# Patient Record
Sex: MALE | Race: WHITE | Employment: OTHER | URBAN - METROPOLITAN AREA
[De-identification: names, ages, dates, MRNs, and addresses within clinical notes are randomized per-mention and may not be internally consistent; named-entity substitution may affect disease eponyms.]

---

## 2017-01-05 ENCOUNTER — OFFICE VISIT (OUTPATIENT)
Dept: CARDIOLOGY | Facility: CLINIC | Age: 67
End: 2017-01-05
Payer: COMMERCIAL

## 2017-01-05 VITALS
DIASTOLIC BLOOD PRESSURE: 80 MMHG | BODY MASS INDEX: 25.48 KG/M2 | HEIGHT: 69 IN | HEART RATE: 72 BPM | SYSTOLIC BLOOD PRESSURE: 130 MMHG | WEIGHT: 172 LBS

## 2017-01-05 DIAGNOSIS — I47.29 PAROXYSMAL VENTRICULAR TACHYCARDIA (H): ICD-10-CM

## 2017-01-05 PROCEDURE — 93000 ELECTROCARDIOGRAM COMPLETE: CPT | Performed by: INTERNAL MEDICINE

## 2017-01-05 PROCEDURE — 99215 OFFICE O/P EST HI 40 MIN: CPT | Performed by: INTERNAL MEDICINE

## 2017-01-05 NOTE — PROGRESS NOTES
HPI and Plan:   See dictation    Orders Placed This Encounter   Procedures     EKG 12-lead complete w/read - Clinics (performed today)     EP Ablation Procedures       Orders Placed This Encounter   Medications     tamsulosin (FLOMAX) 80 mcg/mL     Sig: Take 400 mcg by mouth daily       There are no discontinued medications.      Encounter Diagnoses   Name Primary?     Atrial flutter (H) Yes     Paroxysmal ventricular tachycardia (H)        CURRENT MEDICATIONS:  Current Outpatient Prescriptions   Medication Sig Dispense Refill     tamsulosin (FLOMAX) 80 mcg/mL Take 400 mcg by mouth daily       budesonide-formoterol (SYMBICORT) 160-4.5 MCG/ACT Inhaler Inhale 1 puff into the lungs daily       tiotropium (SPIRIVA) 18 MCG capsule Inhale 18 mcg into the lungs daily       HYDROcodone-acetaminophen (NORCO) 5-325 MG per tablet Take 1-2 tablets by mouth every 4 hours as needed for other (Moderate to Severe Pain) 20 tablet 0     lisinopril (PRINIVIL,ZESTRIL) 20 MG tablet Take 1 tablet (20 mg) by mouth daily 30 tablet 0     aspirin EC 81 MG tablet Take 1 tablet (81 mg) by mouth daily 100 tablet 10     albuterol (VENTOLIN HFA) 108 (90 BASE) MCG/ACT inhaler Inhale 2 puffs into the lungs every 6 hours PRN       fluticasone-salmeterol (ADVAIR DISKUS) 250-50 MCG/DOSE diskus inhaler Inhale 2 puffs into the lungs 2 times daily          ALLERGIES     Allergies   Allergen Reactions     Flecainide Palpitations       PAST MEDICAL HISTORY:  Past Medical History   Diagnosis Date     Persistent atrial fibrillation (H) 4/28/09     ablation 7/1/2015     COPD (chronic obstructive pulmonary disease) (H)      Tricuspid valve disorder      Dr Aj, Hx of valve repair      Atrial flutter (H)      Hypertension      Diverticulitis      COPD (chronic obstructive pulmonary disease) (H)      PVC (premature ventricular contraction)      s/p ablation 12/5/2017       PAST SURGICAL HISTORY:  Past Surgical History   Procedure Laterality Date     Valve  replacement       tricuspid     Orthopedic surgery       Left hip replacement     Abdomen surgery       hernia repair right     Small intestine surgery       had bowel obstruction age 9     Appendectomy       Laparoscopic cholecystectomy  1/6/2012     Procedure:LAPAROSCOPIC CHOLECYSTECTOMY; LAPAROSCOPIC CHOLECYSTECTOMY ; Surgeon:ROGER PACHECO; Location:[facility] OR     Cardioversion  06/03/2009     successful for afib     Cardioversion  4/20/15     successful for afib     Cardioversion  5/28/15     H ablation focal afib  7/1/15     Left atrial linear ablation and pulmonary vein antrum ablation     Repair valve tricuspid  9/8/08     conversion to a bileaflet valve and application of a 30 mm Sanderson ring     Incision and drainage lower extremity, combined Right 11/10/2016     Procedure: COMBINED INCISION AND DRAINAGE LOWER EXTREMITY;  Surgeon: Roger Pacheco MD;  Location: [facility] OR     H ablation pvc  12/5/16       FAMILY HISTORY:  Family History   Problem Relation Age of Onset     Prostate Cancer Father      Family History Negative Mother      Emphysema Mother      Hypertension Mother      CEREBROVASCULAR DISEASE Mother        SOCIAL HISTORY:  Social History     Social History     Marital Status:      Spouse Name: N/A     Number of Children: N/A     Years of Education: N/A     Social History Main Topics     Smoking status: Former Smoker     Quit date: 01/02/2008     Smokeless tobacco: Never Used     Alcohol Use: 0.0 oz/week     0 Standard drinks or equivalent per week      Comment: 1 drink daily     Drug Use: No     Sexual Activity: No     Other Topics Concern     Caffeine Concern No     1 a day      Sleep Concern Yes     nocturia     Weight Concern No     Special Diet No     Exercise No     Bike Helmet Yes     Seat Belt Yes     Parent/Sibling W/ Cabg, Mi Or Angioplasty Before 65f 55m? No     Social History Narrative       Review of Systems:  Skin:  Positive for itching skin cancer removed recently  "  Eyes:  Positive for glasses    ENT:  Negative      Respiratory:    dyspnea on exertion;shortness of breath;cough;wheezing;mucoid expectorant COPD---increased sob   Cardiovascular:    Positive for;dizziness;chest pain dizziness with bending over  Gastroenterology: Positive for melena;hematochezia    Genitourinary:  Positive for nocturia    Musculoskeletal:  Positive for joint pain L elbow  Neurologic:  Positive for headaches right leg is cold  Psychiatric:  Positive for sleep disturbances    Heme/Lymph/Imm:  Positive for allergies    Endocrine:  Positive for diabetes      Physical Exam:  Vitals: /80 mmHg  Pulse 72  Ht 1.753 m (5' 9\")  Wt 78.019 kg (172 lb)  BMI 25.39 kg/m2    Constitutional:  cooperative, alert and oriented, well developed, well nourished, in no acute distress        Skin:  warm and dry to the touch, no apparent skin lesions or masses noted        Head:  normocephalic, no masses or lesions        Eyes:  pupils equal and round;conjunctivae and lids unremarkable;sclera white        ENT:  no pallor or cyanosis, dentition good        Neck:  carotid pulses are full and equal bilaterally, JVP normal, no carotid bruit, no thyromegaly        Chest:  clear to auscultation diminished breath sounds bilaterally        Cardiac: normal S1 and S2;regular rhythm;no murmurs, gallops or rubs detected                  Abdomen:  abdomen soft        Vascular: pulses full and equal                               right femoral bruit (-) left subclavian bruit (-)      Extremities and Back:  no deformities, clubbing, cyanosis, erythema observed;no edema              Neurological:  affect appropriate, oriented to time, person and place              CC  No referring provider defined for this encounter.                "

## 2017-01-05 NOTE — Clinical Note
2017      Ana Pratt MD    [facility]    [address] [place]  [address]       RE: Tone Cooper   MRN: [id number]   : 1950      Dear Dr. Pratt:      I saw Mr. Cooper for followup of PVC.  He is a 66-year-old white male with a history of tricuspid repair.  He had atrial fibrillation ablation that was successful without recurrence.  The patient later presented with symptomatic frequent PVCs that failed medical therapy.  He underwent ablation on 2016.  Unfortunately, during the ablation his PVC became very infrequent, which made the procedure very time-consuming.  In addition, with isoproterenol infusion, he developed other PVCs from the left ventricle.  Attempted ablation over the RV outflow tract failed to eliminate the PVCs.  Subsequently, he was put on a Holter monitor which showed single morphology PVCs and frequent runs of nonsustained VT.  The total ventricular ectopic counting was 39,876, which accounted for 29.75% of his total heartbeat.  The patient continues to have palpitations, particularly with exertion.  That was consistent with the Holter results, which showed more frequent PVCs and VT with faster sinus rate.      PHYSICAL EXAMINATION:   VITAL SIGNS:  Blood pressure was 130/80, heart rate 72 beats per minute, body weight 172 pounds.   HEENT:  The eyes and ENT were unremarkable.   LUNGS:  Clear.   CARDIAC:  Rhythm was irregular with frequent premature contractions.  The heart sounds were normal without murmur.   ABDOMEN:  Showed no hepatomegaly.   EXTREMITIES:  There was no pedal edema.      EKG showed sinus rhythm with 1 PVC that was recorded on the limb leads.      ASSESSMENT AND RECOMMENDATIONS:  Mr. Cooper continues to have frequent single morphology PVCs and nonsustained VT.  He is symptomatic with the arrhythmia.  Previous medical therapy was shown to be unsuccessful.  Based on the above, I have recommended a repeat PVC ablation.  With the repeat  ablation we will prepare for both the left and right ventricular mapping.  Hopefully, that will take care of the clinical PVCs permanently.  The risks and benefits of PVC ablation were explained to the patient, who expressed understanding and consented for the procedure.  The procedure will be scheduled electively in the next few weeks.      Sincerely,            Sussy Britt MD

## 2017-01-05 NOTE — MR AVS SNAPSHOT
"              After Visit Summary   1/5/2017    Tone Cooper    MRN: [id number]           Patient Information     Date Of Birth          1950        Visit Information        Provider Department      1/5/2017 1:15 PM Sussy Britt MD [facility]        Today's Diagnoses     Paroxysmal ventricular tachycardia (H)            Follow-ups after your visit        Future tests that were ordered for you today     Open Future Orders        Priority Expected Expires Ordered    EP Ablation Procedures Routine 1/12/2017 1/5/2018 1/5/2017            Who to contact     If you have questions or need follow up information about today's clinic visit or your schedule please contact [facility] directly at [phone].  Normal or non-critical lab and imaging results will be communicated to you by MyChart, letter or phone within 4 business days after the clinic has received the results. If you do not hear from us within 7 days, please contact the clinic through MyChart or phone. If you have a critical or abnormal lab result, we will notify you by phone as soon as possible.  Submit refill requests through [employer] or call your pharmacy and they will forward the refill request to us. Please allow 3 business days for your refill to be completed.          Additional Information About Your Visit        Care EveryWhere ID     This is your Care EveryWhere ID. This could be used by other organizations to access your [facility] medical records  [id number]        Your Vitals Were     Pulse Height BMI (Body Mass Index)             72 1.753 m (5' 9\") 25.39 kg/m2          Blood Pressure from Last 3 Encounters:   01/05/17 130/80   12/05/16 162/92   11/10/16 135/88    Weight from Last 3 Encounters:   01/05/17 78.019 kg (172 lb)   12/05/16 74.98 kg (165 lb 4.8 oz)   11/10/16 77.474 kg (170 lb 12.8 oz)              We Performed the Following     EKG 12-lead complete " w/read - Clinics (performed today)        Primary Care Provider Office Phone # Fax #    Ana Pratt [phone]       [employer] [address] [facility] [address]        Thank you!     Thank you for choosing [facility] [place]  for your care. Our goal is always to provide you with excellent care. Hearing back from our patients is one way we can continue to improve our services. Please take a few minutes to complete the written survey that you may receive in the mail after your visit with us. Thank you!             Your Updated Medication List - Protect others around you: Learn how to safely use, store and throw away your medicines at www.disposemymeds.org.          This list is accurate as of: 1/5/17  1:24 PM.  Always use your most recent med list.                   Brand Name Dispense Instructions for use    ADVAIR DISKUS 250-50 MCG/DOSE diskus inhaler   Generic drug:  fluticasone-salmeterol      Inhale 2 puffs into the lungs 2 times daily       aspirin EC 81 MG EC tablet     100 tablet    Take 1 tablet (81 mg) by mouth daily       budesonide-formoterol 160-4.5 MCG/ACT Inhaler    SYMBICORT     Inhale 1 puff into the lungs daily       HYDROcodone-acetaminophen 5-325 MG per tablet    NORCO    20 tablet    Take 1-2 tablets by mouth every 4 hours as needed for other (Moderate to Severe Pain)       lisinopril 20 MG tablet    PRINIVIL/ZESTRIL    30 tablet    Take 1 tablet (20 mg) by mouth daily       tamsulosin 80 mcg/mL    FLOMAX     Take 400 mcg by mouth daily       tiotropium 18 MCG capsule    SPIRIVA     Inhale 18 mcg into the lungs daily       VENTOLIN  (90 BASE) MCG/ACT Inhaler   Generic drug:  albuterol      Inhale 2 puffs into the lungs every 6 hours PRN

## 2017-01-05 NOTE — PROGRESS NOTES
2017      Ana Pratt MD    [facility]    [address] [place]  [address]       RE: Tone Cooper   MRN: [id number]   : 1950      Dear Dr. Pratt:      I saw Mr. Cooper for followup of PVC.  He is a 66-year-old white male with a history of tricuspid repair.  He had atrial fibrillation ablation that was successful without recurrence.  The patient later presented with symptomatic frequent PVCs that failed medical therapy.  He underwent ablation on 2016.  Unfortunately, during the ablation his PVC became very infrequent, which made the procedure very time-consuming.  In addition, with isoproterenol infusion, he developed other PVCs from the left ventricle.  Attempted ablation over the RV outflow tract failed to eliminate the PVCs.  Subsequently, he was put on a Holter monitor which showed single morphology PVCs and frequent runs of nonsustained VT.  The total ventricular ectopic counting was 39,876, which accounted for 29.75% of his total heartbeat.  The patient continues to have palpitations, particularly with exertion.  That was consistent with the Holter results, which showed more frequent PVCs and VT with faster sinus rate.      PHYSICAL EXAMINATION:   VITAL SIGNS:  Blood pressure was 130/80, heart rate 72 beats per minute, body weight 172 pounds.   HEENT:  The eyes and ENT were unremarkable.   LUNGS:  Clear.   CARDIAC:  Rhythm was irregular with frequent premature contractions.  The heart sounds were normal without murmur.   ABDOMEN:  Showed no hepatomegaly.   EXTREMITIES:  There was no pedal edema.      EKG showed sinus rhythm with 1 PVC that was recorded on the limb leads.      ASSESSMENT AND RECOMMENDATIONS:  Mr. Cooper continues to have frequent single morphology PVCs and nonsustained VT.  He is symptomatic with the arrhythmia.  Previous medical therapy was shown to be unsuccessful.  Based on the above, I have recommended a repeat PVC ablation.  With the repeat  ablation we will prepare for both the left and right ventricular mapping.  Hopefully, that will take care of the clinical PVCs permanently.  The risks and benefits of PVC ablation were explained to the patient, who expressed understanding and consented for the procedure.  The procedure will be scheduled electively in the next few weeks.      Sincerely,            MD NORIS De Leon MD             D: 2017 13:30   T: 2017 15:51   MT: ROBERTO      Name:     FELIPE AYOUB   MRN:      [id number]        Account:      [id number]   :      1950           Service Date: 2017      Document: [id number]

## 2017-01-20 DIAGNOSIS — I47.29 PAROXYSMAL VENTRICULAR TACHYCARDIA (H): Primary | ICD-10-CM

## 2017-01-20 RX ORDER — SODIUM CHLORIDE 450 MG/100ML
INJECTION, SOLUTION INTRAVENOUS CONTINUOUS
Status: CANCELLED | OUTPATIENT
Start: 2017-01-20

## 2017-01-20 RX ORDER — LIDOCAINE 40 MG/G
CREAM TOPICAL
Status: CANCELLED | OUTPATIENT
Start: 2017-01-20

## 2017-01-23 ENCOUNTER — APPOINTMENT (OUTPATIENT)
Dept: CARDIOLOGY | Facility: CLINIC | Age: 67
End: 2017-01-23
Attending: INTERNAL MEDICINE
Payer: COMMERCIAL

## 2017-01-23 ENCOUNTER — HOSPITAL ENCOUNTER (OUTPATIENT)
Facility: CLINIC | Age: 67
Discharge: HOME | End: 2017-01-24
Attending: INTERNAL MEDICINE | Admitting: INTERNAL MEDICINE
Payer: COMMERCIAL

## 2017-01-23 DIAGNOSIS — I47.29 PAROXYSMAL VENTRICULAR TACHYCARDIA (H): ICD-10-CM

## 2017-01-23 LAB
ANION GAP SERPL CALCULATED.3IONS-SCNC: 6 MMOL/L (ref 3–14)
BUN SERPL-MCNC: 23 MG/DL (ref 7–30)
CALCIUM SERPL-MCNC: 9.7 MG/DL (ref 8.5–10.1)
CHLORIDE SERPL-SCNC: 105 MMOL/L (ref 94–109)
CO2 SERPL-SCNC: 29 MMOL/L (ref 20–32)
CREAT SERPL-MCNC: 1.09 MG/DL (ref 0.66–1.25)
ERYTHROCYTE [DISTWIDTH] IN BLOOD BY AUTOMATED COUNT: 13.7 % (ref 10–15)
GFR SERPL CREATININE-BSD FRML MDRD: 68 ML/MIN/1.7M2
GLUCOSE SERPL-MCNC: 107 MG/DL (ref 70–99)
HCT VFR BLD AUTO: 50 % (ref 40–53)
HGB BLD-MCNC: 17.3 G/DL (ref 13.3–17.7)
MCH RBC QN AUTO: 29.7 PG (ref 26.5–33)
MCHC RBC AUTO-ENTMCNC: 34.6 G/DL (ref 31.5–36.5)
MCV RBC AUTO: 86 FL (ref 78–100)
PLATELET # BLD AUTO: 192 10E9/L (ref 150–450)
POTASSIUM SERPL-SCNC: 4.6 MMOL/L (ref 3.4–5.3)
RBC # BLD AUTO: 5.82 10E12/L (ref 4.4–5.9)
SODIUM SERPL-SCNC: 140 MMOL/L (ref 133–144)
WBC # BLD AUTO: 6.1 10E9/L (ref 4–11)

## 2017-01-23 PROCEDURE — 93654 COMPRE EP EVAL TX VT: CPT

## 2017-01-23 PROCEDURE — 02K83ZZ MAP CONDUCTION MECHANISM, PERCUTANEOUS APPROACH: ICD-10-PCS | Performed by: INTERNAL MEDICINE

## 2017-01-23 PROCEDURE — 025L3ZZ DESTRUCTION OF LEFT VENTRICLE, PERCUTANEOUS APPROACH: ICD-10-PCS | Performed by: INTERNAL MEDICINE

## 2017-01-23 PROCEDURE — 27210782 ZZH KIT EP TOTES DISP CR7

## 2017-01-23 PROCEDURE — C1730 CATH, EP, 19 OR FEW ELECT: HCPCS

## 2017-01-23 PROCEDURE — 4A023FZ MEASUREMENT OF CARDIAC RHYTHM, PERCUTANEOUS APPROACH: ICD-10-PCS | Performed by: INTERNAL MEDICINE

## 2017-01-23 PROCEDURE — 27210964 ZZH ACCESS EP PROC CR8

## 2017-01-23 PROCEDURE — 99153 MOD SED SAME PHYS/QHP EA: CPT

## 2017-01-23 PROCEDURE — 3E033KZ INTRODUCTION OF OTHER DIAGNOSTIC SUBSTANCE INTO PERIPHERAL VEIN, PERCUTANEOUS APPROACH: ICD-10-PCS | Performed by: INTERNAL MEDICINE

## 2017-01-23 PROCEDURE — 93654 COMPRE EP EVAL TX VT: CPT | Performed by: INTERNAL MEDICINE

## 2017-01-23 PROCEDURE — 25000125 ZZHC RX 250: Performed by: INTERNAL MEDICINE

## 2017-01-23 PROCEDURE — 40000852 ZZH STATISTIC HEART CATH LAB OR EP LAB

## 2017-01-23 PROCEDURE — 40000065 ZZH STATISTIC EKG NON-CHARGEABLE

## 2017-01-23 PROCEDURE — 85347 COAGULATION TIME ACTIVATED: CPT | Mod: 91

## 2017-01-23 PROCEDURE — 99152 MOD SED SAME PHYS/QHP 5/>YRS: CPT

## 2017-01-23 PROCEDURE — 93623 PRGRMD STIMJ&PACG IV RX NFS: CPT | Mod: 26 | Performed by: INTERNAL MEDICINE

## 2017-01-23 PROCEDURE — 93623 PRGRMD STIMJ&PACG IV RX NFS: CPT

## 2017-01-23 PROCEDURE — 25000134 H RX MED IP 250 OP 636 PS 250: Performed by: INTERNAL MEDICINE

## 2017-01-23 PROCEDURE — 27210796 ZZH PAD EXTRNAL REFRENCE CARDIAC MAPPING CR14

## 2017-01-23 PROCEDURE — 27210795 ZZH PAD DEFIB QUICK CR4

## 2017-01-23 PROCEDURE — 80048 BASIC METABOLIC PNL TOTAL CA: CPT | Performed by: INTERNAL MEDICINE

## 2017-01-23 PROCEDURE — 25000132 ZZH RX MED GY IP 250 OP 250 PS 637: Performed by: INTERNAL MEDICINE

## 2017-01-23 PROCEDURE — 99152 MOD SED SAME PHYS/QHP 5/>YRS: CPT | Mod: 59 | Performed by: INTERNAL MEDICINE

## 2017-01-23 PROCEDURE — 27210995 ZZH RX 272: Performed by: INTERNAL MEDICINE

## 2017-01-23 PROCEDURE — 40000936 ZZH STATISTIC OUTPATIENT (NON-OBS) NIGHT

## 2017-01-23 PROCEDURE — 99153 MOD SED SAME PHYS/QHP EA: CPT | Mod: 59 | Performed by: INTERNAL MEDICINE

## 2017-01-23 PROCEDURE — 40000268 ZZH STATISTIC NO CHARGES

## 2017-01-23 PROCEDURE — 85027 COMPLETE CBC AUTOMATED: CPT | Performed by: INTERNAL MEDICINE

## 2017-01-23 PROCEDURE — 93005 ELECTROCARDIOGRAM TRACING: CPT

## 2017-01-23 PROCEDURE — 36415 COLL VENOUS BLD VENIPUNCTURE: CPT | Performed by: INTERNAL MEDICINE

## 2017-01-23 PROCEDURE — 93010 ELECTROCARDIOGRAM REPORT: CPT | Performed by: INTERNAL MEDICINE

## 2017-01-23 RX ORDER — PROMETHAZINE HYDROCHLORIDE 25 MG/ML
6.25-25 INJECTION, SOLUTION INTRAMUSCULAR; INTRAVENOUS EVERY 4 HOURS PRN
Status: DISCONTINUED | OUTPATIENT
Start: 2017-01-23 | End: 2017-01-23 | Stop reason: HOSPADM

## 2017-01-23 RX ORDER — LORAZEPAM 2 MG/ML
.5-2 INJECTION INTRAMUSCULAR EVERY 10 MIN PRN
Status: DISCONTINUED | OUTPATIENT
Start: 2017-01-23 | End: 2017-01-23 | Stop reason: HOSPADM

## 2017-01-23 RX ORDER — BUPIVACAINE HYDROCHLORIDE 2.5 MG/ML
10-30 INJECTION, SOLUTION EPIDURAL; INFILTRATION; INTRACAUDAL
Status: DISCONTINUED | OUTPATIENT
Start: 2017-01-23 | End: 2017-01-23 | Stop reason: HOSPADM

## 2017-01-23 RX ORDER — HYDRALAZINE HYDROCHLORIDE 20 MG/ML
20 INJECTION INTRAMUSCULAR; INTRAVENOUS EVERY 6 HOURS PRN
Status: DISCONTINUED | OUTPATIENT
Start: 2017-01-23 | End: 2017-01-24 | Stop reason: HOSPADM

## 2017-01-23 RX ORDER — DIPHENHYDRAMINE HYDROCHLORIDE 50 MG/ML
25-50 INJECTION INTRAMUSCULAR; INTRAVENOUS
Status: DISCONTINUED | OUTPATIENT
Start: 2017-01-23 | End: 2017-01-23 | Stop reason: HOSPADM

## 2017-01-23 RX ORDER — FENTANYL CITRATE 50 UG/ML
25 INJECTION, SOLUTION INTRAMUSCULAR; INTRAVENOUS EVERY 30 MIN PRN
Status: DISCONTINUED | OUTPATIENT
Start: 2017-01-23 | End: 2017-01-24 | Stop reason: HOSPADM

## 2017-01-23 RX ORDER — NALOXONE HYDROCHLORIDE 0.4 MG/ML
.1-.4 INJECTION, SOLUTION INTRAMUSCULAR; INTRAVENOUS; SUBCUTANEOUS
Status: DISCONTINUED | OUTPATIENT
Start: 2017-01-23 | End: 2017-01-24 | Stop reason: HOSPADM

## 2017-01-23 RX ORDER — FENTANYL CITRATE 50 UG/ML
25-50 INJECTION, SOLUTION INTRAMUSCULAR; INTRAVENOUS
Status: DISCONTINUED | OUTPATIENT
Start: 2017-01-23 | End: 2017-01-23 | Stop reason: HOSPADM

## 2017-01-23 RX ORDER — LIDOCAINE HYDROCHLORIDE 10 MG/ML
10-30 INJECTION, SOLUTION EPIDURAL; INFILTRATION; INTRACAUDAL; PERINEURAL
Status: COMPLETED | OUTPATIENT
Start: 2017-01-23 | End: 2017-01-23

## 2017-01-23 RX ORDER — IBUTILIDE FUMARATE 1 MG/10ML
1 INJECTION, SOLUTION INTRAVENOUS
Status: DISCONTINUED | OUTPATIENT
Start: 2017-01-23 | End: 2017-01-23 | Stop reason: HOSPADM

## 2017-01-23 RX ORDER — MORPHINE SULFATE 2 MG/ML
1-2 INJECTION, SOLUTION INTRAMUSCULAR; INTRAVENOUS EVERY 5 MIN PRN
Status: DISCONTINUED | OUTPATIENT
Start: 2017-01-23 | End: 2017-01-23 | Stop reason: HOSPADM

## 2017-01-23 RX ORDER — DOBUTAMINE HYDROCHLORIDE 200 MG/100ML
5-40 INJECTION INTRAVENOUS CONTINUOUS PRN
Status: DISCONTINUED | OUTPATIENT
Start: 2017-01-23 | End: 2017-01-23 | Stop reason: HOSPADM

## 2017-01-23 RX ORDER — NALOXONE HYDROCHLORIDE 0.4 MG/ML
0.4 INJECTION, SOLUTION INTRAMUSCULAR; INTRAVENOUS; SUBCUTANEOUS EVERY 5 MIN PRN
Status: DISCONTINUED | OUTPATIENT
Start: 2017-01-23 | End: 2017-01-23 | Stop reason: HOSPADM

## 2017-01-23 RX ORDER — SODIUM CHLORIDE 450 MG/100ML
INJECTION, SOLUTION INTRAVENOUS CONTINUOUS
Status: DISCONTINUED | OUTPATIENT
Start: 2017-01-23 | End: 2017-01-23 | Stop reason: HOSPADM

## 2017-01-23 RX ORDER — ONDANSETRON 2 MG/ML
4 INJECTION INTRAMUSCULAR; INTRAVENOUS EVERY 4 HOURS PRN
Status: DISCONTINUED | OUTPATIENT
Start: 2017-01-23 | End: 2017-01-23 | Stop reason: HOSPADM

## 2017-01-23 RX ORDER — LIDOCAINE HYDROCHLORIDE 10 MG/ML
10-30 INJECTION, SOLUTION EPIDURAL; INFILTRATION; INTRACAUDAL; PERINEURAL
Status: DISCONTINUED | OUTPATIENT
Start: 2017-01-23 | End: 2017-01-23 | Stop reason: HOSPADM

## 2017-01-23 RX ORDER — FLUMAZENIL 0.1 MG/ML
0.2 INJECTION, SOLUTION INTRAVENOUS
Status: DISCONTINUED | OUTPATIENT
Start: 2017-01-23 | End: 2017-01-23 | Stop reason: HOSPADM

## 2017-01-23 RX ORDER — LIDOCAINE HYDROCHLORIDE AND EPINEPHRINE 10; 10 MG/ML; UG/ML
10-30 INJECTION, SOLUTION INFILTRATION; PERINEURAL
Status: DISCONTINUED | OUTPATIENT
Start: 2017-01-23 | End: 2017-01-23 | Stop reason: HOSPADM

## 2017-01-23 RX ORDER — IBUTILIDE FUMARATE 1 MG/10ML
0.01 INJECTION, SOLUTION INTRAVENOUS
Status: DISCONTINUED | OUTPATIENT
Start: 2017-01-23 | End: 2017-01-23 | Stop reason: HOSPADM

## 2017-01-23 RX ORDER — PROTAMINE SULFATE 10 MG/ML
1-5 INJECTION, SOLUTION INTRAVENOUS
Status: DISCONTINUED | OUTPATIENT
Start: 2017-01-23 | End: 2017-01-23 | Stop reason: HOSPADM

## 2017-01-23 RX ORDER — LIDOCAINE 40 MG/G
CREAM TOPICAL
Status: DISCONTINUED | OUTPATIENT
Start: 2017-01-23 | End: 2017-01-23 | Stop reason: HOSPADM

## 2017-01-23 RX ORDER — ADENOSINE 3 MG/ML
6-12 INJECTION, SOLUTION INTRAVENOUS EVERY 5 MIN PRN
Status: DISCONTINUED | OUTPATIENT
Start: 2017-01-23 | End: 2017-01-23 | Stop reason: HOSPADM

## 2017-01-23 RX ORDER — FUROSEMIDE 10 MG/ML
20-100 INJECTION INTRAMUSCULAR; INTRAVENOUS
Status: DISCONTINUED | OUTPATIENT
Start: 2017-01-23 | End: 2017-01-23 | Stop reason: HOSPADM

## 2017-01-23 RX ORDER — DIPHENHYDRAMINE HYDROCHLORIDE 50 MG/ML
25 INJECTION INTRAMUSCULAR; INTRAVENOUS
Status: DISCONTINUED | OUTPATIENT
Start: 2017-01-23 | End: 2017-01-24 | Stop reason: HOSPADM

## 2017-01-23 RX ORDER — KETOROLAC TROMETHAMINE 15 MG/ML
15 INJECTION, SOLUTION INTRAMUSCULAR; INTRAVENOUS EVERY 6 HOURS PRN
Status: DISCONTINUED | OUTPATIENT
Start: 2017-01-23 | End: 2017-01-24 | Stop reason: HOSPADM

## 2017-01-23 RX ORDER — HEPARIN SODIUM 1000 [USP'U]/ML
1000-10000 INJECTION, SOLUTION INTRAVENOUS; SUBCUTANEOUS EVERY 5 MIN PRN
Status: DISCONTINUED | OUTPATIENT
Start: 2017-01-23 | End: 2017-01-23 | Stop reason: HOSPADM

## 2017-01-23 RX ORDER — KETOROLAC TROMETHAMINE 30 MG/ML
15 INJECTION, SOLUTION INTRAMUSCULAR; INTRAVENOUS
Status: DISCONTINUED | OUTPATIENT
Start: 2017-01-23 | End: 2017-01-23 | Stop reason: HOSPADM

## 2017-01-23 RX ORDER — PROTAMINE SULFATE 10 MG/ML
5-40 INJECTION, SOLUTION INTRAVENOUS EVERY 10 MIN PRN
Status: DISCONTINUED | OUTPATIENT
Start: 2017-01-23 | End: 2017-01-23 | Stop reason: HOSPADM

## 2017-01-23 RX ADMIN — FENTANYL CITRATE: 50 INJECTION, SOLUTION INTRAMUSCULAR; INTRAVENOUS at 18:42

## 2017-01-23 RX ADMIN — Medication 2 MCG/MIN: at 13:00

## 2017-01-23 RX ADMIN — FENTANYL CITRATE 25 MCG: 50 INJECTION, SOLUTION INTRAMUSCULAR; INTRAVENOUS at 16:46

## 2017-01-23 RX ADMIN — MIDAZOLAM HYDROCHLORIDE 1 MG: 1 INJECTION, SOLUTION INTRAMUSCULAR; INTRAVENOUS at 13:02

## 2017-01-23 RX ADMIN — KETOROLAC TROMETHAMINE 15 MG: 15 INJECTION, SOLUTION INTRAMUSCULAR; INTRAVENOUS at 16:44

## 2017-01-23 RX ADMIN — MIDAZOLAM HYDROCHLORIDE 1 MG: 1 INJECTION, SOLUTION INTRAMUSCULAR; INTRAVENOUS at 13:36

## 2017-01-23 RX ADMIN — HYDRALAZINE HYDROCHLORIDE 10 MG: 20 INJECTION INTRAMUSCULAR; INTRAVENOUS at 15:00

## 2017-01-23 RX ADMIN — LIDOCAINE HYDROCHLORIDE 10 ML: 10 INJECTION, SOLUTION EPIDURAL; INFILTRATION; INTRACAUDAL; PERINEURAL at 13:10

## 2017-01-23 RX ADMIN — ACETAMINOPHEN AND CODEINE PHOSPHATE 2 TABLET: 300; 30 TABLET ORAL at 16:02

## 2017-01-23 RX ADMIN — SODIUM CHLORIDE: 4.5 INJECTION, SOLUTION INTRAVENOUS at 11:56

## 2017-01-23 RX ADMIN — FENTANYL CITRATE 50 MCG: 50 INJECTION, SOLUTION INTRAMUSCULAR; INTRAVENOUS at 13:02

## 2017-01-23 RX ADMIN — FENTANYL CITRATE 50 MCG: 50 INJECTION, SOLUTION INTRAMUSCULAR; INTRAVENOUS at 13:35

## 2017-01-23 RX ADMIN — MIDAZOLAM HYDROCHLORIDE 1 MG: 1 INJECTION, SOLUTION INTRAMUSCULAR; INTRAVENOUS at 13:37

## 2017-01-23 RX ADMIN — HYDRALAZINE HYDROCHLORIDE 10 MG: 20 INJECTION INTRAMUSCULAR; INTRAVENOUS at 15:06

## 2017-01-23 RX ADMIN — DIPHENHYDRAMINE HYDROCHLORIDE 25 MG: 50 INJECTION, SOLUTION INTRAMUSCULAR; INTRAVENOUS at 16:40

## 2017-01-23 RX ADMIN — MIDAZOLAM HYDROCHLORIDE 1 MG: 1 INJECTION, SOLUTION INTRAMUSCULAR; INTRAVENOUS at 15:08

## 2017-01-23 RX ADMIN — Medication 5000 UNITS: at 13:22

## 2017-01-23 NOTE — PROGRESS NOTES
PVC ablation:  Intermittent PVCs with 2 different QRS morphologies (similar axis on limb leads and LBBB/RBBB on precordial leads).  Ablation via retrograde aortic approach. PVCs were deep in the mitral-aorta junction posteriorly, likely the same focus with different conduction pathways.   Post ablation: occasional accelerated ventricular rhythm with similar QRS to the first PVC.  No complications.  May be discharged around 10 PM.  Follow up ordered.

## 2017-01-23 NOTE — PROGRESS NOTES
Pt admitted to care suites room 18 for a VT ablation. Pt states he understands why he is here and the process. VSS, IV inserted and fluids started. Awaiting MD arrival and lab results.

## 2017-01-23 NOTE — ED AVS SNAPSHOT
[facility]    [address] [facility] [address]    Phone:  [phone]                                       Tone Cooper   MRN: [id number]    Department:  [facility]   Date of Visit:  1/23/2017           After Visit Summary Signature Page     I have received my discharge instructions, and my questions have been answered. I have discussed any challenges I see with this plan with the nurse or doctor.    ..........................................................................................................................................  Patient/Patient Representative Signature      ..........................................................................................................................................  Patient Representative Print Name and Relationship to Patient    ..................................................               ................................................  Date                                            Time    ..........................................................................................................................................  Reviewed by Signature/Title    ...................................................              ..............................................  Date                                                            Time

## 2017-01-23 NOTE — ED AVS SNAPSHOT
MRN:[id number]                      After Visit Summary   1/23/2017    Tone Cooper    MRN: [id number]           Thank you!     Thank you for choosing [facility] for your care. Our goal is always to provide you with excellent care. Hearing back from our patients is one way we can continue to improve our services. Please take a few minutes to complete the written survey that you may receive in the mail after you visit with us. Thank you!        Patient Information     Date Of Birth          1950        About your hospital stay     You were [facility] on:  January 23[facility] 2017 [facility]    You were discharged on:  January 24, 2017       Who to Call     For medical emergencies, please call 911.  For non-urgent questions about your medical care, please call your primary care provider or clinic, [phone]          Attending Provider     Provider    Sussy Britt MD       Primary Care Provider Office Phone # Fax #    Ana Pratt [phone]       [facility] [id number] [place] [address]        Your next 10 appointments already scheduled     Feb 20, 2017  8:10 AM   Return Visit with Geovanna Bullard PA-C   [facility] [place] ([facility])    [address] [place], [place] [address]  [facility] [address] [phone]              Additional Services     Follow-Up with Electrophysiologist                 Future tests that were ordered for you     EKG 12-lead complete w/read  (to be scheduled)                 Further instructions from your care team       1. Protect right groin site - no lifting, twisting, bending x 48 hours. Do not soak in tub or submerge in water x 1 week. OK to shower.  2. OK to return to work tomorrow with lifting restriction (letter written)  3. Call Dr. Britt's nurses if you have R groin site pain, bleeding, increased palpitations, increased shortness of breath,  "fainting or severe chest pain.  Our nurses are @ [phone] (Nichelle Bernal).     4. Follow up made.    Pending Results     Date and Time Order Name Status Description    1/24/2017 0759 EKG 12-lead, tracing only Preliminary     1/23/2017 1553 EKG 12-lead, tracing only Preliminary     1/23/2017 1128 EKG 12-lead, tracing only Preliminary             Statement of Approval     Ordered          01/24/17 0823  I have reviewed and agree with all the recommendations and orders detailed in this document.   EFFECTIVE NOW     Approved and electronically signed by:  Geovanna Bullard PA-C             Admission Information        Provider Department Dept Phone    1/23/2017 Sussy Britt MD [facility] [phone]      Your Vitals Were     Blood Pressure Pulse Temperature    126/81 mmHg 49 97.3  F (36.3  C) (Oral)    Respirations Height Weight    18 1.778 m (5' 10\") 77.8 kg (171 lb 8.3 oz)    BMI (Body Mass Index) Pulse Oximetry       24.61 kg/m2 97%       Care EveryWhere ID     This is your Care EveryWhere ID. This could be used by other organizations to access your [facility] medical records  [id number]           Review of your medicines      CONTINUE these medicines which have NOT CHANGED        Dose / Directions    ADVAIR DISKUS 250-50 MCG/DOSE diskus inhaler   Generic drug:  fluticasone-salmeterol        Dose:  2 puff   Inhale 2 puffs into the lungs 2 times daily   Refills:  0       aspirin EC 81 MG EC tablet   Used for:  Atrial fibrillation, persistent (H)        Dose:  81 mg   Take 1 tablet (81 mg) by mouth daily   Quantity:  100 tablet   Refills:  10       HYDROcodone-acetaminophen 5-325 MG per tablet   Commonly known as:  NORCO   Used for:  Abscess of right leg        Dose:  1-2 tablet   Take 1-2 tablets by mouth every 4 hours as needed for other (Moderate to Severe Pain)   Quantity:  20 tablet   Refills:  0       lisinopril 20 MG tablet   Commonly known as:  PRINIVIL/ZESTRIL   Used for:  Essential " hypertension with goal blood pressure less than 140/90        Dose:  20 mg   Take 1 tablet (20 mg) by mouth daily   Quantity:  30 tablet   Refills:  0       tamsulosin 80 mcg/mL   Commonly known as:  FLOMAX        Dose:  400 mcg   Take 400 mcg by mouth daily   Refills:  0       tiotropium 18 MCG capsule   Commonly known as:  SPIRIVA        Dose:  18 mcg   Inhale 18 mcg into the lungs daily   Refills:  0       VENTOLIN  (90 BASE) MCG/ACT Inhaler   Generic drug:  albuterol        Dose:  2 puff   Inhale 2 puffs into the lungs every 6 hours PRN   Refills:  0                Protect others around you: Learn how to safely use, store and throw away your medicines at www.disposemymeds.org.             Medication List: This is a list of all your medications and when to take them. Check marks below indicate your daily home schedule. Keep this list as a reference.      Medications           Morning Afternoon Evening Bedtime As Needed    ADVAIR DISKUS 250-50 MCG/DOSE diskus inhaler   Inhale 2 puffs into the lungs 2 times daily   Generic drug:  fluticasone-salmeterol                                aspirin EC 81 MG EC tablet   Take 1 tablet (81 mg) by mouth daily                                HYDROcodone-acetaminophen 5-325 MG per tablet   Commonly known as:  NORCO   Take 1-2 tablets by mouth every 4 hours as needed for other (Moderate to Severe Pain)                                lisinopril 20 MG tablet   Commonly known as:  PRINIVIL/ZESTRIL   Take 1 tablet (20 mg) by mouth daily                                tamsulosin 80 mcg/mL   Commonly known as:  FLOMAX   Take 400 mcg by mouth daily                                tiotropium 18 MCG capsule   Commonly known as:  SPIRIVA   Inhale 18 mcg into the lungs daily                                VENTOLIN  (90 BASE) MCG/ACT Inhaler   Inhale 2 puffs into the lungs every 6 hours PRN   Generic drug:  albuterol

## 2017-01-24 VITALS
TEMPERATURE: 97.3 F | HEIGHT: 70 IN | DIASTOLIC BLOOD PRESSURE: 81 MMHG | WEIGHT: 171.52 LBS | OXYGEN SATURATION: 97 % | SYSTOLIC BLOOD PRESSURE: 126 MMHG | HEART RATE: 49 BPM | RESPIRATION RATE: 18 BRPM | BODY MASS INDEX: 24.55 KG/M2

## 2017-01-24 LAB
KCT BLD-ACNC: 159 SEC (ref 105–167)
KCT BLD-ACNC: 186 SEC (ref 105–167)

## 2017-01-24 PROCEDURE — 93005 ELECTROCARDIOGRAM TRACING: CPT

## 2017-01-24 PROCEDURE — 40000934 ZZH STATISTIC OUTPATIENT (NON-OBS) DAY

## 2017-01-24 PROCEDURE — 40000065 ZZH STATISTIC EKG NON-CHARGEABLE

## 2017-01-24 PROCEDURE — 99213 OFFICE O/P EST LOW 20 MIN: CPT | Performed by: PHYSICIAN ASSISTANT

## 2017-01-24 NOTE — PROGRESS NOTES
[facility]    EP Progress Note    Date of Service (when I saw the patient): 01/24/2017     Assessment and Plan  Tone Cooper is a 66 year old male with h/o TV repair 2008 (Dr. Watt) for which he follows with Dr. 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   He met with Dr. Britt 1/5 and was admitted on 1/23/2017 for repeat PVC ablation.    1. Frequent PVCs and NSVT - Stress test 8/2016 with no severe ischemia. EUSEBIO 6/2015 showed EF 50%.     - Now s/p PVC ablation with retrograde aortic approach. PVCs were deep in the posterior mitral-aorta junction (2 different morphologies, thought to be same focus with different conduction pathways).    - At end of case pt noted to have just intermittent accelerated ventricular rhythm and PVCs (likely deep in ventricular myocardium) for which continued watching/waiting recommended as they were infrequent, noting we could use metoprolol if these were sx'c.    - Post-procedure pt c/o headache, back ache and nasal stuffiness. These have all improved. Mild headache still, but feels it's because he hasn't slept well.    - Groin site (R) is soft. No tenderness. No bruit.    -Tele shows SR with unifocal PVCs with runs of AIVR (up to 13 beats seen). EKG this AM shows SR with AIVR 5 beats.    - FU made: will see me 2/20 and Dr. Britt in April. I have a message out to Dr. Britt to determine if he wants a repeat Holter       2. Valvular disease - s/p TV repair 2008.  - Last EUSEBIO 6/2015 showed nl TV.   - Sees Dr. Aj annually (due 10/2017 with echo).      Geovanna Bullard PA-C    Interval History  Sleeping hard when I arrived but awoke easily. No c/o CP, pleurisy. No back discomfort, groin discomfort. Swallowing and  voiding without issue. No palpitations.     Physical Exam  Temp: 97.5  F (36.4  C) Temp src: Oral BP: 155/89 mmHg Pulse: (!) 49 Heart Rate: 75 Resp: 18 SpO2: 96 % O2 Device: None (Room air)    Filed Vitals:    01/23/17 1121 01/23/17 2150   Weight: 76.613 kg (168 lb 14.4 oz) 77.8 kg (171 lb 8.3 oz)     Vital Signs with Ranges  Temp:  [96.7  F (35.9  C)-97.5  F (36.4  C)] 97.5  F (36.4  C)  Pulse:  [49] 49  Heart Rate:  [75-89] 75  Resp:  [18] 18  BP: (128-157)/(77-97) 155/89 mmHg  SpO2:  [96 %-100 %] 96 %  I/O last 3 completed shifts:  In: -   Out: 280 [Urine:280]    Telemetry: NSR with frequent PVCs and runs of AIVR with HR 70s    Constitutional: awake, alert, cooperative, no apparent distress, and appears stated age once awake  Respiratory: CTA B  Cardiovascular: Regular with some ectopy. No MRG  Musculoskeletal: No edema. R groin site with c/d band aid. No hematoma, bruit or tenderness    Medications       sodium chloride (PF)  3 mL Intracatheter Q8H       Data  I personally reviewed the EKG tracing showing NSR with normal intervals and AIVR x 5 beats @ 67 bpm  .  Results for orders placed or performed during the hospital encounter of 01/23/17 (from the past 24 hour(s))   Basic metabolic panel   Result Value Ref Range    Sodium 140 133 - 144 mmol/L    Potassium 4.6 3.4 - 5.3 mmol/L    Chloride 105 94 - 109 mmol/L    Carbon Dioxide 29 20 - 32 mmol/L    Anion Gap 6 3 - 14 mmol/L    Glucose 107 (H) 70 - 99 mg/dL    Urea Nitrogen 23 7 - 30 mg/dL    Creatinine 1.09 0.66 - 1.25 mg/dL    GFR Estimate 68 >60 mL/min/1.7m2    GFR Estimate If Black 82 >60 mL/min/1.7m2    Calcium 9.7 8.5 - 10.1 mg/dL   CBC with platelets   Result Value Ref Range    WBC 6.1 4.0 - 11.0 10e9/L    RBC Count 5.82 4.4 - 5.9 10e12/L    Hemoglobin 17.3 13.3 - 17.7 g/dL    Hematocrit 50.0 40.0 - 53.0 %    MCV 86 78 - 100 fl    MCH 29.7 26.5 - 33.0 pg    MCHC 34.6 31.5 - 36.5 g/dL    RDW 13.7 10.0 - 15.0 %    Platelet Count 192 150 - 450 10e9/L   EKG  12-lead, tracing only   Result Value Ref Range    Interpretation ECG Click View Image link to view waveform and result    EP Ablation PVC    Narrative    PROCEDURES PERFORMED:  1. Comprehensive EP study with left ventricular and right ventricular  recording and His bundle recording.  2. Conscious sedation.  3. 3-D intracardiac mapping.  4. Ablation of left ventricular PVC at the mitral aorta junction.    INDICATIONS FOR PROCEDURE: Mr. Cooper is a 66-year-old white male  with history of tricuspid repair. He had previous atrial fibrillation  ablation and PVC ablation at the RV outflow tract. He presented with  frequent symptomatic PVCs.    PROCEDURE AND RESULTS: After the written informed consent was  obtained, the patient was transported to CV Lab 4 in the fasting  state. The procedure was performed under conscious sedation for a  total of 148 minutes from 2511-2040. 4 mg Versed and 100 mcg of  fentanyl were used. Heart rate, blood pressure, respiration, oxygen  saturation and patient responses were monitored throughout the  procedure with the RN assistance.    The patient was in sinus rhythm with intermittent PVCs. There were two  different QRS morphology as the PVCs. The limb leads looked similar  but the precordial lead looked clearly different.    By using the Seldinger technique, the right femoral vein and the right  femoral artery were accessed percutaneously. A 6 Icelandic quadripolar  catheter was inserted through the right femoral vein and placed into  the right ventricle. The ablation catheter was inserted through the  right femoral artery and placed into the LV for mapping and ablation.    His A-H and H-V intervals were normal. The mapping were guided by the  CARTO 3-D mapping system. Systematic mapping was performed during  PVCs. The PVCs became more frequent with infusion of isoproterenol  with rounds of PVC couplets. It was observed when the patient had a  PVC couplet, the first beat was  showing right  bundle-branch block  morphology and second beat showed left bundle-branch block morphology.  The two PVCs were always tight together in timing.    The earliest activation site was identified over the mitral aorta  junction posteriorly. The ablation was applied multiple times. The  PVCs were suppressed by ablation for minutes. Near the end of the  procedure, the patient only had intermittent accelerated ventricular  rhythm with QRS morphology similar to the first PVC. It was believed  that the PVC was located deep in the ventricular myocardium was  different conduction pathways. It was very unlikely to penetrate deep  enough to eliminate focus of the PVCs and I decided to terminate the  procedure because of inability to eliminate the deep PVC focus.    During the procedure, the patient was given heparin 5000 units.    At the end of the procedure, he was given hydralazine 20 mg IV for  blood pressure reduction before the sheath removal. The catheters and  sheaths were removed and local pressure was applied to the puncture  site. There was no complication.    It is expected the patient may continue to have PVCs in the near  future. As now he is not severely symptomatic, I would not pursue  aggressive management. If he is symptomatic, we may consider  metoprolol for improvement of the symptoms.    EKG 12-lead, tracing only   Result Value Ref Range    Interpretation ECG Click View Image link to view waveform and result

## 2017-01-24 NOTE — PLAN OF CARE
A&Ox4. VSS. Right groin site CDI. CMS intact. Denies pain. Ambulating independently. Will continue to monitor.

## 2017-01-24 NOTE — PLAN OF CARE
Pt remains A&O. VSS, Pt in SR with PAC's followed by PVC's.  Pt up from bedrest Right groin site CD/I.  Pt states only complaint is HA which is improving.  Pt up to void, no other issues noted. Will continue to monitor.

## 2017-01-24 NOTE — PLAN OF CARE
Pt A&O, Groin site CD/I, VSS. Pt remains in SR with PAC's and PVC's rate in 70's.  Pt independent in room. Will continue to monitor.

## 2017-01-24 NOTE — DISCHARGE SUMMARY
Patient has been discharged to home by wheelchair at January 24, 2017 10:20 AM.  Home medication regimen discussed with patient and patient verbalizes understanding.  Diet and activity and groin site care discussed with patient and patient verbalizes understanding.  Upcoming appointments also discussed. Patient had no questions.

## 2017-01-24 NOTE — DISCHARGE INSTRUCTIONS
1. Protect right groin site - no lifting, twisting, bending x 48 hours. Do not soak in tub or submerge in water x 1 week. OK to shower.  2. OK to return to work tomorrow with lifting restriction (letter written)  3. Call Dr. Britt's nurses if you have R groin site pain, bleeding, increased palpitations, increased shortness of breath, fainting or severe chest pain.  Our nurses are @ [phone] (Nichelle Bernal).     4. Follow up made.

## 2017-01-24 NOTE — PROGRESS NOTES
"1545- Returned from [facility].  \" I feel like someone hit me in the back with a baseball bat and  I have an  Headache.\" Stable.  1600- Voided in the urinal.  1630- Dr Britt called and aware that the patient has a new very \"stuffy\" nose and severe pain. Orders received.  1730- Dr Britt paged as patient wants to stay over night.  1815- Eating dinner. Girlfriend here and updated. \" My stuffiness is better.\" Report to Thi in OBS.  1845- Chest pain is mostly gone except with deep breaths but now his head hurts. Neuro's are intact. Fentanyl given IV.  1900- \"My headache is better but I don't want to give it a number.\" 1910- Transferred to OBS #18 on cart and monitor. Right groin intact.  "

## 2017-01-25 LAB
INTERPRETATION ECG - MUSE: NORMAL
INTERPRETATION ECG - MUSE: NORMAL

## 2017-01-27 LAB — INTERPRETATION ECG - MUSE: NORMAL

## 2017-02-06 DIAGNOSIS — I48.19 ATRIAL FIBRILLATION, PERSISTENT (H): Primary | ICD-10-CM

## 2017-02-06 RX ORDER — ASPIRIN 81 MG/1
81 TABLET ORAL DAILY
Qty: 90 TABLET | Refills: 3 | Status: SHIPPED | OUTPATIENT
Start: 2017-02-06 | End: 2017-03-31

## 2017-02-20 ENCOUNTER — OFFICE VISIT (OUTPATIENT)
Dept: CARDIOLOGY | Facility: CLINIC | Age: 67
End: 2017-02-20
Payer: COMMERCIAL

## 2017-02-20 VITALS
BODY MASS INDEX: 23.8 KG/M2 | HEIGHT: 71 IN | SYSTOLIC BLOOD PRESSURE: 130 MMHG | DIASTOLIC BLOOD PRESSURE: 84 MMHG | HEART RATE: 72 BPM | WEIGHT: 170 LBS

## 2017-02-20 DIAGNOSIS — I49.3 PVC'S (PREMATURE VENTRICULAR CONTRACTIONS): Primary | ICD-10-CM

## 2017-02-20 DIAGNOSIS — R06.81 APNEA: ICD-10-CM

## 2017-02-20 DIAGNOSIS — I49.3 PVC'S (PREMATURE VENTRICULAR CONTRACTIONS): ICD-10-CM

## 2017-02-20 DIAGNOSIS — I48.92 ATRIAL FLUTTER, UNSPECIFIED TYPE (H): Primary | ICD-10-CM

## 2017-02-20 PROCEDURE — 93000 ELECTROCARDIOGRAM COMPLETE: CPT | Performed by: PHYSICIAN ASSISTANT

## 2017-02-20 PROCEDURE — 99214 OFFICE O/P EST MOD 30 MIN: CPT | Mod: 25 | Performed by: PHYSICIAN ASSISTANT

## 2017-02-20 RX ORDER — METOPROLOL SUCCINATE 25 MG/1
25 TABLET, EXTENDED RELEASE ORAL DAILY
Start: 2017-02-20 | End: 2017-03-31

## 2017-02-20 NOTE — PATIENT INSTRUCTIONS
1. EKG continues to show PVCs, but improved compared with previous    2. As we discussed, as your symptoms of shortness of breath haven't improved, will start low dose beta-blocker. Metoprolol 25 mg daily in morning - we can always adjust dose if needed    3. Wear 24 hour Holter in 2-3 weeks and see Dr. Britt to review    4. Dolores/Nichelle: [phone]

## 2017-02-20 NOTE — MR AVS SNAPSHOT
After Visit Summary   2/20/2017    Tone Cooper    MRN: [id number]           Patient Information     Date Of Birth          1950        Visit Information        Provider Department      2/20/2017 8:10 AM Geovanna Bullard PA-C [facility]        Today's Diagnoses     Atrial flutter, unspecified type (H)    -  1    PVC's (premature ventricular contractions)          Care Instructions    1. EKG continues to show PVCs, but improved compared with previous    2. As we discussed, as your symptoms of shortness of breath haven't improved, will start low dose beta-blocker. Metoprolol 25 mg daily in morning - we can always adjust dose if needed    3. Wear 24 hour Holter in 2-3 weeks and see Dr. Britt to review    4. Dolores/Nichelle: [phone]        Follow-ups after your visit        Additional Services     Follow-Up with Electrophysiologist                 Your next 10 appointments already scheduled     May 16, 2017  8:15 AM CDT   [facility] RETURN with Sussy Britt MD   [facility] ([facility])    [address] [place] [address] [id number] [phone]              Future tests that were ordered for you today     Open Future Orders        Priority Expected Expires Ordered    Holter Monitor 24 hour - Adult Routine 3/6/2017 2/20/2018 2/20/2017    Follow-Up with Electrophysiologist Routine 3/20/2017 2/20/2018 2/20/2017            Who to contact     If you have questions or need follow up information about today's clinic visit or your schedule please contact [facility] directly at [phone].  Normal or non-critical lab and imaging results will be communicated to you by MyChart, letter or phone within 4 business days after the clinic has received the results. If you do not hear from us within 7 days, please contact the clinic through MyChart or phone. If you  "have a critical or abnormal lab result, we will notify you by phone as soon as possible.  Submit refill requests through [facility] or call your pharmacy and they will forward the refill request to us. Please allow 3 business days for your refill to be completed.          Additional Information About Your Visit        Care EveryWhere ID     This is your Care EveryWhere ID. This could be used by other organizations to access your [facility] medical records  [id number]        Your Vitals Were     Pulse Height BMI (Body Mass Index)             72 1.803 m (5' 11\") 23.71 kg/m2          Blood Pressure from Last 3 Encounters:   02/20/17 130/84   01/24/17 126/81   01/05/17 130/80    Weight from Last 3 Encounters:   02/20/17 77.1 kg (170 lb)   01/23/17 77.8 kg (171 lb 8.3 oz)   01/05/17 78 kg (172 lb)              We Performed the Following     EKG 12-lead complete w/read - Clinics (performed today)          Today's Medication Changes          These changes are accurate as of: 2/20/17  8:42 AM.  If you have any questions, ask your nurse or doctor.               Start taking these medicines.        Dose/Directions    metoprolol 25 MG 24 hr tablet   Commonly known as:  TOPROL-XL   Used for:  PVC's (premature ventricular contractions)   Started by:  Geovanna Bullard PA-C        Dose:  25 mg   Take 1 tablet (25 mg) by mouth daily   Refills:  0            Where to get your medicines      Some of these will need a paper prescription and others can be bought over the counter.  Ask your nurse if you have questions.     You don't need a prescription for these medications     metoprolol 25 MG 24 hr tablet                Primary Care Provider Office Phone # Fax #    Ana Pratt [phone]       [employer] [address] [facility] [address]        Thank you!     Thank you for choosing [facility] [place]  for your care. Our goal is always to provide you with " excellent care. Hearing back from our patients is one way we can continue to improve our services. Please take a few minutes to complete the written survey that you may receive in the mail after your visit with us. Thank you!             Your Updated Medication List - Protect others around you: Learn how to safely use, store and throw away your medicines at www.disposemymeds.org.          This list is accurate as of: 2/20/17  8:42 AM.  Always use your most recent med list.                   Brand Name Dispense Instructions for use    ADVAIR DISKUS 250-50 MCG/DOSE diskus inhaler   Generic drug:  fluticasone-salmeterol      Inhale 2 puffs into the lungs 2 times daily       aspirin EC 81 MG EC tablet     90 tablet    Take 1 tablet (81 mg) by mouth daily       HYDROcodone-acetaminophen 5-325 MG per tablet    NORCO    20 tablet    Take 1-2 tablets by mouth every 4 hours as needed for other (Moderate to Severe Pain)       lisinopril 20 MG tablet    PRINIVIL/ZESTRIL    30 tablet    Take 1 tablet (20 mg) by mouth daily       metoprolol 25 MG 24 hr tablet    TOPROL-XL     Take 1 tablet (25 mg) by mouth daily       tamsulosin 80 mcg/mL    FLOMAX     Take 400 mcg by mouth daily       tiotropium 18 MCG capsule    SPIRIVA     Inhale 18 mcg into the lungs daily       VENTOLIN  (90 BASE) MCG/ACT Inhaler   Generic drug:  albuterol      Inhale 2 puffs into the lungs every 6 hours PRN

## 2017-02-20 NOTE — PROGRESS NOTES
HPI and Plan:   See dictation #[id number]    Orders Placed This Encounter   Procedures     Follow-Up with Electrophysiologist     EKG 12-lead complete w/read - Clinics (performed today)     Holter Monitor 24 hour - Adult       Orders Placed This Encounter   Medications     metoprolol (TOPROL-XL) 25 MG 24 hr tablet     Sig: Take 1 tablet (25 mg) by mouth daily       Encounter Diagnoses   Name Primary?     Atrial flutter, unspecified type (H) Yes     PVC's (premature ventricular contractions)        CURRENT MEDICATIONS:  Current Outpatient Prescriptions   Medication Sig Dispense Refill     metoprolol (TOPROL-XL) 25 MG 24 hr tablet Take 1 tablet (25 mg) by mouth daily       aspirin EC 81 MG EC tablet Take 1 tablet (81 mg) by mouth daily 90 tablet 3     tamsulosin (FLOMAX) 80 mcg/mL Take 400 mcg by mouth daily       tiotropium (SPIRIVA) 18 MCG capsule Inhale 18 mcg into the lungs daily       HYDROcodone-acetaminophen (NORCO) 5-325 MG per tablet Take 1-2 tablets by mouth every 4 hours as needed for other (Moderate to Severe Pain) 20 tablet 0     lisinopril (PRINIVIL,ZESTRIL) 20 MG tablet Take 1 tablet (20 mg) by mouth daily 30 tablet 0     albuterol (VENTOLIN HFA) 108 (90 BASE) MCG/ACT inhaler Inhale 2 puffs into the lungs every 6 hours PRN       fluticasone-salmeterol (ADVAIR DISKUS) 250-50 MCG/DOSE diskus inhaler Inhale 2 puffs into the lungs 2 times daily          ALLERGIES     Allergies   Allergen Reactions     Flecainide Palpitations       PAST MEDICAL HISTORY:  Past Medical History   Diagnosis Date     Atrial flutter (H)      COPD (chronic obstructive pulmonary disease) (H)      COPD (chronic obstructive pulmonary disease) (H)      Diverticulitis      Hypertension      Persistent atrial fibrillation (H) 4/28/09     ablation 7/1/2015     PVC (premature ventricular contraction)      s/p ablation 12/5/2017     S/P radiofrequency ablation operation for arrhythmia 01/23/2017     Tricuspid valve disorder      Dr Aj, Hx of  valve repair        PAST SURGICAL HISTORY:  Past Surgical History   Procedure Laterality Date     Valve replacement       tricuspid     Orthopedic surgery       Left hip replacement     Abdomen surgery       hernia repair right     Small intestine surgery       had bowel obstruction age 9     Appendectomy       Laparoscopic cholecystectomy  1/6/2012     Procedure:LAPAROSCOPIC CHOLECYSTECTOMY; LAPAROSCOPIC CHOLECYSTECTOMY ; Surgeon:ROGER PACHECO; Location:[facility] OR     Cardioversion  06/03/2009     successful for afib     Cardioversion  4/20/15     successful for afib     Cardioversion  5/28/15     H ablation focal afib  7/1/15     Left atrial linear ablation and pulmonary vein antrum ablation     Repair valve tricuspid  9/8/08     conversion to a bileaflet valve and application of a 30 mm Sanderson ring     Incision and drainage lower extremity, combined Right 11/10/2016     Procedure: COMBINED INCISION AND DRAINAGE LOWER EXTREMITY;  Surgeon: Roger Pacheco MD;  Location: [facility] OR     H ablation pvc  12/5/16       FAMILY HISTORY:  Family History   Problem Relation Age of Onset     Prostate Cancer Father      Family History Negative Mother      Emphysema Mother      Hypertension Mother      CEREBROVASCULAR DISEASE Mother        SOCIAL HISTORY:  Social History     Social History     Marital status:      Spouse name: N/A     Number of children: N/A     Years of education: N/A     Social History Main Topics     Smoking status: Former Smoker     Quit date: 1/2/2008     Smokeless tobacco: Never Used     Alcohol use 0.0 oz/week     0 Standard drinks or equivalent per week      Comment: 1 drink daily     Drug use: No     Sexual activity: No     Other Topics Concern     Caffeine Concern No     1 a day      Sleep Concern Yes     nocturia     Weight Concern No     Special Diet No     Exercise No     Bike Helmet Yes     Seat Belt Yes     Parent/Sibling W/ Cabg, Mi Or Angioplasty Before 65f 55m? No     Social  "History Narrative       Review of Systems:  Skin:  Positive for itching skin cancer removed recently   Eyes:  Positive for glasses    ENT:  Negative      Respiratory:  Positive for dyspnea on exertion     Cardiovascular:  Negative for;palpitations;chest pain;edema Positive for;exercise intolerance    Gastroenterology: Negative      Genitourinary:  Positive for nocturia    Musculoskeletal:  Positive for joint pain L elbow  Neurologic:  Negative      Psychiatric:  Positive for sleep disturbances sleeps poorly  Heme/Lymph/Imm:  Positive for allergies    Endocrine:  Positive for diabetes      Physical Exam:  Vitals: /84  Pulse 72  Ht 1.803 m (5' 11\")  Wt 77.1 kg (170 lb)  BMI 23.71 kg/m2    Constitutional:  cooperative, alert and oriented, well developed, well nourished, in no acute distress        Skin:  warm and dry to the touch, no apparent skin lesions or masses noted        Head:  normocephalic, no masses or lesions        Eyes:  no xanthalasma;EOMS intact;sclera white;conjunctivae and lids unremarkable        ENT:  no pallor or cyanosis, dentition good        Neck:  JVP normal;no carotid bruit        Chest:  clear to auscultation diminished breath sounds bilaterally        Cardiac: normal S1 and S2;regular rhythm;no murmurs, gallops or rubs detected occasional premature beats                Abdomen:  abdomen soft        Vascular: pulses full and equal                               right femoral bruit (-)        Extremities and Back:  no deformities, clubbing, cyanosis, erythema observed;no edema              Neurological:  affect appropriate, oriented to time, person and place              WENDY Pratt MD  [employer]  [id number] [place] [address]              "

## 2017-02-20 NOTE — PROGRESS NOTES
HISTORY OF PRESENT ILLNESS:  I had the pleasure of seeing Tone today when he came for followup of his recent PVC ablation.  He is a pleasant 66-year-old.  He has a history of previous tobacco use with COPD.  He has tricuspid valve regurgitation and had repair of this by Dr. Watt in 2008.  He sees Dr. Aj routinely with routine echocardiograms.      From an electrical standpoint, he has had a history of paroxysmal atrial fibrillation.  Flecainide caused proarrhythmia (wide complex atrial flutter) and he was taken off of that and underwent pulmonary vein isolation along with left atrial linear ablation (roof, bottom) and CTI in 07/2015.  He has not had recurrent atrial fibrillation.  Unfortunately, he has had very symptomatic PVCs complaining of shortness of breath and dyspnea with associated with this.  Dr. Britt first took him to the lab in 12/2016 for his PVCs.  He was noted to have different types of PVCs with 1 dominant PVC coming from the RV outflow tract.  His PVCs actually became quite infrequent during the procedure and multiple ablations could not eliminate the PVCs completely and as he was noted to have other PVCs coming from the left ventricle, only the RV outflow tract PVCs were ablated.      Followup Holter monitor continued to show great amounts of PVCs which were symptomatic and therefore Dr. Britt to come back to the lab on 01/23.  At that time he had ablation of a left ventricular PVC at the mitral-aortic junction.  He noted that there were 2 different QRS morphologies of PVCs.  The PVC QRS on the limb leads look similar, but the precordial leads look very different.  Dr. Britt noted that the earliest activation site was identified over the mitral-aortic junction posteriorly and ablation was applied multiple times.  The PVCs were suppressed for a few minutes but near the end of the procedure, he was noted to have intermittent accelerated ventricular rhythm with QRS morphology similar to that of the first PVC.  " It was felt therefore that this PVC focus was located deep in the ventricular myocardium with a different conduction pathway.      He was discharged home the following day.  His post-procedure EKG continued to show PVCs with a run of AIVR.      He comes back for his first followup appointment stating that unfortunately he does not feel much different.  He continues to note dyspnea on exertion with activity, even mild even just going up a few stairs.  He denies any edema, orthopnea or PND.  Denies fevers or chills.  He does think that for the first few days after the PVC ablation, his symptoms did improve a bit.      He denies chest pain and denies any trouble with the right groin site.  Overall, he is feeling \"about the same.\"      EKG today, which I overread, shows sinus rhythm at 79 beats per minute.  He had 4 PVCs noted with differing morphologies.        ASSESSMENT AND PLAN:     1.  PVCs.  Unfortunately, he continues to demonstrate PVCs and has been symptomatic with this.  Dr. Britt and I had   spoken at the time of his ablation that if this ablation did not improve his symptomatology, he would recommend reinitiation of beta blockade.  He has metoprolol 25 mg daily at home and he will start taking this in the morning.   In about 2-3 weeks, will have him get a 24-hour Holter monitor and then I will have him move up his appointment with Dr. Britt to review this to determine if differing medications or repeat ablation is necessary.     2.  Dyspnea on exertion.  It has been felt in the past that this was likely related to his frequent PVCs.  A stress test in 08/2016 was negative for severe ischemia.  EUSEBIO in 06/2015 showed an EF of 55%.  He denies edema, orthopnea or PND or other signs of heart failure.  He has nothing physician to suggest chronic pulmonary embolism.  He does have underlying COPD and remains on Advair and Ventolin as well as Spiriva.  He has quit smoking.      We also talked about the possibility of sleep " "apnea.  He tells me that he has been told that he \"holds his breath\" when he sleeps sometimes, however, he is not interested in doing an overnight sleep study unless he can do this at home.  I will contact our sleep department to determine if a home sleep study is available, and if so, how we can get him set up for this.  I explained that certainly untreated sleep apnea can lead to increasing pulmonary pressures causing dyspnea on exertion as well as increased PVCs and certainly if he were to have this, we would like to get this treated.         ADDENDUM: Spoke with  [facility]) - Sleep MD would meet with pt to discuss at-home sleep study. Called pt and he is agreeable to meeting with them.  Order placed and asked pt to call me if he hadn't heard anything from clinic in ~1 week.      TONO WADDELL PA-C             D: 2017 08:53   T: 2017 10:37   MT: SARANYA      Name:     FELIPE AYOUB   MRN:      [id number]        Account:      [id number]   :      1950           Service Date: 2017      Document: [id number]      "

## 2017-02-20 NOTE — LETTER
2/20/2017    Ana Pratt MD  [facility]  [address] [place] [address]    RE: Tone Cooper       Dear Colleague,    I had the pleasure of seeing Tone today when he came for followup of his recent PVC ablation.  He is a pleasant 66-year-old.  He has a history of previous tobacco use with COPD.  He has tricuspid valve regurgitation and had repair of this by Dr. Watt in 2008.  He sees Dr. Aj routinely with routine echocardiograms.      From an electrical standpoint, he has had a history of paroxysmal atrial fibrillation.  Flecainide caused proarrhythmia (wide complex atrial flutter) and he was taken off of that and underwent pulmonary vein isolation along with left atrial linear ablation (roof, bottom) and CTI in 07/2015.  He has not had recurrent atrial fibrillation.  Unfortunately, he has had very symptomatic PVCs complaining of shortness of breath and dyspnea with associated with this.  Dr. Britt first took him to the lab in 12/2016 for his PVCs.  He was noted to have different types of PVCs with 1 dominant PVC coming from the RV outflow tract.  His PVCs actually became quite infrequent during the procedure and multiple ablations could not eliminate the PVCs completely and as he was noted to have other PVCs coming from the left ventricle, only the RV outflow tract PVCs were ablated.      Followup Holter monitor continued to show great amounts of PVCs which were symptomatic and therefore Dr. Britt to come back to the lab on 01/23.  At that time he had ablation of a left ventricular PVC at the mitral-aortic junction.  He noted that there were 2 different QRS morphologies of PVCs.  The PVC QRS on the limb leads look similar, but the precordial leads look very different.  Dr. Britt noted that the earliest activation site was identified over the mitral-aortic junction posteriorly and ablation was applied multiple times.  The PVCs were suppressed for a few minutes but near the end of the procedure, he was  "noted to have intermittent accelerated ventricular rhythm with QRS morphology similar to that of the first PVC.  It was felt therefore that this PVC focus was located deep in the ventricular myocardium with a different conduction pathway.      He was discharged home the following day.  His post-procedure EKG continued to show PVCs with a run of AIVR.      He comes back for his first followup appointment stating that unfortunately he does not feel much different.  He continues to note dyspnea on exertion with activity, even mild even just going up a few stairs.  He denies any edema, orthopnea or PND.  Denies fevers or chills.  He does think that for the first few days after the PVC ablation, his symptoms did improve a bit.      He denies chest pain and denies any trouble with the right groin site.  Overall, he is feeling \"about the same.\"      EKG today, which I overread, shows sinus rhythm at 79 beats per minute.  He had 4 PVCs noted with differing morphologies.      Outpatient Encounter Prescriptions as of 2/20/2017   Medication Sig Dispense Refill     metoprolol (TOPROL-XL) 25 MG 24 hr tablet Take 1 tablet (25 mg) by mouth daily       aspirin EC 81 MG EC tablet Take 1 tablet (81 mg) by mouth daily 90 tablet 3     tamsulosin (FLOMAX) 80 mcg/mL Take 400 mcg by mouth daily       tiotropium (SPIRIVA) 18 MCG capsule Inhale 18 mcg into the lungs daily       HYDROcodone-acetaminophen (NORCO) 5-325 MG per tablet Take 1-2 tablets by mouth every 4 hours as needed for other (Moderate to Severe Pain) 20 tablet 0     lisinopril (PRINIVIL,ZESTRIL) 20 MG tablet Take 1 tablet (20 mg) by mouth daily 30 tablet 0     albuterol (VENTOLIN HFA) 108 (90 BASE) MCG/ACT inhaler Inhale 2 puffs into the lungs every 6 hours PRN       fluticasone-salmeterol (ADVAIR DISKUS) 250-50 MCG/DOSE diskus inhaler Inhale 2 puffs into the lungs 2 times daily        No facility-administered encounter medications on file as of 2/20/2017.      ASSESSMENT AND " "PLAN:     1.  PVCs.  Unfortunately, he continues to demonstrate PVCs and has been symptomatic with this.  Dr. Britt and I had   spoken at the time of his ablation that if this ablation did not improve his symptomatology, he would recommend reinitiation of beta blockade.  He has metoprolol 25 mg daily at home and he will start taking this in the morning.   In about 2-3 weeks, will have him get a 24-hour Holter monitor and then I will have him move up his appointment with Dr. Britt to review this to determine if differing medications or repeat ablation is necessary.     2.  Dyspnea on exertion.  It has been felt in the past that this was likely related to his frequent PVCs.  A stress test in 08/2016 was negative for severe ischemia.  EUSEBIO in 06/2015 showed an EF of 55%.  He denies edema, orthopnea or PND or other signs of heart failure.  He has nothing physician to suggest chronic pulmonary embolism.  He does have underlying COPD and remains on Advair and Ventolin as well as Spiriva.  He has quit smoking.      We also talked about the possibility of sleep apnea.  He tells me that he has been told that he \"holds his breath\" when he sleeps sometimes, however, he is not interested in doing an overnight sleep study unless he can do this at home.  I will contact our sleep department to determine if a home sleep study is available, and if so, how we can get him set up for this.  I explained that certainly untreated sleep apnea can lead to increasing pulmonary pressures causing dyspnea on exertion as well as increased PVCs and certainly if he were to have this, we would like to get this treated.       ADDENDUM: [facility] - Sleep MD would meet with pt to discuss at-home sleep study. Called pt and he is agreeable to meeting with them.  Order placed and asked pt to call me if he hadn't heard anything from clinic in ~1 week.    Again, thank you for allowing me to participate in the care of your patient.  "     Sincerely,    Geovanna Bullard PA-C     [facility]

## 2017-02-28 ENCOUNTER — OFFICE VISIT (OUTPATIENT)
Dept: SLEEP MEDICINE | Facility: CLINIC | Age: 67
End: 2017-02-28
Payer: COMMERCIAL

## 2017-02-28 VITALS
DIASTOLIC BLOOD PRESSURE: 92 MMHG | WEIGHT: 170 LBS | HEIGHT: 71 IN | TEMPERATURE: 97.8 F | BODY MASS INDEX: 23.8 KG/M2 | OXYGEN SATURATION: 97 % | HEART RATE: 48 BPM | SYSTOLIC BLOOD PRESSURE: 135 MMHG

## 2017-02-28 DIAGNOSIS — J44.9 CHRONIC OBSTRUCTIVE PULMONARY DISEASE, UNSPECIFIED COPD TYPE (H): ICD-10-CM

## 2017-02-28 DIAGNOSIS — R06.83 SNORING: Primary | ICD-10-CM

## 2017-02-28 DIAGNOSIS — I49.3 PVC'S (PREMATURE VENTRICULAR CONTRACTIONS): ICD-10-CM

## 2017-02-28 DIAGNOSIS — R53.83 OTHER FATIGUE: ICD-10-CM

## 2017-02-28 DIAGNOSIS — R06.81 APNEA: ICD-10-CM

## 2017-02-28 PROCEDURE — 99204 OFFICE O/P NEW MOD 45 MIN: CPT | Performed by: INTERNAL MEDICINE

## 2017-02-28 RX ORDER — ZOLPIDEM TARTRATE 5 MG/1
TABLET ORAL
Qty: 1 TABLET | Refills: 0 | Status: SHIPPED | OUTPATIENT
Start: 2017-02-28 | End: 2017-03-09

## 2017-02-28 ASSESSMENT — PAIN SCALES - GENERAL: PAINLEVEL: NO PAIN (0)

## 2017-02-28 NOTE — PROGRESS NOTES
Sleep Consultation:    Date on this visit: 2/28/2017    Tone Cooper is sent by Geovanna Bullard for a sleep consultation regarding sleep apnea.    Primary Physician: Ana Pratt     Chief complaint: fatigue    Presenting history:     Tone Cooper has a medical history significant for paroxysmal atrial fibrillation, PVCs, coronary artery disease and COPD. A sleep evaluation for sleep apnea is requested by Cardiology.     Tone complains of having non-restorative sleep and daytime fatigue. He has experienced these symptoms for last 4 years. He sleeps separately from his partner. He has been told that he has snoring. He wakes up with headache daily and complains of fatigue during the daytime. We do not have collateral information regarding presence of apneas. He denies history of snort arousals, gasping or choking episodes. He often experiences dry mouth.     He works in [employer] and works a 2 pm to 11 pm shift. He sleeps from midnight until 9 am on weekdays and from 11 pm to 10 am on weekends. He was up about 3 times during the night when he wakes up spontaneously. It could take him an hour to return to sleep.     He denies excessive sleepiness in the daytime. [place] score so 7. He denies any impairment while driving due to drowsiness.     Patient complains of exertional dyspnea. He has a long history of smoking 2 packs per day until stopping in 2006. PFT from 2007 showed obstructive lung disease.     Baseline awake O2 saturation is normal today at 97%. Bicarbonate level from [facility] on 1/23/17 is 29.       Allergies:    Allergies   Allergen Reactions     Flecainide Palpitations       Medications:    Current Outpatient Prescriptions   Medication Sig Dispense Refill     zolpidem (AMBIEN) 5 MG tablet Take tablet by mouth 15 minutes prior to sleep, for Sleep Study 1 tablet 0     metoprolol (TOPROL-XL) 25 MG 24 hr tablet Take 1 tablet (25 mg) by mouth daily       aspirin EC 81 MG EC tablet Take 1  tablet (81 mg) by mouth daily 90 tablet 3     tamsulosin (FLOMAX) 80 mcg/mL Take 400 mcg by mouth daily       tiotropium (SPIRIVA) 18 MCG capsule Inhale 18 mcg into the lungs daily       HYDROcodone-acetaminophen (NORCO) 5-325 MG per tablet Take 1-2 tablets by mouth every 4 hours as needed for other (Moderate to Severe Pain) 20 tablet 0     lisinopril (PRINIVIL,ZESTRIL) 20 MG tablet Take 1 tablet (20 mg) by mouth daily 30 tablet 0     albuterol (VENTOLIN HFA) 108 (90 BASE) MCG/ACT inhaler Inhale 2 puffs into the lungs every 6 hours PRN       fluticasone-salmeterol (ADVAIR DISKUS) 250-50 MCG/DOSE diskus inhaler Inhale 2 puffs into the lungs 2 times daily          Problem List:  Patient Active Problem List    Diagnosis Date Noted     S/P radiofrequency ablation operation for arrhythmia      Priority: Medium     Hypertension      Priority: Medium     Atrial flutter (H)      Priority: Medium     Wide-complex tachycardia (H) 06/08/2015     Tricuspid valve disorder      Dr Aj, hx of valve repair       COPD (chronic obstructive pulmonary disease) (H)      Atrial fibrillation (H)      Cardiomyopathy (H)      Diverticulitis 03/04/2014        Past Medical/Surgical History:  Past Medical History   Diagnosis Date     Atrial flutter (H)      COPD (chronic obstructive pulmonary disease) (H)      COPD (chronic obstructive pulmonary disease) (H)      Diverticulitis      Hypertension      Persistent atrial fibrillation (H) 4/28/09     ablation 7/1/2015     PVC (premature ventricular contraction)      s/p ablation 12/5/2017     S/P radiofrequency ablation operation for arrhythmia 01/23/2017     Tricuspid valve disorder      Dr Aj, Hx of valve repair      Past Surgical History   Procedure Laterality Date     Valve replacement       tricuspid     Orthopedic surgery       Left hip replacement     Abdomen surgery       hernia repair right     Small intestine surgery       had bowel obstruction age 9     Appendectomy       Laparoscopic  cholecystectomy  1/6/2012     Procedure:LAPAROSCOPIC CHOLECYSTECTOMY; LAPAROSCOPIC CHOLECYSTECTOMY ; Surgeon:ROGER PACHECO; Location:[facility] OR     Cardioversion  06/03/2009     successful for afib     Cardioversion  4/20/15     successful for afib     Cardioversion  5/28/15     H ablation focal afib  7/1/15     Left atrial linear ablation and pulmonary vein antrum ablation     Repair valve tricuspid  9/8/08     conversion to a bileaflet valve and application of a 30 mm Sanderson ring     Incision and drainage lower extremity, combined Right 11/10/2016     Procedure: COMBINED INCISION AND DRAINAGE LOWER EXTREMITY;  Surgeon: Roger Pacheco MD;  Location: [facility] OR     H ablation pvc  12/5/16       Social History:  Social History     Social History     Marital status:      Spouse name: N/A     Number of children: N/A     Years of education: N/A     Occupational History     Not on file.     Social History Main Topics     Smoking status: Former Smoker     Quit date: 1/2/2008     Smokeless tobacco: Never Used     Alcohol use 0.0 oz/week     0 Standard drinks or equivalent per week      Comment: 1 drink daily     Drug use: No     Sexual activity: No     Other Topics Concern     Caffeine Concern No     1 a day      Sleep Concern Yes     nocturia     Weight Concern No     Special Diet No     Exercise No     Bike Helmet Yes     Seat Belt Yes     Parent/Sibling W/ Cabg, Mi Or Angioplasty Before 65f 55m? No     Social History Narrative       Family History:  Family History   Problem Relation Age of Onset     Prostate Cancer Father      Family History Negative Mother      Emphysema Mother      Hypertension Mother      CEREBROVASCULAR DISEASE Mother        Review of Systems:  A complete review of systems reviewed by me is negative with the exeption of what has been mentioned in the history of present illness.  CONSTITUTIONAL: NEGATIVE for weight gain/loss, fever, chills, sweats or night sweats, drug  "allergies.  EYES: NEGATIVE for changes in vision, blind spots, double vision.  ENT: NEGATIVE for ear pain, sore throat, sinus pain, post-nasal drip, runny nose, bloody nose  CARDIAC:  POSITIVE for  fast heart beats, chest pressure and breathlessness when lying flat  NEUROLOGIC:  POSITIVE for  headaches and weakness or numbness in the arms or legs  DERMATOLOGIC: NEGATIVE for rashes, new moles or change in mole(s)  PULMONARY:  POSITIVE for  SOB at rest, SOB with activity, dry cough and productive cough  GASTROINTESTINAL: NEGATIVE for nausea or vomitting, loose or watery stools, fat or grease in stools, constipation, abdominal pain, bowel movements black in color or blood noted.  GASTROINTESTINAL:  POSITIVE for  constipation  GENITOURINARY: NEGATIVE for pain during urination, blood in urine, urinating more frequently than usual, irregular menstrual periods.  MUSCULOSKELETAL: NEGATIVE for muscle pain, bone or joint pain, swollen joints.  ENDOCRINE: NEGATIVE for increased thirst or urination, diabetes.  LYMPHATIC: NEGATIVE for swollen lymph nodes, lumps or bumps in the breasts or nipple discharge.    Physical Examination:  Vitals: BP (!) 135/92  Pulse (!) 48  Temp 97.8  F (36.6  C) (Oral)  Ht 1.803 m (5' 10.98\")  Wt 77.1 kg (170 lb)  SpO2 97%  BMI 23.72 kg/m2  BMI= Body mass index is 23.72 kg/(m^2).    Neck Cir (cm): 38 cm    [place] Total Score 2/28/2017   Total score - [place] 7       GENERAL APPEARANCE: healthy, alert and no distress  EYES: Eyes grossly normal to inspection, PERRL and conjunctivae and sclerae normal  HENT: nose and mouth without ulcers or lesions  NECK: no adenopathy, no asymmetry, masses, or scars and thyroid normal to palpation  RESP: lungs clear to auscultation - no rales, rhonchi or wheezes and reduced breath sounds.   CV: regular rates and rhythm, normal S1 S2, no S3 or S4 and no murmur, click or rub  MS: extremities normal- no gross deformities noted  NEURO: Normal strength and tone, mentation " intact and speech normal  PSYCH: mentation appears normal and affect normal/bright  Mallampati Class: IV.  Tonsillar Stage: 1  hidden by pillars.    Impression/Plan:    1. Possible obstructive sleep apnea   2. COPD     - Patient has symptoms concerning for sleep apnea including snoring, non restorative sleep and daytime fatigue. An overnight sleep study is recommended for assessment of sleep disordered breathing.     - We discussed both in lab polysomnogram and home sleep apnea testing. Home sleep apnea testing can underestimate sleep disordered breathing. In his situation, probability for severe sleep apnea is low. Considering comorbid COPD and low pretest probability for severe sleep apnea, an in lab polysomnogram will be preferable.     - Patient was counseled regarding pathophysiology and consequences of sleep apnea. He was willing to proceed with a polysomnogram.       Literature provided regarding sleep apnea.      He will follow up with me in approximately two weeks after his sleep study has been competed to review the results and discuss plan of care.       Polysomnography reviewed.  Obstructive sleep apnea reviewed.  Complications of untreated sleep apnea were reviewed.    I spent a total of 45 minutes with patient with more than 50% in counseling.     Nakul Alvarado MD    CC: Geovanna Bullard

## 2017-02-28 NOTE — PATIENT INSTRUCTIONS
"MY TREATMENT INFORMATION FOR SLEEP APNEA-  Tone Cooper    DOCTOR : Nakul Alvarado MD  SLEEP CENTER :      MY CONTACT NUMBER:       If I haven't had a sleep study yet, what can I expect?  A personal story from Luis Felipe  https://www.[employer].com/watch?v=AxPLmlRpnCs    Am I having a home sleep study?  Here is a video in case you get home and want to make sure you have done it correctly  https://www.[employer].com/watch?v=AVK3D8sXlc5&feature=youtu.be    Frequently asked questions:  1. What is Obstructive Sleep Apnea (ISIS)? ISIS is the most common type of sleep apnea. Apnea literally means, \"without breath.\" It is characterized by repetitive pauses in breathing, despite continued effort to breathe, and is usually associated with a reduction in blood oxygen saturation. Apneas can last 10 to over 60 seconds. It is caused by narrowing or collapse of the upper airway as muscles relax during sleep. Severity of sleep apnea is determined by frequency of breathing events and their effect on your sleep and oxygen levels determined during sleep testing.   2. What are the consequences of ISIS? Symptoms include: daytime sleepiness- possibly increasing the risk of falling asleep while driving, unrefreshing/restless sleep, snoring, insomnia, waking frequently to urinate, waking with heartburn or reflux, reduced concentration and memory, and morning headaches. Other health consequences may include development of high blood pressure and other cardiovascular disease in persons who are susceptible. Untreated ISIS  can contribute to heart disease, stroke and diabetes.   3. What are the treatment options? In most situations, sleep apnea is a lifelong disease that must be managed with daily therapy. Medications are not effective for sleep apnea and surgery is generally not performed until other therapies have been tried. Therapy is usually tailored to the individual patient based on many factors including your wishes as well as severity of sleep apnea " and severity of obesity. Continuous Positive Airway (CPAP) is the most reliable treatment. An oral device to hold your jaw forward is usually the next most reliable option. Other options include postioning devices (to keep you off your back), weight loss, and surgery including a tongue pacing device. There is more detail about some of these options below.            1. CPAP-  WHAT DOES IT DO AND HOW CAN I LEARN TO WEAR IT?                               BEFORE I START, CAN I WATCH A MOVIE TO GET A PLAN ON HOW TO USE CPAP?  https://www.[employer].com/watch?h=v8Q91rb787O      Continuous positive airway pressure, or CPAP, is the most effective treatment for obstructive sleep apnea. It works by blowing room air, through a mask, to hold your throat open. A decision to use CPAP is a major step forward in the pursuit of a healthier life. The successful use of CPAP will help you breathe easier, sleep better and live healthier. You can choose CPAP equipment from any durable medical equipment provider that meets your needs.  Using CPAP can be a positive experience if you keep these bradley points in mind:  1. Commitment  CPAP is not a quick fix for your problem. It involves a long-term commitment to improve your sleep and your health.    2. Communication  Stay in close communication with both your sleep doctor and your CPAP supplier. Ask lots of questions and seek help when you need it.    3. Consistency  Use CPAP all night, every night and for every nap. You will receive the maximum health benefits from CPAP when you use it every time that you sleep. This will also make it easier for your body to adjust to the treatment.    4. Correction  The first machine and mask that you try may not be the best ones for you. Work with your sleep doctor and your CPAP supplier to make corrections to your equipment selection. Ask about trying a different type of machine or mask if you have ongoing problems. Make sure that your mask is a good fit and  "learn to use your equipment properly.    5. Challenge  Tell a family member or close friend to ask you each morning if you used your CPAP the previous night. Have someone to challenge you to give it your best effort.    6. Connection   Your adjustment to CPAP will be easier if you are able to connect with others who use the same treatment. Ask your sleep doctor if there is a support group in your area for people who have sleep apnea, or look for one on the Internet.  7. Comfort   Increase your level of comfort by using a saline spray, decongestant or heated humidifier if CPAP irritates your nose, mouth or throat. Use your unit's \"ramp\" setting to slowly get used to the air pressure level. There may be soft pads you can buy that will fit over your mask straps. Look on www.CPAP.com for accessories that can help make CPAP use more comfortable.  8. Cleaning   Clean your mask, tubing and headgear on a regular basis. Put this time in your schedule so that you don't forget to do it. Check and replace the filters for your CPAP unit and humidifier.    9. Completion   Although you are never finished with CPAP therapy, you should reward yourself by celebrating the completion of your first month of treatment. Expect this first month to be your hardest period of adjustment. It will involve some trial and error as you find the machine, mask and pressure settings that are right for you.    10. Continuation  After your first month of treatment, continue to make a daily commitment to use your CPAP all night, every night and for every nap.    CPAP-Tips to starting with success:  Begin using your CPAP for short periods of time during the day while you watch TV or read.    Use CPAP every night and for every nap. Using it less often reduces the health benefits and makes it harder for your body to get used to it.    Make small adjustments to your mask, tubing, straps and headgear until you get the right fit. Tightening the mask may " actually worsen the leak.  If it leaves significant marks on your face or irritates the bridge of your nose, it may not be the best mask for you.  Speak with the person who supplied the mask and consider trying other masks. Insurances will allow you to try different masks during the first month of starting CPAP.  Insurance also covers a new mask, hose and filter about every 6 months.    Use a saline nasal spray to ease mild nasal congestion. Neti-Pot or saline nasal rinses may also help. Nasal gel sprays can help reduce nasal dryness.  Biotene mouthwash can be helpful to protect your teeth if you experience frequent dry mouth.  Dry mouth may be a sign of air escaping out of your mouth or out of the mask in the case of a full face mask.  Speak with your provider if you expect that is the case.     Take a nasal decongestant to relieve more severe nasal or sinus congestion.  Do not use Afrin (oxymetazoline) nasal spray more than 3 days in a row.  Speak with your sleep doctor if your nasal congestion is chronic.    Use a heated humidifier that fits your CPAP model to enhance your breathing comfort. Adjust the heat setting up if you get a dry nose or throat, down if you get condensation in the hose or mask.  Position the CPAP lower than you so that any condensation in the hose drains back into the machine rather than towards the mask.    Try a system that uses nasal pillows if traditional masks give you problems.    Clean your mask, tubing and headgear once a week. Make sure the equipment dries fully.    Regularly check and replace the filters for your CPAP unit and humidifier.    Work closely with your sleep provider and your CPAP supplier to make sure that you have the machine, mask and air pressure setting that works best for you. It is better to stop using it and call your provider to solve problems than to lay awake all night frustrated with the device.    BESIDES CPAP, WHAT OTHER THERAPIES ARE  THERE?      Positioning Device  Positioning devices are generally used when sleep apnea is mild and only occurs on your back.This example shows a pillow that straps around the waist. It may be appropriate for those whose sleep study shows milder sleep apnea that occurs primarily when lying flat on one's back. Preliminary studies have shown benefit but effectiveness at home may need to be verified by a home sleep test. These devices are generally not covered by medical insurance.                      Oral Appliance  What is oral appliance therapy?  An oral appliance is a small acrylic device that fits over the upper and lower teeth or tongue (similar to an orthodontic retainer or a mouth guard). This device slightly advances the lower jaw or tongue, which moves the base of the tongue forward, opens the airway, improves breathing and can effectively treat snoring and obstructive sleep apnea sleep apnea. The appliance is fabricated and customized by a qualified dentist with experience in treating snoring and sleep apnea. Oral appliances are usually well tolerated and have relatively high compliance by patients1, 2, 3.  When is an oral appliance indicated?  Oral appliance therapy is recommended as a first-line treatment for patients with primary snoring, mild sleep apnea, and for patients with moderate sleep apnea who prefer appliance therapy to use of CPAP4, 5. Severity of sleep apnea is determined by sleep testing and is based on the number of respiratory events per hour of sleep.   How successful is oral appliance therapy?  The success rate of oral appliance therapy in patients with mild sleep apnea is 75-80% while in patients with moderate sleep apnea it is 50-70%. The chance of success in patients with severe sleep apnea is 40-50%. The research also shows that oral appliances have a beneficial effect on the cardiovascular health of ISIS patients at the same magnitude as CPAP therapy7.  Oral appliances should be a  second-line treatment in cases of severe sleep apnea, but if not completely successful then a combination therapy utilizing CPAP plus oral appliance therapy may be effective. Oral appliances tend to be effective in a broad range of patients although studies show that the patients who have the highest success are females, younger patients, those with milder disease, and less severe obesity. 3, 6.   The chances of success are lower in patients who have more severe ISIS, are older, and those who are morbidly obese.     Example of an oral appliance   Finding a dentist that practices dental sleep medicine  Specific training is available through the American Academy of Dental Sleep Medicine for dentists interested in working in the field of sleep. To find a dentist who is educated in the field of sleep and the use of oral appliances, near you, visit the Web site of the American Academy of Dental Sleep Medicine; also see   http://www.accpstorage.org/newOrganization/patients/oralAppliances.pdf  To search for a dentist certified in these practices:  Http://aadsm.org/FindADentist.aspx?1  1. Rashida et al. Objectively measured vs self-reported compliance during oral appliance therapy for sleep-disordered breathing. Chest 2013; 144(5): 1721-1769.  2. Rigoberto, et al. Objective measurement of compliance during oral appliance therapy for sleep-disordered breathing. Thorax 2013; 68(1): 91-96.  3. Elisha, et al. Mandibular advancement devices in 620 men and women with ISIS and snoring: tolerability and predictors of treatment success. Chest 2004; 125: 2092-9276.  4. Baldev, et al. Oral appliances for snoring and ISIS: a review. Sleep 2006; 29: 244-262.  5. Rufino et al. Oral appliance treatment for ISIS: an update. J Clin Sleep Med 2014; 10(2): 215-227.  6. Nae et al. Predictors of OSAH treatment outcome. J Dent Res 2007; 86: 8891-9068.      Weight Loss:    Weight management is a personal decision.  If you are  interested in exploring weight loss strategies, the following discussion covers the impact on weight loss on sleep apnea and the approaches that may be successful.    Weight loss decreases severity of sleep apnea in most people with obesity. For those with mild obesity who have developed snoring with weight gain, even 15-30 pound weight loss can improve and occasionally eliminate sleep apnea.  Structured and life-long dietary and health habits are necessary to lose weight and keep healthier weight levels.     Though there may be significant health benefits from weight loss, long-term weight loss is very difficult to achieve- studies show success with dietary management in less than 10% of people. In addition, substantial weight loss may require years of dietary control and may be difficult if patients have severe obesity. In these cases, surgical management may be considered.  Finally, older individuals who have tolerated obesity without health complications may be less likely to benefit from weight loss strategies.    Your BMI is Body mass index is 23.72 kg/(m^2).  Body mass index (BMI) is one way to tell whether you are at a healthy weight, overweight, or obese. It measures your weight in relation to your height.  A BMI of 18.5 to 24.9 is in the healthy range. A person with a BMI of 25 to 29.9 is considered overweight, and someone with a BMI of 30 or greater is considered obese. More than two-thirds of American adults are considered overweight or obese.  Being overweight or obese increases the risk for further weight gain. Excess weight may lead to heart disease and diabetes.  Creating and following plans for healthy eating and physical activity may help you improve your health.  Weight control is part of healthy lifestyle and includes exercise, emotional health, and healthy eating habits. Careful eating habits lifelong are the mainstay of weight control. Though there are significant health benefits from weight  loss, long-term weight loss with diet alone may be very difficult to achieve- studies show long-term success with dietary management in less than 10% of people. Attaining a healthy weight may be especially difficult to achieve in those with severe obesity. In some cases, medications, devices and surgical management might be considered.  What can you do?  If you are overweight or obese and are interested in methods for weight loss, you should discuss this with your provider.     Consider reducing daily calorie intake by 500 calories.     Keep a food journal.     Avoiding skipping meals, consider cutting portions instead.    Diet combined with exercise helps maintain muscle while optimizing fat loss. Strength training is particularly important for building and maintaining muscle mass. Exercise helps reduce stress, increase energy, and improves fitness. Increasing exercise without diet control, however, may not burn enough calories to loose weight.       Start walking three days a week 10-20 minutes at a time    Work towards walking thirty minutes five days a week     Eventually, increase the speed of your walking for 1-2 minutes at time    In addition, we recommend that you review healthy lifestyles and methods for weight loss available through the National Institutes of Health patient information sites:  http://win.niddk.nih.gov/publications/index.htm    And look into health and wellness programs that may be available through your health insurance provider, employer, local community center, or yony club.        Surgery:    Upper Airway Surgery for ISIS  Surgery for ISIS is a second-line treatment option in the management of sleep apnea.  Surgery should be considered for patients who are having a difficult time tolerating CPAP.    Surgery for ISIS is directed at areas that are responsible for narrowing or complete obstruction of the airway during sleep.  There are a wide range of procedures available to enlarge and/or  stabilize the airway to prevent blockage of breathing in the three major areas where it can occur: the palate, tongue, and nasal regions.  Successful surgical treatment depends on the accurate identification of the factors responsible for obstructive sleep apnea in each person.  A personalized approach is required because there is no single treatment that works well for everyone.  Because of anatomic variation, consultation with an examination by a sleep surgeon is a critical first step in determining what surgical options are best for each patient.  In some cases, examination during sedation may be recommended in order to guide the selection of procedures.  Patients will be counseled about risks and benefits as well as the typical recovery course after surgery. Surgery is typically not a cure for a person s ISIS.  However, surgery will often significantly improve one s ISIS severity (termed  success rate ).  Even in the absence of a cure, surgery will decrease the cardiovascular risk associated with OSA7; improve overall quality of life8 (sleepiness, functionality, sleep quality, etc).          Palate Procedures:  Patients with ISIS often have narrowing of their airway in the region of their tonsils and uvula.  The goals of palate procedures are to widen the airway in this region as well as to help the tissues resist collapse.  Modern palate procedure techniques focus on tissue conservation and soft tissue rearrangement, rather than tissue removal.  Often the uvula is preserved in this procedure. Residual sleep apnea is common in patient after pharyngoplasty with an average reduction in sleep apnea events of 33%2.      Tongue Procedures:  While patients are awake, the muscles that surround the throat are active and keep this region open for breathing. These muscles relax during sleep, allowing the tongue and other structures to collapse and block breathing.  There are several different tongue procedures available.   Selection of a tongue base procedure depends on characteristics seen on physical exam.  Generally, procedures are aimed at removing bulky tissues in this area or preventing the back of the tongue from falling back during sleep.  Success rates for tongue surgery range from 50-62%3.    Hypoglossal Nerve Stimulation:  Hypoglossal nerve stimulation has recently received approval from the [place] Food and Drug Administration for the treatment of obstructive sleep apnea.  This is based on research showing that the system was safe and effective in treating sleep apnea6.  Results showed that the median AHI score decreased 68%, from 29.3 to 9.0. This therapy uses an implant system that senses breathing patterns and delivers mild stimulation to airway muscles, which keeps the airway open during sleep.  The system consists of three fully implanted components: a small generator (similar in size to a pacemaker), a breathing sensor, and a stimulation lead.  Using a small handheld remote, a patient turns the therapy on before bed and off upon awakening.    Candidates for this device must be greater than 22 years of age, have moderate to severe ISIS (AHI between 20-65), BMI less than 32, have tried CPAP/oral appliance without success, and have appropriate upper airway anatomy (determined by a sleep endoscopy performed by Dr. Muñoz).    Hypoglossal Nerve Stimulation Pathway:    The sleep surgeon s office will work with the patient through the insurance prior-authorization process (including communications and appeals).    Nasal Procedures:  Nasal obstruction can interfere with nasal breathing during the day and night.  Studies have shown that relief of nasal obstruction can improve the ability of some patients to tolerate positive airway pressure therapy for obstructive sleep apnea1.  Treatment options include medications such as nasal saline, topical corticosteroid and antihistamine sprays, and oral medications such as  antihistamines or decongestants. Non-surgical treatments can include external nasal dilators for selected patients. If these are not successful by themselves, surgery can improve the nasal airway either alone or in combination with these other options.      Combination Procedures:  Combination of surgical procedures and other treatments may be recommended, particularly if patients have more than one area of narrowing or persistent positional disease.  The success rate of combination surgery ranges from 66-80%2,3.      1. Dano PORTER. The Role of the Nose in Snoring and Obstructive Sleep Apnoea: An Update.  Eur Arch Otorhinolaryngol. 2011; 268: 1365-73.  2.  Wilver SM; Lb JA; Arvind JR; Pallanch JF; Jeimy MB; Michelle SG; Cosmo VERDUGO. Surgical modifications of the upper airway for obstructive sleep apnea in adults: a systematic review and meta-analysis. SLEEP 2010;33(10):1693-3289. Anderson WILSON. Hypopharyngeal surgery in obstructive sleep apnea: an evidence-based medicine review.  Arch Otolaryngol Head Neck Surg. 2006 Feb;132(2):206-13.  3. Tim OWENSH1, Lucia Y, Nikko FRANCISCO. The efficacy of anatomically based multilevel surgery for obstructive sleep apnea. Otolaryngol Head Neck Surg. 2003 Oct;129(4):327-35.  4. Anderson WILSON, Goldberg A. Hypopharyngeal Surgery in Obstructive Sleep Apnea: An Evidence-Based Medicine Review. Arch Otolaryngol Head Neck Surg. 2006 Feb;132(2):206-13.  5. Madhav HUDSON et al. Upper-Airway Stimulation for Obstructive Sleep Apnea.  N Engl J Med. 2014 Jan 9;370(2):139-49.  6. Raven Y et al. Increased Incidence of Cardiovascular Disease in Middle-aged Men with Obstructive Sleep Apnea. Am J Respir Crit Care Med; 2002 166: 159-165  7. Sonya GIBSON et al. Studying Life Effects and Effectiveness of Palatopharyngoplasty (SLEEP) study: Subjective Outcomes of Isolated Uvulopalatopharyngoplasty. Otolaryngol Head Neck Surg. 2011; 144: 623-631.

## 2017-02-28 NOTE — MR AVS SNAPSHOT
"              After Visit Summary   2/28/2017    Tone Cooper    MRN: [id number]           Patient Information     Date Of Birth          1950        Visit Information        Provider Department      2/28/2017 10:30 AM Nakul Alvarado MD [facility]        Today's Diagnoses     Snoring    -  1    Apnea        PVC's (premature ventricular contractions)        Other fatigue        Chronic obstructive pulmonary disease, unspecified COPD type (H)          Care Instructions    MY TREATMENT INFORMATION FOR SLEEP APNEA-  Tone Cooper    DOCTOR : Nakul Alvarado MD  SLEEP CENTER :      MY CONTACT NUMBER:       If I haven't had a sleep study yet, what can I expect?  A personal story from [employer]  https://www.weeSpring.com/watch?v=AxPLmlRpnCs    Am I having a home sleep study?  Here is a video in case you get home and want to make sure you have done it correctly  https://www.weeSpring.com/watch?v=GFB5G2pGoj1&feature=youtu.be    Frequently asked questions:  1. What is Obstructive Sleep Apnea (ISIS)? ISIS is the most common type of sleep apnea. Apnea literally means, \"without breath.\" It is characterized by repetitive pauses in breathing, despite continued effort to breathe, and is usually associated with a reduction in blood oxygen saturation. Apneas can last 10 to over 60 seconds. It is caused by narrowing or collapse of the upper airway as muscles relax during sleep. Severity of sleep apnea is determined by frequency of breathing events and their effect on your sleep and oxygen levels determined during sleep testing.   2. What are the consequences of ISIS? Symptoms include: daytime sleepiness- possibly increasing the risk of falling asleep while driving, unrefreshing/restless sleep, snoring, insomnia, waking frequently to urinate, waking with heartburn or reflux, reduced concentration and memory, and morning headaches. Other health consequences may include development of high blood pressure and other " cardiovascular disease in persons who are susceptible. Untreated ISIS  can contribute to heart disease, stroke and diabetes.   3. What are the treatment options? In most situations, sleep apnea is a lifelong disease that must be managed with daily therapy. Medications are not effective for sleep apnea and surgery is generally not performed until other therapies have been tried. Therapy is usually tailored to the individual patient based on many factors including your wishes as well as severity of sleep apnea and severity of obesity. Continuous Positive Airway (CPAP) is the most reliable treatment. An oral device to hold your jaw forward is usually the next most reliable option. Other options include postioning devices (to keep you off your back), weight loss, and surgery including a tongue pacing device. There is more detail about some of these options below.            1. CPAP-  WHAT DOES IT DO AND HOW CAN I LEARN TO WEAR IT?                               BEFORE I START, CAN I WATCH A MOVIE TO GET A PLAN ON HOW TO USE CPAP?  https://www.Loehmann's.com/watch?z=q3T09xs396L      Continuous positive airway pressure, or CPAP, is the most effective treatment for obstructive sleep apnea. It works by blowing room air, through a mask, to hold your throat open. A decision to use CPAP is a major step forward in the pursuit of a healthier life. The successful use of CPAP will help you breathe easier, sleep better and live healthier. You can choose CPAP equipment from any durable medical equipment provider that meets your needs.  Using CPAP can be a positive experience if you keep these bradley points in mind:  1. Commitment  CPAP is not a quick fix for your problem. It involves a long-term commitment to improve your sleep and your health.    2. Communication  Stay in close communication with both your sleep doctor and your CPAP supplier. Ask lots of questions and seek help when you need it.    3. Consistency  Use CPAP all night, every  "night and for every nap. You will receive the maximum health benefits from CPAP when you use it every time that you sleep. This will also make it easier for your body to adjust to the treatment.    4. Correction  The first machine and mask that you try may not be the best ones for you. Work with your sleep doctor and your CPAP supplier to make corrections to your equipment selection. Ask about trying a different type of machine or mask if you have ongoing problems. Make sure that your mask is a good fit and learn to use your equipment properly.    5. Challenge  Tell a family member or close friend to ask you each morning if you used your CPAP the previous night. Have someone to challenge you to give it your best effort.    6. Connection   Your adjustment to CPAP will be easier if you are able to connect with others who use the same treatment. Ask your sleep doctor if there is a support group in your area for people who have sleep apnea, or look for one on the Internet.  7. Comfort   Increase your level of comfort by using a saline spray, decongestant or heated humidifier if CPAP irritates your nose, mouth or throat. Use your unit's \"ramp\" setting to slowly get used to the air pressure level. There may be soft pads you can buy that will fit over your mask straps. Look on www.CPAP.com for accessories that can help make CPAP use more comfortable.  8. Cleaning   Clean your mask, tubing and headgear on a regular basis. Put this time in your schedule so that you don't forget to do it. Check and replace the filters for your CPAP unit and humidifier.    9. Completion   Although you are never finished with CPAP therapy, you should reward yourself by celebrating the completion of your first month of treatment. Expect this first month to be your hardest period of adjustment. It will involve some trial and error as you find the machine, mask and pressure settings that are right for you.    10. Continuation  After your first " month of treatment, continue to make a daily commitment to use your CPAP all night, every night and for every nap.    CPAP-Tips to starting with success:  Begin using your CPAP for short periods of time during the day while you watch TV or read.    Use CPAP every night and for every nap. Using it less often reduces the health benefits and makes it harder for your body to get used to it.    Make small adjustments to your mask, tubing, straps and headgear until you get the right fit. Tightening the mask may actually worsen the leak.  If it leaves significant marks on your face or irritates the bridge of your nose, it may not be the best mask for you.  Speak with the person who supplied the mask and consider trying other masks. Insurances will allow you to try different masks during the first month of starting CPAP.  Insurance also covers a new mask, hose and filter about every 6 months.    Use a saline nasal spray to ease mild nasal congestion. Neti-Pot or saline nasal rinses may also help. Nasal gel sprays can help reduce nasal dryness.  Biotene mouthwash can be helpful to protect your teeth if you experience frequent dry mouth.  Dry mouth may be a sign of air escaping out of your mouth or out of the mask in the case of a full face mask.  Speak with your provider if you expect that is the case.     Take a nasal decongestant to relieve more severe nasal or sinus congestion.  Do not use Afrin (oxymetazoline) nasal spray more than 3 days in a row.  Speak with your sleep doctor if your nasal congestion is chronic.    Use a heated humidifier that fits your CPAP model to enhance your breathing comfort. Adjust the heat setting up if you get a dry nose or throat, down if you get condensation in the hose or mask.  Position the CPAP lower than you so that any condensation in the hose drains back into the machine rather than towards the mask.    Try a system that uses nasal pillows if traditional masks give you  problems.    Clean your mask, tubing and headgear once a week. Make sure the equipment dries fully.    Regularly check and replace the filters for your CPAP unit and humidifier.    Work closely with your sleep provider and your CPAP supplier to make sure that you have the machine, mask and air pressure setting that works best for you. It is better to stop using it and call your provider to solve problems than to lay awake all night frustrated with the device.    BESIDES CPAP, WHAT OTHER THERAPIES ARE THERE?      Positioning Device  Positioning devices are generally used when sleep apnea is mild and only occurs on your back.This example shows a pillow that straps around the waist. It may be appropriate for those whose sleep study shows milder sleep apnea that occurs primarily when lying flat on one's back. Preliminary studies have shown benefit but effectiveness at home may need to be verified by a home sleep test. These devices are generally not covered by medical insurance.                      Oral Appliance  What is oral appliance therapy?  An oral appliance is a small acrylic device that fits over the upper and lower teeth or tongue (similar to an orthodontic retainer or a mouth guard). This device slightly advances the lower jaw or tongue, which moves the base of the tongue forward, opens the airway, improves breathing and can effectively treat snoring and obstructive sleep apnea sleep apnea. The appliance is fabricated and customized by a qualified dentist with experience in treating snoring and sleep apnea. Oral appliances are usually well tolerated and have relatively high compliance by patients1, 2, 3.  When is an oral appliance indicated?  Oral appliance therapy is recommended as a first-line treatment for patients with primary snoring, mild sleep apnea, and for patients with moderate sleep apnea who prefer appliance therapy to use of CPAP4, 5. Severity of sleep apnea is determined by sleep testing and is  based on the number of respiratory events per hour of sleep.   How successful is oral appliance therapy?  The success rate of oral appliance therapy in patients with mild sleep apnea is 75-80% while in patients with moderate sleep apnea it is 50-70%. The chance of success in patients with severe sleep apnea is 40-50%. The research also shows that oral appliances have a beneficial effect on the cardiovascular health of ISIS patients at the same magnitude as CPAP therapy7.  Oral appliances should be a second-line treatment in cases of severe sleep apnea, but if not completely successful then a combination therapy utilizing CPAP plus oral appliance therapy may be effective. Oral appliances tend to be effective in a broad range of patients although studies show that the patients who have the highest success are females, younger patients, those with milder disease, and less severe obesity. 3, 6.   The chances of success are lower in patients who have more severe ISIS, are older, and those who are morbidly obese.     Example of an oral appliance   Finding a dentist that practices dental sleep medicine  Specific training is available through the American Academy of Dental Sleep Medicine for dentists interested in working in the field of sleep. To find a dentist who is educated in the field of sleep and the use of oral appliances, near you, visit the Web site of the American Academy of Dental Sleep Medicine; also see   http://www.accpstorage.org/newOrganization/patients/oralAppliances.pdf  To search for a dentist certified in these practices:  Http://aadsm.org/FindADentist.aspx?1  1. Rashida, et al. Objectively measured vs self-reported compliance during oral appliance therapy for sleep-disordered breathing. Chest 2013; 144(5): 9453-9459.  2. Rigoberto et al. Objective measurement of compliance during oral appliance therapy for sleep-disordered breathing. Thorax 2013; 68(1): 91-96.  3. Elisha et al. Mandibular  advancement devices in 620 men and women with ISIS and snoring: tolerability and predictors of treatment success. Chest 2004; 125: 1768-1025.  4. Baldev et al. Oral appliances for snoring and ISIS: a review. Sleep 2006; 29: 244-262.  5. Rufino et al. Oral appliance treatment for ISIS: an update. J Clin Sleep Med 2014; 10(2): 215-227.  6. Nae et al. Predictors of OSAH treatment outcome. J Dent Res 2007; 86: 0401-4783.      Weight Loss:    Weight management is a personal decision.  If you are interested in exploring weight loss strategies, the following discussion covers the impact on weight loss on sleep apnea and the approaches that may be successful.    Weight loss decreases severity of sleep apnea in most people with obesity. For those with mild obesity who have developed snoring with weight gain, even 15-30 pound weight loss can improve and occasionally eliminate sleep apnea.  Structured and life-long dietary and health habits are necessary to lose weight and keep healthier weight levels.     Though there may be significant health benefits from weight loss, long-term weight loss is very difficult to achieve- studies show success with dietary management in less than 10% of people. In addition, substantial weight loss may require years of dietary control and may be difficult if patients have severe obesity. In these cases, surgical management may be considered.  Finally, older individuals who have tolerated obesity without health complications may be less likely to benefit from weight loss strategies.    Your BMI is Body mass index is 23.72 kg/(m^2).  Body mass index (BMI) is one way to tell whether you are at a healthy weight, overweight, or obese. It measures your weight in relation to your height.  A BMI of 18.5 to 24.9 is in the healthy range. A person with a BMI of 25 to 29.9 is considered overweight, and someone with a BMI of 30 or greater is considered obese. More than two-thirds of American adults  are considered overweight or obese.  Being overweight or obese increases the risk for further weight gain. Excess weight may lead to heart disease and diabetes.  Creating and following plans for healthy eating and physical activity may help you improve your health.  Weight control is part of healthy lifestyle and includes exercise, emotional health, and healthy eating habits. Careful eating habits lifelong are the mainstay of weight control. Though there are significant health benefits from weight loss, long-term weight loss with diet alone may be very difficult to achieve- studies show long-term success with dietary management in less than 10% of people. Attaining a healthy weight may be especially difficult to achieve in those with severe obesity. In some cases, medications, devices and surgical management might be considered.  What can you do?  If you are overweight or obese and are interested in methods for weight loss, you should discuss this with your provider.     Consider reducing daily calorie intake by 500 calories.     Keep a food journal.     Avoiding skipping meals, consider cutting portions instead.    Diet combined with exercise helps maintain muscle while optimizing fat loss. Strength training is particularly important for building and maintaining muscle mass. Exercise helps reduce stress, increase energy, and improves fitness. Increasing exercise without diet control, however, may not burn enough calories to loose weight.       Start walking three days a week 10-20 minutes at a time    Work towards walking thirty minutes five days a week     Eventually, increase the speed of your walking for 1-2 minutes at time    In addition, we recommend that you review healthy lifestyles and methods for weight loss available through the National Institutes of Health patient information sites:  http://win.niddk.nih.gov/publications/index.htm    And look into health and wellness programs that may be available  through your health insurance provider, employer, local community center, or yony club.        Surgery:    Upper Airway Surgery for ISIS  Surgery for ISIS is a second-line treatment option in the management of sleep apnea.  Surgery should be considered for patients who are having a difficult time tolerating CPAP.    Surgery for ISIS is directed at areas that are responsible for narrowing or complete obstruction of the airway during sleep.  There are a wide range of procedures available to enlarge and/or stabilize the airway to prevent blockage of breathing in the three major areas where it can occur: the palate, tongue, and nasal regions.  Successful surgical treatment depends on the accurate identification of the factors responsible for obstructive sleep apnea in each person.  A personalized approach is required because there is no single treatment that works well for everyone.  Because of anatomic variation, consultation with an examination by a sleep surgeon is a critical first step in determining what surgical options are best for each patient.  In some cases, examination during sedation may be recommended in order to guide the selection of procedures.  Patients will be counseled about risks and benefits as well as the typical recovery course after surgery. Surgery is typically not a cure for a person s ISIS.  However, surgery will often significantly improve one s ISIS severity (termed  success rate ).  Even in the absence of a cure, surgery will decrease the cardiovascular risk associated with OSA7; improve overall quality of life8 (sleepiness, functionality, sleep quality, etc).          Palate Procedures:  Patients with ISIS often have narrowing of their airway in the region of their tonsils and uvula.  The goals of palate procedures are to widen the airway in this region as well as to help the tissues resist collapse.  Modern palate procedure techniques focus on tissue conservation and soft tissue rearrangement,  rather than tissue removal.  Often the uvula is preserved in this procedure. Residual sleep apnea is common in patient after pharyngoplasty with an average reduction in sleep apnea events of 33%2.      Tongue Procedures:  While patients are awake, the muscles that surround the throat are active and keep this region open for breathing. These muscles relax during sleep, allowing the tongue and other structures to collapse and block breathing.  There are several different tongue procedures available.  Selection of a tongue base procedure depends on characteristics seen on physical exam.  Generally, procedures are aimed at removing bulky tissues in this area or preventing the back of the tongue from falling back during sleep.  Success rates for tongue surgery range from 50-62%3.    Hypoglossal Nerve Stimulation:  Hypoglossal nerve stimulation has recently received approval from the [place] Food and Drug Administration for the treatment of obstructive sleep apnea.  This is based on research showing that the system was safe and effective in treating sleep apnea6.  Results showed that the median AHI score decreased 68%, from 29.3 to 9.0. This therapy uses an implant system that senses breathing patterns and delivers mild stimulation to airway muscles, which keeps the airway open during sleep.  The system consists of three fully implanted components: a small generator (similar in size to a pacemaker), a breathing sensor, and a stimulation lead.  Using a small handheld remote, a patient turns the therapy on before bed and off upon awakening.    Candidates for this device must be greater than 22 years of age, have moderate to severe ISIS (AHI between 20-65), BMI less than 32, have tried CPAP/oral appliance without success, and have appropriate upper airway anatomy (determined by a sleep endoscopy performed by Dr. Muñoz).    Hypoglossal Nerve Stimulation Pathway:    The sleep surgeon s office will work with the patient  through the insurance prior-authorization process (including communications and appeals).    Nasal Procedures:  Nasal obstruction can interfere with nasal breathing during the day and night.  Studies have shown that relief of nasal obstruction can improve the ability of some patients to tolerate positive airway pressure therapy for obstructive sleep apnea1.  Treatment options include medications such as nasal saline, topical corticosteroid and antihistamine sprays, and oral medications such as antihistamines or decongestants. Non-surgical treatments can include external nasal dilators for selected patients. If these are not successful by themselves, surgery can improve the nasal airway either alone or in combination with these other options.      Combination Procedures:  Combination of surgical procedures and other treatments may be recommended, particularly if patients have more than one area of narrowing or persistent positional disease.  The success rate of combination surgery ranges from 66-80%2,3.      1. Dano PORTER. The Role of the Nose in Snoring and Obstructive Sleep Apnoea: An Update.  Eur Arch Otorhinolaryngol. 2011; 268: 1365-73.  2.  Wilver SM; Lb JA; Arvind JR; Pallanch JF; Jeimy MB; Michelle SG; Cosmo VERDUGO. Surgical modifications of the upper airway for obstructive sleep apnea in adults: a systematic review and meta-analysis. SLEEP 2010;33(10):8153-5502. Anderson WILSON. Hypopharyngeal surgery in obstructive sleep apnea: an evidence-based medicine review.  Arch Otolaryngol Head Neck Surg. 2006 Feb;132(2):206-13.  3. Tim YH1, Lucia Y, Nikko FRANCISCO. The efficacy of anatomically based multilevel surgery for obstructive sleep apnea. Otolaryngol Head Neck Surg. 2003 Oct;129(4):327-35.  4. Anderson WILSON, Goldberg A. Hypopharyngeal Surgery in Obstructive Sleep Apnea: An Evidence-Based Medicine Review. Arch Otolaryngol Head Neck Surg. 2006 Feb;132(2):206-13.  5. Madhav HUDSON et al. Upper-Airway Stimulation for  Obstructive Sleep Apnea.  N Engl J Med. 2014 Jan 9;370(2):139-49.  6. Raven Y et al. Increased Incidence of Cardiovascular Disease in Middle-aged Men with Obstructive Sleep Apnea. Am J Respir Crit Care Med; 2002 166: 159-165  7. Sonya GIBSON et al. Studying Life Effects and Effectiveness of Palatopharyngoplasty (SLEEP) study: Subjective Outcomes of Isolated Uvulopalatopharyngoplasty. Otolaryngol Head Neck Surg. 2011; 144: 623-631.                    Follow-ups after your visit        Your next 10 appointments already scheduled     Mar 06, 2017 10:00 AM CST   Holter Monitor with [facility] DEVICE [facility])    [address]  [facility] [address] [phone]           LOCATION - [address], [address] [place] [address] **Please check-in at the [place] Registration Office, [address], in the [facility]. When you are finished registering, please go to [address] and have a seat.            Mar 27, 2017 12:45 PM CDT   [facility] EP RETURN with Sussy Britt MD   [facility] [place] ([facility])    [facility]  [place] [id number] [phone]              Future tests that were ordered for you today     Open Future Orders        Priority Expected Expires Ordered    Comprehensive Sleep Study Routine  8/27/2017 2/28/2017            Who to contact     If you have questions or need follow up information about today's clinic visit or your schedule please contact [facility] directly at [phone].  Normal or non-critical lab and imaging results will be communicated to you by MyChart, letter or phone within 4 business days after the clinic has received the results. If you do not hear from us within 7 days, please contact the clinic through MyChart or phone. If you have a critical or abnormal lab result, we will notify you by phone as soon as  "possible.  Submit refill requests through [employer] or call your pharmacy and they will forward the refill request to us. Please allow 3 business days for your refill to be completed.          Additional Information About Your Visit        Care EveryWhere ID     This is your Care EveryWhere ID. This could be used by other organizations to access your [facility] medical records  [id number]        Your Vitals Were     Pulse Temperature Height Pulse Oximetry BMI (Body Mass Index)       48 97.8  F (36.6  C) (Oral) 1.803 m (5' 10.98\") 97% 23.72 kg/m2        Blood Pressure from Last 3 Encounters:   02/28/17 (!) 135/92   02/20/17 130/84   01/24/17 126/81    Weight from Last 3 Encounters:   02/28/17 77.1 kg (170 lb)   02/20/17 77.1 kg (170 lb)   01/23/17 77.8 kg (171 lb 8.3 oz)              We Performed the Following     Sleep Study (referral)          Today's Medication Changes          These changes are accurate as of: 2/28/17 11:30 AM.  If you have any questions, ask your nurse or doctor.               Start taking these medicines.        Dose/Directions    zolpidem 5 MG tablet   Commonly known as:  AMBIEN   Started by:  Nakul Alvarado MD        Take tablet by mouth 15 minutes prior to sleep, for Sleep Study   Quantity:  1 tablet   Refills:  0            Where to get your medicines      Some of these will need a paper prescription and others can be bought over the counter.  Ask your nurse if you have questions.     Bring a paper prescription for each of these medications     zolpidem 5 MG tablet                Primary Care Provider Office Phone # Fax #    Ana Pratt [phone]       [place] [address] [facility] [address]        Thank you!     Thank you for choosing [facility]  for your care. Our goal is always to provide you with excellent care. Hearing back from our patients is one way we can continue to improve our services. Please take a few minutes to " complete the written survey that you may receive in the mail after your visit with us. Thank you!             Your Updated Medication List - Protect others around you: Learn how to safely use, store and throw away your medicines at www.disposemymeds.org.          This list is accurate as of: 2/28/17 11:30 AM.  Always use your most recent med list.                   Brand Name Dispense Instructions for use    ADVAIR DISKUS 250-50 MCG/DOSE diskus inhaler   Generic drug:  fluticasone-salmeterol      Inhale 2 puffs into the lungs 2 times daily       aspirin EC 81 MG EC tablet     90 tablet    Take 1 tablet (81 mg) by mouth daily       HYDROcodone-acetaminophen 5-325 MG per tablet    NORCO    20 tablet    Take 1-2 tablets by mouth every 4 hours as needed for other (Moderate to Severe Pain)       lisinopril 20 MG tablet    PRINIVIL/ZESTRIL    30 tablet    Take 1 tablet (20 mg) by mouth daily       metoprolol 25 MG 24 hr tablet    TOPROL-XL     Take 1 tablet (25 mg) by mouth daily       tamsulosin 80 mcg/mL    FLOMAX     Take 400 mcg by mouth daily       tiotropium 18 MCG capsule    SPIRIVA     Inhale 18 mcg into the lungs daily       VENTOLIN  (90 BASE) MCG/ACT Inhaler   Generic drug:  albuterol      Inhale 2 puffs into the lungs every 6 hours PRN       zolpidem 5 MG tablet    AMBIEN    1 tablet    Take tablet by mouth 15 minutes prior to sleep, for Sleep Study

## 2017-02-28 NOTE — NURSING NOTE
"Chief Complaint   Patient presents with     Sleep Problem     Referred by Cardiologist, SOB difficulty staying asleep at night. Constantly waking up at night and waking up with headaches       Initial BP (!) 135/92  Pulse (!) 48  Temp 97.8  F (36.6  C) (Oral)  Ht 1.803 m (5' 10.98\")  Wt 77.1 kg (170 lb)  SpO2 97%  BMI 23.72 kg/m2 Estimated body mass index is 23.72 kg/(m^2) as calculated from the following:    Height as of this encounter: 1.803 m (5' 10.98\").    Weight as of this encounter: 77.1 kg (170 lb).  Medication Reconciliation: complete   Neck 38cm  ESS 7/24  Mary Delvalle MA      "

## 2017-03-09 ENCOUNTER — TELEPHONE (OUTPATIENT)
Dept: SLEEP MEDICINE | Facility: CLINIC | Age: 67
End: 2017-03-09

## 2017-03-09 RX ORDER — ZOLPIDEM TARTRATE 5 MG/1
TABLET ORAL
Qty: 1 TABLET | Refills: 0 | Status: SHIPPED | OUTPATIENT
Start: 2017-03-09 | End: 2017-05-05

## 2017-03-09 NOTE — TELEPHONE ENCOUNTER
Pt stated he lost his prescription for the ambien 5 mg and is asking that another prescription be sent to [facility] pharmacy Address: [address] [place] [address]  [phone]

## 2017-03-20 ENCOUNTER — TELEPHONE (OUTPATIENT)
Dept: SLEEP MEDICINE | Facility: CLINIC | Age: 67
End: 2017-03-20

## 2017-03-20 ENCOUNTER — TELEPHONE (OUTPATIENT)
Dept: CARDIOLOGY | Facility: CLINIC | Age: 67
End: 2017-03-20

## 2017-03-20 NOTE — TELEPHONE ENCOUNTER
Called and left message for return call to discuss r/s f/u with Dr Britt on 3/27/17. He s/p PVC ablation and was supposed to wear a holter monitor per Michaela after restarting metoprolol. He did not wear holter. Will review symptoms post ablation and decide if he should r/s f/u until after he wears holter.

## 2017-03-20 NOTE — TELEPHONE ENCOUNTER
Jennifer from [facility] called stating they would need a copy of pts PFT to support the evidence of COPD listed in order for pts sleep study. Pt is scheduled for in lab sleep study 3/27, I did not see any information scanned into patients chart that he has had PFT. Is it possible to get orders for patient to have this test done so information can be sent to [facility]. Call has been made to patient to see if he had any supporting information. Jennifer CBR at [phone] ext [phone]

## 2017-03-22 ENCOUNTER — TELEPHONE (OUTPATIENT)
Dept: SLEEP MEDICINE | Facility: CLINIC | Age: 67
End: 2017-03-22

## 2017-03-22 NOTE — TELEPHONE ENCOUNTER
Called in prescription for zolpidem 5mg tab to the [facility] pharmacy on [address] in [place]

## 2017-03-27 ENCOUNTER — TELEPHONE (OUTPATIENT)
Dept: SLEEP MEDICINE | Facility: CLINIC | Age: 67
End: 2017-03-27

## 2017-03-27 DIAGNOSIS — R06.83 SNORING: Primary | ICD-10-CM

## 2017-03-27 DIAGNOSIS — R53.83 FATIGUE, UNSPECIFIED TYPE: ICD-10-CM

## 2017-03-28 ENCOUNTER — HOSPITAL ENCOUNTER (OUTPATIENT)
Dept: CARDIOLOGY | Facility: CLINIC | Age: 67
Discharge: HOME | End: 2017-03-28
Attending: PHYSICIAN ASSISTANT | Admitting: PHYSICIAN ASSISTANT
Payer: COMMERCIAL

## 2017-03-28 DIAGNOSIS — I49.3 PVC'S (PREMATURE VENTRICULAR CONTRACTIONS): ICD-10-CM

## 2017-03-28 PROCEDURE — 93225 XTRNL ECG REC<48 HRS REC: CPT

## 2017-03-28 PROCEDURE — 93227 XTRNL ECG REC<48 HR R&I: CPT | Performed by: INTERNAL MEDICINE

## 2017-03-31 ENCOUNTER — OFFICE VISIT (OUTPATIENT)
Dept: CARDIOLOGY | Facility: CLINIC | Age: 67
End: 2017-03-31
Payer: COMMERCIAL

## 2017-03-31 VITALS
DIASTOLIC BLOOD PRESSURE: 84 MMHG | BODY MASS INDEX: 24.2 KG/M2 | HEIGHT: 70 IN | WEIGHT: 169 LBS | SYSTOLIC BLOOD PRESSURE: 122 MMHG | HEART RATE: 60 BPM

## 2017-03-31 DIAGNOSIS — I49.3 PVC'S (PREMATURE VENTRICULAR CONTRACTIONS): ICD-10-CM

## 2017-03-31 DIAGNOSIS — I48.4 ATYPICAL ATRIAL FLUTTER (H): Primary | ICD-10-CM

## 2017-03-31 PROCEDURE — 99214 OFFICE O/P EST MOD 30 MIN: CPT | Performed by: INTERNAL MEDICINE

## 2017-03-31 RX ORDER — DILTIAZEM HYDROCHLORIDE 240 MG/1
240 CAPSULE, COATED, EXTENDED RELEASE ORAL DAILY
Qty: 30 CAPSULE | Refills: 3 | Status: SHIPPED | OUTPATIENT
Start: 2017-03-31 | End: 2017-05-16

## 2017-03-31 RX ORDER — METOPROLOL SUCCINATE 25 MG/1
50 TABLET, EXTENDED RELEASE ORAL DAILY
Qty: 90 TABLET | Refills: 3 | Status: SHIPPED | OUTPATIENT
Start: 2017-03-31 | End: 2017-05-16

## 2017-03-31 NOTE — PROGRESS NOTES
HPI and Plan:   See dictation    Orders Placed This Encounter   Procedures     Follow-Up with Electrophysiologist     Donna Patch Monitor       Orders Placed This Encounter   Medications     diltiazem (CARDIZEM CD) 240 MG 24 hr capsule     Sig: Take 1 capsule (240 mg) by mouth daily     Dispense:  30 capsule     Refill:  3     rivaroxaban ANTICOAGULANT (XARELTO) 20 MG TABS tablet     Sig: Take 1 tablet (20 mg) by mouth daily (with dinner)     Dispense:  90 tablet     Refill:  3     metoprolol (TOPROL-XL) 25 MG 24 hr tablet     Sig: Take 2 tablets (50 mg) by mouth daily     Dispense:  90 tablet     Refill:  3       Medications Discontinued During This Encounter   Medication Reason     aspirin EC 81 MG EC tablet      lisinopril (PRINIVIL,ZESTRIL) 20 MG tablet      metoprolol (TOPROL-XL) 25 MG 24 hr tablet Reorder         Encounter Diagnoses   Name Primary?     Atypical atrial flutter (H) Yes     PVC's (premature ventricular contractions)        CURRENT MEDICATIONS:  Current Outpatient Prescriptions   Medication Sig Dispense Refill     diltiazem (CARDIZEM CD) 240 MG 24 hr capsule Take 1 capsule (240 mg) by mouth daily 30 capsule 3     rivaroxaban ANTICOAGULANT (XARELTO) 20 MG TABS tablet Take 1 tablet (20 mg) by mouth daily (with dinner) 90 tablet 3     metoprolol (TOPROL-XL) 25 MG 24 hr tablet Take 2 tablets (50 mg) by mouth daily 90 tablet 3     zolpidem (AMBIEN) 5 MG tablet Take tablet by mouth 15 minutes prior to sleep, for Sleep Study 1 tablet 0     tamsulosin (FLOMAX) 80 mcg/mL Take 400 mcg by mouth daily       tiotropium (SPIRIVA) 18 MCG capsule Inhale 18 mcg into the lungs daily       HYDROcodone-acetaminophen (NORCO) 5-325 MG per tablet Take 1-2 tablets by mouth every 4 hours as needed for other (Moderate to Severe Pain) 20 tablet 0     albuterol (VENTOLIN HFA) 108 (90 BASE) MCG/ACT inhaler Inhale 2 puffs into the lungs every 6 hours PRN       fluticasone-salmeterol (ADVAIR DISKUS) 250-50 MCG/DOSE diskus inhaler  Inhale 2 puffs into the lungs 2 times daily        [DISCONTINUED] metoprolol (TOPROL-XL) 25 MG 24 hr tablet Take 1 tablet (25 mg) by mouth daily         ALLERGIES     Allergies   Allergen Reactions     Flecainide Palpitations       PAST MEDICAL HISTORY:  Past Medical History:   Diagnosis Date     Atrial flutter (H)      COPD (chronic obstructive pulmonary disease) (H)      Diverticulitis      Hypertension      Persistent atrial fibrillation (H) 4/28/09    ablation 7/1/2015     PVC (premature ventricular contraction)     s/p ablation 12/5/2017     Tricuspid valve disorder     Dr Aj, Hx of valve repair        PAST SURGICAL HISTORY:  Past Surgical History:   Procedure Laterality Date     ABDOMEN SURGERY      hernia repair right     APPENDECTOMY       CARDIOVERSION  06/03/2009    successful for afib     CARDIOVERSION  4/20/15    successful for afib     CARDIOVERSION  5/28/15     H ABLATION FOCAL AFIB  7/1/15    Left atrial linear ablation and pulmonary vein antrum ablation     H ABLATION PVC  12/5/16     INCISION AND DRAINAGE LOWER EXTREMITY, COMBINED Right 11/10/2016    Procedure: COMBINED INCISION AND DRAINAGE LOWER EXTREMITY;  Surgeon: Roger Pacheco MD;  Location: [facility] OR     LAPAROSCOPIC CHOLECYSTECTOMY  1/6/2012    Procedure:LAPAROSCOPIC CHOLECYSTECTOMY; LAPAROSCOPIC CHOLECYSTECTOMY ; Surgeon:ROGER PACHECO; Location:[facility] OR     ORTHOPEDIC SURGERY      Left hip replacement     REPAIR VALVE TRICUSPID  9/8/08    conversion to a bileaflet valve and application of a 30 mm Sanderson ring     SMALL INTESTINE SURGERY      had bowel obstruction age 9     VALVE REPLACEMENT      tricuspid       FAMILY HISTORY:  Family History   Problem Relation Age of Onset     Prostate Cancer Father      Family History Negative Mother      Emphysema Mother      Hypertension Mother      CEREBROVASCULAR DISEASE Mother        SOCIAL HISTORY:  Social History     Social History     Marital status:      Spouse name: N/A      "Number of children: N/A     Years of education: N/A     Social History Main Topics     Smoking status: Former Smoker     Quit date: 1/2/2008     Smokeless tobacco: Never Used     Alcohol use 0.0 oz/week     0 Standard drinks or equivalent per week      Comment: 1 drink daily     Drug use: No     Sexual activity: No     Other Topics Concern     Caffeine Concern No     1 a day      Sleep Concern Yes     nocturia     Weight Concern No     Special Diet No     Exercise No     Bike Helmet Yes     Seat Belt Yes     Parent/Sibling W/ Cabg, Mi Or Angioplasty Before 65f 55m? No     Social History Narrative       Review of Systems:  Skin:  Positive for itching skin cancer removed recently   Eyes:  Positive for glasses    ENT:  Negative      Respiratory:  Positive for dyspnea on exertion COPD---increased sob   Cardiovascular:  Negative for;palpitations;chest pain;edema Positive for;exercise intolerance dizziness with bending over  Gastroenterology: Negative melena;hematochezia    Genitourinary:  Positive for nocturia    Musculoskeletal:  Positive for joint pain L elbow  Neurologic:  Negative headaches right leg is cold  Psychiatric:  Positive for sleep disturbances sleeps poorly  Heme/Lymph/Imm:  Positive for allergies    Endocrine:  Positive for diabetes      Physical Exam:  Vitals: /84  Pulse 60  Ht 1.778 m (5' 10\")  Wt 76.7 kg (169 lb)  BMI 24.25 kg/m2    Constitutional:  cooperative, alert and oriented, well developed, well nourished, in no acute distress        Skin:  warm and dry to the touch, no apparent skin lesions or masses noted        Head:  normocephalic, no masses or lesions        Eyes:  no xanthalasma;EOMS intact;sclera white;conjunctivae and lids unremarkable        ENT:  no pallor or cyanosis, dentition good        Neck:  JVP normal;no carotid bruit        Chest:  clear to auscultation diminished breath sounds bilaterally        Cardiac: normal S1 and S2;regular rhythm;no murmurs, gallops or rubs " detected occasional premature beats                Abdomen:  abdomen soft        Vascular: pulses full and equal                               right femoral bruit (-) left subclavian bruit (-)      Extremities and Back:  no deformities, clubbing, cyanosis, erythema observed;no edema              Neurological:  affect appropriate, oriented to time, person and place              CC  Ana Pratt MD  [facility]  [address] [place] [address]

## 2017-03-31 NOTE — LETTER
3/31/2017    Ana Pratt MD  [facility]  [address] [place] [address]    RE: Tone KAYLA Allison       Dear Colleague,    I had the pleasure of seeing Tone Cooper in the [facility].    I saw Mr. Cooper for followup of atrial fibrillation ablation and PVC ablation.  He is a 66-year-old white male with a history of COPD, hypertension and previous tricuspid repair.  He presented with symptomatic atrial fibrillation that failed medical therapy on flecainide.  The patient underwent atrial fibrillation ablation on 07/01/2015.  After that, he did not have apparent recurrent atrial fibrillation in the absence of antiarrhythmic drug therapy.  He later presented with recurrent palpitations and was found to have highly frequent PVCs.  The patient underwent 2 attempted ablations of PVCs; one on 12/05/2016 and another 01/23/2017.  After the second PVC ablation he was put on a Holter monitor for reassessment.  Surprisingly, he was found to have a few episodes of atrial flutter/ tachycardia.  One episode was quite long and associated with 1:1 AV conduction with rate-dependent aberrancy.  During that time he was having dizziness.  He has had no syncope or near-syncope.  He has not had any recent ER visit or hospitalization.      PHYSICAL EXAMINATION:   VITAL SIGNS:  Today blood pressure was 122/84, heart rate 60 beats per minute, body weight 169 pounds.   HEENT:  Eyes and ENT were unremarkable.   LUNGS:  Clear.   CARDIAC:  Rhythm was regular and heart sounds were normal with no murmur.   ABDOMEN:  Examination showed no hepatomegaly.   EXTREMITIES:  There was no pedal edema.     Outpatient Encounter Prescriptions as of 3/31/2017   Medication Sig Dispense Refill     rivaroxaban ANTICOAGULANT (XARELTO) 20 MG TABS tablet Take 1 tablet (20 mg) by mouth daily (with dinner) 90 tablet 3     [DISCONTINUED] diltiazem (CARDIZEM CD) 240 MG 24 hr capsule Take 1 capsule (240 mg) by mouth daily  30 capsule 3     [DISCONTINUED] metoprolol (TOPROL-XL) 25 MG 24 hr tablet Take 2 tablets (50 mg) by mouth daily 90 tablet 3     [DISCONTINUED] zolpidem (AMBIEN) 5 MG tablet Take tablet by mouth 15 minutes prior to sleep, for Sleep Study 1 tablet 0     [DISCONTINUED] tamsulosin (FLOMAX) 80 mcg/mL Take 400 mcg by mouth daily       tiotropium (SPIRIVA) 18 MCG capsule Inhale 18 mcg into the lungs daily       HYDROcodone-acetaminophen (NORCO) 5-325 MG per tablet Take 1-2 tablets by mouth every 4 hours as needed for other (Moderate to Severe Pain) 20 tablet 0     albuterol (VENTOLIN HFA) 108 (90 BASE) MCG/ACT inhaler Inhale 2 puffs into the lungs every 6 hours PRN       fluticasone-salmeterol (ADVAIR DISKUS) 250-50 MCG/DOSE diskus inhaler Inhale 2 puffs into the lungs 2 times daily        [DISCONTINUED] metoprolol (TOPROL-XL) 25 MG 24 hr tablet Take 1 tablet (25 mg) by mouth daily       [DISCONTINUED] aspirin EC 81 MG EC tablet Take 1 tablet (81 mg) by mouth daily 90 tablet 3     [DISCONTINUED] lisinopril (PRINIVIL,ZESTRIL) 20 MG tablet Take 1 tablet (20 mg) by mouth daily 30 tablet 0     No facility-administered encounter medications on file as of 3/31/2017.       ASSESSMENT AND RECOMMENDATIONS:     1.  Mr. Cooper is a 66-year-old white male who had previous atrial fibrillation ablation and 2 PVC ablations.  He now still has moderate to high frequent burden of PVCs.  However, I do not know if the Holter counting of the PVCs is accurate because of rate-dependent aberrant conduction.  The patient had atrial flutter/tachycardia with intermittent 1:1 AV conduction and a rapid ventricular rate.  The symptoms were severe dizziness.  That needs to be treated.  Because of the multiple ablations in the recent past, I would try to manage the problem medically first.  For that, he will be asked to increase the dose of metoprolol from 25 to 50 mg a day and add Cardizem- mg once a day for rate control.  For further assessment  he will wear a 2-week ZIO Patch monitor to assess the frequency of PVCs, duration of atrial flutter and most importantly ventricular rate during recurrent atrial flutter.  I plan to see him in about 1 month for reassessment.   2.  To reduce the risk of orthostatic hypotension.  I have stopped lisinopril with the addition of diltiazem and  a higher dose of metoprolol.   3.  He has a history of hypertension and the atypical atrial flutter/tachycardia may increase the risk of stroke.  Therefore, I have switched his aspirin to Xarelto 20 mg once a day.  He previously tolerated Xarelto well without side effects and the insurance coverage appeared to be fine at that time.     Again, thank you for allowing me to participate in the care of your patient.      Sincerely,    Sussy Britt MD     [facility]

## 2017-03-31 NOTE — MR AVS SNAPSHOT
After Visit Summary   3/31/2017    Tone Cooper    MRN: [id number]           Patient Information     Date Of Birth          1950        Visit Information        Provider Department      3/31/2017 10:30 AM Sussy Britt MD [facility]        Today's Diagnoses     Atypical atrial flutter (H)    -  1    PVC's (premature ventricular contractions)           Follow-ups after your visit        Additional Services     Follow-Up with Electrophysiologist                 Your next 10 appointments already scheduled     Apr 03, 2017  2:00 PM CDT   ZIOPATCH MONITOR with [facility] Radiology - [facility] ([facility])    [address]  [place] [address]   [phone]            May 16, 2017  8:15 AM CDT   [facility] EP RETURN with Sussy Britt MD   [facility] ([facility])    [address]  [place] [address] [phone]            Jun 05, 2017  9:45 AM CDT   P EP RETURN with Sussy Britt MD   [facility] ([facility])    [address]  [place] [address] [phone]              Future tests that were ordered for you today     Open Future Orders        Priority Expected Expires Ordered    Zio Patch Monitor Routine 4/7/2017 3/31/2018 3/31/2017    Follow-Up with Electrophysiologist Routine 4/28/2017 3/31/2018 3/31/2017            Who to contact     If you have questions or need follow up information about today's clinic visit or your schedule please contact [facility] directly at [phone].  Normal or non-critical lab and imaging results will be communicated to you by MyChart, letter or phone within 4 business days after the clinic has received the results. If you do not hear from us within 7 days, please contact the clinic through [employer] or  "phone. If you have a critical or abnormal lab result, we will notify you by phone as soon as possible.  Submit refill requests through [employer] or call your pharmacy and they will forward the refill request to us. Please allow 3 business days for your refill to be completed.          Additional Information About Your Visit        Care EveryWhere ID     This is your Care EveryWhere ID. This could be used by other organizations to access your [facility] medical records  [id number]        Your Vitals Were     Pulse Height BMI (Body Mass Index)             60 1.778 m (5' 10\") 24.25 kg/m2          Blood Pressure from Last 3 Encounters:   03/31/17 122/84   02/28/17 (!) 135/92   02/20/17 130/84    Weight from Last 3 Encounters:   03/31/17 76.7 kg (169 lb)   02/28/17 77.1 kg (170 lb)   02/20/17 77.1 kg (170 lb)                 Today's Medication Changes          These changes are accurate as of: 3/31/17 11:18 AM.  If you have any questions, ask your nurse or doctor.               Start taking these medicines.        Dose/Directions    diltiazem 240 MG 24 hr capsule   Commonly known as:  CARDIZEM CD   Used for:  Atypical atrial flutter (H)   Started by:  Sussy Britt MD        Dose:  240 mg   Take 1 capsule (240 mg) by mouth daily   Quantity:  30 capsule   Refills:  3       rivaroxaban ANTICOAGULANT 20 MG Tabs tablet   Commonly known as:  XARELTO   Used for:  Atypical atrial flutter (H)   Started by:  Sussy Britt MD        Dose:  20 mg   Take 1 tablet (20 mg) by mouth daily (with dinner)   Quantity:  90 tablet   Refills:  3         These medicines have changed or have updated prescriptions.        Dose/Directions    metoprolol 25 MG 24 hr tablet   Commonly known as:  TOPROL-XL   This may have changed:  how much to take   Used for:  PVC's (premature ventricular contractions)   Changed by:  Sussy Britt MD        Dose:  50 mg   Take 2 tablets (50 mg) by mouth daily   Quantity:  90 tablet   Refills:  3         Stop taking these " medicines if you haven't already. Please contact your care team if you have questions.     aspirin EC 81 MG EC tablet   Stopped by:  Sussy Britt MD           lisinopril 20 MG tablet   Commonly known as:  PRINIVIL/ZESTRIL   Stopped by:  Sussy Britt MD                Where to get your medicines      These medications were sent to [facility]/pharmacy #[id number] - [place], [place] - [address] [address] [address] [facility]  [address] [facility] [address]     Phone:  [phone]     diltiazem 240 MG 24 hr capsule    metoprolol 25 MG 24 hr tablet    rivaroxaban ANTICOAGULANT 20 MG Tabs tablet                Primary Care Provider Office Phone # Fax #    Ana Shayla Pratt [phone]       [email or web address] [address] [facility] [address]        Thank you!     Thank you for choosing [facility] [place]  for your care. Our goal is always to provide you with excellent care. Hearing back from our patients is one way we can continue to improve our services. Please take a few minutes to complete the written survey that you may receive in the mail after your visit with us. Thank you!             Your Updated Medication List - Protect others around you: Learn how to safely use, store and throw away your medicines at www.disposemymeds.org.          This list is accurate as of: 3/31/17 11:18 AM.  Always use your most recent med list.                   Brand Name Dispense Instructions for use    ADVAIR DISKUS 250-50 MCG/DOSE diskus inhaler   Generic drug:  fluticasone-salmeterol      Inhale 2 puffs into the lungs 2 times daily       diltiazem 240 MG 24 hr capsule    CARDIZEM CD    30 capsule    Take 1 capsule (240 mg) by mouth daily       HYDROcodone-acetaminophen 5-325 MG per tablet    NORCO    20 tablet    Take 1-2 tablets by mouth every 4 hours as needed for other (Moderate to Severe Pain)       metoprolol 25 MG 24 hr tablet    TOPROL-XL    90 tablet    Take 2 tablets (50 mg) by mouth daily        rivaroxaban ANTICOAGULANT 20 MG Tabs tablet    XARELTO    90 tablet    Take 1 tablet (20 mg) by mouth daily (with dinner)       tamsulosin 80 mcg/mL Susp    FLOMAX     Take 400 mcg by mouth daily       tiotropium 18 MCG capsule    SPIRIVA     Inhale 18 mcg into the lungs daily       VENTOLIN  (90 BASE) MCG/ACT Inhaler   Generic drug:  albuterol      Inhale 2 puffs into the lungs every 6 hours PRN       zolpidem 5 MG tablet    AMBIEN    1 tablet    Take tablet by mouth 15 minutes prior to sleep, for Sleep Study

## 2017-04-03 ENCOUNTER — TELEPHONE (OUTPATIENT)
Dept: SLEEP MEDICINE | Facility: CLINIC | Age: 67
End: 2017-04-03

## 2017-04-03 ENCOUNTER — HOSPITAL ENCOUNTER (OUTPATIENT)
Dept: CARDIOLOGY | Facility: CLINIC | Age: 67
Discharge: HOME | End: 2017-04-03
Attending: INTERNAL MEDICINE | Admitting: INTERNAL MEDICINE
Payer: COMMERCIAL

## 2017-04-03 DIAGNOSIS — I48.4 ATYPICAL ATRIAL FLUTTER (H): ICD-10-CM

## 2017-04-03 DIAGNOSIS — I49.3 PVC'S (PREMATURE VENTRICULAR CONTRACTIONS): ICD-10-CM

## 2017-04-03 PROCEDURE — 0296T ZIO PATCH HOLTER: CPT

## 2017-04-03 PROCEDURE — 0298T ZZC EXT ECG > 48HR TO 21 DAY REVIEW AND INTERPRETATN: CPT | Performed by: INTERNAL MEDICINE

## 2017-04-03 NOTE — PROGRESS NOTES
HISTORY OF PRESENT ILLNESS:  I saw Mr. Cooper for followup of atrial fibrillation ablation and PVC ablation.  He is a 66-year-old white male with a history of COPD, hypertension and previous tricuspid repair.  He presented with symptomatic atrial fibrillation that failed medical therapy on flecainide.  The patient underwent atrial fibrillation ablation on 07/01/2015.  After that, he did not have apparent recurrent atrial fibrillation in the absence of antiarrhythmic drug therapy.  He later presented with recurrent palpitations and was found to have highly frequent PVCs.  The patient underwent 2 attempted ablations of PVCs; one on 12/05/2016 and another 01/23/2017.  After the second PVC ablation he was put on a Holter monitor for reassessment.  Surprisingly, he was found to have a few episodes of atrial flutter/ tachycardia.  One episode was quite long and associated with 1:1 AV conduction with rate-dependent aberrancy.  During that time he was having dizziness.  He has had no syncope or near-syncope.  He has not had any recent ER visit or hospitalization.      PHYSICAL EXAMINATION:   VITAL SIGNS:  Today blood pressure was 122/84, heart rate 60 beats per minute, body weight 169 pounds.   HEENT:  Eyes and ENT were unremarkable.   LUNGS:  Clear.   CARDIAC:  Rhythm was regular and heart sounds were normal with no murmur.   ABDOMEN:  Examination showed no hepatomegaly.   EXTREMITIES:  There was no pedal edema.      ASSESSMENT AND RECOMMENDATIONS:     1.  Mr. Cooper is a 66-year-old white male who had previous atrial fibrillation ablation and 2 PVC ablations.  He now still has moderate to high frequent burden of PVCs.  However, I do not know if the Holter counting of the PVCs is accurate because of rate-dependent aberrant conduction.  The patient had atrial flutter/tachycardia with intermittent 1:1 AV conduction and a rapid ventricular rate.  The symptoms were severe dizziness.  That needs to be treated.  Because of the  multiple ablations in the recent past, I would try to manage the problem medically first.  For that, he will be asked to increase the dose of metoprolol from 25 to 50 mg a day and add Cardizem- mg once a day for rate control.  For further assessment he will wear a 2-week ZIO Patch monitor to assess the frequency of PVCs, duration of atrial flutter and most importantly ventricular rate during recurrent atrial flutter.  I plan to see him in about 1 month for reassessment.   2.  To reduce the risk of orthostatic hypotension.  I have stopped lisinopril with the addition of diltiazem and  a higher dose of metoprolol.   3.  He has a history of hypertension and the atypical atrial flutter/tachycardia may increase the risk of stroke.  Therefore, I have switched his aspirin to Xarelto 20 mg once a day.  He previously tolerated Xarelto well without side effects and the insurance coverage appeared to be fine at that time.      Noris Martinez MD      cc:   [place] [address]         NORIS MARTINEZ MD             D: 2017 11:13   T: 2017 14:24   MT: VITOR      Name:     FELIPE AYOUB   MRN:      [id number]        Account:      [id number]   :      1950           Service Date: 2017      Document: [id number]

## 2017-04-21 ENCOUNTER — TELEPHONE (OUTPATIENT)
Dept: SLEEP MEDICINE | Facility: CLINIC | Age: 67
End: 2017-04-21

## 2017-04-25 ENCOUNTER — TELEPHONE (OUTPATIENT)
Dept: CARDIOLOGY | Facility: CLINIC | Age: 67
End: 2017-04-25

## 2017-04-25 NOTE — TELEPHONE ENCOUNTER
Pt called and requesting clarification regarding subsequent f/u shaq'ts with Dr. Britt. Scheduled to see Dr. Britt for OV on 5-16-17 at 0815-instructed pt to keep this shaq't. Writer instructed  to cancel subsequent shaq't scheduled with Dr. Britt in June. Pt wore Zio Patch monitor x 2 weeks and completed last Monday. Results pending. Pt was instructed to increase Metoprolol from 25 mg po daily to 50 mg po daily at time of last Dr. Britt OV on 3-31-17, but pt forgot to do this. Will increase dosage today. Could possibly skew Zio Patch results, as wearing to monitor for PVC and AT burden. KEYLA Quijano RN.

## 2017-05-03 ENCOUNTER — OFFICE VISIT (OUTPATIENT)
Dept: SLEEP MEDICINE | Facility: CLINIC | Age: 67
End: 2017-05-03

## 2017-05-03 DIAGNOSIS — R06.83 SNORING: Primary | ICD-10-CM

## 2017-05-03 NOTE — MR AVS SNAPSHOT
"              After Visit Summary   5/3/2017    Tone Cooper    MRN: [id number]           Patient Information     Date Of Birth          1950        Visit Information        Provider Department      5/3/2017 9:00 AM BED 7 SH SLEEP [facility]        Today's Diagnoses     Snoring    -  1       Follow-ups after your visit        Your next 10 appointments already scheduled     May 04, 2017  9:00 AM CDT   HST Drop Off with  SLEEP CENTER DME   [facility] ([facility])    [address]  [place] [id number] [phone]            May 16, 2017  8:15 AM CDT   [facility] EP RETURN with Sussy Britt MD   [facility] PHYSICIANS HEART AT [place] ([facility] Clinics)    [address]  [place] [id number] [phone]              Who to contact     If you have questions or need follow up information about today's clinic visit or your schedule please contact [facility] directly at [phone].  Normal or non-critical lab and imaging results will be communicated to you by FarmersWebhart, letter or phone within 4 business days after the clinic has received the results. If you do not hear from us within 7 days, please contact the clinic through ViewReplet or phone. If you have a critical or abnormal lab result, we will notify you by phone as soon as possible.  Submit refill requests through [employer] or call your pharmacy and they will forward the refill request to us. Please allow 3 business days for your refill to be completed.          Additional Information About Your Visit        FarmersWebhart Information     [employer] lets you send messages to your doctor, view your test results, renew your prescriptions, schedule appointments and more. To sign up, go to [email or web address] . Click on \"Log in\" on the left side of the screen, which will take you to the Welcome page. Then click on \"Sign up Now\" on the right " side of the page.     You will be asked to enter the access code listed below, as well as some personal information. Please follow the directions to create your username and password.     Your access code is: [email or web address]  Expires: 2017 11:04 AM     Your access code will  in 90 days. If you need help or a new code, please call your [place] clinic or [phone].        Care EveryWhere ID     This is your Care EveryWhere ID. This could be used by other organizations to access your [place] medical records  [id number]         Blood Pressure from Last 3 Encounters:   17 122/84   17 (!) 135/92   17 130/84    Weight from Last 3 Encounters:   17 76.7 kg (169 lb)   17 77.1 kg (170 lb)   17 77.1 kg (170 lb)              Today, you had the following     No orders found for display       Primary Care Provider Office Phone # Fax #    Ana Shayla Pratt [phone]       [facility] [address] [facility] [address]        Thank you!     Thank you for choosing [facility]  for your care. Our goal is always to provide you with excellent care. Hearing back from our patients is one way we can continue to improve our services. Please take a few minutes to complete the written survey that you may receive in the mail after your visit with us. Thank you!             Your Updated Medication List - Protect others around you: Learn how to safely use, store and throw away your medicines at www.disposemymeds.org.          This list is accurate as of: 5/3/17 11:04 AM.  Always use your most recent med list.                   Brand Name Dispense Instructions for use    ADVAIR DISKUS 250-50 MCG/DOSE diskus inhaler   Generic drug:  fluticasone-salmeterol      Inhale 2 puffs into the lungs 2 times daily       diltiazem 240 MG 24 hr capsule    CARDIZEM CD    30 capsule    Take 1 capsule (240 mg) by mouth daily       HYDROcodone-acetaminophen 5-325 MG per  tablet    NORCO    20 tablet    Take 1-2 tablets by mouth every 4 hours as needed for other (Moderate to Severe Pain)       metoprolol 25 MG 24 hr tablet    TOPROL-XL    90 tablet    Take 2 tablets (50 mg) by mouth daily       rivaroxaban ANTICOAGULANT 20 MG Tabs tablet    XARELTO    90 tablet    Take 1 tablet (20 mg) by mouth daily (with dinner)       tamsulosin 80 mcg/mL Susp    FLOMAX     Take 400 mcg by mouth daily       tiotropium 18 MCG capsule    SPIRIVA     Inhale 18 mcg into the lungs daily       VENTOLIN  (90 BASE) MCG/ACT Inhaler   Generic drug:  albuterol      Inhale 2 puffs into the lungs every 6 hours PRN       zolpidem 5 MG tablet    AMBIEN    1 tablet    Take tablet by mouth 15 minutes prior to sleep, for Sleep Study

## 2017-05-03 NOTE — PROGRESS NOTES
Patient instructed on HST use. Patient demonstrated and verbalized knowledge of use. Device programmed to start at 11pm. Device will be returned tomorrow before noon.      Ariadne Caruso  Clinical Coordinator

## 2017-05-04 ENCOUNTER — DOCUMENTATION ONLY (OUTPATIENT)
Dept: SLEEP MEDICINE | Facility: CLINIC | Age: 67
End: 2017-05-04

## 2017-05-05 ENCOUNTER — TELEPHONE (OUTPATIENT)
Dept: SLEEP MEDICINE | Facility: CLINIC | Age: 67
End: 2017-05-05

## 2017-05-05 RX ORDER — ZOLPIDEM TARTRATE 5 MG/1
TABLET ORAL
Qty: 1 TABLET | Refills: 0 | Status: SHIPPED | OUTPATIENT
Start: 2017-05-05 | End: 2017-05-16

## 2017-05-05 NOTE — PROGRESS NOTES
HST download quality poor due to nasal cannula. Patient repeating test 5/9/17. Charges removed for 5/3/17    Ariadne Caruso  Clinical Coordinator

## 2017-05-05 NOTE — TELEPHONE ENCOUNTER
Patient is doing a repeat HST and would like Ambien called/faxed to pharmacy for upcoming test.    Ariadne Caruso  Clinical Coordinator

## 2017-05-05 NOTE — TELEPHONE ENCOUNTER
Faxed order for Ambien 5mg to [facility] pharmacy. Contacted patient to notify.    Ariadne Caruso  Clinical Coordinator

## 2017-05-09 ENCOUNTER — OFFICE VISIT (OUTPATIENT)
Dept: SLEEP MEDICINE | Facility: CLINIC | Age: 67
End: 2017-05-09

## 2017-05-09 DIAGNOSIS — R06.83 SNORING: Primary | ICD-10-CM

## 2017-05-09 NOTE — MR AVS SNAPSHOT
"              After Visit Summary   5/9/2017    Tone Cooper    MRN: [id number]           Patient Information     Date Of Birth          1950        Visit Information        Provider Department      5/9/2017 9:00 AM BED 7 SH SLEEP [facility]        Today's Diagnoses     Snoring    -  1       Follow-ups after your visit        Your next 10 appointments already scheduled     May 10, 2017  9:15 AM CDT   HST Drop Off with  SLEEP CENTER DME   [facility] ([facility])    [address]  [place] [address] [phone]            May 16, 2017  8:15 AM CDT   P EP RETURN with Sussy Britt MD   [facility] PHYSICIANS HEART AT [place] ([facility] Clinics)    [address]  [place] [id number] [phone]              Who to contact     If you have questions or need follow up information about today's clinic visit or your schedule please contact [facility] directly at [phone].  Normal or non-critical lab and imaging results will be communicated to you by Zinitixhart, letter or phone within 4 business days after the clinic has received the results. If you do not hear from us within 7 days, please contact the clinic through Thanxt or phone. If you have a critical or abnormal lab result, we will notify you by phone as soon as possible.  Submit refill requests through [employer] or call your pharmacy and they will forward the refill request to us. Please allow 3 business days for your refill to be completed.          Additional Information About Your Visit        Zinitixhart Information     [employer] lets you send messages to your doctor, view your test results, renew your prescriptions, schedule appointments and more. To sign up, go to [email or web address] . Click on \"Log in\" on the left side of the screen, which will take you to the Welcome page. Then click on \"Sign up Now\" on the right " side of the page.     You will be asked to enter the access code listed below, as well as some personal information. Please follow the directions to create your username and password.     Your access code is: [email or web address]  Expires: 2017 11:04 AM     Your access code will  in 90 days. If you need help or a new code, please call your [place] clinic or [phone].        Care EveryWhere ID     This is your Care EveryWhere ID. This could be used by other organizations to access your [place] medical records  [id number]         Blood Pressure from Last 3 Encounters:   17 122/84   17 (!) 135/92   17 130/84    Weight from Last 3 Encounters:   17 76.7 kg (169 lb)   17 77.1 kg (170 lb)   17 77.1 kg (170 lb)              Today, you had the following     No orders found for display       Primary Care Provider Office Phone # Fax #    Ana Shayla Pratt [phone]       [employer] [address] [facility] [address]        Thank you!     Thank you for choosing [facility]  for your care. Our goal is always to provide you with excellent care. Hearing back from our patients is one way we can continue to improve our services. Please take a few minutes to complete the written survey that you may receive in the mail after your visit with us. Thank you!             Your Updated Medication List - Protect others around you: Learn how to safely use, store and throw away your medicines at www.disposemymeds.org.          This list is accurate as of: 17  9:00 AM.  Always use your most recent med list.                   Brand Name Dispense Instructions for use    ADVAIR DISKUS 250-50 MCG/DOSE diskus inhaler   Generic drug:  fluticasone-salmeterol      Inhale 2 puffs into the lungs 2 times daily       diltiazem 240 MG 24 hr capsule    CARDIZEM CD    30 capsule    Take 1 capsule (240 mg) by mouth daily       HYDROcodone-acetaminophen 5-325 MG per  tablet    NORCO    20 tablet    Take 1-2 tablets by mouth every 4 hours as needed for other (Moderate to Severe Pain)       metoprolol 25 MG 24 hr tablet    TOPROL-XL    90 tablet    Take 2 tablets (50 mg) by mouth daily       rivaroxaban ANTICOAGULANT 20 MG Tabs tablet    XARELTO    90 tablet    Take 1 tablet (20 mg) by mouth daily (with dinner)       tamsulosin 80 mcg/mL Susp    FLOMAX     Take 400 mcg by mouth daily       tiotropium 18 MCG capsule    SPIRIVA     Inhale 18 mcg into the lungs daily       VENTOLIN  (90 BASE) MCG/ACT Inhaler   Generic drug:  albuterol      Inhale 2 puffs into the lungs every 6 hours PRN       zolpidem 5 MG tablet    AMBIEN    1 tablet    Take tablet by mouth 15 minutes prior to sleep, for Sleep Study

## 2017-05-09 NOTE — PROGRESS NOTES
Patient instructed on HST use. Patient demonstrated and verbalized knowledge of use. Device programmed to start at 11:59pm. Device will be returned tomorrow before noon.      Ariadne Caruso  Clinical Coordinator

## 2017-05-10 ENCOUNTER — DOCUMENTATION ONLY (OUTPATIENT)
Dept: SLEEP MEDICINE | Facility: CLINIC | Age: 67
End: 2017-05-10

## 2017-05-10 NOTE — PROGRESS NOTES
Agreed. I am not sure if he is just not wearing the oximetry properly or why it keeps failing. I am not sure what the next step is from this point. Please advise. Thank you.

## 2017-05-10 NOTE — PROGRESS NOTES
"Patient repeated HST. Oximetry did not read. Patient stated, \"if the test doesn't work this time, I am not repeating it and you can contact my primary care physician.\"     Charges have been deleted and PCP has been notified.    Ariadne Caruso  Clinical Coordinator    "

## 2017-05-16 ENCOUNTER — OFFICE VISIT (OUTPATIENT)
Dept: CARDIOLOGY | Facility: CLINIC | Age: 67
End: 2017-05-16
Attending: INTERNAL MEDICINE
Payer: COMMERCIAL

## 2017-05-16 VITALS
WEIGHT: 174.4 LBS | DIASTOLIC BLOOD PRESSURE: 88 MMHG | SYSTOLIC BLOOD PRESSURE: 156 MMHG | BODY MASS INDEX: 24.97 KG/M2 | HEIGHT: 70 IN | HEART RATE: 52 BPM

## 2017-05-16 DIAGNOSIS — I10 BENIGN ESSENTIAL HYPERTENSION: Primary | ICD-10-CM

## 2017-05-16 DIAGNOSIS — I48.4 ATYPICAL ATRIAL FLUTTER (H): ICD-10-CM

## 2017-05-16 DIAGNOSIS — I49.3 PVC'S (PREMATURE VENTRICULAR CONTRACTIONS): ICD-10-CM

## 2017-05-16 PROCEDURE — 99214 OFFICE O/P EST MOD 30 MIN: CPT | Performed by: INTERNAL MEDICINE

## 2017-05-16 RX ORDER — AMLODIPINE BESYLATE 10 MG/1
10 TABLET ORAL DAILY
Qty: 90 TABLET | Refills: 3 | Status: SHIPPED | OUTPATIENT
Start: 2017-05-16 | End: 2017-10-04

## 2017-05-16 RX ORDER — METOPROLOL SUCCINATE 25 MG/1
25 TABLET, EXTENDED RELEASE ORAL DAILY
Qty: 90 TABLET | Refills: 3 | Status: SHIPPED | OUTPATIENT
Start: 2017-05-16 | End: 2017-08-29

## 2017-05-16 NOTE — MR AVS SNAPSHOT
"              After Visit Summary   5/16/2017    Tone Cooper    MRN: [id number]           Patient Information     Date Of Birth          1950        Visit Information        Provider Department      5/16/2017 8:15 AM Sussy Britt MD [facility] HEART AT [place]        Today's Diagnoses     Benign essential hypertension    -  1    Atypical atrial flutter (H)        PVC's (premature ventricular contractions)           Follow-ups after your visit        Additional Services     Follow-Up with Electrophysiologist                 Future tests that were ordered for you today     Open Future Orders        Priority Expected Expires Ordered    EKG 12-lead complete w/read (Future)- to be scheduled Routine 8/15/2017 6/20/2018 5/16/2017    Follow-Up with Electrophysiologist Routine 8/12/2017 5/16/2018 5/16/2017            Who to contact     If you have questions or need follow up information about today's clinic visit or your schedule please contact [facility] HEART [facility] directly at [phone].  Normal or non-critical lab and imaging results will be communicated to you by [employer], letter or phone within 4 business days after the clinic has received the results. If you do not hear from us within 7 days, please contact the clinic through britebillt or phone. If you have a critical or abnormal lab result, we will notify you by phone as soon as possible.  Submit refill requests through [employer] or call your pharmacy and they will forward the refill request to us. Please allow 3 business days for your refill to be completed.          Additional Information About Your Visit        [employer] Information     [employer] lets you send messages to your doctor, view your test results, renew your prescriptions, schedule appointments and more. To sign up, go to [email or web address] . Click on \"Log in\" on the left side of the screen, which will take you to the Welcome page. Then click " "on \"Sign up Now\" on the right side of the page.     You will be asked to enter the access code listed below, as well as some personal information. Please follow the directions to create your username and password.     Your access code is: [email or web address]  Expires: 2017 11:04 AM     Your access code will  in 90 days. If you need help or a new code, please call your [place] clinic or [phone].        Care EveryWhere ID     This is your Care EveryWhere ID. This could be used by other organizations to access your [place] medical records  [id number]        Your Vitals Were     Pulse Height BMI (Body Mass Index)             52 1.778 m (5' 10\") 25.02 kg/m2          Blood Pressure from Last 3 Encounters:   17 156/88   17 122/84   17 (!) 135/92    Weight from Last 3 Encounters:   17 79.1 kg (174 lb 6.4 oz)   17 76.7 kg (169 lb)   17 77.1 kg (170 lb)              We Performed the Following     Follow-Up with Electrophysiologist          Today's Medication Changes          These changes are accurate as of: 17  8:55 AM.  If you have any questions, ask your nurse or doctor.               Start taking these medicines.        Dose/Directions    amLODIPine 10 MG tablet   Commonly known as:  NORVASC   Used for:  Benign essential hypertension   Started by:  Sussy Britt MD        Dose:  10 mg   Take 1 tablet (10 mg) by mouth daily   Quantity:  90 tablet   Refills:  3         These medicines have changed or have updated prescriptions.        Dose/Directions    metoprolol 25 MG 24 hr tablet   Commonly known as:  TOPROL-XL   This may have changed:  how much to take   Used for:  PVC's (premature ventricular contractions)   Changed by:  Sussy Britt MD        Dose:  25 mg   Take 1 tablet (25 mg) by mouth daily   Quantity:  90 tablet   Refills:  3         Stop taking these medicines if you haven't already. Please contact your care team if you have questions.     diltiazem 240 MG 24 hr " capsule   Commonly known as:  CARDIZEM CD   Stopped by:  Sussy Britt MD                Where to get your medicines      These medications were sent to [facility]/pharmacy #[id number] - [place], [place] - [address]  [address] [address] [facility] [address]     Phone:  [phone]     amLODIPine 10 MG tablet    metoprolol 25 MG 24 hr tablet                Primary Care Provider Office Phone # Fax #    Ana Shayla Pratt [phone]       [facility] [address] [facility] [address]        Thank you!     Thank you for choosing [facility] PHYSICIANS HEART AT [place]  for your care. Our goal is always to provide you with excellent care. Hearing back from our patients is one way we can continue to improve our services. Please take a few minutes to complete the written survey that you may receive in the mail after your visit with us. Thank you!             Your Updated Medication List - Protect others around you: Learn how to safely use, store and throw away your medicines at www.disposemymeds.org.          This list is accurate as of: 5/16/17  8:55 AM.  Always use your most recent med list.                   Brand Name Dispense Instructions for use    ADVAIR DISKUS 250-50 MCG/DOSE diskus inhaler   Generic drug:  fluticasone-salmeterol      Inhale 2 puffs into the lungs 2 times daily       amLODIPine 10 MG tablet    NORVASC    90 tablet    Take 1 tablet (10 mg) by mouth daily       DAILY MULTI Tabs      Take by mouth daily       HYDROcodone-acetaminophen 5-325 MG per tablet    NORCO    20 tablet    Take 1-2 tablets by mouth every 4 hours as needed for other (Moderate to Severe Pain)       metoprolol 25 MG 24 hr tablet    TOPROL-XL    90 tablet    Take 1 tablet (25 mg) by mouth daily       rivaroxaban ANTICOAGULANT 20 MG Tabs tablet    XARELTO    90 tablet    Take 1 tablet (20 mg) by mouth daily (with dinner)       tiotropium 18 MCG capsule    SPIRIVA     Inhale 18 mcg into the lungs daily        VENTOLIN  (90 BASE) MCG/ACT Inhaler   Generic drug:  albuterol      Inhale 2 puffs into the lungs every 6 hours PRN

## 2017-05-16 NOTE — PROGRESS NOTES
HPI and Plan:   See dictation    Orders Placed This Encounter   Procedures     Follow-Up with Electrophysiologist     EKG 12-lead complete w/read (Future)- to be scheduled       Orders Placed This Encounter   Medications     Multiple Vitamins-Minerals (DAILY MULTI) TABS     Sig: Take by mouth daily     metoprolol (TOPROL-XL) 25 MG 24 hr tablet     Sig: Take 1 tablet (25 mg) by mouth daily     Dispense:  90 tablet     Refill:  3     amLODIPine (NORVASC) 10 MG tablet     Sig: Take 1 tablet (10 mg) by mouth daily     Dispense:  90 tablet     Refill:  3       Medications Discontinued During This Encounter   Medication Reason     tamsulosin (FLOMAX) 80 mcg/mL Medication Reconciliation Clean Up     zolpidem (AMBIEN) 5 MG tablet Medication Reconciliation Clean Up     metoprolol (TOPROL-XL) 25 MG 24 hr tablet      diltiazem (CARDIZEM CD) 240 MG 24 hr capsule          Encounter Diagnoses   Name Primary?     Atypical atrial flutter (H)      PVC's (premature ventricular contractions)      Benign essential hypertension Yes       CURRENT MEDICATIONS:  Current Outpatient Prescriptions   Medication Sig Dispense Refill     Multiple Vitamins-Minerals (DAILY MULTI) TABS Take by mouth daily       metoprolol (TOPROL-XL) 25 MG 24 hr tablet Take 1 tablet (25 mg) by mouth daily 90 tablet 3     amLODIPine (NORVASC) 10 MG tablet Take 1 tablet (10 mg) by mouth daily 90 tablet 3     rivaroxaban ANTICOAGULANT (XARELTO) 20 MG TABS tablet Take 1 tablet (20 mg) by mouth daily (with dinner) 90 tablet 3     tiotropium (SPIRIVA) 18 MCG capsule Inhale 18 mcg into the lungs daily       HYDROcodone-acetaminophen (NORCO) 5-325 MG per tablet Take 1-2 tablets by mouth every 4 hours as needed for other (Moderate to Severe Pain) 20 tablet 0     albuterol (VENTOLIN HFA) 108 (90 BASE) MCG/ACT inhaler Inhale 2 puffs into the lungs every 6 hours PRN       fluticasone-salmeterol (ADVAIR DISKUS) 250-50 MCG/DOSE diskus inhaler Inhale 2 puffs into the lungs 2 times  daily        [DISCONTINUED] metoprolol (TOPROL-XL) 25 MG 24 hr tablet Take 2 tablets (50 mg) by mouth daily 90 tablet 3       ALLERGIES     Allergies   Allergen Reactions     Flecainide Palpitations       PAST MEDICAL HISTORY:  Past Medical History:   Diagnosis Date     Atrial flutter (H)      COPD (chronic obstructive pulmonary disease) (H)      Diverticulitis      Hypertension      Persistent atrial fibrillation (H) 4/28/09    ablation 7/1/2015     PVC (premature ventricular contraction)     s/p ablation 12/5/2017     Tricuspid valve disorder     Dr Aj, Hx of valve repair      Ventricular tachycardia (H)     nonsustained       PAST SURGICAL HISTORY:  Past Surgical History:   Procedure Laterality Date     ABDOMEN SURGERY      hernia repair right     APPENDECTOMY       CARDIOVERSION  06/03/2009    successful for afib     CARDIOVERSION  4/20/15    successful for afib     CARDIOVERSION  5/28/15     H ABLATION FOCAL AFIB  7/1/15    Left atrial linear ablation and pulmonary vein antrum ablation     H ABLATION PVC  12/5/16     INCISION AND DRAINAGE LOWER EXTREMITY, COMBINED Right 11/10/2016    Procedure: COMBINED INCISION AND DRAINAGE LOWER EXTREMITY;  Surgeon: Roger Pacheco MD;  Location: [facility] OR     LAPAROSCOPIC CHOLECYSTECTOMY  1/6/2012    Procedure:LAPAROSCOPIC CHOLECYSTECTOMY; LAPAROSCOPIC CHOLECYSTECTOMY ; Surgeon:ROGER PACHECO; Location:[facility] OR     ORTHOPEDIC SURGERY      Left hip replacement     REPAIR VALVE TRICUSPID  9/8/08    conversion to a bileaflet valve and application of a 30 mm Sanderson ring     SMALL INTESTINE SURGERY      had bowel obstruction age 9     VALVE REPLACEMENT      tricuspid       FAMILY HISTORY:  Family History   Problem Relation Age of Onset     Prostate Cancer Father      Family History Negative Mother      Emphysema Mother      Hypertension Mother      CEREBROVASCULAR DISEASE Mother        SOCIAL HISTORY:  Social History     Social History     Marital status:       "Spouse name: N/A     Number of children: N/A     Years of education: N/A     Social History Main Topics     Smoking status: Former Smoker     Quit date: 1/2/2008     Smokeless tobacco: Never Used     Alcohol use 0.0 oz/week     0 Standard drinks or equivalent per week      Comment: 1 drink daily     Drug use: No     Sexual activity: No     Other Topics Concern     Caffeine Concern No     1 a day      Sleep Concern Yes     nocturia     Weight Concern No     Special Diet No     Exercise No     Bike Helmet Yes     Seat Belt Yes     Parent/Sibling W/ Cabg, Mi Or Angioplasty Before 65f 55m? No     Social History Narrative       Review of Systems:  Skin:  Negative       Eyes:  Positive for glasses    ENT:  Negative      Respiratory:  Positive for dyspnea on exertion;wheezing all the time   Cardiovascular:  Negative      Gastroenterology: Negative      Genitourinary:  Negative      Musculoskeletal:  Positive for joint pain;arthritis    Neurologic:  Positive for headaches in the am  Psychiatric:  Positive for sleep disturbances sleeps poorly  Heme/Lymph/Imm:  Positive for allergies    Endocrine:  Negative        Physical Exam:  Vitals: /88  Pulse 52  Ht 1.778 m (5' 10\")  Wt 79.1 kg (174 lb 6.4 oz)  BMI 25.02 kg/m2    Constitutional:  cooperative, alert and oriented, well developed, well nourished, in no acute distress        Skin:  warm and dry to the touch, no apparent skin lesions or masses noted        Head:  normocephalic, no masses or lesions        Eyes:  no xanthalasma;EOMS intact;sclera white;conjunctivae and lids unremarkable        ENT:  no pallor or cyanosis, dentition good        Neck:  JVP normal;no carotid bruit        Chest:  clear to auscultation diminished breath sounds bilaterally        Cardiac: normal S1 and S2;regular rhythm;no murmurs, gallops or rubs detected occasional premature beats                Abdomen:  abdomen soft        Vascular: pulses full and equal                               " right femoral bruit (-) left subclavian bruit (-)      Extremities and Back:  no deformities, clubbing, cyanosis, erythema observed;no edema              Neurological:  affect appropriate, oriented to time, person and place              CC  Sussy Britt MD   [facility]  [address]  [address]  [place] [address]

## 2017-05-16 NOTE — LETTER
5/16/2017    Ana Pratt MD  [facility]   [address] [facility] [address]    RE: Tone Ruizgray       Dear Colleague,    I had the pleasure of seeing Tone Cooper in the [facility].    I saw Mr. Cooper for followup of atrial fibrillation and PVCs.  He is a 66-year-old white male with a history of tricuspid repair.  The patient underwent atrial fibrillation and flutter ablation on 07/01/2015.  He subsequently developed frequent symptomatic PVCs and underwent 2 ablations for PVC.  The last ablation for PVC was on 01/23/2017.        The patient has not had documentation of recurrent atrial fibrillation since the ablation procedure.  The recent ZIO Patch monitor in April showed relatively low frequency PVCs, but he had a run of nonsustained VT.  Again, there was no evidence of atrial fibrillation.      The patient's main complaint is shortness of breath and occasional wheezing.  He does not feel palpitation and denies chest pain.      PHYSICAL EXAMINATION:   VITAL SIGNS:  Blood pressure was 156/88, heart rate 52 beats per minute, body weight 174 pounds.   HEENT:  Eyes and ENT were unremarkable.   LUNGS:  Clear.   CARDIAC:  Rhythm was regular and the heart sounds were normal without murmur.   ABDOMEN:  Examination showed no hepatomegaly.   EXTREMITIES:  There was no pedal edema.     Outpatient Encounter Prescriptions as of 5/16/2017   Medication Sig Dispense Refill     Multiple Vitamins-Minerals (DAILY MULTI) TABS Take by mouth daily       metoprolol (TOPROL-XL) 25 MG 24 hr tablet Take 1 tablet (25 mg) by mouth daily 90 tablet 3     amLODIPine (NORVASC) 10 MG tablet Take 1 tablet (10 mg) by mouth daily 90 tablet 3     rivaroxaban ANTICOAGULANT (XARELTO) 20 MG TABS tablet Take 1 tablet (20 mg) by mouth daily (with dinner) 90 tablet 3     tiotropium (SPIRIVA) 18 MCG capsule Inhale 18 mcg into the lungs daily       HYDROcodone-acetaminophen (NORCO) 5-325 MG per tablet Take  1-2 tablets by mouth every 4 hours as needed for other (Moderate to Severe Pain) 20 tablet 0     albuterol (VENTOLIN HFA) 108 (90 BASE) MCG/ACT inhaler Inhale 2 puffs into the lungs every 6 hours PRN       fluticasone-salmeterol (ADVAIR DISKUS) 250-50 MCG/DOSE diskus inhaler Inhale 2 puffs into the lungs 2 times daily        [DISCONTINUED] zolpidem (AMBIEN) 5 MG tablet Take tablet by mouth 15 minutes prior to sleep, for Sleep Study 1 tablet 0     [DISCONTINUED] diltiazem (CARDIZEM CD) 240 MG 24 hr capsule Take 1 capsule (240 mg) by mouth daily 30 capsule 3     [DISCONTINUED] metoprolol (TOPROL-XL) 25 MG 24 hr tablet Take 2 tablets (50 mg) by mouth daily 90 tablet 3     [DISCONTINUED] tamsulosin (FLOMAX) 80 mcg/mL Take 400 mcg by mouth daily       No facility-administered encounter medications on file as of 5/16/2017.       ASSESSMENT AND RECOMMENDATIONS:  Mr. Cooper now has occasional wheezing and shortness of breath with exertion.  His heart rate is mildly slow in sinus rhythm.  I have decided to stop diltiazem which has something to do to cause bradycardia.  To control the blood pressure, diltiazem is switched to amlodipine 10 mg once a day.  The dose of Toprol-XL has been reduced from 50 mg to 25 mg once a day.  The patient will be monitored clinically for symptoms.  If he does not feel palpitations and has occasional PVCs, I would not recommend further intervention to the arrhythmia.  In the presence of a normal ejection fraction and absence of myocardial infarct, I do not recommend an EP study for a single episode of nonsustained asymptomatic VT.        With the change of the medications, I plan to see him for another followup in 3 months.  After that, he may continue routine cardiology follow up with Dr. Aj without regular visit with me.      Again, thank you for allowing me to participate in the care of your patient.      Sincerely,    Sussy Britt MD     [facility]

## 2017-05-16 NOTE — PROGRESS NOTES
HISTORY OF PRESENT ILLNESS:  I saw Mr. Cooper for followup of atrial fibrillation and PVCs.  He is a 66-year-old white male with a history of tricuspid repair.  The patient underwent atrial fibrillation and flutter ablation on 07/01/2015.  He subsequently developed frequent symptomatic PVCs and underwent 2 ablations for PVC.  The last ablation for PVC was on 01/23/2017.        The patient has not had documentation of recurrent atrial fibrillation since the ablation procedure.  The recent ZIO Patch monitor in April showed relatively low frequency PVCs, but he had a run of nonsustained VT.  Again, there was no evidence of atrial fibrillation.      The patient's main complaint is shortness of breath and occasional wheezing.  He does not feel palpitation and denies chest pain.      PHYSICAL EXAMINATION:   VITAL SIGNS:  Blood pressure was 156/88, heart rate 52 beats per minute, body weight 174 pounds.   HEENT:  Eyes and ENT were unremarkable.   LUNGS:  Clear.   CARDIAC:  Rhythm was regular and the heart sounds were normal without murmur.   ABDOMEN:  Examination showed no hepatomegaly.   EXTREMITIES:  There was no pedal edema.      ASSESSMENT AND RECOMMENDATIONS:  Mr. Cooper now has occasional wheezing and shortness of breath with exertion.  His heart rate is mildly slow in sinus rhythm.  I have decided to stop diltiazem which has something to do to cause bradycardia.  To control the blood pressure, diltiazem is switched to amlodipine 10 mg once a day.  The dose of Toprol-XL has been reduced from 50 mg to 25 mg once a day.  The patient will be monitored clinically for symptoms.  If he does not feel palpitations and has occasional PVCs, I would not recommend further intervention to the arrhythmia.  In the presence of a normal ejection fraction and absence of myocardial infarct, I do not recommend an EP study for a single episode of nonsustained asymptomatic VT.        With the change of the medications, I plan to see him  for another followup in 3 months.  After that, he may continue routine cardiology follow up with Dr. Aj without regular visit with me.      [place] [address]         NORIS MARTINEZ MD             D: 2017 09:04   T: 2017 13:44   MT: SARANYA      Name:     FELIPE AYOUB   MRN:      [id number]        Account:      [id number]   :      1950           Service Date: 2017      Document: [id number]

## 2017-07-19 DIAGNOSIS — J44.9 CHRONIC OBSTRUCTIVE PULMONARY DISEASE, UNSPECIFIED COPD TYPE (H): ICD-10-CM

## 2017-07-19 DIAGNOSIS — G47.33 OSA (OBSTRUCTIVE SLEEP APNEA): Primary | ICD-10-CM

## 2017-07-19 DIAGNOSIS — G47.34 HYPOXIA, SLEEP RELATED: ICD-10-CM

## 2017-07-19 NOTE — PROGRESS NOTES
Patient attempted 2 home sleep tests.     Nasal cannula had poor signal on the firs test from 5/3/17. AHI was 10 and sleep related hypoxemia with 52 minutes below 88% was noted.     Second test from 5/9/17 had missing oximetry signal.     At this point, I recommended an in lab PSG, split night with CPAP titration and O2 supplementation if indicated.

## 2017-07-24 ENCOUNTER — TELEPHONE (OUTPATIENT)
Dept: SLEEP MEDICINE | Facility: CLINIC | Age: 67
End: 2017-07-24

## 2017-07-24 NOTE — TELEPHONE ENCOUNTER
Patient has had two unsuccessful Home Sleep Studies. He called this morning to schedule an in-lab study ordered by Dr. Alvarado, but wants to know that insurance will cover it. He was previously denied. I called financial securing and spoke with Delphine Gregorio. She advised me that patient will need an appeal filed and she will begin that process. She stated appeals for [employer] take up to 30 days. I contacted patient and let him know we are starting the appeals process and that we will be in touch with him as soon as we are notified of a decision. Patient verbalized understanding and will wait for our call.    Ariadne Caruso  [facility] - Specialist

## 2017-07-27 ENCOUNTER — OFFICE VISIT (OUTPATIENT)
Dept: SURGERY | Facility: CLINIC | Age: 67
End: 2017-07-27
Payer: COMMERCIAL

## 2017-07-27 VITALS
OXYGEN SATURATION: 97 % | DIASTOLIC BLOOD PRESSURE: 70 MMHG | WEIGHT: 174 LBS | SYSTOLIC BLOOD PRESSURE: 110 MMHG | BODY MASS INDEX: 24.91 KG/M2 | HEART RATE: 72 BPM | HEIGHT: 70 IN

## 2017-07-27 DIAGNOSIS — L72.9 SCALP CYST: Primary | ICD-10-CM

## 2017-07-27 PROCEDURE — 99202 OFFICE O/P NEW SF 15 MIN: CPT | Performed by: SURGERY

## 2017-07-27 RX ORDER — SULFAMETHOXAZOLE/TRIMETHOPRIM 800-160 MG
1 TABLET ORAL 2 TIMES DAILY
Qty: 14 TABLET | Refills: 0 | Status: SHIPPED | OUTPATIENT
Start: 2017-07-27 | End: 2017-08-29

## 2017-07-27 NOTE — MR AVS SNAPSHOT
"              After Visit Summary   7/27/2017    Tone Cooper    MRN: [id number]           Patient Information     Date Of Birth          1950        Visit Information        Provider Department      7/27/2017 11:00 AM Roger Pacheco MD [facility] General Surgery      Today's Diagnoses     Scalp cyst    -  1       Follow-ups after your visit        Your next 10 appointments already scheduled     Aug 29, 2017  8:45 AM CDT   [facility] EP RETURN with Sussy Britt MD   [facility] ([facility])    [address] [place] [address] [address]  [facility] [address]   [phone]            Oct 04, 2017  9:15 AM CDT   Return Visit with Maxwell Aj MD   [facility] ([facility])    [address] [place] [address] [address]  [facility] [address] [phone]              Who to contact     If you have questions or need follow up information about today's clinic visit or your schedule please contact [facility] directly at [phone].  Normal or non-critical lab and imaging results will be communicated to you by [employer], letter or phone within 4 business days after the clinic has received the results. If you do not hear from us within 7 days, please contact the clinic through [employer] or phone. If you have a critical or abnormal lab result, we will notify you by phone as soon as possible.  Submit refill requests through [employer] or call your pharmacy and they will forward the refill request to us. Please allow 3 business days for your refill to be completed.          Additional Information About Your Visit        MyChart Information     PPI lets you send messages to your doctor, view your test results, renew your prescriptions, schedule appointments and more. To sign up, go to [email or web address] . Click on \"Log in\" on the left side of the screen, which will " "take you to the Welcome page. Then click on \"Sign up Now\" on the right side of the page.     You will be asked to enter the access code listed below, as well as some personal information. Please follow the directions to create your username and password.     Your access code is: [email or web address]  Expires: 2017 11:04 AM     Your access code will  in 90 days. If you need help or a new code, please call your [facility] clinic or [phone].        Care EveryWhere ID     This is your Care EveryWhere ID. This could be used by other organizations to access your [facility] medical records  [id number]        Your Vitals Were     Pulse Height Pulse Oximetry BMI (Body Mass Index)          72 5' 10\" (1.778 m) 97% 24.97 kg/m2         Blood Pressure from Last 3 Encounters:   17 110/70   17 156/88   17 122/84    Weight from Last 3 Encounters:   17 174 lb (78.9 kg)   17 174 lb 6.4 oz (79.1 kg)   17 169 lb (76.7 kg)              Today, you had the following     No orders found for display         Today's Medication Changes          These changes are accurate as of: 17 11:53 AM.  If you have any questions, ask your nurse or doctor.               Start taking these medicines.        Dose/Directions    sulfamethoxazole-trimethoprim 800-160 MG per tablet   Commonly known as:  BACTRIM DS/SEPTRA DS   Used for:  Scalp cyst        Dose:  1 tablet   Take 1 tablet by mouth 2 times daily   Quantity:  14 tablet   Refills:  0            Where to get your medicines      These medications were sent to [facility]/pharmacy #[id number] - [place], [place] - [address]  [address] [address], [facility] [address]     Phone:  [phone]     sulfamethoxazole-trimethoprim 800-160 MG per tablet                Primary Care Provider Office Phone # Fax #    Ana Pratt [phone]       [place] [address] [facility] [address]        Equal Access to Services     JOON FITZGERALD AH: Hadii " cecy Archer, walissettda luqadaha, qaybta kaalbarbra wasserman, waxernestina idiin hayjajabonnie laytnoart chuckieanjanacompa simental ruben. So [facility] [phone].    ATENCIÓN: Si blakela scooby, tiene a kirk disposición servicios gratuitos de asistencia lingüística. Susan al [phone].    We comply with applicable federal civil rights laws and [place] laws. We do not discriminate on the basis of race, color, national origin, age, disability sex, sexual orientation or gender identity.            Thank you!     Thank you for choosing [facility]  for your care. Our goal is always to provide you with excellent care. Hearing back from our patients is one way we can continue to improve our services. Please take a few minutes to complete the written survey that you may receive in the mail after your visit with us. Thank you!             Your Updated Medication List - Protect others around you: Learn how to safely use, store and throw away your medicines at www.disposemymeds.org.          This list is accurate as of: 7/27/17 11:53 AM.  Always use your most recent med list.                   Brand Name Dispense Instructions for use Diagnosis    ADVAIR DISKUS 250-50 MCG/DOSE diskus inhaler   Generic drug:  fluticasone-salmeterol      Inhale 2 puffs into the lungs 2 times daily        amLODIPine 10 MG tablet    NORVASC    90 tablet    Take 1 tablet (10 mg) by mouth daily    Benign essential hypertension       DAILY MULTI Tabs      Take by mouth daily        HYDROcodone-acetaminophen 5-325 MG per tablet    NORCO    20 tablet    Take 1-2 tablets by mouth every 4 hours as needed for other (Moderate to Severe Pain)    Abscess of right leg       metoprolol 25 MG 24 hr tablet    TOPROL-XL    90 tablet    Take 1 tablet (25 mg) by mouth daily    PVC's (premature ventricular contractions)       rivaroxaban ANTICOAGULANT 20 MG Tabs tablet    XARELTO    90 tablet    Take 1 tablet (20 mg) by mouth daily (with dinner)    Atypical atrial flutter  (H)       sulfamethoxazole-trimethoprim 800-160 MG per tablet    BACTRIM DS/SEPTRA DS    14 tablet    Take 1 tablet by mouth 2 times daily    Scalp cyst       tiotropium 18 MCG capsule    SPIRIVA     Inhale 18 mcg into the lungs daily        VENTOLIN  (90 BASE) MCG/ACT Inhaler   Generic drug:  albuterol      Inhale 2 puffs into the lungs every 6 hours PRN

## 2017-07-27 NOTE — PROGRESS NOTES
"HPI:  Tone presents today for a dermal cyst on his scalp vertex for the past 1 week or so.  He describes catching a lump here with his comb and noting a firm and enlarging mass.  He has expressed some pus from the site since then.  He is still on anticoagulation for his a-fib.  He has had previous boils in the past.    PE:  /70 (BP Location: Left arm, Cuff Size: Adult Regular)  Pulse 72  Ht 5' 10\" (1.778 m)  Wt 174 lb (78.9 kg)  SpO2 97%  BMI 24.97 kg/m2  General appearance: well-nourished, no apparent distress  Skin: There is a 1 cm area of skin necrosis with some underlying fat necrosis and scant purulent drainage from an evolving boil on the vertex of his head.  The necrotic skin was sharply debrided.  The wound was gently cleansed.  There is no surrounding erythema or cellulitis.         Plan:  I relayed to him that the apparent boil has already drained sponaneously and that his wound would likely scab and scar up.  He should cleanse the area daily and keep it dry.  I've prescribed an antibiotic though it may not be absolutely necessary.  He should return to our office in a week for a wound check.    Roger Herndon MD    Please route or send letter to:  Primary Care Provider (PCP)            "

## 2017-07-27 NOTE — LETTER
"  2017      RE:  Tone Cooper-:  52    HPI:  Tone presents today for a dermal cyst on his scalp vertex for the past 1 week or so.  He describes catching a lump here with his comb and noting a firm and enlarging mass.  He has expressed some pus from the site since then.  He is still on anticoagulation for his a-fib.  He has had previous boils in the past.     PE:  /70 (BP Location: Left arm, Cuff Size: Adult Regular)  Pulse 72  Ht 5' 10\" (1.778 m)  Wt 174 lb (78.9 kg)  SpO2 97%  BMI 24.97 kg/m2  General appearance: well-nourished, no apparent distress  Skin: There is a 1 cm area of skin necrosis with some underlying fat necrosis and scant purulent drainage from an evolving boil on the vertex of his head.  The necrotic skin was sharply debrided.  The wound was gently cleansed.  There is no surrounding erythema or cellulitis.      Plan:  I relayed to him that the apparent boil has already drained sponaneously and that his wound would likely scab and scar up.  He should cleanse the area daily and keep it dry.  I've prescribed an antibiotic though it may not be absolutely necessary.  He should return to our office in a week for a wound check.     Roger Herndon MD  "

## 2017-08-29 ENCOUNTER — OFFICE VISIT (OUTPATIENT)
Dept: CARDIOLOGY | Facility: CLINIC | Age: 67
End: 2017-08-29
Attending: INTERNAL MEDICINE
Payer: COMMERCIAL

## 2017-08-29 VITALS
WEIGHT: 174 LBS | HEIGHT: 70 IN | BODY MASS INDEX: 24.91 KG/M2 | DIASTOLIC BLOOD PRESSURE: 64 MMHG | SYSTOLIC BLOOD PRESSURE: 128 MMHG | HEART RATE: 60 BPM

## 2017-08-29 DIAGNOSIS — I49.3 PVC'S (PREMATURE VENTRICULAR CONTRACTIONS): ICD-10-CM

## 2017-08-29 DIAGNOSIS — I47.29 PAROXYSMAL VENTRICULAR TACHYCARDIA (H): ICD-10-CM

## 2017-08-29 PROCEDURE — 99214 OFFICE O/P EST MOD 30 MIN: CPT | Mod: 25 | Performed by: INTERNAL MEDICINE

## 2017-08-29 PROCEDURE — 93000 ELECTROCARDIOGRAM COMPLETE: CPT | Performed by: INTERNAL MEDICINE

## 2017-08-29 NOTE — LETTER
8/29/2017    Ana Pratt MD  [place]   [address] [facility] [address]    RE: Tone Ruizgray       Dear Colleague,    I had the pleasure of seeing Tone Cooper in the [facility].    I saw Mr. Cooper for followup of atrial fibrillation ablation and PVC ablation.  He is a 66-year-old white male with a history of tricuspid repair.  The patient had persistent drug refractory atrial fibrillation and underwent catheter ablation of atrial fibrillation in 07/2015.  Since that ablation, he has not had any recurrent atrial fibrillation or flutter.  The patient subsequently had symptomatic frequent PVCs and underwent 2 attempted PVC ablation, 1 in 12/2016 and another 01/2017.  Since the last ablation of PVCs, he has not had recurrence of symptomatic PVCs.  At the present time he has some mild shortness of breath but no palpitations, chest pain, fatigue or dizziness.  He did smoke up to 2007.      PHYSICAL EXAMINATION:   VITAL SIGNS:  Blood pressure was 128/64, heart rate 60 beats per minute, body weight 174 pounds.   HEENT:  Eyes and ENT were unremarkable.   LUNGS:  Clear.   CARDIAC:  Rhythm was regular and heart sounds were normal without murmur.   ABDOMEN:  Examination showed no hepatomegaly.   EXTREMITIES:  There was no pedal edema.      EKG showed normal sinus rhythm.     Outpatient Encounter Prescriptions as of 8/29/2017   Medication Sig Dispense Refill     Multiple Vitamins-Minerals (DAILY MULTI) TABS Take by mouth daily       amLODIPine (NORVASC) 10 MG tablet Take 1 tablet (10 mg) by mouth daily 90 tablet 3     rivaroxaban ANTICOAGULANT (XARELTO) 20 MG TABS tablet Take 1 tablet (20 mg) by mouth daily (with dinner) 90 tablet 3     tiotropium (SPIRIVA) 18 MCG capsule Inhale 18 mcg into the lungs daily       HYDROcodone-acetaminophen (NORCO) 5-325 MG per tablet Take 1-2 tablets by mouth every 4 hours as needed for other (Moderate to Severe Pain) 20 tablet 0      albuterol (VENTOLIN HFA) 108 (90 BASE) MCG/ACT inhaler Inhale 2 puffs into the lungs every 6 hours PRN       fluticasone-salmeterol (ADVAIR DISKUS) 250-50 MCG/DOSE diskus inhaler Inhale 2 puffs into the lungs 2 times daily        [DISCONTINUED] sulfamethoxazole-trimethoprim (BACTRIM DS/SEPTRA DS) 800-160 MG per tablet Take 1 tablet by mouth 2 times daily 14 tablet 0     [DISCONTINUED] metoprolol (TOPROL-XL) 25 MG 24 hr tablet Take 1 tablet (25 mg) by mouth daily 90 tablet 3     No facility-administered encounter medications on file as of 8/29/2017.      ASSESSMENT AND RECOMMENDATIONS:  Mr. Cooper is doing well at the present time from the arrhythmia point of view.  He has a mild shortness of breath and I have decided to stop metoprolol completely.  He will continue amlodipine for hypertension.  If he has no recurrent arrhythmia, he can continue future cardiology followup with Dr. Aj and he will see me only as needed.     Again, thank you for allowing me to participate in the care of your patient.      Sincerely,    Sussy Britt MD     [facility]

## 2017-08-29 NOTE — PROGRESS NOTES
HISTORY OF PRESENT ILLNESS:  I saw Mr. Cooper for followup of atrial fibrillation ablation and PVC ablation.  He is a 66-year-old white male with a history of tricuspid repair.  The patient had persistent drug refractory atrial fibrillation and underwent catheter ablation of atrial fibrillation in 2015.  Since that ablation, he has not had any recurrent atrial fibrillation or flutter.  The patient subsequently had symptomatic frequent PVCs and underwent 2 attempted PVC ablation, 1 in 2016 and another 2017.  Since the last ablation of PVCs, he has not had recurrence of symptomatic PVCs.  At the present time he has some mild shortness of breath but no palpitations, chest pain, fatigue or dizziness.  He did smoke up to .      PHYSICAL EXAMINATION:   VITAL SIGNS:  Blood pressure was 128/64, heart rate 60 beats per minute, body weight 174 pounds.   HEENT:  Eyes and ENT were unremarkable.   LUNGS:  Clear.   CARDIAC:  Rhythm was regular and heart sounds were normal without murmur.   ABDOMEN:  Examination showed no hepatomegaly.   EXTREMITIES:  There was no pedal edema.      EKG showed normal sinus rhythm.      ASSESSMENT AND RECOMMENDATIONS:  Mr. Cooper is doing well at the present time from the arrhythmia point of view.  He has a mild shortness of breath and I have decided to stop metoprolol completely.  He will continue amlodipine for hypertension.  If he has no recurrent arrhythmia, he can continue future cardiology followup with Dr. Aj and he will see me only as needed.      cc:      Ana Pratt MD    [facility]   [address] [place] [address]         NORIS MARTINEZ MD             D: 2017 09:24   T: 2017 10:06   MT: SARANYA      Name:     FELIPE COOPER   MRN:      [id number]        Account:      [id number]   :      1950           Service Date: 2017      Document: [id number]

## 2017-08-29 NOTE — MR AVS SNAPSHOT
After Visit Summary   8/29/2017    Tone Cooper    MRN: [id number]           Patient Information     Date Of Birth          1950        Visit Information        Provider Department      8/29/2017 8:45 AM Sussy Britt MD [facility]        Today's Diagnoses     PVC's (premature ventricular contractions)        Paroxysmal ventricular tachycardia (H)           Follow-ups after your visit        Additional Services     Follow-Up with Cardiologist                 Your next 10 appointments already scheduled     Oct 04, 2017  9:15 AM CDT   Return Visit with Maxwell Aj MD   [facility] ([facility])    [address] [place] [id number] [phone]              Future tests that were ordered for you today     Open Future Orders        Priority Expected Expires Ordered    Follow-Up with Cardiologist Routine 8/29/2018 1/11/2019 8/29/2017            Who to contact     If you have questions or need follow up information about today's clinic visit or your schedule please contact [facility] directly at [phone].  Normal or non-critical lab and imaging results will be communicated to you by Mooltahart, letter or phone within 4 business days after the clinic has received the results. If you do not hear from us within 7 days, please contact the clinic through Shanda Gamest or phone. If you have a critical or abnormal lab result, we will notify you by phone as soon as possible.  Submit refill requests through RealtyShares or call your pharmacy and they will forward the refill request to us. Please allow 3 business days for your refill to be completed.          Additional Information About Your Visit        Mooltahart Information     RealtyShares lets you send messages to your doctor, view your test results, renew your prescriptions, schedule appointments and more. To sign  "up, go to [email or web address] . Click on \"Log in\" on the left side of the screen, which will take you to the Welcome page. Then click on \"Sign up Now\" on the right side of the page.     You will be asked to enter the access code listed below, as well as some personal information. Please follow the directions to create your username and password.     Your access code is: [email or web address]  Expires: 2017  9:33 AM     Your access code will  in 90 days. If you need help or a new code, please call your [place] clinic or [phone].        Care EveryWhere ID     This is your Care EveryWhere ID. This could be used by other organizations to access your [facility] medical records  [id number]        Your Vitals Were     Pulse Height BMI (Body Mass Index)             60 1.778 m (5' 10\") 24.97 kg/m2          Blood Pressure from Last 3 Encounters:   17 128/64   17 110/70   17 156/88    Weight from Last 3 Encounters:   17 78.9 kg (174 lb)   17 78.9 kg (174 lb)   17 79.1 kg (174 lb 6.4 oz)              We Performed the Following     EKG 12-lead complete w/read  (to be scheduled)     Follow-Up with Electrophysiologist          Today's Medication Changes          These changes are accurate as of: 17  9:33 AM.  If you have any questions, ask your nurse or doctor.               Stop taking these medicines if you haven't already. Please contact your care team if you have questions.     metoprolol 25 MG 24 hr tablet   Commonly known as:  TOPROL-XL   Stopped by:  Sussy Britt MD                    Primary Care Provider Office Phone # Fax #    Ana Pratt [phone]       [facility] [address] [facility] [address]        Equal Access to Services     [facility] AH: Barb Archer, stephanie laboy, carmine carvajal ah. [facility] [phone].    ATENCIÓN: Si miladis jane a kirk " disposición servicios gratuitos de asistencia lingüística. Susan lopez [phone].    We comply with applicable federal civil rights laws and [place] laws. We do not discriminate on the basis of race, color, national origin, age, disability sex, sexual orientation or gender identity.            Thank you!     Thank you for choosing [facility] [place]  for your care. Our goal is always to provide you with excellent care. Hearing back from our patients is one way we can continue to improve our services. Please take a few minutes to complete the written survey that you may receive in the mail after your visit with us. Thank you!             Your Updated Medication List - Protect others around you: Learn how to safely use, store and throw away your medicines at www.disposemymeds.org.          This list is accurate as of: 8/29/17  9:33 AM.  Always use your most recent med list.                   Brand Name Dispense Instructions for use Diagnosis    ADVAIR DISKUS 250-50 MCG/DOSE diskus inhaler   Generic drug:  fluticasone-salmeterol      Inhale 2 puffs into the lungs 2 times daily        amLODIPine 10 MG tablet    NORVASC    90 tablet    Take 1 tablet (10 mg) by mouth daily    Benign essential hypertension       DAILY MULTI Tabs      Take by mouth daily        HYDROcodone-acetaminophen 5-325 MG per tablet    NORCO    20 tablet    Take 1-2 tablets by mouth every 4 hours as needed for other (Moderate to Severe Pain)    Abscess of right leg       rivaroxaban ANTICOAGULANT 20 MG Tabs tablet    XARELTO    90 tablet    Take 1 tablet (20 mg) by mouth daily (with dinner)    Atypical atrial flutter (H)       tiotropium 18 MCG capsule    SPIRIVA     Inhale 18 mcg into the lungs daily        VENTOLIN  (90 BASE) MCG/ACT Inhaler   Generic drug:  albuterol      Inhale 2 puffs into the lungs every 6 hours PRN

## 2017-08-29 NOTE — PROGRESS NOTES
HPI and Plan:   See dictation    Orders Placed This Encounter   Procedures     Follow-Up with Cardiologist       No orders of the defined types were placed in this encounter.      Medications Discontinued During This Encounter   Medication Reason     sulfamethoxazole-trimethoprim (BACTRIM DS/SEPTRA DS) 800-160 MG per tablet Stopped by Patient     metoprolol (TOPROL-XL) 25 MG 24 hr tablet          Encounter Diagnoses   Name Primary?     PVC's (premature ventricular contractions)      Paroxysmal ventricular tachycardia (H)        CURRENT MEDICATIONS:  Current Outpatient Prescriptions   Medication Sig Dispense Refill     Multiple Vitamins-Minerals (DAILY MULTI) TABS Take by mouth daily       amLODIPine (NORVASC) 10 MG tablet Take 1 tablet (10 mg) by mouth daily 90 tablet 3     rivaroxaban ANTICOAGULANT (XARELTO) 20 MG TABS tablet Take 1 tablet (20 mg) by mouth daily (with dinner) 90 tablet 3     tiotropium (SPIRIVA) 18 MCG capsule Inhale 18 mcg into the lungs daily       HYDROcodone-acetaminophen (NORCO) 5-325 MG per tablet Take 1-2 tablets by mouth every 4 hours as needed for other (Moderate to Severe Pain) 20 tablet 0     albuterol (VENTOLIN HFA) 108 (90 BASE) MCG/ACT inhaler Inhale 2 puffs into the lungs every 6 hours PRN       fluticasone-salmeterol (ADVAIR DISKUS) 250-50 MCG/DOSE diskus inhaler Inhale 2 puffs into the lungs 2 times daily          ALLERGIES     Allergies   Allergen Reactions     Flecainide Palpitations       PAST MEDICAL HISTORY:  Past Medical History:   Diagnosis Date     Atrial flutter (H)      COPD (chronic obstructive pulmonary disease) (H)      Diverticulitis      Hypertension      Persistent atrial fibrillation (H) 4/28/09    ablation 7/1/2015     PVC (premature ventricular contraction)     s/p ablation 12/5/2017     Tricuspid valve disorder     Dr Aj, Hx of valve repair      Ventricular tachycardia (H)     nonsustained       PAST SURGICAL HISTORY:  Past Surgical History:   Procedure  Laterality Date     ABDOMEN SURGERY      hernia repair right     APPENDECTOMY       CARDIOVERSION  06/03/2009    successful for afib     CARDIOVERSION  4/20/15    successful for afib     CARDIOVERSION  5/28/15     H ABLATION FOCAL AFIB  7/1/15    Left atrial linear ablation and pulmonary vein antrum ablation     H ABLATION PVC  12/5/16     INCISION AND DRAINAGE LOWER EXTREMITY, COMBINED Right 11/10/2016    Procedure: COMBINED INCISION AND DRAINAGE LOWER EXTREMITY;  Surgeon: Roger Pacheco MD;  Location: [facility] OR     LAPAROSCOPIC CHOLECYSTECTOMY  1/6/2012    Procedure:LAPAROSCOPIC CHOLECYSTECTOMY; LAPAROSCOPIC CHOLECYSTECTOMY ; Surgeon:ROGER PACHECO; Location:[facility] OR     ORTHOPEDIC SURGERY      Left hip replacement     REPAIR VALVE TRICUSPID  9/8/08    conversion to a bileaflet valve and application of a 30 mm Sanderson ring     SMALL INTESTINE SURGERY      had bowel obstruction age 9     VALVE REPLACEMENT      tricuspid       FAMILY HISTORY:  Family History   Problem Relation Age of Onset     Prostate Cancer Father      Family History Negative Mother      Emphysema Mother      Hypertension Mother      CEREBROVASCULAR DISEASE Mother        SOCIAL HISTORY:  Social History     Social History     Marital status:      Spouse name: N/A     Number of children: N/A     Years of education: N/A     Social History Main Topics     Smoking status: Former Smoker     Quit date: 1/2/2008     Smokeless tobacco: Never Used     Alcohol use 0.0 oz/week     0 Standard drinks or equivalent per week      Comment: 1 drink daily     Drug use: No     Sexual activity: No     Other Topics Concern     Caffeine Concern No     1 a day      Sleep Concern Yes     nocturia     Weight Concern No     Special Diet No     Exercise No     Bike Helmet Yes     Seat Belt Yes     Parent/Sibling W/ Cabg, Mi Or Angioplasty Before 65f 55m? No     Social History Narrative       Review of Systems:  Skin:  Negative       Eyes:  Positive for  "glasses    ENT:  Negative      Respiratory:  Positive for dyspnea on exertion;wheezing all the time   Cardiovascular:  Negative      Gastroenterology: Negative      Genitourinary:  Negative      Musculoskeletal:  Positive for joint pain;arthritis    Neurologic:  Positive for headaches in the am  Psychiatric:  Positive for sleep disturbances sleeps poorly  Heme/Lymph/Imm:  Positive for allergies    Endocrine:  Negative        Physical Exam:  Vitals: /64  Pulse 60  Ht 1.778 m (5' 10\")  Wt 78.9 kg (174 lb)  BMI 24.97 kg/m2    Constitutional:  cooperative, alert and oriented, well developed, well nourished, in no acute distress        Skin:  warm and dry to the touch, no apparent skin lesions or masses noted        Head:  normocephalic, no masses or lesions        Eyes:  no xanthalasma;EOMS intact;sclera white;conjunctivae and lids unremarkable        ENT:  no pallor or cyanosis, dentition good        Neck:  JVP normal;no carotid bruit        Chest:  clear to auscultation diminished breath sounds bilaterally        Cardiac: normal S1 and S2;regular rhythm;no murmurs, gallops or rubs detected occasional premature beats                Abdomen:  abdomen soft        Vascular: pulses full and equal                               right femoral bruit (-) left subclavian bruit (-)      Extremities and Back:  no deformities, clubbing, cyanosis, erythema observed;no edema              Neurological:  affect appropriate, oriented to time, person and place              CC  Sussy Britt MD  [address]  [address]  [place] [address]              "

## 2017-10-03 ENCOUNTER — PRE VISIT (OUTPATIENT)
Dept: CARDIOLOGY | Facility: CLINIC | Age: 67
End: 2017-10-03

## 2017-10-04 ENCOUNTER — OFFICE VISIT (OUTPATIENT)
Dept: CARDIOLOGY | Facility: CLINIC | Age: 67
End: 2017-10-04
Attending: INTERNAL MEDICINE
Payer: COMMERCIAL

## 2017-10-04 VITALS
SYSTOLIC BLOOD PRESSURE: 164 MMHG | WEIGHT: 174 LBS | BODY MASS INDEX: 24.91 KG/M2 | HEART RATE: 56 BPM | DIASTOLIC BLOOD PRESSURE: 90 MMHG | HEIGHT: 70 IN

## 2017-10-04 DIAGNOSIS — I07.9 TRICUSPID VALVE DISORDER: ICD-10-CM

## 2017-10-04 DIAGNOSIS — J43.8 OTHER EMPHYSEMA (H): ICD-10-CM

## 2017-10-04 DIAGNOSIS — I10 BENIGN ESSENTIAL HYPERTENSION: ICD-10-CM

## 2017-10-04 PROCEDURE — 99214 OFFICE O/P EST MOD 30 MIN: CPT | Performed by: INTERNAL MEDICINE

## 2017-10-04 RX ORDER — AMLODIPINE BESYLATE 10 MG/1
10 TABLET ORAL DAILY
Qty: 90 TABLET | Refills: 3 | Status: ON HOLD | OUTPATIENT
Start: 2017-10-04 | End: 2018-10-07

## 2017-10-04 NOTE — MR AVS SNAPSHOT
After Visit Summary   10/4/2017    Tone Cooper    MRN: [id number]           Patient Information     Date Of Birth          1950        Visit Information        Provider Department      10/4/2017 9:15 AM Maxwell Aj MD [facility]        Today's Diagnoses     Tricuspid valve disorder        Other emphysema (H)        Benign essential hypertension           Follow-ups after your visit        Additional Services     Sleep Study (referral)       Please be aware that coverage of these services is subject to the terms and limitations of your health insurance plan.  Call member services at your health plan with any benefit or coverage questions.      Please bring the following to your appointment:    >>   List of current medications   >>   This referral request   >>   Any documents/labs given to you for this referral    [place] [facility] (Sarah)  Ph [phone] (Age 18 and up)            Follow-Up with Cardiologist                 Future tests that were ordered for you today     Open Future Orders        Priority Expected Expires Ordered    Sleep Study (referral) Routine 10/11/2017 10/4/2018 10/4/2017    Follow-Up with Cardiologist Routine 10/4/2018 10/5/2018 10/4/2017    Echocardiogram Routine 10/4/2018 10/5/2018 10/4/2017            Who to contact     If you have questions or need follow up information about today's clinic visit or your schedule please contact [facility] AT [place] directly at [phone].  Normal or non-critical lab and imaging results will be communicated to you by MyChart, letter or phone within 4 business days after the clinic has received the results. If you do not hear from us within 7 days, please contact the clinic through MyChart or phone. If you have a critical or abnormal lab result, we will notify you by phone as soon as possible.  Submit refill requests through bCODEhart or call your  "pharmacy and they will forward the refill request to us. Please allow 3 business days for your refill to be completed.          Additional Information About Your Visit        MyChart Information     Mebelrama lets you send messages to your doctor, view your test results, renew your prescriptions, schedule appointments and more. To sign up, go to [email or web address] . Click on \"Log in\" on the left side of the screen, which will take you to the Welcome page. Then click on \"Sign up Now\" on the right side of the page.     You will be asked to enter the access code listed below, as well as some personal information. Please follow the directions to create your username and password.     Your access code is: [email or web address]  Expires: 2017  9:33 AM     Your access code will  in 90 days. If you need help or a new code, please call your [facility] clinic or [phone].        Care EveryWhere ID     This is your Care EveryWhere ID. This could be used by other organizations to access your [facility] medical records  [id number]        Your Vitals Were     Pulse Height BMI (Body Mass Index)             56 1.778 m (5' 10\") 24.97 kg/m2          Blood Pressure from Last 3 Encounters:   10/04/17 164/90   17 128/64   17 110/70    Weight from Last 3 Encounters:   10/04/17 78.9 kg (174 lb)   17 78.9 kg (174 lb)   17 78.9 kg (174 lb)              We Performed the Following     Follow-Up with Cardiologist          Where to get your medicines      These medications were sent to [facility] #[id number] - [place], [place] - [address]  [address] [place], [facility] [address]     Phone:  [phone]     amLODIPine 10 MG tablet          Primary Care Provider Office Phone # Fax #    Ana Pratt [phone]       [facility] [address] [facility] [address]        Equal Access to Services     XOCHILT FITZGERALD AH: Barb Archer, stephanie laboy, qaybjono harrison " carmine wassermanart lopesaan ah. [facility] [phone].    ATENCIÓN: Si habla scooby, tiene a kirk disposición servicios gratuitos de asistencia lingüística. Susan al [phone].    We comply with applicable federal civil rights laws and [place] laws. We do not discriminate on the basis of race, color, national origin, age, disability, sex, sexual orientation, or gender identity.            Thank you!     Thank you for choosing [facility] [place]  for your care. Our goal is always to provide you with excellent care. Hearing back from our patients is one way we can continue to improve our services. Please take a few minutes to complete the written survey that you may receive in the mail after your visit with us. Thank you!             Your Updated Medication List - Protect others around you: Learn how to safely use, store and throw away your medicines at www.disposemymeds.org.          This list is accurate as of: 10/4/17  9:57 AM.  Always use your most recent med list.                   Brand Name Dispense Instructions for use Diagnosis    ADVAIR DISKUS 250-50 MCG/DOSE diskus inhaler   Generic drug:  fluticasone-salmeterol      Inhale 2 puffs into the lungs 2 times daily        amLODIPine 10 MG tablet    NORVASC    90 tablet    Take 1 tablet (10 mg) by mouth daily    Benign essential hypertension       DAILY MULTI Tabs      Take by mouth daily        HYDROcodone-acetaminophen 5-325 MG per tablet    NORCO    20 tablet    Take 1-2 tablets by mouth every 4 hours as needed for other (Moderate to Severe Pain)    Abscess of right leg       rivaroxaban ANTICOAGULANT 20 MG Tabs tablet    XARELTO    90 tablet    Take 1 tablet (20 mg) by mouth daily (with dinner)    Atypical atrial flutter (H)       tiotropium 18 MCG capsule    SPIRIVA     Inhale 18 mcg into the lungs daily        VENTOLIN  (90 BASE) MCG/ACT Inhaler   Generic drug:  albuterol      Inhale 2 puffs  into the lungs every 6 hours PRN

## 2017-10-04 NOTE — LETTER
10/4/2017    Ana Pratt MD  [facility] [facility]   [address] [facility] [address]    RE: Tone Cooper       Dear Colleague,    I had the pleasure of seeing Tone Cooper in the [facility].    It is as always a pleasure for me to see Mr. Cooper.  I see him for followup of tricuspid valve repair which was carried out by Dr. Justyn Watt in 2008.  He has no coronary artery disease by coronary angiography.      More recently, he has been under close follow-up by my colleague, Dr. Britt, for atrial fibrillation as well as frequent PVCs.  I am happy to see that his atrial fibrillation ablation has been successful, though PVC ablation which has been attempted twice has been less successful.  Tone does have COPD as well and I think that may be the reason why his metoprolol was stopped.  His blood pressure is high today and that is because he ran out of amlodipine.  He has not been taking this medication for the past few days.  He is chronically out of breath and this has not worsened.  He has no PND, orthopnea or ankle swelling.  On further questioning, it appears that he has been diagnosed with sleep apnea, but he has not been using a CPAP mask.        Cardiac examination today just reveals quieter breath sounds, but there was definitely no evidence of congestive heart failure.     Outpatient Encounter Prescriptions as of 10/4/2017   Medication Sig Dispense Refill     amLODIPine (NORVASC) 10 MG tablet Take 1 tablet (10 mg) by mouth daily 90 tablet 3     Multiple Vitamins-Minerals (DAILY MULTI) TABS Take by mouth daily       HYDROcodone-acetaminophen (NORCO) 5-325 MG per tablet Take 1-2 tablets by mouth every 4 hours as needed for other (Moderate to Severe Pain) 20 tablet 0     [DISCONTINUED] amLODIPine (NORVASC) 10 MG tablet Take 1 tablet (10 mg) by mouth daily (Patient not taking: Reported on 10/4/2017) 90 tablet 3     rivaroxaban ANTICOAGULANT (XARELTO) 20 MG TABS tablet Take 1  tablet (20 mg) by mouth daily (with dinner) (Patient not taking: Reported on 10/4/2017) 90 tablet 3     tiotropium (SPIRIVA) 18 MCG capsule Inhale 18 mcg into the lungs daily       albuterol (VENTOLIN HFA) 108 (90 BASE) MCG/ACT inhaler Inhale 2 puffs into the lungs every 6 hours PRN       fluticasone-salmeterol (ADVAIR DISKUS) 250-50 MCG/DOSE diskus inhaler Inhale 2 puffs into the lungs 2 times daily        No facility-administered encounter medications on file as of 10/4/2017.       IMPRESSION:   1.  Intact tricuspid valve repair.   2.  COPD.   3.  Sleep apnea.  I have taken the liberty of arranging a sleep clinic followup for him.  I think his symptoms of shortness of breath and fatigue may be improved by appropriate use of CPAP as necessary.   4.  Hypertension.  I prescribed amlodipine again.  Hopefully, this will take care of blood pressure.  I did ask him to monitor it closely because he is on 1 medication and most people need 2 or more for blood pressure control.      If all is well, I plan to see him again in a year's time for further followup.     Again, thank you for allowing me to participate in the care of your patient.      Sincerely,    DR HOOD MILLARD MD     [facility]

## 2017-10-04 NOTE — PROGRESS NOTES
HPI and Plan:   See dictation    Orders Placed This Encounter   Procedures     Follow-Up with Cardiologist     Sleep Study (referral)     Echocardiogram       Orders Placed This Encounter   Medications     amLODIPine (NORVASC) 10 MG tablet     Sig: Take 1 tablet (10 mg) by mouth daily     Dispense:  90 tablet     Refill:  3       Encounter Diagnoses   Name Primary?     Tricuspid valve disorder      Other emphysema (H)      Benign essential hypertension        CURRENT MEDICATIONS:  Current Outpatient Prescriptions   Medication Sig Dispense Refill     amLODIPine (NORVASC) 10 MG tablet Take 1 tablet (10 mg) by mouth daily 90 tablet 3     Multiple Vitamins-Minerals (DAILY MULTI) TABS Take by mouth daily       HYDROcodone-acetaminophen (NORCO) 5-325 MG per tablet Take 1-2 tablets by mouth every 4 hours as needed for other (Moderate to Severe Pain) 20 tablet 0     [DISCONTINUED] amLODIPine (NORVASC) 10 MG tablet Take 1 tablet (10 mg) by mouth daily (Patient not taking: Reported on 10/4/2017) 90 tablet 3     rivaroxaban ANTICOAGULANT (XARELTO) 20 MG TABS tablet Take 1 tablet (20 mg) by mouth daily (with dinner) (Patient not taking: Reported on 10/4/2017) 90 tablet 3     tiotropium (SPIRIVA) 18 MCG capsule Inhale 18 mcg into the lungs daily       albuterol (VENTOLIN HFA) 108 (90 BASE) MCG/ACT inhaler Inhale 2 puffs into the lungs every 6 hours PRN       fluticasone-salmeterol (ADVAIR DISKUS) 250-50 MCG/DOSE diskus inhaler Inhale 2 puffs into the lungs 2 times daily          ALLERGIES     Allergies   Allergen Reactions     Flecainide Palpitations       PAST MEDICAL HISTORY:  Past Medical History:   Diagnosis Date     Atrial fibrillation (H)      Atrial flutter (H)      COPD (chronic obstructive pulmonary disease) (H)      Diverticulitis      Hypertension      Persistent atrial fibrillation (H) 4/28/09    ablation 7/1/2015     Premature beats      PVC (premature ventricular contraction)     s/p ablation 12/5/2017      Tricuspid valve disorder     Dr Aj, Hx of valve repair      Ventricular tachycardia (H)     nonsustained       PAST SURGICAL HISTORY:  Past Surgical History:   Procedure Laterality Date     ABDOMEN SURGERY      hernia repair right     APPENDECTOMY       CARDIOVERSION  06/03/2009    successful for afib     CARDIOVERSION  4/20/15    successful for afib     CARDIOVERSION  5/28/15     H ABLATION ATRIAL FLUTTER       H ABLATION FOCAL AFIB  7/1/15    Left atrial linear ablation and pulmonary vein antrum ablation     H ABLATION PVC  12/5/16     INCISION AND DRAINAGE LOWER EXTREMITY, COMBINED Right 11/10/2016    Procedure: COMBINED INCISION AND DRAINAGE LOWER EXTREMITY;  Surgeon: Roger Pacheco MD;  Location: [facility] OR     LAPAROSCOPIC CHOLECYSTECTOMY  1/6/2012    Procedure:LAPAROSCOPIC CHOLECYSTECTOMY; LAPAROSCOPIC CHOLECYSTECTOMY ; Surgeon:ROGER PACHECO; Location:[facility] OR     ORTHOPEDIC SURGERY      Left hip replacement     REPAIR VALVE TRICUSPID  9/8/08    conversion to a bileaflet valve and application of a 30 mm Sanderson ring     SMALL INTESTINE SURGERY      had bowel obstruction age 9     VALVE REPLACEMENT      tricuspid       FAMILY HISTORY:  Family History   Problem Relation Age of Onset     Prostate Cancer Father      Family History Negative Mother      Emphysema Mother      Hypertension Mother      CEREBROVASCULAR DISEASE Mother        SOCIAL HISTORY:  Social History     Social History     Marital status:      Spouse name: N/A     Number of children: N/A     Years of education: N/A     Social History Main Topics     Smoking status: Former Smoker     Quit date: 1/2/2008     Smokeless tobacco: Never Used     Alcohol use 0.0 oz/week     0 Standard drinks or equivalent per week      Comment: monthly: 3-6 approx     Drug use: No     Sexual activity: No     Other Topics Concern     Caffeine Concern No     1 a day      Sleep Concern Yes     nocturia     Weight Concern No     Special Diet No      "Exercise No     Bike Helmet Yes     Seat Belt Yes     Parent/Sibling W/ Cabg, Mi Or Angioplasty Before 65f 55m? No     Social History Narrative       Review of Systems:  Skin:  Negative     Eyes:  Positive for glasses  ENT:  Negative    Respiratory:  Positive for dyspnea on exertion;wheezing  Cardiovascular:  Negative dizziness;lightheadedness  Gastroenterology: Negative    Genitourinary:  Negative    Musculoskeletal:  Positive for joint pain;arthritis  Neurologic:  Positive for headaches  Psychiatric:  Positive for sleep disturbances  Heme/Lymph/Imm:  Positive for allergies  Endocrine:  Negative      Physical Exam:  Vitals: /90  Pulse 56  Ht 1.778 m (5' 10\")  Wt 78.9 kg (174 lb)  BMI 24.97 kg/m2    Constitutional:  cooperative, alert and oriented, well developed, well nourished, in no acute distress        Skin:  warm and dry to the touch, no apparent skin lesions or masses noted        Head:  normocephalic, no masses or lesions        Eyes:  no xanthalasma;EOMS intact;sclera white;conjunctivae and lids unremarkable        ENT:  no pallor or cyanosis, dentition good        Neck:  JVP normal;no carotid bruit        Chest:  clear to auscultation diminished breath sounds bilaterally      Cardiac: normal S1 and S2;regular rhythm;no murmurs, gallops or rubs detected occasional premature beats                Abdomen:  abdomen soft        Vascular: pulses full and equal                               right femoral bruit (-)      Extremities and Back:  no deformities, clubbing, cyanosis, erythema observed;no edema        Neurological:  affect appropriate, oriented to time, person and place          Recent Lab Results:  LIPID RESULTS:  Lab Results   Component Value Date    CHOL 178 01/17/2014    HDL 46 01/17/2014    LDL 89 01/20/2011    TRIG 100 01/17/2014    CHOLHDLRATIO 3.87 01/17/2014    CHOLHDLRATIO 3.6 01/20/2011       LIVER ENZYME RESULTS:  Lab Results   Component Value Date    AST 35 06/08/2015    ALT 41 " 06/08/2015       CBC RESULTS:  Lab Results   Component Value Date    WBC 6.1 01/23/2017    RBC 5.82 01/23/2017    HGB 17.3 01/23/2017    HCT 50.0 01/23/2017    MCV 86 01/23/2017    MCH 29.7 01/23/2017    MCHC 34.6 01/23/2017    RDW 13.7 01/23/2017     01/23/2017       BMP RESULTS:  Lab Results   Component Value Date     01/23/2017    POTASSIUM 4.6 01/23/2017    CHLORIDE 105 01/23/2017    CO2 29 01/23/2017    ANIONGAP 6 01/23/2017     (H) 01/23/2017    BUN 23 01/23/2017    CR 1.09 01/23/2017    GFRESTIMATED 68 01/23/2017    GFRESTBLACK 82 01/23/2017    WILBERT 9.7 01/23/2017        A1C RESULTS:  No results found for: A1C    INR RESULTS:  Lab Results   Component Value Date    INR 1.48 (H) 06/09/2015    INR 1.88 (H) 06/08/2015           CC  Maxwell Aj MD  [address]  [place] [id number]

## 2017-10-04 NOTE — PROGRESS NOTES
HISTORY OF PRESENT ILLNESS:  It is as always a pleasure for me to see Mr. Cooper.  I see him for followup of tricuspid valve repair which was carried out by Dr. Justyn Watt in .  He has no coronary artery disease by coronary angiography.      More recently, he has been under close follow-up by my colleague, Dr. Britt, for atrial fibrillation as well as frequent PVCs.  I am happy to see that his atrial fibrillation ablation has been successful, though PVC ablation which has been attempted twice has been less successful.  Tone does have COPD as well and I think that may be the reason why his metoprolol was stopped.  His blood pressure is high today and that is because he ran out of amlodipine.  He has not been taking this medication for the past few days.  He is chronically out of breath and this has not worsened.  He has no PND, orthopnea or ankle swelling.  On further questioning, it appears that he has been diagnosed with sleep apnea, but he has not been using a CPAP mask.        Cardiac examination today just reveals quieter breath sounds, but there was definitely no evidence of congestive heart failure.      IMPRESSION:   1.  Intact tricuspid valve repair.   2.  COPD.   3.  Sleep apnea.  I have taken the liberty of arranging a sleep clinic followup for him.  I think his symptoms of shortness of breath and fatigue may be improved by appropriate use of CPAP as necessary.   4.  Hypertension.  I prescribed amlodipine again.  Hopefully, this will take care of blood pressure.  I did ask him to monitor it closely because he is on 1 medication and most people need 2 or more for blood pressure control.      If all is well, I plan to see him again in a year's time for further followup.         HOOD MILLARD MD, [facility]             D: 10/04/2017 10:03   T: 10/04/2017 12:57   MT: [id number]      Name:     TONE COOPER   MRN:      [id number]        Account:      [id number]   :      1950           Service Date: 10/04/2017       Document: [id number]

## 2017-10-29 ENCOUNTER — THERAPY VISIT (OUTPATIENT)
Dept: SLEEP MEDICINE | Facility: CLINIC | Age: 67
End: 2017-10-29
Payer: COMMERCIAL

## 2017-10-29 DIAGNOSIS — G47.34 HYPOXIA, SLEEP RELATED: ICD-10-CM

## 2017-10-29 DIAGNOSIS — J44.9 CHRONIC OBSTRUCTIVE PULMONARY DISEASE, UNSPECIFIED COPD TYPE (H): ICD-10-CM

## 2017-10-29 DIAGNOSIS — G47.34 NOCTURNAL HYPOXIA: ICD-10-CM

## 2017-10-29 DIAGNOSIS — G47.33 OSA (OBSTRUCTIVE SLEEP APNEA): ICD-10-CM

## 2017-10-29 PROCEDURE — 95810 POLYSOM 6/> YRS 4/> PARAM: CPT | Performed by: INTERNAL MEDICINE

## 2017-10-29 NOTE — MR AVS SNAPSHOT
"              After Visit Summary   10/29/2017    Tone Cooper    MRN: [id number]           Patient Information     Date Of Birth          1950        Visit Information        Provider Department      10/29/2017 8:30 PM BED 3  SLEEP [facility]        Today's Diagnoses     Hypoxia, sleep related        Chronic obstructive pulmonary disease, unspecified COPD type (H)        ISIS (obstructive sleep apnea)          Care Instructions    Diagnostic PSG completed per provider order.  Patient did not meet criteria for PAP therapy.          Follow-ups after your visit        Your next 10 appointments already scheduled     Nov 09, 2017  4:30 PM CST   Telephone Visit with Tyson Sanders MD   [facility]    [address] [address] [phone]           Note: this is not an onsite visit; there is no need to come to the facility.              Who to contact     If you have questions or need follow up information about today's clinic visit or your schedule please contact [facility] directly at [phone].  Normal or non-critical lab and imaging results will be communicated to you by [employer], letter or phone within 4 business days after the clinic has received the results. If you do not hear from us within 7 days, please contact the clinic through [employer] or phone. If you have a critical or abnormal lab result, we will notify you by phone as soon as possible.  Submit refill requests through [employer] or call your pharmacy and they will forward the refill request to us. Please allow 3 business days for your refill to be completed.          Additional Information About Your Visit        Fan[employer]ohart Information     [employer] lets you send messages to your doctor, view your test results, renew your prescriptions, schedule appointments and more. To sign up, go to [email or web address] . Click on \"Log " "in\" on the left side of the screen, which will take you to the Welcome page. Then click on \"Sign up Now\" on the right side of the page.     You will be asked to enter the access code listed below, as well as some personal information. Please follow the directions to create your username and password.     Your access code is: [email or web address]  Expires: 2017  9:33 AM     Your access code will  in 90 days. If you need help or a new code, please call your [place] clinic or [phone].        Care EveryWhere ID     This is your Care EveryWhere ID. This could be used by other organizations to access your [place] medical records  [id number]         Blood Pressure from Last 3 Encounters:   10/04/17 164/90   17 128/64   17 110/70    Weight from Last 3 Encounters:   10/04/17 78.9 kg (174 lb)   17 78.9 kg (174 lb)   17 78.9 kg (174 lb)              We Performed the Following     Comprehensive Sleep Study        Primary Care Provider Office Phone # Fax #    Ana Pratt [phone]       [facility] [address] [facility] [address]        Equal Access to Services     JOON FITZGERALD AH: Hadii cecy araizao Sojaydonali, waaxda luqadaha, qaybta kaalmada adeegyada, carmine miranda. So [facility] [phone].    ATENCIÓN: Si habla español, tiene a kirk disposición servicios gratuitos de asistencia lingüística. [facility] [phone].    We comply with applicable federal civil rights laws and [place] laws. We do not discriminate on the basis of race, color, national origin, age, disability, sex, sexual orientation, or gender identity.            Thank you!     Thank you for choosing [place] [facility]  for your care. Our goal is always to provide you with excellent care. Hearing back from our patients is one way we can continue to improve our services. Please take a few minutes to complete the written survey that you may receive in the mail " after your visit with us. Thank you!             Your Updated Medication List - Protect others around you: Learn how to safely use, store and throw away your medicines at www.disposemymeds.org.          This list is accurate as of: 10/29/17 11:59 PM.  Always use your most recent med list.                   Brand Name Dispense Instructions for use Diagnosis    ADVAIR DISKUS 250-50 MCG/DOSE diskus inhaler   Generic drug:  fluticasone-salmeterol      Inhale 2 puffs into the lungs 2 times daily        amLODIPine 10 MG tablet    NORVASC    90 tablet    Take 1 tablet (10 mg) by mouth daily    Benign essential hypertension       DAILY MULTI Tabs      Take by mouth daily        HYDROcodone-acetaminophen 5-325 MG per tablet    NORCO    20 tablet    Take 1-2 tablets by mouth every 4 hours as needed for other (Moderate to Severe Pain)    Abscess of right leg       rivaroxaban ANTICOAGULANT 20 MG Tabs tablet    XARELTO    90 tablet    Take 1 tablet (20 mg) by mouth daily (with dinner)    Atypical atrial flutter (H)       tiotropium 18 MCG capsule    SPIRIVA     Inhale 18 mcg into the lungs daily        VENTOLIN  (90 BASE) MCG/ACT Inhaler   Generic drug:  albuterol      Inhale 2 puffs into the lungs every 6 hours PRN

## 2017-10-29 NOTE — Clinical Note
Please call to schedule overnight oximetry, PFTs at [facility], and reschedule telephone follow up visit to in-person follow up visit after oximetry.   ThanksTyson

## 2017-11-01 PROBLEM — G47.34 NOCTURNAL HYPOXIA: Status: ACTIVE | Noted: 2017-11-01

## 2017-11-01 NOTE — PROGRESS NOTES
Called patient to review.  No significant Obstructive Sleep Apnea on Polysomnography.  Likely misclassification on HST.  Did have REM-related hypoxia likely due to COPD.   Schedule complete pulmonary function testing (none since 2008 with new and concerning symptoms of morning headaches).  Schedule overnight oximetry on room air   Reschedule follow up for after overnight oximetry.    Plan to order nocturnal oxygen at follow up.    Tyson Sanders MD, Sleep Physician  5:07 PM, 11/1/2017

## 2017-11-01 NOTE — PROCEDURES
" SLEEP STUDY INTERPRETATION  POLYSOMNOGRAPHY REPORT      Patient: Tone Cooper  YOB: 1950  Study Date: 10/29/2017  MRN: [id number]  Referring Provider: ROB Penn Katherine  Ordering Provider: MD Alvarado Rakesh    Indications for Polysomnography: The patient is a 66 y old Male who is 5' 10\" and weighs 170.0 lbs.  His BMI is 24.6, [place] sleepiness scale 7.0 and neck size is 38.0.  Relevant medical history includes paroxysmal atrial fibrillation, PVCs, coronary artery disease and COPD. A diagnostic polysomnogram was performed to evaluate for ISIS, hypoventilation and hypoxemia.    Polysomnogram Data:  A full night polysomnogram recorded the standard physiologic parameters including EEG, EOG, EMG, ECG, nasal and oral airflow.  Respiratory parameters of chest and abdominal movements were recorded with respiratory inductance plethysmography.  Oxygen saturation was recorded by pulse oximetry.      Sleep Architecture: Normal sleep latency without sleep aid, normal arousal index, and normal sleep efficiency.  The total recording time of the polysomnogram was 538.4 minutes.  The total sleep time was 492.5 minutes.  Sleep latency was normal at 4.8 minutes without the use of a sleep aid.  REM latency was 33.5 minutes.  Arousal index was normal at 20.1 arousals per hour.  Sleep efficiency was normal at 91.5%.  Wake after sleep onset was 40.0 minutes.  The patient spent 7.8% of total sleep time in Stage N1, 52.3% in Stage N2, 17.7% in Stages N3, and 22.2% in REM.  Time in REM supine was 44.0 minutes.    Respiration: Loud snoring.  Large number of scored events were post-arousal central in nature. Overall burden of obstructive events does not meet criteria for ISIS.  Hypoxia noted during REM sleep independent of any sleep-disordered breathing events and appears consistent with known pulmonary disease process.    Events - The polysomnogram revealed a presence of 13 obstructive, 18 central, and - mixed apneas " resulting in an apnea index of 3.8 events per hour.  There were 28 hypopneas resulting in a hypopnea index of 3.4 events per hour.  The combined apnea/hypopnea index was 7.2 events per hour.  The REM AHI was 5.5 events per hour.  The supine AHI was 10.9 events per hour.  The RERA index was 7.1 events per hour.   The RDI was 14.3 events per hour.    Snoring - was reported as loud.    Respiratory rate and pattern - was normal.    Sustained Sleep Associated Hypoventilation - Transcutaneous carbon dioxide monitoring was used, but significant hypoventilation was not present. Maximum change of 5 mmHg (wake 40 / REM 45 mmHg).    Sleep Associated Hypoxemia - (Greater than 5 minutes O2 sat below 89%) was / was not present.  Baseline oxygen saturation was 90.4%. Lowest oxygen saturation was 84.0%.  Time spent less than or equal to 88% was 29.6 minutes.  Time spent less than or equal to 89% was 88.9 minutes.  14.3 7.1 7.2     Movement Activity: Scattered PLMs of low clinical significance.    Periodic Limb Activity - There were 81 PLMs during the entire study. The PLM index was 9.9 movements per hour.  The PLM Arousal Index was 1.1 per hour.    REM EMG Activity - Excessive muscle activity was not present.    Nocturnal Behavior - Abnormal sleep related behaviors were not noted.    Bruxism - None apparent.    Cardiac Summary: Frequent PACs and abnormal P wave morphology suspicious for left atrial enlargement.    The average pulse rate was 68.8 bpm.  The minimum pulse rate was 31.0 bpm while the maximum pulse rate was 121.9 bpm. The rhythm is normal sinus. Arrhythmias were / were not noted.    Assessment:     Normal sleep latency without sleep aid, normal arousal index, and normal sleep efficiency.    Loud snoring.  Large number of scored events were post-arousal central in nature. Overall burden of obstructive events does not meet criteria for ISIS.  Hypoxia noted during REM sleep independent of any sleep-disordered breathing  events and appears consistent with known pulmonary disease process.    Frequent PACs and abnormal P wave morphology suspicious for left atrial enlargement.      Scattered PLMs of low clinical significance.    Recommendations:    Evaluation for nocturnal oxygen use with home overnight oximetry.    Advise regarding the risks of drowsy driving.    Suggest optimizing sleep schedule and avoiding sleep deprivation.    Pharmacologic therapy should be used for management of restless legs syndrome only if present and clinically indicated and not based on the presence of periodic limb movements alone.    Cardiac Comments: Atrial Fibrillation/Atrial Flutter  Diagnostic Codes:     Sleep Hypoxemia/Hypoventilation G47.36     Unspecified Sleep Disturbance G47.9     _____________________________________   Electronically Signed By: Tyson Sanders MD 11/1/2017

## 2017-11-06 ENCOUNTER — OFFICE VISIT (OUTPATIENT)
Dept: SLEEP MEDICINE | Facility: CLINIC | Age: 67
End: 2017-11-06

## 2017-11-06 DIAGNOSIS — G47.34 HYPOXIA, SLEEP RELATED: Primary | ICD-10-CM

## 2017-11-06 DIAGNOSIS — G47.34 NOCTURNAL HYPOXIA: ICD-10-CM

## 2017-11-06 DIAGNOSIS — J44.9 CHRONIC OBSTRUCTIVE PULMONARY DISEASE, UNSPECIFIED COPD TYPE (H): ICD-10-CM

## 2017-11-06 NOTE — PROGRESS NOTES
Patient instructed on ARGENIS use. Patient demonstrated and verbalized knowledge of use. Insurance was verified by financial securing. Device programmed. Device will be returned tomorrow before noon.    Ariadne AskewCaruso  Sleep Clinic - Specialist

## 2017-11-06 NOTE — MR AVS SNAPSHOT
After Visit Summary   11/6/2017    Tone Cooper    MRN: [id number]           Patient Information     Date Of Birth          1950        Visit Information        Provider Department      11/6/2017 9:00 AM BED 7  SLEEP [facility]        Today's Diagnoses     Hypoxia, sleep related    -  1    Nocturnal hypoxia           Follow-ups after your visit        Your next 10 appointments already scheduled     Nov 06, 2017  9:00 AM CST   Oximetry  with BED 7  SLEEP   [facility] ([facility])    [address]  [address]  [place] [id number] [phone]            Nov 07, 2017  4:15 PM CST   Oximetry Drop Off with  SLEEP CENTER DME   [facility] ([facility])    [address]  [address]  [place] [id number] [phone]            Nov 17, 2017  4:00 PM CST   Telephone Visit with Tyson Sanders MD   [facility] ([facility])    [address]  [address]  [facility] [id number] [phone]           Note: this is not an onsite visit; there is no need to come to the facility.            Nov 20, 2017  8:00 AM CST   Pulmonary Function with PFT LAB IN [facility] ([facility])    [address]  [facility] [id number] [phone]           No Inhalers for 6 hours prior to test No Smoking 2 hours prior to test              Who to contact     If you have questions or need follow up information about today's clinic visit or your schedule please contact [facility] directly at [phone].  Normal or non-critical lab and imaging results will be communicated to you by MyChart, letter or phone within 4 business days after the clinic has received the results. If you do not hear from us within 7 days, please contact the clinic through MyChart or phone. If you  "have a critical or abnormal lab result, we will notify you by phone as soon as possible.  Submit refill requests through [employer] or call your pharmacy and they will forward the refill request to us. Please allow 3 business days for your refill to be completed.          Additional Information About Your Visit        Picituphart Information     [employer] lets you send messages to your doctor, view your test results, renew your prescriptions, schedule appointments and more. To sign up, go to [email or web address] . Click on \"Log in\" on the left side of the screen, which will take you to the Welcome page. Then click on \"Sign up Now\" on the right side of the page.     You will be asked to enter the access code listed below, as well as some personal information. Please follow the directions to create your username and password.     Your access code is: [email or web address]  Expires: 2017  8:33 AM     Your access code will  in 90 days. If you need help or a new code, please call your [place] clinic or [phone].        Care EveryWhere ID     This is your Care EveryWhere ID. This could be used by other organizations to access your [place] medical records  [id number]         Blood Pressure from Last 3 Encounters:   10/04/17 164/90   17 128/64   17 110/70    Weight from Last 3 Encounters:   10/04/17 78.9 kg (174 lb)   17 78.9 kg (174 lb)   17 78.9 kg (174 lb)              Today, you had the following     No orders found for display       Primary Care Provider Office Phone # Fax #    Ana Pratt [phone]       [facility] [address] [address] [address]        Equal Access to Services     JOON FITZGERALD : Barb Archer, stephanie laboy, eduarda collazoalcarmine juan. So [facility] [phone].    ATENCIÓN: Si habla español, tiene a kirk disposición servicios gratuitos de asistencia lingüística. Llame al " [phone].    We comply with applicable federal civil rights laws and [place] laws. We do not discriminate on the basis of race, color, national origin, age, disability, sex, sexual orientation, or gender identity.            Thank you!     Thank you for choosing [facility]  for your care. Our goal is always to provide you with excellent care. Hearing back from our patients is one way we can continue to improve our services. Please take a few minutes to complete the written survey that you may receive in the mail after your visit with us. Thank you!             Your Updated Medication List - Protect others around you: Learn how to safely use, store and throw away your medicines at www.disposemymeds.org.          This list is accurate as of: 11/6/17  8:56 AM.  Always use your most recent med list.                   Brand Name Dispense Instructions for use Diagnosis    ADVAIR DISKUS 250-50 MCG/DOSE diskus inhaler   Generic drug:  fluticasone-salmeterol      Inhale 2 puffs into the lungs 2 times daily        amLODIPine 10 MG tablet    NORVASC    90 tablet    Take 1 tablet (10 mg) by mouth daily    Benign essential hypertension       DAILY MULTI Tabs      Take by mouth daily        HYDROcodone-acetaminophen 5-325 MG per tablet    NORCO    20 tablet    Take 1-2 tablets by mouth every 4 hours as needed for other (Moderate to Severe Pain)    Abscess of right leg       rivaroxaban ANTICOAGULANT 20 MG Tabs tablet    XARELTO    90 tablet    Take 1 tablet (20 mg) by mouth daily (with dinner)    Atypical atrial flutter (H)       tiotropium 18 MCG capsule    SPIRIVA     Inhale 18 mcg into the lungs daily        VENTOLIN  (90 BASE) MCG/ACT Inhaler   Generic drug:  albuterol      Inhale 2 puffs into the lungs every 6 hours PRN

## 2017-11-07 ENCOUNTER — DOCUMENTATION ONLY (OUTPATIENT)
Dept: SLEEP MEDICINE | Facility: CLINIC | Age: 67
End: 2017-11-07

## 2017-11-07 DIAGNOSIS — G47.34 NOCTURNAL HYPOXIA: ICD-10-CM

## 2017-11-09 NOTE — NURSING NOTE
Overnight oximetry completed by patient.     Recording date: 11/6/2017    Duration: 08:43:32    Note: Download successful. Copy given to provider and scanned in to chart.      Ariadne Caruso  Sleep Clinic - Specialist

## 2017-11-10 NOTE — PROCEDURES
PHYSICIAN INTERPRETATION   HOME OXIMETRY   Patient: Tone Cooper  MRN: [id number]  YOB: 1950  Study Date: 11/6/2017   Referring Physician: nAa Pratt  Ordering Physician: Tyson Sanders MD     Conditions:  Room air  Quality: artifact-free     Measure [threshold]                 Time less than or equal to SpO2 88% [5 min]:  30.3 min  Duration monitoring [> 2 hours artifact free]:  8:43 hours        4% O2 desat index [ > 15/ hour]:    3.2/ hour                    Pattern:       sustained                                  Assessment:   Hypoxemia present  Sustained pattern suggestive of cardiopulmonary disease or hypoventilation    Recommendations:  The following changes in O2/PAP settings were ordered:  Start on oxygen at 1 LPM for COPD without Obstructive Sleep Apnea.    Diagnosis Code(s):  Hypoxemia G47.36   Tyson Sanders MD, November 10, 2017

## 2017-11-17 ENCOUNTER — OFFICE VISIT (OUTPATIENT)
Dept: SLEEP MEDICINE | Facility: CLINIC | Age: 67
End: 2017-11-17
Payer: COMMERCIAL

## 2017-11-17 VITALS
DIASTOLIC BLOOD PRESSURE: 80 MMHG | RESPIRATION RATE: 18 BRPM | HEART RATE: 64 BPM | SYSTOLIC BLOOD PRESSURE: 128 MMHG | BODY MASS INDEX: 25.7 KG/M2 | WEIGHT: 183.6 LBS | HEIGHT: 71 IN | OXYGEN SATURATION: 95 %

## 2017-11-17 DIAGNOSIS — G47.34 NOCTURNAL HYPOXIA: ICD-10-CM

## 2017-11-17 PROCEDURE — 99213 OFFICE O/P EST LOW 20 MIN: CPT | Performed by: INTERNAL MEDICINE

## 2017-11-17 NOTE — PROGRESS NOTES
Testing follow up visit.  Polysomnography performed 10/29/2017 showed hypoxia without sleep apnea.  Overnight oximetry confirmed diagnosis of nocturnal hypoxia.  Given daytime dyspnea and COPD we are ordering supplemental oxygen for use at night.  PFTs were ordered to reassess severity of disease and determine if treatment escalation is appropriate.    Reviewed overnight oximetry results.    1. Nocturnal hypoxia  Order phoned into [facility] for set up.  - order for DME; Oxygen for nocturnal use. 1 LPM via nasal cannula  Testing done at home in chronic stable state.  Condition is COPD.  Patient does not have Obstructive Sleep Apnea.  Overnight oximetry revealed 30.3 minutes of hypoxia (<= 88%).  Duration: Lifetime (99 months).  Dispense: 1 each; Refill: 99    Total time of care was 15 minutes, with > 50% of time spent on counseling and coordination of care.    Tyson Sanders MD, Sleep Physician  Nov 17, 2017

## 2017-11-17 NOTE — NURSING NOTE
"Chief Complaint   Patient presents with     Study Results     PSG f/u       Initial /80  Pulse 64  Resp 18  Ht 1.803 m (5' 11\")  Wt 83.3 kg (183 lb 9.6 oz)  SpO2 95%  BMI 25.61 kg/m2 Estimated body mass index is 25.61 kg/(m^2) as calculated from the following:    Height as of this encounter: 1.803 m (5' 11\").    Weight as of this encounter: 83.3 kg (183 lb 9.6 oz).  Medication Reconciliation: complete     ESS 3  Brunilda Goncalves  "

## 2017-11-17 NOTE — MR AVS SNAPSHOT
After Visit Summary   11/17/2017    Tone Cooper    MRN: [id number]           Patient Information     Date Of Birth          1950        Visit Information        Provider Department      11/17/2017 4:00 PM Tyson Sanders MD [facility]        Today's Diagnoses     Nocturnal hypoxia          Care Instructions    Let me know how you feel once you get on oxygen.  We'll talk after we get your lung testing results.  Have a great time in [place].          Follow-ups after your visit        Your next 10 appointments already scheduled     Nov 20, 2017  8:00 AM CST   Pulmonary Function with PFT LAB IN [facility] ([facility])    [address], [address]  [facility] [id number] [phone]           No Inhalers for 6 hours prior to test No Smoking 2 hours prior to test            Nov 20, 2017  9:00 AM CST   LAB with SH LAB ONLY   [facility] ([facility])    [address]  [facility] [id number] [phone]           Please do not eat 10-12 hours before your appointment if you are coming in fasting for labs on lipids, cholesterol, or glucose (sugar). This does not apply to pregnant women. Water, hot tea and black coffee (with nothing added) are okay. Do not drink other fluids, diet soda or chew gum.              Who to contact     If you have questions or need follow up information about today's clinic visit or your schedule please contact [facility] directly at [phone].  Normal or non-critical lab and imaging results will be communicated to you by MyChart, letter or phone within 4 business days after the clinic has received the results. If you do not hear from us within 7 days, please contact the clinic through ApprenNethart or phone. If you have a critical or abnormal lab result, we will notify you by phone as soon as possible.  Submit refill requests through [employer] or call  "your pharmacy and they will forward the refill request to us. Please allow 3 business days for your refill to be completed.          Additional Information About Your Visit        [email or web address] Information     [employer] lets you send messages to your doctor, view your test results, renew your prescriptions, schedule appointments and more. To sign up, go to [email or web address] . Click on \"Log in\" on the left side of the screen, which will take you to the Welcome page. Then click on \"Sign up Now\" on the right side of the page.     You will be asked to enter the access code listed below, as well as some personal information. Please follow the directions to create your username and password.     Your access code is: [email or web address]  Expires: 2017  8:33 AM     Your access code will  in 90 days. If you need help or a new code, please call your [place] clinic or [phone].        Care EveryWhere ID     This is your Care EveryWhere ID. This could be used by other organizations to access your [place] medical records  [id number]        Your Vitals Were     Pulse Respirations Height Pulse Oximetry BMI (Body Mass Index)       64 18 1.803 m (5' 11\") 95% 25.61 kg/m2        Blood Pressure from Last 3 Encounters:   17 128/80   10/04/17 164/90   17 128/64    Weight from Last 3 Encounters:   17 83.3 kg (183 lb 9.6 oz)   10/04/17 78.9 kg (174 lb)   17 78.9 kg (174 lb)              Today, you had the following     No orders found for display         Today's Medication Changes          These changes are accurate as of: 17  4:25 PM.  If you have any questions, ask your nurse or doctor.               Start taking these medicines.        Dose/Directions    order for DME   Used for:  Nocturnal hypoxia        Oxygen for nocturnal use. 1 LPM via nasal cannula Testing done at home in chronic stable state. Condition is COPD.  Patient does not have Obstructive Sleep Apnea. Overnight oximetry revealed " 30.3 minutes of hypoxia (<= 88%). Duration: Lifetime (99 months).   Quantity:  1 each   Refills:  99            Where to get your medicines      Information about where to get these medications is not yet available     ! Ask your nurse or doctor about these medications     order for DME                Primary Care Provider Office Phone # Fax #    Ana Pratt [phone]       [facility] [id number] [place] [address]        Equal Access to Services     [facility] HospitalKAYLA : Hadii aad ku hadasho Soomaali, waaxda luqadaha, qaybta kaalmada adeegyada, waxay idiin hayaan adeeg kharash la'jajan . So [facility] [phone].    ATENCIÓN: Si habla español, tiene a kirk disposición servicios gratuitos de asistencia lingüística. [facility] [phone].    We comply with applicable federal civil rights laws and [place] laws. We do not discriminate on the basis of race, color, national origin, age, disability, sex, sexual orientation, or gender identity.            Thank you!     Thank you for choosing [facility]  for your care. Our goal is always to provide you with excellent care. Hearing back from our patients is one way we can continue to improve our services. Please take a few minutes to complete the written survey that you may receive in the mail after your visit with us. Thank you!             Your Updated Medication List - Protect others around you: Learn how to safely use, store and throw away your medicines at www.disposemymeds.org.          This list is accurate as of: 11/17/17  4:25 PM.  Always use your most recent med list.                   Brand Name Dispense Instructions for use Diagnosis    ADVAIR DISKUS 250-50 MCG/DOSE diskus inhaler   Generic drug:  fluticasone-salmeterol      Inhale 2 puffs into the lungs 2 times daily        amLODIPine 10 MG tablet    NORVASC    90 tablet    Take 1 tablet (10 mg) by mouth daily    Benign essential hypertension       DAILY MULTI Tabs       Take by mouth daily        HYDROcodone-acetaminophen 5-325 MG per tablet    NORCO    20 tablet    Take 1-2 tablets by mouth every 4 hours as needed for other (Moderate to Severe Pain)    Abscess of right leg       order for DME     1 each    Oxygen for nocturnal use. 1 LPM via nasal cannula Testing done at home in chronic stable state. Condition is COPD.  Patient does not have Obstructive Sleep Apnea. Overnight oximetry revealed 30.3 minutes of hypoxia (<= 88%). Duration: Lifetime (99 months).    Nocturnal hypoxia       rivaroxaban ANTICOAGULANT 20 MG Tabs tablet    XARELTO    90 tablet    Take 1 tablet (20 mg) by mouth daily (with dinner)    Atypical atrial flutter (H)       tiotropium 18 MCG capsule    SPIRIVA     Inhale 18 mcg into the lungs daily        VENTOLIN  (90 BASE) MCG/ACT Inhaler   Generic drug:  albuterol      Inhale 2 puffs into the lungs every 6 hours PRN

## 2017-11-17 NOTE — PATIENT INSTRUCTIONS
Let me know how you feel once you get on oxygen.  We'll talk after we get your lung testing results.  Have a great time in [place].

## 2017-11-20 ENCOUNTER — HOSPITAL ENCOUNTER (OUTPATIENT)
Dept: RESPIRATORY THERAPY | Facility: CLINIC | Age: 67
End: 2017-11-20
Attending: INTERNAL MEDICINE
Payer: COMMERCIAL

## 2017-11-20 ENCOUNTER — HOSPITAL ENCOUNTER (OUTPATIENT)
Dept: LAB | Facility: CLINIC | Age: 67
Discharge: HOME | End: 2017-11-20
Attending: INTERNAL MEDICINE | Admitting: INTERNAL MEDICINE
Payer: COMMERCIAL

## 2017-11-20 DIAGNOSIS — R09.02 HYPOXIA: ICD-10-CM

## 2017-11-20 DIAGNOSIS — J44.9 CHRONIC OBSTRUCTIVE PULMONARY DISEASE, UNSPECIFIED COPD TYPE (H): ICD-10-CM

## 2017-11-20 LAB — HGB BLD-MCNC: 15.8 G/DL (ref 13.3–17.7)

## 2017-11-20 PROCEDURE — 36415 COLL VENOUS BLD VENIPUNCTURE: CPT | Performed by: INTERNAL MEDICINE

## 2017-11-20 PROCEDURE — 94010 BREATHING CAPACITY TEST: CPT

## 2017-11-20 PROCEDURE — 94729 DIFFUSING CAPACITY: CPT

## 2017-11-20 PROCEDURE — 85018 HEMOGLOBIN: CPT | Performed by: INTERNAL MEDICINE

## 2017-11-20 PROCEDURE — 94726 PLETHYSMOGRAPHY LUNG VOLUMES: CPT

## 2017-11-24 LAB
DLCOCOR-%PRED-PRE: 52 %
DLCOCOR-PRE: 14.45 ML/MIN/MMHG
DLCOUNC-%PRED-PRE: 54 %
DLCOUNC-PRE: 14.92 ML/MIN/MMHG
DLCOUNC-PRED: 27.5 ML/MIN/MMHG
ERV-%PRED-PRE: 94 %
ERV-PRE: 1.27 L
ERV-PRED: 1.34 L
EXPTIME-PRE: 11.67 SEC
FEF2575-%PRED-PRE: 13 %
FEF2575-PRE: 0.35 L/SEC
FEF2575-PRED: 2.58 L/SEC
FEFMAX-%PRED-PRE: 44 %
FEFMAX-PRE: 4.02 L/SEC
FEFMAX-PRED: 8.97 L/SEC
FEV1-%PRED-PRE: 33 %
FEV1-PRE: 1.08 L
FEV1FEV6-PRE: 40 %
FEV1FEV6-PRED: 78 %
FEV1FVC-PRE: 31 %
FEV1FVC-PRED: 77 %
FEV1SVC-PRE: 25 %
FEV1SVC-PRED: 64 %
FIFMAX-PRE: 3.03 L/SEC
FRCPLETH-%PRED-PRE: 176 %
FRCPLETH-PRE: 6.58 L
FRCPLETH-PRED: 3.72 L
FVC-%PRED-PRE: 81 %
FVC-PRE: 3.42 L
FVC-PRED: 4.2 L
IC-%PRED-PRE: 83 %
IC-PRE: 3.08 L
IC-PRED: 3.69 L
RVPLETH-%PRED-PRE: 205 %
RVPLETH-PRE: 5.31 L
RVPLETH-PRED: 2.58 L
TLCPLETH-%PRED-PRE: 131 %
TLCPLETH-PRE: 9.66 L
TLCPLETH-PRED: 7.33 L
VA-%PRED-PRE: 88 %
VA-PRE: 6.21 L
VC-%PRED-PRE: 86 %
VC-PRE: 4.36 L
VC-PRED: 5.03 L

## 2018-01-02 ENCOUNTER — TRANSFERRED RECORDS (OUTPATIENT)
Dept: HEALTH INFORMATION MANAGEMENT | Facility: CLINIC | Age: 68
End: 2018-01-02

## 2018-10-04 ENCOUNTER — HOSPITAL ENCOUNTER (INPATIENT)
Facility: CLINIC | Age: 68
LOS: 2 days | Discharge: HOME | DRG: 191 | End: 2018-10-07
Attending: EMERGENCY MEDICINE | Admitting: INTERNAL MEDICINE
Payer: COMMERCIAL

## 2018-10-04 ENCOUNTER — APPOINTMENT (OUTPATIENT)
Dept: GENERAL RADIOLOGY | Facility: CLINIC | Age: 68
DRG: 191 | End: 2018-10-04
Attending: EMERGENCY MEDICINE
Payer: COMMERCIAL

## 2018-10-04 DIAGNOSIS — I10 BENIGN ESSENTIAL HYPERTENSION: Primary | ICD-10-CM

## 2018-10-04 DIAGNOSIS — J44.1 COPD EXACERBATION (H): ICD-10-CM

## 2018-10-04 LAB
ANION GAP SERPL CALCULATED.3IONS-SCNC: 3 MMOL/L (ref 3–14)
BASOPHILS # BLD AUTO: 0.1 10E9/L (ref 0–0.2)
BASOPHILS NFR BLD AUTO: 1.8 %
BUN SERPL-MCNC: 23 MG/DL (ref 7–30)
CALCIUM SERPL-MCNC: 9.7 MG/DL (ref 8.5–10.1)
CHLORIDE SERPL-SCNC: 102 MMOL/L (ref 94–109)
CO2 SERPL-SCNC: 33 MMOL/L (ref 20–32)
CREAT SERPL-MCNC: 1.4 MG/DL (ref 0.66–1.25)
DIFFERENTIAL METHOD BLD: ABNORMAL
EOSINOPHIL # BLD AUTO: 0.5 10E9/L (ref 0–0.7)
EOSINOPHIL NFR BLD AUTO: 7.7 %
ERYTHROCYTE [DISTWIDTH] IN BLOOD BY AUTOMATED COUNT: 13.8 % (ref 10–15)
GFR SERPL CREATININE-BSD FRML MDRD: 51 ML/MIN/1.7M2
GLUCOSE SERPL-MCNC: 108 MG/DL (ref 70–99)
HCT VFR BLD AUTO: 54.6 % (ref 40–53)
HGB BLD-MCNC: 17.8 G/DL (ref 13.3–17.7)
IMM GRANULOCYTES # BLD: 0 10E9/L (ref 0–0.4)
IMM GRANULOCYTES NFR BLD: 0.6 %
LACTATE BLD-SCNC: 1.9 MMOL/L (ref 0.7–2)
LYMPHOCYTES # BLD AUTO: 1.3 10E9/L (ref 0.8–5.3)
LYMPHOCYTES NFR BLD AUTO: 19.4 %
MCH RBC QN AUTO: 29.6 PG (ref 26.5–33)
MCHC RBC AUTO-ENTMCNC: 32.6 G/DL (ref 31.5–36.5)
MCV RBC AUTO: 91 FL (ref 78–100)
MONOCYTES # BLD AUTO: 0.5 10E9/L (ref 0–1.3)
MONOCYTES NFR BLD AUTO: 7.4 %
NEUTROPHILS # BLD AUTO: 4.3 10E9/L (ref 1.6–8.3)
NEUTROPHILS NFR BLD AUTO: 63.1 %
NRBC # BLD AUTO: 0 10*3/UL
NRBC BLD AUTO-RTO: 0 /100
PLATELET # BLD AUTO: 223 10E9/L (ref 150–450)
POTASSIUM SERPL-SCNC: 4.4 MMOL/L (ref 3.4–5.3)
RBC # BLD AUTO: 6.02 10E12/L (ref 4.4–5.9)
SODIUM SERPL-SCNC: 138 MMOL/L (ref 133–144)
TROPONIN I SERPL-MCNC: 0.05 UG/L (ref 0–0.04)
WBC # BLD AUTO: 6.9 10E9/L (ref 4–11)

## 2018-10-04 PROCEDURE — 80048 BASIC METABOLIC PNL TOTAL CA: CPT | Performed by: EMERGENCY MEDICINE

## 2018-10-04 PROCEDURE — 85025 COMPLETE CBC W/AUTO DIFF WBC: CPT | Performed by: EMERGENCY MEDICINE

## 2018-10-04 PROCEDURE — 83605 ASSAY OF LACTIC ACID: CPT | Performed by: INTERNAL MEDICINE

## 2018-10-04 PROCEDURE — 99220 ZZC INITIAL OBSERVATION CARE,LEVL III: CPT | Performed by: INTERNAL MEDICINE

## 2018-10-04 PROCEDURE — 84484 ASSAY OF TROPONIN QUANT: CPT | Performed by: EMERGENCY MEDICINE

## 2018-10-04 PROCEDURE — 25000132 ZZH RX MED GY IP 250 OP 250 PS 637: Performed by: EMERGENCY MEDICINE

## 2018-10-04 PROCEDURE — 94640 AIRWAY INHALATION TREATMENT: CPT | Mod: 76

## 2018-10-04 PROCEDURE — 40000275 ZZH STATISTIC RCP TIME EA 10 MIN

## 2018-10-04 PROCEDURE — 71046 X-RAY EXAM CHEST 2 VIEWS: CPT

## 2018-10-04 PROCEDURE — 99285 EMERGENCY DEPT VISIT HI MDM: CPT | Mod: 25

## 2018-10-04 PROCEDURE — G0378 HOSPITAL OBSERVATION PER HR: HCPCS

## 2018-10-04 PROCEDURE — 25000125 ZZHC RX 250

## 2018-10-04 PROCEDURE — 25000125 ZZHC RX 250: Performed by: INTERNAL MEDICINE

## 2018-10-04 PROCEDURE — 25000128 H RX IP 250 OP 636: Performed by: EMERGENCY MEDICINE

## 2018-10-04 PROCEDURE — 36415 COLL VENOUS BLD VENIPUNCTURE: CPT | Performed by: INTERNAL MEDICINE

## 2018-10-04 PROCEDURE — A9270 NON-COVERED ITEM OR SERVICE: HCPCS | Mod: GY | Performed by: EMERGENCY MEDICINE

## 2018-10-04 PROCEDURE — 94640 AIRWAY INHALATION TREATMENT: CPT

## 2018-10-04 PROCEDURE — 93005 ELECTROCARDIOGRAM TRACING: CPT

## 2018-10-04 PROCEDURE — 96361 HYDRATE IV INFUSION ADD-ON: CPT

## 2018-10-04 PROCEDURE — 96374 THER/PROPH/DIAG INJ IV PUSH: CPT

## 2018-10-04 PROCEDURE — 25000125 ZZHC RX 250: Performed by: EMERGENCY MEDICINE

## 2018-10-04 RX ORDER — ACETAMINOPHEN 650 MG/1
650 SUPPOSITORY RECTAL EVERY 4 HOURS PRN
Status: DISCONTINUED | OUTPATIENT
Start: 2018-10-04 | End: 2018-10-07 | Stop reason: HOSPADM

## 2018-10-04 RX ORDER — PREDNISONE 20 MG/1
40 TABLET ORAL DAILY
Status: DISCONTINUED | OUTPATIENT
Start: 2018-10-05 | End: 2018-10-05

## 2018-10-04 RX ORDER — IPRATROPIUM BROMIDE AND ALBUTEROL SULFATE 2.5; .5 MG/3ML; MG/3ML
3 SOLUTION RESPIRATORY (INHALATION)
Status: DISCONTINUED | OUTPATIENT
Start: 2018-10-04 | End: 2018-10-07 | Stop reason: HOSPADM

## 2018-10-04 RX ORDER — ONDANSETRON 2 MG/ML
4 INJECTION INTRAMUSCULAR; INTRAVENOUS EVERY 6 HOURS PRN
Status: DISCONTINUED | OUTPATIENT
Start: 2018-10-04 | End: 2018-10-07 | Stop reason: HOSPADM

## 2018-10-04 RX ORDER — METHYLPREDNISOLONE SODIUM SUCCINATE 125 MG/2ML
125 INJECTION, POWDER, LYOPHILIZED, FOR SOLUTION INTRAMUSCULAR; INTRAVENOUS ONCE
Status: COMPLETED | OUTPATIENT
Start: 2018-10-04 | End: 2018-10-04

## 2018-10-04 RX ORDER — AMOXICILLIN 250 MG
1 CAPSULE ORAL 2 TIMES DAILY PRN
Status: DISCONTINUED | OUTPATIENT
Start: 2018-10-04 | End: 2018-10-07 | Stop reason: HOSPADM

## 2018-10-04 RX ORDER — AMOXICILLIN 500 MG/1
2000 CAPSULE ORAL PRN
Status: ON HOLD | COMMUNITY
End: 2019-01-08

## 2018-10-04 RX ORDER — IPRATROPIUM BROMIDE AND ALBUTEROL SULFATE 2.5; .5 MG/3ML; MG/3ML
SOLUTION RESPIRATORY (INHALATION)
Status: COMPLETED
Start: 2018-10-04 | End: 2018-10-04

## 2018-10-04 RX ORDER — AZITHROMYCIN 250 MG/1
250 TABLET, FILM COATED ORAL DAILY
Status: DISCONTINUED | OUTPATIENT
Start: 2018-10-05 | End: 2018-10-07 | Stop reason: HOSPADM

## 2018-10-04 RX ORDER — AZITHROMYCIN 250 MG/1
500 TABLET, FILM COATED ORAL ONCE
Status: COMPLETED | OUTPATIENT
Start: 2018-10-04 | End: 2018-10-04

## 2018-10-04 RX ORDER — IPRATROPIUM BROMIDE AND ALBUTEROL SULFATE 2.5; .5 MG/3ML; MG/3ML
6 SOLUTION RESPIRATORY (INHALATION) ONCE
Status: COMPLETED | OUTPATIENT
Start: 2018-10-04 | End: 2018-10-04

## 2018-10-04 RX ORDER — IPRATROPIUM BROMIDE AND ALBUTEROL SULFATE 2.5; .5 MG/3ML; MG/3ML
3 SOLUTION RESPIRATORY (INHALATION) ONCE
Status: COMPLETED | OUTPATIENT
Start: 2018-10-04 | End: 2018-10-04

## 2018-10-04 RX ORDER — ACETAMINOPHEN 325 MG/1
650 TABLET ORAL EVERY 4 HOURS PRN
Status: DISCONTINUED | OUTPATIENT
Start: 2018-10-04 | End: 2018-10-07 | Stop reason: HOSPADM

## 2018-10-04 RX ORDER — NALOXONE HYDROCHLORIDE 0.4 MG/ML
.1-.4 INJECTION, SOLUTION INTRAMUSCULAR; INTRAVENOUS; SUBCUTANEOUS
Status: DISCONTINUED | OUTPATIENT
Start: 2018-10-04 | End: 2018-10-07 | Stop reason: HOSPADM

## 2018-10-04 RX ORDER — AMOXICILLIN 250 MG
2 CAPSULE ORAL 2 TIMES DAILY PRN
Status: DISCONTINUED | OUTPATIENT
Start: 2018-10-04 | End: 2018-10-07 | Stop reason: HOSPADM

## 2018-10-04 RX ORDER — IPRATROPIUM BROMIDE AND ALBUTEROL SULFATE 2.5; .5 MG/3ML; MG/3ML
3 SOLUTION RESPIRATORY (INHALATION)
Status: DISCONTINUED | OUTPATIENT
Start: 2018-10-04 | End: 2018-10-05

## 2018-10-04 RX ORDER — ONDANSETRON 4 MG/1
4 TABLET, ORALLY DISINTEGRATING ORAL EVERY 6 HOURS PRN
Status: DISCONTINUED | OUTPATIENT
Start: 2018-10-04 | End: 2018-10-07 | Stop reason: HOSPADM

## 2018-10-04 RX ADMIN — METHYLPREDNISOLONE SODIUM SUCCINATE 125 MG: 125 INJECTION, POWDER, FOR SOLUTION INTRAMUSCULAR; INTRAVENOUS at 17:40

## 2018-10-04 RX ADMIN — AZITHROMYCIN MONOHYDRATE 500 MG: 250 TABLET ORAL at 19:15

## 2018-10-04 RX ADMIN — IPRATROPIUM BROMIDE AND ALBUTEROL SULFATE 3 ML: .5; 3 SOLUTION RESPIRATORY (INHALATION) at 19:44

## 2018-10-04 RX ADMIN — IPRATROPIUM BROMIDE AND ALBUTEROL SULFATE 3 ML: .5; 3 SOLUTION RESPIRATORY (INHALATION) at 15:50

## 2018-10-04 RX ADMIN — SODIUM CHLORIDE 1000 ML: 9 INJECTION, SOLUTION INTRAVENOUS at 19:21

## 2018-10-04 RX ADMIN — IPRATROPIUM BROMIDE AND ALBUTEROL SULFATE 3 ML: .5; 3 SOLUTION RESPIRATORY (INHALATION) at 17:40

## 2018-10-04 RX ADMIN — IPRATROPIUM BROMIDE AND ALBUTEROL SULFATE 3 ML: .5; 3 SOLUTION RESPIRATORY (INHALATION) at 19:30

## 2018-10-04 RX ADMIN — IPRATROPIUM BROMIDE AND ALBUTEROL SULFATE 3 ML: .5; 3 SOLUTION RESPIRATORY (INHALATION) at 21:53

## 2018-10-04 ASSESSMENT — ENCOUNTER SYMPTOMS
DIZZINESS: 1
SHORTNESS OF BREATH: 1
COUGH: 1
DIAPHORESIS: 1
WHEEZING: 1

## 2018-10-04 NOTE — ED PROVIDER NOTES
History     Chief Complaint:  Shortness of breath and cough    HPI   Tone Cooper is a 67 year old male, anticoagulated on Xarelto for A-Fib with a history of COPD and tricuspid valve disease, who presents with shortness of breath, a productive cough and wheezing. He states he has episodes of constant coughing that last about ten minutes which brings on diaphoresis and dizziness. He states this occurs at least once daily [place]. He called his clinic today and was recommended to come to the ED. He discontinued his Norvasc and Spiriva due to insurance problems awhile ago, but he restarted these recently. He uses his rescue inhaler frequently. Patient takes amoxicillin before the dentist and when his phlegm becomes yellow. He has not been hospitalized for his COPD before.  Patient denies chest pain.     Cardiac Risk Factors   Sex: Male   Tobacco: Negative   Hypertension: Positive  Diabetes: Negative  Hyperlipidemia: Negative  Family History: Negative    Allergies:  Flecainide     Medications:    Albuterol  Norvasc  Multivitamin  Xarelto  Spiriva    Past Medical History:    A-Fib   Atrial Flutter  COPD  Diverticulitis  HTN  PVCs  Tricuspid valve disorder  Ventricular tachycardia    Past Surgical History:    Hernia repair, right  Appendectomy  Cardioversion x 3  Ablation atrial flutter  Ablation focal A-Fib  Ablation PVC  I&D  Laparoscopic cholecystectomy  Left total hip replacement  Repair tricuspid valve    Family History:    Prostate cancer  Emphysema  HTN  Cerebrovascular disease    Social History:  Marital Status:   Presents to the ED with his wife.   Tobacco Use: Former smoker (Quit: 1/2/2008)  Alcohol Use: 3-6 drinks/weekly  PCP: Ana Pratt     Review of Systems   Constitutional: Positive for diaphoresis.   Respiratory: Positive for cough, shortness of breath and wheezing.    Cardiovascular: Negative for chest pain.   Neurological: Positive for  "dizziness.   All other systems reviewed and are negative.      Physical Exam   First Vitals:  Patient Vitals for the past 24 hrs:   BP Temp Temp src Pulse Heart Rate Resp SpO2 Height Weight   10/04/18 1737 - - - - 71 - 96 % - -   10/04/18 1736 - - - - 96 - 97 % - -   10/04/18 1735 (!) 122/92 - - - 84 - 97 % - -   10/04/18 1545 - - - - 100 23 95 % - -   10/04/18 1530 - - - - 98 16 100 % - -   10/04/18 1515 - - - - 96 25 95 % - -   10/04/18 1500 (!) 134/118 97.4  F (36.3  C) Oral 81 - 24 97 % 1.803 m (5' 11\") 78.5 kg (173 lb)       Physical Exam  Nursing note and vitals reviewed.  Constitutional: Cooperative.   HENT:   Mouth/Throat: Moist mucous membranes.   Eyes: EOMI, nonicteric sclera  Cardiovascular: Normal rate, regular rhythm, no murmurs, rubs, or gallops  Pulmonary/Chest: Increased effort. Bilateral wheezing. Tachypneic.   Abdominal: Soft. Nontender, nondistended, no guarding or rigidity. BS present.   Musculoskeletal: Normal range of motion.   Neurological: Alert. Moves all extremities spontaneously.   Skin: Skin is warm and dry. No rash noted.   Psychiatric: Normal mood and affect.       Emergency Department Course   ECG:  @ 1453  Indication: Shortness of breath   Vent. Rate 114 bpm. MT interval 166 ms. QRS duration 92 ms. QT/QTc 330/454 ms. P-R-T axis 94 146 78.   Sinus tachycardia with occasional PVCs. Right axis deviation. Pulmonary disease pattern. Abnormal ECG.    Read @ 1703 by Dr. Moreno.    Imaging:  Radiographic findings were communicated with the patient who voiced understanding of the findings.    XR Chest 2 Views   Final Result   IMPRESSION: No focal airspace disease or other acute findings.   Emphysema. Sternotomy. Postoperative changes mitral valve.   Normal-sized cardiac silhouette.      PARMJIT ODSS MD        Laboratory:  CBC:  WBC 6.9, HGB 17.8 (H),   BMP: Creatinine 1.40 (H), GFR 51 (L), CO2 33 (H), glucose 108 (H), otherwise WNL )  Troponin: 0.048 (H)    Interventions:  1550: " Duoneb, 3 mL, Nebulization  1740: Duoneb, 3 mL, Nebulization  1740: Solu-medrol, 125 mg, IV injection  Ordered: Azithromycin, 500 mg, oral    Emergency Department Course:  The patient arrived in triage where vitals were measured and recorded.   The patient was then escorted back to the emergency department.   The patient's medical records were reviewed.  Nursing notes and vitals were reviewed.  1541: I performed an exam of the patient as documented above.  The above workup was undertaken.  1739: I rechecked the patient and discussed results.  Findings and plan explained to the patient who consents to admission.   1912: I discussed the patient with Dr. Hill of the hospitalist service, who will admit the patient to an obs bed for further monitoring, evaluation, and treatment.    Impression & Plan      Medical Decision Making:  Tone Cooper is a 67 year old male who presents for evaluation of shortness of breath and wheezing. Signs and symptoms are consistent with COPD exacerbation.  A broad differential was considered including foreign body, asthma, reactive airway disease, pneumothorax, cardiac equivalent/ACS, viral induced wheezing, allergic phenomena, pneumonia, etc.  Patient feels improved after interventions here in ED but continues to have impairment in respiratory function including decreasing O2 sats into the 80s.  Given this, I will hospitalize for further intervention.      There are no signs at this point of any other serious etiologies including those mentioned above especially acute coronary syndrome. I doubt this is ACS given the classic story of COPD exacerbation given by the patient, the marked wheezing without rales and a nonspecific EKG changes.  No signs of pneumonia.     Given change in sputum production, antibiotics are indicated and first dose given in ED.  Discussed patient with hospitalist who agreed with inpatient care.        Diagnosis:    ICD-10-CM    1. COPD exacerbation (H)  J44.1        Disposition:  Admitted to an obs bed.         I, Janey Gill, am serving as a scribe on 10/4/2018 at 3:41 PM to personally document services performed by Dr. Moreno based on my observations and the provider's statements to me.   [facility] [place] EMERGENCY DEPARTMENT       Augie Moreno MD  10/05/18 0518

## 2018-10-04 NOTE — ED NOTES
[facility]  ED Nurse Handoff Report    Tone Cooper is a 67 year old male   ED Chief complaint: shortness of breath, cough, wheezing  . ED Diagnosis:   Final diagnoses:   COPD exacerbation (H)     Allergies:   Allergies   Allergen Reactions     Flecainide Palpitations       Code Status: Full Code  Activity level - Baseline/Home:  Independent. Activity Level - Current: independent  Lift room needed: No. Bariatric: No   Needed: No   Isolation: No. Infection: Not Applicable.     Vital Signs:   Vitals:    10/04/18 1545 10/04/18 1735 10/04/18 1736 10/04/18 1737   BP:  (!) 122/92     Pulse:       Resp: 23      Temp:       TempSrc:       SpO2: 95% 97% 97% 96%   Weight:       Height:           Cardiac Rhythm:  ,      Pain level:    Patient confused: No. Patient Falls Risk: Yes.   Elimination Status: Has voided   Patient Report - Initial Complaint: SOB COPD exacerbation . Focused Assessment:  Respiratory - Respiratory WDL:  WDL except Lung Fields: All Fields Throughout All Lung Fields: wheezes expiratory; wheezes inspiratory  Tests Performed:   Labs Ordered and Resulted from Time of ED Arrival Up to the Time of Departure from the ED   CBC WITH PLATELETS DIFFERENTIAL - Abnormal; Notable for the following:        Result Value    RBC Count 6.02 (*)     Hemoglobin 17.8 (*)     Hematocrit 54.6 (*)     All other components within normal limits   BASIC METABOLIC PANEL - Abnormal; Notable for the following:     Carbon Dioxide 33 (*)     Glucose 108 (*)     Creatinine 1.40 (*)     GFR Estimate 51 (*)     All other components within normal limits   TROPONIN I - Abnormal; Notable for the following:     Troponin I ES 0.048 (*)     All other components within normal limits   CARDIAC CONTINUOUS MONITORING   PULSE OXIMETRY NURSING   PERIPHERAL IV CATHETER     XR Chest 2 Views   Final Result   IMPRESSION: No focal airspace disease or other acute findings.   Emphysema. Sternotomy. Postoperative changes mitral  valve.   Normal-sized cardiac silhouette.      PARMJIT DOSS MD        .   Treatments provided:   Medications   0.9% sodium chloride BOLUS (not administered)   ipratropium - albuterol 0.5 mg/2.5 mg/3 mL (DUONEB) neb solution 3 mL (not administered)   azithromycin (ZITHROMAX) tablet 500 mg (not administered)   ipratropium - albuterol 0.5 mg/2.5 mg/3 mL (DUONEB) neb solution 3 mL (3 mLs Nebulization Given 10/4/18 1550)   ipratropium - albuterol 0.5 mg/2.5 mg/3 mL (DUONEB) neb solution 6 mL (3 mLs Nebulization Given 10/4/18 1740)   methylPREDNISolone sodium succinate (solu-MEDROL) injection 125 mg (125 mg Intravenous Given 10/4/18 1740)       Family Comments: wife was at bedside.   OBS brochure/video discussed/provided to patient:  N/A  ED Medications:   Medications   0.9% sodium chloride BOLUS (not administered)   ipratropium - albuterol 0.5 mg/2.5 mg/3 mL (DUONEB) neb solution 3 mL (not administered)   azithromycin (ZITHROMAX) tablet 500 mg (not administered)   ipratropium - albuterol 0.5 mg/2.5 mg/3 mL (DUONEB) neb solution 3 mL (3 mLs Nebulization Given 10/4/18 1550)   ipratropium - albuterol 0.5 mg/2.5 mg/3 mL (DUONEB) neb solution 6 mL (3 mLs Nebulization Given 10/4/18 1740)   methylPREDNISolone sodium succinate (solu-MEDROL) injection 125 mg (125 mg Intravenous Given 10/4/18 1740)     Drips infusing:  No  For the majority of the shift, the patient's behavior Green. Interventions performed were monitor .     Severe Sepsis OR Septic Shock Diagnosis Present: No      ED Nurse Name/Phone Number: Wali Sánchez,   6:29 PM    RECEIVING UNIT ED HANDOFF REVIEW    Above ED Nurse Handoff Report was reviewed: Yes  Reviewed by: Sera Parekh on October 4, 2018 at 8:43 PM

## 2018-10-04 NOTE — ED NOTES
Bed: ED03  Expected date: 10/4/18  Expected time: 2:50 PM  Means of arrival:   Comments:  Pt from triage

## 2018-10-04 NOTE — IP AVS SNAPSHOT
MRN:[id number]                      After Visit Summary   10/4/2018    Tone Cooper    MRN: [id number]           Thank you!     Thank you for choosing [facility] for your care. Our goal is always to provide you with excellent care. Hearing back from our patients is one way we can continue to improve our services. Please take a few minutes to complete the written survey that you may receive in the mail after you visit. If you would like to speak to someone directly about your visit please contact Patient Relations at [phone]. Thank you!          Patient Information     Date Of Birth          1950        Designated Caregiver       Most Recent Value    Caregiver    Will someone help with your care after discharge? yes    Name of designated caregiver Wife - Gloria Vaelncia    Phone number of caregiver [phone]    Caregiver address Same as patient.      About your hospital stay     You were admitted on:  October 4, 2018 You last received care in the:  [facility] Observation Department    You were discharged on:  October 7, 2018        Reason for your hospital stay       You were hospitalized for a COPD exacerbation.                  Who to Call     For medical emergencies, please call 911.  For non-urgent questions about your medical care, please call your primary care provider or clinic, [phone]          Attending Provider     Provider Specialty    Augie Moreno MD Emergency Medicine    Lester Hill MD Internal Medicine    Froy, Nata Rod MD Internal Medicine       Primary Care Provider Office Phone # Fax #    Ana Shayla Pratt [phone]      After Care Instructions     Activity       Your activity upon discharge: activity as tolerated            Diet       Follow this diet upon discharge: Orders Placed This Encounter      Regular Diet Adult            Oxygen Adult       [place] [facility] Order 3 liter(s) by  nasal cannula continuously with use of portable tank. Expected treatment length is 2 months.. Test on conserving device as applicable.    Patients who qualify for home O2 coverage under the CMS guidelines require ABG tests or O2 sat readings obtained closest to, but no earlier than 2 days prior to the discharge, as evidence of the need for home oxygen therapy. Testing must be performed while patient is in the chronic stable state. See notes for O2 sats.    I certify that this patient, Tone Cooper has been under my care and that I, or a nurse practitioner or physician's assistant working with me, had a face-to-face encounter that meets the face-to-face encounter requirements with this patient on 10/7/2018. The patient, Tone Cooper was evaluated or treated in whole, or in part, for the following medical condition, which necessitates the use of the ordered oxygen. Treatment Diagnosis: COPD    Attending Provider: Nata Mcduffie  Physician signature: See electronic signature associated with these discharge orders  Date of Order: October 7, 2018                  Follow-up Appointments     Follow-up and recommended labs and tests        Follow up with primary care provider, Ana Pratt, within 7 days to evaluate medication change and for hospital follow- up.  No follow up labs or test are needed.                             Further instructions from your care team       Your hospital follow up appointment has been schedule for you with Dr. Pratt at [facility] for Thursday October 11th at 9:35 AM. Please bring your hospital discharge instructions and any new medications with you to your appointment. Please call the clinic at [phone] if you need to reschedule.  [facility]  Address: [address] [place] [address]     Make sure to keep your appointment on 10/11.  At that time they will assess your breathing, need for continued oxygen and blood pressure.      Oxygen  "Provider:  Arranged through [facility], contact number [phone]. If you have any questions or concerns please call the oxygen company directly.      Pending Results     Date and Time Order Name Status Description    10/6/2018 0759 Blood culture Preliminary     10/6/2018 0759 Blood culture Preliminary             Statement of Approval     Ordered          10/07/18 1056  I have reviewed and agree with all the recommendations and orders detailed in this document.  EFFECTIVE NOW     Approved and electronically signed by:  Nata Mcduffie MD             Admission Information     Date & Time Provider Department Dept. Phone    10/4/2018 Nata Mcduffie MD [facility] Observation Department [phone]      Your Vitals Were     Blood Pressure Pulse Temperature Respirations Height Weight    163/93 (BP Location: Left arm) 72 97.2  F (36.2  C) (Oral) 20 1.803 m (5' 11\") 78.5 kg (173 lb)    Pulse Oximetry BMI (Body Mass Index)                96% 24.13 kg/m2          MyChart Information     [employer] lets you send messages to your doctor, view your test results, renew your prescriptions, schedule appointments and more. To sign up, go to [email or web address] . Click on \"Log in\" on the left side of the screen, which will take you to the Welcome page. Then click on \"Sign up Now\" on the right side of the page.     You will be asked to enter the access code listed below, as well as some personal information. Please follow the directions to create your username and password.     Your access code is: [email or web address]  Expires: 2019 12:46 PM     Your access code will  in 90 days. If you need help or a new code, please call your [facility] clinic or [phone].        Care EveryWhere ID     This is your Care EveryWhere ID. This could be used by other organizations to access your [facility] medical records  [id number]        Equal Access to Services     JOON FITZGERALD AH: Barb pedroza " Sonando, walissettda luqadaha, qaleeleeta kaalmada lux, carmine keybonnie ruben. So [facility] [phone].    ATENCIÓN: Si lisette almodovar, tiene a kirk disposición servicios gratuitos de asistencia lingüística. Llmaya al [phone].    We comply with applicable federal civil rights laws and [place] laws. We do not discriminate on the basis of race, color, national origin, age, disability, sex, sexual orientation, or gender identity.               Review of your medicines      START taking        Dose / Directions    azithromycin 250 MG tablet   Commonly known as:  ZITHROMAX   Indication:  copd   Used for:  COPD exacerbation (H)        Dose:  250 mg   Start taking on:  10/8/2018   Take 1 tablet (250 mg) by mouth daily   Quantity:  1 tablet   Refills:  0       ipratropium - albuterol 0.5 mg/2.5 mg/3 mL 0.5-2.5 (3) MG/3ML neb solution   Commonly known as:  DUONEB   Used for:  COPD exacerbation (H)        Dose:  3 mL   Take 1 vial (3 mLs) by nebulization every 2 hours as needed for wheezing   Quantity:  360 mL   Refills:  0       metoprolol tartrate 25 MG tablet   Commonly known as:  LOPRESSOR   Used for:  Benign essential hypertension        Dose:  12.5 mg   Take 0.5 tablets (12.5 mg) by mouth 2 times daily   Quantity:  30 tablet   Refills:  0       order for DME   Used for:  COPD exacerbation (H)        Equipment being ordered: Nebulizer   Quantity:  1 each   Refills:  0       predniSONE 20 MG tablet   Commonly known as:  DELTASONE   Used for:  COPD exacerbation (H)        Take 60 mg daily x3 days then 40 mg daily x3 days then 20 mg daily x3 days then stop   Quantity:  18 tablet   Refills:  0         CONTINUE these medicines which have NOT CHANGED        Dose / Directions    albuterol 108 (90 Base) MCG/ACT inhaler   Commonly known as:  VENTOLIN HFA   Used for:  COPD exacerbation (H)        Dose:  1-2 puff   Inhale 1-2 puffs into the lungs every 4 hours (while awake) PRN   Quantity:  2 Inhaler   Refills:  0        amoxicillin 500 MG capsule   Commonly known as:  AMOXIL        Dose:  2000 mg   Take 2,000 mg by mouth as needed (Dental appt)   Refills:  0       DAILY MULTI Tabs        Take by mouth daily   Refills:  0       fluticasone-salmeterol 250-50 MCG/DOSE diskus inhaler   Commonly known as:  ADVAIR DISKUS   Used for:  COPD exacerbation (H)        Dose:  1 puff   Inhale 1 puff into the lungs 2 times daily   Quantity:  1 Inhaler   Refills:  0       order for DME   Used for:  Nocturnal hypoxia        Oxygen for nocturnal use. 1 LPM via nasal cannula Testing done at home in chronic stable state. Condition is COPD.  Patient does not have Obstructive Sleep Apnea. Overnight oximetry revealed 30.3 minutes of hypoxia (<= 88%). Duration: Lifetime (99 months).   Quantity:  1 each   Refills:  99       tiotropium 18 MCG capsule   Commonly known as:  SPIRIVA   Used for:  COPD exacerbation (H)        Dose:  18 mcg   Inhale 1 capsule (18 mcg) into the lungs daily   Quantity:  30 capsule   Refills:  0         STOP taking     amLODIPine 10 MG tablet   Commonly known as:  NORVASC                Where to get your medicines      These medications were sent to [facility] [address] [address] [address]     Phone:  [phone]     albuterol 108 (90 Base) MCG/ACT inhaler    azithromycin 250 MG tablet    fluticasone-salmeterol 250-50 MCG/DOSE diskus inhaler    ipratropium - albuterol 0.5 mg/2.5 mg/3 mL 0.5-2.5 (3) MG/3ML neb solution    metoprolol tartrate 25 MG tablet    predniSONE 20 MG tablet    tiotropium 18 MCG capsule         Some of these will need a paper prescription and others can be bought over the counter. Ask your nurse if you have questions.     Bring a paper prescription for each of these medications     order for DME                Protect others around you: Learn how to safely use, store and throw away your medicines at www.disposemymeds.org.         ANTIBIOTIC INSTRUCTION     You've Been Prescribed an Antibiotic - Now What?  Your healthcare team thinks that you or your loved one might have an infection. Some infections can be treated with antibiotics, which are powerful, life-saving drugs. Like all medications, antibiotics have side effects and should only be used when necessary. There are some important things you should know about your antibiotic treatment.      Your healthcare team may run tests before you start taking an antibiotic.    Your team may take samples (e.g., from your blood, urine or other areas) to run tests to look for bacteria. These test can be important to determine if you need an antibiotic at all and, if you do, which antibiotic will work best.      Within a few days, your healthcare team might change or even stop your antibiotic.    Your team may start you on an antibiotic while they are working to find out what is making you sick.    Your team might change your antibiotic because test results show that a different antibiotic would be better to treat your infection.    In some cases, once your team has more information, they learn that you do not need an antibiotic at all. They may find out that you don't have an infection, or that the antibiotic you're taking won't work against your infection. For example, an infection caused by a virus can't be treated with antibiotics. Staying on an antibiotic when you don't need it is more likely to be harmful than helpful.      You may experience side effects from your antibiotic.    Like all medications, antibiotics have side effects. Some of these can be serious.    Let you healthcare team know if you have any known allergies when you are admitted to the hospital.    One significant side effect of nearly all antibiotics is the risk of severe and sometimes deadly diarrhea caused by Clostridium difficile (C. Difficile). This occurs when a person takes antibiotics because some good germs are  destroyed. Antibiotic use allows C. diificile to take over, putting patients at high risk for this serious infection.    As a patient or caregiver, it is important to understand your or your loved one's antibiotic treatment. It is especially important for caregivers to speak up when patients can't speak for themselves. Here are some important questions to ask your healthcare team.    What infection is this antibiotic treating and how do you know I have that infection?    What side effects might occur from this antibiotic?    How long will I need to take this antibiotic?    Is it safe to take this antibiotic with other medications or supplements (e.g., vitamins) that I am taking?     Are there any special directions I need to know about taking this antibiotic? For example, should I take it with food?    How will I be monitored to know whether my infection is responding to the antibiotic?    What tests may help to make sure the right antibiotic is prescribed for me?      Information provided by:  www.cdc.gov/getsmart  U.S. Department of Health and Human Services  Centers for disease Control and Prevention  National Center for Emerging and Zoonotic Infectious Diseases  Division of Healthcare Quality Promotion             Medication List: This is a list of all your medications and when to take them. Check marks below indicate your daily home schedule. Keep this list as a reference.      Medications           Morning Afternoon Evening Bedtime As Needed    albuterol 108 (90 Base) MCG/ACT inhaler   Commonly known as:  VENTOLIN HFA   Inhale 1-2 puffs into the lungs every 4 hours (while awake) PRN                                amoxicillin 500 MG capsule   Commonly known as:  AMOXIL   Take 2,000 mg by mouth as needed (Dental appt)                                azithromycin 250 MG tablet   Commonly known as:  ZITHROMAX   Take 1 tablet (250 mg) by mouth daily   Start taking on:  10/8/2018   Last time this was given:  250 mg  on 10/7/2018  8:30 AM                                DAILY MULTI Tabs   Take by mouth daily                                fluticasone-salmeterol 250-50 MCG/DOSE diskus inhaler   Commonly known as:  ADVAIR DISKUS   Inhale 1 puff into the lungs 2 times daily                                ipratropium - albuterol 0.5 mg/2.5 mg/3 mL 0.5-2.5 (3) MG/3ML neb solution   Commonly known as:  DUONEB   Take 1 vial (3 mLs) by nebulization every 2 hours as needed for wheezing   Last time this was given:  3 mLs on 10/7/2018 11:27 AM                                metoprolol tartrate 25 MG tablet   Commonly known as:  LOPRESSOR   Take 0.5 tablets (12.5 mg) by mouth 2 times daily   Last time this was given:  25 mg on 10/7/2018  8:30 AM                                order for DME   Oxygen for nocturnal use. 1 LPM via nasal cannula Testing done at home in chronic stable state. Condition is COPD.  Patient does not have Obstructive Sleep Apnea. Overnight oximetry revealed 30.3 minutes of hypoxia (<= 88%). Duration: Lifetime (99 months).                                order for DME   Equipment being ordered: Nebulizer                                predniSONE 20 MG tablet   Commonly known as:  DELTASONE   Take 60 mg daily x3 days then 40 mg daily x3 days then 20 mg daily x3 days then stop   Last time this was given:  40 mg on 10/5/2018  8:34 AM                                tiotropium 18 MCG capsule   Commonly known as:  SPIRIVA   Inhale 1 capsule (18 mcg) into the lungs daily

## 2018-10-04 NOTE — ED TRIAGE NOTES
Short of breath, productive cough, wheezing for a couple of days.  Called clinic today, was told to come to ED.  Denies chest pain.  ABCDs intact.

## 2018-10-04 NOTE — IP AVS SNAPSHOT
[facility] Observation Department    [address]    [facility] [address]    Phone:  [phone]                                       After Visit Summary   10/4/2018    Tone Cooper    MRN: [id number]           After Visit Summary Signature Page     I have received my discharge instructions, and my questions have been answered. I have discussed any challenges I see with this plan with the nurse or doctor.    ..........................................................................................................................................  Patient/Patient Representative Signature      ..........................................................................................................................................  Patient Representative Print Name and Relationship to Patient    ..................................................               ................................................  Date                                   Time    ..........................................................................................................................................  Reviewed by Signature/Title    ...................................................              ..............................................  Date                                               Time          22EPIC Rev 08/18

## 2018-10-04 NOTE — ED NOTES
Road tested pt with pulse oximeter.Pt started at 94% before walk. pt started wheezing during walk, oxygen decreased to 86%.

## 2018-10-05 ENCOUNTER — APPOINTMENT (OUTPATIENT)
Dept: CARDIOLOGY | Facility: CLINIC | Age: 68
DRG: 191 | End: 2018-10-05
Attending: PHYSICIAN ASSISTANT
Payer: COMMERCIAL

## 2018-10-05 LAB
ANION GAP SERPL CALCULATED.3IONS-SCNC: 8 MMOL/L (ref 3–14)
BUN SERPL-MCNC: 23 MG/DL (ref 7–30)
CALCIUM SERPL-MCNC: 9.2 MG/DL (ref 8.5–10.1)
CHLORIDE SERPL-SCNC: 104 MMOL/L (ref 94–109)
CO2 SERPL-SCNC: 24 MMOL/L (ref 20–32)
CREAT SERPL-MCNC: 1.12 MG/DL (ref 0.66–1.25)
GFR SERPL CREATININE-BSD FRML MDRD: 65 ML/MIN/1.7M2
GLUCOSE SERPL-MCNC: 206 MG/DL (ref 70–99)
INTERPRETATION ECG - MUSE: NORMAL
POTASSIUM SERPL-SCNC: 4.3 MMOL/L (ref 3.4–5.3)
SODIUM SERPL-SCNC: 136 MMOL/L (ref 133–144)
TROPONIN I SERPL-MCNC: 0.02 UG/L (ref 0–0.04)

## 2018-10-05 PROCEDURE — 40000274 ZZH STATISTIC RCP CONSULT EA 30 MIN

## 2018-10-05 PROCEDURE — 94640 AIRWAY INHALATION TREATMENT: CPT | Mod: 76

## 2018-10-05 PROCEDURE — 12000000 ZZH R&B MED SURG/OB

## 2018-10-05 PROCEDURE — 25000128 H RX IP 250 OP 636: Performed by: PHYSICIAN ASSISTANT

## 2018-10-05 PROCEDURE — 40000275 ZZH STATISTIC RCP TIME EA 10 MIN

## 2018-10-05 PROCEDURE — 25000132 ZZH RX MED GY IP 250 OP 250 PS 637: Performed by: INTERNAL MEDICINE

## 2018-10-05 PROCEDURE — 36415 COLL VENOUS BLD VENIPUNCTURE: CPT | Performed by: INTERNAL MEDICINE

## 2018-10-05 PROCEDURE — 80048 BASIC METABOLIC PNL TOTAL CA: CPT | Performed by: INTERNAL MEDICINE

## 2018-10-05 PROCEDURE — 93306 TTE W/DOPPLER COMPLETE: CPT

## 2018-10-05 PROCEDURE — 94799 UNLISTED PULMONARY SVC/PX: CPT

## 2018-10-05 PROCEDURE — 93306 TTE W/DOPPLER COMPLETE: CPT | Mod: 26 | Performed by: INTERNAL MEDICINE

## 2018-10-05 PROCEDURE — A9270 NON-COVERED ITEM OR SERVICE: HCPCS | Mod: GY | Performed by: INTERNAL MEDICINE

## 2018-10-05 PROCEDURE — 25000132 ZZH RX MED GY IP 250 OP 250 PS 637: Performed by: PHYSICIAN ASSISTANT

## 2018-10-05 PROCEDURE — G0378 HOSPITAL OBSERVATION PER HR: HCPCS

## 2018-10-05 PROCEDURE — 99232 SBSQ HOSP IP/OBS MODERATE 35: CPT | Performed by: PHYSICIAN ASSISTANT

## 2018-10-05 PROCEDURE — 94640 AIRWAY INHALATION TREATMENT: CPT

## 2018-10-05 PROCEDURE — 93005 ELECTROCARDIOGRAM TRACING: CPT

## 2018-10-05 PROCEDURE — 84484 ASSAY OF TROPONIN QUANT: CPT | Performed by: INTERNAL MEDICINE

## 2018-10-05 PROCEDURE — 93010 ELECTROCARDIOGRAM REPORT: CPT | Performed by: INTERNAL MEDICINE

## 2018-10-05 PROCEDURE — 25000125 ZZHC RX 250: Performed by: INTERNAL MEDICINE

## 2018-10-05 RX ORDER — IPRATROPIUM BROMIDE AND ALBUTEROL SULFATE 2.5; .5 MG/3ML; MG/3ML
3 SOLUTION RESPIRATORY (INHALATION)
Status: DISCONTINUED | OUTPATIENT
Start: 2018-10-05 | End: 2018-10-07 | Stop reason: HOSPADM

## 2018-10-05 RX ORDER — LISINOPRIL 10 MG/1
10 TABLET ORAL DAILY
Status: DISCONTINUED | OUTPATIENT
Start: 2018-10-05 | End: 2018-10-06

## 2018-10-05 RX ORDER — METHYLPREDNISOLONE SODIUM SUCCINATE 40 MG/ML
40 INJECTION, POWDER, LYOPHILIZED, FOR SOLUTION INTRAMUSCULAR; INTRAVENOUS EVERY 12 HOURS
Status: DISCONTINUED | OUTPATIENT
Start: 2018-10-05 | End: 2018-10-06

## 2018-10-05 RX ADMIN — AZITHROMYCIN MONOHYDRATE 250 MG: 250 TABLET ORAL at 08:34

## 2018-10-05 RX ADMIN — ACETAMINOPHEN 650 MG: 325 TABLET, FILM COATED ORAL at 16:24

## 2018-10-05 RX ADMIN — METHYLPREDNISOLONE SODIUM SUCCINATE 40 MG: 40 INJECTION, POWDER, FOR SOLUTION INTRAMUSCULAR; INTRAVENOUS at 20:37

## 2018-10-05 RX ADMIN — IPRATROPIUM BROMIDE AND ALBUTEROL SULFATE 3 ML: 2.5; .5 SOLUTION RESPIRATORY (INHALATION) at 20:50

## 2018-10-05 RX ADMIN — Medication 1 MG: at 22:29

## 2018-10-05 RX ADMIN — ENOXAPARIN SODIUM 80 MG: 80 INJECTION SUBCUTANEOUS at 20:37

## 2018-10-05 RX ADMIN — PREDNISONE 40 MG: 20 TABLET ORAL at 08:34

## 2018-10-05 RX ADMIN — IPRATROPIUM BROMIDE AND ALBUTEROL SULFATE 3 ML: 2.5; .5 SOLUTION RESPIRATORY (INHALATION) at 15:46

## 2018-10-05 RX ADMIN — LISINOPRIL 10 MG: 10 TABLET ORAL at 13:40

## 2018-10-05 RX ADMIN — IPRATROPIUM BROMIDE AND ALBUTEROL SULFATE 3 ML: .5; 3 SOLUTION RESPIRATORY (INHALATION) at 08:01

## 2018-10-05 RX ADMIN — IPRATROPIUM BROMIDE AND ALBUTEROL SULFATE 3 ML: 2.5; .5 SOLUTION RESPIRATORY (INHALATION) at 11:03

## 2018-10-05 ASSESSMENT — PAIN DESCRIPTION - DESCRIPTORS: DESCRIPTORS: HEADACHE

## 2018-10-05 NOTE — PROGRESS NOTES
Notified by nursing that pt has been having intermittent a fib on tele that has been asymptomatic. HRs elevated to 110s with a fib. Pt has a hx of a fib and is s/p ablation in the past and is no longer on anticoagulation. Pt is receiving treatment for COPD exacerbation with scheduled nebs and IV steroids which may be contributing to tachyarrhythmia. Will give subcutaneous Lovenox for now, morning rounder to discuss whether further anticoagulation is needed, CHADS-Vasc score of 2. Will continue to monitor HRs, if persistently elevated >120, will consider IV metoprolol.     Sarika Davis PA-C

## 2018-10-05 NOTE — PLAN OF CARE
Problem: Patient Care Overview  Goal: Plan of Care/Patient Progress Review    /79, HR 100s, on 1L O2 at 93%. Tylenol given for headache. Lungs with expiratory wheezes. Patient denies SOB at rest, admits to [facility]. IS encouraged. Will continue to monitor.    Notified by tele tech of 14 beat v-tach and afib rvr, PA notified. No change in patient presentation/symptoms.

## 2018-10-05 NOTE — DISCHARGE INSTRUCTIONS
Your hospital follow up appointment has been schedule for you with Dr. Pratt at [facility] for Thursday October 11th at 9:35 AM. Please bring your hospital discharge instructions and any new medications with you to your appointment. Please call the clinic at [phone] if you need to reschedule.  [facility]  Address: [address] [place] [address]     Make sure to keep your appointment on 10/11.  At that time they will assess your breathing, need for continued oxygen and blood pressure.      Oxygen Provider:  Arranged through [employer], contact number [phone]. If you have any questions or concerns please call the oxygen company directly.

## 2018-10-05 NOTE — CONSULTS
Care Transition Initial Assessment - RN        Met with: Patient.  DATA   Active Problems:    COPD (chronic obstructive pulmonary disease) (H)       Cognitive Status: awake, alert and oriented.  Primary Care [facility]: [facility]  Primary Care MD Name: Ana Pratt  Contact information and PCP information verified: Yes                       Insurance concerns: No Insurance issues identified  ASSESSMENT  Patient currently receives the following services:  None        Identified issues/concerns regarding health management: Patient admitted with COPD exacerbation. COPD action plan given and reviewed with patient.   Pharmacy liason consult placed as per chart review patient is having difficulty affording medications.   PLAN  Financial costs for the patient not discussed .  Patient given options and choices for discharge Yes .  Patient/family is agreeable to the plan?  Yes.  Patient anticipates discharging to Home .        Patient anticipates needs for home equipment: No  Plan/Disposition: Home   Appointments: Follow up hospitalization appointment made and updated to AVS.      Care  (CTS) will continue to follow as needed.    Judith PATTERSON  Care Transition Services  [phone]

## 2018-10-05 NOTE — PROGRESS NOTES
"[facility]     Date: 10/5/2018  Admission Dx: COPD  Pulmonary History COPD  Home Nebulizer/MDI Use: Albuterol HFA Q4 hours prn, Spiriva daily  Home Oxygen: Room air  Acuity Level (RCAT flow sheet): 3  Aerosol Therapy initiated: Changed Duoneb Q4 hours wile awake to QID, continue Duoneb Q2 hours prn.      Pulmonary Hygiene initiated: Deep breath and cough TID, Aerobika prn    Volume Expansion initiated: IS TID      Current Oxygen Requirements: Room air  Current SpO2: 95%    Re-evaluation date: 10/8/2018    Patient Education: Informed Pt of RCAT.  Gave instruction in the use of an Aerobika and IS.      See \"RT Assessments\" flow sheet for patient assessment scoring and Acuity Level Details.     Roger Cartagena  October 5, 2018.8:01 AM              "

## 2018-10-05 NOTE — PLAN OF CARE
"Problem: Patient Care Overview  Goal: Plan of Care/Patient Progress Review  PRIMARY DIAGNOSIS: SOB  OUTPATIENT/OBSERVATION GOALS TO BE MET BEFORE DISCHARGE:  1. Vital signs stable: Pt hypertensive, please see flowsheet    2. Improvement of peak flow to greater than 70% sustained off nebulizer for 4 hours: n/a    3. Dyspnea improved and O2 sats >88% at RA or at prior home O2 therapy level: pt c/o ERICKSON      SpO2: 97 %, O2 Device: Nasal cannula    4. Short term supplemental O2 needed for use with activity at home: No    5. Tolerating adequate PO diet and medications: Yes    6. Return to near baseline physical activity: Yes, SBA    Discharge Planner Nurse   Safe discharge environment identified: Yes  Barriers to discharge: Yes       Entered by: Sera Parekh 10/04/2018 10:02 PM     Please review provider order for any additional goals.   Nurse to notify provider when observation goals have been met and patient is ready for discharge.    Pt A&O. LS wheezes exp on RA, infrequent cough. Voiding w/o difficulty. Up SBA. On reg diet. Denies pain. Plan: PO zith, nebs, prednisone. Continue to monitor.   BP (!) 137/97 (BP Location: Left arm)  Pulse 81  Temp 96.9  F (36.1  C) (Oral)  Resp 22  Ht 1.803 m (5' 11\")  Wt 78.5 kg (173 lb)  SpO2 97%  BMI 24.13 kg/m2        "

## 2018-10-05 NOTE — PROGRESS NOTES
"Hosp short note:  Pt admitted yesterday for COPD exacerbation, states still feeling wheezy and sob with activity but improves some after getting nebs.   C/o orthopnea and difficulty laying down at night. Was suppose to have follow up ECHO per Dr. Aj to follow up on TVR and hx of Afibn but has not done so yet.   Exam: few wheeze but very dec bs and not great air exchange.   Will transition to inpatient status, change to IV steroids, cont nebs. Cont abx for productive cough  Obtain echo to assess for valvular function      [facility]  Hospitalist Progress Note  Rhea Sky PA-C 10/05/2018    Reason for Stay (Diagnosis): Inc SOB, COPD exacerbation         Assessment and Plan:      Tone Cooper is a 67 year old male with PMH including atrial fibrillation/flutter, COPD, hypertension who presents with shortness of breath worse over the past two days.  He is not on any meds other than albuterol due to insurance issues/costs. He has been wheezy with productive cough, his xray was negative for infiltrate. He is being admitted for COPD exacerbation.     Problem List:   1.   COPD exacerbation with bronchitis: still feeling wheezy today katheryn with activity. Sats drops to 88% with activity, lungs with poor air exchange. Will change to IV steroids for now, cont nebs QID. Having productive cough so will cont Azithromycin.   Chronically prescribed Spiriva and has an albuterol rescue inhaler.  Not on spiriva recently due to insurance issues.  He reports he previously was prescribed nocturnal O2 at home but not using due to issues w/ the mask.     -Will benefit from nebulizer at home on discharge, can change to Duonebs if cost for spiriva is an issue.   -Pt states Advair is $55 at his pharmacy so he will be able to   -SW to help arrange home neb machine and home O2 with a different apparatus as his old mask gave him a \"rash\"    2.   Mildly elevated troponin: Suspect demand ischemia from COPD in the setting " of tachycardia and slightly elevated cr.    Trops is down and he has no CP. He did complain of occasional orthopnea so ECHO performed today, shows normal EF and no RWMA. 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  Last Nuc stress test in 10/2016 was read as mildly abnl but seen by Cardiology and felt that this likely an over read and not significant signs of ischemia. He has no recent. Angina.   He is due to see Dr. Aj of cardiology again, I would recommend follow up with Dr. Aj upon discharge.     3.   History of atrial fibrillation/flutter no longer on anticoagulation: Currently in sinus tachycardia.  Previously had multiple ablations and was told that he no longer needed OAC. Currently nebs are probably triggering his sinus tach.      4.   Possible CKD: Cr improved today.  Follow as outpt.     5. Untreated HTN: previously on Lisinopril and then Norvasc but c/o LE swelling. He took himself off meds due to financial constraints. BP suboptimal. Will resume Lisinopril and titrate as needed. Avoid BB due to COPD and Norvasc due to LE edema. (Lisinopril should be $4.00 at local pharmacy)     6.  Financial concerns: has been given some resources through [employer] to get into discount med programs but hasn't completed the paperwork yet.    DVT Prophylaxis: Pneumatic Compression Devices  Code Status: Full Code  Discharge Dispo: home  Estimated Disch Date / # of Days until Disch: transition to inpatient status, discharge in 1-3 days?        Interval History (Subjective):      States still wheezing and feeling SOB with activity. O2 down to 88% on RA.  Supposed to wear nocturnal O2 but has not been able to use it since the mask gave him a rash in the nasal cannula was hard to use.  In fact he has not taken any of his medication for some time due to financial constraints.  Also complaining of productive cough but otherwise no fevers, no chest pain.  No complaints of palpitation                  Physical  "Exam:      Last Vital Signs:  BP (!) 158/93 (BP Location: Right arm)  Pulse 81  Temp 96.3  F (35.7  C) (Oral)  Resp 18  Ht 1.803 m (5' 11\")  Wt 78.5 kg (173 lb)  SpO2 95%  BMI 24.13 kg/m2      Constitutional: Awake, alert, cooperative, no apparent distress   Respiratory:  few expiratory wheezes on exam, decreased breath sounds throughout with Minimal air exchange   Cardiovascular: Regular rate and rhythm, normal S1 and S2, with few ectopic beats and no murmur noted   Abdomen: Normal bowel sounds, soft, non-distended, non-tender   Skin: No rashes, no cyanosis, dry to touch   Neuro: Alert and oriented x3, no weakness, numbness, memory loss   Extremities: No edema, normal range of motion   Other(s):        All other systems: Negative          Medications:      All current medications were reviewed with changes reflected in problem list.         Data:      All new lab and imaging data was reviewed.   Labs:  No results for input(s): CULT in the last 168 hours.       Lab Results   Component Value Date     10/05/2018     10/04/2018     01/23/2017    Lab Results   Component Value Date    CHLORIDE 104 10/05/2018    CHLORIDE 102 10/04/2018    CHLORIDE 105 01/23/2017    Lab Results   Component Value Date    BUN 23 10/05/2018    BUN 23 10/04/2018    BUN 23 01/23/2017      Lab Results   Component Value Date    POTASSIUM 4.3 10/05/2018    POTASSIUM 4.4 10/04/2018    POTASSIUM 4.6 01/23/2017    Lab Results   Component Value Date    CO2 24 10/05/2018    CO2 33 10/04/2018    CO2 29 01/23/2017    Lab Results   Component Value Date    CR 1.12 10/05/2018    CR 1.40 10/04/2018    CR 1.09 01/23/2017          Recent Labs  Lab 10/04/18  1544   WBC 6.9   HGB 17.8*   HCT 54.6*   MCV 91          Recent Labs  Lab 10/05/18  0555 10/04/18  1544   * 108*       Recent Labs  Lab 10/05/18  0555 10/04/18  1544   TROPI 0.020 0.048*      Imaging:   Results for orders placed or performed during the hospital " encounter of 10/04/18   XR Chest 2 Views    Narrative    XR CHEST TWO VIEWS   10/4/2018 4:34 PM     INDICATION: Respiratory distress.    COMPARISON: 8/23/2016.      Impression    IMPRESSION: No focal airspace disease or other acute findings.  Emphysema. Sternotomy. Postoperative changes mitral valve.  Normal-sized cardiac silhouette.    PARMJIT DOSS MD

## 2018-10-05 NOTE — PLAN OF CARE
Problem: Patient Care Overview  Goal: Plan of Care/Patient Progress Review  Outcome: Improving  PRIMARY DIAGNOSIS: COPD Exacerbation  OUTPATIENT/OBSERVATION GOALS TO BE MET BEFORE DISCHARGE:  1. Vital signs stable: Yes ex HTN (PA aware)     2. Improvement of peak flow to greater than 70% sustained off nebulizer for 4 hours: No     3. Dyspnea improved and O2 sats >88% at RA or at prior home O2 therapy level: Yes      SpO2: 94 %, O2 Device: Nasal cannula     4. Short term supplemental O2 needed for use with activity at home: Unsure at this time.      5. Tolerating adequate PO diet and medications: Yes     6. Return to near baseline physical activity: Yes     Discharge Planner Nurse   Safe discharge environment identified: Yes  Barriers to discharge: Yes - SOB.        Entered by: Geovanna Russell 10/05/2018        VSS on 1L O2 ex HTN (PA will start Lisinopril today). Patient is more comfortable and feels less SOB with O2 on. Tele is SR w/ freq PACs and PVCs and PAC couplets. PA aware. EKG obtained per PA request. Prednisone, zpack, and jessica nebs continue; will get IV steroids tonight. Saline locked. Patient reports SOB at baseline, but feels it is slightly worse right now. IS & flutter valve encouraged by RN. Moving with SBA.      Patient walked in murphy with and without oxygen. Without oxygen, O2 saturation varied from 92 - 88% and HR of 111-114. With 2L oxygen, O2 saturation varied from 91-93% and HR of 67-75. Patient felt very SOB with both walks but recovered quickly once back in bed.     Please review provider order for any additional goals.   Nurse to notify provider when observation goals have been met and patient is ready for discharge.

## 2018-10-05 NOTE — PLAN OF CARE
Problem: Patient Care Overview  Goal: Plan of Care/Patient Progress Review  Outcome: Improving  PRIMARY DIAGNOSIS: COPD exacerbation   OUTPATIENT/OBSERVATION GOALS TO BE MET BEFORE DISCHARGE:  1. Vital signs stable: Yes    2. Improvement of peak flow to greater than 70% sustained off nebulizer for 4 hours: No    3. Dyspnea improved and O2 sats >88% at RA or at prior home O2 therapy level: Yes      SpO2: 93 %, O2 Device: None (Room air)    4. Short term supplemental O2 needed for use with activity at home: No    5. Tolerating adequate PO diet and medications: Yes    6. Return to near baseline physical activity: Yes    Discharge Planner Nurse   Safe discharge environment identified: Yes  Barriers to discharge: No       Entered by: Beryl Dutta 10/05/2018 4:04 AM     Please review provider order for any additional goals.   Nurse to notify provider when observation goals have been met and patient is ready for discharge.    Patient is Alert and Oriented x4. Vitals are Temp: 97.2  F (36.2  C) Temp src: Oral BP: 151/70 Pulse: 81 Heart Rate: 72 Resp: 18 SpO2: 93 % O2 Device: None (Room air) . HR irregular. BP elevated. Asymptomatic. Denying pain.  SOB with excerption. States SOB is less than admission. Lungs diminished with slight wheezes. They are SBA with Activity.    Pt is a Regular diet Patient is Saline locked. Trop: 0.048. Lactic: 1.9  Plan: Louise nebs, Z-Pack, Prednisone. Cont O2 evelin. Walk and monitor O2 sats in AM. Trop in AM. Monitor HR and SOB.

## 2018-10-05 NOTE — PLAN OF CARE
Problem: Patient Care Overview  Goal: Plan of Care/Patient Progress Review  ROOM # 206-2    Living Situation (if not independent, order SW consult): home  Facility name:  : n/a    Activity level at baseline: ind  Activity level on admit: SBA      Patient registered to observation; given Patient Bill of Rights; given the opportunity to ask questions about observation status and their plan of care.  Patient has been oriented to the observation room, bathroom and call light is in place.    Discussed discharge goals and expectations with patient/family.

## 2018-10-05 NOTE — PHARMACY
Consult re: patient unable to afford inhalers.  Patient has Medicare D through [employer] with fulfilled deductible.  He is currently paying a 25% copay on covered brand name drugs.  Patient currently taking:    Advair ($99/mo)  Cheaper alternatives:  Symbicort ($82/mo) (Discharge Pharmacy could dispense 1 free Symbicort)  Breo ($86/mo)    Spiriva ($100/mo)  Cheaper alternatives:  Incruse ($81/mo)   Ipratropium nebs: $9 for 25 vials  Ipratropium/albuterol nebs: $25 for 60 vials    -EDVIN Santizo, Pharmacy Technician/Liaison, Discharge Pharmacy [phone]

## 2018-10-05 NOTE — PROGRESS NOTES
Respiratory Therapy Note        EKG done and handed to PA.    October 5, 2018 11:21 AM  Roger Cartagena

## 2018-10-05 NOTE — PLAN OF CARE
Problem: Patient Care Overview  Goal: Plan of Care/Patient Progress Review  Outcome: No Change  PRIMARY DIAGNOSIS: COPD Exacerbation  OUTPATIENT/OBSERVATION GOALS TO BE MET BEFORE DISCHARGE:  1. Vital signs stable: Yes ex HTN (PA aware)    2. Improvement of peak flow to greater than 70% sustained off nebulizer for 4 hours: No    3. Dyspnea improved and O2 sats >88% at RA or at prior home O2 therapy level: Yes      SpO2: 94 %, O2 Device: Nasal cannula    4. Short term supplemental O2 needed for use with activity at home: Unsure at this time.     5. Tolerating adequate PO diet and medications: Yes    6. Return to near baseline physical activity: Yes    Discharge Planner Nurse   Safe discharge environment identified: Yes  Barriers to discharge: Yes - SOB.        Entered by: Geovanna Russell 10/05/2018       VSS on RA ex hypertension. PA notified & aware. Per patient, he stopped taking BP meds due to cost. Tele is SR w/ freq PACs and PVCs and PAC couplets. PA aware. Prednisone, zpack, and louise nebs. Patient reports SOB at baseline, but feels it is slightly worse right now. Louise neb given. IS & flutter valve given by RT and encouraged by RN. Moving with SBA. Second trop came back at 0.020.     Please review provider order for any additional goals.   Nurse to notify provider when observation goals have been met and patient is ready for discharge.

## 2018-10-05 NOTE — PLAN OF CARE
Problem: Patient Care Overview  Goal: Plan of Care/Patient Progress Review  Outcome: No Change  Problem: Patient Care Overview  Goal: Plan of Care/Patient Progress Review  Outcome: Improving  PRIMARY DIAGNOSIS: COPD exacerbation   OUTPATIENT/OBSERVATION GOALS TO BE MET BEFORE DISCHARGE:  1. Vital signs stable: Yes     2. Improvement of peak flow to greater than 70% sustained off nebulizer for 4 hours: No     3. Dyspnea improved and O2 sats >88% at RA or at prior home O2 therapy level: Yes      SpO2: 93 %, O2 Device: None (Room air)     4. Short term supplemental O2 needed for use with activity at home: No     5. Tolerating adequate PO diet and medications: Yes     6. Return to near baseline physical activity: Yes     Discharge Planner Nurse   Safe discharge environment identified: Yes  Barriers to discharge: No       Entered by: Beryl Dutta 10/05/2018 4:04 AM  Please review provider order for any additional goals.   Nurse to notify provider when observation goals have been met and patient is ready for discharge.     Patient is Alert and Oriented x4. Vitals are Temp: 97.2  F (36.2  C) Temp src: Oral BP: 151/70 Pulse: 81 Heart Rate: 72 Resp: 18 SpO2: 93 % O2 Device: None (Room air) . HR irregular. BP elevated. Asymptomatic. Denying pain.  SOB with excerption. States SOB is less than admission. Lungs diminished with slight wheezes. They are SBA with Activity.    Pt is a Regular diet Patient is Saline locked. Trop: 0.048. Lactic: 1.9 Tele: SR 80s with PVCs  Plan: Louise nebs, Z-Pack, Prednisone. Cont O2 evelin. Walk and monitor O2 sats in AM. Trop in AM. Monitor HR and SOB.

## 2018-10-05 NOTE — H&P
[facility]  Hospitalist Admission Note  Name: Tone Cooper    MRN: [id number]  YOB: 1950    Age: 67 year old  Date of admission: 10/4/2018  Primary care provider: Ana Pratt    Chief Complaint:  copd    Tone Cooper is a 67 year old male with PMH including atrial fibrillation/flutter, COPD, hypertension who presents with shortness of breath worse over the past two days.  He is not on any meds other than albuterol due to insurance issues/costs. He has been wheezy with productive cough and using his rescue inhaler but not taking his spiriva due to insurance issues.  He picked up some advair just today.    Here in the emergency room chest x-ray was negative for focal infiltrate.  Basic labs were overall unrevealing other than a creatinine of 1.40 and a troponin of 0.048.  EKG showed sinus tachycardia with a rate of 114.  Clinically he was felt to be wheezy and having a COPD exacerbation.  He was given multiple nebulizers, a dose of IV Solu-Medrol 125 mg and 500 mg oral azithromycin.    Assessment and Plan:   1. COPD exacerbation: as above.  O2 sats dropped to 85% on ambulation initially in the ER after some nebs.  It seems he chronically probably has poor control of his COPD due to medication noncompliance which seems to be at least in part an insurance issue and noncompliance with nocturnal oxygen.  Chest x-ray is negative arguing against pneumonia but he has had productive cough so we will continue antibiotics.  --admit to obs status  --prednisone 40 mg daily, likely 5-day total burst or less.  --Given azithromycin 500 mg in the ER, continue 250 mg daily for 4 more days.  --scheduled and prn nebs  --monitor on oxymetry, it is possible he may need home O2 as he was prescribed nocturnal O2 previously but is not using it.   --?home tomorrow if symptoms improved  --quit smoking in 2006    2.   Mildly elevated troponin: Suspect demand ischemia from COPD in the setting of  tachycardia and slightly elevated cr.  Will recheck but otherwise defer major workup to the outpatient setting once his breathing is back to baseline.    3.   History of atrial fibrillation/flutter no longer on anticoagulation: Currently in sinus tachycardia.  Previously had multiple ablations.      4.   COPD: Chronically prescribed Spiriva and has an albuterol rescue inhaler.  Not on spiriva recently due to insurance issues.  He reports he previously was prescribed nocturnal O2 at home but not using due to issues w/ the mask.  Just picked up advair today.    5.   Possible CKD: cr 1.40.  Recheck in clinic.      6.  Financial concerns: has been given some resources through [employer] to get into discount med programs but hasn't completed the paperwork yet.      Code Status: Full Code  Discharge Dispo: Admit to observation status.      History of Present Illness:  Tone Cooper is a 67 year old male with PMH including atrial fibrillation/flutter, COPD, hypertension who presents with shortness of breath.  He notes that he has had coughing bouts lasting up to 10 minutes which is been productive and associated with shortness of breath at least once daily for the past 5 months.  He felt symptoms were worse today so he called his clinic and was directed to the ER.  He does have a standing prescription for amoxicillin which it sounds is for endocarditis prophylaxis prior to dental visits but he also takes that when his plan becomes more yellow and is been using his rescue inhaler frequently.  He denies chest pain and denies prior hospitalization or intubation for COPD.    Here in the emergency room chest x-ray was negative for focal infiltrate.  Basic labs were overall unrevealing other than a creatinine of 1.40 and a troponin of 0.048.  EKG showed sinus tachycardia with a rate of 114.  Clinically he was felt to be wheezy and having a COPD exacerbation.  He was given multiple nebulizers, a dose of IV Solu-Medrol 125 mg and 500  mg oral azithromycin.     Past Medical History:  Past Medical History:   Diagnosis Date     Atrial fibrillation (H)      Atrial flutter (H)      COPD (chronic obstructive pulmonary disease) (H)      Diverticulitis      Hypertension      Persistent atrial fibrillation (H) 4/28/09    ablation 7/1/2015     Premature beats      PVC (premature ventricular contraction)     s/p ablation 12/5/2017     Tricuspid valve disorder     Dr Aj, Hx of valve repair      Ventricular tachycardia (H)     nonsustained     Past Surgical History:  Past Surgical History:   Procedure Laterality Date     ABDOMEN SURGERY      hernia repair right     APPENDECTOMY       CARDIOVERSION  06/03/2009    successful for afib     CARDIOVERSION  4/20/15    successful for afib     CARDIOVERSION  5/28/15     H ABLATION ATRIAL FLUTTER       H ABLATION FOCAL AFIB  7/1/15    Left atrial linear ablation and pulmonary vein antrum ablation     H ABLATION PVC  12/5/16     INCISION AND DRAINAGE LOWER EXTREMITY, COMBINED Right 11/10/2016    Procedure: COMBINED INCISION AND DRAINAGE LOWER EXTREMITY;  Surgeon: Roger Pacheco MD;  Location: [facility] OR     LAPAROSCOPIC CHOLECYSTECTOMY  1/6/2012    Procedure:LAPAROSCOPIC CHOLECYSTECTOMY; LAPAROSCOPIC CHOLECYSTECTOMY ; Surgeon:ROGER PACHECO; Location:[facility] OR     ORTHOPEDIC SURGERY      Left hip replacement     REPAIR VALVE TRICUSPID  9/8/08    conversion to a bileaflet valve and application of a 30 mm Sanderson ring     SMALL INTESTINE SURGERY      had bowel obstruction age 9     VALVE REPLACEMENT      tricuspid     Social History:  Social History   Substance Use Topics     Smoking status: Former Smoker     Quit date: 1/2/2008     Smokeless tobacco: Never Used     Alcohol use 0.0 oz/week     0 Standard drinks or equivalent per week      Comment: monthly: 3-6 approx     Social History     Social History Narrative     Family History:  Family History   Problem Relation Age of Onset     Prostate Cancer Father       "Family History Negative Mother      Emphysema Mother      Hypertension Mother      Cerebrovascular Disease Mother      Allergies:  Allergies   Allergen Reactions     Flecainide Palpitations     Medications:  Prior to Admission Medications   Prescriptions Last Dose Informant Patient Reported? Taking?   HYDROcodone-acetaminophen (NORCO) 5-325 MG per tablet   No No   Sig: Take 1-2 tablets by mouth every 4 hours as needed for other (Moderate to Severe Pain)   Multiple Vitamins-Minerals (DAILY MULTI) TABS   Yes No   Sig: Take by mouth daily   albuterol (VENTOLIN HFA) 108 (90 BASE) MCG/ACT inhaler   Yes No   Sig: Inhale 2 puffs into the lungs every 6 hours PRN   amLODIPine (NORVASC) 10 MG tablet   No No   Sig: Take 1 tablet (10 mg) by mouth daily   fluticasone-salmeterol (ADVAIR DISKUS) 250-50 MCG/DOSE diskus inhaler   Yes No   Sig: Inhale 2 puffs into the lungs 2 times daily    order for DME   No No   Sig: Oxygen for nocturnal use. 1 LPM via nasal cannula  Testing done at home in chronic stable state.  Condition is COPD.  Patient does not have Obstructive Sleep Apnea.  Overnight oximetry revealed 30.3 minutes of hypoxia (<= 88%).  Duration: Lifetime (99 months).   rivaroxaban ANTICOAGULANT (XARELTO) 20 MG TABS tablet   No No   Sig: Take 1 tablet (20 mg) by mouth daily (with dinner)   tiotropium (SPIRIVA) 18 MCG capsule   Yes No   Sig: Inhale 18 mcg into the lungs daily      Facility-Administered Medications: None         Review of Systems:  A Comprehensive greater than 10 system review of systems was carried out.  Pertinent positives and negatives are noted above.  Otherwise negative for contributory information.     Physical Exam:  Blood pressure (!) 122/92, pulse 81, temperature 97.4  F (36.3  C), temperature source Oral, resp. rate 23, height 1.803 m (5' 11\"), weight 78.5 kg (173 lb), SpO2 96 %.  Wt Readings from Last 1 Encounters:   10/04/18 78.5 kg (173 lb)     Exam:  General: Alert, awake, no acute " distress.  HEENT: NC/AT, eyes anicteric, external occular movements intact, face symmetric.  Dentition WNL, MM moist.  Cardiac: RRR, S1, S2.  No murmurs appreciated.  Pulmonary: diminished air movement, diffuse end exp wheezes. Normal chest rise, normal work of breathing.  Lungs CTA BL  Abdomen: soft, non-tender, non-distended.  Bowel Sounds Present.  No guarding.  Extremities: no deformities.  Warm, well perfused.  Skin: no rashes or lesions noted.  Warm and Dry.  Neuro: No focal deficits noted.  Speech clear.  Coordination and strength grossly normal.  Psych: Appropriate affect.    Data:  EKG:  Sinus tachycardia.  Imaging:  Recent Results (from the past 48 hour(s))   XR Chest 2 Views    Narrative    XR CHEST TWO VIEWS   10/4/2018 4:34 PM     INDICATION: Respiratory distress.    COMPARISON: 8/23/2016.      Impression    IMPRESSION: No focal airspace disease or other acute findings.  Emphysema. Sternotomy. Postoperative changes mitral valve.  Normal-sized cardiac silhouette.    PARMJIT DOSS MD     Labs:  No results for input(s): PHV, PO2V, PCO2V, HCO3V in the last 168 hours.    Recent Labs  Lab 10/04/18  1544   WBC 6.9   HGB 17.8*   HCT 54.6*   MCV 91             Lab Results   Component Value Date     10/04/2018     01/23/2017     12/05/2016    Lab Results   Component Value Date    CHLORIDE 102 10/04/2018    CHLORIDE 105 01/23/2017    CHLORIDE 110 12/05/2016    Lab Results   Component Value Date    BUN 23 10/04/2018    BUN 23 01/23/2017    BUN 22 12/05/2016      Lab Results   Component Value Date    POTASSIUM 4.4 10/04/2018    POTASSIUM 4.6 01/23/2017    POTASSIUM 3.7 12/05/2016    Lab Results   Component Value Date    CO2 33 10/04/2018    CO2 29 01/23/2017    CO2 24 12/05/2016    Lab Results   Component Value Date    CR 1.40 10/04/2018    CR 1.09 01/23/2017    CR 1.00 12/05/2016            Wilner Hill MD  Hospitalist  [facility]

## 2018-10-05 NOTE — PHARMACY-ADMISSION MEDICATION HISTORY
Admission medication history interview status for this patient is complete. See [facility] admission navigator for allergy information, prior to admission medications and immunization status.     Medication history interview source(s):Patient  Medication history resources (including written lists, pill bottles, clinic record):Epic    Changes made to PTA medication list:  Added: Amoxicillin prn appt  Deleted:  Norco prn, Xarelto  Changed: Albuterol inh, Advair    Additional medication history information: per pt, has not been taking most of  his meds for ~2 month. [facility] record does include Lisinopril 20mg daily, Amlodipine 10mg daily, Detrol LA 4mg daily, Flomax 0.4mg daily.  MD to address appropriateness for re-start.    Medication reconciliation/reorder completed by provider prior to medication history? No    For patients on insulin therapy: N (Y/N)  Lantus/levemir/NPH/Mix 70/30 dose:   (Y/N) (see Med list for doses)   Sliding scale Novolog Y/N  If Yes, do you have a baseline novolog pre-meal dose:  units with meals  Patients eat three meals a day:   Y/N    How many episodes of hypoglycemia do you have per week: _______  How many missed doses do you have per week: ______  How many times do you check your blood glucose per day: _______  Do you have a Continuous glucose monitor (CGM)   Y/N (remind pt that not approved for hospital use)   Any Barriers to therapy - Be specific :  cost of medications, comfortable with giving injections (if applicable), comfortable and confident with current diabetes regimen: Y/N ______________      Prior to Admission medications    Medication Sig Last Dose Taking? Auth Provider   albuterol (VENTOLIN HFA) 108 (90 BASE) MCG/ACT inhaler Inhale 1-2 puffs into the lungs every 4 hours (while awake) PRN  Yes Reported, Patient   fluticasone-salmeterol (ADVAIR DISKUS) 250-50 MCG/DOSE diskus inhaler Inhale 2 puffs into the lungs 2 times daily   Yes Reported, Patient   tiotropium (SPIRIVA) 18 MCG  capsule Inhale 18 mcg into the lungs daily  Yes Reported, Patient   amLODIPine (NORVASC) 10 MG tablet Take 1 tablet (10 mg) by mouth daily 2 month ago at Unknown time  Ip, Maxwell Friend MD   amoxicillin (AMOXIL) 500 MG capsule Take 2,000 mg by mouth as needed (Dental appt)   Unknown, Entered By History   Multiple Vitamins-Minerals (DAILY MULTI) TABS Take by mouth daily   Reported, Patient   order for DME Oxygen for nocturnal use. 1 LPM via nasal cannula  Testing done at home in chronic stable state.  Condition is COPD.  Patient does not have Obstructive Sleep Apnea.  Overnight oximetry revealed 30.3 minutes of hypoxia (<= 88%).  Duration: Lifetime (99 months).   Tyson Sanders MD

## 2018-10-06 LAB
ANION GAP SERPL CALCULATED.3IONS-SCNC: 4 MMOL/L (ref 3–14)
BUN SERPL-MCNC: 28 MG/DL (ref 7–30)
CALCIUM SERPL-MCNC: 10.2 MG/DL (ref 8.5–10.1)
CHLORIDE SERPL-SCNC: 102 MMOL/L (ref 94–109)
CO2 SERPL-SCNC: 32 MMOL/L (ref 20–32)
CREAT SERPL-MCNC: 1.13 MG/DL (ref 0.66–1.25)
ERYTHROCYTE [DISTWIDTH] IN BLOOD BY AUTOMATED COUNT: 13.7 % (ref 10–15)
GFR SERPL CREATININE-BSD FRML MDRD: 65 ML/MIN/1.7M2
GLUCOSE SERPL-MCNC: 146 MG/DL (ref 70–99)
HCT VFR BLD AUTO: 53.2 % (ref 40–53)
HGB BLD-MCNC: 16.7 G/DL (ref 13.3–17.7)
LACTATE BLD-SCNC: 1.7 MMOL/L (ref 0.7–2)
LACTATE BLD-SCNC: 2.1 MMOL/L (ref 0.7–2)
MCH RBC QN AUTO: 29.1 PG (ref 26.5–33)
MCHC RBC AUTO-ENTMCNC: 31.4 G/DL (ref 31.5–36.5)
MCV RBC AUTO: 93 FL (ref 78–100)
PLATELET # BLD AUTO: 247 10E9/L (ref 150–450)
POTASSIUM SERPL-SCNC: 5.1 MMOL/L (ref 3.4–5.3)
RBC # BLD AUTO: 5.74 10E12/L (ref 4.4–5.9)
SODIUM SERPL-SCNC: 138 MMOL/L (ref 133–144)
WBC # BLD AUTO: 15.6 10E9/L (ref 4–11)

## 2018-10-06 PROCEDURE — 12000000 ZZH R&B MED SURG/OB

## 2018-10-06 PROCEDURE — 25000125 ZZHC RX 250: Performed by: INTERNAL MEDICINE

## 2018-10-06 PROCEDURE — 25000128 H RX IP 250 OP 636: Performed by: INTERNAL MEDICINE

## 2018-10-06 PROCEDURE — 25000132 ZZH RX MED GY IP 250 OP 250 PS 637: Performed by: INTERNAL MEDICINE

## 2018-10-06 PROCEDURE — 93005 ELECTROCARDIOGRAM TRACING: CPT

## 2018-10-06 PROCEDURE — 36415 COLL VENOUS BLD VENIPUNCTURE: CPT | Performed by: INTERNAL MEDICINE

## 2018-10-06 PROCEDURE — 87040 BLOOD CULTURE FOR BACTERIA: CPT | Performed by: INTERNAL MEDICINE

## 2018-10-06 PROCEDURE — 80048 BASIC METABOLIC PNL TOTAL CA: CPT | Performed by: PHYSICIAN ASSISTANT

## 2018-10-06 PROCEDURE — 36415 COLL VENOUS BLD VENIPUNCTURE: CPT | Performed by: PHYSICIAN ASSISTANT

## 2018-10-06 PROCEDURE — 94640 AIRWAY INHALATION TREATMENT: CPT | Mod: 76

## 2018-10-06 PROCEDURE — 40000275 ZZH STATISTIC RCP TIME EA 10 MIN

## 2018-10-06 PROCEDURE — 99233 SBSQ HOSP IP/OBS HIGH 50: CPT | Performed by: INTERNAL MEDICINE

## 2018-10-06 PROCEDURE — 85027 COMPLETE CBC AUTOMATED: CPT | Performed by: PHYSICIAN ASSISTANT

## 2018-10-06 PROCEDURE — 93010 ELECTROCARDIOGRAM REPORT: CPT | Performed by: INTERNAL MEDICINE

## 2018-10-06 PROCEDURE — 94640 AIRWAY INHALATION TREATMENT: CPT

## 2018-10-06 PROCEDURE — 83605 ASSAY OF LACTIC ACID: CPT | Performed by: INTERNAL MEDICINE

## 2018-10-06 PROCEDURE — 25000128 H RX IP 250 OP 636: Performed by: PHYSICIAN ASSISTANT

## 2018-10-06 RX ORDER — METOPROLOL TARTRATE 1 MG/ML
5 INJECTION, SOLUTION INTRAVENOUS
Status: DISCONTINUED | OUTPATIENT
Start: 2018-10-06 | End: 2018-10-07 | Stop reason: HOSPADM

## 2018-10-06 RX ORDER — LORAZEPAM 0.5 MG/1
0.5 TABLET ORAL EVERY 4 HOURS PRN
Status: DISCONTINUED | OUTPATIENT
Start: 2018-10-06 | End: 2018-10-07 | Stop reason: HOSPADM

## 2018-10-06 RX ORDER — TEMAZEPAM 7.5 MG/1
7.5 CAPSULE ORAL
Status: DISCONTINUED | OUTPATIENT
Start: 2018-10-06 | End: 2018-10-07 | Stop reason: HOSPADM

## 2018-10-06 RX ORDER — AMLODIPINE BESYLATE 10 MG/1
10 TABLET ORAL DAILY
Status: DISCONTINUED | OUTPATIENT
Start: 2018-10-06 | End: 2018-10-06

## 2018-10-06 RX ORDER — METHYLPREDNISOLONE SODIUM SUCCINATE 40 MG/ML
40 INJECTION, POWDER, LYOPHILIZED, FOR SOLUTION INTRAMUSCULAR; INTRAVENOUS EVERY 8 HOURS
Status: DISCONTINUED | OUTPATIENT
Start: 2018-10-06 | End: 2018-10-07 | Stop reason: HOSPADM

## 2018-10-06 RX ADMIN — ACETAMINOPHEN 650 MG: 325 TABLET, FILM COATED ORAL at 12:32

## 2018-10-06 RX ADMIN — ENOXAPARIN SODIUM 80 MG: 80 INJECTION SUBCUTANEOUS at 05:12

## 2018-10-06 RX ADMIN — METHYLPREDNISOLONE SODIUM SUCCINATE 40 MG: 40 INJECTION, POWDER, FOR SOLUTION INTRAMUSCULAR; INTRAVENOUS at 23:35

## 2018-10-06 RX ADMIN — TEMAZEPAM 7.5 MG: 7.5 CAPSULE ORAL at 21:20

## 2018-10-06 RX ADMIN — IPRATROPIUM BROMIDE AND ALBUTEROL SULFATE 3 ML: 2.5; .5 SOLUTION RESPIRATORY (INHALATION) at 15:28

## 2018-10-06 RX ADMIN — IPRATROPIUM BROMIDE AND ALBUTEROL SULFATE 3 ML: 2.5; .5 SOLUTION RESPIRATORY (INHALATION) at 19:53

## 2018-10-06 RX ADMIN — IPRATROPIUM BROMIDE AND ALBUTEROL SULFATE 3 ML: 2.5; .5 SOLUTION RESPIRATORY (INHALATION) at 23:42

## 2018-10-06 RX ADMIN — METOPROLOL TARTRATE 5 MG: 5 INJECTION, SOLUTION INTRAVENOUS at 08:37

## 2018-10-06 RX ADMIN — IPRATROPIUM BROMIDE AND ALBUTEROL SULFATE 3 ML: 2.5; .5 SOLUTION RESPIRATORY (INHALATION) at 11:17

## 2018-10-06 RX ADMIN — IPRATROPIUM BROMIDE AND ALBUTEROL SULFATE 3 ML: 2.5; .5 SOLUTION RESPIRATORY (INHALATION) at 21:28

## 2018-10-06 RX ADMIN — UMECLIDINIUM 1 PUFF: 62.5 AEROSOL, POWDER ORAL at 10:49

## 2018-10-06 RX ADMIN — Medication 12.5 MG: at 11:36

## 2018-10-06 RX ADMIN — IPRATROPIUM BROMIDE AND ALBUTEROL SULFATE 3 ML: 2.5; .5 SOLUTION RESPIRATORY (INHALATION) at 06:38

## 2018-10-06 RX ADMIN — METHYLPREDNISOLONE SODIUM SUCCINATE 40 MG: 40 INJECTION, POWDER, FOR SOLUTION INTRAMUSCULAR; INTRAVENOUS at 15:50

## 2018-10-06 RX ADMIN — ACETAMINOPHEN 650 MG: 325 TABLET, FILM COATED ORAL at 17:50

## 2018-10-06 RX ADMIN — ENOXAPARIN SODIUM 80 MG: 80 INJECTION SUBCUTANEOUS at 17:50

## 2018-10-06 RX ADMIN — Medication 12.5 MG: at 19:56

## 2018-10-06 RX ADMIN — AZITHROMYCIN MONOHYDRATE 250 MG: 250 TABLET ORAL at 08:33

## 2018-10-06 RX ADMIN — METHYLPREDNISOLONE SODIUM SUCCINATE 40 MG: 40 INJECTION, POWDER, FOR SOLUTION INTRAMUSCULAR; INTRAVENOUS at 08:34

## 2018-10-06 NOTE — PROGRESS NOTES
Telemetry called and notified this writer that patient has HR of 190.  Went into his room and found him in bathroom, breathless without his O2 on.  He stated that he didn't have time to call because he started to void and then felt that he needed to have a BM.  Had the BM, got O2 extention tubing on him at 4L/NC.  Assisted him into bed and gave him a neb treatment.  After 15 min, did a recheck of his VS.  Assisted him to use pursed lip breathing.  HOB elevated.  Stated he was breathing better.  Sepsis protocol initiated.  VSS at this time.  Recheck in 30 min.  PA notified of patient situation.  Patient was not wheezing as much at bedrest and breathing more relaxed at this time.  Report of events given to oncoming RN.  Will continue to monitor patient.  Telemetry maintained.

## 2018-10-06 NOTE — PLAN OF CARE
"Problem: Patient Care Overview  Goal: Plan of Care/Patient Progress Review  PRIMARY DIAGNOSIS: COPD  OUTPATIENT/OBSERVATION GOALS TO BE MET BEFORE DISCHARGE:  1. Vital signs stable: Yes    2. Improvement of peak flow to greater than 70% sustained off nebulizer for 4 hours: Yes    3. Dyspnea improved and O2 sats >88% at RA or at prior home O2 therapy level: Yes      SpO2: 90 %, O2 Device: Nasal cannula    4. Short term supplemental O2 needed for use with activity at home: Yes    5. Tolerating adequate PO diet and medications: Yes    6. Return to near baseline physical activity: No    Discharge Planner Nurse   Safe discharge environment identified: Yes  Barriers to discharge: Yes       Entered by: Joanne Acharya 10/06/2018 4:03 PM     Please review provider order for any additional goals.   Nurse to notify provider when observation goals have been met and patient is ready for discharge.    Patient is alert and oriented x4. VS WNL and documented on the FS. Lung sounds present with expiratory wheezing throughout all lobes. Patient is getting Solu-Medrol, nebs, and inhalers. Flutter valve along with IS being encouraged. Patient is on 3 liters of O2 with sats >88%. Patient states he does feel SOB at rest and with exertion. Active bowel sounds in all 4 quadrants with LBM at 1530. Patient states that he has a chronic urgency with voiding, but denies pain. Patient rating headache a 6/10 and is being treated with Tylenol. Patient states he has a chronic numbness and tingling in (R) hand pointer finger and thumb. Patient is a SBA when up with extension tubing for O2. Patient is on a regular diet and ate 75%. SL at this time. Patient able to make needs known by using the call light.     BP (!) 163/96 (BP Location: Left arm)  Pulse 85  Temp 96.6  F (35.9  C) (Oral)  Resp 20  Ht 1.803 m (5' 11\")  Wt 78.5 kg (173 lb)  SpO2 90%  BMI 24.13 kg/m2        "

## 2018-10-06 NOTE — PLAN OF CARE
"Problem: Patient Care Overview  Goal: Plan of Care/Patient Progress Review  Outcome: No Change  Problem: Patient Care Overview  Goal: Plan of Care/Patient Progress Review  BP (!) 124/93 (BP Location: Right arm)  Pulse 81  Temp 97.2  F (36.2  C) (Oral)  Resp 16  Ht 1.803 m (5' 11\")  Wt 78.5 kg (173 lb)  SpO2 95%  BMI 24.13 kg/m2     Lungs with expiratory wheezes. Patient denies SOB at rest, admits to ERICKSON. IS encouraged.     telemetry shows sinus rhythm shows with fr eq pac's, pcs, and paroxsymal pst..     Remains on tele.  PA notified. No change in patient presentation/symptoms.               "

## 2018-10-06 NOTE — PLAN OF CARE
Problem: Chronic Obstructive Pulmonary Disease (Adult)  Goal: Signs and Symptoms of Listed Potential Problems Will be Absent, Minimized or Managed (Chronic Obstructive Pulmonary Disease)  Signs and symptoms of listed potential problems will be absent, minimized or managed by discharge/transition of care (reference Chronic Obstructive Pulmonary Disease (Adult) CPG).   Outcome: Improving  VSS on 3L O2 ex HTN (MD aware). Started shift on 4L; attempted to wean - now on 3L. IV solu-medrol, zpack, and jessica nebs continued. Spiriva inhaler started - in lockbox in room. IS bedside and encouraged. Infrequent, nonproductive cough noted. Patient reports SOB and is dyspneic with exertion. Bed alarm on. Moving with SBA due to SOB. IV metoprolol given x 1 for high HR. Started on PO metoprolol as well. Tylenol given x 1 for headache with relief. LS throughout have expiratory wheezing. Blood cultures pending. Tele shows SR/sinus arrhythmia with frequent PACs, HR 58.

## 2018-10-06 NOTE — PLAN OF CARE
Problem: Patient Care Overview  Goal: Plan of Care/Patient Progress Review  Outcome: Improving  PRIMARY DIAGNOSIS: COPD Exacerbation  OUTPATIENT/OBSERVATION GOALS TO BE MET BEFORE DISCHARGE:  1. Vital signs stable: Yes ex HTN (PA aware)      2. Improvement of peak flow to greater than 70% sustained off nebulizer for 4 hours: No      3. Dyspnea improved and O2 sats >88% at RA or at prior home O2 therapy level: Yes      SpO2: 94 %, O2 Device: Nasal cannula      4. Short term supplemental O2 needed for use with activity at home: Unsure at this time.  Patient has had O2 at home before.      5. Tolerating adequate PO diet and medications: Yes      6. Return to near baseline physical activity: Yes      Discharge Planner Nurse   Safe discharge environment identified: Yes  Barriers to discharge: Yes - SOB.        Entered by: Geovanna Russell 10/05/2018         VSS on 1L O2 ex HTN. Patient is more comfortable and feels less SOB with O2 on. IV solu-medrol, zpack, and jessica nebs.  Patient reports ERICKSON.  Has been started on lovenox.  Moving with SBA. Will continue to monitor and assess.       Patient walked in murphy with and without oxygen. Without oxygen, O2 saturation varied from 92 - 88% and HR of 111-114. With 2L oxygen, O2 saturation varied from 91-93% and HR of 67-75. Patient felt very SOB with both walks but recovered quickly once back in bed.      Please review provider order for any additional goals.   Nurse to notify provider when observation goals have been met and patient is ready for discharge.

## 2018-10-06 NOTE — PROGRESS NOTES
[facility]  Hospitalist Progress Note  Nata Mcduffie MD 10/06/18  Text Page  Pager: [phone] (7am-6pm)    Reason for Stay (Diagnosis): COPD exacerbation         Assessment and Plan:      Summary of Stay: Tone Cooper is a 67 year old male with past medical history of atrial fibrillation/flutter s/p multiple ablations and cardioversions, COPD (not on oxygen) and HTN who was admitted on 10/4/2018 with progressive SOB and cough and was found to have acute COPD exacerbation.  Patient continues to have significant respiratory symptoms, also having tachycardia due to frequent PACs.    Problem List/Assessment and Plan:     1.   COPD exacerbation with bronchitis with Hypoxia: Presented with progressive SOB and cough.  Chronically prescribed Spiriva and has an albuterol rescue inhaler.  Not on Spiriva recently due to insurance issues.  He reports he previously was prescribed nocturnal O2 at home but not using due to issues w/ the mask.  CXR showed no infiltrates.  He was started on oral steroids but changed to IV steroids given persistent respiratory symptoms.  He had severe SOB and coughing fit this morning.  Still diffuse wheezing and poor air movement.  Will increase IV steroids today.  -Continue Azithromycin  -Increase Solumedrol from 40 mg IV BID to TID  -Will benefit from nebulizer at home on discharge, can change to Duonebs if cost for Spiriva is an issue.   -Pt states Advair is $55 at his pharmacy so he will be able to   -Wean oxygen as able     2.   Mildly elevated troponin: Suspect demand ischemia from COPD in the setting of tachycardia and slightly elevated creatinine.  Trops is down and he has no CP. He did complain of occasional orthopnea so ECHO performed which shows normal EF and no RWMA. 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  Last Nuc stress test in 10/2016 was read as mildly abnl but seen by Cardiology and felt that this likely an over read and not significant  signs of ischemia. He has no recent angina. He is due to see Dr. Aj of cardiology again, I would recommend follow up with Dr. Aj upon discharge.      3.   History of atrial fibrillation/flutter no longer on anticoagulation - now with PACs: Previously had multiple ablations and was told that he no longer needed OAC.  He had worsening tachycardia last night, particularly bad when respiratory issues worsen.  Having lots of PACs but no AF when looking through telemetry.  Nebs and respiratory issues likely worsening this.  Treatment as above.  Will start oral Metoprolol.  - Telemetry monitoring  - Metoprolol 12.5 mg BID  - IV Metoprolol PRN HR >120      4.   DONG - Resolved: Creatinine was 1.4 on admission, this has normalized to 1.13.     5. Untreated HTN: previously on Lisinopril and then Norvasc but c/o LE swelling. He took himself off meds due to financial constraints. BP suboptimal. Lisinopril was resumed but now that he is having tachycardia with frequent PACs will stop this and start Metoprolol.  - Metoprolol 12.5 mg BID      6.  Financial concerns: has been given some resources through [employer] to get into discount med programs but hasn't completed the paperwork yet.    DVT Prophylaxis: Enoxaparin (Lovenox) SQ  Code Status: Full Code  Discharge Dispo: Home  Estimated Disch Date / # of Days until Disch: Suspect 2 more days to improve respiratory status        Interval History (Subjective):      Patient had an episode of severe SOB this morning.  He had gone to the bathroom without his oxygen and he became extremely SOB, had a coughing fit and his HR was in the 190s.  Nursing found him gasping for breath.  He was placed back on oxygen and given a neb.  He states he is feeling better from a breathing standpoint compared to that episode but still does not feel great.  He did developed tachycardia which is actually PACs and not atrial fibrillation last night, has long history of AF and has had several  "cardioversions and ablations.  Denies CP, nausea or emesis.                  Physical Exam:      Last Vital Signs:  BP (!) 143/95  Pulse 60  Temp 97.6  F (36.4  C) (Oral)  Resp 24  Ht 1.803 m (5' 11\")  Wt 78.5 kg (173 lb)  SpO2 95%  BMI 24.13 kg/m2    General: Alert, awake, no acute distress.  HEENT: Normocephalic and atraumatic, eyes anicteric and without scleral injection, EOMI, face symmetric, MMM.  Cardiac: Irregularly irregular with RVR, normal S1, S2. No m/g/r, no LE edema.  Pulmonary: Normal chest rise, slightly increased work of breathing.  Diffuse expiratory wheezing with overall poor air movement, no crackles  Abdomen: soft, non-tender, non-distended.  Normoactive bowel sounds, no guarding or rebound tenderness.  Extremities: no deformities.  Warm, well perfused.  Skin: no rashes or lesions.  Warm and Dry.  Neuro: No focal deficits.  Speech clear.  Coordination and strength grossly normal.  Psych: Alert and oriented x3. Appropriate affect.         Medications:      All current medications were reviewed with changes reflected in problem list.         Data:      All new lab and imaging data was reviewed.   Labs:    Recent Labs  Lab 10/06/18  0604 10/05/18  0555 10/04/18  1544    136 138   POTASSIUM 5.1 4.3 4.4   CHLORIDE 102 104 102   CO2 32 24 33*   ANIONGAP 4 8 3   * 206* 108*   BUN 28 23 23   CR 1.13 1.12 1.40*   GFRESTIMATED 65 65 51*   GFRESTBLACK 78 79 61   WILBERT 10.2* 9.2 9.7       Recent Labs  Lab 10/06/18  0604 10/04/18  1544   WBC 15.6* 6.9   HGB 16.7 17.8*   HCT 53.2* 54.6*   MCV 93 91    223      Imaging:   No results found for this or any previous visit (from the past 24 hour(s)).    Nata Mcduffie MD.           "

## 2018-10-07 VITALS
HEIGHT: 71 IN | SYSTOLIC BLOOD PRESSURE: 161 MMHG | BODY MASS INDEX: 24.22 KG/M2 | DIASTOLIC BLOOD PRESSURE: 97 MMHG | TEMPERATURE: 96.9 F | WEIGHT: 173 LBS | OXYGEN SATURATION: 96 % | RESPIRATION RATE: 20 BRPM | HEART RATE: 72 BPM

## 2018-10-07 LAB — INTERPRETATION ECG - MUSE: NORMAL

## 2018-10-07 PROCEDURE — 94640 AIRWAY INHALATION TREATMENT: CPT

## 2018-10-07 PROCEDURE — 90662 IIV NO PRSV INCREASED AG IM: CPT | Performed by: INTERNAL MEDICINE

## 2018-10-07 PROCEDURE — 94640 AIRWAY INHALATION TREATMENT: CPT | Mod: 76

## 2018-10-07 PROCEDURE — 25000128 H RX IP 250 OP 636: Performed by: PHYSICIAN ASSISTANT

## 2018-10-07 PROCEDURE — 25000132 ZZH RX MED GY IP 250 OP 250 PS 637: Performed by: INTERNAL MEDICINE

## 2018-10-07 PROCEDURE — 40000275 ZZH STATISTIC RCP TIME EA 10 MIN

## 2018-10-07 PROCEDURE — 99239 HOSP IP/OBS DSCHRG MGMT >30: CPT | Performed by: INTERNAL MEDICINE

## 2018-10-07 PROCEDURE — 25000125 ZZHC RX 250: Performed by: INTERNAL MEDICINE

## 2018-10-07 PROCEDURE — 25000128 H RX IP 250 OP 636: Performed by: INTERNAL MEDICINE

## 2018-10-07 RX ORDER — PREDNISONE 20 MG/1
TABLET ORAL
Qty: 18 TABLET | Refills: 0 | Status: SHIPPED | OUTPATIENT
Start: 2018-10-07 | End: 2018-11-23

## 2018-10-07 RX ORDER — METOPROLOL TARTRATE 25 MG/1
25 TABLET, FILM COATED ORAL 2 TIMES DAILY
Status: DISCONTINUED | OUTPATIENT
Start: 2018-10-07 | End: 2018-10-07 | Stop reason: HOSPADM

## 2018-10-07 RX ORDER — TIOTROPIUM BROMIDE 18 UG/1
18 CAPSULE ORAL; RESPIRATORY (INHALATION) DAILY
Qty: 30 CAPSULE | Refills: 0 | Status: ON HOLD | OUTPATIENT
Start: 2018-10-07 | End: 2019-01-08

## 2018-10-07 RX ORDER — AZITHROMYCIN 250 MG/1
250 TABLET, FILM COATED ORAL DAILY
Qty: 1 TABLET | Refills: 0 | Status: SHIPPED | OUTPATIENT
Start: 2018-10-08 | End: 2018-11-05

## 2018-10-07 RX ORDER — IPRATROPIUM BROMIDE AND ALBUTEROL SULFATE 2.5; .5 MG/3ML; MG/3ML
3 SOLUTION RESPIRATORY (INHALATION)
Qty: 360 ML | Refills: 0 | Status: ON HOLD | OUTPATIENT
Start: 2018-10-07 | End: 2018-11-26

## 2018-10-07 RX ORDER — METOPROLOL TARTRATE 25 MG/1
12.5 TABLET, FILM COATED ORAL 2 TIMES DAILY
Qty: 30 TABLET | Refills: 0 | Status: SHIPPED | OUTPATIENT
Start: 2018-10-07 | End: 2018-11-05

## 2018-10-07 RX ORDER — ALBUTEROL SULFATE 90 UG/1
1-2 AEROSOL, METERED RESPIRATORY (INHALATION)
Qty: 2 INHALER | Refills: 0 | Status: SHIPPED | OUTPATIENT
Start: 2018-10-07 | End: 2019-06-28

## 2018-10-07 RX ADMIN — METOPROLOL TARTRATE 25 MG: 25 TABLET ORAL at 08:30

## 2018-10-07 RX ADMIN — METHYLPREDNISOLONE SODIUM SUCCINATE 40 MG: 40 INJECTION, POWDER, FOR SOLUTION INTRAMUSCULAR; INTRAVENOUS at 08:29

## 2018-10-07 RX ADMIN — AZITHROMYCIN MONOHYDRATE 250 MG: 250 TABLET ORAL at 08:30

## 2018-10-07 RX ADMIN — INFLUENZA A VIRUS A/MICHIGAN/45/2015 X-275 (H1N1) ANTIGEN (FORMALDEHYDE INACTIVATED), INFLUENZA A VIRUS A/SINGAPORE/INFIMH-16-0019/2016 IVR-186 (H3N2) ANTIGEN (FORMALDEHYDE INACTIVATED), AND INFLUENZA B VIRUS B/MARYLAND/15/2016 BX-69A (A B/COLORADO/6/2017-LIKE VIRUS) ANTIGEN (FORMALDEHYDE INACTIVATED) 0.5 ML: 60; 60; 60 INJECTION, SUSPENSION INTRAMUSCULAR at 13:21

## 2018-10-07 RX ADMIN — IPRATROPIUM BROMIDE AND ALBUTEROL SULFATE 3 ML: 2.5; .5 SOLUTION RESPIRATORY (INHALATION) at 11:27

## 2018-10-07 RX ADMIN — IPRATROPIUM BROMIDE AND ALBUTEROL SULFATE 3 ML: 2.5; .5 SOLUTION RESPIRATORY (INHALATION) at 07:22

## 2018-10-07 RX ADMIN — ENOXAPARIN SODIUM 80 MG: 80 INJECTION SUBCUTANEOUS at 05:41

## 2018-10-07 NOTE — PLAN OF CARE
Problem: Chronic Obstructive Pulmonary Disease (Adult)  Goal: Signs and Symptoms of Listed Potential Problems Will be Absent, Minimized or Managed (Chronic Obstructive Pulmonary Disease)  Signs and symptoms of listed potential problems will be absent, minimized or managed by discharge/transition of care (reference Chronic Obstructive Pulmonary Disease (Adult) CPG).   Outcome: Adequate for Discharge Date Met: 10/07/18  Patient's After Visit Summary was reviewed with patient.  Patient verbalized understanding of After Visit Summary, recommended follow up and was given an opportunity to ask questions.   Discharge medications sent home with patient/family: YES, albuterol inhaler x 2, neb machine, duoneb solution x 2, metoprolol, azithromycin, spiriva, and prednisone.   Discharged with self.    Extensive teaching done surrounding oxygen safety and when to seek help (diaphoresis, SOB that does not resolve quickly, air hunger, etc.). Patient discharged pain free, in stable condition. Wife will meet patient at home.     OBSERVATION patient END time: 2:04 PM

## 2018-10-07 NOTE — PROGRESS NOTES
Progress Note    Patient requires supplemental oxygen due to hypoxia related to COPD.  He will require 3L at rest and with activity continuously.    Nata Mcduffie MD

## 2018-10-07 NOTE — PROGRESS NOTES
Patient has been assessed for Home Oxygen needs. Oxygen readings:    *Pulse oximetry (SpO2) = 83% on room air at rest while awake.    *SpO2 improved to 94% on 3 liters/minute at rest.    *SpO2 = 88% on room air during activity/with exercise.    *SpO2 improved to 94% on 3 liters/minute during activity/with exercise.    Patient felt increased SOB with activity without oxygen. Increased wheezing noted during walk.

## 2018-10-07 NOTE — PLAN OF CARE
Problem: Patient Care Overview  Goal: Plan of Care/Patient Progress Review  Outcome: Improving  PRIMARY DIAGNOSIS: COPD  OUTPATIENT/OBSERVATION GOALS TO BE MET BEFORE DISCHARGE:  1. Vital signs stable: Yes      2. Improvement of peak flow to greater than 70% sustained off nebulizer for 4 hours: Yes      3. Dyspnea improved and O2 sats >88% at RA or at prior home O2 therapy level: Yes      SpO2: 90 %, O2 Device: Nasal cannula 3 Liters       4. Short term supplemental O2 needed for use with activity at home: Yes      5. Tolerating adequate PO diet and medications: Yes      6. Return to near baseline physical activity: No      Discharge Planner Nurse   Safe discharge environment identified: Yes  Barriers to discharge: Yes       Entered by: Joanne Acharya 10/06/2018 4:03 PM  Please review provider order for any additional goals.   Nurse to notify provider when observation goals have been met and patient is ready for discharge.      Patient is alert and oriented x4. VS WNL and documented on the FS. Lung sounds present with expiratory wheezing throughout all lobes. Patient is getting Solu-Medrol, Umeclidinium inhaler, DuoNebs. Flutter valve along with IS being encouraged. Patient is on 3 liters of O2 with sats >88%. Patient states he does feel SOB at rest and with exertion. Active bowel sounds in all 4 quadrants with LBM at 1530. Patient states that he has a chronic urgency with voiding, but denies pain. Patient rating headache a 6/10 and is being treated with Tylenol. Patient states he has a chronic numbness and tingling in (R) hand pointer finger and thumb. Patient is a SBA when up with extension tubing for O2. Patient is on a regular diet and tolerating. SL at this time. Patient able to make needs known by using the call light. Patient takes Restoril at bedtime to help with sleep. Tele in place and patient is Sinus Arhythmia with PVC's HR 88.      /84 (BP Location: Left arm)  Pulse 72  Temp 96  F (35.6  C) (Oral)   "Resp 20  Ht 1.803 m (5' 11\")  Wt 78.5 kg (173 lb)  SpO2 98%  BMI 24.13 kg/m2                                 "

## 2018-10-07 NOTE — PLAN OF CARE
Problem: Patient Care Overview  Goal: Plan of Care/Patient Progress Review  Outcome: Improving  VSS on 3L O2 ex HTN (MD aware). Denies pain. IV solu-medrol, zpack, and jessica nebs continued. IS bedside and encouraged. Infrequent, nonproductive cough noted. Patient reports SOB and is dyspneic with exertion. Moving with SBA due to SOB. Caling appropriately. Walked in halls with and without oxygen; see separate note. LS throughout have expiratory wheezing. Tele shows SR w/ PACs and PVCs. Likely discharge this afternoon with home O2.

## 2018-10-07 NOTE — DISCHARGE SUMMARY
[facility]  Discharge Summary  Name: Tone Cooper    MRN: [id number]  YOB: 1950    Age: 67 year old  Date of Discharge:  10/7/2018  Date of Admission: 10/4/2018  Primary Care Provider: Ana Pratt  Discharge Physician:  Nata Mcduffie MD  Discharging Service:  Hospitalist      Discharge Diagnoses:  1. Acute COPD Exacerbation with Bronchitis and Hypoxia  2. Mild Troponin Elevation  3. History of Atrial Fibrillation/Flutter now with PACs  4. DONG  5. Untreated HTN  6. Financial Concerns     Hospital Course:  Tone Cooper is a 67 year old male with past medical history of atrial fibrillation/flutter s/p multiple ablations and cardioversions, COPD (not on oxygen currently but had been prescribed nocturnal oxygen) and HTN (currently untreated) who was admitted on 10/4/2018 with progressive SOB and cough and was found to have acute COPD exacerbation.  Patient also had tachycardia with PACs.      1.   Acute COPD exacerbation with bronchitis with Hypoxia: Presented with progressive SOB and cough.  Chronically prescribed Spiriva, Advair and has an albuterol rescue inhaler.  Not on inhalers recently due to insurance issues.  He reports he previously was prescribed nocturnal O2 at home but not using due to issues w/ the mask.  CXR showed no infiltrates.  He was started on oral steroids but changed to IV steroids given persistent respiratory symptoms. He is feeling much better today.  Still with wheezing but better than admission.  He will discharge with Prednisone taper, complete Azithromycin.  He was also prescribed a nebulizer machine and his inhalers were all refilled prior to discharge.  He will require supplemental oxygen at discharge.      2.   Mildly elevated troponin: Suspect demand ischemia from COPD in the setting of tachycardia and slightly elevated creatinine.  Trops is down and he has no CP. He did complain of occasional orthopnea so ECHO performed which shows normal EF and no  "RWMA. 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  Last Nuc stress test in 10/2016 was read as mildly abnl but seen by Cardiology and felt that this likely an over read and not significant signs of ischemia. He has no recent angina. He is due to see Dr. Aj of cardiology again, I would recommend follow up with Dr. Aj upon discharge.       3.   History of atrial fibrillation/flutter no longer on anticoagulation - now with PACs: Previously had multiple ablations and was told that he no longer needed OAC.  He had worsening tachycardia, particularly bad when respiratory issues worsen.  Having lots of PACs but no AF when looking through telemetry.  Nebs and respiratory issues likely worsening this.  He was started on Metoprolol 12.5 mg BID and will continue this on discharge.      4.   DONG - Resolved: Creatinine was 1.4 on admission, this has normalized to 1.13.      5. Untreated HTN: previously on Lisinopril and then Norvasc but c/o LE swelling. He took himself off meds due to financial constraints. BP suboptimal. Lisinopril was resumed but as he developed tachycardia with frequent PACs this was changed to Metoprolol.  He has follow up with PCP on 10/11 and will have blood pressure check at that time.      6.  Financial concerns: has been given some resources through [employer] to get into [employer] med programs but hasn't completed the paperwork yet.  His medications were filled prior to discharge.    # Discharge Pain Plan:   - Patient currently has NO PAIN and is not being prescribed pain medications on discharge.         Discharge Disposition:  Discharged to home     Allergies:  Allergies   Allergen Reactions     Flecainide Palpitations        Condition on Discharge:  Discharge condition: Stable   Discharge vitals: Blood pressure (!) 156/103, pulse 72, temperature 96.6  F (35.9  C), temperature source Oral, resp. rate 20, height 1.803 m (5' 11\"), weight 78.5 kg (173 lb), SpO2 97 %.   Code status on " "discharge: Full Code     History of Illness:  See detailed admission note for full details.    Physical Exam:  Blood pressure (!) 156/103, pulse 72, temperature 96.6  F (35.9  C), temperature source Oral, resp. rate 20, height 1.803 m (5' 11\"), weight 78.5 kg (173 lb), SpO2 97 %.  Wt Readings from Last 1 Encounters:   10/04/18 78.5 kg (173 lb)     General: Alert, awake, no acute distress.  HEENT: Normocephalic and atraumatic, eyes anicteric and without scleral injection, EOMI, face symmetric, MMM.  Cardiac: PACs at times but normal rhythm, normal S1, S2. No m/g/r, no LE edema.  Pulmonary: Normal chest rise, normal work of breathing.  Moving air bilaterally, faint diffuse expiratory wheezing, no crackles.  Able to ambulate the entire murphy (with oxygen) and no increased SOB  Abdomen: soft, non-tender, non-distended.  Normoactive bowel sounds, no guarding or rebound tenderness.  Extremities: no deformities.  Warm, well perfused.  Skin: no rashes or lesions.  Warm and Dry.  Neuro: No focal deficits.  Speech clear.  Coordination and strength grossly normal.  Psych: Alert and oriented x3. Appropriate affect.    Procedures other than Imaging:  Nebulizers  TTE  Telemetry     Imaging:  Results for orders placed or performed during the hospital encounter of 10/04/18   XR Chest 2 Views    Narrative    XR CHEST TWO VIEWS   10/4/2018 4:34 PM     INDICATION: Respiratory distress.    COMPARISON: 8/23/2016.      Impression    IMPRESSION: No focal airspace disease or other acute findings.  Emphysema. Sternotomy. Postoperative changes mitral valve.  Normal-sized cardiac silhouette.    PARMJIT DOSS MD        Consultations:  Consultations This Hospital Stay   PHARMACY LIAISON FOR MEDICATION COVERAGE CONSULT  CARE COORDINATOR IP CONSULT     Recent Lab Results:    Recent Labs  Lab 10/06/18  0604 10/04/18  1544   WBC 15.6* 6.9   HGB 16.7 17.8*   HCT 53.2* 54.6*   MCV 93 91    223       Recent Labs  Lab 10/06/18  0604 " 10/05/18  0555 10/04/18  1544    136 138   POTASSIUM 5.1 4.3 4.4   CHLORIDE 102 104 102   CO2 32 24 33*   ANIONGAP 4 8 3   * 206* 108*   BUN 28 23 23   CR 1.13 1.12 1.40*   GFRESTIMATED 65 65 51*   GFRESTBLACK 78 79 61   WILBERT 10.2* 9.2 9.7          Pending Results:    Unresulted Labs Ordered in the Past 30 Days of this Admission     Date and Time Order Name Status Description    10/6/2018 0759 Blood culture Preliminary     10/6/2018 0759 Blood culture Preliminary            Discharge Instructions and Follow-Up:   Discharge Orders     Reason for your hospital stay   You were hospitalized for a COPD exacerbation.     Follow-up and recommended labs and tests    Follow up with primary care provider, Ana Pratt, within 7 days to evaluate medication change and for hospital follow- up.  No follow up labs or test are needed.     Activity   Your activity upon discharge: activity as tolerated     Full Code     Oxygen Adult   [facility] Oxygen Order 3 liter(s) by nasal cannula continuously with use of portable tank. Expected treatment length is 2 months.. Test on conserving device as applicable.    Patients who qualify for home O2 coverage under the CMS guidelines require ABG tests or O2 sat readings obtained closest to, but no earlier than 2 days prior to the discharge, as evidence of the need for home oxygen therapy. Testing must be performed while patient is in the chronic stable state. See notes for O2 sats.    I certify that this patient, Tone Cooper has been under my care and that I, or a nurse practitioner or physician's assistant working with me, had a face-to-face encounter that meets the face-to-face encounter requirements with this patient on 10/7/2018. The patient, Tone Cooper was evaluated or treated in whole, or in part, for the following medical condition, which necessitates the use of the ordered oxygen. Treatment Diagnosis: COPD    Attending Provider: Nata Mcduffie  Physician  signature: See electronic signature associated with these discharge orders  Date of Order: October 7, 2018     Diet   Follow this diet upon discharge: Orders Placed This Encounter     Regular Diet Adult       Discharge Medications   Current Discharge Medication List      START taking these medications    Details   azithromycin (ZITHROMAX) 250 MG tablet Take 1 tablet (250 mg) by mouth daily  Qty: 1 tablet, Refills: 0    Associated Diagnoses: COPD exacerbation (H)      ipratropium - albuterol 0.5 mg/2.5 mg/3 mL (DUONEB) 0.5-2.5 (3) MG/3ML neb solution Take 1 vial (3 mLs) by nebulization every 2 hours as needed for wheezing  Qty: 360 mL, Refills: 0    Associated Diagnoses: COPD exacerbation (H)      metoprolol tartrate (LOPRESSOR) 25 MG tablet Take 0.5 tablets (12.5 mg) by mouth 2 times daily  Qty: 30 tablet, Refills: 0    Associated Diagnoses: Benign essential hypertension      !! order for DME Equipment being ordered: Nebulizer  Qty: 1 each, Refills: 0    Associated Diagnoses: COPD exacerbation (H)      predniSONE (DELTASONE) 20 MG tablet Take 60 mg daily x3 days then 40 mg daily x3 days then 20 mg daily x3 days then stop  Qty: 18 tablet, Refills: 0    Associated Diagnoses: COPD exacerbation (H)       !! - Potential duplicate medications found. Please discuss with provider.      CONTINUE these medications which have CHANGED    Details   albuterol (VENTOLIN HFA) 108 (90 Base) MCG/ACT inhaler Inhale 1-2 puffs into the lungs every 4 hours (while awake) PRN  Qty: 2 Inhaler, Refills: 0    Comments: Please fill Albuterol and not Ventolin as Albuterol works much better than Ventolin  Associated Diagnoses: COPD exacerbation (H)      fluticasone-salmeterol (ADVAIR DISKUS) 250-50 MCG/DOSE diskus inhaler Inhale 1 puff into the lungs 2 times daily  Qty: 1 Inhaler, Refills: 0    Associated Diagnoses: COPD exacerbation (H)      tiotropium (SPIRIVA) 18 MCG capsule Inhale 1 capsule (18 mcg) into the lungs daily  Qty: 30 capsule,  Refills: 0    Associated Diagnoses: COPD exacerbation (H)         CONTINUE these medications which have NOT CHANGED    Details   amoxicillin (AMOXIL) 500 MG capsule Take 2,000 mg by mouth as needed (Dental appt)      Multiple Vitamins-Minerals (DAILY MULTI) TABS Take by mouth daily      !! order for DME Oxygen for nocturnal use. 1 LPM via nasal cannula  Testing done at home in chronic stable state.  Condition is COPD.  Patient does not have Obstructive Sleep Apnea.  Overnight oximetry revealed 30.3 minutes of hypoxia (<= 88%).  Duration: Lifetime (99 months).  Qty: 1 each, Refills: 99    Associated Diagnoses: Nocturnal hypoxia       !! - Potential duplicate medications found. Please discuss with provider.      STOP taking these medications       amLODIPine (NORVASC) 10 MG tablet Comments:   Reason for Stopping:               Time Spent on this Encounter   I, Nata Mcduffie, personally saw the patient today and spent greater than 30 minutes discharging this patient.    Nata Mcduffie MD

## 2018-10-07 NOTE — PROGRESS NOTES
Problem: Patient Care Overview  Goal: Plan of Care/Patient Progress Review  PRIMARY DIAGNOSIS: COPD  OUTPATIENT/OBSERVATION GOALS TO BE MET BEFORE DISCHARGE:  1. Vital signs stable: Yes     2. Improvement of peak flow to greater than 70% sustained off nebulizer for 4 hours: Yes     3. Dyspnea improved and O2 sats >88% at RA or at prior home O2 therapy level: Yes      SpO2: 90 %, O2 Device: Nasal cannula 3 Liters      4. Short term supplemental O2 needed for use with activity at home: Yes     5. Tolerating adequate PO diet and medications: Yes     6. Return to near baseline physical activity: No     Discharge Planner Nurse   Safe discharge environment identified: Yes  Barriers to discharge: Yes       Entered by: Joanne Acharya 10/06/2018 4:03 PM  Please review provider order for any additional goals.   Nurse to notify provider when observation goals have been met and patient is ready for discharge.     Patient is alert and oriented x4. VS WNL and documented on the FS. Lung sounds present with expiratory wheezing throughout all lobes. Patient is getting Solu-Medrol, Umeclidinium inhaler, DuoNebs. Flutter valve along with IS being encouraged. Patient is on 3 liters of O2 with sats >88%. Patient states he does feel SOB at rest and with exertion. Active bowel sounds in all 4 quadrants with LBM at 1530. Patient states that he has a chronic urgency with voiding, but denies pain. Patient rating headache a 6/10 and is being treated with Tylenol. Patient states he has a chronic numbness and tingling in (R) hand pointer finger and thumb. Patient is a SBA when up with extension tubing for O2. Patient is on a regular diet and tolerating. SL at this time. Patient able to make needs known by using the call light. Patient takes Restoril at bedtime to help with sleep. Tele in place and patient is Sinus Arhythmia with PVC's HR 88.     BP (!) 151/99 (BP Location: Left arm)  Pulse 85  Temp 96.3  F (35.7  C) (Oral)  Resp 20  Ht 1.803 m  "(5' 11\")  Wt 78.5 kg (173 lb)  SpO2 96%  BMI 24.13 kg/m2    "

## 2018-10-09 LAB — INTERPRETATION ECG - MUSE: NORMAL

## 2018-10-12 LAB
BACTERIA SPEC CULT: NO GROWTH
BACTERIA SPEC CULT: NO GROWTH
Lab: NORMAL
Lab: NORMAL
SPECIMEN SOURCE: NORMAL
SPECIMEN SOURCE: NORMAL

## 2018-11-05 ENCOUNTER — OFFICE VISIT (OUTPATIENT)
Dept: CARDIOLOGY | Facility: CLINIC | Age: 68
End: 2018-11-05
Payer: COMMERCIAL

## 2018-11-05 VITALS
SYSTOLIC BLOOD PRESSURE: 108 MMHG | HEART RATE: 75 BPM | BODY MASS INDEX: 25.34 KG/M2 | WEIGHT: 181 LBS | DIASTOLIC BLOOD PRESSURE: 80 MMHG | OXYGEN SATURATION: 94 % | HEIGHT: 71 IN

## 2018-11-05 DIAGNOSIS — I48.0 PAROXYSMAL ATRIAL FIBRILLATION (H): ICD-10-CM

## 2018-11-05 DIAGNOSIS — I48.0 PAROXYSMAL ATRIAL FIBRILLATION (H): Primary | ICD-10-CM

## 2018-11-05 PROCEDURE — 99214 OFFICE O/P EST MOD 30 MIN: CPT | Performed by: INTERNAL MEDICINE

## 2018-11-05 PROCEDURE — 93000 ELECTROCARDIOGRAM COMPLETE: CPT | Performed by: INTERNAL MEDICINE

## 2018-11-05 RX ORDER — DILTIAZEM HYDROCHLORIDE 180 MG/1
180 CAPSULE, COATED, EXTENDED RELEASE ORAL DAILY
Qty: 90 CAPSULE | Refills: 3 | Status: ON HOLD | OUTPATIENT
Start: 2018-11-05 | End: 2018-11-26

## 2018-11-05 RX ORDER — OXYCODONE AND ACETAMINOPHEN 5; 325 MG/1; MG/1
1 TABLET ORAL EVERY 6 HOURS PRN
Status: ON HOLD | COMMUNITY
End: 2019-01-08

## 2018-11-05 RX ORDER — PROPAFENONE HYDROCHLORIDE 150 MG/1
150 TABLET, COATED ORAL 3 TIMES DAILY
Qty: 270 TABLET | Refills: 3 | Status: SHIPPED | OUTPATIENT
Start: 2018-11-05 | End: 2019-01-18

## 2018-11-05 RX ORDER — DILTIAZEM HYDROCHLORIDE 180 MG/1
180 CAPSULE, COATED, EXTENDED RELEASE ORAL DAILY
Qty: 30 CAPSULE | Refills: 3 | Status: SHIPPED | OUTPATIENT
Start: 2018-11-05 | End: 2018-11-05

## 2018-11-05 RX ORDER — PROPAFENONE HYDROCHLORIDE 150 MG/1
150 TABLET, COATED ORAL 3 TIMES DAILY
Qty: 90 TABLET | Refills: 3 | Status: SHIPPED | OUTPATIENT
Start: 2018-11-05 | End: 2018-11-05

## 2018-11-05 NOTE — LETTER
11/5/2018    Ana Pratt MD  [facility] [id number] [facility] [address]    RE: Tone Ruizgray       Dear Colleague,    I had the pleasure of seeing Tone Cooper in the [facility].    Service Date: 11/05/2018      HISTORY OF PRESENT ILLNESS:  I saw Mr. Cooper for followup of atrial fibrillation.  He is a 67-year-old white male with a history of tricuspid repair and COPD.  He presented with recurrent persistent atrial fibrillation with rapid ventricular rate and underwent an ablation on 07/01/2015.  He did very well without recurrent atrial fibrillation afterwards.  He did have symptomatic PVCs and underwent 2 attempted ablations with successful elimination of the PVCs.        The patient was recently admitted to [facility] for shortness of breath.  He was diagnosed to have COPD exacerbation.  He was found to be back in atrial fibrillation with rapid ventricular rate.  Symptomatically, he does not feel palpitations but whenever he is exerting himself, he is apparently out of breath.      PHYSICAL EXAMINATION:   VITAL SIGNS:  Blood pressure was 108/80, heart rate 75 beats per minute, body weight 181 pounds.   HEENT:  Eyes and ENT were unremarkable.   LUNGS:  Clear.   CARDIAC:  Rhythm was irregularly irregular.  The heart sounds were normal without murmur.   ABDOMEN:  Examination showed no hepatomegaly.   EXTREMITIES:  There was no pedal edema.      EKG showed atypical atrial flutter with variable AV conduction.      ASSESSMENT AND RECOMMENDATIONS:  Mr. Cooper is in atypical atrial flutter.  Recent Holter showed intermittent sinus rhythm.  He previously had a skin rash on flecainide.  I have started him on propafenone 150 mg p.o. t.i.d.  He will resume Xarelto 20 mg once a day.  I have switched his metoprolol to diltiazem 180 mg once a day with a plan to see him again in December.  At that time, we will discuss if he should stay on propafenone for  long-term or consider repeat ablation.      cc:      Ana Pratt MD    [id number] [place] [address]         NORIS MARTINEZ MD             D: 2018   T: 2018   MT: SARANYA      Name:     FELIPE AYOUB   MRN:      [id number]        Account:      [id number]   :      1950           Service Date: 2018      Document: [id number]        HPI and Plan:   See dictation    Orders Placed This Encounter   Procedures     Follow-Up with Electrophysiologist     EKG 12-lead complete w/read - Clinics (performed today)     EKG 12-lead complete w/read (Future)- to be scheduled       Orders Placed This Encounter   Medications     TRAZODONE HCL PO     Sig: Take 25 mg by mouth 2 times daily     oxyCODONE-acetaminophen (PERCOCET) 5-325 MG per tablet     Sig: Take 1 tablet by mouth every 6 hours as needed for severe pain     rivaroxaban ANTICOAGULANT (XARELTO) 20 MG TABS tablet     Sig: Take 1 tablet (20 mg) by mouth daily (with dinner)     Dispense:  90 tablet     Refill:  3     diltiazem (CARDIZEM CD) 180 MG 24 hr capsule     Sig: Take 1 capsule (180 mg) by mouth daily     Dispense:  30 capsule     Refill:  3     propafenone (RYTHMOL) 150 MG TABS tablet     Sig: Take 1 tablet (150 mg) by mouth 3 times daily     Dispense:  90 tablet     Refill:  3       Medications Discontinued During This Encounter   Medication Reason     azithromycin (ZITHROMAX) 250 MG tablet Medication Reconciliation Clean Up     Multiple Vitamins-Minerals (DAILY MULTI) TABS Medication Reconciliation Clean Up     metoprolol tartrate (LOPRESSOR) 25 MG tablet          Encounter Diagnoses   Name Primary?     Atrial fibrillation (H) Yes     Paroxysmal atrial fibrillation (H)        CURRENT MEDICATIONS:  Current Outpatient Prescriptions   Medication Sig Dispense Refill     albuterol (VENTOLIN HFA) 108 (90 Base) MCG/ACT inhaler Inhale 1-2 puffs into the lungs every 4 hours (while awake) PRN 2 Inhaler 0     amoxicillin  (AMOXIL) 500 MG capsule Take 2,000 mg by mouth as needed (Dental appt)       diltiazem (CARDIZEM CD) 180 MG 24 hr capsule Take 1 capsule (180 mg) by mouth daily 30 capsule 3     fluticasone-salmeterol (ADVAIR DISKUS) 250-50 MCG/DOSE diskus inhaler Inhale 1 puff into the lungs 2 times daily 1 Inhaler 0     ipratropium - albuterol 0.5 mg/2.5 mg/3 mL (DUONEB) 0.5-2.5 (3) MG/3ML neb solution Take 1 vial (3 mLs) by nebulization every 2 hours as needed for wheezing 360 mL 0     order for DME Equipment being ordered: Nebulizer 1 each 0     order for DME Oxygen for nocturnal use. 1 LPM via nasal cannula  Testing done at home in chronic stable state.  Condition is COPD.  Patient does not have Obstructive Sleep Apnea.  Overnight oximetry revealed 30.3 minutes of hypoxia (<= 88%).  Duration: Lifetime (99 months). 1 each 99     oxyCODONE-acetaminophen (PERCOCET) 5-325 MG per tablet Take 1 tablet by mouth every 6 hours as needed for severe pain       predniSONE (DELTASONE) 20 MG tablet Take 60 mg daily x3 days then 40 mg daily x3 days then 20 mg daily x3 days then stop 18 tablet 0     propafenone (RYTHMOL) 150 MG TABS tablet Take 1 tablet (150 mg) by mouth 3 times daily 90 tablet 3     rivaroxaban ANTICOAGULANT (XARELTO) 20 MG TABS tablet Take 1 tablet (20 mg) by mouth daily (with dinner) 90 tablet 3     tiotropium (SPIRIVA) 18 MCG capsule Inhale 1 capsule (18 mcg) into the lungs daily 30 capsule 0     TRAZODONE HCL PO Take 25 mg by mouth 2 times daily         ALLERGIES     Allergies   Allergen Reactions     Flecainide Palpitations       PAST MEDICAL HISTORY:  Past Medical History:   Diagnosis Date     Atrial flutter (H)      COPD (chronic obstructive pulmonary disease) (H)      Diverticulitis      Hypertension      Persistent atrial fibrillation (H) 4/28/09    ablation 7/1/2015     Premature beats      PVC (premature ventricular contraction)     s/p ablation 12/5/2017     Tricuspid valve disorder     Dr Aj, Hx of valve repair       Ventricular tachycardia (H)     nonsustained       PAST SURGICAL HISTORY:  Past Surgical History:   Procedure Laterality Date     ABDOMEN SURGERY      hernia repair right     APPENDECTOMY       CARDIOVERSION  06/03/2009    successful for afib     CARDIOVERSION  4/20/15    successful for afib     CARDIOVERSION  5/28/15     H ABLATION ATRIAL FLUTTER       H ABLATION FOCAL AFIB  7/1/15    Left atrial linear ablation and pulmonary vein antrum ablation     H ABLATION PVC  12/5/16     INCISION AND DRAINAGE LOWER EXTREMITY, COMBINED Right 11/10/2016    Procedure: COMBINED INCISION AND DRAINAGE LOWER EXTREMITY;  Surgeon: Roger Pacheco MD;  Location: [facility] OR     LAPAROSCOPIC CHOLECYSTECTOMY  1/6/2012    Procedure:LAPAROSCOPIC CHOLECYSTECTOMY; LAPAROSCOPIC CHOLECYSTECTOMY ; Surgeon:ROGER PACHECO; Location:[facility] OR     ORTHOPEDIC SURGERY      Left hip replacement     REPAIR VALVE TRICUSPID  9/8/08    conversion to a bileaflet valve and application of a 30 mm Sanderson ring     SMALL INTESTINE SURGERY      had bowel obstruction age 9     VALVE REPLACEMENT      tricuspid       FAMILY HISTORY:  Family History   Problem Relation Age of Onset     Prostate Cancer Father      Family History Negative Mother      Emphysema Mother      Hypertension Mother      Cerebrovascular Disease Mother        SOCIAL HISTORY:  Social History     Social History     Marital status:      Spouse name: N/A     Number of children: N/A     Years of education: N/A     Social History Main Topics     Smoking status: Former Smoker     Quit date: 1/2/2008     Smokeless tobacco: Never Used     Alcohol use 0.0 oz/week     0 Standard drinks or equivalent per week      Comment: 3-6 per month     Drug use: No     Sexual activity: No     Other Topics Concern     Caffeine Concern No     1 a day      Sleep Concern Yes     nocturia     Weight Concern No     Special Diet No     Exercise No     Bike Helmet Yes     Seat Belt Yes     Parent/Sibling  "W/ Cabg, Mi Or Angioplasty Before 65f 55m? No     Social History Narrative       Review of Systems:  Skin:  Negative itching skin cancer removed recently   Eyes:  Positive for glasses    ENT:  Negative      Respiratory:  Positive for dyspnea on exertion;wheezing all the time, COPD   Cardiovascular:  Negative dizziness;lightheadedness;chest pain;Positive for chest pain on right side of chest  Gastroenterology: Negative melena;hematochezia    Genitourinary:  Negative nocturia    Musculoskeletal:  Positive for joint pain;arthritis L elbow  Neurologic:  Positive for headaches in the am  Psychiatric:  Positive for sleep disturbances sleeps poorly  Heme/Lymph/Imm:  Positive for allergies    Endocrine:  Negative diabetes      Physical Exam:  Vitals: /80  Pulse 75  Ht 1.803 m (5' 11\")  Wt 82.1 kg (181 lb)  SpO2 94%  BMI 25.24 kg/m2    Constitutional:  cooperative, alert and oriented, well developed, well nourished, in no acute distress        Skin:  warm and dry to the touch, no apparent skin lesions or masses noted          Head:  normocephalic, no masses or lesions        Eyes:  no xanthalasma;EOMS intact;sclera white;conjunctivae and lids unremarkable        Lymph:      ENT:  no pallor or cyanosis, dentition good        Neck:  JVP normal;no carotid bruit        Respiratory:  clear to auscultation diminished breath sounds bilaterally       Cardiac: normal S1 and S2;regular rhythm;no murmurs, gallops or rubs detected occasional premature beats              pulses full and equal                               right femoral bruit (-) left subclavian bruit (-)      GI:  abdomen soft        Extremities and Muscular Skeletal:  no deformities, clubbing, cyanosis, erythema observed;no edema              Neurological:           Psych:           CC  No referring provider defined for this encounter.                Thank you for allowing me to participate in the care of your patient.      Sincerely,     Sussy Britt MD "     [facility]    cc:   No referring provider defined for this encounter.

## 2018-11-05 NOTE — MR AVS SNAPSHOT
After Visit Summary   11/5/2018    Tone Cooper    MRN: [id number]           Patient Information     Date Of Birth          1950        Visit Information        Provider Department      11/5/2018 4:15 PM Sussy Britt MD [facility]        Today's Diagnoses     Paroxysmal atrial fibrillation (H)    -  1       Follow-ups after your visit        Additional Services     Follow-Up with Electrophysiologist                 Your next 10 appointments already scheduled     Nov 05, 2018  4:15 PM CST   UMP EP RETURN with Sussy Britt MD   [facility] ([facility])    [address] [address]   [phone] OPT 2            Dec 03, 2018 10:15 AM CST   UMP EP RETURN with Sussy Britt MD   [facility] ([facility])    [address] [address]   [phone] OPT 2              Future tests that were ordered for you today     Open Future Orders        Priority Expected Expires Ordered    EKG 12-lead complete w/read (Future)- to be scheduled Routine 12/5/2018 11/5/2019 11/5/2018    Follow-Up with Electrophysiologist Routine 12/4/2018 11/5/2019 11/5/2018            Who to contact     If you have questions or need follow up information about today's clinic visit or your schedule please contact [facility] directly at [phone].  Normal or non-critical lab and imaging results will be communicated to you by MyChart, letter or phone within 4 business days after the clinic has received the results. If you do not hear from us within 7 days, please contact the clinic through MyChart or phone. If you have a critical or abnormal lab result, we will notify you by phone as soon as possible.  Submit refill requests through [employer] or call your pharmacy and they will forward the refill request to us. Please  "allow 3 business days for your refill to be completed.          Additional Information About Your Visit        MyChart Information     [employer] lets you send messages to your doctor, view your test results, renew your prescriptions, schedule appointments and more. To sign up, go to [email or web address] . Click on \"Log in\" on the left side of the screen, which will take you to the Welcome page. Then click on \"Sign up Now\" on the right side of the page.     You will be asked to enter the access code listed below, as well as some personal information. Please follow the directions to create your username and password.     Your access code is: [email or web address]  Expires: 2019 11:46 AM     Your access code will  in 90 days. If you need help or a new code, please call your [facility] clinic or [phone].        Care EveryWhere ID     This is your Care EveryWhere ID. This could be used by other organizations to access your [facility] medical records  [id number]        Your Vitals Were     Pulse Height Pulse Oximetry BMI (Body Mass Index)          75 1.803 m (5' 11\") 94% 25.24 kg/m2         Blood Pressure from Last 3 Encounters:   18 108/80   10/07/18 (!) 161/97   17 128/80    Weight from Last 3 Encounters:   18 82.1 kg (181 lb)   10/04/18 78.5 kg (173 lb)   17 83.3 kg (183 lb 9.6 oz)              We Performed the Following     EKG 12-lead complete w/read - Clinics (performed today)          Today's Medication Changes          These changes are accurate as of 18  3:31 PM.  If you have any questions, ask your nurse or doctor.               Start taking these medicines.        Dose/Directions    diltiazem 180 MG 24 hr capsule   Commonly known as:  CARDIZEM CD   Used for:  Paroxysmal atrial fibrillation (H)   Started by:  Sussy Britt MD        Dose:  180 mg   Take 1 capsule (180 mg) by mouth daily   Quantity:  30 capsule   Refills:  3       propafenone 150 MG Tabs tablet   Commonly known as: "  RYTHMOL   Used for:  Paroxysmal atrial fibrillation (H)   Started by:  Sussy Britt MD        Dose:  150 mg   Take 1 tablet (150 mg) by mouth 3 times daily   Quantity:  90 tablet   Refills:  3       rivaroxaban ANTICOAGULANT 20 MG Tabs tablet   Commonly known as:  XARELTO   Used for:  Paroxysmal atrial fibrillation (H)   Started by:  Sussy Britt MD        Dose:  20 mg   Take 1 tablet (20 mg) by mouth daily (with dinner)   Quantity:  90 tablet   Refills:  3         Stop taking these medicines if you haven't already. Please contact your care team if you have questions.     metoprolol tartrate 25 MG tablet   Commonly known as:  LOPRESSOR   Stopped by:  Sussy Britt MD                Where to get your medicines      These medications were sent to [facility] [address] [facility]  [address] [facility] [address]     Phone:  [phone]     diltiazem 180 MG 24 hr capsule    propafenone 150 MG Tabs tablet    rivaroxaban ANTICOAGULANT 20 MG Tabs tablet               Information about OPIOIDS     PRESCRIPTION OPIOIDS: WHAT YOU NEED TO KNOW   We gave you an opioid (narcotic) pain medicine. It is important to manage your pain, but opioids are not always the best choice. You should first try all the other options your care team gave you. Take this medicine for as short a time (and as few doses) as possible.    Some activities can increase your pain, such as bandage changes or therapy sessions. It may help to take your pain medicine 30 to 60 minutes before these activities. Reduce your stress by getting enough sleep, working on hobbies you enjoy and practicing relaxation or meditation. Talk to your care team about ways to manage your pain beyond prescription opioids.    These medicines have risks:    DO NOT drive when on new or higher doses of pain medicine. These medicines can affect your alertness and reaction times, and you could be arrested for driving under the influence  (DUI). If you need to use opioids long-term, talk to your care team about driving.    DO NOT operate heavy machinery    DO NOT do any other dangerous activities while taking these medicines.    DO NOT drink any alcohol while taking these medicines.     If the opioid prescribed includes acetaminophen, DO NOT take with any other medicines that contain acetaminophen. Read all labels carefully. Look for the word  acetaminophen  or  Tylenol.  Ask your pharmacist if you have questions or are unsure.    You can get addicted to pain medicines, especially if you have a history of addiction (chemical, alcohol or substance dependence). Talk to your care team about ways to reduce this risk.    All opioids tend to cause constipation. Drink plenty of water and eat foods that have a lot of fiber, such as fruits, vegetables, prune juice, apple juice and high-fiber cereal. Take a laxative (Miralax, milk of magnesia, Colace, Senna) if you don t move your bowels at least every other day. Other side effects include upset stomach, sleepiness, dizziness, throwing up, tolerance (needing more of the medicine to have the same effect), physical dependence and slowed breathing.    Store your pills in a secure place, locked if possible. We will not replace any lost or stolen medicine. If you don t finish your medicine, please throw away (dispose) as directed by your pharmacist. The [place] Pollution Control Agency has more information about safe disposal: [email or web address]         Primary Care Provider Office Phone # Fax #    Ana Pratt [phone]       [facility] [address] [facility] [address]        Equal Access to Services     [facility]: Hadii cecy calvo hadindrao Sonando, waaxda luqadaha, qaybta kaalmacarmine ayon . So [facility] [phone].    ATENCIÓN: Si habla español, tiene a kirk disposición servicios  katia de asistencia lingüística. Susan lopez [phone].    We comply with applicable federal civil rights laws and [place] laws. We do not discriminate on the basis of race, color, national origin, age, disability, sex, sexual orientation, or gender identity.            Thank you!     Thank you for choosing [facility]  for your care. Our goal is always to provide you with excellent care. Hearing back from our patients is one way we can continue to improve our services. Please take a few minutes to complete the written survey that you may receive in the mail after your visit with us. Thank you!             Your Updated Medication List - Protect others around you: Learn how to safely use, store and throw away your medicines at www.disposemymeds.org.          This list is accurate as of 11/5/18  3:31 PM.  Always use your most recent med list.                   Brand Name Dispense Instructions for use Diagnosis    albuterol 108 (90 Base) MCG/ACT inhaler    VENTOLIN HFA    2 Inhaler    Inhale 1-2 puffs into the lungs every 4 hours (while awake) PRN    COPD exacerbation (H)       amoxicillin 500 MG capsule    AMOXIL     Take 2,000 mg by mouth as needed (Dental appt)        diltiazem 180 MG 24 hr capsule    CARDIZEM CD    30 capsule    Take 1 capsule (180 mg) by mouth daily    Paroxysmal atrial fibrillation (H)       fluticasone-salmeterol 250-50 MCG/DOSE diskus inhaler    ADVAIR DISKUS    1 Inhaler    Inhale 1 puff into the lungs 2 times daily    COPD exacerbation (H)       ipratropium - albuterol 0.5 mg/2.5 mg/3 mL 0.5-2.5 (3) MG/3ML neb solution    DUONEB    360 mL    Take 1 vial (3 mLs) by nebulization every 2 hours as needed for wheezing    COPD exacerbation (H)       order for DME     1 each    Oxygen for nocturnal use. 1 LPM via nasal cannula Testing done at home in chronic stable state. Condition is COPD.  Patient does not have Obstructive Sleep Apnea. Overnight oximetry  revealed 30.3 minutes of hypoxia (<= 88%). Duration: Lifetime (99 months).    Nocturnal hypoxia       order for DME     1 each    Equipment being ordered: Nebulizer    COPD exacerbation (H)       oxyCODONE-acetaminophen 5-325 MG per tablet    PERCOCET     Take 1 tablet by mouth every 6 hours as needed for severe pain        predniSONE 20 MG tablet    DELTASONE    18 tablet    Take 60 mg daily x3 days then 40 mg daily x3 days then 20 mg daily x3 days then stop    COPD exacerbation (H)       propafenone 150 MG Tabs tablet    RYTHMOL    90 tablet    Take 1 tablet (150 mg) by mouth 3 times daily    Paroxysmal atrial fibrillation (H)       rivaroxaban ANTICOAGULANT 20 MG Tabs tablet    XARELTO    90 tablet    Take 1 tablet (20 mg) by mouth daily (with dinner)    Paroxysmal atrial fibrillation (H)       tiotropium 18 MCG capsule    SPIRIVA    30 capsule    Inhale 1 capsule (18 mcg) into the lungs daily    COPD exacerbation (H)       TRAZODONE HCL PO      Take 25 mg by mouth 2 times daily

## 2018-11-05 NOTE — PROGRESS NOTES
HPI and Plan:   See dictation    Orders Placed This Encounter   Procedures     Follow-Up with Electrophysiologist     EKG 12-lead complete w/read - Clinics (performed today)     EKG 12-lead complete w/read (Future)- to be scheduled       Orders Placed This Encounter   Medications     TRAZODONE HCL PO     Sig: Take 25 mg by mouth 2 times daily     oxyCODONE-acetaminophen (PERCOCET) 5-325 MG per tablet     Sig: Take 1 tablet by mouth every 6 hours as needed for severe pain     rivaroxaban ANTICOAGULANT (XARELTO) 20 MG TABS tablet     Sig: Take 1 tablet (20 mg) by mouth daily (with dinner)     Dispense:  90 tablet     Refill:  3     diltiazem (CARDIZEM CD) 180 MG 24 hr capsule     Sig: Take 1 capsule (180 mg) by mouth daily     Dispense:  30 capsule     Refill:  3     propafenone (RYTHMOL) 150 MG TABS tablet     Sig: Take 1 tablet (150 mg) by mouth 3 times daily     Dispense:  90 tablet     Refill:  3       Medications Discontinued During This Encounter   Medication Reason     azithromycin (ZITHROMAX) 250 MG tablet Medication Reconciliation Clean Up     Multiple Vitamins-Minerals (DAILY MULTI) TABS Medication Reconciliation Clean Up     metoprolol tartrate (LOPRESSOR) 25 MG tablet          Encounter Diagnoses   Name Primary?     Atrial fibrillation (H) Yes     Paroxysmal atrial fibrillation (H)        CURRENT MEDICATIONS:  Current Outpatient Prescriptions   Medication Sig Dispense Refill     albuterol (VENTOLIN HFA) 108 (90 Base) MCG/ACT inhaler Inhale 1-2 puffs into the lungs every 4 hours (while awake) PRN 2 Inhaler 0     amoxicillin (AMOXIL) 500 MG capsule Take 2,000 mg by mouth as needed (Dental appt)       diltiazem (CARDIZEM CD) 180 MG 24 hr capsule Take 1 capsule (180 mg) by mouth daily 30 capsule 3     fluticasone-salmeterol (ADVAIR DISKUS) 250-50 MCG/DOSE diskus inhaler Inhale 1 puff into the lungs 2 times daily 1 Inhaler 0     ipratropium - albuterol 0.5 mg/2.5 mg/3 mL (DUONEB) 0.5-2.5 (3) MG/3ML neb solution  Take 1 vial (3 mLs) by nebulization every 2 hours as needed for wheezing 360 mL 0     order for DME Equipment being ordered: Nebulizer 1 each 0     order for DME Oxygen for nocturnal use. 1 LPM via nasal cannula  Testing done at home in chronic stable state.  Condition is COPD.  Patient does not have Obstructive Sleep Apnea.  Overnight oximetry revealed 30.3 minutes of hypoxia (<= 88%).  Duration: Lifetime (99 months). 1 each 99     oxyCODONE-acetaminophen (PERCOCET) 5-325 MG per tablet Take 1 tablet by mouth every 6 hours as needed for severe pain       predniSONE (DELTASONE) 20 MG tablet Take 60 mg daily x3 days then 40 mg daily x3 days then 20 mg daily x3 days then stop 18 tablet 0     propafenone (RYTHMOL) 150 MG TABS tablet Take 1 tablet (150 mg) by mouth 3 times daily 90 tablet 3     rivaroxaban ANTICOAGULANT (XARELTO) 20 MG TABS tablet Take 1 tablet (20 mg) by mouth daily (with dinner) 90 tablet 3     tiotropium (SPIRIVA) 18 MCG capsule Inhale 1 capsule (18 mcg) into the lungs daily 30 capsule 0     TRAZODONE HCL PO Take 25 mg by mouth 2 times daily         ALLERGIES     Allergies   Allergen Reactions     Flecainide Palpitations       PAST MEDICAL HISTORY:  Past Medical History:   Diagnosis Date     Atrial flutter (H)      COPD (chronic obstructive pulmonary disease) (H)      Diverticulitis      Hypertension      Persistent atrial fibrillation (H) 4/28/09    ablation 7/1/2015     Premature beats      PVC (premature ventricular contraction)     s/p ablation 12/5/2017     Tricuspid valve disorder     Dr Aj, Hx of valve repair      Ventricular tachycardia (H)     nonsustained       PAST SURGICAL HISTORY:  Past Surgical History:   Procedure Laterality Date     ABDOMEN SURGERY      hernia repair right     APPENDECTOMY       CARDIOVERSION  06/03/2009    successful for afib     CARDIOVERSION  4/20/15    successful for afib     CARDIOVERSION  5/28/15     H ABLATION ATRIAL FLUTTER       H ABLATION FOCAL AFIB  7/1/15     Left atrial linear ablation and pulmonary vein antrum ablation     H ABLATION PVC  12/5/16     INCISION AND DRAINAGE LOWER EXTREMITY, COMBINED Right 11/10/2016    Procedure: COMBINED INCISION AND DRAINAGE LOWER EXTREMITY;  Surgeon: Roger Pacheco MD;  Location: [facility] OR     LAPAROSCOPIC CHOLECYSTECTOMY  1/6/2012    Procedure:LAPAROSCOPIC CHOLECYSTECTOMY; LAPAROSCOPIC CHOLECYSTECTOMY ; Surgeon:ROGER PACHECO; Location:[facility] OR     ORTHOPEDIC SURGERY      Left hip replacement     REPAIR VALVE TRICUSPID  9/8/08    conversion to a bileaflet valve and application of a 30 mm Sanderson ring     SMALL INTESTINE SURGERY      had bowel obstruction age 9     VALVE REPLACEMENT      tricuspid       FAMILY HISTORY:  Family History   Problem Relation Age of Onset     Prostate Cancer Father      Family History Negative Mother      Emphysema Mother      Hypertension Mother      Cerebrovascular Disease Mother        SOCIAL HISTORY:  Social History     Social History     Marital status:      Spouse name: N/A     Number of children: N/A     Years of education: N/A     Social History Main Topics     Smoking status: Former Smoker     Quit date: 1/2/2008     Smokeless tobacco: Never Used     Alcohol use 0.0 oz/week     0 Standard drinks or equivalent per week      Comment: 3-6 per month     Drug use: No     Sexual activity: No     Other Topics Concern     Caffeine Concern No     1 a day      Sleep Concern Yes     nocturia     Weight Concern No     Special Diet No     Exercise No     Bike Helmet Yes     Seat Belt Yes     Parent/Sibling W/ Cabg, Mi Or Angioplasty Before 65f 55m? No     Social History Narrative       Review of Systems:  Skin:  Negative itching skin cancer removed recently   Eyes:  Positive for glasses    ENT:  Negative      Respiratory:  Positive for dyspnea on exertion;wheezing all the time, COPD   Cardiovascular:  Negative dizziness;lightheadedness;chest pain;Positive for chest pain on right side of  "chest  Gastroenterology: Negative melena;hematochezia    Genitourinary:  Negative nocturia    Musculoskeletal:  Positive for joint pain;arthritis L elbow  Neurologic:  Positive for headaches in the am  Psychiatric:  Positive for sleep disturbances sleeps poorly  Heme/Lymph/Imm:  Positive for allergies    Endocrine:  Negative diabetes      Physical Exam:  Vitals: /80  Pulse 75  Ht 1.803 m (5' 11\")  Wt 82.1 kg (181 lb)  SpO2 94%  BMI 25.24 kg/m2    Constitutional:  cooperative, alert and oriented, well developed, well nourished, in no acute distress        Skin:  warm and dry to the touch, no apparent skin lesions or masses noted          Head:  normocephalic, no masses or lesions        Eyes:  no xanthalasma;EOMS intact;sclera white;conjunctivae and lids unremarkable        Lymph:      ENT:  no pallor or cyanosis, dentition good        Neck:  JVP normal;no carotid bruit        Respiratory:  clear to auscultation diminished breath sounds bilaterally       Cardiac: normal S1 and S2;regular rhythm;no murmurs, gallops or rubs detected occasional premature beats              pulses full and equal                               right femoral bruit (-) left subclavian bruit (-)      GI:  abdomen soft        Extremities and Muscular Skeletal:  no deformities, clubbing, cyanosis, erythema observed;no edema              Neurological:           Psych:           CC  No referring provider defined for this encounter.              "

## 2018-11-05 NOTE — PROGRESS NOTES
Service Date: 11/05/2018      HISTORY OF PRESENT ILLNESS:  I saw Mr. Ayoub for followup of atrial fibrillation.  He is a 67-year-old white male with a history of tricuspid repair and COPD.  He presented with recurrent persistent atrial fibrillation with rapid ventricular rate and underwent an ablation on 07/01/2015.  He did very well without recurrent atrial fibrillation afterwards.  He did have symptomatic PVCs and underwent 2 attempted ablations with successful elimination of the PVCs.        The patient was recently admitted to [facility] for shortness of breath.  He was diagnosed to have COPD exacerbation.  He was found to be back in atrial fibrillation with rapid ventricular rate.  Symptomatically, he does not feel palpitations but whenever he is exerting himself, he is apparently out of breath.      PHYSICAL EXAMINATION:   VITAL SIGNS:  Blood pressure was 108/80, heart rate 75 beats per minute, body weight 181 pounds.   HEENT:  Eyes and ENT were unremarkable.   LUNGS:  Clear.   CARDIAC:  Rhythm was irregularly irregular.  The heart sounds were normal without murmur.   ABDOMEN:  Examination showed no hepatomegaly.   EXTREMITIES:  There was no pedal edema.      EKG showed atypical atrial flutter with variable AV conduction.      ASSESSMENT AND RECOMMENDATIONS:  Mr. Ayoub is in atypical atrial flutter.  Recent Holter showed intermittent sinus rhythm.  He previously had a skin rash on flecainide.  I have started him on propafenone 150 mg p.o. t.i.d.  He will resume Xarelto 20 mg once a day.  I have switched his metoprolol to diltiazem 180 mg once a day with a plan to see him again in December.  At that time, we will discuss if he should stay on propafenone for long-term or consider repeat ablation.      cc:      Ana Pratt MD    [facility]   [address] [place] [address]         NORIS MARTINEZ MD             D: 11/05/2018   T: 11/05/2018   MT: SARANYA      Name:     FELIPE AYOUB    MRN:      [id number]        Account:      [id number]   :      1950           Service Date: 2018      Document: [id number]

## 2018-11-05 NOTE — LETTER
11/5/2018      Ana Pratt MD  [employer] [id number] [facility] [address]      RE: Tone Ruizgray       Dear Colleague,    I had the pleasure of seeing Tone Cooper in the [facility].    Service Date: 11/05/2018      HISTORY OF PRESENT ILLNESS:  I saw Mr. Cooper for followup of atrial fibrillation.  He is a 67-year-old white male with a history of tricuspid repair and COPD.  He presented with recurrent persistent atrial fibrillation with rapid ventricular rate and underwent an ablation on 07/01/2015.  He did very well without recurrent atrial fibrillation afterwards.  He did have symptomatic PVCs and underwent 2 attempted ablations with successful elimination of the PVCs.        The patient was recently admitted to [facility] for shortness of breath.  He was diagnosed to have COPD exacerbation.  He was found to be back in atrial fibrillation with rapid ventricular rate.  Symptomatically, he does not feel palpitations but whenever he is exerting himself, he is apparently out of breath.      PHYSICAL EXAMINATION:   VITAL SIGNS:  Blood pressure was 108/80, heart rate 75 beats per minute, body weight 181 pounds.   HEENT:  Eyes and ENT were unremarkable.   LUNGS:  Clear.   CARDIAC:  Rhythm was irregularly irregular.  The heart sounds were normal without murmur.   ABDOMEN:  Examination showed no hepatomegaly.   EXTREMITIES:  There was no pedal edema.      EKG showed atypical atrial flutter with variable AV conduction.      ASSESSMENT AND RECOMMENDATIONS:  Mr. Cooper is in atypical atrial flutter.  Recent Holter showed intermittent sinus rhythm.  He previously had a skin rash on flecainide.  I have started him on propafenone 150 mg p.o. t.i.d.  He will resume Xarelto 20 mg once a day.  I have switched his metoprolol to diltiazem 180 mg once a day with a plan to see him again in December.  At that time, we will discuss if he should stay on propafenone for  long-term or consider repeat ablation.      cc:      Ana Pratt MD    [address] [address]         NORIS MARTINEZ MD             D: 2018   T: 2018   MT: SARANYA      Name:     FELIPE AYOBU   MRN:      [id number]        Account:      [id number]   :      1950           Service Date: 2018      Document: [id number]           Outpatient Encounter Prescriptions as of 2018   Medication Sig Dispense Refill     albuterol (VENTOLIN HFA) 108 (90 Base) MCG/ACT inhaler Inhale 1-2 puffs into the lungs every 4 hours (while awake) PRN 2 Inhaler 0     amoxicillin (AMOXIL) 500 MG capsule Take 2,000 mg by mouth as needed (Dental appt)       fluticasone-salmeterol (ADVAIR DISKUS) 250-50 MCG/DOSE diskus inhaler Inhale 1 puff into the lungs 2 times daily 1 Inhaler 0     ipratropium - albuterol 0.5 mg/2.5 mg/3 mL (DUONEB) 0.5-2.5 (3) MG/3ML neb solution Take 1 vial (3 mLs) by nebulization every 2 hours as needed for wheezing 360 mL 0     order for DME Equipment being ordered: Nebulizer 1 each 0     order for DME Oxygen for nocturnal use. 1 LPM via nasal cannula  Testing done at home in chronic stable state.  Condition is COPD.  Patient does not have Obstructive Sleep Apnea.  Overnight oximetry revealed 30.3 minutes of hypoxia (<= 88%).  Duration: Lifetime (99 months). 1 each 99     oxyCODONE-acetaminophen (PERCOCET) 5-325 MG per tablet Take 1 tablet by mouth every 6 hours as needed for severe pain       predniSONE (DELTASONE) 20 MG tablet Take 60 mg daily x3 days then 40 mg daily x3 days then 20 mg daily x3 days then stop 18 tablet 0     tiotropium (SPIRIVA) 18 MCG capsule Inhale 1 capsule (18 mcg) into the lungs daily 30 capsule 0     TRAZODONE HCL PO Take 25 mg by mouth 2 times daily       [DISCONTINUED] diltiazem (CARDIZEM CD) 180 MG 24 hr capsule Take 1 capsule (180 mg) by mouth daily 30 capsule 3     [DISCONTINUED] propafenone (RYTHMOL) 150 MG TABS tablet Take  1 tablet (150 mg) by mouth 3 times daily 90 tablet 3     [DISCONTINUED] rivaroxaban ANTICOAGULANT (XARELTO) 20 MG TABS tablet Take 1 tablet (20 mg) by mouth daily (with dinner) 90 tablet 3     [DISCONTINUED] azithromycin (ZITHROMAX) 250 MG tablet Take 1 tablet (250 mg) by mouth daily 1 tablet 0     [DISCONTINUED] metoprolol tartrate (LOPRESSOR) 25 MG tablet Take 0.5 tablets (12.5 mg) by mouth 2 times daily 30 tablet 0     [DISCONTINUED] Multiple Vitamins-Minerals (DAILY MULTI) TABS Take by mouth daily       No facility-administered encounter medications on file as of 11/5/2018.        Again, thank you for allowing me to participate in the care of your patient.      Sincerely,    Sussy Britt MD     [facility]

## 2018-11-07 ENCOUNTER — TELEPHONE (OUTPATIENT)
Dept: CARDIOLOGY | Facility: CLINIC | Age: 68
End: 2018-11-07

## 2018-11-07 DIAGNOSIS — I48.0 PAROXYSMAL ATRIAL FIBRILLATION (H): Primary | ICD-10-CM

## 2018-11-07 NOTE — TELEPHONE ENCOUNTER
Pt called and stated that the cost of Xarelto is too much and he can not afford to stay on and wondered what else he could take.  Explained that the cheapest would be warfarin and pt did not want to do that.  Pt has been on Xarelto in the past, but pt now is living off his social security and can not afford to pay the $200/mo.  Discussed that with Eliquis there is a pt assistance plan that we can try for, but pt needs to have spent 3% of his income before this will kick in.  Pt feels that his inhalers alone will get him over the 3% quickly.  Will discuss with Dr Britt if ok to change to Eliquis 5 mg bid and then will mail a 30 day free card to pt while he is using up his Xarelto and then will cover pt with samples while waiting for PA to be completed. Mell

## 2018-11-09 NOTE — TELEPHONE ENCOUNTER
Per Dr Lorenza robert to change to Eliquis.  New escript has been sent to pharmacy.  30 day free card has been placed in the mail and will have Susy Casas start on the pt assistance paperwork for next year.  Mell

## 2018-11-23 ENCOUNTER — APPOINTMENT (OUTPATIENT)
Dept: GENERAL RADIOLOGY | Facility: CLINIC | Age: 68
DRG: 190 | End: 2018-11-23
Attending: PHYSICIAN ASSISTANT
Payer: COMMERCIAL

## 2018-11-23 ENCOUNTER — HOSPITAL ENCOUNTER (INPATIENT)
Facility: CLINIC | Age: 68
LOS: 3 days | Discharge: HOME | DRG: 190 | End: 2018-11-26
Attending: EMERGENCY MEDICINE | Admitting: INTERNAL MEDICINE
Payer: COMMERCIAL

## 2018-11-23 DIAGNOSIS — J44.1 COPD EXACERBATION (H): ICD-10-CM

## 2018-11-23 DIAGNOSIS — I48.0 PAROXYSMAL ATRIAL FIBRILLATION (H): ICD-10-CM

## 2018-11-23 DIAGNOSIS — J96.22 ACUTE ON CHRONIC RESPIRATORY FAILURE WITH HYPOXIA AND HYPERCAPNIA (H): Primary | ICD-10-CM

## 2018-11-23 DIAGNOSIS — I48.91 ATRIAL FIBRILLATION WITH RAPID VENTRICULAR RESPONSE (H): ICD-10-CM

## 2018-11-23 DIAGNOSIS — J96.21 ACUTE ON CHRONIC RESPIRATORY FAILURE WITH HYPOXIA AND HYPERCAPNIA (H): Primary | ICD-10-CM

## 2018-11-23 PROBLEM — R06.00 DYSPNEA: Status: ACTIVE | Noted: 2018-11-23

## 2018-11-23 LAB
ANION GAP SERPL CALCULATED.3IONS-SCNC: 5 MMOL/L (ref 3–14)
BASOPHILS # BLD AUTO: 0.1 10E9/L (ref 0–0.2)
BASOPHILS NFR BLD AUTO: 1.2 %
BUN SERPL-MCNC: 20 MG/DL (ref 7–30)
CALCIUM SERPL-MCNC: 9.4 MG/DL (ref 8.5–10.1)
CHLORIDE SERPL-SCNC: 103 MMOL/L (ref 94–109)
CO2 SERPL-SCNC: 30 MMOL/L (ref 20–32)
CREAT SERPL-MCNC: 1.28 MG/DL (ref 0.66–1.25)
D DIMER PPP FEU-MCNC: <0.3 UG/ML FEU (ref 0–0.5)
DIFFERENTIAL METHOD BLD: NORMAL
EOSINOPHIL # BLD AUTO: 0.6 10E9/L (ref 0–0.7)
EOSINOPHIL NFR BLD AUTO: 7.9 %
ERYTHROCYTE [DISTWIDTH] IN BLOOD BY AUTOMATED COUNT: 14.1 % (ref 10–15)
GFR SERPL CREATININE-BSD FRML MDRD: 56 ML/MIN/1.7M2
GLUCOSE SERPL-MCNC: 134 MG/DL (ref 70–99)
HCT VFR BLD AUTO: 48.3 % (ref 40–53)
HGB BLD-MCNC: 15.8 G/DL (ref 13.3–17.7)
IMM GRANULOCYTES # BLD: 0 10E9/L (ref 0–0.4)
IMM GRANULOCYTES NFR BLD: 0.6 %
LYMPHOCYTES # BLD AUTO: 1.2 10E9/L (ref 0.8–5.3)
LYMPHOCYTES NFR BLD AUTO: 17.9 %
MCH RBC QN AUTO: 29.1 PG (ref 26.5–33)
MCHC RBC AUTO-ENTMCNC: 32.7 G/DL (ref 31.5–36.5)
MCV RBC AUTO: 89 FL (ref 78–100)
MONOCYTES # BLD AUTO: 0.5 10E9/L (ref 0–1.3)
MONOCYTES NFR BLD AUTO: 7.2 %
NEUTROPHILS # BLD AUTO: 4.5 10E9/L (ref 1.6–8.3)
NEUTROPHILS NFR BLD AUTO: 65.2 %
NRBC # BLD AUTO: 0 10*3/UL
NRBC BLD AUTO-RTO: 0 /100
NT-PROBNP SERPL-MCNC: 433 PG/ML (ref 0–900)
PLATELET # BLD AUTO: 208 10E9/L (ref 150–450)
POTASSIUM SERPL-SCNC: 4.4 MMOL/L (ref 3.4–5.3)
RBC # BLD AUTO: 5.43 10E12/L (ref 4.4–5.9)
SODIUM SERPL-SCNC: 138 MMOL/L (ref 133–144)
TROPONIN I SERPL-MCNC: <0.015 UG/L (ref 0–0.04)
WBC # BLD AUTO: 6.9 10E9/L (ref 4–11)

## 2018-11-23 PROCEDURE — 96374 THER/PROPH/DIAG INJ IV PUSH: CPT

## 2018-11-23 PROCEDURE — 25000125 ZZHC RX 250

## 2018-11-23 PROCEDURE — 85379 FIBRIN DEGRADATION QUANT: CPT | Performed by: INTERNAL MEDICINE

## 2018-11-23 PROCEDURE — 12000007 ZZH R&B INTERMEDIATE

## 2018-11-23 PROCEDURE — 25000132 ZZH RX MED GY IP 250 OP 250 PS 637: Performed by: INTERNAL MEDICINE

## 2018-11-23 PROCEDURE — 25000128 H RX IP 250 OP 636: Performed by: PHYSICIAN ASSISTANT

## 2018-11-23 PROCEDURE — 93005 ELECTROCARDIOGRAM TRACING: CPT

## 2018-11-23 PROCEDURE — 93005 ELECTROCARDIOGRAM TRACING: CPT | Mod: 76

## 2018-11-23 PROCEDURE — 83880 ASSAY OF NATRIURETIC PEPTIDE: CPT | Performed by: INTERNAL MEDICINE

## 2018-11-23 PROCEDURE — 94640 AIRWAY INHALATION TREATMENT: CPT

## 2018-11-23 PROCEDURE — 71046 X-RAY EXAM CHEST 2 VIEWS: CPT

## 2018-11-23 PROCEDURE — 99285 EMERGENCY DEPT VISIT HI MDM: CPT | Mod: 25

## 2018-11-23 PROCEDURE — 40000275 ZZH STATISTIC RCP TIME EA 10 MIN

## 2018-11-23 PROCEDURE — 93010 ELECTROCARDIOGRAM REPORT: CPT | Performed by: INTERNAL MEDICINE

## 2018-11-23 PROCEDURE — 25000125 ZZHC RX 250: Performed by: INTERNAL MEDICINE

## 2018-11-23 PROCEDURE — 84484 ASSAY OF TROPONIN QUANT: CPT | Performed by: PHYSICIAN ASSISTANT

## 2018-11-23 PROCEDURE — 36415 COLL VENOUS BLD VENIPUNCTURE: CPT | Performed by: INTERNAL MEDICINE

## 2018-11-23 PROCEDURE — 25000132 ZZH RX MED GY IP 250 OP 250 PS 637: Performed by: PHYSICIAN ASSISTANT

## 2018-11-23 PROCEDURE — 80048 BASIC METABOLIC PNL TOTAL CA: CPT | Performed by: PHYSICIAN ASSISTANT

## 2018-11-23 PROCEDURE — 94640 AIRWAY INHALATION TREATMENT: CPT | Mod: 76

## 2018-11-23 PROCEDURE — 85025 COMPLETE CBC W/AUTO DIFF WBC: CPT | Performed by: PHYSICIAN ASSISTANT

## 2018-11-23 PROCEDURE — 99223 1ST HOSP IP/OBS HIGH 75: CPT | Performed by: INTERNAL MEDICINE

## 2018-11-23 PROCEDURE — 99223 1ST HOSP IP/OBS HIGH 75: CPT | Mod: AI | Performed by: INTERNAL MEDICINE

## 2018-11-23 RX ORDER — ONDANSETRON 2 MG/ML
4 INJECTION INTRAMUSCULAR; INTRAVENOUS EVERY 6 HOURS PRN
Status: DISCONTINUED | OUTPATIENT
Start: 2018-11-23 | End: 2018-11-26 | Stop reason: HOSPADM

## 2018-11-23 RX ORDER — METHYLPREDNISOLONE SODIUM SUCCINATE 125 MG/2ML
125 INJECTION, POWDER, LYOPHILIZED, FOR SOLUTION INTRAMUSCULAR; INTRAVENOUS ONCE
Status: COMPLETED | OUTPATIENT
Start: 2018-11-23 | End: 2018-11-23

## 2018-11-23 RX ORDER — LIDOCAINE 40 MG/G
CREAM TOPICAL
Status: DISCONTINUED | OUTPATIENT
Start: 2018-11-23 | End: 2018-11-26 | Stop reason: HOSPADM

## 2018-11-23 RX ORDER — DOXYCYCLINE HYCLATE 100 MG
100 TABLET ORAL ONCE
Status: COMPLETED | OUTPATIENT
Start: 2018-11-23 | End: 2018-11-23

## 2018-11-23 RX ORDER — PREDNISONE 20 MG/1
40 TABLET ORAL DAILY
Status: DISCONTINUED | OUTPATIENT
Start: 2018-11-24 | End: 2018-11-24

## 2018-11-23 RX ORDER — DILTIAZEM HYDROCHLORIDE 180 MG/1
360 CAPSULE, COATED, EXTENDED RELEASE ORAL DAILY
Status: DISCONTINUED | OUTPATIENT
Start: 2018-11-23 | End: 2018-11-26 | Stop reason: HOSPADM

## 2018-11-23 RX ORDER — IPRATROPIUM BROMIDE AND ALBUTEROL SULFATE 2.5; .5 MG/3ML; MG/3ML
6 SOLUTION RESPIRATORY (INHALATION) ONCE
Status: COMPLETED | OUTPATIENT
Start: 2018-11-23 | End: 2018-11-23

## 2018-11-23 RX ORDER — IPRATROPIUM BROMIDE AND ALBUTEROL SULFATE 2.5; .5 MG/3ML; MG/3ML
SOLUTION RESPIRATORY (INHALATION)
Status: COMPLETED
Start: 2018-11-23 | End: 2018-11-23

## 2018-11-23 RX ORDER — ONDANSETRON 4 MG/1
4 TABLET, ORALLY DISINTEGRATING ORAL EVERY 6 HOURS PRN
Status: DISCONTINUED | OUTPATIENT
Start: 2018-11-23 | End: 2018-11-26 | Stop reason: HOSPADM

## 2018-11-23 RX ORDER — TRAMADOL HYDROCHLORIDE 50 MG/1
50 TABLET ORAL EVERY 6 HOURS PRN
Status: DISCONTINUED | OUTPATIENT
Start: 2018-11-23 | End: 2018-11-26 | Stop reason: HOSPADM

## 2018-11-23 RX ORDER — LEVALBUTEROL INHALATION SOLUTION 1.25 MG/3ML
1.25 SOLUTION RESPIRATORY (INHALATION) EVERY 4 HOURS PRN
Status: DISCONTINUED | OUTPATIENT
Start: 2018-11-23 | End: 2018-11-26 | Stop reason: HOSPADM

## 2018-11-23 RX ORDER — TRAZODONE HYDROCHLORIDE 50 MG/1
50 TABLET, FILM COATED ORAL AT BEDTIME
COMMUNITY

## 2018-11-23 RX ORDER — PROPAFENONE HYDROCHLORIDE 150 MG/1
150 TABLET, COATED ORAL 3 TIMES DAILY
Status: DISCONTINUED | OUTPATIENT
Start: 2018-11-23 | End: 2018-11-26 | Stop reason: HOSPADM

## 2018-11-23 RX ORDER — NALOXONE HYDROCHLORIDE 0.4 MG/ML
.1-.4 INJECTION, SOLUTION INTRAMUSCULAR; INTRAVENOUS; SUBCUTANEOUS
Status: DISCONTINUED | OUTPATIENT
Start: 2018-11-23 | End: 2018-11-26 | Stop reason: HOSPADM

## 2018-11-23 RX ORDER — IPRATROPIUM BROMIDE AND ALBUTEROL SULFATE 2.5; .5 MG/3ML; MG/3ML
3 SOLUTION RESPIRATORY (INHALATION)
Status: DISCONTINUED | OUTPATIENT
Start: 2018-11-23 | End: 2018-11-25

## 2018-11-23 RX ORDER — ACETAMINOPHEN 325 MG/1
975 TABLET ORAL EVERY 6 HOURS PRN
Status: DISCONTINUED | OUTPATIENT
Start: 2018-11-23 | End: 2018-11-26 | Stop reason: HOSPADM

## 2018-11-23 RX ADMIN — IPRATROPIUM BROMIDE AND ALBUTEROL SULFATE 3 ML: .5; 3 SOLUTION RESPIRATORY (INHALATION) at 19:59

## 2018-11-23 RX ADMIN — PROPAFENONE HYDROCHLORIDE 150 MG: 150 TABLET, COATED ORAL at 21:35

## 2018-11-23 RX ADMIN — PROPAFENONE HYDROCHLORIDE 150 MG: 150 TABLET, COATED ORAL at 15:37

## 2018-11-23 RX ADMIN — ACETAMINOPHEN 975 MG: 325 TABLET, FILM COATED ORAL at 19:46

## 2018-11-23 RX ADMIN — IPRATROPIUM BROMIDE AND ALBUTEROL SULFATE 3 ML: .5; 3 SOLUTION RESPIRATORY (INHALATION) at 15:22

## 2018-11-23 RX ADMIN — DOXYCYCLINE HYCLATE 100 MG: 100 TABLET, FILM COATED ORAL at 12:27

## 2018-11-23 RX ADMIN — IPRATROPIUM BROMIDE AND ALBUTEROL SULFATE 6 ML: .5; 3 SOLUTION RESPIRATORY (INHALATION) at 10:10

## 2018-11-23 RX ADMIN — TRAMADOL HYDROCHLORIDE 50 MG: 50 TABLET, COATED ORAL at 21:35

## 2018-11-23 RX ADMIN — IPRATROPIUM BROMIDE AND ALBUTEROL SULFATE 6 ML: 2.5; .5 SOLUTION RESPIRATORY (INHALATION) at 10:10

## 2018-11-23 RX ADMIN — APIXABAN 5 MG: 5 TABLET, FILM COATED ORAL at 19:46

## 2018-11-23 RX ADMIN — METHYLPREDNISOLONE SODIUM SUCCINATE 125 MG: 125 INJECTION, POWDER, FOR SOLUTION INTRAMUSCULAR; INTRAVENOUS at 10:43

## 2018-11-23 RX ADMIN — DILTIAZEM HYDROCHLORIDE 360 MG: 180 CAPSULE, COATED, EXTENDED RELEASE ORAL at 14:33

## 2018-11-23 ASSESSMENT — ACTIVITIES OF DAILY LIVING (ADL)
ADLS_ACUITY_SCORE: 11
ADLS_ACUITY_SCORE: 11

## 2018-11-23 ASSESSMENT — ENCOUNTER SYMPTOMS
COUGH: 1
SHORTNESS OF BREATH: 1
CHILLS: 0
FEVER: 0

## 2018-11-23 NOTE — ED PROVIDER NOTES
History     Chief Complaint:  Shortness of breath     HPI:   The history is provided by the patient.      Tone Cooper is a 67 year old male on Xarelto with a history of atrial fibrillation, COPD, and hypertension who presents to the emergency department with his wife for evaluation of shortness of breath. The patient reports that he routinely needs to use nebulization for his COPD. However, in the last week he has needed to increase his intake, waking at 0430 in the morning each day for another neb. This morning he needed to do the same, however, this time his shortness of breath was much more severe. His blood pressure was also elevated in the 170's, so with these symptoms he decided to present to the emergency department for evaluation. Here, the patient also reports a very productive cough. No fevers or chills. He denies any history of pneumonia. He has no other concerns.     Of note, the patient was admitted here form 10/4-10/7 with a COPD exacerbation. He did finish his course of Azithromycin following his discharge. He states that his last dose of Xarelto was yesterday. He was prescribed Eliquis but has yet to fill this prescription.       CARDIAC RISK FACTORS:  Sex:    Male   Tobacco:   Positive (former)  Hypertension:   Hypertension   Hyperlipidemia:  Negative   Diabetes:   Negative   Family History:  Negative       Allergies:  Flecainide - palpitations      Medications:    Albuterol inhaler   Xarelto, not yet started Eliquis   Cardizem   Advair diskus   Duoneb   Order for DME x 2   Propafenone   Spiriva   Trazodone      Past Medical History:    Atrial flutter   COPD  Diverticulitis   Hypertension   Persistent atrial fibrillation   PVC  Tricuspid valve disorder  Ventricular tachycardia   Cardiomyopathy   Nocturnal hypoxia     Past Surgical History:    Hernia repair right   Appendectomy   Cardioversion x 3   Ablation atrial flutter x  Ablation focal atrial fibrillation   Ablation PVC  Laparoscopic  "cholecystectomy   Hip replacement left   Repair valve tricuspid, valve replacement   Bowel obstruction surgery in small intestine     Family History:    Father - prostate cancer   Mother - emphysema, hypertension, cerebrovascular disease     Social History:  Presents with wife    Tobacco use: Former smoker, quit 2008  Alcohol use: Rarely   PCP: Ana Pratt    Marital Status:        Review of Systems   Constitutional: Negative for chills and fever.   Respiratory: Positive for cough and shortness of breath.    All other systems reviewed and are negative.    Physical Exam     Patient Vitals for the past 24 hrs:   BP Temp Temp src Heart Rate Resp SpO2 Height Weight   11/23/18 1411 161/83 - - 87 28 92 % 1.803 m (5' 11\") 80.7 kg (178 lb)   11/23/18 1331 - - - 97 - - - -   11/23/18 1330 133/84 - - 101 - - - -   11/23/18 1300 111/82 - - 89 - - - -   11/23/18 1245 127/87 - - 105 18 - - -   11/23/18 1209 - - - 80 17 95 % - -   11/23/18 1200 (!) 132/104 - - 83 - 92 % - -   11/23/18 1145 (!) 125/100 - - 77 - 92 % - -   11/23/18 1104 128/87 - - 102 - - - -   11/23/18 1040 - - - - 20 - - -   11/23/18 1031 - - - 97 - 92 % - -   11/23/18 1030 117/79 - - 98 - - - -   11/23/18 1015 (!) 157/96 - - 97 - 98 % - -   11/23/18 1001 (!) 132/105 97.8  F (36.6  C) Temporal 101 24 95 % - -        Physical Exam  General: Alert, interactive. GCS 15  Head:  Scalp is atraumatic.  Eyes:  EOM intact. The pupils are equal, round, and reactive to light. No scleral icterus.  ENT:                                      Ears:  The external ears are normal.  Nose:  The external nose is normal.  Throat:  The oropharynx is normal. Mucus membranes are moist.                 Neck:  Normal range of motion. There is no rigidity.   CV:  Irregularly irregular. No murmur. 2+ radial pulses  Resp:  Diffuse expiratory wheezing throughout lung fields. Using nebulizer, no acute respiratory distress.   GI:  Abdomen is soft, no distension, no tenderness. "   MS:  Normal range of motion.   Skin:  Warm and dry.   Neuro:  Strength and sensation grossly intact.   Psych:  Awake. Alert.  Appropriate interactions.     Emergency Department Course   ECG (9:58:11):  Rate 101 bpm. GA interval *. QRS duration 106. QT/QTc 338/438. P-R-T axes 75 95 79. Sinus tachycardia with 2nd degree AV block (Mobitz I). Rightward axis. Possible inferior infarct, age undetermined. Abnormal ECG.  Agree with computer interpretation. No significant changes from prior EKG on 11/5/18. Interpreted at 1008 by Chris Martínez MD.     ECG (11:38:17):  Rate 92 bpm. GA interval 176. QRS duration 100. QT/QTc 348/430. P-R-T axes 91 -44 84. Sinus rhythm with premature atrial complexes. Left axis deviation. Pulmonary disease pattern. Inferior infarct, age undetermined. Abnormal ECG. Interpreted at 1138 by Chris Martínez DO.    Imaging:  Radiographic findings were communicated with the patient and family who voiced understanding of the findings.     XR Chest, PA and LAT:  Sternotomy and postoperative changes involving the mitral valve are noted. Hyperinflation both lungs. The lungs are otherwise clear. No pleural effusions. Heart size and pulmonary vascularity are within normal limits. Aortic calcification.    Imaging independently reviewed and agree with radiologist interpretation.      Laboratory:  I personally reviewed the laboratory results with the patient and spouse and answered all related questions prior to admission.     1030: Troponin: <0.015     CBC: WNL (WBC 6.9, HGB 15.8, )   BMP: Glucose 134 (H), Creatinine 1.28 (H), GFR Estimate 56 (L), o/w WNL    Interventions:  1010: Duoneb 6 mL nebulization    1043: Solu-Medrol 125 mg IV   1227: Doxycycline 100 mg PO       Emergency Department Course:  Past medical records, nursing notes, and vitals reviewed.  1012: I performed an exam of the patient and obtained history, as documented above.   EKG was obtained, results above.   IV inserted and  blood samples were collected and sent for laboratory testing, findings above.  The patient was sent for a chest x-ray while in the emergency department, findings above.    1138: Repeat EKG was obtained, results above.    1140: The patient underwent a road test and complained of shortness of breath and dizziness. His oxygen saturations were 89-91%.    1209: I spoke to Dr. Masters of the hospitalist service who accepts the patient for admission.     1212: I rechecked the patient and explained the findings.    1308: I spoke to Dr. Masters regarding the patient.    Findings and plan explained to the patient who consents to admission.     Discussed the patient with Dr. Masters, who will admit the patient to an observation bed for further monitoring, evaluation, and treatment.     Impression & Plan      Medical Decision Making:  Tone Cooper is a 67 year old male with medical history including atrial fibrillation on Xarelto and COPD who presents for evaluation of shortness of breath and wheezing.  Signs and symptoms are consistent with COPD exacerbation.  A broad differential was considered including foreign body, asthma, reactive airway disease, pneumothorax, cardiac equivalent/ACS, viral induced wheezing, allergic phenomena, pneumonia, etc.  Patient feels improved after interventions here in ED but continues to have hypoxia with ambulation. Given this, I will hospitalize for further intervention.      There are no signs at this point of any other serious etiologies including those mentioned above especially acute coronary syndrome. I doubt this is ACS given the classic story of COPD exacerbation given by the patient, the marked wheezing without rales and a nonspecific EKG.  No signs of pneumonia on chest x-ray. Given history of increased sputum production will initiate antibiotic, first dose of Doxycycline given here. Discussed patient with Dr. Masters, who graciously accepts care of the patient. All questions/concerns  addressed prior to admit.      Diagnosis:    ICD-10-CM    1. COPD exacerbation (H) J44.1        Disposition:  Admitted to observation in the care of Dr. Guerrero Purdy Disclosure:   I, Hosea Velasco, am serving as a scribe at 10:12 AM on 11/23/2018 to document services personally performed by Lana Putnam PA-C based on my observations and the provider's statements to me.       I, Clemencia Berry, am serving as a scribe at 11:43 AM on 11/23/2018 to document services personally performed by Lana Putnam PA-C based on my observations and the provider's statements to me.     Lana Putnam PA-C  11/23/2018   [facility] EMERGENCY DEPARTMENT         Lana Putnam PA-C  11/23/18 [id number]

## 2018-11-23 NOTE — ED PROVIDER NOTES
Emergency Department Attending Supervision Note  11/23/2018  12:21 PM      I evaluated this patient in conjunction with Lana Putnam PA-C      Briefly, the patient presented with dyspnea. He notes increasing sputum production and dyspnea on exertion.    On my exam,     Patient Vitals for the past 24 hrs:   BP Temp Temp src Heart Rate Resp SpO2   11/23/18 1209 - - - 80 17 95 %   11/23/18 1200 (!) 132/104 - - 83 - 92 %   11/23/18 1145 (!) 125/100 - - 77 - 92 %   11/23/18 1104 128/87 - - 102 - -   11/23/18 1040 - - - - 20 -   11/23/18 1031 - - - 97 - 92 %   11/23/18 1030 117/79 - - 98 - -   11/23/18 1015 (!) 157/96 - - 97 - 98 %   11/23/18 1001 (!) 132/105 97.8  F (36.6  C) Temporal 101 24 95 %     Constitutional: Vital signs reviewed as above.    Head: No external signs of trauma noted.  Eyes: Pupils are equal, round, and reactive to light.   Neck: No JVD noted  Cardiovascular: Transient tachycardia during exam, regular rhythm and normal heart sounds.  No murmur heard. Equal B/L peripheral pulses.  Pulmonary/Chest: No respiratory distress at the time of my exam. Patient has no wheezes at the time of my exam. Patient has no rales.   Gastrointestinal: Soft. There is no tenderness.   Musculoskeletal/Extremities: No edema noted. Normal tone.  Neurological: Patient is alert and oriented to person, place, and time.   Skin: Skin is warm and dry. There is no diaphoresis noted.   Psychiatric: The patient appears calm.    Results:    Labs Ordered and Resulted from Time of ED Arrival Up to the Time of Departure from the ED   BASIC METABOLIC PANEL - Abnormal; Notable for the following:        Result Value    Glucose 134 (*)     Creatinine 1.28 (*)     GFR Estimate 56 (*)     All other components within normal limits   CBC WITH PLATELETS DIFFERENTIAL   TROPONIN I     Chest XR,  PA & LAT   Final Result   IMPRESSION: Sternotomy and postoperative changes involving the mitral   valve are noted. Hyperinflation both lungs. The lungs are  otherwise   clear. No pleural effusions. Heart size and pulmonary vascularity are   within normal limits. Aortic calcification.      LANI CHACON MD        ED course:    1202: Discussed findings with the patient and admission. Patient was around 89% on ambulation.    Impression      ICD-10-CM    1. COPD exacerbation (H) J44.1 D dimer quantitative     Nt probnp inpatient         Chris Martínez DO [place], Bradley Joseph, DO  11/23/18 3612

## 2018-11-23 NOTE — ED TRIAGE NOTES
Pt arrives with SOB since last night. Worse with ambulation and laying flat. Endorses some R sided chest pain. Pt with audible wheezes in triage, last neb 4 am.

## 2018-11-23 NOTE — ED NOTES
[facility]  ED Nurse Handoff Report    Tone Cooper is a 67 year old male   ED Chief complaint: Shortness of Breath  . ED Diagnosis:   Final diagnoses:   COPD exacerbation (H)     Allergies:   Allergies   Allergen Reactions     Flecainide Palpitations       Code Status: Full Code  Activity level - Baseline/Home:  Independent. Activity Level - Current:   Stand with Assist. Lift room needed: No. Bariatric: No   Needed: No   Isolation: No. Infection: Not Applicable.     Vital Signs:   Vitals:    11/23/18 1104 11/23/18 1145 11/23/18 1200 11/23/18 1209   BP: 128/87 (!) 125/100 (!) 132/104    Resp:    17   Temp:       TempSrc:       SpO2:  92% 92% 95%       Cardiac Rhythm:  ,      Pain level: 0-10 Pain Scale: 7  Patient confused: No. Patient Falls Risk: Yes.   Elimination Status: Has voided   Patient Report - Initial Complaint: SOB. Focused Assessment:  Respiratory - Rhythm/Pattern, Respiratory: shortness of breath reported Lung Fields: All Fields Throughout All Lung Fields: wheezes expiratory  Tests Performed: Labs, XR. Abnormal Results:   Labs Ordered and Resulted from Time of ED Arrival Up to the Time of Departure from the ED   BASIC METABOLIC PANEL - Abnormal; Notable for the following:        Result Value    Glucose 134 (*)     Creatinine 1.28 (*)     GFR Estimate 56 (*)     All other components within normal limits   CBC WITH PLATELETS DIFFERENTIAL   TROPONIN I        Treatments provided: See MAR  Family Comments: Present  OBS brochure/video discussed/provided to patient:  TBD  ED Medications:   Medications   doxycycline (VIBRAMYCIN) capsule 100 mg (not administered)   ipratropium - albuterol 0.5 mg/2.5 mg/3 mL (DUONEB) neb solution 6 mL (6 mLs Nebulization Given 11/23/18 1010)   methylPREDNISolone sodium succinate (solu-MEDROL) injection 125 mg (125 mg Intravenous Given 11/23/18 1043)     Drips infusing:  No  For the majority of the shift, the patient's behavior Green. Interventions  performed were Call light within reach.     Severe Sepsis OR Septic Shock Diagnosis Present: No      ED Nurse Name/Phone Number: Jennifer Claire,   12:11 PM    RECEIVING UNIT ED HANDOFF REVIEW    Above ED Nurse Handoff Report was reviewed: Yes  Reviewed by: Sissy Evans on November 23, 2018 at 1:51 PM

## 2018-11-23 NOTE — PLAN OF CARE
New admit.   A&O x 4. Up with SBA- c/o dizziness when OOB. Exp. Wheezes noted. A fib RVR (110-125)on tele. Pt stated he wears 3 L at home all the time (unless he leaves his house). O2  93% at rest on RA, Sats dropped to 88% on RA.

## 2018-11-23 NOTE — Clinical Note
Admitting Physician: SOL WOODARD [[id number]]   [id number]   Bed Type: Observation Unit [54]   Special needs: Fall Risk [8]   Bed request comments: Obs bed request@[id number] - 1 person TF

## 2018-11-23 NOTE — CONSULTS
[facility]    Cardiology Consultation     Date of Admission:  11/23/2018    Assessment & Plan   Tone Cooper is a 67 year old male who was admitted on 11/23/2018.    1.  Dyspnea  I was consulted for ongoing shortness of breath with minimal activity.  On exam, patient has diffuse wheezing with decreased air entry consistent with COPD exacerbation.  I do not see any clear evidence of elevated JVP.  There is no leg edema.  His PFTs are consistent with severe airflow obstruction.  I did have the nurse walk him in the hallway in his baseline oxygen saturation 93% on room air decreased to 88% with mild activity and he was significantly short of breath.  Heart rate is increased to the 120 range.  On admission, he was in sinus rhythm with PACs.  His telemetry appears to some some atrial arrhythmia although I cannot be sure on base of one strip and therefore will get a repeat EKG. we will check an N-terminal proBNP to assist with the diagnosis although clinically and from the test information, I suspect this is most likely COPD exacerbation.  Ironically, patient does not have a pulmonologist.  I emphasized the importance of getting a pulmonary physician and discuss that with Dr. lennon.  He has been started on prednisone by the hospitalist for COPD exacerbation.  I would also check a d-dimer.  If abnormal, may need evaluation for PE by VQ scan or CT chest as preferred by the hospitalist.  I have taken the liberty of ordering that and discussed it with the hospitalist.    2.  Paroxysmal atrial fibrillation/flutter  Recently started on propafenone and diltiazem.  His stress nuclear study in 2017 was slightly abnormal with decreased EF and questionable perfusion defect.  In the past apparently, his coronary anatomy showed no significant disease.  I briefly reviewed the CT left atrium study from 2015 which was done prior to his ablation.  I did not have the luxury of having dedicated reading station but on the  PACS, I saw mild coronary artery calcification with no significant disease in the proximal midportion of the coronary arteries.  If propafenone has to be continued, a definitive evaluation of coronary anatomy and ejection fraction is essential.  Therefore, I would have recommended a repeat echocardiogram today with contrast to make sure ejection fraction is normal.  His primary cardiologist,  if there is surrounding tomorrow and I will defer to him if he wants to repeat a CT coronary angiography or coronary angiography at some point to rule out CAD while we continue class Ic antiarrhythmic agents.  He should continue anticoagulant agent given chads vascular score is at least 2 given the age and history of hypertension.    3.  Acute on chronic renal insufficiency, in the past he has had somewhat elevated creatinine is in this range.  We will continue to monitor.    4.  Hypertension  On diltiazem.  Recently metoprolol and lisinopril was discontinued.  Continue to monitor.    5.  History of tricuspid valve repair  Functioning well on the last echocardiogram in October    Plan  Optimize COPD therapy  May benefit from pulmonary rehab and pulmonary consult, inpatient and outpatient  Check an N-terminal proBNP and d-dimer  If d-dimer abnormal, consider further evaluation to rule out PE, discussed with hospitalist  Will need definite evaluation of his ejection fraction and CAD if you are continuing class Ic agents.  Repeat EKG as the telemetry is suspicious for recurrent atypical flutter/fibrillation        Artie Coughlin MD    Primary Care Physician   Ana Pratt    Reason for Consult   Reason for consult: I was asked by Dr Masters to evaluate this patient for dyspnea.    History of Present Illness   Tone Cooper is a 67 year old man who returned to the hospital today with activity-limiting dyspnea that has been bothering him since his last admission in October, 2018.      Patient has a past medical  history of tricuspid valve repair done by Dr. Justyn Watt in the past.  His primary cardiologist Dr. Maxwell Aj.  Prior to his tricuspid valve repair and placement of annuloplasty ring, his coronary anatomy apparently showed no significant disease.  He has had paroxysmal atrial fibrillation and atypical atrial flutter.  He underwent ablation in 2015.  This was done by Dr. Jimmy Britt.  More recently, he presented to electrophysiology for an episode of atrial fibrillation on Holter monitor with rapid response.  He was seen by elective physiologist on 11/518, he appeared to be in atrial flutter.  His metoprolol was discontinued and diltiazem X are 180 mg was started.  Propafenone was added at 150 mg 3 times daily.  Patient has had some allergic reaction to flecainide in the past.    He had an echocardiogram in October which was done in setting of rapid atrial arrhythmia and his EF was reportedly normal.  On my review of the echo, EF appears low normal at best.  Regional wall motion abnormalities cannot be excluded as contrast was not used.   Mean rate across the tricuspid valve is 4 mm, appropriate for the type of tricuspid valve repair.  No significant tricuspid regurgitation was noted.  However, IVC was dilated.    He had a Lexiscan stress nuclear study in 2017 which showed inferior scar with moderate paula-infarct ischemia.  His EF on the nuclear stress test was 47%.      He now presents with increasing shortness of breath since October.  He has a productive cough especially when he lays down.  He uses oxygen throughout the day.  He was admitted for possible COPD exacerbation.  Initially, in the emergency department, the patient was thought to be having another COPD exacerbation.  Shortness of breath gets worse with exercise or activity.    There is no history of chest pain.  No orthopnea or PND.  No fevers or chills.      He is on Xarelto for stroke prophylaxis.  This was recently started by Dr. Britt but this has been  quite expensive for him.  He has called our office back and they are starting him on now on Eliquis with patient assistance plan.  He has been taking Xarelto since October although missed a dose today.    His PFTs in October 2017 reveals severe airflow obstruction with FEV1 of 1.08 L and a diffusion capacity of 52% predicted.  There is evidence of overinflation.      Patient Active Problem List   Diagnosis     Diverticulitis     Tricuspid valve disorder     COPD (chronic obstructive pulmonary disease) (H)     Atrial fibrillation (H), paroxysmal     Cardiomyopathy (H)     Wide-complex tachycardia (H)     Hypertension     Atrial flutter (H)     S/P radiofrequency ablation operation for arrhythmia     Nocturnal hypoxia     Dyspnea       Past Medical History   I have reviewed this patient's medical history and updated it with pertinent information if needed.   Past Medical History:   Diagnosis Date     Atrial flutter (H)      COPD (chronic obstructive pulmonary disease) (H)      Diverticulitis      Hypertension      Persistent atrial fibrillation (H) 4/28/09    ablation 7/1/2015     Premature beats      PVC (premature ventricular contraction)     s/p ablation 12/5/2017     Tricuspid valve disorder     Dr Aj, Hx of valve repair      Ventricular tachycardia (H)     nonsustained       Past Surgical History   I have reviewed this patient's surgical history and updated it with pertinent information if needed.  Past Surgical History:   Procedure Laterality Date     ABDOMEN SURGERY      hernia repair right     APPENDECTOMY       CARDIOVERSION  06/03/2009    successful for afib     CARDIOVERSION  4/20/15    successful for afib     CARDIOVERSION  5/28/15     H ABLATION ATRIAL FLUTTER       H ABLATION FOCAL AFIB  7/1/15    Left atrial linear ablation and pulmonary vein antrum ablation     H ABLATION PVC  12/5/16     INCISION AND DRAINAGE LOWER EXTREMITY, COMBINED Right 11/10/2016    Procedure: COMBINED INCISION AND DRAINAGE LOWER  EXTREMITY;  Surgeon: Roger Pacheco MD;  Location: [facility] OR     LAPAROSCOPIC CHOLECYSTECTOMY  1/6/2012    Procedure:LAPAROSCOPIC CHOLECYSTECTOMY; LAPAROSCOPIC CHOLECYSTECTOMY ; Surgeon:ROGER PACHECO; Location:[facility] OR     ORTHOPEDIC SURGERY      Left hip replacement     REPAIR VALVE TRICUSPID  9/8/08    conversion to a bileaflet valve and application of a 30 mm Sanderson ring     SMALL INTESTINE SURGERY      had bowel obstruction age 9     VALVE REPLACEMENT      tricuspid       Prior to Admission Medications   Prior to Admission Medications   Prescriptions Last Dose Informant Patient Reported? Taking?   albuterol (VENTOLIN HFA) 108 (90 Base) MCG/ACT inhaler Past Month at Unknown time  No Yes   Sig: Inhale 1-2 puffs into the lungs every 4 hours (while awake) PRN   amoxicillin (AMOXIL) 500 MG capsule Unknown at Unknown time  Yes No   Sig: Take 2,000 mg by mouth as needed (Dental appt)   apixaban ANTICOAGULANT (ELIQUIS) 5 MG tablet HAS NOT STARTED YET  No No   Sig: Take 1 tablet (5 mg) by mouth 2 times daily   diltiazem (CARDIZEM CD) 180 MG 24 hr capsule 11/22/2018 at am  No Yes   Sig: Take 1 capsule (180 mg) by mouth daily   fluticasone-salmeterol (ADVAIR DISKUS) 250-50 MCG/DOSE diskus inhaler 11/5/2018  No No   Sig: Inhale 1 puff into the lungs 2 times daily   ipratropium - albuterol 0.5 mg/2.5 mg/3 mL (DUONEB) 0.5-2.5 (3) MG/3ML neb solution Past Week at Unknown time  No Yes   Sig: Take 1 vial (3 mLs) by nebulization every 2 hours as needed for wheezing   order for DME Unknown at Unknown time  No No   Sig: Oxygen for nocturnal use. 1 LPM via nasal cannula  Testing done at home in chronic stable state.  Condition is COPD.  Patient does not have Obstructive Sleep Apnea.  Overnight oximetry revealed 30.3 minutes of hypoxia (<= 88%).  Duration: Lifetime (99 months).   order for DME Unknown at Unknown time  No No   Sig: Equipment being ordered: Nebulizer   oxyCODONE-acetaminophen (PERCOCET) 5-325 MG per tablet  Past Month at Unknown time  Yes Yes   Sig: Take 1 tablet by mouth every 6 hours as needed for severe pain   propafenone (RYTHMOL) 150 MG TABS tablet 11/22/2018 at pm  No Yes   Sig: Take 1 tablet (150 mg) by mouth 3 times daily   rivaroxaban ANTICOAGULANT (XARELTO) 20 MG TABS tablet 11/22/2018 at am  Yes Yes   Sig: Take 20 mg by mouth daily   tiotropium (SPIRIVA) 18 MCG capsule 11/5/2018  No No   Sig: Inhale 1 capsule (18 mcg) into the lungs daily   traZODone (DESYREL) 50 MG tablet Past Week at Unknown time  Yes Yes   Sig: Take 25 mg by mouth nightly as needed for sleep      Facility-Administered Medications: None     Current Facility-Administered Medications   Medication Dose Route Frequency     apixaban ANTICOAGULANT  5 mg Oral BID     diltiazem  360 mg Oral Daily     fluticasone-vilanterol  1 puff Inhalation Daily     ipratropium - albuterol 0.5 mg/2.5 mg/3 mL  3 mL Nebulization 4x daily     [START ON 11/24/2018] predniSONE  40 mg Oral Daily     propafenone  150 mg Oral TID     sodium chloride (PF)  3 mL Intracatheter Q8H     Current Facility-Administered Medications   Medication Last Rate     - MEDICATION INSTRUCTIONS -       Allergies   Allergies   Allergen Reactions     Flecainide Palpitations       Social History    reports that he quit smoking about 10 years ago. He has never used smokeless tobacco. He reports that he drinks alcohol. He reports that he does not use illicit drugs.    Family History   Family History   Problem Relation Age of Onset     Prostate Cancer Father      Family History Negative Mother      Emphysema Mother      Hypertension Mother      Cerebrovascular Disease Mother        Review of Systems   The comprehensive 10 point Review of Systems is negative other than noted in the HPI or here.     Physical Exam   Vital Signs with Ranges  Temp:  [97.8  F (36.6  C)] 97.8  F (36.6  C)  Heart Rate:  [] 87  Resp:  [17-28] 28  BP: (111-161)/() 161/83  SpO2:  [92 %-98 %] 92 %  Wt Readings  "from Last 4 Encounters:   11/23/18 80.7 kg (178 lb)   11/05/18 82.1 kg (181 lb)   10/04/18 78.5 kg (173 lb)   11/17/17 83.3 kg (183 lb 9.6 oz)            Vitals: /83 (BP Location: Left arm)  Temp 97.8  F (36.6  C) (Temporal)  Resp 28  Ht 1.803 m (5' 11\")  Wt 80.7 kg (178 lb)  SpO2 92%  BMI 24.83 kg/m2    Constitutional:   mild distress     Eyes:   extra-ocular muscles intact     Neck:   no jugular venous distension     Back:   symmetric     Lungs:   wheeze diffuse     Cardiovascular:   normal S1 and S2 and no murmur noted     Abdomen:   non-distended and non-tender     Neurologic:   Motor Exam:  moves all extremities well and symmetrically     Neuropsychiatric:   Orientation: oriented to self, place, time and situation     Skin:   no lesions         Recent Labs  Lab 11/23/18  1030   TROPI <0.015         Recent Labs  Lab 11/23/18  1030   WBC 6.9   HGB 15.8   MCV 89         POTASSIUM 4.4   CHLORIDE 103   CO2 30   BUN 20   CR 1.28*   GFRESTIMATED 56*   GFRESTBLACK 68   ANIONGAP 5   WILBERT 9.4   *   TROPI <0.015     Recent Labs   Lab Test 01/17/14 01/20/11   0521   CHOL  178  169   HDL  46  47   LDL   --   89   TRIG  100  162*   CHOLHDLRATIO  3.87  3.6       Recent Labs  Lab 11/23/18  1030   WBC 6.9   HGB 15.8   HCT 48.3   MCV 89        No results for input(s): PH, PHV, PO2, PO2V, SAT, PCO2, PCO2V, HCO3, HCO3V in the last 168 hours.  No results for input(s): NTBNPI, NTBNP in the last 168 hours.  No results for input(s): DD in the last 168 hours.  No results for input(s): SED, CRP in the last 168 hours.    Recent Labs  Lab 11/23/18  1030        No results for input(s): TSH in the last 168 hours.  No results for input(s): COLOR, APPEARANCE, URINEGLC, URINEBILI, URINEKETONE, SG, UBLD, URINEPH, PROTEIN, UROBILINOGEN, NITRITE, LEUKEST, RBCU, WBCU in the last 168 hours.    Imaging:  Recent Results (from the past 48 hour(s))   Chest XR,  PA & LAT    Narrative    CHEST TWO VIEWS  " 2018 10:50 AM     HISTORY:  Shortness of breath.    COMPARISON: 10/4/2018.      Impression    IMPRESSION: Sternotomy and postoperative changes involving the mitral  valve are noted. Hyperinflation both lungs. The lungs are otherwise  clear. No pleural effusions. Heart size and pulmonary vascularity are  within normal limits. Aortic calcification.    LANI CHACON MD       Echo:  Recent Results (from the past 4320 hour(s))   Echocardiogram Complete    Narrative    [id number]  [id number]  [id number]  [id number]^DANISHA^JAIRON^JONATHAN           [facility]  Echocardiography Laboratory  [address] [place] [address]        Name: FELIPE AYOUB  MRN: [id number]  : 1950  Study Date: 10/05/2018 01:10 PM  Age: 67 yrs  Gender: Male  Patient Location: [facility]  Reason For Study: CHF  Ordering Physician: JAIRON RAMAN  Referring Physician: Ana Pratt  Performed By: Davi Brush RDCS     BSA: 2.0 m2  Height: 71 in  Weight: 173 lb  HR: 120  BP: 158/93 mmHg  _____________________________________________________________________________  __        Procedure  Complete Portable Echo Adult.  _____________________________________________________________________________  __        Interpretation Summary     The patient exhibited frequent PACs.  Left ventricular systolic function is normal.  The visual ejection fraction is estimated at 60-65%.  The left ventricle is normal in size.  The right ventricle is normal in structure, function and size.  An annuloplasty ring is noted in the tricuspid position.     There is very mild tricuspid stenosis ( mean diastolic gradient 4 mm Hg) due  to previous tricuspid valve repair. Since the last TTE 3/69689, the patient  has converted to sinus tachycardia with frequent PACs ( was in atrial  fibrillation). There has otherwise been no significant change.  _____________________________________________________________________________  __        Left Ventricle  The left  ventricle is normal in size. There is normal left ventricular wall  thickness. Left ventricular systolic function is normal. Left ventricular  diastolic function is indeterminate. The visual ejection fraction is estimated  at 60-65%. No regional wall motion abnormalities noted. There is no thrombus  seen in the left ventricle.     Right Ventricle  The right ventricle is normal in structure, function and size. There is no  mass or thrombus in the right ventricle.     Atria  Normal left atrial size. Right atrial size is normal. There is no atrial shunt  seen. The left atrial appendage is not well visualized.     Mitral Valve  The mitral valve leaflets appear normal. There is no evidence of stenosis,  fluttering, or prolapse. There is no mitral regurgitation noted. There is no  mitral valve stenosis.        Tricuspid Valve  There is trace tricuspid regurgitation. There is mild tricuspid stenosis. An  annuloplasty ring is noted in the tricuspid position.     Aortic Valve  There is trivial trileaflet aortic sclerosis. No aortic regurgitation is  present. No aortic stenosis is present.     Pulmonic Valve  Normal pulmonic valve. There is no pulmonic valvular regurgitation. There is  no pulmonic valvular stenosis.     Vessels  The aortic root is normal size. Normal size ascending aorta. The IVC is normal  in size and reactivity with respiration, suggesting normal central venous  pressure. The pulmonary artery is normal size.     Pericardium  The pericardium appears normal. There is no pleural effusion.        Rhythm  The patient exhibited frequent PACs.  _____________________________________________________________________________  __  MMode/2D Measurements & Calculations  IVSd: 0.97 cm     LVIDd: 5.4 cm  LVIDs: 4.2 cm  LVPWd: 1.1 cm  FS: 23.3 %  LV mass(C)d: 218.5 grams  LV mass(C)dI: 110.2 grams/m2  Ao root diam: 3.6 cm  LA dimension: 3.6 cm  asc Aorta Diam: 3.3 cm  LA/Ao: 1.0  LVOT diam: 2.1 cm  LVOT area: 3.5 cm2  LA  Volume (BP): 38.0 ml  LA Volume Index (BP): 19.2 ml/m2  RWT: 0.41           Doppler Measurements & Calculations  TV V2 max: 126.0 cm/sec  TV max P.4 mmHg  TV mean P.0 mmHg  TV V2 VTI: 26.4 cm           _____________________________________________________________________________  __           Report approved by: Dr. Eliazar Schneider 10/05/2018 02:56 PM

## 2018-11-23 NOTE — H&P
[facility] History and Physical    Tone Cooper MRN# [id number]   Age: 67 year old YOB: 1950     Date of Admission:  11/23/2018    Home clinic: [facility]  Primary care provider: Ana Pratt          Assessment and Plan:   Assessment:   Tone Cooper is a 67 year old man who returned to the hospital today with activity-limiting dyspnea that has been bothering him since his last admission in October, 2018.      Initially, in the emergency department, the patient was thought to be having another COPD exacerbation.  He does apparently complain of wheezing and describes a cough that is productive when he has been lying down for any period of time.  However very clear that the patient is mostly bothered by extreme dyspnea that comes on with activity and abates with rest.    Diagnoses:  1.  Multifactorial dyspnea.  I suspect the primary cause is that the patient has poorly controlled atrial fibrillation.    2.  COPD.  Doubt exacerbation at this time, though I am seeing him after he has had treatment in the emergency department including several bronchodilator treatments along with Solu-Medrol 125 mg IV.  3.  History of tricuspid valve repair.  4.  Hypertension.      Plan:   1.  Admit to inpatient on telemetry.   2.  Increase dose of Diltiazem to 360 mg daily. Cont home dose of Propafenone.  3.  Cont with coverage for COPD exacerbation with prednisone 40 mg daily and scheduled nebs.  4.  Cardiology consultation requested.  Pt does not seem to be having benefit from Propafenone. Question whether return to Dr. Britt is appropriate. ?indication for stress testing. (last stress test was done in 2016 and showed poss area of reversible ischemia.             Chief Complaint:   dyspnea     History is obtained from the patient, ED providers and EMR.     Mr. Cooper reports that within hours of being discharged from the hospital on October 7, 2018.  He had been treated for a COPD  exacerbation at that time and really felt better at the time of discharge.  He recounts that he got up to walk around the house, he became intensely dyspneic despite having been discharged on supplemental oxygen.  He then followed up with his primary physician, Dr. Ana Pratt who referred him back to his cardiologist after Holter monitor was ordered.    Mr. Cooper then had an outpatient visit with Dr. Sussy Britt, from electrophysiology [email or web address] cardiology on 11/5/2018.  Holter monitoring did show at least one sustained period of atrial fibrillation with rapid ventricular response.  At that clinic visit, the patient was switched from metoprolol to diltiazem XR (180 mg) along with propafenone 150 mg p.o. 3 times daily.  He feels that the medication change has resulted in worsening if anything of his dyspnea on exertion.  He does have some orthopnea/PND which makes him more comfortable with several pillows or sleeping in his recliner. No fevers, sweats or chills.  He reports that his sleep is disrupted by shortness of breath as well as nocturia.  He reports some mild imbalance that has been present for months, but that may be worse with the new meds for A fib.         Past Medical History:     Past Medical History:   Diagnosis Date     Atrial flutter, s/p ablation.      COPD (chronic obstructive pulmonary disease) (H)      Diverticulitis      Hypertension      Persistent atrial fibrillation (H) 4/28/09    ablation 7/1/2015     Premature beats      PVC (premature ventricular contraction)     s/p ablation 12/5/2017     Tricuspid valve disorder     Dr Aj, Hx of valve repair      Ventricular tachycardia (H)     nonsustained             Past Surgical History:      Past Surgical History:   Procedure Laterality Date     ABDOMEN SURGERY      hernia repair right     APPENDECTOMY       CARDIOVERSION  06/03/2009    successful for afib     CARDIOVERSION  4/20/15    successful for afib     CARDIOVERSION  5/28/15     H ABLATION  ATRIAL FLUTTER       H ABLATION FOCAL AFIB  7/1/15    Left atrial linear ablation and pulmonary vein antrum ablation     H ABLATION PVC  12/5/16     INCISION AND DRAINAGE LOWER EXTREMITY, COMBINED Right 11/10/2016    Procedure: COMBINED INCISION AND DRAINAGE LOWER EXTREMITY;  Surgeon: Roger Pacheco MD;  Location: [facility] OR     LAPAROSCOPIC CHOLECYSTECTOMY  1/6/2012    Procedure:LAPAROSCOPIC CHOLECYSTECTOMY; LAPAROSCOPIC CHOLECYSTECTOMY ; Surgeon:ROGER PACHECO; Location:[facility] OR     ORTHOPEDIC SURGERY      Left hip replacement     REPAIR VALVE TRICUSPID  9/8/08    conversion to a bileaflet valve and application of a 30 mm Sanderson ring     SMALL INTESTINE SURGERY      had bowel obstruction age 9     VALVE REPLACEMENT      tricuspid             Social History:     Social History   Substance Use Topics     Smoking status: Former Smoker     Quit date: 1/2/2008     Smokeless tobacco: Never Used     Alcohol use 0.0 oz/week     0 Standard drinks or equivalent per week      Comment: 3-6 per month             Family History:   reviewed and non-contributory            Allergies:     Allergies   Allergen Reactions     Flecainide Palpitations             Medications:     Prescriptions Prior to Admission   Medication Sig Dispense Refill Last Dose     albuterol (VENTOLIN HFA) 108 (90 Base) MCG/ACT inhaler Inhale 1-2 puffs into the lungs every 4 hours (while awake) PRN 2 Inhaler 0 Past Month at Unknown time     diltiazem (CARDIZEM CD) 180 MG 24 hr capsule Take 1 capsule (180 mg) by mouth daily 90 capsule 3 11/22/2018 at am     ipratropium - albuterol 0.5 mg/2.5 mg/3 mL (DUONEB) 0.5-2.5 (3) MG/3ML neb solution Take 1 vial (3 mLs) by nebulization every 2 hours as needed for wheezing 360 mL 0 Past Week at Unknown time     oxyCODONE-acetaminophen (PERCOCET) 5-325 MG per tablet Take 1 tablet by mouth every 6 hours as needed for severe pain   Past Month at Unknown time     propafenone (RYTHMOL) 150 MG TABS tablet Take 1  tablet (150 mg) by mouth 3 times daily 270 tablet 3 11/22/2018 at pm     rivaroxaban ANTICOAGULANT (XARELTO) 20 MG TABS tablet Take 20 mg by mouth daily   11/22/2018 at am     traZODone (DESYREL) 50 MG tablet Take 25 mg by mouth nightly as needed for sleep   Past Week at Unknown time     amoxicillin (AMOXIL) 500 MG capsule Take 2,000 mg by mouth as needed (Dental appt)   Unknown at Unknown time     apixaban ANTICOAGULANT (ELIQUIS) 5 MG tablet Take 1 tablet (5 mg) by mouth 2 times daily 60 tablet 11 HAS NOT STARTED YET     fluticasone-salmeterol (ADVAIR DISKUS) 250-50 MCG/DOSE diskus inhaler Inhale 1 puff into the lungs 2 times daily 1 Inhaler 0 11/5/2018     order for DME Equipment being ordered: Nebulizer 1 each 0 Unknown at Unknown time     order for DME Oxygen for nocturnal use. 1 LPM via nasal cannula  Testing done at home in chronic stable state.  Condition is COPD.  Patient does not have Obstructive Sleep Apnea.  Overnight oximetry revealed 30.3 minutes of hypoxia (<= 88%).  Duration: Lifetime (99 months). 1 each 99 Unknown at Unknown time     tiotropium (SPIRIVA) 18 MCG capsule Inhale 1 capsule (18 mcg) into the lungs daily 30 capsule 0 11/5/2018             Review of Systems:   A comprehensive review of systems was performed and found to be negative except as described in this note           Physical Exam:   Vitals were reviewed  Temp: 97.8  F (36.6  C) Temp src: Temporal BP: 161/83   Heart Rate: 87 Resp: 28 SpO2: (!) 88 % (with activity) O2 Device: None (Room air)    Constitutional: Awake, alert, cooperative, no apparent distress, and appears stated age.  Eyes: Lids and lashes normal, pupils equal, round and reactive to light, extra ocular muscles intact, sclera clear, conjunctiva normal.  ENT: Normocephalic, without obvious abnormality, atramatic, sinuses nontender on palpation, external ears without lesions, oral pharynx with moist mucus membranes, tonsils without erythema or exudates, gums normal and  good dentition.  Neck: Supple, symmetrical, trachea midline, no adenopathy, thyroid symmetric, not enlarged and no tenderness, skin normal.  Hematologic / Lymphatic: No cervical lymphadenopathy and no supraclavicular lymphadenopathy.  Back: Symmetric, no curvature, spinous processes are non-tender on palpation, paraspinous muscles are non-tender on palpation, no costal vertebral tenderness.  Lungs: No increased work of breathing, good air exchange, clear to auscultation bilaterally, no crackles. Fine, distant wheezing with prolonged expiratory phase.  Cardiovascular: Regular rate and rhythm, normal S1 and S2, no S3 or S4, and no murmur noted.  Abdomen: No scars, normal bowel sounds, soft, non-distended, non-tender, no masses palpated, no hepatosplenomegally.  Musculoskeletal: No redness, warmth, or swelling of the joints.  Full range of motion noted.  Motor strength is 5 out of 5 all extremities bilaterally.  Tone is normal.  Neurologic: Awake, alert, oriented to name, place and time.  Cranial nerves II-XII are grossly intact.  Motor is 5 out of 5 bilaterally.  Cerebellar finger to nose, heel to shin intact.  Sensory is intact.  Babinski down going, Romberg negative, and gait is normal.  Neuropsychiatric: Normal affect, mood, orientation, memory and insight.  Skin: No rashes, erythema, pallor, petechia or purpura.          Data:     Results for orders placed or performed during the hospital encounter of 11/23/18 (from the past 24 hour(s))   CBC with platelets differential   Result Value Ref Range    WBC 6.9 4.0 - 11.0 10e9/L    RBC Count 5.43 4.4 - 5.9 10e12/L    Hemoglobin 15.8 13.3 - 17.7 g/dL    Hematocrit 48.3 40.0 - 53.0 %    MCV 89 78 - 100 fl    MCH 29.1 26.5 - 33.0 pg    MCHC 32.7 31.5 - 36.5 g/dL    RDW 14.1 10.0 - 15.0 %    Platelet Count 208 150 - 450 10e9/L    Diff Method Automated Method     % Neutrophils 65.2 %    % Lymphocytes 17.9 %    % Monocytes 7.2 %    % Eosinophils 7.9 %    % Basophils 1.2 %     % Immature Granulocytes 0.6 %    Nucleated RBCs 0 0 /100    Absolute Neutrophil 4.5 1.6 - 8.3 10e9/L    Absolute Lymphocytes 1.2 0.8 - 5.3 10e9/L    Absolute Monocytes 0.5 0.0 - 1.3 10e9/L    Absolute Eosinophils 0.6 0.0 - 0.7 10e9/L    Absolute Basophils 0.1 0.0 - 0.2 10e9/L    Abs Immature Granulocytes 0.0 0 - 0.4 10e9/L    Absolute Nucleated RBC 0.0    Basic metabolic panel   Result Value Ref Range    Sodium 138 133 - 144 mmol/L    Potassium 4.4 3.4 - 5.3 mmol/L    Chloride 103 94 - 109 mmol/L    Carbon Dioxide 30 20 - 32 mmol/L    Anion Gap 5 3 - 14 mmol/L    Glucose 134 (H) 70 - 99 mg/dL    Urea Nitrogen 20 7 - 30 mg/dL    Creatinine 1.28 (H) 0.66 - 1.25 mg/dL    GFR Estimate 56 (L) >60 mL/min/1.7m2    GFR Estimate If Black 68 >60 mL/min/1.7m2    Calcium 9.4 8.5 - 10.1 mg/dL   Troponin I (now)   Result Value Ref Range    Troponin I ES <0.015 0.000 - 0.045 ug/L   Chest XR,  PA & LAT    Narrative    CHEST TWO VIEWS  11/23/2018 10:50 AM     HISTORY:  Shortness of breath.    COMPARISON: 10/4/2018.      Impression    IMPRESSION: Sternotomy and postoperative changes involving the mitral  valve are noted. Hyperinflation both lungs. The lungs are otherwise  clear. No pleural effusions. Heart size and pulmonary vascularity are  within normal limits. Aortic calcification.    LANI CHACON MD   EKG 12 lead   Result Value Ref Range    Interpretation ECG Click View Image link to view waveform and result    Cardiology IP Consult: Patient to be seen: Routine - within 24 hours; pt of Dr. Britt. Here with persistent dyspnea. Clearly inadequately controlled HR.; Consultant may enter orders: Yes    Narrative    Artie Coughlin MD     11/23/2018  3:19 PM  [facility]    Cardiology Consultation     Date of Admission:  11/23/2018    Assessment & Plan   Tone Cooper is a 67 year old male who was admitted on   11/23/2018.    1.  Dyspnea  I was consulted for ongoing shortness of breath with minimal   activity.  On  exam, patient has diffuse wheezing with decreased   air entry consistent with COPD exacerbation.  I do not see any   clear evidence of elevated JVP.  There is no leg edema.  His PFTs   are consistent with severe airflow obstruction.  I did have the   nurse walk him in the hallway in his baseline oxygen saturation   93% on room air decreased to 88% with mild activity and he was   significantly short of breath.  Heart rate is increased to the   120 range.  On admission, he was in sinus rhythm with PACs.  His   telemetry appears to some some atrial arrhythmia although I   cannot be sure on base of one strip and therefore will get a   repeat EKG. we will check an N-terminal proBNP to assist with the   diagnosis although clinically and from the test information, I   suspect this is most likely COPD exacerbation.  Ironically,   patient does not have a pulmonologist.  I emphasized the   importance of getting a pulmonary physician and discuss that with   Dr. lennon.  He has been started on prednisone by the hospitalist   for COPD exacerbation.  I would also check a d-dimer.  If abnormal, may need evaluation   for PE by VQ scan or CT chest as preferred by the hospitalist.  I   have taken the liberty of ordering that and discussed it with the   hospitalist.    2.  Paroxysmal atrial fibrillation/flutter  Recently started on propafenone and diltiazem.  His stress nuclear study in 2017 was slightly abnormal with   decreased EF and questionable perfusion defect.  In the past apparently, his coronary anatomy showed no   significant disease.  I briefly reviewed the CT left atrium study   from 2015 which was done prior to his ablation.  I did not have   the luxury of having dedicated reading station but on the PACS, I   saw mild coronary artery calcification with no significant   disease in the proximal midportion of the coronary arteries.  If   propafenone has to be continued, a definitive evaluation of   coronary anatomy and  ejection fraction is essential.  Therefore,   I would have recommended a repeat echocardiogram today with   contrast to make sure ejection fraction is normal.  His primary   cardiologist,  if there is surrounding tomorrow and I will   defer to him if he wants to repeat a CT coronary angiography or   coronary angiography at some point to rule out CAD while we   continue class Ic antiarrhythmic agents.  He should continue anticoagulant agent given chads vascular score   is at least 2 given the age and history of hypertension.    3.  Acute on chronic renal insufficiency, in the past he has had   somewhat elevated creatinine is in this range.  We will continue   to monitor.    4.  Hypertension  On diltiazem.  Recently metoprolol and lisinopril was   discontinued.  Continue to monitor.    5.  History of tricuspid valve repair  Functioning well on the last echocardiogram in October    Plan  Optimize COPD therapy  May benefit from pulmonary rehab and pulmonary consult, inpatient   and outpatient  Check an N-terminal proBNP and d-dimer  If d-dimer abnormal, consider further evaluation to rule out PE,   discussed with hospitalist  Will need definite evaluation of his ejection fraction and CAD if   you are continuing class Ic agents.  Repeat EKG as the telemetry is suspicious for recurrent atypical   flutter/fibrillation        Artie Coughlin MD    Primary Care Physician   Ana Pratt    Reason for Consult   Reason for consult: I was asked by Dr Masters to evaluate this   patient for dyspnea.    History of Present Illness   Tone Cooper is a 67 year old man who returned to the hospital   today with activity-limiting dyspnea that has been bothering him   since his last admission in October, 2018.      Patient has a past medical history of tricuspid valve repair done   by Dr. Justyn Watt in the past.  His primary cardiologist Dr. Maxwell Aj.  Prior to his tricuspid valve repair and placement of   annuloplasty  ring, his coronary anatomy apparently showed no   significant disease.  He has had paroxysmal atrial fibrillation   and atypical atrial flutter.  He underwent ablation in 2015.    This was done by Dr. Jimmy Britt.  More recently, he presented to   electrophysiology for an episode of atrial fibrillation on Holter   monitor with rapid response.  He was seen by elective   physiologist on 11/518, he appeared to be in atrial flutter.  His   metoprolol was discontinued and diltiazem X are 180 mg was   started.  Propafenone was added at 150 mg 3 times daily.  Patient   has had some allergic reaction to flecainide in the past.    He had an echocardiogram in October which was done in setting of   rapid atrial arrhythmia and his EF was reportedly normal.  On my   review of the echo, EF appears low normal at best.  Regional wall   motion abnormalities cannot be excluded as contrast was not used.     Mean rate across the tricuspid valve is 4 mm, appropriate for   the type of tricuspid valve repair.  No significant tricuspid   regurgitation was noted.  However, IVC was dilated.    He had a Lexiscan stress nuclear study in 2017 which showed   inferior scar with moderate paula-infarct ischemia.  His EF on the   nuclear stress test was 47%.      He now presents with increasing shortness of breath since   October.  He has a productive cough especially when he lays down.    He uses oxygen throughout the day.  He was admitted for possible   COPD exacerbation.  Initially, in the emergency department, the   patient was thought to be having another COPD exacerbation.    Shortness of breath gets worse with exercise or activity.    There is no history of chest pain.  No orthopnea or PND.  No   fevers or chills.      He is on Xarelto for stroke prophylaxis.  This was recently   started by Dr. Britt but this has been quite expensive for him.  He   has called our office back and they are starting him on now on   Eliquis with patient assistance plan.   He has been taking Xarelto   since October although missed a dose today.    His PFTs in October 2017 reveals severe airflow obstruction with   FEV1 of 1.08 L and a diffusion capacity of 52% predicted.  There   is evidence of overinflation.      Patient Active Problem List   Diagnosis     Diverticulitis     Tricuspid valve disorder     COPD (chronic obstructive pulmonary disease) (H)     Atrial fibrillation (H), paroxysmal     Cardiomyopathy (H)     Wide-complex tachycardia (H)     Hypertension     Atrial flutter (H)     S/P radiofrequency ablation operation for arrhythmia     Nocturnal hypoxia     Dyspnea       Past Medical History   I have reviewed this patient's medical history and updated it   with pertinent information if needed.   Past Medical History:   Diagnosis Date     Atrial flutter (H)      COPD (chronic obstructive pulmonary disease) (H)      Diverticulitis      Hypertension      Persistent atrial fibrillation (H) 4/28/09    ablation 7/1/2015     Premature beats      PVC (premature ventricular contraction)     s/p ablation 12/5/2017     Tricuspid valve disorder     Dr Aj, Hx of valve repair      Ventricular tachycardia (H)     nonsustained       Past Surgical History   I have reviewed this patient's surgical history and updated it   with pertinent information if needed.  Past Surgical History:   Procedure Laterality Date     ABDOMEN SURGERY      hernia repair right     APPENDECTOMY       CARDIOVERSION  06/03/2009    successful for afib     CARDIOVERSION  4/20/15    successful for afib     CARDIOVERSION  5/28/15     H ABLATION ATRIAL FLUTTER       H ABLATION FOCAL AFIB  7/1/15    Left atrial linear ablation and pulmonary vein antrum ablation     H ABLATION PVC  12/5/16     INCISION AND DRAINAGE LOWER EXTREMITY, COMBINED Right   11/10/2016    Procedure: COMBINED INCISION AND DRAINAGE LOWER EXTREMITY;    Surgeon: Roger Pacheco MD;  Location: [facility] OR     LAPAROSCOPIC CHOLECYSTECTOMY  1/6/2012     Procedure:LAPAROSCOPIC CHOLECYSTECTOMY; LAPAROSCOPIC   CHOLECYSTECTOMY ; Surgeon:TALON DEVINE; Location:[facility] OR     ORTHOPEDIC SURGERY      Left hip replacement     REPAIR VALVE TRICUSPID  9/8/08    conversion to a bileaflet valve and application of a 30 mm   Sanderson ring     SMALL INTESTINE SURGERY      had bowel obstruction age 9     VALVE REPLACEMENT      tricuspid       Prior to Admission Medications   Prior to Admission Medications   Prescriptions Last Dose Informant Patient Reported? Taking?   albuterol (VENTOLIN HFA) 108 (90 Base) MCG/ACT inhaler Past Month   at Unknown time  No Yes   Sig: Inhale 1-2 puffs into the lungs every 4 hours (while awake)   PRN   amoxicillin (AMOXIL) 500 MG capsule Unknown at Unknown time  Yes   No   Sig: Take 2,000 mg by mouth as needed (Dental appt)   apixaban ANTICOAGULANT (ELIQUIS) 5 MG tablet HAS NOT STARTED YET    No No   Sig: Take 1 tablet (5 mg) by mouth 2 times daily   diltiazem (CARDIZEM CD) 180 MG 24 hr capsule 11/22/2018 at am  No   Yes   Sig: Take 1 capsule (180 mg) by mouth daily   fluticasone-salmeterol (ADVAIR DISKUS) 250-50 MCG/DOSE diskus   inhaler 11/5/2018  No No   Sig: Inhale 1 puff into the lungs 2 times daily   ipratropium - albuterol 0.5 mg/2.5 mg/3 mL (DUONEB) 0.5-2.5 (3)   MG/3ML neb solution Past Week at Unknown time  No Yes   Sig: Take 1 vial (3 mLs) by nebulization every 2 hours as needed   for wheezing   order for DME Unknown at Unknown time  No No   Sig: Oxygen for nocturnal use. 1 LPM via nasal cannula  Testing done at home in chronic stable state.  Condition is COPD.  Patient does not have Obstructive Sleep   Apnea.  Overnight oximetry revealed 30.3 minutes of hypoxia (<= 88%).  Duration: Lifetime (99 months).   order for DME Unknown at Unknown time  No No   Sig: Equipment being ordered: Nebulizer   oxyCODONE-acetaminophen (PERCOCET) 5-325 MG per tablet Past Month   at Unknown time  Yes Yes   Sig: Take 1 tablet by mouth every 6 hours as  needed for severe   pain   propafenone (RYTHMOL) 150 MG TABS tablet 11/22/2018 at pm  No Yes     Sig: Take 1 tablet (150 mg) by mouth 3 times daily   rivaroxaban ANTICOAGULANT (XARELTO) 20 MG TABS tablet 11/22/2018   at am  Yes Yes   Sig: Take 20 mg by mouth daily   tiotropium (SPIRIVA) 18 MCG capsule 11/5/2018  No No   Sig: Inhale 1 capsule (18 mcg) into the lungs daily   traZODone (DESYREL) 50 MG tablet Past Week at Unknown time  Yes   Yes   Sig: Take 25 mg by mouth nightly as needed for sleep      Facility-Administered Medications: None     Current Facility-Administered Medications   Medication Dose Route Frequency     apixaban ANTICOAGULANT  5 mg Oral BID     diltiazem  360 mg Oral Daily     fluticasone-vilanterol  1 puff Inhalation Daily     ipratropium - albuterol 0.5 mg/2.5 mg/3 mL  3 mL Nebulization   4x daily     [START ON 11/24/2018] predniSONE  40 mg Oral Daily     propafenone  150 mg Oral TID     sodium chloride (PF)  3 mL Intracatheter Q8H     Current Facility-Administered Medications   Medication Last Rate     - MEDICATION INSTRUCTIONS -       Allergies   Allergies   Allergen Reactions     Flecainide Palpitations       Social History    reports that he quit smoking about 10 years ago. He has never   used smokeless tobacco. He reports that he drinks alcohol. He   reports that he does not use illicit drugs.    Family History   Family History   Problem Relation Age of Onset     Prostate Cancer Father      Family History Negative Mother      Emphysema Mother      Hypertension Mother      Cerebrovascular Disease Mother        Review of Systems   The comprehensive 10 point Review of Systems is negative other   than noted in the HPI or here.     Physical Exam   Vital Signs with Ranges  Temp:  [97.8  F (36.6  C)] 97.8  F (36.6  C)  Heart Rate:  [] 87  Resp:  [17-28] 28  BP: (111-161)/() 161/83  SpO2:  [92 %-98 %] 92 %  Wt Readings from Last 4 Encounters:   11/23/18 80.7 kg (178 lb)   11/05/18 82.1  "kg (181 lb)   10/04/18 78.5 kg (173 lb)   11/17/17 83.3 kg (183 lb 9.6 oz)            Vitals: /83 (BP Location: Left arm)  Temp 97.8  F (36.6    C) (Temporal)  Resp 28  Ht 1.803 m (5' 11\")  Wt 80.7 kg (178   lb)  SpO2 92%  BMI 24.83 kg/m2    Constitutional:   mild distress     Eyes:   extra-ocular muscles intact     Neck:   no jugular venous distension     Back:   symmetric     Lungs:   wheeze diffuse     Cardiovascular:   normal S1 and S2 and no murmur noted     Abdomen:   non-distended and non-tender     Neurologic:   Motor Exam:  moves all extremities well and symmetrically     Neuropsychiatric:   Orientation: oriented to self, place, time and situation     Skin:   no lesions         Recent Labs  Lab 11/23/18  1030   TROPI <0.015         Recent Labs  Lab 11/23/18  1030   WBC 6.9   HGB 15.8   MCV 89         POTASSIUM 4.4   CHLORIDE 103   CO2 30   BUN 20   CR 1.28*   GFRESTIMATED 56*   GFRESTBLACK 68   ANIONGAP 5   WILBERT 9.4   *   TROPI <0.015     Recent Labs   Lab Test 01/17/14 01/20/11   0521   CHOL  178  169   HDL  46  47   LDL   --   89   TRIG  100  162*   CHOLHDLRATIO  3.87  3.6       Recent Labs  Lab 11/23/18  1030   WBC 6.9   HGB 15.8   HCT 48.3   MCV 89        No results for input(s): PH, PHV, PO2, PO2V, SAT, PCO2, PCO2V,   HCO3, HCO3V in the last 168 hours.  No results for input(s): NTBNPI, NTBNP in the last 168 hours.  No results for input(s): DD in the last 168 hours.  No results for input(s): SED, CRP in the last 168 hours.    Recent Labs  Lab 11/23/18  1030        No results for input(s): TSH in the last 168 hours.  No results for input(s): COLOR, APPEARANCE, URINEGLC, URINEBILI,   URINEKETONE, SG, UBLD, URINEPH, PROTEIN, UROBILINOGEN, NITRITE,   LEUKEST, RBCU, WBCU in the last 168 hours.    Imaging:  Recent Results (from the past 48 hour(s))   Chest XR,  PA & LAT    Narrative    CHEST TWO VIEWS  11/23/2018 10:50 AM     HISTORY:  Shortness of " breath.    COMPARISON: 10/4/2018.      Impression    IMPRESSION: Sternotomy and postoperative changes involving the   mitral  valve are noted. Hyperinflation both lungs. The lungs are   otherwise  clear. No pleural effusions. Heart size and pulmonary vascularity   are  within normal limits. Aortic calcification.    LANI CHACON MD       Echo:  Recent Results (from the past 4320 hour(s))   Echocardiogram Complete    Narrative    [id number]  [id number]  [id number]  [id number]^DANISHA^JAIRON^JONATHAN           [facility]  Echocardiography Laboratory  [address] [place] [address]        Name: FELIPE AYOUB  MRN: [id number]  : 1950  Study Date: 10/05/2018 01:10 PM  Age: 67 yrs  Gender: Male  Patient Location: [facility]  Reason For Study: CHF  Ordering Physician: JAIRON RAMAN  Referring Physician: Ana Pratt  Performed By: Davi Brush RDCS     BSA: 2.0 m2  Height: 71 in  Weight: 173 lb  HR: 120  BP: 158/93 mmHg  __________________________________________________________________  ___________  __        Procedure  Complete Portable Echo Adult.  __________________________________________________________________  ___________  __        Interpretation Summary     The patient exhibited frequent PACs.  Left ventricular systolic function is normal.  The visual ejection fraction is estimated at 60-65%.  The left ventricle is normal in size.  The right ventricle is normal in structure, function and size.  An annuloplasty ring is noted in the tricuspid position.     There is very mild tricuspid stenosis ( mean diastolic gradient 4   mm Hg) due  to previous tricuspid valve repair. Since the last TTE 3/06251,   the patient  has converted to sinus tachycardia with frequent PACs ( was in   atrial  fibrillation). There has otherwise been no significant change.  __________________________________________________________________  ___________  __        Left Ventricle  The left ventricle is normal in size.  There is normal left   ventricular wall  thickness. Left ventricular systolic function is normal. Left   ventricular  diastolic function is indeterminate. The visual ejection fraction   is estimated  at 60-65%. No regional wall motion abnormalities noted. There is   no thrombus  seen in the left ventricle.     Right Ventricle  The right ventricle is normal in structure, function and size.   There is no  mass or thrombus in the right ventricle.     Atria  Normal left atrial size. Right atrial size is normal. There is no   atrial shunt  seen. The left atrial appendage is not well visualized.     Mitral Valve  The mitral valve leaflets appear normal. There is no evidence of   stenosis,  fluttering, or prolapse. There is no mitral regurgitation noted.   There is no  mitral valve stenosis.        Tricuspid Valve  There is trace tricuspid regurgitation. There is mild tricuspid   stenosis. An  annuloplasty ring is noted in the tricuspid position.     Aortic Valve  There is trivial trileaflet aortic sclerosis. No aortic   regurgitation is  present. No aortic stenosis is present.     Pulmonic Valve  Normal pulmonic valve. There is no pulmonic valvular   regurgitation. There is  no pulmonic valvular stenosis.     Vessels  The aortic root is normal size. Normal size ascending aorta. The   IVC is normal  in size and reactivity with respiration, suggesting normal   central venous  pressure. The pulmonary artery is normal size.     Pericardium  The pericardium appears normal. There is no pleural effusion.        Rhythm  The patient exhibited frequent PACs.  __________________________________________________________________  ___________  __  MMode/2D Measurements & Calculations  IVSd: 0.97 cm     LVIDd: 5.4 cm  LVIDs: 4.2 cm  LVPWd: 1.1 cm  FS: 23.3 %  LV mass(C)d: 218.5 grams  LV mass(C)dI: 110.2 grams/m2  Ao root diam: 3.6 cm  LA dimension: 3.6 cm  asc Aorta Diam: 3.3 cm  LA/Ao: 1.0  LVOT diam: 2.1 cm  LVOT area: 3.5 cm2  LA  Volume (BP): 38.0 ml  LA Volume Index (BP): 19.2 ml/m2  RWT: 0.41           Doppler Measurements & Calculations  TV V2 max: 126.0 cm/sec  TV max P.4 mmHg  TV mean P.0 mmHg  TV V2 VTI: 26.4 cm           __________________________________________________________________  ___________  __           Report approved by: Dr. Eliazar Schneider 10/05/2018 02:56 PM                     EKG results:   Performed on admission        Atrial fibrillation  Normal axis  Normal QRS interval  No electrical evidence of ischemia present      All imaging studies reviewed by me.      Attestation:  I have reviewed today's vital signs, notes, medications, labs and imaging.  Total time: 35 minutes     Marco Masters MD

## 2018-11-23 NOTE — PHARMACY-ADMISSION MEDICATION HISTORY
Admission medication history interview status for this patient is complete. See [facility] admission navigator for allergy information, prior to admission medications and immunization status.     Medication history interview source(s):Patient  Medication history resources (including written lists, pill bottles, clinic record): [facility] records      Changes made to PTA medication list:  Added: none, xarelto - see note below  Deleted: prednisone  Changed: trazodone 50mg BID --> 25mg at bedtime prn sleep     Actions taken by pharmacist (provider contacted, etc): Alerted provider     Additional medication history information:    **Patient has been on xarelto at home. His MD wants to switch him to Eliquis but due to cost he has not been able to fill or start this yet. He states he still has xarelto at home which he is continuing to take. He has a packet/coupon at home for 90 days free of Eliquis but has not filled it yet. I left both on his med list - MD to review and address at discharge.     **Patient states he stopped using his tiotropium and advair inhalers a few weeks ago due to drug interaction with propafenone (additive QT prolongation w/ salmeterol + propafenone, ?spiriva interaction).        Medication reconciliation/reorder completed by provider prior to medication history? Yes    Do you take OTC medications (eg tylenol, ibuprofen, fish oil, eye/ear drops, etc)? N(Y/N)    For patients on insulin therapy: N (Y/N)        Prior to Admission medications    Medication Sig Last Dose Taking? Auth Provider   albuterol (VENTOLIN HFA) 108 (90 Base) MCG/ACT inhaler Inhale 1-2 puffs into the lungs every 4 hours (while awake) PRN Past Month at Unknown time Yes Nata Mcduffie MD   diltiazem (CARDIZEM CD) 180 MG 24 hr capsule Take 1 capsule (180 mg) by mouth daily 11/22/2018 at am Yes Sussy Britt MD   ipratropium - albuterol 0.5 mg/2.5 mg/3 mL (DUONEB) 0.5-2.5 (3) MG/3ML neb solution Take 1 vial (3 mLs) by nebulization every 2 hours  as needed for wheezing Past Week at Unknown time Yes Nata Mcduffie MD   oxyCODONE-acetaminophen (PERCOCET) 5-325 MG per tablet Take 1 tablet by mouth every 6 hours as needed for severe pain Past Month at Unknown time Yes Reported, Patient   propafenone (RYTHMOL) 150 MG TABS tablet Take 1 tablet (150 mg) by mouth 3 times daily 11/22/2018 at pm Yes Sussy Britt MD   rivaroxaban ANTICOAGULANT (XARELTO) 20 MG TABS tablet Take 20 mg by mouth daily 11/22/2018 at am Yes Unknown, Entered By History   traZODone (DESYREL) 50 MG tablet Take 25 mg by mouth nightly as needed for sleep Past Week at Unknown time Yes Unknown, Entered By History   amoxicillin (AMOXIL) 500 MG capsule Take 2,000 mg by mouth as needed (Dental appt) Unknown at Unknown time  Unknown, Entered By History   apixaban ANTICOAGULANT (ELIQUIS) 5 MG tablet Take 1 tablet (5 mg) by mouth 2 times daily HAS NOT STARTED YET  Sussy Britt MD   fluticasone-salmeterol (ADVAIR DISKUS) 250-50 MCG/DOSE diskus inhaler Inhale 1 puff into the lungs 2 times daily 11/5/2018  Nata Mcduffie MD   order for DME Equipment being ordered: Nebulizer Unknown at Unknown time  Nata Mcduffie MD   order for DME Oxygen for nocturnal use. 1 LPM via nasal cannula  Testing done at home in chronic stable state.  Condition is COPD.  Patient does not have Obstructive Sleep Apnea.  Overnight oximetry revealed 30.3 minutes of hypoxia (<= 88%).  Duration: Lifetime (99 months). Unknown at Unknown time  Tyson Sanders MD   tiotropium (SPIRIVA) 18 MCG capsule Inhale 1 capsule (18 mcg) into the lungs daily 11/5/2018  Nata Mcduffie MD

## 2018-11-23 NOTE — IP AVS SNAPSHOT
[facility] [address] [facility]    [address]    [facility] [address]    Phone:  [phone]    Fax:  [phone]                                       After Visit Summary   11/23/2018    Tone Cooper    MRN: [id number]           After Visit Summary Signature Page     I have received my discharge instructions, and my questions have been answered. I have discussed any challenges I see with this plan with the nurse or doctor.    ..........................................................................................................................................  Patient/Patient Representative Signature      ..........................................................................................................................................  Patient Representative Print Name and Relationship to Patient    ..................................................               ................................................  Date                                   Time    ..........................................................................................................................................  Reviewed by Signature/Title    ...................................................              ..............................................  Date                                               Time          22EPIC Rev 08/18

## 2018-11-23 NOTE — ED NOTES
Road tested pt, complained of dizziness and SOB. O2 sats were at 89-91% while ambulating. Gait steady, but pt felt dizzy, and requested a walker for assistance. Pt reports he does not usually use a walker at home.

## 2018-11-24 ENCOUNTER — APPOINTMENT (OUTPATIENT)
Dept: CARDIOLOGY | Facility: CLINIC | Age: 68
DRG: 190 | End: 2018-11-24
Attending: INTERNAL MEDICINE
Payer: COMMERCIAL

## 2018-11-24 LAB
ANION GAP SERPL CALCULATED.3IONS-SCNC: 6 MMOL/L (ref 3–14)
BUN SERPL-MCNC: 27 MG/DL (ref 7–30)
CALCIUM SERPL-MCNC: 10 MG/DL (ref 8.5–10.1)
CHLORIDE SERPL-SCNC: 102 MMOL/L (ref 94–109)
CO2 SERPL-SCNC: 28 MMOL/L (ref 20–32)
CREAT SERPL-MCNC: 1.21 MG/DL (ref 0.66–1.25)
ERYTHROCYTE [DISTWIDTH] IN BLOOD BY AUTOMATED COUNT: 14 % (ref 10–15)
GFR SERPL CREATININE-BSD FRML MDRD: 60 ML/MIN/1.7M2
GLUCOSE SERPL-MCNC: 201 MG/DL (ref 70–99)
HCT VFR BLD AUTO: 47 % (ref 40–53)
HGB BLD-MCNC: 15.5 G/DL (ref 13.3–17.7)
INTERPRETATION ECG - MUSE: NORMAL
INTERPRETATION ECG - MUSE: NORMAL
LACTATE BLD-SCNC: 2.1 MMOL/L (ref 0.7–2)
MCH RBC QN AUTO: 29.5 PG (ref 26.5–33)
MCHC RBC AUTO-ENTMCNC: 33 G/DL (ref 31.5–36.5)
MCV RBC AUTO: 90 FL (ref 78–100)
PLATELET # BLD AUTO: 203 10E9/L (ref 150–450)
POTASSIUM SERPL-SCNC: 4.9 MMOL/L (ref 3.4–5.3)
RBC # BLD AUTO: 5.25 10E12/L (ref 4.4–5.9)
SODIUM SERPL-SCNC: 136 MMOL/L (ref 133–144)
WBC # BLD AUTO: 14 10E9/L (ref 4–11)

## 2018-11-24 PROCEDURE — 94640 AIRWAY INHALATION TREATMENT: CPT | Mod: 76

## 2018-11-24 PROCEDURE — 25000132 ZZH RX MED GY IP 250 OP 250 PS 637: Performed by: INTERNAL MEDICINE

## 2018-11-24 PROCEDURE — 80048 BASIC METABOLIC PNL TOTAL CA: CPT | Performed by: INTERNAL MEDICINE

## 2018-11-24 PROCEDURE — 94640 AIRWAY INHALATION TREATMENT: CPT

## 2018-11-24 PROCEDURE — 99233 SBSQ HOSP IP/OBS HIGH 50: CPT | Performed by: INTERNAL MEDICINE

## 2018-11-24 PROCEDURE — 36415 COLL VENOUS BLD VENIPUNCTURE: CPT | Performed by: INTERNAL MEDICINE

## 2018-11-24 PROCEDURE — 12000007 ZZH R&B INTERMEDIATE

## 2018-11-24 PROCEDURE — 93306 TTE W/DOPPLER COMPLETE: CPT

## 2018-11-24 PROCEDURE — 25000125 ZZHC RX 250: Performed by: INTERNAL MEDICINE

## 2018-11-24 PROCEDURE — 25500064 ZZH RX 255 OP 636: Performed by: INTERNAL MEDICINE

## 2018-11-24 PROCEDURE — 93306 TTE W/DOPPLER COMPLETE: CPT | Mod: 26 | Performed by: INTERNAL MEDICINE

## 2018-11-24 PROCEDURE — 99232 SBSQ HOSP IP/OBS MODERATE 35: CPT | Mod: 25 | Performed by: INTERNAL MEDICINE

## 2018-11-24 PROCEDURE — 83605 ASSAY OF LACTIC ACID: CPT | Performed by: INTERNAL MEDICINE

## 2018-11-24 PROCEDURE — 85027 COMPLETE CBC AUTOMATED: CPT | Performed by: INTERNAL MEDICINE

## 2018-11-24 PROCEDURE — 40000275 ZZH STATISTIC RCP TIME EA 10 MIN

## 2018-11-24 RX ORDER — PREDNISONE 20 MG/1
40 TABLET ORAL 2 TIMES DAILY WITH MEALS
Status: DISCONTINUED | OUTPATIENT
Start: 2018-11-24 | End: 2018-11-26

## 2018-11-24 RX ADMIN — IPRATROPIUM BROMIDE AND ALBUTEROL SULFATE 3 ML: .5; 3 SOLUTION RESPIRATORY (INHALATION) at 07:27

## 2018-11-24 RX ADMIN — PROPAFENONE HYDROCHLORIDE 150 MG: 150 TABLET, COATED ORAL at 16:03

## 2018-11-24 RX ADMIN — PROPAFENONE HYDROCHLORIDE 150 MG: 150 TABLET, COATED ORAL at 08:44

## 2018-11-24 RX ADMIN — DILTIAZEM HYDROCHLORIDE 360 MG: 180 CAPSULE, COATED, EXTENDED RELEASE ORAL at 08:44

## 2018-11-24 RX ADMIN — IPRATROPIUM BROMIDE AND ALBUTEROL SULFATE 3 ML: .5; 3 SOLUTION RESPIRATORY (INHALATION) at 12:09

## 2018-11-24 RX ADMIN — HUMAN ALBUMIN MICROSPHERES AND PERFLUTREN 3 ML: 10; .22 INJECTION, SOLUTION INTRAVENOUS at 09:53

## 2018-11-24 RX ADMIN — APIXABAN 5 MG: 5 TABLET, FILM COATED ORAL at 22:00

## 2018-11-24 RX ADMIN — APIXABAN 5 MG: 5 TABLET, FILM COATED ORAL at 08:44

## 2018-11-24 RX ADMIN — IPRATROPIUM BROMIDE AND ALBUTEROL SULFATE 3 ML: .5; 3 SOLUTION RESPIRATORY (INHALATION) at 15:09

## 2018-11-24 RX ADMIN — IPRATROPIUM BROMIDE AND ALBUTEROL SULFATE 3 ML: .5; 3 SOLUTION RESPIRATORY (INHALATION) at 20:06

## 2018-11-24 RX ADMIN — ACETAMINOPHEN 975 MG: 325 TABLET, FILM COATED ORAL at 18:47

## 2018-11-24 RX ADMIN — FLUTICASONE FUROATE AND VILANTEROL TRIFENATATE 1 PUFF: 200; 25 POWDER RESPIRATORY (INHALATION) at 07:27

## 2018-11-24 RX ADMIN — TRAMADOL HYDROCHLORIDE 50 MG: 50 TABLET, COATED ORAL at 22:04

## 2018-11-24 RX ADMIN — PREDNISONE 40 MG: 20 TABLET ORAL at 08:44

## 2018-11-24 RX ADMIN — ACETAMINOPHEN 975 MG: 325 TABLET, FILM COATED ORAL at 12:32

## 2018-11-24 RX ADMIN — Medication 1 LOZENGE: at 09:02

## 2018-11-24 RX ADMIN — PROPAFENONE HYDROCHLORIDE 150 MG: 150 TABLET, COATED ORAL at 22:00

## 2018-11-24 RX ADMIN — PREDNISONE 40 MG: 20 TABLET ORAL at 17:44

## 2018-11-24 RX ADMIN — LEVALBUTEROL HYDROCHLORIDE 1.25 MG: 1.25 SOLUTION RESPIRATORY (INHALATION) at 00:03

## 2018-11-24 RX ADMIN — TRAMADOL HYDROCHLORIDE 50 MG: 50 TABLET, COATED ORAL at 16:03

## 2018-11-24 ASSESSMENT — ACTIVITIES OF DAILY LIVING (ADL)
ADLS_ACUITY_SCORE: 11

## 2018-11-24 NOTE — PLAN OF CARE
Problem: Patient Care Overview  Goal: Plan of Care/Patient Progress Review  Outcome: No Change  Tele-Afib CVR.  VSS.  2.5 LPM nasal canula O2 at 94%.  Echo planned for today.  Cariology following.  Scheduled and prn nebs.  Tolerating reg diet well.

## 2018-11-24 NOTE — PLAN OF CARE
A&O x 4. Up with SBA. Tylenol for HA. IV SL. A fib CVR on tele. O2 2.5 L. Dyspnea on exertion, non-productive cough. Exp wheezes noted (this has improved since yesterday). Echo done today.

## 2018-11-24 NOTE — PLAN OF CARE
Problem: Patient Care Overview  Goal: Plan of Care/Patient Progress Review  Outcome: No Change  VSS, c/o right sided chest pain with SOB, Tylenol given PRN for headache, 2L NC satting at 96 %. Reg diet tolerating well,  Cardiology following, PIV SL, Tele A-fib RVR, CVR at times, Echo tomorrow, scheduled nebs, Tramadol given PRN for sleep.

## 2018-11-24 NOTE — PROVIDER NOTIFICATION
This writer spoke with Madeline in Lab, the lactic acid is 2.1. This writer web paged Dr. Masters and updated Primary RN Sissy BARKER

## 2018-11-24 NOTE — PROGRESS NOTES
"[facility] Cardiology Progress Note       Assessment and Plan:   Severe COPD.  PAF:  Will be on Eliquis.  Predominant rhythm is NSR with APC's  S/P PVC ablation.  Intact tricuspid valve repair.  HTN.    No evidence of CHF.  COPD exacerbation is main issue.  Pt should remain on propafenone.  Aim is not complete eradication of PAF but minimalization of symptomatic recurrences.  Explained to pt that PAF episodes will likely be more frequent as COPD worsens.            Interval History:   Still OOB.   Remains in NSR, occ.  APC's                    Medications:     Current Facility-Administered Medications   Medication     acetaminophen (TYLENOL) tablet 975 mg     apixaban ANTICOAGULANT (ELIQUIS) tablet 5 mg     benzocaine-menthol (CHLORASEPTIC) 6-10 MG lozenge 1 lozenge     diltiazem 24 hr capsule 360 mg     fluticasone-vilanterol (BREO ELLIPTA) 200-25 MCG/INH inhaler 1 puff     ipratropium - albuterol 0.5 mg/2.5 mg/3 mL (DUONEB) neb solution 3 mL     levalbuterol (XOPENEX) neb solution 1.25 mg     lidocaine (LMX4) cream     lidocaine 1 % 1 mL     melatonin tablet 1 mg     naloxone (NARCAN) injection 0.1-0.4 mg     ondansetron (ZOFRAN-ODT) ODT tab 4 mg    Or     ondansetron (ZOFRAN) injection 4 mg     Patient is already receiving anticoagulation with heparin, enoxaparin (LOVENOX), warfarin (COUMADIN)  or other anticoagulant medication     predniSONE (DELTASONE) tablet 40 mg     propafenone (RYTHMOL) tablet 150 mg     sodium chloride (PF) 0.9% PF flush 3 mL     sodium chloride (PF) 0.9% PF flush 3 mL     traMADol (ULTRAM) tablet 50 mg             Physical Exam:   Blood pressure 151/87, temperature 97.8  F (36.6  C), temperature source Oral, resp. rate 22, height 1.803 m (5' 11\"), weight 80.7 kg (178 lb), SpO2 92 %.  Wt Readings from Last 4 Encounters:   11/23/18 80.7 kg (178 lb)   11/05/18 82.1 kg (181 lb)   10/04/18 78.5 kg (173 lb)   11/17/17 83.3 kg (183 lb 9.6 oz)         Vital Sign Ranges  Temperature Temp  " Av.5  F (36.4  C)  Min: 97.2  F (36.2  C)  Max: 97.8  F (36.6  C)   Blood pressure Systolic (24hrs), Av , Min:132 , Max:151        Diastolic (24hrs), Av, Min:68, Max:87      Pulse No Data Recorded   Respirations Resp  Av.3  Min: 20  Max: 22   Pulse oximetry SpO2  Av.8 %  Min: 92 %  Max: 95 %         Intake/Output Summary (Last 24 hours) at 18 1543  Last data filed at 18 1647   Gross per 24 hour   Intake              480 ml   Output                0 ml   Net              480 ml          Cardiovascular:   Normal apical impulse, regular rate and rhythm, normal S1 and S2, no S3 or S4, and no murmur noted   Chest: quiet BS with exp wheezing.  No peripheral oedema         Data:     Labs:  Lab Results   Component Value Date     2018    Lab Results   Component Value Date    CHLORIDE 102 2018    Lab Results   Component Value Date    BUN 27 2018      Lab Results   Component Value Date    POTASSIUM 4.9 2018    Lab Results   Component Value Date    CO2 28 2018    Lab Results   Component Value Date    CR 1.21 2018        Lab Results   Component Value Date    WBC 14.0 (H) 2018    HGB 15.5 2018    HCT 47.0 2018    MCV 90 2018     2018     Lab Results   Component Value Date    TROPONIN 0.01 2016    TROPI <0.015 2018           Attestation:  I have reviewed today's vital signs, notes, medications, labs and imaging.         DR HOOD MILLARD MD 2018  3:43 PM

## 2018-11-24 NOTE — PROGRESS NOTES
"[facility]  Hospitalist Progress Note  Marco Masters MD 11/24/2018    Reason for Stay (Diagnosis): dyspnea, multifactorial         Assessment and Plan:      Summary of Stay: Tone Cooper is a 67 year old man who returned to the hospital on 11/23/18 with activity-limiting dyspnea that has been bothering him since his last admission in October, 2018.       Initially, in the emergency department, the patient was thought to be having another COPD exacerbation.  He does apparently complain of wheezing and describes a cough that is productive when he has been lying down for any period of time.  However very clear that the patient is mostly bothered by extreme dyspnea that comes on with activity and abates with rest.    Problem List:   1. Multifactorial dyspnea, poss COPD exacerbation (v very advanced lung disease), but also with poorly controlled atrial dysrhythmia. On steroids and bronchodilators.   2. Chronic atrial fibrillation, known to Dr. Britt from [facility] Cardiology EP. On Propafenone and Diltiazem  mg daily (increased to 360 mg daily upon admission).  Cardiology consult requested.  3. HTN.     PLAN:  1.  Cont current cares including daily prednisone, propafenone, dilt, bronchodilators.   2.  Will need Pulmonary follow up with pulm rehab upon discharge.     DVT Prophylaxis: eliquis  Code Status: Full Code  Discharge Dispo: home expected  Estimated Disch Date / # of Days until Disch: likely 1-2 days        Interval History (Subjective):      Only minimally improved compared with yesterday.                   Physical Exam:      Last Vital Signs:  /68 (BP Location: Left arm)  Temp 97.6  F (36.4  C) (Oral)  Resp 20  Ht 1.803 m (5' 11\")  Wt 80.7 kg (178 lb)  SpO2 94%  BMI 24.83 kg/m2    I/O last 3 completed shifts:  In: 480 [P.O.:480]  Out: -     Constitutional: Awake, alert, cooperative, no apparent distress when at rest.  Following a walk down the murphy at a leisurely pace and with oxygen " at 4 L/min, patient is significantly dyspneic with markedly increased work of breathing.   Respiratory:  Decreased air entry bilaterally, no crackles.  Bilateral wheezes distant and very fine throughout prolonged expiratory phase.   Cardiovascular: Regular rate and rhythm, normal S1 and S2, and no murmur noted.  Distant heart sounds.   Abdomen: Normal bowel sounds, soft, non-distended, non-tender.     Skin: No rashes, no cyanosis, dry to touch   Neuro: Alert and oriented x3, no weakness, numbness, memory loss   Extremities:  Trace edema, normal range of motion   Other(s):  Ambulates without significant ataxia.       All other systems:  No elevated jugular venous distention.          Medications:      All current medications were reviewed with changes reflected in problem list.         Data:      All new lab and imaging data was reviewed.   Labs/Imaging:  Results for orders placed or performed during the hospital encounter of 11/23/18 (from the past 24 hour(s))   Basic metabolic panel   Result Value Ref Range    Sodium 136 133 - 144 mmol/L    Potassium 4.9 3.4 - 5.3 mmol/L    Chloride 102 94 - 109 mmol/L    Carbon Dioxide 28 20 - 32 mmol/L    Anion Gap 6 3 - 14 mmol/L    Glucose 201 (H) 70 - 99 mg/dL    Urea Nitrogen 27 7 - 30 mg/dL    Creatinine 1.21 0.66 - 1.25 mg/dL    GFR Estimate 60 (L) >60 mL/min/1.7m2    GFR Estimate If Black 72 >60 mL/min/1.7m2    Calcium 10.0 8.5 - 10.1 mg/dL   CBC with platelets   Result Value Ref Range    WBC 14.0 (H) 4.0 - 11.0 10e9/L    RBC Count 5.25 4.4 - 5.9 10e12/L    Hemoglobin 15.5 13.3 - 17.7 g/dL    Hematocrit 47.0 40.0 - 53.0 %    MCV 90 78 - 100 fl    MCH 29.5 26.5 - 33.0 pg    MCHC 33.0 31.5 - 36.5 g/dL    RDW 14.0 10.0 - 15.0 %    Platelet Count 203 150 - 450 10e9/L   Lactic acid level STAT for sepsis protocol   Result Value Ref Range    Lactate for Sepsis Protocol 2.1 (H) 0.7 - 2.0 mmol/L   ECHO COMPLETE WITH OPTISON    Narrative     [id number]  Replaced by [facility]  [id number]  [id number]^LUCIA^CALISTA^RATILAL           [facility]  Echocardiography Laboratory  [address]  [place] [address]        Name: FELIPE AYOUB  MRN: [id number]  : 1950  Study Date: 2018 09:27 AM  Age: 67 yrs  Gender: Male  Patient Location: [facility]  Reason For Study: SOB  Ordering Physician: CALISTA MYERS  Referring Physician: Jamar Overton  Performed By: Jennifer Denney RDCS     BSA: 2.0 m2  Height: 71 in  Weight: 178 lb  HR: 96  BP: 151/87 mmHg  _____________________________________________________________________________  __        Procedure  Complete Portable Echo Adult. Contrast Optison.  _____________________________________________________________________________  __        Interpretation Summary     The patient exhibited frequent PACs.  There is mild tricuspid stenosis.  An annuloplasty ring is noted in the tricuspid position.  The right ventricle is normal in structure, function and size.  Left ventricular systolic function is normal.  The visual ejection fraction is estimated at 55-60%.  The left ventricle is normal in size.     No change since 10/5/2018.  _____________________________________________________________________________  __        Left Ventricle  The left ventricle is normal in size. There is normal left ventricular wall  thickness. Left ventricular systolic function is normal. The visual ejection  fraction is estimated at 55-60%. Left ventricular diastolic function is  indeterminate. No regional wall motion abnormalities noted. No evidence of  left ventricular mass or tumors.     Right Ventricle  The right ventricle is normal in structure, function and size. There is no  mass or thrombus in the right ventricle.     Atria  Normal left atrial size. Right atrial size is normal. There is no atrial shunt  seen. The left atrial appendage is not well visualized.     Mitral Valve  The mitral valve leaflets appear normal. There is no  evidence of stenosis,  fluttering, or prolapse. There is no mitral regurgitation noted. There is no  mitral valve stenosis.        Tricuspid Valve  No tricuspid regurgitation. There is mild tricuspid stenosis. An annuloplasty  ring is noted in the tricuspid position.     Aortic Valve  There is mild trileaflet aortic sclerosis. No aortic regurgitation is present.  No aortic stenosis is present.     Pulmonic Valve  The pulmonic valve is not well seen, but is grossly normal. There is no  pulmonic valvular regurgitation. There is no pulmonic valvular stenosis.     Vessels  The aortic root is normal size. Normal size ascending aorta. The IVC is normal  in size and reactivity with respiration, suggesting normal central venous  pressure. The pulmonary artery is normal size.     Pericardium  The pericardium appears normal. There is no pleural effusion.        Rhythm  The patient exhibited frequent PACs.  _____________________________________________________________________________  __  MMode/2D Measurements & Calculations  IVSd: 2.4 cm     LVIDd: 5.5 cm  LVIDs: 4.2 cm  LVPWd: 0.98 cm  FS: 23.6 %  LV mass(C)d: 446.4 grams  LV mass(C)dI: 222.4 grams/m2  Ao root diam: 3.7 cm  asc Aorta Diam: 3.1 cm  LA Volume Indexed (AL/bp): 15.1 ml/m2  RWT: 0.36  TAPSE: 1.4 cm           Doppler Measurements & Calculations  TV V2 max: 143.2 cm/sec  TV max P.2 mmHg  TV mean PG: 3.7 mmHg  TV V2 VTI: 34.0 cm           _____________________________________________________________________________  __           Report approved by: Dr. Eliazar Schneider 2018 12:21 PM

## 2018-11-25 LAB
ANION GAP SERPL CALCULATED.3IONS-SCNC: 6 MMOL/L (ref 3–14)
BUN SERPL-MCNC: 31 MG/DL (ref 7–30)
CALCIUM SERPL-MCNC: 10.4 MG/DL (ref 8.5–10.1)
CHLORIDE SERPL-SCNC: 101 MMOL/L (ref 94–109)
CO2 SERPL-SCNC: 28 MMOL/L (ref 20–32)
CREAT SERPL-MCNC: 1.15 MG/DL (ref 0.66–1.25)
GFR SERPL CREATININE-BSD FRML MDRD: 63 ML/MIN/1.7M2
GLUCOSE SERPL-MCNC: 185 MG/DL (ref 70–99)
POTASSIUM SERPL-SCNC: 4.8 MMOL/L (ref 3.4–5.3)
SODIUM SERPL-SCNC: 135 MMOL/L (ref 133–144)

## 2018-11-25 PROCEDURE — 99232 SBSQ HOSP IP/OBS MODERATE 35: CPT | Performed by: INTERNAL MEDICINE

## 2018-11-25 PROCEDURE — 94640 AIRWAY INHALATION TREATMENT: CPT | Mod: 76

## 2018-11-25 PROCEDURE — 87186 SC STD MICRODIL/AGAR DIL: CPT | Performed by: INTERNAL MEDICINE

## 2018-11-25 PROCEDURE — 87205 SMEAR GRAM STAIN: CPT | Performed by: INTERNAL MEDICINE

## 2018-11-25 PROCEDURE — 87070 CULTURE OTHR SPECIMN AEROBIC: CPT | Performed by: INTERNAL MEDICINE

## 2018-11-25 PROCEDURE — 12000007 ZZH R&B INTERMEDIATE

## 2018-11-25 PROCEDURE — 87077 CULTURE AEROBIC IDENTIFY: CPT | Performed by: INTERNAL MEDICINE

## 2018-11-25 PROCEDURE — 40000275 ZZH STATISTIC RCP TIME EA 10 MIN

## 2018-11-25 PROCEDURE — 25000132 ZZH RX MED GY IP 250 OP 250 PS 637: Performed by: INTERNAL MEDICINE

## 2018-11-25 PROCEDURE — 99231 SBSQ HOSP IP/OBS SF/LOW 25: CPT | Performed by: INTERNAL MEDICINE

## 2018-11-25 PROCEDURE — 94640 AIRWAY INHALATION TREATMENT: CPT

## 2018-11-25 PROCEDURE — 36415 COLL VENOUS BLD VENIPUNCTURE: CPT | Performed by: INTERNAL MEDICINE

## 2018-11-25 PROCEDURE — 25000125 ZZHC RX 250: Performed by: INTERNAL MEDICINE

## 2018-11-25 PROCEDURE — 80048 BASIC METABOLIC PNL TOTAL CA: CPT | Performed by: INTERNAL MEDICINE

## 2018-11-25 RX ORDER — LEVALBUTEROL INHALATION SOLUTION 1.25 MG/3ML
1.25 SOLUTION RESPIRATORY (INHALATION) 4 TIMES DAILY
Status: DISCONTINUED | OUTPATIENT
Start: 2018-11-25 | End: 2018-11-26 | Stop reason: HOSPADM

## 2018-11-25 RX ADMIN — APIXABAN 5 MG: 5 TABLET, FILM COATED ORAL at 21:22

## 2018-11-25 RX ADMIN — FLUTICASONE FUROATE AND VILANTEROL TRIFENATATE 1 PUFF: 200; 25 POWDER RESPIRATORY (INHALATION) at 07:51

## 2018-11-25 RX ADMIN — APIXABAN 5 MG: 5 TABLET, FILM COATED ORAL at 07:55

## 2018-11-25 RX ADMIN — IPRATROPIUM BROMIDE AND ALBUTEROL SULFATE 3 ML: .5; 3 SOLUTION RESPIRATORY (INHALATION) at 07:51

## 2018-11-25 RX ADMIN — PREDNISONE 40 MG: 20 TABLET ORAL at 07:55

## 2018-11-25 RX ADMIN — PROPAFENONE HYDROCHLORIDE 150 MG: 150 TABLET, COATED ORAL at 16:33

## 2018-11-25 RX ADMIN — ACETAMINOPHEN 975 MG: 325 TABLET, FILM COATED ORAL at 01:21

## 2018-11-25 RX ADMIN — LEVALBUTEROL HYDROCHLORIDE 1.25 MG: 1.25 SOLUTION RESPIRATORY (INHALATION) at 21:00

## 2018-11-25 RX ADMIN — DILTIAZEM HYDROCHLORIDE 360 MG: 180 CAPSULE, COATED, EXTENDED RELEASE ORAL at 07:55

## 2018-11-25 RX ADMIN — AMOXICILLIN AND CLAVULANATE POTASSIUM 1 TABLET: 875; 125 TABLET, FILM COATED ORAL at 07:54

## 2018-11-25 RX ADMIN — LEVALBUTEROL HYDROCHLORIDE 1.25 MG: 1.25 SOLUTION RESPIRATORY (INHALATION) at 00:30

## 2018-11-25 RX ADMIN — PROPAFENONE HYDROCHLORIDE 150 MG: 150 TABLET, COATED ORAL at 21:22

## 2018-11-25 RX ADMIN — AMOXICILLIN AND CLAVULANATE POTASSIUM 1 TABLET: 875; 125 TABLET, FILM COATED ORAL at 19:36

## 2018-11-25 RX ADMIN — LEVALBUTEROL HYDROCHLORIDE 1.25 MG: 1.25 SOLUTION RESPIRATORY (INHALATION) at 23:55

## 2018-11-25 RX ADMIN — LEVALBUTEROL HYDROCHLORIDE 1.25 MG: 1.25 SOLUTION RESPIRATORY (INHALATION) at 04:09

## 2018-11-25 RX ADMIN — LEVALBUTEROL HYDROCHLORIDE 1.25 MG: 1.25 SOLUTION RESPIRATORY (INHALATION) at 15:35

## 2018-11-25 RX ADMIN — PROPAFENONE HYDROCHLORIDE 150 MG: 150 TABLET, COATED ORAL at 07:55

## 2018-11-25 RX ADMIN — TRAMADOL HYDROCHLORIDE 50 MG: 50 TABLET, COATED ORAL at 16:28

## 2018-11-25 RX ADMIN — TRAMADOL HYDROCHLORIDE 50 MG: 50 TABLET, COATED ORAL at 22:53

## 2018-11-25 RX ADMIN — LEVALBUTEROL HYDROCHLORIDE 1.25 MG: 1.25 SOLUTION RESPIRATORY (INHALATION) at 11:43

## 2018-11-25 RX ADMIN — TRAMADOL HYDROCHLORIDE 50 MG: 50 TABLET, COATED ORAL at 04:02

## 2018-11-25 RX ADMIN — PREDNISONE 40 MG: 20 TABLET ORAL at 13:53

## 2018-11-25 ASSESSMENT — ACTIVITIES OF DAILY LIVING (ADL)
ADLS_ACUITY_SCORE: 11

## 2018-11-25 NOTE — PROGRESS NOTES
[facility]  Hospitalist Progress Note  Marco Masters MD 11/25/2018    Reason for Stay (Diagnosis): dyspnea, multifactorial         Assessment and Plan:      Summary of Stay: Tone Cooper is a 67 year old man who returned to the hospital on 11/23/18 with activity-limiting dyspnea that has been bothering him since his last admission in October, 2018.       Initially, in the emergency department, the patient was thought to be having another COPD exacerbation.  He does apparently complain of wheezing and describes a cough that is productive when he has been lying down for any period of time.  However very clear that the patient is mostly bothered by extreme dyspnea that comes on with activity and abates with rest.    Problem List:   1. Multifactorial dyspnea, poss COPD exacerbation (v very advanced lung disease), but also with poorly controlled atrial dysrhythmia. On steroids and bronchodilators.   2. Chronic atrial fibrillation, known to Dr. Britt from [facility] EP. On Propafenone and Diltiazem  mg daily (increased to 360 mg daily upon admission).  Heart rate appears better controlled.  3. HTN.  Somewhat better controlled.    PLAN:  1.  Cont current cares including daily prednisone, propafenone, dilt, bronchodilators.   2.  Pulmonary follow up with pulm rehab upon discharge.  Plan a long slow taper of steroids.  3.  Need to clarify which medication patient is on for anticoagulation.  Apparently cost/insurance issues have been problematic.  4.  Discontinue Advair.  The salmeterol interacts with propafenone (QT interval prolongation).  We will start Pulmicort on discharge.  5.  Patient states that he is more comfortable with Xopenex and he had been with albuterol alone.    DVT Prophylaxis: eliquis  Code Status: Full Code  Discharge Dispo: home expected  Estimated Disch Date / # of Days until Disch: likely tomorrow.        Interval History (Subjective):      Ongoing stability but overall little  "improvement from admission.                  Physical Exam:      Last Vital Signs:  /75 (BP Location: Right arm)  Temp 96.2  F (35.7  C) (Oral)  Resp 18  Ht 1.803 m (5' 11\")  Wt 80.7 kg (178 lb)  SpO2 93%  BMI 24.83 kg/m2    I/O last 3 completed shifts:  In: 103 [P.O.:100; I.V.:3]  Out: -     Constitutional: Awake, alert, cooperative, no apparent distress when at rest.     Respiratory:  Decreased air entry bilaterally, no crackles.  Bilateral wheezes distant and very fine throughout with prolonged expiratory phase.   Cardiovascular: IRRIR, normal S1 and S2, and no murmur noted.  Distant heart sounds.   Abdomen: Normal bowel sounds, soft, non-distended, non-tender.     Skin: No rashes, no cyanosis, dry to touch   Neuro: Alert and oriented x3, no weakness, numbness, memory loss   Extremities:  Trace edema, normal range of motion   Other(s):  Ambulates without significant ataxia.       All other systems:  No elevated jugular venous distention.          Medications:      All current medications were reviewed with changes reflected in problem list.         Data:      All new lab and imaging data was reviewed.   Labs/Imaging:  Results for orders placed or performed during the hospital encounter of 11/23/18 (from the past 24 hour(s))   Basic metabolic panel   Result Value Ref Range    Sodium 135 133 - 144 mmol/L    Potassium 4.8 3.4 - 5.3 mmol/L    Chloride 101 94 - 109 mmol/L    Carbon Dioxide 28 20 - 32 mmol/L    Anion Gap 6 3 - 14 mmol/L    Glucose 185 (H) 70 - 99 mg/dL    Urea Nitrogen 31 (H) 7 - 30 mg/dL    Creatinine 1.15 0.66 - 1.25 mg/dL    GFR Estimate 63 >60 mL/min/1.7m2    GFR Estimate If Black 77 >60 mL/min/1.7m2    Calcium 10.4 (H) 8.5 - 10.1 mg/dL         "

## 2018-11-25 NOTE — PLAN OF CARE
Problem: Patient Care Overview  Goal: Plan of Care/Patient Progress Review  A&O, SBA, voiding but pt refuses to use urinal to measure amounts, Reg diet tolerating well, Echo 55-60%. On 2.5L NC here sats 94%. C/o HA due to SOB after ambulating to bathroom, Tramadol and Tylenol given with relief, Tele SR, PIV SL, scheduled nebs, prednisone upped to BID, Continue to monitor.

## 2018-11-25 NOTE — PROGRESS NOTES
"[facility] Cardiology Progress Note       Assessment and Plan:   COPD exacerbation.  PAF:  Stable  Intact TV repair.    Will sign off.  Pt has appt with Dr Britt in -.            Interval History:   SOB improving, less wheezing on PE.  Using new nebulizer.                  Medications:     Current Facility-Administered Medications   Medication     acetaminophen (TYLENOL) tablet 975 mg     amoxicillin-clavulanate (AUGMENTIN) 875-125 MG per tablet 1 tablet     apixaban ANTICOAGULANT (ELIQUIS) tablet 5 mg     benzocaine-menthol (CHLORASEPTIC) 6-10 MG lozenge 1 lozenge     diltiazem 24 hr capsule 360 mg     fluticasone-vilanterol (BREO ELLIPTA) 200-25 MCG/INH inhaler 1 puff     ipratropium - albuterol 0.5 mg/2.5 mg/3 mL (DUONEB) neb solution 3 mL     levalbuterol (XOPENEX) neb solution 1.25 mg     lidocaine (LMX4) cream     lidocaine 1 % 1 mL     melatonin tablet 1 mg     naloxone (NARCAN) injection 0.1-0.4 mg     ondansetron (ZOFRAN-ODT) ODT tab 4 mg    Or     ondansetron (ZOFRAN) injection 4 mg     Patient is already receiving anticoagulation with heparin, enoxaparin (LOVENOX), warfarin (COUMADIN)  or other anticoagulant medication     predniSONE (DELTASONE) tablet 40 mg     propafenone (RYTHMOL) tablet 150 mg     sodium chloride (PF) 0.9% PF flush 3 mL     sodium chloride (PF) 0.9% PF flush 3 mL     traMADol (ULTRAM) tablet 50 mg             Physical Exam:   Blood pressure 137/75, temperature 96.2  F (35.7  C), temperature source Oral, resp. rate 18, height 1.803 m (5' 11\"), weight 80.7 kg (178 lb), SpO2 95 %.  Wt Readings from Last 4 Encounters:   18 80.7 kg (178 lb)   18 82.1 kg (181 lb)   10/04/18 78.5 kg (173 lb)   17 83.3 kg (183 lb 9.6 oz)         Vital Sign Ranges  Temperature Temp  Av  F (36.1  C)  Min: 96.2  F (35.7  C)  Max: 97.9  F (36.6  C)   Blood pressure Systolic (24hrs), Av , Min:137 , Max:164        Diastolic (24hrs), Av, Min:62, Max:88      Pulse No Data " Recorded   Respirations Resp  Av  Min: 18  Max: 20   Pulse oximetry SpO2  Av.6 %  Min: 91 %  Max: 95 %         Intake/Output Summary (Last 24 hours) at 18 09  Last data filed at 18 0653   Gross per 24 hour   Intake              103 ml   Output                0 ml   Net              103 ml          Cardiovascular:   Normal apical impulse, regular rate and rhythm, normal S1 and S2, no S3 or S4, and no murmur noted   Chest:  Decreased AE, but less wheezing.  No peripheral oedema         Data:     Labs:  Lab Results   Component Value Date     2018    Lab Results   Component Value Date    CHLORIDE 101 2018    Lab Results   Component Value Date    BUN 31 2018      Lab Results   Component Value Date    POTASSIUM 4.8 2018    Lab Results   Component Value Date    CO2 28 2018    Lab Results   Component Value Date    CR 1.15 2018        Lab Results   Component Value Date    WBC 14.0 (H) 2018    HGB 15.5 2018    HCT 47.0 2018    MCV 90 2018     2018     Lab Results   Component Value Date    TROPONIN 0.01 2016    TROPI <0.015 2018           Attestation:  I have reviewed today's vital signs, notes, medications, labs and imaging.         DR HOOD MILLARD MD 2018  9:29 AM

## 2018-11-25 NOTE — PLAN OF CARE
Problem: Patient Care Overview  Goal: Plan of Care/Patient Progress Review  Outcome: Improving  Pt admitted 11/23 for dyspnea. Dx: COPD exacerbation vs. Advanced lung disease. Pt AOx4. Forgetful at times. Ind w/ transfers. Headache from coughing managed with tylenol and tramadol. Pt on 2.5 L of O2 via n/c. Requested 2 prn nebs during NOC. LS diminished throughout. Tele : a.fib CVR. IV to RA saline locked. Plan for discharge back home with wife in 1-2 days. Continue plan of care.

## 2018-11-26 VITALS
HEART RATE: 55 BPM | RESPIRATION RATE: 22 BRPM | OXYGEN SATURATION: 96 % | DIASTOLIC BLOOD PRESSURE: 87 MMHG | SYSTOLIC BLOOD PRESSURE: 152 MMHG | BODY MASS INDEX: 24.92 KG/M2 | WEIGHT: 178 LBS | HEIGHT: 71 IN | TEMPERATURE: 97.7 F

## 2018-11-26 PROBLEM — J96.22 ACUTE ON CHRONIC RESPIRATORY FAILURE WITH HYPOXIA AND HYPERCAPNIA (H): Status: ACTIVE | Noted: 2018-11-26

## 2018-11-26 PROBLEM — J96.21 ACUTE ON CHRONIC RESPIRATORY FAILURE WITH HYPOXIA AND HYPERCAPNIA (H): Status: ACTIVE | Noted: 2018-11-26

## 2018-11-26 LAB
GRAM STN SPEC: NORMAL
LACTATE BLD-SCNC: 1.3 MMOL/L (ref 0.7–2)
Lab: NORMAL
SPECIMEN SOURCE: NORMAL

## 2018-11-26 PROCEDURE — 94640 AIRWAY INHALATION TREATMENT: CPT | Mod: 76

## 2018-11-26 PROCEDURE — 94640 AIRWAY INHALATION TREATMENT: CPT

## 2018-11-26 PROCEDURE — 40000275 ZZH STATISTIC RCP TIME EA 10 MIN

## 2018-11-26 PROCEDURE — 25000132 ZZH RX MED GY IP 250 OP 250 PS 637: Performed by: INTERNAL MEDICINE

## 2018-11-26 PROCEDURE — 25000125 ZZHC RX 250: Performed by: INTERNAL MEDICINE

## 2018-11-26 PROCEDURE — 83605 ASSAY OF LACTIC ACID: CPT | Performed by: INTERNAL MEDICINE

## 2018-11-26 PROCEDURE — 36415 COLL VENOUS BLD VENIPUNCTURE: CPT | Performed by: INTERNAL MEDICINE

## 2018-11-26 PROCEDURE — 99239 HOSP IP/OBS DSCHRG MGMT >30: CPT | Performed by: INTERNAL MEDICINE

## 2018-11-26 RX ORDER — PREDNISONE 10 MG/1
TABLET ORAL
Qty: 56 TABLET | Refills: 0 | Status: SHIPPED | OUTPATIENT
Start: 2018-11-26 | End: 2018-11-26

## 2018-11-26 RX ORDER — LEVALBUTEROL INHALATION SOLUTION 1.25 MG/3ML
1 SOLUTION RESPIRATORY (INHALATION) EVERY 4 HOURS PRN
Qty: 270 ML | Refills: 1 | Status: SHIPPED | OUTPATIENT
Start: 2018-11-26 | End: 2019-06-28

## 2018-11-26 RX ORDER — LEVALBUTEROL INHALATION SOLUTION 1.25 MG/3ML
1.25 SOLUTION RESPIRATORY (INHALATION) 4 TIMES DAILY
Qty: 270 ML | Refills: 0 | Status: SHIPPED | OUTPATIENT
Start: 2018-11-26 | End: 2019-02-11

## 2018-11-26 RX ORDER — DILTIAZEM HYDROCHLORIDE 180 MG/1
360 CAPSULE, COATED, EXTENDED RELEASE ORAL DAILY
Qty: 90 CAPSULE | Refills: 3 | Status: ON HOLD
Start: 2018-11-26 | End: 2019-01-08

## 2018-11-26 RX ORDER — PREDNISONE 20 MG/1
TABLET ORAL
Qty: 56 TABLET | Refills: 0 | Status: SHIPPED | OUTPATIENT
Start: 2018-11-26 | End: 2018-12-17

## 2018-11-26 RX ORDER — PREDNISONE 20 MG/1
20 TABLET ORAL ONCE
Status: COMPLETED | OUTPATIENT
Start: 2018-11-26 | End: 2018-11-26

## 2018-11-26 RX ADMIN — PREDNISONE 20 MG: 20 TABLET ORAL at 09:48

## 2018-11-26 RX ADMIN — AMOXICILLIN AND CLAVULANATE POTASSIUM 1 TABLET: 875; 125 TABLET, FILM COATED ORAL at 08:36

## 2018-11-26 RX ADMIN — ACETAMINOPHEN 975 MG: 325 TABLET, FILM COATED ORAL at 09:48

## 2018-11-26 RX ADMIN — LEVALBUTEROL HYDROCHLORIDE 1.25 MG: 1.25 SOLUTION RESPIRATORY (INHALATION) at 05:44

## 2018-11-26 RX ADMIN — TRAMADOL HYDROCHLORIDE 50 MG: 50 TABLET, COATED ORAL at 05:09

## 2018-11-26 RX ADMIN — LEVALBUTEROL HYDROCHLORIDE 1.25 MG: 1.25 SOLUTION RESPIRATORY (INHALATION) at 11:43

## 2018-11-26 RX ADMIN — Medication 1 LOZENGE: at 09:49

## 2018-11-26 RX ADMIN — DILTIAZEM HYDROCHLORIDE 360 MG: 180 CAPSULE, COATED, EXTENDED RELEASE ORAL at 08:37

## 2018-11-26 RX ADMIN — PROPAFENONE HYDROCHLORIDE 150 MG: 150 TABLET, COATED ORAL at 08:37

## 2018-11-26 RX ADMIN — TRAMADOL HYDROCHLORIDE 50 MG: 50 TABLET, COATED ORAL at 11:33

## 2018-11-26 RX ADMIN — ACETAMINOPHEN 975 MG: 325 TABLET, FILM COATED ORAL at 00:05

## 2018-11-26 RX ADMIN — PREDNISONE 40 MG: 20 TABLET ORAL at 08:37

## 2018-11-26 RX ADMIN — APIXABAN 5 MG: 5 TABLET, FILM COATED ORAL at 08:37

## 2018-11-26 RX ADMIN — ACETAMINOPHEN 975 MG: 325 TABLET, FILM COATED ORAL at 15:28

## 2018-11-26 ASSESSMENT — ACTIVITIES OF DAILY LIVING (ADL)
ADLS_ACUITY_SCORE: 11

## 2018-11-26 NOTE — CONSULTS
Care Coordination:  CTS consulted for arranging Pulmonary follow up and OP Pulm rehab. Pt has been arranged follow up with Pulmonologist Dr Espinoza.    Follow Up appts:  You have been scheduled a follow up appt with Pulmonology at [facility] with Dr Espinoza on Thurs Dec 27th at 10:00. They will send you an information packet and you will receive a reminder phone call of your appt.     [facility]  [address], [address], [place] [address]   [phone]    Pulmonary Rehab: pt needs an OP Pulm Rehab referral and they will call him to schedule rehab sessions. MD paged to place order for dc.    Lana Sofia RN CTS  Care Transitions  [phone]

## 2018-11-26 NOTE — PLAN OF CARE
Problem: Patient Care Overview  Goal: Plan of Care/Patient Progress Review  Outcome: Improving  VSS, A&O forgetful at times, SOB with ambulation, Reg diet tolerating well, Tele SB with pac's (HR 55), Tramadol given for HA with relief, scheduled nebs, PIV SL, refusing skin assessment of LE, PO Augmentin and prednisone. Continue to monitor. Likely to discharge tomorrow.

## 2018-11-26 NOTE — DISCHARGE SUMMARY
[facility] Discharge Summary    Tone Cooper MRN# [id number]   Age: 67 year old YOB: 1950     Date of Admission:  11/23/2018  Date of Discharge::  11/26/2018  Admitting Physician:  Marco Masters MD  Discharge Physician:  Marco Masters MD     Home clinic: [facility]          Admission Diagnoses:   COPD exacerbation (H) [J44.1]          Discharge Diagnosis:   Principal Problem:    Acute on chronic respiratory failure with hypoxia. This dx is based on the need for increased flow of oxygen upon admission and throughout hospitalization.  Active Problems:    COPD exacerbation (H)    Atrial fibrillation with rapid ventricular response (H)    Dyspnea            Procedures:   CXR  Echocardiogram.       Interpretation Summary     The patient exhibited frequent PACs.  There is mild tricuspid stenosis.  An annuloplasty ring is noted in the tricuspid position.  The right ventricle is normal in structure, function and size.  Left ventricular systolic function is normal.  The visual ejection fraction is estimated at 55-60%.  The left ventricle is normal in size.     No change since 10/5/2018.          Medications Prior to Admission:     Prescriptions Prior to Admission   Medication Sig Dispense Refill Last Dose     albuterol (VENTOLIN HFA) 108 (90 Base) MCG/ACT inhaler Inhale 1-2 puffs into the lungs every 4 hours (while awake) PRN 2 Inhaler 0 Past Month at Unknown time     oxyCODONE-acetaminophen (PERCOCET) 5-325 MG per tablet Take 1 tablet by mouth every 6 hours as needed for severe pain   Past Month at Unknown time     propafenone (RYTHMOL) 150 MG TABS tablet Take 1 tablet (150 mg) by mouth 3 times daily 270 tablet 3 11/22/2018 at pm     rivaroxaban ANTICOAGULANT (XARELTO) 20 MG TABS tablet Take 20 mg by mouth daily   11/22/2018 at am     traZODone (DESYREL) 50 MG tablet Take 25 mg by mouth nightly as needed for sleep   Past Week at Unknown time     amoxicillin (AMOXIL) 500 MG capsule  Take 2,000 mg by mouth as needed (Dental appt)   Unknown at Unknown time     apixaban ANTICOAGULANT (ELIQUIS) 5 MG tablet Take 1 tablet (5 mg) by mouth 2 times daily 60 tablet 11 HAS NOT STARTED YET     order for DME Equipment being ordered: Nebulizer 1 each 0 Unknown at Unknown time     order for DME Oxygen for nocturnal use. 1 LPM via nasal cannula  Testing done at home in chronic stable state.  Condition is COPD.  Patient does not have Obstructive Sleep Apnea.  Overnight oximetry revealed 30.3 minutes of hypoxia (<= 88%).  Duration: Lifetime (99 months). 1 each 99 Unknown at Unknown time     tiotropium (SPIRIVA) 18 MCG capsule Inhale 1 capsule (18 mcg) into the lungs daily 30 capsule 0 11/5/2018     [DISCONTINUED] fluticasone-salmeterol (ADVAIR DISKUS) 250-50 MCG/DOSE diskus inhaler Inhale 1 puff into the lungs 2 times daily 1 Inhaler 0 11/5/2018     [DISCONTINUED] ipratropium - albuterol 0.5 mg/2.5 mg/3 mL (DUONEB) 0.5-2.5 (3) MG/3ML neb solution Take 1 vial (3 mLs) by nebulization every 2 hours as needed for wheezing 360 mL 0 Past Week at Unknown time             Discharge Medications:     Discharge Medication List as of 11/26/2018  3:18 PM      START taking these medications    Details   amoxicillin-clavulanate (AUGMENTIN) 875-125 MG per tablet Take 1 tablet by mouth 2 times daily for 7 days, Disp-14 tablet, R-0, E-Prescribe      !! levalbuterol (XOPENEX) 1.25 MG/3ML neb solution Take 3 mLs (1.25 mg) by nebulization every 4 hours as needed for wheezing or shortness of breath / dyspnea, Disp-270 mL, R-1, E-Prescribe      !! levalbuterol (XOPENEX) 1.25 MG/3ML neb solution Take 3 mLs (1.25 mg) by nebulization 4 times daily, Disp-270 mL, R-0, E-Prescribe       !! - Potential duplicate medications found. Please discuss with provider.      CONTINUE these medications which have CHANGED    Details   apixaban ANTICOAGULANT (ELIQUIS) 5 MG tablet Take 1 tablet (5 mg) by mouth 2 times daily, Disp-180 tablet, R-1, Local  Print      diltiazem (CARDIZEM CD) 180 MG 24 hr capsule Take 2 capsules (360 mg) by mouth daily, Disp-90 capsule, R-3, No Print Out      predniSONE (DELTASONE) 20 MG tablet 60 mg po daily x 4 days. Taper daily dose by 10 mg every 4 days until 20 mg. Then take 20 mg daily until follow up., Disp-56 tablet, R-0, E-Prescribe         CONTINUE these medications which have NOT CHANGED    Details   albuterol (VENTOLIN HFA) 108 (90 Base) MCG/ACT inhaler Inhale 1-2 puffs into the lungs every 4 hours (while awake) PRN, Disp-2 Inhaler, R-0, E-PrescribePlease fill Albuterol and not Ventolin as Albuterol works much better than Ventolin      amoxicillin (AMOXIL) 500 MG capsule Take 2,000 mg by mouth as needed (Dental appt), Historical      !! order for DME Equipment being ordered: NebulizerDisp-1 each, R-0, Local Print      !! order for DME Oxygen for nocturnal use. 1 LPM via nasal cannula  Testing done at home in chronic stable state.  Condition is COPD.  Patient does not have Obstructive Sleep Apnea.  Overnight oximetry revealed 30.3 minutes of hypoxia (<= 88%).  Duration: Lifetime (99 mo nths).Disp-1 each, R-99, Local Print      oxyCODONE-acetaminophen (PERCOCET) 5-325 MG per tablet Take 1 tablet by mouth every 6 hours as needed for severe pain, Historical      propafenone (RYTHMOL) 150 MG TABS tablet Take 1 tablet (150 mg) by mouth 3 times daily, Disp-270 tablet, R-3, E-Prescribe      tiotropium (SPIRIVA) 18 MCG capsule Inhale 1 capsule (18 mcg) into the lungs daily, Disp-30 capsule, R-0, E-Prescribe      traZODone (DESYREL) 50 MG tablet Take 25 mg by mouth nightly as needed for sleep, Historical       !! - Potential duplicate medications found. Please discuss with provider.      STOP taking these medications       fluticasone-salmeterol (ADVAIR DISKUS) 250-50 MCG/DOSE diskus inhaler Comments:   Reason for Stopping:         ipratropium - albuterol 0.5 mg/2.5 mg/3 mL (DUONEB) 0.5-2.5 (3) MG/3ML neb solution Comments:   Reason  for Stopping:         rivaroxaban ANTICOAGULANT (XARELTO) 20 MG TABS tablet Comments:   Reason for Stopping:                     Consultations:   Consultation during this admission received from cardiology          Hospital Course:   Tone Cooper is a 67 year old man who returned to the hospital on 11/23/18 with activity-limiting dyspnea that has been bothering him since his last admission in October, 2018.  In fact, he reported that within 4 hours of his discharge, he was as breathless as he had been before admission when just walking 10-20 feet into the bathroom.      Initially, in the emergency department, the patient was thought to be having another COPD exacerbation.  He does complain of wheezing and describes a cough that is productive when he has been lying down for any period of time.  However it is very clear that the patient is mostly bothered by extreme dyspnea that comes on with activity and abates with rest.    On my first eval, I noticed that the patient became tachycardic (HR to the low 100's) even with animated talking. I therefore doubled his dose of Diltiazem, and although he had measurable improvement in HR control (70-80's while ambulating) this did little to improve his exercise tolerance.     He was admitted to a telemetry bed and was treated with usual therapies for COPD exacerbation. There was no chemical evidence of PE or MI, so advanced therapies for those issues were not pursued. I did empirically start the patient on Amox/Clav despite the absence of clear infectious processes (no increase in sputum production).    Mr. Cooper reportedly has O2 at home and uses it when he is not self-conscious about it. He refuses to use it when he is out in public, even just going to the store. He did seem to intermittently have reasonable tolerance for activity despite ambulating off supplemental oxygen, but he did agree that the O2 helps him feel better.  His exercise tolerance is drastically reduced,  "just on myu empirical observation.     Cardiology was consulted, due to my initial concern for inadequate heart rate control.  They felt that the dyspnea was virtually 100% related to the patient's primary lung issues.  Unfortunately, Mr. Cooper clearly has severely advanced emphysematous disease.  He wheezes even when he is well controlled with very poor air entry and markedly prolonged expiratory phase.  Surprisingly, he sometimes does not desaturate dramatically when he ambulates.    /87 (BP Location: Left arm)  Pulse 55  Temp 97.7  F (36.5  C) (Oral)  Resp 22  Ht 1.803 m (5' 11\")  Wt 80.7 kg (178 lb)  SpO2 96%  BMI 24.83 kg/m2  At the time of discharge, Mr. Cooper is alert, coherent and appropriate.  At rest he is in no apparent distress though with increased work of breathing.  Chest: Distant breath sounds bilaterally.  Distant wheezes heard throughout.  Perhaps fine crackles are also appreciated.  Very prolonged expiratory phase with I:E ratio 1:5 at least.  Heart: Distant heart sounds.  No obvious murmur noted.  Irregularly irregular heart rate.  Abdomen: Soft, nontender, nondistended.  No obvious masses appreciated.  Extremities: Trace pitting edema noted.          Discharge Instructions and Follow-Up:   Discharge diet: Regular   Discharge activity: Activity as tolerated   Discharge follow-up: Follow up with primary care provider as needed.   Follow up with Pulmonary medicine as planned.  Follow up with Dr. Britt as planned.   Referred to Pulmonary Rehab.           Discharge Disposition:   Discharged to home      Attestation:  I have reviewed today's vital signs, notes, medications, labs and imaging.  Total time: 45 minutes    Marco Masters MD     "

## 2018-11-26 NOTE — PLAN OF CARE
Problem: Patient Care Overview  Goal: Plan of Care/Patient Progress Review  Outcome: No Change  Pt triggered the sepsis protocol and lactate came back at 1.3.  A&O but forgetful at times.  Has sob with ambulating.  Tolerating regular diet.  Refusing skin assessment of LE.  Possible discharge home today.

## 2018-11-26 NOTE — PLAN OF CARE
Problem: Patient Care Overview  Goal: Plan of Care/Patient Progress Review  Outcome: Improving  VSS SOB/ERICKSON, on 3lo2 95-96%., ambulated in hallway with md on room and and o2. On PO steroids and scheduled Nebs, On PO Augmentin. Plan for discharge today.

## 2018-11-26 NOTE — PLAN OF CARE
Problem: Patient Care Overview  Goal: Plan of Care/Patient Progress Review  Outcome: Adequate for Discharge Date Met: 11/26/18  VSS, A/OX4, Independent transfer, discharge paperwork reviewed w/ pt, pt verbalized understanding of information, all questions answered, pt transported to car via wheelchair, pt transported home via personal vehicle.

## 2018-11-27 LAB — INTERPRETATION ECG - MUSE: NORMAL

## 2018-11-28 ENCOUNTER — TELEPHONE (OUTPATIENT)
Dept: CARDIOLOGY | Facility: CLINIC | Age: 68
End: 2018-11-28

## 2018-11-28 LAB
BACTERIA SPEC CULT: ABNORMAL
BACTERIA SPEC CULT: ABNORMAL
SPECIMEN SOURCE: ABNORMAL

## 2018-11-28 NOTE — TELEPHONE ENCOUNTER
Patient was evaluated by cardiology while inpatient for afib with RVR. Called patient and left  to discuss any post hospital d/c questions he may have, review medication changes, and confirm f/u appts. RN advised patient in  that he has an apt scheduled on 12/3/18 with Dr. Britt. Patient advised in  to call clinic with any cardiac related questions or concerns prior to this scheduled shaq't.

## 2018-11-30 LAB — INTERPRETATION ECG - MUSE: NORMAL

## 2018-12-03 ENCOUNTER — OFFICE VISIT (OUTPATIENT)
Dept: CARDIOLOGY | Facility: CLINIC | Age: 68
End: 2018-12-03
Attending: INTERNAL MEDICINE
Payer: COMMERCIAL

## 2018-12-03 VITALS
DIASTOLIC BLOOD PRESSURE: 90 MMHG | SYSTOLIC BLOOD PRESSURE: 156 MMHG | OXYGEN SATURATION: 95 % | HEART RATE: 62 BPM | HEIGHT: 70 IN | BODY MASS INDEX: 27.63 KG/M2 | WEIGHT: 193 LBS

## 2018-12-03 DIAGNOSIS — I48.0 PAROXYSMAL ATRIAL FIBRILLATION (H): ICD-10-CM

## 2018-12-03 PROCEDURE — 99214 OFFICE O/P EST MOD 30 MIN: CPT | Performed by: INTERNAL MEDICINE

## 2018-12-03 PROCEDURE — 93000 ELECTROCARDIOGRAM COMPLETE: CPT | Performed by: INTERNAL MEDICINE

## 2018-12-03 NOTE — MR AVS SNAPSHOT
After Visit Summary   12/3/2018    Tone Cooper    MRN: [id number]           Patient Information     Date Of Birth          1950        Visit Information        Provider Department      12/3/2018 10:15 AM Sussy Britt MD [facility]        Today's Diagnoses     Paroxysmal atrial fibrillation (H)           Follow-ups after your visit        Additional Services     Follow-Up with Electrophysiologist                 Your next 10 appointments already scheduled     Jan 03, 2019 10:30 AM CST   Pulmonary Eval with [facility]   [facility] ([facility])    [address] [address]  [facility] [address] [phone]              Future tests that were ordered for you today     Open Future Orders        Priority Expected Expires Ordered    Zio Patch Monitor Routine 12/10/2018 12/3/2019 12/3/2018    Follow-Up with Electrophysiologist Routine 3/1/2019 12/3/2019 12/3/2018            Who to contact     If you have questions or need follow up information about today's clinic visit or your schedule please contact [facility]   [place] directly at [phone].  Normal or non-critical lab and imaging results will be communicated to you by MyChart, letter or phone within 4 business days after the clinic has received the results. If you do not hear from us within 7 days, please contact the clinic through [employer] or phone. If you have a critical or abnormal lab result, we will notify you by phone as soon as possible.  Submit refill requests through [facility] or call your pharmacy and they will forward the refill request to us. Please allow 3 business days for your refill to be completed.          Additional Information About Your Visit        MyChart Information     [facility] lets you send messages to your doctor, view your test results, renew your prescriptions, schedule appointments and more. To sign  "up, go to [email or web address] . Click on \"Log in\" on the left side of the screen, which will take you to the Welcome page. Then click on \"Sign up Now\" on the right side of the page.     You will be asked to enter the access code listed below, as well as some personal information. Please follow the directions to create your username and password.     Your access code is: [email or web address]  Expires: 3/3/2019  9:35 AM     Your access code will  in 90 days. If you need help or a new code, please call your [place] clinic or [phone].        Care EveryWhere ID     This is your Care EveryWhere ID. This could be used by other organizations to access your [place] medical records  [id number]        Your Vitals Were     Pulse Height Pulse Oximetry BMI (Body Mass Index)          62 1.778 m (5' 10\") 95% 27.69 kg/m2         Blood Pressure from Last 3 Encounters:   18 156/90   18 152/87   18 108/80    Weight from Last 3 Encounters:   18 87.5 kg (193 lb)   18 80.7 kg (178 lb)   18 82.1 kg (181 lb)              We Performed the Following     EKG 12-lead complete w/read - Clinics (performed today)     Follow-Up with Electrophysiologist        Primary Care Provider Office Phone # Fax #    Ana Pratt [phone]       [facility] [address] [facility] [address]        Equal Access to Services     [facility]: Hadii cecy calvo hadasho Soomaali, waaxda luqadaha, qaybta kaalmada lux, carmine simental . So [facility] [phone].    ATENCIÓN: Si habla español, tiene a kirk disposición servicios gratuitos de asistencia lingüística. Llame al [phone].    We comply with applicable federal civil rights laws and [place] laws. We do not discriminate on the basis of race, color, national origin, age, disability, sex, sexual orientation, or gender identity.            Thank you!     Thank you for choosing [facility]" [facility]   [place]  for your care. Our goal is always to provide you with excellent care. Hearing back from our patients is one way we can continue to improve our services. Please take a few minutes to complete the written survey that you may receive in the mail after your visit with us. Thank you!             Your Updated Medication List - Protect others around you: Learn how to safely use, store and throw away your medicines at www.disposemymeds.org.          This list is accurate as of 12/3/18 10:39 AM.  Always use your most recent med list.                   Brand Name Dispense Instructions for use Diagnosis    albuterol 108 (90 Base) MCG/ACT inhaler    VENTOLIN HFA    2 Inhaler    Inhale 1-2 puffs into the lungs every 4 hours (while awake) PRN    COPD exacerbation (H)       amoxicillin 500 MG capsule    AMOXIL     Take 2,000 mg by mouth as needed (Dental appt)        amoxicillin-clavulanate 875-125 MG tablet    AUGMENTIN    14 tablet    Take 1 tablet by mouth 2 times daily for 7 days    COPD exacerbation (H), Acute on chronic respiratory failure with hypoxia and hypercapnia (H)       apixaban ANTICOAGULANT 5 MG tablet    ELIQUIS    180 tablet    Take 1 tablet (5 mg) by mouth 2 times daily    Paroxysmal atrial fibrillation (H)       diltiazem ER COATED BEADS 180 MG 24 hr capsule    CARDIZEM CD    90 capsule    Take 2 capsules (360 mg) by mouth daily    Paroxysmal atrial fibrillation (H)       * levalbuterol 1.25 MG/3ML neb solution    XOPENEX    270 mL    Take 3 mLs (1.25 mg) by nebulization every 4 hours as needed for wheezing or shortness of breath / dyspnea    COPD exacerbation (H), Acute on chronic respiratory failure with hypoxia and hypercapnia (H), Atrial fibrillation with rapid ventricular response (H)       * levalbuterol 1.25 MG/3ML neb solution    XOPENEX    270 mL    Take 3 mLs (1.25 mg) by nebulization 4 times daily    Acute on chronic respiratory failure with hypoxia and hypercapnia (H)       order  for DME     1 each    Oxygen for nocturnal use. 1 LPM via nasal cannula Testing done at home in chronic stable state. Condition is COPD.  Patient does not have Obstructive Sleep Apnea. Overnight oximetry revealed 30.3 minutes of hypoxia (<= 88%). Duration: Lifetime (99 months).    Nocturnal hypoxia       order for DME     1 each    Equipment being ordered: Nebulizer    COPD exacerbation (H)       oxyCODONE-acetaminophen 5-325 MG tablet    PERCOCET     Take 1 tablet by mouth every 6 hours as needed for severe pain        predniSONE 20 MG tablet    DELTASONE    56 tablet    60 mg po daily x 4 days. Taper daily dose by 10 mg every 4 days until 20 mg. Then take 20 mg daily until follow up.    COPD exacerbation (H), Acute on chronic respiratory failure with hypoxia and hypercapnia (H)       propafenone 150 MG Tabs tablet    RYTHMOL    270 tablet    Take 1 tablet (150 mg) by mouth 3 times daily    Paroxysmal atrial fibrillation (H)       tiotropium 18 MCG inhaled capsule    SPIRIVA    30 capsule    Inhale 1 capsule (18 mcg) into the lungs daily    COPD exacerbation (H)       traZODone 50 MG tablet    DESYREL     Take 25 mg by mouth nightly as needed for sleep        * Notice:  This list has 2 medication(s) that are the same as other medications prescribed for you. Read the directions carefully, and ask your doctor or other care provider to review them with you.

## 2018-12-03 NOTE — LETTER
12/3/2018    Ana Pratt MD  [facility] [address] [facility] [address]    RE: Tone Cooper       Dear Colleague,    I had the pleasure of seeing Tone Cooper in the [facility].    HPI and Plan:   See dictation    Orders Placed This Encounter   Procedures     Follow-Up with Electrophysiologist     EKG 12-lead complete w/read - Clinics (performed today)     Zio Patch Monitor       No orders of the defined types were placed in this encounter.      There are no discontinued medications.      Encounter Diagnosis   Name Primary?     Paroxysmal atrial fibrillation (H)        CURRENT MEDICATIONS:  Current Outpatient Prescriptions   Medication Sig Dispense Refill     albuterol (VENTOLIN HFA) 108 (90 Base) MCG/ACT inhaler Inhale 1-2 puffs into the lungs every 4 hours (while awake) PRN 2 Inhaler 0     amoxicillin (AMOXIL) 500 MG capsule Take 2,000 mg by mouth as needed (Dental appt)       amoxicillin-clavulanate (AUGMENTIN) 875-125 MG per tablet Take 1 tablet by mouth 2 times daily for 7 days 14 tablet 0     apixaban ANTICOAGULANT (ELIQUIS) 5 MG tablet Take 1 tablet (5 mg) by mouth 2 times daily 180 tablet 1     diltiazem (CARDIZEM CD) 180 MG 24 hr capsule Take 2 capsules (360 mg) by mouth daily 90 capsule 3     levalbuterol (XOPENEX) 1.25 MG/3ML neb solution Take 3 mLs (1.25 mg) by nebulization every 4 hours as needed for wheezing or shortness of breath / dyspnea 270 mL 1     levalbuterol (XOPENEX) 1.25 MG/3ML neb solution Take 3 mLs (1.25 mg) by nebulization 4 times daily 270 mL 0     order for DME Equipment being ordered: Nebulizer 1 each 0     order for DME Oxygen for nocturnal use. 1 LPM via nasal cannula  Testing done at home in chronic stable state.  Condition is COPD.  Patient does not have Obstructive Sleep Apnea.  Overnight oximetry revealed 30.3 minutes of hypoxia (<= 88%).  Duration: Lifetime (99 months). 1 each 99     oxyCODONE-acetaminophen (PERCOCET) 5-325 MG  per tablet Take 1 tablet by mouth every 6 hours as needed for severe pain       predniSONE (DELTASONE) 20 MG tablet 60 mg po daily x 4 days. Taper daily dose by 10 mg every 4 days until 20 mg. Then take 20 mg daily until follow up. 56 tablet 0     propafenone (RYTHMOL) 150 MG TABS tablet Take 1 tablet (150 mg) by mouth 3 times daily 270 tablet 3     traZODone (DESYREL) 50 MG tablet Take 25 mg by mouth nightly as needed for sleep       tiotropium (SPIRIVA) 18 MCG capsule Inhale 1 capsule (18 mcg) into the lungs daily (Patient not taking: Reported on 12/3/2018) 30 capsule 0       ALLERGIES     Allergies   Allergen Reactions     Flecainide Palpitations       PAST MEDICAL HISTORY:  Past Medical History:   Diagnosis Date     Atrial flutter (H)      COPD (chronic obstructive pulmonary disease) (H)      Diverticulitis      Hypertension      Persistent atrial fibrillation (H) 4/28/09    ablation 7/1/2015     Premature beats      PVC (premature ventricular contraction)     s/p ablation 12/5/2017     Tricuspid valve disorder     Dr Aj, Hx of valve repair      Ventricular tachycardia (H)     nonsustained       PAST SURGICAL HISTORY:  Past Surgical History:   Procedure Laterality Date     ABDOMEN SURGERY      hernia repair right     APPENDECTOMY       CARDIOVERSION  06/03/2009    successful for afib     CARDIOVERSION  4/20/15    successful for afib     CARDIOVERSION  5/28/15     H ABLATION ATRIAL FLUTTER       H ABLATION FOCAL AFIB  7/1/15    Left atrial linear ablation and pulmonary vein antrum ablation     H ABLATION PVC  12/5/16     INCISION AND DRAINAGE LOWER EXTREMITY, COMBINED Right 11/10/2016    Procedure: COMBINED INCISION AND DRAINAGE LOWER EXTREMITY;  Surgeon: Roger Pacheco MD;  Location:  OR     LAPAROSCOPIC CHOLECYSTECTOMY  1/6/2012    Procedure:LAPAROSCOPIC CHOLECYSTECTOMY; LAPAROSCOPIC CHOLECYSTECTOMY ; Surgeon:ROGER PACHECO; Location: OR     ORTHOPEDIC SURGERY      Left hip replacement      "REPAIR VALVE TRICUSPID  9/8/08    conversion to a bileaflet valve and application of a 30 mm Sanderson ring     SMALL INTESTINE SURGERY      had bowel obstruction age 9     VALVE REPLACEMENT      tricuspid       FAMILY HISTORY:  Family History   Problem Relation Age of Onset     Prostate Cancer Father      Family History Negative Mother      Emphysema Mother      Hypertension Mother      Cerebrovascular Disease Mother        SOCIAL HISTORY:  Social History     Social History     Marital status:      Spouse name: N/A     Number of children: N/A     Years of education: N/A     Social History Main Topics     Smoking status: Former Smoker     Quit date: 1/2/2008     Smokeless tobacco: Never Used     Alcohol use 0.0 oz/week     0 Standard drinks or equivalent per week      Comment: 3-6 per month     Drug use: No     Sexual activity: No     Other Topics Concern     Caffeine Concern No     1 a day      Sleep Concern Yes     nocturia     Weight Concern No     Special Diet No     Exercise No     Bike Helmet Yes     Seat Belt Yes     Parent/Sibling W/ Cabg, Mi Or Angioplasty Before 65f 55m? No     Social History Narrative       Review of Systems:  Skin:  Negative itching skin cancer removed recently   Eyes:  Positive for glasses    ENT:  Negative      Respiratory:  Positive for dyspnea on exertion;wheezing COPD   Cardiovascular:  Negative dizziness;lightheadedness;chest pain;Positive for chest pain on right side of chest  Gastroenterology: Negative melena;hematochezia    Genitourinary:  Negative nocturia    Musculoskeletal:  Positive for joint pain;arthritis L elbow  Neurologic:  Positive for headaches in the am  Psychiatric:  Positive for sleep disturbances sleeps poorly  Heme/Lymph/Imm:  Positive for allergies    Endocrine:  Negative diabetes      Physical Exam:  Vitals: /90 (BP Location: Left arm, Patient Position: Chair, Cuff Size: Adult Regular)  Pulse 62  Ht 1.778 m (5' 10\")  Wt 87.5 kg (193 lb)  SpO2 " 95%  BMI 27.69 kg/m2    Constitutional:  cooperative, alert and oriented, well developed, well nourished, in no acute distress        Skin:  warm and dry to the touch, no apparent skin lesions or masses noted          Head:  normocephalic, no masses or lesions        Eyes:  no xanthalasma;EOMS intact;sclera white;conjunctivae and lids unremarkable        Lymph:      ENT:  no pallor or cyanosis, dentition good        Neck:  JVP normal;no carotid bruit        Respiratory:  clear to auscultation diminished breath sounds bilaterally       Cardiac: normal S1 and S2;regular rhythm;no murmurs, gallops or rubs detected occasional premature beats              pulses full and equal                               right femoral bruit (-) left subclavian bruit (-)      GI:  abdomen soft        Extremities and Muscular Skeletal:  no deformities, clubbing, cyanosis, erythema observed;no edema              Neurological:           Psych:           CC  Sussy Britt MD  [address]  [address]  [place] [address]                Service Date: 12/03/2018      HISTORY OF PRESENT ILLNESS:  I saw Mr. Cooper for followup of atrial fibrillation/flutter.  He is a 67-year-old white male with a history of previous tricuspid repair and COPD.  He underwent catheter ablation of atrial fibrillation on 7/01/2015.  He received a pulmonary vein isolation with left atrial linear ablation at cavotricuspid isthmus ablation.  The patient did well for atrial arrhythmia until this year.  He did have 2 attempted PVC ablation without recurrence of frequent PVCs.  He had recurrent atrial flutter that was believed to be from the left atrium this year.  The patient was treated with propafenone 150 mg p.o. t.i.d.        After the last visit with me on 11/05, he had a readmission for shortness of breath.  He was diagnosed to have COPD exacerbation and is currently on prednisone.  He also received antibiotic therapy.  At the present time he has no cough, fever or  shortness of breath at rest.  With moderate exertion, he has some mild shortness of breath.  He has no chest pain or dizziness.  He does not have apparent side effects from propafenone.      PHYSICAL EXAMINATION:   VITAL SIGNS:  Blood pressure was 156/90, heart rate 62 beats per minute, body weight 193 pounds.   LUNGS:  Clear.   HEART:  Rhythm was regular and heart sounds were normal with no murmur.        The EKG today showed sinus rhythm with PACs.      ASSESSMENT AND RECOMMENDATIONS:  Mr. Cooper has some shortness of breath with exertion that is likely secondary to severe COPD.  His arrhythmia seems to be controlled with propafenone and he seemed to tolerate propafenone well at the current dose.  He will have a 2-week ZIO Patch monitor to exclude any asymptomatic atrial tachyarrhythmia.  The patient will return to see me in 2019.  At that time we will talk about if he should discontinue Eliquis, which is costing him more than $200 a month.        cc:      Ana Pratt MD    [address] [address]         NORIS BRITT MD             D: 2018   T: 2018   MT: SARANYA      Name:     FELIPE COOPER   MRN:      [id number]        Account:      [id number]   :      1950           Service Date: 2018      Document: [id number]        Thank you for allowing me to participate in the care of your patient.      Sincerely,     Noris Britt MD     [facility]    cc:   Noris Britt MD  [address]  [address]  [place] [address]

## 2018-12-03 NOTE — LETTER
12/3/2018      Ana Pratt MD  [facility] [address] [facility] [address]      RE: Tone Ruizgray       Dear Colleague,    I had the pleasure of seeing Tone Cooper in the [facility].    Service Date: 12/03/2018      HISTORY OF PRESENT ILLNESS:  I saw Mr. Cooper for followup of atrial fibrillation/flutter.  He is a 67-year-old white male with a history of previous tricuspid repair and COPD.  He underwent catheter ablation of atrial fibrillation on 7/01/2015.  He received a pulmonary vein isolation with left atrial linear ablation at cavotricuspid isthmus ablation.  The patient did well for atrial arrhythmia until this year.  He did have 2 attempted PVC ablation without recurrence of frequent PVCs.  He had recurrent atrial flutter that was believed to be from the left atrium this year.  The patient was treated with propafenone 150 mg p.o. t.i.d.        After the last visit with me on 11/05, he had a readmission for shortness of breath.  He was diagnosed to have COPD exacerbation and is currently on prednisone.  He also received antibiotic therapy.  At the present time he has no cough, fever or shortness of breath at rest.  With moderate exertion, he has some mild shortness of breath.  He has no chest pain or dizziness.  He does not have apparent side effects from propafenone.      PHYSICAL EXAMINATION:   VITAL SIGNS:  Blood pressure was 156/90, heart rate 62 beats per minute, body weight 193 pounds.   LUNGS:  Clear.   HEART:  Rhythm was regular and heart sounds were normal with no murmur.        The EKG today showed sinus rhythm with PACs.      ASSESSMENT AND RECOMMENDATIONS:  Mr. Cooper has some shortness of breath with exertion that is likely secondary to severe COPD.  His arrhythmia seems to be controlled with propafenone and he seemed to tolerate propafenone well at the current dose.  He will have a 2-week ZIO Patch monitor to exclude any asymptomatic atrial  tachyarrhythmia.  The patient will return to see me in 2019.  At that time we will talk about if he should discontinue Eliquis, which is costing him more than $200 a month.        cc:      Ana Pratt MD    [facility]   [address] [place] [address]         NORIS MARTINEZ MD             D: 2018   T: 2018   MT:       Name:     FELIPE AYOUB   MRN:      [id number]        Account:      [id number]   :      1950           Service Date: 2018      Document: [id number]           Outpatient Encounter Prescriptions as of 12/3/2018   Medication Sig Dispense Refill     albuterol (VENTOLIN HFA) 108 (90 Base) MCG/ACT inhaler Inhale 1-2 puffs into the lungs every 4 hours (while awake) PRN 2 Inhaler 0     amoxicillin (AMOXIL) 500 MG capsule Take 2,000 mg by mouth as needed (Dental appt)       [] amoxicillin-clavulanate (AUGMENTIN) 875-125 MG per tablet Take 1 tablet by mouth 2 times daily for 7 days 14 tablet 0     apixaban ANTICOAGULANT (ELIQUIS) 5 MG tablet Take 1 tablet (5 mg) by mouth 2 times daily 180 tablet 1     diltiazem (CARDIZEM CD) 180 MG 24 hr capsule Take 2 capsules (360 mg) by mouth daily 90 capsule 3     levalbuterol (XOPENEX) 1.25 MG/3ML neb solution Take 3 mLs (1.25 mg) by nebulization every 4 hours as needed for wheezing or shortness of breath / dyspnea 270 mL 1     levalbuterol (XOPENEX) 1.25 MG/3ML neb solution Take 3 mLs (1.25 mg) by nebulization 4 times daily 270 mL 0     order for DME Equipment being ordered: Nebulizer 1 each 0     order for DME Oxygen for nocturnal use. 1 LPM via nasal cannula  Testing done at home in chronic stable state.  Condition is COPD.  Patient does not have Obstructive Sleep Apnea.  Overnight oximetry revealed 30.3 minutes of hypoxia (<= 88%).  Duration: Lifetime (99 months). 1 each 99     oxyCODONE-acetaminophen (PERCOCET) 5-325 MG per tablet Take 1 tablet by mouth every 6 hours as needed for severe pain       predniSONE  (DELTASONE) 20 MG tablet 60 mg po daily x 4 days. Taper daily dose by 10 mg every 4 days until 20 mg. Then take 20 mg daily until follow up. 56 tablet 0     propafenone (RYTHMOL) 150 MG TABS tablet Take 1 tablet (150 mg) by mouth 3 times daily 270 tablet 3     traZODone (DESYREL) 50 MG tablet Take 25 mg by mouth nightly as needed for sleep       tiotropium (SPIRIVA) 18 MCG capsule Inhale 1 capsule (18 mcg) into the lungs daily (Patient not taking: Reported on 12/3/2018) 30 capsule 0     No facility-administered encounter medications on file as of 12/3/2018.        Again, thank you for allowing me to participate in the care of your patient.      Sincerely,    Sussy Britt MD     [facility]

## 2018-12-03 NOTE — PROGRESS NOTES
Service Date: 12/03/2018      HISTORY OF PRESENT ILLNESS:  I saw Mr. Cooper for followup of atrial fibrillation/flutter.  He is a 67-year-old white male with a history of previous tricuspid repair and COPD.  He underwent catheter ablation of atrial fibrillation on 7/01/2015.  He received a pulmonary vein isolation with left atrial linear ablation at cavotricuspid isthmus ablation.  The patient did well for atrial arrhythmia until this year.  He did have 2 attempted PVC ablation without recurrence of frequent PVCs.  He had recurrent atrial flutter that was believed to be from the left atrium this year.  The patient was treated with propafenone 150 mg p.o. t.i.d.        After the last visit with me on 11/05, he had a readmission for shortness of breath.  He was diagnosed to have COPD exacerbation and is currently on prednisone.  He also received antibiotic therapy.  At the present time he has no cough, fever or shortness of breath at rest.  With moderate exertion, he has some mild shortness of breath.  He has no chest pain or dizziness.  He does not have apparent side effects from propafenone.      PHYSICAL EXAMINATION:   VITAL SIGNS:  Blood pressure was 156/90, heart rate 62 beats per minute, body weight 193 pounds.   LUNGS:  Clear.   HEART:  Rhythm was regular and heart sounds were normal with no murmur.        The EKG today showed sinus rhythm with PACs.      ASSESSMENT AND RECOMMENDATIONS:  Mr. Cooper has some shortness of breath with exertion that is likely secondary to severe COPD.  His arrhythmia seems to be controlled with propafenone and he seemed to tolerate propafenone well at the current dose.  He will have a 2-week ZIO Patch monitor to exclude any asymptomatic atrial tachyarrhythmia.  The patient will return to see me in 03/2019.  At that time we will talk about if he should discontinue Eliquis, which is costing him more than $200 a month.        cc:      Ana Pratt MD    [facility]    [address] [place] [address]         NORIS MARTINEZ MD             D: 2018   T: 2018   MT: SARANYA      Name:     FELIPE AYOUB   MRN:      [id number]        Account:      [id number]   :      1950           Service Date: 2018      Document: [id number]

## 2018-12-03 NOTE — PROGRESS NOTES
HPI and Plan:   See dictation    Orders Placed This Encounter   Procedures     Follow-Up with Electrophysiologist     EKG 12-lead complete w/read - Clinics (performed today)     Zio Patch Monitor       No orders of the defined types were placed in this encounter.      There are no discontinued medications.      Encounter Diagnosis   Name Primary?     Paroxysmal atrial fibrillation (H)        CURRENT MEDICATIONS:  Current Outpatient Prescriptions   Medication Sig Dispense Refill     albuterol (VENTOLIN HFA) 108 (90 Base) MCG/ACT inhaler Inhale 1-2 puffs into the lungs every 4 hours (while awake) PRN 2 Inhaler 0     amoxicillin (AMOXIL) 500 MG capsule Take 2,000 mg by mouth as needed (Dental appt)       amoxicillin-clavulanate (AUGMENTIN) 875-125 MG per tablet Take 1 tablet by mouth 2 times daily for 7 days 14 tablet 0     apixaban ANTICOAGULANT (ELIQUIS) 5 MG tablet Take 1 tablet (5 mg) by mouth 2 times daily 180 tablet 1     diltiazem (CARDIZEM CD) 180 MG 24 hr capsule Take 2 capsules (360 mg) by mouth daily 90 capsule 3     levalbuterol (XOPENEX) 1.25 MG/3ML neb solution Take 3 mLs (1.25 mg) by nebulization every 4 hours as needed for wheezing or shortness of breath / dyspnea 270 mL 1     levalbuterol (XOPENEX) 1.25 MG/3ML neb solution Take 3 mLs (1.25 mg) by nebulization 4 times daily 270 mL 0     order for DME Equipment being ordered: Nebulizer 1 each 0     order for DME Oxygen for nocturnal use. 1 LPM via nasal cannula  Testing done at home in chronic stable state.  Condition is COPD.  Patient does not have Obstructive Sleep Apnea.  Overnight oximetry revealed 30.3 minutes of hypoxia (<= 88%).  Duration: Lifetime (99 months). 1 each 99     oxyCODONE-acetaminophen (PERCOCET) 5-325 MG per tablet Take 1 tablet by mouth every 6 hours as needed for severe pain       predniSONE (DELTASONE) 20 MG tablet 60 mg po daily x 4 days. Taper daily dose by 10 mg every 4 days until 20 mg. Then take 20 mg daily until follow up.  56 tablet 0     propafenone (RYTHMOL) 150 MG TABS tablet Take 1 tablet (150 mg) by mouth 3 times daily 270 tablet 3     traZODone (DESYREL) 50 MG tablet Take 25 mg by mouth nightly as needed for sleep       tiotropium (SPIRIVA) 18 MCG capsule Inhale 1 capsule (18 mcg) into the lungs daily (Patient not taking: Reported on 12/3/2018) 30 capsule 0       ALLERGIES     Allergies   Allergen Reactions     Flecainide Palpitations       PAST MEDICAL HISTORY:  Past Medical History:   Diagnosis Date     Atrial flutter (H)      COPD (chronic obstructive pulmonary disease) (H)      Diverticulitis      Hypertension      Persistent atrial fibrillation (H) 4/28/09    ablation 7/1/2015     Premature beats      PVC (premature ventricular contraction)     s/p ablation 12/5/2017     Tricuspid valve disorder     Dr Aj, Hx of valve repair      Ventricular tachycardia (H)     nonsustained       PAST SURGICAL HISTORY:  Past Surgical History:   Procedure Laterality Date     ABDOMEN SURGERY      hernia repair right     APPENDECTOMY       CARDIOVERSION  06/03/2009    successful for afib     CARDIOVERSION  4/20/15    successful for afib     CARDIOVERSION  5/28/15     H ABLATION ATRIAL FLUTTER       H ABLATION FOCAL AFIB  7/1/15    Left atrial linear ablation and pulmonary vein antrum ablation     H ABLATION PVC  12/5/16     INCISION AND DRAINAGE LOWER EXTREMITY, COMBINED Right 11/10/2016    Procedure: COMBINED INCISION AND DRAINAGE LOWER EXTREMITY;  Surgeon: Roger Pacheco MD;  Location: [facility] OR     LAPAROSCOPIC CHOLECYSTECTOMY  1/6/2012    Procedure:LAPAROSCOPIC CHOLECYSTECTOMY; LAPAROSCOPIC CHOLECYSTECTOMY ; Surgeon:ROGER PACHECO; Location:[facility] OR     ORTHOPEDIC SURGERY      Left hip replacement     REPAIR VALVE TRICUSPID  9/8/08    conversion to a bileaflet valve and application of a 30 mm Sanderson ring     SMALL INTESTINE SURGERY      had bowel obstruction age 9     VALVE REPLACEMENT      tricuspid       FAMILY  "HISTORY:  Family History   Problem Relation Age of Onset     Prostate Cancer Father      Family History Negative Mother      Emphysema Mother      Hypertension Mother      Cerebrovascular Disease Mother        SOCIAL HISTORY:  Social History     Social History     Marital status:      Spouse name: N/A     Number of children: N/A     Years of education: N/A     Social History Main Topics     Smoking status: Former Smoker     Quit date: 1/2/2008     Smokeless tobacco: Never Used     Alcohol use 0.0 oz/week     0 Standard drinks or equivalent per week      Comment: 3-6 per month     Drug use: No     Sexual activity: No     Other Topics Concern     Caffeine Concern No     1 a day      Sleep Concern Yes     nocturia     Weight Concern No     Special Diet No     Exercise No     Bike Helmet Yes     Seat Belt Yes     Parent/Sibling W/ Cabg, Mi Or Angioplasty Before 65f 55m? No     Social History Narrative       Review of Systems:  Skin:  Negative itching skin cancer removed recently   Eyes:  Positive for glasses    ENT:  Negative      Respiratory:  Positive for dyspnea on exertion;wheezing COPD   Cardiovascular:  Negative dizziness;lightheadedness;chest pain;Positive for chest pain on right side of chest  Gastroenterology: Negative melena;hematochezia    Genitourinary:  Negative nocturia    Musculoskeletal:  Positive for joint pain;arthritis L elbow  Neurologic:  Positive for headaches in the am  Psychiatric:  Positive for sleep disturbances sleeps poorly  Heme/Lymph/Imm:  Positive for allergies    Endocrine:  Negative diabetes      Physical Exam:  Vitals: /90 (BP Location: Left arm, Patient Position: Chair, Cuff Size: Adult Regular)  Pulse 62  Ht 1.778 m (5' 10\")  Wt 87.5 kg (193 lb)  SpO2 95%  BMI 27.69 kg/m2    Constitutional:  cooperative, alert and oriented, well developed, well nourished, in no acute distress        Skin:  warm and dry to the touch, no apparent skin lesions or masses noted      "     Head:  normocephalic, no masses or lesions        Eyes:  no xanthalasma;EOMS intact;sclera white;conjunctivae and lids unremarkable        Lymph:      ENT:  no pallor or cyanosis, dentition good        Neck:  JVP normal;no carotid bruit        Respiratory:  clear to auscultation diminished breath sounds bilaterally       Cardiac: normal S1 and S2;regular rhythm;no murmurs, gallops or rubs detected occasional premature beats              pulses full and equal                               right femoral bruit (-) left subclavian bruit (-)      GI:  abdomen soft        Extremities and Muscular Skeletal:  no deformities, clubbing, cyanosis, erythema observed;no edema              Neurological:           Psych:           CC  Sussy Britt MD  [id number]  [id number]  [place] [id number]

## 2018-12-07 ENCOUNTER — TELEPHONE (OUTPATIENT)
Dept: CARDIOLOGY | Facility: CLINIC | Age: 68
End: 2018-12-07

## 2018-12-07 NOTE — TELEPHONE ENCOUNTER
Spoke to patient who was recently hospitalized 11/23-11/26 for COPD exacerbation.  Reports he was placed on prednisone taper and is coming to an end of the taper and feels his breathing is getting worse.  Has upcoming appt (new) with pulmonology in January 2019 for his first visit.  Did see PMD on 11/29 for hospital follow up.  Recommended that he call his PMD until he establishes care with Pulmonology to discuss changes to his prednisone dosing.  Patient provided verbal understanding.  HARDEEP Elizalde

## 2018-12-14 ENCOUNTER — HOSPITAL ENCOUNTER (OUTPATIENT)
Dept: CARDIOLOGY | Facility: CLINIC | Age: 68
Discharge: HOME | DRG: 871 | End: 2018-12-14
Attending: INTERNAL MEDICINE | Admitting: INTERNAL MEDICINE
Payer: COMMERCIAL

## 2018-12-14 DIAGNOSIS — I48.0 PAROXYSMAL ATRIAL FIBRILLATION (H): ICD-10-CM

## 2018-12-14 PROCEDURE — 0296T ZIO PATCH HOLTER ADULT PEDIATRIC GREATER THAN 48 HRS: CPT

## 2018-12-14 PROCEDURE — 0298T ZIO PATCH HOLTER ADULT PEDIATRIC GREATER THAN 48 HRS: CPT | Performed by: INTERNAL MEDICINE

## 2018-12-17 ENCOUNTER — HOSPITAL ENCOUNTER (INPATIENT)
Facility: CLINIC | Age: 68
LOS: 22 days | Discharge: HOME HEALTH | DRG: 871 | End: 2019-01-08
Attending: EMERGENCY MEDICINE | Admitting: INTERNAL MEDICINE
Payer: COMMERCIAL

## 2018-12-17 ENCOUNTER — APPOINTMENT (OUTPATIENT)
Dept: GENERAL RADIOLOGY | Facility: CLINIC | Age: 68
DRG: 871 | End: 2018-12-17
Attending: EMERGENCY MEDICINE
Payer: COMMERCIAL

## 2018-12-17 DIAGNOSIS — R78.81 BACTEREMIA: ICD-10-CM

## 2018-12-17 DIAGNOSIS — K59.00 CONSTIPATION, UNSPECIFIED CONSTIPATION TYPE: ICD-10-CM

## 2018-12-17 DIAGNOSIS — R68.2 DRY MOUTH: ICD-10-CM

## 2018-12-17 DIAGNOSIS — M19.90 ARTHRITIS: ICD-10-CM

## 2018-12-17 DIAGNOSIS — A41.9 SEVERE SEPSIS (H): Primary | ICD-10-CM

## 2018-12-17 DIAGNOSIS — J18.9 PNEUMONIA OF RIGHT LOWER LOBE DUE TO INFECTIOUS ORGANISM: ICD-10-CM

## 2018-12-17 DIAGNOSIS — I10 ESSENTIAL HYPERTENSION: ICD-10-CM

## 2018-12-17 DIAGNOSIS — I48.91 ATRIAL FIBRILLATION WITH RVR (H): ICD-10-CM

## 2018-12-17 DIAGNOSIS — J96.02 ACUTE RESPIRATORY FAILURE WITH HYPOXIA AND HYPERCAPNIA (H): ICD-10-CM

## 2018-12-17 DIAGNOSIS — J44.1 COPD EXACERBATION (H): ICD-10-CM

## 2018-12-17 DIAGNOSIS — R65.20 SEVERE SEPSIS (H): Primary | ICD-10-CM

## 2018-12-17 DIAGNOSIS — J96.01 ACUTE RESPIRATORY FAILURE WITH HYPOXIA AND HYPERCAPNIA (H): ICD-10-CM

## 2018-12-17 DIAGNOSIS — J18.9 HCAP (HEALTHCARE-ASSOCIATED PNEUMONIA): ICD-10-CM

## 2018-12-17 LAB
ANION GAP SERPL CALCULATED.3IONS-SCNC: 8 MMOL/L (ref 3–14)
BASE EXCESS BLDV CALC-SCNC: 5.9 MMOL/L
BASOPHILS # BLD AUTO: 0.1 10E9/L (ref 0–0.2)
BASOPHILS NFR BLD AUTO: 0.3 %
BUN SERPL-MCNC: 26 MG/DL (ref 7–30)
CALCIUM SERPL-MCNC: 9.3 MG/DL (ref 8.5–10.1)
CHLORIDE SERPL-SCNC: 96 MMOL/L (ref 94–109)
CO2 BLDCOV-SCNC: 30 MMOL/L (ref 21–28)
CO2 SERPL-SCNC: 30 MMOL/L (ref 20–32)
CREAT SERPL-MCNC: 1.09 MG/DL (ref 0.66–1.25)
CRP SERPL-MCNC: 194 MG/L (ref 0–8)
DIFFERENTIAL METHOD BLD: ABNORMAL
EOSINOPHIL # BLD AUTO: 0 10E9/L (ref 0–0.7)
EOSINOPHIL NFR BLD AUTO: 0.1 %
ERYTHROCYTE [DISTWIDTH] IN BLOOD BY AUTOMATED COUNT: 14.9 % (ref 10–15)
FLUAV+FLUBV AG SPEC QL: NEGATIVE
FLUAV+FLUBV AG SPEC QL: NEGATIVE
GFR SERPL CREATININE-BSD FRML MDRD: 67 ML/MIN/1.7M2
GLUCOSE BLDC GLUCOMTR-MCNC: 147 MG/DL (ref 70–99)
GLUCOSE SERPL-MCNC: 140 MG/DL (ref 70–99)
HCO3 BLDV-SCNC: 31 MMOL/L (ref 21–28)
HCT VFR BLD AUTO: 54.2 % (ref 40–53)
HGB BLD-MCNC: 17.2 G/DL (ref 13.3–17.7)
IMM GRANULOCYTES # BLD: 0.2 10E9/L (ref 0–0.4)
IMM GRANULOCYTES NFR BLD: 0.7 %
LACTATE BLD-SCNC: 1.7 MMOL/L (ref 0.7–2.1)
LACTATE BLD-SCNC: 2.8 MMOL/L (ref 0.7–2)
LYMPHOCYTES # BLD AUTO: 1 10E9/L (ref 0.8–5.3)
LYMPHOCYTES NFR BLD AUTO: 4.5 %
MCH RBC QN AUTO: 29.7 PG (ref 26.5–33)
MCHC RBC AUTO-ENTMCNC: 31.7 G/DL (ref 31.5–36.5)
MCV RBC AUTO: 93 FL (ref 78–100)
MONOCYTES # BLD AUTO: 0.9 10E9/L (ref 0–1.3)
MONOCYTES NFR BLD AUTO: 4 %
NEUTROPHILS # BLD AUTO: 19.4 10E9/L (ref 1.6–8.3)
NEUTROPHILS NFR BLD AUTO: 90.4 %
NRBC # BLD AUTO: 0 10*3/UL
NRBC BLD AUTO-RTO: 0 /100
NT-PROBNP SERPL-MCNC: 3065 PG/ML (ref 0–900)
O2/TOTAL GAS SETTING VFR VENT: ABNORMAL %
PCO2 BLDV: 46 MM HG (ref 40–50)
PCO2 BLDV: 57 MM HG (ref 40–50)
PH BLDV: 7.33 PH (ref 7.32–7.43)
PH BLDV: 7.45 PH (ref 7.32–7.43)
PLATELET # BLD AUTO: 193 10E9/L (ref 150–450)
PO2 BLDV: 29 MM HG (ref 25–47)
PO2 BLDV: 37 MM HG (ref 25–47)
POTASSIUM SERPL-SCNC: 4.7 MMOL/L (ref 3.4–5.3)
PROCALCITONIN SERPL-MCNC: 2.61 NG/ML
RBC # BLD AUTO: 5.8 10E12/L (ref 4.4–5.9)
SAO2 % BLDV FROM PO2: 50 %
SODIUM SERPL-SCNC: 134 MMOL/L (ref 133–144)
SPECIMEN SOURCE: NORMAL
TROPONIN I SERPL-MCNC: 0.03 UG/L (ref 0–0.04)
WBC # BLD AUTO: 21.5 10E9/L (ref 4–11)

## 2018-12-17 PROCEDURE — 87640 STAPH A DNA AMP PROBE: CPT | Performed by: INTERNAL MEDICINE

## 2018-12-17 PROCEDURE — 96366 THER/PROPH/DIAG IV INF ADDON: CPT

## 2018-12-17 PROCEDURE — 25000125 ZZHC RX 250: Performed by: EMERGENCY MEDICINE

## 2018-12-17 PROCEDURE — 99223 1ST HOSP IP/OBS HIGH 75: CPT | Mod: AI | Performed by: INTERNAL MEDICINE

## 2018-12-17 PROCEDURE — 87077 CULTURE AEROBIC IDENTIFY: CPT | Performed by: EMERGENCY MEDICINE

## 2018-12-17 PROCEDURE — 25000132 ZZH RX MED GY IP 250 OP 250 PS 637: Performed by: INTERNAL MEDICINE

## 2018-12-17 PROCEDURE — 86140 C-REACTIVE PROTEIN: CPT | Performed by: EMERGENCY MEDICINE

## 2018-12-17 PROCEDURE — 96365 THER/PROPH/DIAG IV INF INIT: CPT

## 2018-12-17 PROCEDURE — 25000128 H RX IP 250 OP 636: Performed by: INTERNAL MEDICINE

## 2018-12-17 PROCEDURE — 87800 DETECT AGNT MULT DNA DIREC: CPT | Performed by: EMERGENCY MEDICINE

## 2018-12-17 PROCEDURE — 20000003 ZZH R&B ICU

## 2018-12-17 PROCEDURE — 83605 ASSAY OF LACTIC ACID: CPT

## 2018-12-17 PROCEDURE — 99285 EMERGENCY DEPT VISIT HI MDM: CPT | Mod: 25

## 2018-12-17 PROCEDURE — 94640 AIRWAY INHALATION TREATMENT: CPT

## 2018-12-17 PROCEDURE — 93005 ELECTROCARDIOGRAM TRACING: CPT | Mod: 76

## 2018-12-17 PROCEDURE — 96367 TX/PROPH/DG ADDL SEQ IV INF: CPT

## 2018-12-17 PROCEDURE — 87804 INFLUENZA ASSAY W/OPTIC: CPT | Performed by: EMERGENCY MEDICINE

## 2018-12-17 PROCEDURE — 71045 X-RAY EXAM CHEST 1 VIEW: CPT

## 2018-12-17 PROCEDURE — 85025 COMPLETE CBC W/AUTO DIFF WBC: CPT | Performed by: EMERGENCY MEDICINE

## 2018-12-17 PROCEDURE — 84484 ASSAY OF TROPONIN QUANT: CPT | Performed by: EMERGENCY MEDICINE

## 2018-12-17 PROCEDURE — 36415 COLL VENOUS BLD VENIPUNCTURE: CPT

## 2018-12-17 PROCEDURE — 94640 AIRWAY INHALATION TREATMENT: CPT | Mod: 76

## 2018-12-17 PROCEDURE — 82803 BLOOD GASES ANY COMBINATION: CPT

## 2018-12-17 PROCEDURE — 40000275 ZZH STATISTIC RCP TIME EA 10 MIN

## 2018-12-17 PROCEDURE — 80048 BASIC METABOLIC PNL TOTAL CA: CPT | Performed by: EMERGENCY MEDICINE

## 2018-12-17 PROCEDURE — 36415 COLL VENOUS BLD VENIPUNCTURE: CPT | Performed by: EMERGENCY MEDICINE

## 2018-12-17 PROCEDURE — 87641 MR-STAPH DNA AMP PROBE: CPT | Performed by: INTERNAL MEDICINE

## 2018-12-17 PROCEDURE — 87040 BLOOD CULTURE FOR BACTERIA: CPT | Performed by: EMERGENCY MEDICINE

## 2018-12-17 PROCEDURE — 93005 ELECTROCARDIOGRAM TRACING: CPT

## 2018-12-17 PROCEDURE — 87186 SC STD MICRODIL/AGAR DIL: CPT | Performed by: EMERGENCY MEDICINE

## 2018-12-17 PROCEDURE — 83605 ASSAY OF LACTIC ACID: CPT | Performed by: EMERGENCY MEDICINE

## 2018-12-17 PROCEDURE — 00000146 ZZHCL STATISTIC GLUCOSE BY METER IP

## 2018-12-17 PROCEDURE — 83880 ASSAY OF NATRIURETIC PEPTIDE: CPT | Performed by: EMERGENCY MEDICINE

## 2018-12-17 PROCEDURE — 82803 BLOOD GASES ANY COMBINATION: CPT | Performed by: EMERGENCY MEDICINE

## 2018-12-17 PROCEDURE — 40000281 ZZH STATISTIC TRANSPORT TIME EA 15 MIN

## 2018-12-17 PROCEDURE — 25000128 H RX IP 250 OP 636: Performed by: EMERGENCY MEDICINE

## 2018-12-17 PROCEDURE — 94660 CPAP INITIATION&MGMT: CPT

## 2018-12-17 PROCEDURE — 84145 PROCALCITONIN (PCT): CPT | Performed by: EMERGENCY MEDICINE

## 2018-12-17 PROCEDURE — 96375 TX/PRO/DX INJ NEW DRUG ADDON: CPT

## 2018-12-17 RX ORDER — PREDNISONE 20 MG/1
40 TABLET ORAL DAILY
Status: DISCONTINUED | OUTPATIENT
Start: 2018-12-18 | End: 2018-12-20

## 2018-12-17 RX ORDER — PROPAFENONE HYDROCHLORIDE 150 MG/1
150 TABLET, COATED ORAL 3 TIMES DAILY
Status: DISCONTINUED | OUTPATIENT
Start: 2018-12-17 | End: 2019-01-08 | Stop reason: HOSPADM

## 2018-12-17 RX ORDER — IPRATROPIUM BROMIDE AND ALBUTEROL SULFATE 2.5; .5 MG/3ML; MG/3ML
3 SOLUTION RESPIRATORY (INHALATION) ONCE
Status: DISCONTINUED | OUTPATIENT
Start: 2018-12-17 | End: 2018-12-17

## 2018-12-17 RX ORDER — ACETAMINOPHEN 325 MG/1
650 TABLET ORAL EVERY 6 HOURS PRN
Status: DISCONTINUED | OUTPATIENT
Start: 2018-12-17 | End: 2018-12-27

## 2018-12-17 RX ORDER — DILTIAZEM HYDROCHLORIDE 5 MG/ML
11.8 INJECTION INTRAVENOUS ONCE
Status: COMPLETED | OUTPATIENT
Start: 2018-12-17 | End: 2018-12-17

## 2018-12-17 RX ORDER — METHYLPREDNISOLONE SODIUM SUCCINATE 125 MG/2ML
125 INJECTION, POWDER, LYOPHILIZED, FOR SOLUTION INTRAMUSCULAR; INTRAVENOUS ONCE
Status: COMPLETED | OUTPATIENT
Start: 2018-12-17 | End: 2018-12-17

## 2018-12-17 RX ORDER — NALOXONE HYDROCHLORIDE 0.4 MG/ML
.1-.4 INJECTION, SOLUTION INTRAMUSCULAR; INTRAVENOUS; SUBCUTANEOUS
Status: DISCONTINUED | OUTPATIENT
Start: 2018-12-17 | End: 2019-01-08 | Stop reason: HOSPADM

## 2018-12-17 RX ORDER — CEFTRIAXONE 2 G/1
2 INJECTION, POWDER, FOR SOLUTION INTRAMUSCULAR; INTRAVENOUS EVERY 24 HOURS
Status: DISCONTINUED | OUTPATIENT
Start: 2018-12-17 | End: 2018-12-18

## 2018-12-17 RX ORDER — PROCHLORPERAZINE MALEATE 5 MG
5 TABLET ORAL EVERY 6 HOURS PRN
Status: DISCONTINUED | OUTPATIENT
Start: 2018-12-17 | End: 2019-01-08 | Stop reason: HOSPADM

## 2018-12-17 RX ORDER — LEVALBUTEROL INHALATION SOLUTION 0.31 MG/3ML
0.31 SOLUTION RESPIRATORY (INHALATION) ONCE
Status: DISCONTINUED | OUTPATIENT
Start: 2018-12-17 | End: 2018-12-17 | Stop reason: CLARIF

## 2018-12-17 RX ORDER — PREDNISONE 20 MG/1
40 TABLET ORAL DAILY
Status: ON HOLD | COMMUNITY
End: 2019-01-08

## 2018-12-17 RX ORDER — DILTIAZEM HYDROCHLORIDE 180 MG/1
360 CAPSULE, COATED, EXTENDED RELEASE ORAL DAILY
Status: DISCONTINUED | OUTPATIENT
Start: 2018-12-18 | End: 2019-01-08 | Stop reason: HOSPADM

## 2018-12-17 RX ORDER — LEVALBUTEROL INHALATION SOLUTION 1.25 MG/3ML
1.25 SOLUTION RESPIRATORY (INHALATION) ONCE
Status: COMPLETED | OUTPATIENT
Start: 2018-12-17 | End: 2018-12-17

## 2018-12-17 RX ORDER — IPRATROPIUM BROMIDE AND ALBUTEROL SULFATE 2.5; .5 MG/3ML; MG/3ML
3 SOLUTION RESPIRATORY (INHALATION) ONCE
Status: COMPLETED | OUTPATIENT
Start: 2018-12-17 | End: 2018-12-17

## 2018-12-17 RX ORDER — DILTIAZEM HYDROCHLORIDE 5 MG/ML
10 INJECTION INTRAVENOUS ONCE
Status: COMPLETED | OUTPATIENT
Start: 2018-12-17 | End: 2018-12-17

## 2018-12-17 RX ORDER — CEFAZOLIN SODIUM 1 G/50ML
2000 SOLUTION INTRAVENOUS ONCE
Status: COMPLETED | OUTPATIENT
Start: 2018-12-17 | End: 2018-12-17

## 2018-12-17 RX ORDER — PROCHLORPERAZINE 25 MG
12.5 SUPPOSITORY, RECTAL RECTAL EVERY 12 HOURS PRN
Status: DISCONTINUED | OUTPATIENT
Start: 2018-12-17 | End: 2019-01-08 | Stop reason: HOSPADM

## 2018-12-17 RX ORDER — LEVALBUTEROL INHALATION SOLUTION 0.31 MG/3ML
0.93 SOLUTION RESPIRATORY (INHALATION) ONCE
Status: COMPLETED | OUTPATIENT
Start: 2018-12-17 | End: 2018-12-17

## 2018-12-17 RX ORDER — ONDANSETRON 4 MG/1
4 TABLET, ORALLY DISINTEGRATING ORAL EVERY 6 HOURS PRN
Status: DISCONTINUED | OUTPATIENT
Start: 2018-12-17 | End: 2019-01-08 | Stop reason: HOSPADM

## 2018-12-17 RX ORDER — ALBUTEROL SULFATE 5 MG/ML
7.5 SOLUTION RESPIRATORY (INHALATION) ONCE
Status: DISCONTINUED | OUTPATIENT
Start: 2018-12-17 | End: 2018-12-17

## 2018-12-17 RX ORDER — ONDANSETRON 2 MG/ML
4 INJECTION INTRAMUSCULAR; INTRAVENOUS EVERY 6 HOURS PRN
Status: DISCONTINUED | OUTPATIENT
Start: 2018-12-17 | End: 2019-01-08 | Stop reason: HOSPADM

## 2018-12-17 RX ORDER — IPRATROPIUM BROMIDE AND ALBUTEROL SULFATE 2.5; .5 MG/3ML; MG/3ML
1 SOLUTION RESPIRATORY (INHALATION) EVERY 6 HOURS PRN
Status: ON HOLD | COMMUNITY
End: 2019-01-08

## 2018-12-17 RX ORDER — LEVALBUTEROL INHALATION SOLUTION 1.25 MG/3ML
1.25 SOLUTION RESPIRATORY (INHALATION)
Status: DISCONTINUED | OUTPATIENT
Start: 2018-12-17 | End: 2019-01-08 | Stop reason: HOSPADM

## 2018-12-17 RX ORDER — AZITHROMYCIN 250 MG/1
250 TABLET, FILM COATED ORAL EVERY EVENING
Status: DISCONTINUED | OUTPATIENT
Start: 2018-12-17 | End: 2018-12-17

## 2018-12-17 RX ORDER — LEVALBUTEROL INHALATION SOLUTION 1.25 MG/3ML
1.25 SOLUTION RESPIRATORY (INHALATION) 4 TIMES DAILY
Status: DISCONTINUED | OUTPATIENT
Start: 2018-12-17 | End: 2019-01-08 | Stop reason: HOSPADM

## 2018-12-17 RX ORDER — AZITHROMYCIN 250 MG/1
250 TABLET, FILM COATED ORAL EVERY EVENING
Status: DISCONTINUED | OUTPATIENT
Start: 2018-12-18 | End: 2018-12-21

## 2018-12-17 RX ADMIN — LEVALBUTEROL HYDROCHLORIDE 1.25 MG: 1.25 SOLUTION RESPIRATORY (INHALATION) at 15:16

## 2018-12-17 RX ADMIN — PROPAFENONE HYDROCHLORIDE 150 MG: 150 TABLET, COATED ORAL at 23:08

## 2018-12-17 RX ADMIN — IPRATROPIUM BROMIDE 0.5 MG: 0.5 SOLUTION RESPIRATORY (INHALATION) at 15:03

## 2018-12-17 RX ADMIN — DILTIAZEM HYDROCHLORIDE 10 MG: 5 INJECTION INTRAVENOUS at 16:33

## 2018-12-17 RX ADMIN — LEVALBUTEROL HYDROCHLORIDE 0.93 MG: 0.31 SOLUTION RESPIRATORY (INHALATION) at 15:07

## 2018-12-17 RX ADMIN — AZITHROMYCIN MONOHYDRATE 500 MG: 500 INJECTION, POWDER, LYOPHILIZED, FOR SOLUTION INTRAVENOUS at 21:37

## 2018-12-17 RX ADMIN — Medication 2 G: at 15:12

## 2018-12-17 RX ADMIN — APIXABAN 5 MG: 5 TABLET, FILM COATED ORAL at 21:38

## 2018-12-17 RX ADMIN — IPRATROPIUM BROMIDE AND ALBUTEROL SULFATE 3 ML: .5; 3 SOLUTION RESPIRATORY (INHALATION) at 14:27

## 2018-12-17 RX ADMIN — ACETAMINOPHEN 650 MG: 325 TABLET, FILM COATED ORAL at 23:08

## 2018-12-17 RX ADMIN — SODIUM CHLORIDE 1000 ML: 9 INJECTION, SOLUTION INTRAVENOUS at 15:50

## 2018-12-17 RX ADMIN — LEVALBUTEROL HYDROCHLORIDE 1.25 MG: 1.25 SOLUTION RESPIRATORY (INHALATION) at 15:15

## 2018-12-17 RX ADMIN — CEFTRIAXONE SODIUM 2 G: 2 INJECTION, POWDER, FOR SOLUTION INTRAMUSCULAR; INTRAVENOUS at 23:08

## 2018-12-17 RX ADMIN — VANCOMYCIN HYDROCHLORIDE 2000 MG: 5 INJECTION, POWDER, LYOPHILIZED, FOR SOLUTION INTRAVENOUS at 16:26

## 2018-12-17 RX ADMIN — DILTIAZEM HYDROCHLORIDE 11.8 MG: 5 INJECTION INTRAVENOUS at 16:53

## 2018-12-17 RX ADMIN — SODIUM CHLORIDE, POTASSIUM CHLORIDE, SODIUM LACTATE AND CALCIUM CHLORIDE 500 ML: 600; 310; 30; 20 INJECTION, SOLUTION INTRAVENOUS at 16:54

## 2018-12-17 RX ADMIN — METHYLPREDNISOLONE SODIUM SUCCINATE 125 MG: 125 INJECTION, POWDER, FOR SOLUTION INTRAMUSCULAR; INTRAVENOUS at 15:02

## 2018-12-17 RX ADMIN — TAZOBACTAM SODIUM AND PIPERACILLIN SODIUM 4.5 G: 500; 4 INJECTION, SOLUTION INTRAVENOUS at 15:57

## 2018-12-17 ASSESSMENT — ENCOUNTER SYMPTOMS
VOMITING: 0
SHORTNESS OF BREATH: 1
SORE THROAT: 0
COUGH: 1
FEVER: 0

## 2018-12-17 NOTE — ED TRIAGE NOTES
Pt has COPD, is on 3L O2 at home. Comes to ED with SOB and cough. Sats are 82% in triage on RA. Placed on 6L O2 with sats at 90%

## 2018-12-17 NOTE — PHARMACY-ADMISSION MEDICATION HISTORY
Admission medication history interview status for this patient is complete. See [facility] admission navigator for allergy information, prior to admission medications and immunization status.     Medication history interview source(s):Patient  Medication history resources (including written lists, pill bottles, clinic record):None  Primary pharmacy:-    Changes made to PTA medication list:  Added: duoneb  Deleted: -  Changed: -    Actions taken by pharmacist (provider contacted, etc):Called spouse Gloria to confirm meds     Additional medication history information:None    Medication reconciliation/reorder completed by provider prior to medication history? No    Do you take OTC medications (eg tylenol, ibuprofen, fish oil, eye/ear drops, etc)? yes(Y/N)    For patients on insulin therapy: no (Y/N)  Lantus/levemir/NPH/Mix 70/30 dose:   (Y/N) (see Med list for doses)   Sliding scale Novolog Y/N  If Yes, do you have a baseline novolog pre-meal dose:  units with meals  Patients eat three meals a day:   Y/N    How many episodes of hypoglycemia do you have per week: _______  How many missed doses do you have per week: ______  How many times do you check your blood glucose per day: _______  Do you have a Continuous glucose monitor (CGM)   Y/N (remind pt that not approved for hospital use)   Any Barriers to therapy - Be specific :  cost of medications, comfortable with giving injections (if applicable), comfortable and confident with current diabetes regimen: Y/N ______________      Prior to Admission medications    Medication Sig Last Dose Taking? Auth Provider   albuterol (VENTOLIN HFA) 108 (90 Base) MCG/ACT inhaler Inhale 1-2 puffs into the lungs every 4 hours (while awake) PRN  Yes Nata Mcduffie MD   apixaban ANTICOAGULANT (ELIQUIS) 5 MG tablet Take 1 tablet (5 mg) by mouth 2 times daily 12/17/2018 at x1 Yes Marco Masters MD   diltiazem (CARDIZEM CD) 180 MG 24 hr capsule Take 2 capsules (360 mg) by mouth daily 12/17/2018  at Unknown time Yes Marco Masters MD   ipratropium - albuterol 0.5 mg/2.5 mg/3 mL (DUONEB) 0.5-2.5 (3) MG/3ML neb solution Take 1 vial by nebulization every 6 hours as needed for shortness of breath / dyspnea or wheezing  Yes Unknown, Entered By History   levalbuterol (XOPENEX) 1.25 MG/3ML neb solution Take 3 mLs (1.25 mg) by nebulization every 4 hours as needed for wheezing or shortness of breath / dyspnea  Yes Marco Masters MD   levalbuterol (XOPENEX) 1.25 MG/3ML neb solution Take 3 mLs (1.25 mg) by nebulization 4 times daily 12/17/2018 at Unknown time Yes Marco Masters MD   predniSONE (DELTASONE) 20 MG tablet Take 40 mg by mouth daily. 12/17/2018 at Unknown time Yes Unknown, Entered By History   propafenone (RYTHMOL) 150 MG TABS tablet Take 1 tablet (150 mg) by mouth 3 times daily 12/17/2018 at x1 Yes Sussy Britt MD   tiotropium (SPIRIVA) 18 MCG capsule Inhale 1 capsule (18 mcg) into the lungs daily 12/17/2018 at Unknown time Yes Nata Mcduffie MD   traZODone (DESYREL) 50 MG tablet Take 25 mg by mouth nightly as needed for sleep  Yes Unknown, Entered By History   amoxicillin (AMOXIL) 500 MG capsule Take 2,000 mg by mouth as needed (Dental appt)   Unknown, Entered By History   order for DME Equipment being ordered: Nebulizer   Nata Mcduffie MD   order for DME Oxygen for nocturnal use. 1 LPM via nasal cannula  Testing done at home in chronic stable state.  Condition is COPD.  Patient does not have Obstructive Sleep Apnea.  Overnight oximetry revealed 30.3 minutes of hypoxia (<= 88%).  Duration: Lifetime (99 months).   Tyson Sanders MD   oxyCODONE-acetaminophen (PERCOCET) 5-325 MG per tablet Take 1 tablet by mouth every 6 hours as needed for severe pain   Reported, Patient

## 2018-12-17 NOTE — LETTER
"  Key Recommendations: Pt admitted with PNA/COPD ex. Is a readmit less than 30 days. Pt uses oxygen at home supplied through "[employer]" medical equipment. When CM asked pt about having a portable tank pt states he refused to lug his portable tank around in public as he has issues with his ego. Pt travels a lot for work. He uses inhalers and nebulizers to help manage his copd. Pt states he at one time \"dropped\" his inhalers because of a conflict with some other medications. CM offered to consult a pharmacist***. Pt needs continued following with clinic care coordination with COPD symptom management and compliance.      Ciara Hyde    AVS/Discharge Summary is the source of truth; this is a helpful guide for improved communication of patient story          "

## 2018-12-17 NOTE — ED PROVIDER NOTES
History     Chief Complaint:  Shortness of Breath      The history is provided by the patient and the spouse.      Tone Cooper is a 67 year old male with a history of COPD and atrial fibrillation on Eliquis, propafenone, and Cardizem who presents with his wife for evaluation of shortness of breath. Patient was admitted here 3 weeks ago with COPD exacerbation. He endorses worsening dyspnea since that discharge with a cough. In the last week he notes his cough has been productive of a yellow-tan sputum and dyspnea has significantly worsened. He reports chest and back pain that is tight in nature related to his dyspnea. He has been using nebulizer at home with transient improvement with last use 2 hours prior to arrival. He denies sore throat, vomiting, or fever. He remarks he has had some new leg swelling for which he saw his PCP, but this is actually improving now.     Of note, wife reports that the patient seems to be confused, as he did not take use nebulizer today.     Allergies:  Amlodipine  Flecainide     Medications:    albuterol (VENTOLIN HFA) 108 (90 Base) MCG/ACT inhaler  amoxicillin (AMOXIL) 500 MG capsule  apixaban ANTICOAGULANT (ELIQUIS) 5 MG tablet  diltiazem (CARDIZEM CD) 180 MG 24 hr capsule  levalbuterol (XOPENEX) 1.25 MG/3ML neb solution  oxyCODONE-acetaminophen (PERCOCET) 5-325 MG per tablet  predniSONE (DELTASONE) 20 MG tablet  propafenone (RYTHMOL) 150 MG TABS tablet  tiotropium (SPIRIVA) 18 MCG capsule  traZODone (DESYREL) 50 MG tablet    Past Medical History:    Atrial flutter (H)   COPD (chronic obstructive pulmonary disease) (H)   Diverticulitis   Hypertension   Persistent atrial fibrillation (H)  Premature beats   PVC (premature ventricular contraction)   Tricuspid valve disorder   Ventricular tachycardia (H)   Cardiomyopathy   Acute on chronic respiratory failure with hypoxia and hypercapnia (H)    Past Surgical History:    ABDOMEN SURGERY-hernia repair  right  APPENDECTOMY    CARDIOVERSION   H ABLATION ATRIAL FLUTTER    H ABLATION FOCAL AFIB    H ABLATION PVC    INCISION AND DRAINAGE LOWER EXTREMITY, COMBINED   LAPAROSCOPIC CHOLECYSTECTOMY   ORTHOPEDIC SURGERY   REPAIR VALVE TRICUSPID    SMALL INTESTINE SURGERY    VALVE REPLACEMENT- tricuspid    Family History:    Prostate Cancer    Emphysema   Hypertension   Cerebrovascular Disease     Social History:  Marital Status:     The patient was accompanied to the ED by his wife.  Smoking Status: Former Smoker.   Smokeless Tobacco: Never  Alcohol Use: Yes     Review of Systems   Constitutional: Negative for fever.   HENT: Negative for sore throat.    Respiratory: Positive for cough and shortness of breath.    Cardiovascular: Positive for chest pain (tightness) and leg swelling.   Gastrointestinal: Negative for vomiting.   Psychiatric/Behavioral: Positive for confusion (per wife).   All other systems reviewed and are negative.    Physical Exam       Patient Vitals for the past 24 hrs:   BP Temp Temp src Heart Rate Resp SpO2 Weight   12/17/18 2018 (!) 83/69 98.7  F (37.1  C) Axillary -- 16 -- --   12/17/18 1915 121/73 -- -- 112 19 94 % --   12/17/18 1900 (!) 130/104 -- -- 126 19 96 % --   12/17/18 1845 115/78 -- -- 117 22 96 % --   12/17/18 1830 103/85 -- -- 133 16 95 % --   12/17/18 1815 132/84 -- -- 115 20 96 % --   12/17/18 1800 124/74 -- -- 123 18 96 % --   12/17/18 1745 109/79 -- -- 117 16 95 % --   12/17/18 1730 116/76 -- -- 107 19 96 % --   12/17/18 1715 119/74 -- -- 106 14 95 % --   12/17/18 1700 121/82 -- -- 126 18 94 % --   12/17/18 1645 103/73 -- -- 123 18 94 % --   12/17/18 1630 -- -- -- 141 16 95 % --   12/17/18 1615 (!) 115/108 -- -- 140 (!) 32 96 % --   12/17/18 1600 (!) 108/93 -- -- 146 20 95 % --   12/17/18 1545 (!) 89/70 -- -- 147 22 95 % --   12/17/18 1535 -- -- -- 134 14 96 % --   12/17/18 1530 90/69 -- -- 140 14 96 % --   12/17/18 1525 -- -- -- 140 (!) 32 96 % --   12/17/18 1522 -- -- -- -- -- 95  % --   12/17/18 1521 -- -- -- -- -- 96 % --   12/17/18 1515 119/76 -- -- 147 26 97 % --   12/17/18 1510 -- -- -- 137 18 96 % --   12/17/18 1507 -- -- -- -- -- 98 % --   12/17/18 1500 98/85 -- -- 176 25 96 % --   12/17/18 1445 (!) 134/111 -- -- 170 (!) 43 95 % --   12/17/18 1440 -- -- -- 200 (!) 34 97 % --   12/17/18 1437 (!) 154/123 -- -- -- -- 95 % --   12/17/18 1415 (!) 170/99 98.5  F (36.9  C) Oral 101 (!) 36 (!) 82 % 87.1 kg (192 lb)       Physical Exam  General: Well-developed and well-nourished. Ill appearing middle aged  man in at least moderate distress. Cooperative.  Head:  Atraumatic.  Eyes:  Conjunctivae, lids, and sclerae are normal.  ENT:    Normal nose. Moist mucous membranes.  Neck:  Supple. Normal range of motion.  CV:  Tachycardic rate and irregularly irregular rhythm. Normal heart sounds with no murmurs, rubs, or gallops detected.  Resp:  Moderate-severe respiratory distress. Very poor air movement throughout with tachypnea and accessory muscle usage.   GI:  Soft. Non-distended. Non-tender.    MS:  Normal ROM. Trace bilateral lower extremity edema at the ankles.  Skin:  Warm. Non-diaphoretic. No pallor.  Neuro: Awake. A&Ox3. Normal strength.  Psych:  Normal mood and affect. Normal speech.  Vitals reviewed.    Emergency Department Course     ECG #1:  Indication: Shortness of Breath   Completed at 1503.  Read at 1503.   Rate 164 bpm. QRS duration 86. QT/QTc 266/439.   Atrial fibrillation with rapid ventricular response. Left posterior fascicular block. Abnormal ECG.  No acute ST changes.  Atrial fibrillation with RVR replaces sinus rhythm as compared to prior, dated 12/3/18.    ECG #2:  Indication: Shortness of Breath  Completed at 1721.  Read at 1721.   Rate 104 bpm. RI interval 200. QRS duration 92. QT/QTc 322/423.   Sinus tachycardia with blocked premature atrial complexes with occasional premature ventricular complexes.   Right superior axis deviation. Inferior infarct, age undetermined.  Abnormal ECG.  No acute ST changes.  Sinus rhythm replaces atrial fibrillation with RVR as compared to prior, dated today (EKG #1).     Imaging:  Radiology findings were communicated with the patient and family who voiced understanding of the findings.    XR Chest Port 1 View:   Consolidating areas of infiltrate in the right hilar and  right lung base new compared to 11/23/2018.  Report per radiology      Laboratory:  Laboratory findings were communicated with the patient and family who voiced understanding of the findings.    Sputum Culture Apoerbic Bacterial: Pending     ISTAT gases lactate radhames POCT: PCO2 57 (H), Bicarbonate 30 (H) o/w WNL     Blood Venous Gas: pH 7.45 (H), Bicarbonate 31 (H) o/w WNL     Influenza A/B antigen: Negative A, Negative B     Troponin: 0.025     BMP: Glucose 140 (H) o/w WNL (Creatinine 1.09)  CBC: WBC 21.5 (H) o/w WNL. ( HGB 17.2, )      Lactic acid whole blood: 2.8     CRP inflammation: 194.0 (H)     Procalcitonin: 2.61    Blood cultures: Pending    BNP: 3,065 (H)     Interventions:    1427: DuoNeb 3 ML Nebulization   1502: Solu-Medrol 125 mg IV   1503: Atrovent 0.5 mg Nebulization  1515: Xopenex  0.93 mg Nebulization   1516: Xopenex 1.25 mg Nebulization   1546: Magnesium Sulfate  2  IV  1626: Vancomycin 2000 mg IV   1634: Zosyn 150 Ml IV   1633: Cardizem 11.8 mg IV   1633: Cardizem 10 mg IV   1638: NS  BOLUS 1600 mL IV   1734: LR BOLUS 500 mL IV     Emergency Department Course:  Nursing notes and vitals reviewed.  1456: I performed an exam of the patient as documented above.     1507: Patient rechecked and updated.Patient was placed on BiPAP, he notes already feeling improved.     1543: Patient rechecked and updated. Patient reports he is feeling better. I discussed with him the plan for hospital admission.     1550: Patient rechecked and updated.     1622: Patient rechecked and updated. Patient reports feeling improved.    1647: I spoke with Dr. Delgadillo of the hospitalist  service regarding patient's presentation, findings, and plan of care.     1957: Patient rechecked and updated.     Findings and plan explained to the patient and spouse who consent to admission. Discussed the patient with Dr. Delgadillo, who will admit the patient to an ICU bed for further monitoring, evaluation, and treatment.    Impression & Plan    CMS Diagnoses:   The patient has signs of Severe Sepsis as evidenced by:    1. 2 SIRS criteria, AND  2. Suspected infection, AND   3. Organ dysfunction: Lactic acid >1.9, High WBC, and Tachycardia     Time severe sepsis diagnosis confirmed = 1534 as this was the time when Lactate resulted, and the level was > 1.9 and Acute Resp Failure requiring BiPAP or Mechanical Vent    3 Hour Severe Sepsis Bundle Completion:  1. Initial Lactic Acid Result:   Recent Labs   Lab Test 12/17/18  1634 12/17/18  1450 10/06/18  0823   LACT 1.7 2.8* 1.7     2. Blood Cultures before Antibiotics: Yes  3. Broad Spectrum Antibiotics Administered: Yes - vancomycin and Zosyn    4.1500 ml fluids given  Ideal body weight: 73 kg (160 lb 15 oz)  Adjusted ideal body weight: 78.6 kg (173 lb 5.8 oz)    Severe Sepsis reassessment:  1. Repeat Lactic Acid Level: 1.7  2. MAP>65 after initial IVF bolus, will continue to monitor fluid status and vital signs  I attest to having performed a repeat sepsis exam and assessment of perfusion at 1957 and the results demonstrate improved perfusion.    Medical Decision Making:  Tone is a 67 year old male with COPD who presents with shortness of breath and cough.  Patient notes cough is productive of pink tinged yellow-tan sputum though he has had no fever.  He has some tightness across his chest associated with his dyspnea and he reports he has been using his nebulizer at home with transient improvement though his wife later remarks she believes he is confused as he did not use his nebulizer earlier today.  He appears unwell on exam in at least moderate respiratory  distress with tachypnea and accessory muscle usage.  He has very poor air movement throughout even after a DuoNeb.  He was hypoxic on room air to 82%.  Patient was immediately placed on BiPAP and due to tachycardia was instead given Xopenex in addition to an additional Atrovent.  He was given Solu-Medrol and magnesium and after these, Xopenex, and BiPAP, had marked improvement in his respiratory status.  He did have a couple transient borderline low blood pressures after positive airway pressure and responded quite nicely to IV fluids.  He was significantly tachycardic with atrial fibrillation for which he takes Eliquis.  After 2 small doses of Cardizem, his heart rate improved and he converted to sinus rhythm.  See both EKGs as above which are also notable without ST changes.  His troponin is technically negative though detectable at 0.025.  I think this is unlikely represent ACS and is may be related to the severity of his illness and work of breathing.  Lactic acid is elevated at 2.8 which is at least in degree related to a severe sepsis due to a right lower lobe pneumonia on chest x-ray.  He has a leukocytosis to 21.5.  Blood cultures were obtained and, given patient's admission within the last month, I have elected to treat him for HCAP with vancomycin and Zosyn.  It should be noted that he is influenza negative.  Patient was given 1.5 L of IV fluids and had resolution of his lactic acidosis on repeat VBG.  He has a mild hypercarbia to 57 though clinically he appears markedly improved.  Although he does not appear significantly volume overloaded, he does have some trace lower extremity edema and BNP of 3065 (from 433 at last check) and I am hesitant to give him further IV fluids.  Given he has no further borderline hypotensive readings, particularly with improvement in his heart rate, I will not give full 30 cc/kg of IV fluids in the emergency department.  This can be deferred to inpatient team.  I ordered a  sputum culture, CRP elevated at 194, and procalcitonin is elevated at 2.61 and these can be used to further characterize patient's illness.  He has no gross electrolyte abnormalities with creatinine of 1.09.  I rechecked the patient multiple times and updated him on the results of laboratory and imaging studies as well as plan for admission.  At each check, patient continued to appear improved with improvement in his breath sounds and respiratory status. Again, he had improvement in his heart rate with conversion to sinus rhythm and is hemodynamically stable on BiPAP.  However, given his acute respiratory failure and severe sepsis I believe he warrants admission to the ICU.  I discussed patient's case with Dr. Delgadillo, hospitalist, who accepts admission and has no further orders.    Critical Care time:  was 40 minutes for this patient excluding procedures.    Diagnosis:    ICD-10-CM    1. Severe sepsis (H) A41.9     R65.20    2. HCAP (healthcare-associated pneumonia) J18.9    3. Pneumonia of right lower lobe due to infectious organism (H) J18.1    4. Atrial fibrillation with RVR (H) I48.91    5. Acute respiratory failure with hypoxia and hypercapnia (H) J96.01     J96.02    6. COPD exacerbation (H) J44.1        Disposition:  Admitted to ICU      Sarika Hansno  12/17/2018   [facility] EMERGENCY DEPARTMENT    Scribe Disclosure:  I, Sarika Hanson, am serving as a scribe at 2:56 PM on 12/17/2018 to document services personally performed by Phuong Lee MD based on my observations and the provider's statements to me.        Phuong Lee MD  12/18/18 0125

## 2018-12-17 NOTE — H&P
[facility]    History and Physical - Hospitalist Service       Date of Admission:  12/17/2018    Assessment & Plan     Mr. Cooper is a 67-year-old man with history of severe COPD intermittently on oxygen who presents with 1-1/2 weeks of productive cough and worsening shortness of breath.  He is found to be in hypoxic respiratory failure with severe sepsis secondary to community-acquired pneumonia in the right lung.  He also has a had a history of atrial fibrillation with previous ablation, tricuspid valve replacement, hypertension, nonsustained ventricular tachycardia and 2 hospitalizations in the past 2 months for COPD exacerbations.    1.  Hypoxic respiratory failure and severe sepsis (evidenced by leukocytosis, tachycardia  and lactic acidosis) secondary to community-acquired pneumonia:  --Oxygenation was initially in the low 80% range on room air and his respiratory distress.  Fortunately he is now improving with treatment the emergency department.  --Continue on BiPAP for now and wean off as able  --Treat with ceftriaxone and azithromycin for community acquired pneumonia.  He got a dose of Zosyn and vancomycin in the ER.  --Continue Spiriva and Xopenex nebs scheduled and as needed.  --His medication list says prednisone is unclear when this was initiated.  We will continue the prednisone 40 mg a day.    2.  Atrial fibrillation with rapid ventricular response:  --Is quite tachycardic with fast heart rate in the high 100s when he was in severe respiratory distress.  This is now improving after treatment in the ER and his heart rate are more around 100 bpm.  --Continue with diltiazem and Eliquis.  --Add other rate controlling agents if needed.  --He has a Zio patch on     Code Status: Full     The patient's care was discussed with the [facility] physician.    Yoan Delgadillo MD  [facility]    ______________________________________________________________________    Chief Complaint    Shortness of breath    History of Present Illness   Tone Cooper is a 67 year old male admitted on 12/17/2018. He reports about a week and a half of worsening breathing and more productive cough with brownish yellow sputum.  He has a history of severe COPD and has had been hospitalized 2 previous times in the past 2 months for breathing issues.  He has chest and back pain when he coughs otherwise he is not having any pain.  He has had no known sick contacts.  His breathing was back to baseline after his recent hospitalization at the end of November.  At that time he was not found to have a pneumonia.  He was severely short of breath upon arrival to emergency department and was put on BiPAP.  His symptoms are now improving and he remains on BiPAP and will be admitted to the ICU.      Review of Systems    A complete 10 point review system was performed get up except for the pertinent positives which can be seen in the history of present illness    Past Medical History    I have reviewed this patient's medical history and updated it with pertinent information if needed.   Past Medical History:   Diagnosis Date     Atrial flutter (H)      COPD (chronic obstructive pulmonary disease) (H)      Diverticulitis      Hypertension      Persistent atrial fibrillation (H) 4/28/09    ablation 7/1/2015     Premature beats      PVC (premature ventricular contraction)     s/p ablation 12/5/2017     Tricuspid valve disorder     Dr Aj, Hx of valve repair      Ventricular tachycardia (H)     nonsustained   --Hospitalization from 11/23 to 11/26 for exacerbation of COPD  --Hospitalization from 10/4-10/7 for exacerbation of COPD  --Recent echocardiogram showed ejection fraction 50-60%    Past Surgical History   I have reviewed this patient's surgical history and updated it with pertinent information if needed.  Past Surgical History:   Procedure Laterality Date     ABDOMEN SURGERY      hernia repair right     APPENDECTOMY        CARDIOVERSION  06/03/2009    successful for afib     CARDIOVERSION  4/20/15    successful for afib     CARDIOVERSION  5/28/15     H ABLATION ATRIAL FLUTTER       H ABLATION FOCAL AFIB  7/1/15    Left atrial linear ablation and pulmonary vein antrum ablation     H ABLATION PVC  12/5/16     INCISION AND DRAINAGE LOWER EXTREMITY, COMBINED Right 11/10/2016    Procedure: COMBINED INCISION AND DRAINAGE LOWER EXTREMITY;  Surgeon: Roger Pacheco MD;  Location: [facility] OR     LAPAROSCOPIC CHOLECYSTECTOMY  1/6/2012    Procedure:LAPAROSCOPIC CHOLECYSTECTOMY; LAPAROSCOPIC CHOLECYSTECTOMY ; Surgeon:ROGER PACHECO; Location:[facility] OR     ORTHOPEDIC SURGERY      Left hip replacement     REPAIR VALVE TRICUSPID  9/8/08    conversion to a bileaflet valve and application of a 30 mm Sanderson ring     SMALL INTESTINE SURGERY      had bowel obstruction age 9     VALVE REPLACEMENT      tricuspid       Social History   I have reviewed this patient's social history and updated it with pertinent information if needed.  Social History     Tobacco Use     Smoking status: Former Smoker     Last attempt to quit: 1/2/2008     Years since quitting: 10.9     Smokeless tobacco: Never Used   Substance Use Topics     Alcohol use: Yes     Alcohol/week: 0.0 oz     Comment: 3-6 per month     Drug use: No       Family History   I have reviewed this patient's family history and updated it with pertinent information if needed.   Family History   Problem Relation Age of Onset     Prostate Cancer Father      Family History Negative Mother      Emphysema Mother      Hypertension Mother      Cerebrovascular Disease Mother        Prior to Admission Medications   Prior to Admission Medications   Prescriptions Last Dose Informant Patient Reported? Taking?   albuterol (VENTOLIN HFA) 108 (90 Base) MCG/ACT inhaler   No Yes   Sig: Inhale 1-2 puffs into the lungs every 4 hours (while awake) PRN   amoxicillin (AMOXIL) 500 MG capsule   Yes No   Sig: Take 2,000 mg  by mouth as needed (Dental appt)   apixaban ANTICOAGULANT (ELIQUIS) 5 MG tablet 12/17/2018 at x1  No Yes   Sig: Take 1 tablet (5 mg) by mouth 2 times daily   diltiazem (CARDIZEM CD) 180 MG 24 hr capsule 12/17/2018 at Unknown time  No Yes   Sig: Take 2 capsules (360 mg) by mouth daily   ipratropium - albuterol 0.5 mg/2.5 mg/3 mL (DUONEB) 0.5-2.5 (3) MG/3ML neb solution   Yes Yes   Sig: Take 1 vial by nebulization every 6 hours as needed for shortness of breath / dyspnea or wheezing   levalbuterol (XOPENEX) 1.25 MG/3ML neb solution   No Yes   Sig: Take 3 mLs (1.25 mg) by nebulization every 4 hours as needed for wheezing or shortness of breath / dyspnea   levalbuterol (XOPENEX) 1.25 MG/3ML neb solution 12/17/2018 at Unknown time  No Yes   Sig: Take 3 mLs (1.25 mg) by nebulization 4 times daily   order for DME   No No   Sig: Oxygen for nocturnal use. 1 LPM via nasal cannula  Testing done at home in chronic stable state.  Condition is COPD.  Patient does not have Obstructive Sleep Apnea.  Overnight oximetry revealed 30.3 minutes of hypoxia (<= 88%).  Duration: Lifetime (99 months).   order for DME   No No   Sig: Equipment being ordered: Nebulizer   oxyCODONE-acetaminophen (PERCOCET) 5-325 MG per tablet   Yes No   Sig: Take 1 tablet by mouth every 6 hours as needed for severe pain   predniSONE (DELTASONE) 20 MG tablet 12/17/2018 at Unknown time Spouse/Significant Other Yes Yes   Sig: Take 40 mg by mouth daily.   propafenone (RYTHMOL) 150 MG TABS tablet 12/17/2018 at x1  No Yes   Sig: Take 1 tablet (150 mg) by mouth 3 times daily   tiotropium (SPIRIVA) 18 MCG capsule 12/17/2018 at Unknown time  No Yes   Sig: Inhale 1 capsule (18 mcg) into the lungs daily   traZODone (DESYREL) 50 MG tablet   Yes Yes   Sig: Take 25 mg by mouth nightly as needed for sleep      Facility-Administered Medications: None     Allergies   Allergies   Allergen Reactions     Amlodipine Swelling     Flecainide Palpitations       Physical Exam   Vital  Signs: Temp: 98.5  F (36.9  C) Temp src: Oral BP: 121/82   Heart Rate: 126 Resp: 18 SpO2: 94 % O2 Device: BiPAP/CPAP Oxygen Delivery: 7 LPM  Weight: 192 lbs 0 oz      Vital signs reviewed  General:  Alert, calm, NAD, on BiPAP  CV: regular rate and rhythm, no murmurs or rubs  Lungs: Decreased to ascultation bilaterally, normal respiratory effort  HEENT:  Pupil round, equal, conjuctivae, sclerae and lids normal, neck is supple  Abdomen:  Soft, nontender, nondistended, no masses, normal bowel sounds  Extremities:  No edema  Neuro: normal strength and sensation in all 4 extremities, cranial nerves grossly intact  Psychiatric:  Mood and affect within normal limits  Skin:  No rashes or wounds evident  Heme:  No lymphandenopathy appreciated    Data   Data reviewed today: I reviewed all medications, new labs and imaging results over the last 24 hours. I personally reviewed the chest x-ray image(s) showing Right hilar and right basilar infiltrate.  I personally visualized the chest x-ray image.    Recent Labs   Lab 12/17/18  1450   WBC 21.5*   HGB 17.2   MCV 93         POTASSIUM 4.7   CHLORIDE 96   CO2 30   BUN 26   CR 1.09   ANIONGAP 8   WILBERT 9.3   *   TROPI 0.025

## 2018-12-17 NOTE — LETTER
Transition Communication Hand-off for Care Transitions to Next Level of Care Provider    Name: Tone Cooper  : 1950  MRN #: [id number]  Primary Care Provider: Ana Pratt  Primary Care MD Name: Ana Pratt  Primary Clinic: [facility] [address] [facility] [id number]  Primary Care Clinic Name: [facility]  Reason for Hospitalization:  COPD exacerbation (H) [J44.1]  Atrial fibrillation with RVR (H) [I48.91]  HCAP (healthcare-associated pneumonia) [J18.9]  Severe sepsis (H) [A41.9, R65.20]  Acute respiratory failure with hypoxia and hypercapnia (H) [J96.01, J96.02]  Pneumonia of right lower lobe due to infectious organism (H) [J18.1]  Admit Date/Time: 2018  2:30 PM  Discharge Date: 2019  Payor Source: Payor: [facility] / Plan: [employer] NON FPA / Product Type: HMO /     Readmission Assessment Measure (CALEB) Risk Score/category: ELEVATED   Reason for Communication Hand-off Referral: Admission diagnoses: PN  Admission diagnoses: COPD      Discharge Needs Assessment:  Needs      Most Recent Value   Equipment Currently Used at Home  walker, rolling   # of Referrals Placed by [facility]  Home Infusion, Inpatient Pharmacy   Home Infusion Provider  Liane Acevedo [phone], Fax: [phone]          Any outstanding tests or procedures:    Procedures     Future Labs/Procedures    Oxygen Adult     Comments:    Renew Home Oxygen Order  Renew previous prescription.  Expected treatment length is indefinite (99 months).    Attending Provider: Shanon Black MD  Physician signature: See electronic signature associated with these discharge orders  Date of Order: 2019    Oxygen Adult     Comments:    Renew Home Oxygen Order  Renew previous prescription.  Expected treatment length is indefinite (99 months).    Attending Provider: Shanon Black MD  Physician signature: See electronic signature associated with these discharge orders  Date of Order:   2019          Referrals     Future Labs/Procedures    Home infusion referral     Comments:    Your provider has referred you to: PREFERRED PROVIDERS:    Local Address (if different from home address): N/A    Anticipated Length of Therapy: per ID    Home Infusion Pharmacist to adjust therapy based on labs and clinical assessments: Yes    Labs:  May draw labs from Venous Catheter: Yes  Home Infusion Pharmacist to order labs based on therapy type and clinical assessments: Yes  Call/Fax Lab Results to: Dr Stanford ID    Agency Staff to assess nursing needs for Infusion Therapy.    Access Device Management:  IV Access Type: midline  Flush with Heparin and Normal Saline IVP PRN and routine site care (per agency protocol) to maintain access device? Yes    Home infusion referral     Comments:    Your provider has referred you to: PREFERRED PROVIDERS:    Local Address (if different from home address): N/A    Anticipated Length of Therapy: per ID    Home Infusion Pharmacist to adjust therapy based on labs and clinical assessments: Yes    Labs:  May draw labs from Venous Catheter: Yes  Home Infusion Pharmacist to order labs based on therapy type and clinical assessments: Yes  Call/Fax Lab Results to: Dr Stanford    Agency Staff to assess nursing needs for Infusion Therapy.    Access Device Management:  IV Access Type: mid line  Flush with Heparin and Normal Saline IVP PRN and routine site care (per agency protocol) to maintain access device? Yes    Medication Therapy Management Referral     Comments:    MTM referral reason            Patient has 5 PTA or Discharge Medications AND one of the following   diagnoses: DM,HF,COPD,AMI DX,PULM HTN       This service is designed to help you get the most from your medications.  A specially trained pharmacist will work closely with you and your doctors  to solve any problems related to your medications and to help you get the   best results from taking them.      The Medication Therapy  "Management staff will call you to schedule an appointment.            Key Recommendations: Pt admitted with PNA/COPD ex. Is a readmit less than 30 days. Pt uses oxygen at home supplied through [employer] medical equipment. When CM asked pt about having a portable tank pt states he refused to lug his portable tank around in public as he has issues with his ego. Pt travels a lot for work. He uses inhalers and nebulizers to help manage his copd. Pt states he at one time \"dropped\" his inhalers because of a conflict with some other medications. Pt needs continued following with clinic care coordination with COPD symptom management and compliance.      Ciara Barrett    Pt was discharged to home with [facility]    Follow-up and recommended labs and tests     1. Follow up with primary care provider, Ana Pratt, within 7 days to evaluate treatment change, for hospital follow- up and consider  restart of hemoptysis resolves. Per ID pt to continue Ancef & then days & then use Augmentin for   2. Follow up with pulmonolgy.       Follow-up and recommended labs and tests     1. Follow up with primary care provider, Ana Pratt, within 1 week , to evaluate medication change and for hospital follow- up, resume  Eliquis x 1 more week and then restart of hemoptysis resolves.    No follow up labs or test are needed.   2. Follow up with Pulmonary             AVS/Discharge Summary is the source of truth; this is a helpful guide for improved communication of patient story                        "

## 2018-12-17 NOTE — PROGRESS NOTES
Respiratory Therapy    Patient placed onto BiPAP 14/6 RR 14 FiO2 initially 60% FiO2 now 50%, SpO2 95%. X1 atrovent given inline, X3 0.31 mg xopenex nebs given in-line and X2 1.25mg xopenex nebs given inline with bipap. Lung sounds very diminished. Will continue to assess and monitor    Becca Waterman RRT  12/17/2018

## 2018-12-18 LAB
ANION GAP SERPL CALCULATED.3IONS-SCNC: 6 MMOL/L (ref 3–14)
BUN SERPL-MCNC: 32 MG/DL (ref 7–30)
CALCIUM SERPL-MCNC: 8.6 MG/DL (ref 8.5–10.1)
CHLORIDE SERPL-SCNC: 101 MMOL/L (ref 94–109)
CO2 SERPL-SCNC: 30 MMOL/L (ref 20–32)
CREAT SERPL-MCNC: 1.08 MG/DL (ref 0.66–1.25)
ERYTHROCYTE [DISTWIDTH] IN BLOOD BY AUTOMATED COUNT: 14.9 % (ref 10–15)
GFR SERPL CREATININE-BSD FRML MDRD: 68 ML/MIN/1.7M2
GLUCOSE BLDC GLUCOMTR-MCNC: 142 MG/DL (ref 70–99)
GLUCOSE BLDC GLUCOMTR-MCNC: 154 MG/DL (ref 70–99)
GLUCOSE BLDC GLUCOMTR-MCNC: 159 MG/DL (ref 70–99)
GLUCOSE BLDC GLUCOMTR-MCNC: 175 MG/DL (ref 70–99)
GLUCOSE BLDC GLUCOMTR-MCNC: 208 MG/DL (ref 70–99)
GLUCOSE BLDC GLUCOMTR-MCNC: 218 MG/DL (ref 70–99)
GLUCOSE SERPL-MCNC: 151 MG/DL (ref 70–99)
GRAM STN SPEC: ABNORMAL
HCT VFR BLD AUTO: 46 % (ref 40–53)
HGB BLD-MCNC: 14.2 G/DL (ref 13.3–17.7)
INTERPRETATION ECG - MUSE: NORMAL
INTERPRETATION ECG - MUSE: NORMAL
Lab: ABNORMAL
MCH RBC QN AUTO: 29.3 PG (ref 26.5–33)
MCHC RBC AUTO-ENTMCNC: 30.9 G/DL (ref 31.5–36.5)
MCV RBC AUTO: 95 FL (ref 78–100)
MRSA DNA SPEC QL NAA+PROBE: NEGATIVE
PLATELET # BLD AUTO: 124 10E9/L (ref 150–450)
POTASSIUM SERPL-SCNC: 4.7 MMOL/L (ref 3.4–5.3)
RBC # BLD AUTO: 4.84 10E12/L (ref 4.4–5.9)
SODIUM SERPL-SCNC: 137 MMOL/L (ref 133–144)
SPECIMEN SOURCE: ABNORMAL
SPECIMEN SOURCE: NORMAL
WBC # BLD AUTO: 16.3 10E9/L (ref 4–11)

## 2018-12-18 PROCEDURE — 87186 SC STD MICRODIL/AGAR DIL: CPT | Performed by: INTERNAL MEDICINE

## 2018-12-18 PROCEDURE — 87205 SMEAR GRAM STAIN: CPT | Performed by: INTERNAL MEDICINE

## 2018-12-18 PROCEDURE — 94640 AIRWAY INHALATION TREATMENT: CPT | Mod: 76

## 2018-12-18 PROCEDURE — 87077 CULTURE AEROBIC IDENTIFY: CPT | Performed by: INTERNAL MEDICINE

## 2018-12-18 PROCEDURE — 94660 CPAP INITIATION&MGMT: CPT

## 2018-12-18 PROCEDURE — 25000125 ZZHC RX 250: Performed by: INTERNAL MEDICINE

## 2018-12-18 PROCEDURE — 80048 BASIC METABOLIC PNL TOTAL CA: CPT | Performed by: INTERNAL MEDICINE

## 2018-12-18 PROCEDURE — 00000146 ZZHCL STATISTIC GLUCOSE BY METER IP

## 2018-12-18 PROCEDURE — 87070 CULTURE OTHR SPECIMN AEROBIC: CPT | Performed by: INTERNAL MEDICINE

## 2018-12-18 PROCEDURE — 85027 COMPLETE CBC AUTOMATED: CPT | Performed by: INTERNAL MEDICINE

## 2018-12-18 PROCEDURE — 25000132 ZZH RX MED GY IP 250 OP 250 PS 637: Performed by: INTERNAL MEDICINE

## 2018-12-18 PROCEDURE — 40000275 ZZH STATISTIC RCP TIME EA 10 MIN

## 2018-12-18 PROCEDURE — 25000128 H RX IP 250 OP 636: Performed by: INTERNAL MEDICINE

## 2018-12-18 PROCEDURE — 40000281 ZZH STATISTIC TRANSPORT TIME EA 15 MIN

## 2018-12-18 PROCEDURE — 94640 AIRWAY INHALATION TREATMENT: CPT

## 2018-12-18 PROCEDURE — 36415 COLL VENOUS BLD VENIPUNCTURE: CPT | Performed by: INTERNAL MEDICINE

## 2018-12-18 PROCEDURE — 99233 SBSQ HOSP IP/OBS HIGH 50: CPT | Performed by: INTERNAL MEDICINE

## 2018-12-18 PROCEDURE — 20000003 ZZH R&B ICU

## 2018-12-18 RX ORDER — CEFAZOLIN SODIUM 1 G/50ML
1750 SOLUTION INTRAVENOUS EVERY 12 HOURS
Status: DISCONTINUED | OUTPATIENT
Start: 2018-12-18 | End: 2018-12-20 | Stop reason: DRUGHIGH

## 2018-12-18 RX ORDER — LANOLIN ALCOHOL/MO/W.PET/CERES
3 CREAM (GRAM) TOPICAL
Status: DISCONTINUED | OUTPATIENT
Start: 2018-12-18 | End: 2018-12-26

## 2018-12-18 RX ADMIN — Medication 2.5 MG: at 16:28

## 2018-12-18 RX ADMIN — LEVALBUTEROL HYDROCHLORIDE 1.25 MG: 1.25 SOLUTION RESPIRATORY (INHALATION) at 15:49

## 2018-12-18 RX ADMIN — APIXABAN 5 MG: 5 TABLET, FILM COATED ORAL at 08:08

## 2018-12-18 RX ADMIN — PREDNISONE 40 MG: 20 TABLET ORAL at 08:08

## 2018-12-18 RX ADMIN — DILTIAZEM HYDROCHLORIDE 360 MG: 180 CAPSULE, COATED, EXTENDED RELEASE ORAL at 08:08

## 2018-12-18 RX ADMIN — PROPAFENONE HYDROCHLORIDE 150 MG: 150 TABLET, COATED ORAL at 15:27

## 2018-12-18 RX ADMIN — Medication 25 MG: at 21:54

## 2018-12-18 RX ADMIN — APIXABAN 5 MG: 5 TABLET, FILM COATED ORAL at 21:56

## 2018-12-18 RX ADMIN — Medication 2.5 MG: at 21:54

## 2018-12-18 RX ADMIN — PROPAFENONE HYDROCHLORIDE 150 MG: 150 TABLET, COATED ORAL at 08:08

## 2018-12-18 RX ADMIN — TAZOBACTAM SODIUM AND PIPERACILLIN SODIUM 4.5 G: 500; 4 INJECTION, SOLUTION INTRAVENOUS at 10:22

## 2018-12-18 RX ADMIN — VANCOMYCIN HYDROCHLORIDE 1750 MG: 10 INJECTION, POWDER, LYOPHILIZED, FOR SOLUTION INTRAVENOUS at 18:27

## 2018-12-18 RX ADMIN — TAZOBACTAM SODIUM AND PIPERACILLIN SODIUM 4.5 G: 500; 4 INJECTION, SOLUTION INTRAVENOUS at 21:54

## 2018-12-18 RX ADMIN — TAZOBACTAM SODIUM AND PIPERACILLIN SODIUM 4.5 G: 500; 4 INJECTION, SOLUTION INTRAVENOUS at 15:28

## 2018-12-18 RX ADMIN — Medication 2.5 MG: at 11:59

## 2018-12-18 RX ADMIN — LEVALBUTEROL HYDROCHLORIDE 1.25 MG: 1.25 SOLUTION RESPIRATORY (INHALATION) at 11:02

## 2018-12-18 RX ADMIN — PROPAFENONE HYDROCHLORIDE 150 MG: 150 TABLET, COATED ORAL at 21:54

## 2018-12-18 RX ADMIN — LEVALBUTEROL HYDROCHLORIDE 1.25 MG: 1.25 SOLUTION RESPIRATORY (INHALATION) at 19:50

## 2018-12-18 RX ADMIN — ACETAMINOPHEN 650 MG: 325 TABLET, FILM COATED ORAL at 08:08

## 2018-12-18 RX ADMIN — LEVALBUTEROL HYDROCHLORIDE 1.25 MG: 1.25 SOLUTION RESPIRATORY (INHALATION) at 07:11

## 2018-12-18 RX ADMIN — AZITHROMYCIN 250 MG: 250 TABLET, FILM COATED ORAL at 21:55

## 2018-12-18 ASSESSMENT — PAIN DESCRIPTION - DESCRIPTORS
DESCRIPTORS: HEADACHE
DESCRIPTORS: DISCOMFORT
DESCRIPTORS: DISCOMFORT
DESCRIPTORS: HEADACHE

## 2018-12-18 ASSESSMENT — ACTIVITIES OF DAILY LIVING (ADL)
ADLS_ACUITY_SCORE: 17
ADLS_ACUITY_SCORE: 16

## 2018-12-18 ASSESSMENT — ENCOUNTER SYMPTOMS: CONFUSION: 1

## 2018-12-18 NOTE — PROGRESS NOTES
History of back pain, worsened now with time in bed. No prns available.    Tylenol prn ordered.    [facility] ICU

## 2018-12-18 NOTE — PHARMACY-VANCOMYCIN DOSING SERVICE
Pharmacy Vancomycin Initial Note  Date of Service 2018  Patient's  1950  67 year old, male    Indication: Sepsis    Current estimated CrCl = Estimated Creatinine Clearance: 80.9 mL/min (based on SCr of 1.08 mg/dL).    Creatinine for last 3 days  2018:  2:50 PM Creatinine 1.09 mg/dL  2018:  5:16 AM Creatinine 1.08 mg/dL    Recent Vancomycin Level(s) for last 3 days  No results found for requested labs within last 72 hours.      Vancomycin IV Administrations (past 72 hours)                   vancomycin (VANCOCIN) 2,000 mg in sodium chloride 0.9 % 500 mL intermittent infusion (mg) 2,000 mg Given 18 1626                Nephrotoxins and other renal medications (From now, onward)    Start     Dose/Rate Route Frequency Ordered Stop    18 1715  vancomycin (VANCOCIN) 1,750 mg in sodium chloride 0.9 % 500 mL intermittent infusion      1,750 mg  over 2 Hours Intravenous EVERY 12 HOURS 18 1713      18 0930  piperacillin-tazobactam (ZOSYN) intermittent infusion 4.5 g     Comments:  Pharmacy can adjust dose based on renal function.    4.5 g  200 mL/hr over 30 Minutes Intravenous EVERY 6 HOURS 18 0900            Contrast Orders - past 72 hours (72h ago, onward)    None                Plan:  1.  Start vancomycin  1750 mg IV q12h.   2.  Goal Trough Level: 15-20 mg/L   3.  Pharmacy will check trough levels as appropriate in 1-3 Days.    4. Serum creatinine levels will be ordered daily for the first week of therapy and at least twice weekly for subsequent weeks.    5. Rudolph method utilized to dose vancomycin therapy: Method 1    Johnathon Barba

## 2018-12-18 NOTE — PROGRESS NOTES
RT- Patient came off BIPAP this morning and has tolerated 3L NC all day (which he wears at home) BS coarse with expiratory wheezes and very diminished at times. Patient is easily SOB and takes awhile to recover. BIPAP on standby at the bedside.

## 2018-12-18 NOTE — ED NOTES
Pt remians on BiPap at 45% FiO2, IPAP 14, EPAP 6, rate 14.  Oxygen saturations currently 95%, minimal amount of overbreathing.  Pt resting comfortably.  A/Ox3.  ABC intact.

## 2018-12-18 NOTE — PROGRESS NOTES
Pt has been transported to ICU from ED. Pt was transported on BIPAP and SPO2 was maintained at 100% through the duration of transport. No complications were encountered, respiratory will continue to follow.

## 2018-12-18 NOTE — PROGRESS NOTES
RT- patient remains on Bipap 14/6 (FIO2 weaned to 35%) via medium full face mask. Breath sounds diminished.    Janey Verde, RT  12/18/2018 4:04 AM

## 2018-12-18 NOTE — PROVIDER NOTIFICATION
12/18/18 1351   Significant Event   Significant Event Other (see comments)  (Positive BC - 12/17, Left hand. Gram + cocci in clusters. )   MD notified.

## 2018-12-18 NOTE — PROGRESS NOTES
Transported pt from ED to ICU successfully on BIPAP maintaining SPO2 at 100%. Pt is secure in ICU room with no complications to report. Respiratory will continue to follow.

## 2018-12-18 NOTE — PROGRESS NOTES
[facility]    Medicine Progress Note - Hospitalist Service       Date of Admission:  12/17/2018  Assessment & Plan   Mr. Cooper is a 67-year-old man with history of severe COPD intermittently on oxygen who presented with several days of productive cough and worsening shortness of breath.  Found to be in hypoxic respiratory failure with severe sepsis secondary to pneumonia in the right lung.  He also has a had a history of atrial fibrillation with previous ablation, tricuspid valve replacement, hypertension, nonsustained ventricular tachycardia and 2 hospitalizations in the past 2 months for COPD exacerbations.    1.  Severe sepsis.  Due to pneumonia.  -Change IV ceftriaxone to IV Zosyn to cover possible Pseudomonas.  -Continue azithromycin.    2.  Acute hypoxic respiratory failure.  Due to pneumonia and COPD exacerbation.  Initially requiring BiPAP therapy.  -Improving.  Currently off BiPAP.  -Continue supplemental oxygen as needed.    3.  Pneumonia.  With his complex history, concern for possible Pseudomonas.  -Check sputum for Gram stain and culture.  -Change IV ceftriaxone to IV Zosyn to cover for possible Pseudomonas.  -Continue azithromycin.    4.  COPD exacerbation.  -Continue azithromycin.  -Change ceftriaxone to IV Zosyn.  -Continue Xopenex.  -Continue Incruse Ellipta.  -Continue prednisone.    5.  Atrial fibrillation.  -Continue diltiazem, propafenone, and apixaban.    6.  Lactic acidosis.  Due to sepsis.  -Resolved with IV fluids.    7.  Hyperglycemia.  Possibly due to steroids.  -Check hemoglobin A1c.    8.  Chronic kidney disease.  Creatinine at baseline.  -Avoid nephrotoxins as able.    9.  Nutrition.  -Start regular diet.    10.  Deconditioning.    -Physical therapy consult.        Diet: Regular Diet Adult    DVT Prophylaxis: Apixaban.  Rodney Catheter: not present  Code Status: Full Code      Disposition Plan   Expected discharge: 4 - 7 days.  Entered: Ricci Birmingham, DO 12/18/2018,  9:10 AM       Ricci Birmingham DO  Hospitalist Service  [facility]    ______________________________________________________________________    Interval History   Short of breath.  Coughing.  Having generalized body aches.  Felt feverish overnight.  Denies chest pain, nausea, vomiting, or diarrhea.    Physical Exam   Vital Signs: Temp: 98.1  F (36.7  C) Temp src: Oral BP: 137/80   Heart Rate: 106 Resp: 20 SpO2: 96 % O2 Device: Nasal cannula Oxygen Delivery: 3 LPM  Weight: 187 lbs 6.26 oz  Gen:  NAD, A&Ox3.  Eyes:  PERRL, sclera anicteric.  OP:  MMM, no lesions.  Neck:  Supple.  CV: Irregular, tachycardic, no murmurs.  Lung: Extensive wheeze in right lung, left lung clear, normal effort.  Ab:  +BS, soft.  Skin:  Warm, dry to touch.  No rash.  Ext:  No pitting edema LE b/l.    Data   Recent Labs   Lab 12/18/18  0516 12/17/18  1450   WBC 16.3* 21.5*   HGB 14.2 17.2   MCV 95 93   * 193    134   POTASSIUM 4.7 4.7   CHLORIDE 101 96   CO2 30 30   BUN 32* 26   CR 1.08 1.09   ANIONGAP 6 8   WILBERT 8.6 9.3   * 140*   TROPI  --  0.025

## 2018-12-18 NOTE — PROVIDER NOTIFICATION
12/18/18 6254   Significant Event   Significant Event Other (see comments)  (Sputum Lab positive for Gram + Cocci in Clusters. )   Izabella ARNETT.

## 2018-12-18 NOTE — PROVIDER NOTIFICATION
12/18/18 3391   Significant Event   Significant Event Other (see comments)  (Lab Update: BC 12/17 R hand is Staff [employer]; however, NOT MR)   Paged MD.

## 2018-12-18 NOTE — PLAN OF CARE
Pt had uneventful night overall. Alert and oriented x4, irritable at times. Rested well on Bipap, Fio2 titrated from 45 to 35 throughout night. Pt had episode of dyspnea while up to bathroom for BM, assisted back to bed and put back on bipap and recovered well. HR from Afib to NSR with PVCs while sleeping, then back to afib this early AM. BP stable. Reports discomfort in back but denies need for pain medication. Voided and had BM this AM.     No questions at this time, call light within reach, continue to monitor.

## 2018-12-18 NOTE — PLAN OF CARE
ICU End of Shift Summary.  For vital signs and complete assessments, please see documentation flowsheets.     Pertinent assessments: A&Ox4. Tele Afib CVR most of day; however, RVR with ambulation to bathroom or eating. Now utilizing bedside commode. Lungs very diminished. Changed from BiPAP to 3LPM NC. Intermittent cough with bloody sputum. Notified MD. Placed bubbler. Afebrile. Pain controlled with tylenol and Oxycodone. Eating 1/2 of meals. 2 BMs this shift, soft. Refused paula care or skin check. Only bruises noted. POC reviewed with pt.   Major Shift Events: positive blood culture and sputum gram stain - updated MD. Foster added to IV Zosyn.   Plan (Upcoming Events): Continue to monitor closely.   Discharge/Transfer Needs: TBD    Bedside Shift Report Completed : yes  Bedside Safety Check Completed: yes

## 2018-12-19 LAB
ANION GAP SERPL CALCULATED.3IONS-SCNC: 4 MMOL/L (ref 3–14)
BASE EXCESS BLDA CALC-SCNC: 4.5 MMOL/L
BUN SERPL-MCNC: 33 MG/DL (ref 7–30)
CALCIUM SERPL-MCNC: 8.9 MG/DL (ref 8.5–10.1)
CHLORIDE SERPL-SCNC: 103 MMOL/L (ref 94–109)
CO2 SERPL-SCNC: 30 MMOL/L (ref 20–32)
CREAT SERPL-MCNC: 0.99 MG/DL (ref 0.66–1.25)
GFR SERPL CREATININE-BSD FRML MDRD: 78 ML/MIN/{1.73_M2}
GLUCOSE BLDC GLUCOMTR-MCNC: 123 MG/DL (ref 70–99)
GLUCOSE BLDC GLUCOMTR-MCNC: 136 MG/DL (ref 70–99)
GLUCOSE BLDC GLUCOMTR-MCNC: 174 MG/DL (ref 70–99)
GLUCOSE BLDC GLUCOMTR-MCNC: 196 MG/DL (ref 70–99)
GLUCOSE BLDC GLUCOMTR-MCNC: 209 MG/DL (ref 70–99)
GLUCOSE BLDC GLUCOMTR-MCNC: 209 MG/DL (ref 70–99)
GLUCOSE SERPL-MCNC: 134 MG/DL (ref 70–99)
HBA1C MFR BLD: 6 % (ref 0–5.6)
HCO3 BLD-SCNC: 30 MMOL/L (ref 21–28)
O2/TOTAL GAS SETTING VFR VENT: ABNORMAL %
OXYHGB MFR BLD: 95 % (ref 92–100)
PCO2 BLD: 47 MM HG (ref 35–45)
PH BLD: 7.41 PH (ref 7.35–7.45)
PO2 BLD: 70 MM HG (ref 80–105)
POTASSIUM SERPL-SCNC: 4.3 MMOL/L (ref 3.4–5.3)
SODIUM SERPL-SCNC: 137 MMOL/L (ref 133–144)

## 2018-12-19 PROCEDURE — 25000128 H RX IP 250 OP 636: Performed by: INTERNAL MEDICINE

## 2018-12-19 PROCEDURE — 94640 AIRWAY INHALATION TREATMENT: CPT

## 2018-12-19 PROCEDURE — 25000125 ZZHC RX 250: Performed by: INTERNAL MEDICINE

## 2018-12-19 PROCEDURE — 94660 CPAP INITIATION&MGMT: CPT

## 2018-12-19 PROCEDURE — 40000275 ZZH STATISTIC RCP TIME EA 10 MIN

## 2018-12-19 PROCEDURE — 25000131 ZZH RX MED GY IP 250 OP 636 PS 637: Performed by: INTERNAL MEDICINE

## 2018-12-19 PROCEDURE — 82805 BLOOD GASES W/O2 SATURATION: CPT | Performed by: INTERNAL MEDICINE

## 2018-12-19 PROCEDURE — 80048 BASIC METABOLIC PNL TOTAL CA: CPT | Performed by: INTERNAL MEDICINE

## 2018-12-19 PROCEDURE — 00000146 ZZHCL STATISTIC GLUCOSE BY METER IP

## 2018-12-19 PROCEDURE — 87040 BLOOD CULTURE FOR BACTERIA: CPT | Performed by: INTERNAL MEDICINE

## 2018-12-19 PROCEDURE — 99233 SBSQ HOSP IP/OBS HIGH 50: CPT | Performed by: INTERNAL MEDICINE

## 2018-12-19 PROCEDURE — 25000132 ZZH RX MED GY IP 250 OP 250 PS 637: Performed by: INTERNAL MEDICINE

## 2018-12-19 PROCEDURE — 36600 WITHDRAWAL OF ARTERIAL BLOOD: CPT

## 2018-12-19 PROCEDURE — 94640 AIRWAY INHALATION TREATMENT: CPT | Mod: 76

## 2018-12-19 PROCEDURE — 36415 COLL VENOUS BLD VENIPUNCTURE: CPT | Performed by: INTERNAL MEDICINE

## 2018-12-19 PROCEDURE — 83036 HEMOGLOBIN GLYCOSYLATED A1C: CPT | Performed by: INTERNAL MEDICINE

## 2018-12-19 PROCEDURE — 20000003 ZZH R&B ICU

## 2018-12-19 RX ORDER — FUROSEMIDE 10 MG/ML
20 INJECTION INTRAMUSCULAR; INTRAVENOUS ONCE
Status: COMPLETED | OUTPATIENT
Start: 2018-12-19 | End: 2018-12-19

## 2018-12-19 RX ORDER — NICOTINE POLACRILEX 4 MG
15-30 LOZENGE BUCCAL
Status: DISCONTINUED | OUTPATIENT
Start: 2018-12-19 | End: 2019-01-08 | Stop reason: HOSPADM

## 2018-12-19 RX ORDER — DEXTROSE MONOHYDRATE 25 G/50ML
25-50 INJECTION, SOLUTION INTRAVENOUS
Status: DISCONTINUED | OUTPATIENT
Start: 2018-12-19 | End: 2019-01-08 | Stop reason: HOSPADM

## 2018-12-19 RX ADMIN — LEVALBUTEROL HYDROCHLORIDE 1.25 MG: 1.25 SOLUTION RESPIRATORY (INHALATION) at 15:20

## 2018-12-19 RX ADMIN — TAZOBACTAM SODIUM AND PIPERACILLIN SODIUM 4.5 G: 500; 4 INJECTION, SOLUTION INTRAVENOUS at 21:38

## 2018-12-19 RX ADMIN — TAZOBACTAM SODIUM AND PIPERACILLIN SODIUM 4.5 G: 500; 4 INJECTION, SOLUTION INTRAVENOUS at 10:47

## 2018-12-19 RX ADMIN — LEVALBUTEROL HYDROCHLORIDE 1.25 MG: 1.25 SOLUTION RESPIRATORY (INHALATION) at 07:31

## 2018-12-19 RX ADMIN — VANCOMYCIN HYDROCHLORIDE 1750 MG: 10 INJECTION, POWDER, LYOPHILIZED, FOR SOLUTION INTRAVENOUS at 18:13

## 2018-12-19 RX ADMIN — APIXABAN 5 MG: 5 TABLET, FILM COATED ORAL at 08:29

## 2018-12-19 RX ADMIN — INSULIN ASPART 1 UNITS: 100 INJECTION, SOLUTION INTRAVENOUS; SUBCUTANEOUS at 16:14

## 2018-12-19 RX ADMIN — TAZOBACTAM SODIUM AND PIPERACILLIN SODIUM 4.5 G: 500; 4 INJECTION, SOLUTION INTRAVENOUS at 15:53

## 2018-12-19 RX ADMIN — VANCOMYCIN HYDROCHLORIDE 1750 MG: 10 INJECTION, POWDER, LYOPHILIZED, FOR SOLUTION INTRAVENOUS at 06:16

## 2018-12-19 RX ADMIN — PREDNISONE 40 MG: 20 TABLET ORAL at 08:28

## 2018-12-19 RX ADMIN — APIXABAN 5 MG: 5 TABLET, FILM COATED ORAL at 20:13

## 2018-12-19 RX ADMIN — LEVALBUTEROL HYDROCHLORIDE 1.25 MG: 1.25 SOLUTION RESPIRATORY (INHALATION) at 11:43

## 2018-12-19 RX ADMIN — DILTIAZEM HYDROCHLORIDE 360 MG: 180 CAPSULE, COATED, EXTENDED RELEASE ORAL at 08:28

## 2018-12-19 RX ADMIN — AZITHROMYCIN 250 MG: 250 TABLET, FILM COATED ORAL at 20:13

## 2018-12-19 RX ADMIN — INSULIN ASPART 1 UNITS: 100 INJECTION, SOLUTION INTRAVENOUS; SUBCUTANEOUS at 14:28

## 2018-12-19 RX ADMIN — Medication 2.5 MG: at 22:21

## 2018-12-19 RX ADMIN — Medication 25 MG: at 22:20

## 2018-12-19 RX ADMIN — TAZOBACTAM SODIUM AND PIPERACILLIN SODIUM 4.5 G: 500; 4 INJECTION, SOLUTION INTRAVENOUS at 03:54

## 2018-12-19 RX ADMIN — INSULIN HUMAN 7 UNITS: 100 INJECTION, SUSPENSION SUBCUTANEOUS at 14:29

## 2018-12-19 RX ADMIN — PROPAFENONE HYDROCHLORIDE 150 MG: 150 TABLET, COATED ORAL at 21:38

## 2018-12-19 RX ADMIN — MELATONIN TAB 3 MG 3 MG: 3 TAB at 00:03

## 2018-12-19 RX ADMIN — LEVALBUTEROL HYDROCHLORIDE 1.25 MG: 1.25 SOLUTION RESPIRATORY (INHALATION) at 19:39

## 2018-12-19 RX ADMIN — PROPAFENONE HYDROCHLORIDE 150 MG: 150 TABLET, COATED ORAL at 08:28

## 2018-12-19 RX ADMIN — FUROSEMIDE 20 MG: 10 INJECTION, SOLUTION INTRAMUSCULAR; INTRAVENOUS at 15:53

## 2018-12-19 RX ADMIN — PROPAFENONE HYDROCHLORIDE 150 MG: 150 TABLET, COATED ORAL at 15:58

## 2018-12-19 ASSESSMENT — ACTIVITIES OF DAILY LIVING (ADL)
ADLS_ACUITY_SCORE: 16
ADLS_ACUITY_SCORE: 17
ADLS_ACUITY_SCORE: 16
ADLS_ACUITY_SCORE: 17
ADLS_ACUITY_SCORE: 18
ADLS_ACUITY_SCORE: 17

## 2018-12-19 ASSESSMENT — PAIN DESCRIPTION - DESCRIPTORS
DESCRIPTORS: POUNDING
DESCRIPTORS: DULL

## 2018-12-19 NOTE — PLAN OF CARE
ICU End of Shift Summary.  For vital signs and complete assessments, please see documentation flowsheets.     Pertinent assessments: On home dose 3 LPM NC O2 while awake, SpO2 decreased while asleep to mid 70's at times. Switched to oxymask, increased to 8 LPM, RT notified; tele-ICU notified, recommended returning pt to bipap overnight. Pt agreeable to bipap to achieve rest; LS remained coarse overnight, ERICKSON on exertion, pt denied worsening s/s while O2 low. C/O HA pain d/t cough, prn oxycodone given with rest after administration. PIV SL between IV abx, PO azithromycin started. Pt reported ongoing red/bloody sputum, states returned to gold this AM. Voiding into urinal overnight with adequate UO, using bedside commode while awake and unable to measure UO. Tele Afib, rate variable 50's while asleep to 110's while awake, occasional PCV. Alert and oriented, able to make needs known.  Major Shift Events: Returned to bipap overnight, tolerated well  Plan (Upcoming Events): Continue to wean to baseline O2 use, continue IV abx  Discharge/Transfer Needs: TBD    Bedside Shift Report Completed : Y  Bedside Safety Check Completed: Y

## 2018-12-19 NOTE — PROVIDER NOTIFICATION
2200: Call placed to tele-ICU; informed receiving RN of ongoing blood tinged sputum. Pt denies changes in pain, no change in VS, transitioned from BIPAP to baseline 3 LPM. No change in care plan, no new orders. Continue to monitor and update as needed.

## 2018-12-19 NOTE — PROGRESS NOTES
RT Note:    Patient requiring more O2 to maintain SpO2 while asleep, increased to 8L oxymask around 1am, then at 3am he was placed back on BiPAP with settings of 14/6 and 30%. Patient will continue to receive Xopenex QID. RT will continue to monitor.    Cristal Rouse  December 19, 2018.4:02 AM

## 2018-12-19 NOTE — PROGRESS NOTES
[facility]    Medicine Progress Note - Hospitalist Service       Date of Admission:  12/17/2018  Assessment & Plan   Mr. Cooper is a 67-year-old man with history of severe COPD intermittently on oxygen who presented with several days of productive cough and worsening shortness of breath.  Found to be in hypoxic respiratory failure with severe sepsis secondary to pneumonia in the right lung.  He also has a had a history of atrial fibrillation with previous ablation, tricuspid valve replacement, hypertension, nonsustained ventricular tachycardia and 2 hospitalizations in the past 2 months for COPD exacerbations.     1.  Severe sepsis.  Due to pneumonia.  -Continue IV Zosyn.  -Continue azithromycin.  -IV vancomycin added on 12/18/18 after one blood culture returns with gram-positive cocci now identified as staph aureus.     2.  Acute hypoxic respiratory failure.  Due to pneumonia and COPD exacerbation.  Initially requiring BiPAP therapy.  -Improving.  Currently off BiPAP.  -Continue supplemental oxygen as needed.     3.  Pneumonia.    -Continue azithromycin, Zosyn, and vancomycin for now.    -Gram stain showing gram-positive cocci.  One blood culture with staph aureus.  -ID consult.    4.  COPD exacerbation.  -Continue antibiotics as above.  -Continue Xopenex.  -Continue Incruse Ellipta.  -Continue prednisone.     5.  Atrial fibrillation.  -Continue diltiazem, propafenone, and apixaban.     6.  Lactic acidosis.  Due to sepsis.  -Resolved with IV fluids.     7.  Hyperglycemia.  Possibly due to steroids.  -Start NPH insulin 7 units every morning with first dose now while on prednisone.  -NovoLog sliding scale.     8.  Chronic kidney disease.  Creatinine at baseline.  -Avoid nephrotoxins as able.     9.  Nutrition.  -Regular diet     10.  Deconditioning.    -Physical therapy consult.    11.  Staph aureus bacteremia.  1 of 2 bottles on 12/17/18 growing staph aureus.  -Repeat 2 blood cultures  today.  -Continue antibiotics as noted above.  -Infectious disease consult.        Diet: Regular Diet Adult    DVT Prophylaxis: Apixaban  Rodney Catheter: not present  Code Status: Full Code        Entered: Ricci Birmingham DO 12/19/2018, 12:39 PM         Ricci Birmingham DO  Hospitalist Service  [facility]    ______________________________________________________________________    Interval History   Short of breath.  Coughing.  Feels weak.  Stools mildly loose.  Denies chest pain, fevers, chills, nausea, vomiting.      Physical Exam   Vital Signs: Temp: (P) 98.4  F (36.9  C) Temp src: (P) Axillary BP: 127/85   Heart Rate: 92 Resp: (P) 14 SpO2: 97 % O2 Device: Nasal cannula Oxygen Delivery: 4 LPM  Weight: 196 lbs 3.35 oz  Gen:  NAD, A&Ox3.  Eyes:  PERRL, sclera anicteric.  OP:  MMM, no lesions.  Neck:  Supple.  CV:  Irregular, no murmurs.  Lung:  Wheeze b/l, normal effort.  Ab:  +BS, soft.  Skin:  Warm, dry to touch.  No rash.  Ext:  No pitting edema LE b/l.      Data   Recent Labs   Lab 12/19/18  1014 12/18/18  0516 12/17/18  1450   WBC  --  16.3* 21.5*   HGB  --  14.2 17.2   MCV  --  95 93   PLT  --  124* 193    137 134   POTASSIUM 4.3 4.7 4.7   CHLORIDE 103 101 96   CO2 30 30 30   BUN 33* 32* 26   CR 0.99 1.08 1.09   ANIONGAP 4 6 8   WILBERT 8.9 8.6 9.3   * 151* 140*   TROPI  --   --  0.025

## 2018-12-19 NOTE — CONSULTS
ID consult dictated IMPm 1 68 yo male [facility] hospital in last mo, pneumonia/COPD flare, MSSA in 1/2 BC, likely MSSA pneumonia but TV etc    REc eventualm to ancef but broad coverage a bit loner, avoid PICC Follow-up BC

## 2018-12-19 NOTE — PROGRESS NOTES
RT- Came to give patient nebulizer at 1143, patient very SOB while eating and after washing up. Patient placed back on BIPAP after nebulizer, during nebulizer had to turn oxygen up to 6L and oxygen saturations were only 83%. Patient now 97% on BIPAP settings 14/6, R14 30%.

## 2018-12-20 LAB
ANION GAP SERPL CALCULATED.3IONS-SCNC: 6 MMOL/L (ref 3–14)
BACTERIA SPEC CULT: ABNORMAL
BUN SERPL-MCNC: 32 MG/DL (ref 7–30)
CALCIUM SERPL-MCNC: 8.7 MG/DL (ref 8.5–10.1)
CHLORIDE SERPL-SCNC: 102 MMOL/L (ref 94–109)
CO2 SERPL-SCNC: 30 MMOL/L (ref 20–32)
CREAT SERPL-MCNC: 1.03 MG/DL (ref 0.66–1.25)
ERYTHROCYTE [DISTWIDTH] IN BLOOD BY AUTOMATED COUNT: 14.7 % (ref 10–15)
GFR SERPL CREATININE-BSD FRML MDRD: 75 ML/MIN/{1.73_M2}
GLUCOSE BLDC GLUCOMTR-MCNC: 127 MG/DL (ref 70–99)
GLUCOSE BLDC GLUCOMTR-MCNC: 147 MG/DL (ref 70–99)
GLUCOSE BLDC GLUCOMTR-MCNC: 182 MG/DL (ref 70–99)
GLUCOSE BLDC GLUCOMTR-MCNC: 203 MG/DL (ref 70–99)
GLUCOSE BLDC GLUCOMTR-MCNC: 99 MG/DL (ref 70–99)
GLUCOSE SERPL-MCNC: 140 MG/DL (ref 70–99)
HCT VFR BLD AUTO: 42.9 % (ref 40–53)
HGB BLD-MCNC: 13.6 G/DL (ref 13.3–17.7)
Lab: ABNORMAL
MCH RBC QN AUTO: 29.5 PG (ref 26.5–33)
MCHC RBC AUTO-ENTMCNC: 31.7 G/DL (ref 31.5–36.5)
MCV RBC AUTO: 93 FL (ref 78–100)
PLATELET # BLD AUTO: 165 10E9/L (ref 150–450)
POTASSIUM SERPL-SCNC: 4.2 MMOL/L (ref 3.4–5.3)
RBC # BLD AUTO: 4.61 10E12/L (ref 4.4–5.9)
SODIUM SERPL-SCNC: 138 MMOL/L (ref 133–144)
SPECIMEN SOURCE: ABNORMAL
VANCOMYCIN SERPL-MCNC: 22.3 MG/L
WBC # BLD AUTO: 12.7 10E9/L (ref 4–11)

## 2018-12-20 PROCEDURE — 25000132 ZZH RX MED GY IP 250 OP 250 PS 637: Performed by: INTERNAL MEDICINE

## 2018-12-20 PROCEDURE — 36415 COLL VENOUS BLD VENIPUNCTURE: CPT | Performed by: INTERNAL MEDICINE

## 2018-12-20 PROCEDURE — 94640 AIRWAY INHALATION TREATMENT: CPT | Mod: 76

## 2018-12-20 PROCEDURE — 85027 COMPLETE CBC AUTOMATED: CPT | Performed by: INTERNAL MEDICINE

## 2018-12-20 PROCEDURE — 94660 CPAP INITIATION&MGMT: CPT

## 2018-12-20 PROCEDURE — 80048 BASIC METABOLIC PNL TOTAL CA: CPT | Performed by: INTERNAL MEDICINE

## 2018-12-20 PROCEDURE — 00000146 ZZHCL STATISTIC GLUCOSE BY METER IP

## 2018-12-20 PROCEDURE — 99233 SBSQ HOSP IP/OBS HIGH 50: CPT | Performed by: INTERNAL MEDICINE

## 2018-12-20 PROCEDURE — 25000128 H RX IP 250 OP 636: Performed by: INTERNAL MEDICINE

## 2018-12-20 PROCEDURE — 40000275 ZZH STATISTIC RCP TIME EA 10 MIN

## 2018-12-20 PROCEDURE — 20000003 ZZH R&B ICU

## 2018-12-20 PROCEDURE — 25000125 ZZHC RX 250: Performed by: INTERNAL MEDICINE

## 2018-12-20 PROCEDURE — 94640 AIRWAY INHALATION TREATMENT: CPT

## 2018-12-20 PROCEDURE — 80202 ASSAY OF VANCOMYCIN: CPT | Performed by: INTERNAL MEDICINE

## 2018-12-20 RX ORDER — CEFAZOLIN SODIUM 1 G/50ML
1750 SOLUTION INTRAVENOUS
Status: DISCONTINUED | OUTPATIENT
Start: 2018-12-20 | End: 2018-12-21

## 2018-12-20 RX ORDER — METHYLPREDNISOLONE SODIUM SUCCINATE 40 MG/ML
40 INJECTION, POWDER, LYOPHILIZED, FOR SOLUTION INTRAMUSCULAR; INTRAVENOUS EVERY 8 HOURS
Status: DISCONTINUED | OUTPATIENT
Start: 2018-12-20 | End: 2018-12-23

## 2018-12-20 RX ADMIN — TAZOBACTAM SODIUM AND PIPERACILLIN SODIUM 4.5 G: 500; 4 INJECTION, SOLUTION INTRAVENOUS at 15:47

## 2018-12-20 RX ADMIN — INSULIN ASPART 1 UNITS: 100 INJECTION, SOLUTION INTRAVENOUS; SUBCUTANEOUS at 18:39

## 2018-12-20 RX ADMIN — Medication 2.5 MG: at 02:49

## 2018-12-20 RX ADMIN — METHYLPREDNISOLONE SODIUM SUCCINATE 40 MG: 40 INJECTION, POWDER, FOR SOLUTION INTRAMUSCULAR; INTRAVENOUS at 17:27

## 2018-12-20 RX ADMIN — Medication 2.5 MG: at 21:59

## 2018-12-20 RX ADMIN — TAZOBACTAM SODIUM AND PIPERACILLIN SODIUM 4.5 G: 500; 4 INJECTION, SOLUTION INTRAVENOUS at 03:29

## 2018-12-20 RX ADMIN — METHYLPREDNISOLONE SODIUM SUCCINATE 40 MG: 40 INJECTION, POWDER, FOR SOLUTION INTRAMUSCULAR; INTRAVENOUS at 09:18

## 2018-12-20 RX ADMIN — LEVALBUTEROL HYDROCHLORIDE 1.25 MG: 1.25 SOLUTION RESPIRATORY (INHALATION) at 11:16

## 2018-12-20 RX ADMIN — PROPAFENONE HYDROCHLORIDE 150 MG: 150 TABLET, COATED ORAL at 09:18

## 2018-12-20 RX ADMIN — TAZOBACTAM SODIUM AND PIPERACILLIN SODIUM 4.5 G: 500; 4 INJECTION, SOLUTION INTRAVENOUS at 21:58

## 2018-12-20 RX ADMIN — PROPAFENONE HYDROCHLORIDE 150 MG: 150 TABLET, COATED ORAL at 15:47

## 2018-12-20 RX ADMIN — LEVALBUTEROL HYDROCHLORIDE 1.25 MG: 1.25 SOLUTION RESPIRATORY (INHALATION) at 15:08

## 2018-12-20 RX ADMIN — VANCOMYCIN HYDROCHLORIDE 1750 MG: 5 INJECTION, POWDER, LYOPHILIZED, FOR SOLUTION INTRAVENOUS at 12:02

## 2018-12-20 RX ADMIN — APIXABAN 5 MG: 5 TABLET, FILM COATED ORAL at 21:59

## 2018-12-20 RX ADMIN — APIXABAN 5 MG: 5 TABLET, FILM COATED ORAL at 09:18

## 2018-12-20 RX ADMIN — ACETAMINOPHEN 650 MG: 325 TABLET, FILM COATED ORAL at 00:02

## 2018-12-20 RX ADMIN — INSULIN HUMAN 7 UNITS: 100 INJECTION, SUSPENSION SUBCUTANEOUS at 09:00

## 2018-12-20 RX ADMIN — INSULIN ASPART 1 UNITS: 100 INJECTION, SOLUTION INTRAVENOUS; SUBCUTANEOUS at 14:19

## 2018-12-20 RX ADMIN — AZITHROMYCIN 250 MG: 250 TABLET, FILM COATED ORAL at 21:59

## 2018-12-20 RX ADMIN — LEVALBUTEROL HYDROCHLORIDE 1.25 MG: 1.25 SOLUTION RESPIRATORY (INHALATION) at 07:29

## 2018-12-20 RX ADMIN — LEVALBUTEROL HYDROCHLORIDE 1.25 MG: 1.25 SOLUTION RESPIRATORY (INHALATION) at 19:41

## 2018-12-20 RX ADMIN — PROPAFENONE HYDROCHLORIDE 150 MG: 150 TABLET, COATED ORAL at 21:59

## 2018-12-20 RX ADMIN — DILTIAZEM HYDROCHLORIDE 360 MG: 180 CAPSULE, COATED, EXTENDED RELEASE ORAL at 09:18

## 2018-12-20 RX ADMIN — Medication 2.5 MG: at 11:27

## 2018-12-20 RX ADMIN — TAZOBACTAM SODIUM AND PIPERACILLIN SODIUM 4.5 G: 500; 4 INJECTION, SOLUTION INTRAVENOUS at 09:24

## 2018-12-20 RX ADMIN — Medication 2.5 MG: at 15:47

## 2018-12-20 RX ADMIN — Medication 25 MG: at 22:01

## 2018-12-20 ASSESSMENT — ACTIVITIES OF DAILY LIVING (ADL)
ADLS_ACUITY_SCORE: 18

## 2018-12-20 NOTE — PROGRESS NOTES
[facility]    Medicine Progress Note - Hospitalist Service       Date of Admission:  12/17/2018  Assessment & Plan      Mr. Cooper is a 67-year-old man with history of severe COPD intermittently on oxygen who presented with several days of productive cough and worsening shortness of breath.  Found to be in hypoxic respiratory failure with severe sepsis secondary to pneumonia in the right lung.  He also has a had a history of atrial fibrillation with previous ablation, tricuspid valve replacement, hypertension, nonsustained ventricular tachycardia and 2 hospitalizations in the past 2 months for COPD exacerbations.     1.  Severe sepsis.  Due to pneumonia, RLL, organism likely MSSA.  -Continue IV Zosyn.  -Continue azithromycin.  -IV vancomycin added on 12/18/18 after one blood culture returns with gram-positive cocci now identified as staph aureus.     2.  Acute hypoxic respiratory failure.  Due to pneumonia and COPD exacerbation.    -  Currently on BiPAP.  - IV steroids.     3.  Pneumonia, RLL, likely MSSA.     -Continue azithromycin, Zosyn, and vancomycin for now.    -Gram stain showing gram-positive cocci.  One blood culture with staph aureus.  -ID consult.    4.  COPD exacerbation.  -Continue antibiotics as above.  -Continue Xopenex.  -Continue Incruse Ellipta.  -Continue prednisone.     5.  Atrial fibrillation.  -Continue diltiazem, propafenone, and apixaban.     6.  Lactic acidosis.  Due to sepsis.  -Resolved with IV fluids.     7.  Hyperglycemia.  Possibly due to steroids.  -Start NPH insulin 7 units every morning with first dose now while on prednisone.  -NovoLog sliding scale.     8.  Chronic kidney disease.  Creatinine at baseline.  -Avoid nephrotoxins as able.     9.  Nutrition.  -Regular diet     10.  Deconditioning.    -Physical therapy consult.    11.  Staph aureus bacteremia.  1 of 2 bottles on 12/17/18 growing staph aureus.  -Continue antibiotics as noted above.  -Infectious disease  consult.          Diet: Regular Diet Adult    DVT Prophylaxis: eliquis  Rodney Catheter: not present  Code Status: Full Code      Disposition Plan   Expected discharge: 4 - 7 days, recommended to transitional care unit once antibiotic plan established and O2 use less than 4 liters/minute.  Entered: Geoff Alvares MD 12/20/2018, 8:10 AM       The patient's care was discussed with the Patient.    Geoff Alvares MD  Hospitalist Service  [facility]    ______________________________________________________________________    Interval History     + SOB. No fevers/chills. Minimal cough. Requiring BiPAP.    Data reviewed today: I reviewed all medications, new labs and imaging results over the last 24 hours. I personally reviewed no images or EKG's today.    Physical Exam   Vital Signs: Temp: 97.9  F (36.6  C) Temp src: Oral BP: 128/86   Heart Rate: 73 Resp: 17 SpO2: 96 % O2 Device: BiPAP/CPAP Oxygen Delivery: 5 LPM  Weight: 196 lbs 10.41 oz    Gen - AAO x 3, on BiPAP.  Lungs - diminished with some wheezing.  Heart - irregularly irregular, S1+S2 nml, no m/g/r.  Abd - soft, NT, ND, + BS.  Ext - no edema.    Data   Recent Labs   Lab 12/20/18  0531 12/19/18  1014 12/18/18  0516 12/17/18  1450   WBC 12.7*  --  16.3* 21.5*   HGB 13.6  --  14.2 17.2   MCV 93  --  95 93     --  124* 193    137 137 134   POTASSIUM 4.2 4.3 4.7 4.7   CHLORIDE 102 103 101 96   CO2 30 30 30 30   BUN 32* 33* 32* 26   CR 1.03 0.99 1.08 1.09   ANIONGAP 6 4 6 8   WILBERT 8.7 8.9 8.6 9.3   * 134* 151* 140*   TROPI  --   --   --  0.025     No results found for this or any previous visit (from the past 24 hour(s)).  Medications     - MEDICATION INSTRUCTIONS -         apixaban ANTICOAGULANT  5 mg Oral BID     azithromycin  250 mg Oral QPM     diltiazem ER COATED BEADS  360 mg Oral Daily     insulin aspart  1-3 Units Subcutaneous TID AC     insulin aspart  1-3 Units Subcutaneous At Bedtime     insulin isophane human  7 Units  Subcutaneous QAM AC     levalbuterol  1.25 mg Nebulization 4x Daily     methylPREDNISolone  40 mg Intravenous Q8H     piperacillin-tazobactam  4.5 g Intravenous Q6H     propafenone  150 mg Oral TID     umeclidinium  1 puff Inhalation Daily     vancomycin (VANCOCIN) IV  1,750 mg Intravenous Q18H

## 2018-12-20 NOTE — PROGRESS NOTES
RT Note:    Patient switched between BiPAP and 5L NC throughout night. Will continue to receive Xopenex QID as ordered. Breath sounds coarse with expiratory wheezes. RT will continue to monitor.    Cristal Rouse  December 20, 2018.6:03 AM

## 2018-12-20 NOTE — PROGRESS NOTES
RT: Patient on/ off BIPAP t/o shift. Off BIPAP patient wears 5-6L nasal cannula.    BBS coarse/ expiratory wheezes. SPO2 95-99%. Xopenex given as scheduled.

## 2018-12-20 NOTE — PLAN OF CARE
ICU End of Shift Summary.  For vital signs and complete assessments, please see documentation flowsheets.     Pertinent assessments: AOx4. Reporting headache. Afebrile. Tele afib. BP wnl. Lungs coarse with exp wheezes. Wore BIPAP for a few hours during the night. 5L NC when off. Uses abd muscles. Off/on reporting SOB. SOB with activity. Voiding in BSC. 1 assist. Denies nausea.  Major Shift Events: none  Plan (Upcoming Events): continue current plan of care  Discharge/Transfer Needs: TBD    Bedside Shift Report Completed : Y  Bedside Safety Check Completed: Y

## 2018-12-20 NOTE — PROGRESS NOTES
[facility]  Infectious Disease Progress Note          Assessment and Plan:   IMPRESSION:   1.  A 67-year-old male with third hospitalization in the last month with hypoxic respiratory failure, probable pneumonia, Staph aureus in recent sputum and now Staph aureus in the blood.   2.  Staph aureus bacteremia, only 1 of 2 cultures positive.  Suspect this is spillover from pneumonia, but certainly at risk including a tricuspid valve.   3.  History of atrial fibrillation with rapid ventricular response.   4.  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      RECOMMENDATIONS:     1.  Continue broad antibiotics, but if sputum and other cultures without any other growth, progressively simplify antibiotics down to Ancef.  He will need an extended IV course here simply for the positive blood culture, certainly add in the tricuspid valve and the hip, probably a full 4-week course of therapy.   2.  Avoid PICC line or similar long lines if possible at this time until documented followup blood cultures are clear.  Can hold on echocardiogram or at least a transesophageal echocardiogram unless multiple positive blood cultures occur.             Interval History:   no new complaints sl abd pain, Follow-up BC neg so far, no fver no other new focal sxs, WBc 12 K              Medications:       apixaban ANTICOAGULANT  5 mg Oral BID     azithromycin  250 mg Oral QPM     diltiazem ER COATED BEADS  360 mg Oral Daily     insulin aspart  1-3 Units Subcutaneous TID AC     insulin aspart  1-3 Units Subcutaneous At Bedtime     insulin isophane human  7 Units Subcutaneous QAM AC     levalbuterol  1.25 mg Nebulization 4x Daily     methylPREDNISolone  40 mg Intravenous Q8H     piperacillin-tazobactam  4.5 g Intravenous Q6H     propafenone  150 mg Oral TID     umeclidinium  1 puff Inhalation Daily     vancomycin (VANCOCIN) IV  1,750 mg  Intravenous Q18H                  Physical Exam:   Blood pressure 144/88, temperature 98.4  F (36.9  C), temperature source Oral, resp. rate 24, weight 89.2 kg (196 lb 10.4 oz), SpO2 98 %.  Wt Readings from Last 2 Encounters:   12/20/18 89.2 kg (196 lb 10.4 oz)   12/03/18 87.5 kg (193 lb)     Vital Signs with Ranges  Temp:  [97.5  F (36.4  C)-98.4  F (36.9  C)] 98.4  F (36.9  C)  Heart Rate:  [] 62  Resp:  [14-30] 24  BP: (119-160)/() 144/88  FiO2 (%):  [30 %] 30 %  SpO2:  [90 %-98 %] 98 %    Constitutional: Awake, alert, cooperative, no apparent distress   Lungs: Congestion to auscultation bilaterally, no crackles or wheezing hpoxia   Cardiovascular: Regular rate and rhythm, normal S1 and S2, and no murmur noted   Abdomen: Normal bowel sounds, soft, non-distended, non-tender   Skin: No rashes, no cyanosis, no edema no embolic lesions   Other:           Data:   All microbiology laboratory data reviewed.  Recent Labs   Lab Test 12/20/18  0531 12/18/18  0516 12/17/18  1450   WBC 12.7* 16.3* 21.5*   HGB 13.6 14.2 17.2   HCT 42.9 46.0 54.2*   MCV 93 95 93    124* 193     Recent Labs   Lab Test 12/20/18  0531 12/19/18  1014 12/18/18  0516   CR 1.03 0.99 1.08     No lab results found.  Recent Labs   Lab Test 12/19/18  1023 12/19/18  1013 12/18/18  1027 12/17/18  1518 12/17/18  1452 11/25/18  2257 10/06/18  0826 10/06/18  0822 11/10/16  1524   CULT No growth after 17 hours No growth after 17 hours Light growth  Normal tushar    Culture in progress Cultured on the 1st day of incubation:  Staphylococcus aureus  This isolate DOES NOT demonstrate inducible clindamycin resistance in vitro. Clindamycin   is susceptible and could be used when indicated, however, erythromycin is resistant and   should not be used.  *  Critical Value/Significant Value, preliminary result only, called to and read back by  Laure SANDOVAL @ 7716 12.18.18 JE    (Note)  POSITIVE for STAPHYLOCOCCUS AUREUS and NEGATIVE for the mecA  gene  (not MRSA) by Providajobigene multiplex nucleic acid test. The mecA gene was  not detected. Final identification and antimicrobial susceptibility  testing will be verified by standard methods.    Specimen tested with Verigene multiplex, gram-positive blood culture  nucleic acid test for the following targets: Staph aureus, Staph  epidermidis, Staph lugdunensis, other Staph species, Enterococcus  faecalis, Enterococcus faecium, Streptococcus species, S. agalactiae,  S. anginosus grp., S. pneumoniae, S. pyogenes, Listeria sp., mecA  (methicillin resistance) and Steven/B (vancomycin resistance).    Critical Value/Significant Value called to and read back by Laure Mathews RN [facility] 1652 12.18.18 PATSY.   No growth after 3 days Heavy growth  Normal tushar    Moderate growth  Staphylococcus aureus  This isolate DOES NOT demonstrate inducible clindamycin resistance in vitro. Clindamycin   is susceptible and could be used when indicated, however, erythromycin is resistant and   should not be used.  * No growth No growth No anaerobes isolated  Moderate growth Staphylococcus aureus This isolate DOES NOT demonstrate inducible   clindamycin resistance in vitro. Clindamycin is susceptible and could be used   when indicated, however, erythromycin is resistant and should not be used.  *  Canceled, Test credited Test reordered as correct code REORDERED AS AN ABSCESS   CULTURE

## 2018-12-20 NOTE — PROGRESS NOTES
Discharge Planner   Discharge Plans in progress: [facility] [employer] called to provide benefit coverage   Barriers to discharge plan: none IV meds. Drug cost per week for pt will be $17.75. Supplies are covered   Follow up plan: Will keep alyx updated on discharge plan for teaching and set up at home  [place] [phone]       Entered by: Corinne C. White 12/20/2018 4:57 PM        [employer] returned call. They need to rerun Benefits for pt. SW will need to wait until clarification has been made about drug cost

## 2018-12-20 NOTE — PHARMACY-VANCOMYCIN DOSING SERVICE
Pharmacy Vancomycin Note  Date of Service 2018  Patient's  1950   67 year old, male    Indication: Sepsis  Goal Trough Level: 15-20 mg/L  Day of Therapy: 4  Current Vancomycin regimen:1750mg Q18H    Current estimated CrCl = Estimated Creatinine Clearance: 79.3 mL/min (based on SCr of 1.03 mg/dL).    Creatinine for last 3 days  2018:  2:50 PM Creatinine 1.09 mg/dL  2018:  5:16 AM Creatinine 1.08 mg/dL  2018: 10:14 AM Creatinine 0.99 mg/dL  2018:  5:31 AM Creatinine 1.03 mg/dL    Recent Vancomycin Levels (past 3 days)  2018:  5:31 AM Vancomycin Level 22.3 mg/L    Vancomycin IV Administrations (past 72 hours)                   vancomycin (VANCOCIN) 1,750 mg in sodium chloride 0.9 % 500 mL intermittent infusion (mg) 1,750 mg Given 18 1813     1,750 mg Given  0616     1,750 mg Given 18 1827                Nephrotoxins and other renal medications (From now, onward)    Start     Dose/Rate Route Frequency Ordered Stop    18 1715  vancomycin (VANCOCIN) 1,750 mg in sodium chloride 0.9 % 500 mL intermittent infusion      1,750 mg  over 2 Hours Intravenous EVERY 12 HOURS 18 1713      18 0930  piperacillin-tazobactam (ZOSYN) intermittent infusion 4.5 g     Comments:  Pharmacy can adjust dose based on renal function.    4.5 g  200 mL/hr over 30 Minutes Intravenous EVERY 6 HOURS 18 0900               Contrast Orders - past 72 hours (72h ago, onward)    None          Interpretation of levels and current regimen:  Trough level is  Supratherapeutic    Has serum creatinine changed > 50% in last 72 hours: No    Urine output:  good urine output    Renal Function: Stable    Plan:  1.  Decrease Dose to 1750mg Q18H  2.  Pharmacy will check trough levels as appropriate in 1-3 Days.    3. Serum creatinine levels will be ordered daily for the first week of therapy and at least twice weekly for subsequent weeks.          Bobo Guo, PharmD./PGY-1  Resident         .

## 2018-12-20 NOTE — CONSULTS
Consult Date:  12/19/2018      LOCATION:  Room 365, intensive care unit, [facility].      REFERRING PHYSICIAN:  Ricci Birmingham DO.      IMPRESSION:   1.  A 67-year-old male with third hospitalization in the last month with hypoxic respiratory failure, probable pneumonia, Staph aureus in recent sputum and now Staph aureus in the blood.   2.  Staph aureus bacteremia, only 1 of 2 cultures positive.  Suspect this is spillover from pneumonia, but certainly at risk including a tricuspid valve.   3.  History of atrial fibrillation with rapid ventricular response.   4.  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      RECOMMENDATIONS:     1.  Continue current broad antibiotics, but if sputum and other cultures without any other growth, progressively simplify antibiotics down to Ancef.  He will need an extended IV course here simply for the positive blood culture, certainly add in the tricuspid valve and the hip, probably a full 4-week course of therapy.   2.  Avoid PICC line or similar long lines if possible at this time until documented followup blood cultures are clear.  Can hold on echocardiogram or at least a transesophageal echocardiogram unless multiple positive blood cultures occur.      HISTORY:  This 67-year-old male is seen in consultation due to apparent pneumonia and now Staph aureus bacteremia.  The patient has had recent issues with hypoxic respiratory failure and COPD such that he has had 3 hospitalizations.  He got improvement in the initial one in November, but then early this month was hospitalized did not think he would improve much.  At that time had Staph aureus in his sputum culture, got antibiotics, only to have over the last 4 days worsening respiratory status, some slight feverish feelings.  At admission, blood cultures are growing Staph aureus in 1 of 2 cultures.  Sputum culture is pending.  Does  have some signs of infiltrates present.      PAST MEDICAL HISTORY:  Tricuspid valve repair 10+ years ago.  Prior total hip arthroplasty.  The recent respiratory events.      SOCIAL AND FAMILY HISTORY:  No recent travels or exposures.  No one else he has been around has been ill.  No history of resistant pathogens.      MEDICATIONS:  As listed.      REVIEW OF SYSTEMS:  Quite short of breath and does not feel much fever symptoms.  No hip discomfort.  No chest pain.  Sputum with some productivity and purulent looking sputum, including some bloody sputum.      PHYSICAL EXAMINATION:   GENERAL:  The patient appears his stated age.  He does not look particularly toxic or ill.  He is on BiPAP currently, but could come of it.  He is okay with nasal cannula oxygen.   VITAL SIGNS:  He is afebrile, pulse of 100.   HEENT:  No thrush or intraoral lesions.  Pupils reactive.  No facial skin rashes.   NECK:  Supple and nontender with no lymphadenopathy or thyromegaly.   HEART:  Regular rhythm currently, but fairly tachycardic in the 110 to 120 range, no major murmur.   LUNGS:  Decreased breath sounds at the bases and slight crackles on the right.   ABDOMEN:  Soft and nontender.   EXTREMITIES:  Slight edema, no embolic lesions.      LABORATORY DATA:  One of 2 admission blood cultures growing sensitive Staph aureus.  Had a recent sputum culture also with sensitive Staph aureus, has been MRSA negative.  Current sputum just collected light growth of normal tushar so far, but full information to follow.  Appears to be a good specimen.      Thank you very much for the consultation.  We will follow the patient with you.         ROBBY SAL MD             D: 2018   T: 2018   MT: CANDY      Name:     FELIPE AYOUB   MRN:      [id number]        Account:       [id number]   :      1950           Consult Date:  2018      Document: [id number]       cc: Ana Birmingham DO

## 2018-12-20 NOTE — PLAN OF CARE
ICU End of Shift Summary.  For vital signs and complete assessments, please see documentation flowsheets.     Pertinent assessments: A&Ox4. Pt requiring BiPAP 30% FiO2 most of day, when not on BiPAP requiring 6LPM NC to keeps sats> 92%. Lungs diminished, ex wheezes, and increased crackles noted this afternoon/evening. Increased work of breathing today noted with abdominal and accessory muscle use. Very ERICKSON. Tele Afib with frequent PVCs. Updated MD of respiratory status. ABG and IV Lasix ordered. Good urine output with lasix. Utilizing Bedside Commode. BMx2 this shift. POC reviewed at length with pt and family.    Major Shift Events: Utilizing BiPAP most of shift.   Plan (Upcoming Events): ID following. Continue to monitor closely in ICU.   Discharge/Transfer Needs: TBD    Bedside Shift Report Completed : yes  Bedside Safety Check Completed: yes

## 2018-12-20 NOTE — PROGRESS NOTES
Pt will need IV ABX.  I called [place] [phone] to check on benefits.  Will update pt on options of outpatient infusion vs home infusion when benefits known.  Following.  Kajal MEIER CTS [id number]

## 2018-12-21 LAB
ANION GAP SERPL CALCULATED.3IONS-SCNC: 5 MMOL/L (ref 3–14)
BASOPHILS # BLD AUTO: 0 10E9/L (ref 0–0.2)
BASOPHILS NFR BLD AUTO: 0.2 %
BUN SERPL-MCNC: 26 MG/DL (ref 7–30)
CALCIUM SERPL-MCNC: 8.7 MG/DL (ref 8.5–10.1)
CHLORIDE SERPL-SCNC: 100 MMOL/L (ref 94–109)
CO2 SERPL-SCNC: 31 MMOL/L (ref 20–32)
CREAT SERPL-MCNC: 0.85 MG/DL (ref 0.66–1.25)
DIFFERENTIAL METHOD BLD: ABNORMAL
EOSINOPHIL # BLD AUTO: 0 10E9/L (ref 0–0.7)
EOSINOPHIL NFR BLD AUTO: 0 %
ERYTHROCYTE [DISTWIDTH] IN BLOOD BY AUTOMATED COUNT: 14.2 % (ref 10–15)
GFR SERPL CREATININE-BSD FRML MDRD: >90 ML/MIN/{1.73_M2}
GLUCOSE BLDC GLUCOMTR-MCNC: 129 MG/DL (ref 70–99)
GLUCOSE BLDC GLUCOMTR-MCNC: 165 MG/DL (ref 70–99)
GLUCOSE BLDC GLUCOMTR-MCNC: 184 MG/DL (ref 70–99)
GLUCOSE BLDC GLUCOMTR-MCNC: 193 MG/DL (ref 70–99)
GLUCOSE BLDC GLUCOMTR-MCNC: 238 MG/DL (ref 70–99)
GLUCOSE BLDC GLUCOMTR-MCNC: 239 MG/DL (ref 70–99)
GLUCOSE SERPL-MCNC: 219 MG/DL (ref 70–99)
HCT VFR BLD AUTO: 44.3 % (ref 40–53)
HGB BLD-MCNC: 14.2 G/DL (ref 13.3–17.7)
IMM GRANULOCYTES # BLD: 0.2 10E9/L (ref 0–0.4)
IMM GRANULOCYTES NFR BLD: 2.5 %
LYMPHOCYTES # BLD AUTO: 0.3 10E9/L (ref 0.8–5.3)
LYMPHOCYTES NFR BLD AUTO: 4.2 %
MCH RBC QN AUTO: 29.6 PG (ref 26.5–33)
MCHC RBC AUTO-ENTMCNC: 32.1 G/DL (ref 31.5–36.5)
MCV RBC AUTO: 92 FL (ref 78–100)
MONOCYTES # BLD AUTO: 0.1 10E9/L (ref 0–1.3)
MONOCYTES NFR BLD AUTO: 2.2 %
NEUTROPHILS # BLD AUTO: 5.4 10E9/L (ref 1.6–8.3)
NEUTROPHILS NFR BLD AUTO: 90.9 %
NRBC # BLD AUTO: 0 10*3/UL
NRBC BLD AUTO-RTO: 0 /100
PLATELET # BLD AUTO: 168 10E9/L (ref 150–450)
POTASSIUM SERPL-SCNC: 4.2 MMOL/L (ref 3.4–5.3)
RBC # BLD AUTO: 4.8 10E12/L (ref 4.4–5.9)
SODIUM SERPL-SCNC: 136 MMOL/L (ref 133–144)
WBC # BLD AUTO: 5.9 10E9/L (ref 4–11)

## 2018-12-21 PROCEDURE — 40000275 ZZH STATISTIC RCP TIME EA 10 MIN

## 2018-12-21 PROCEDURE — 25000132 ZZH RX MED GY IP 250 OP 250 PS 637: Performed by: INTERNAL MEDICINE

## 2018-12-21 PROCEDURE — 20000003 ZZH R&B ICU

## 2018-12-21 PROCEDURE — 00000146 ZZHCL STATISTIC GLUCOSE BY METER IP

## 2018-12-21 PROCEDURE — 94640 AIRWAY INHALATION TREATMENT: CPT

## 2018-12-21 PROCEDURE — 80048 BASIC METABOLIC PNL TOTAL CA: CPT | Performed by: INTERNAL MEDICINE

## 2018-12-21 PROCEDURE — 25000128 H RX IP 250 OP 636: Performed by: INTERNAL MEDICINE

## 2018-12-21 PROCEDURE — 99233 SBSQ HOSP IP/OBS HIGH 50: CPT | Performed by: INTERNAL MEDICINE

## 2018-12-21 PROCEDURE — 94640 AIRWAY INHALATION TREATMENT: CPT | Mod: 76

## 2018-12-21 PROCEDURE — 36415 COLL VENOUS BLD VENIPUNCTURE: CPT | Performed by: INTERNAL MEDICINE

## 2018-12-21 PROCEDURE — 25000125 ZZHC RX 250: Performed by: INTERNAL MEDICINE

## 2018-12-21 PROCEDURE — 85025 COMPLETE CBC W/AUTO DIFF WBC: CPT | Performed by: INTERNAL MEDICINE

## 2018-12-21 RX ORDER — CEFAZOLIN SODIUM 2 G/100ML
2 INJECTION, SOLUTION INTRAVENOUS EVERY 8 HOURS
Status: DISCONTINUED | OUTPATIENT
Start: 2018-12-21 | End: 2019-01-08 | Stop reason: HOSPADM

## 2018-12-21 RX ORDER — HYDRALAZINE HYDROCHLORIDE 20 MG/ML
10 INJECTION INTRAMUSCULAR; INTRAVENOUS EVERY 4 HOURS PRN
Status: DISCONTINUED | OUTPATIENT
Start: 2018-12-21 | End: 2019-01-08 | Stop reason: HOSPADM

## 2018-12-21 RX ADMIN — Medication 2.5 MG: at 03:31

## 2018-12-21 RX ADMIN — LEVALBUTEROL HYDROCHLORIDE 1.25 MG: 1.25 SOLUTION RESPIRATORY (INHALATION) at 21:18

## 2018-12-21 RX ADMIN — LEVALBUTEROL HYDROCHLORIDE 1.25 MG: 1.25 SOLUTION RESPIRATORY (INHALATION) at 03:39

## 2018-12-21 RX ADMIN — TAZOBACTAM SODIUM AND PIPERACILLIN SODIUM 4.5 G: 500; 4 INJECTION, SOLUTION INTRAVENOUS at 09:20

## 2018-12-21 RX ADMIN — METHYLPREDNISOLONE SODIUM SUCCINATE 40 MG: 40 INJECTION, POWDER, FOR SOLUTION INTRAMUSCULAR; INTRAVENOUS at 08:30

## 2018-12-21 RX ADMIN — TAZOBACTAM SODIUM AND PIPERACILLIN SODIUM 4.5 G: 500; 4 INJECTION, SOLUTION INTRAVENOUS at 03:36

## 2018-12-21 RX ADMIN — INSULIN ASPART 1 UNITS: 100 INJECTION, SOLUTION INTRAVENOUS; SUBCUTANEOUS at 08:21

## 2018-12-21 RX ADMIN — LEVALBUTEROL HYDROCHLORIDE 1.25 MG: 1.25 SOLUTION RESPIRATORY (INHALATION) at 18:23

## 2018-12-21 RX ADMIN — APIXABAN 5 MG: 5 TABLET, FILM COATED ORAL at 21:11

## 2018-12-21 RX ADMIN — METHYLPREDNISOLONE SODIUM SUCCINATE 40 MG: 40 INJECTION, POWDER, FOR SOLUTION INTRAMUSCULAR; INTRAVENOUS at 17:06

## 2018-12-21 RX ADMIN — CEFAZOLIN SODIUM 2 G: 2 INJECTION, SOLUTION INTRAVENOUS at 15:11

## 2018-12-21 RX ADMIN — PROPAFENONE HYDROCHLORIDE 150 MG: 150 TABLET, COATED ORAL at 08:30

## 2018-12-21 RX ADMIN — LEVALBUTEROL HYDROCHLORIDE 1.25 MG: 1.25 SOLUTION RESPIRATORY (INHALATION) at 11:34

## 2018-12-21 RX ADMIN — Medication 2.5 MG: at 21:59

## 2018-12-21 RX ADMIN — CEFAZOLIN SODIUM 2 G: 2 INJECTION, SOLUTION INTRAVENOUS at 21:59

## 2018-12-21 RX ADMIN — ACETAMINOPHEN 650 MG: 325 TABLET, FILM COATED ORAL at 01:07

## 2018-12-21 RX ADMIN — APIXABAN 5 MG: 5 TABLET, FILM COATED ORAL at 08:30

## 2018-12-21 RX ADMIN — LEVALBUTEROL HYDROCHLORIDE 1.25 MG: 1.25 SOLUTION RESPIRATORY (INHALATION) at 15:39

## 2018-12-21 RX ADMIN — METHYLPREDNISOLONE SODIUM SUCCINATE 40 MG: 40 INJECTION, POWDER, FOR SOLUTION INTRAMUSCULAR; INTRAVENOUS at 01:07

## 2018-12-21 RX ADMIN — ACETAMINOPHEN 650 MG: 325 TABLET, FILM COATED ORAL at 19:30

## 2018-12-21 RX ADMIN — DILTIAZEM HYDROCHLORIDE 360 MG: 180 CAPSULE, COATED, EXTENDED RELEASE ORAL at 08:30

## 2018-12-21 RX ADMIN — VANCOMYCIN HYDROCHLORIDE 1750 MG: 5 INJECTION, POWDER, LYOPHILIZED, FOR SOLUTION INTRAVENOUS at 05:44

## 2018-12-21 RX ADMIN — Medication 25 MG: at 22:00

## 2018-12-21 RX ADMIN — Medication 2.5 MG: at 10:51

## 2018-12-21 RX ADMIN — LEVALBUTEROL HYDROCHLORIDE 1.25 MG: 1.25 SOLUTION RESPIRATORY (INHALATION) at 08:37

## 2018-12-21 RX ADMIN — PROPAFENONE HYDROCHLORIDE 150 MG: 150 TABLET, COATED ORAL at 15:17

## 2018-12-21 RX ADMIN — PROPAFENONE HYDROCHLORIDE 150 MG: 150 TABLET, COATED ORAL at 21:11

## 2018-12-21 RX ADMIN — INSULIN ASPART 1 UNITS: 100 INJECTION, SOLUTION INTRAVENOUS; SUBCUTANEOUS at 13:44

## 2018-12-21 RX ADMIN — HYDRALAZINE HYDROCHLORIDE 10 MG: 20 INJECTION INTRAMUSCULAR; INTRAVENOUS at 15:17

## 2018-12-21 RX ADMIN — Medication 2.5 MG: at 17:07

## 2018-12-21 RX ADMIN — INSULIN ASPART 1 UNITS: 100 INJECTION, SOLUTION INTRAVENOUS; SUBCUTANEOUS at 17:02

## 2018-12-21 ASSESSMENT — ACTIVITIES OF DAILY LIVING (ADL)
ADLS_ACUITY_SCORE: 18

## 2018-12-21 ASSESSMENT — PAIN DESCRIPTION - DESCRIPTORS
DESCRIPTORS: HEADACHE
DESCRIPTORS: HEADACHE

## 2018-12-21 NOTE — PROGRESS NOTES
"Pt remained off the BiPAP throughout the day and is currently on a 6 LPM NC and is tolerating it well. Will continue to monitor and assess the pt's respiratory status.    Vital signs:  Temp: (!) 48.2  F (9  C) Temp src: Oral BP: (!) 164/119   Heart Rate: 82 Resp: 20 SpO2: 97 % O2 Device: Nasal cannula with humidification Oxygen Delivery: 6 LPM   Weight: 89.2 kg (196 lb 10.4 oz)  Estimated body mass index is 28.22 kg/m  as calculated from the following:    Height as of 12/3/18: 1.778 m (5' 10\").    Weight as of this encounter: 89.2 kg (196 lb 10.4 oz).    Past Medical History:   Diagnosis Date     Atrial flutter (H)      COPD (chronic obstructive pulmonary disease) (H)      Diverticulitis      Hypertension      Persistent atrial fibrillation (H) 4/28/09    ablation 7/1/2015     Premature beats      PVC (premature ventricular contraction)     s/p ablation 12/5/2017     Tricuspid valve disorder     Dr Aj, Hx of valve repair      Ventricular tachycardia (H)     nonsustained       Past Surgical History:   Procedure Laterality Date     ABDOMEN SURGERY      hernia repair right     APPENDECTOMY       CARDIOVERSION  06/03/2009    successful for afib     CARDIOVERSION  4/20/15    successful for afib     CARDIOVERSION  5/28/15     H ABLATION ATRIAL FLUTTER       H ABLATION FOCAL AFIB  7/1/15    Left atrial linear ablation and pulmonary vein antrum ablation     H ABLATION PVC  12/5/16     INCISION AND DRAINAGE LOWER EXTREMITY, COMBINED Right 11/10/2016    Procedure: COMBINED INCISION AND DRAINAGE LOWER EXTREMITY;  Surgeon: Roger Pacheco MD;  Location: [facility] OR     LAPAROSCOPIC CHOLECYSTECTOMY  1/6/2012    Procedure:LAPAROSCOPIC CHOLECYSTECTOMY; LAPAROSCOPIC CHOLECYSTECTOMY ; Surgeon:ROGER PACHECO; Location:[facility] OR     ORTHOPEDIC SURGERY      Left hip replacement     REPAIR VALVE TRICUSPID  9/8/08    conversion to a bileaflet valve and application of a 30 mm Sanderson ring     SMALL INTESTINE SURGERY      had bowel " obstruction age 9     VALVE REPLACEMENT      tricuspid       Family History   Problem Relation Age of Onset     Prostate Cancer Father      Family History Negative Mother      Emphysema Mother      Hypertension Mother      Cerebrovascular Disease Mother        Social History     Tobacco Use     Smoking status: Former Smoker     Last attempt to quit: 1/2/2008     Years since quitting: 10.9     Smokeless tobacco: Never Used   Substance Use Topics     Alcohol use: Yes     Alcohol/week: 0.0 oz     Comment: 3-6 per month     Eliazar Rangel RT  12/21/2018

## 2018-12-21 NOTE — PROGRESS NOTES
[facility]    Medicine Progress Note - Hospitalist Service       Date of Admission:  12/17/2018  Assessment & Plan         Mr. Cooper is a 67-year-old man with history of severe COPD intermittently on oxygen who presented with several days of productive cough and worsening shortness of breath.  Found to be in hypoxic respiratory failure with severe sepsis secondary to pneumonia in the right lung.  He also has a had a history of atrial fibrillation with previous ablation, tricuspid valve replacement, hypertension, nonsustained ventricular tachycardia and 2 hospitalizations in the past 2 months for COPD exacerbations.     1.  Severe sepsis.  Due to pneumonia, RLL, organism likely MSSA.  -Continue IV Zosyn.  -Continue azithromycin.  -IV vancomycin added on 12/18/18 after one blood culture returns with gram-positive cocci now identified as staph aureus.     2.  Acute hypoxic respiratory failure.  Due to pneumonia and COPD exacerbation.    - BiPAP prn.  - IV steroids.     3.  Pneumonia , RLL, likely MSSA.     -Continue azithromycin, Zosyn, and vancomycin for now.    -Gram stain showing gram-positive cocci.  One blood culture with staph aureus.  -ID consult.    4.  COPD exacerbation.  -Continue antibiotics as above.  -Continue Xopenex.  -Continue Incruse Ellipta.  -Continue prednisone.     5.  Atrial fibrillation.  -Continue diltiazem, propafenone, and apixaban.     6.  Lactic acidosis.  Due to sepsis.  -Resolved with IV fluids.     7.  Hyperglycemia.  Possibly due to steroids.  -Start NPH insulin 7 units every morning with first dose now while on prednisone.  -NovoLog sliding scale.     8.  Chronic kidney disease.  Creatinine at baseline.  -Avoid nephrotoxins as able.     9.  Nutrition.  -Regular diet     10.  Deconditioning.    -Physical therapy consult.    11.  Staph aureus bacteremia.  1 of 2 bottles on 12/17/18 growing staph aureus.  -Continue antibiotics as noted above.  -Infectious disease  consult.            Diet: Regular Diet Adult    DVT Prophylaxis: eliquis  Rodney Catheter: not present  Code Status: Full Code      Disposition Plan   Expected discharge: 4 - 7 days, recommended to transitional care unit once O2 use less than 3 liters/minute.  Entered: Geoff Alvares MD 12/21/2018, 8:28 AM       The patient's care was discussed with the Patient.    Geoff Alvares MD  Hospitalist Service  [facility]    ______________________________________________________________________    Interval History     + SOB. Minimal cough. Less BiPAP use. + loose stools. No abdominal pain.    Data reviewed today: I reviewed all medications, new labs and imaging results over the last 24 hours. I personally reviewed no images or EKG's today.    Physical Exam   Vital Signs: Temp: 97.7  F (36.5  C) Temp src: Oral BP: (!) 146/92   Heart Rate: 71 Resp: 22 SpO2: 98 % O2 Device: Nasal cannula Oxygen Delivery: 6 LPM  Weight: 196 lbs 10.41 oz    Gen - AAO x 3, on BiPAP.  Lungs - diminished with some wheezing.  Heart - irregularly irregular, S1+S2 nml, no m/g/r.  Abd - soft, NT, ND, + BS.  Ext - no edema.          Data   Recent Labs   Lab 12/21/18  0519 12/20/18  0531 12/19/18  1014 12/18/18  0516 12/17/18  1450   WBC 5.9 12.7*  --  16.3* 21.5*   HGB 14.2 13.6  --  14.2 17.2   MCV 92 93  --  95 93    165  --  124* 193    138 137 137 134   POTASSIUM 4.2 4.2 4.3 4.7 4.7   CHLORIDE 100 102 103 101 96   CO2 31 30 30 30 30   BUN 26 32* 33* 32* 26   CR 0.85 1.03 0.99 1.08 1.09   ANIONGAP 5 6 4 6 8   WILBERT 8.7 8.7 8.9 8.6 9.3   * 140* 134* 151* 140*   TROPI  --   --   --   --  0.025     No results found for this or any previous visit (from the past 24 hour(s)).  Medications     - MEDICATION INSTRUCTIONS -         apixaban ANTICOAGULANT  5 mg Oral BID     azithromycin  250 mg Oral QPM     diltiazem ER COATED BEADS  360 mg Oral Daily     insulin aspart  1-3 Units Subcutaneous TID AC     insulin aspart  1-3  Units Subcutaneous At Bedtime     insulin isophane human  7 Units Subcutaneous BID AC     levalbuterol  1.25 mg Nebulization 4x Daily     methylPREDNISolone  40 mg Intravenous Q8H     piperacillin-tazobactam  4.5 g Intravenous Q6H     propafenone  150 mg Oral TID     umeclidinium  1 puff Inhalation Daily     vancomycin (VANCOCIN) IV  1,750 mg Intravenous Q18H

## 2018-12-21 NOTE — PROGRESS NOTES
[facility]  Infectious Disease Progress Note          Assessment and Plan:   IMPRESSION:   1.  A 67-year-old male with third hospitalization in the last month with hypoxic respiratory failure, probable pneumonia, Staph aureus in recent sputum and now Staph aureus in the blood.   2.  Staph aureus bacteremia, only 1 of 2 cultures positive.  Suspect this is spillover from pneumonia, but certainly at risk including a tricuspid valve.   3.  History of atrial fibrillation with rapid ventricular response.   4.  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      RECOMMENDATIONS:     1.  MSSA also insputum,  other cultures without any other growth,  simplify antibiotics down to Ancef.  He will need an extended IV course here simply for the positive blood culture,  Given  the tricuspid valve and the hip, probably a full 4-week course of therapy.   2.  Avoid PICC line or similar long lines if possible at this time until documented followup blood cultures are clear.If still neg 12/19 cx tomorrow OK midline.  Can hold on echocardiogram or at least a transesophageal echocardiogram unless multiple positive blood cultures occur.             Interval History:   no new complaints sl abd pain, Follow-up BC neg so far, sputum staphno fver no other new focal sxs, WBc 12 K              Medications:       apixaban ANTICOAGULANT  5 mg Oral BID     azithromycin  250 mg Oral QPM     diltiazem ER COATED BEADS  360 mg Oral Daily     insulin aspart  1-3 Units Subcutaneous TID AC     insulin aspart  1-3 Units Subcutaneous At Bedtime     insulin isophane human  7 Units Subcutaneous BID AC     levalbuterol  1.25 mg Nebulization 4x Daily     methylPREDNISolone  40 mg Intravenous Q8H     propafenone  150 mg Oral TID     umeclidinium  1 puff Inhalation Daily                  Physical Exam:   Blood pressure (!) 147/104, temperature 97.9  F (36.6   C), temperature source Oral, resp. rate 26, weight 89.2 kg (196 lb 10.4 oz), SpO2 98 %.  Wt Readings from Last 2 Encounters:   12/21/18 89.2 kg (196 lb 10.4 oz)   12/03/18 87.5 kg (193 lb)     Vital Signs with Ranges  Temp:  [97.7  F (36.5  C)-98.4  F (36.9  C)] 97.9  F (36.6  C)  Heart Rate:  [59-87] 66  Resp:  [14-26] 26  BP: (133-166)/() 147/104  FiO2 (%):  [30 %] 30 %  SpO2:  [92 %-100 %] 98 %    Constitutional: Awake, alert, cooperative, no apparent distress   Lungs: Congestion to auscultation bilaterally, no crackles or wheezing hpoxia   Cardiovascular: Regular rate and rhythm, normal S1 and S2, and no murmur noted   Abdomen: Normal bowel sounds, soft, non-distended, non-tender   Skin: No rashes, no cyanosis, no edema no embolic lesions   Other:           Data:   All microbiology laboratory data reviewed.  Recent Labs   Lab Test 12/21/18  0519 12/20/18  0531 12/18/18  0516   WBC 5.9 12.7* 16.3*   HGB 14.2 13.6 14.2   HCT 44.3 42.9 46.0   MCV 92 93 95    165 124*     Recent Labs   Lab Test 12/21/18  0519 12/20/18  0531 12/19/18  1014   CR 0.85 1.03 0.99     No lab results found.  Recent Labs   Lab Test 12/19/18  1023 12/19/18  1013 12/18/18  1027 12/17/18  1518 12/17/18  1452 11/25/18  2257 10/06/18  0826 10/06/18  0822 11/10/16  1524   CULT No growth after 1 day No growth after 1 day Light growth  Normal tushar    Moderate growth  Staphylococcus aureus  Susceptibility testing in progress  * Cultured on the 1st day of incubation:  Staphylococcus aureus  This isolate DOES NOT demonstrate inducible clindamycin resistance in vitro. Clindamycin   is susceptible and could be used when indicated, however, erythromycin is resistant and   should not be used.  *  Critical Value/Significant Value, preliminary result only, called to and read back by  Laure SANDOVAL @ 4326 12.18.18 JE    (Note)  POSITIVE for STAPHYLOCOCCUS AUREUS and NEGATIVE for the mecA gene  (not MRSA) by [employer] nucleic  acid test. The mecA gene was  not detected. Final identification and antimicrobial susceptibility  testing will be verified by standard methods.    Specimen tested with Imalogixigene multiplex, gram-positive blood culture  nucleic acid test for the following targets: Staph aureus, Staph  epidermidis, Staph lugdunensis, other Staph species, Enterococcus  faecalis, Enterococcus faecium, Streptococcus species, S. agalactiae,  S. anginosus grp., S. pneumoniae, S. pyogenes, Listeria sp., mecA  (methicillin resistance) and Steven/B (vancomycin resistance).    Critical Value/Significant Value called to and read back by Laure Mathews RN [facility] 1652 12.18.18 PATSY.   No growth after 3 days Heavy growth  Normal tushar    Moderate growth  Staphylococcus aureus  This isolate DOES NOT demonstrate inducible clindamycin resistance in vitro. Clindamycin   is susceptible and could be used when indicated, however, erythromycin is resistant and   should not be used.  * No growth No growth No anaerobes isolated  Moderate growth Staphylococcus aureus This isolate DOES NOT demonstrate inducible   clindamycin resistance in vitro. Clindamycin is susceptible and could be used   when indicated, however, erythromycin is resistant and should not be used.  *  Canceled, Test credited Test reordered as correct code REORDERED AS AN ABSCESS   CULTURE

## 2018-12-21 NOTE — CONSULTS
"Care Transition Initial Assessment - RN        Met with: Patient.  DATA   Active Problems:    Pneumonia       Cognitive Status: awake, alert and oriented.  [facility]  Primary Care MD Name: Ana Pratt  Contact information and PCP information verified: Yes  Lives With: spouse, child(harry), adult      Quality of Family Relationships: involved, supportive  Description of Support System: Supportive, Involved   Who is your support system?: Wife   Support Assessment: Adequate family and caregiver support   Insurance concerns: No Insurance issues identified  ASSESSMENT  Patient currently receives the following services:  none        Identified issues/concerns regarding health management: Pt admitted with PNA/COPD ex. Is a readmit less than 30 days. Pt uses oxygen at home supplied through [employer]. When CM asked pt about having a portable tank pt states he refused to lug his portable tank around in public as he has issues with his ego. Pt travels a lot for work. He uses inhalers and nebulizers to help manage his copd. Pt states he at one time \"dropped\" his inhalers because of a conflict with some other medications. CM offered to consult a pharmacist. CM also mentioned the need for home infusion antibiotics at discharge. Pt was already aware of this. CM went over the cost, totaling $122.75 a week with [facility]. Pt was a little upset oaver this but excepted the cost as it would be needed. Pt states he had to reschedule his pulmonary appointment from the 27th of December to January 3rd. Unsure of discharge date at this time.    PLAN  Financial costs for the patient include home infusion cost for supplies and drug .    Patient/family is agreeable to the plan?  Yes: agrees with [facility]  Patient anticipates discharging to home .  Resources List: Home Infusion     Patient anticipates needs for home equipment: No  Plan/Disposition: Home   Appointments: " Pulmonary appointment January 3rd at 1630 with [facility].       Care  (CTS) will continue to follow as needed.    Ciara Hyde RN, BSN CTS  Care Coordinator  [phone]

## 2018-12-21 NOTE — PLAN OF CARE
.ICU End of Shift Summary.  For vital signs and complete assessments, please see documentation flowsheets.     Pertinent assessments: A&Ox4.  Up with A1 to BSC.  VSS on 6 L of NC throughout the night.  Patient had his Bipap on for a short period of time but Bipap remains off.  Tylenol x 1 for headache.  Oxycodone for abdominal pain.  Continue on Zosyn, Azithromycin, and Vancomycin.  Using the urinal in bed.    Major Shift Events: none  Plan (Upcoming Events): Continue with current plan of care.  Follow cultures.    Discharge/Transfer Needs: TBD    Bedside Shift Report Completed :   Bedside Safety Check Completed:

## 2018-12-21 NOTE — PLAN OF CARE
ICU End of Shift Summary.  For vital signs and complete assessments, please see documentation flowsheets.     Pertinent assessments: Patient A&O, able to make needs known, lung sounds course with expiratory wheezes, SOB with exertion, tele A-fib occasional PVCs, edema noted to bilateral legs, skin sandra, wearing BIPAP at night, 5-6L during the day and with meals  Major Shift Events: Uses bedside commode, had incontinent episode this evening- loose stools, received scheduled IV Zosyn and PO Zithromax  Plan (Upcoming Events): Continue to monitor in the ICU  Discharge/Transfer Needs: ongoing    Bedside Shift Report Completed : Y  Bedside Safety Check Completed: Y

## 2018-12-21 NOTE — PROGRESS NOTES
Discharge Planner   Discharge Plans in progress: Called Dulce from [place] to update pt not ready to discharge.   Barriers to discharge plan: Still waiting on benefit check for home IV infusion support.   Follow up plan: Following   Weekend [phone] coverage number        Entered by: Corinne C. White 12/21/2018 11:28 AM

## 2018-12-21 NOTE — PROGRESS NOTES
RT Note:    Patient wore BiPAP for part night, was off when checked at 3:30 and requested a prn nebulizer. Patient will continue to receive Xopenex QID and Q2 prn. On 6L NC when off of BiPAP. Breath sounds coarse and diminished with expiratory wheezes. RT will continue to monitor.    Cristal Rouse  December 21, 2018.6:00 AM

## 2018-12-22 ENCOUNTER — APPOINTMENT (OUTPATIENT)
Dept: PHYSICAL THERAPY | Facility: CLINIC | Age: 68
DRG: 871 | End: 2018-12-22
Attending: INTERNAL MEDICINE
Payer: COMMERCIAL

## 2018-12-22 LAB
BACTERIA SPEC CULT: ABNORMAL
BACTERIA SPEC CULT: ABNORMAL
GLUCOSE BLDC GLUCOMTR-MCNC: 158 MG/DL (ref 70–99)
GLUCOSE BLDC GLUCOMTR-MCNC: 163 MG/DL (ref 70–99)
GLUCOSE BLDC GLUCOMTR-MCNC: 188 MG/DL (ref 70–99)
GLUCOSE BLDC GLUCOMTR-MCNC: 209 MG/DL (ref 70–99)
GLUCOSE BLDC GLUCOMTR-MCNC: 221 MG/DL (ref 70–99)
SPECIMEN SOURCE: ABNORMAL

## 2018-12-22 PROCEDURE — 25000132 ZZH RX MED GY IP 250 OP 250 PS 637: Performed by: INTERNAL MEDICINE

## 2018-12-22 PROCEDURE — 94640 AIRWAY INHALATION TREATMENT: CPT

## 2018-12-22 PROCEDURE — 25000128 H RX IP 250 OP 636: Performed by: INTERNAL MEDICINE

## 2018-12-22 PROCEDURE — 25000125 ZZHC RX 250: Performed by: INTERNAL MEDICINE

## 2018-12-22 PROCEDURE — 00000146 ZZHCL STATISTIC GLUCOSE BY METER IP

## 2018-12-22 PROCEDURE — 40000275 ZZH STATISTIC RCP TIME EA 10 MIN

## 2018-12-22 PROCEDURE — 40000193 ZZH STATISTIC PT WARD VISIT

## 2018-12-22 PROCEDURE — 94640 AIRWAY INHALATION TREATMENT: CPT | Mod: 76

## 2018-12-22 PROCEDURE — 97161 PT EVAL LOW COMPLEX 20 MIN: CPT | Mod: GP

## 2018-12-22 PROCEDURE — 97530 THERAPEUTIC ACTIVITIES: CPT | Mod: GP

## 2018-12-22 PROCEDURE — 12000007 ZZH R&B INTERMEDIATE

## 2018-12-22 PROCEDURE — 99233 SBSQ HOSP IP/OBS HIGH 50: CPT | Performed by: INTERNAL MEDICINE

## 2018-12-22 RX ADMIN — LEVALBUTEROL HYDROCHLORIDE 1.25 MG: 1.25 SOLUTION RESPIRATORY (INHALATION) at 15:38

## 2018-12-22 RX ADMIN — PROPAFENONE HYDROCHLORIDE 150 MG: 150 TABLET, COATED ORAL at 14:48

## 2018-12-22 RX ADMIN — METHYLPREDNISOLONE SODIUM SUCCINATE 40 MG: 40 INJECTION, POWDER, FOR SOLUTION INTRAMUSCULAR; INTRAVENOUS at 08:46

## 2018-12-22 RX ADMIN — LEVALBUTEROL HYDROCHLORIDE 1.25 MG: 1.25 SOLUTION RESPIRATORY (INHALATION) at 07:52

## 2018-12-22 RX ADMIN — Medication 2.5 MG: at 03:52

## 2018-12-22 RX ADMIN — INSULIN ASPART 1 UNITS: 100 INJECTION, SOLUTION INTRAVENOUS; SUBCUTANEOUS at 12:42

## 2018-12-22 RX ADMIN — APIXABAN 5 MG: 5 TABLET, FILM COATED ORAL at 08:46

## 2018-12-22 RX ADMIN — Medication 2.5 MG: at 14:47

## 2018-12-22 RX ADMIN — Medication 25 MG: at 21:34

## 2018-12-22 RX ADMIN — DILTIAZEM HYDROCHLORIDE 360 MG: 180 CAPSULE, COATED, EXTENDED RELEASE ORAL at 08:46

## 2018-12-22 RX ADMIN — ACETAMINOPHEN 650 MG: 325 TABLET, FILM COATED ORAL at 16:43

## 2018-12-22 RX ADMIN — METHYLPREDNISOLONE SODIUM SUCCINATE 40 MG: 40 INJECTION, POWDER, FOR SOLUTION INTRAMUSCULAR; INTRAVENOUS at 17:36

## 2018-12-22 RX ADMIN — METHYLPREDNISOLONE SODIUM SUCCINATE 40 MG: 40 INJECTION, POWDER, FOR SOLUTION INTRAMUSCULAR; INTRAVENOUS at 00:24

## 2018-12-22 RX ADMIN — ACETAMINOPHEN 650 MG: 325 TABLET, FILM COATED ORAL at 01:52

## 2018-12-22 RX ADMIN — CEFAZOLIN SODIUM 2 G: 2 INJECTION, SOLUTION INTRAVENOUS at 21:34

## 2018-12-22 RX ADMIN — CEFAZOLIN SODIUM 2 G: 2 INJECTION, SOLUTION INTRAVENOUS at 05:47

## 2018-12-22 RX ADMIN — INSULIN ASPART 1 UNITS: 100 INJECTION, SOLUTION INTRAVENOUS; SUBCUTANEOUS at 17:18

## 2018-12-22 RX ADMIN — PROPAFENONE HYDROCHLORIDE 150 MG: 150 TABLET, COATED ORAL at 21:17

## 2018-12-22 RX ADMIN — LEVALBUTEROL HYDROCHLORIDE 1.25 MG: 1.25 SOLUTION RESPIRATORY (INHALATION) at 12:22

## 2018-12-22 RX ADMIN — CEFAZOLIN SODIUM 2 G: 2 INJECTION, SOLUTION INTRAVENOUS at 14:40

## 2018-12-22 RX ADMIN — LEVALBUTEROL HYDROCHLORIDE 1.25 MG: 1.25 SOLUTION RESPIRATORY (INHALATION) at 20:03

## 2018-12-22 RX ADMIN — APIXABAN 5 MG: 5 TABLET, FILM COATED ORAL at 21:17

## 2018-12-22 RX ADMIN — Medication 2.5 MG: at 21:34

## 2018-12-22 RX ADMIN — INSULIN ASPART 1 UNITS: 100 INJECTION, SOLUTION INTRAVENOUS; SUBCUTANEOUS at 09:32

## 2018-12-22 RX ADMIN — PROPAFENONE HYDROCHLORIDE 150 MG: 150 TABLET, COATED ORAL at 08:46

## 2018-12-22 ASSESSMENT — ACTIVITIES OF DAILY LIVING (ADL)
ADLS_ACUITY_SCORE: 18

## 2018-12-22 ASSESSMENT — PAIN DESCRIPTION - DESCRIPTORS: DESCRIPTORS: HEADACHE

## 2018-12-22 NOTE — PLAN OF CARE
ICU End of Shift Summary.  For vital signs and complete assessments, please see documentation flowsheets.     Pertinent assessments: Afebrile throughout night. C/o pain in head described as headache and abdominal pain that occurs frequently with coughing. Prn apap and oxycodone given x 2 each. Ice pack also applied to back of head/neck area. Resting after but when awake rates pain as the same or minimally improved. BP's elevated at times. Prn hydralazine available. Voiding well.  Major Shift Events: Comfort and sleep promoted.  Plan (Upcoming Events): Transfer to tele floor when bed available. IV abx at home for 4 weeks. Place line once cultures negative.   Discharge/Transfer Needs: Home care/infusions at discharge. SW following for setup.    Bedside Shift Report Completed : yes  Bedside Safety Check Completed: yes

## 2018-12-22 NOTE — PROGRESS NOTES
[facility]  Infectious Disease Progress Note          Assessment and Plan:   IMPRESSION:   1.  A 67-year-old male with third hospitalization in the last month with hypoxic respiratory failure, probable pneumonia, Staph aureus in recent sputum and now Staph aureus in the blood.   2.  Staph aureus bacteremia, only 1 of 2 cultures positive.  Suspect this is spillover from pneumonia, but certainly at risk including a tricuspid valve.   3.  History of atrial fibrillation with rapid ventricular response.   4.  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      RECOMMENDATIONS:     1.  MSSA also in sputum,  other cultures without any other growth,  simplify antibiotics down to Ancef.  He will need an extended IV course here simply for the positive blood culture,  Given  the tricuspid valve and the hip, probably a full 4week course of therapy.   2.  Avoid PICC line or similar long lines if possible at this time until documented followup blood cultures are clear.If still neg 12/19 cx tomorrow OK midline.  Can hold on echocardiogram or at least a transesophageal echocardiogram unless multiple positive blood cultures occur.             Interval History:   no new complaints sl abd pain, Follow-up BC neg so far, sputum staphno fver no other new focal sxs, WBc normal               Medications:       apixaban ANTICOAGULANT  5 mg Oral BID     ceFAZolin  2 g Intravenous Q8H     diltiazem ER COATED BEADS  360 mg Oral Daily     insulin aspart  1-3 Units Subcutaneous TID AC     insulin aspart  1-3 Units Subcutaneous At Bedtime     insulin isophane human  10 Units Subcutaneous BID AC     levalbuterol  1.25 mg Nebulization 4x Daily     methylPREDNISolone  40 mg Intravenous Q8H     propafenone  150 mg Oral TID     umeclidinium  1 puff Inhalation Daily                  Physical Exam:   Blood pressure (!) 159/98, temperature 97.6  F  (36.4  C), resp. rate (!) 32, weight 89.2 kg (196 lb 10.4 oz), SpO2 98 %.  Wt Readings from Last 2 Encounters:   12/21/18 89.2 kg (196 lb 10.4 oz)   12/03/18 87.5 kg (193 lb)     Vital Signs with Ranges  Temp:  [97.5  F (36.4  C)-98.4  F (36.9  C)] 97.6  F (36.4  C)  Heart Rate:  [65-98] 88  Resp:  [8-32] 32  BP: (133-164)/() 159/98  SpO2:  [92 %-100 %] 98 %    Constitutional: Awake, alert, cooperative, no apparent distress   Lungs: Congestion to auscultation bilaterally, no crackles or wheezing hpoxia   Cardiovascular: Regular rate and rhythm, normal S1 and S2, and no murmur noted   Abdomen: Normal bowel sounds, soft, non-distended, non-tender   Skin: No rashes, no cyanosis, no edema no embolic lesions   Other:           Data:   All microbiology laboratory data reviewed.  Recent Labs   Lab Test 12/21/18  0519 12/20/18  0531 12/18/18  0516   WBC 5.9 12.7* 16.3*   HGB 14.2 13.6 14.2   HCT 44.3 42.9 46.0   MCV 92 93 95    165 124*     Recent Labs   Lab Test 12/21/18  0519 12/20/18  0531 12/19/18  1014   CR 0.85 1.03 0.99     No lab results found.  Recent Labs   Lab Test 12/19/18  1023 12/19/18  1013 12/18/18  1027 12/17/18  1518 12/17/18  1452 11/25/18  2257 10/06/18  0826 10/06/18  0822 11/10/16  1524   CULT No growth after 3 days No growth after 3 days Light growth  Normal tushar    Moderate growth  Staphylococcus aureus  * Cultured on the 1st day of incubation:  Staphylococcus aureus  This isolate DOES NOT demonstrate inducible clindamycin resistance in vitro. Clindamycin   is susceptible and could be used when indicated, however, erythromycin is resistant and   should not be used.  *  Critical Value/Significant Value, preliminary result only, called to and read back by  Laure Mathews RN ICU @ 2947 12.18.18 JE    (Note)  POSITIVE for STAPHYLOCOCCUS AUREUS and NEGATIVE for the mecA gene  (not MRSA) by [employer] multiplex nucleic acid test. The mecA gene was  not detected. Final identification and  antimicrobial susceptibility  testing will be verified by standard methods.    Specimen tested with SeMeAntoja.comigene multiplex, gram-positive blood culture  nucleic acid test for the following targets: Staph aureus, Staph  epidermidis, Staph lugdunensis, other Staph species, Enterococcus  faecalis, Enterococcus faecium, Streptococcus species, S. agalactiae,  S. anginosus grp., S. pneumoniae, S. pyogenes, Listeria sp., mecA  (methicillin resistance) and Steven/B (vancomycin resistance).    Critical Value/Significant Value called to and read back by Laure Mathews RN ICU 1652 12.18.18 PATSY.   No growth after 5 days Heavy growth  Normal tushar    Moderate growth  Staphylococcus aureus  This isolate DOES NOT demonstrate inducible clindamycin resistance in vitro. Clindamycin   is susceptible and could be used when indicated, however, erythromycin is resistant and   should not be used.  * No growth No growth No anaerobes isolated  Moderate growth Staphylococcus aureus This isolate DOES NOT demonstrate inducible   clindamycin resistance in vitro. Clindamycin is susceptible and could be used   when indicated, however, erythromycin is resistant and should not be used.  *  Canceled, Test credited Test reordered as correct code REORDERED AS AN ABSCESS   CULTURE

## 2018-12-22 NOTE — PROGRESS NOTES
"SPIRITUAL HEALTH SERVICES Progress Note  [facility] ICU    Visit with pt, per five-day length of stay.  Pt described the ups and downs of the last few days, and his hopes to be discharged soon.  Pt said, \"They say I might be here two more days, but I can't imagine they'll let me go on [place] so I assume it'll be the 26th.\"  Pt says he is spiritual but not Faith, and talked about his love of riding his motorcycle and communing with God.  Provided conversation and support.   team available per additional need or request.                                                                                                                                                     Shannon El M.A.  Staff   Pager [phone]  Phone [phone]      "

## 2018-12-22 NOTE — PLAN OF CARE
ICU End of Shift Summary.  For vital signs and complete assessments, please see documentation flowsheets.     Pertinent assessments: Awake and alert. Up to BSC with SBA. Dyspnea with activity. 6L NC. Coughing up thick yellow sputum. Appetite fair. Prn hydralazine for elevated BP. Oxycodone for pain. Afebrile  Major Shift Events: None  Plan (Upcoming Events): Cont cares  Discharge/Transfer Needs: Home with IV antibiotics    Bedside Shift Report Completed : Y  Bedside Safety Check Completed:Y

## 2018-12-22 NOTE — PLAN OF CARE
PT: Patient seen by physical therapy for evaluation and treatment.  Patient with PMH including severe COPD, HTN, A-fib/flutter, premature beats, PVCs, tricuspid valve disorder, ventricular tachycardia now admitted with acute hypoxic respiratory failure due to pneumonia and COPD exacerbation.  Patient reports that he lives with his wife and daughter in a split level home (9 stairs to access main level).  Patient reports using a walker at baseline, a family member is typically at home with him 24 hours (daughter works from home).   Patient reports severely limited activity tolerance at baseline (short, household distances only).    Discharge Planner PT   Patient plan for discharge: home with assist from family  Current status: Patient supine upon arrival, on 6LPM NC throughout session.  Patient independent with bed mobility.  Transferred to standing holding onto IV pole (declined use of 2WW that was available) and SBA (refused gait belt during session).  Patient amb 20 feet within room with one seated break.  Patient reaching out for support surfaces (bed/table) while holding onto IV pole, impulsive with increased anxiety with mobility.  Patient's O2 remained above 92% with mobility, increased SOB and wheezing noted with ambulation.  Patient reports this is close to his baseline for mobility (walks only between bed/chair and bathroom once at home).  Patient reports home setup has been working for patient and family.  Barriers to return to prior living situation: stairs, severely limited activity tolerance, fall risk  Recommendations for discharge: home with 24 supervision with mobility from family  Rationale for recommendations: Patient close to baseline level of mobility, severely decreased activity tolerance.  Patient has 9 stairs to access home, and is a high fall risk; requires supervision from family with mobility for increased safety.  Would benefit from ongoing inpatient physical therapy in order to increase  endurance and independence with mobility.       Entered by: Elana Adhikari 12/22/2018 10:59 AM

## 2018-12-22 NOTE — PROGRESS NOTES
[facility]    Medicine Progress Note - Hospitalist Service       Date of Admission:  12/17/2018  Assessment & Plan            Mr. Cooper is a 67-year-old man with history of severe COPD intermittently on oxygen who presented with several days of productive cough and worsening shortness of breath.  Found to be in hypoxic respiratory failure with severe sepsis secondary to pneumonia in the right lung.  He also has a had a history of atrial fibrillation with previous ablation, tricuspid valve replacement, hypertension, nonsustained ventricular tachycardia and 2 hospitalizations in the past 2 months for COPD exacerbations.     1.  Severe sepsis.  Due to pneumonia, RLL, organism likely MSSA.  -Continue IV ancef.       2.  Acute hypoxic respiratory failure.  Due to pneumonia and COPD exacerbation.    - BiPAP prn.  - IV steroids.     3.  Pneumonia , RLL, likely MSSA.     -Continue IV ancef.    -Gram stain showing gram-positive cocci.  One blood culture with staph aureus.  -ID consult recommends IV ancef via PICC.    4.  COPD exacerbation.  -Continue antibiotics as above.  -Continue Xopenex.  -Continue Incruse Ellipta.  -Continue prednisone.     5.  Atrial fibrillation.  -Continue diltiazem, propafenone, and apixaban.     6.  Lactic acidosis.  Due to sepsis.  -Resolved with IV fluids.     7.  Hyperglycemia.  Possibly due to steroids.  -On  NPH insulin 10 units bid with first dose now while on prednisone.  -NovoLog sliding scale.     8.  Chronic kidney disease.  Creatinine at baseline.  -Avoid nephrotoxins as able.     9.  Nutrition.  -Regular diet     10.  Deconditioning.    -Physical therapy consult.    11.  Staph aureus bacteremia.  1 of 2 bottles on 12/17/18 growing MS staph aureus.  -Continue antibiotics as noted above.  -Infectious disease consult.           Diet: Regular Diet Adult    DVT Prophylaxis: eliquis  Rodney Catheter: not present  Code Status: Full Code      Disposition Plan   Expected  discharge: 2 - 3 days, recommended to prior living arrangement once antibiotic plan established.  Entered: Geoff Alvares MD 12/22/2018, 7:51 AM       The patient's care was discussed with the Patient.    Geoff Alvares MD  Hospitalist Service  [facility]    ______________________________________________________________________    Interval History     SOB is better. No loose stools. No fevers.    Data reviewed today: I reviewed all medications, new labs and imaging results over the last 24 hours. I personally reviewed no images or EKG's today.    Physical Exam   Vital Signs: Temp: 97.5  F (36.4  C) Temp src: Oral BP: (!) 154/98   Heart Rate: 73 Resp: 13 SpO2: 97 % O2 Device: Nasal cannula Oxygen Delivery: 6 LPM  Weight: 196 lbs 10.41 oz      Gen - AAO x 3 in NAD.  Lungs - diminished with some wheezing.  Heart - irregularly irregular, S1+S2 nml, no m/g/r.  Abd - soft, NT, ND, + BS.  Ext - no edema.        Data   Recent Labs   Lab 12/21/18  0519 12/20/18  0531 12/19/18  1014 12/18/18  0516 12/17/18  1450   WBC 5.9 12.7*  --  16.3* 21.5*   HGB 14.2 13.6  --  14.2 17.2   MCV 92 93  --  95 93    165  --  124* 193    138 137 137 134   POTASSIUM 4.2 4.2 4.3 4.7 4.7   CHLORIDE 100 102 103 101 96   CO2 31 30 30 30 30   BUN 26 32* 33* 32* 26   CR 0.85 1.03 0.99 1.08 1.09   ANIONGAP 5 6 4 6 8   WILBERT 8.7 8.7 8.9 8.6 9.3   * 140* 134* 151* 140*   TROPI  --   --   --   --  0.025     No results found for this or any previous visit (from the past 24 hour(s)).  Medications     - MEDICATION INSTRUCTIONS -         apixaban ANTICOAGULANT  5 mg Oral BID     ceFAZolin  2 g Intravenous Q8H     diltiazem ER COATED BEADS  360 mg Oral Daily     insulin aspart  1-3 Units Subcutaneous TID AC     insulin aspart  1-3 Units Subcutaneous At Bedtime     insulin isophane human  10 Units Subcutaneous BID AC     levalbuterol  1.25 mg Nebulization 4x Daily     methylPREDNISolone  40 mg Intravenous Q8H      propafenone  150 mg Oral TID     umeclidinium  1 puff Inhalation Daily

## 2018-12-23 LAB
BACTERIA SPEC CULT: NO GROWTH
GLUCOSE BLDC GLUCOMTR-MCNC: 166 MG/DL (ref 70–99)
Lab: NORMAL
SPECIMEN SOURCE: NORMAL

## 2018-12-23 PROCEDURE — 25000128 H RX IP 250 OP 636: Performed by: INTERNAL MEDICINE

## 2018-12-23 PROCEDURE — 40000275 ZZH STATISTIC RCP TIME EA 10 MIN

## 2018-12-23 PROCEDURE — 99233 SBSQ HOSP IP/OBS HIGH 50: CPT | Performed by: INTERNAL MEDICINE

## 2018-12-23 PROCEDURE — 12000007 ZZH R&B INTERMEDIATE

## 2018-12-23 PROCEDURE — 25000132 ZZH RX MED GY IP 250 OP 250 PS 637: Performed by: INTERNAL MEDICINE

## 2018-12-23 PROCEDURE — 40000894 ZZH STATISTIC OT IP EVAL DEFER: Performed by: REHABILITATION PRACTITIONER

## 2018-12-23 PROCEDURE — 94640 AIRWAY INHALATION TREATMENT: CPT | Mod: 76

## 2018-12-23 PROCEDURE — 94640 AIRWAY INHALATION TREATMENT: CPT

## 2018-12-23 PROCEDURE — 00000146 ZZHCL STATISTIC GLUCOSE BY METER IP

## 2018-12-23 PROCEDURE — 25000125 ZZHC RX 250: Performed by: INTERNAL MEDICINE

## 2018-12-23 RX ORDER — METHYLPREDNISOLONE SODIUM SUCCINATE 40 MG/ML
40 INJECTION, POWDER, LYOPHILIZED, FOR SOLUTION INTRAMUSCULAR; INTRAVENOUS EVERY 12 HOURS
Status: DISCONTINUED | OUTPATIENT
Start: 2018-12-23 | End: 2018-12-24

## 2018-12-23 RX ORDER — CEFAZOLIN SODIUM 1 G/3ML
2 INJECTION, POWDER, FOR SOLUTION INTRAMUSCULAR; INTRAVENOUS EVERY 8 HOURS
Qty: 180 G | Refills: 0 | DISCHARGE
Start: 2018-12-23 | End: 2019-01-08

## 2018-12-23 RX ADMIN — PROPAFENONE HYDROCHLORIDE 150 MG: 150 TABLET, COATED ORAL at 16:30

## 2018-12-23 RX ADMIN — CEFAZOLIN SODIUM 2 G: 2 INJECTION, SOLUTION INTRAVENOUS at 13:49

## 2018-12-23 RX ADMIN — CEFAZOLIN SODIUM 2 G: 2 INJECTION, SOLUTION INTRAVENOUS at 22:21

## 2018-12-23 RX ADMIN — PROPAFENONE HYDROCHLORIDE 150 MG: 150 TABLET, COATED ORAL at 22:20

## 2018-12-23 RX ADMIN — LEVALBUTEROL HYDROCHLORIDE 1.25 MG: 1.25 SOLUTION RESPIRATORY (INHALATION) at 15:50

## 2018-12-23 RX ADMIN — LEVALBUTEROL HYDROCHLORIDE 1.25 MG: 1.25 SOLUTION RESPIRATORY (INHALATION) at 11:44

## 2018-12-23 RX ADMIN — INSULIN ASPART 1 UNITS: 100 INJECTION, SOLUTION INTRAVENOUS; SUBCUTANEOUS at 08:49

## 2018-12-23 RX ADMIN — METHYLPREDNISOLONE SODIUM SUCCINATE 40 MG: 40 INJECTION, POWDER, FOR SOLUTION INTRAMUSCULAR; INTRAVENOUS at 22:21

## 2018-12-23 RX ADMIN — ACETAMINOPHEN 650 MG: 325 TABLET, FILM COATED ORAL at 11:02

## 2018-12-23 RX ADMIN — APIXABAN 5 MG: 5 TABLET, FILM COATED ORAL at 08:49

## 2018-12-23 RX ADMIN — METHYLPREDNISOLONE SODIUM SUCCINATE 40 MG: 40 INJECTION, POWDER, FOR SOLUTION INTRAMUSCULAR; INTRAVENOUS at 01:00

## 2018-12-23 RX ADMIN — DILTIAZEM HYDROCHLORIDE 360 MG: 180 CAPSULE, COATED, EXTENDED RELEASE ORAL at 08:49

## 2018-12-23 RX ADMIN — APIXABAN 5 MG: 5 TABLET, FILM COATED ORAL at 22:20

## 2018-12-23 RX ADMIN — Medication 2.5 MG: at 08:50

## 2018-12-23 RX ADMIN — ACETAMINOPHEN 650 MG: 325 TABLET, FILM COATED ORAL at 16:30

## 2018-12-23 RX ADMIN — INSULIN ASPART 1 UNITS: 100 INJECTION, SOLUTION INTRAVENOUS; SUBCUTANEOUS at 18:50

## 2018-12-23 RX ADMIN — LEVALBUTEROL HYDROCHLORIDE 1.25 MG: 1.25 SOLUTION RESPIRATORY (INHALATION) at 20:39

## 2018-12-23 RX ADMIN — HYDRALAZINE HYDROCHLORIDE 10 MG: 20 INJECTION INTRAMUSCULAR; INTRAVENOUS at 16:41

## 2018-12-23 RX ADMIN — LEVALBUTEROL HYDROCHLORIDE 1.25 MG: 1.25 SOLUTION RESPIRATORY (INHALATION) at 08:00

## 2018-12-23 RX ADMIN — Medication 2.5 MG: at 01:06

## 2018-12-23 RX ADMIN — Medication 2.5 MG: at 19:14

## 2018-12-23 RX ADMIN — CEFAZOLIN SODIUM 2 G: 2 INJECTION, SOLUTION INTRAVENOUS at 05:37

## 2018-12-23 RX ADMIN — PROPAFENONE HYDROCHLORIDE 150 MG: 150 TABLET, COATED ORAL at 08:49

## 2018-12-23 RX ADMIN — METHYLPREDNISOLONE SODIUM SUCCINATE 40 MG: 40 INJECTION, POWDER, FOR SOLUTION INTRAMUSCULAR; INTRAVENOUS at 10:18

## 2018-12-23 RX ADMIN — INSULIN ASPART 1 UNITS: 100 INJECTION, SOLUTION INTRAVENOUS; SUBCUTANEOUS at 13:31

## 2018-12-23 ASSESSMENT — ACTIVITIES OF DAILY LIVING (ADL)
ADLS_ACUITY_SCORE: 18

## 2018-12-23 NOTE — PLAN OF CARE
Vitals:  elevated BP, PRN hydralazine available, afebrile  Oxygen: tolerated 3L NC for a few hours, now back up to 6L  LOC: AAOx4  Pain: reports headache pain, PRN tylenol given x1  Ambulation: assist x1  Tele: afib, CVR  Cardiac:irregular rhythm   Lungs: LS-diminished, exp wheezes, ERICKSON  GI: denies nausea, tolerates diet, formed BM this evening  : low urine output  Skin: flushed/sandra, bruises  Plan: PT/OT, continue IV abx, will need PICC at discharge

## 2018-12-23 NOTE — PROGRESS NOTES
[facility]  Infectious Disease Progress Note          Assessment and Plan:   IMPRESSION:   1.  A 67-year-old male with third hospitalization in the last month with hypoxic respiratory failure, probable pneumonia, Staph aureus in recent sputum and now Staph aureus in the blood.   2.  Staph aureus bacteremia, only 1 of 2 cultures positive.  Suspect this is spillover from pneumonia, but certainly at risk including a tricuspid valve.   3.  History of atrial fibrillation with rapid ventricular response.   4.  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      RECOMMENDATIONS:     1.  MSSA also in sputum,  other cultures without any other growth,  On  Ancef.  He will need an extended IV course here simply for the positive blood culture,  Given  the tricuspid valve and the hip, probably a full 4 week course of therapy.   2.  12/19 cx stay negative, midline ok!  3. Orders for IV antibiotics in the chart.           Interval History:   no new complaints sl abd pain, Follow-up BC neg so far, sputum staphno fver no other new focal sxs, WBc normal   Out of ICU,               Medications:       apixaban ANTICOAGULANT  5 mg Oral BID     ceFAZolin  2 g Intravenous Q8H     diltiazem ER COATED BEADS  360 mg Oral Daily     insulin aspart  1-3 Units Subcutaneous TID AC     insulin aspart  1-3 Units Subcutaneous At Bedtime     insulin isophane human  10 Units Subcutaneous BID AC     levalbuterol  1.25 mg Nebulization 4x Daily     methylPREDNISolone  40 mg Intravenous Q12H     propafenone  150 mg Oral TID     umeclidinium  1 puff Inhalation Daily                  Physical Exam:   Blood pressure (!) 160/106, temperature 98  F (36.7  C), temperature source Oral, resp. rate 22, weight 90.2 kg (198 lb 14.4 oz), SpO2 98 %.  Wt Readings from Last 2 Encounters:   12/22/18 90.2 kg (198 lb 14.4 oz)   12/03/18 87.5 kg (193 lb)     Vital  Signs with Ranges  Temp:  [97.8  F (36.6  C)-98.1  F (36.7  C)] 98  F (36.7  C)  Heart Rate:  [] 97  Resp:  [18-27] 22  BP: (142-170)/() 160/106  SpO2:  [94 %-99 %] 98 %    Constitutional: Awake, alert, cooperative, no apparent distress   Lungs: Congestion to auscultation bilaterally, no crackles or wheezing hpoxia   Cardiovascular: Regular rate and rhythm, normal S1 and S2, and no murmur noted   Abdomen: Normal bowel sounds, soft, non-distended, non-tender   Skin: No rashes, no cyanosis, no edema no embolic lesions   Other:           Data:   All microbiology laboratory data reviewed.  Recent Labs   Lab Test 12/21/18  0519 12/20/18  0531 12/18/18  0516   WBC 5.9 12.7* 16.3*   HGB 14.2 13.6 14.2   HCT 44.3 42.9 46.0   MCV 92 93 95    165 124*     Recent Labs   Lab Test 12/21/18  0519 12/20/18  0531 12/19/18  1014   CR 0.85 1.03 0.99     No lab results found.  Recent Labs   Lab Test 12/19/18  1023 12/19/18  1013 12/18/18  1027 12/17/18  1518 12/17/18  1452 11/25/18  2257 10/06/18  0826 10/06/18  0822 11/10/16  1524   CULT No growth after 4 days No growth after 4 days Light growth  Normal tushar    Moderate growth  Staphylococcus aureus  * Cultured on the 1st day of incubation:  Staphylococcus aureus  This isolate DOES NOT demonstrate inducible clindamycin resistance in vitro. Clindamycin   is susceptible and could be used when indicated, however, erythromycin is resistant and   should not be used.  *  Critical Value/Significant Value, preliminary result only, called to and read back by  Laure SANDOVAL @ 1281 12.18.18 JE    (Note)  POSITIVE for STAPHYLOCOCCUS AUREUS and NEGATIVE for the mecA gene  (not MRSA) by [employer] multiplex nucleic acid test. The mecA gene was  not detected. Final identification and antimicrobial susceptibility  testing will be verified by standard methods.    Specimen tested with [employer] multiplex, gram-positive blood culture  nucleic acid test for the following targets:  Staph aureus, Staph  epidermidis, Staph lugdunensis, other Staph species, Enterococcus  faecalis, Enterococcus faecium, Streptococcus species, S. agalactiae,  S. anginosus grp., S. pneumoniae, S. pyogenes, Listeria sp., mecA  (methicillin resistance) and Steven/B (vancomycin resistance).    Critical Value/Significant Value called to and read back by Laure Mathews RN [facility] 1652 12.18.18 PATSY.   No growth Heavy growth  Normal tushar    Moderate growth  Staphylococcus aureus  This isolate DOES NOT demonstrate inducible clindamycin resistance in vitro. Clindamycin   is susceptible and could be used when indicated, however, erythromycin is resistant and   should not be used.  * No growth No growth No anaerobes isolated  Moderate growth Staphylococcus aureus This isolate DOES NOT demonstrate inducible   clindamycin resistance in vitro. Clindamycin is susceptible and could be used   when indicated, however, erythromycin is resistant and should not be used.  *  Canceled, Test credited Test reordered as correct code REORDERED AS AN ABSCESS   CULTURE

## 2018-12-23 NOTE — PLAN OF CARE
"PT: Physical therapy attempted to see patient; patient firmly declined physical therapy today, stated \"I am NOT getting out of bed, I am not doing anything, I am going back to sleep.\"  Therapist asked if patient would participate later in day, patient again declined all therapy.  "

## 2018-12-23 NOTE — PLAN OF CARE
A&O, BP elevated 178/105, IV hydralazine given, recheck 159/97, Ind in room, refusing skin assessment, currently on 6L NC sats at 95%, dyspneic with exertion, c/o of HA, Tylenol given, care coordination and ID following, PIV SL, IV ancef, pt will need outpt antibiotics so possibly placement of midline/picc at discharge. Pt c/o of mouth sores on upper/lower lips and tongue. MD paged, no new orders.

## 2018-12-23 NOTE — PLAN OF CARE
Tele-Afib CVR with PVCs.  Resp following as well as PT and OT.  IV Ancef.  VSS.  O2 97% on 6L via nc.  Pt may need a PICC at discharge, as possible need for 4 weeks antibiotic given tricuspid valve replacement.  Possible discharge home 2-3 days.  Pt reported a headache and with relief after taking 2.5mg of oxy.

## 2018-12-23 NOTE — PROGRESS NOTES
[facility]  Hospitalist Progress Note  Alejandro Galindo MD, MD 12/23/2018  (Text Page)  Reason for Stay (Diagnosis): Sepsis secondary to MSSA suspected right lower lung pneumonia         Assessment and Plan:      Summary of Stay: Mr. Cooper is a 67-year-old man with history of severe COPD intermittently on oxygen who presented with several days of productive cough and worsening shortness of breath.  Found to be in hypoxic respiratory failure with severe sepsis secondary to pneumonia in the right lung.  He also has a had a history of atrial fibrillation with previous ablation, tricuspid valve replacement, hypertension, nonsustained ventricular tachycardia and 2 hospitalizations in the past 2 months for COPD exacerbations.     1.  Severe sepsis.  Due to pneumonia, RLL, organism likely MSSA.  -Continue IV ancef.  -Highly appreciate continues input from infectious disease service  -Plans likely will be requiring extended antibiotic course        2.  Acute hypoxic respiratory failure.  Due to pneumonia and COPD exacerbation.    - BiPAP prn.-Off BiPAP  - IV steroids.  Wheezing has been improving.  I will taper down dosing to every 12 hours today.     3.  Pneumonia , RLL, likely MSSA.     -Continue IV ancef.    -Gram stain showing gram-positive cocci.  One blood culture with staph aureus.     4.  COPD exacerbation.  -Continue antibiotics as above.  -Continue Xopenex.  -Continue Incruse Ellipta.  -Continue steroids     5.  Atrial fibrillation.-Rate control  -Continue diltiazem, propafenone, and apixaban.     6.  Lactic acidosis.  Due to sepsis.  -Resolved with IV fluids.     7.  Hyperglycemia.  Possibly due to steroids.  -On  NPH insulin 10 units bid with first dose now while on prednisone.  -NovoLog sliding scale.   -Anticipating improvement of glucose control once steroids has been tapered and discontinued.    8.  Chronic kidney disease.  Creatinine at baseline and currently normal  -Avoid nephrotoxins  as able.     9.  Nutrition.  -Regular diet     10.  Deconditioning.    -Physical therapy consult.     11.  Staph aureus bacteremia.  1 of 2 bottles on 12/17/18 growing MS staph aureus.  -Continue antibiotics as noted above.  -Infectious disease consult.      Diet: Regular Diet Adult    DVT Prophylaxis: eliquis  Rodney Catheter: not present  Code Status: Full Code    Disposition: Likely will still be needing at least 2 more inpatient hospitalization days.          Interval History (Subjective):      I have assume hospitalist service care today.    Transferred out of the ICU setting yesterday   seen and examined.  Chart reviewed.  Jean-Pierre is feeling fine and reportedly tolerating oral diet with no complaints of nausea or vomiting.  Afebrile this morning.  Still having some intermittent coughing spells but no ongoing shortness of breath or chest pain.  No reported mental status changes overnight.                Physical Exam:      Last Vital Signs:  BP (!) 153/105 (BP Location: Right arm)   Temp 97.9  F (36.6  C) (Oral)   Resp 24   Wt 90.2 kg (198 lb 14.4 oz)   SpO2 96%   BMI 28.54 kg/m      I/O last 3 completed shifts:  In: 500 [P.O.:500]  Out: 200 [Urine:200]  Wt Readings from Last 1 Encounters:   12/22/18 90.2 kg (198 lb 14.4 oz)     Vitals:    12/18/18 0600 12/19/18 0600 12/20/18 0636 12/21/18 0602   Weight: 85 kg (187 lb 6.3 oz) 89 kg (196 lb 3.4 oz) 89.2 kg (196 lb 10.4 oz) 89.2 kg (196 lb 10.4 oz)    12/22/18 1616   Weight: 90.2 kg (198 lb 14.4 oz)       Constitutional: Awake, alert, cooperative, no apparent distress   Respiratory:  Fair air entry, no crackles, scant wheezing   Cardiovascular:  Irregularly irregular rhythm, normal rate, S1 and S2   Abdomen: Normal bowel sounds, soft, non-distended, non-tender   Skin: No rashes, no cyanosis, dry to touch   Neuro: Alert and oriented x3, no weakness, spontaneous and coherent speech   Extremities: No edema, normal range of motion   Other(s): Euthymic mood, not  agitated       All other systems: Negative          Medications:      All current medications were reviewed with changes reflected in problem list.         Data:      All new lab and imaging data was reviewed.   Labs:  Recent Labs   Lab 12/19/18  1023 12/19/18  1013 12/18/18  1027 12/17/18  1518 12/17/18  1452   CULT No growth after 4 days No growth after 4 days Light growth  Normal tushar    Moderate growth  Staphylococcus aureus  * Cultured on the 1st day of incubation:  Staphylococcus aureus  This isolate DOES NOT demonstrate inducible clindamycin resistance in vitro. Clindamycin   is susceptible and could be used when indicated, however, erythromycin is resistant and   should not be used.  *  Critical Value/Significant Value, preliminary result only, called to and read back by  Laure Mathews RN ICU @ 3362 12.18.18 JE    (Note)  POSITIVE for STAPHYLOCOCCUS AUREUS and NEGATIVE for the mecA gene  (not MRSA) by [employer] multiplex nucleic acid test. The mecA gene was  not detected. Final identification and antimicrobial susceptibility  testing will be verified by standard methods.    Specimen tested with Verigene multiplex, gram-positive blood culture  nucleic acid test for the following targets: Staph aureus, Staph  epidermidis, Staph lugdunensis, other Staph species, Enterococcus  faecalis, Enterococcus faecium, Streptococcus species, S. agalactiae,  S. anginosus grp., S. pneumoniae, S. pyogenes, Listeria sp., mecA  (methicillin resistance) and Setven/B (vancomycin resistance).    Critical Value/Significant Value called to and read back by Laure Mathews RN [facility] 1116 12.18.18 PATSY.   No growth     Recent Labs   Lab 12/21/18  0519 12/20/18  0531 12/18/18  0516   WBC 5.9 12.7* 16.3*   HGB 14.2 13.6 14.2   HCT 44.3 42.9 46.0   MCV 92 93 95    165 124*     Recent Labs   Lab 12/21/18  0519 12/20/18  0531 12/19/18  1014    138 137   POTASSIUM 4.2 4.2 4.3   CHLORIDE 100 102 103   CO2 31 30 30   ANIONGAP 5 6 4   GLC  219* 140* 134*   BUN 26 32* 33*   CR 0.85 1.03 0.99   GFRESTIMATED >90 75 78   GFRESTBLACK >90 87 >90   WILBERT 8.7 8.7 8.9     Recent Labs   Lab 12/23/18  0143 12/22/18  2213 12/22/18  1634 12/22/18  1226 12/22/18  0931  12/21/18  0519  12/20/18  0531  12/19/18  1014  12/18/18  0516  12/17/18  1450   GLC  --   --   --   --   --   --  219*  --  140*  --  134*  --  151*  --  140*   * 209* 188* 163* 158*   < >  --    < >  --    < >  --    < >  --    < >  --     < > = values in this interval not displayed.     No results for input(s): INR in the last 168 hours.  Recent Labs   Lab 12/17/18  1450   TROPI 0.025     No results for input(s): COLOR, APPEARANCE, URINEGLC, URINEBILI, URINEKETONE, SG, UBLD, URINEPH, PROTEIN, UROBILINOGEN, NITRITE, LEUKEST, RBCU, WBCU in the last 168 hours.   Imaging:   Results for orders placed or performed during the hospital encounter of 12/17/18   XR Chest Port 1 View    Narrative    XR CHEST PORT 1 VW 12/17/2018 3:21 PM    HISTORY: dyspnea      Impression    IMPRESSION: Consolidating areas of infiltrate in the right hilar and  right lung base new compared to 11/23/2018.    DIONISIO PHAN MD

## 2018-12-23 NOTE — PLAN OF CARE
RN SHIFT SUMMARY :  DX/STORY :.third hospitalization in the last month with hypoxic respiratory failure, probable pneumonia, Staph aureus in recent sputum and now Staph aureus in the blood.   HX  : 3rd admit for Resp issues , afib, TVR -10 yr ago , L hip surgery in past   LABS/PROTOCOLS : glucs , HX Staph + BC   TELE : chronic afib CVR- freq PVC's   ASSESS : a/o, anxious man, HX of poss. Non-compliance with 02 ,   TEACHING : discussed POC   PLAN : at baseline is  & from [facility] . Cont. POC of treating sepsis/COPD - on 02, IV Ancef   D/C goal: will need PICC or Midline per ID  for long term ( 4 weeks ) antibiotic

## 2018-12-23 NOTE — PLAN OF CARE
Discharge Planner OT   Patient plan for discharge: home  Current status: Patient approached for OT eval. Patient resistant to OT services. Educated on focus of OT. Patient reports he has 24/7 A from spouse and daughter, was able to verbalize techniques and AE he uses to modify ADl's at home. Currently patient has following AE; elevated toilets, reachers, sockaid, RTS, grab bars in shower area and and has ordered a shower bench and spouse to obtain a hand held shower head. Patient is refusing OT services at this time, however agreed to have OT provide handouts on EC/WS techniques, but declined verbal education or any further OT intervention.   Barriers to return to prior living situation: stairs ( to be addressed by PT)  Recommendations for discharge: defer to PT  Rationale for recommendations: defer to PT- patient is declined OT services, per patient request, will discharge from OT services.        Entered by: Lana Xie 12/23/2018 10:01 AM

## 2018-12-23 NOTE — PROGRESS NOTES
"   12/22/18 1045   Quick Adds   Type of Visit Initial PT Evaluation   Living Environment   Lives With child(harry), adult;spouse   Living Arrangements house   Home Accessibility stairs to enter home;stairs within home   Living Environment Comment Patient lives in split level home, 9 steps to access main living area.  Patient reports has wife or daughter home 24/7 (daughter works from home   Self-Care   Usual Activity Tolerance fair   Current Activity Tolerance poor   Equipment Currently Used at Home walker, rolling   Activity/Exercise/Self-Care Comment Patient reports that he has been limited to short, household distance for \"a while now\"   Functional Level Prior   Ambulation 1-->assistive equipment   Transferring 1-->assistive equipment   Toileting 1-->assistive equipment   Bathing 0-->independent   Communication 0-->understands/communicates without difficulty   Swallowing 0-->swallows foods/liquids without difficulty   Cognition 0 - no cognition issues reported   Fall history within last six months no   Which of the above functional risks had a recent onset or change? ambulation;transferring   Prior Functional Level Comment Patient reports use of walker with ambulation, family assists with ADLs as needed.   General Information   Onset of Illness/Injury or Date of Surgery - Date 12/17/18   Patient/Family Goals Statement return to home   Pertinent History of Current Problem (include personal factors and/or comorbidities that impact the POC) Patient with PMH including severe COPD, HTN, A-fib/flutter, premature beats, PVCs, tricuspid valve disorder, ventricular tachycardia now admitted with acute hypoxic respiratory failure due to pneumonia and COPD exacerbation.    Precautions/Limitations fall precautions;oxygen therapy device and L/min  (6LPM NC)   Cognitive Status Examination   Orientation orientation to person, place and time   Pain Assessment   Patient Currently in Pain No   Posture    Posture Forward head " "position;Protracted shoulders   Range of Motion (ROM)   ROM Comment WFL   Strength   Strength Comments WFL   Bed Mobility   Bed Mobility Comments independent   Transfer Skills   Transfer Comments SBA with IV pole   Gait   Gait Comments Patient amb 20 feet within room with one seated break   Balance   Balance Comments Patient reaching out for support surfaces (bed/table) while holding onto IV pole, impulsive with increased anxiety with mobility.     General Therapy Interventions   Planned Therapy Interventions balance training;gait training;strengthening;transfer training;home program guidelines;progressive activity/exercise   Clinical Impression   Criteria for Skilled Therapeutic Intervention yes, treatment indicated   PT Diagnosis decreased independence with mobility   Influenced by the following impairments decreased activity tolerance   Clinical Presentation Stable/Uncomplicated   Clinical Presentation Rationale complex PMH, changing presentation, good social support   Clinical Decision Making (Complexity) Low complexity   Therapy Frequency` daily   Predicted Duration of Therapy Intervention (days/wks) 5 days   Anticipated Discharge Disposition Home   Risk & Benefits of therapy have been explained Yes   Patient, Family & other staff in agreement with plan of care Yes   [facility] [employer]. TM \"6 Clicks\"   2016, Trustees of [facility], under license to [employer].  All rights reserved.   6 Clicks Short Forms Basic Mobility Inpatient Short Form   [facility] [employer]  \"6 Clicks\" V.2 Basic Mobility Inpatient Short Form   1. Turning from your back to your side while in a flat bed without using bedrails? 4 - None   2. Moving from lying on your back to sitting on the side of a flat bed without using bedrails? 4 - None   3. Moving to and from a bed to a chair (including a wheelchair)? 4 - None   4. Standing up from a chair using your arms (e.g., wheelchair, or bedside chair)? 4 - None   5. To walk in " hospital room? 3 - A Little   6. Climbing 3-5 steps with a railing? 3 - A Little   Basic Mobility Raw Score (Score out of 24.Lower scores equate to lower levels of function) 22   Total Evaluation Time   Total Evaluation Time (Minutes) 5

## 2018-12-24 LAB
GLUCOSE BLDC GLUCOMTR-MCNC: 140 MG/DL (ref 70–99)
GLUCOSE BLDC GLUCOMTR-MCNC: 151 MG/DL (ref 70–99)
GLUCOSE BLDC GLUCOMTR-MCNC: 153 MG/DL (ref 70–99)
GLUCOSE BLDC GLUCOMTR-MCNC: 160 MG/DL (ref 70–99)
GLUCOSE BLDC GLUCOMTR-MCNC: 170 MG/DL (ref 70–99)
GLUCOSE BLDC GLUCOMTR-MCNC: 180 MG/DL (ref 70–99)
GLUCOSE BLDC GLUCOMTR-MCNC: 195 MG/DL (ref 70–99)
GLUCOSE BLDC GLUCOMTR-MCNC: 212 MG/DL (ref 70–99)
GLUCOSE BLDC GLUCOMTR-MCNC: 254 MG/DL (ref 70–99)

## 2018-12-24 PROCEDURE — 00000146 ZZHCL STATISTIC GLUCOSE BY METER IP

## 2018-12-24 PROCEDURE — 25000132 ZZH RX MED GY IP 250 OP 250 PS 637: Performed by: INTERNAL MEDICINE

## 2018-12-24 PROCEDURE — 40000275 ZZH STATISTIC RCP TIME EA 10 MIN

## 2018-12-24 PROCEDURE — 25000125 ZZHC RX 250: Performed by: INTERNAL MEDICINE

## 2018-12-24 PROCEDURE — 94640 AIRWAY INHALATION TREATMENT: CPT

## 2018-12-24 PROCEDURE — 94640 AIRWAY INHALATION TREATMENT: CPT | Mod: 76

## 2018-12-24 PROCEDURE — 99233 SBSQ HOSP IP/OBS HIGH 50: CPT | Performed by: INTERNAL MEDICINE

## 2018-12-24 PROCEDURE — 12000007 ZZH R&B INTERMEDIATE

## 2018-12-24 PROCEDURE — 25000128 H RX IP 250 OP 636: Performed by: INTERNAL MEDICINE

## 2018-12-24 RX ORDER — NYSTATIN 100000/ML
500000 SUSPENSION, ORAL (FINAL DOSE FORM) ORAL 4 TIMES DAILY
Status: DISCONTINUED | OUTPATIENT
Start: 2018-12-24 | End: 2019-01-08 | Stop reason: HOSPADM

## 2018-12-24 RX ORDER — PREDNISONE 20 MG/1
20 TABLET ORAL DAILY
Status: DISCONTINUED | OUTPATIENT
Start: 2018-12-25 | End: 2018-12-24

## 2018-12-24 RX ORDER — HYDRALAZINE HYDROCHLORIDE 25 MG/1
25 TABLET, FILM COATED ORAL EVERY 8 HOURS SCHEDULED
Status: DISCONTINUED | OUTPATIENT
Start: 2018-12-24 | End: 2018-12-26

## 2018-12-24 RX ORDER — PREDNISONE 20 MG/1
40 TABLET ORAL DAILY
Status: DISCONTINUED | OUTPATIENT
Start: 2018-12-25 | End: 2018-12-31

## 2018-12-24 RX ADMIN — DILTIAZEM HYDROCHLORIDE 360 MG: 180 CAPSULE, COATED, EXTENDED RELEASE ORAL at 07:49

## 2018-12-24 RX ADMIN — INSULIN ASPART 2 UNITS: 100 INJECTION, SOLUTION INTRAVENOUS; SUBCUTANEOUS at 17:55

## 2018-12-24 RX ADMIN — HYDRALAZINE HYDROCHLORIDE 25 MG: 25 TABLET ORAL at 13:24

## 2018-12-24 RX ADMIN — APIXABAN 5 MG: 5 TABLET, FILM COATED ORAL at 07:49

## 2018-12-24 RX ADMIN — LEVALBUTEROL HYDROCHLORIDE 1.25 MG: 1.25 SOLUTION RESPIRATORY (INHALATION) at 20:01

## 2018-12-24 RX ADMIN — Medication 2.5 MG: at 20:24

## 2018-12-24 RX ADMIN — NYSTATIN 500000 UNITS: 500000 SUSPENSION ORAL at 18:40

## 2018-12-24 RX ADMIN — METHYLPREDNISOLONE SODIUM SUCCINATE 40 MG: 40 INJECTION, POWDER, FOR SOLUTION INTRAMUSCULAR; INTRAVENOUS at 10:23

## 2018-12-24 RX ADMIN — LEVALBUTEROL HYDROCHLORIDE 1.25 MG: 1.25 SOLUTION RESPIRATORY (INHALATION) at 14:07

## 2018-12-24 RX ADMIN — CEFAZOLIN SODIUM 2 G: 2 INJECTION, SOLUTION INTRAVENOUS at 05:27

## 2018-12-24 RX ADMIN — Medication 2.5 MG: at 00:17

## 2018-12-24 RX ADMIN — CEFAZOLIN SODIUM 2 G: 2 INJECTION, SOLUTION INTRAVENOUS at 13:24

## 2018-12-24 RX ADMIN — NYSTATIN 500000 UNITS: 500000 SUSPENSION ORAL at 13:24

## 2018-12-24 RX ADMIN — INSULIN ASPART 1 UNITS: 100 INJECTION, SOLUTION INTRAVENOUS; SUBCUTANEOUS at 07:50

## 2018-12-24 RX ADMIN — Medication 2.5 MG: at 15:41

## 2018-12-24 RX ADMIN — LEVALBUTEROL HYDROCHLORIDE 1.25 MG: 1.25 SOLUTION RESPIRATORY (INHALATION) at 15:46

## 2018-12-24 RX ADMIN — Medication 25 MG: at 00:19

## 2018-12-24 RX ADMIN — PROPAFENONE HYDROCHLORIDE 150 MG: 150 TABLET, COATED ORAL at 07:49

## 2018-12-24 RX ADMIN — PROPAFENONE HYDROCHLORIDE 150 MG: 150 TABLET, COATED ORAL at 15:41

## 2018-12-24 RX ADMIN — APIXABAN 5 MG: 5 TABLET, FILM COATED ORAL at 20:25

## 2018-12-24 RX ADMIN — LEVALBUTEROL HYDROCHLORIDE 1.25 MG: 1.25 SOLUTION RESPIRATORY (INHALATION) at 05:10

## 2018-12-24 RX ADMIN — CEFAZOLIN SODIUM 2 G: 2 INJECTION, SOLUTION INTRAVENOUS at 21:31

## 2018-12-24 RX ADMIN — PROPAFENONE HYDROCHLORIDE 150 MG: 150 TABLET, COATED ORAL at 21:30

## 2018-12-24 RX ADMIN — LEVALBUTEROL HYDROCHLORIDE 1.25 MG: 1.25 SOLUTION RESPIRATORY (INHALATION) at 11:12

## 2018-12-24 RX ADMIN — Medication 2.5 MG: at 08:02

## 2018-12-24 RX ADMIN — HYDRALAZINE HYDROCHLORIDE 25 MG: 25 TABLET ORAL at 21:30

## 2018-12-24 RX ADMIN — NYSTATIN 500000 UNITS: 500000 SUSPENSION ORAL at 21:30

## 2018-12-24 RX ADMIN — HYDRALAZINE HYDROCHLORIDE 10 MG: 20 INJECTION INTRAMUSCULAR; INTRAVENOUS at 11:46

## 2018-12-24 RX ADMIN — INSULIN ASPART 1 UNITS: 100 INJECTION, SOLUTION INTRAVENOUS; SUBCUTANEOUS at 11:56

## 2018-12-24 ASSESSMENT — ACTIVITIES OF DAILY LIVING (ADL)
ADLS_ACUITY_SCORE: 18

## 2018-12-24 NOTE — PROGRESS NOTES
"BRIEF NUTRITION ASSESSMENT    REASON FOR ASSESSMENT:  LOS  CURRENT DIET AND NOURISHMENT ORDER:  Information obtained from chart review - bathroom, and later with Resp therapist during two attempts to see  Patient is on a ?regular diet at home  Food allergies/intolerances: NKFA  History of severe COPD    Diet: Regular  Current Intake/Tolerance: Per flow sheet review, 100% intake for majority of documented meals in the past few days (improved since admit).    ANTHROPOMETRICS  Height: 5'10\"  Weight: 87 kg (admit weight)  Body mass index is 29.82 kg/m .  Weight Status:  Overweight BMI 25-29.9  Ideal body weight:  75.5 kg +/- 10%, 115% of IBW   Weight History:  Weight has increased in last month, as noted below  Wt Readings from Last 10 Encounters:   12/24/18 94.3 kg (207 lb 12.8 oz)   12/03/18 87.5 kg (193 lb)   11/23/18 80.7 kg (178 lb)   11/05/18 82.1 kg (181 lb)   10/04/18 78.5 kg (173 lb)   11/17/17 83.3 kg (183 lb 9.6 oz)   10/04/17 78.9 kg (174 lb)   08/29/17 78.9 kg (174 lb)   07/27/17 78.9 kg (174 lb)   05/16/17 79.1 kg (174 lb 6.4 oz)       ASSESSED NUTRITION NEEDS (PER APPROVED PRACTICE GUIDELINES, Dosing weight: 87 kg admit weight)  Estimated Energy Needs: 4046-9190 kcals (25-30 Kcal/Kg)  Justification: maintenance  Estimated Protein Needs: + grams protein (1-1.2 g pro/Kg)  Justification: preservation of lean body mass  Estimated Fluid Needs: >1 mL/Kcal  Justification: maintenance    LABS/MEDS/PHYSICAL FINDINGS:  Meds reviewed  Labs reviewed    Malnutrition:  Patient does not meet two of the following criteria necessary for diagnosing malnutrition: significant weight loss, reduced intake, subcutaneous fat loss, muscle loss (unable to assess for fat or muscle loss) or fluid retention    INTERVENTION:  Nutrition Diagnosis:  Increased nutrient needs (protein energy) related to hypermetabolic demands of chronic disease as evidenced by advanced COPD    Implementation:  Nutrition Education:  No education " needs at this time  Collaboration and Referral of care: Discussed patient during interdisciplinary care rounds this morning    Follow Up/Monitoring:   Progress towards goals will be monitored and evaluated per protocol and Practice Guidelines        Luzma Robbins RDN, LD, CNSC  Pager - 3rd floor/ICU: [phone]  Pager - All other floors: [phone]  Pager - Weekend/holiday: [phone]  Office: [phone]

## 2018-12-24 NOTE — CONSULTS
Care Coordination:  CTS continues to follow for home IV abx needs. Per chart review, pt will not be ready for discharge for a couple of more days. Per previous CTS note, pt's coverage is as follows.  Follow up plan: Benefits check completed with [facility]. Supply cost will be $105.00 a week with $17.75 a week for the drug cost for a total of $122.75 a week. Nursing should be covered as long as pt is homebound. Call [facility] at [phone] with discharge updates.    Will cont to follow for discharge plan. SW updated as well. Call placed to [facility] regarding discharge plan. They are continuing to follow for discharge. Anticipate possible discharge Wed as they do not do admissions on Michael Day. They verify above costs for supplies and drug. If pt is not homebound and would need a nurse, the cost would be $90/wk for one RN visit. If pt is homebound this would be covered. Kayla will come to hospital to teach pt and family prior to discharge. For [facility]: the  if home care RN is needed is Rowan [phone], and the RN Liaison for hospital teaching prior to discharge is Lana [phone].    Met with pt at bedside to review plan of care. He verifies he lives at home with his wife and daughter. He states he is aware of home IV abx plan and costs associated. He is agreeable to this plan. He states he is homebound at this time and will only be leaving his home if he has appts. He anticipates discharging later this week. He is agreeable to have Kayla come into hospital for teaching with his family. He states he uses home oxygen at 3L htrough  [facility] but has been told he will be needing 4-5L on discharge. He states he does not have the correct home equipment to supply this much O2. Call placed to  [facility] [phone] regarding increased home oxygen needs on discharge. They state they will supply new home O2 needs to hospital and pt's home on same day as discharge if needed. They need the  order for increased O2 needs. They suggest callling [facility] on day of discharge with new order and they will take it from there.    Lana Sofia RN CTS

## 2018-12-24 NOTE — PLAN OF CARE
Presentation/Diagnosis: Pt admitted 12/17 due to SOB. Pt diagnosed with pneumonia.   History: COPD, A-fib (on Eliquis), HTN, DM, and cardiomyopathy (most recent EF:55-60%).   Labs/Protocols: K: 4.2, Hgb: 14.2. QID accuchecks, sugars this shift: 153 and 140.   Vitals: BP elevated, PRN hydralazine given once this shift. Other VSS on 5L O2 per NC. (pt refused to have RN titrate O2 down this shift). Pt on 3L O2 per NC at baseline. Pt complained of headache throughout shift, pt given PRN oxycodone given once with mild improvement.      Cardiac: Irregular rhythm. Denies chest pain this shift. +2 edema to Bilateral lower extremities. Zio patch in place to L chest.   Telemetry: A-flutter with PVCs.   Respiratory: Diminished lung sounds throughout. Dyspnea with exertion. RT following with scheduled nebs. One PRN neb given this shift due to SOB and wheezing.   Neuro: A&Ox4. Calm and cooperative with cares.   GI/: WDL. Last BM today 12/24.   Skin: Trevor coloration. Scattered bruising and scabbing. Unable to do a full skin check due to patient refusal, pt denies wounds.   LDAs: PIV to R FA, SL. Intermittent IV ancef.   Diet: Regular diet, good appetite.   Activity: Pt up independently in room. Pt refused to work with PT this shift. FWW in room for pt to use in halls.    Teaching: Pt educated on plan of care.   Plan: Continue IV ancef. IV solumedrol given this shift, pt to start PO prednisone tomorrow. Pt will need 4-6wks IV antibiotics outpatient.

## 2018-12-24 NOTE — PROGRESS NOTES
[facility]  Hospitalist Progress Note  Alejandro Galindo MD, MD 12/24/2018  (Text Page)  Reason for Stay (Diagnosis): Sepsis secondary to MSSA suspected right lower lung pneumonia         Assessment and Plan:      Summary of Stay: Mr. Cooper is a 67-year-old man with history of severe COPD intermittently on oxygen who presented with several days of productive cough and worsening shortness of breath.  Found to be in hypoxic respiratory failure with severe sepsis secondary to pneumonia in the right lung.  He also has a had a history of atrial fibrillation with previous ablation, tricuspid valve replacement, hypertension, nonsustained ventricular tachycardia and 2 hospitalizations in the past 2 months for COPD exacerbations.     1.  Severe sepsis.  Due to pneumonia, RLL, organism likely MSSA.  -Continue IV ancef.  -Highly appreciate continues input from infectious disease service  -Plans likely will be requiring extended antibiotic course.  Midline IV in.  -IV antibiotics with IV Ancef for outpatient infusion orders care of ID service are in place        2.  Acute hypoxic respiratory failure.  Due to pneumonia and COPD exacerbation.    - BiPAP prn.-Off BiPAP  - IV steroids.  Wheezing has been improving.  Stop IV steroids today and switch to oral prednisone.   -Wean and taper oxygen supplementation if able to his baseline home needs.    3.  Pneumonia , RLL, likely MSSA.     -Continue IV ancef.    -Gram stain showing gram-positive cocci.  One blood culture with staph aureus.     4.  COPD exacerbation.  -Continue antibiotics as above.  -Continue Xopenex.  -Continue Incruse Ellipta.  -Continue steroids     5.  Atrial fibrillation.-Rate control  -Continue diltiazem, propafenone, and apixaban.     6.  Lactic acidosis.  Due to sepsis.  -Resolved with IV fluids.     7.  Hyperglycemia.  Possibly due to steroids.  -On  NPH insulin 10 units bid with first dose now while on IV steroids  -NovoLog sliding  scale.   -Anticipating improvement of glucose control once steroids has been tapered and discontinued.  -Discontinued, anticipating improving glucose levels and has decreased NPH coverage to half dosing.    8.  Chronic kidney disease.  Creatinine at baseline and currently normal  -Avoid nephrotoxins as able.     9.  Nutrition.  -Regular diet     10.  Deconditioning.    -Physical therapy consult.     11.  Staph aureus bacteremia.  1 of 2 bottles on 12/17/18 growing MS staph aureus.  -Continue antibiotics as noted above.  -Infectious disease consult.    12.  Hypertension-still uncontrolled likely current steroid use contributing.  On diltiazem 360 mg daily.  Added around-the-clock oral hydralazine      Diet: Regular Diet Adult    DVT Prophylaxis: eliquis  Rodney Catheter: not present  Code Status: Full Code    Disposition: Likely will still be needing at least 1-2 more inpatient hospitalization days as patient still requiring more of oxygen than his baseline home needs.          Interval History (Subjective):      Continuing hospitalist service care today.    Transferred out of the ICU setting earlier  seen and examined.  Chart reviewed.  Tone remained in good spirits.  Tolerating oral diet with no reported vomiting or diarrhea.  No bleeding tendencies.  Still requiring oxygen supplementation more than his baseline needs.  Earlier had some complaints regarding mouth sores.       Physical Exam:      Last Vital Signs:  BP (!) 157/108 (BP Location: Right arm)   Temp 98.1  F (36.7  C) (Oral)   Resp 20   Wt 94.3 kg (207 lb 12.8 oz)   SpO2 94%   BMI 29.82 kg/m      I/O last 3 completed shifts:  In: -   Out: 575 [Urine:575]  Wt Readings from Last 1 Encounters:   12/24/18 94.3 kg (207 lb 12.8 oz)     Vitals:    12/19/18 0600 12/20/18 0636 12/21/18 0602 12/22/18 1616   Weight: 89 kg (196 lb 3.4 oz) 89.2 kg (196 lb 10.4 oz) 89.2 kg (196 lb 10.4 oz) 90.2 kg (198 lb 14.4 oz)    12/24/18 0707   Weight: 94.3 kg (207 lb 12.8 oz)        Constitutional: Awake, alert, cooperative, no apparent distress   Respiratory:  Fair air entry, no crackles, scant wheezing   Cardiovascular:  Irregularly irregular rhythm, normal rate, S1 and S2   Abdomen: Normal bowel sounds, soft, non-distended, non-tender   Skin: No rashes, no cyanosis, dry to touch   Neuro: Alert and oriented x3, no weakness, spontaneous and coherent speech   Extremities: No edema, normal range of motion   Other(s): Euthymic mood, not agitated       All other systems: Negative          Medications:      All current medications were reviewed with changes reflected in problem list.         Data:      All new lab and imaging data was reviewed.   Labs:  Recent Labs   Lab 12/19/18  1023 12/19/18  1013 12/18/18  1027 12/17/18  1518 12/17/18  1452   CULT No growth after 5 days No growth after 5 days Light growth  Normal tushar    Moderate growth  Staphylococcus aureus  * Cultured on the 1st day of incubation:  Staphylococcus aureus  This isolate DOES NOT demonstrate inducible clindamycin resistance in vitro. Clindamycin   is susceptible and could be used when indicated, however, erythromycin is resistant and   should not be used.  *  Critical Value/Significant Value, preliminary result only, called to and read back by  Laure SANDOVAL @ 3972 12.18.18 JE    (Note)  POSITIVE for STAPHYLOCOCCUS AUREUS and NEGATIVE for the mecA gene  (not MRSA) by [employer] multiplex nucleic acid test. The mecA gene was  not detected. Final identification and antimicrobial susceptibility  testing will be verified by standard methods.    Specimen tested with Verigene multiplex, gram-positive blood culture  nucleic acid test for the following targets: Staph aureus, Staph  epidermidis, Staph lugdunensis, other Staph species, Enterococcus  faecalis, Enterococcus faecium, Streptococcus species, S. agalactiae,  S. anginosus grp., S. pneumoniae, S. pyogenes, Listeria sp., mecA  (methicillin resistance) and Steven/B  (vancomycin resistance).    Critical Value/Significant Value called to and read back by Laure Mathews,  RN ICU 1652 12.18.18 PATSY.   No growth     Recent Labs   Lab 12/21/18  0519 12/20/18  0531 12/18/18  0516   WBC 5.9 12.7* 16.3*   HGB 14.2 13.6 14.2   HCT 44.3 42.9 46.0   MCV 92 93 95    165 124*     Recent Labs   Lab 12/21/18  0519 12/20/18  0531 12/19/18  1014    138 137   POTASSIUM 4.2 4.2 4.3   CHLORIDE 100 102 103   CO2 31 30 30   ANIONGAP 5 6 4   * 140* 134*   BUN 26 32* 33*   CR 0.85 1.03 0.99   GFRESTIMATED >90 75 78   GFRESTBLACK >90 87 >90   WILBERT 8.7 8.7 8.9     Recent Labs   Lab 12/24/18  1155 12/24/18  0748 12/23/18  0143 12/22/18  2213 12/22/18  1634  12/21/18  0519  12/20/18  0531  12/19/18  1014  12/18/18  0516  12/17/18  1450   GLC  --   --   --   --   --   --  219*  --  140*  --  134*  --  151*  --  140*   * 153* 166* 209* 188*   < >  --    < >  --    < >  --    < >  --    < >  --     < > = values in this interval not displayed.     No results for input(s): INR in the last 168 hours.  Recent Labs   Lab 12/17/18  1450   TROPI 0.025     No results for input(s): COLOR, APPEARANCE, URINEGLC, URINEBILI, URINEKETONE, SG, UBLD, URINEPH, PROTEIN, UROBILINOGEN, NITRITE, LEUKEST, RBCU, WBCU in the last 168 hours.   Imaging:   Results for orders placed or performed during the hospital encounter of 12/17/18   XR Chest Port 1 View    Narrative    XR CHEST PORT 1 VW 12/17/2018 3:21 PM    HISTORY: dyspnea      Impression    IMPRESSION: Consolidating areas of infiltrate in the right hilar and  right lung base new compared to 11/23/2018.    DIONISIO PHAN MD

## 2018-12-24 NOTE — PROVIDER NOTIFICATION
FYI-Pt has white patches throughout mouth/tongue. Complains of irritation, currently on antibiotics.

## 2018-12-24 NOTE — PLAN OF CARE
"PT - Pt refused PT stating, \" My wife wants to go for a walk @ 1:30 and I am  not getting  OOB twice.\"  Nursing was  notified of pt refusing PT.  "

## 2018-12-24 NOTE — PROGRESS NOTES
[facility]  Infectious Disease Progress Note          Assessment and Plan:   IMPRESSION:   1.  A 67-year-old male with third hospitalization in the last month with hypoxic respiratory failure, probable pneumonia, Staph aureus in recent sputum and now Staph aureus in the blood.   2.  Staph aureus bacteremia, only 1 of 2 cultures positive.  Suspect this is spillover from pneumonia, but certainly at risk including a tricuspid valve.   3.  History of atrial fibrillation with rapid ventricular response.   4.  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      RECOMMENDATIONS:     1.  MSSA also in sputum,  other cultures without any other growth,  On  Ancef.  He will need an extended IV course here simply for the positive blood culture,  Given  the tricuspid valve and the hip, probably a full 4 week course of therapy.   2.  12/19 cx stay negative, midline ok!  3. Orders for IV antibiotics in the chart.           Interval History:   no new complaints sl abd pain, Follow-up BC neg so far, sputum staph no fever no other new focal sxs, WBc normal   Out of ICU,               Medications:       apixaban ANTICOAGULANT  5 mg Oral BID     ceFAZolin  2 g Intravenous Q8H     diltiazem ER COATED BEADS  360 mg Oral Daily     insulin aspart  1-3 Units Subcutaneous TID AC     insulin aspart  1-3 Units Subcutaneous At Bedtime     insulin isophane human  10 Units Subcutaneous BID AC     levalbuterol  1.25 mg Nebulization 4x Daily     methylPREDNISolone  40 mg Intravenous Q12H     propafenone  150 mg Oral TID     umeclidinium  1 puff Inhalation Daily                  Physical Exam:   Blood pressure (!) 170/100, temperature 98.1  F (36.7  C), temperature source Oral, resp. rate 20, weight 94.3 kg (207 lb 12.8 oz), SpO2 99 %.  Wt Readings from Last 2 Encounters:   12/24/18 94.3 kg (207 lb 12.8 oz)   12/03/18 87.5 kg (193 lb)     Vital  Signs with Ranges  Temp:  [97.8  F (36.6  C)-98.2  F (36.8  C)] 98.1  F (36.7  C)  Heart Rate:  [87-97] 96  Resp:  [20-22] 20  BP: (142-178)/() 170/100  SpO2:  [93 %-99 %] 99 %    Constitutional: Awake, alert, cooperative, no apparent distress   Lungs: Congestion to auscultation bilaterally, no crackles or wheezing hpoxia   Cardiovascular: Regular rate and rhythm, normal S1 and S2, and no murmur noted   Abdomen: Normal bowel sounds, soft, non-distended, non-tender   Skin: No rashes, no cyanosis, no edema no embolic lesions   Other:           Data:   All microbiology laboratory data reviewed.  Recent Labs   Lab Test 12/21/18  0519 12/20/18  0531 12/18/18  0516   WBC 5.9 12.7* 16.3*   HGB 14.2 13.6 14.2   HCT 44.3 42.9 46.0   MCV 92 93 95    165 124*     Recent Labs   Lab Test 12/21/18  0519 12/20/18  0531 12/19/18  1014   CR 0.85 1.03 0.99     No lab results found.  Recent Labs   Lab Test 12/19/18  1023 12/19/18  1013 12/18/18  1027 12/17/18  1518 12/17/18  1452 11/25/18  2257 10/06/18  0826 10/06/18  0822 11/10/16  1524   CULT No growth after 5 days No growth after 5 days Light growth  Normal tushar    Moderate growth  Staphylococcus aureus  * Cultured on the 1st day of incubation:  Staphylococcus aureus  This isolate DOES NOT demonstrate inducible clindamycin resistance in vitro. Clindamycin   is susceptible and could be used when indicated, however, erythromycin is resistant and   should not be used.  *  Critical Value/Significant Value, preliminary result only, called to and read back by  Laure SANDOVAL @ 2015 12.18.18 JE    (Note)  POSITIVE for STAPHYLOCOCCUS AUREUS and NEGATIVE for the mecA gene  (not MRSA) by [employer] multiplex nucleic acid test. The mecA gene was  not detected. Final identification and antimicrobial susceptibility  testing will be verified by standard methods.    Specimen tested with [employer] multiplex, gram-positive blood culture  nucleic acid test for the following  targets: Staph aureus, Staph  epidermidis, Staph lugdunensis, other Staph species, Enterococcus  faecalis, Enterococcus faecium, Streptococcus species, S. agalactiae,  S. anginosus grp., S. pneumoniae, S. pyogenes, Listeria sp., mecA  (methicillin resistance) and Steven/B (vancomycin resistance).    Critical Value/Significant Value called to and read back by Laure Mathews RN [facility] 1652 12.18.18 PATSY.   No growth Heavy growth  Normal tushar    Moderate growth  Staphylococcus aureus  This isolate DOES NOT demonstrate inducible clindamycin resistance in vitro. Clindamycin   is susceptible and could be used when indicated, however, erythromycin is resistant and   should not be used.  * No growth No growth No anaerobes isolated  Moderate growth Staphylococcus aureus This isolate DOES NOT demonstrate inducible   clindamycin resistance in vitro. Clindamycin is susceptible and could be used   when indicated, however, erythromycin is resistant and should not be used.  *  Canceled, Test credited Test reordered as correct code REORDERED AS AN ABSCESS   CULTURE

## 2018-12-25 LAB
BACTERIA SPEC CULT: NO GROWTH
BACTERIA SPEC CULT: NO GROWTH
GLUCOSE BLDC GLUCOMTR-MCNC: 133 MG/DL (ref 70–99)
GLUCOSE BLDC GLUCOMTR-MCNC: 163 MG/DL (ref 70–99)
GLUCOSE BLDC GLUCOMTR-MCNC: 168 MG/DL (ref 70–99)
GLUCOSE BLDC GLUCOMTR-MCNC: 198 MG/DL (ref 70–99)
Lab: NORMAL
Lab: NORMAL
SPECIMEN SOURCE: NORMAL
SPECIMEN SOURCE: NORMAL

## 2018-12-25 PROCEDURE — 25000125 ZZHC RX 250: Performed by: INTERNAL MEDICINE

## 2018-12-25 PROCEDURE — 25000132 ZZH RX MED GY IP 250 OP 250 PS 637: Performed by: INTERNAL MEDICINE

## 2018-12-25 PROCEDURE — 94640 AIRWAY INHALATION TREATMENT: CPT

## 2018-12-25 PROCEDURE — 12000007 ZZH R&B INTERMEDIATE

## 2018-12-25 PROCEDURE — 25000128 H RX IP 250 OP 636: Performed by: INTERNAL MEDICINE

## 2018-12-25 PROCEDURE — 99232 SBSQ HOSP IP/OBS MODERATE 35: CPT | Performed by: INTERNAL MEDICINE

## 2018-12-25 PROCEDURE — 00000146 ZZHCL STATISTIC GLUCOSE BY METER IP

## 2018-12-25 PROCEDURE — 40000275 ZZH STATISTIC RCP TIME EA 10 MIN

## 2018-12-25 PROCEDURE — 94640 AIRWAY INHALATION TREATMENT: CPT | Mod: 76

## 2018-12-25 RX ADMIN — APIXABAN 5 MG: 5 TABLET, FILM COATED ORAL at 07:49

## 2018-12-25 RX ADMIN — PREDNISONE 40 MG: 20 TABLET ORAL at 07:49

## 2018-12-25 RX ADMIN — Medication 25 MG: at 00:23

## 2018-12-25 RX ADMIN — INSULIN ASPART 1 UNITS: 100 INJECTION, SOLUTION INTRAVENOUS; SUBCUTANEOUS at 18:07

## 2018-12-25 RX ADMIN — DILTIAZEM HYDROCHLORIDE 360 MG: 180 CAPSULE, COATED, EXTENDED RELEASE ORAL at 07:49

## 2018-12-25 RX ADMIN — CEFAZOLIN SODIUM 2 G: 2 INJECTION, SOLUTION INTRAVENOUS at 05:32

## 2018-12-25 RX ADMIN — Medication 2.5 MG: at 05:33

## 2018-12-25 RX ADMIN — NYSTATIN 500000 UNITS: 500000 SUSPENSION ORAL at 12:32

## 2018-12-25 RX ADMIN — PROPAFENONE HYDROCHLORIDE 150 MG: 150 TABLET, COATED ORAL at 16:29

## 2018-12-25 RX ADMIN — HYDRALAZINE HYDROCHLORIDE 25 MG: 25 TABLET ORAL at 21:40

## 2018-12-25 RX ADMIN — Medication 2.5 MG: at 00:21

## 2018-12-25 RX ADMIN — PROPAFENONE HYDROCHLORIDE 150 MG: 150 TABLET, COATED ORAL at 21:40

## 2018-12-25 RX ADMIN — LEVALBUTEROL HYDROCHLORIDE 1.25 MG: 1.25 SOLUTION RESPIRATORY (INHALATION) at 11:51

## 2018-12-25 RX ADMIN — Medication 2.5 MG: at 13:35

## 2018-12-25 RX ADMIN — LEVALBUTEROL HYDROCHLORIDE 1.25 MG: 1.25 SOLUTION RESPIRATORY (INHALATION) at 16:20

## 2018-12-25 RX ADMIN — HYDRALAZINE HYDROCHLORIDE 25 MG: 25 TABLET ORAL at 05:33

## 2018-12-25 RX ADMIN — LEVALBUTEROL HYDROCHLORIDE 1.25 MG: 1.25 SOLUTION RESPIRATORY (INHALATION) at 07:11

## 2018-12-25 RX ADMIN — APIXABAN 5 MG: 5 TABLET, FILM COATED ORAL at 21:40

## 2018-12-25 RX ADMIN — NYSTATIN 500000 UNITS: 500000 SUSPENSION ORAL at 07:49

## 2018-12-25 RX ADMIN — Medication 2.5 MG: at 21:43

## 2018-12-25 RX ADMIN — CEFAZOLIN SODIUM 2 G: 2 INJECTION, SOLUTION INTRAVENOUS at 13:43

## 2018-12-25 RX ADMIN — HYDRALAZINE HYDROCHLORIDE 25 MG: 25 TABLET ORAL at 13:35

## 2018-12-25 RX ADMIN — CEFAZOLIN SODIUM 2 G: 2 INJECTION, SOLUTION INTRAVENOUS at 21:49

## 2018-12-25 RX ADMIN — PROPAFENONE HYDROCHLORIDE 150 MG: 150 TABLET, COATED ORAL at 07:49

## 2018-12-25 RX ADMIN — Medication 2.5 MG: at 18:06

## 2018-12-25 RX ADMIN — HYDRALAZINE HYDROCHLORIDE 10 MG: 20 INJECTION INTRAMUSCULAR; INTRAVENOUS at 07:49

## 2018-12-25 RX ADMIN — NYSTATIN 500000 UNITS: 500000 SUSPENSION ORAL at 21:40

## 2018-12-25 RX ADMIN — LEVALBUTEROL HYDROCHLORIDE 1.25 MG: 1.25 SOLUTION RESPIRATORY (INHALATION) at 19:31

## 2018-12-25 ASSESSMENT — ACTIVITIES OF DAILY LIVING (ADL)
ADLS_ACUITY_SCORE: 16

## 2018-12-25 NOTE — PROGRESS NOTES
[facility]  Infectious Disease Progress Note          Assessment and Plan:   IMPRESSION:   1.  A 67-year-old male with third hospitalization in the last month with hypoxic respiratory failure, probable pneumonia, Staph aureus in recent sputum and now Staph aureus in the blood.   2.  Staph aureus bacteremia, only 1 of 2 cultures positive.  Suspect this is spillover from pneumonia, but certainly at risk including a tricuspid valve.   3.  History of atrial fibrillation with rapid ventricular response.   4.  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      RECOMMENDATIONS:     1.  MSSA also in sputum,  other cultures without any other growth,  On  Ancef.  He will need an extended IV course here simply for the positive blood culture,  Given  the tricuspid valve and the hip, probably a full 4 week course of therapy.   2.  12/19 cx stay negative, midline ordered  3. Orders for IV antibiotics in the chart.           Interval History:   no new complaints sl abd pain, Follow-up BC neg so far, sputum staph no fever no other new focal sxs, WBc normal   Out of ICU, but still hypoxic              Medications:       apixaban ANTICOAGULANT  5 mg Oral BID     ceFAZolin  2 g Intravenous Q8H     diltiazem ER COATED BEADS  360 mg Oral Daily     hydrALAZINE  25 mg Oral Q8H MEGHAN     insulin aspart  1-3 Units Subcutaneous TID AC     insulin aspart  1-3 Units Subcutaneous At Bedtime     [START ON 12/26/2018] insulin isophane human  10 Units Subcutaneous Daily     levalbuterol  1.25 mg Nebulization 4x Daily     nystatin  500,000 Units Mouth/Throat 4x Daily     predniSONE  40 mg Oral Daily     propafenone  150 mg Oral TID     umeclidinium  1 puff Inhalation Daily                  Physical Exam:   Blood pressure (!) 163/96, temperature 97.9  F (36.6  C), temperature source Oral, resp. rate 22, weight 94.3 kg (207 lb 12.8 oz), SpO2 93  %.  Wt Readings from Last 2 Encounters:   12/24/18 94.3 kg (207 lb 12.8 oz)   12/03/18 87.5 kg (193 lb)     Vital Signs with Ranges  Temp:  [97.9  F (36.6  C)-98  F (36.7  C)] 97.9  F (36.6  C)  Heart Rate:  [77-99] 84  Resp:  [22-32] 22  BP: (135-173)/() 163/96  SpO2:  [88 %-99 %] 93 %    Constitutional: Awake, alert, cooperative, no apparent distress   Lungs: Congestion to auscultation bilaterally, no crackles or wheezing hpoxia   Cardiovascular: Regular rate and rhythm, normal S1 and S2, and no murmur noted   Abdomen: Normal bowel sounds, soft, non-distended, non-tender   Skin: No rashes, no cyanosis, no edema no embolic lesions   Other:           Data:   All microbiology laboratory data reviewed.  Recent Labs   Lab Test 12/21/18  0519 12/20/18  0531 12/18/18  0516   WBC 5.9 12.7* 16.3*   HGB 14.2 13.6 14.2   HCT 44.3 42.9 46.0   MCV 92 93 95    165 124*     Recent Labs   Lab Test 12/21/18  0519 12/20/18  0531 12/19/18  1014   CR 0.85 1.03 0.99     No lab results found.  Recent Labs   Lab Test 12/19/18  1023 12/19/18  1013 12/18/18  1027 12/17/18  1518 12/17/18  1452 11/25/18  2257 10/06/18  0826 10/06/18  0822 11/10/16  1524   CULT No growth No growth Light growth  Normal tushar    Moderate growth  Staphylococcus aureus  * Cultured on the 1st day of incubation:  Staphylococcus aureus  This isolate DOES NOT demonstrate inducible clindamycin resistance in vitro. Clindamycin   is susceptible and could be used when indicated, however, erythromycin is resistant and   should not be used.  *  Critical Value/Significant Value, preliminary result only, called to and read back by  Laure Mathews RN RHELSIE @ 6549 12.18.18 JE    (Note)  POSITIVE for STAPHYLOCOCCUS AUREUS and NEGATIVE for the mecA gene  (not MRSA) by [employer] multiplex nucleic acid test. The mecA gene was  not detected. Final identification and antimicrobial susceptibility  testing will be verified by standard methods.    Specimen tested with  Verigene multiplex, gram-positive blood culture  nucleic acid test for the following targets: Staph aureus, Staph  epidermidis, Staph lugdunensis, other Staph species, Enterococcus  faecalis, Enterococcus faecium, Streptococcus species, S. agalactiae,  S. anginosus grp., S. pneumoniae, S. pyogenes, Listeria sp., mecA  (methicillin resistance) and Steven/B (vancomycin resistance).    Critical Value/Significant Value called to and read back [facility] 12.18.18 PATSY.   No growth Heavy growth  Normal tushar    Moderate growth  Staphylococcus aureus  This isolate DOES NOT demonstrate inducible clindamycin resistance in vitro. Clindamycin   is susceptible and could be used when indicated, however, erythromycin is resistant and   should not be used.  * No growth No growth No anaerobes isolated  Moderate growth Staphylococcus aureus This isolate DOES NOT demonstrate inducible   clindamycin resistance in vitro. Clindamycin is susceptible and could be used   when indicated, however, erythromycin is resistant and should not be used.  *  Canceled, Test credited Test reordered as correct code REORDERED AS AN ABSCESS   CULTURE

## 2018-12-25 NOTE — PLAN OF CARE
Vitals: BP (!) 155/102   Temp 98  F (36.7  C) (Oral)   Resp 30   Wt 94.3 kg (207 lb 12.8 oz)   SpO2 97%   BMI 29.82 kg/m    Situation/Status: Pt SOB weak, on 5 LPM  Neuro: A/O x 4  Pain: Rated pain at 9/10, head and abdomen gave prn pain med oxy x 2, with effective relief, ice/heat offered  Activity:Indep  Diet: Reg diet  Tele/Cardiac: Afib  Lungs: LS-course exp crackles, diminished inspiratory  GI/: No nausea/vomiting, + Flautus, BS x4.  Skin: CMS intact, edema +2 in BLE  Lines/drains: IV-SL- On abx Ancef for PNA  Labs: BG Results for FEILPE AYOUB (MRN [id number]) as of 12/25/2018 06:12   Ref. Range 12/24/2018 21:27 12/25/2018 01:49   Glucose Latest Ref Range: 70 - 99 mg/dL 195 (H) 168 (H)     Plan: Continue to monitor per POC.

## 2018-12-25 NOTE — PROGRESS NOTES
[facility]  Hospitalist Progress Note  Best Tucker MD 12/25/2018    Reason for Stay (Diagnosis): Sepsis secondary to MSSA suspected right lower lung pneumonia         Assessment and Plan:      Summary of Stay: Mr. Cooper is a 67-year-old man with history of severe COPD intermittently on home oxygen who presented with several days of productive cough and worsening shortness of breath.  Found to be in hypoxic respiratory failure with severe sepsis secondary to pneumonia in the right lung.  He also has a had a history of atrial fibrillation with previous ablation, tricuspid valve replacement, hypertension, nonsustained ventricular tachycardia and 2 hospitalizations in the past 2 months for COPD exacerbations.     1.  Severe sepsis due to MSSA bacteremia and pneumonia. The source of bacteremia has been felt to be spill over from the pneumonia. MSSA in blood and sputum. Monitor for any secondary sites, has a history of tricuspid valve repair and left total hip arthroplasty.  -Continue IV ancef.  -Highly appreciate continues input from infectious disease service  -Plans likely will be requiring extended IV antibiotic course.  Midline IV in.  -IV antibiotics with IV Ancef for outpatient infusion orders care of ID service are in place     2.  Acute hypoxic respiratory failure.  Due to pneumonia and COPD exacerbation.  He required BiPAP initially, now weaned off  - Received IV steroids.  Wheezing has been improving.  Now switched to oral prednisone.   -Wean oxygen supplementation back to his baseline of 3L as able to  -Could consider trial of diuresis although the patient is reluctant to try it today    3.  Pneumonia , RLL, likely MSSA.     -Continue IV ancef.      4.  COPD exacerbation.  -Continue antibiotics as above.  -Continue Xopenex.  -Continue Incruse Ellipta.  -Continue steroids     5.  Atrial fibrillation.-Rate control  -Continue diltiazem, propafenone, and apixaban.     6.  Hyperglycemia.   Possibly due to steroids.  -On  NPH insulin 10 units daily   -NovoLog sliding scale.  -Anticipating improvement of glucose control once steroids has been tapered and discontinued.    8.  Chronic kidney disease.  Creatinine at baseline and currently normal  -Avoid nephrotoxins as able.     9.  Nutrition.  -Regular diet     10.  Deconditioning.    -Physical therapy consult.    11.  Hypertension-still uncontrolled likely current steroid use contributing.  On diltiazem 360 mg daily.  Added around-the-clock oral hydralazine      Diet: Regular Diet Adult    DVT Prophylaxis: eliquis  Rodney Catheter: not present  Code Status: Full Code    Disposition: Likely will still be needing at least 2 more inpatient hospitalization days once breathing improved, ambulating and oxygen back to baseline requirement        Interval History (Subjective):      Assumed service care today.    seen and examined.  Chart reviewed.  Tolerating diet, still short of breath with activity and requiring supplemental oxygen more than usual , on 5L at the time, Tolerating oral diet with no reported vomiting or diarrhea.  No bleeding tendencies.           Physical Exam:      Last Vital Signs:  BP (!) 164/93 (BP Location: Right arm)   Temp 97.9  F (36.6  C) (Oral)   Resp 22   Wt 94.3 kg (207 lb 12.8 oz)   SpO2 93%   BMI 29.82 kg/m      I/O last 3 completed shifts:  In: 1163 [P.O.:720; I.V.:443]  Out: -   Wt Readings from Last 1 Encounters:   12/24/18 94.3 kg (207 lb 12.8 oz)     Vitals:    12/19/18 0600 12/20/18 0636 12/21/18 0602 12/22/18 1616   Weight: 89 kg (196 lb 3.4 oz) 89.2 kg (196 lb 10.4 oz) 89.2 kg (196 lb 10.4 oz) 90.2 kg (198 lb 14.4 oz)    12/24/18 0707   Weight: 94.3 kg (207 lb 12.8 oz)       Constitutional: Awake, alert, cooperative, no apparent distress   Respiratory:  Fair air entry, no crackles, scant wheezing   Cardiovascular:  Irregularly irregular rhythm, normal rate, S1 and S2   Abdomen: Normal bowel sounds, soft, non-distended,  non-tender   Skin: No rashes, no cyanosis, dry to touch   Neuro: Alert and oriented x3, neurologically intact, spontaneous and coherent speech   Extremities: +1 edema, normal range of motion   Other(s): Euthymic mood, not agitated       All other systems: Negative          Medications:      All current medications were reviewed with changes reflected in problem list.         Data:      All new lab and imaging data was reviewed.   Labs:  Recent Labs   Lab 12/19/18  1023 12/19/18  1013   CULT No growth No growth     Recent Labs   Lab 12/21/18  0519 12/20/18  0531   WBC 5.9 12.7*   HGB 14.2 13.6   HCT 44.3 42.9   MCV 92 93    165     Recent Labs   Lab 12/21/18  0519 12/20/18  0531 12/19/18  1014    138 137   POTASSIUM 4.2 4.2 4.3   CHLORIDE 100 102 103   CO2 31 30 30   ANIONGAP 5 6 4   * 140* 134*   BUN 26 32* 33*   CR 0.85 1.03 0.99   GFRESTIMATED >90 75 78   GFRESTBLACK >90 87 >90   WILBERT 8.7 8.7 8.9     Recent Labs   Lab 12/25/18  0724 12/25/18  0149 12/24/18  2127 12/24/18  1721 12/24/18  1155  12/21/18  0519  12/20/18  0531  12/19/18  1014   GLC  --   --   --   --   --   --  219*  --  140*  --  134*   * 168* 195* 254* 140*   < >  --    < >  --    < >  --     < > = values in this interval not displayed.     No results for input(s): INR in the last 168 hours.  No results for input(s): TROPONIN, TROPI, TROPR in the last 168 hours.    Invalid input(s): TROP, TROPONINIES  No results for input(s): COLOR, APPEARANCE, URINEGLC, URINEBILI, URINEKETONE, SG, UBLD, URINEPH, PROTEIN, UROBILINOGEN, NITRITE, LEUKEST, RBCU, WBCU in the last 168 hours.   Imaging:   No results found for this or any previous visit (from the past 24 hour(s)).

## 2018-12-25 NOTE — PLAN OF CARE
Presentation/Diagnosis: Pt admitted 12/17 due to SOB. Pt diagnosed with pneumonia.   History: COPD, A-fib (on Eliquis), HTN, DM, and cardiomyopathy (most recent EF:55-60%).   Labs/Protocols: K: 4.2, Hgb: 14.2. (From 12/21) QID accuchecks, sugar this shift: 133. (Pt did not call nursing to check sugar prior to eating his lunch.)  Vitals: BP elevated, PRN hydralazine given once this shift. Other VSS on 5L O2 per NC. (pt refused to have RN titrate O2 down this shift). Pt on 3L O2 per NC at baseline. Pt complained of headache throughout shift. PRN oxycodone available Q 4 hours, given once this shift.  Cardiac: Irregular rhythm. Denies chest pain this shift. +3 edema to Bilateral lower extremities, MD aware. Zio patch in place to L chest.   Telemetry: A-flutter with PVCs.   Respiratory: Coarse diminished lung sounds throughout. Dyspnea with exertion. RT following with scheduled nebs. PRN xopenex available, given once this shift by RT.   Neuro: A&Ox4.   GI/: WDL. Last BM today 12/25.   Skin: Trevor coloration. Scattered bruising and scabbing. Unable to do a full skin check due to patient refusal, pt denies wounds.   LDAs: PIV to R FA, SL. Intermittent IV ancef.   Diet: Regular diet, good appetite.   Activity: Pt up independently in room. FWW in room for pt to use in halls. PT following.   Teaching: Pt educated on plan of care.   Plan: Continue IV ancef. Continue PO prednisone. Wean O2 as able. Pt will need 4-6wks IV antibiotics outpatient. ID following. Vascular access consulted this shift, PICC line to be placed tomorrow.     Will continue to monitor.

## 2018-12-26 ENCOUNTER — APPOINTMENT (OUTPATIENT)
Dept: GENERAL RADIOLOGY | Facility: CLINIC | Age: 68
DRG: 871 | End: 2018-12-26
Attending: INTERNAL MEDICINE
Payer: COMMERCIAL

## 2018-12-26 LAB
ANION GAP SERPL CALCULATED.3IONS-SCNC: 5 MMOL/L (ref 3–14)
BUN SERPL-MCNC: 35 MG/DL (ref 7–30)
CALCIUM SERPL-MCNC: 9.1 MG/DL (ref 8.5–10.1)
CHLORIDE SERPL-SCNC: 102 MMOL/L (ref 94–109)
CO2 SERPL-SCNC: 31 MMOL/L (ref 20–32)
CREAT SERPL-MCNC: 0.98 MG/DL (ref 0.66–1.25)
ERYTHROCYTE [DISTWIDTH] IN BLOOD BY AUTOMATED COUNT: 14.7 % (ref 10–15)
GFR SERPL CREATININE-BSD FRML MDRD: 80 ML/MIN/{1.73_M2}
GLUCOSE BLDC GLUCOMTR-MCNC: 117 MG/DL (ref 70–99)
GLUCOSE BLDC GLUCOMTR-MCNC: 144 MG/DL (ref 70–99)
GLUCOSE BLDC GLUCOMTR-MCNC: 187 MG/DL (ref 70–99)
GLUCOSE BLDC GLUCOMTR-MCNC: 233 MG/DL (ref 70–99)
GLUCOSE BLDC GLUCOMTR-MCNC: 88 MG/DL (ref 70–99)
GLUCOSE SERPL-MCNC: 90 MG/DL (ref 70–99)
HCT VFR BLD AUTO: 46.4 % (ref 40–53)
HGB BLD-MCNC: 14.8 G/DL (ref 13.3–17.7)
LACTATE BLD-SCNC: 1.4 MMOL/L (ref 0.7–2)
MCH RBC QN AUTO: 29.2 PG (ref 26.5–33)
MCHC RBC AUTO-ENTMCNC: 31.9 G/DL (ref 31.5–36.5)
MCV RBC AUTO: 92 FL (ref 78–100)
NT-PROBNP SERPL-MCNC: 975 PG/ML (ref 0–900)
PLATELET # BLD AUTO: 297 10E9/L (ref 150–450)
POTASSIUM SERPL-SCNC: 5.8 MMOL/L (ref 3.4–5.3)
RBC # BLD AUTO: 5.07 10E12/L (ref 4.4–5.9)
SODIUM SERPL-SCNC: 138 MMOL/L (ref 133–144)
WBC # BLD AUTO: 15.4 10E9/L (ref 4–11)

## 2018-12-26 PROCEDURE — 25000128 H RX IP 250 OP 636: Performed by: INTERNAL MEDICINE

## 2018-12-26 PROCEDURE — 25000125 ZZHC RX 250: Performed by: INTERNAL MEDICINE

## 2018-12-26 PROCEDURE — 83605 ASSAY OF LACTIC ACID: CPT | Performed by: INTERNAL MEDICINE

## 2018-12-26 PROCEDURE — 71046 X-RAY EXAM CHEST 2 VIEWS: CPT

## 2018-12-26 PROCEDURE — 36569 INSJ PICC 5 YR+ W/O IMAGING: CPT

## 2018-12-26 PROCEDURE — 85027 COMPLETE CBC AUTOMATED: CPT | Performed by: INTERNAL MEDICINE

## 2018-12-26 PROCEDURE — 99233 SBSQ HOSP IP/OBS HIGH 50: CPT | Performed by: INTERNAL MEDICINE

## 2018-12-26 PROCEDURE — 25000132 ZZH RX MED GY IP 250 OP 250 PS 637: Performed by: INTERNAL MEDICINE

## 2018-12-26 PROCEDURE — 27211289 ZZ H KIT CATH IV 4FR 8 CM OR 10 CM, POWERWAND QUICK XL

## 2018-12-26 PROCEDURE — 80048 BASIC METABOLIC PNL TOTAL CA: CPT | Performed by: INTERNAL MEDICINE

## 2018-12-26 PROCEDURE — 94640 AIRWAY INHALATION TREATMENT: CPT

## 2018-12-26 PROCEDURE — 40000275 ZZH STATISTIC RCP TIME EA 10 MIN

## 2018-12-26 PROCEDURE — 94640 AIRWAY INHALATION TREATMENT: CPT | Mod: 76

## 2018-12-26 PROCEDURE — 83880 ASSAY OF NATRIURETIC PEPTIDE: CPT | Performed by: INTERNAL MEDICINE

## 2018-12-26 PROCEDURE — 36416 COLLJ CAPILLARY BLOOD SPEC: CPT | Performed by: INTERNAL MEDICINE

## 2018-12-26 PROCEDURE — 12000007 ZZH R&B INTERMEDIATE

## 2018-12-26 PROCEDURE — 00000146 ZZHCL STATISTIC GLUCOSE BY METER IP

## 2018-12-26 PROCEDURE — 36415 COLL VENOUS BLD VENIPUNCTURE: CPT | Performed by: INTERNAL MEDICINE

## 2018-12-26 RX ORDER — ZOLPIDEM TARTRATE 5 MG/1
5 TABLET ORAL
Status: DISCONTINUED | OUTPATIENT
Start: 2018-12-26 | End: 2018-12-27

## 2018-12-26 RX ORDER — HYDRALAZINE HYDROCHLORIDE 50 MG/1
50 TABLET, FILM COATED ORAL EVERY 8 HOURS SCHEDULED
Status: DISCONTINUED | OUTPATIENT
Start: 2018-12-26 | End: 2019-01-08 | Stop reason: HOSPADM

## 2018-12-26 RX ORDER — LANOLIN ALCOHOL/MO/W.PET/CERES
3 CREAM (GRAM) TOPICAL AT BEDTIME
Status: DISCONTINUED | OUTPATIENT
Start: 2018-12-26 | End: 2018-12-27

## 2018-12-26 RX ORDER — TRAZODONE HYDROCHLORIDE 50 MG/1
50 TABLET, FILM COATED ORAL
Status: DISCONTINUED | OUTPATIENT
Start: 2018-12-26 | End: 2018-12-26

## 2018-12-26 RX ORDER — FUROSEMIDE 10 MG/ML
20 INJECTION INTRAMUSCULAR; INTRAVENOUS DAILY
Status: DISCONTINUED | OUTPATIENT
Start: 2018-12-26 | End: 2018-12-29

## 2018-12-26 RX ADMIN — PROPAFENONE HYDROCHLORIDE 150 MG: 150 TABLET, COATED ORAL at 09:25

## 2018-12-26 RX ADMIN — APIXABAN 5 MG: 5 TABLET, FILM COATED ORAL at 22:28

## 2018-12-26 RX ADMIN — MELATONIN TAB 3 MG 3 MG: 3 TAB at 22:28

## 2018-12-26 RX ADMIN — ACETAMINOPHEN 650 MG: 325 TABLET, FILM COATED ORAL at 22:28

## 2018-12-26 RX ADMIN — Medication 2.5 MG: at 22:28

## 2018-12-26 RX ADMIN — DILTIAZEM HYDROCHLORIDE 360 MG: 180 CAPSULE, COATED, EXTENDED RELEASE ORAL at 09:25

## 2018-12-26 RX ADMIN — NYSTATIN 500000 UNITS: 500000 SUSPENSION ORAL at 17:52

## 2018-12-26 RX ADMIN — Medication 2.5 MG: at 17:52

## 2018-12-26 RX ADMIN — CEFAZOLIN SODIUM 2 G: 2 INJECTION, SOLUTION INTRAVENOUS at 22:35

## 2018-12-26 RX ADMIN — LEVALBUTEROL HYDROCHLORIDE 1.25 MG: 1.25 SOLUTION RESPIRATORY (INHALATION) at 11:31

## 2018-12-26 RX ADMIN — CEFAZOLIN SODIUM 2 G: 2 INJECTION, SOLUTION INTRAVENOUS at 05:30

## 2018-12-26 RX ADMIN — ACETAMINOPHEN 650 MG: 325 TABLET, FILM COATED ORAL at 00:14

## 2018-12-26 RX ADMIN — LEVALBUTEROL HYDROCHLORIDE 1.25 MG: 1.25 SOLUTION RESPIRATORY (INHALATION) at 16:08

## 2018-12-26 RX ADMIN — CEFAZOLIN SODIUM 2 G: 2 INJECTION, SOLUTION INTRAVENOUS at 13:47

## 2018-12-26 RX ADMIN — HYDRALAZINE HYDROCHLORIDE 25 MG: 25 TABLET ORAL at 05:34

## 2018-12-26 RX ADMIN — LEVALBUTEROL HYDROCHLORIDE 1.25 MG: 1.25 SOLUTION RESPIRATORY (INHALATION) at 19:28

## 2018-12-26 RX ADMIN — NYSTATIN 500000 UNITS: 500000 SUSPENSION ORAL at 09:25

## 2018-12-26 RX ADMIN — LEVALBUTEROL HYDROCHLORIDE 1.25 MG: 1.25 SOLUTION RESPIRATORY (INHALATION) at 08:26

## 2018-12-26 RX ADMIN — HYDRALAZINE HYDROCHLORIDE 50 MG: 50 TABLET, FILM COATED ORAL at 22:28

## 2018-12-26 RX ADMIN — PROPAFENONE HYDROCHLORIDE 150 MG: 150 TABLET, COATED ORAL at 22:28

## 2018-12-26 RX ADMIN — PREDNISONE 40 MG: 20 TABLET ORAL at 09:25

## 2018-12-26 RX ADMIN — Medication 2.5 MG: at 13:09

## 2018-12-26 RX ADMIN — INSULIN ASPART 1 UNITS: 100 INJECTION, SOLUTION INTRAVENOUS; SUBCUTANEOUS at 16:31

## 2018-12-26 RX ADMIN — FUROSEMIDE 20 MG: 10 INJECTION, SOLUTION INTRAMUSCULAR; INTRAVENOUS at 13:16

## 2018-12-26 RX ADMIN — APIXABAN 5 MG: 5 TABLET, FILM COATED ORAL at 09:25

## 2018-12-26 RX ADMIN — HYDRALAZINE HYDROCHLORIDE 50 MG: 50 TABLET, FILM COATED ORAL at 13:47

## 2018-12-26 RX ADMIN — ACETAMINOPHEN 650 MG: 325 TABLET, FILM COATED ORAL at 15:53

## 2018-12-26 RX ADMIN — Medication 2.5 MG: at 02:19

## 2018-12-26 RX ADMIN — PROPAFENONE HYDROCHLORIDE 150 MG: 150 TABLET, COATED ORAL at 15:53

## 2018-12-26 RX ADMIN — NYSTATIN 500000 UNITS: 500000 SUSPENSION ORAL at 13:16

## 2018-12-26 ASSESSMENT — ACTIVITIES OF DAILY LIVING (ADL)
ADLS_ACUITY_SCORE: 18
ADLS_ACUITY_SCORE: 16
ADLS_ACUITY_SCORE: 18

## 2018-12-26 ASSESSMENT — PAIN DESCRIPTION - DESCRIPTORS: DESCRIPTORS: HEADACHE

## 2018-12-26 NOTE — PROGRESS NOTES
[facility]  Infectious Disease Progress Note          Assessment and Plan:   IMPRESSION:   1.  A 67-year-old male with third hospitalization in the last month with hypoxic respiratory failure, probable pneumonia, Staph aureus in recent sputum and now Staph aureus in the blood.   2.  Staph aureus bacteremia, only 1 of 2 cultures positive.  Suspect this is spillover from pneumonia, but certainly at risk including a tricuspid valve.   3.  History of atrial fibrillation with rapid ventricular response.   4.  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      RECOMMENDATIONS:     1.  MSSA also in sputum,  other cultures without any other growth,  On  Ancef.  He will need an extended IV course here simply for the positive blood culture,  Given  the tricuspid valve and the hip, probably a full 4 week course of therapy.   2.  12/19 cx  negative, midline in  3. Orders for IV antibiotics in already . Day 10/28          Interval History:   no new complaints sl abd pain, Follow-up BC neg so far, sputum staph no fever no other new focal sxs, WBc normal   Out of ICU, but still hypoxic              Medications:       apixaban ANTICOAGULANT  5 mg Oral BID     ceFAZolin  2 g Intravenous Q8H     diltiazem ER COATED BEADS  360 mg Oral Daily     furosemide  20 mg Intravenous Daily     hydrALAZINE  50 mg Oral Q8H MEGHAN     insulin aspart  1-3 Units Subcutaneous TID AC     insulin aspart  1-3 Units Subcutaneous At Bedtime     insulin isophane human  5 Units Subcutaneous Daily     levalbuterol  1.25 mg Nebulization 4x Daily     melatonin  3 mg Oral At Bedtime     nystatin  500,000 Units Mouth/Throat 4x Daily     predniSONE  40 mg Oral Daily     propafenone  150 mg Oral TID     sodium chloride (PF)  10 mL Intracatheter Q8H     umeclidinium  1 puff Inhalation Daily                  Physical Exam:   Blood pressure (!) 159/98, pulse 98,  temperature 97.2  F (36.2  C), temperature source Oral, resp. rate 18, weight 95.4 kg (210 lb 6.4 oz), SpO2 94 %.  Wt Readings from Last 2 Encounters:   12/26/18 95.4 kg (210 lb 6.4 oz)   12/03/18 87.5 kg (193 lb)     Vital Signs with Ranges  Temp:  [97.2  F (36.2  C)-98.2  F (36.8  C)] 97.2  F (36.2  C)  Pulse:  [85-98] 98  Heart Rate:  [86-92] 86  Resp:  [18-24] 18  BP: (140-167)/() 159/98  SpO2:  [92 %-97 %] 94 %    Constitutional: Awake, alert, cooperative, no apparent distress   Lungs: Congestion to auscultation bilaterally, no crackles or wheezing hpoxia   Cardiovascular: Regular rate and rhythm, normal S1 and S2, and no murmur noted   Abdomen: Normal bowel sounds, soft, non-distended, non-tender   Skin: No rashes, no cyanosis, no edema no embolic lesions   Other:           Data:   All microbiology laboratory data reviewed.  Recent Labs   Lab Test 12/26/18  0803 12/21/18  0519 12/20/18  0531   WBC 15.4* 5.9 12.7*   HGB 14.8 14.2 13.6   HCT 46.4 44.3 42.9   MCV 92 92 93    168 165     Recent Labs   Lab Test 12/26/18  0803 12/21/18  0519 12/20/18  0531   CR 0.98 0.85 1.03     No lab results found.  Recent Labs   Lab Test 12/19/18  1023 12/19/18  1013 12/18/18  1027 12/17/18  1518 12/17/18  1452 11/25/18  2257 10/06/18  0826 10/06/18  0822 11/10/16  1524   CULT No growth No growth Light growth  Normal tushar    Moderate growth  Staphylococcus aureus  * Cultured on the 1st day of incubation:  Staphylococcus aureus  This isolate DOES NOT demonstrate inducible clindamycin resistance in vitro. Clindamycin   is susceptible and could be used when indicated, however, erythromycin is resistant and   should not be used.  *  Critical Value/Significant Value, preliminary result only, called to and read back by  Laure SANDOVAL @ 4643 12.18.18 JE    (Note)  POSITIVE for STAPHYLOCOCCUS AUREUS and NEGATIVE for the mecA gene  (not MRSA) by [employer] multiplex nucleic acid test. The mecA gene was  not detected.  Final identification and antimicrobial susceptibility  testing will be verified by standard methods.    Specimen tested with [employer] multiplex, gram-positive blood culture  nucleic acid test for the following targets: Staph aureus, Staph  epidermidis, Staph lugdunensis, other Staph species, Enterococcus  faecalis, Enterococcus faecium, Streptococcus species, S. agalactiae,  S. anginosus grp., S. pneumoniae, S. pyogenes, Listeria sp., mecA  (methicillin resistance) and Steven/B (vancomycin resistance).    Critical Value/Significant Value called to and read back by Laure Mathews RN [facility] 1652 12.18.18 PATSY.   No growth Heavy growth  Normal tushar    Moderate growth  Staphylococcus aureus  This isolate DOES NOT demonstrate inducible clindamycin resistance in vitro. Clindamycin   is susceptible and could be used when indicated, however, erythromycin is resistant and   should not be used.  * No growth No growth No anaerobes isolated  Moderate growth Staphylococcus aureus This isolate DOES NOT demonstrate inducible   clindamycin resistance in vitro. Clindamycin is susceptible and could be used   when indicated, however, erythromycin is resistant and should not be used.  *  Canceled, Test credited Test reordered as correct code REORDERED AS AN ABSCESS   CULTURE

## 2018-12-26 NOTE — PLAN OF CARE
"Pt alert and oriented x4 VSS. Report being SOB but states \"its feeling better\". PRN given for headache with relief. Bs @ 1789-322. Tele Afib with PVC. Will continue to monitor.  "

## 2018-12-26 NOTE — PROGRESS NOTES
[facility]  Hospitalist Progress Note  Best Tucker MD 12/26/2018    Reason for Stay (Diagnosis): Sepsis secondary to MSSA suspected right lower lung pneumonia         Assessment and Plan:      Summary of Stay: Mr. Cooper is a 67-year-old man with history of severe COPD intermittently on home oxygen who presented with several days of productive cough and worsening shortness of breath.  Found to be in hypoxic respiratory failure with severe sepsis secondary to pneumonia in the right lung.  He also has a had a history of atrial fibrillation with previous ablation, tricuspid valve replacement, hypertension, nonsustained ventricular tachycardia and 2 hospitalizations in the past 2 months for COPD exacerbations.     1.  Severe sepsis due to MSSA bacteremia and pneumonia. The source of bacteremia has been felt to be spill over from the pneumonia. MSSA in blood and sputum. Monitor for any secondary sites, has a history of tricuspid valve repair and left total hip arthroplasty.  -Continue IV ancef.  -Highly appreciate continues input from infectious disease service  -Plans likely will be requiring extended IV antibiotic course.  Plan to get midline   -IV antibiotics with IV Ancef for outpatient infusion orders care of ID service are in place     2.  Acute hypoxic respiratory failure.  Suspect this is primarily a pulmonary issue, due to pneumonia and COPD exacerbation.  He required BiPAP initially, now weaned off  - Received IV steroids.  Wheezing has been improving.  Now switched to oral prednisone.   -Wean oxygen supplementation back to his baseline of 3L as able to  - trial of diuresis, IV lasix 20 mg daily, obtain CXR, BNP slightly up     3.  Pneumonia , RLL, likely MSSA.     -Continue IV ancef.      4.  COPD exacerbation.  -Continue antibiotics as above.  -Continue Xopenex.  -Continue Incruse Ellipta.  -Continue steroids     5.  Atrial fibrillation.-Rate control  -Continue diltiazem, propafenone,  and apixaban.     6.  Hyperglycemia. Likely due to steroids.  -On  NPH insulin, decrease to 5 units daily   -NovoLog sliding scale.  -Anticipating improvement of glucose control once steroids has been tapered and discontinued.    8.  Chronic kidney disease.  Creatinine at baseline and currently normal  -Avoid nephrotoxins as able.     9.  Nutrition.  -Regular diet     10.  Deconditioning.    -Physical therapy consult.    11.  Hypertension-still uncontrolled likely current steroid use contributing.  On diltiazem 360 mg daily.  Added around-the-clock oral hydralazine, increase to 50 mg TID    12. Patient having difficulty sleeping at night, he is requesting ambien instead of trazodone, will try tonight       Diet: Regular Diet Adult    DVT Prophylaxis: eliquis  Code Status: Full Code    Disposition: Likely 1-2 more inpatient hospitalization days once breathing improved, ambulating and oxygen back to baseline requirements of 3L        Interval History (Subjective):      Resumed service care today.    seen and examined.  Chart reviewed. Discussed with RN  Has not been sleeping well at night, wants something stronger like Ambien, breathing is stable, slowly improving, tolerating oral intake, no other complaints voiced.  Reluctant to take Lasix although willing to try today         Physical Exam:      Last Vital Signs:  /81 (BP Location: Right arm)   Temp 98  F (36.7  C)   Resp 24   Wt 95.4 kg (210 lb 6.4 oz)   SpO2 95%   BMI 30.19 kg/m      I/O last 3 completed shifts:  In: 720 [P.O.:720]  Out: 175 [Urine:175]  Wt Readings from Last 1 Encounters:   12/26/18 95.4 kg (210 lb 6.4 oz)     Vitals:    12/20/18 0636 12/21/18 0602 12/22/18 1616 12/24/18 0707   Weight: 89.2 kg (196 lb 10.4 oz) 89.2 kg (196 lb 10.4 oz) 90.2 kg (198 lb 14.4 oz) 94.3 kg (207 lb 12.8 oz)    12/26/18 0539   Weight: 95.4 kg (210 lb 6.4 oz)       Constitutional: Awake, alert, cooperative, no apparent distress   Respiratory: Fair air entry, no  crackles, scant wheezing   Cardiovascular: Irregularly irregular rhythm, normal rate, S1 and S2   Abdomen: Normal bowel sounds, soft, non-distended, non-tender   Skin: No rashes, no cyanosis, dry to touch   Neuro: Alert and oriented x3, neurologically intact, spontaneous and coherent speech   Extremities: +1 edema, normal range of motion   Other(s): Euthymic mood, not agitated       All other systems: Negative          Medications:      All current medications were reviewed with changes reflected in problem list.         Data:      All new lab and imaging data was reviewed.   Labs:  No results for input(s): CULT in the last 168 hours.  Recent Labs   Lab 12/26/18  0803 12/21/18  0519 12/20/18  0531   WBC 15.4* 5.9 12.7*   HGB 14.8 14.2 13.6   HCT 46.4 44.3 42.9   MCV 92 92 93    168 165     Recent Labs   Lab 12/26/18  0803 12/21/18  0519 12/20/18  0531    136 138   POTASSIUM 5.8* 4.2 4.2   CHLORIDE 102 100 102   CO2 31 31 30   ANIONGAP 5 5 6   GLC 90 219* 140*   BUN 35* 26 32*   CR 0.98 0.85 1.03   GFRESTIMATED 80 >90 75   GFRESTBLACK >90 >90 87   WILBERT 9.1 8.7 8.7     Recent Labs   Lab 12/26/18  0803 12/26/18  0146 12/25/18  2138 12/25/18  1805 12/25/18  0724 12/25/18  0149  12/21/18  0519  12/20/18  0531   GLC 90  --   --   --   --   --   --  219*  --  140*   BGM  --  144* 163* 198* 133* 168*   < >  --    < >  --     < > = values in this interval not displayed.     No results for input(s): INR in the last 168 hours.  No results for input(s): TROPONIN, TROPI, TROPR in the last 168 hours.    Invalid input(s): TROP, TROPONINIES  No results for input(s): COLOR, APPEARANCE, URINEGLC, URINEBILI, URINEKETONE, SG, UBLD, URINEPH, PROTEIN, UROBILINOGEN, NITRITE, LEUKEST, RBCU, WBCU in the last 168 hours.   Imaging:   No results found for this or any previous visit (from the past 24 hour(s)).

## 2018-12-26 NOTE — PLAN OF CARE
Weaning oxygen. On 4L now, home 3L. Non-productive cough. Midline placed for course of antibiotics at home. One dose of IV Lasix-weight up significantly since admission. Has declined to go for CXR and will attempt again when visitors are gone. Up independently.  1720 done to radiology.

## 2018-12-26 NOTE — PROGRESS NOTES
Care Coordination:  CTS continues to follow for home IV abx on discharge. Lana MEIER representative from [facility] met with pt and wife today to do home teaching on IV abx. She states pt and wife were receptive and it went well. discharge date is still unclear. CTS to call Lana at [facility] [phone] when pt is ready for discharge and she will follow. Bedside RN asked to have Dr Stanford put in an end date on the home IV abx order.     Pt uses home oxygen at 3L htrough  [facility] but has been told he will be needing 4-5L on discharge. He states he does not have the correct home equipment to supply this much O2. Call placed to  [facility] [phone] on Monday regarding increased home oxygen needs on discharge. They state they will supply new home O2 needs to hospital and pt's home on same day as discharge if needed. They need the order for increased O2 needs. They suggest callling [facility] on day of discharge with new order and they will take it from there.    Lana Sofia RN [facility]

## 2018-12-26 NOTE — PLAN OF CARE
Pt is AOx4, up independently in room, VSS. Pt continues 5L N/C, sats mid 90's. Diminished lungs auscultated with occasional expiratory wheezes noted especially after exerting self. Fluids encouraged.  and 163. Pt continues IV Ancef. Plan for PICC placement tomorrow for home ABX therapy. Will continue to monitor.

## 2018-12-27 LAB
ANION GAP SERPL CALCULATED.3IONS-SCNC: 1 MMOL/L (ref 3–14)
BUN SERPL-MCNC: 33 MG/DL (ref 7–30)
CALCIUM SERPL-MCNC: 8.8 MG/DL (ref 8.5–10.1)
CHLORIDE SERPL-SCNC: 99 MMOL/L (ref 94–109)
CO2 SERPL-SCNC: 37 MMOL/L (ref 20–32)
CREAT SERPL-MCNC: 0.96 MG/DL (ref 0.66–1.25)
GFR SERPL CREATININE-BSD FRML MDRD: 81 ML/MIN/{1.73_M2}
GLUCOSE BLDC GLUCOMTR-MCNC: 136 MG/DL (ref 70–99)
GLUCOSE SERPL-MCNC: 105 MG/DL (ref 70–99)
POTASSIUM SERPL-SCNC: 4.8 MMOL/L (ref 3.4–5.3)
SODIUM SERPL-SCNC: 137 MMOL/L (ref 133–144)

## 2018-12-27 PROCEDURE — 94640 AIRWAY INHALATION TREATMENT: CPT | Mod: 76

## 2018-12-27 PROCEDURE — 00000146 ZZHCL STATISTIC GLUCOSE BY METER IP

## 2018-12-27 PROCEDURE — 94640 AIRWAY INHALATION TREATMENT: CPT

## 2018-12-27 PROCEDURE — 25000128 H RX IP 250 OP 636: Performed by: INTERNAL MEDICINE

## 2018-12-27 PROCEDURE — 25000132 ZZH RX MED GY IP 250 OP 250 PS 637: Performed by: INTERNAL MEDICINE

## 2018-12-27 PROCEDURE — 25000125 ZZHC RX 250: Performed by: INTERNAL MEDICINE

## 2018-12-27 PROCEDURE — 80048 BASIC METABOLIC PNL TOTAL CA: CPT | Performed by: INTERNAL MEDICINE

## 2018-12-27 PROCEDURE — 36415 COLL VENOUS BLD VENIPUNCTURE: CPT | Performed by: INTERNAL MEDICINE

## 2018-12-27 PROCEDURE — 99232 SBSQ HOSP IP/OBS MODERATE 35: CPT | Performed by: INTERNAL MEDICINE

## 2018-12-27 PROCEDURE — 12000007 ZZH R&B INTERMEDIATE

## 2018-12-27 PROCEDURE — 40000275 ZZH STATISTIC RCP TIME EA 10 MIN

## 2018-12-27 RX ORDER — ZOLPIDEM TARTRATE 5 MG/1
5 TABLET ORAL AT BEDTIME
Status: DISCONTINUED | OUTPATIENT
Start: 2018-12-27 | End: 2019-01-08 | Stop reason: HOSPADM

## 2018-12-27 RX ORDER — ACETAMINOPHEN 325 MG/1
650 TABLET ORAL EVERY 4 HOURS PRN
Status: DISCONTINUED | OUTPATIENT
Start: 2018-12-27 | End: 2019-01-08 | Stop reason: HOSPADM

## 2018-12-27 RX ORDER — LANOLIN ALCOHOL/MO/W.PET/CERES
3 CREAM (GRAM) TOPICAL
Status: DISCONTINUED | OUTPATIENT
Start: 2018-12-27 | End: 2019-01-08 | Stop reason: HOSPADM

## 2018-12-27 RX ADMIN — HYDRALAZINE HYDROCHLORIDE 50 MG: 50 TABLET, FILM COATED ORAL at 06:49

## 2018-12-27 RX ADMIN — LEVALBUTEROL HYDROCHLORIDE 1.25 MG: 1.25 SOLUTION RESPIRATORY (INHALATION) at 11:52

## 2018-12-27 RX ADMIN — HYDRALAZINE HYDROCHLORIDE 10 MG: 20 INJECTION INTRAMUSCULAR; INTRAVENOUS at 23:54

## 2018-12-27 RX ADMIN — PROPAFENONE HYDROCHLORIDE 150 MG: 150 TABLET, COATED ORAL at 22:13

## 2018-12-27 RX ADMIN — PREDNISONE 40 MG: 20 TABLET ORAL at 08:00

## 2018-12-27 RX ADMIN — APIXABAN 5 MG: 5 TABLET, FILM COATED ORAL at 08:00

## 2018-12-27 RX ADMIN — NYSTATIN 500000 UNITS: 500000 SUSPENSION ORAL at 18:10

## 2018-12-27 RX ADMIN — NYSTATIN 500000 UNITS: 500000 SUSPENSION ORAL at 22:13

## 2018-12-27 RX ADMIN — INSULIN ASPART 1 UNITS: 100 INJECTION, SOLUTION INTRAVENOUS; SUBCUTANEOUS at 18:11

## 2018-12-27 RX ADMIN — Medication 2.5 MG: at 14:16

## 2018-12-27 RX ADMIN — NYSTATIN 500000 UNITS: 500000 SUSPENSION ORAL at 08:00

## 2018-12-27 RX ADMIN — DILTIAZEM HYDROCHLORIDE 360 MG: 180 CAPSULE, COATED, EXTENDED RELEASE ORAL at 08:00

## 2018-12-27 RX ADMIN — FUROSEMIDE 20 MG: 10 INJECTION, SOLUTION INTRAMUSCULAR; INTRAVENOUS at 08:00

## 2018-12-27 RX ADMIN — LEVALBUTEROL HYDROCHLORIDE 1.25 MG: 1.25 SOLUTION RESPIRATORY (INHALATION) at 07:35

## 2018-12-27 RX ADMIN — PROPAFENONE HYDROCHLORIDE 150 MG: 150 TABLET, COATED ORAL at 15:58

## 2018-12-27 RX ADMIN — HYDRALAZINE HYDROCHLORIDE 50 MG: 50 TABLET, FILM COATED ORAL at 14:16

## 2018-12-27 RX ADMIN — HYDRALAZINE HYDROCHLORIDE 50 MG: 50 TABLET, FILM COATED ORAL at 22:14

## 2018-12-27 RX ADMIN — ZOLPIDEM TARTRATE 5 MG: 5 TABLET, COATED ORAL at 22:13

## 2018-12-27 RX ADMIN — APIXABAN 5 MG: 5 TABLET, FILM COATED ORAL at 22:14

## 2018-12-27 RX ADMIN — LEVALBUTEROL HYDROCHLORIDE 1.25 MG: 1.25 SOLUTION RESPIRATORY (INHALATION) at 19:10

## 2018-12-27 RX ADMIN — Medication 2.5 MG: at 19:25

## 2018-12-27 RX ADMIN — PROPAFENONE HYDROCHLORIDE 150 MG: 150 TABLET, COATED ORAL at 08:00

## 2018-12-27 RX ADMIN — CEFAZOLIN SODIUM 2 G: 2 INJECTION, SOLUTION INTRAVENOUS at 23:00

## 2018-12-27 RX ADMIN — CEFAZOLIN SODIUM 2 G: 2 INJECTION, SOLUTION INTRAVENOUS at 06:49

## 2018-12-27 RX ADMIN — CEFAZOLIN SODIUM 2 G: 2 INJECTION, SOLUTION INTRAVENOUS at 14:16

## 2018-12-27 RX ADMIN — NYSTATIN 500000 UNITS: 500000 SUSPENSION ORAL at 12:03

## 2018-12-27 RX ADMIN — LEVALBUTEROL HYDROCHLORIDE 1.25 MG: 1.25 SOLUTION RESPIRATORY (INHALATION) at 15:42

## 2018-12-27 ASSESSMENT — ACTIVITIES OF DAILY LIVING (ADL)
ADLS_ACUITY_SCORE: 18
ADLS_ACUITY_SCORE: 16
ADLS_ACUITY_SCORE: 18
ADLS_ACUITY_SCORE: 18

## 2018-12-27 NOTE — PLAN OF CARE
Orientation: Alert and oriented x4  VSS. 96% on 4L.   Tele: Afib CVR. HR 89.   LS: Diminished, expiratory wheezes, denies SOB/+ERICKSON  GI: BS audible/active x4, tolerating PO,  and 136 this shift. Passing gas. No BM this shift. Denies N/V.   : Adequate urine output. Yes  Skin: Intact  Activity: Independent. Pt slept comfortably throughout shift.   Pain: 8/10, Tylenol and oxycodone given with relief.  Plan: IV abx, PO steroids, nebs, try to wean O2 to baseline of 3L, discharge TBD. Continue with current cares.

## 2018-12-27 NOTE — PROGRESS NOTES
CM received call from [facility] ([phone]). They are still trying to find RN staff for patient at discharge. Per MD note dated 12/26/18, pt may discharge tomorrow, Friday, 12/28/18.      CM will continue to follow patient until discharge for any additional needs.     Dulce Denney RN, BSN, [facility]  [phone]

## 2018-12-27 NOTE — PLAN OF CARE
PT- attempted to see but refused will walk at 1600. Per notes is up Ind in room but per conversation is only going to commode.

## 2018-12-27 NOTE — PROGRESS NOTES
[facility]  Infectious Disease Progress Note          Assessment and Plan:   IMPRESSION:   1.  A 67-year-old male with third hospitalization in the last month with hypoxic respiratory failure, probable pneumonia, Staph aureus in recent sputum and now Staph aureus in the blood.   2.  Staph aureus bacteremia, only 1 of 2 cultures positive.  Suspect this is spillover from pneumonia, but certainly at risk including a tricuspid valve.   3.  History of atrial fibrillation with rapid ventricular response.   4.  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      RECOMMENDATIONS:     1.  MSSA also in sputum,  other cultures without any other growth,  On  Ancef.  He will need an extended IV course here simply for the positive blood culture,  Given  the tricuspid valve and the hip, probably a full 4 week course of therapy.   2.  12/19 cx  negative, midline in  3. Orders for IV antibiotics in already . Day 11/28          Interval History:   no new complaints sl abd pain, Follow-up BC neg so far, sputum staph no fever no other new focal sxs, WBc normal   Out of ICU, but still hypoxic              Medications:       apixaban ANTICOAGULANT  5 mg Oral BID     ceFAZolin  2 g Intravenous Q8H     diltiazem ER COATED BEADS  360 mg Oral Daily     furosemide  20 mg Intravenous Daily     hydrALAZINE  50 mg Oral Q8H MEGHAN     insulin aspart  1-3 Units Subcutaneous TID AC     insulin aspart  1-3 Units Subcutaneous At Bedtime     insulin isophane human  5 Units Subcutaneous Daily     levalbuterol  1.25 mg Nebulization 4x Daily     nystatin  500,000 Units Mouth/Throat 4x Daily     predniSONE  40 mg Oral Daily     propafenone  150 mg Oral TID     sodium chloride (PF)  10 mL Intracatheter Q8H     umeclidinium  1 puff Inhalation Daily     zolpidem  5 mg Oral At Bedtime                  Physical Exam:   Blood pressure (!) 161/95, pulse 66,  temperature 98.1  F (36.7  C), temperature source Oral, resp. rate 16, weight 95.4 kg (210 lb 4.8 oz), SpO2 94 %.  Wt Readings from Last 2 Encounters:   12/27/18 95.4 kg (210 lb 4.8 oz)   12/03/18 87.5 kg (193 lb)     Vital Signs with Ranges  Temp:  [97.7  F (36.5  C)-98.1  F (36.7  C)] 98.1  F (36.7  C)  Pulse:  [66] 66  Heart Rate:  [89-93] 89  Resp:  [16-20] 16  BP: (143-161)/() 161/95  SpO2:  [92 %-97 %] 94 %    Constitutional: Awake, alert, cooperative, no apparent distress   Lungs: Congestion to auscultation bilaterally, no crackles or wheezing hpoxia   Cardiovascular: Regular rate and rhythm, normal S1 and S2, and no murmur noted   Abdomen: Normal bowel sounds, soft, non-distended, non-tender   Skin: No rashes, no cyanosis, no edema no embolic lesions   Other:           Data:   All microbiology laboratory data reviewed.  Recent Labs   Lab Test 12/26/18  0803 12/21/18  0519 12/20/18  0531   WBC 15.4* 5.9 12.7*   HGB 14.8 14.2 13.6   HCT 46.4 44.3 42.9   MCV 92 92 93    168 165     Recent Labs   Lab Test 12/27/18  0652 12/26/18  0803 12/21/18  0519   CR 0.96 0.98 0.85     No lab results found.  Recent Labs   Lab Test 12/19/18  1023 12/19/18  1013 12/18/18  1027 12/17/18  1518 12/17/18  1452 11/25/18  2257 10/06/18  0826 10/06/18  0822 11/10/16  1524   CULT No growth No growth Light growth  Normal tushar    Moderate growth  Staphylococcus aureus  * Cultured on the 1st day of incubation:  Staphylococcus aureus  This isolate DOES NOT demonstrate inducible clindamycin resistance in vitro. Clindamycin   is susceptible and could be used when indicated, however, erythromycin is resistant and   should not be used.  *  Critical Value/Significant Value, preliminary result only, called to and read back by  Laure SANDOVAL @ 7583 12.18.18 JE    (Note)  POSITIVE for STAPHYLOCOCCUS AUREUS and NEGATIVE for the mecA gene  (not MRSA) by [employer] multiplex nucleic acid test. The mecA gene was  not detected.  Final identification and antimicrobial susceptibility  testing will be verified by standard methods.    Specimen tested with [employer] multiplex, gram-positive blood culture  nucleic acid test for the following targets: Staph aureus, Staph  epidermidis, Staph lugdunensis, other Staph species, Enterococcus  faecalis, Enterococcus faecium, Streptococcus species, S. agalactiae,  S. anginosus grp., S. pneumoniae, S. pyogenes, Listeria sp., mecA  (methicillin resistance) and Steven/B (vancomycin resistance).    Critical Value/Significant Value called to and read back by Laure Mathews RN [facility] 1652 12.18.18 PATSY.   No growth Heavy growth  Normal tushar    Moderate growth  Staphylococcus aureus  This isolate DOES NOT demonstrate inducible clindamycin resistance in vitro. Clindamycin   is susceptible and could be used when indicated, however, erythromycin is resistant and   should not be used.  * No growth No growth No anaerobes isolated  Moderate growth Staphylococcus aureus This isolate DOES NOT demonstrate inducible   clindamycin resistance in vitro. Clindamycin is susceptible and could be used   when indicated, however, erythromycin is resistant and should not be used.  *  Canceled, Test credited Test reordered as correct code REORDERED AS AN ABSCESS   CULTURE

## 2018-12-27 NOTE — PROGRESS NOTES
[facility]  Hospitalist Progress Note  Best Tucker MD 12/27/2018    Reason for Stay (Diagnosis): Sepsis secondary to MSSA suspected right lower lung pneumonia         Assessment and Plan:      Summary of Stay: Mr. Cooper is a 67-year-old man with history of severe COPD intermittently on home oxygen who presented with several days of productive cough and worsening shortness of breath.  Found to be in hypoxic respiratory failure with severe sepsis secondary to pneumonia in the right lung.  He also has a had a history of atrial fibrillation with previous ablation, tricuspid valve replacement, hypertension, nonsustained ventricular tachycardia and 2 hospitalizations in the past 2 months for COPD exacerbations.     1.  Severe sepsis due to MSSA bacteremia and pneumonia. The source of bacteremia has been felt to be spill over from the pneumonia. MSSA in blood and sputum. Monitor for any secondary sites, has a history of tricuspid valve repair and left total hip arthroplasty.  -Continue IV ancef.  -Highly appreciate input from infectious disease service  -Plans for extended course of IV antibiotic course.  midline placed.   -IV antibiotics with IV Ancef for outpatient infusion orders care of ID service are in place     2.  Acute hypoxic respiratory failure.  Suspect this is primarily a pulmonary issue, due to pneumonia and COPD exacerbation.  He required BiPAP initially, now weaned off  - Received IV steroids.  Wheezing has been improving.  Now switched to oral prednisone.   -Wean oxygen supplementation back to his baseline of 3L as able to  - trial of diuresis, IV lasix 20 mg daily, BNP slightly up , repeat CXR shows improvement in right lung infiltrate     3.  Pneumonia , RLL, likely MSSA.     -Continue IV ancef.      4.  COPD exacerbation.  -Continue antibiotics as above.  -Continue Xopenex.  -Continue Incruse Ellipta.  -Continue steroids     5.  Atrial fibrillation.-Rate control  -Continue  diltiazem, propafenone, and apixaban. Seems adequately controlled      6.  Hyperglycemia. Likely due to steroids.  -On  NPH insulin, decrease to 5 units daily   -NovoLog sliding scale.  -Anticipating improvement of glucose control once steroids has been tapered and discontinued.    8.  Chronic kidney disease.  Creatinine at baseline and currently normal  -Avoid nephrotoxins as able.     9.  Nutrition.  -Regular diet     10.  Deconditioning.    -Physical therapy consult.    11.  Hypertension-still uncontrolled likely current steroid use contributing.  On diltiazem 360 mg daily.  Added around-the-clock oral hydralazine, increase to 50 mg TID    12. Patient having difficulty sleeping at night, he is requesting ambien instead of trazodone, will try tonight       Diet: Regular Diet Adult    DVT Prophylaxis: eliquis  Code Status: Full Code    Disposition: Possible tomorrow vs weekend, once breathing improved, ambulating and oxygen back to baseline requirements of 3L, very deconditioned, often declining PT, encouraged to ambulate more         Interval History (Subjective):      Resumed service care today.    seen and examined.  Chart reviewed. Discussed with RN  Has not been sleeping well at night, wants something stronger like Ambien, breathing is slowly improving, tolerating oral intake, gets short of breath with small amount of activity, no other complaints voiced.  Agrees to take Lasix          Physical Exam:      Last Vital Signs:  BP (!) 148/92 (BP Location: Right arm)   Pulse 98   Temp 97.7  F (36.5  C) (Axillary)   Resp 18   Wt 95.4 kg (210 lb 4.8 oz)   SpO2 97%   BMI 30.17 kg/m      I/O last 3 completed shifts:  In: 600 [P.O.:600]  Out: -   Wt Readings from Last 1 Encounters:   12/27/18 95.4 kg (210 lb 4.8 oz)     Vitals:    12/21/18 0602 12/22/18 1616 12/24/18 0707 12/26/18 0539   Weight: 89.2 kg (196 lb 10.4 oz) 90.2 kg (198 lb 14.4 oz) 94.3 kg (207 lb 12.8 oz) 95.4 kg (210 lb 6.4 oz)    12/27/18 0501    Weight: 95.4 kg (210 lb 4.8 oz)       Constitutional: Awake, alert, cooperative, no apparent distress   Respiratory: Fair air entry, no crackles, scant wheezing   Cardiovascular: Irregularly irregular rhythm, normal rate, S1 and S2   Abdomen: Normal bowel sounds, soft, non-distended, non-tender   Skin: No rashes, no cyanosis, dry to touch   Neuro: Alert and oriented x3, neurologically intact, spontaneous and coherent speech   Extremities: Trace edema, normal range of motion   Other(s): Euthymic mood, not agitated       All other systems: Negative          Medications:      All current medications were reviewed with changes reflected in problem list.         Data:      All new lab and imaging data was reviewed.   Labs:  No results for input(s): CULT in the last 168 hours.  Recent Labs   Lab 12/26/18  0803 12/21/18  0519   WBC 15.4* 5.9   HGB 14.8 14.2   HCT 46.4 44.3   MCV 92 92    168     Recent Labs   Lab 12/27/18  0652 12/26/18  0803 12/21/18  0519    138 136   POTASSIUM 4.8 5.8* 4.2   CHLORIDE 99 102 100   CO2 37* 31 31   ANIONGAP 1* 5 5   * 90 219*   BUN 33* 35* 26   CR 0.96 0.98 0.85   GFRESTIMATED 81 80 >90   GFRESTBLACK >90 >90 >90   WILBERT 8.8 9.1 8.7     Recent Labs   Lab 12/27/18  0652 12/27/18  0204 12/26/18  2134 12/26/18  1552 12/26/18  1156 12/26/18  0817 12/26/18  0803  12/21/18  0519   *  --   --   --   --   --  90  --  219*   BGM  --  136* 187* 233* 117* 88  --    < >  --     < > = values in this interval not displayed.     No results for input(s): INR in the last 168 hours.  No results for input(s): TROPONIN, TROPI, TROPR in the last 168 hours.    Invalid input(s): TROP, TROPONINIES  No results for input(s): COLOR, APPEARANCE, URINEGLC, URINEBILI, URINEKETONE, SG, UBLD, URINEPH, PROTEIN, UROBILINOGEN, NITRITE, LEUKEST, RBCU, WBCU in the last 168 hours.   Imaging:   Recent Results (from the past 24 hour(s))   XR Chest 2 Views    Narrative    CHEST TWO VIEWS 12/26/2018 5:23  PM     HISTORY: Hypoxia.    COMPARISON: December 17, 2018     FINDINGS: Improved infiltrate at the right base compared to previous.  Left lung clear. The cardiac silhouette is not enlarged. Pulmonary  vasculature is unremarkable.      Impression    IMPRESSION: Improved right base infiltrate since the comparison study.    RENÉ MATTHEWS MD

## 2018-12-27 NOTE — PLAN OF CARE
"PT: Treatment attempted at PM check back. Pt again refusing citing multiple reasons: pain, lasix medication, therapy not coming at \"scheduled time\", fatigue and not wanting to mobilize more than once per day in [place]. Encouragement\education provided on the benefits of repeated OOB. Educated therapy does not come at scheduled times on 3rd floor. Pt continues to decline at check back. Will decrease frequency to 5x/week at this time as pt has not participated since initial evaluation on 12/22.   "

## 2018-12-27 NOTE — PLAN OF CARE
A/O. Reports shortness of breath. Scheduled nebs given. LS diminished with expiratory wheezes. 4 LPM nasal cannula. Getting IV lasix. Getting IV abx. Midline to L arm. Skin is bruised. Bilateral lower extremity edema +3. Bilateral arm edema +3. , 107. Tele: A-fib, CVR. Up independently in room. Will continue with POC.     Pt. Encouraged to ambulate more. Refusing for RN and refusing for PT. Educated on benefits of ambulation.

## 2018-12-28 LAB
ANION GAP SERPL CALCULATED.3IONS-SCNC: <1 MMOL/L (ref 3–14)
BUN SERPL-MCNC: 31 MG/DL (ref 7–30)
CALCIUM SERPL-MCNC: 8.4 MG/DL (ref 8.5–10.1)
CHLORIDE SERPL-SCNC: 98 MMOL/L (ref 94–109)
CO2 SERPL-SCNC: 40 MMOL/L (ref 20–32)
CREAT SERPL-MCNC: 0.96 MG/DL (ref 0.66–1.25)
GFR SERPL CREATININE-BSD FRML MDRD: 81 ML/MIN/{1.73_M2}
GLUCOSE BLDC GLUCOMTR-MCNC: 107 MG/DL (ref 70–99)
GLUCOSE BLDC GLUCOMTR-MCNC: 122 MG/DL (ref 70–99)
GLUCOSE BLDC GLUCOMTR-MCNC: 142 MG/DL (ref 70–99)
GLUCOSE BLDC GLUCOMTR-MCNC: 167 MG/DL (ref 70–99)
GLUCOSE BLDC GLUCOMTR-MCNC: 189 MG/DL (ref 70–99)
GLUCOSE BLDC GLUCOMTR-MCNC: 193 MG/DL (ref 70–99)
GLUCOSE BLDC GLUCOMTR-MCNC: 198 MG/DL (ref 70–99)
GLUCOSE BLDC GLUCOMTR-MCNC: 251 MG/DL (ref 70–99)
GLUCOSE BLDC GLUCOMTR-MCNC: 263 MG/DL (ref 70–99)
GLUCOSE SERPL-MCNC: 117 MG/DL (ref 70–99)
POTASSIUM SERPL-SCNC: 4.7 MMOL/L (ref 3.4–5.3)
SODIUM SERPL-SCNC: 138 MMOL/L (ref 133–144)

## 2018-12-28 PROCEDURE — 25000128 H RX IP 250 OP 636: Performed by: INTERNAL MEDICINE

## 2018-12-28 PROCEDURE — 99233 SBSQ HOSP IP/OBS HIGH 50: CPT | Performed by: INTERNAL MEDICINE

## 2018-12-28 PROCEDURE — 36415 COLL VENOUS BLD VENIPUNCTURE: CPT | Performed by: INTERNAL MEDICINE

## 2018-12-28 PROCEDURE — 40000275 ZZH STATISTIC RCP TIME EA 10 MIN

## 2018-12-28 PROCEDURE — 25000132 ZZH RX MED GY IP 250 OP 250 PS 637: Performed by: INTERNAL MEDICINE

## 2018-12-28 PROCEDURE — 80048 BASIC METABOLIC PNL TOTAL CA: CPT | Performed by: INTERNAL MEDICINE

## 2018-12-28 PROCEDURE — 12000007 ZZH R&B INTERMEDIATE

## 2018-12-28 PROCEDURE — 25000125 ZZHC RX 250: Performed by: INTERNAL MEDICINE

## 2018-12-28 PROCEDURE — 94640 AIRWAY INHALATION TREATMENT: CPT

## 2018-12-28 PROCEDURE — 00000146 ZZHCL STATISTIC GLUCOSE BY METER IP

## 2018-12-28 PROCEDURE — 94640 AIRWAY INHALATION TREATMENT: CPT | Mod: 76

## 2018-12-28 RX ADMIN — CEFAZOLIN SODIUM 2 G: 2 INJECTION, SOLUTION INTRAVENOUS at 05:16

## 2018-12-28 RX ADMIN — LEVALBUTEROL HYDROCHLORIDE 1.25 MG: 1.25 SOLUTION RESPIRATORY (INHALATION) at 11:14

## 2018-12-28 RX ADMIN — INSULIN ASPART 2 UNITS: 100 INJECTION, SOLUTION INTRAVENOUS; SUBCUTANEOUS at 17:55

## 2018-12-28 RX ADMIN — HYDRALAZINE HYDROCHLORIDE 50 MG: 50 TABLET, FILM COATED ORAL at 21:01

## 2018-12-28 RX ADMIN — Medication 2.5 MG: at 05:15

## 2018-12-28 RX ADMIN — CEFAZOLIN SODIUM 2 G: 2 INJECTION, SOLUTION INTRAVENOUS at 14:34

## 2018-12-28 RX ADMIN — APIXABAN 5 MG: 5 TABLET, FILM COATED ORAL at 21:01

## 2018-12-28 RX ADMIN — NYSTATIN 500000 UNITS: 500000 SUSPENSION ORAL at 14:34

## 2018-12-28 RX ADMIN — ZOLPIDEM TARTRATE 5 MG: 5 TABLET, COATED ORAL at 21:01

## 2018-12-28 RX ADMIN — LEVALBUTEROL HYDROCHLORIDE 1.25 MG: 1.25 SOLUTION RESPIRATORY (INHALATION) at 19:06

## 2018-12-28 RX ADMIN — DILTIAZEM HYDROCHLORIDE 360 MG: 180 CAPSULE, COATED, EXTENDED RELEASE ORAL at 08:20

## 2018-12-28 RX ADMIN — NYSTATIN 500000 UNITS: 500000 SUSPENSION ORAL at 21:01

## 2018-12-28 RX ADMIN — APIXABAN 5 MG: 5 TABLET, FILM COATED ORAL at 08:19

## 2018-12-28 RX ADMIN — LEVALBUTEROL HYDROCHLORIDE 1.25 MG: 1.25 SOLUTION RESPIRATORY (INHALATION) at 08:00

## 2018-12-28 RX ADMIN — NYSTATIN 500000 UNITS: 500000 SUSPENSION ORAL at 17:09

## 2018-12-28 RX ADMIN — ACETAMINOPHEN 650 MG: 325 TABLET, FILM COATED ORAL at 05:16

## 2018-12-28 RX ADMIN — PROPAFENONE HYDROCHLORIDE 150 MG: 150 TABLET, COATED ORAL at 17:09

## 2018-12-28 RX ADMIN — HYDRALAZINE HYDROCHLORIDE 50 MG: 50 TABLET, FILM COATED ORAL at 05:16

## 2018-12-28 RX ADMIN — LEVALBUTEROL HYDROCHLORIDE 1.25 MG: 1.25 SOLUTION RESPIRATORY (INHALATION) at 15:03

## 2018-12-28 RX ADMIN — ACETAMINOPHEN 650 MG: 325 TABLET, FILM COATED ORAL at 00:00

## 2018-12-28 RX ADMIN — NYSTATIN 500000 UNITS: 500000 SUSPENSION ORAL at 08:19

## 2018-12-28 RX ADMIN — CEFAZOLIN SODIUM 2 G: 2 INJECTION, SOLUTION INTRAVENOUS at 21:01

## 2018-12-28 RX ADMIN — HYDRALAZINE HYDROCHLORIDE 50 MG: 50 TABLET, FILM COATED ORAL at 14:34

## 2018-12-28 RX ADMIN — PROPAFENONE HYDROCHLORIDE 150 MG: 150 TABLET, COATED ORAL at 21:01

## 2018-12-28 RX ADMIN — ACETAMINOPHEN 650 MG: 325 TABLET, FILM COATED ORAL at 11:19

## 2018-12-28 RX ADMIN — Medication 2.5 MG: at 00:01

## 2018-12-28 RX ADMIN — FUROSEMIDE 20 MG: 10 INJECTION, SOLUTION INTRAMUSCULAR; INTRAVENOUS at 08:20

## 2018-12-28 RX ADMIN — PREDNISONE 40 MG: 20 TABLET ORAL at 08:19

## 2018-12-28 RX ADMIN — Medication 2.5 MG: at 11:19

## 2018-12-28 RX ADMIN — PROPAFENONE HYDROCHLORIDE 150 MG: 150 TABLET, COATED ORAL at 08:20

## 2018-12-28 ASSESSMENT — ACTIVITIES OF DAILY LIVING (ADL)
ADLS_ACUITY_SCORE: 16

## 2018-12-28 NOTE — PROGRESS NOTES
[facility]  Hospitalist Progress Note  Abby Verdugo MD, MD 12/28/2018    Reason for Stay (Diagnosis): Sepsis secondary to MSSA suspected right lower lung pneumonia         Assessment and Plan:      Summary of Stay: Mr. Cooper is a 67-year-old man with history of severe COPD intermittently on home oxygen who presented with several days of productive cough and worsening shortness of breath.  Found to be in hypoxic respiratory failure with severe sepsis secondary to pneumonia in the right lung.  He also has a had a history of atrial fibrillation with previous ablation, tricuspid valve replacement, hypertension, nonsustained ventricular tachycardia and 2 hospitalizations in the past 2 months for COPD exacerbations.     1.  Severe sepsis due to MSSA bacteremia and pneumonia. The source of bacteremia has been felt to be spill over from the pneumonia. MSSA in blood and sputum.Has a history of tricuspid valve repair and left total hip arthroplasty.  -Continue IV ancef.  -Plans for extended course of IV antibiotic course.  midline placed.   -IV antibiotics with IV Ancef for outpatient infusion orders care of ID service are in place. Appreciate ID input     2.  Acute on chronic hypoxic respiratory failure.  Suspected to be due to pneumonia and COPD exacerbation.    --Initially required BiPAP. Now on oxygen via [place]. Off BIPAP  -- Initially received IV steroids and now switched to oral prednisone.  --Still requiring oxygen 4.5 liters. Wean oxygen supplementation back to his baseline of 3L as able  - on trial of lasix 20 mg IV daily.  repeat CXR shows improvement in right lung infiltrate. Renal function stable.     3.  Pneumonia , RLL, likely MSSA.     -Continue IV ancef. ID following    4.  COPD exacerbation.  -Continue antibiotics as above.  -Continue Xopenex.  -Continue Incruse Ellipta.  -Continue steroids     5.  Atrial fibrillation.-Rate control  -Continue diltiazem, propafenone, and apixaban. Seems  adequately controlled      6.  Hyperglycemia. Likely due to steroids.  -On  NPH insulin, decrease to 5 units daily   -NovoLog sliding scale.  -Anticipating improvement of glucose control once steroids has been tapered and discontinued.    8.  Chronic kidney disease.  Creatinine at baseline and currently normal  -Avoid nephrotoxins as able.     9.  Nutrition.  -Regular diet     10.  Deconditioning.    -Physical therapy consult.    11.  Hypertension-still uncontrolled likely current steroid use contributing.  On diltiazem 360 mg daily.  Added around-the-clock oral hydralazine, increase to 50 mg TID    12. Patient having difficulty sleeping at night, he is requesting ambien instead of trazodone, will try tonight       Diet: Regular Diet Adult    DVT Prophylaxis: eliquis  Code Status: Full Code    Disposition: Anticipate discharge tomorrow if stable and able to wean oxygen to 3 liters.         Interval History (Subjective):      Resumed care today. Patient seen and examined. Stated that his breathing is better today. He has no nausea or vomiting. Denies fever. No abdominal pain. No cough, fever.          Physical Exam:      Last Vital Signs:  /82 (BP Location: Right arm)   Pulse 90   Temp 97.7  F (36.5  C) (Oral)   Resp 20   Wt 93.5 kg (206 lb 3.2 oz)   SpO2 93%   BMI 29.59 kg/m      I/O last 3 completed shifts:  In: 10 [I.V.:10]  Out: -   Wt Readings from Last 1 Encounters:   12/28/18 93.5 kg (206 lb 3.2 oz)     Vitals:    12/22/18 1616 12/24/18 0707 12/26/18 0539 12/27/18 0501   Weight: 90.2 kg (198 lb 14.4 oz) 94.3 kg (207 lb 12.8 oz) 95.4 kg (210 lb 6.4 oz) 95.4 kg (210 lb 4.8 oz)    12/28/18 0711   Weight: 93.5 kg (206 lb 3.2 oz)       Constitutional: Awake, alert, cooperative, no apparent distress   Respiratory: Fair air entry, no crackles, scant wheezing   Cardiovascular: Irregularly irregular rhythm, normal rate, S1 and S2   Abdomen: Normal bowel sounds, soft, non-distended, non-tender   Skin: No  rashes, no cyanosis, dry to touch   Neuro: Alert and oriented x3, neurologically intact, spontaneous and coherent speech   Extremities: Trace edema, normal range of motion   Other(s): Euthymic mood, not agitated       All other systems: Negative          Medications:      All current medications were reviewed with changes reflected in problem list.         Data:      All new lab and imaging data was reviewed.   Labs:  No results for input(s): CULT in the last 168 hours.  Recent Labs   Lab 12/26/18  0803   WBC 15.4*   HGB 14.8   HCT 46.4   MCV 92        Recent Labs   Lab 12/28/18  0556 12/27/18  0652 12/26/18  0803    137 138   POTASSIUM 4.7 4.8 5.8*   CHLORIDE 98 99 102   CO2 40* 37* 31   ANIONGAP <1* 1* 5   * 105* 90   BUN 31* 33* 35*   CR 0.96 0.96 0.98   GFRESTIMATED 81 81 80   GFRESTBLACK >90 >90 >90   WILBERT 8.4* 8.8 9.1     Recent Labs   Lab 12/28/18  0556 12/28/18  0107 12/27/18  2159 12/27/18  1731 12/27/18  1412 12/27/18  1113 12/27/18  0652  12/26/18  0803   *  --   --   --   --   --  105*  --  90   BGM  --  142* 189* 198* 263* 107*  --    < >  --     < > = values in this interval not displayed.     No results for input(s): INR in the last 168 hours.  No results for input(s): TROPONIN, TROPI, TROPR in the last 168 hours.    Invalid input(s): TROP, TROPONINIES  No results for input(s): COLOR, APPEARANCE, URINEGLC, URINEBILI, URINEKETONE, SG, UBLD, URINEPH, PROTEIN, UROBILINOGEN, NITRITE, LEUKEST, RBCU, WBCU in the last 168 hours.   Imaging:   No results found for this or any previous visit (from the past 24 hour(s)).

## 2018-12-28 NOTE — PROGRESS NOTES
[facility]  Infectious Disease Progress Note          Assessment and Plan:   IMPRESSION:   1.  A 67-year-old male with third hospitalization in the last month with hypoxic respiratory failure, probable pneumonia, Staph aureus in recent sputum and now Staph aureus in the blood.   2.  Staph aureus bacteremia, only 1 of 2 cultures positive.  Suspect this is spillover from pneumonia, but certainly at risk including a tricuspid valve.   3.  History of atrial fibrillation with rapid ventricular response.   4.  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      RECOMMENDATIONS:     1.  MSSA also in sputum,  other cultures without any other growth,  On  Ancef.  He will need an extended IV course here simply for the positive blood culture,  Given  the tricuspid valve and the hip, probably a full 4 week course of therapy.   2.  12/19 cx  negative, midline in  3. Orders for IV antibiotics in already . Day 12/28          Interval History:   no new complaints sl abd pain, Follow-up BC neg so far, sputum staph no fever no other new focal sxs, WBc normal    still hypoxic slow better              Medications:       apixaban ANTICOAGULANT  5 mg Oral BID     ceFAZolin  2 g Intravenous Q8H     diltiazem ER COATED BEADS  360 mg Oral Daily     furosemide  20 mg Intravenous Daily     hydrALAZINE  50 mg Oral Q8H MEGHAN     insulin aspart  1-3 Units Subcutaneous TID AC     insulin aspart  1-3 Units Subcutaneous At Bedtime     insulin isophane human  5 Units Subcutaneous Daily     levalbuterol  1.25 mg Nebulization 4x Daily     nystatin  500,000 Units Mouth/Throat 4x Daily     predniSONE  40 mg Oral Daily     propafenone  150 mg Oral TID     sodium chloride (PF)  10 mL Intracatheter Q8H     umeclidinium  1 puff Inhalation Daily     zolpidem  5 mg Oral At Bedtime                  Physical Exam:   Blood pressure 162/87, pulse 90,  temperature 98  F (36.7  C), temperature source Oral, resp. rate 18, weight 93.5 kg (206 lb 3.2 oz), SpO2 96 %.  Wt Readings from Last 2 Encounters:   12/28/18 93.5 kg (206 lb 3.2 oz)   12/03/18 87.5 kg (193 lb)     Vital Signs with Ranges  Temp:  [97.7  F (36.5  C)-98  F (36.7  C)] 98  F (36.7  C)  Pulse:  [90] 90  Heart Rate:  [71-91] 82  Resp:  [18-22] 18  BP: (129-185)/() 162/87  SpO2:  [93 %-100 %] 96 %    Constitutional: Awake, alert, cooperative, no apparent distress   Lungs: Congestion to auscultation bilaterally, no crackles or wheezing hpoxia   Cardiovascular: Regular rate and rhythm, normal S1 and S2, and no murmur noted   Abdomen: Normal bowel sounds, soft, non-distended, non-tender   Skin: No rashes, no cyanosis, no edema no embolic lesions   Other:           Data:   All microbiology laboratory data reviewed.  Recent Labs   Lab Test 12/26/18  0803 12/21/18  0519 12/20/18  0531   WBC 15.4* 5.9 12.7*   HGB 14.8 14.2 13.6   HCT 46.4 44.3 42.9   MCV 92 92 93    168 165     Recent Labs   Lab Test 12/28/18  0556 12/27/18  0652 12/26/18  0803   CR 0.96 0.96 0.98     No lab results found.  Recent Labs   Lab Test 12/19/18  1023 12/19/18  1013 12/18/18  1027 12/17/18  1518 12/17/18  1452 11/25/18  2257 10/06/18  0826 10/06/18  0822 11/10/16  1524   CULT No growth No growth Light growth  Normal tushar    Moderate growth  Staphylococcus aureus  * Cultured on the 1st day of incubation:  Staphylococcus aureus  This isolate DOES NOT demonstrate inducible clindamycin resistance in vitro. Clindamycin   is susceptible and could be used when indicated, however, erythromycin is resistant and   should not be used.  *  Critical Value/Significant Value, preliminary result only, called to and read back by  Laure Mathews RN [facility] @ 1979 12.18.18 JE    (Note)  POSITIVE for STAPHYLOCOCCUS AUREUS and NEGATIVE for the mecA gene  (not MRSA) by [employer] multiplex nucleic acid test. The mecA gene was  not detected. Final  identification and antimicrobial susceptibility  testing will be verified by standard methods.    Specimen tested with [employer]ene multiplex, gram-positive blood culture  nucleic acid test for the following targets: Staph aureus, Staph  epidermidis, Staph lugdunensis, other Staph species, Enterococcus  faecalis, Enterococcus faecium, Streptococcus species, S. agalactiae,  S. anginosus grp., S. pneumoniae, S. pyogenes, Listeria sp., mecA  (methicillin resistance) and Steven/B (vancomycin resistance).    Critical Value/Significant Value called to and read back by Laure Mathews RN ICU 1652 12.18.18 PATSY.   No growth Heavy growth  Normal tushar    Moderate growth  Staphylococcus aureus  This isolate DOES NOT demonstrate inducible clindamycin resistance in vitro. Clindamycin   is susceptible and could be used when indicated, however, erythromycin is resistant and   should not be used.  * No growth No growth No anaerobes isolated  Moderate growth Staphylococcus aureus This isolate DOES NOT demonstrate inducible   clindamycin resistance in vitro. Clindamycin is susceptible and could be used   when indicated, however, erythromycin is resistant and should not be used.  *  Canceled, Test credited Test reordered as correct code REORDERED AS AN ABSCESS   CULTURE

## 2018-12-28 NOTE — PLAN OF CARE
Pt a/o x4, up independently in room.  Tele Afib CVR, HR 83 with PVC's.  2+-3+ edema to upper and lower extremities.  IV lasix given this AM.  Oxy and tylenol given x1 for headache and stomach pain with slight relief.  4L O2, sating at 95%, pt gets very SOB on exertion.  Could possibly discharge tomorrow if he can be weaned down to 3L which is his baseline at home.  Will continue POC.

## 2018-12-28 NOTE — PLAN OF CARE
Pt remains on 4 LPM O2. Has a productive cough. 3+ LE edema bilaterally. Tele is A-fib with CVR, Is up independently in the room, BP's run toward the higher end, has prn hydralazine if needed, c/o pain to his abdomen when coughing, bruises to his skin from lab draws. Is pleasant with staff. Jamar Figueroa RN on 12/27/2018 at 10:53 PM

## 2018-12-28 NOTE — PLAN OF CARE
Please see flowsheets for detailed vital signs and assessments.   Orientation: A&O  Vital Signs: BP elevated to 140/108, gave PRN hydralazine x1 BP fell to 136/92  Pain: c/o severe pain, gave oxycodone + tylenol x2 and pt rested quietly  O2: mid 90s 4L NC  Tele: a fib CVR with PVCs  Lungs: diminished  GI: WDL  : voiding w/o difficulty    Skin: intact, bruised, 2+BUE /3+ BLE edema  Activity: independent  IVF: saline locked  Notable labs: WBC 15.4 K 4.8 Lactic 1.4  Consults: ID, PT  Plan: continue IV ancef, nystatin for thrush, pain management  Discharge: possible today or tomorrow

## 2018-12-28 NOTE — PROGRESS NOTES
"CLINICAL NUTRITION SERVICES - REASSESSMENT NOTE      MALNUTRITION (12/28/2018)  % Weight Loss:  None noted  % Intake:  No decreased intake noted  Subcutaneous Fat Loss:  None observed  Muscle Loss:  None observed  Fluid Retention:  None noted    Malnutrition Diagnosis: Patient does not meet two of the above criteria necessary for diagnosing malnutrition       EVALUATION OF PROGRESS TOWARD GOALS   Diet:  Regular  Intake:  Continues to consume 100% of meals TID, 1 instance of 25% 12/26.  Continues to order meals TID.  Does report mouth sores 2/2 ABX though denies that these are impacting oral intakes, self-selecting soft foods.  Feels he is eating at baseline.    Nutrition history:  - Regular diet at home.  - Meals BID is baseline.  - Wife does meal preparation.  - Sleeps \"later\" and therefore usually has late breakfast and late afternoon meal.  May \"snack\" in the evening.  - No decrease in oral intakes PTA.  - NKFA.      ASSESSED NUTRITION NEEDS (PER APPROVED PRACTICE GUIDELINES, Dosing weight: 87 kg admit weight)  Estimated Energy Needs: 9825-8217 kcals (25-30 Kcal/Kg)  Justification: maintenance  Estimated Protein Needs: + grams protein (1-1.2 g pro/Kg)  Justification: preservation of lean body mass  Estimated Fluid Needs: >1 mL/Kcal  Justification: maintenance      NEW FINDINGS:   - Medications reviewed.  - Labs reviewed including BG trending.  - Wt trending reviewed, fluid up:  Vitals:    12/22/18 1616 12/24/18 0707 12/26/18 0539 12/27/18 0501   Weight: 90.2 kg (198 lb 14.4 oz) 94.3 kg (207 lb 12.8 oz) 95.4 kg (210 lb 6.4 oz) 95.4 kg (210 lb 4.8 oz)    12/28/18 0711   Weight: 93.5 kg (206 lb 3.2 oz)   - Last recorded BM yesterday.    Previous Goals:   None as brief note  Evaluation: Unable to evaluate    Previous Nutrition Diagnosis:   Increased nutrient needs (protein energy) related to hypermetabolic demands of chronic disease as evidenced by advanced COPD  Evaluation: Completed, changed below "       MALNUTRITION  % Weight Loss:  None noted  % Intake:  No decreased intake noted  Subcutaneous Fat Loss:  None observed  Muscle Loss:  None observed  Fluid Retention:  None noted    Malnutrition Diagnosis: Patient does not meet two of the above criteria necessary for diagnosing malnutrition    CURRENT NUTRITION DIAGNOSIS  No nutrition diagnosis at this time.    INTERVENTIONS  Recommendations / Nutrition Prescription  Continue regular diet order with self-selection of soft foods.    Implementation  Collaboration and Referral of Nutrition care: Discussed POC with team during rounds.      MONITORING AND EVALUATION:  Progress towards goals will be monitored and evaluated per protocol and Practice Guidelines      Susy Alvarado RD, LD  Clinical Dietitian  3rd floor/ICU: [phone]  All other floors: [phone]  Weekend/holiday: [phone]

## 2018-12-29 LAB
ANION GAP SERPL CALCULATED.3IONS-SCNC: 1 MMOL/L (ref 3–14)
BASOPHILS # BLD AUTO: 0.1 10E9/L (ref 0–0.2)
BASOPHILS NFR BLD AUTO: 0.4 %
BUN SERPL-MCNC: 31 MG/DL (ref 7–30)
CALCIUM SERPL-MCNC: 8.6 MG/DL (ref 8.5–10.1)
CHLORIDE SERPL-SCNC: 100 MMOL/L (ref 94–109)
CO2 SERPL-SCNC: 36 MMOL/L (ref 20–32)
CREAT SERPL-MCNC: 0.9 MG/DL (ref 0.66–1.25)
DIFFERENTIAL METHOD BLD: ABNORMAL
EOSINOPHIL # BLD AUTO: 0.1 10E9/L (ref 0–0.7)
EOSINOPHIL NFR BLD AUTO: 0.6 %
ERYTHROCYTE [DISTWIDTH] IN BLOOD BY AUTOMATED COUNT: 14.7 % (ref 10–15)
GFR SERPL CREATININE-BSD FRML MDRD: 88 ML/MIN/{1.73_M2}
GLUCOSE BLDC GLUCOMTR-MCNC: 122 MG/DL (ref 70–99)
GLUCOSE BLDC GLUCOMTR-MCNC: 191 MG/DL (ref 70–99)
GLUCOSE BLDC GLUCOMTR-MCNC: 231 MG/DL (ref 70–99)
GLUCOSE SERPL-MCNC: 109 MG/DL (ref 70–99)
HCT VFR BLD AUTO: 46.7 % (ref 40–53)
HGB BLD-MCNC: 15 G/DL (ref 13.3–17.7)
IMM GRANULOCYTES # BLD: 0.6 10E9/L (ref 0–0.4)
IMM GRANULOCYTES NFR BLD: 3.8 %
LYMPHOCYTES # BLD AUTO: 1.4 10E9/L (ref 0.8–5.3)
LYMPHOCYTES NFR BLD AUTO: 8.8 %
MCH RBC QN AUTO: 29.4 PG (ref 26.5–33)
MCHC RBC AUTO-ENTMCNC: 32.1 G/DL (ref 31.5–36.5)
MCV RBC AUTO: 92 FL (ref 78–100)
MONOCYTES # BLD AUTO: 0.8 10E9/L (ref 0–1.3)
MONOCYTES NFR BLD AUTO: 5.1 %
NEUTROPHILS # BLD AUTO: 12.8 10E9/L (ref 1.6–8.3)
NEUTROPHILS NFR BLD AUTO: 81.3 %
NRBC # BLD AUTO: 0 10*3/UL
NRBC BLD AUTO-RTO: 0 /100
PLATELET # BLD AUTO: 253 10E9/L (ref 150–450)
POTASSIUM SERPL-SCNC: 5 MMOL/L (ref 3.4–5.3)
RBC # BLD AUTO: 5.1 10E12/L (ref 4.4–5.9)
SODIUM SERPL-SCNC: 137 MMOL/L (ref 133–144)
WBC # BLD AUTO: 15.7 10E9/L (ref 4–11)

## 2018-12-29 PROCEDURE — 85025 COMPLETE CBC W/AUTO DIFF WBC: CPT | Performed by: INTERNAL MEDICINE

## 2018-12-29 PROCEDURE — 25000125 ZZHC RX 250: Performed by: INTERNAL MEDICINE

## 2018-12-29 PROCEDURE — 25000132 ZZH RX MED GY IP 250 OP 250 PS 637: Performed by: INTERNAL MEDICINE

## 2018-12-29 PROCEDURE — 94640 AIRWAY INHALATION TREATMENT: CPT

## 2018-12-29 PROCEDURE — 12000007 ZZH R&B INTERMEDIATE

## 2018-12-29 PROCEDURE — 36415 COLL VENOUS BLD VENIPUNCTURE: CPT | Performed by: INTERNAL MEDICINE

## 2018-12-29 PROCEDURE — 40000275 ZZH STATISTIC RCP TIME EA 10 MIN

## 2018-12-29 PROCEDURE — 00000146 ZZHCL STATISTIC GLUCOSE BY METER IP

## 2018-12-29 PROCEDURE — 25000128 H RX IP 250 OP 636: Performed by: INTERNAL MEDICINE

## 2018-12-29 PROCEDURE — 80048 BASIC METABOLIC PNL TOTAL CA: CPT | Performed by: INTERNAL MEDICINE

## 2018-12-29 PROCEDURE — 25000131 ZZH RX MED GY IP 250 OP 636 PS 637: Performed by: INTERNAL MEDICINE

## 2018-12-29 PROCEDURE — 94640 AIRWAY INHALATION TREATMENT: CPT | Mod: 76

## 2018-12-29 PROCEDURE — 99233 SBSQ HOSP IP/OBS HIGH 50: CPT | Performed by: INTERNAL MEDICINE

## 2018-12-29 RX ORDER — FUROSEMIDE 10 MG/ML
40 INJECTION INTRAMUSCULAR; INTRAVENOUS DAILY
Status: DISCONTINUED | OUTPATIENT
Start: 2018-12-30 | End: 2018-12-31

## 2018-12-29 RX ORDER — HYDROMORPHONE HYDROCHLORIDE 1 MG/ML
.3-.5 INJECTION, SOLUTION INTRAMUSCULAR; INTRAVENOUS; SUBCUTANEOUS EVERY 4 HOURS PRN
Status: DISCONTINUED | OUTPATIENT
Start: 2018-12-29 | End: 2019-01-08 | Stop reason: HOSPADM

## 2018-12-29 RX ORDER — SALIVA STIMULANT COMB. NO.3
1 SPRAY, NON-AEROSOL (ML) MUCOUS MEMBRANE EVERY 6 HOURS PRN
Status: DISCONTINUED | OUTPATIENT
Start: 2018-12-29 | End: 2018-12-29

## 2018-12-29 RX ORDER — FUROSEMIDE 10 MG/ML
20 INJECTION INTRAMUSCULAR; INTRAVENOUS ONCE
Status: COMPLETED | OUTPATIENT
Start: 2018-12-29 | End: 2018-12-29

## 2018-12-29 RX ORDER — FLUORIDE TOOTHPASTE
15 TOOTHPASTE DENTAL 4 TIMES DAILY PRN
Status: DISCONTINUED | OUTPATIENT
Start: 2018-12-29 | End: 2019-01-08 | Stop reason: HOSPADM

## 2018-12-29 RX ADMIN — LEVALBUTEROL HYDROCHLORIDE 1.25 MG: 1.25 SOLUTION RESPIRATORY (INHALATION) at 15:33

## 2018-12-29 RX ADMIN — PREDNISONE 40 MG: 20 TABLET ORAL at 08:07

## 2018-12-29 RX ADMIN — FUROSEMIDE 20 MG: 10 INJECTION, SOLUTION INTRAMUSCULAR; INTRAVENOUS at 08:44

## 2018-12-29 RX ADMIN — ZOLPIDEM TARTRATE 5 MG: 5 TABLET, COATED ORAL at 21:41

## 2018-12-29 RX ADMIN — CEFAZOLIN SODIUM 2 G: 2 INJECTION, SOLUTION INTRAVENOUS at 21:45

## 2018-12-29 RX ADMIN — LEVALBUTEROL HYDROCHLORIDE 1.25 MG: 1.25 SOLUTION RESPIRATORY (INHALATION) at 11:21

## 2018-12-29 RX ADMIN — PROPAFENONE HYDROCHLORIDE 150 MG: 150 TABLET, COATED ORAL at 15:16

## 2018-12-29 RX ADMIN — HYDRALAZINE HYDROCHLORIDE 50 MG: 50 TABLET, FILM COATED ORAL at 21:41

## 2018-12-29 RX ADMIN — LEVALBUTEROL HYDROCHLORIDE 1.25 MG: 1.25 SOLUTION RESPIRATORY (INHALATION) at 19:56

## 2018-12-29 RX ADMIN — ACETAMINOPHEN 650 MG: 325 TABLET, FILM COATED ORAL at 11:41

## 2018-12-29 RX ADMIN — FUROSEMIDE 20 MG: 10 INJECTION, SOLUTION INTRAMUSCULAR; INTRAVENOUS at 08:06

## 2018-12-29 RX ADMIN — HYDROMORPHONE HYDROCHLORIDE 0.3 MG: 1 INJECTION, SOLUTION INTRAMUSCULAR; INTRAVENOUS; SUBCUTANEOUS at 15:16

## 2018-12-29 RX ADMIN — HYDRALAZINE HYDROCHLORIDE 50 MG: 50 TABLET, FILM COATED ORAL at 05:51

## 2018-12-29 RX ADMIN — Medication 2.5 MG: at 11:41

## 2018-12-29 RX ADMIN — INSULIN ASPART 1 UNITS: 100 INJECTION, SOLUTION INTRAVENOUS; SUBCUTANEOUS at 18:06

## 2018-12-29 RX ADMIN — INSULIN ASPART 1 UNITS: 100 INJECTION, SOLUTION INTRAVENOUS; SUBCUTANEOUS at 11:43

## 2018-12-29 RX ADMIN — LEVALBUTEROL HYDROCHLORIDE 1.25 MG: 1.25 SOLUTION RESPIRATORY (INHALATION) at 07:28

## 2018-12-29 RX ADMIN — NYSTATIN 500000 UNITS: 500000 SUSPENSION ORAL at 17:20

## 2018-12-29 RX ADMIN — NYSTATIN 500000 UNITS: 500000 SUSPENSION ORAL at 13:04

## 2018-12-29 RX ADMIN — HYDRALAZINE HYDROCHLORIDE 50 MG: 50 TABLET, FILM COATED ORAL at 13:04

## 2018-12-29 RX ADMIN — APIXABAN 5 MG: 5 TABLET, FILM COATED ORAL at 08:07

## 2018-12-29 RX ADMIN — PROPAFENONE HYDROCHLORIDE 150 MG: 150 TABLET, COATED ORAL at 21:41

## 2018-12-29 RX ADMIN — NYSTATIN 500000 UNITS: 500000 SUSPENSION ORAL at 08:07

## 2018-12-29 RX ADMIN — DILTIAZEM HYDROCHLORIDE 360 MG: 180 CAPSULE, COATED, EXTENDED RELEASE ORAL at 08:07

## 2018-12-29 RX ADMIN — CEFAZOLIN SODIUM 2 G: 2 INJECTION, SOLUTION INTRAVENOUS at 13:04

## 2018-12-29 RX ADMIN — APIXABAN 5 MG: 5 TABLET, FILM COATED ORAL at 21:41

## 2018-12-29 RX ADMIN — CEFAZOLIN SODIUM 2 G: 2 INJECTION, SOLUTION INTRAVENOUS at 05:51

## 2018-12-29 RX ADMIN — PROPAFENONE HYDROCHLORIDE 150 MG: 150 TABLET, COATED ORAL at 08:07

## 2018-12-29 ASSESSMENT — ACTIVITIES OF DAILY LIVING (ADL)
ADLS_ACUITY_SCORE: 16

## 2018-12-29 ASSESSMENT — PAIN DESCRIPTION - DESCRIPTORS: DESCRIPTORS: ACHING

## 2018-12-29 NOTE — PROGRESS NOTES
Discharge Planner   Discharge Plans in progress: Per chart review, ID orders in place and pt potential discharge for today 12/29. Call placed to [facility] [phone] to verify if they have been able to secure a home care agency for home infusion. Received call back from Nancy pharmacist w/ [facility], they are still working on this, she will call CTS with any updates.   Barriers to discharge plan: Medical clearance, home care agency staffing for home infusion.   Follow up plan: CTS to follow-up w/ pt and provider w/ any updates.        Entered by: Celia Still 12/29/2018 9:14 AM       Update 1005: Kayla called, they do have a home care agency in place for pt. Called bedside RN Mary to discuss, she reports that Dr. Verdugo would like to keep pt for 1-2 more days. Called [facility] and updated them w/ this information. Will continue to keep [facility] updated on discharge plan.     CM will continue to follow patient for any additional discharge needs.     Celia Still RN BSN CTS   [phone]  Care Transitions Team  [facility]

## 2018-12-29 NOTE — PLAN OF CARE
A&O x 4, independent, regular diet, , tele A Fib CVR, O2 97% on 3L, plan is to discharge today, will continue to monitor.

## 2018-12-29 NOTE — PROGRESS NOTES
[facility]  Infectious Disease Progress Note          Assessment and Plan:   IMPRESSION:   1.  A 67-year-old male with third hospitalization in the last month with hypoxic respiratory failure, probable pneumonia, Staph aureus in recent sputum and now Staph aureus in the blood.   2.  Staph aureus bacteremia, only 1 of 2 cultures positive.  Suspect this is spillover from pneumonia, but certainly at risk including a tricuspid valve.   3.  History of atrial fibrillation with rapid ventricular response.   4.  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      RECOMMENDATIONS:     1.  MSSA also in sputum,  other cultures without any other growth,  On  Ancef.  He will need an extended IV course here simply for the positive blood culture,  Given  the tricuspid valve and the hip, probably a full 4 week course of therapy.   2.  12/19 cx  negative, midline in  3. Orders for IV antibiotics in already . Day 13/28          Interval History:   no new complaints sl abd pain, Follow-up BC neg so far, sputum staph no fever no other new focal sxs, WBc normal    still hypoxic slow better              Medications:       apixaban ANTICOAGULANT  5 mg Oral BID     ceFAZolin  2 g Intravenous Q8H     diltiazem ER COATED BEADS  360 mg Oral Daily     [START ON 12/30/2018] furosemide  40 mg Intravenous Daily     hydrALAZINE  50 mg Oral Q8H MEGHAN     insulin aspart  1-3 Units Subcutaneous TID AC     insulin aspart  1-3 Units Subcutaneous At Bedtime     insulin isophane human  5 Units Subcutaneous Daily     levalbuterol  1.25 mg Nebulization 4x Daily     nystatin  500,000 Units Mouth/Throat 4x Daily     predniSONE  40 mg Oral Daily     propafenone  150 mg Oral TID     sodium chloride (PF)  10 mL Intracatheter Q8H     umeclidinium  1 puff Inhalation Daily     zolpidem  5 mg Oral At Bedtime                  Physical Exam:   Blood pressure  140/88, pulse 88, temperature 97.9  F (36.6  C), temperature source Oral, resp. rate 18, weight 94.2 kg (207 lb 11.2 oz), SpO2 94 %.  Wt Readings from Last 2 Encounters:   12/29/18 94.2 kg (207 lb 11.2 oz)   12/03/18 87.5 kg (193 lb)     Vital Signs with Ranges  Temp:  [95.9  F (35.5  C)-98  F (36.7  C)] 97.9  F (36.6  C)  Pulse:  [88] 88  Heart Rate:  [70-83] 80  Resp:  [16-20] 18  BP: (135-162)/(84-89) 140/88  SpO2:  [93 %-97 %] 94 %    Constitutional: Awake, alert, cooperative, no apparent distress   Lungs: Congestion to auscultation bilaterally, no crackles or wheezing hpoxia   Cardiovascular: Regular rate and rhythm, normal S1 and S2, and no murmur noted   Abdomen: Normal bowel sounds, soft, non-distended, non-tender   Skin: No rashes, no cyanosis, no edema no embolic lesions   Other:           Data:   All microbiology laboratory data reviewed.  Recent Labs   Lab Test 12/29/18  0710 12/26/18  0803 12/21/18  0519   WBC 15.7* 15.4* 5.9   HGB 15.0 14.8 14.2   HCT 46.7 46.4 44.3   MCV 92 92 92    297 168     Recent Labs   Lab Test 12/29/18  0710 12/28/18  0556 12/27/18  0652   CR 0.90 0.96 0.96     No lab results found.  Recent Labs   Lab Test 12/19/18  1023 12/19/18  1013 12/18/18  1027 12/17/18  1518 12/17/18  1452 11/25/18  2257 10/06/18  0826 10/06/18  0822 11/10/16  1524   CULT No growth No growth Light growth  Normal tushar    Moderate growth  Staphylococcus aureus  * Cultured on the 1st day of incubation:  Staphylococcus aureus  This isolate DOES NOT demonstrate inducible clindamycin resistance in vitro. Clindamycin   is susceptible and could be used when indicated, however, erythromycin is resistant and   should not be used.  *  Critical Value/Significant Value, preliminary result only, called to and read back by  Laure Mathews RN [facility] @ 6757 12.18.18 JE    (Note)  POSITIVE for STAPHYLOCOCCUS AUREUS and NEGATIVE for the mecA gene  (not MRSA) by [employer] multiplex nucleic acid test. The mecA gene  was  not detected. Final identification and antimicrobial susceptibility  testing will be verified by standard methods.    Specimen tested with [employer] multiplex, gram-positive blood culture  nucleic acid test for the following targets: Staph aureus, Staph  epidermidis, Staph lugdunensis, other Staph species, Enterococcus  faecalis, Enterococcus faecium, Streptococcus species, S. agalactiae,  S. anginosus grp., S. pneumoniae, S. pyogenes, Listeria sp., mecA  (methicillin resistance) and Steven/B (vancomycin resistance).    Critical Value/Significant Value called to and read back by Laure Mathews RN [facility] 1652 12.18.18 PATSY.   No growth Heavy growth  Normal tushar    Moderate growth  Staphylococcus aureus  This isolate DOES NOT demonstrate inducible clindamycin resistance in vitro. Clindamycin   is susceptible and could be used when indicated, however, erythromycin is resistant and   should not be used.  * No growth No growth No anaerobes isolated  Moderate growth Staphylococcus aureus This isolate DOES NOT demonstrate inducible   clindamycin resistance in vitro. Clindamycin is susceptible and could be used   when indicated, however, erythromycin is resistant and should not be used.  *  Canceled, Test credited Test reordered as correct code REORDERED AS AN ABSCESS   CULTURE

## 2018-12-29 NOTE — PLAN OF CARE
A&O X4. Up independent. VSS on 3L NC. ERICKSON, scheduled nebulizer and inhaler. 3+ edema to upper/lower extremities. PRN Oxycodone given for leg pain. Increased IV Lasix to 40 mg BID. Tele A-Fib CVR. , 191, 231, 218. Left midline in place, IV Ancef for PNA. ID, PT, care coordinator following. Discharge to home on ABX once edema improves.     Patient declined getting OOB for meals and ambulating due to leg pain. Educated patient on pre-medication and benefits of ambulating - patient further declined.     MD notified at 0584: Patient c/o breakthrough pain in legs rated 10/10. Oxycodone and Tylenol not available until 4pm. Can we try something else PRN? Thanks.   Addendum: IV Dilaudid given with relief.

## 2018-12-29 NOTE — PLAN OF CARE
Pt has been weaned on O2 from 4-3 LPM. Tele A-fib CVR, pain has been controlled, up independently, Good PO intake, 3+  LE edema. Jamar Figueroa RN on 12/28/2018 at 10:22 PM

## 2018-12-30 ENCOUNTER — APPOINTMENT (OUTPATIENT)
Dept: PHYSICAL THERAPY | Facility: CLINIC | Age: 68
DRG: 871 | End: 2018-12-30
Payer: COMMERCIAL

## 2018-12-30 ENCOUNTER — APPOINTMENT (OUTPATIENT)
Dept: GENERAL RADIOLOGY | Facility: CLINIC | Age: 68
DRG: 871 | End: 2018-12-30
Attending: INTERNAL MEDICINE
Payer: COMMERCIAL

## 2018-12-30 LAB
ALBUMIN SERPL-MCNC: 2.1 G/DL (ref 3.4–5)
ANION GAP SERPL CALCULATED.3IONS-SCNC: 3 MMOL/L (ref 3–14)
BASOPHILS # BLD AUTO: 0.1 10E9/L (ref 0–0.2)
BASOPHILS NFR BLD AUTO: 0.4 %
BUN SERPL-MCNC: 36 MG/DL (ref 7–30)
CALCIUM SERPL-MCNC: 8.8 MG/DL (ref 8.5–10.1)
CHLORIDE SERPL-SCNC: 98 MMOL/L (ref 94–109)
CO2 SERPL-SCNC: 37 MMOL/L (ref 20–32)
CREAT SERPL-MCNC: 1 MG/DL (ref 0.66–1.25)
DIFFERENTIAL METHOD BLD: ABNORMAL
EOSINOPHIL # BLD AUTO: 0.1 10E9/L (ref 0–0.7)
EOSINOPHIL NFR BLD AUTO: 0.6 %
ERYTHROCYTE [DISTWIDTH] IN BLOOD BY AUTOMATED COUNT: 14.9 % (ref 10–15)
GFR SERPL CREATININE-BSD FRML MDRD: 78 ML/MIN/{1.73_M2}
GLUCOSE BLDC GLUCOMTR-MCNC: 135 MG/DL (ref 70–99)
GLUCOSE BLDC GLUCOMTR-MCNC: 218 MG/DL (ref 70–99)
GLUCOSE SERPL-MCNC: 123 MG/DL (ref 70–99)
HCT VFR BLD AUTO: 42.8 % (ref 40–53)
HGB BLD-MCNC: 13.8 G/DL (ref 13.3–17.7)
IMM GRANULOCYTES # BLD: 0.6 10E9/L (ref 0–0.4)
IMM GRANULOCYTES NFR BLD: 4.2 %
LYMPHOCYTES # BLD AUTO: 1.3 10E9/L (ref 0.8–5.3)
LYMPHOCYTES NFR BLD AUTO: 9.9 %
MCH RBC QN AUTO: 29.6 PG (ref 26.5–33)
MCHC RBC AUTO-ENTMCNC: 32.2 G/DL (ref 31.5–36.5)
MCV RBC AUTO: 92 FL (ref 78–100)
MONOCYTES # BLD AUTO: 0.8 10E9/L (ref 0–1.3)
MONOCYTES NFR BLD AUTO: 6 %
NEUTROPHILS # BLD AUTO: 10.7 10E9/L (ref 1.6–8.3)
NEUTROPHILS NFR BLD AUTO: 78.9 %
NRBC # BLD AUTO: 0 10*3/UL
NRBC BLD AUTO-RTO: 0 /100
PLATELET # BLD AUTO: 230 10E9/L (ref 150–450)
POTASSIUM SERPL-SCNC: 4.7 MMOL/L (ref 3.4–5.3)
RBC # BLD AUTO: 4.67 10E12/L (ref 4.4–5.9)
SODIUM SERPL-SCNC: 138 MMOL/L (ref 133–144)
WBC # BLD AUTO: 13.6 10E9/L (ref 4–11)

## 2018-12-30 PROCEDURE — 00000146 ZZHCL STATISTIC GLUCOSE BY METER IP

## 2018-12-30 PROCEDURE — 25000132 ZZH RX MED GY IP 250 OP 250 PS 637: Performed by: INTERNAL MEDICINE

## 2018-12-30 PROCEDURE — 99233 SBSQ HOSP IP/OBS HIGH 50: CPT | Performed by: INTERNAL MEDICINE

## 2018-12-30 PROCEDURE — 25000128 H RX IP 250 OP 636: Performed by: INTERNAL MEDICINE

## 2018-12-30 PROCEDURE — 36415 COLL VENOUS BLD VENIPUNCTURE: CPT | Performed by: INTERNAL MEDICINE

## 2018-12-30 PROCEDURE — 40000275 ZZH STATISTIC RCP TIME EA 10 MIN

## 2018-12-30 PROCEDURE — 97530 THERAPEUTIC ACTIVITIES: CPT | Mod: GP | Performed by: PHYSICAL THERAPIST

## 2018-12-30 PROCEDURE — 40000193 ZZH STATISTIC PT WARD VISIT: Performed by: PHYSICAL THERAPIST

## 2018-12-30 PROCEDURE — 82040 ASSAY OF SERUM ALBUMIN: CPT | Performed by: INTERNAL MEDICINE

## 2018-12-30 PROCEDURE — 71045 X-RAY EXAM CHEST 1 VIEW: CPT

## 2018-12-30 PROCEDURE — P9047 ALBUMIN (HUMAN), 25%, 50ML: HCPCS | Performed by: INTERNAL MEDICINE

## 2018-12-30 PROCEDURE — 94640 AIRWAY INHALATION TREATMENT: CPT | Mod: 76

## 2018-12-30 PROCEDURE — 85025 COMPLETE CBC W/AUTO DIFF WBC: CPT | Performed by: INTERNAL MEDICINE

## 2018-12-30 PROCEDURE — 94640 AIRWAY INHALATION TREATMENT: CPT

## 2018-12-30 PROCEDURE — 25000125 ZZHC RX 250: Performed by: INTERNAL MEDICINE

## 2018-12-30 PROCEDURE — 12000007 ZZH R&B INTERMEDIATE

## 2018-12-30 PROCEDURE — 80048 BASIC METABOLIC PNL TOTAL CA: CPT | Performed by: INTERNAL MEDICINE

## 2018-12-30 RX ORDER — FUROSEMIDE 10 MG/ML
20 INJECTION INTRAMUSCULAR; INTRAVENOUS ONCE
Status: COMPLETED | OUTPATIENT
Start: 2018-12-30 | End: 2018-12-30

## 2018-12-30 RX ORDER — ALBUMIN (HUMAN) 12.5 G/50ML
12.5 SOLUTION INTRAVENOUS ONCE
Status: COMPLETED | OUTPATIENT
Start: 2018-12-30 | End: 2018-12-30

## 2018-12-30 RX ADMIN — ACETAMINOPHEN 650 MG: 325 TABLET, FILM COATED ORAL at 08:26

## 2018-12-30 RX ADMIN — ZOLPIDEM TARTRATE 5 MG: 5 TABLET, COATED ORAL at 21:44

## 2018-12-30 RX ADMIN — Medication 2.5 MG: at 13:58

## 2018-12-30 RX ADMIN — LEVALBUTEROL HYDROCHLORIDE 1.25 MG: 1.25 SOLUTION RESPIRATORY (INHALATION) at 16:09

## 2018-12-30 RX ADMIN — HYDROMORPHONE HYDROCHLORIDE 0.5 MG: 1 INJECTION, SOLUTION INTRAMUSCULAR; INTRAVENOUS; SUBCUTANEOUS at 11:28

## 2018-12-30 RX ADMIN — PREDNISONE 40 MG: 20 TABLET ORAL at 08:25

## 2018-12-30 RX ADMIN — HYDRALAZINE HYDROCHLORIDE 50 MG: 50 TABLET, FILM COATED ORAL at 13:58

## 2018-12-30 RX ADMIN — CEFAZOLIN SODIUM 2 G: 2 INJECTION, SOLUTION INTRAVENOUS at 21:44

## 2018-12-30 RX ADMIN — CEFAZOLIN SODIUM 2 G: 2 INJECTION, SOLUTION INTRAVENOUS at 13:56

## 2018-12-30 RX ADMIN — INSULIN ASPART 1 UNITS: 100 INJECTION, SOLUTION INTRAVENOUS; SUBCUTANEOUS at 11:24

## 2018-12-30 RX ADMIN — PROPAFENONE HYDROCHLORIDE 150 MG: 150 TABLET, COATED ORAL at 08:57

## 2018-12-30 RX ADMIN — ALBUMIN HUMAN 12.5 G: 0.25 SOLUTION INTRAVENOUS at 09:32

## 2018-12-30 RX ADMIN — APIXABAN 5 MG: 5 TABLET, FILM COATED ORAL at 21:41

## 2018-12-30 RX ADMIN — HYDRALAZINE HYDROCHLORIDE 50 MG: 50 TABLET, FILM COATED ORAL at 21:41

## 2018-12-30 RX ADMIN — APIXABAN 5 MG: 5 TABLET, FILM COATED ORAL at 08:26

## 2018-12-30 RX ADMIN — LEVALBUTEROL HYDROCHLORIDE 1.25 MG: 1.25 SOLUTION RESPIRATORY (INHALATION) at 11:06

## 2018-12-30 RX ADMIN — NYSTATIN 500000 UNITS: 500000 SUSPENSION ORAL at 08:26

## 2018-12-30 RX ADMIN — HYDROMORPHONE HYDROCHLORIDE 0.5 MG: 1 INJECTION, SOLUTION INTRAMUSCULAR; INTRAVENOUS; SUBCUTANEOUS at 16:25

## 2018-12-30 RX ADMIN — HYDRALAZINE HYDROCHLORIDE 50 MG: 50 TABLET, FILM COATED ORAL at 06:25

## 2018-12-30 RX ADMIN — DILTIAZEM HYDROCHLORIDE 360 MG: 180 CAPSULE, COATED, EXTENDED RELEASE ORAL at 08:25

## 2018-12-30 RX ADMIN — FUROSEMIDE 20 MG: 10 INJECTION, SOLUTION INTRAMUSCULAR; INTRAVENOUS at 16:25

## 2018-12-30 RX ADMIN — LEVALBUTEROL HYDROCHLORIDE 1.25 MG: 1.25 SOLUTION RESPIRATORY (INHALATION) at 20:01

## 2018-12-30 RX ADMIN — CEFAZOLIN SODIUM 2 G: 2 INJECTION, SOLUTION INTRAVENOUS at 06:25

## 2018-12-30 RX ADMIN — PROPAFENONE HYDROCHLORIDE 150 MG: 150 TABLET, COATED ORAL at 21:44

## 2018-12-30 RX ADMIN — NYSTATIN 500000 UNITS: 500000 SUSPENSION ORAL at 23:14

## 2018-12-30 RX ADMIN — HYDROMORPHONE HYDROCHLORIDE 0.5 MG: 1 INJECTION, SOLUTION INTRAMUSCULAR; INTRAVENOUS; SUBCUTANEOUS at 21:44

## 2018-12-30 RX ADMIN — NYSTATIN 500000 UNITS: 500000 SUSPENSION ORAL at 13:58

## 2018-12-30 RX ADMIN — PROPAFENONE HYDROCHLORIDE 150 MG: 150 TABLET, COATED ORAL at 16:24

## 2018-12-30 RX ADMIN — INSULIN ASPART 1 UNITS: 100 INJECTION, SOLUTION INTRAVENOUS; SUBCUTANEOUS at 18:06

## 2018-12-30 RX ADMIN — FUROSEMIDE 40 MG: 10 INJECTION, SOLUTION INTRAMUSCULAR; INTRAVENOUS at 08:21

## 2018-12-30 RX ADMIN — Medication 2.5 MG: at 23:45

## 2018-12-30 ASSESSMENT — ACTIVITIES OF DAILY LIVING (ADL)
ADLS_ACUITY_SCORE: 16
ADLS_ACUITY_SCORE: 16
ADLS_ACUITY_SCORE: 17
ADLS_ACUITY_SCORE: 16
ADLS_ACUITY_SCORE: 17
ADLS_ACUITY_SCORE: 17

## 2018-12-30 ASSESSMENT — PAIN DESCRIPTION - DESCRIPTORS
DESCRIPTORS: CONSTANT;TIGHTNESS
DESCRIPTORS: CONSTANT

## 2018-12-30 NOTE — PLAN OF CARE
"A&O X4. Up independent. VSS on 3L NC. Tele A-Fib, CVR. 3/4+ edema in upper & lower extremities with weeping. IV Dilaudid and PO Oxycodone for leg pain. IV Lasix, weight trending down. Albumin 2.1, given 1x dose of IV Albumin with Nutrition consult. Repeat CXR done. , 166. Left midline in place, IV Ancef for PNA. ID, PT following. Discharge 1-2 days.     Patient stated, \"I'm just so depressed. This whole thing is ridiculous and depressing.\" Offered emotional support through active listening and decreased stimulation. Offered to wheelchair patient through halls and down to [employer], patient declined.  services offered, patient declined.     MD notified at 1412: FYI patient's edema remains 3/4+, per patient breathing feels worse. 93% on 3L. Please advise if there are any other interventions we can try. Thank you.   Addendum: 1x dose of IV Lasix to be given at 4pm.   "

## 2018-12-30 NOTE — PROGRESS NOTES
[facility]  Infectious Disease Progress Note          Assessment and Plan:   IMPRESSION:   1.  A 67-year-old male with third hospitalization in the last month with hypoxic respiratory failure, probable pneumonia, Staph aureus in recent sputum and now Staph aureus in the blood.   2.  Staph aureus bacteremia, only 1 of 2 cultures positive.  Suspect this is spillover from pneumonia, but certainly at risk including a tricuspid valve.   3.  History of atrial fibrillation with rapid ventricular response.   4.  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      RECOMMENDATIONS:     1.  MSSA also in sputum,  other cultures without any other growth,  On  Ancef.  He will need an extended IV course here simply for the positive blood culture,  Given  the tricuspid valve and the hip, probably a full 4 week course of therapy.   2.  12/19 cx  negative, midline in  3. Orders for IV antibiotics in already . Day 14/28          Interval History:   no new complaints sl abd pain, Follow-up BC neg so far, sputum staph no fever no other new focal sxs, WBc normal    still hypoxic slow better              Medications:       apixaban ANTICOAGULANT  5 mg Oral BID     ceFAZolin  2 g Intravenous Q8H     diltiazem ER COATED BEADS  360 mg Oral Daily     furosemide  40 mg Intravenous Daily     hydrALAZINE  50 mg Oral Q8H MEGHAN     insulin aspart  1-3 Units Subcutaneous TID AC     insulin aspart  1-3 Units Subcutaneous At Bedtime     insulin isophane human  5 Units Subcutaneous Daily     levalbuterol  1.25 mg Nebulization 4x Daily     nystatin  500,000 Units Mouth/Throat 4x Daily     predniSONE  40 mg Oral Daily     propafenone  150 mg Oral TID     sodium chloride (PF)  10 mL Intracatheter Q8H     umeclidinium  1 puff Inhalation Daily     zolpidem  5 mg Oral At Bedtime                  Physical Exam:   Blood pressure (!) 144/98, pulse 71,  temperature 97.8  F (36.6  C), temperature source Oral, resp. rate 18, weight 90.7 kg (200 lb), SpO2 92 %.  Wt Readings from Last 2 Encounters:   12/30/18 90.7 kg (200 lb)   12/03/18 87.5 kg (193 lb)     Vital Signs with Ranges  Temp:  [97.5  F (36.4  C)-98.7  F (37.1  C)] 97.8  F (36.6  C)  Pulse:  [69-79] 71  Heart Rate:  [74-88] 88  Resp:  [16-20] 18  BP: (137-161)/(84-98) 144/98  SpO2:  [92 %-98 %] 92 %    Constitutional: Awake, alert, cooperative, no apparent distress   Lungs: Congestion to auscultation bilaterally, no crackles or wheezing hpoxia   Cardiovascular: Regular rate and rhythm, normal S1 and S2, and no murmur noted   Abdomen: Normal bowel sounds, soft, non-distended, non-tender   Skin: No rashes, no cyanosis, no edema no embolic lesions   Other:           Data:   All microbiology laboratory data reviewed.  Recent Labs   Lab Test 12/30/18  0655 12/29/18  0710 12/26/18  0803   WBC 13.6* 15.7* 15.4*   HGB 13.8 15.0 14.8   HCT 42.8 46.7 46.4   MCV 92 92 92    253 297     Recent Labs   Lab Test 12/30/18  0655 12/29/18  0710 12/28/18  0556   CR 1.00 0.90 0.96     No lab results found.  Recent Labs   Lab Test 12/19/18  1023 12/19/18  1013 12/18/18  1027 12/17/18  1518 12/17/18  1452 11/25/18  2257 10/06/18  0826 10/06/18  0822 11/10/16  1524   CULT No growth No growth Light growth  Normal tushar    Moderate growth  Staphylococcus aureus  * Cultured on the 1st day of incubation:  Staphylococcus aureus  This isolate DOES NOT demonstrate inducible clindamycin resistance in vitro. Clindamycin   is susceptible and could be used when indicated, however, erythromycin is resistant and   should not be used.  *  Critical Value/Significant Value, preliminary result only, called to and read back by  Lauer Mathews RN ICU @ 1521 12.18.18 JE    (Note)  POSITIVE for STAPHYLOCOCCUS AUREUS and NEGATIVE for the mecA gene  (not MRSA) by [employer] multiplex nucleic acid test. The mecA gene was  not detected. Final  identification and antimicrobial susceptibility  testing will be verified by standard methods.    Specimen tested with [employer] multiplex, gram-positive blood culture  nucleic acid test for the following targets: Staph aureus, Staph  epidermidis, Staph lugdunensis, other Staph species, Enterococcus  faecalis, Enterococcus faecium, Streptococcus species, S. agalactiae,  S. anginosus grp., S. pneumoniae, S. pyogenes, Listeria sp., mecA  (methicillin resistance) and Steven/B (vancomycin resistance).    Critical Value/Significant Value called to and read back by Laure Mathews RN ICU 1652 12.18.18 PATSY.   No growth Heavy growth  Normal tushar    Moderate growth  Staphylococcus aureus  This isolate DOES NOT demonstrate inducible clindamycin resistance in vitro. Clindamycin   is susceptible and could be used when indicated, however, erythromycin is resistant and   should not be used.  * No growth No growth No anaerobes isolated  Moderate growth Staphylococcus aureus This isolate DOES NOT demonstrate inducible   clindamycin resistance in vitro. Clindamycin is susceptible and could be used   when indicated, however, erythromycin is resistant and should not be used.  *  Canceled, Test credited Test reordered as correct code REORDERED AS AN ABSCESS   CULTURE

## 2018-12-30 NOTE — PLAN OF CARE
PT: Discussed with pt POC. Pt has refused PT this week, besides on initial eval. Pt currently SOB with very minimal activity, reporting LE pain. Pt reports ambulating in room inde. Pt reports up frequently throughout the day. Working with pt on a plan to go forward in terms of PT. Agree to canceling PT with the caveat that he continues to work on OOB activity with RNs and when his breathing and tolerance improve he is to have a new PT order to work on ambulation distances and endurance.   Also noted increased B edema in LEs/UEs. Pt currently SOB at rest so lymph therapy not indicated at this time, but would be beneficial once SOB improves.   Physical Therapy Discharge Summary    Reason for therapy discharge:    Patient/family request discontinuation of services.    Progress towards therapy goal(s). See goals on [facility] Plan in [facility] health record for goal details.  Goals not met.  Barriers to achieving goals:   limited tolerance for therapy.    Therapy recommendation(s):    Continued therapy is recommended.  Rationale/Recommendations:  as stated above would benefit from PT and possibly lymph therapy once SOB improves as pt not interested in PT working on in room distances.

## 2018-12-30 NOTE — PROGRESS NOTES
[facility]  Hospitalist Progress Note  Abby Verdugo MD, MD 12/30/2018    Reason for Stay (Diagnosis): Sepsis secondary to MSSA suspected right lower lung pneumonia         Assessment and Plan:      Summary of Stay: Mr. Cooper is a 67-year-old man with history of severe COPD intermittently on home oxygen who presented with several days of productive cough and worsening shortness of breath.  Found to be in hypoxic respiratory failure with severe sepsis secondary to pneumonia in the right lung.  He also has a had a history of atrial fibrillation with previous ablation, tricuspid valve replacement, hypertension, nonsustained ventricular tachycardia and 2 hospitalizations in the past 2 months for COPD exacerbations.     1.  Severe sepsis due to MSSA bacteremia and pneumonia. The source of bacteremia has been felt to be spill over from the pneumonia. MSSA in blood and sputum. Has a history of tricuspid valve repair and left total hip arthroplasty.  --Continue IV ancef.  --Plans for extended course of IV antibiotic course.  midline placed.   --IV antibiotics with IV Ancef for outpatient infusion orders care of ID service are in place. Appreciate ID input     2.  Acute on chronic hypoxic respiratory failure.  Suspected to be due to pneumonia and COPD exacerbation.    --Initially required BiPAP. Now on oxygen via NC 3-4 lit/min  -- Initially received IV steroids and now switched to oral prednisone. Will decrease prednisone to 30 mg daily  - Will repeat CXR today  --Still has significant leg edema- suspect partly secondary to hypoalbuminemia.     3.  Pneumonia , RLL, likely MSSA.     --Continue IV ancef. Day 13/28 today. Culture from 12/19 negative.  ID following    4. Hypoalbuminemia: ? Nutritional versus secondary to acute illness.  Albumin checked and 2.1. Given albumin 25% x1 dose. Will consult dietician     5.  COPD exacerbation.  --Continue antibiotics as above.  --Continue Xopenex.  --Continue  Incruse Ellipta.  --Continue steroids     6.  Atrial fibrillation.-Rate control  --Continue diltiazem, propafenone, and apixaban. Seems adequately controlled      7.  Hyperglycemia. Likely due to steroids.now improving  --On NPH insulin  5 units daily   --NovoLog sliding scale.  --Anticipating improvement of glucose control once steroids has been tapered and discontinued.    8.  Chronic kidney disease.  Creatinine at baseline and currently normal. Creatinine 1.0 today.   --Avoid nephrotoxins as able.     9.  Nutrition.  -Regular diet     10.  Deconditioning.    -Physical therapy consult.    11.  Hypertension-Continue  diltiazem 360 mg daily.Hydralazine 50 mg TID. Will monitor blood pressure      Diet: Regular Diet Adult    DVT Prophylaxis: eliquis  Code Status: Full Code    Disposition: Anticipate discharge in 1-2 days if swelling improves, O2 sat remains stable, and if no new issues.        Interval History (Subjective):      Patient seen and examined. He stated that he is still having shortness of breath. He has no fever. No abdominal pain. Denies nausea or vomiting. He has significant edema.          Physical Exam:      Last Vital Signs:  BP (!) 144/98 (BP Location: Right arm)   Pulse 71   Temp 97.8  F (36.6  C) (Oral)   Resp 18   Wt 90.7 kg (200 lb)   SpO2 92%   BMI 28.70 kg/m      I/O last 3 completed shifts:  In: 620 [P.O.:620]  Out: 2575 [Urine:2575]  Wt Readings from Last 1 Encounters:   12/30/18 90.7 kg (200 lb)     Vitals:    12/26/18 0539 12/27/18 0501 12/28/18 0711 12/29/18 0551   Weight: 95.4 kg (210 lb 6.4 oz) 95.4 kg (210 lb 4.8 oz) 93.5 kg (206 lb 3.2 oz) 94.2 kg (207 lb 11.2 oz)    12/30/18 0856   Weight: 90.7 kg (200 lb)       Constitutional: Awake, alert, cooperative, no apparent distress   Respiratory: Fair air entry, no crackles, scant wheezing   Cardiovascular: Irregularly irregular rhythm, normal rate, S1 and S2   Abdomen: Normal bowel sounds, soft, non-distended, non-tender   Skin: No  rashes, no cyanosis, dry to touch   Neuro: Alert and oriented x3, neurologically intact, spontaneous and coherent speech   Extremities: Trace edema, normal range of motion   Other(s): Euthymic mood, not agitated       All other systems: Negative          Medications:      All current medications were reviewed with changes reflected in problem list.         Data:      All new lab and imaging data was reviewed.   Labs:  No results for input(s): CULT in the last 168 hours.  Recent Labs   Lab 12/30/18  0655 12/29/18  0710 12/26/18  0803   WBC 13.6* 15.7* 15.4*   HGB 13.8 15.0 14.8   HCT 42.8 46.7 46.4   MCV 92 92 92    253 297     Recent Labs   Lab 12/30/18  0655 12/29/18  0710 12/28/18  0556    137 138   POTASSIUM 4.7 5.0 4.7   CHLORIDE 98 100 98   CO2 37* 36* 40*   ANIONGAP 3 1* <1*   * 109* 117*   BUN 36* 31* 31*   CR 1.00 0.90 0.96   GFRESTIMATED 78 88 81   GFRESTBLACK >90 >90 >90   WILBERT 8.8 8.6 8.4*   ALBUMIN 2.1*  --   --      Recent Labs   Lab 12/30/18  0655 12/29/18  2133 12/29/18  1801 12/29/18  1617 12/29/18  1142 12/29/18  0710 12/29/18  0231  12/28/18  0556  12/27/18  0652  12/26/18  0803   *  --   --   --   --  109*  --   --  117*  --  105*  --  90   BGM  --  135* 218* 231* 191*  --  122*   < >  --    < >  --    < >  --     < > = values in this interval not displayed.     No results for input(s): INR in the last 168 hours.  No results for input(s): TROPONIN, TROPI, TROPR in the last 168 hours.    Invalid input(s): TROP, TROPONINIES  No results for input(s): COLOR, APPEARANCE, URINEGLC, URINEBILI, URINEKETONE, SG, UBLD, URINEPH, PROTEIN, UROBILINOGEN, NITRITE, LEUKEST, RBCU, WBCU in the last 168 hours.   Imaging:   Recent Results (from the past 24 hour(s))   XR Chest Port 1 View    Narrative    XR CHEST PORT 1 VW  12/30/2018 9:20 AM     HISTORY:  worsening shortness of breath    COMPARISON: Film dated 12/26/2018    FINDINGS:  Midline sternotomy. Again noted is a focal opacity at  the  right base compatible with pneumonitis. This is improved when compared  to 1226. The left lung remains clear. Healed left rib fractures.      Impression    IMPRESSION: Improving right basilar infiltrate. No new infiltrates are  identified.    TORIBIO BYRNE MD

## 2018-12-30 NOTE — PLAN OF CARE
Orientation: Alert and oriented x4. Awake, alert, cooperative, no apparent distress.    VSS. 95% on 4 LPM NC. Afebrile.   IVF: Midline SL  Tele: Afib CVR  LS: Dyspneic on exertion, c/o SOB, diminished throughout  GI: Passing gas. BM x1. Denies N/V.    : Adequate urine output. Clear yellow.   Skin: bruising noted, +3 edema x4 extremeties, Pt c/o fluid leaking for right arm when pressure applied, see provider notification note, Pt refused further skin check and repositioning, Skin otherwise intact. No rashes, no cyanosis  Activity: Independent. Pt slept comfortably throughout shift.   Pain: No c/o pain. No PRN interventions utilized. Pt sleeping.   DC Plan: Continue with current cares.

## 2018-12-30 NOTE — PROVIDER NOTIFICATION
"Pt is concerned about increased edema.  Arms are starting to \"Leak\" fluid. Please Assess - Hospitalist Paged    Addendum - Dr Schaeffer called back - no further orders given.  Reassured pt that he is on correct medications and is being followed appropriately per Dr. Schaeffer,   Will inform Dr Verdugo of Pts concerns in the AM  "

## 2018-12-31 LAB
ANION GAP SERPL CALCULATED.3IONS-SCNC: 4 MMOL/L (ref 3–14)
BASOPHILS # BLD AUTO: 0 10E9/L (ref 0–0.2)
BASOPHILS NFR BLD AUTO: 0 %
BUN SERPL-MCNC: 36 MG/DL (ref 7–30)
CALCIUM SERPL-MCNC: 8.6 MG/DL (ref 8.5–10.1)
CHLORIDE SERPL-SCNC: 98 MMOL/L (ref 94–109)
CO2 SERPL-SCNC: 34 MMOL/L (ref 20–32)
CREAT SERPL-MCNC: 1.02 MG/DL (ref 0.66–1.25)
DIFFERENTIAL METHOD BLD: ABNORMAL
EOSINOPHIL # BLD AUTO: 0.3 10E9/L (ref 0–0.7)
EOSINOPHIL NFR BLD AUTO: 2 %
ERYTHROCYTE [DISTWIDTH] IN BLOOD BY AUTOMATED COUNT: 14.9 % (ref 10–15)
GFR SERPL CREATININE-BSD FRML MDRD: 76 ML/MIN/{1.73_M2}
GLUCOSE BLDC GLUCOMTR-MCNC: 134 MG/DL (ref 70–99)
GLUCOSE BLDC GLUCOMTR-MCNC: 166 MG/DL (ref 70–99)
GLUCOSE BLDC GLUCOMTR-MCNC: 176 MG/DL (ref 70–99)
GLUCOSE BLDC GLUCOMTR-MCNC: 238 MG/DL (ref 70–99)
GLUCOSE SERPL-MCNC: 112 MG/DL (ref 70–99)
HCT VFR BLD AUTO: 41.8 % (ref 40–53)
HGB BLD-MCNC: 13.3 G/DL (ref 13.3–17.7)
LACTATE BLD-SCNC: 1.3 MMOL/L (ref 0.7–2)
LYMPHOCYTES # BLD AUTO: 1.8 10E9/L (ref 0.8–5.3)
LYMPHOCYTES NFR BLD AUTO: 14 %
MCH RBC QN AUTO: 29.2 PG (ref 26.5–33)
MCHC RBC AUTO-ENTMCNC: 31.8 G/DL (ref 31.5–36.5)
MCV RBC AUTO: 92 FL (ref 78–100)
METAMYELOCYTES # BLD: 0.1 10E9/L
METAMYELOCYTES NFR BLD MANUAL: 1 %
MONOCYTES # BLD AUTO: 1.2 10E9/L (ref 0–1.3)
MONOCYTES NFR BLD AUTO: 9 %
NEUTROPHILS # BLD AUTO: 9.8 10E9/L (ref 1.6–8.3)
NEUTROPHILS NFR BLD AUTO: 74 %
PLATELET # BLD AUTO: 225 10E9/L (ref 150–450)
PLATELET # BLD EST: ABNORMAL 10*3/UL
POTASSIUM SERPL-SCNC: 5 MMOL/L (ref 3.4–5.3)
RBC # BLD AUTO: 4.56 10E12/L (ref 4.4–5.9)
RBC MORPH BLD: ABNORMAL
SODIUM SERPL-SCNC: 136 MMOL/L (ref 133–144)
WBC # BLD AUTO: 13.2 10E9/L (ref 4–11)

## 2018-12-31 PROCEDURE — 40000275 ZZH STATISTIC RCP TIME EA 10 MIN

## 2018-12-31 PROCEDURE — 85025 COMPLETE CBC W/AUTO DIFF WBC: CPT | Performed by: INTERNAL MEDICINE

## 2018-12-31 PROCEDURE — 25000132 ZZH RX MED GY IP 250 OP 250 PS 637: Performed by: INTERNAL MEDICINE

## 2018-12-31 PROCEDURE — 40000257 ZZH STATISTIC CONSULT NO CHARGE VASC ACCESS

## 2018-12-31 PROCEDURE — 94640 AIRWAY INHALATION TREATMENT: CPT | Mod: 76

## 2018-12-31 PROCEDURE — 25000128 H RX IP 250 OP 636: Performed by: INTERNAL MEDICINE

## 2018-12-31 PROCEDURE — 36415 COLL VENOUS BLD VENIPUNCTURE: CPT | Performed by: INTERNAL MEDICINE

## 2018-12-31 PROCEDURE — 83605 ASSAY OF LACTIC ACID: CPT | Performed by: INTERNAL MEDICINE

## 2018-12-31 PROCEDURE — 00000146 ZZHCL STATISTIC GLUCOSE BY METER IP

## 2018-12-31 PROCEDURE — 94640 AIRWAY INHALATION TREATMENT: CPT

## 2018-12-31 PROCEDURE — 25000125 ZZHC RX 250: Performed by: INTERNAL MEDICINE

## 2018-12-31 PROCEDURE — 80048 BASIC METABOLIC PNL TOTAL CA: CPT | Performed by: INTERNAL MEDICINE

## 2018-12-31 PROCEDURE — 99233 SBSQ HOSP IP/OBS HIGH 50: CPT | Performed by: INTERNAL MEDICINE

## 2018-12-31 PROCEDURE — 12000007 ZZH R&B INTERMEDIATE

## 2018-12-31 RX ORDER — FUROSEMIDE 10 MG/ML
20 INJECTION INTRAMUSCULAR; INTRAVENOUS EVERY 12 HOURS
Status: COMPLETED | OUTPATIENT
Start: 2018-12-31 | End: 2019-01-01

## 2018-12-31 RX ORDER — FUROSEMIDE 10 MG/ML
20 INJECTION INTRAMUSCULAR; INTRAVENOUS EVERY 12 HOURS
Status: DISCONTINUED | OUTPATIENT
Start: 2018-12-31 | End: 2018-12-31

## 2018-12-31 RX ADMIN — INSULIN ASPART 1 UNITS: 100 INJECTION, SOLUTION INTRAVENOUS; SUBCUTANEOUS at 17:44

## 2018-12-31 RX ADMIN — NYSTATIN 500000 UNITS: 500000 SUSPENSION ORAL at 21:36

## 2018-12-31 RX ADMIN — PREDNISONE 30 MG: 10 TABLET ORAL at 10:28

## 2018-12-31 RX ADMIN — FUROSEMIDE 20 MG: 10 INJECTION, SOLUTION INTRAMUSCULAR; INTRAVENOUS at 23:52

## 2018-12-31 RX ADMIN — Medication 2.5 MG: at 06:23

## 2018-12-31 RX ADMIN — NYSTATIN 500000 UNITS: 500000 SUSPENSION ORAL at 17:43

## 2018-12-31 RX ADMIN — NYSTATIN 500000 UNITS: 500000 SUSPENSION ORAL at 10:28

## 2018-12-31 RX ADMIN — CEFAZOLIN SODIUM 2 G: 2 INJECTION, SOLUTION INTRAVENOUS at 06:23

## 2018-12-31 RX ADMIN — Medication 5 MG: at 18:50

## 2018-12-31 RX ADMIN — CEFAZOLIN SODIUM 2 G: 2 INJECTION, SOLUTION INTRAVENOUS at 13:04

## 2018-12-31 RX ADMIN — APIXABAN 5 MG: 5 TABLET, FILM COATED ORAL at 21:40

## 2018-12-31 RX ADMIN — PROPAFENONE HYDROCHLORIDE 150 MG: 150 TABLET, COATED ORAL at 21:40

## 2018-12-31 RX ADMIN — NYSTATIN 500000 UNITS: 500000 SUSPENSION ORAL at 12:57

## 2018-12-31 RX ADMIN — PROPAFENONE HYDROCHLORIDE 150 MG: 150 TABLET, COATED ORAL at 16:00

## 2018-12-31 RX ADMIN — HYDRALAZINE HYDROCHLORIDE 50 MG: 50 TABLET, FILM COATED ORAL at 06:23

## 2018-12-31 RX ADMIN — APIXABAN 5 MG: 5 TABLET, FILM COATED ORAL at 10:28

## 2018-12-31 RX ADMIN — DILTIAZEM HYDROCHLORIDE 360 MG: 180 CAPSULE, COATED, EXTENDED RELEASE ORAL at 10:28

## 2018-12-31 RX ADMIN — HYDRALAZINE HYDROCHLORIDE 50 MG: 50 TABLET, FILM COATED ORAL at 21:36

## 2018-12-31 RX ADMIN — HYDRALAZINE HYDROCHLORIDE 50 MG: 50 TABLET, FILM COATED ORAL at 13:52

## 2018-12-31 RX ADMIN — ZOLPIDEM TARTRATE 5 MG: 5 TABLET, COATED ORAL at 21:40

## 2018-12-31 RX ADMIN — PROPAFENONE HYDROCHLORIDE 150 MG: 150 TABLET, COATED ORAL at 10:28

## 2018-12-31 RX ADMIN — HYDROMORPHONE HYDROCHLORIDE 0.5 MG: 1 INJECTION, SOLUTION INTRAMUSCULAR; INTRAVENOUS; SUBCUTANEOUS at 23:52

## 2018-12-31 RX ADMIN — HYDROMORPHONE HYDROCHLORIDE 0.5 MG: 1 INJECTION, SOLUTION INTRAMUSCULAR; INTRAVENOUS; SUBCUTANEOUS at 02:05

## 2018-12-31 RX ADMIN — CEFAZOLIN SODIUM 2 G: 2 INJECTION, SOLUTION INTRAVENOUS at 21:36

## 2018-12-31 RX ADMIN — LEVALBUTEROL HYDROCHLORIDE 1.25 MG: 1.25 SOLUTION RESPIRATORY (INHALATION) at 20:11

## 2018-12-31 RX ADMIN — Medication 2.5 MG: at 10:30

## 2018-12-31 RX ADMIN — FUROSEMIDE 20 MG: 10 INJECTION, SOLUTION INTRAMUSCULAR; INTRAVENOUS at 12:57

## 2018-12-31 RX ADMIN — HYDROMORPHONE HYDROCHLORIDE 0.5 MG: 1 INJECTION, SOLUTION INTRAMUSCULAR; INTRAVENOUS; SUBCUTANEOUS at 14:41

## 2018-12-31 RX ADMIN — Medication 5 MG: at 22:32

## 2018-12-31 RX ADMIN — LEVALBUTEROL HYDROCHLORIDE 1.25 MG: 1.25 SOLUTION RESPIRATORY (INHALATION) at 07:57

## 2018-12-31 ASSESSMENT — ACTIVITIES OF DAILY LIVING (ADL)
ADLS_ACUITY_SCORE: 17
ADLS_ACUITY_SCORE: 16
ADLS_ACUITY_SCORE: 14
ADLS_ACUITY_SCORE: 16
ADLS_ACUITY_SCORE: 14
ADLS_ACUITY_SCORE: 14

## 2018-12-31 NOTE — PLAN OF CARE
Pain: Has complaint of pain in arms due to swelling PRN oxycodone given; pt also has sores in mouth, taking nystatin as ordered  LOC: Alert and oriented  Mobility: Independent in room to BSC  Lungs/ O2: Lung sounds diminished; chronically on 3L O2  Tele: A-fib cvr  GI: Regular diet, appetite decrease  : Voiding without difficulty, IV lasix given  IV: Midline LA, saline locked  Other: Continue prednisone, cardiazem, and IV Ancef as ordered. Blood sugar checks 112 and 108 this shift. Infectious disease and nutrition following. Discharge home when ready.

## 2018-12-31 NOTE — PROGRESS NOTES
"SPIRITUAL HEALTH SERVICES Progress Note  [facility] Med Surg 3    Pt visit per length of stay. Tone seated at bedside with brother, Christiano, visiting.  Pt is admitted for COPD and sepsis.    Tone expressed frustration at chronic \"health issues\" and states he hopes for discharge soon.   Tone and Christiano shared that this is a particularly difficult time of year for them.  Tone lost his mother on [place] \"years ago\", Christiano lost his daughter at the same time of year.  Tone also shared he just spent his birthday in the hospital as well.  Pt open to [facility] but could not continue visit due to need for cares.    Plan: Spiritual Health Services remains available for additional emotional/spiritual support.    Chad Rosales MA  Staff   Pager: [phone]  Phone: [phone]        "

## 2018-12-31 NOTE — PLAN OF CARE
VSS, afeb., LS are dim w/exp wheezes, 95% on 4L.  Pt cont to have 3+ LE and U/E edema. Xtra dose of Iv Lasix given this shift with good urine output. Pt up in room indep., Wife at bedside and reviewed POC, questions answered. Tele is Afib, cvr. ID consult, please see notes for plan.

## 2018-12-31 NOTE — PROGRESS NOTES
[facility]  Hospitalist Progress Note  Abby Verdugo MD, MD 12/31/2018    Reason for Stay (Diagnosis): Sepsis secondary to MSSA suspected right lower lung pneumonia         Assessment and Plan:      Summary of Stay: Mr. Cooper is a 67-year-old man with history of severe COPD intermittently on home oxygen who presented with several days of productive cough and worsening shortness of breath.  Found to be in hypoxic respiratory failure with severe sepsis secondary to pneumonia in the right lung.  He also has a had a history of atrial fibrillation with previous ablation, tricuspid valve replacement, hypertension, nonsustained ventricular tachycardia and 2 hospitalizations in the past 2 months for COPD exacerbations. Currently patient is on IV Ancef.       1.  Severe sepsis due to MSSA bacteremia and pneumonia. The source of bacteremia has been felt to be spill over from the pneumonia. MSSA in blood and sputum. Has a history of tricuspid valve repair and left total hip arthroplasty.  --Continue IV ancef.day 15/28  --Plans for extended course of IV antibiotic course.  midline placed.   --IV antibiotics with IV Ancef for outpatient infusion orders care of ID service are in place. Appreciate ID input     2.  Acute on chronic hypoxic respiratory failure.  Suspected to be due to pneumonia and COPD exacerbation.    --Initially required BiPAP. Now on oxygen via NC 3 lit/min(baseline)  -- Initially received IV steroids and now switched to oral prednisone. Will decrease prednisone to 30 mg daily  --Still has significant leg edema- suspect partly secondary to hypoalbuminemia.   --Will decrease lasix to 20 mg bid. Will monitor renal function closely.     3.  Pneumonia , RLL, likely MSSA.     --Continue IV ancef. Day 15/28 today. Culture from 12/19 negative.  ID following    4. Hypoalbuminemia: ? Nutritional versus secondary to acute illness.  Albumin checked and 2.1. Given albumin 25% x1 dose.  Nutrition  consulted for nutritional assessment and recommendations.    5.  COPD exacerbation.  --Continue antibiotics as above.  --Continue Xopenex.  --Continue Incruse Ellipta.  --Continue steroids  --We will continue oxygen supplement 3 L/min which is his baseline.     6.  Atrial fibrillation.-Rate control  --Continue diltiazem, propafenone, and apixaban. Seems adequately controlled      7.  Hyperglycemia. Likely due to steroids.now improving  --On NPH insulin  5 units daily   --NovoLog sliding scale.  --Anticipating improvement of glucose control once steroids has been tapered and discontinued.    8.  Chronic kidney disease. Creatinine at baseline and currently normal. Creatinine 1.02 today.  Will closely monitor renal function while on IV diuretic  --Avoid nephrotoxins as able.     9.  Nutrition.  -Regular diet     10.  Deconditioning.    -Physical therapy consult.    11.  Hypertension-Continue  diltiazem 360 mg daily.Hydralazine 50 mg TID. Will monitor blood pressure      Diet: Regular Diet Adult    DVT Prophylaxis: eliquis  Code Status: Full Code    Disposition: Anticipate discharge in 1-2 days if swelling improves, O2 sat remains stable, and if no new issues.        Interval History (Subjective):      Patient seen and examined.  He stated that his breathing is a little better today.  His edema is also slightly better.  Has no nausea or vomiting.  No fever.  No abdominal pain.         Physical Exam:      Last Vital Signs:  /87 (BP Location: Right arm)   Pulse 87   Temp 97.8  F (36.6  C)   Resp 18   Wt 92.4 kg (203 lb 12.8 oz)   SpO2 93%   BMI 29.24 kg/m      I/O last 3 completed shifts:  In: 240 [P.O.:240]  Out: 3900 [Urine:3900]  Wt Readings from Last 1 Encounters:   12/31/18 92.4 kg (203 lb 12.8 oz)     Vitals:    12/27/18 0501 12/28/18 0711 12/29/18 0551 12/30/18 0856   Weight: 95.4 kg (210 lb 4.8 oz) 93.5 kg (206 lb 3.2 oz) 94.2 kg (207 lb 11.2 oz) 90.7 kg (200 lb)    12/31/18 0611   Weight: 92.4 kg (203  lb 12.8 oz)       Constitutional: Awake, alert, cooperative, no apparent distress   Respiratory: Fair air entry, no crackles, scant wheezing   Cardiovascular: Irregularly irregular rhythm, normal rate, S1 and S2   Abdomen: Normal bowel sounds, soft, non-distended, non-tender   Skin: No rashes, no cyanosis, dry to touch   Neuro: Alert and oriented x3, neurologically intact, spontaneous and coherent speech   Extremities: Trace edema, normal range of motion   Other(s): Euthymic mood, not agitated       All other systems: Negative          Medications:      All current medications were reviewed with changes reflected in problem list.         Data:      All new lab and imaging data was reviewed.   Labs:  No results for input(s): CULT in the last 168 hours.  Recent Labs   Lab 12/31/18  0700 12/30/18  0655 12/29/18  0710   WBC 13.2* 13.6* 15.7*   HGB 13.3 13.8 15.0   HCT 41.8 42.8 46.7   MCV 92 92 92    230 253     Recent Labs   Lab 12/31/18  0700 12/30/18  0655 12/29/18  0710    138 137   POTASSIUM 5.0 4.7 5.0   CHLORIDE 98 98 100   CO2 34* 37* 36*   ANIONGAP 4 3 1*   * 123* 109*   BUN 36* 36* 31*   CR 1.02 1.00 0.90   GFRESTIMATED 76 78 88   GFRESTBLACK 88 >90 >90   WILBERT 8.6 8.8 8.6   ALBUMIN  --  2.1*  --      Recent Labs   Lab 12/31/18  0700 12/30/18  2125 12/30/18  1733 12/30/18  1123 12/30/18  0655 12/30/18  0202 12/29/18  2133  12/29/18  0710  12/28/18  0556  12/27/18  0652   *  --   --   --  123*  --   --   --  109*  --  117*  --  105*   BGM  --  176* 238* 166*  --  134* 135*   < >  --    < >  --    < >  --     < > = values in this interval not displayed.     No results for input(s): INR in the last 168 hours.  No results for input(s): TROPONIN, TROPI, TROPR in the last 168 hours.    Invalid input(s): TROP, TROPONINIES  No results for input(s): COLOR, APPEARANCE, URINEGLC, URINEBILI, URINEKETONE, SG, UBLD, URINEPH, PROTEIN, UROBILINOGEN, NITRITE, LEUKEST, RBCU, WBCU in the last 168 hours.    Imaging:   No results found for this or any previous visit (from the past 24 hour(s)).

## 2018-12-31 NOTE — CONSULTS
"CLINICAL NUTRITION SERVICES - BRIEF NOTE    - Received MD Consult with comments - \"hypoalbuminemia, ?malnutrition vs acute illness.  - Refer to RD reassessment completed 12/28/2018.  - Patient continues to consume % of meals TID.  An increase from home/baseline of meals BID.  Per meal system review, meals ordered/consumed in hospital setting usually contain at least 1 HBV protein source.  Patient himself reports he has had a decrease in appetite and eating less than usual.  This was not reported during reassessment as above.  Decrease in oral intakes was denied by patient at this time.  Per discussion with patient and nursing staff, he only had oatmeal for breakfast this AM.    - Only 1 recent albumin on 12/30/2018, no previous trending available.  Suspect not nutritionally related.    - However patient reports he is extremely frustrated the care at this facility and the fact no one has been able to explain the reason for his \"swelling\".   - Provided active listening and handout on protein sources.  Encouraged 2 protein sources with each meal.  Discussed availability of Boost supplement and ability to order prn if decline in oral intakes noted.  - Will continue following.      Susy Alvarado, JANE, LD  Clinical Dietitian  3rd floor/ICU: [phone]  All other floors: [phone]  Weekend/holiday: [phone]  "

## 2018-12-31 NOTE — PLAN OF CARE
Orientation: Alert and oriented x4  VSS. 94% on 3L NC. Afebrile.   Tele: A Fib CVR.   LS: diminished throughout. Dyspnea on exertion. Nonproductive cough.   GI: Passing gas. no BM. Denies N/V.   : Adequate urine output. Clear yellow  Skin: bruises noted throughout. 3-4+ pitting edema to bilateral upper and lower extremities. Weeping.   Activity: Independent. Up to bedside commode throughout shift. Pt slept comfortably throughout shift.   Pain: 8/10. Bilateral upper and lower extremity tightness/pain. Well controlled with PRN interventions. Oxycodone x1. Dilaudid x1.   Plan: Continue with current cares. Possible discharge home on extended antibiotics today    6:34 AM Nystatin swish sitting on bedside table. Not taken by patient.

## 2019-01-01 LAB
ANION GAP SERPL CALCULATED.3IONS-SCNC: 4 MMOL/L (ref 3–14)
BASOPHILS # BLD AUTO: 0 10E9/L (ref 0–0.2)
BASOPHILS NFR BLD AUTO: 0.4 %
BUN SERPL-MCNC: 32 MG/DL (ref 7–30)
CALCIUM SERPL-MCNC: 8.6 MG/DL (ref 8.5–10.1)
CHLORIDE SERPL-SCNC: 95 MMOL/L (ref 94–109)
CO2 SERPL-SCNC: 37 MMOL/L (ref 20–32)
CREAT SERPL-MCNC: 0.98 MG/DL (ref 0.66–1.25)
DIFFERENTIAL METHOD BLD: ABNORMAL
EOSINOPHIL # BLD AUTO: 0.1 10E9/L (ref 0–0.7)
EOSINOPHIL NFR BLD AUTO: 0.5 %
ERYTHROCYTE [DISTWIDTH] IN BLOOD BY AUTOMATED COUNT: 14.8 % (ref 10–15)
GFR SERPL CREATININE-BSD FRML MDRD: 79 ML/MIN/{1.73_M2}
GLUCOSE BLDC GLUCOMTR-MCNC: 108 MG/DL (ref 70–99)
GLUCOSE BLDC GLUCOMTR-MCNC: 110 MG/DL (ref 70–99)
GLUCOSE BLDC GLUCOMTR-MCNC: 121 MG/DL (ref 70–99)
GLUCOSE BLDC GLUCOMTR-MCNC: 174 MG/DL (ref 70–99)
GLUCOSE BLDC GLUCOMTR-MCNC: 198 MG/DL (ref 70–99)
GLUCOSE BLDC GLUCOMTR-MCNC: 235 MG/DL (ref 70–99)
GLUCOSE BLDC GLUCOMTR-MCNC: 260 MG/DL (ref 70–99)
GLUCOSE SERPL-MCNC: 101 MG/DL (ref 70–99)
HCT VFR BLD AUTO: 41.6 % (ref 40–53)
HGB BLD-MCNC: 13.1 G/DL (ref 13.3–17.7)
IMM GRANULOCYTES # BLD: 0.5 10E9/L (ref 0–0.4)
IMM GRANULOCYTES NFR BLD: 4.5 %
LYMPHOCYTES # BLD AUTO: 1 10E9/L (ref 0.8–5.3)
LYMPHOCYTES NFR BLD AUTO: 9.4 %
MCH RBC QN AUTO: 29.3 PG (ref 26.5–33)
MCHC RBC AUTO-ENTMCNC: 31.5 G/DL (ref 31.5–36.5)
MCV RBC AUTO: 93 FL (ref 78–100)
MONOCYTES # BLD AUTO: 0.9 10E9/L (ref 0–1.3)
MONOCYTES NFR BLD AUTO: 7.8 %
NEUTROPHILS # BLD AUTO: 8.5 10E9/L (ref 1.6–8.3)
NEUTROPHILS NFR BLD AUTO: 77.4 %
NRBC # BLD AUTO: 0 10*3/UL
NRBC BLD AUTO-RTO: 0 /100
PLATELET # BLD AUTO: 238 10E9/L (ref 150–450)
POTASSIUM SERPL-SCNC: 4.2 MMOL/L (ref 3.4–5.3)
RBC # BLD AUTO: 4.47 10E12/L (ref 4.4–5.9)
SODIUM SERPL-SCNC: 136 MMOL/L (ref 133–144)
WBC # BLD AUTO: 11 10E9/L (ref 4–11)

## 2019-01-01 PROCEDURE — 85025 COMPLETE CBC W/AUTO DIFF WBC: CPT | Performed by: INTERNAL MEDICINE

## 2019-01-01 PROCEDURE — 00000146 ZZHCL STATISTIC GLUCOSE BY METER IP

## 2019-01-01 PROCEDURE — 25000132 ZZH RX MED GY IP 250 OP 250 PS 637: Performed by: INTERNAL MEDICINE

## 2019-01-01 PROCEDURE — 36415 COLL VENOUS BLD VENIPUNCTURE: CPT | Performed by: INTERNAL MEDICINE

## 2019-01-01 PROCEDURE — 80048 BASIC METABOLIC PNL TOTAL CA: CPT | Performed by: INTERNAL MEDICINE

## 2019-01-01 PROCEDURE — 25000128 H RX IP 250 OP 636: Performed by: INTERNAL MEDICINE

## 2019-01-01 PROCEDURE — 99233 SBSQ HOSP IP/OBS HIGH 50: CPT | Performed by: INTERNAL MEDICINE

## 2019-01-01 PROCEDURE — 40000275 ZZH STATISTIC RCP TIME EA 10 MIN

## 2019-01-01 PROCEDURE — 25000125 ZZHC RX 250: Performed by: INTERNAL MEDICINE

## 2019-01-01 PROCEDURE — 94640 AIRWAY INHALATION TREATMENT: CPT | Mod: 76

## 2019-01-01 PROCEDURE — 12000000 ZZH R&B MED SURG/OB

## 2019-01-01 PROCEDURE — 94640 AIRWAY INHALATION TREATMENT: CPT

## 2019-01-01 RX ADMIN — PROPAFENONE HYDROCHLORIDE 150 MG: 150 TABLET, COATED ORAL at 21:06

## 2019-01-01 RX ADMIN — APIXABAN 5 MG: 5 TABLET, FILM COATED ORAL at 08:21

## 2019-01-01 RX ADMIN — NYSTATIN 500000 UNITS: 500000 SUSPENSION ORAL at 21:05

## 2019-01-01 RX ADMIN — CEFAZOLIN SODIUM 2 G: 2 INJECTION, SOLUTION INTRAVENOUS at 21:07

## 2019-01-01 RX ADMIN — FUROSEMIDE 20 MG: 10 INJECTION, SOLUTION INTRAMUSCULAR; INTRAVENOUS at 12:37

## 2019-01-01 RX ADMIN — NYSTATIN 500000 UNITS: 500000 SUSPENSION ORAL at 12:37

## 2019-01-01 RX ADMIN — CEFAZOLIN SODIUM 2 G: 2 INJECTION, SOLUTION INTRAVENOUS at 13:55

## 2019-01-01 RX ADMIN — LEVALBUTEROL HYDROCHLORIDE 1.25 MG: 1.25 SOLUTION RESPIRATORY (INHALATION) at 11:11

## 2019-01-01 RX ADMIN — HYDRALAZINE HYDROCHLORIDE 50 MG: 50 TABLET, FILM COATED ORAL at 21:05

## 2019-01-01 RX ADMIN — HYDRALAZINE HYDROCHLORIDE 50 MG: 50 TABLET, FILM COATED ORAL at 13:55

## 2019-01-01 RX ADMIN — APIXABAN 5 MG: 5 TABLET, FILM COATED ORAL at 21:06

## 2019-01-01 RX ADMIN — LEVALBUTEROL HYDROCHLORIDE 1.25 MG: 1.25 SOLUTION RESPIRATORY (INHALATION) at 15:04

## 2019-01-01 RX ADMIN — INSULIN ASPART 2 UNITS: 100 INJECTION, SOLUTION INTRAVENOUS; SUBCUTANEOUS at 17:12

## 2019-01-01 RX ADMIN — HYDROMORPHONE HYDROCHLORIDE 0.5 MG: 1 INJECTION, SOLUTION INTRAMUSCULAR; INTRAVENOUS; SUBCUTANEOUS at 15:14

## 2019-01-01 RX ADMIN — HYDROMORPHONE HYDROCHLORIDE 0.5 MG: 1 INJECTION, SOLUTION INTRAMUSCULAR; INTRAVENOUS; SUBCUTANEOUS at 19:11

## 2019-01-01 RX ADMIN — HYDRALAZINE HYDROCHLORIDE 50 MG: 50 TABLET, FILM COATED ORAL at 06:18

## 2019-01-01 RX ADMIN — CEFAZOLIN SODIUM 2 G: 2 INJECTION, SOLUTION INTRAVENOUS at 06:21

## 2019-01-01 RX ADMIN — NYSTATIN 500000 UNITS: 500000 SUSPENSION ORAL at 17:59

## 2019-01-01 RX ADMIN — Medication 5 MG: at 12:37

## 2019-01-01 RX ADMIN — Medication 5 MG: at 06:18

## 2019-01-01 RX ADMIN — Medication 5 MG: at 21:28

## 2019-01-01 RX ADMIN — Medication 5 MG: at 17:15

## 2019-01-01 RX ADMIN — ZOLPIDEM TARTRATE 5 MG: 5 TABLET, COATED ORAL at 21:05

## 2019-01-01 RX ADMIN — LEVALBUTEROL HYDROCHLORIDE 1.25 MG: 1.25 SOLUTION RESPIRATORY (INHALATION) at 19:39

## 2019-01-01 RX ADMIN — PREDNISONE 30 MG: 10 TABLET ORAL at 08:20

## 2019-01-01 RX ADMIN — PROPAFENONE HYDROCHLORIDE 150 MG: 150 TABLET, COATED ORAL at 08:21

## 2019-01-01 RX ADMIN — PROPAFENONE HYDROCHLORIDE 150 MG: 150 TABLET, COATED ORAL at 16:05

## 2019-01-01 RX ADMIN — NYSTATIN 500000 UNITS: 500000 SUSPENSION ORAL at 08:21

## 2019-01-01 RX ADMIN — DILTIAZEM HYDROCHLORIDE 360 MG: 180 CAPSULE, COATED, EXTENDED RELEASE ORAL at 08:20

## 2019-01-01 ASSESSMENT — ACTIVITIES OF DAILY LIVING (ADL)
ADLS_ACUITY_SCORE: 14

## 2019-01-01 NOTE — PROGRESS NOTES
[facility]  Infectious Disease Progress Note          Assessment and Plan:   IMPRESSION:   1.  A 67-year-old male with third hospitalization in the last month with hypoxic respiratory failure, probable pneumonia, Staph aureus in recent sputum and now Staph aureus in the blood.   2.  Staph aureus bacteremia, only 1 of 2 cultures positive.  Suspect this is spillover from pneumonia, but certainly at risk including a tricuspid valve.   3.  History of atrial fibrillation with rapid ventricular response.   4.  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      RECOMMENDATIONS:     1.  MSSA also in sputum,  other cultures without any other growth,  On  Ancef.  He will need an extended IV course here simply for the positive blood culture,  Given  the tricuspid valve and the hip, probably a full 4 week course of therapy.   2.  12/19 cx  negative, midline in  3. Orders for IV antibiotics in already . Day 16/28          Interval History:   no new complaints sl abd pain, Follow-up BC neg so far, sputum staph no fever no other new focal sxs, WBc normal    still hypoxic slow better              Medications:       apixaban ANTICOAGULANT  5 mg Oral BID     ceFAZolin  2 g Intravenous Q8H     diltiazem ER COATED BEADS  360 mg Oral Daily     hydrALAZINE  50 mg Oral Q8H MEGHAN     insulin aspart  1-3 Units Subcutaneous TID AC     insulin aspart  1-3 Units Subcutaneous At Bedtime     insulin isophane human  5 Units Subcutaneous Daily     levalbuterol  1.25 mg Nebulization 4x Daily     nystatin  500,000 Units Mouth/Throat 4x Daily     predniSONE  30 mg Oral Daily     propafenone  150 mg Oral TID     sodium chloride (PF)  10 mL Intracatheter Q8H     umeclidinium  1 puff Inhalation Daily     zolpidem  5 mg Oral At Bedtime                  Physical Exam:   Blood pressure 148/77, pulse 87, temperature 97.9  F (36.6  C), temperature source  Oral, resp. rate 22, weight 90.5 kg (199 lb 9.6 oz), SpO2 98 %.  Wt Readings from Last 2 Encounters:   01/01/19 90.5 kg (199 lb 9.6 oz)   12/03/18 87.5 kg (193 lb)     Vital Signs with Ranges  Temp:  [97.9  F (36.6  C)-98.2  F (36.8  C)] 97.9  F (36.6  C)  Heart Rate:  [] 61  Resp:  [22-24] 22  BP: (145-169)/(77-94) 148/77  SpO2:  [93 %-98 %] 98 %    Constitutional: Awake, alert, cooperative, no apparent distress   Lungs: Congestion to auscultation bilaterally, no crackles or wheezing hpoxia   Cardiovascular: Regular rate and rhythm, normal S1 and S2, and no murmur noted   Abdomen: Normal bowel sounds, soft, non-distended, non-tender   Skin: No rashes, no cyanosis, no edema no embolic lesions   Other:           Data:   All microbiology laboratory data reviewed.  Recent Labs   Lab Test 01/01/19  0624 12/31/18  0700 12/30/18  0655   WBC 11.0 13.2* 13.6*   HGB 13.1* 13.3 13.8   HCT 41.6 41.8 42.8   MCV 93 92 92    225 230     Recent Labs   Lab Test 01/01/19  0624 12/31/18  0700 12/30/18  0655   CR 0.98 1.02 1.00     No lab results found.  Recent Labs   Lab Test 12/19/18  1023 12/19/18  1013 12/18/18  1027 12/17/18  1518 12/17/18  1452 11/25/18  2257 10/06/18  0826 10/06/18  0822 11/10/16  1524   CULT No growth No growth Light growth  Normal tushar    Moderate growth  Staphylococcus aureus  * Cultured on the 1st day of incubation:  Staphylococcus aureus  This isolate DOES NOT demonstrate inducible clindamycin resistance in vitro. Clindamycin   is susceptible and could be used when indicated, however, erythromycin is resistant and   should not be used.  *  Critical Value/Significant Value, preliminary result only, called to and read back by  Laure Mathews RN ICU @ 0335 12.18.18 JE    (Note)  POSITIVE for STAPHYLOCOCCUS AUREUS and NEGATIVE for the mecA gene  (not MRSA) by Book A Boatigene multiplex nucleic acid test. The mecA gene was  not detected. Final identification and antimicrobial susceptibility  testing will  be verified by standard methods.    Specimen tested with iTiffinigene multiplex, gram-positive blood culture  nucleic acid test for the following targets: Staph aureus, Staph  epidermidis, Staph lugdunensis, other Staph species, Enterococcus  faecalis, Enterococcus faecium, Streptococcus species, S. agalactiae,  S. anginosus grp., S. pneumoniae, S. pyogenes, Listeria sp., mecA  (methicillin resistance) and Steven/B (vancomycin resistance).    Critical Value/Significant Value called to and read back [facility] 12.18.18 PATSY.   No growth Heavy growth  Normal tushar    Moderate growth  Staphylococcus aureus  This isolate DOES NOT demonstrate inducible clindamycin resistance in vitro. Clindamycin   is susceptible and could be used when indicated, however, erythromycin is resistant and   should not be used.  * No growth No growth No anaerobes isolated  Moderate growth Staphylococcus aureus This isolate DOES NOT demonstrate inducible   clindamycin resistance in vitro. Clindamycin is susceptible and could be used   when indicated, however, erythromycin is resistant and should not be used.  *  Canceled, Test credited Test reordered as correct code REORDERED AS AN ABSCESS   CULTURE

## 2019-01-01 NOTE — PLAN OF CARE
VSS alert/oriented complain of pain treated with oxycodone and Iv dilaudid for break through pain. On 3L oxygen vis nasal cannular ambulates with SBA, on Iv ancef and lasix with good urine out put blood glucose monitoring.  ID consulting on eliquis. Tele Afib CVR.

## 2019-01-01 NOTE — PROGRESS NOTES
[facility]  Hospitalist Progress Note  Abby Verdugo MD, MD 01/01/2019    Reason for Stay (Diagnosis): Sepsis secondary to MSSA suspected right lower lung pneumonia         Assessment and Plan:      Summary of Stay: Mr. Cooper is a 67-year-old man with history of severe COPD intermittently on home oxygen who presented with several days of productive cough and worsening shortness of breath.  Found to be in hypoxic respiratory failure with severe sepsis secondary to pneumonia in the right lung.  He also has a had a history of atrial fibrillation with previous ablation, tricuspid valve replacement, hypertension, nonsustained ventricular tachycardia and 2 hospitalizations in the past 2 months for COPD exacerbations. Currently patient is on IV Ancef.       1.  Severe sepsis due to MSSA bacteremia and pneumonia. The source of bacteremia has been felt to be spill over from the pneumonia. MSSA in blood and sputum. Has a history of tricuspid valve repair and left total hip arthroplasty.  --Continue IV ancef.day 16/28  --Plans for extended course of IV antibiotic course.  midline placed.   --IV antibiotics with IV Ancef for outpatient infusion orders care of ID service are in place. Appreciate ID input     2.  Acute on chronic hypoxic respiratory failure.  Suspected to be due to pneumonia and COPD exacerbation.    --Initially required BiPAP. Now on oxygen via NC 3 lit/min(baseline)  -- Initially received IV steroids and now switched to oral prednisone. Will decrease prednisone to 30 mg daily  --Leg edema and arm edema slowly improving. CXR negative for pulmonary edema. suspect partly secondary to hypoalbuminemia. Will give additional dose of albumin 25% today to mobilize fluid.   --Will continue lasix to 20 mg bid. Will monitor renal function closely. Will likely need low dose lasix on discharge.     3.  Pneumonia , RLL, likely MSSA.     --Continue IV ancef. Day 15/28 today. Culture from 12/19  negative.  ID following    4.  Edema, suspect secondary to aggressive fluid resuscitation for sepsis, hypoalbuminemia:   --Doubt congestive heart failure.  Chest x-ray showed improving right basilar infiltrate otherwise no evidence of pulmonary vascular congestion  --Patient was given Lasix 40 mg IV daily and dose changed to 20 mg IV twice daily.  Edema slowly improving.  Weight also improving but still above his baseline. Albumin checked and 2.1.   --Given albumin 25% x1 dose on 12/30 to mobilize fluid.  --Nutrition consulted for nutritional assessment and was started on high protein diet     5.  COPD exacerbation.  Breathing improving  --No wheezes today  --Continue antibiotics as above.  --Continue Xopenex.  --Continue Incruse Ellipta.  --Prednisone decreased to 30 mg daily.  Will continue prednisone taper  --Will continue oxygen supplement 3 L/min which is his baseline.     6.  Atrial fibrillation.-Rate control  --Continue diltiazem, propafenone, and apixaban. Seems adequately controlled      7.  Hyperglycemia. Likely due to steroids.now improving  --On NPH insulin  5 units daily   --NovoLog sliding scale.  --Anticipating improvement of glucose control once steroids has been tapered and discontinued.    8.  Chronic kidney disease. Creatinine at baseline and currently normal. Creatinine 0.98 today.  Will closely monitor renal function while on IV diuretic  --Avoid nephrotoxins as able.     9.  Nutrition.  -Regular diet.  Appreciate dietitian input     10.  Deconditioning.    -Physical therapy consult.    11.  Hypertension-Continue  diltiazem 360 mg daily. Hydralazine 50 mg TID. Will monitor blood pressure      Diet: Regular Diet Adult    DVT Prophylaxis: eliquis  Code Status: Full Code    Disposition: Anticipate discharge tomorrow if he continues to improve and breathing is stable.        Interval History (Subjective):      Patient seen and examined.  He stated that he is feeling much better today.  Breathing  stable.  No cough or shortness of breath.  Edema improving.  No fever.  No nausea or vomiting.         Physical Exam:      Last Vital Signs:  /84 (BP Location: Right arm)   Pulse 87   Temp 97.9  F (36.6  C) (Oral)   Resp 22   Wt 90.5 kg (199 lb 9.6 oz)   SpO2 94%   BMI 28.64 kg/m      I/O last 3 completed shifts:  In: 300 [P.O.:300]  Out: 2450 [Urine:2450]  Wt Readings from Last 1 Encounters:   01/01/19 90.5 kg (199 lb 9.6 oz)     Vitals:    12/28/18 0711 12/29/18 0551 12/30/18 0856 12/31/18 0611   Weight: 93.5 kg (206 lb 3.2 oz) 94.2 kg (207 lb 11.2 oz) 90.7 kg (200 lb) 92.4 kg (203 lb 12.8 oz)    01/01/19 0543   Weight: 90.5 kg (199 lb 9.6 oz)       Constitutional: Awake, alert, cooperative, no apparent distress   Respiratory: Fair air entry, no crackles, scant wheezing   Cardiovascular: Irregularly irregular rhythm, normal rate, S1 and S2   Abdomen: Normal bowel sounds, soft, non-distended, non-tender   Skin: No rashes, no cyanosis, dry to touch   Neuro: Alert and oriented x3, neurologically intact, spontaneous and coherent speech   Extremities: Trace edema, normal range of motion   Other(s): Euthymic mood, not agitated       All other systems: Negative          Medications:      All current medications were reviewed with changes reflected in problem list.         Data:      All new lab and imaging data was reviewed.   Labs:  No results for input(s): CULT in the last 168 hours.  Recent Labs   Lab 01/01/19  0624 12/31/18  0700 12/30/18  0655   WBC 11.0 13.2* 13.6*   HGB 13.1* 13.3 13.8   HCT 41.6 41.8 42.8   MCV 93 92 92    225 230     Recent Labs   Lab 01/01/19  0624 12/31/18  0700 12/30/18  0655    136 138   POTASSIUM 4.2 5.0 4.7   CHLORIDE 95 98 98   CO2 37* 34* 37*   ANIONGAP 4 4 3   * 112* 123*   BUN 32* 36* 36*   CR 0.98 1.02 1.00   GFRESTIMATED 79 76 78   GFRESTBLACK >90 88 >90   WILBERT 8.6 8.6 8.8   ALBUMIN  --   --  2.1*     Recent Labs   Lab 01/01/19  0624 01/01/19  0217  12/31/18  2130 12/31/18  1706 12/31/18  1041 12/31/18  0700 12/31/18  0201  12/30/18  0655  12/29/18  0710  12/28/18  0556   *  --   --   --   --  112*  --   --  123*  --  109*  --  117*   BGM  --  174* 198* 235* 108*  --  121*   < >  --    < >  --    < >  --     < > = values in this interval not displayed.     No results for input(s): INR in the last 168 hours.  No results for input(s): TROPONIN, TROPI, TROPR in the last 168 hours.    Invalid input(s): TROP, TROPONINIES  No results for input(s): COLOR, APPEARANCE, URINEGLC, URINEBILI, URINEKETONE, SG, UBLD, URINEPH, PROTEIN, UROBILINOGEN, NITRITE, LEUKEST, RBCU, WBCU in the last 168 hours.   Imaging:   No results found for this or any previous visit (from the past 24 hour(s)).

## 2019-01-01 NOTE — PLAN OF CARE
VSS, afeb, LS are dim, 93% on 3L. Pt recieves oxy and dilauded for pain. Pt is up indep in room, uses BSC. Good urine output this shift. Midline to  L arm flushes well. Cont on IV Lasix and IV Ancef. Plan to discontinue home w/ home infusion in 1-2 days. Tele is Afib.

## 2019-01-01 NOTE — PLAN OF CARE
Pain: Has c/o pain in arms d/t edema. PRN pain medications available as needed.  Also has sores in his mouth on tongue and cheeks.  Nystatin given per orders.  LOC: alert and oriented  Mobility: Independent in room  Lungs: ERICKSON, dim, clear. 97% 3L  GI: Regular diet  IV: Midline saline locked  Other: generalized edema continues, but pt feels like it is improving.  Ancef, nystatin, prednisone per orders.  Blood sugar checks/insulin.  I.D. Following.  Discharge home when ready with long-term IV abx therapy.

## 2019-01-02 LAB
GLUCOSE BLDC GLUCOMTR-MCNC: 114 MG/DL (ref 70–99)
GLUCOSE BLDC GLUCOMTR-MCNC: 157 MG/DL (ref 70–99)
GLUCOSE BLDC GLUCOMTR-MCNC: 182 MG/DL (ref 70–99)

## 2019-01-02 PROCEDURE — 25000132 ZZH RX MED GY IP 250 OP 250 PS 637: Performed by: INTERNAL MEDICINE

## 2019-01-02 PROCEDURE — 25000128 H RX IP 250 OP 636: Performed by: HOSPITALIST

## 2019-01-02 PROCEDURE — 00000146 ZZHCL STATISTIC GLUCOSE BY METER IP

## 2019-01-02 PROCEDURE — 99233 SBSQ HOSP IP/OBS HIGH 50: CPT | Performed by: HOSPITALIST

## 2019-01-02 PROCEDURE — 12000000 ZZH R&B MED SURG/OB

## 2019-01-02 PROCEDURE — P9047 ALBUMIN (HUMAN), 25%, 50ML: HCPCS | Performed by: HOSPITALIST

## 2019-01-02 PROCEDURE — 25000128 H RX IP 250 OP 636: Performed by: INTERNAL MEDICINE

## 2019-01-02 PROCEDURE — 25000125 ZZHC RX 250: Performed by: INTERNAL MEDICINE

## 2019-01-02 PROCEDURE — 94640 AIRWAY INHALATION TREATMENT: CPT

## 2019-01-02 PROCEDURE — 40000275 ZZH STATISTIC RCP TIME EA 10 MIN

## 2019-01-02 PROCEDURE — 94640 AIRWAY INHALATION TREATMENT: CPT | Mod: 76

## 2019-01-02 RX ORDER — FUROSEMIDE 10 MG/ML
40 INJECTION INTRAMUSCULAR; INTRAVENOUS EVERY 8 HOURS
Status: DISCONTINUED | OUTPATIENT
Start: 2019-01-02 | End: 2019-01-03

## 2019-01-02 RX ORDER — ALBUMIN (HUMAN) 12.5 G/50ML
25 SOLUTION INTRAVENOUS EVERY 8 HOURS
Status: DISCONTINUED | OUTPATIENT
Start: 2019-01-02 | End: 2019-01-03

## 2019-01-02 RX ADMIN — APIXABAN 5 MG: 5 TABLET, FILM COATED ORAL at 11:06

## 2019-01-02 RX ADMIN — APIXABAN 5 MG: 5 TABLET, FILM COATED ORAL at 21:25

## 2019-01-02 RX ADMIN — PROPAFENONE HYDROCHLORIDE 150 MG: 150 TABLET, COATED ORAL at 21:25

## 2019-01-02 RX ADMIN — HYDRALAZINE HYDROCHLORIDE 50 MG: 50 TABLET, FILM COATED ORAL at 05:17

## 2019-01-02 RX ADMIN — ACETAMINOPHEN 650 MG: 325 TABLET, FILM COATED ORAL at 14:46

## 2019-01-02 RX ADMIN — ALBUMIN HUMAN 25 G: 0.25 SOLUTION INTRAVENOUS at 12:42

## 2019-01-02 RX ADMIN — Medication 5 MG: at 14:46

## 2019-01-02 RX ADMIN — Medication 5 MG: at 19:44

## 2019-01-02 RX ADMIN — PROPAFENONE HYDROCHLORIDE 150 MG: 150 TABLET, COATED ORAL at 16:37

## 2019-01-02 RX ADMIN — ALBUMIN HUMAN 25 G: 0.25 SOLUTION INTRAVENOUS at 22:09

## 2019-01-02 RX ADMIN — CEFAZOLIN SODIUM 2 G: 2 INJECTION, SOLUTION INTRAVENOUS at 05:17

## 2019-01-02 RX ADMIN — NYSTATIN 500000 UNITS: 500000 SUSPENSION ORAL at 16:38

## 2019-01-02 RX ADMIN — HYDRALAZINE HYDROCHLORIDE 50 MG: 50 TABLET, FILM COATED ORAL at 21:24

## 2019-01-02 RX ADMIN — FUROSEMIDE 40 MG: 10 INJECTION, SOLUTION INTRAMUSCULAR; INTRAVENOUS at 21:25

## 2019-01-02 RX ADMIN — LEVALBUTEROL HYDROCHLORIDE 1.25 MG: 1.25 SOLUTION RESPIRATORY (INHALATION) at 15:32

## 2019-01-02 RX ADMIN — Medication 5 MG: at 05:17

## 2019-01-02 RX ADMIN — CEFAZOLIN SODIUM 2 G: 2 INJECTION, SOLUTION INTRAVENOUS at 21:29

## 2019-01-02 RX ADMIN — PREDNISONE 30 MG: 10 TABLET ORAL at 08:33

## 2019-01-02 RX ADMIN — DILTIAZEM HYDROCHLORIDE 360 MG: 180 CAPSULE, COATED, EXTENDED RELEASE ORAL at 08:33

## 2019-01-02 RX ADMIN — NYSTATIN 500000 UNITS: 500000 SUSPENSION ORAL at 21:24

## 2019-01-02 RX ADMIN — HYDRALAZINE HYDROCHLORIDE 50 MG: 50 TABLET, FILM COATED ORAL at 14:29

## 2019-01-02 RX ADMIN — PROPAFENONE HYDROCHLORIDE 150 MG: 150 TABLET, COATED ORAL at 08:33

## 2019-01-02 RX ADMIN — ZOLPIDEM TARTRATE 5 MG: 5 TABLET, COATED ORAL at 22:08

## 2019-01-02 RX ADMIN — NYSTATIN 500000 UNITS: 500000 SUSPENSION ORAL at 08:34

## 2019-01-02 RX ADMIN — FUROSEMIDE 40 MG: 10 INJECTION, SOLUTION INTRAMUSCULAR; INTRAVENOUS at 14:28

## 2019-01-02 RX ADMIN — LEVALBUTEROL HYDROCHLORIDE 1.25 MG: 1.25 SOLUTION RESPIRATORY (INHALATION) at 19:27

## 2019-01-02 RX ADMIN — INSULIN ASPART 3 UNITS: 100 INJECTION, SOLUTION INTRAVENOUS; SUBCUTANEOUS at 16:38

## 2019-01-02 RX ADMIN — Medication 5 MG: at 10:03

## 2019-01-02 RX ADMIN — ACETAMINOPHEN 650 MG: 325 TABLET, FILM COATED ORAL at 10:03

## 2019-01-02 RX ADMIN — HYDROMORPHONE HYDROCHLORIDE 0.5 MG: 1 INJECTION, SOLUTION INTRAMUSCULAR; INTRAVENOUS; SUBCUTANEOUS at 00:31

## 2019-01-02 RX ADMIN — CEFAZOLIN SODIUM 2 G: 2 INJECTION, SOLUTION INTRAVENOUS at 14:28

## 2019-01-02 ASSESSMENT — ACTIVITIES OF DAILY LIVING (ADL)
ADLS_ACUITY_SCORE: 14

## 2019-01-02 NOTE — PROGRESS NOTES
Contacted Kayla ([phone]) spoke with Brenna to update on pt's possible discharge today. They have arranged for [facility] to follow pt for home infusion. Need to fax discharge orders & summary along with PICC/midline report to Kayla at [phone] to initiate mixing of antibiotic for infusion. Updated MD via web-based paging.     CM will continue to follow patient until discharge for any additional needs.     Dulce Denney RN, BSN, CTS  [facility]  [phone]

## 2019-01-02 NOTE — PROGRESS NOTES
[facility]  Infectious Disease Progress Note          Assessment and Plan:   IMPRESSION:   1.  A 67-year-old male with third hospitalization in the last month with hypoxic respiratory failure, probable pneumonia, Staph aureus in recent sputum and now Staph aureus in the blood.   2.  Staph aureus bacteremia, only 1 of 2 cultures positive.  Suspect this is spillover from pneumonia, but certainly at risk including a tricuspid valve.   3.  History of atrial fibrillation with rapid ventricular response.   4.  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      RECOMMENDATIONS:     1.  MSSA also in sputum,  other cultures without any other growth,  On  Ancef.  He will need an extended IV course here simply for the positive blood culture,  Given  the tricuspid valve and the hip, probably a full 4 week course of therapy.   2.  12/19 cx  negative, midline in  3. Orders for IV antibiotics in already . Day 17/28          Interval History:   no new complaints sl abd pain, Follow-up BC neg so far, sputum staph no fever no other new focal sxs, WBc normal    still hypoxic slow better              Medications:       albumin human  25 g Intravenous Q8H     apixaban ANTICOAGULANT  5 mg Oral BID     ceFAZolin  2 g Intravenous Q8H     diltiazem ER COATED BEADS  360 mg Oral Daily     furosemide  40 mg Intravenous Q8H     hydrALAZINE  50 mg Oral Q8H MEGHAN     insulin aspart  1-3 Units Subcutaneous TID AC     insulin aspart  1-3 Units Subcutaneous At Bedtime     insulin isophane human  5 Units Subcutaneous Daily     levalbuterol  1.25 mg Nebulization 4x Daily     nystatin  500,000 Units Mouth/Throat 4x Daily     predniSONE  30 mg Oral Daily     propafenone  150 mg Oral TID     sodium chloride (PF)  10 mL Intracatheter Q8H     umeclidinium  1 puff Inhalation Daily     zolpidem  5 mg Oral At Bedtime                  Physical Exam:    Blood pressure (!) 145/99, pulse 87, temperature 98.1  F (36.7  C), temperature source Oral, resp. rate 18, weight 90.1 kg (198 lb 9.6 oz), SpO2 99 %.  Wt Readings from Last 2 Encounters:   01/02/19 90.1 kg (198 lb 9.6 oz)   12/03/18 87.5 kg (193 lb)     Vital Signs with Ranges  Temp:  [98.1  F (36.7  C)-98.3  F (36.8  C)] 98.1  F (36.7  C)  Heart Rate:  [60-90] 71  Resp:  [18-22] 18  BP: (136-168)/(91-99) 145/99  SpO2:  [92 %-99 %] 99 %    Constitutional: Awake, alert, cooperative, no apparent distress   Lungs: Congestion to auscultation bilaterally, no crackles or wheezing hpoxia   Cardiovascular: Regular rate and rhythm, normal S1 and S2, and no murmur noted   Abdomen: Normal bowel sounds, soft, non-distended, non-tender   Skin: No rashes, no cyanosis, no edema no embolic lesions   Other:           Data:   All microbiology laboratory data reviewed.  Recent Labs   Lab Test 01/01/19  0624 12/31/18  0700 12/30/18  0655   WBC 11.0 13.2* 13.6*   HGB 13.1* 13.3 13.8   HCT 41.6 41.8 42.8   MCV 93 92 92    225 230     Recent Labs   Lab Test 01/01/19  0624 12/31/18  0700 12/30/18  0655   CR 0.98 1.02 1.00     No lab results found.  Recent Labs   Lab Test 12/19/18  1023 12/19/18  1013 12/18/18  1027 12/17/18  1518 12/17/18  1452 11/25/18  2257 10/06/18  0826 10/06/18  0822 11/10/16  1524   CULT No growth No growth Light growth  Normal tushar    Moderate growth  Staphylococcus aureus  * Cultured on the 1st day of incubation:  Staphylococcus aureus  This isolate DOES NOT demonstrate inducible clindamycin resistance in vitro. Clindamycin   is susceptible and could be used when indicated, however, erythromycin is resistant and   should not be used.  *  Critical Value/Significant Value, preliminary result only, called to and read back by  Laure SANDOVAL @ 5149 12.18.18 JE    (Note)  POSITIVE for STAPHYLOCOCCUS AUREUS and NEGATIVE for the mecA gene  (not MRSA) by [employer] multiplex nucleic acid test. The mecA gene  was  not detected. Final identification and antimicrobial susceptibility  testing will be verified by standard methods.    Specimen tested with [employer] multiplex, gram-positive blood culture  nucleic acid test for the following targets: Staph aureus, Staph  epidermidis, Staph lugdunensis, other Staph species, Enterococcus  faecalis, Enterococcus faecium, Streptococcus species, S. agalactiae,  S. anginosus grp., S. pneumoniae, S. pyogenes, Listeria sp., mecA  (methicillin resistance) and Steven/B (vancomycin resistance).    Critical Value/Significant Value called to and read back by Laure Mathews RN [facility] 1652 12.18.18 PATSY.   No growth Heavy growth  Normal tushar    Moderate growth  Staphylococcus aureus  This isolate DOES NOT demonstrate inducible clindamycin resistance in vitro. Clindamycin   is susceptible and could be used when indicated, however, erythromycin is resistant and   should not be used.  * No growth No growth No anaerobes isolated  Moderate growth Staphylococcus aureus This isolate DOES NOT demonstrate inducible   clindamycin resistance in vitro. Clindamycin is susceptible and could be used   when indicated, however, erythromycin is resistant and should not be used.  *  Canceled, Test credited Test reordered as correct code REORDERED AS AN ABSCESS   CULTURE

## 2019-01-02 NOTE — PLAN OF CARE
VSS alert/oriented complain of pain treated with oxycodone and IV dilaudid for break through pain. On 3L oxygen vis nasal cannular ambulates with SBA, on IV ancef and lasix with good urine out put blood glucose monitoring.  ID consulting on eliquis. Midline catheter on left arm, Tele Afib CVR.

## 2019-01-02 NOTE — PLAN OF CARE
Pt vss, LS  are dim, 96% on 3L. Good appetite, recieves po oxy and dilauded  for pain. IV Ancef Q8H, pt will be discharged home with his midline, and receive home infusions x2 weeks. Pt up indep in room, UE and LE edema, 3+. BG at dinner was 260. Tele is Afib.

## 2019-01-02 NOTE — PROGRESS NOTES
Breathing at baseline.  Is always short of breath  Up independently in room  On IV ancef.   Anticipates discharge today--abx rec per ID>    Has peripheral edema in arms and legs.  Diuresing-->albumin iv first then iv lasix.  NOT discharging today.  Soon.

## 2019-01-02 NOTE — PROGRESS NOTES
[facility]    Hospitalist Progress Note  Name: Tone Cooper    MRN: [id number]  Provider:  Christiano Miller DO, MPH  Date of Service: 01/02/2019    Summary of Stay: Summary of Stay: Mr. Cooper is a 67-year-old man with history of severe COPD intermittently on home oxygen who presented with several days of productive cough and worsening shortness of breath.  Found to be in hypoxic respiratory failure with severe sepsis secondary to pneumonia in the right lung.  He also has a had a history of atrial fibrillation with previous ablation, tricuspid valve replacement, hypertension, nonsustained ventricular tachycardia and 2 hospitalizations in the past 2 months for COPD exacerbations. Currently patient is on IV Ancef.       1.  Severe sepsis due to MSSA bacteremia and pneumonia. The source of bacteremia has been felt to be spill over from the pneumonia. MSSA in blood and sputum. Has a history of tricuspid valve repair and left total hip arthroplasty.  --Continue IV ancef.day 16/28  --Plans for extended course of IV antibiotic course.  Midline placed.   --IV antibiotics with IV Ancef for outpatient infusion orders care of ID service are in place. Appreciate ID input     2.  Acute on chronic hypoxic respiratory failure.  Suspected to be due to pneumonia and COPD exacerbation.    --Initially required BiPAP. Now on oxygen via NC 3 lit/min(baseline)     3.  Pneumonia , RLL, likely MSSA.     --Continue IV ancef. Needs 28 total days. Culture from 12/19 negative.  ID following     4.  Iatrogenic volume overload, suspect secondary to aggressive fluid resuscitation for sepsis and hypoalbuminemia:   --Doubt congestive heart failure.  Chest x-ray showed improving right basilar infiltrate otherwise no evidence of pulmonary vascular congestion.  --No significant improvement so start scheduled IV albumin followed by IV lasix to mobilize fluid.     5.  COPD exacerbation.  Breathing improving  --Continue antibiotics as  above.  --Continue Xopenex.  --Continue Incruse Ellipta.  --Prednisone decreased to 30 mg daily.  Will continue prednisone taper  --Will continue oxygen supplement 3 L/min which is his baseline.     6.  Atrial fibrillation.-Rate control  --Continue diltiazem, propafenone, and apixaban. Seems adequately controlled      7.  Hyperglycemia. Likely due to steroids.now improving  --On NPH insulin  5 units daily. .  --NovoLog sliding scale.  --Anticipating improvement of glucose control once steroids has been tapered and discontinued.     8.  Chronic kidney disease. Creatinine at baseline and currently normal. Creatinine normal today.  Will closely monitor renal function while on IV diuretic.  --Avoid nephrotoxins as able.     9.  Nutrition.  -Regular diet.  Appreciate dietitian input     10.  Deconditioning.    -Physical therapy consult.     11.  Hypertension-Continue  diltiazem 360 mg daily. Hydralazine 50 mg TID. Will monitor blood pressure.    DVT Prophylaxis: NOAC.  Code Status: Full Code  Disposition: Expected discharge in 1-2 days to home. Goals prior to discharge include diuresis.   Incidental Findings: None.  Family updated today: No     Interval History   Assumed care from previous hospitalist. The history was fully reviewed.  The patient reports doing well. Only complaint is extremity swelling and weight gain. No chest pain or shortness of breath. No nausea, vomiting, diarrhea, constipation. No fevers. No other specific complaints identified.     -Data reviewed today: I personally reviewed all new labs and imaging results over the last 24 hours.     Physical Exam   Temp: 98.1  F (36.7  C) Temp src: Oral BP: (!) 145/99   Heart Rate: 71 Resp: 18 SpO2: 99 % O2 Device: Nasal cannula Oxygen Delivery: 3 LPM  Vitals:    12/31/18 0611 01/01/19 0543 01/02/19 0432   Weight: 92.4 kg (203 lb 12.8 oz) 90.5 kg (199 lb 9.6 oz) 90.1 kg (198 lb 9.6 oz)     Vital Signs with Ranges  Temp:  [98.1  F (36.7  C)-98.3  F (36.8  C)]  98.1  F (36.7  C)  Heart Rate:  [60-90] 71  Resp:  [18-22] 18  BP: (136-168)/(77-99) 145/99  SpO2:  [92 %-99 %] 99 %  I/O last 3 completed shifts:  In: 465 [P.O.:465]  Out: 600 [Urine:600]    GENERAL: No apparent distress. Awake, alert, and fully oriented.  HEENT: Normocephalic, atraumatic. Extraocular movements intact.  CARDIOVASCULAR: Regular rate and rhythm without murmurs or rubs. No S3.  PULMONARY: Clear bilaterally.  GASTROINTESTINAL: Soft, non-tender, non-distended. Bowel sounds normoactive.   EXTREMITIES: No cyanosis or clubbing. 2+ BL LE edema.  NEUROLOGICAL: CN 2-12 grossly intact, no focal neurological deficits.  DERMATOLOGICAL: No rash, ulcer, bruising, nor jaundice.     Medications     - MEDICATION INSTRUCTIONS -         albumin human  25 g Intravenous Q8H     apixaban ANTICOAGULANT  5 mg Oral BID     ceFAZolin  2 g Intravenous Q8H     diltiazem ER COATED BEADS  360 mg Oral Daily     furosemide  40 mg Intravenous Q8H     hydrALAZINE  50 mg Oral Q8H MEGHAN     insulin aspart  1-3 Units Subcutaneous TID AC     insulin aspart  1-3 Units Subcutaneous At Bedtime     insulin isophane human  5 Units Subcutaneous Daily     levalbuterol  1.25 mg Nebulization 4x Daily     nystatin  500,000 Units Mouth/Throat 4x Daily     predniSONE  30 mg Oral Daily     propafenone  150 mg Oral TID     sodium chloride (PF)  10 mL Intracatheter Q8H     umeclidinium  1 puff Inhalation Daily     zolpidem  5 mg Oral At Bedtime     Data     Laboratory:  Recent Labs   Lab 01/01/19  0624 12/31/18  0700 12/30/18  0655   WBC 11.0 13.2* 13.6*   HGB 13.1* 13.3 13.8   HCT 41.6 41.8 42.8   MCV 93 92 92    225 230     Recent Labs   Lab 01/01/19  0624 12/31/18  0700 12/30/18  0655    136 138   POTASSIUM 4.2 5.0 4.7   CHLORIDE 95 98 98   CO2 37* 34* 37*   ANIONGAP 4 4 3   * 112* 123*   BUN 32* 36* 36*   CR 0.98 1.02 1.00   GFRESTIMATED 79 76 78   GFRESTBLACK >90 88 >90   WILBERT 8.6 8.6 8.8     No results for input(s): CULT in the  last 168 hours.    Imaging:  No results found for this or any previous visit (from the past 24 hour(s)).      Christiano Miller DO MPH  [facility] Hospitalist  [address] [place] [address]  Pager: [phone]  01/02/2019

## 2019-01-03 LAB
ANION GAP SERPL CALCULATED.3IONS-SCNC: 2 MMOL/L (ref 3–14)
BUN SERPL-MCNC: 27 MG/DL (ref 7–30)
CALCIUM SERPL-MCNC: 9.1 MG/DL (ref 8.5–10.1)
CHLORIDE SERPL-SCNC: 95 MMOL/L (ref 94–109)
CO2 SERPL-SCNC: 41 MMOL/L (ref 20–32)
CREAT SERPL-MCNC: 1.01 MG/DL (ref 0.66–1.25)
ERYTHROCYTE [DISTWIDTH] IN BLOOD BY AUTOMATED COUNT: 14.9 % (ref 10–15)
GFR SERPL CREATININE-BSD FRML MDRD: 77 ML/MIN/{1.73_M2}
GLUCOSE BLDC GLUCOMTR-MCNC: 124 MG/DL (ref 70–99)
GLUCOSE BLDC GLUCOMTR-MCNC: 124 MG/DL (ref 70–99)
GLUCOSE BLDC GLUCOMTR-MCNC: 129 MG/DL (ref 70–99)
GLUCOSE BLDC GLUCOMTR-MCNC: 136 MG/DL (ref 70–99)
GLUCOSE BLDC GLUCOMTR-MCNC: 155 MG/DL (ref 70–99)
GLUCOSE BLDC GLUCOMTR-MCNC: 161 MG/DL (ref 70–99)
GLUCOSE BLDC GLUCOMTR-MCNC: 277 MG/DL (ref 70–99)
GLUCOSE BLDC GLUCOMTR-MCNC: 362 MG/DL (ref 70–99)
GLUCOSE BLDC GLUCOMTR-MCNC: 99 MG/DL (ref 70–99)
GLUCOSE SERPL-MCNC: 108 MG/DL (ref 70–99)
HCT VFR BLD AUTO: 36.5 % (ref 40–53)
HGB BLD-MCNC: 11.4 G/DL (ref 13.3–17.7)
MCH RBC QN AUTO: 29.4 PG (ref 26.5–33)
MCHC RBC AUTO-ENTMCNC: 31.2 G/DL (ref 31.5–36.5)
MCV RBC AUTO: 94 FL (ref 78–100)
PLATELET # BLD AUTO: 227 10E9/L (ref 150–450)
POTASSIUM SERPL-SCNC: 3.8 MMOL/L (ref 3.4–5.3)
RBC # BLD AUTO: 3.88 10E12/L (ref 4.4–5.9)
SODIUM SERPL-SCNC: 138 MMOL/L (ref 133–144)
WBC # BLD AUTO: 8.2 10E9/L (ref 4–11)

## 2019-01-03 PROCEDURE — 25000125 ZZHC RX 250: Performed by: INTERNAL MEDICINE

## 2019-01-03 PROCEDURE — 40000275 ZZH STATISTIC RCP TIME EA 10 MIN

## 2019-01-03 PROCEDURE — 25000132 ZZH RX MED GY IP 250 OP 250 PS 637: Performed by: INTERNAL MEDICINE

## 2019-01-03 PROCEDURE — 00000146 ZZHCL STATISTIC GLUCOSE BY METER IP

## 2019-01-03 PROCEDURE — 25000128 H RX IP 250 OP 636: Performed by: INTERNAL MEDICINE

## 2019-01-03 PROCEDURE — 80048 BASIC METABOLIC PNL TOTAL CA: CPT | Performed by: HOSPITALIST

## 2019-01-03 PROCEDURE — P9047 ALBUMIN (HUMAN), 25%, 50ML: HCPCS | Performed by: HOSPITALIST

## 2019-01-03 PROCEDURE — 25000128 H RX IP 250 OP 636: Performed by: HOSPITALIST

## 2019-01-03 PROCEDURE — 85027 COMPLETE CBC AUTOMATED: CPT | Performed by: HOSPITALIST

## 2019-01-03 PROCEDURE — 99232 SBSQ HOSP IP/OBS MODERATE 35: CPT | Performed by: INTERNAL MEDICINE

## 2019-01-03 PROCEDURE — 94640 AIRWAY INHALATION TREATMENT: CPT

## 2019-01-03 PROCEDURE — 12000000 ZZH R&B MED SURG/OB

## 2019-01-03 RX ORDER — FUROSEMIDE 10 MG/ML
40 INJECTION INTRAMUSCULAR; INTRAVENOUS
Status: DISCONTINUED | OUTPATIENT
Start: 2019-01-04 | End: 2019-01-05

## 2019-01-03 RX ADMIN — DILTIAZEM HYDROCHLORIDE 360 MG: 180 CAPSULE, COATED, EXTENDED RELEASE ORAL at 08:12

## 2019-01-03 RX ADMIN — NYSTATIN 500000 UNITS: 500000 SUSPENSION ORAL at 21:49

## 2019-01-03 RX ADMIN — NYSTATIN 500000 UNITS: 500000 SUSPENSION ORAL at 13:05

## 2019-01-03 RX ADMIN — CEFAZOLIN SODIUM 2 G: 2 INJECTION, SOLUTION INTRAVENOUS at 21:50

## 2019-01-03 RX ADMIN — FUROSEMIDE 40 MG: 10 INJECTION, SOLUTION INTRAMUSCULAR; INTRAVENOUS at 13:05

## 2019-01-03 RX ADMIN — HYDRALAZINE HYDROCHLORIDE 50 MG: 50 TABLET, FILM COATED ORAL at 05:53

## 2019-01-03 RX ADMIN — Medication 5 MG: at 11:59

## 2019-01-03 RX ADMIN — ALBUMIN HUMAN 25 G: 0.25 SOLUTION INTRAVENOUS at 11:59

## 2019-01-03 RX ADMIN — INSULIN ASPART 2 UNITS: 100 INJECTION, SOLUTION INTRAVENOUS; SUBCUTANEOUS at 17:09

## 2019-01-03 RX ADMIN — CEFAZOLIN SODIUM 2 G: 2 INJECTION, SOLUTION INTRAVENOUS at 13:16

## 2019-01-03 RX ADMIN — ALBUMIN HUMAN 25 G: 0.25 SOLUTION INTRAVENOUS at 03:58

## 2019-01-03 RX ADMIN — HYDRALAZINE HYDROCHLORIDE 50 MG: 50 TABLET, FILM COATED ORAL at 21:49

## 2019-01-03 RX ADMIN — NYSTATIN 500000 UNITS: 500000 SUSPENSION ORAL at 08:12

## 2019-01-03 RX ADMIN — PROPAFENONE HYDROCHLORIDE 150 MG: 150 TABLET, COATED ORAL at 08:12

## 2019-01-03 RX ADMIN — Medication 5 MG: at 03:58

## 2019-01-03 RX ADMIN — Medication 5 MG: at 19:52

## 2019-01-03 RX ADMIN — PROPAFENONE HYDROCHLORIDE 150 MG: 150 TABLET, COATED ORAL at 21:49

## 2019-01-03 RX ADMIN — PROPAFENONE HYDROCHLORIDE 150 MG: 150 TABLET, COATED ORAL at 16:07

## 2019-01-03 RX ADMIN — HYDRALAZINE HYDROCHLORIDE 50 MG: 50 TABLET, FILM COATED ORAL at 13:05

## 2019-01-03 RX ADMIN — LEVALBUTEROL HYDROCHLORIDE 1.25 MG: 1.25 SOLUTION RESPIRATORY (INHALATION) at 08:20

## 2019-01-03 RX ADMIN — APIXABAN 5 MG: 5 TABLET, FILM COATED ORAL at 21:49

## 2019-01-03 RX ADMIN — NYSTATIN 500000 UNITS: 500000 SUSPENSION ORAL at 19:49

## 2019-01-03 RX ADMIN — APIXABAN 5 MG: 5 TABLET, FILM COATED ORAL at 08:12

## 2019-01-03 RX ADMIN — Medication 5 MG: at 08:12

## 2019-01-03 RX ADMIN — ZOLPIDEM TARTRATE 5 MG: 5 TABLET, COATED ORAL at 21:49

## 2019-01-03 RX ADMIN — CEFAZOLIN SODIUM 2 G: 2 INJECTION, SOLUTION INTRAVENOUS at 05:53

## 2019-01-03 RX ADMIN — PREDNISONE 30 MG: 10 TABLET ORAL at 08:12

## 2019-01-03 RX ADMIN — ACETAMINOPHEN 650 MG: 325 TABLET, FILM COATED ORAL at 16:07

## 2019-01-03 RX ADMIN — Medication 5 MG: at 16:07

## 2019-01-03 RX ADMIN — FUROSEMIDE 40 MG: 10 INJECTION, SOLUTION INTRAMUSCULAR; INTRAVENOUS at 05:22

## 2019-01-03 RX ADMIN — INSULIN ASPART 1 UNITS: 100 INJECTION, SOLUTION INTRAVENOUS; SUBCUTANEOUS at 12:01

## 2019-01-03 ASSESSMENT — ACTIVITIES OF DAILY LIVING (ADL)
ADLS_ACUITY_SCORE: 14
ADLS_ACUITY_SCORE: 16
ADLS_ACUITY_SCORE: 14

## 2019-01-03 NOTE — PROGRESS NOTES
[facility]    Medicine Progress Note - Hospitalist Service       Date of Admission:  12/17/2018  Assessment & Plan   Tone Cooper is a 68 year old male admitted on 12/17/2018. PMH significant for severe COPD on home oxygen, atrial fibrillation, tricuspid valve replacement, hypertension, nonsustained VT, 2 hospitalizations in previous 2 months for COPD exacerbations. Patient presented to ER 12/17 with 1.5 wks productive cough with SOB. Patient was found to have severe sepsis, hypoxic respiratory failure, and right lung community acquired pneumonia. Patient was admitted for further evaluation and treatment.    Acute on chronic hypoxic respiratory failure  Severe sepsis (leukocytosis, tachycardia, elevated lactate) secondary to community-acquired pneumonia, resolved  Community-acquired pneumonia, RLL  MSSA bacteremia  Patient initially required BiPAP and is now satting well on home 3L O2 NC.   Continue IV ancef per ID recommendations. MSSA likely secondary to CAP. Prolonged duration secondary to tricuspid valve repair.     Atrial fibrillation with RVR  RVR was likely driven by sepsis. Improved after treatment of sepsis.  Continue diltiazem, propafenone, and eliquis.    COPD exacerbation  Continue antibiotics as above.  Today is day 4 of 7 for prednisone 30 mg. Will plan to decrease to 20 mg daily after 7 days.   Continue xopenex, incruse.   O2 requirement at baseline.     Iatrogenic fluid overload secondary to aggressive fluid resuscitation for sepsis  Patient started on scheduled albumin and IV lasix with good improvement. Mobilizing fluid and diuresing. Will discontinue albumin at this time and continue IV lasix BID initially. Compression stockings.    Hypertension  Continue diltiazem, hydralazine.    Hyperglycemia, steroid-induced  Continue NPH 5 unit(s) daily and sliding scale insulin.    CKD 2  Renally dose medications and avoid nephrotoxins.     Diet: Regular Diet Adult    DVT Prophylaxis:  Eliajayis  Rodney Catheter: not present  Code Status: Full Code      Disposition Plan   Expected discharge: Next 1-2 days to home pending clinical improvement and improved volume status. Plan to discharge with home antibiotics via midline per ID.    Latanya Loving MD FACP  Hospitalist Service  [facility]  Text Page (7am - 6pm)    ______________________________________________________________________    Interval History   Patient has continued to diurese well, though complains of ongoing discomfort from edema. Mild SOB with O2 needs at baseline. Patient denies chest pain or palpitations. Minimal ambulation. Patient requests humidified O2 for home.      Data reviewed today: I reviewed all medications, new labs over the last 24 hours. I personally reviewed no images or EKG's today.    Physical Exam   Vital Signs: Temp: 98.2  F (36.8  C) Temp src: Oral BP: 131/71   Heart Rate: 90 Resp: 20 SpO2: 95 % O2 Device: Nasal cannula with humidification Oxygen Delivery: 3 LPM  Weight: 198 lbs 9.6 oz  Constitutional: Alert and oriented x3. No acute distress. Sitting up in bed. Nursing at bedside. Patient is non-toxic.  HEENT: NCAT. EOMI. Moist oral mucosa.  Respiratory: Clear to auscultation bilaterally with soft crackles at bilateral bases. No wheezes. 3L O2 by NC.  Cardiovascular: Regular rate and rhythm. No murmur appreciated. Patient does have 2+ lower extremity pitting edema with significant pedal and ankle edema.   GI: Soft, nontender, nondistended.   Musculoskeletal: No gross deformities. Edema as noted above.   Neurologic: Alert and oriented x3. No focal neurologic deficits. Did not assess gait, but patient reports ambulating regularly today.        Data   Recent Labs   Lab 01/03/19  0526 01/01/19  0624 12/31/18  0700 12/30/18  0655   WBC 8.2 11.0 13.2* 13.6*   HGB 11.4* 13.1* 13.3 13.8   MCV 94 93 92 92    238 225 230    136 136 138   POTASSIUM 3.8 4.2 5.0 4.7   CHLORIDE 95 95 98 98   CO2 41*  37* 34* 37*   BUN 27 32* 36* 36*   CR 1.01 0.98 1.02 1.00   ANIONGAP 2* 4 4 3   WILBERT 9.1 8.6 8.6 8.8   * 101* 112* 123*   ALBUMIN  --   --   --  2.1*     No results found for this or any previous visit (from the past 24 hour(s)).

## 2019-01-03 NOTE — PROGRESS NOTES
[facility]  Infectious Disease Progress Note          Assessment and Plan:   IMPRESSION:   1.  A 67-year-old male with third hospitalization in the last month with hypoxic respiratory failure, probable pneumonia, Staph aureus in recent sputum and now Staph aureus in the blood.   2.  Staph aureus bacteremia, only 1 of 2 cultures positive.  Suspect this is spillover from pneumonia, but certainly at risk including a tricuspid valve.   3.  History of atrial fibrillation with rapid ventricular response.   4.  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      RECOMMENDATIONS:     1.  MSSA also in sputum,  other cultures without any other growth,  On  Ancef.  He will need an extended IV course here simply for the positive blood culture,  Given  the tricuspid valve and the hip, probably a full 4 week course of therapy.   2.  12/19 cx  negative, midline in  3. Orders for IV antibiotics in already . Day 18/28          Interval History:   no new complaints sl abd pain, Follow-up BC neg so far, sputum staph no fever no other new focal sxs, WBc normal    still hypoxic slow better              Medications:       albumin human  25 g Intravenous Q8H     apixaban ANTICOAGULANT  5 mg Oral BID     ceFAZolin  2 g Intravenous Q8H     diltiazem ER COATED BEADS  360 mg Oral Daily     furosemide  40 mg Intravenous Q8H     hydrALAZINE  50 mg Oral Q8H MEGHAN     insulin aspart  1-3 Units Subcutaneous TID AC     insulin aspart  1-3 Units Subcutaneous At Bedtime     insulin isophane human  5 Units Subcutaneous Daily     levalbuterol  1.25 mg Nebulization 4x Daily     nystatin  500,000 Units Mouth/Throat 4x Daily     predniSONE  30 mg Oral Daily     propafenone  150 mg Oral TID     sodium chloride (PF)  10 mL Intracatheter Q8H     umeclidinium  1 puff Inhalation Daily     zolpidem  5 mg Oral At Bedtime                  Physical Exam:    Blood pressure 135/82, pulse 87, temperature 98.2  F (36.8  C), temperature source Oral, resp. rate 20, weight 90.1 kg (198 lb 9.6 oz), SpO2 95 %.  Wt Readings from Last 2 Encounters:   01/02/19 90.1 kg (198 lb 9.6 oz)   12/03/18 87.5 kg (193 lb)     Vital Signs with Ranges  Temp:  [97.1  F (36.2  C)-98.2  F (36.8  C)] 98.2  F (36.8  C)  Heart Rate:  [80-95] 90  Resp:  [16-22] 20  BP: (108-169)/(79-97) 135/82  SpO2:  [94 %-100 %] 95 %    Constitutional: Awake, alert, cooperative, no apparent distress   Lungs: Congestion to auscultation bilaterally, no crackles or wheezing hpoxia   Cardiovascular: Regular rate and rhythm, normal S1 and S2, and no murmur noted   Abdomen: Normal bowel sounds, soft, non-distended, non-tender   Skin: No rashes, no cyanosis, no edema no embolic lesions   Other:           Data:   All microbiology laboratory data reviewed.  Recent Labs   Lab Test 01/03/19  0526 01/01/19  0624 12/31/18  0700   WBC 8.2 11.0 13.2*   HGB 11.4* 13.1* 13.3   HCT 36.5* 41.6 41.8   MCV 94 93 92    238 225     Recent Labs   Lab Test 01/03/19  0526 01/01/19  0624 12/31/18  0700   CR 1.01 0.98 1.02     No lab results found.  Recent Labs   Lab Test 12/19/18  1023 12/19/18  1013 12/18/18  1027 12/17/18  1518 12/17/18  1452 11/25/18  2257 10/06/18  0826 10/06/18  0822 11/10/16  1524   CULT No growth No growth Light growth  Normal tushar    Moderate growth  Staphylococcus aureus  * Cultured on the 1st day of incubation:  Staphylococcus aureus  This isolate DOES NOT demonstrate inducible clindamycin resistance in vitro. Clindamycin   is susceptible and could be used when indicated, however, erythromycin is resistant and   should not be used.  *  Critical Value/Significant Value, preliminary result only, called to and read back by  Laure Mathews RN [facility] @ 2190 12.18.18 JE    (Note)  POSITIVE for STAPHYLOCOCCUS AUREUS and NEGATIVE for the mecA gene  (not MRSA) by [employer]ev[employer]ene multiplex nucleic acid test. The mecA gene  was  not detected. Final identification and antimicrobial susceptibility  testing will be verified by standard methods.    Specimen tested with Moozeyigene multiplex, gram-positive blood culture  nucleic acid test for the following targets: Staph aureus, Staph  epidermidis, Staph lugdunensis, other Staph species, Enterococcus  faecalis, Enterococcus faecium, Streptococcus species, S. agalactiae,  S. anginosus grp., S. pneumoniae, S. pyogenes, Listeria sp., mecA  (methicillin resistance) and Steven/B (vancomycin resistance).    Critical Value/Significant Value called to and read back by Laure Mathews RN [facility] 1652 12.18.18 PATSY.   No growth Heavy growth  Normal tushar    Moderate growth  Staphylococcus aureus  This isolate DOES NOT demonstrate inducible clindamycin resistance in vitro. Clindamycin   is susceptible and could be used when indicated, however, erythromycin is resistant and   should not be used.  * No growth No growth No anaerobes isolated  Moderate growth Staphylococcus aureus This isolate DOES NOT demonstrate inducible   clindamycin resistance in vitro. Clindamycin is susceptible and could be used   when indicated, however, erythromycin is resistant and should not be used.  *  Canceled, Test credited Test reordered as correct code REORDERED AS AN ABSCESS   CULTURE

## 2019-01-03 NOTE — PROGRESS NOTES
VSS, with exception of one high BP reading that trended back down at next check.  MD aware.  Tolerating medications.  Up independently.  Oxy for headaches.  Voiding/BM WNL.  Good appetite.  BGT stable.  Mentation appropriate.  No tele.  3L NC.  Continue to monitor and implement plan of care.

## 2019-01-03 NOTE — PLAN OF CARE
All VSS, LS dim and satting well on 3L NC with humidification.  No tele.   and 114 this shift.  PRN Oxy x2 with minimal results.  Receiving IV Albumin and IV Ancef.  Pt has 2-3+ BLE and BUE edema in patches.  ID following.  Continue with POC.

## 2019-01-03 NOTE — PROGRESS NOTES
X-cover    Called for ? Re: furosemide dosing.     Was to get albumin then furosemide.  This evening rec'd furosemide about 30 mins prior to albumin, is on tid albumin, no need to give additional furosemide

## 2019-01-04 ENCOUNTER — APPOINTMENT (OUTPATIENT)
Dept: CT IMAGING | Facility: CLINIC | Age: 69
DRG: 871 | End: 2019-01-04
Attending: INTERNAL MEDICINE
Payer: COMMERCIAL

## 2019-01-04 ENCOUNTER — APPOINTMENT (OUTPATIENT)
Dept: CARDIOLOGY | Facility: CLINIC | Age: 69
DRG: 871 | End: 2019-01-04
Attending: INTERNAL MEDICINE
Payer: COMMERCIAL

## 2019-01-04 LAB
ALBUMIN SERPL-MCNC: 3.4 G/DL (ref 3.4–5)
ALP SERPL-CCNC: 45 U/L (ref 40–150)
ALT SERPL W P-5'-P-CCNC: 22 U/L (ref 0–70)
ANION GAP SERPL CALCULATED.3IONS-SCNC: 3 MMOL/L (ref 3–14)
AST SERPL W P-5'-P-CCNC: 23 U/L (ref 0–45)
BILIRUB SERPL-MCNC: 0.6 MG/DL (ref 0.2–1.3)
BUN SERPL-MCNC: 23 MG/DL (ref 7–30)
CALCIUM SERPL-MCNC: 9.2 MG/DL (ref 8.5–10.1)
CHLORIDE SERPL-SCNC: 95 MMOL/L (ref 94–109)
CO2 SERPL-SCNC: 38 MMOL/L (ref 20–32)
CREAT SERPL-MCNC: 1 MG/DL (ref 0.66–1.25)
ERYTHROCYTE [DISTWIDTH] IN BLOOD BY AUTOMATED COUNT: 15 % (ref 10–15)
GFR SERPL CREATININE-BSD FRML MDRD: 78 ML/MIN/{1.73_M2}
GLUCOSE BLDC GLUCOMTR-MCNC: 102 MG/DL (ref 70–99)
GLUCOSE BLDC GLUCOMTR-MCNC: 128 MG/DL (ref 70–99)
GLUCOSE BLDC GLUCOMTR-MCNC: 157 MG/DL (ref 70–99)
GLUCOSE BLDC GLUCOMTR-MCNC: 158 MG/DL (ref 70–99)
GLUCOSE BLDC GLUCOMTR-MCNC: 172 MG/DL (ref 70–99)
GLUCOSE SERPL-MCNC: 101 MG/DL (ref 70–99)
HCT VFR BLD AUTO: 40.8 % (ref 40–53)
HGB BLD-MCNC: 12.9 G/DL (ref 13.3–17.7)
MCH RBC QN AUTO: 29.8 PG (ref 26.5–33)
MCHC RBC AUTO-ENTMCNC: 31.6 G/DL (ref 31.5–36.5)
MCV RBC AUTO: 94 FL (ref 78–100)
PLATELET # BLD AUTO: 290 10E9/L (ref 150–450)
POTASSIUM SERPL-SCNC: 4.2 MMOL/L (ref 3.4–5.3)
PROT SERPL-MCNC: 6.5 G/DL (ref 6.8–8.8)
RBC # BLD AUTO: 4.33 10E12/L (ref 4.4–5.9)
SODIUM SERPL-SCNC: 136 MMOL/L (ref 133–144)
WBC # BLD AUTO: 10.3 10E9/L (ref 4–11)

## 2019-01-04 PROCEDURE — 12000000 ZZH R&B MED SURG/OB

## 2019-01-04 PROCEDURE — 25000128 H RX IP 250 OP 636: Performed by: INTERNAL MEDICINE

## 2019-01-04 PROCEDURE — 25000125 ZZHC RX 250: Performed by: INTERNAL MEDICINE

## 2019-01-04 PROCEDURE — 85027 COMPLETE CBC AUTOMATED: CPT | Performed by: INTERNAL MEDICINE

## 2019-01-04 PROCEDURE — 93308 TTE F-UP OR LMTD: CPT

## 2019-01-04 PROCEDURE — 93325 DOPPLER ECHO COLOR FLOW MAPG: CPT | Mod: 26 | Performed by: INTERNAL MEDICINE

## 2019-01-04 PROCEDURE — 25000132 ZZH RX MED GY IP 250 OP 250 PS 637: Performed by: INTERNAL MEDICINE

## 2019-01-04 PROCEDURE — 93321 DOPPLER ECHO F-UP/LMTD STD: CPT | Mod: 26 | Performed by: INTERNAL MEDICINE

## 2019-01-04 PROCEDURE — 00000146 ZZHCL STATISTIC GLUCOSE BY METER IP

## 2019-01-04 PROCEDURE — 93308 TTE F-UP OR LMTD: CPT | Mod: 26 | Performed by: INTERNAL MEDICINE

## 2019-01-04 PROCEDURE — 94640 AIRWAY INHALATION TREATMENT: CPT | Mod: 76

## 2019-01-04 PROCEDURE — 94640 AIRWAY INHALATION TREATMENT: CPT

## 2019-01-04 PROCEDURE — 99233 SBSQ HOSP IP/OBS HIGH 50: CPT | Performed by: INTERNAL MEDICINE

## 2019-01-04 PROCEDURE — 80053 COMPREHEN METABOLIC PANEL: CPT | Performed by: INTERNAL MEDICINE

## 2019-01-04 PROCEDURE — 40000275 ZZH STATISTIC RCP TIME EA 10 MIN

## 2019-01-04 PROCEDURE — 71260 CT THORAX DX C+: CPT

## 2019-01-04 RX ORDER — IOPAMIDOL 755 MG/ML
70 INJECTION, SOLUTION INTRAVASCULAR ONCE
Status: COMPLETED | OUTPATIENT
Start: 2019-01-04 | End: 2019-01-04

## 2019-01-04 RX ADMIN — NYSTATIN 500000 UNITS: 500000 SUSPENSION ORAL at 07:42

## 2019-01-04 RX ADMIN — INSULIN ASPART 1 UNITS: 100 INJECTION, SOLUTION INTRAVENOUS; SUBCUTANEOUS at 12:57

## 2019-01-04 RX ADMIN — Medication 5 MG: at 18:34

## 2019-01-04 RX ADMIN — DILTIAZEM HYDROCHLORIDE 180 MG: 180 CAPSULE, COATED, EXTENDED RELEASE ORAL at 07:42

## 2019-01-04 RX ADMIN — CEFAZOLIN SODIUM 2 G: 2 INJECTION, SOLUTION INTRAVENOUS at 06:04

## 2019-01-04 RX ADMIN — HYDRALAZINE HYDROCHLORIDE 50 MG: 50 TABLET, FILM COATED ORAL at 22:08

## 2019-01-04 RX ADMIN — NYSTATIN 500000 UNITS: 500000 SUSPENSION ORAL at 22:08

## 2019-01-04 RX ADMIN — Medication 5 MG: at 12:58

## 2019-01-04 RX ADMIN — NYSTATIN 500000 UNITS: 500000 SUSPENSION ORAL at 12:58

## 2019-01-04 RX ADMIN — PREDNISONE 30 MG: 10 TABLET ORAL at 07:44

## 2019-01-04 RX ADMIN — HYDRALAZINE HYDROCHLORIDE 50 MG: 50 TABLET, FILM COATED ORAL at 06:04

## 2019-01-04 RX ADMIN — ACETAMINOPHEN 650 MG: 325 TABLET, FILM COATED ORAL at 12:58

## 2019-01-04 RX ADMIN — PROPAFENONE HYDROCHLORIDE 150 MG: 150 TABLET, COATED ORAL at 18:34

## 2019-01-04 RX ADMIN — LEVALBUTEROL HYDROCHLORIDE 1.25 MG: 1.25 SOLUTION RESPIRATORY (INHALATION) at 07:22

## 2019-01-04 RX ADMIN — HYDRALAZINE HYDROCHLORIDE 50 MG: 50 TABLET, FILM COATED ORAL at 14:34

## 2019-01-04 RX ADMIN — CEFAZOLIN SODIUM 2 G: 2 INJECTION, SOLUTION INTRAVENOUS at 14:33

## 2019-01-04 RX ADMIN — Medication 5 MG: at 04:34

## 2019-01-04 RX ADMIN — ZOLPIDEM TARTRATE 5 MG: 5 TABLET, COATED ORAL at 22:09

## 2019-01-04 RX ADMIN — DILTIAZEM HYDROCHLORIDE 180 MG: 180 CAPSULE, COATED, EXTENDED RELEASE ORAL at 07:43

## 2019-01-04 RX ADMIN — LEVALBUTEROL HYDROCHLORIDE 1.25 MG: 1.25 SOLUTION RESPIRATORY (INHALATION) at 11:26

## 2019-01-04 RX ADMIN — LEVALBUTEROL HYDROCHLORIDE 1.25 MG: 1.25 SOLUTION RESPIRATORY (INHALATION) at 15:58

## 2019-01-04 RX ADMIN — SODIUM CHLORIDE 80 ML: 9 INJECTION, SOLUTION INTRAVENOUS at 17:48

## 2019-01-04 RX ADMIN — FUROSEMIDE 40 MG: 10 INJECTION, SOLUTION INTRAMUSCULAR; INTRAVENOUS at 18:34

## 2019-01-04 RX ADMIN — IOPAMIDOL 70 ML: 755 INJECTION, SOLUTION INTRAVENOUS at 17:48

## 2019-01-04 RX ADMIN — FUROSEMIDE 40 MG: 10 INJECTION, SOLUTION INTRAMUSCULAR; INTRAVENOUS at 07:41

## 2019-01-04 RX ADMIN — NYSTATIN 500000 UNITS: 500000 SUSPENSION ORAL at 18:34

## 2019-01-04 RX ADMIN — PROPAFENONE HYDROCHLORIDE 150 MG: 150 TABLET, COATED ORAL at 07:45

## 2019-01-04 RX ADMIN — LEVALBUTEROL HYDROCHLORIDE 1.25 MG: 1.25 SOLUTION RESPIRATORY (INHALATION) at 20:33

## 2019-01-04 RX ADMIN — APIXABAN 5 MG: 5 TABLET, FILM COATED ORAL at 22:08

## 2019-01-04 RX ADMIN — CEFAZOLIN SODIUM 2 G: 2 INJECTION, SOLUTION INTRAVENOUS at 22:09

## 2019-01-04 RX ADMIN — Medication 2.5 MG: at 08:50

## 2019-01-04 RX ADMIN — APIXABAN 5 MG: 5 TABLET, FILM COATED ORAL at 07:45

## 2019-01-04 ASSESSMENT — ACTIVITIES OF DAILY LIVING (ADL)
ADLS_ACUITY_SCORE: 16

## 2019-01-04 NOTE — PLAN OF CARE
VS: stable  A/O: x4  Tele:NA  Glucose checks: 136 and 158 overnight  Activity:independent  Diet: mod carb  GI: normoactive  : voiding  Respiratory: 3L NC  Antibiotics: IV ancef     Patient had adequate pain control with oral pain medications.  Patient refused morning weight.

## 2019-01-04 NOTE — PROGRESS NOTES
CLINICAL NUTRITION SERVICES - REASSESSMENT NOTE      MALNUTRITION (1/04/2018)  % Weight Loss: Weight loss does not meet criteria --> diuresis  % Intake:  No decreased intake noted  Subcutaneous Fat Loss:  None observed  Muscle Loss:  None observed  Fluid Retention:  None noted     Malnutrition Diagnosis: Patient does not meet two of the above criteria necessary for diagnosing malnutrition       EVALUATION OF PROGRESS TOWARD GOALS   Diet: Regular    Intake:  Continues to consume % of meals TID-QID per system review.  Appears meals are well-balanced.  No nutrition concerns.         ASSESSED NUTRITION NEEDS (PER APPROVED PRACTICE GUIDELINES, Dosing weight: 87 kg admit weight)  Estimated Energy Needs: 4042-0319 kcals (25-30 Kcal/Kg)  Justification: maintenance  Estimated Protein Needs: + grams protein (1-1.2 g pro/Kg)  Justification: preservation of lean body mass  Estimated Fluid Needs: >1 mL/Kcal  Justification: maintenance      NEW FINDINGS:   - Medications reviewed.  - Labs reviewed.  - Wt initially trending up during mid admit.  Back to baseline/consistent with admit d/t diuresis (use of Lasix).  Do not suspect true wt loss based on intakes as above.  - Stooling trends reviewed.    Previous Goals:   None given below  Evaluation: Unable to evaluate    Previous Nutrition Diagnosis:   No nutrition diagnosis at this time  Evaluation: No change      CURRENT NUTRITION DIAGNOSIS  No nutrition diagnosis at this time    INTERVENTIONS  Recommendations / Nutrition Prescription  Continue regular diet.    Implementation  Collaboration and Referral of Nutrition care: Discussed POC with team during rounds.      MONITORING AND EVALUATION:  Progress towards goals will be monitored and evaluated per protocol and Practice Guidelines      Susy Alvarado RD, LD  Clinical Dietitian  3rd floor/ICU: [phone]  All other floors: [phone]  Weekend/holiday: [phone]

## 2019-01-04 NOTE — PROGRESS NOTES
Edema reduced.  Breathing not bad.  On nebs.  Is productively coughing--blood tinged sputum.      Patient questioning if he will go home today.    Improved.

## 2019-01-04 NOTE — PROGRESS NOTES
[facility]  Infectious Disease Progress Note          Assessment and Plan:   IMPRESSION:   1.  A 67-year-old male with third hospitalization in the last month with hypoxic respiratory failure, probable pneumonia, Staph aureus in recent sputum and now Staph aureus in the blood.   2.  Staph aureus bacteremia, only 1 of 2 cultures positive.  Suspect this is spillover from pneumonia, but certainly at risk including a tricuspid valve.   3.  History of atrial fibrillation with rapid ventricular response.   4.  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   5 New hemoptysis     RECOMMENDATIONS:     1.  MSSA also in sputum,  other cultures without any other growth,  On  Ancef.  He will need an extended IV course here simply for the positive blood culture,  Given  the tricuspid valve and the hip, probably a full 4 week course of therapy.   2.  12/19 cx  negative, midline in  3. Orders for IV antibiotics in already . Day 19/28  4 Some hemoptysis wo other sign infection new          Interval History:   no new complaints sl abd pain, Follow-up BC neg so far, sputum staph no fever no other new focal sxs, WBc normal    still hypoxic slow better              Medications:       apixaban ANTICOAGULANT  5 mg Oral BID     ceFAZolin  2 g Intravenous Q8H     diltiazem ER COATED BEADS  360 mg Oral Daily     furosemide  40 mg Intravenous BID     hydrALAZINE  50 mg Oral Q8H MEGHAN     insulin aspart  1-3 Units Subcutaneous TID AC     insulin aspart  1-3 Units Subcutaneous At Bedtime     insulin isophane human  5 Units Subcutaneous Daily     levalbuterol  1.25 mg Nebulization 4x Daily     nystatin  500,000 Units Mouth/Throat 4x Daily     predniSONE  30 mg Oral Daily     propafenone  150 mg Oral TID     sodium chloride (PF)  10 mL Intracatheter Q8H     umeclidinium  1 puff Inhalation Daily     zolpidem  5 mg Oral At Bedtime                   Physical Exam:   Blood pressure 156/87, pulse 87, temperature 97.8  F (36.6  C), temperature source Oral, resp. rate 16, weight 86.3 kg (190 lb 3.2 oz), SpO2 94 %.  Wt Readings from Last 2 Encounters:   01/04/19 86.3 kg (190 lb 3.2 oz)   12/03/18 87.5 kg (193 lb)     Vital Signs with Ranges  Temp:  [97.7  F (36.5  C)-98.3  F (36.8  C)] 97.8  F (36.6  C)  Heart Rate:  [66-87] 66  Resp:  [16-22] 16  BP: (138-174)/() 156/87  SpO2:  [94 %-99 %] 94 %    Constitutional: Awake, alert, cooperative, no apparent distress   Lungs: Congestion to auscultation bilaterally, no crackles or wheezing hpoxia   Cardiovascular: Regular rate and rhythm, normal S1 and S2, and no murmur noted   Abdomen: Normal bowel sounds, soft, non-distended, non-tender   Skin: No rashes, no cyanosis, no edema no embolic lesions   Other:           Data:   All microbiology laboratory data reviewed.  Recent Labs   Lab Test 01/04/19  0607 01/03/19  0526 01/01/19  0624   WBC 10.3 8.2 11.0   HGB 12.9* 11.4* 13.1*   HCT 40.8 36.5* 41.6   MCV 94 94 93    227 238     Recent Labs   Lab Test 01/04/19  0607 01/03/19  0526 01/01/19  0624   CR 1.00 1.01 0.98     No lab results found.  Recent Labs   Lab Test 12/19/18  1023 12/19/18  1013 12/18/18  1027 12/17/18  1518 12/17/18  1452 11/25/18  2257 10/06/18  0826 10/06/18  0822 11/10/16  1524   CULT No growth No growth Light growth  Normal tushar    Moderate growth  Staphylococcus aureus  * Cultured on the 1st day of incubation:  Staphylococcus aureus  This isolate DOES NOT demonstrate inducible clindamycin resistance in vitro. Clindamycin   is susceptible and could be used when indicated, however, erythromycin is resistant and   should not be used.  *  Critical Value/Significant Value, preliminary result only, called to and read back by  Laure SANDOVAL @ 4532 12.18.18 JE    (Note)  POSITIVE for STAPHYLOCOCCUS AUREUS and NEGATIVE for the mecA gene  (not MRSA) by Retrac Enterprisesigene multiplex nucleic acid test.  The mecA gene was  not detected. Final identification and antimicrobial susceptibility  testing will be verified by standard methods.    Specimen tested with [employer] (ac[employer] by [employer])igene multiplex, gram-positive blood culture  nucleic acid test for the following targets: Staph aureus, Staph  epidermidis, Staph lugdunensis, other Staph species, Enterococcus  faecalis, Enterococcus faecium, Streptococcus species, S. agalactiae,  S. anginosus grp., S. pneumoniae, S. pyogenes, Listeria sp., mecA  (methicillin resistance) and Steven/B (vancomycin resistance).    Critical Value/Significant Value called to and read back by Laure Mathews RN [facility] 1652 12.18.18 PATSY.   No growth Heavy growth  Normal tushar    Moderate growth  Staphylococcus aureus  This isolate DOES NOT demonstrate inducible clindamycin resistance in vitro. Clindamycin   is susceptible and could be used when indicated, however, erythromycin is resistant and   should not be used.  * No growth No growth No anaerobes isolated  Moderate growth Staphylococcus aureus This isolate DOES NOT demonstrate inducible   clindamycin resistance in vitro. Clindamycin is susceptible and could be used   when indicated, however, erythromycin is resistant and should not be used.  *  Canceled, Test credited Test reordered as correct code REORDERED AS AN ABSCESS   CULTURE

## 2019-01-05 LAB
ANION GAP SERPL CALCULATED.3IONS-SCNC: 4 MMOL/L (ref 3–14)
BASOPHILS # BLD AUTO: 0 10E9/L (ref 0–0.2)
BASOPHILS NFR BLD AUTO: 0.4 %
BUN SERPL-MCNC: 26 MG/DL (ref 7–30)
CALCIUM SERPL-MCNC: 9 MG/DL (ref 8.5–10.1)
CHLORIDE SERPL-SCNC: 95 MMOL/L (ref 94–109)
CO2 SERPL-SCNC: 38 MMOL/L (ref 20–32)
CREAT SERPL-MCNC: 1.03 MG/DL (ref 0.66–1.25)
DIFFERENTIAL METHOD BLD: ABNORMAL
EOSINOPHIL # BLD AUTO: 0.1 10E9/L (ref 0–0.7)
EOSINOPHIL NFR BLD AUTO: 0.7 %
ERYTHROCYTE [DISTWIDTH] IN BLOOD BY AUTOMATED COUNT: 15.2 % (ref 10–15)
GFR SERPL CREATININE-BSD FRML MDRD: 75 ML/MIN/{1.73_M2}
GLUCOSE BLDC GLUCOMTR-MCNC: 120 MG/DL (ref 70–99)
GLUCOSE BLDC GLUCOMTR-MCNC: 125 MG/DL (ref 70–99)
GLUCOSE BLDC GLUCOMTR-MCNC: 168 MG/DL (ref 70–99)
GLUCOSE BLDC GLUCOMTR-MCNC: 177 MG/DL (ref 70–99)
GLUCOSE BLDC GLUCOMTR-MCNC: 215 MG/DL (ref 70–99)
GLUCOSE SERPL-MCNC: 99 MG/DL (ref 70–99)
GRAM STN SPEC: ABNORMAL
HCT VFR BLD AUTO: 39.7 % (ref 40–53)
HGB BLD-MCNC: 12.6 G/DL (ref 13.3–17.7)
IMM GRANULOCYTES # BLD: 0.2 10E9/L (ref 0–0.4)
IMM GRANULOCYTES NFR BLD: 2.3 %
LYMPHOCYTES # BLD AUTO: 1.3 10E9/L (ref 0.8–5.3)
LYMPHOCYTES NFR BLD AUTO: 12.8 %
Lab: ABNORMAL
MCH RBC QN AUTO: 30 PG (ref 26.5–33)
MCHC RBC AUTO-ENTMCNC: 31.7 G/DL (ref 31.5–36.5)
MCV RBC AUTO: 95 FL (ref 78–100)
MONOCYTES # BLD AUTO: 0.6 10E9/L (ref 0–1.3)
MONOCYTES NFR BLD AUTO: 6.1 %
NEUTROPHILS # BLD AUTO: 7.6 10E9/L (ref 1.6–8.3)
NEUTROPHILS NFR BLD AUTO: 77.7 %
NRBC # BLD AUTO: 0 10*3/UL
NRBC BLD AUTO-RTO: 0 /100
PLATELET # BLD AUTO: 264 10E9/L (ref 150–450)
POTASSIUM SERPL-SCNC: 4 MMOL/L (ref 3.4–5.3)
PROCALCITONIN SERPL-MCNC: <0.05 NG/ML
RBC # BLD AUTO: 4.2 10E12/L (ref 4.4–5.9)
SODIUM SERPL-SCNC: 137 MMOL/L (ref 133–144)
SPECIMEN SOURCE: ABNORMAL
WBC # BLD AUTO: 9.8 10E9/L (ref 4–11)

## 2019-01-05 PROCEDURE — 25000128 H RX IP 250 OP 636: Performed by: INTERNAL MEDICINE

## 2019-01-05 PROCEDURE — 84145 PROCALCITONIN (PCT): CPT | Performed by: INTERNAL MEDICINE

## 2019-01-05 PROCEDURE — 25000125 ZZHC RX 250: Performed by: INTERNAL MEDICINE

## 2019-01-05 PROCEDURE — 80048 BASIC METABOLIC PNL TOTAL CA: CPT | Performed by: INTERNAL MEDICINE

## 2019-01-05 PROCEDURE — 87070 CULTURE OTHR SPECIMN AEROBIC: CPT | Performed by: INTERNAL MEDICINE

## 2019-01-05 PROCEDURE — 94640 AIRWAY INHALATION TREATMENT: CPT | Mod: 76

## 2019-01-05 PROCEDURE — 87205 SMEAR GRAM STAIN: CPT | Performed by: INTERNAL MEDICINE

## 2019-01-05 PROCEDURE — 25000132 ZZH RX MED GY IP 250 OP 250 PS 637: Performed by: INTERNAL MEDICINE

## 2019-01-05 PROCEDURE — 00000146 ZZHCL STATISTIC GLUCOSE BY METER IP

## 2019-01-05 PROCEDURE — 40000275 ZZH STATISTIC RCP TIME EA 10 MIN

## 2019-01-05 PROCEDURE — 85025 COMPLETE CBC W/AUTO DIFF WBC: CPT | Performed by: INTERNAL MEDICINE

## 2019-01-05 PROCEDURE — 94640 AIRWAY INHALATION TREATMENT: CPT

## 2019-01-05 PROCEDURE — 12000000 ZZH R&B MED SURG/OB

## 2019-01-05 PROCEDURE — 99233 SBSQ HOSP IP/OBS HIGH 50: CPT | Performed by: INTERNAL MEDICINE

## 2019-01-05 RX ADMIN — PROPAFENONE HYDROCHLORIDE 150 MG: 150 TABLET, COATED ORAL at 16:19

## 2019-01-05 RX ADMIN — PREDNISONE 30 MG: 10 TABLET ORAL at 09:00

## 2019-01-05 RX ADMIN — PROPAFENONE HYDROCHLORIDE 150 MG: 150 TABLET, COATED ORAL at 21:59

## 2019-01-05 RX ADMIN — ZOLPIDEM TARTRATE 5 MG: 5 TABLET, COATED ORAL at 22:03

## 2019-01-05 RX ADMIN — DILTIAZEM HYDROCHLORIDE 360 MG: 180 CAPSULE, COATED, EXTENDED RELEASE ORAL at 09:00

## 2019-01-05 RX ADMIN — Medication 5 MG: at 13:33

## 2019-01-05 RX ADMIN — HYDRALAZINE HYDROCHLORIDE 50 MG: 50 TABLET, FILM COATED ORAL at 06:34

## 2019-01-05 RX ADMIN — NYSTATIN 500000 UNITS: 500000 SUSPENSION ORAL at 09:00

## 2019-01-05 RX ADMIN — HYDRALAZINE HYDROCHLORIDE 50 MG: 50 TABLET, FILM COATED ORAL at 21:59

## 2019-01-05 RX ADMIN — HYDRALAZINE HYDROCHLORIDE 50 MG: 50 TABLET, FILM COATED ORAL at 13:30

## 2019-01-05 RX ADMIN — NYSTATIN 500000 UNITS: 500000 SUSPENSION ORAL at 22:03

## 2019-01-05 RX ADMIN — CEFAZOLIN SODIUM 2 G: 2 INJECTION, SOLUTION INTRAVENOUS at 22:08

## 2019-01-05 RX ADMIN — LEVALBUTEROL HYDROCHLORIDE 1.25 MG: 1.25 SOLUTION RESPIRATORY (INHALATION) at 12:34

## 2019-01-05 RX ADMIN — LEVALBUTEROL HYDROCHLORIDE 1.25 MG: 1.25 SOLUTION RESPIRATORY (INHALATION) at 20:47

## 2019-01-05 RX ADMIN — FUROSEMIDE 40 MG: 10 INJECTION, SOLUTION INTRAMUSCULAR; INTRAVENOUS at 09:39

## 2019-01-05 RX ADMIN — INSULIN ASPART 1 UNITS: 100 INJECTION, SOLUTION INTRAVENOUS; SUBCUTANEOUS at 17:33

## 2019-01-05 RX ADMIN — PROPAFENONE HYDROCHLORIDE 150 MG: 150 TABLET, COATED ORAL at 09:00

## 2019-01-05 RX ADMIN — NYSTATIN 500000 UNITS: 500000 SUSPENSION ORAL at 17:34

## 2019-01-05 RX ADMIN — LEVALBUTEROL HYDROCHLORIDE 1.25 MG: 1.25 SOLUTION RESPIRATORY (INHALATION) at 16:57

## 2019-01-05 RX ADMIN — Medication 5 MG: at 17:34

## 2019-01-05 RX ADMIN — Medication 5 MG: at 09:40

## 2019-01-05 RX ADMIN — LEVALBUTEROL HYDROCHLORIDE 1.25 MG: 1.25 SOLUTION RESPIRATORY (INHALATION) at 08:17

## 2019-01-05 RX ADMIN — NYSTATIN 500000 UNITS: 500000 SUSPENSION ORAL at 13:30

## 2019-01-05 RX ADMIN — PROPAFENONE HYDROCHLORIDE 150 MG: 150 TABLET, COATED ORAL at 00:25

## 2019-01-05 RX ADMIN — CEFAZOLIN SODIUM 2 G: 2 INJECTION, SOLUTION INTRAVENOUS at 06:34

## 2019-01-05 RX ADMIN — CEFAZOLIN SODIUM 2 G: 2 INJECTION, SOLUTION INTRAVENOUS at 13:33

## 2019-01-05 RX ADMIN — Medication 5 MG: at 21:59

## 2019-01-05 RX ADMIN — Medication 5 MG: at 00:25

## 2019-01-05 ASSESSMENT — ACTIVITIES OF DAILY LIVING (ADL)
ADLS_ACUITY_SCORE: 16

## 2019-01-05 NOTE — PROGRESS NOTES
[facility]    Medicine Progress Note - Hospitalist Service       Date of Admission:  12/17/2018  Assessment & Plan   Tone Cooper is a 68 year old male admitted on 12/17/2018. PMH significant for severe COPD on home oxygen, atrial fibrillation, tricuspid valve repair, hypertension, nonsustained VT, 2 hospitalizations in previous 2 months for COPD exacerbations. Patient presented to ER 12/17 with 1.5 wks productive cough with SOB. Patient was found to have severe sepsis, hypoxic respiratory failure, and right lung community acquired pneumonia. Patient was admitted for further evaluation and treatment.    Acute on chronic hypoxic respiratory failure  Severe sepsis (leukocytosis, tachycardia, elevated lactate) secondary to community-acquired pneumonia, resolved  Community-acquired pneumonia, RLL  MSSA bacteremia  Hemoptysis  Patient initially required BiPAP and is now satting well on home 3L O2 NC.   Continue IV ancef per ID recommendations. MSSA likely secondary to CAP. Prolonged duration secondary to tricuspid valve repair.   Patient with new development of hemoptysis 1/4 afternoon. Discussed with [facility] pulmonologist, Dr. Sauer. Have ordered CTA chest per their request. Also ordered echocardiogram to assess tricuspid valve status.  Images available late this evening. Patient is hemodynamically stable and minimal ongoing hemoptysis has continued. In setting of known pneumonia, will continue current treatment and plan to discuss further with pulmonary tomorrow. No urgent indication for transfer.    Atrial fibrillation with RVR  RVR was likely driven by sepsis. Improved after treatment of sepsis.  Continue diltiazem, propafenone, and eliquis.    COPD exacerbation  Continue antibiotics as above.  Today is day 5 of 7 for prednisone 30 mg. Will plan to decrease to 20 mg daily after 7 days.   Continue xopenex, incruse.   O2 requirement at baseline.     Iatrogenic fluid overload secondary to aggressive  fluid resuscitation for sepsis  Patient started on scheduled albumin and IV lasix on 1/2 with good improvement. Mobilizing fluid and diuresing.   Have discontinued albumin.  Will continue IV lasix BID at this time, but may transition to PO in next 1-2 days. Compression stockings ordered.    Hypertension  Continue diltiazem, hydralazine.    Hyperglycemia, steroid-induced  Will plan to increase NPH to 7 unit(s) daily and sliding scale insulin.    CKD 2  Renally dose medications and avoid nephrotoxins.     Diet: Regular Diet Adult    DVT Prophylaxis: Eliquis  Rodney Catheter: not present  Code Status: Full Code      Disposition Plan   Expected discharge: Pending evaluation and improvement in hemoptysis. Unlikely to discharge sooner than 1/6 at this time.    Latanya Loving MD FACP  Hospitalist Service  [facility]  Text Page (7am - 6pm)    ______________________________________________________________________    Interval History   Patient had new complaint of hemoptysis this afternoon. This has been ongoing throughout the afternoon with 5-6 episodes of coughing no more than a tablespoon of bloody sputum each time. This is not merely blood-streaked sputum and is homogenously bloody, but does not appear to be clot. Bright red, but thicker than pure blood.   No respiratory distress and continues on baseline home O2.   Otherwise no acute events or complaints.       Data reviewed today: I reviewed all medications, new labs over the last 24 hours. I personally reviewed no images or EKG's today.    Physical Exam   Vital Signs: Temp: 98.1  F (36.7  C) Temp src: Oral BP: (!) 158/98   Heart Rate: 79 Resp: 20 SpO2: 98 % O2 Device: Nasal cannula Oxygen Delivery: 3 LPM  Weight: 190 lbs 3.2 oz  Constitutional: Alert and oriented x3. Sitting up in bed with wife at bedside. No acute distress, though has been coughing up small amounts of bright red bloody sputum. Non-toxic. No diaphoresis.  HEENT: NCAT. EOMI. Moist oral  mucosa.  Respiratory: Clear to auscultation bilaterally. Soft crackles at bilateral bases, right > left. No wheezes. 3L O2 by NC.  Cardiovascular: Regular rate and rhythm. No murmur appreciated. Compression stockings in place to lower extremities with improvement in lower extremity edema.  GI: Soft, nontender, nondistended.   Musculoskeletal: No gross deformities. Edema as noted above.   Neurologic: Alert and oriented x3. No focal neurologic deficits.       Data   Recent Labs   Lab 01/04/19  0607 01/03/19  0526 01/01/19  0624  12/30/18  0655   WBC 10.3 8.2 11.0   < > 13.6*   HGB 12.9* 11.4* 13.1*   < > 13.8   MCV 94 94 93   < > 92    227 238   < > 230    138 136   < > 138   POTASSIUM 4.2 3.8 4.2   < > 4.7   CHLORIDE 95 95 95   < > 98   CO2 38* 41* 37*   < > 37*   BUN 23 27 32*   < > 36*   CR 1.00 1.01 0.98   < > 1.00   ANIONGAP 3 2* 4   < > 3   WILBERT 9.2 9.1 8.6   < > 8.8   * 108* 101*   < > 123*   ALBUMIN 3.4  --   --   --  2.1*   PROTTOTAL 6.5*  --   --   --   --    BILITOTAL 0.6  --   --   --   --    ALKPHOS 45  --   --   --   --    ALT 22  --   --   --   --    AST 23  --   --   --   --     < > = values in this interval not displayed.     Recent Results (from the past 24 hour(s))   CT Chest Pulmonary Embolism w Contrast    Narrative    CT CHEST PULMONARY EMBOLISM WITH CONTRAST  1/4/2019 5:53 PM     HISTORY: Hemoptysis.    TECHNIQUE: Helical axial scans from lung apices through lung bases  with 70mL Isovue-370 IV contrast. Radiation dose for this scan was  reduced using automated exposure control, adjustment of the mA and/or  kV according to patient size, or iterative reconstruction technique.    COMPARISON: 9/6/2006.    FINDINGS: There is no CT evidence for pulmonary embolism or other  acute vascular abnormality of the chest. Interval median sternotomy.  Vascular calcifications, including coronary artery calcifications. The  mediastinal and hilar structures are otherwise  unremarkable.    Interval resolution of the previously seen moderate-sized right-sided  pleural effusion. Previously seen right-sided healing rib fractures  have completely healed without residual deformity. Interval  development of areas of patchy infiltrate throughout the right lower  lobe. Some new minimal infiltrate is also seen in the left upper lobe  adjacent to the major fissure. There is a small focus of infiltrate or  potential pulmonary scarring in the right upper lobe anterolaterally.  It is difficult to exclude underlying mass lesions in multiple areas  in the right lung and follow-up of the infiltrates described above to  resolution or stability is recommended. There is underlying diffuse  emphysematous change.    Visualized upper abdomen shows prior cholecystectomy and no acute  abnormality.      Impression    IMPRESSION:  1. No evidence for pulmonary embolism.  2. Vascular calcifications, including coronary artery calcifications.  3. Interval resolution of right-sided pleural effusion.  4. Interval development of patchy infiltrates throughout the right  lower lobe, minimal infiltrate in the left upper lobe adjacent to the  major fissure and a small focus of infiltrate or scarring in the right  upper lobe. Since it is difficult to exclude underlying mass lesions  in some areas, follow-up to resolution or stability is recommended.  5. Diffuse emphysematous changes.

## 2019-01-05 NOTE — PROGRESS NOTES
[facility]  Infectious Disease Progress Note          Assessment and Plan:   IMPRESSION:   1.  A 67-year-old male with third hospitalization in the last month with hypoxic respiratory failure, probable pneumonia, Staph aureus in recent sputum and now Staph aureus in the blood.   2.  Staph aureus bacteremia, only 1 of 2 cultures positive.  Suspect this is spillover from pneumonia, but certainly at risk including a tricuspid valve.   3.  History of atrial fibrillation with rapid ventricular response.   4.  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   5 New hemoptysis     RECOMMENDATIONS:     1.  MSSA also in sputum,  other cultures without any other growth,  On  Ancef.  He will need an extended IV course here simply for the positive blood culture,  Given  the tricuspid valve and the hip, probably a full 4 week course of therapy.   2.  12/19 cx  negative, midline in  3. Orders for IV antibiotics in already . Day 20/28  4 Some hemoptysis wo other sign infection new, incl procal 0, CT with new areas, ?, bronch reasonable but obviously problem here with no pulm med available          Interval History:   no new complaints sl abd pain, Follow-up BC neg so far, sputum staph no fever no other new focal sxs, WBc normal    still hypoxic slow better              Medications:       ceFAZolin  2 g Intravenous Q8H     diltiazem ER COATED BEADS  360 mg Oral Daily     hydrALAZINE  50 mg Oral Q8H MEGHAN     insulin aspart  1-3 Units Subcutaneous TID AC     insulin aspart  1-3 Units Subcutaneous At Bedtime     insulin isophane human  7 Units Subcutaneous Daily     levalbuterol  1.25 mg Nebulization 4x Daily     nystatin  500,000 Units Mouth/Throat 4x Daily     predniSONE  30 mg Oral Daily     propafenone  150 mg Oral TID     sodium chloride (PF)  10 mL Intracatheter Q8H     umeclidinium  1 puff Inhalation Daily     zolpidem  5 mg  Oral At Bedtime                  Physical Exam:   Blood pressure 133/82, pulse 87, temperature 98.1  F (36.7  C), temperature source Oral, resp. rate 18, weight 86.8 kg (191 lb 4.8 oz), SpO2 91 %.  Wt Readings from Last 2 Encounters:   01/05/19 86.8 kg (191 lb 4.8 oz)   12/03/18 87.5 kg (193 lb)     Vital Signs with Ranges  Temp:  [98.1  F (36.7  C)-98.2  F (36.8  C)] 98.1  F (36.7  C)  Heart Rate:  [65-91] 65  Resp:  [18-20] 18  BP: (133-158)/(82-98) 133/82  SpO2:  [91 %-98 %] 91 %    Constitutional: Awake, alert, cooperative, no apparent distress   Lungs: Congestion to auscultation bilaterally, no crackles or wheezing hpoxia   Cardiovascular: Regular rate and rhythm, normal S1 and S2, and no murmur noted   Abdomen: Normal bowel sounds, soft, non-distended, non-tender   Skin: No rashes, no cyanosis, no edema no embolic lesions   Other:           Data:   All microbiology laboratory data reviewed.  Recent Labs   Lab Test 01/05/19  0644 01/04/19  0607 01/03/19  0526   WBC 9.8 10.3 8.2   HGB 12.6* 12.9* 11.4*   HCT 39.7* 40.8 36.5*   MCV 95 94 94    290 227     Recent Labs   Lab Test 01/05/19  0644 01/04/19  0607 01/03/19  0526   CR 1.03 1.00 1.01     No lab results found.  Recent Labs   Lab Test 12/19/18  1023 12/19/18  1013 12/18/18  1027 12/17/18  1518 12/17/18  1452 11/25/18  2257 10/06/18  0826 10/06/18  0822 11/10/16  1524   CULT No growth No growth Light growth  Normal tushar    Moderate growth  Staphylococcus aureus  * Cultured on the 1st day of incubation:  Staphylococcus aureus  This isolate DOES NOT demonstrate inducible clindamycin resistance in vitro. Clindamycin   is susceptible and could be used when indicated, however, erythromycin is resistant and   should not be used.  *  Critical Value/Significant Value, preliminary result only, called to and read back by  Laure SANDOVAL @ 7884 12.18.18 JE    (Note)  POSITIVE for STAPHYLOCOCCUS AUREUS and NEGATIVE for the mecA gene  (not MRSA) by Jumbletsigene  multiplex nucleic acid test. The mecA gene was  not detected. Final identification and antimicrobial susceptibility  testing will be verified by standard methods.    Specimen tested with RedSeguroigene multiplex, gram-positive blood culture  nucleic acid test for the following targets: Staph aureus, Staph  epidermidis, Staph lugdunensis, other Staph species, Enterococcus  faecalis, Enterococcus faecium, Streptococcus species, S. agalactiae,  S. anginosus grp., S. pneumoniae, S. pyogenes, Listeria sp., mecA  (methicillin resistance) and Steven/B (vancomycin resistance).    Critical Value/Significant Value called to and read back by Laure Mathews RN [facility] 1652 12.18.18 PATSY.   No growth Heavy growth  Normal tushar    Moderate growth  Staphylococcus aureus  This isolate DOES NOT demonstrate inducible clindamycin resistance in vitro. Clindamycin   is susceptible and could be used when indicated, however, erythromycin is resistant and   should not be used.  * No growth No growth No anaerobes isolated  Moderate growth Staphylococcus aureus This isolate DOES NOT demonstrate inducible   clindamycin resistance in vitro. Clindamycin is susceptible and could be used   when indicated, however, erythromycin is resistant and should not be used.  *  Canceled, Test credited Test reordered as correct code REORDERED AS AN ABSCESS   CULTURE

## 2019-01-05 NOTE — PROGRESS NOTES
[facility]    Medicine Progress Note - Hospitalist Service       Date of Admission:  12/17/2018  Assessment & Plan   Tone Cooper is a 68 year old male admitted on 12/17/2018. PMH significant for severe COPD on home oxygen, atrial fibrillation, tricuspid valve repair, hypertension, nonsustained VT, 2 hospitalizations in previous 2 months for COPD exacerbations. Patient presented to ER 12/17 with 1.5 wks productive cough with SOB. Patient was found to have severe sepsis, hypoxic respiratory failure, and right lung community acquired pneumonia. Patient was admitted for further evaluation and treatment.    Acute on chronic hypoxic respiratory failure  Severe sepsis (leukocytosis, tachycardia, elevated lactate) secondary to community-acquired pneumonia, resolved  Community-acquired pneumonia, RLL  MSSA bacteremia  Hemoptysis  Patient initially required BiPAP and is now satting well on home 3L O2 NC.   Continue IV ancef per ID recommendations. MSSA likely secondary to CAP. Prolonged duration secondary to tricuspid valve repair.   Patient with new development of hemoptysis 1/4 afternoon. Discussed with [facility] pulmonologist, Dr. Sauer. Have ordered CTA chest per their request. Also ordered echocardiogram to assess tricuspid valve status.  Area of consolidation on CT scan consistent with recent pneumonia.   Eliquis has been held. Patient is hemodynamically stable and minimal ongoing hemoptysis.  Have ordered repeat sputum culture and will follow.   Possible that patient will require bronchoscopy after discussion with pulmonology team today, but will continue to monitor with Eliquis on hold and follow sputum culture.   No urgent indication for transfer.    Atrial fibrillation with RVR  RVR was likely driven by sepsis. Improved after treatment of sepsis.  Continue diltiazem, propafenone. Eliquis on hold with hemoptysis.     COPD exacerbation  Continue antibiotics as above.  Today is day 6 of 7 for prednisone  30 mg. Will plan to decrease to 20 mg daily after 7 days.   Continue xopenex, incruse.   O2 requirement at baseline.     Iatrogenic fluid overload secondary to aggressive fluid resuscitation for sepsis  Patient started on scheduled albumin and IV lasix on 1/2 with good improvement.  Patient has diuresed well and lasix has now been discontinued.   Continue compression stockings.     Hypertension  Continue diltiazem, hydralazine.    Hyperglycemia, steroid-induced  Increased NPH to 7 unit(s) daily and sliding scale insulin.    CKD 2  Renally dose medications and avoid nephrotoxins.     Diet: Regular Diet Adult    DVT Prophylaxis: Eliquis is now on hold with hemoptysis. Have ordered SCDs.  Rodney Catheter: not present  Code Status: Full Code      Disposition Plan   Expected discharge: Pending evaluation and improvement in hemoptysis. Unlikely to discharge sooner than 1/6 or 1/7 at this time. Possible will require bronchoscopy.     Latanya Loving MD FACP  Hospitalist Service  [facility]  Text Page (7am - 6pm)    ______________________________________________________________________    Interval History   Patient without any new complaints. Minimal hemoptysis has continued overnight.   No increased respiratory distress with this, however, and patient remains on home 3L O2.   Denies any chest pain or worsening shortness of breath.   Otherwise no acute events or complaints.       Data reviewed today: I reviewed all medications, new labs, and imaging over the last 24 hours.    Physical Exam   Vital Signs: Temp: 98.1  F (36.7  C) Temp src: Oral BP: 133/82   Heart Rate: 65 Resp: 18 SpO2: 91 % O2 Device: Nasal cannula with humidification Oxygen Delivery: 3 LPM  Weight: 191 lbs 4.8 oz  Constitutional: Alert and oriented x3. Sitting up in bed. No acute distress. Patient appears non-toxic.  HEENT: NCAT. EOMI. Moist oral mucosa.  Respiratory: Clear to auscultation bilaterally. Patient continues to have soft crackles  at bilateral bases, right > left. No wheezes on exam. 3L O2 by NC.  Cardiovascular: Regular rate and rhythm. No murmur appreciated. Compression stockings in place to lower extremities with significant improvement in lower extremity edema.  GI: Soft, nontender, nondistended.   Musculoskeletal: No gross deformities. Edema as noted above.   Neurologic: Alert and oriented x3. No focal neurologic deficits.       Data   Recent Labs   Lab 01/05/19  0644 01/04/19  0607 01/03/19  0526  12/30/18  0655   WBC 9.8 10.3 8.2   < > 13.6*   HGB 12.6* 12.9* 11.4*   < > 13.8   MCV 95 94 94   < > 92    290 227   < > 230    136 138   < > 138   POTASSIUM 4.0 4.2 3.8   < > 4.7   CHLORIDE 95 95 95   < > 98   CO2 38* 38* 41*   < > 37*   BUN 26 23 27   < > 36*   CR 1.03 1.00 1.01   < > 1.00   ANIONGAP 4 3 2*   < > 3   WILBERT 9.0 9.2 9.1   < > 8.8   GLC 99 101* 108*   < > 123*   ALBUMIN  --  3.4  --   --  2.1*   PROTTOTAL  --  6.5*  --   --   --    BILITOTAL  --  0.6  --   --   --    ALKPHOS  --  45  --   --   --    ALT  --  22  --   --   --    AST  --  23  --   --   --     < > = values in this interval not displayed.     Recent Results (from the past 24 hour(s))   CT Chest Pulmonary Embolism w Contrast    Narrative    CT CHEST PULMONARY EMBOLISM WITH CONTRAST  1/4/2019 5:53 PM     HISTORY: Hemoptysis.    TECHNIQUE: Helical axial scans from lung apices through lung bases  with 70mL Isovue-370 IV contrast. Radiation dose for this scan was  reduced using automated exposure control, adjustment of the mA and/or  kV according to patient size, or iterative reconstruction technique.    COMPARISON: 9/6/2006.    FINDINGS: There is no CT evidence for pulmonary embolism or other  acute vascular abnormality of the chest. Interval median sternotomy.  Vascular calcifications, including coronary artery calcifications. The  mediastinal and hilar structures are otherwise unremarkable.    Interval resolution of the previously seen moderate-sized  right-sided  pleural effusion. Previously seen right-sided healing rib fractures  have completely healed without residual deformity. Interval  development of areas of patchy infiltrate throughout the right lower  lobe. Some new minimal infiltrate is also seen in the left upper lobe  adjacent to the major fissure. There is a small focus of infiltrate or  potential pulmonary scarring in the right upper lobe anterolaterally.  It is difficult to exclude underlying mass lesions in multiple areas  in the right lung and follow-up of the infiltrates described above to  resolution or stability is recommended. There is underlying diffuse  emphysematous change.    Visualized upper abdomen shows prior cholecystectomy and no acute  abnormality.      Impression    IMPRESSION:  1. No evidence for pulmonary embolism.  2. Vascular calcifications, including coronary artery calcifications.  3. Interval resolution of right-sided pleural effusion.  4. Interval development of patchy infiltrates throughout the right  lower lobe, minimal infiltrate in the left upper lobe adjacent to the  major fissure and a small focus of infiltrate or scarring in the right  upper lobe. Since it is difficult to exclude underlying mass lesions  in some areas, follow-up to resolution or stability is recommended.  5. Diffuse emphysematous changes.    ANTONY HU MD

## 2019-01-05 NOTE — PLAN OF CARE
VS:stable  A/O: x4  Tele: NA  Glucose checks: 157 and 168 over night  Activity: independent   Diet: regular  GI: normoactive  : voiding  Respiratory: 3L

## 2019-01-05 NOTE — PLAN OF CARE
A&OX4. LS diminished and fine crackles bilaterally.02 98% on 3L per nc. SBA with transferring. L PICC in place, dressing changed. +2 edema to bilateral lower extremities. On antibiotic ancef for COPD exacerbation. On diltiazem for Afib RVR. On iv lasix for fluid retention.  and 157. Chest CT completed. Will continue to monitor.

## 2019-01-05 NOTE — PLAN OF CARE
Vss, no co cp/sob, oxy for HA x2 with reduction in pain. , 120.  [place], using urinal, ID following, TJ continue, midline WDL, BLE edema noted to be improving per pt, +BM. Not yet ready for dc, possibly in the next couple days.  Continue poc and monitoring.       Improving  Infection  Infection Symptom Resolution  1/5/2019 1501 - Improving by Latanya Lyons RN  1/5/2019 0705 - Improving by Manda Mascorro RN  Adult Inpatient Plan of Care  Readiness for Transition of Care  1/5/2019 1501 - Improving by Latanya Lyons RN  1/5/2019 0705 - Improving by Manda Mascorro RN  Chronic Respiratory Difficulty Comorbidity  Chronic Respiratory Difficulty  Description  Patient comorbidity will be monitored for signs and symptoms of Respiratory Difficulty (Chronic) condition.  Problems will be absent, minimized or managed by discharge/transition of care.  1/5/2019 1501 - Improving by Latanya Lyons RN  1/5/2019 0705 - Improving by Manda Mascorro RN  COPD Comorbidity  Maintenance of COPD Symptom Control  1/5/2019 1501 - Improving by Latanya Lyons RN  1/5/2019 0705 - Improving by Manda Mascorro RN  Hypertension Comorbidity  Blood Pressure in Desired Range  1/5/2019 1501 - Improving by Latanya Lyons RN  1/5/2019 0705 - Improving by Manda Mascorro RN  Renal Insufficiency Comorbidity  Renal Insufficiency  Description  Patient comorbidity will be monitored for signs and symptoms of Renal Insufficiency (Chronic) condition.  Problems will be absent, minimized or managed by discharge/transition of care.  1/5/2019 1501 - Improving by Latanya Lyons RN  1/5/2019 0705 - Improving by Manda Mascorro RN  Fluid Imbalance (Pneumonia)  Fluid Balance  1/5/2019 1501 - Improving by Latanya Lyons RN  Infection (Pneumonia)  Resolution of Infection Signs/Symptoms  1/5/2019 1501 - Improving by Latanya Lyons RN  Respiratory Compromise (Pneumonia)  Effective Oxygenation and Ventilation  1/5/2019 1501 - Improving by Eloy  Latanya ROSADO RN

## 2019-01-06 LAB
ANION GAP SERPL CALCULATED.3IONS-SCNC: 2 MMOL/L (ref 3–14)
BUN SERPL-MCNC: 27 MG/DL (ref 7–30)
CALCIUM SERPL-MCNC: 8.6 MG/DL (ref 8.5–10.1)
CHLORIDE SERPL-SCNC: 98 MMOL/L (ref 94–109)
CO2 SERPL-SCNC: 36 MMOL/L (ref 20–32)
CREAT SERPL-MCNC: 0.93 MG/DL (ref 0.66–1.25)
GFR SERPL CREATININE-BSD FRML MDRD: 85 ML/MIN/{1.73_M2}
GLUCOSE BLDC GLUCOMTR-MCNC: 127 MG/DL (ref 70–99)
GLUCOSE BLDC GLUCOMTR-MCNC: 146 MG/DL (ref 70–99)
GLUCOSE BLDC GLUCOMTR-MCNC: 161 MG/DL (ref 70–99)
GLUCOSE BLDC GLUCOMTR-MCNC: 185 MG/DL (ref 70–99)
GLUCOSE BLDC GLUCOMTR-MCNC: 196 MG/DL (ref 70–99)
GLUCOSE BLDC GLUCOMTR-MCNC: 93 MG/DL (ref 70–99)
GLUCOSE SERPL-MCNC: 104 MG/DL (ref 70–99)
HGB BLD-MCNC: 11.9 G/DL (ref 13.3–17.7)
POTASSIUM SERPL-SCNC: 4 MMOL/L (ref 3.4–5.3)
SODIUM SERPL-SCNC: 136 MMOL/L (ref 133–144)

## 2019-01-06 PROCEDURE — 85018 HEMOGLOBIN: CPT | Performed by: INTERNAL MEDICINE

## 2019-01-06 PROCEDURE — 94640 AIRWAY INHALATION TREATMENT: CPT | Mod: 76

## 2019-01-06 PROCEDURE — 00000146 ZZHCL STATISTIC GLUCOSE BY METER IP

## 2019-01-06 PROCEDURE — 25000132 ZZH RX MED GY IP 250 OP 250 PS 637: Performed by: INTERNAL MEDICINE

## 2019-01-06 PROCEDURE — 25000128 H RX IP 250 OP 636: Performed by: INTERNAL MEDICINE

## 2019-01-06 PROCEDURE — 99233 SBSQ HOSP IP/OBS HIGH 50: CPT | Performed by: INTERNAL MEDICINE

## 2019-01-06 PROCEDURE — 94640 AIRWAY INHALATION TREATMENT: CPT

## 2019-01-06 PROCEDURE — 25000125 ZZHC RX 250: Performed by: INTERNAL MEDICINE

## 2019-01-06 PROCEDURE — 40000275 ZZH STATISTIC RCP TIME EA 10 MIN

## 2019-01-06 PROCEDURE — 80048 BASIC METABOLIC PNL TOTAL CA: CPT | Performed by: INTERNAL MEDICINE

## 2019-01-06 PROCEDURE — 12000000 ZZH R&B MED SURG/OB

## 2019-01-06 RX ORDER — PREDNISONE 20 MG/1
20 TABLET ORAL DAILY
Status: DISCONTINUED | OUTPATIENT
Start: 2019-01-07 | End: 2019-01-08 | Stop reason: HOSPADM

## 2019-01-06 RX ADMIN — DILTIAZEM HYDROCHLORIDE 360 MG: 180 CAPSULE, COATED, EXTENDED RELEASE ORAL at 08:07

## 2019-01-06 RX ADMIN — HYDRALAZINE HYDROCHLORIDE 50 MG: 50 TABLET, FILM COATED ORAL at 14:11

## 2019-01-06 RX ADMIN — NYSTATIN 500000 UNITS: 500000 SUSPENSION ORAL at 22:21

## 2019-01-06 RX ADMIN — NYSTATIN 500000 UNITS: 500000 SUSPENSION ORAL at 10:31

## 2019-01-06 RX ADMIN — CEFAZOLIN SODIUM 2 G: 2 INJECTION, SOLUTION INTRAVENOUS at 14:12

## 2019-01-06 RX ADMIN — LEVALBUTEROL HYDROCHLORIDE 1.25 MG: 1.25 SOLUTION RESPIRATORY (INHALATION) at 20:37

## 2019-01-06 RX ADMIN — HYDRALAZINE HYDROCHLORIDE 50 MG: 50 TABLET, FILM COATED ORAL at 22:20

## 2019-01-06 RX ADMIN — LEVALBUTEROL HYDROCHLORIDE 1.25 MG: 1.25 SOLUTION RESPIRATORY (INHALATION) at 07:25

## 2019-01-06 RX ADMIN — PROPAFENONE HYDROCHLORIDE 150 MG: 150 TABLET, COATED ORAL at 17:14

## 2019-01-06 RX ADMIN — LEVALBUTEROL HYDROCHLORIDE 1.25 MG: 1.25 SOLUTION RESPIRATORY (INHALATION) at 15:34

## 2019-01-06 RX ADMIN — Medication 5 MG: at 17:16

## 2019-01-06 RX ADMIN — PROPAFENONE HYDROCHLORIDE 150 MG: 150 TABLET, COATED ORAL at 08:07

## 2019-01-06 RX ADMIN — Medication 5 MG: at 13:16

## 2019-01-06 RX ADMIN — PROPAFENONE HYDROCHLORIDE 150 MG: 150 TABLET, COATED ORAL at 22:21

## 2019-01-06 RX ADMIN — NYSTATIN 500000 UNITS: 500000 SUSPENSION ORAL at 15:19

## 2019-01-06 RX ADMIN — ACETAMINOPHEN 650 MG: 325 TABLET, FILM COATED ORAL at 01:03

## 2019-01-06 RX ADMIN — CEFAZOLIN SODIUM 2 G: 2 INJECTION, SOLUTION INTRAVENOUS at 22:23

## 2019-01-06 RX ADMIN — NYSTATIN 500000 UNITS: 500000 SUSPENSION ORAL at 19:22

## 2019-01-06 RX ADMIN — Medication 5 MG: at 22:20

## 2019-01-06 RX ADMIN — Medication 5 MG: at 08:06

## 2019-01-06 RX ADMIN — CEFAZOLIN SODIUM 2 G: 2 INJECTION, SOLUTION INTRAVENOUS at 06:37

## 2019-01-06 RX ADMIN — INSULIN ASPART 1 UNITS: 100 INJECTION, SOLUTION INTRAVENOUS; SUBCUTANEOUS at 17:14

## 2019-01-06 RX ADMIN — LEVALBUTEROL HYDROCHLORIDE 1.25 MG: 1.25 SOLUTION RESPIRATORY (INHALATION) at 11:24

## 2019-01-06 RX ADMIN — LEVALBUTEROL HYDROCHLORIDE 1.25 MG: 1.25 SOLUTION RESPIRATORY (INHALATION) at 18:18

## 2019-01-06 RX ADMIN — HYDRALAZINE HYDROCHLORIDE 50 MG: 50 TABLET, FILM COATED ORAL at 06:44

## 2019-01-06 RX ADMIN — ZOLPIDEM TARTRATE 5 MG: 5 TABLET, COATED ORAL at 22:21

## 2019-01-06 RX ADMIN — PREDNISONE 30 MG: 10 TABLET ORAL at 08:07

## 2019-01-06 ASSESSMENT — ACTIVITIES OF DAILY LIVING (ADL)
ADLS_ACUITY_SCORE: 16

## 2019-01-06 NOTE — PROGRESS NOTES
[facility]    Medicine Progress Note - Hospitalist Service       Date of Admission:  12/17/2018  Assessment & Plan   Tone Cooper is a 68 year old male admitted on 12/17/2018. PMH significant for severe COPD on home oxygen, atrial fibrillation, tricuspid valve repair, hypertension, nonsustained VT, 2 hospitalizations in previous 2 months for COPD exacerbations. Patient presented to ER 12/17 with 1.5 wks productive cough with SOB. Patient was found to have severe sepsis, hypoxic respiratory failure, and right lung community acquired pneumonia. Patient was admitted for further evaluation and treatment.    Acute on chronic hypoxic respiratory failure  Severe sepsis (leukocytosis, tachycardia, elevated lactate) secondary to community-acquired pneumonia, resolved  Community-acquired pneumonia, RLL  MSSA bacteremia  Pulmonary abscess with hemoptysis  Patient initially required BiPAP and is now satting well on home 3L O2 NC.   Continue IV ancef per ID recommendations. MSSA likely secondary to CAP. Prolonged duration secondary to tricuspid valve repair.   Patient with new development of hemoptysis 1/4 afternoon. Initially discussed with [facility] pulmonologist, Dr. Sauer. Have ordered CTA chest per their request. Also ordered echocardiogram to assess tricuspid valve status.  Area of consolidation on CT scan consistent with recent pneumonia.   Subsequently discussed patient [facility] pulmonologist, Dr. Elliott, on 1/6/19. Dr. Elliott reviewed images and suspects that patient has pulmonary abscess from pneumonia and as source of hemoptysis. Reported that bronch would likely not bee required at this time, but that if bleeding worsened, may need bronchial artery embolism with IR. Also noted that patient will require prolonged course of antibiotics (oral), beyond current regimen which is almost complete.   Repeat sputum cultures is pending, with preliminary results showing GPCs.  Discussed this with Dr. Stanford who plans  to discuss further with pulmonary.  Appreciate coordination of care between consultants.   Eliquis has been held with last dose given 1/4. Patient is hemodynamically stable but still having ongoing hemoptysis this evening, though minimal.  No urgent indication for transfer, but depending on plan for ongoing management of antibiotics (duration, IV vs oral) or bleeding, patient may require facility with pulmonary. Would need to determine if IR here would be able to perform bronchial artery embolism.   Still possible that patient can discharge in coming days with outpatient pulmonary follow-up with [facility] (has missed previously scheduled new patient appointment during this hospitalization) if bleeding resolves.    Atrial fibrillation with RVR  RVR was likely driven by sepsis. Improved after treatment of sepsis.  Continue diltiazem, propafenone.   Eliquis on hold with hemoptysis and will likely remain on hold for at least next 1-2 weeks with pulmonary abscess.    COPD exacerbation  Continue antibiotics as above.  Today is day 7 of 7 for prednisone 30 mg (tapered from higher doses earlier in stay). Will plan to decrease to 20 mg daily tomorrow and patient should likely continue this dose until pulmonary follow-up if able to be arranged in the next 14 days.   Note that patient has been on some dose of steroids for nearly the past 2 months at this time.  Continue xopenex, incruse.   O2 requirement at baseline.   Will need expedited pulmonary follow up for new patient appointment (previously scheduled [facility]) if patient ultimately discharges.    Iatrogenic fluid overload secondary to aggressive fluid resuscitation for sepsis, resolved  Patient started on scheduled albumin and IV lasix on 1/2/19 with good improvement.  Patient has diuresed well and lasix has been discontinued.   Continue compression stockings.     Hypertension  Continue diltiazem, hydralazine.    Hyperglycemia, steroid-induced  Increased NPH to 7 unit(s)  daily and sliding scale insulin.  Consider decreasing in coming 1-2 days with decreasing prednisone dose on 1/7/19.    CKD 2  Renally dose medications and avoid nephrotoxins.     Diet: Regular Diet Adult    DVT Prophylaxis: Eliquis is now on hold with hemoptysis. Have ordered SCDs, though patient is refusing these and ambulating regularly.  Rodney Catheter: not present  Code Status: Full Code      Disposition Plan   Expected discharge: Pending evaluation and improvement in hemoptysis. Dr. Stanford to discuss further with pulmonary. No urgent indication for transfer, but should monitor inpatient until hemoptysis resolves.      Latanya Loving MD FACP  Hospitalist Service  [facility]  Text Page (7am - 6pm)    ______________________________________________________________________    Interval History   Discussed with Dr. Elliott of [facility] on 1/6/19. He reviewed CT scan and noted that patient likely has pulmonary abscess following pneumonia. See A&P.  Patient reports hemoptysis had improved since yesterday evening, though this evening after getting out of shower he had considerable amount of hemoptysis again.  Patient has been off Eliquis since 1/4/19 evening dose.  Continue to monitor.  Continues on home 3L O2.  Patient denies any chest pain or worsening SOB.  Otherwise no acute events or complaints.       Data reviewed today: I reviewed all medications and new labs. Reviewed CT scan with pulmonary as above.    Physical Exam   Vital Signs: Temp: 98  F (36.7  C) Temp src: Oral BP: 124/65   Heart Rate: 65 Resp: 20 SpO2: 95 % O2 Device: Nasal cannula Oxygen Delivery: 3 LPM  Weight: 191 lbs 4.8 oz  Constitutional: Alert and oriented x3. Sitting up in bed watching [employer] game. No acute distress. Non-toxic. No diaphoresis. On home O2.  HEENT: NCAT. EOMI. Moist oral mucosa.  Respiratory: Clear to auscultation bilaterally. Soft crackles at bilateral bases, right > left, unchanged. No wheezes on exam. 3L  O2 by NC.  Cardiovascular: Regular rate and rhythm. No murmur. Patient has recently removed compression stockings before showering and trace edema is present bilaterally.   GI: Soft, nontender, nondistended.   Musculoskeletal: No gross deformities. Edema as noted above.   Neurologic: Alert and oriented x3. No focal neurologic deficits.       Data   Recent Labs   Lab 01/06/19  0615 01/05/19  0644 01/04/19  0607 01/03/19  0526   WBC  --  9.8 10.3 8.2   HGB 11.9* 12.6* 12.9* 11.4*   MCV  --  95 94 94   PLT  --  264 290 227    137 136 138   POTASSIUM 4.0 4.0 4.2 3.8   CHLORIDE 98 95 95 95   CO2 36* 38* 38* 41*   BUN 27 26 23 27   CR 0.93 1.03 1.00 1.01   ANIONGAP 2* 4 3 2*   WILBERT 8.6 9.0 9.2 9.1   * 99 101* 108*   ALBUMIN  --   --  3.4  --    PROTTOTAL  --   --  6.5*  --    BILITOTAL  --   --  0.6  --    ALKPHOS  --   --  45  --    ALT  --   --  22  --    AST  --   --  23  --      No results found for this or any previous visit (from the past 24 hour(s)).

## 2019-01-06 NOTE — PLAN OF CARE
----- Message from Maryan Bacon sent at 11/22/2017 10:13 AM CST -----  Paola with Dr Edward Montoya//at [phone] and fax is [phone]//is calling to get results of pt's preop clearance//please call if any questions//thanks/[facility]   Pt VSS. Denies pain.   LS exp wheezes, dim, ERICKSON, slow to recover. O2 3 lpm.  BLE edema.  Regular diet.  B  Up independently in room.  Adequate UOP.  PICC patent, SL  Tx: Ancef.   Consults: ID

## 2019-01-06 NOTE — PLAN OF CARE
A&OX4. SBA to independent in room with transferring. Expiratory and inspiratory wheezing ausculted to bilateral lung fields. Encouraged I&S, patient refused. 02 96% on 3L/nc.  RR following. Gram positive cocci from preliminary result of sputum. Not on tele.   and 177. +2 edema noted to bilateral lower extremities. C/O headache, oxycodone given at 1734 and 2159. Regular diet.  Will continue to monitor.

## 2019-01-06 NOTE — PLAN OF CARE
Vss, no co cp/sob, oxy for HA with relief. BG 93, 127.  [place], using urinal, ID following, TJ continue, midline WDL, BLE edema improving. Not yet ready for dc, might need a bronchoscopy tomorrow, following sputum cx.  Continue poc and monitoring.         Improving  Infection  Infection Symptom Resolution  1/6/2019 1110 - Improving by Latanya Lyons RN  1/6/2019 0506 - No Change by Sherry Olmedo RN  Adult Inpatient Plan of Care  Readiness for Transition of Care  1/6/2019 1110 - Improving by Latanya Lyons RN  1/6/2019 0506 - No Change by Sherry Olmedo RN  Chronic Respiratory Difficulty Comorbidity  Chronic Respiratory Difficulty  Description  Patient comorbidity will be monitored for signs and symptoms of Respiratory Difficulty (Chronic) condition.  Problems will be absent, minimized or managed by discharge/transition of care.  1/6/2019 1110 - Improving by Latanya Lyons RN  1/6/2019 0506 - No Change by Sherry Olmedo RN  COPD Comorbidity  Maintenance of COPD Symptom Control  1/6/2019 1110 - Improving by Latanya Lyons RN  1/6/2019 0506 - No Change by Sherry Olmedo RN  Hypertension Comorbidity  Blood Pressure in Desired Range  1/6/2019 1110 - Improving by Latanya Lyons RN  1/6/2019 0506 - No Change by Sherry Olmedo RN  Renal Insufficiency Comorbidity  Renal Insufficiency  Description  Patient comorbidity will be monitored for signs and symptoms of Renal Insufficiency (Chronic) condition.  Problems will be absent, minimized or managed by discharge/transition of care.  1/6/2019 1110 - Improving by Latanya Lyons RN  1/6/2019 0506 - No Change by Sherry Olmedo RN  Fluid Imbalance (Pneumonia)  Fluid Balance  1/6/2019 1110 - Improving by Latanya Lyons RN  1/6/2019 0506 - No Change by Sherry Olmedo RN  Infection (Pneumonia)  Resolution of Infection Signs/Symptoms  1/6/2019 1110 - Improving by Latanya Lyons RN  1/6/2019 0506 - No Change by Sherry Olmedo RN  Respiratory Compromise  (Pneumonia)  Effective Oxygenation and Ventilation  1/6/2019 1110 - Improving by Latanya Lyons RN  1/6/2019 0506 - No Change by Sherry Olmedo RN

## 2019-01-06 NOTE — PROGRESS NOTES
[facility]  Infectious Disease Progress Note          Assessment and Plan:   IMPRESSION:   1.  A 67-year-old male with third hospitalization in the last month with hypoxic respiratory failure, probable pneumonia, Staph aureus in recent sputum and now Staph aureus in the blood.   2.  Staph aureus bacteremia, only 1 of 2 cultures positive.  Suspect this is spillover from pneumonia, but certainly at risk including a tricuspid valve.   3.  History of atrial fibrillation with rapid ventricular response.   4.  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   5 New hemoptysis     RECOMMENDATIONS:     1.  MSSA also in sputum,  other cultures without any other growth,  On  Ancef.  He will need an extended IV course here simply for the positive blood culture,  Given  the tricuspid valve and the hip, probably a full 4 week course of therapy.   2.  12/19 cx  negative, midline in  3. Orders for IV antibiotics in already . Day 21/28  4 Some hemoptysis wo other sign infection new, incl procal 0, CT with new areas, ?, bronch reasonable but obviously problem here with no pulm med available, discussed with Dr Boo who has discussed with pulm , concern raised about ? Evolving lung abscess, assuming staph already 3 weeks tx, would complete the planned IV course in 1 week then 4 weeks po augmentin with pulm, me and imaging FU          Interval History:   no new complaints sl abd pain, Follow-up BC neg so far, sputum staph no fever no other new focal sxs, WBc normal    still hypoxic slow better              Medications:       ceFAZolin  2 g Intravenous Q8H     diltiazem ER COATED BEADS  360 mg Oral Daily     hydrALAZINE  50 mg Oral Q8H MEGHAN     insulin aspart  1-3 Units Subcutaneous TID AC     insulin aspart  1-3 Units Subcutaneous At Bedtime     insulin isophane human  7 Units Subcutaneous Daily     levalbuterol  1.25 mg  Nebulization 4x Daily     nystatin  500,000 Units Mouth/Throat 4x Daily     [START ON 1/7/2019] predniSONE  20 mg Oral Daily     propafenone  150 mg Oral TID     sodium chloride (PF)  10 mL Intracatheter Q8H     umeclidinium  1 puff Inhalation Daily     zolpidem  5 mg Oral At Bedtime                  Physical Exam:   Blood pressure 151/83, pulse 87, temperature 97.4  F (36.3  C), temperature source Oral, resp. rate 20, weight 86.8 kg (191 lb 4.8 oz), SpO2 97 %.  Wt Readings from Last 2 Encounters:   12/03/18 87.5 kg (193 lb)   11/23/18 80.7 kg (178 lb)     Vital Signs with Ranges  Temp:  [97.4  F (36.3  C)-98  F (36.7  C)] 97.4  F (36.3  C)  Heart Rate:  [57-89] 72  Resp:  [18-24] 20  BP: (124-152)/(65-92) 151/83  SpO2:  [93 %-98 %] 97 %    Constitutional: Awake, alert, cooperative, no apparent distress   Lungs: Congestion to auscultation bilaterally, no crackles or wheezing hpoxia   Cardiovascular: Regular rate and rhythm, normal S1 and S2, and no murmur noted   Abdomen: Normal bowel sounds, soft, non-distended, non-tender   Skin: No rashes, no cyanosis, no edema no embolic lesions   Other:           Data:   All microbiology laboratory data reviewed.  Recent Labs   Lab Test 01/06/19  0615 01/05/19  0644 01/04/19  0607 01/03/19  0526   WBC  --  9.8 10.3 8.2   HGB 11.9* 12.6* 12.9* 11.4*   HCT  --  39.7* 40.8 36.5*   MCV  --  95 94 94   PLT  --  264 290 227     Recent Labs   Lab Test 01/06/19  0615 01/05/19  0644 01/04/19  0607   CR 0.93 1.03 1.00     No lab results found.  Recent Labs   Lab Test 01/05/19  1545 12/19/18  1023 12/19/18  1013 12/18/18  1027 12/17/18  1518 12/17/18  1452 11/25/18  2257 10/06/18  0826 10/06/18  0822   CULT Culture in progress No growth No growth Light growth  Normal tushar    Moderate growth  Staphylococcus aureus  * Cultured on the 1st day of incubation:  Staphylococcus aureus  This isolate DOES NOT demonstrate inducible clindamycin resistance in vitro. Clindamycin   is susceptible and  could be used when indicated, however, erythromycin is resistant and   should not be used.  *  Critical Value/Significant Value, preliminary result only, called to and read back by  Laure Mathews RN [facility] @ 7995 12.18.18 JE    (Note)  POSITIVE for STAPHYLOCOCCUS AUREUS and NEGATIVE for the mecA gene  (not MRSA) by [employer] multiplex nucleic acid test. The mecA gene was  not detected. Final identification and antimicrobial susceptibility  testing will be verified by standard methods.    Specimen tested with [employer] multiplex, gram-positive blood culture  nucleic acid test for the following targets: Staph aureus, Staph  epidermidis, Staph lugdunensis, other Staph species, Enterococcus  faecalis, Enterococcus faecium, Streptococcus species, S. agalactiae,  S. anginosus grp., S. pneumoniae, S. pyogenes, Listeria sp., mecA  (methicillin resistance) and Steven/B (vancomycin resistance).    Critical Value/Significant Value called to and read back by Laure Mathews RN [facility] 1652 12.18.18 PATSY.   No growth Heavy growth  Normal tushar    Moderate growth  Staphylococcus aureus  This isolate DOES NOT demonstrate inducible clindamycin resistance in vitro. Clindamycin   is susceptible and could be used when indicated, however, erythromycin is resistant and   should not be used.  * No growth No growth

## 2019-01-07 LAB
ANION GAP SERPL CALCULATED.3IONS-SCNC: <1 MMOL/L (ref 3–14)
BACTERIA SPEC CULT: NORMAL
BUN SERPL-MCNC: 27 MG/DL (ref 7–30)
CALCIUM SERPL-MCNC: 8.6 MG/DL (ref 8.5–10.1)
CHLORIDE SERPL-SCNC: 100 MMOL/L (ref 94–109)
CO2 SERPL-SCNC: 37 MMOL/L (ref 20–32)
CREAT SERPL-MCNC: 0.98 MG/DL (ref 0.66–1.25)
ERYTHROCYTE [DISTWIDTH] IN BLOOD BY AUTOMATED COUNT: 15.5 % (ref 10–15)
GFR SERPL CREATININE-BSD FRML MDRD: 79 ML/MIN/{1.73_M2}
GLUCOSE BLDC GLUCOMTR-MCNC: 127 MG/DL (ref 70–99)
GLUCOSE BLDC GLUCOMTR-MCNC: 138 MG/DL (ref 70–99)
GLUCOSE BLDC GLUCOMTR-MCNC: 153 MG/DL (ref 70–99)
GLUCOSE BLDC GLUCOMTR-MCNC: 172 MG/DL (ref 70–99)
GLUCOSE BLDC GLUCOMTR-MCNC: 96 MG/DL (ref 70–99)
GLUCOSE SERPL-MCNC: 98 MG/DL (ref 70–99)
HCT VFR BLD AUTO: 35.9 % (ref 40–53)
HGB BLD-MCNC: 11.5 G/DL (ref 13.3–17.7)
MCH RBC QN AUTO: 30.2 PG (ref 26.5–33)
MCHC RBC AUTO-ENTMCNC: 32 G/DL (ref 31.5–36.5)
MCV RBC AUTO: 94 FL (ref 78–100)
PLATELET # BLD AUTO: 197 10E9/L (ref 150–450)
POTASSIUM SERPL-SCNC: 4.2 MMOL/L (ref 3.4–5.3)
RBC # BLD AUTO: 3.81 10E12/L (ref 4.4–5.9)
SODIUM SERPL-SCNC: 137 MMOL/L (ref 133–144)
SPECIMEN SOURCE: NORMAL
WBC # BLD AUTO: 10.5 10E9/L (ref 4–11)

## 2019-01-07 PROCEDURE — 25000132 ZZH RX MED GY IP 250 OP 250 PS 637: Performed by: INTERNAL MEDICINE

## 2019-01-07 PROCEDURE — 80048 BASIC METABOLIC PNL TOTAL CA: CPT | Performed by: INTERNAL MEDICINE

## 2019-01-07 PROCEDURE — 99233 SBSQ HOSP IP/OBS HIGH 50: CPT | Performed by: INTERNAL MEDICINE

## 2019-01-07 PROCEDURE — 25000125 ZZHC RX 250: Performed by: INTERNAL MEDICINE

## 2019-01-07 PROCEDURE — 85027 COMPLETE CBC AUTOMATED: CPT | Performed by: INTERNAL MEDICINE

## 2019-01-07 PROCEDURE — 94640 AIRWAY INHALATION TREATMENT: CPT | Mod: 76

## 2019-01-07 PROCEDURE — 25000128 H RX IP 250 OP 636: Performed by: INTERNAL MEDICINE

## 2019-01-07 PROCEDURE — 12000000 ZZH R&B MED SURG/OB

## 2019-01-07 PROCEDURE — 00000146 ZZHCL STATISTIC GLUCOSE BY METER IP

## 2019-01-07 PROCEDURE — 40000275 ZZH STATISTIC RCP TIME EA 10 MIN

## 2019-01-07 PROCEDURE — 94640 AIRWAY INHALATION TREATMENT: CPT

## 2019-01-07 RX ADMIN — HYDRALAZINE HYDROCHLORIDE 50 MG: 50 TABLET, FILM COATED ORAL at 06:38

## 2019-01-07 RX ADMIN — Medication 5 MG: at 06:46

## 2019-01-07 RX ADMIN — ACETAMINOPHEN 650 MG: 325 TABLET, FILM COATED ORAL at 16:21

## 2019-01-07 RX ADMIN — PROPAFENONE HYDROCHLORIDE 150 MG: 150 TABLET, COATED ORAL at 16:20

## 2019-01-07 RX ADMIN — PROPAFENONE HYDROCHLORIDE 150 MG: 150 TABLET, COATED ORAL at 08:15

## 2019-01-07 RX ADMIN — HYDRALAZINE HYDROCHLORIDE 50 MG: 50 TABLET, FILM COATED ORAL at 14:13

## 2019-01-07 RX ADMIN — Medication 5 MG: at 20:24

## 2019-01-07 RX ADMIN — CEFAZOLIN SODIUM 2 G: 2 INJECTION, SOLUTION INTRAVENOUS at 14:14

## 2019-01-07 RX ADMIN — PROPAFENONE HYDROCHLORIDE 150 MG: 150 TABLET, COATED ORAL at 21:35

## 2019-01-07 RX ADMIN — CEFAZOLIN SODIUM 2 G: 2 INJECTION, SOLUTION INTRAVENOUS at 22:02

## 2019-01-07 RX ADMIN — Medication 5 MG: at 16:20

## 2019-01-07 RX ADMIN — LEVALBUTEROL HYDROCHLORIDE 1.25 MG: 1.25 SOLUTION RESPIRATORY (INHALATION) at 19:07

## 2019-01-07 RX ADMIN — INSULIN ASPART 1 UNITS: 100 INJECTION, SOLUTION INTRAVENOUS; SUBCUTANEOUS at 16:35

## 2019-01-07 RX ADMIN — NYSTATIN 500000 UNITS: 500000 SUSPENSION ORAL at 16:52

## 2019-01-07 RX ADMIN — CEFAZOLIN SODIUM 2 G: 2 INJECTION, SOLUTION INTRAVENOUS at 06:38

## 2019-01-07 RX ADMIN — PREDNISONE 20 MG: 20 TABLET ORAL at 08:15

## 2019-01-07 RX ADMIN — DILTIAZEM HYDROCHLORIDE 360 MG: 180 CAPSULE, COATED, EXTENDED RELEASE ORAL at 08:15

## 2019-01-07 RX ADMIN — LEVALBUTEROL HYDROCHLORIDE 1.25 MG: 1.25 SOLUTION RESPIRATORY (INHALATION) at 14:18

## 2019-01-07 RX ADMIN — HYDRALAZINE HYDROCHLORIDE 50 MG: 50 TABLET, FILM COATED ORAL at 21:35

## 2019-01-07 RX ADMIN — Medication 5 MG: at 02:20

## 2019-01-07 RX ADMIN — ZOLPIDEM TARTRATE 5 MG: 5 TABLET, COATED ORAL at 21:35

## 2019-01-07 RX ADMIN — NYSTATIN 500000 UNITS: 500000 SUSPENSION ORAL at 21:35

## 2019-01-07 ASSESSMENT — ACTIVITIES OF DAILY LIVING (ADL)
ADLS_ACUITY_SCORE: 16

## 2019-01-07 NOTE — PROGRESS NOTES
[facility]  Hospitalist Progress Note  Patient Name: Tone Cooper    MRN: [id number]  Provider: Yoan Mack MD  01/07/19    Initial presenting complaint/issue to hospital (Diagnosis): cough and shortness of breath         Assessment and Plan:      Tone Cooper is a 68 year old male with history of severe COPD (on home oxygen 3 lpm), atrial fibrillation (anticoagulated with Elliquis), tricuspid valve repair, hypertension, nonsustained VT, and two hospitalizations in the previous two months for COPD exacerbations.  He presented to the ED on 12/17 with 1.5 weeks of productive cough and shortness of breath.  He was found to have severe sepsis, community acquired pneumonia of right lung (by CXR), and hypoxic respiratory failure.  He was admitted for further evaluation and treatment.  Initially, he required BIPAP support.  He was treated with Rocephin, Zithromax, nebs, and Prednisone.  Rocephin was changed to Zosyn shortly after admission.  One of two admission blood cultures grew staph.  Infectious disease was consulted.  Vancomycin was started.  This staph ended up being MSSA and it grew in sputum, also.  Antibiotics were changed to Ancef.  Given history of tricuspid valve repair a prolonged course of IV Ancef was planned.  Tone was somewhat slow to improve, although he did come off of BIPAP and his blood pressure improved.  He developed some hemoptysis in sputum.  Prior to admission Eliquis was stopped.  CT of chest was obtained on 1/4/19.  This showed areas of consolidation consistent with recent pneumonia.  Streaky hemoptysis persisted.  Pulmonary medicine at the [facility] was consulted with by phone.  There was concern about possible small abscesses related to recent pneumonia.  It was thought that bronchoscopy was not needed but that a prolonged course of antibiotics would be necessary.  There was also consideration of bronchial artery embolization if hemoptysis worsened.  For  the last couple of days fay has seems to stabilize.  Now he is having just occasional streaky hemoptysis in his sputum.  His breathing has improved.  Infectious diseases recommending completion of 4 weeks of IV Ancef followed by 4 weeks of oral Augmentin.  Kari is now using his prior to admission rate of supplemental oxygen (3 L/min).     1.  Acute on chronic hypoxic respiratory failure due to community-acquired pneumonia and severe sepsis.  Acute hypoxic respiratory failure has resolved.  Continue prior to admission rate of supplemental oxygen.    2.  Severe sepsis (leukocytosis, tachycardia, elevated lactate) secondary to community-acquired pneumonia, resolved.      3.  Community-acquired pneumonia, RLL, with MSSA (grew and sputum and blood cultures), complicated by suspected pulmonary abscess with hemoptysis.  Pneumonia is improving.  Hemoptysis is improving.  Plan is to complete 4 weeks of IV Ancef (outpatient order provided by infectious disease) followed by 4 weeks of oral Augmentin.  Social work consult to finalize home care for IV antibiotics.  Fay received some paperwork from [facility] that needs to be completed.  He is near being able to discharge home once home IV infusion is set up and hemoptysis continues to improve.  Eliquis is on hold with hemoptysis.  This should be restarted within a week of hemoptysis continues to improve and resolves.  Fay will need follow-up with infectious disease (Dr. Zepeda), pulmonary medicine, and primary care.    4.  Atrial fibrillation with RVR.  RVR has resolved.  This was likely driven by sepsiss and improved after treatment of sepsis.  Continue diltiazem and propafenone.  Prior to admission Eliquis is on hold with recent streaky hemoptysis.  I would probably hold this for 1 more week and then restart of hemoptysis resolves.  Defer this to primary care.     5.  COPD with moderate exacerbation.  Prednisone has been tapered down to 20 mg daily.  Santo cardona  remain on this until outpatient follow-up.  Continue prior to admission supplemental oxygen at 3 L/min.     6.  Iatrogenic fluid overload secondary to aggressive fluid resuscitation for sepsis, resolved     7.  Hypertension.  Continue diltiazem and hydralazine.     8.  Hyperglycemia, steroid-induced.  Continue NPH insulin 7 unit(s) daily and sliding scale insulin.  Adjust as appropriate.     9.  CKD 2.  Stable.      Diet: Regular Diet  DVT Prophylaxis: Eliquis is now on hold with hemoptysis. Have ordered SCDs, though patient is refusing these and ambulating regularly.  Rodney Catheter: not present  Code Status: Full Code    Disposition: Continue inpatient cares.  Social work to see to finalize home care orders for IV infusion.  Discharge home tomorrow or Wednesday if hemoptysis continues to improve for results.          Interval History:      No new problems.  Still some streaky hemoptysis.  Breathing is near baseline.                  Physical Exam:      Last Vital Signs:  /88 (BP Location: Right arm)   Pulse 87   Temp 97.5  F (36.4  C) (Oral)   Resp 18   Wt 86.6 kg (191 lb)   SpO2 96%   BMI 27.41 kg/m      Intake/Output Summary (Last 24 hours) at 1/7/2019 1750  Last data filed at 1/7/2019 1653  Gross per 24 hour   Intake 1060 ml   Output 450 ml   Net 610 ml       GENERAL:  Comfortable. Cooperative.  PSYCH: pleasant, oriented, No acute distress.  EYES: PERRLA, Normal conjunctiva.  HEART:  Regular rate and rhythm. No JVD. Pulses normal. No edema.  LUNGS:  Clear to auscultation, normal Respiratory effort.  ABDOMEN:  Soft, no hepatosplenomegaly, normal bowel sounds.  EXTREMETIES: No clubbing, cyanosis or ischemia  SKIN:  Dry to touch, No rash.           Medications:      All current medications were reviewed.         Data:      All new lab and imaging data was reviewed.   Labs:       Lab Results   Component Value Date     01/07/2019     01/06/2019     01/05/2019    Lab Results   Component  Value Date    CHLORIDE 100 01/07/2019    CHLORIDE 98 01/06/2019    CHLORIDE 95 01/05/2019    Lab Results   Component Value Date    BUN 27 01/07/2019    BUN 27 01/06/2019    BUN 26 01/05/2019      Lab Results   Component Value Date    POTASSIUM 4.2 01/07/2019    POTASSIUM 4.0 01/06/2019    POTASSIUM 4.0 01/05/2019    Lab Results   Component Value Date    CO2 37 01/07/2019    CO2 36 01/06/2019    CO2 38 01/05/2019    Lab Results   Component Value Date    CR 0.98 01/07/2019    CR 0.93 01/06/2019    CR 1.03 01/05/2019        Recent Labs   Lab 01/07/19  0600 01/06/19  0615 01/05/19  0644 01/04/19  0607   WBC 10.5  --  9.8 10.3   HGB 11.5* 11.9* 12.6* 12.9*   HCT 35.9*  --  39.7* 40.8   MCV 94  --  95 94     --  264 290

## 2019-01-07 NOTE — PLAN OF CARE
Vss, no co cp/sob/pain. BG 96, 127.  [place], ID following, TJ continue, midline WDL, LLE edema improving. Not yet ready for dc, questionable need for a bronchoscopy (see notes for details, pt waiting to be seen by md), following sputum cx.  Continue poc and monitoring.     No Change  Infection  Infection Symptom Resolution  1/7/2019 1423 - No Change by Latanya Lyons RN  1/7/2019 0730 - No Change by Nat Hart RN  Adult Inpatient Plan of Care  Readiness for Transition of Care  1/7/2019 1423 - No Change by Latanya Lyons RN  1/7/2019 0730 - No Change by Nat Hart RN  Chronic Respiratory Difficulty Comorbidity  Chronic Respiratory Difficulty  Description  Patient comorbidity will be monitored for signs and symptoms of Respiratory Difficulty (Chronic) condition.  Problems will be absent, minimized or managed by discharge/transition of care.  1/7/2019 1423 - No Change by Latanya Lyons RN  1/7/2019 0730 - No Change by Nat Hart RN  Fluid Imbalance (Pneumonia)  Fluid Balance  1/7/2019 1423 - No Change by Latanya Lyons RN  1/7/2019 0730 - No Change by Nat Hart RN  Infection (Pneumonia)  Resolution of Infection Signs/Symptoms  1/7/2019 1423 - No Change by Latanya Lyons RN  1/7/2019 0730 - No Change by Nat Hart RN     Improving  COPD Comorbidity  Maintenance of COPD Symptom Control  1/7/2019 1423 - Improving by Latanya Lyons RN  1/7/2019 0730 - No Change by Nat Hart RN  Hypertension Comorbidity  Blood Pressure in Desired Range  1/7/2019 1423 - Improving by Latanya Lyons RN  1/7/2019 0730 - No Change by Nat Hart RN  Renal Insufficiency Comorbidity  Renal Insufficiency  Description  Patient comorbidity will be monitored for signs and symptoms of Renal Insufficiency (Chronic) condition.  Problems will be absent, minimized or managed by discharge/transition of care.  1/7/2019 1423 - Improving by Latanya Lyons RN  1/7/2019 0730 - No Change by  Nat Hart, RN  Respiratory Compromise (Pneumonia)  Effective Oxygenation and Ventilation  1/7/2019 1423 - Improving by Latanya Lyons RN  1/7/2019 0730 - No Change by Nat Hart, RN

## 2019-01-07 NOTE — PLAN OF CARE
Pt a/o x4. Up independently.   Oxy for headache, with some relief.   [facility]ible [facility] today.  IV Ancef.

## 2019-01-07 NOTE — PLAN OF CARE
A&OX4. Independent in room. LS diminished and clear on all lung fields. 02 94%on 3L per nc. Oxycodone given for pain at 1716 and 2220 . Regular diet. Had shower. Dyspneic on exertion, paged respiratory for breathing treatment.  and 185 Will continue POC monitoring.

## 2019-01-07 NOTE — PROGRESS NOTES
[facility]  Infectious Disease Progress Note          Assessment and Plan:   IMPRESSION:   1.  A 67-year-old male with third hospitalization in the last month with hypoxic respiratory failure, probable pneumonia, Staph aureus in recent sputum and now Staph aureus in the blood.   2.  Staph aureus bacteremia, only 1 of 2 cultures positive.  Suspect this is spillover from pneumonia, but certainly at risk including a tricuspid valve.   3.  History of atrial fibrillation with rapid ventricular response.   4.  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   5 New hemoptysis     RECOMMENDATIONS:     1.  MSSA also in sputum,  other cultures without any other growth,  On  Ancef.  He will need an extended IV course here simply for the positive blood culture,  Given  the tricuspid valve and the hip, probably a full 4 week course of therapy.   2.  12/19 cx  negative, midline in  3. Orders for IV antibiotics in already . Day 22/28  4 Some hemoptysis wo other sign infection new, incl procal 0, CT with new areas, ?, bronch reasonable but obviously problem here with no pulm med available, discussed with Dr Boo who has discussed with pulm , concern raised about ? Evolving lung abscess, assuming staph already 3 weeks tx, would complete the planned IV course in 1 week then 4 weeks po augmentin with pulm, me and imaging Follow-up OK disposition          Interval History:   no new complaints sl abd pain, Follow-up BC neg so far, sputum staph no fever no other new focal sxs, WBc normal    still hypoxic slow better              Medications:       ceFAZolin  2 g Intravenous Q8H     diltiazem ER COATED BEADS  360 mg Oral Daily     hydrALAZINE  50 mg Oral Q8H MEGHAN     insulin aspart  1-3 Units Subcutaneous TID AC     insulin aspart  1-3 Units Subcutaneous At Bedtime     insulin isophane human  7 Units Subcutaneous Daily      levalbuterol  1.25 mg Nebulization 4x Daily     nystatin  500,000 Units Mouth/Throat 4x Daily     predniSONE  20 mg Oral Daily     propafenone  150 mg Oral TID     sodium chloride (PF)  10 mL Intracatheter Q8H     umeclidinium  1 puff Inhalation Daily     zolpidem  5 mg Oral At Bedtime                  Physical Exam:   Blood pressure 154/88, pulse 87, temperature 97.5  F (36.4  C), temperature source Oral, resp. rate 18, weight 86.6 kg (191 lb), SpO2 96 %.  Wt Readings from Last 2 Encounters:   01/07/19 86.6 kg (191 lb)   12/03/18 87.5 kg (193 lb)     Vital Signs with Ranges  Temp:  [97.5  F (36.4  C)-98.5  F (36.9  C)] 97.5  F (36.4  C)  Heart Rate:  [62-76] 68  Resp:  [18-24] 18  BP: (129-154)/(76-92) 154/88  SpO2:  [91 %-96 %] 96 %    Constitutional: Awake, alert, cooperative, no apparent distress   Lungs: Congestion to auscultation bilaterally, no crackles or wheezing hpoxia   Cardiovascular: Regular rate and rhythm, normal S1 and S2, and no murmur noted   Abdomen: Normal bowel sounds, soft, non-distended, non-tender   Skin: No rashes, no cyanosis, no edema no embolic lesions   Other:           Data:   All microbiology laboratory data reviewed.  Recent Labs   Lab Test 01/07/19  0600 01/06/19  0615 01/05/19  0644 01/04/19  0607   WBC 10.5  --  9.8 10.3   HGB 11.5* 11.9* 12.6* 12.9*   HCT 35.9*  --  39.7* 40.8   MCV 94  --  95 94     --  264 290     Recent Labs   Lab Test 01/07/19  0600 01/06/19  0615 01/05/19  0644   CR 0.98 0.93 1.03     No lab results found.  Recent Labs   Lab Test 01/05/19  1545 12/19/18  1023 12/19/18  1013 12/18/18  1027 12/17/18  1518 12/17/18  1452 11/25/18  2257 10/06/18  0826 10/06/18  0822   CULT Moderate growth  Normal tushar   No growth No growth Light growth  Normal tushar    Moderate growth  Staphylococcus aureus  * Cultured on the 1st day of incubation:  Staphylococcus aureus  This isolate DOES NOT demonstrate inducible clindamycin resistance in vitro. Clindamycin   is  susceptible and could be used when indicated, however, erythromycin is resistant and   should not be used.  *  Critical Value/Significant Value, preliminary result only, called to and read back by  Laure Mathews RN [facility] @ 5386 12.18.18 JE    (Note)  POSITIVE for STAPHYLOCOCCUS AUREUS and NEGATIVE for the mecA gene  (not MRSA) by [employer] multiplex nucleic acid test. The mecA gene was  not detected. Final identification and antimicrobial susceptibility  testing will be verified by standard methods.    Specimen tested with [employer]ene multiplex, gram-positive blood culture  nucleic acid test for the following targets: Staph aureus, Staph  epidermidis, Staph lugdunensis, other Staph species, Enterococcus  faecalis, Enterococcus faecium, Streptococcus species, S. agalactiae,  S. anginosus grp., S. pneumoniae, S. pyogenes, Listeria sp., mecA  (methicillin resistance) and Steven/B (vancomycin resistance).    Critical Value/Significant Value called to and read back by Laure Mathews RN [facility] 1652 12.18.18 [facility].   No growth Heavy growth  Normal tushar    Moderate growth  Staphylococcus aureus  This isolate DOES NOT demonstrate inducible clindamycin resistance in vitro. Clindamycin   is susceptible and could be used when indicated, however, erythromycin is resistant and   should not be used.  * No growth No growth

## 2019-01-08 ENCOUNTER — TELEPHONE (OUTPATIENT)
Dept: CARDIOLOGY | Facility: CLINIC | Age: 69
End: 2019-01-08

## 2019-01-08 VITALS
OXYGEN SATURATION: 95 % | WEIGHT: 191 LBS | SYSTOLIC BLOOD PRESSURE: 123 MMHG | HEART RATE: 72 BPM | RESPIRATION RATE: 20 BRPM | DIASTOLIC BLOOD PRESSURE: 71 MMHG | BODY MASS INDEX: 27.41 KG/M2 | TEMPERATURE: 98.1 F

## 2019-01-08 LAB
ANION GAP SERPL CALCULATED.3IONS-SCNC: 5 MMOL/L (ref 3–14)
BUN SERPL-MCNC: 28 MG/DL (ref 7–30)
CALCIUM SERPL-MCNC: 8.9 MG/DL (ref 8.5–10.1)
CHLORIDE SERPL-SCNC: 101 MMOL/L (ref 94–109)
CO2 SERPL-SCNC: 33 MMOL/L (ref 20–32)
CREAT SERPL-MCNC: 0.97 MG/DL (ref 0.66–1.25)
GFR SERPL CREATININE-BSD FRML MDRD: 80 ML/MIN/{1.73_M2}
GLUCOSE BLDC GLUCOMTR-MCNC: 106 MG/DL (ref 70–99)
GLUCOSE BLDC GLUCOMTR-MCNC: 132 MG/DL (ref 70–99)
GLUCOSE BLDC GLUCOMTR-MCNC: 92 MG/DL (ref 70–99)
GLUCOSE SERPL-MCNC: 93 MG/DL (ref 70–99)
HGB BLD-MCNC: 12.2 G/DL (ref 13.3–17.7)
POTASSIUM SERPL-SCNC: 4.3 MMOL/L (ref 3.4–5.3)
SODIUM SERPL-SCNC: 139 MMOL/L (ref 133–144)

## 2019-01-08 PROCEDURE — 25000125 ZZHC RX 250: Performed by: INTERNAL MEDICINE

## 2019-01-08 PROCEDURE — 25000128 H RX IP 250 OP 636: Performed by: INTERNAL MEDICINE

## 2019-01-08 PROCEDURE — 85018 HEMOGLOBIN: CPT | Performed by: INTERNAL MEDICINE

## 2019-01-08 PROCEDURE — 94640 AIRWAY INHALATION TREATMENT: CPT

## 2019-01-08 PROCEDURE — 40000275 ZZH STATISTIC RCP TIME EA 10 MIN

## 2019-01-08 PROCEDURE — 25000132 ZZH RX MED GY IP 250 OP 250 PS 637: Performed by: INTERNAL MEDICINE

## 2019-01-08 PROCEDURE — 80048 BASIC METABOLIC PNL TOTAL CA: CPT | Performed by: INTERNAL MEDICINE

## 2019-01-08 PROCEDURE — 99207 ZZC MOONLIGHTING INDICATOR: CPT | Performed by: INTERNAL MEDICINE

## 2019-01-08 PROCEDURE — 99239 HOSP IP/OBS DSCHRG MGMT >30: CPT | Performed by: INTERNAL MEDICINE

## 2019-01-08 PROCEDURE — 36415 COLL VENOUS BLD VENIPUNCTURE: CPT | Performed by: INTERNAL MEDICINE

## 2019-01-08 PROCEDURE — 00000146 ZZHCL STATISTIC GLUCOSE BY METER IP

## 2019-01-08 PROCEDURE — 94640 AIRWAY INHALATION TREATMENT: CPT | Mod: 76

## 2019-01-08 RX ORDER — CEFAZOLIN SODIUM 1 G/3ML
2 INJECTION, POWDER, FOR SOLUTION INTRAMUSCULAR; INTRAVENOUS EVERY 8 HOURS
Qty: 180 G | Refills: 0 | Status: SHIPPED | OUTPATIENT
Start: 2019-01-08 | End: 2019-02-11

## 2019-01-08 RX ORDER — HYDRALAZINE HYDROCHLORIDE 50 MG/1
50 TABLET, FILM COATED ORAL EVERY 8 HOURS
Qty: 90 TABLET | Refills: 0 | Status: SHIPPED | OUTPATIENT
Start: 2019-01-08 | End: 2019-02-11

## 2019-01-08 RX ORDER — FLUORIDE TOOTHPASTE
15 TOOTHPASTE DENTAL 4 TIMES DAILY PRN
Qty: 1000 ML | Refills: 0 | Status: SHIPPED | OUTPATIENT
Start: 2019-01-08 | End: 2019-02-11

## 2019-01-08 RX ORDER — IPRATROPIUM BROMIDE 0.2 MG/ML
0.5 SOLUTION, NON-ORAL INHALATION 4 TIMES DAILY
Qty: 100 ML | Refills: 0 | Status: SHIPPED | OUTPATIENT
Start: 2019-01-08 | End: 2019-02-11

## 2019-01-08 RX ORDER — DOCUSATE SODIUM 100 MG/1
100 CAPSULE, LIQUID FILLED ORAL 2 TIMES DAILY
Qty: 20 CAPSULE | Refills: 0 | Status: SHIPPED | OUTPATIENT
Start: 2019-01-08 | End: 2019-02-11

## 2019-01-08 RX ORDER — PREDNISONE 20 MG/1
TABLET ORAL
Qty: 20 TABLET | Refills: 0 | Status: ON HOLD | OUTPATIENT
Start: 2019-01-08 | End: 2019-02-13

## 2019-01-08 RX ORDER — DILTIAZEM HYDROCHLORIDE 360 MG/1
360 CAPSULE, EXTENDED RELEASE ORAL DAILY
Qty: 30 CAPSULE | Refills: 0 | Status: SHIPPED | OUTPATIENT
Start: 2019-01-09 | End: 2019-02-11

## 2019-01-08 RX ORDER — CEFAZOLIN SODIUM 1 G/3ML
2 INJECTION, POWDER, FOR SOLUTION INTRAMUSCULAR; INTRAVENOUS EVERY 8 HOURS
Qty: 180 G | Refills: 0 | Status: CANCELLED | OUTPATIENT
Start: 2019-01-08 | End: 2019-02-07

## 2019-01-08 RX ORDER — OXYCODONE HYDROCHLORIDE 5 MG/1
5 TABLET ORAL EVERY 6 HOURS PRN
Qty: 10 TABLET | Refills: 0 | Status: SHIPPED | OUTPATIENT
Start: 2019-01-08 | End: 2019-02-11

## 2019-01-08 RX ORDER — ACETAMINOPHEN 325 MG/1
650 TABLET ORAL EVERY 4 HOURS PRN
COMMUNITY
Start: 2019-01-08 | End: 2019-02-11

## 2019-01-08 RX ADMIN — NYSTATIN 500000 UNITS: 500000 SUSPENSION ORAL at 14:48

## 2019-01-08 RX ADMIN — Medication 5 MG: at 14:48

## 2019-01-08 RX ADMIN — HYDRALAZINE HYDROCHLORIDE 50 MG: 50 TABLET, FILM COATED ORAL at 06:28

## 2019-01-08 RX ADMIN — Medication 5 MG: at 09:57

## 2019-01-08 RX ADMIN — NYSTATIN 500000 UNITS: 500000 SUSPENSION ORAL at 09:58

## 2019-01-08 RX ADMIN — LEVALBUTEROL HYDROCHLORIDE 1.25 MG: 1.25 SOLUTION RESPIRATORY (INHALATION) at 12:31

## 2019-01-08 RX ADMIN — PROPAFENONE HYDROCHLORIDE 150 MG: 150 TABLET, COATED ORAL at 09:58

## 2019-01-08 RX ADMIN — DILTIAZEM HYDROCHLORIDE 360 MG: 180 CAPSULE, COATED, EXTENDED RELEASE ORAL at 09:58

## 2019-01-08 RX ADMIN — HYDRALAZINE HYDROCHLORIDE 50 MG: 50 TABLET, FILM COATED ORAL at 14:48

## 2019-01-08 RX ADMIN — CEFAZOLIN SODIUM 2 G: 2 INJECTION, SOLUTION INTRAVENOUS at 14:49

## 2019-01-08 RX ADMIN — PREDNISONE 20 MG: 20 TABLET ORAL at 09:58

## 2019-01-08 RX ADMIN — CEFAZOLIN SODIUM 2 G: 2 INJECTION, SOLUTION INTRAVENOUS at 06:28

## 2019-01-08 ASSESSMENT — ACTIVITIES OF DAILY LIVING (ADL)
ADLS_ACUITY_SCORE: 16

## 2019-01-08 NOTE — PLAN OF CARE
A&O x 4, independent w/walker, regular diet, , midline left arm, IV ancef, plan is to discharge today or tomorrow, will continue with POC.

## 2019-01-08 NOTE — TELEPHONE ENCOUNTER
Received call late tonight from [facility] regarding urgent finding on Ziop[facility]. Pat had an episode of A flutter with RVR at rate of 207 BMP for 1 minute. Patient has been hospitalized for this entire time and remains hospitalized. Will await report to [facility] for Dr Britt's review. HARDEEP Ramirez

## 2019-01-08 NOTE — DISCHARGE SUMMARY
[facility]    Discharge Summary  Hospitalist    Date of Admission:  12/17/2018  Date of Discharge:  1/8/2019  Discharging Provider: Shanon Black MD    Discharge Diagnoses   1. Acute on chronic hypoxic respiratory failure  2. Severe sepsis  secondary to community-acquired pneumonia, resolved  3. Community-acquired pneumonia, RLL  4. MSSA bacteremia  5. Atrial fibrillation with RVR  6. COPD exacerbation  7. Iatrogenic fluid overload secondary to aggressive fluid resuscitation for sepsis, resolved  8. Hypertension  9. Hyperglycemia, steroid-induced  10. CKD 2    Chief Complaint: Shortness of Breath    History of Present Illness   Tone Cooper is a 67 year old male with a history of COPD and atrial fibrillation on Eliquis, propafenone, and Cardizem who presents with his wife for evaluation of shortness of breath. Patient was admitted here 3 weeks ago with COPD exacerbation. He endorses worsening dyspnea since that discharge with a cough. In the last week he notes his cough has been productive of a yellow-tan sputum and dyspnea has significantly worsened. He reports chest and back pain that is tight in nature related to his dyspnea. He has been using nebulizer at home with transient improvement with last use 2 hours prior to arrival. He denies sore throat, vomiting, or fever. He remarks he has had some new leg swelling for which he saw his PCP, but this is actually improving now.     Hospital Course   Tone Cooper was admitted on 12/17/2018.  The following problems were addressed during his hospitalization:    Tone oCoper is a 68 year old male with history of severe COPD (on home oxygen 3 lpm), atrial fibrillation (anticoagulated with Elliquis), tricuspid valve repair, hypertension, nonsustained VT, and two hospitalizations in the previous two months for COPD exacerbations.  He presented to the ED on 12/17 with 1.5 weeks of productive cough and shortness of breath.  He was found to have  severe sepsis, community acquired pneumonia of right lung (by CXR), and hypoxic respiratory failure.  He was admitted for further evaluation and treatment.  Initially, he required BIPAP support.  He was treated with Rocephin, Zithromax, nebs, and Prednisone.  Rocephin was changed to Zosyn shortly after admission.  One of two admission blood cultures grew staph.  Infectious disease was consulted.  Vancomycin was started.  This staph ended up being MSSA and it grew in sputum, also.  Antibiotics were changed to Ancef.  Given history of tricuspid valve repair a prolonged course of IV Ancef was planned.  Tone was somewhat slow to improve, although he did come off of BIPAP and his blood pressure improved.  He developed some hemoptysis in sputum.  Prior to admission Eliquis was stopped.  CT of chest was obtained on 1/4/19.  This showed areas of consolidation consistent with recent pneumonia.  Streaky hemoptysis persisted.  Pulmonary medicine at the [facility] was consulted with by phone.  There was concern about possible small abscesses related to recent pneumonia.  It was thought that bronchoscopy was not needed but that a prolonged course of antibiotics would be necessary.  There was also consideration of bronchial artery embolization if hemoptysis worsened.  For the last couple of days cannot has seems to stabilize.  Now he is having just occasional streaky hemoptysis in his sputum.  His breathing has improved.  Infectious diseases recommending completion of 4 weeks of IV Ancef followed by 4 weeks of oral Augmentin.  Kari is now using his prior to admission rate of supplemental oxygen (3 L/min).     1.  Acute on chronic hypoxic respiratory failure due to community-acquired pneumonia and severe sepsis.  Acute hypoxic respiratory failure has resolved.  Continue prior to admission rate of supplemental oxygen.     2.  Severe sepsis (leukocytosis, tachycardia, elevated lactate) secondary to community-acquired  pneumonia, resolved.       3.  Community-acquired pneumonia, RLL, with MSSA (grew and sputum and blood cultures), complicated by suspected pulmonary abscess with hemoptysis.  Pneumonia is improving.  Hemoptysis is improving.    Plan is to complete 4 weeks (on da 23/28) of IV Ancef (outpatient order provided by infectious disease) followed by 4 weeks of oral Augmentin.  Social work consult to finalize home care for IV antibiotics.  Fay received some paperwork from [facility] that needs to be completed.  He is near being able to discharge home once home IV infusion is set up and hemoptysis continues to improve.  Eliquis is on hold with hemoptysis.  This should be restarted within a week of hemoptysis continues to improve and resolves.  Fay will need follow-up with infectious disease (Dr. Zepeda), pulmonary medicine, and primary care.   -Per pt his hemoptysis was worse today compared to yesterday, coughed up a large chunk this morning. When I mentioned the plan per hospitalist's  notes reg possible need for bronchial A embolization & need for transfer to the [facility] where the procedure could be done he stated this was not discussed with him.   I noticed a paper in the waste basket with what could have been bloody sputum & when I was trying to reassure him of it. He corrected me that the sp was peanut butter & even after I apologized. He got angry & told me to send another Dr in to see him & did not want me to continue. Pt rep got involved. Pt as previously planned wanted to goo home & continue plan for AB per Dr Stanford.    4.  Atrial fibrillation with RVR.  RVR has resolved.  This was likely driven by sepsiss and improved after treatment of sepsis.  Continue diltiazem and propafenone.  Prior to admission Eliquis is on hold with recent streaky hemoptysis.  I would probably hold this for 1 more week and then restart of hemoptysis resolves.  Defer this to primary care.     5.  COPD with moderate exacerbation.  Prednisone  has been tapered down to 20 mg daily.  Santo should remain on this until outpatient follow-up.  Continue prior to admission supplemental oxygen at 3 L/min.     6.  Iatrogenic fluid overload secondary to aggressive fluid resuscitation for sepsis, resolved     7.  Hypertension.  Continue diltiazem and hydralazine.     8.  Hyperglycemia, steroid-induced.  Continue NPH insulin 7 unit(s) daily and sliding scale insulin.  Adjust as appropriate.     9.  CKD 2.  Stable.      Diet: Regular Diet  DVT Prophylaxis: Eliquis is now on hold with hemoptysis. Have ordered SCDs, though patient is refusing these and ambulating regularly.  Rodney Catheter: not present  Code Status: Full Code        Shanon Black MD    Significant Results and Procedures   none    Pending Results   None pending  Unresulted Labs Ordered in the Past 30 Days of this Admission     No orders found from 10/18/2018 to 12/18/2018.          Code Status   Full Code       Primary Care Physician   Ana Pratt    Physical Exam   Temp: 98.1  F (36.7  C) Temp src: Oral BP: 151/83 Pulse: 72 Heart Rate: 74 Resp: 20 SpO2: 98 % O2 Device: Nasal cannula Oxygen Delivery: 3 LPM  Vitals:    01/05/19 0937 01/07/19 0758   Weight: 86.8 kg (191 lb 4.8 oz) 86.6 kg (191 lb)     Vital Signs with Ranges  Temp:  [97.4  F (36.3  C)-98.1  F (36.7  C)] 98.1  F (36.7  C)  Pulse:  [72] 72  Heart Rate:  [65-83] 74  Resp:  [18-20] 20  BP: (129-160)/() 151/83  SpO2:  [94 %-98 %] 98 %  I/O last 3 completed shifts:  In: 1060 [P.O.:960; I.V.:100]  Out: 750 [Urine:750]    Examination: pt declined    Discharge Disposition   Discharged to home with home infusion  Condition at discharge: Stable    Consultations This Hospital Stay   PHARMACY TO DOSE VANCO  CARE COORDINATOR IP CONSULT  PHARMACY TO DOSE VANCO  INFECTIOUS DISEASES IP CONSULT  PHARMACY DISCHARGE EDUCATION BY PHARMACIST  PHYSICAL THERAPY ADULT IP CONSULT  OCCUPATIONAL THERAPY ADULT IP CONSULT  CARE TRANSITION  RN/SW IP CONSULT  VASCULAR ACCESS ADULT IP CONSULT  NUTRITION SERVICES ADULT IP CONSULT  SOCIAL WORK IP CONSULT    Time Spent on this Encounter   I, Shanon Black MD, personally saw the patient today and spent greater than 30 minutes discharging this patient.    Discharge Orders     Discharge Medications   Current Discharge Medication List      START taking these medications    Details   ceFAZolin (ANCEF) 1 GM vial Inject 2 g into the vein every 8 hours for 23 days CBC with differential, creatinine, SGOT weekly while on this medication to be faxed to Dr. Stanford office.  Qty: 180 g, Refills: 0    Associated Diagnoses: Bacteremia         CONTINUE these medications which have NOT CHANGED    Details   albuterol (VENTOLIN HFA) 108 (90 Base) MCG/ACT inhaler Inhale 1-2 puffs into the lungs every 4 hours (while awake) PRN  Qty: 2 Inhaler, Refills: 0    Comments: Please fill Albuterol and not Ventolin as Albuterol works much better than Ventolin  Associated Diagnoses: COPD exacerbation (H)      apixaban ANTICOAGULANT (ELIQUIS) 5 MG tablet Take 1 tablet (5 mg) by mouth 2 times daily  Qty: 180 tablet, Refills: 1    Associated Diagnoses: Paroxysmal atrial fibrillation (H)      diltiazem (CARDIZEM CD) 180 MG 24 hr capsule Take 2 capsules (360 mg) by mouth daily  Qty: 90 capsule, Refills: 3    Associated Diagnoses: Paroxysmal atrial fibrillation (H)      ipratropium - albuterol 0.5 mg/2.5 mg/3 mL (DUONEB) 0.5-2.5 (3) MG/3ML neb solution Take 1 vial by nebulization every 6 hours as needed for shortness of breath / dyspnea or wheezing      !! levalbuterol (XOPENEX) 1.25 MG/3ML neb solution Take 3 mLs (1.25 mg) by nebulization every 4 hours as needed for wheezing or shortness of breath / dyspnea  Qty: 270 mL, Refills: 1    Associated Diagnoses: COPD exacerbation (H); Acute on chronic respiratory failure with hypoxia and hypercapnia (H); Atrial fibrillation with rapid ventricular response (H)      !! levalbuterol  (XOPENEX) 1.25 MG/3ML neb solution Take 3 mLs (1.25 mg) by nebulization 4 times daily  Qty: 270 mL, Refills: 0    Associated Diagnoses: Acute on chronic respiratory failure with hypoxia and hypercapnia (H)      predniSONE (DELTASONE) 20 MG tablet Take 40 mg by mouth daily.      propafenone (RYTHMOL) 150 MG TABS tablet Take 1 tablet (150 mg) by mouth 3 times daily  Qty: 270 tablet, Refills: 3    Associated Diagnoses: Paroxysmal atrial fibrillation (H)      tiotropium (SPIRIVA) 18 MCG capsule Inhale 1 capsule (18 mcg) into the lungs daily  Qty: 30 capsule, Refills: 0    Associated Diagnoses: COPD exacerbation (H)      traZODone (DESYREL) 50 MG tablet Take 25 mg by mouth nightly as needed for sleep      amoxicillin (AMOXIL) 500 MG capsule Take 2,000 mg by mouth as needed (Dental appt)      !! order for DME Equipment being ordered: Nebulizer  Qty: 1 each, Refills: 0    Associated Diagnoses: COPD exacerbation (H)      !! order for DME Oxygen for nocturnal use. 1 LPM via nasal cannula  Testing done at home in chronic stable state.  Condition is COPD.  Patient does not have Obstructive Sleep Apnea.  Overnight oximetry revealed 30.3 minutes of hypoxia (<= 88%).  Duration: Lifetime (99 months).  Qty: 1 each, Refills: 99    Associated Diagnoses: Nocturnal hypoxia      oxyCODONE-acetaminophen (PERCOCET) 5-325 MG per tablet Take 1 tablet by mouth every 6 hours as needed for severe pain       !! - Potential duplicate medications found. Please discuss with provider.        Allergies   Allergies   Allergen Reactions     Amlodipine Swelling     Flecainide Palpitations     Data   Most Recent 3 CBC's:  Recent Labs   Lab Test 01/08/19  0621 01/07/19  0600 01/06/19  0615 01/05/19  0644 01/04/19  0607   WBC  --  10.5  --  9.8 10.3   HGB 12.2* 11.5* 11.9* 12.6* 12.9*   MCV  --  94  --  95 94   PLT  --  197  --  264 290      Most Recent 3 BMP's:  Recent Labs   Lab Test 01/08/19  0621 01/07/19  0600 01/06/19  0615    137 136    POTASSIUM 4.3 4.2 4.0   CHLORIDE 101 100 98   CO2 33* 37* 36*   BUN 28 27 27   CR 0.97 0.98 0.93   ANIONGAP 5 <1* 2*   WILBERT 8.9 8.6 8.6   GLC 93 98 104*     Most Recent 2 LFT's:  Recent Labs   Lab Test 01/04/19  0607 06/08/15  1415   AST 23 35   ALT 22 41   ALKPHOS 45 94   BILITOTAL 0.6 1.2     Most Recent INR's and Anticoagulation Dosing History:  Anticoagulation Dose History     Recent Dosing and Labs Latest Ref Rng & Units 9/3/2008 9/4/2008 9/5/2008 9/6/2008 6/3/2009 6/8/2015 6/9/2015    INR 0.86 - 1.14 1.20(H) 1.12 1.06 1.04 3.84(H) 1.88(H) 1.48(H)        Most Recent 3 Troponin's:  Recent Labs   Lab Test 12/17/18  1450 11/23/18  1030 10/05/18  0555  08/23/16  1256   TROPI 0.025 <0.015 0.020   < >  --    TROPONIN  --   --   --   --  0.01    < > = values in this interval not displayed.     Most Recent Cholesterol Panel:  Recent Labs   Lab Test 01/17/14 01/20/11  0521   CHOL 178 169   LDL  --  89   HDL 46 47   TRIG 100 162*     Most Recent 6 Bacteria Isolates From Any Culture (See EPIC Reports for Culture Details):  Recent Labs   Lab Test 01/05/19  1545 12/19/18  1023 12/19/18  1013 12/18/18  1027 12/17/18  1518 12/17/18  1452   CULT Moderate growth  Normal tushar   No growth No growth Light growth  Normal tushar    Moderate growth  Staphylococcus aureus  * Cultured on the 1st day of incubation:  Staphylococcus aureus  This isolate DOES NOT demonstrate inducible clindamycin resistance in vitro. Clindamycin   is susceptible and could be used when indicated, however, erythromycin is resistant and   should not be used.  *  Critical Value/Significant Value, preliminary result only, called to and read back by  Laure Mathews RN [facility] @ 4317 12.18.18 JE    (Note)  POSITIVE for STAPHYLOCOCCUS AUREUS and NEGATIVE for the mecA gene  (not MRSA) by [employer] multiplex nucleic acid test. The mecA gene was  not detected. Final identification and antimicrobial susceptibility  testing will be verified by standard methods.    Specimen  tested with [employer] multiplex, gram-positive blood culture  nucleic acid test for the following targets: Staph aureus, Staph  epidermidis, Staph lugdunensis, other Staph species, Enterococcus  faecalis, Enterococcus faecium, Streptococcus species, S. agalactiae,  S. anginosus grp., S. pneumoniae, S. pyogenes, Listeria sp., mecA  (methicillin resistance) and Steven/B (vancomycin resistance).    Critical Value/Significant Value called to and read back by [facility] 16[facility] 12.18.18 [facility].   No growth     Most Recent TSH, T4 and A1c Labs:  Recent Labs   Lab Test 12/19/18  1405   A1C 6.0*     Results for orders placed or performed during the hospital encounter of 12/17/18   XR Chest Port 1 View    Narrative    XR CHEST PORT 1 VW 12/17/2018 3:21 PM    HISTORY: dyspnea      Impression    IMPRESSION: Consolidating areas of infiltrate in the right hilar and  right lung base new compared to 11/23/2018.    DIONISIO PHAN MD   XR Chest 2 Views    Narrative    CHEST TWO VIEWS 12/26/2018 5:23 PM     HISTORY: Hypoxia.    COMPARISON: December 17, 2018     FINDINGS: Improved infiltrate at the right base compared to previous.  Left lung clear. The cardiac silhouette is not enlarged. Pulmonary  vasculature is unremarkable.      Impression    IMPRESSION: Improved right base infiltrate since the comparison study.    RENÉ MATTHEWS MD   XR Chest Port 1 View    Narrative    XR CHEST PORT 1 VW  12/30/2018 9:20 AM     HISTORY:  worsening shortness of breath    COMPARISON: Film dated 12/26/2018    FINDINGS:  Midline sternotomy. Again noted is a focal opacity at the  right base compatible with pneumonitis. This is improved when compared  to 1226. The left lung remains clear. Healed left rib fractures.      Impression    IMPRESSION: Improving right basilar infiltrate. No new infiltrates are  identified.    TORIBIO BYRNE MD   CT Chest Pulmonary Embolism w Contrast    Narrative    CT CHEST PULMONARY EMBOLISM WITH CONTRAST  1/4/2019 5:53 PM      HISTORY: Hemoptysis.    TECHNIQUE: Helical axial scans from lung apices through lung bases  with 70mL Isovue-370 IV contrast. Radiation dose for this scan was  reduced using automated exposure control, adjustment of the mA and/or  kV according to patient size, or iterative reconstruction technique.    COMPARISON: 9/6/2006.    FINDINGS: There is no CT evidence for pulmonary embolism or other  acute vascular abnormality of the chest. Interval median sternotomy.  Vascular calcifications, including coronary artery calcifications. The  mediastinal and hilar structures are otherwise unremarkable.    Interval resolution of the previously seen moderate-sized right-sided  pleural effusion. Previously seen right-sided healing rib fractures  have completely healed without residual deformity. Interval  development of areas of patchy infiltrate throughout the right lower  lobe. Some new minimal infiltrate is also seen in the left upper lobe  adjacent to the major fissure. There is a small focus of infiltrate or  potential pulmonary scarring in the right upper lobe anterolaterally.  It is difficult to exclude underlying mass lesions in multiple areas  in the right lung and follow-up of the infiltrates described above to  resolution or stability is recommended. There is underlying diffuse  emphysematous change.    Visualized upper abdomen shows prior cholecystectomy and no acute  abnormality.      Impression    IMPRESSION:  1. No evidence for pulmonary embolism.  2. Vascular calcifications, including coronary artery calcifications.  3. Interval resolution of right-sided pleural effusion.  4. Interval development of patchy infiltrates throughout the right  lower lobe, minimal infiltrate in the left upper lobe adjacent to the  major fissure and a small focus of infiltrate or scarring in the right  upper lobe. Since it is difficult to exclude underlying mass lesions  in some areas, follow-up to resolution or stability is  recommended.  5. Diffuse emphysematous changes.    ANTONY HU MD

## 2019-01-08 NOTE — PHARMACY - DISCHARGE MEDICATION RECONCILIATION AND EDUCATION
Discharge medication review for this patient is complete.   Patient was counseled on new med regimen.  See EPIC for allergy information, prior to admission medications and immunization status.   Pharmacist assisted with medication reconciliation of discharge medications with PTA medications.    MD was contacted with any questions/concerns: pt refuses to take Spiriva due to cost, so I paged Dr. Black and she added Atrovent nebs in place of Spiriva. Pt is already on scheduled Xopenex nebs.    Additional medication history information: Per patient, Cardiology stopped his Advair and Spiriva inhalers due to drug interactions. Per Micromedex: Concurrent use of PROPAFENONE and SALMETEROL may result in increased risk of QT interval prolongation. No drug to drug interaction between Spiriva and Propafenone. Advised pt to continue Spiriva as ordered on AVS, but patient refused due to high cost of Spiriva. Pt requested to be placed back on Duonebs. I paged Dr. Black (see previous paragraph). I also advised patient to discuss the need for the inhalers/optimization of therapy with primary MD and with his pulmonologist. Discharging RN is aware of the AVS changes above.    Discharge Medication List     Review of your medicines      START taking      Dose / Directions   acetaminophen 325 MG tablet  Commonly known as:  TYLENOL  Used for:  Arthritis      Dose:  650 mg  Take 2 tablets (650 mg) by mouth every 4 hours as needed for mild pain or fever  Refills:  0     amoxicillin-clavulanate 875-125 MG tablet  Commonly known as:  AUGMENTIN  Used for:  Bacteremia      Dose:  1 tablet  Start taking on:  1/14/2019  Take 1 tablet by mouth 2 times daily for 28 days  Quantity:  48 tablet  Refills:  0     artificial saliva Liqd liquid  Used for:  Dry mouth      Dose:  15 mL  Swish and spit 15 mLs in mouth 4 times daily as needed for dry mouth  Quantity:  1000 mL  Refills:  0     ceFAZolin 1 GM vial  Commonly known as:  ANCEF  Used for:   Bacteremia      Dose:  2 g  Inject 2 g into the vein every 8 hours for 5 days CBC with differential, creatinine, SGOT weekly while on this medication to be faxed to Dr. Stanford office.  Quantity:  180 g  Refills:  0     diclofenac 1 % topical gel  Commonly known as:  VOLTAREN  Used for:  Arthritis      Dose:  2 g  Place 2 g onto the skin every 6 hours as needed for moderate pain Lower ext  Quantity:  100 g  Refills:  0     docusate sodium 100 MG capsule  Commonly known as:  COLACE  Used for:  Constipation, unspecified constipation type      Dose:  100 mg  Take 1 capsule (100 mg) by mouth 2 times daily for 10 days  Quantity:  20 capsule  Refills:  0     hydrALAZINE 50 MG tablet  Commonly known as:  APRESOLINE      Dose:  50 mg  Take 1 tablet (50 mg) by mouth every 8 hours  Quantity:  90 tablet  Refills:  0     ipratropium 0.02 % nebulizer solution  Commonly known as:  ATROVENT  Used for:  COPD exacerbation (H)      Dose:  0.5 mg  Take 0.5 mg by nebulization 4 times daily  Quantity:  100 mL  Refills:  0     magnesium hydroxide 400 MG/5ML suspension  Commonly known as:  MILK OF MAGNESIA  Used for:  Constipation, unspecified constipation type      Dose:  30 mL  Take 30 mLs by mouth daily as needed for constipation  Quantity:  355 mL  Refills:  0     oxyCODONE 5 MG tablet  Commonly known as:  ROXICODONE  Used for:  Arthritis      Dose:  5 mg  Take 1 tablet (5 mg) by mouth every 6 hours as needed for moderate to severe pain Please dispense 10 tabs  Quantity:  10 tablet  Refills:  0        CONTINUE these medicines which may have CHANGED, or have new prescriptions. If we are uncertain of the size of tablets/capsules you have at home, strength may be listed as something that might have changed.      Dose / Directions   diltiazem ER COATED BEADS 360 MG 24 hr capsule  Commonly known as:  CARDIZEM CD  This may have changed:  medication strength  Used for:  Atrial fibrillation with RVR (H)      Dose:  360 mg  Start taking on:   1/9/2019  Take 1 capsule (360 mg) by mouth daily  Quantity:  30 capsule  Refills:  0     predniSONE 20 MG tablet  Commonly known as:  DELTASONE  This may have changed:      how much to take    how to take this    when to take this    additional instructions  Used for:  COPD exacerbation (H)      20 mg/day x 1 week then   10 mg /day  x 1 week, further plan per PCP / pulmonology  Please dispense 20 tabs.  Quantity:  20 tablet  Refills:  0        CONTINUE these medicines which have NOT CHANGED      Dose / Directions   albuterol 108 (90 Base) MCG/ACT inhaler  Commonly known as:  VENTOLIN HFA  Used for:  COPD exacerbation (H)      Dose:  1-2 puff  Inhale 1-2 puffs into the lungs every 4 hours (while awake) PRN  Quantity:  2 Inhaler  Refills:  0     * levalbuterol 1.25 MG/3ML neb solution  Commonly known as:  XOPENEX  Used for:  COPD exacerbation (H), Acute on chronic respiratory failure with hypoxia and hypercapnia (H), Atrial fibrillation with rapid ventricular response (H)      Dose:  1 ampule  Take 3 mLs (1.25 mg) by nebulization every 4 hours as needed for wheezing or shortness of breath / dyspnea  Quantity:  270 mL  Refills:  1     * levalbuterol 1.25 MG/3ML neb solution  Commonly known as:  XOPENEX  Used for:  Acute on chronic respiratory failure with hypoxia and hypercapnia (H)      Dose:  1.25 mg  Take 3 mLs (1.25 mg) by nebulization 4 times daily  Quantity:  270 mL  Refills:  0     order for DME  Used for:  Nocturnal hypoxia      Oxygen for nocturnal use. 1 LPM via nasal cannula  Testing done at home in chronic stable state.  Condition is COPD.  Patient does not have Obstructive Sleep Apnea.  Overnight oximetry revealed 30.3 minutes of hypoxia (<= 88%).  Duration: Lifetime (99 months).  Quantity:  1 each  Refills:  99     order for DME  Used for:  COPD exacerbation (H)      Equipment being ordered: Nebulizer  Quantity:  1 each  Refills:  0     propafenone 150 MG Tabs tablet  Commonly known as:  RYTHMOL  Used  for:  Paroxysmal atrial fibrillation (H)      Dose:  150 mg  Take 1 tablet (150 mg) by mouth 3 times daily  Quantity:  270 tablet  Refills:  3     traZODone 50 MG tablet  Commonly known as:  DESYREL      Dose:  25 mg  Take 25 mg by mouth nightly as needed for sleep  Refills:  0         * This list has 2 medication(s) that are the same as other medications prescribed for you. Read the directions carefully, and ask your doctor or other care provider to review them with you.            STOP taking    amoxicillin 500 MG capsule  Commonly known as:  AMOXIL        apixaban ANTICOAGULANT 5 MG tablet  Commonly known as:  ELIQUIS        ipratropium - albuterol 0.5 mg/2.5 mg/3 mL 0.5-2.5 (3) MG/3ML neb solution  Commonly known as:  DUONEB        oxyCODONE-acetaminophen 5-325 MG tablet  Commonly known as:  PERCOCET        tiotropium 18 MCG inhaled capsule  Commonly known as:  SPIRIVA              Where to get your medicines      These medications were sent to [facility] [id number] [facility] [address]    Phone:  [phone]     amoxicillin-clavulanate 875-125 MG tablet    artificial saliva Liqd liquid    diclofenac 1 % topical gel    diltiazem ER COATED BEADS 360 MG 24 hr capsule    docusate sodium 100 MG capsule    hydrALAZINE 50 MG tablet    magnesium hydroxide 400 MG/5ML suspension     Some of these will need a paper prescription and others can be bought over the counter. Ask your nurse if you have questions.    Bring a paper prescription for each of these medications    ipratropium 0.02 % nebulizer solution    oxyCODONE 5 MG tablet    predniSONE 20 MG tablet  You don't need a prescription for these medications    acetaminophen 325 MG tablet     Information about where to get these medications is not yet available    Ask your nurse or doctor about these medications    ceFAZolin 1 GM vial

## 2019-01-08 NOTE — PLAN OF CARE
VSS, prn Oxycodone x2 for complain of pain on neck/head, LUE midline, Id following, continues on Ancef IV, SW consulted for home IV infusion at discharge, will continue with POC.

## 2019-01-08 NOTE — PROGRESS NOTES
CM received VM from Dulce Vigil. She stated everything is in place for patient. She does not have access to [facility] so she requested writer fax discharge summary to Kayla at [phone]. Faxed via [employer].     CM will continue to follow patient until discharge for any additional needs.     Dulce Denney RN, BSN, CTS  [facility]  [phone]

## 2019-01-08 NOTE — PROGRESS NOTES
[facility]  Infectious Disease Progress Note          Assessment and Plan:   IMPRESSION:   1.  A 67-year-old male with third hospitalization in the last month with hypoxic respiratory failure, probable pneumonia, Staph aureus in recent sputum and now Staph aureus in the blood.   2.  Staph aureus bacteremia, only 1 of 2 cultures positive.  Suspect this is spillover from pneumonia, but certainly at risk including a tricuspid valve.   3.  History of atrial fibrillation with rapid ventricular response.   4.  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   5 New hemoptysis     RECOMMENDATIONS:     1.  MSSA also in sputum,  other cultures without any other growth,  On  Ancef.  He will need an extended IV course here simply for the positive blood culture,  Given  the tricuspid valve and the hip, probably a full 4 week course of therapy.   2.  12/19 cx  negative, midline in  3. Orders for IV antibiotics in already . Day 23/28, changed to 5 days more  4 Some hemoptysis wo other sign infection new, incl procal 0, CT with new areas, ?, bronch reasonable but obviously problem here with no pulm med available, discussed with Dr Boo who has discussed with pulm , concern raised about ? Evolving lung abscess, assuming staph already 3 weeks tx, would complete the planned IV course in 5 days then 4 weeks po augmentin with pulm, me and imaging Follow-up OK disposition          Interval History:   no new complaints sl abd pain, Follow-up BC neg so far, sputum staph no fever no other new focal sxs, WBc normal    still hypoxic slow better              Medications:       ceFAZolin  2 g Intravenous Q8H     diltiazem ER COATED BEADS  360 mg Oral Daily     hydrALAZINE  50 mg Oral Q8H MEGHAN     insulin aspart  1-3 Units Subcutaneous TID AC     insulin aspart  1-3 Units Subcutaneous At Bedtime     insulin isophane human  7 Units  Subcutaneous Daily     levalbuterol  1.25 mg Nebulization 4x Daily     nystatin  500,000 Units Mouth/Throat 4x Daily     predniSONE  20 mg Oral Daily     propafenone  150 mg Oral TID     sodium chloride (PF)  10 mL Intracatheter Q8H     umeclidinium  1 puff Inhalation Daily     zolpidem  5 mg Oral At Bedtime                  Physical Exam:   Blood pressure 151/83, pulse 72, temperature 98.1  F (36.7  C), temperature source Oral, resp. rate 20, weight 86.6 kg (191 lb), SpO2 98 %.  Wt Readings from Last 2 Encounters:   01/07/19 86.6 kg (191 lb)   12/03/18 87.5 kg (193 lb)     Vital Signs with Ranges  Temp:  [97.4  F (36.3  C)-98.1  F (36.7  C)] 98.1  F (36.7  C)  Pulse:  [72] 72  Heart Rate:  [65-83] 74  Resp:  [18-20] 20  BP: (129-160)/() 151/83  SpO2:  [94 %-98 %] 98 %    Constitutional: Awake, alert, cooperative, no apparent distress   Lungs: Congestion to auscultation bilaterally, no crackles or wheezing hpoxia   Cardiovascular: Regular rate and rhythm, normal S1 and S2, and no murmur noted   Abdomen: Normal bowel sounds, soft, non-distended, non-tender   Skin: No rashes, no cyanosis, no edema no embolic lesions   Other:           Data:   All microbiology laboratory data reviewed.  Recent Labs   Lab Test 01/08/19  0621 01/07/19  0600 01/06/19  0615 01/05/19  0644 01/04/19  0607   WBC  --  10.5  --  9.8 10.3   HGB 12.2* 11.5* 11.9* 12.6* 12.9*   HCT  --  35.9*  --  39.7* 40.8   MCV  --  94  --  95 94   PLT  --  197  --  264 290     Recent Labs   Lab Test 01/08/19  0621 01/07/19  0600 01/06/19  0615   CR 0.97 0.98 0.93     No lab results found.  Recent Labs   Lab Test 01/05/19  1545 12/19/18  1023 12/19/18  1013 12/18/18  1027 12/17/18  1518 12/17/18  1452 11/25/18  2257 10/06/18  0826 10/06/18  0822   CULT Moderate growth  Normal tushar   No growth No growth Light growth  Normal tushar    Moderate growth  Staphylococcus aureus  * Cultured on the 1st day of incubation:  Staphylococcus aureus  This isolate DOES  NOT demonstrate inducible clindamycin resistance in vitro. Clindamycin   is susceptible and could be used when indicated, however, erythromycin is resistant and   should not be used.  *  Critical Value/Significant Value, preliminary result only, called to and read back by  Laure Mathews RN [facility] @ 8711 12.18.18 JE    (Note)  POSITIVE for STAPHYLOCOCCUS AUREUS and NEGATIVE for the mecA gene  (not MRSA) by [place]se multiplex nucleic acid test. The mecA gene was  not detected. Final identification and antimicrobial susceptibility  testing will be verified by standard methods.    Specimen tested with Everbridgeigene multiplex, gram-positive blood culture  nucleic acid test for the following targets: Staph aureus, Staph  epidermidis, Staph lugdunensis, other Staph species, Enterococcus  faecalis, Enterococcus faecium, Streptococcus species, S. agalactiae,  S. anginosus grp., S. pneumoniae, S. pyogenes, Listeria sp., mecA  (methicillin resistance) and Steven/B (vancomycin resistance).    Critical Value/Significant Value called to and read back by Laure Mathews RN [facility] 1652 12.18.18 [facility].   No growth Heavy growth  Normal tushar    Moderate growth  Staphylococcus aureus  This isolate DOES NOT demonstrate inducible clindamycin resistance in vitro. Clindamycin   is susceptible and could be used when indicated, however, erythromycin is resistant and   should not be used.  * No growth No growth

## 2019-01-08 NOTE — PLAN OF CARE
Pt and wife received discharge instructions. Medications were reviewed and questions were answered. Pt left floor by wheelchair. Jamar Figueroa RN on 1/8/2019 at 5:17 PM

## 2019-01-08 NOTE — CONSULTS
RAJ continues to follow patient for home IV antibiotics. Patient is on day 23/28 of IV antibiotics. Per ID note 1/7/19, he will complete 28 days of IV abx then transition to PO Augmentin for 28 days. Contacted Dulce Garza ([phone]) to update on plan. Discharge either today or tomorrow. RAJ will discuss with hospitalist on discharge date and contact Dulce with final plan.     RAJ will continue to follow patient until discharge for any additional needs.     Dulce Denney RN, BSN, CTS  [facility]  [phone]    Addendum 10:12am - RAJ met with pt at bedside to gauge his comfort level with administering IV abx. He was educated 2 weeks ago on home infusion along with his wife. He stated he is fine; his wife understands how to do home infusion also. No additional teaching needed from Kayla. RAJ discussed situation with hospitalist. She will start the discharge process and await final sign off from ID for discharge. RAJ will follow to update Kayla on actual discharge completion.     isaac

## 2019-01-09 ENCOUNTER — TELEPHONE (OUTPATIENT)
Dept: CARDIOLOGY | Facility: CLINIC | Age: 69
End: 2019-01-09

## 2019-01-09 ENCOUNTER — TELEPHONE (OUTPATIENT)
Dept: PHARMACY | Facility: OTHER | Age: 69
End: 2019-01-09

## 2019-01-09 DIAGNOSIS — I48.0 PAROXYSMAL ATRIAL FIBRILLATION (H): Primary | ICD-10-CM

## 2019-01-09 NOTE — PROGRESS NOTES
Pt alert and oriented. Independent in room. Pt Dyspneic on exertion.   Tolerating reg diet.   Plan to discharge today

## 2019-01-09 NOTE — PROGRESS NOTES
Transition Communication Hand-off for Care Transitions to Next Level of Care Provider    Name: Tone Cooper  : 1950  MRN #: [id number]  Primary Care Provider: Ana Pratt  Primary Care MD Name: Ana Pratt  Primary Clinic: [facility] [address] [address] [facility] [address] [address]  Primary Care Clinic Name: [facility]  Reason for Hospitalization:  COPD exacerbation (H) [J44.1]  Atrial fibrillation with RVR (H) [I48.91]  HCAP (healthcare-associated pneumonia) [J18.9]  Severe sepsis (H) [A41.9, R65.20]  Acute respiratory failure with hypoxia and hypercapnia (H) [J96.01, J96.02]  Pneumonia of right lower lobe due to infectious organism (H) [J18.1]  Admit Date/Time: 2018  2:30 PM  Discharge Date: 2019  Payor Source: Payor: [facility] / Plan: [employer] NON FPA / Product Type: HMO /     Readmission Assessment Measure (CALEB) Risk Score/category: ELEVATED   Reason for Communication Hand-off Referral: Admission diagnoses: PN  Admission diagnoses: COPD      Discharge Needs Assessment:  Needs      Most Recent Value   Equipment Currently Used at Home  walker, rolling   # of Referrals Placed by [facility]  Home Infusion, Inpatient Pharmacy   Home Infusion Provider  Liane Acevedo [phone], Fax: [phone]          Any outstanding tests or procedures:    Procedures     Future Labs/Procedures    Oxygen Adult     Comments:    Renew Home Oxygen Order  Renew previous prescription.  Expected treatment length is indefinite (99 months).    Attending Provider: Shanon Black MD  Physician signature: See electronic signature associated with these discharge orders  Date of Order: 2019    Oxygen Adult     Comments:    Renew Home Oxygen Order  Renew previous prescription.  Expected treatment length is indefinite (99 months).    Attending Provider: Shanon Black MD  Physician signature: See electronic signature associated with these discharge orders  Date of Order:   2019          Referrals     Future Labs/Procedures    Home infusion referral     Comments:    Your provider has referred you to: PREFERRED PROVIDERS:    Local Address (if different from home address): N/A    Anticipated Length of Therapy: per ID    Home Infusion Pharmacist to adjust therapy based on labs and clinical assessments: Yes    Labs:  May draw labs from Venous Catheter: Yes  Home Infusion Pharmacist to order labs based on therapy type and clinical assessments: Yes  Call/Fax Lab Results to: Dr Stanford ID    Agency Staff to assess nursing needs for Infusion Therapy.    Access Device Management:  IV Access Type: midline  Flush with Heparin and Normal Saline IVP PRN and routine site care (per agency protocol) to maintain access device? Yes    Home infusion referral     Comments:    Your provider has referred you to: PREFERRED PROVIDERS:    Local Address (if different from home address): N/A    Anticipated Length of Therapy: per ID    Home Infusion Pharmacist to adjust therapy based on labs and clinical assessments: Yes    Labs:  May draw labs from Venous Catheter: Yes  Home Infusion Pharmacist to order labs based on therapy type and clinical assessments: Yes  Call/Fax Lab Results to: Dr Stanford    Agency Staff to assess nursing needs for Infusion Therapy.    Access Device Management:  IV Access Type: mid line  Flush with Heparin and Normal Saline IVP PRN and routine site care (per agency protocol) to maintain access device? Yes    Medication Therapy Management Referral     Comments:    MTM referral reason            Patient has 5 PTA or Discharge Medications AND one of the following   diagnoses: DM,HF,COPD,AMI DX,PULM HTN       This service is designed to help you get the most from your medications.  A specially trained pharmacist will work closely with you and your doctors  to solve any problems related to your medications and to help you get the   best results from taking them.      The Medication Therapy  "Management staff will call you to schedule an appointment.            Key Recommendations: Pt admitted with PNA/COPD ex. Is a readmit less than 30 days. Pt uses oxygen at home supplied through [employer] medical equipment. When CM asked pt about having a portable tank pt states he refused to lug his portable tank around in public as he has issues with his ego. Pt travels a lot for work. He uses inhalers and nebulizers to help manage his copd. Pt states he at one time \"dropped\" his inhalers because of a conflict with some other medications. Pt needs continued following with clinic care coordination with COPD symptom management and compliance.      Ciara Barrett    Pt was discharged to home with [facility]    Follow-up and recommended labs and tests     1. Follow up with primary care provider, Ana Pratt, within 7 days to evaluate treatment change, for hospital follow- up and consider  restart of hemoptysis resolves. Per ID pt to continue Ancef & then days & then use Augmentin for   2. Follow up with pulmonolgy.       Follow-up and recommended labs and tests     1. Follow up with primary care provider, Ana Pratt, within 1 week , to evaluate medication change and for hospital follow- up, resume  Eliquis x 1 more week and then restart of hemoptysis resolves.    No follow up labs or test are needed.   2. Follow up with Pulmonary             AVS/Discharge Summary is the source of truth; this is a helpful guide for improved communication of patient story                        "

## 2019-01-09 NOTE — TELEPHONE ENCOUNTER
ZioPatch showing PAF 64%,  with rates  bpm.  Longest episode was 7 days and 10 hours.  Dr Britt would like pt to stop his propafenone and come in and see Dr Britt to discuss ablation.  LM for pt to call back. Mell

## 2019-01-10 NOTE — TELEPHONE ENCOUNTER
Called and left another message for return call to discuss monitor results. Order placed to f/u with Dr Britt.

## 2019-01-11 ENCOUNTER — DOCUMENTATION ONLY (OUTPATIENT)
Dept: CARDIOLOGY | Facility: CLINIC | Age: 69
End: 2019-01-11

## 2019-01-11 NOTE — PROGRESS NOTES
I had Beatriz name on a list to send out application to apply for Patient assistance program through [email or web address]. SKYLAR read has informed me that due to recent hospital stay, lung abcess and bleeding, this medication is on hold, SO for now he wants to wait on the application-mmunns lpn

## 2019-01-15 NOTE — TELEPHONE ENCOUNTER
Spoke to patient regarding ZP results.  Instructed patient to stop propafenone.  Medication list updated.  Patient will follow up on 1/18 with Dr Britt at 11:30 am.  HARDEEP Elizalde

## 2019-01-18 ENCOUNTER — HOSPITAL ENCOUNTER (OUTPATIENT)
Dept: CARDIOLOGY | Facility: CLINIC | Age: 69
Discharge: HOME | End: 2019-01-18
Admitting: INTERNAL MEDICINE
Payer: COMMERCIAL

## 2019-01-18 ENCOUNTER — OFFICE VISIT (OUTPATIENT)
Dept: CARDIOLOGY | Facility: CLINIC | Age: 69
End: 2019-01-18
Payer: COMMERCIAL

## 2019-01-18 VITALS
SYSTOLIC BLOOD PRESSURE: 113 MMHG | WEIGHT: 186 LBS | HEART RATE: 82 BPM | HEIGHT: 70 IN | DIASTOLIC BLOOD PRESSURE: 68 MMHG | BODY MASS INDEX: 26.63 KG/M2

## 2019-01-18 DIAGNOSIS — I48.0 PAROXYSMAL ATRIAL FIBRILLATION (H): ICD-10-CM

## 2019-01-18 PROCEDURE — 93000 ELECTROCARDIOGRAM COMPLETE: CPT | Performed by: INTERNAL MEDICINE

## 2019-01-18 PROCEDURE — 93227 XTRNL ECG REC<48 HR R&I: CPT | Performed by: INTERNAL MEDICINE

## 2019-01-18 PROCEDURE — 99214 OFFICE O/P EST MOD 30 MIN: CPT | Performed by: INTERNAL MEDICINE

## 2019-01-18 PROCEDURE — 93225 XTRNL ECG REC<48 HRS REC: CPT

## 2019-01-18 RX ORDER — PROPAFENONE HYDROCHLORIDE 225 MG/1
225 TABLET, FILM COATED ORAL EVERY 8 HOURS
Qty: 270 TABLET | Refills: 3 | Status: SHIPPED | OUTPATIENT
Start: 2019-01-18 | End: 2019-04-24

## 2019-01-18 RX ORDER — OXYCODONE HYDROCHLORIDE 5 MG/1
5 CAPSULE ORAL EVERY 4 HOURS PRN
Status: ON HOLD | COMMUNITY
End: 2019-02-28

## 2019-01-18 ASSESSMENT — MIFFLIN-ST. JEOR: SCORE: 1624.94

## 2019-01-18 NOTE — PROGRESS NOTES
HPI and Plan:   See dictation    Orders Placed This Encounter   Procedures     Follow-Up with Electrophysiologist     EKG 12-lead complete w/read - Clinics (performed today)     EKG 12-lead complete w/read (Future)- to be scheduled     Holter Monitor 24 hour Adult Pediatric       Orders Placed This Encounter   Medications     oxyCODONE (OXY-IR) 5 MG capsule     Sig: Take 5 mg by mouth every 4 hours as needed for severe pain     propafenone (RYTHMOL) 225 MG tablet     Sig: Take 1 tablet (225 mg) by mouth every 8 hours     Dispense:  270 tablet     Refill:  3       Medications Discontinued During This Encounter   Medication Reason     magnesium hydroxide (MILK OF MAGNESIA) 400 MG/5ML suspension Medication Reconciliation Clean Up     propafenone (RYTHMOL) 150 MG TABS tablet          Encounter Diagnosis   Name Primary?     Paroxysmal atrial fibrillation (H)        CURRENT MEDICATIONS:  Current Outpatient Medications   Medication Sig Dispense Refill     acetaminophen (TYLENOL) 325 MG tablet Take 2 tablets (650 mg) by mouth every 4 hours as needed for mild pain or fever       amoxicillin-clavulanate (AUGMENTIN) 875-125 MG tablet Take 1 tablet by mouth 2 times daily for 28 days 48 tablet 0     diltiazem ER COATED BEADS (CARDIZEM CD) 360 MG 24 hr capsule Take 1 capsule (360 mg) by mouth daily 30 capsule 0     docusate sodium (COLACE) 100 MG capsule Take 1 capsule (100 mg) by mouth 2 times daily for 10 days 20 capsule 0     hydrALAZINE (APRESOLINE) 50 MG tablet Take 1 tablet (50 mg) by mouth every 8 hours 90 tablet 0     ipratropium (ATROVENT) 0.02 % nebulizer solution Take 0.5 mg by nebulization 4 times daily 100 mL 0     levalbuterol (XOPENEX) 1.25 MG/3ML neb solution Take 3 mLs (1.25 mg) by nebulization every 4 hours as needed for wheezing or shortness of breath / dyspnea 270 mL 1     oxyCODONE (OXY-IR) 5 MG capsule Take 5 mg by mouth every 4 hours as needed for severe pain       predniSONE (DELTASONE) 20 MG tablet 20  mg/day x 1 week then   10 mg /day  x 1 week, further plan per PCP / pulmonology  Please dispense 20 tabs. 20 tablet 0     propafenone (RYTHMOL) 225 MG tablet Take 1 tablet (225 mg) by mouth every 8 hours 270 tablet 3     albuterol (VENTOLIN HFA) 108 (90 Base) MCG/ACT inhaler Inhale 1-2 puffs into the lungs every 4 hours (while awake) PRN (Patient not taking: Reported on 1/18/2019) 2 Inhaler 0     artificial saliva (BIOTENE DRY MOUTHWASH) LIQD liquid Swish and spit 15 mLs in mouth 4 times daily as needed for dry mouth (Patient not taking: Reported on 1/18/2019) 1000 mL 0     diclofenac (VOLTAREN) 1 % topical gel Place 2 g onto the skin every 6 hours as needed for moderate pain Lower ext (Patient not taking: Reported on 1/18/2019) 100 g 0     levalbuterol (XOPENEX) 1.25 MG/3ML neb solution Take 3 mLs (1.25 mg) by nebulization 4 times daily 270 mL 0     order for DME Equipment being ordered: Nebulizer 1 each 0     order for DME Oxygen for nocturnal use. 1 LPM via nasal cannula  Testing done at home in chronic stable state.  Condition is COPD.  Patient does not have Obstructive Sleep Apnea.  Overnight oximetry revealed 30.3 minutes of hypoxia (<= 88%).  Duration: Lifetime (99 months). 1 each 99     traZODone (DESYREL) 50 MG tablet Take 25 mg by mouth nightly as needed for sleep         ALLERGIES     Allergies   Allergen Reactions     Amlodipine Swelling     Flecainide Palpitations       PAST MEDICAL HISTORY:  Past Medical History:   Diagnosis Date     Atrial flutter (H)      COPD (chronic obstructive pulmonary disease) (H)      Diverticulitis      Hypertension      Persistent atrial fibrillation (H) 4/28/09    ablation 7/1/2015     Premature beats      PVC (premature ventricular contraction)     s/p ablation 12/5/2017     Tricuspid valve disorder     Dr Aj, Hx of valve repair      Ventricular tachycardia (H)     nonsustained       PAST SURGICAL HISTORY:  Past Surgical History:   Procedure Laterality Date     ABDOMEN  SURGERY      hernia repair right     APPENDECTOMY       CARDIOVERSION  2009    successful for afib     CARDIOVERSION  4/20/15    successful for afib     CARDIOVERSION  5/28/15     H ABLATION ATRIAL FLUTTER       H ABLATION FOCAL AFIB  7/1/15    Left atrial linear ablation and pulmonary vein antrum ablation     H ABLATION PVC  16     INCISION AND DRAINAGE LOWER EXTREMITY, COMBINED Right 11/10/2016    Procedure: COMBINED INCISION AND DRAINAGE LOWER EXTREMITY;  Surgeon: Roger Pacheco MD;  Location: [facility] OR     LAPAROSCOPIC CHOLECYSTECTOMY  2012    Procedure:LAPAROSCOPIC CHOLECYSTECTOMY; LAPAROSCOPIC CHOLECYSTECTOMY ; Surgeon:ROGER PACHECO; Location:[facility] OR     ORTHOPEDIC SURGERY      Left hip replacement     REPAIR VALVE TRICUSPID  08    conversion to a bileaflet valve and application of a 30 mm Sanderson ring     SMALL INTESTINE SURGERY      had bowel obstruction age 9     VALVE REPLACEMENT      tricuspid       FAMILY HISTORY:  Family History   Problem Relation Age of Onset     Prostate Cancer Father      Family History Negative Mother      Emphysema Mother      Hypertension Mother      Cerebrovascular Disease Mother        SOCIAL HISTORY:  Social History     Socioeconomic History     Marital status:      Spouse name: None     Number of children: None     Years of education: None     Highest education level: None   Social Needs     Financial resource strain: None     Food insecurity - worry: None     Food insecurity - inability: None     Transportation needs - medical: None     Transportation needs - non-medical: None   Occupational History     None   Tobacco Use     Smoking status: Former Smoker     Last attempt to quit: 2008     Years since quittin.0     Smokeless tobacco: Never Used   Substance and Sexual Activity     Alcohol use: Yes     Alcohol/week: 0.0 oz     Comment: 3-6 per month     Drug use: No     Sexual activity: No     Partners: Female     Birth  "control/protection: None   Other Topics Concern      Service Not Asked     Blood Transfusions Not Asked     Caffeine Concern No     Comment: 1 a day      Occupational Exposure Not Asked     Hobby Hazards Not Asked     Sleep Concern Yes     Comment: nocturia     Stress Concern Not Asked     Weight Concern No     Special Diet No     Back Care Not Asked     Exercise No     Bike Helmet Yes     Seat Belt Yes     Self-Exams Not Asked     Parent/sibling w/ CABG, MI or angioplasty before 65F 55M? No   Social History Narrative     None       Review of Systems:  Skin:  Negative itching skin cancer removed recently   Eyes:  Positive for glasses    ENT:  Negative      Respiratory:  Positive for dyspnea on exertion;wheezing;shortness of breath COPD   Cardiovascular:  Negative lightheadedness;chest pain;Positive for chest pain on right side of chest  Gastroenterology: Negative melena;hematochezia    Genitourinary:  Negative nocturia    Musculoskeletal:  Positive for joint pain;arthritis L elbow  Neurologic:  Positive for headaches in the am  Psychiatric:  Positive for sleep disturbances sleeps poorly  Heme/Lymph/Imm:  Positive for allergies    Endocrine:  Negative diabetes      Physical Exam:  Vitals: /68   Pulse 82   Ht 1.778 m (5' 10\")   Wt 84.4 kg (186 lb)   BMI 26.69 kg/m      Constitutional:           Skin:             Head:           Eyes:           Lymph:      ENT:           Neck:           Respiratory:            Cardiac:                                                           GI:           Extremities and Muscular Skeletal:                 Neurological:           Psych:           CC  Sussy Britt MD  [id number]  [id number]  [place] [id number]              "

## 2019-01-18 NOTE — LETTER
1/18/2019    Ana Pratt MD  [facility] [address] [address] [facility] [address]    RE: Tone Cooper       Dear Colleague,    I had the pleasure of seeing Tone Cooper in the [facility].    HPI and Plan:   See dictation    Orders Placed This Encounter   Procedures     Follow-Up with Electrophysiologist     EKG 12-lead complete w/read - Clinics (performed today)     EKG 12-lead complete w/read (Future)- to be scheduled     Holter Monitor 24 hour Adult Pediatric       Orders Placed This Encounter   Medications     oxyCODONE (OXY-IR) 5 MG capsule     Sig: Take 5 mg by mouth every 4 hours as needed for severe pain     propafenone (RYTHMOL) 225 MG tablet     Sig: Take 1 tablet (225 mg) by mouth every 8 hours     Dispense:  270 tablet     Refill:  3       Medications Discontinued During This Encounter   Medication Reason     magnesium hydroxide (MILK OF MAGNESIA) 400 MG/5ML suspension Medication Reconciliation Clean Up     propafenone (RYTHMOL) 150 MG TABS tablet          Encounter Diagnosis   Name Primary?     Paroxysmal atrial fibrillation (H)        CURRENT MEDICATIONS:  Current Outpatient Medications   Medication Sig Dispense Refill     acetaminophen (TYLENOL) 325 MG tablet Take 2 tablets (650 mg) by mouth every 4 hours as needed for mild pain or fever       amoxicillin-clavulanate (AUGMENTIN) 875-125 MG tablet Take 1 tablet by mouth 2 times daily for 28 days 48 tablet 0     diltiazem ER COATED BEADS (CARDIZEM CD) 360 MG 24 hr capsule Take 1 capsule (360 mg) by mouth daily 30 capsule 0     docusate sodium (COLACE) 100 MG capsule Take 1 capsule (100 mg) by mouth 2 times daily for 10 days 20 capsule 0     hydrALAZINE (APRESOLINE) 50 MG tablet Take 1 tablet (50 mg) by mouth every 8 hours 90 tablet 0     ipratropium (ATROVENT) 0.02 % nebulizer solution Take 0.5 mg by nebulization 4 times daily 100 mL 0     levalbuterol (XOPENEX) 1.25 MG/3ML neb solution Take 3 mLs  (1.25 mg) by nebulization every 4 hours as needed for wheezing or shortness of breath / dyspnea 270 mL 1     oxyCODONE (OXY-IR) 5 MG capsule Take 5 mg by mouth every 4 hours as needed for severe pain       predniSONE (DELTASONE) 20 MG tablet 20 mg/day x 1 week then   10 mg /day  x 1 week, further plan per PCP / pulmonology  Please dispense 20 tabs. 20 tablet 0     propafenone (RYTHMOL) 225 MG tablet Take 1 tablet (225 mg) by mouth every 8 hours 270 tablet 3     albuterol (VENTOLIN HFA) 108 (90 Base) MCG/ACT inhaler Inhale 1-2 puffs into the lungs every 4 hours (while awake) PRN (Patient not taking: Reported on 1/18/2019) 2 Inhaler 0     artificial saliva (BIOTENE DRY MOUTHWASH) LIQD liquid Swish and spit 15 mLs in mouth 4 times daily as needed for dry mouth (Patient not taking: Reported on 1/18/2019) 1000 mL 0     diclofenac (VOLTAREN) 1 % topical gel Place 2 g onto the skin every 6 hours as needed for moderate pain Lower ext (Patient not taking: Reported on 1/18/2019) 100 g 0     levalbuterol (XOPENEX) 1.25 MG/3ML neb solution Take 3 mLs (1.25 mg) by nebulization 4 times daily 270 mL 0     order for DME Equipment being ordered: Nebulizer 1 each 0     order for DME Oxygen for nocturnal use. 1 LPM via nasal cannula  Testing done at home in chronic stable state.  Condition is COPD.  Patient does not have Obstructive Sleep Apnea.  Overnight oximetry revealed 30.3 minutes of hypoxia (<= 88%).  Duration: Lifetime (99 months). 1 each 99     traZODone (DESYREL) 50 MG tablet Take 25 mg by mouth nightly as needed for sleep         ALLERGIES     Allergies   Allergen Reactions     Amlodipine Swelling     Flecainide Palpitations       PAST MEDICAL HISTORY:  Past Medical History:   Diagnosis Date     Atrial flutter (H)      COPD (chronic obstructive pulmonary disease) (H)      Diverticulitis      Hypertension      Persistent atrial fibrillation (H) 4/28/09    ablation 7/1/2015     Premature beats      PVC (premature ventricular  contraction)     s/p ablation 12/5/2017     Tricuspid valve disorder     Dr Aj, Hx of valve repair      Ventricular tachycardia (H)     nonsustained       PAST SURGICAL HISTORY:  Past Surgical History:   Procedure Laterality Date     ABDOMEN SURGERY      hernia repair right     APPENDECTOMY       CARDIOVERSION  06/03/2009    successful for afib     CARDIOVERSION  4/20/15    successful for afib     CARDIOVERSION  5/28/15     H ABLATION ATRIAL FLUTTER       H ABLATION FOCAL AFIB  7/1/15    Left atrial linear ablation and pulmonary vein antrum ablation     H ABLATION PVC  12/5/16     INCISION AND DRAINAGE LOWER EXTREMITY, COMBINED Right 11/10/2016    Procedure: COMBINED INCISION AND DRAINAGE LOWER EXTREMITY;  Surgeon: Roger Pacheco MD;  Location: [facility] OR     LAPAROSCOPIC CHOLECYSTECTOMY  1/6/2012    Procedure:LAPAROSCOPIC CHOLECYSTECTOMY; LAPAROSCOPIC CHOLECYSTECTOMY ; Surgeon:ROGER PACHECO; Location:[facility] OR     ORTHOPEDIC SURGERY      Left hip replacement     REPAIR VALVE TRICUSPID  9/8/08    conversion to a bileaflet valve and application of a 30 mm Sanderson ring     SMALL INTESTINE SURGERY      had bowel obstruction age 9     VALVE REPLACEMENT      tricuspid       FAMILY HISTORY:  Family History   Problem Relation Age of Onset     Prostate Cancer Father      Family History Negative Mother      Emphysema Mother      Hypertension Mother      Cerebrovascular Disease Mother        SOCIAL HISTORY:  Social History     Socioeconomic History     Marital status:      Spouse name: None     Number of children: None     Years of education: None     Highest education level: None   Social Needs     Financial resource strain: None     Food insecurity - worry: None     Food insecurity - inability: None     Transportation needs - medical: None     Transportation needs - non-medical: None   Occupational History     None   Tobacco Use     Smoking status: Former Smoker     Last attempt to quit: 1/2/2008     Years  "since quittin.0     Smokeless tobacco: Never Used   Substance and Sexual Activity     Alcohol use: Yes     Alcohol/week: 0.0 oz     Comment: 3-6 per month     Drug use: No     Sexual activity: No     Partners: Female     Birth control/protection: None   Other Topics Concern      Service Not Asked     Blood Transfusions Not Asked     Caffeine Concern No     Comment: 1 a day      Occupational Exposure Not Asked     Hobby Hazards Not Asked     Sleep Concern Yes     Comment: nocturia     Stress Concern Not Asked     Weight Concern No     Special Diet No     Back Care Not Asked     Exercise No     Bike Helmet Yes     Seat Belt Yes     Self-Exams Not Asked     Parent/sibling w/ CABG, MI or angioplasty before 65F 55M? No   Social History Narrative     None       Review of Systems:  Skin:  Negative itching skin cancer removed recently   Eyes:  Positive for glasses    ENT:  Negative      Respiratory:  Positive for dyspnea on exertion;wheezing;shortness of breath COPD   Cardiovascular:  Negative lightheadedness;chest pain;Positive for chest pain on right side of chest  Gastroenterology: Negative melena;hematochezia    Genitourinary:  Negative nocturia    Musculoskeletal:  Positive for joint pain;arthritis L elbow  Neurologic:  Positive for headaches in the am  Psychiatric:  Positive for sleep disturbances sleeps poorly  Heme/Lymph/Imm:  Positive for allergies    Endocrine:  Negative diabetes      Physical Exam:  Vitals: /68   Pulse 82   Ht 1.778 m (5' 10\")   Wt 84.4 kg (186 lb)   BMI 26.69 kg/m       Constitutional:           Skin:             Head:           Eyes:           Lymph:      ENT:           Neck:           Respiratory:            Cardiac:                                                           GI:           Extremities and Muscular Skeletal:                 Neurological:           Psych:           CC  Sussy Britt MD  [id number]  [id number]  [place] [id number]                Service Date: " 01/18/2019      HISTORY OF PRESENT ILLNESS:  I saw Mr. Cooper for followup of atrial fibrillation.  He is a 68-year-old white male with history of severe COPD and previous tricuspid valve repair.  The patient had atrial fibrillation ablation on 07/01/2015 and did well for a few years without antiarrhythmic drug.  He subsequently had frequent symptomatic PVCs and underwent 2 ablations.  The PVC issue has resolved and the patient had atrial flutter in 2018.  For that reason, he was treated with propafenone 150 mg p.o. t.i.d.        In 12/2018, I ordered a ZIO Patch for detection of possible other atrial tachyarrhythmia because he still has some shortness of breath.  Unfortunately, the patient had admission in December that he had an extended stay of about 3 weeks because of severe pneumonia and sepsis.  He has been discharged for about 1 week and currently still on antibiotics.  He has imbalance and is now using a walker for ambulation.  Symptomatically, he still has shortness of breath.  The ZIO Patch monitor detected multiple episodes of paroxysmal atrial fibrillation with heart rate over 230 beats per minute. The Ziopatch was in place while he was hospitalized for pneumonia.     PHYSICAL EXAMINATION:   VITAL SIGNS:  Blood pressure was 113/68, heart rate 82 beats per minute, body weight 186 pounds.   CHEST:  The lungs were clear without crackles or wheezing.   HEART:  Rhythm was irregular and heart sounds were normal with no murmur.      The EKG showed short runs of atrial tachycardia.      ASSESSMENT AND RECOMMENDATIONS:  Mr. Cooper is recovering from severe pneumonia and sepsis.  He is currently still on antibiotic therapy.  He still has shortness of breath.  He continues to have episodes of atrial tachyarrhythmia with rapid ventricular rate.  His condition is unstable for repeat ablation of the atrial fibrillation/flutter.  For short-term management, I have changed the dose of propafenone from 150 mg t.i.d. to  225 mg p.o. t.i.d.  He will have a Holter in about 1 week for assessment of the arrhythmia control.  If his arrhythmia can be controlled with occasional breakthrough atrial fibrillation, we will continue observation.  I plan to see him again in February for further assessment.  Ablation may be considered but we will have to wait until he is more stable clinically.      cc:      Ana Pratt MD    [facility]   [address] [place] [address]         NORIS MARTINEZ MD             D: 2019   T: 2019   MT:       Name:     FELIPE AYOUB   MRN:      [id number]        Account:      [id number]   :      1950           Service Date: 2019      Document: [id number]        Thank you for allowing me to participate in the care of your patient.      Sincerely,     Noris Martinez MD     [facility]    cc:   Noris Martinez MD  [address]  [address]  [place] [address]

## 2019-01-18 NOTE — LETTER
1/18/2019      Ana Pratt MD  [facility] [address] [address] [facility] [address]      RE: Tone GARZA Allison       Dear Colleague,    I had the pleasure of seeing Tone Coopre in the [facility].    Service Date: 01/18/2019      HISTORY OF PRESENT ILLNESS:  I saw Mr. Cooper for followup of atrial fibrillation.  He is a 68-year-old white male with history of severe COPD and previous tricuspid valve repair.  The patient had atrial fibrillation ablation on 07/01/2015 and did well for a few years without antiarrhythmic drug.  He subsequently had frequent symptomatic PVCs and underwent 2 ablations.  The PVC issue has resolved and the patient had atrial flutter in 2018.  For that reason, he was treated with propafenone 150 mg p.o. t.i.d.        In 12/2018, I ordered a ZIO Patch for detection of possible other atrial tachyarrhythmia because he still has some shortness of breath.  Unfortunately, the patient had admission in December that he had an extended stay of about 3 weeks because of severe pneumonia and sepsis.  He has been discharged for about 1 week and currently still on antibiotics.  He has imbalance and is now using a walker for ambulation.  Symptomatically, he still has shortness of breath.  The ZIO Patch monitor detected multiple episodes of paroxysmal atrial fibrillation with heart rate over 230 beats per minute. The Ziopatch was in place while he was hospitalized for pneumonia.     PHYSICAL EXAMINATION:   VITAL SIGNS:  Blood pressure was 113/68, heart rate 82 beats per minute, body weight 186 pounds.   CHEST:  The lungs were clear without crackles or wheezing.   HEART:  Rhythm was irregular and heart sounds were normal with no murmur.      The EKG showed short runs of atrial tachycardia.      ASSESSMENT AND RECOMMENDATIONS:  Mr. Cooper is recovering from severe pneumonia and sepsis.  He is currently still on antibiotic therapy.  He still has shortness of  breath.  He continues to have episodes of atrial tachyarrhythmia with rapid ventricular rate.  His condition is unstable for repeat ablation of the atrial fibrillation/flutter.  For short-term management, I have changed the dose of propafenone from 150 mg t.i.d. to 225 mg p.o. t.i.d.  He will have a Holter in about 1 week for assessment of the arrhythmia control.  If his arrhythmia can be controlled with occasional breakthrough atrial fibrillation, we will continue observation.  I plan to see him again in February for further assessment.  Ablation may be considered but we will have to wait until he is more stable clinically.      cc:      Ana Pratt MD    [address] [address]         NORIS MARTINEZ MD             D: 2019   T: 2019   MT: SARANYA      Name:     FELIPE AYOUB   MRN:      [id number]        Account:      [id number]   :      1950           Service Date: 2019      Document: [id number]           Outpatient Encounter Medications as of 2019   Medication Sig Dispense Refill     acetaminophen (TYLENOL) 325 MG tablet Take 2 tablets (650 mg) by mouth every 4 hours as needed for mild pain or fever       amoxicillin-clavulanate (AUGMENTIN) 875-125 MG tablet Take 1 tablet by mouth 2 times daily for 28 days 48 tablet 0     diltiazem ER COATED BEADS (CARDIZEM CD) 360 MG 24 hr capsule Take 1 capsule (360 mg) by mouth daily 30 capsule 0     [] docusate sodium (COLACE) 100 MG capsule Take 1 capsule (100 mg) by mouth 2 times daily for 10 days 20 capsule 0     hydrALAZINE (APRESOLINE) 50 MG tablet Take 1 tablet (50 mg) by mouth every 8 hours 90 tablet 0     ipratropium (ATROVENT) 0.02 % nebulizer solution Take 0.5 mg by nebulization 4 times daily 100 mL 0     levalbuterol (XOPENEX) 1.25 MG/3ML neb solution Take 3 mLs (1.25 mg) by nebulization every 4 hours as needed for wheezing or shortness of breath / dyspnea 270 mL 1     oxyCODONE (OXY-IR) 5  MG capsule Take 5 mg by mouth every 4 hours as needed for severe pain       predniSONE (DELTASONE) 20 MG tablet 20 mg/day x 1 week then   10 mg /day  x 1 week, further plan per PCP / pulmonology  Please dispense 20 tabs. 20 tablet 0     propafenone (RYTHMOL) 225 MG tablet Take 1 tablet (225 mg) by mouth every 8 hours 270 tablet 3     albuterol (VENTOLIN HFA) 108 (90 Base) MCG/ACT inhaler Inhale 1-2 puffs into the lungs every 4 hours (while awake) PRN (Patient not taking: Reported on 1/18/2019) 2 Inhaler 0     artificial saliva (BIOTENE DRY MOUTHWASH) LIQD liquid Swish and spit 15 mLs in mouth 4 times daily as needed for dry mouth (Patient not taking: Reported on 1/18/2019) 1000 mL 0     diclofenac (VOLTAREN) 1 % topical gel Place 2 g onto the skin every 6 hours as needed for moderate pain Lower ext (Patient not taking: Reported on 1/18/2019) 100 g 0     levalbuterol (XOPENEX) 1.25 MG/3ML neb solution Take 3 mLs (1.25 mg) by nebulization 4 times daily 270 mL 0     order for DME Equipment being ordered: Nebulizer 1 each 0     order for DME Oxygen for nocturnal use. 1 LPM via nasal cannula  Testing done at home in chronic stable state.  Condition is COPD.  Patient does not have Obstructive Sleep Apnea.  Overnight oximetry revealed 30.3 minutes of hypoxia (<= 88%).  Duration: Lifetime (99 months). 1 each 99     traZODone (DESYREL) 50 MG tablet Take 25 mg by mouth nightly as needed for sleep       [DISCONTINUED] magnesium hydroxide (MILK OF MAGNESIA) 400 MG/5ML suspension Take 30 mLs by mouth daily as needed for constipation 355 mL 0     [DISCONTINUED] propafenone (RYTHMOL) 150 MG TABS tablet Take 1 tablet (150 mg) by mouth 3 times daily (Patient not taking: Reported on 1/18/2019) 270 tablet 3     No facility-administered encounter medications on file as of 1/18/2019.        Again, thank you for allowing me to participate in the care of your patient.      Sincerely,    Sussy Britt MD     [facility]  [facility]

## 2019-01-18 NOTE — PROGRESS NOTES
Service Date: 01/18/2019      HISTORY OF PRESENT ILLNESS:  I saw Mr. Cooper for followup of atrial fibrillation.  He is a 68-year-old white male with history of severe COPD and previous tricuspid valve repair.  The patient had atrial fibrillation ablation on 07/01/2015 and did well for a few years without antiarrhythmic drug.  He subsequently had frequent symptomatic PVCs and underwent 2 ablations.  The PVC issue has resolved and the patient had atrial flutter in 2018.  For that reason, he was treated with propafenone 150 mg p.o. t.i.d.        In 12/2018, I ordered a ZIO Patch for detection of possible other atrial tachyarrhythmia because he still has some shortness of breath.  Unfortunately, the patient had admission in December that he had an extended stay of about 3 weeks because of severe pneumonia and sepsis.  He has been discharged for about 1 week and currently still on antibiotics.  He has imbalance and is now using a walker for ambulation.  Symptomatically, he still has shortness of breath.  The ZIO Patch monitor detected multiple episodes of paroxysmal atrial fibrillation with heart rate over 230 beats per minute. The Ziopatch was in place while he was hospitalized for pneumonia.     PHYSICAL EXAMINATION:   VITAL SIGNS:  Blood pressure was 113/68, heart rate 82 beats per minute, body weight 186 pounds.   CHEST:  The lungs were clear without crackles or wheezing.   HEART:  Rhythm was irregular and heart sounds were normal with no murmur.      The EKG showed short runs of atrial tachycardia.      ASSESSMENT AND RECOMMENDATIONS:  Mr. Cooper is recovering from severe pneumonia and sepsis.  He is currently still on antibiotic therapy.  He still has shortness of breath.  He continues to have episodes of atrial tachyarrhythmia with rapid ventricular rate.  His condition is unstable for repeat ablation of the atrial fibrillation/flutter.  For short-term management, I have changed the dose of propafenone from 150  mg t.i.d. to 225 mg p.o. t.i.d.  He will have a Holter in about 1 week for assessment of the arrhythmia control.  If his arrhythmia can be controlled with occasional breakthrough atrial fibrillation, we will continue observation.  I plan to see him again in February for further assessment.  Ablation may be considered but we will have to wait until he is more stable clinically.      cc:      [place] [address]         NORIS MARTINEZ MD             D: 2019   T: 2019   MT: SARANYA      Name:     FELIPE AYOUB   MRN:      [id number]        Account:      [id number]   :      1950           Service Date: 2019      Document: [id number]

## 2019-01-25 ENCOUNTER — TELEPHONE (OUTPATIENT)
Dept: CARDIOLOGY | Facility: CLINIC | Age: 69
End: 2019-01-25

## 2019-01-25 NOTE — TELEPHONE ENCOUNTER
Pt had recent Holter monitor, ordered by Dr. Britt. Holter showed 4% AF/aflutter. The principle rhytm was sinus with frequent supraventricular ectopy and some small bursts of atrial flutter. (Compared to Zio Patch from December when pt had 64% atrial burden)    Dr. Britt ordered the Holter to check patient's atrial burden after increasing propafenone. Pt has f/u OV with Dr. Britt on 2/19/2019 to discuss the long term plan (ablation vs continue antiarrhythmics).     Called patient, no answer, left voicemail with results and plan for f/u OV.

## 2019-01-28 ENCOUNTER — TRANSFERRED RECORDS (OUTPATIENT)
Dept: HEALTH INFORMATION MANAGEMENT | Facility: CLINIC | Age: 69
End: 2019-01-28

## 2019-02-11 ENCOUNTER — APPOINTMENT (OUTPATIENT)
Dept: GENERAL RADIOLOGY | Facility: CLINIC | Age: 69
DRG: 175 | End: 2019-02-11
Attending: PHYSICIAN ASSISTANT
Payer: COMMERCIAL

## 2019-02-11 ENCOUNTER — HOSPITAL ENCOUNTER (INPATIENT)
Facility: CLINIC | Age: 69
LOS: 3 days | Discharge: HOME | DRG: 175 | End: 2019-02-14
Attending: EMERGENCY MEDICINE | Admitting: INTERNAL MEDICINE
Payer: COMMERCIAL

## 2019-02-11 ENCOUNTER — APPOINTMENT (OUTPATIENT)
Dept: CT IMAGING | Facility: CLINIC | Age: 69
DRG: 175 | End: 2019-02-11
Attending: PHYSICIAN ASSISTANT
Payer: COMMERCIAL

## 2019-02-11 DIAGNOSIS — J44.1 COPD EXACERBATION (H): ICD-10-CM

## 2019-02-11 DIAGNOSIS — J18.9 PNEUMONIA: ICD-10-CM

## 2019-02-11 DIAGNOSIS — I26.99 PULMONARY EMBOLISM (H): ICD-10-CM

## 2019-02-11 DIAGNOSIS — I48.91 ATRIAL FIBRILLATION WITH RVR (H): ICD-10-CM

## 2019-02-11 LAB
ANION GAP SERPL CALCULATED.3IONS-SCNC: 9 MMOL/L (ref 3–14)
BASOPHILS # BLD AUTO: 0 10E9/L (ref 0–0.2)
BASOPHILS NFR BLD AUTO: 0.2 %
BUN SERPL-MCNC: 32 MG/DL (ref 7–30)
CALCIUM SERPL-MCNC: 9.6 MG/DL (ref 8.5–10.1)
CHLORIDE SERPL-SCNC: 104 MMOL/L (ref 94–109)
CO2 BLDCOV-SCNC: 28 MMOL/L (ref 21–28)
CO2 SERPL-SCNC: 26 MMOL/L (ref 20–32)
CREAT SERPL-MCNC: 1.11 MG/DL (ref 0.66–1.25)
D DIMER PPP FEU-MCNC: 1.6 UG/ML FEU (ref 0–0.5)
DIFFERENTIAL METHOD BLD: ABNORMAL
EOSINOPHIL # BLD AUTO: 0.1 10E9/L (ref 0–0.7)
EOSINOPHIL NFR BLD AUTO: 0.5 %
ERYTHROCYTE [DISTWIDTH] IN BLOOD BY AUTOMATED COUNT: 15.3 % (ref 10–15)
GFR SERPL CREATININE-BSD FRML MDRD: 68 ML/MIN/{1.73_M2}
GLUCOSE SERPL-MCNC: 139 MG/DL (ref 70–99)
HCT VFR BLD AUTO: 51.3 % (ref 40–53)
HGB BLD-MCNC: 16.9 G/DL (ref 13.3–17.7)
IMM GRANULOCYTES # BLD: 0.2 10E9/L (ref 0–0.4)
IMM GRANULOCYTES NFR BLD: 1.5 %
INTERPRETATION ECG - MUSE: NORMAL
LACTATE BLD-SCNC: 1.5 MMOL/L (ref 0.7–2.1)
LMWH PPP CHRO-ACNC: 0.67 IU/ML
LYMPHOCYTES # BLD AUTO: 0.6 10E9/L (ref 0.8–5.3)
LYMPHOCYTES NFR BLD AUTO: 3.8 %
MCH RBC QN AUTO: 30.5 PG (ref 26.5–33)
MCHC RBC AUTO-ENTMCNC: 32.9 G/DL (ref 31.5–36.5)
MCV RBC AUTO: 93 FL (ref 78–100)
MONOCYTES # BLD AUTO: 0.3 10E9/L (ref 0–1.3)
MONOCYTES NFR BLD AUTO: 1.9 %
NEUTROPHILS # BLD AUTO: 13.9 10E9/L (ref 1.6–8.3)
NEUTROPHILS NFR BLD AUTO: 92.1 %
NRBC # BLD AUTO: 0 10*3/UL
NRBC BLD AUTO-RTO: 0 /100
NT-PROBNP SERPL-MCNC: 1619 PG/ML (ref 0–900)
PCO2 BLDV: 40 MM HG (ref 40–50)
PH BLDV: 7.46 PH (ref 7.32–7.43)
PLATELET # BLD AUTO: 118 10E9/L (ref 150–450)
PO2 BLDV: 31 MM HG (ref 25–47)
POTASSIUM SERPL-SCNC: 4.8 MMOL/L (ref 3.4–5.3)
RADIOLOGIST FLAGS: ABNORMAL
RBC # BLD AUTO: 5.54 10E12/L (ref 4.4–5.9)
SAO2 % BLDV FROM PO2: 64 %
SODIUM SERPL-SCNC: 139 MMOL/L (ref 133–144)
TROPONIN I SERPL-MCNC: 0.02 UG/L (ref 0–0.04)
WBC # BLD AUTO: 15.1 10E9/L (ref 4–11)

## 2019-02-11 PROCEDURE — 85379 FIBRIN DEGRADATION QUANT: CPT

## 2019-02-11 PROCEDURE — 25000128 H RX IP 250 OP 636

## 2019-02-11 PROCEDURE — 94640 AIRWAY INHALATION TREATMENT: CPT

## 2019-02-11 PROCEDURE — 36415 COLL VENOUS BLD VENIPUNCTURE: CPT | Performed by: PHYSICIAN ASSISTANT

## 2019-02-11 PROCEDURE — 80048 BASIC METABOLIC PNL TOTAL CA: CPT | Performed by: PHYSICIAN ASSISTANT

## 2019-02-11 PROCEDURE — 99285 EMERGENCY DEPT VISIT HI MDM: CPT | Mod: 25

## 2019-02-11 PROCEDURE — 87040 BLOOD CULTURE FOR BACTERIA: CPT | Performed by: PHYSICIAN ASSISTANT

## 2019-02-11 PROCEDURE — 36415 COLL VENOUS BLD VENIPUNCTURE: CPT

## 2019-02-11 PROCEDURE — 25800030 ZZH RX IP 258 OP 636: Performed by: EMERGENCY MEDICINE

## 2019-02-11 PROCEDURE — 25000128 H RX IP 250 OP 636: Performed by: EMERGENCY MEDICINE

## 2019-02-11 PROCEDURE — 25000125 ZZHC RX 250: Performed by: PHYSICIAN ASSISTANT

## 2019-02-11 PROCEDURE — 71046 X-RAY EXAM CHEST 2 VIEWS: CPT

## 2019-02-11 PROCEDURE — 82803 BLOOD GASES ANY COMBINATION: CPT

## 2019-02-11 PROCEDURE — 96365 THER/PROPH/DIAG IV INF INIT: CPT

## 2019-02-11 PROCEDURE — 40000274 ZZH STATISTIC RCP CONSULT EA 30 MIN

## 2019-02-11 PROCEDURE — 85379 FIBRIN DEGRADATION QUANT: CPT | Performed by: PHYSICIAN ASSISTANT

## 2019-02-11 PROCEDURE — 96375 TX/PRO/DX INJ NEW DRUG ADDON: CPT

## 2019-02-11 PROCEDURE — 94640 AIRWAY INHALATION TREATMENT: CPT | Mod: 76

## 2019-02-11 PROCEDURE — 25000128 H RX IP 250 OP 636: Performed by: PHYSICIAN ASSISTANT

## 2019-02-11 PROCEDURE — 99223 1ST HOSP IP/OBS HIGH 75: CPT | Mod: AI | Performed by: INTERNAL MEDICINE

## 2019-02-11 PROCEDURE — 25000125 ZZHC RX 250: Performed by: INTERNAL MEDICINE

## 2019-02-11 PROCEDURE — 71260 CT THORAX DX C+: CPT

## 2019-02-11 PROCEDURE — 85520 HEPARIN ASSAY: CPT | Performed by: INTERNAL MEDICINE

## 2019-02-11 PROCEDURE — 36415 COLL VENOUS BLD VENIPUNCTURE: CPT | Performed by: INTERNAL MEDICINE

## 2019-02-11 PROCEDURE — 40000275 ZZH STATISTIC RCP TIME EA 10 MIN

## 2019-02-11 PROCEDURE — 25000128 H RX IP 250 OP 636: Performed by: INTERNAL MEDICINE

## 2019-02-11 PROCEDURE — 85025 COMPLETE CBC W/AUTO DIFF WBC: CPT | Performed by: PHYSICIAN ASSISTANT

## 2019-02-11 PROCEDURE — 25000132 ZZH RX MED GY IP 250 OP 250 PS 637: Performed by: INTERNAL MEDICINE

## 2019-02-11 PROCEDURE — 12000000 ZZH R&B MED SURG/OB

## 2019-02-11 PROCEDURE — 83605 ASSAY OF LACTIC ACID: CPT

## 2019-02-11 PROCEDURE — 93005 ELECTROCARDIOGRAM TRACING: CPT

## 2019-02-11 PROCEDURE — 84484 ASSAY OF TROPONIN QUANT: CPT | Performed by: PHYSICIAN ASSISTANT

## 2019-02-11 PROCEDURE — 83880 ASSAY OF NATRIURETIC PEPTIDE: CPT | Performed by: PHYSICIAN ASSISTANT

## 2019-02-11 RX ORDER — FUROSEMIDE 20 MG
20 TABLET ORAL 2 TIMES DAILY
Status: DISCONTINUED | OUTPATIENT
Start: 2019-02-11 | End: 2019-02-14 | Stop reason: HOSPADM

## 2019-02-11 RX ORDER — DILTIAZEM HYDROCHLORIDE 180 MG/1
180 CAPSULE, COATED, EXTENDED RELEASE ORAL 2 TIMES DAILY
Status: DISCONTINUED | OUTPATIENT
Start: 2019-02-11 | End: 2019-02-14 | Stop reason: HOSPADM

## 2019-02-11 RX ORDER — ONDANSETRON 4 MG/1
4 TABLET, ORALLY DISINTEGRATING ORAL EVERY 6 HOURS PRN
Status: DISCONTINUED | OUTPATIENT
Start: 2019-02-11 | End: 2019-02-14 | Stop reason: HOSPADM

## 2019-02-11 RX ORDER — POTASSIUM CHLORIDE 1500 MG/1
20-40 TABLET, EXTENDED RELEASE ORAL
Status: DISCONTINUED | OUTPATIENT
Start: 2019-02-11 | End: 2019-02-14 | Stop reason: HOSPADM

## 2019-02-11 RX ORDER — IPRATROPIUM BROMIDE AND ALBUTEROL SULFATE 2.5; .5 MG/3ML; MG/3ML
1 SOLUTION RESPIRATORY (INHALATION) EVERY 6 HOURS PRN
Status: DISCONTINUED | OUTPATIENT
Start: 2019-02-11 | End: 2019-02-14 | Stop reason: HOSPADM

## 2019-02-11 RX ORDER — AMOXICILLIN 250 MG
2 CAPSULE ORAL 2 TIMES DAILY PRN
Status: DISCONTINUED | OUTPATIENT
Start: 2019-02-11 | End: 2019-02-14 | Stop reason: HOSPADM

## 2019-02-11 RX ORDER — PROPAFENONE HYDROCHLORIDE 225 MG/1
225 TABLET, FILM COATED ORAL EVERY 8 HOURS
Status: DISCONTINUED | OUTPATIENT
Start: 2019-02-11 | End: 2019-02-11

## 2019-02-11 RX ORDER — TAMSULOSIN HYDROCHLORIDE 0.4 MG/1
0.4 CAPSULE ORAL DAILY
Status: DISCONTINUED | OUTPATIENT
Start: 2019-02-11 | End: 2019-02-14 | Stop reason: HOSPADM

## 2019-02-11 RX ORDER — PROCHLORPERAZINE 25 MG
12.5 SUPPOSITORY, RECTAL RECTAL EVERY 12 HOURS PRN
Status: DISCONTINUED | OUTPATIENT
Start: 2019-02-11 | End: 2019-02-14 | Stop reason: HOSPADM

## 2019-02-11 RX ORDER — OXYCODONE HYDROCHLORIDE 5 MG/1
5-10 TABLET ORAL
Status: DISCONTINUED | OUTPATIENT
Start: 2019-02-11 | End: 2019-02-14 | Stop reason: HOSPADM

## 2019-02-11 RX ORDER — NALOXONE HYDROCHLORIDE 0.4 MG/ML
.1-.4 INJECTION, SOLUTION INTRAMUSCULAR; INTRAVENOUS; SUBCUTANEOUS
Status: DISCONTINUED | OUTPATIENT
Start: 2019-02-11 | End: 2019-02-14 | Stop reason: HOSPADM

## 2019-02-11 RX ORDER — IPRATROPIUM BROMIDE AND ALBUTEROL SULFATE 2.5; .5 MG/3ML; MG/3ML
3 SOLUTION RESPIRATORY (INHALATION)
Status: DISCONTINUED | OUTPATIENT
Start: 2019-02-11 | End: 2019-02-14 | Stop reason: HOSPADM

## 2019-02-11 RX ORDER — POTASSIUM CL/LIDO/0.9 % NACL 10MEQ/0.1L
10 INTRAVENOUS SOLUTION, PIGGYBACK (ML) INTRAVENOUS
Status: DISCONTINUED | OUTPATIENT
Start: 2019-02-11 | End: 2019-02-14 | Stop reason: HOSPADM

## 2019-02-11 RX ORDER — POTASSIUM CHLORIDE 1.5 G/1.58G
20-40 POWDER, FOR SOLUTION ORAL
Status: DISCONTINUED | OUTPATIENT
Start: 2019-02-11 | End: 2019-02-14 | Stop reason: HOSPADM

## 2019-02-11 RX ORDER — POTASSIUM CHLORIDE 7.45 MG/ML
10 INJECTION INTRAVENOUS
Status: DISCONTINUED | OUTPATIENT
Start: 2019-02-11 | End: 2019-02-14 | Stop reason: HOSPADM

## 2019-02-11 RX ORDER — DILTIAZEM HYDROCHLORIDE 180 MG/1
180 CAPSULE, COATED, EXTENDED RELEASE ORAL 2 TIMES DAILY
Status: ON HOLD | COMMUNITY
End: 2019-02-28

## 2019-02-11 RX ORDER — LEVALBUTEROL INHALATION SOLUTION 1.25 MG/3ML
1 SOLUTION RESPIRATORY (INHALATION) EVERY 4 HOURS PRN
Status: DISCONTINUED | OUTPATIENT
Start: 2019-02-11 | End: 2019-02-14 | Stop reason: HOSPADM

## 2019-02-11 RX ORDER — TAMSULOSIN HYDROCHLORIDE 0.4 MG/1
0.4 CAPSULE ORAL DAILY
COMMUNITY
End: 2019-06-28

## 2019-02-11 RX ORDER — POTASSIUM CHLORIDE 29.8 MG/ML
20 INJECTION INTRAVENOUS
Status: DISCONTINUED | OUTPATIENT
Start: 2019-02-11 | End: 2019-02-14 | Stop reason: HOSPADM

## 2019-02-11 RX ORDER — TRAZODONE HYDROCHLORIDE 50 MG/1
50 TABLET, FILM COATED ORAL AT BEDTIME
Status: DISCONTINUED | OUTPATIENT
Start: 2019-02-11 | End: 2019-02-14 | Stop reason: HOSPADM

## 2019-02-11 RX ORDER — ALBUTEROL SULFATE 90 UG/1
1-2 AEROSOL, METERED RESPIRATORY (INHALATION)
Status: DISCONTINUED | OUTPATIENT
Start: 2019-02-11 | End: 2019-02-11

## 2019-02-11 RX ORDER — IPRATROPIUM BROMIDE AND ALBUTEROL SULFATE 2.5; .5 MG/3ML; MG/3ML
1 SOLUTION RESPIRATORY (INHALATION) EVERY 6 HOURS PRN
Status: ON HOLD | COMMUNITY
End: 2019-02-28

## 2019-02-11 RX ORDER — ACETAMINOPHEN 325 MG/1
650 TABLET ORAL EVERY 4 HOURS PRN
Status: DISCONTINUED | OUTPATIENT
Start: 2019-02-11 | End: 2019-02-14 | Stop reason: HOSPADM

## 2019-02-11 RX ORDER — METHYLPREDNISOLONE SODIUM SUCCINATE 125 MG/2ML
125 INJECTION, POWDER, LYOPHILIZED, FOR SOLUTION INTRAMUSCULAR; INTRAVENOUS ONCE
Status: COMPLETED | OUTPATIENT
Start: 2019-02-11 | End: 2019-02-11

## 2019-02-11 RX ORDER — PROCHLORPERAZINE MALEATE 5 MG
5 TABLET ORAL EVERY 6 HOURS PRN
Status: DISCONTINUED | OUTPATIENT
Start: 2019-02-11 | End: 2019-02-14 | Stop reason: HOSPADM

## 2019-02-11 RX ORDER — PREDNISONE 20 MG/1
40 TABLET ORAL DAILY
Status: DISCONTINUED | OUTPATIENT
Start: 2019-02-12 | End: 2019-02-14 | Stop reason: HOSPADM

## 2019-02-11 RX ORDER — AMOXICILLIN 250 MG
1 CAPSULE ORAL 2 TIMES DAILY PRN
Status: DISCONTINUED | OUTPATIENT
Start: 2019-02-11 | End: 2019-02-14 | Stop reason: HOSPADM

## 2019-02-11 RX ORDER — IOPAMIDOL 755 MG/ML
500 INJECTION, SOLUTION INTRAVASCULAR ONCE
Status: COMPLETED | OUTPATIENT
Start: 2019-02-11 | End: 2019-02-11

## 2019-02-11 RX ORDER — IPRATROPIUM BROMIDE AND ALBUTEROL SULFATE 2.5; .5 MG/3ML; MG/3ML
3 SOLUTION RESPIRATORY (INHALATION) ONCE
Status: COMPLETED | OUTPATIENT
Start: 2019-02-11 | End: 2019-02-11

## 2019-02-11 RX ORDER — ONDANSETRON 2 MG/ML
4 INJECTION INTRAMUSCULAR; INTRAVENOUS EVERY 6 HOURS PRN
Status: DISCONTINUED | OUTPATIENT
Start: 2019-02-11 | End: 2019-02-14 | Stop reason: HOSPADM

## 2019-02-11 RX ORDER — MAGNESIUM SULFATE HEPTAHYDRATE 40 MG/ML
4 INJECTION, SOLUTION INTRAVENOUS EVERY 4 HOURS PRN
Status: DISCONTINUED | OUTPATIENT
Start: 2019-02-11 | End: 2019-02-14 | Stop reason: HOSPADM

## 2019-02-11 RX ORDER — FUROSEMIDE 20 MG
60 TABLET ORAL 2 TIMES DAILY
Status: ON HOLD | COMMUNITY
End: 2019-05-31

## 2019-02-11 RX ORDER — CEFAZOLIN SODIUM 1 G/50ML
1750 SOLUTION INTRAVENOUS ONCE
Status: COMPLETED | OUTPATIENT
Start: 2019-02-11 | End: 2019-02-11

## 2019-02-11 RX ADMIN — IOPAMIDOL 69 ML: 755 INJECTION, SOLUTION INTRAVENOUS at 14:22

## 2019-02-11 RX ADMIN — OXYCODONE HYDROCHLORIDE 10 MG: 5 TABLET ORAL at 21:06

## 2019-02-11 RX ADMIN — HEPARIN SODIUM 1400 UNITS/HR: 10000 INJECTION, SOLUTION INTRAVENOUS at 17:04

## 2019-02-11 RX ADMIN — IPRATROPIUM BROMIDE AND ALBUTEROL SULFATE 3 ML: .5; 3 SOLUTION RESPIRATORY (INHALATION) at 17:23

## 2019-02-11 RX ADMIN — Medication 225 MG: at 19:35

## 2019-02-11 RX ADMIN — IPRATROPIUM BROMIDE AND ALBUTEROL SULFATE 3 ML: .5; 3 SOLUTION RESPIRATORY (INHALATION) at 12:17

## 2019-02-11 RX ADMIN — SODIUM CHLORIDE 83 ML: 9 INJECTION, SOLUTION INTRAVENOUS at 14:22

## 2019-02-11 RX ADMIN — TAZOBACTAM SODIUM AND PIPERACILLIN SODIUM 4.5 G: 500; 4 INJECTION, SOLUTION INTRAVENOUS at 15:20

## 2019-02-11 RX ADMIN — TRAZODONE HYDROCHLORIDE 50 MG: 50 TABLET ORAL at 21:06

## 2019-02-11 RX ADMIN — TAMSULOSIN HYDROCHLORIDE 0.4 MG: 0.4 CAPSULE ORAL at 18:49

## 2019-02-11 RX ADMIN — METHYLPREDNISOLONE SODIUM SUCCINATE 125 MG: 125 INJECTION, POWDER, FOR SOLUTION INTRAMUSCULAR; INTRAVENOUS at 12:18

## 2019-02-11 RX ADMIN — TAZOBACTAM SODIUM AND PIPERACILLIN SODIUM 3.38 G: 375; 3 INJECTION, SOLUTION INTRAVENOUS at 21:13

## 2019-02-11 RX ADMIN — FUROSEMIDE 20 MG: 20 TABLET ORAL at 18:50

## 2019-02-11 RX ADMIN — IPRATROPIUM BROMIDE AND ALBUTEROL SULFATE 3 ML: .5; 3 SOLUTION RESPIRATORY (INHALATION) at 21:19

## 2019-02-11 RX ADMIN — OXYCODONE HYDROCHLORIDE 5 MG: 5 TABLET ORAL at 17:11

## 2019-02-11 RX ADMIN — DILTIAZEM HYDROCHLORIDE 180 MG: 180 CAPSULE, COATED, EXTENDED RELEASE ORAL at 21:06

## 2019-02-11 RX ADMIN — Medication 6100 UNITS: at 17:11

## 2019-02-11 RX ADMIN — VANCOMYCIN HYDROCHLORIDE 1750 MG: 1 INJECTION, POWDER, LYOPHILIZED, FOR SOLUTION INTRAVENOUS at 15:55

## 2019-02-11 ASSESSMENT — ENCOUNTER SYMPTOMS
FEVER: 0
COUGH: 1
LIGHT-HEADEDNESS: 1
PALPITATIONS: 0
SHORTNESS OF BREATH: 1
DIZZINESS: 1

## 2019-02-11 ASSESSMENT — ACTIVITIES OF DAILY LIVING (ADL): ADLS_ACUITY_SCORE: 16

## 2019-02-11 NOTE — ED NOTES
[facility]  ED Nurse Handoff Report    Tone Cooper is a 68 year old male   ED Chief complaint: Shortness of Breath  . ED Diagnosis:   Final diagnoses:   Pulmonary embolism (H)   Pneumonia   COPD exacerbation (H)     Allergies:   Allergies   Allergen Reactions     Amlodipine Swelling     Flecainide Palpitations       Code Status: Prior  Activity level - Baseline/Home:  Independent. Activity Level - Current:   Stand with Assist. Lift room needed: No. Bariatric: No   Needed: No   Isolation: No. Infection: Not Applicable.     Vital Signs:   Vitals:    02/11/19 1330 02/11/19 1356 02/11/19 1400 02/11/19 1515   BP: (!) 124/91  (!) 131/98 (!) 126/97   Pulse: 108  133    Resp: 17  17    Temp:  98.4  F (36.9  C)     TempSrc:  Oral     SpO2:   92% 91%   Weight:           Cardiac Rhythm:  ,      Pain level:    Patient confused: No. Patient Falls Risk: Yes.   Elimination Status: Has voided   Patient Report - Initial Complaint:    Hospitalized 5 weeks ago with a pneumonia. Sent here with shortness of breath. States his MD let him know he needed to be seen for a possible abscess which was seen on CT 5 weeks ago. Patient collapsed at home yesterday; denies syncope. States he experienced room spinning. Patient is on an antibiotic for the past 5 weeks.      . Focused Assessment: Respiratory - Respiratory WDL: rhythm/pattern Rhythm/Pattern, Respiratory: shortness of breath Breath Sounds: LLL; RLL LLL Breath Sounds: wheezes, expiratory RLL Breath Sounds: wheezes, expiratory   Tests Performed: Labs, CT. Abnormal Results:   Labs Ordered and Resulted from Time of ED Arrival Up to the Time of Departure from the ED   CBC WITH PLATELETS DIFFERENTIAL - Abnormal; Notable for the following components:       Result Value    WBC 15.1 (*)     RDW 15.3 (*)     Platelet Count 118 (*)     Absolute Neutrophil 13.9 (*)     Absolute Lymphocytes 0.6 (*)     All other components within normal limits   NT PROBNP INPATIENT -  Abnormal; Notable for the following components:    N-Terminal Pro BNP Inpatient 1,619 (*)     All other components within normal limits   BASIC METABOLIC PANEL - Abnormal; Notable for the following components:    Glucose 139 (*)     Urea Nitrogen 32 (*)     All other components within normal limits   D DIMER QUANTITATIVE - Abnormal; Notable for the following components:    D Dimer 1.6 (*)     All other components within normal limits   ISTAT  GASES LACTATE LUIS M POCT - Abnormal; Notable for the following components:    Ph Venous 7.46 (*)     All other components within normal limits   TROPONIN I   CARDIAC CONTINUOUS MONITORING   ISTAT CG4 GASES LACTATE LUIS M NURSING POCT   PLATELETS MONITORED PER HEPARIN TREATMENT PROTOCOL (FOR MEANINGFUL USE   MEASURE WEIGHT   NOTIFY PHYSICIAN   NOTIFY PHYSICIAN   BLOOD CULTURE   BLOOD CULTURE     CT Chest Pulmonary Embolism w Contrast   Preliminary Result   Abnormal   IMPRESSION:    1. The exam is positive for a small area of nonocclusive pulmonary   embolism in the right upper lobe.   2. Small area of consolidation at the right lung base posteriorly is   suspicious for pneumonia.   3. Emphysema.       [Critical Result: Pulmonary embolism]      Finding was identified on 2/11/2019 2:35 PM.       Dr. Ngo was contacted by me on 2/11/2019 2:44 PM and verbalized   understanding of the critical result.       Chest XR,  PA & LAT   Final Result   IMPRESSION: Mild residual infiltrate or atelectasis in the right lung   base as well as some focal consolidation or nodularity which is less   prominent compared to previous exam.      DIONISIO PHAN MD      .   Treatments provided: Antibiotics, Heparin ordered - has not started at this time 1534  Family Comments: NA  OBS brochure/video discussed/provided to patient:  N/A  ED Medications:   Medications   piperacillin-tazobactam (ZOSYN) intermittent infusion 4.5 g (4.5 g Intravenous New Bag 2/11/19 1520)   vancomycin (VANCOCIN) 1,750 mg in sodium  chloride 0.9 % 500 mL intermittent infusion (not administered)   methylPREDNISolone sodium succinate (solu-MEDROL) injection 125 mg (125 mg Intravenous Given 2/11/19 1218)   ipratropium - albuterol 0.5 mg/2.5 mg/3 mL (DUONEB) neb solution 3 mL (3 mLs Nebulization Given 2/11/19 1217)   0.9% sodium chloride BOLUS (0 mLs Intravenous Stopped 2/11/19 1425)   iopamidol (ISOVUE-370) solution 500 mL (69 mLs Intravenous Given 2/11/19 1422)     Drips infusing:  Yes  For the majority of the shift, the patient's behavior Green. Interventions performed were NA.     Severe Sepsis OR Septic Shock Diagnosis Present: No      ED Nurse Name/Phone Number: Leilani Denney,   3:33 PM    RECEIVING UNIT ED HANDOFF REVIEW    Above ED Nurse Handoff Report was reviewed: Yes  Reviewed by: Dianna Bower on February 11, 2019 at 3:59 PM

## 2019-02-11 NOTE — ED PROVIDER NOTES
Emergency Department Attending Supervision Note  2/11/2019  12:16 PM      I evaluated this patient in conjunction with Vinicius Ngo PA-C.      Briefly, the patient presented with  ***      On my exam, ***        My impression is ***        Diagnosis  No diagnosis found.      Scribe Disclosure:  I, Vikash Caldwell, am serving as a scribe on 2/11/2019 to document services personally performed by Alaina Colon MD, based on my observations and the provider's statements to me.    [facility] EMERGENCY DEPARTMENT

## 2019-02-11 NOTE — LETTER
Transition Communication Hand-off for Care Transitions to Next Level of Care Provider    Name: Tone Cooper  : 1950  MRN #: [id number]  Primary Care Provider: Ana Pratt  Primary Care MD Name: [facility]  Primary Clinic: [facility] [address] [facility] [address]  Primary Care Clinic Name: Dr Pratt   Reason for Hospitalization:  Pneumonia [J18.9]  Pulmonary embolism (H) [I26.99]  COPD exacerbation (H) [J44.1]  Admit Date/Time: 2019 11:25 AM  Discharge Date:   Payor Source: Payor: [employer]ARE / Plan: [employer] MEDICARE NON FPA / Product Type: HMO /     Readmission Assessment Measure (CALEB) Risk Score/category: Elevated  Reason for Communication Hand-off Referral: Fragility    Discharge Plan: home with wife and daugter    Discharge Needs Assessment:  Needs      Most Recent Value   Anticipated Changes Related to Illness  none   # of Referrals Placed by CTS  Communication hand-offs to next level of Care Providers          Follow-up plan:    Future Appointments   Date Time Provider Department Center   2019  9:30 AM Madelaine Watkins APRN CNP [facility]       Any outstanding tests or procedures:    Procedures     Future Labs/Procedures    Oxygen Adult     Comments:    Renew Home Oxygen Order  Renew previous prescription.  Expected treatment length is indefinite (99 months).    Attending Provider: Jon Rocha  Physician signature: See electronic signature associated with these discharge orders  Date of Order: 2019          Referrals     Future Labs/Procedures    Medication Therapy Management Referral     Comments:    MTM referral reason            Patient has 5 PTA or Discharge Medications AND one of the following   diagnoses: DM,HF,COPD,AMI DX,PULM HTN       This service is designed to help you get the most from your medications.  A specially trained pharmacist will work closely with you and your doctors  to solve any problems related  to your medications and to help you get the   best results from taking them.      The Medication Therapy Management staff will call you to schedule an appointment.            Key Recommendations:  FYI of hospitalization--    CTS following for elevated CALEB score and frequent readmissions. Pt was last admitted from 12/17-1/8 for PNA and MSSA bacteremia. This is his 4th admit in the last 6 months. Met with pt at bedside to discuss plan of care. Pt now readmitted with PE and PNA from his clinic. He is being treated with zosyn, nebs, oxycodone and lovenox. Pt states he was at an appt with Dr Pratt and she sent him to the hospital. Plan for him to discharge home today. He has been followed by ID in hospital and follows closely with Dr Stanford as outpatient. He has an appt with Dr Stanford on 2/19. New meds on discharge are prednisone taper. MTM referral is in place. He will follow up with [facility] EP on 2/25.      Lana Sofia    AVS/Discharge Summary is the source of truth; this is a helpful guide for improved communication of patient story

## 2019-02-11 NOTE — ED PROVIDER NOTES
Emergency Department Attending Supervision Note  2/11/2019  1:42 PM      I evaluated this patient in conjunction with Vinicius BURT      Briefly, the patient presented with shortness of breath. The patient was recently admitted for 5 weeks from 12/17/2018-1/8/2019 due to a lung infection and sepsis. Four weeks following discharge he received IV Ancef and four weeks following that has been taking Augmentin. He notes he was on Eliquis but has since ceased taking it due to a right lung abscess. Patient reports he has been experiencing constant worsening shortness of breath since his discharge, but has been controlling it with his 3L home oxygen. He reports two days prior to evaluation becoming dizzy and lightheaded, which has been constant since. He notes a productive cough and fatigue with the dizziness. He denies any chest pain, nausea, vomiting, leg swelling or leg pain.      On my exam,   General: Resting on the bed.  Head: No obvious trauma to head.  Ears, Nose, Throat:  External ears normal.  Nose normal.   Eyes:  Conjunctivae clear.  Pupils are equal, round, and reactive.   Neck: Normal range of motion.  Neck supple.   CV: tachycardic rate and irregularly irregular rhythm.  No murmurs.      Respiratory: Effort normal and breath sounds normal.  No wheezing or crackles.   Gastrointestinal: Soft.  No distension. There is no tenderness.    Musculoskeletal: Non tender non edematous calves  Skin: Skin is warm and dry.  No rash noted.     Results: reviewed in epic     My impression is PE, PNA.  68-year-old male with history of atrial fib, recent lung abscess, COPD presents with shortness of breath.  Vital signs mildly tachycardic but otherwise unremarkable.  Broad differential was pursued including but not limited to PE, pneumonia, empyema, abscess, electrolyte metabolic renal dysfunction, sepsis, acute cord syndrome, CHF, etc.  Overall patient is well-appearing nontoxic.  He denies any vandana chest discomfort.  EKG  had a very difficult to determine rhythm but seem to be most consistent with atrial fibrillation.  This was discussed with cardiology who felt that this was atrial fibrillation.  Troponin was within normal limits.  Patient does have a history of atrial fib and appears to be in RVR.  BNP is mildly elevated of unclear clinical significance.  CBC shows leukocytosis but no new anemia.  BMP shows no acute electrolyte metabolic or renal dysfunction.  D-dimer was obtained and 1.6.  CT PE scan was obtained showing a positive small pulmonary embolism in the right upper lobe and a small consolidation of the right lung base.  Patient is on Augmentin orally therefore we opted to broaden with thank and Zosyn for possible failed outpatient pneumonia treatment.  Also discussed heparin treatment for PE with patient.  He denies any past contraindications for heparinization.  Discussion with the CARMEL Mary, it appears that patient had been previously told to resume his Eliquis 1 week after hospitalization but patient has not.  At this point seems to be reasonable to heparinize patient for PE treatment.  Patient's rate improved over the course of stay with COPD management and fluids.  Did not do rate controlling at this time until able to get the pneumonia and PE treatment.  He may need rate control at a later time depending if he continues to improve with supportive cares.  Patient was discussed with the hospitalist who agreed.      Diagnosis    ICD-10-CM    1. Pulmonary embolism (H) I26.99 Blood culture     Blood culture   2. Pneumonia J18.9    3. COPD exacerbation (H) J44.1    4. Atrial fibrillation with RVR (H) I48.91        Scribe Disclosure:  I, Syed Mar, am serving as a scribe at 1:42 PM on 2/11/2019 to document services personally performed by Destiny Vela MD based on my observations and the provider's statements to me.        Destiny Vela MD  02/11/19 [id number]

## 2019-02-11 NOTE — ED PROVIDER NOTES
History     Chief Complaint:  Shortness of Breath    HPI   Tone Cooper is a 68 year old male who presents with shortness of breath. The patient has a complex past medical history including severe COPD on continuous home oxygen, tricuspid valve repair, extensive CAD with multiple coronary angiographies, as well as atrial fibrillation/flutter status post ablation. The patient was seen and admitted 5 weeks ago from 12/17/2018 until 1/8/2019 for sepsis involving a lung infection and a subsequent suspicion for a right lung abscess detected at Ct-scan. He had 4 weeks of outpatient IV Ancef infusions as well followed by 4 weeks of Augmentin, which he is still taking. Notably, he had hemoptysis during his previous visit and his Eliquis was placed on hold. The patient reports he has not since restarted this medication under the direction of his primary doctor as well as Dr. Stanford of ID given the ongoing abscess and need for reevaluation. The patient states that he has been persistently short of breath past his baseline since discharge but has been managing at home with his oxygen. Two days ago, however, the patient describes what sounds to be a near-syncopal episode at home where he became suddenly lightheaded and dizzy. He fell following this though denies hitting his head or losing consciousness, and says he had to lay down for several minutes before being able to get back up. He has been persistently dizzy since that time and was seen by his primary today. He was given a duoneb treatment in clinic and subsequently referred here for more emergent evaluation. The patient denies any chest pain with his symptoms at any point. He primarily complains of shortness of breath, productive cough and being fatigued. He otherwise has no fevers, nausea, vomiting, leg swelling or pain.      CT-scan of the chest obtained on 12/17/2018:  1. No evidence for pulmonary embolism.  2. Vascular calcifications, including coronary artery  calcifications.  3. Interval resolution of right-sided pleural effusion.  4. Interval development of patchy infiltrates throughout the right  lower lobe, minimal infiltrate in the left upper lobe adjacent to the  major fissure and a small focus of infiltrate or scarring in the right  upper lobe. Since it is difficult to exclude underlying mass lesions  in some areas, follow-up to resolution or stability is recommended.  5. Diffuse emphysematous changes.    Allergies:  Amlodipine  Flecainide      Medications:    Albuterol inhaler  Augmentin  Apresoline  Atrovent  Prednisone  Desyrel   Rhythmol    Past Medical History:    COPD  Nocturnal hypoxia  Hypertension   Atrial flutter/fibrillation  Wide-complex tachycardia  Cardiomyopathy  Diverticulitis  Tricuspid valve repair  V-tach    Past Surgical History:    Coronary catheterization x5  Cardiac ablations x5  Cardiac cardioversion x4  Right hernia repair  Appendectomy  Cholecystectomy  Small bowel surgery  Tricuspid valve REPAIR    Family History:    Emphysema, hypertension, CVD     Social History:  Smoking Status: Former Smoker  Alcohol Use: Yes  Patient presents alone.   Marital Status:        Review of Systems   Constitutional: Negative for fever.   Respiratory: Positive for cough and shortness of breath.    Cardiovascular: Negative for chest pain, palpitations and leg swelling.   Neurological: Positive for dizziness and light-headedness. Negative for syncope.   All other systems reviewed and are negative.      Physical Exam     Patient Vitals for the past 24 hrs:   BP Temp Temp src Pulse Heart Rate Resp SpO2 Weight   02/11/19 1632 (P) 133/76 (P) 99.3  F (37.4  C) (P) Oral -- (P) 100 (P) 20 (P) 94 % --   02/11/19 1600 139/89 -- -- 112 107 29 95 % --   02/11/19 1530 (!) 134/109 -- -- 128 124 14 -- --   02/11/19 1515 (!) 126/97 -- -- -- -- -- 91 % --   02/11/19 1400 (!) 131/98 -- -- 133 120 17 92 % --   02/11/19 1356 -- 98.4  F (36.9  C) Oral -- -- -- -- --    02/11/19 1330 (!) 124/91 -- -- 108 120 17 -- --   02/11/19 1315 -- -- -- -- -- -- 94 % --   02/11/19 1300 (!) 145/94 -- -- 56 -- -- -- --   02/11/19 1215 -- -- -- -- 120 12 96 % --   02/11/19 1210 100/83 -- -- 125 118 15 97 % --   02/11/19 1118 123/86 98.1  F (36.7  C) -- -- 112 20 96 % 83.5 kg (184 lb)        Physical Exam  General: Alert and cooperative with exam. Speaking in short sentences.  Head:  Scalp is NC/AT  Eyes:  No scleral icterus, PERRL,   ENT:  The external nose and ears are normal.   Neck:  Normal range of motion without rigidity.  CV:  Tachycardic and irregular    No pathologic murmur, rubs, or gallops.  Resp:  Bilateral end expiratory wheezing with decreased air movement.  No crackles or rhonchi.    Non-labored, no retractions or accessory muscle use  GI:  Abdomen is soft, no distension, no tenderness, no masses. No peritoneal signs.  Bowel sounds present in all quadrants  :  No suprapubic or flank tenderness  MS:  No lower extremity edema or asymmetric calf swelling.  Skin:  Warm and dry, No rash or lesions noted.  Neuro: Oriented x 3. No gross motor deficits.  Psych: Awake. Alert. Normal affect. Appropriate interactions.      Emergency Department Course   ECG:    ECG (11:51:03):  Rate 135 bpm. WY interval 216. QRS duration 92. QT/QTc 264/396. P-R-T axes 73 -60 87. Sinus tachycardia with 1st degree AV block. Left axis deviation. Possible inferior infarct, age undetermined. Anterior infarct, age undetermined. Abnormal ECG. Reviewed by  and Vinicius Ngo PA-C.     Rhythm strip available via [employer] T[employer] under chart review.    Imaging:  Radiographic findings were communicated with the patient who voiced understanding of the findings.    X-ray Chest, 2 views:  Mild residual infiltrate or atelectasis in the right lung  base as well as some focal consolidation or nodularity which is less  prominent compared to previous exam.  Result per radiology.     CT-scan Chest w/ IV contrast:  1. The exam  is positive for a small area of nonocclusive pulmonary  embolism in the right upper lobe.  2. Small area of consolidation at the right lung base posteriorly is  suspicious for pneumonia.  3. Emphysema.   As read by Radiology.      Laboratory:  1209 - Troponin: 0.017  D-dimer: 1.6 (H)  BNP: 1,619 (H)  CBC: WBC 15.1 (H),  (L), o/w WNL (HGB 16.9)   BMP: Glucose 139 (H), BUN 32 (H), otherwise WNL (Creatinine 1.11)   1305 - ISTAT Lactate: pH 7.46 (H), pCO2 40, pO2 31, Bicarbonate 28, Lactic Acid 1.5     Blood Culture x2: Pending     Interventions:  1217 - DuoNeb, 2.5mg/3 mL, Inhalation solution, Nebulizer   1218 - Solu-medrol 125 mg IV  1520 - Zosyn 4.5 g IVPB     Emergency Department Course:  Past medical records, nursing notes, and vitals reviewed.  1108: I performed an exam of the patient and obtained history, as documented above.      IV inserted and blood drawn.     The patient was placed on oxygen via nasal cannula. The patient was placed on cardiac monitoring.     The patient was sent for a X-ray while in the emergency department, findings above.     1337: I discussed the patient with Dr. Vela, in shared service, who also evaluated the patient.     1356: I discussed the case with Dr. Snyder of Cardiology regarding the patient. He states it appears to be A-fib with PVCs.    The patient was sent for a CT-scan while in the emergency department, findings above.     1444: I discussed the imaging results with radiology who confirmed PE diagnosis.    1446: I rechecked the patient. Findings and plan explained to the Patient who consents to admission.     1524: Discussed the patient with Dr. Donaldson, who will admit the patient to a telemetry bed for further monitoring, evaluation, and treatment.      Impression & Plan        PESI Score for estimating PE Associated 30-Day Mortality (calculator)  Background  Estimates the 30-day mortality risk associated with pulmonary embolism based on 11 criteria including age,  gender, cancer history, CHF, COPD, heart rate >110, SBP <100, RR >30, Tm <96.8 F, O2 Sat <90, and altered mental status.   Data  68 year old  has Diverticulitis; Tricuspid valve disorder; COPD exacerbation (H); Atrial fibrillation with rapid ventricular response (H); Cardiomyopathy (H); Wide-complex tachycardia (H); Hypertension; Atrial flutter (H); S/P radiofrequency ablation operation for arrhythmia; Nocturnal hypoxia; Dyspnea; Acute on chronic respiratory failure with hypoxia and hypercapnia (H); and Pneumonia on their problem list.  Temp: 98.4  F (36.9  C)  BP: (!) 126/97  Pulse: 133  Resp: 17  SpO2: 91 %  Criteria  NEGATIVE  Score 30: Cancer - active or past history  Score 10: Heart failure  Score 20: Heart rate >110 bpm  Score 30: Systolic blood pressure <100 mmHg  Score 20: Respiratory rate >30 bpm  Score 20: Temperature <36 C  Score 60: Altered mental status  Score 20: Oxygen Saturation <90%  Interpretation  PESI Score: 87 (Age 68, COPD, Male Gender)  Score <106: Class 3 - Moderate 30-day mortality risk (3.2 to 7.1%)    Medical Decision Makin-year-old male who presents with shortness of breath.  Patient history and records reviewed.  On examination, the patient has diffuse expiratory wheezing and poor air movement.  He was given DuoNeb and steroids and did seem to improve somewhat.  He did remain quite dyspneic and tachycardic and given his recent prior lung abscess I did perform CT scan of the chest which did demonstrate the presence of pulmonary embolism, probable pneumonia.  He had previously been on Eliquis for paroxysmal atrial fibrillation in his tricuspid valve but this was stopped during his prior hospitalization because of concerns with hemoptysis.  Per the discharge summary at that time plan was to begin Eliquis if no hemoptysis for greater than 1 week, however it appears that at this point the patient has not yet been restarted on this.  After discussion of risks versus benefits we did  initiate heparin here in the ED, as well as broad-spectrum antibiotic coverage for pneumonia in the setting of his chronic lung disease and recent hospitalization.  No evidence of severe sepsis or septic shock and the patient's lactate is normal with no evidence of endorgan dysfunction.  He has maintained O2 sats in the low 90s consistent with his baseline.  EKG showed tachycardia with indeterminate rhythm, possible T waves, and rate alternating between 100-130.  Possibilities included atrial fibrillation given the patient's history of chronic paroxysmal A. fib, multifocal atrial tachycardia, intermittent AV block.  We did discuss with cardiology who did feel that this represented a atrial fibrillation with intermittent PVCs.   His rate was predominantly in the low 100s while in the emergency department, and rate control was not initiated down here in the ED given believe that tachycardia likely compensatory response to pulmonary embolism and pneumonia as noted below.  The patient will be admitted to the hospital for treatment and further evaluation.  Patient was discussed with Dr. Diez as above, who agrees to accept the patient to a cardiac telemetry bed.  Patient consents to admission and all questions answered.    Diagnosis:    ICD-10-CM   1. Pulmonary embolism (H) I26.99       2. Pneumonia J18.9   3. COPD exacerbation (H) J44.1   4. Atrial fibrillation with RVR (H) I48.91     Jared Riley  2/11/2019   [facility] EMERGENCY DEPARTMENT  IJared, am serving as a scribe at 3:46 PM on 2/11/2019 to document services personally performed by Vinicius Ngo PA-C based on my observations and the provider's statements to me.       Vinicius Ngo PA-C  02/11/19 1928

## 2019-02-12 ENCOUNTER — APPOINTMENT (OUTPATIENT)
Dept: ULTRASOUND IMAGING | Facility: CLINIC | Age: 69
DRG: 175 | End: 2019-02-12
Attending: INTERNAL MEDICINE
Payer: COMMERCIAL

## 2019-02-12 LAB
BASOPHILS # BLD AUTO: 0 10E9/L (ref 0–0.2)
BASOPHILS NFR BLD AUTO: 0.1 %
DIFFERENTIAL METHOD BLD: ABNORMAL
EOSINOPHIL # BLD AUTO: 0 10E9/L (ref 0–0.7)
EOSINOPHIL NFR BLD AUTO: 0 %
ERYTHROCYTE [DISTWIDTH] IN BLOOD BY AUTOMATED COUNT: 15.4 % (ref 10–15)
HCT VFR BLD AUTO: 43.2 % (ref 40–53)
HGB BLD-MCNC: 13.9 G/DL (ref 13.3–17.7)
IMM GRANULOCYTES # BLD: 0.1 10E9/L (ref 0–0.4)
IMM GRANULOCYTES NFR BLD: 1.1 %
LMWH PPP CHRO-ACNC: 0.51 IU/ML
LYMPHOCYTES # BLD AUTO: 0.4 10E9/L (ref 0.8–5.3)
LYMPHOCYTES NFR BLD AUTO: 4.7 %
MCH RBC QN AUTO: 30.2 PG (ref 26.5–33)
MCHC RBC AUTO-ENTMCNC: 32.2 G/DL (ref 31.5–36.5)
MCV RBC AUTO: 94 FL (ref 78–100)
MONOCYTES # BLD AUTO: 0.2 10E9/L (ref 0–1.3)
MONOCYTES NFR BLD AUTO: 3 %
NEUTROPHILS # BLD AUTO: 6.9 10E9/L (ref 1.6–8.3)
NEUTROPHILS NFR BLD AUTO: 91.1 %
NRBC # BLD AUTO: 0 10*3/UL
NRBC BLD AUTO-RTO: 0 /100
PLATELET # BLD AUTO: 110 10E9/L (ref 150–450)
RBC # BLD AUTO: 4.6 10E12/L (ref 4.4–5.9)
WBC # BLD AUTO: 7.6 10E9/L (ref 4–11)

## 2019-02-12 PROCEDURE — 36415 COLL VENOUS BLD VENIPUNCTURE: CPT | Performed by: INTERNAL MEDICINE

## 2019-02-12 PROCEDURE — 40000275 ZZH STATISTIC RCP TIME EA 10 MIN

## 2019-02-12 PROCEDURE — 85520 HEPARIN ASSAY: CPT | Performed by: INTERNAL MEDICINE

## 2019-02-12 PROCEDURE — 25000128 H RX IP 250 OP 636: Performed by: INTERNAL MEDICINE

## 2019-02-12 PROCEDURE — 25000132 ZZH RX MED GY IP 250 OP 250 PS 637: Performed by: INTERNAL MEDICINE

## 2019-02-12 PROCEDURE — 25000125 ZZHC RX 250: Performed by: INTERNAL MEDICINE

## 2019-02-12 PROCEDURE — 25000128 H RX IP 250 OP 636

## 2019-02-12 PROCEDURE — 94640 AIRWAY INHALATION TREATMENT: CPT

## 2019-02-12 PROCEDURE — 94640 AIRWAY INHALATION TREATMENT: CPT | Mod: 76

## 2019-02-12 PROCEDURE — 12000000 ZZH R&B MED SURG/OB

## 2019-02-12 PROCEDURE — 93970 EXTREMITY STUDY: CPT

## 2019-02-12 PROCEDURE — 99233 SBSQ HOSP IP/OBS HIGH 50: CPT | Performed by: INTERNAL MEDICINE

## 2019-02-12 PROCEDURE — 85025 COMPLETE CBC W/AUTO DIFF WBC: CPT | Performed by: INTERNAL MEDICINE

## 2019-02-12 RX ORDER — PROPAFENONE HYDROCHLORIDE 150 MG/1
75 TABLET, FILM COATED ORAL EVERY 8 HOURS SCHEDULED
Status: DISCONTINUED | OUTPATIENT
Start: 2019-02-12 | End: 2019-02-14 | Stop reason: HOSPADM

## 2019-02-12 RX ORDER — PROPAFENONE HYDROCHLORIDE 150 MG/1
150 TABLET, COATED ORAL EVERY 8 HOURS SCHEDULED
Status: DISCONTINUED | OUTPATIENT
Start: 2019-02-12 | End: 2019-02-14 | Stop reason: HOSPADM

## 2019-02-12 RX ADMIN — OXYCODONE HYDROCHLORIDE 10 MG: 5 TABLET ORAL at 18:13

## 2019-02-12 RX ADMIN — DILTIAZEM HYDROCHLORIDE 180 MG: 180 CAPSULE, COATED, EXTENDED RELEASE ORAL at 08:04

## 2019-02-12 RX ADMIN — DILTIAZEM HYDROCHLORIDE 180 MG: 180 CAPSULE, COATED, EXTENDED RELEASE ORAL at 21:47

## 2019-02-12 RX ADMIN — IPRATROPIUM BROMIDE AND ALBUTEROL SULFATE 3 ML: .5; 3 SOLUTION RESPIRATORY (INHALATION) at 07:40

## 2019-02-12 RX ADMIN — HEPARIN SODIUM 1400 UNITS/HR: 10000 INJECTION, SOLUTION INTRAVENOUS at 06:54

## 2019-02-12 RX ADMIN — IPRATROPIUM BROMIDE AND ALBUTEROL SULFATE 3 ML: .5; 3 SOLUTION RESPIRATORY (INHALATION) at 19:56

## 2019-02-12 RX ADMIN — Medication 75 MG: at 17:52

## 2019-02-12 RX ADMIN — LEVALBUTEROL HYDROCHLORIDE 1.25 MG: 1.25 SOLUTION RESPIRATORY (INHALATION) at 18:05

## 2019-02-12 RX ADMIN — TAZOBACTAM SODIUM AND PIPERACILLIN SODIUM 3.38 G: 375; 3 INJECTION, SOLUTION INTRAVENOUS at 14:31

## 2019-02-12 RX ADMIN — TAZOBACTAM SODIUM AND PIPERACILLIN SODIUM 3.38 G: 375; 3 INJECTION, SOLUTION INTRAVENOUS at 21:46

## 2019-02-12 RX ADMIN — PROPAFENONE HYDROCHLORIDE 150 MG: 150 TABLET, COATED ORAL at 21:47

## 2019-02-12 RX ADMIN — Medication 1 MG: at 21:47

## 2019-02-12 RX ADMIN — Medication 225 MG: at 04:30

## 2019-02-12 RX ADMIN — IPRATROPIUM BROMIDE AND ALBUTEROL SULFATE 3 ML: .5; 3 SOLUTION RESPIRATORY (INHALATION) at 11:48

## 2019-02-12 RX ADMIN — PREDNISONE 40 MG: 20 TABLET ORAL at 08:04

## 2019-02-12 RX ADMIN — SENNOSIDES AND DOCUSATE SODIUM 1 TABLET: 8.6; 5 TABLET ORAL at 21:47

## 2019-02-12 RX ADMIN — IPRATROPIUM BROMIDE AND ALBUTEROL SULFATE 3 ML: .5; 3 SOLUTION RESPIRATORY (INHALATION) at 15:31

## 2019-02-12 RX ADMIN — Medication 225 MG: at 10:31

## 2019-02-12 RX ADMIN — OXYCODONE HYDROCHLORIDE 10 MG: 5 TABLET ORAL at 00:05

## 2019-02-12 RX ADMIN — OXYCODONE HYDROCHLORIDE 10 MG: 5 TABLET ORAL at 02:59

## 2019-02-12 RX ADMIN — OXYCODONE HYDROCHLORIDE 10 MG: 5 TABLET ORAL at 13:37

## 2019-02-12 RX ADMIN — TAZOBACTAM SODIUM AND PIPERACILLIN SODIUM 3.38 G: 375; 3 INJECTION, SOLUTION INTRAVENOUS at 02:53

## 2019-02-12 RX ADMIN — TRAZODONE HYDROCHLORIDE 50 MG: 50 TABLET ORAL at 21:47

## 2019-02-12 RX ADMIN — ENOXAPARIN SODIUM 90 MG: 100 INJECTION SUBCUTANEOUS at 12:00

## 2019-02-12 RX ADMIN — TAMSULOSIN HYDROCHLORIDE 0.4 MG: 0.4 CAPSULE ORAL at 08:04

## 2019-02-12 RX ADMIN — TAZOBACTAM SODIUM AND PIPERACILLIN SODIUM 3.38 G: 375; 3 INJECTION, SOLUTION INTRAVENOUS at 08:08

## 2019-02-12 RX ADMIN — OXYCODONE HYDROCHLORIDE 10 MG: 5 TABLET ORAL at 21:47

## 2019-02-12 RX ADMIN — FUROSEMIDE 20 MG: 20 TABLET ORAL at 08:04

## 2019-02-12 RX ADMIN — FUROSEMIDE 20 MG: 20 TABLET ORAL at 17:52

## 2019-02-12 RX ADMIN — OXYCODONE HYDROCHLORIDE 10 MG: 5 TABLET ORAL at 10:31

## 2019-02-12 ASSESSMENT — ACTIVITIES OF DAILY LIVING (ADL)
ADLS_ACUITY_SCORE: 14
ADLS_ACUITY_SCORE: 14
TRANSFERRING: 0-->INDEPENDENT
FALL_HISTORY_WITHIN_LAST_SIX_MONTHS: YES
COGNITION: 0 - NO COGNITION ISSUES REPORTED
AMBULATION: 1-->ASSISTIVE EQUIPMENT
ADLS_ACUITY_SCORE: 16
DRESS: 0-->INDEPENDENT
ADLS_ACUITY_SCORE: 14
NUMBER_OF_TIMES_PATIENT_HAS_FALLEN_WITHIN_LAST_SIX_MONTHS: 1
RETIRED_COMMUNICATION: 0-->UNDERSTANDS/COMMUNICATES WITHOUT DIFFICULTY
ADLS_ACUITY_SCORE: 16
RETIRED_EATING: 0-->INDEPENDENT
BATHING: 0-->INDEPENDENT
ADLS_ACUITY_SCORE: 14
SWALLOWING: 0-->SWALLOWS FOODS/LIQUIDS WITHOUT DIFFICULTY
TOILETING: 0-->INDEPENDENT

## 2019-02-12 NOTE — H&P
Admitted:     02/11/2019      CHIEF COMPLAINT:  Shortness of breath.      HISTORY OF PRESENT ILLNESS:  Tone Cooper is a 68-year-old gentleman with past medical history including acute on chronic hypoxic respiratory failure, severe sepsis, community-acquired pneumonia, MSSA bacteremia, COPD, atrial fibrillation, hypertension, hyperlipidemia, chronic kidney disease who was discharged from this hospital on 01/08/2019 after he was in the hospital for about 3 weeks.  At the time, the patient was found to have MSSA bacteremia complicated by suspected pulmonary abscess and community-acquired pneumonia.  He was on IV Ancef for 4 weeks and transitioned to Augmentin for an additional 4 weeks.  Currently, the patient is taking his Augmentin.  The patient also had hemoptysis during his previous visits and he was taken off Eliquis and currently not on anticoagulation.  The patient has followup with his primary care provider and also he is supposed to see a pulmonologist tomorrow, but the appointment was canceled as the patient was admitted to the hospital.      The patient has been persistently short of breath after discharge and has been on home oxygen 2 liters.  Two days ago he felt like near-syncope, weak and tired, energy level decreased.  He was concerned and laid down for about 10 minutes before being able to get up.  Per his wife, the patient has been dizzy and weak since discharge from the hospital and mostly in bed.  The patient stated he did not have any more blood in his sputum.  He denied any chest pain, no palpitation.  In the emergency room, the patient was evaluated and discussed with ED physician  Jeni RIVAS.  A CT scan was done which showed small area of nonocclusive pulmonary embolism in the right upper lobe and a small area of consolidation in his right lung base and emphysema.  The patient was started on heparin drip and got IV antibiotics, vancomycin and Zosyn, and admitted to the hospital.      PAST  MEDICAL HISTORY:    Severe chronic obstructive pulmonary disease on oxygen  Hypoxic respiratory failure secondary to community-acquired pneumonia.  Severe sepsis  MSSA bacteremia.  Atrial fibrillation.  Iatrogenic fluid overload secondary to aggressive IV fluid resuscitation during recent hospitalization.  Hypertension.  Hyperglycemia.  Chronic kidney disease stage II.   Empyema, drained.  Premature ventricular contraction.  Tricuspid valve disease, status post repair, follow up with Cardiology, Dr. Aj.  Nonsustained ventricular tachycardia.      PAST SURGICAL HISTORY:    Past Surgical History:   Procedure Laterality Date     ABDOMEN SURGERY      hernia repair right     APPENDECTOMY       CARDIOVERSION  06/03/2009    successful for afib     CARDIOVERSION  4/20/15    successful for afib     CARDIOVERSION  5/28/15     H ABLATION ATRIAL FLUTTER       H ABLATION FOCAL AFIB  7/1/15    Left atrial linear ablation and pulmonary vein antrum ablation     H ABLATION PVC  12/5/16     INCISION AND DRAINAGE LOWER EXTREMITY, COMBINED Right 11/10/2016    Procedure: COMBINED INCISION AND DRAINAGE LOWER EXTREMITY;  Surgeon: Roger Pacheco MD;  Location: [facility] OR     LAPAROSCOPIC CHOLECYSTECTOMY  1/6/2012    Procedure:LAPAROSCOPIC CHOLECYSTECTOMY; LAPAROSCOPIC CHOLECYSTECTOMY ; Surgeon:ROGER PACHECO; Location:[facility] OR     ORTHOPEDIC SURGERY      Left hip replacement     REPAIR VALVE TRICUSPID  9/8/08    conversion to a bileaflet valve and application of a 30 mm Sanderson ring     SMALL INTESTINE SURGERY      had bowel obstruction age 9     VALVE REPLACEMENT      tricuspid        SOCIAL HISTORY:  The patient lives with his wife.  He used to smoke, quit about 11 years ago.  He does not drink alcohol.  He does use illicit drugs.      FAMILY HISTORY:  Reviewed, prostate cancer in his father, emphysema, hypertension and CVA in his mother.      HOME MEDICATIONS:    Prior to Admission Medications   Prescriptions Last Dose  Informant Patient Reported? Taking?   albuterol (VENTOLIN HFA) 108 (90 Base) MCG/ACT inhaler   No No   Sig: Inhale 1-2 puffs into the lungs every 4 hours (while awake) PRN   amoxicillin-clavulanate (AUGMENTIN) 875-125 MG tablet 2019 at x1  No No   Sig: Take 1 tablet by mouth 2 times daily for 28 days   apixaban ANTICOAGULANT (ELIQUIS) 5 MG tablet   Yes Yes   Sig: Take 1 tablet (5 mg) by mouth 2 times daily   furosemide (LASIX) 20 MG tablet 2019 at x1  Yes Yes   Sig: Take 20 mg by mouth 2 times daily   levalbuterol (XOPENEX) 1.25 MG/3ML neb solution   No Yes   Sig: Take 3 mLs (1.25 mg) by nebulization every 4 hours as needed for wheezing or shortness of breath / dyspnea   order for DME   No No   Sig: Oxygen for nocturnal use. 1 LPM via nasal cannula  Testing done at home in chronic stable state.  Condition is COPD.  Patient does not have Obstructive Sleep Apnea.  Overnight oximetry revealed 30.3 minutes of hypoxia (<= 88%).  Duration: Lifetime (99 months).   order for DME   No No   Sig: Equipment being ordered: Nebulizer   oxyCODONE (OXY-IR) 5 MG capsule   Yes Yes   Sig: Take 5 mg by mouth every 4 hours as needed for severe pain   predniSONE (DELTASONE) 20 MG tablet 2019 at 10 mg  No No   Si mg/day x 1 week then   10 mg /day  x 1 week, further plan per PCP / pulmonology  Please dispense 20 tabs.   propafenone (RYTHMOL) 225 MG tablet 2019 at x1  No Yes   Sig: Take 1 tablet (225 mg) by mouth every 8 hours   tamsulosin (FLOMAX) 0.4 MG capsule Past Month at Unknown time  Yes Yes   Sig: Take 0.4 mg by mouth daily   traZODone (DESYREL) 50 MG tablet 2/10/2019 at Unknown time  Yes Yes   Sig: Take 50 mg by mouth At Bedtime       Facility-Administered Medications: None     ALLERGIES:  AMLODIPINE, FLECAINIDE.      PHYSICAL EXAMINATION:   GENERAL:  The patient is awake, alert, oriented, comfortable, not in any form of distress.   VITAL SIGNS:  Blood pressure 133/76, pulse rate 100, temperature 99.0,  oxygen saturation 94% on 3 liters.   HEENT:  Pink and anicteric.  Extraocular muscle movement intact.   NECK:  Supple, no JVD, no thyromegaly.   CHEST:  Good air entry bilaterally.  Scattered wheezing and decreased breath sounds noted on the posterior side.   CARDIOVASCULAR:  S1, S2, irregular.   ABDOMEN:  Soft, nontender, nondistended.   EXTREMITIES:  No edema, cyanosis or clubbing.   NEUROLOGIC:  No focal neurologic deficit.  Cranial nerves grossly intact.   PSYCHIATRIC:  Normal mood and affect.  Keeps eye contact, responds to question appropriately.        DIAGNOSTIC TESTS OF INTEREST:  Sodium 138, potassium 4, BUN 32, creatinine 1.1, calcium 9.6, lactic acid 1.5.  BNP 1600.  Troponin 0.017.  Glucose 139.  Venous pH 7.46, pCO2 of 40.  WBC 15.1, hemoglobin 16.9, platelets 118.  Blood culture done.  CT scan of the chest PE protocol showed positive for small area of nonocclusive pulmonary embolism in the right upper lobe, small area of consolidation on the right lung base posteriorly, suspicious for pneumonia and emphysema seen.      ASSESSMENT AND PLAN:  Tone Cooper is a 68-year-old gentleman with extensive past medical history as outlined above including chronic respiratory failure due to chronic obstructive pulmonary disease on home oxygen, tricuspid valve repair, coronary artery disease, multiple coronary angiograms, atrial fibrillation and flutter, status post ablation, recent discharge from the hospital after sepsis, respiratory failure and suspected empyema and was on IV antibiotic, Ancef, as he also had MSSA bacteremia and currently on Augmentin being admitted to the hospital for worsening shortness of breath, generalized weakness and presyncopal episode and found to have small pulmonary embolism and being admitted to the hospital.   1.  Acute on chronic hypoxic respiratory failure secondary to underlying chronic obstructive pulmonary disease and pneumonia.   2.  Pulmonary embolism.   3.  Atrial  fibrillation, rate controlled.   4.  Recent MSSA bacteremia on Augmentin prior to admission.   5.  Hypertension.   6.  Leukocytosis, likely steroid induced.   7.  Chronic kidney disease, stage 2-3.      PLAN:  The patient is being admitted as an inpatient.  Expect 2-3 night stay in the hospital.  Followup blood cultures, transition the patient to Zosyn while in the hospital and also increase his prednisone to 40 mg.  Currently requiring 3 liters of oxygen, titrate down to his baseline of 2 liters per minute, continue most of his home medication including bronchodilators.      The patient has atrial fibrillation.  He was taken off Eliquis on his last admission to the hospital due to episodes of hemoptysis.  The patient has no more hemoptysis.  At this point for his pulmonary embolism, the patient is started on a heparin drip.  If the patient has no more hemoptysis or sign of bleeding, he needs to be transitioned to new oral anticoagulation agent tomorrow.  I discussed with the patient at length the plan of care.  All his questions and concerns addressed.  His wife was at the bedside and agreed with the plan of care.        CODE STATUS:  In the event of cardiorespiratory arrest patient is full code.         SHADY SU MD             D: 2019   T: 2019   MT: TARAH      Name:     FELIPE AYOUB   MRN:      [id number]        Account:      [id number]   :      1950        Admitted:     2019                   Document: [id number]

## 2019-02-12 NOTE — PROGRESS NOTES
"[facility] RCAT     Date: 2/11/19  Admission Dx: PE, PNA  Pulmonary History: COPD, lung abcess  Home Nebulizer/MDI Use: Duoneb Q6 prn, Xopenex Q4 prn, Albuterol Q4 W/A ( not using)  Home Oxygen: 1L at night  Acuity Level (RCAT flow sheet): 3  Aerosol Therapy initiated: Duoneb QID/ Q6 prn, Xopenex Q4 prn      Pulmonary Hygiene initiated: Deep breathe and cough- TID      Volume Expansion initiated: IS per nursing protocol      Current Oxygen Requirements: 3L nasal cannula   Current SpO2: 93%    Re-evaluation date: 2/14/19    Patient Education: Explained RCAT process to patient      See \"RT Assessments\" flow sheet for patient assessment scoring and Acuity Level Details.               "

## 2019-02-12 NOTE — PROGRESS NOTES
Patient was seen and examined by me this morning.  68-year-old male with complex medical problems including recent admission for sepsis, pneumonia in a setting of COPD on home oxygen currently presenting with shortness of breath.  CT scan of the chest showed evidence of small pulmonary embolism as well as right lower lung infiltrate concerning for pneumonia.  Patient is currently afebrile but continues to have shortness of breath.  Denies chest pain.  He is currently on broad-spectrum antibiotic with Zosyn and vancomycin.  Patient is well-known to Dr. Stanford of infectious disease service and he would like to be seen by him again.  Plan is we will transition him to subcutaneous Lovenox injections, may need to be on Eliquis on discharge  Will continue antibiotic for now, follow cultures, involve acute disease service to assist with antibiotic treatment plan.  I will get a bilateral lower extremity ultrasound to assess clot burden and treatment as needed.

## 2019-02-12 NOTE — PLAN OF CARE
Tele-Afib CVR HR 83.  SBA.  A&O x4.  Hep gtt 14mL/hr.  3L O2 stating at mid 90's (baseline 2-21/5L). Hep10a 0.67. Pt transitioned to IV Zosyn, scheduled  nebs.  VSS.  Oxy for ribcage pain X2 for relief.  Possible discharge in 2-3 days.  Continue  plan of care.

## 2019-02-12 NOTE — PLAN OF CARE
VSS on 3L O2, baseline O2 2L. Tele: Afib CVR. SBA. Hep gtt off @1000. Pt c/o lower ribcage pain and cough Oxycodone given x2, see MAR. Continue POC.

## 2019-02-12 NOTE — PROGRESS NOTES
[facility]  Hospitalist Progress Note  Jon Rocha MD 02/12/2019    Reason for Stay (Diagnosis): Acute on chronic hypoxic respiratory failure         Assessment and Plan:      Summary of Stay:   Tone Cooper is a 68-year-old gentleman with past medical history including acute on chronic hypoxic respiratory failure, severe sepsis, community-acquired pneumonia, MSSA bacteremia, COPD, atrial fibrillation, hypertension, hyperlipidemia, chronic kidney disease who was discharged from this hospital on 01/08/2019 after he was in the hospital for about 3 weeks.  Patient presents with shortness of breath and hypoxia.  Evaluation in the emergency department revealed evidence of small pulmonary emboli and concern for pneumonia.    Problem List:   1. Acute on chronic respiratory failure: Hypoxic on home oxygen and multiple comorbidities including recent admission for pneumonia sepsis  --Currently stable, continue oxygen supplementation, anticoagulation antibiotic  2. Pulmonary embolism: On heparin, stable.  Change heparin to Lovenox injections, will get bilateral lower extremity ultrasound to evaluate for clot burden if he needs IVC filter    3. CT scan evidence of consolidation/infiltrate in right lower base, doubt this is new pneumonia.  Patient is already on Zosyn and azithromycin.  He is afebrile, no evidence of sepsis.  I will get infectious disease physician to assist with antibiotic management.    4. Atrial fibrillation, rate controlled.  Patient is on heparin now, will change to Lovenox and may start home Eliquis on discharge    DVT Prophylaxis: Enoxaparin (Lovenox) SQ  Code Status: Full Code  Discharge Dispo: home   Estimated Disch Date / # of Days until Disch: May discharge in the next 2 days if he continues to do well        Interval History (Subjective):      Patient was seen and examined by me this morning.  68-year-old male with complex medical problems including recent admission for sepsis,  pneumonia in a setting of COPD on home oxygen currently presenting with shortness of breath.  CT scan of the chest showed evidence of small pulmonary embolism as well as right lower lung infiltrate concerning for pneumonia.  Patient is currently afebrile but continues to have shortness of breath.  Denies chest pain.  He is currently on broad-spectrum antibiotic with Zosyn and vancomycin.  Patient is well-known to Dr. Stanford of infectious disease service and he would like to be seen by him again.                      Physical Exam:      Last Vital Signs:  BP (!) 140/93 (BP Location: Right arm)   Pulse 74   Temp 95.1  F (35.1  C) (Axillary)   Resp 18   Wt 86 kg (189 lb 9.5 oz)   SpO2 92%   BMI 27.20 kg/m        Intake/Output Summary (Last 24 hours) at 2/12/2019 1503  Last data filed at 2/12/2019 1437  Gross per 24 hour   Intake 750 ml   Output 750 ml   Net 0 ml       Constitutional: Awake, alert, cooperative, no apparent distress   Respiratory:  no increased work of breathing, decreased air entry   Cardiovascular:  Irregularly irregular   Abdomen:  Soft, nontender   Skin: No rashes, no cyanosis, dry to touch   Neuro: Alert and oriented x3, no weakness, numbness, memory loss   Extremities: No edema, normal range of motion   Other(s):        All other systems: Negative          Medications:      All current medications were reviewed with changes reflected in problem list.         Data:      All new lab and imaging data was reviewed.   Labs:  All laboratory and imaging data in the past 24 hours reviewed     Recent Labs   Lab 02/12/19  0642 02/11/19  1209   WBC 7.6 15.1*   HGB 13.9 16.9   HCT 43.2 51.3   MCV 94 93   * 118*     Recent Labs   Lab 02/11/19  1256 02/11/19  1244   CULT No growth after 16 hours No growth after 16 hours     Recent Labs   Lab 02/11/19  1209      POTASSIUM 4.8   CHLORIDE 104   CO2 26   ANIONGAP 9   *   BUN 32*   CR 1.11   GFRESTIMATED 68   GFRESTBLACK 79   WILBERT 9.6        Recent Labs   Lab 02/11/19  1209   *           No results for input(s): INR in the last 168 hours.        Recent Labs   Lab 02/11/19  1209   TROPI 0.017       Recent Results (from the past 48 hour(s))   Chest XR,  PA & LAT    Narrative    XR CHEST 2 VW 2/11/2019 12:59 PM    HISTORY: Shortness of breath, history of pneumonia      Impression    IMPRESSION: Mild residual infiltrate or atelectasis in the right lung  base as well as some focal consolidation or nodularity which is less  prominent compared to previous exam.    DIONISIO PHAN MD   CT Chest Pulmonary Embolism w Contrast   Result Value    Radiologist flags Pulmonary embolism (AA)    Narrative    CT CHEST PULMONARY EMBOLISM WITH CONTRAST   2/11/2019 2:29 PM    HISTORY: Dyspnea.    TECHNIQUE: Pulmonary embolism protocol was performed. 69 mL Isovue-370  were injected IV. After contrast injection, volumetric helical  acquisition was performed from the thoracic inlet through the upper  abdomen. Radiation dose for this scan was reduced using automated  exposure control, adjustment of the mA and/or kV according to patient  size, or iterative reconstruction technique.    COMPARISON: Chest CT performed 1/4/2019.    FINDINGS: There is a focal area of nonocclusive pulmonary embolism in  the right upper lobe anteriorly (series 4 image 54). No other areas of  pulmonary embolism are identified. The thoracic aorta is of normal  caliber, without evidence for thoracic aortic aneurysm or dissection.  Mild atherosclerotic aortoiliac calcification. No pleural or  pericardial effusions. Sternotomy. No enlarged lymph nodes are  identified in the chest. Extensive emphysematous changes are noted  throughout both lungs. Focal area of consolidation at the right lung  base posteriorly is suspicious for pneumonia. Previously noted areas  of consolidation and infiltrate in the right lung have otherwise  largely resolved. Prior cholecystectomy. Limited views of the  upper  abdomen are unremarkable. Mild anterior compression of the L1  vertebral body is unchanged.       Impression    IMPRESSION:   1. The exam is positive for a small area of nonocclusive pulmonary  embolism in the right upper lobe.  2. Small area of consolidation at the right lung base posteriorly is  suspicious for pneumonia.  3. Emphysema.     [Critical Result: Pulmonary embolism]    Finding was identified on 2/11/2019 2:35 PM.     Dr. Ngo was contacted by me on 2/11/2019 2:44 PM and verbalized  understanding of the critical result.     LANI CHACON MD   US Lower Extremity Venous Duplex Bilateral    Narrative    ULTRASOUND LOWER EXTREMITY VENOUS DUPLEX BILATERAL 2/12/2019 10:32 AM    HISTORY:  Pulmonary embolism.    TECHNIQUE:  Venous Doppler US.?Color flow and spectral Doppler with  waveform analysis performed.      Impression    IMPRESSION:  No evidence of lower extremity deep venous thrombosis.    JESSICA TOLLIVER MD

## 2019-02-12 NOTE — CONSULTS
[facility]    Infectious Disease Consultation     Date of Admission:  2/11/2019  Date of Consult (When I saw the patient): 02/12/19    Assessment & Plan   Tone Cooper is a 68 year old male who was admitted on 2/11/2019.     Impression:  1.  A 68-year-old male with 4th hospitalization in the last month with hypoxic respiratory failure, found to have a new PE in right Upper lobe.   2.  Recent admission for Staph aureus bacteremia, only 1 of 2 cultures positive.  Suspect this is spillover from pneumonia, treated with 4 weeks of IV antibiotics and then discharged on oral Augmentin. He was still taking oral Augmentin.   3. 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 Given this he was aggressively treated with a longer course of IV antibiotics.   4. CT scan this occasion shows improving prior infection in the right lower lobe.        RECOMMENDATIONS:     1. For now IV zosyn.   2. Follow up blood cultures. If negative, can be switched back to oral augmentin soon.   3. No history of MRSA, stop Vancomycin.         Anna Pettit MD    Reason for Consult   Reason for consult: I was asked by Dr. Rocha to evaluate this patient for possible pneumonia.    Primary Care Physician   Ana Pratt    Chief Complaint   Shortness of breath    History is obtained from the patient and medical records    History of Present Illness   From HPI:   Tone Cooper is a 68-year-old gentleman with past medical history including acute on chronic hypoxic respiratory failure, severe sepsis, community-acquired pneumonia, MSSA bacteremia, COPD, atrial fibrillation, hypertension, hyperlipidemia, chronic kidney disease who was discharged from this hospital on 01/08/2019 after he was in the hospital for about 3 weeks.  At the time, the patient was found to have MSSA bacteremia complicated by suspected pulmonary abscess and community-acquired  pneumonia.  He was on IV Ancef for 4 weeks and transitioned to Augmentin for an additional 4 weeks.  Currently, the patient is taking his Augmentin.  The patient also had hemoptysis during his previous visits and he was taken off Eliquis and currently not on anticoagulation.  The patient has followup with his primary care provider and also he is supposed to see a pulmonologist tomorrow, but the appointment was canceled as the patient was admitted to the hospital.      The patient has been persistently short of breath after discharge and has been on home oxygen 2 liters.     He was found to have a PE and possible pneumonia in the right lung base.     Below is Dr. Stanford note from last hospitalization:   1.  A 67-year-old male with third hospitalization in the last month with hypoxic respiratory failure, probable pneumonia, Staph aureus in recent sputum and now Staph aureus in the blood.   2.  Staph aureus bacteremia, only 1 of 2 cultures positive.  Suspect this is spillover from pneumonia, but certainly at risk including a tricuspid valve.   3.  History of atrial fibrillation with rapid ventricular response.   4.  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   5 New hemoptysis     RECOMMENDATIONS:     1.  MSSA also in sputum,  other cultures without any other growth,  On  Ancef.  He will need an extended IV course here simply for the positive blood culture,  Given  the tricuspid valve and the hip, probably a full 4 week course of therapy.   2.  12/19 cx  negative, midline in  3. Orders for IV antibiotics in already . Day 22/28  4 Some hemoptysis wo other sign infection new, incl procal 0, CT with new areas, ?, bronch reasonable but obviously problem here with no pulm med available, discussed with Dr Boo who has discussed with pulm , concern raised about ? Evolving lung abscess, assuming staph already 3 weeks tx, would complete  the planned IV course in 1 week then 4 weeks po augmentin with pulm, me and imaging Follow-up OK disposition              Past Medical History   I have reviewed this patient's medical history and updated it with pertinent information if needed.   Past Medical History:   Diagnosis Date     Atrial flutter (H)      COPD (chronic obstructive pulmonary disease) (H)      Diverticulitis      Hypertension      Persistent atrial fibrillation (H) 4/28/09    ablation 7/1/2015     Premature beats      PVC (premature ventricular contraction)     s/p ablation 12/5/2017     Tricuspid valve disorder     Dr Aj, Hx of valve repair      Ventricular tachycardia (H)     nonsustained       Past Surgical History   I have reviewed this patient's surgical history and updated it with pertinent information if needed.  Past Surgical History:   Procedure Laterality Date     ABDOMEN SURGERY      hernia repair right     APPENDECTOMY       CARDIOVERSION  06/03/2009    successful for afib     CARDIOVERSION  4/20/15    successful for afib     CARDIOVERSION  5/28/15     H ABLATION ATRIAL FLUTTER       H ABLATION FOCAL AFIB  7/1/15    Left atrial linear ablation and pulmonary vein antrum ablation     H ABLATION PVC  12/5/16     INCISION AND DRAINAGE LOWER EXTREMITY, COMBINED Right 11/10/2016    Procedure: COMBINED INCISION AND DRAINAGE LOWER EXTREMITY;  Surgeon: Roger Pacheco MD;  Location: [facility] OR     LAPAROSCOPIC CHOLECYSTECTOMY  1/6/2012    Procedure:LAPAROSCOPIC CHOLECYSTECTOMY; LAPAROSCOPIC CHOLECYSTECTOMY ; Surgeon:ROGER PACHECO; Location:[facility] OR     ORTHOPEDIC SURGERY      Left hip replacement     REPAIR VALVE TRICUSPID  9/8/08    conversion to a bileaflet valve and application of a 30 mm Sanderson ring     SMALL INTESTINE SURGERY      had bowel obstruction age 9     VALVE REPLACEMENT      tricuspid       Prior to Admission Medications   Prior to Admission Medications   Prescriptions Last Dose Informant Patient Reported? Taking?    albuterol (VENTOLIN HFA) 108 (90 Base) MCG/ACT inhaler   No No   Sig: Inhale 1-2 puffs into the lungs every 4 hours (while awake) PRN   Patient not taking: Reported on 2019   amoxicillin-clavulanate (AUGMENTIN) 875-125 MG tablet 2019 at x1  No Yes   Sig: Take 1 tablet by mouth 2 times daily for 28 days   diltiazem ER COATED BEADS (CARDIZEM CD/CARTIA XT) 180 MG 24 hr capsule 2019 at x1  Yes Yes   Sig: Take 180 mg by mouth 2 times daily   furosemide (LASIX) 20 MG tablet 2019 at x1  Yes Yes   Sig: Take 20 mg by mouth 2 times daily   ipratropium - albuterol 0.5 mg/2.5 mg/3 mL (DUONEB) 0.5-2.5 (3) MG/3ML neb solution   Yes Yes   Sig: Take 1 vial by nebulization every 6 hours as needed for shortness of breath / dyspnea or wheezing   levalbuterol (XOPENEX) 1.25 MG/3ML neb solution   No Yes   Sig: Take 3 mLs (1.25 mg) by nebulization every 4 hours as needed for wheezing or shortness of breath / dyspnea   order for DME   No No   Sig: Oxygen for nocturnal use. 1 LPM via nasal cannula  Testing done at home in chronic stable state.  Condition is COPD.  Patient does not have Obstructive Sleep Apnea.  Overnight oximetry revealed 30.3 minutes of hypoxia (<= 88%).  Duration: Lifetime (99 months).   order for DME   No No   Sig: Equipment being ordered: Nebulizer   oxyCODONE (OXY-IR) 5 MG capsule   Yes Yes   Sig: Take 5 mg by mouth every 4 hours as needed for severe pain   predniSONE (DELTASONE) 20 MG tablet 2019 at 10 mg  No Yes   Si mg/day x 1 week then   10 mg /day  x 1 week, further plan per PCP / pulmonology  Please dispense 20 tabs.   propafenone (RYTHMOL) 225 MG tablet 2019 at x1  No Yes   Sig: Take 1 tablet (225 mg) by mouth every 8 hours   tamsulosin (FLOMAX) 0.4 MG capsule Past Month at Unknown time  Yes Yes   Sig: Take 0.4 mg by mouth daily   traZODone (DESYREL) 50 MG tablet 2/10/2019 at Unknown time  Yes Yes   Sig: Take 50 mg by mouth At Bedtime       Facility-Administered Medications:  None     Allergies   Allergies   Allergen Reactions     Amlodipine Swelling     Flecainide Palpitations       Immunization History   Immunization History   Administered Date(s) Administered     Influenza (High Dose) 3 valent vaccine 10/07/2018       Social History   I have reviewed this patient's social history and updated it with pertinent information if needed. Tone Cooper  reports that he quit smoking about 11 years ago. he has never used smokeless tobacco. He reports that he drinks alcohol. He reports that he does not use drugs.    Family History   I have reviewed this patient's family history and updated it with pertinent information if needed.   Family History   Problem Relation Age of Onset     Prostate Cancer Father      Family History Negative Mother      Emphysema Mother      Hypertension Mother      Cerebrovascular Disease Mother        Review of Systems   The 10 point Review of Systems is negative other than noted in the HPI or here.     Physical Exam   Temp: 95.1  F (35.1  C) Temp src: Axillary BP: (!) 140/93 Pulse: 74 Heart Rate: 82 Resp: 18 SpO2: 94 % O2 Device: Nasal cannula Oxygen Delivery: 3 LPM  Vital Signs with Ranges  Temp:  [95.1  F (35.1  C)-99.3  F (37.4  C)] 95.1  F (35.1  C)  Pulse:  [] 74  Heart Rate:  [] 82  Resp:  [12-29] 18  BP: (100-145)/() 140/93  SpO2:  [91 %-97 %] 94 %  189 lbs 6.4 oz  Body mass index is 27.18 kg/m .    GENERAL APPEARANCE: short of breath   EYES: Eyes grossly normal to inspection, PERRL and conjunctivae and sclerae normal  HENT: ear canals and TM's normal and nose and mouth without ulcers or lesions  NECK: no adenopathy, no asymmetry, masses, or scars and thyroid normal to palpation  RESP: wheezy  CV: regular rates and rhythm, normal S1 S2, no S3 or S4 and no murmur, click or rub  LYMPHATICS: normal ant/post cervical and supraclavicular nodes  ABDOMEN: soft, nontender, without hepatosplenomegaly or masses and bowel sounds normal  MS: extremities  normal- no gross deformities noted  SKIN: no suspicious lesions or rashes      Data   Lab Results   Component Value Date    WBC 7.6 02/12/2019    HGB 13.9 02/12/2019    HCT 43.2 02/12/2019     (L) 02/12/2019     02/11/2019    POTASSIUM 4.8 02/11/2019    CHLORIDE 104 02/11/2019    CO2 26 02/11/2019    BUN 32 (H) 02/11/2019    CR 1.11 02/11/2019     (H) 02/11/2019    DD 1.6 (H) 02/11/2019    NTBNPI 1,619 (H) 02/11/2019    NTBNP 69 05/23/2007    TROPONIN 0.01 08/23/2016    TROPI 0.017 02/11/2019    AST 23 01/04/2019    ALT 22 01/04/2019    ALKPHOS 45 01/04/2019    BILITOTAL 0.6 01/04/2019    INR 1.48 (H) 06/09/2015     Recent Labs   Lab 02/11/19  1256 02/11/19  1244   CULT No growth after 16 hours No growth after 16 hours     Recent Labs   Lab Test 02/11/19  1256 02/11/19  1244 01/05/19  1545 12/19/18  1023 12/19/18  1013 12/18/18  1027 12/17/18  1518 12/17/18  1452 11/25/18  2257   CULT No growth after 16 hours No growth after 16 hours Moderate growth  Normal tushar   No growth No growth Light growth  Normal tushar    Moderate growth  Staphylococcus aureus  * Cultured on the 1st day of incubation:  Staphylococcus aureus  This isolate DOES NOT demonstrate inducible clindamycin resistance in vitro. Clindamycin   is susceptible and could be used when indicated, however, erythromycin is resistant and   should not be used.  *  Critical Value/Significant Value, preliminary result only, called to and read back by  Laure SANDOVAL @ 3428 12.18.18 JE    (Note)  POSITIVE for STAPHYLOCOCCUS AUREUS and NEGATIVE for the mecA gene  (not MRSA) by Prepairigene multiplex nucleic acid test. The mecA gene was  not detected. Final identification and antimicrobial susceptibility  testing will be verified by standard methods.    Specimen tested with Verigene multiplex, gram-positive blood culture  nucleic acid test for the following targets: Staph aureus, Staph  epidermidis, Staph lugdunensis, other Staph species,  Enterococcus  faecalis, Enterococcus faecium, Streptococcus species, S. agalactiae,  S. anginosus grp., S. pneumoniae, S. pyogenes, Listeria sp., mecA  (methicillin resistance) and Steven/B (vancomycin resistance).    Critical Value/Significant Value called to and read back by Laure Mathews RN [facility] 5322 12.18.18 PATSY.   No growth Heavy growth  Normal tushar    Moderate growth  Staphylococcus aureus  This isolate DOES NOT demonstrate inducible clindamycin resistance in vitro. Clindamycin   is susceptible and could be used when indicated, however, erythromycin is resistant and   should not be used.  *

## 2019-02-13 LAB
ANION GAP SERPL CALCULATED.3IONS-SCNC: 8 MMOL/L (ref 3–14)
BUN SERPL-MCNC: 31 MG/DL (ref 7–30)
CALCIUM SERPL-MCNC: 9.1 MG/DL (ref 8.5–10.1)
CHLORIDE SERPL-SCNC: 99 MMOL/L (ref 94–109)
CO2 SERPL-SCNC: 28 MMOL/L (ref 20–32)
CREAT SERPL-MCNC: 1.09 MG/DL (ref 0.66–1.25)
ERYTHROCYTE [DISTWIDTH] IN BLOOD BY AUTOMATED COUNT: 15.1 % (ref 10–15)
GFR SERPL CREATININE-BSD FRML MDRD: 70 ML/MIN/{1.73_M2}
GLUCOSE SERPL-MCNC: 175 MG/DL (ref 70–99)
HCT VFR BLD AUTO: 43.2 % (ref 40–53)
HGB BLD-MCNC: 13.9 G/DL (ref 13.3–17.7)
MCH RBC QN AUTO: 30.7 PG (ref 26.5–33)
MCHC RBC AUTO-ENTMCNC: 32.2 G/DL (ref 31.5–36.5)
MCV RBC AUTO: 95 FL (ref 78–100)
PLATELET # BLD AUTO: 127 10E9/L (ref 150–450)
POTASSIUM SERPL-SCNC: 4.6 MMOL/L (ref 3.4–5.3)
RBC # BLD AUTO: 4.53 10E12/L (ref 4.4–5.9)
SODIUM SERPL-SCNC: 135 MMOL/L (ref 133–144)
WBC # BLD AUTO: 12.3 10E9/L (ref 4–11)

## 2019-02-13 PROCEDURE — 99232 SBSQ HOSP IP/OBS MODERATE 35: CPT | Performed by: INTERNAL MEDICINE

## 2019-02-13 PROCEDURE — 85027 COMPLETE CBC AUTOMATED: CPT | Performed by: INTERNAL MEDICINE

## 2019-02-13 PROCEDURE — 36415 COLL VENOUS BLD VENIPUNCTURE: CPT | Performed by: INTERNAL MEDICINE

## 2019-02-13 PROCEDURE — 94640 AIRWAY INHALATION TREATMENT: CPT

## 2019-02-13 PROCEDURE — 12000000 ZZH R&B MED SURG/OB

## 2019-02-13 PROCEDURE — 40000275 ZZH STATISTIC RCP TIME EA 10 MIN

## 2019-02-13 PROCEDURE — 25000125 ZZHC RX 250: Performed by: INTERNAL MEDICINE

## 2019-02-13 PROCEDURE — 80048 BASIC METABOLIC PNL TOTAL CA: CPT | Performed by: INTERNAL MEDICINE

## 2019-02-13 PROCEDURE — 25000132 ZZH RX MED GY IP 250 OP 250 PS 637: Performed by: INTERNAL MEDICINE

## 2019-02-13 PROCEDURE — 25000128 H RX IP 250 OP 636: Performed by: INTERNAL MEDICINE

## 2019-02-13 PROCEDURE — 94640 AIRWAY INHALATION TREATMENT: CPT | Mod: 76

## 2019-02-13 RX ORDER — PREDNISONE 10 MG/1
TABLET ORAL
Qty: 20 TABLET | Refills: 0 | Status: ON HOLD | OUTPATIENT
Start: 2019-02-13 | End: 2019-02-28

## 2019-02-13 RX ADMIN — IPRATROPIUM BROMIDE AND ALBUTEROL SULFATE 3 ML: .5; 3 SOLUTION RESPIRATORY (INHALATION) at 15:48

## 2019-02-13 RX ADMIN — OXYCODONE HYDROCHLORIDE 10 MG: 5 TABLET ORAL at 15:56

## 2019-02-13 RX ADMIN — FUROSEMIDE 20 MG: 20 TABLET ORAL at 07:49

## 2019-02-13 RX ADMIN — ENOXAPARIN SODIUM 90 MG: 100 INJECTION SUBCUTANEOUS at 00:37

## 2019-02-13 RX ADMIN — OXYCODONE HYDROCHLORIDE 10 MG: 5 TABLET ORAL at 03:48

## 2019-02-13 RX ADMIN — OXYCODONE HYDROCHLORIDE 10 MG: 5 TABLET ORAL at 12:31

## 2019-02-13 RX ADMIN — PROPAFENONE HYDROCHLORIDE 150 MG: 150 TABLET, COATED ORAL at 20:41

## 2019-02-13 RX ADMIN — TAMSULOSIN HYDROCHLORIDE 0.4 MG: 0.4 CAPSULE ORAL at 07:49

## 2019-02-13 RX ADMIN — PROPAFENONE HYDROCHLORIDE 150 MG: 150 TABLET, COATED ORAL at 06:15

## 2019-02-13 RX ADMIN — DILTIAZEM HYDROCHLORIDE 180 MG: 180 CAPSULE, COATED, EXTENDED RELEASE ORAL at 07:49

## 2019-02-13 RX ADMIN — Medication 75 MG: at 14:03

## 2019-02-13 RX ADMIN — OXYCODONE HYDROCHLORIDE 10 MG: 5 TABLET ORAL at 00:40

## 2019-02-13 RX ADMIN — TAZOBACTAM SODIUM AND PIPERACILLIN SODIUM 3.38 G: 375; 3 INJECTION, SOLUTION INTRAVENOUS at 09:08

## 2019-02-13 RX ADMIN — TAZOBACTAM SODIUM AND PIPERACILLIN SODIUM 3.38 G: 375; 3 INJECTION, SOLUTION INTRAVENOUS at 14:36

## 2019-02-13 RX ADMIN — TAZOBACTAM SODIUM AND PIPERACILLIN SODIUM 3.38 G: 375; 3 INJECTION, SOLUTION INTRAVENOUS at 20:29

## 2019-02-13 RX ADMIN — IPRATROPIUM BROMIDE AND ALBUTEROL SULFATE 3 ML: .5; 3 SOLUTION RESPIRATORY (INHALATION) at 07:30

## 2019-02-13 RX ADMIN — IPRATROPIUM BROMIDE AND ALBUTEROL SULFATE 3 ML: .5; 3 SOLUTION RESPIRATORY (INHALATION) at 11:13

## 2019-02-13 RX ADMIN — OXYCODONE HYDROCHLORIDE 10 MG: 5 TABLET ORAL at 07:57

## 2019-02-13 RX ADMIN — PROPAFENONE HYDROCHLORIDE 150 MG: 150 TABLET, COATED ORAL at 13:57

## 2019-02-13 RX ADMIN — DILTIAZEM HYDROCHLORIDE 180 MG: 180 CAPSULE, COATED, EXTENDED RELEASE ORAL at 20:26

## 2019-02-13 RX ADMIN — OXYCODONE HYDROCHLORIDE 10 MG: 5 TABLET ORAL at 20:41

## 2019-02-13 RX ADMIN — FUROSEMIDE 20 MG: 20 TABLET ORAL at 18:25

## 2019-02-13 RX ADMIN — TAZOBACTAM SODIUM AND PIPERACILLIN SODIUM 3.38 G: 375; 3 INJECTION, SOLUTION INTRAVENOUS at 03:48

## 2019-02-13 RX ADMIN — TRAZODONE HYDROCHLORIDE 50 MG: 50 TABLET ORAL at 20:40

## 2019-02-13 RX ADMIN — PREDNISONE 40 MG: 20 TABLET ORAL at 07:49

## 2019-02-13 RX ADMIN — IPRATROPIUM BROMIDE AND ALBUTEROL SULFATE 3 ML: .5; 3 SOLUTION RESPIRATORY (INHALATION) at 20:43

## 2019-02-13 RX ADMIN — ENOXAPARIN SODIUM 90 MG: 100 INJECTION SUBCUTANEOUS at 12:24

## 2019-02-13 RX ADMIN — IPRATROPIUM BROMIDE AND ALBUTEROL SULFATE 3 ML: .5; 3 SOLUTION RESPIRATORY (INHALATION) at 04:29

## 2019-02-13 RX ADMIN — Medication 75 MG: at 06:14

## 2019-02-13 RX ADMIN — Medication 75 MG: at 21:01

## 2019-02-13 ASSESSMENT — ACTIVITIES OF DAILY LIVING (ADL)
ADLS_ACUITY_SCORE: 14

## 2019-02-13 NOTE — PLAN OF CARE
On lovenox for tx of pe  Per patient breathing is improved  92% 3 liters  Does deep breath--declined the IS--

## 2019-02-13 NOTE — PROGRESS NOTES
[facility]  Hospitalist Progress Note  Jon Rocha MD 02/13/2019    Reason for Stay (Diagnosis): Acute on chronic hypoxic respiratory failure         Assessment and Plan:      Summary of Stay:   Tone Cooper is a 68-year-old gentleman with past medical history including acute on chronic hypoxic respiratory failure, severe sepsis, community-acquired pneumonia, MSSA bacteremia, COPD, atrial fibrillation, hypertension, hyperlipidemia, chronic kidney disease who was discharged from this hospital on 01/08/2019 after he was in the hospital for about 3 weeks.  Patient presents with shortness of breath and hypoxia.  Evaluation in the emergency department revealed evidence of small pulmonary emboli and concern for pneumonia.    Problem List:   1. Acute on chronic respiratory failure: Hypoxic on home oxygen and multiple comorbidities including recent admission for pneumonia sepsis  --Currently stable, continue oxygen supplementation, anticoagulation   --Continue antibiotic per ID physician.  Additional day of IV Zosyn was recommended.  Continue to monitor cultures.  2. Pulmonary embolism: Patient is currently on subcutaneous Lovenox injections, will start Eliquis, continue to monitor.      3. CT scan evidence of consolidation/infiltrate in right lower base, doubt this is new pneumonia.  Patient is already on Zosyn and azithromycin.  He is afebrile, no evidence of sepsis.  Infectious disease physician consulted and recommendation noted.  Patient is currently on Zosyn.    4. Atrial fibrillation, rate controlled.  Patient is to start Eliquis     DVT Prophylaxis: Enoxaparin (Lovenox) SQ  Code Status: Full Code  Discharge Dispo: home   Estimated Disch Date / # of Days until Disch: Patient is  improving, discharge may be in the next 1 or 2 days if okay with infectious disease service.  l        Interval History (Subjective):      Patient is doing well this morning.  No fever chest pain or shortness of breath or  chest pain.  He wants to go home.  I discussed with him the need to continue IV antibiotic per recommendation by infectious physician.                  Physical Exam:      Last Vital Signs:  /79 (BP Location: Right arm)   Pulse 61   Temp 95.1  F (35.1  C) (Axillary)   Resp 18   Wt 87.6 kg (193 lb 3.2 oz)   SpO2 95%   BMI 27.72 kg/m              Constitutional: Awake, alert, cooperative, no apparent distress   Respiratory:  no increased work of breathing, decreased air entry   Cardiovascular:  Irregularly irregular   Abdomen:  Soft, nontender   Skin: No rashes, no cyanosis, dry to touch   Neuro: Alert and oriented x3, no weakness, numbness, memory loss   Extremities: No edema, normal range of motion   Other(s):        All other systems: Negative          Medications:      All current medications were reviewed with changes reflected in problem list.         Data:      All new lab and imaging data was reviewed.   Labs:  All laboratory and imaging data in the past 24 hours reviewed     Recent Labs   Lab 02/13/19  0634 02/12/19  0642 02/11/19  1209   WBC 12.3* 7.6 15.1*   HGB 13.9 13.9 16.9   HCT 43.2 43.2 51.3   MCV 95 94 93   * 110* 118*     Recent Labs   Lab 02/11/19  1256 02/11/19  1244   CULT No growth after 2 days No growth after 2 days     Recent Labs   Lab 02/13/19  0634 02/11/19  1209    139   POTASSIUM 4.6 4.8   CHLORIDE 99 104   CO2 28 26   ANIONGAP 8 9   * 139*   BUN 31* 32*   CR 1.09 1.11   GFRESTIMATED 70 68   GFRESTBLACK 81 79   WILBERT 9.1 9.6       Recent Labs   Lab 02/13/19  0634 02/11/19  1209   * 139*           No results for input(s): INR in the last 168 hours.        Recent Labs   Lab 02/11/19  1209   TROPI 0.017       Recent Results (from the past 48 hour(s))   Chest XR,  PA & LAT    Narrative    XR CHEST 2 VW 2/11/2019 12:59 PM    HISTORY: Shortness of breath, history of pneumonia      Impression    IMPRESSION: Mild residual infiltrate or atelectasis in the right  lung  base as well as some focal consolidation or nodularity which is less  prominent compared to previous exam.    DIONISIO PHAN MD   CT Chest Pulmonary Embolism w Contrast   Result Value    Radiologist flags Pulmonary embolism (AA)    Narrative    CT CHEST PULMONARY EMBOLISM WITH CONTRAST   2/11/2019 2:29 PM    HISTORY: Dyspnea.    TECHNIQUE: Pulmonary embolism protocol was performed. 69 mL Isovue-370  were injected IV. After contrast injection, volumetric helical  acquisition was performed from the thoracic inlet through the upper  abdomen. Radiation dose for this scan was reduced using automated  exposure control, adjustment of the mA and/or kV according to patient  size, or iterative reconstruction technique.    COMPARISON: Chest CT performed 1/4/2019.    FINDINGS: There is a focal area of nonocclusive pulmonary embolism in  the right upper lobe anteriorly (series 4 image 54). No other areas of  pulmonary embolism are identified. The thoracic aorta is of normal  caliber, without evidence for thoracic aortic aneurysm or dissection.  Mild atherosclerotic aortoiliac calcification. No pleural or  pericardial effusions. Sternotomy. No enlarged lymph nodes are  identified in the chest. Extensive emphysematous changes are noted  throughout both lungs. Focal area of consolidation at the right lung  base posteriorly is suspicious for pneumonia. Previously noted areas  of consolidation and infiltrate in the right lung have otherwise  largely resolved. Prior cholecystectomy. Limited views of the upper  abdomen are unremarkable. Mild anterior compression of the L1  vertebral body is unchanged.       Impression    IMPRESSION:   1. The exam is positive for a small area of nonocclusive pulmonary  embolism in the right upper lobe.  2. Small area of consolidation at the right lung base posteriorly is  suspicious for pneumonia.  3. Emphysema.     [Critical Result: Pulmonary embolism]    Finding was identified on 2/11/2019 2:35  PM.     Dr. Ngo was contacted by me on 2/11/2019 2:44 PM and verbalized  understanding of the critical result.     LANI CHACON MD   US Lower Extremity Venous Duplex Bilateral    Narrative    ULTRASOUND LOWER EXTREMITY VENOUS DUPLEX BILATERAL 2/12/2019 10:32 AM    HISTORY:  Pulmonary embolism.    TECHNIQUE:  Venous Doppler US.?Color flow and spectral Doppler with  waveform analysis performed.      Impression    IMPRESSION:  No evidence of lower extremity deep venous thrombosis.    JESSICA TOLLIVER MD

## 2019-02-13 NOTE — PLAN OF CARE
A&O. BP elevated, 98% on 3L O2, other VSS. Tele SR with PACs. C/o 8/10 R rib cage pain, PRN oxycodone given x2. LS diminished, expiratory wheezes. On lovenox, on IV zosyn. Up with SBA. Continue with POC.    /81 (BP Location: Right arm)   Pulse 61   Temp 95.1  F (35.1  C) (Axillary)   Resp 18   Wt 86 kg (189 lb 9.5 oz)   SpO2 91%   BMI 27.20 kg/m

## 2019-02-13 NOTE — PLAN OF CARE
Pt A/OX4, VSS, afebrile, on 3LO2 baseline- sats 93-96%, c/o pain 7-8/10- PRN oxy given x2 with relief, Tele: Afib CVR (HR 70s), continue IV zosyn q6h, ID following, LS- dim/expiratory wheezes, ERICKSON, up SBA, urinal voids at bedside, 2gm NA diet, calls appropriately, continue POC.

## 2019-02-13 NOTE — PROGRESS NOTES
Patient was seen and examined by me this morning.  He is feeling much better today.  Close to his baseline and would like to go home.  May discharge today on oral antibiotic and he is home Eliquis and follow-up with his primary care provider.  I am waiting for infectious disease physician to round for oral antibiotic recommendation on discharge.

## 2019-02-13 NOTE — CONSULTS
Care Transition Initial Assessment - RN        Met with: Patient.  DATA   Active Problems:    Pulmonary embolism (H)       Cognitive Status: alert and oriented.  Primary Care Clinic Name: Dr Pratt   Primary Care MD Name: Ish Guerra  Contact information and PCP information verified: Yes  Lives With: spouse   Living Arrangements: house  Quality of Family Relationships: helpful, involved, supportive  Description of Support System: Supportive, Involved   Who is your support system?: Wife   Support Assessment: Adequate family and caregiver support   Insurance concerns: No Insurance issues identified and Other Pt has questions about payment plan, business office called  ASSESSMENT  Patient currently receives the following services:  Home Oxygen through [facility] at 3L [place].     CTS following for elevated CALEB score and frequent readmissions. Pt was last admitted from 12/17-1/8 for PNA and MSSA bacteremia. This is his 4th admit in the last 6 months. Met with pt at bedside to discuss plan of care. Pt now readmitted with PE and PNA from his clinic. Pt states he was at an appt with Dr Pratt and she sent him to the hospital. Plan for him to discharge home today. He has been followed by ID in hospital and follows closely with Dr Stanford as outpatient. He has an appt with Dr Stanford on 2/19. He lives at home with his wife and daughter. He does not currently have any home services and does not anticipate any discharge needs. He has questions about his billing process. Business office called and spoke to pt at bedside. No further needs at this time.       Identified issues/concerns regarding health management: no gaps in care identified    PLAN  Patient/family is agreeable to the plan?  Yes  Patient anticipates discharging to home .        Patient anticipates needs for home equipment: No  Plan/Disposition: Home   Appointments: pt will make his own PCP appt according to his discharge recommendations. He has a follow up  appt with Dr Stanford on 2/19 at 11:00.      Care  (CTS) will continue to follow as needed. Handoff will be sent to CTS for pcp on discharge.    Lana Sofia RN CTS  Care Transitions  [phone]

## 2019-02-14 VITALS
DIASTOLIC BLOOD PRESSURE: 94 MMHG | WEIGHT: 194.3 LBS | SYSTOLIC BLOOD PRESSURE: 155 MMHG | TEMPERATURE: 96.8 F | RESPIRATION RATE: 16 BRPM | HEART RATE: 61 BPM | OXYGEN SATURATION: 94 % | BODY MASS INDEX: 27.88 KG/M2

## 2019-02-14 PROCEDURE — 94640 AIRWAY INHALATION TREATMENT: CPT | Mod: 76

## 2019-02-14 PROCEDURE — 25000128 H RX IP 250 OP 636: Performed by: INTERNAL MEDICINE

## 2019-02-14 PROCEDURE — 99238 HOSP IP/OBS DSCHRG MGMT 30/<: CPT | Performed by: INTERNAL MEDICINE

## 2019-02-14 PROCEDURE — 25000125 ZZHC RX 250: Performed by: INTERNAL MEDICINE

## 2019-02-14 PROCEDURE — 40000275 ZZH STATISTIC RCP TIME EA 10 MIN

## 2019-02-14 PROCEDURE — 94640 AIRWAY INHALATION TREATMENT: CPT

## 2019-02-14 PROCEDURE — 25000132 ZZH RX MED GY IP 250 OP 250 PS 637: Performed by: INTERNAL MEDICINE

## 2019-02-14 RX ADMIN — ENOXAPARIN SODIUM 90 MG: 100 INJECTION SUBCUTANEOUS at 00:42

## 2019-02-14 RX ADMIN — IPRATROPIUM BROMIDE AND ALBUTEROL SULFATE 3 ML: .5; 3 SOLUTION RESPIRATORY (INHALATION) at 09:56

## 2019-02-14 RX ADMIN — Medication 1 MG: at 00:42

## 2019-02-14 RX ADMIN — TAMSULOSIN HYDROCHLORIDE 0.4 MG: 0.4 CAPSULE ORAL at 07:50

## 2019-02-14 RX ADMIN — OXYCODONE HYDROCHLORIDE 10 MG: 5 TABLET ORAL at 00:42

## 2019-02-14 RX ADMIN — TAZOBACTAM SODIUM AND PIPERACILLIN SODIUM 3.38 G: 375; 3 INJECTION, SOLUTION INTRAVENOUS at 02:45

## 2019-02-14 RX ADMIN — Medication 75 MG: at 06:05

## 2019-02-14 RX ADMIN — PREDNISONE 40 MG: 20 TABLET ORAL at 07:49

## 2019-02-14 RX ADMIN — TAZOBACTAM SODIUM AND PIPERACILLIN SODIUM 3.38 G: 375; 3 INJECTION, SOLUTION INTRAVENOUS at 08:49

## 2019-02-14 RX ADMIN — OXYCODONE HYDROCHLORIDE 10 MG: 5 TABLET ORAL at 07:58

## 2019-02-14 RX ADMIN — OXYCODONE HYDROCHLORIDE 10 MG: 5 TABLET ORAL at 04:08

## 2019-02-14 RX ADMIN — FUROSEMIDE 20 MG: 20 TABLET ORAL at 07:50

## 2019-02-14 RX ADMIN — IPRATROPIUM BROMIDE AND ALBUTEROL SULFATE 3 ML: .5; 3 SOLUTION RESPIRATORY (INHALATION) at 08:06

## 2019-02-14 RX ADMIN — PROPAFENONE HYDROCHLORIDE 150 MG: 150 TABLET, COATED ORAL at 06:05

## 2019-02-14 RX ADMIN — DILTIAZEM HYDROCHLORIDE 180 MG: 180 CAPSULE, COATED, EXTENDED RELEASE ORAL at 07:50

## 2019-02-14 ASSESSMENT — ACTIVITIES OF DAILY LIVING (ADL)
ADLS_ACUITY_SCORE: 14

## 2019-02-14 NOTE — PLAN OF CARE
A&O. BP elevated, 94% on 3L NC which is baseline, other VSS. Tele SA with PACs. LS diminished, expiratory wheezes, ERICKSON. C/o 8/10 R rib cage pain, PRN oxycodone given x2. Up with SBA. On IV zosyn. C/o difficulty sleeping, PRN melatonin given. Plan to possibly discharge today, continue with POC.    /87 (BP Location: Right arm)   Pulse 61   Temp 95.5  F (35.3  C) (Oral)   Resp 18   Wt 87.6 kg (193 lb 3.2 oz)   SpO2 94%   BMI 27.72 kg/m

## 2019-02-14 NOTE — PLAN OF CARE
ORIENTATION: A&O  VS: Stable  NEURO: Intact  TELE: A-fib CVR  LUNGS: 93% 3L O2; expiratory wheezes w/fine crackles bilateral bases  : WDL  GI: Last BM 2/13/19  IV: Saline Locked  PAIN: RUQ; Oxy 10mg x 2  ACTIVITY: SBA  DIET: 2 gm Na+ diet  PLAN: Possible discharge 1-2 days w/ok from ID

## 2019-02-14 NOTE — PROGRESS NOTES
Transition Communication Hand-off for Care Transitions to Next Level of Care Provider    Name: Tone Cooper  : 1950  MRN #: [id number]  Primary Care Provider: Ana Pratt  Primary Care MD Name: [facility]  Primary Clinic: [facility] [address] [facility] [id number]  Primary Care Clinic Name: Dr Pratt   Reason for Hospitalization:  Pneumonia [J18.9]  Pulmonary embolism (H) [I26.99]  COPD exacerbation (H) [J44.1]  Admit Date/Time: 2019 11:25 AM  Discharge Date:   Payor Source: Payor: UCARE / Plan: UCARE MEDICARE NON FPA / Product Type: HMO /     Readmission Assessment Measure (CALEB) Risk Score/category: Elevated  Reason for Communication Hand-off Referral: Fragility    Discharge Plan: home with wife and daugter    Discharge Needs Assessment:  Needs      Most Recent Value   Anticipated Changes Related to Illness  none   # of Referrals Placed by CTS  Communication hand-offs to next level of Care Providers          Follow-up plan:    Future Appointments   Date Time Provider Department Center   2019  9:30 AM Madelaine Watkins APRN CNP [facility]       Any outstanding tests or procedures:    Procedures     Future Labs/Procedures    Oxygen Adult     Comments:    Renew Home Oxygen Order  Renew previous prescription.  Expected treatment length is indefinite (99 months).    Attending Provider: Jon Rocha  Physician signature: See electronic signature associated with these discharge orders  Date of Order: 2019          Referrals     Future Labs/Procedures    Medication Therapy Management Referral     Comments:    MTM referral reason            Patient has 5 PTA or Discharge Medications AND one of the following   diagnoses: DM,HF,COPD,AMI DX,PULM HTN       This service is designed to help you get the most from your medications.  A specially trained pharmacist will work closely with you and your doctors  to solve any problems related  to your medications and to help you get the   best results from taking them.      The Medication Therapy Management staff will call you to schedule an appointment.            Key Recommendations:  FYI of hospitalization--    CTS following for elevated CALEB score and frequent readmissions. Pt was last admitted from 12/17-1/8 for PNA and MSSA bacteremia. This is his 4th admit in the last 6 months. Met with pt at bedside to discuss plan of care. Pt now readmitted with PE and PNA from his clinic. He is being treated with zosyn, nebs, oxycodone and lovenox. Pt states he was at an appt with Dr Pratt and she sent him to the hospital. Plan for him to discharge home today. He has been followed by ID in hospital and follows closely with Dr Stanford as outpatient. He has an appt with Dr Stanford on 2/19. New meds on discharge are prednisone taper. MTM referral is in place. He will follow up with [facility] EP on 2/25.      Lana Sofia    AVS/Discharge Summary is the source of truth; this is a helpful guide for improved communication of patient story

## 2019-02-15 ENCOUNTER — TELEPHONE (OUTPATIENT)
Dept: PHARMACY | Facility: OTHER | Age: 69
End: 2019-02-15

## 2019-02-15 NOTE — TELEPHONE ENCOUNTER
MTM referral from: Transitions of Care (recent hospital discharge or ED visit)    MTM referral outreach attempt #1 on February 15, 2019 at 8:18 AM      Outcome: Patient is not interested at this time because they are an [facility] patient, will route to MTM Pharmacist/Provider as an FYI. Thank you for the referral.     Ama Posey, MTM Coordinator

## 2019-02-16 ENCOUNTER — HOSPITAL ENCOUNTER (INPATIENT)
Facility: CLINIC | Age: 69
LOS: 12 days | Discharge: NURSING, REHAB OR LONG-TERM CARE | DRG: 189 | End: 2019-02-28
Attending: EMERGENCY MEDICINE | Admitting: INTERNAL MEDICINE
Payer: COMMERCIAL

## 2019-02-16 ENCOUNTER — APPOINTMENT (OUTPATIENT)
Dept: GENERAL RADIOLOGY | Facility: CLINIC | Age: 69
DRG: 189 | End: 2019-02-16
Attending: EMERGENCY MEDICINE
Payer: COMMERCIAL

## 2019-02-16 ENCOUNTER — APPOINTMENT (OUTPATIENT)
Dept: GENERAL RADIOLOGY | Facility: CLINIC | Age: 69
DRG: 189 | End: 2019-02-16
Attending: STUDENT IN AN ORGANIZED HEALTH CARE EDUCATION/TRAINING PROGRAM
Payer: COMMERCIAL

## 2019-02-16 DIAGNOSIS — M54.9 CHRONIC BACK PAIN, UNSPECIFIED BACK LOCATION, UNSPECIFIED BACK PAIN LATERALITY: Primary | ICD-10-CM

## 2019-02-16 DIAGNOSIS — G89.29 CHRONIC BACK PAIN, UNSPECIFIED BACK LOCATION, UNSPECIFIED BACK PAIN LATERALITY: Primary | ICD-10-CM

## 2019-02-16 DIAGNOSIS — J44.1 COPD EXACERBATION (H): ICD-10-CM

## 2019-02-16 DIAGNOSIS — I48.3 TYPICAL ATRIAL FLUTTER (H): ICD-10-CM

## 2019-02-16 DIAGNOSIS — G47.00 INSOMNIA, UNSPECIFIED TYPE: ICD-10-CM

## 2019-02-16 DIAGNOSIS — I48.91 ATRIAL FIBRILLATION WITH RAPID VENTRICULAR RESPONSE (H): ICD-10-CM

## 2019-02-16 LAB
ANION GAP SERPL CALCULATED.3IONS-SCNC: 6 MMOL/L (ref 3–14)
ANION GAP SERPL CALCULATED.3IONS-SCNC: 8 MMOL/L (ref 3–14)
BASE EXCESS BLDV CALC-SCNC: 5.1 MMOL/L
BASE EXCESS BLDV CALC-SCNC: 5.4 MMOL/L
BASOPHILS # BLD AUTO: 0.1 10E9/L (ref 0–0.2)
BASOPHILS NFR BLD AUTO: 0.5 %
BUN SERPL-MCNC: 32 MG/DL (ref 7–30)
BUN SERPL-MCNC: 35 MG/DL (ref 7–30)
CALCIUM SERPL-MCNC: 9 MG/DL (ref 8.5–10.1)
CALCIUM SERPL-MCNC: 9.7 MG/DL (ref 8.5–10.1)
CHLORIDE SERPL-SCNC: 100 MMOL/L (ref 94–109)
CHLORIDE SERPL-SCNC: 101 MMOL/L (ref 94–109)
CO2 BLDCOV-SCNC: 34 MMOL/L (ref 21–28)
CO2 SERPL-SCNC: 28 MMOL/L (ref 20–32)
CO2 SERPL-SCNC: 33 MMOL/L (ref 20–32)
CREAT SERPL-MCNC: 1.02 MG/DL (ref 0.66–1.25)
CREAT SERPL-MCNC: 1.07 MG/DL (ref 0.66–1.25)
DIFFERENTIAL METHOD BLD: ABNORMAL
EOSINOPHIL # BLD AUTO: 0.1 10E9/L (ref 0–0.7)
EOSINOPHIL NFR BLD AUTO: 0.6 %
ERYTHROCYTE [DISTWIDTH] IN BLOOD BY AUTOMATED COUNT: 15 % (ref 10–15)
ERYTHROCYTE [DISTWIDTH] IN BLOOD BY AUTOMATED COUNT: 15.1 % (ref 10–15)
GFR SERPL CREATININE-BSD FRML MDRD: 71 ML/MIN/{1.73_M2}
GFR SERPL CREATININE-BSD FRML MDRD: 76 ML/MIN/{1.73_M2}
GLUCOSE BLDC GLUCOMTR-MCNC: 210 MG/DL (ref 70–99)
GLUCOSE BLDC GLUCOMTR-MCNC: 250 MG/DL (ref 70–99)
GLUCOSE BLDC GLUCOMTR-MCNC: 307 MG/DL (ref 70–99)
GLUCOSE SERPL-MCNC: 200 MG/DL (ref 70–99)
GLUCOSE SERPL-MCNC: 251 MG/DL (ref 70–99)
HCO3 BLDV-SCNC: 32 MMOL/L (ref 21–28)
HCO3 BLDV-SCNC: 32 MMOL/L (ref 21–28)
HCT VFR BLD AUTO: 45.8 % (ref 40–53)
HCT VFR BLD AUTO: 45.9 % (ref 40–53)
HGB BLD-MCNC: 14.4 G/DL (ref 13.3–17.7)
HGB BLD-MCNC: 14.8 G/DL (ref 13.3–17.7)
IMM GRANULOCYTES # BLD: 0.2 10E9/L (ref 0–0.4)
IMM GRANULOCYTES NFR BLD: 1.8 %
LACTATE BLD-SCNC: 2.3 MMOL/L (ref 0.7–2.1)
LACTATE BLD-SCNC: 4.3 MMOL/L (ref 0.7–2)
LYMPHOCYTES # BLD AUTO: 0.5 10E9/L (ref 0.8–5.3)
LYMPHOCYTES NFR BLD AUTO: 4.8 %
MCH RBC QN AUTO: 30.1 PG (ref 26.5–33)
MCH RBC QN AUTO: 30.4 PG (ref 26.5–33)
MCHC RBC AUTO-ENTMCNC: 31.4 G/DL (ref 31.5–36.5)
MCHC RBC AUTO-ENTMCNC: 32.2 G/DL (ref 31.5–36.5)
MCV RBC AUTO: 94 FL (ref 78–100)
MCV RBC AUTO: 96 FL (ref 78–100)
MONOCYTES # BLD AUTO: 0.4 10E9/L (ref 0–1.3)
MONOCYTES NFR BLD AUTO: 4 %
NEUTROPHILS # BLD AUTO: 9.2 10E9/L (ref 1.6–8.3)
NEUTROPHILS NFR BLD AUTO: 88.3 %
NRBC # BLD AUTO: 0 10*3/UL
NRBC BLD AUTO-RTO: 0 /100
NT-PROBNP SERPL-MCNC: 721 PG/ML (ref 0–900)
O2/TOTAL GAS SETTING VFR VENT: ABNORMAL %
O2/TOTAL GAS SETTING VFR VENT: ABNORMAL %
OXYHGB MFR BLDV: 92 %
OXYHGB MFR BLDV: 99 %
PCO2 BLDV: 53 MM HG (ref 40–50)
PCO2 BLDV: 54 MM HG (ref 40–50)
PCO2 BLDV: 58 MM HG (ref 40–50)
PH BLDV: 7.37 PH (ref 7.32–7.43)
PH BLDV: 7.38 PH (ref 7.32–7.43)
PH BLDV: 7.39 PH (ref 7.32–7.43)
PLATELET # BLD AUTO: 175 10E9/L (ref 150–450)
PLATELET # BLD AUTO: 177 10E9/L (ref 150–450)
PO2 BLDV: 174 MM HG (ref 25–47)
PO2 BLDV: 37 MM HG (ref 25–47)
PO2 BLDV: 64 MM HG (ref 25–47)
POTASSIUM SERPL-SCNC: 4.4 MMOL/L (ref 3.4–5.3)
POTASSIUM SERPL-SCNC: 4.7 MMOL/L (ref 3.4–5.3)
RBC # BLD AUTO: 4.78 10E12/L (ref 4.4–5.9)
RBC # BLD AUTO: 4.87 10E12/L (ref 4.4–5.9)
SAO2 % BLDV FROM PO2: 67 %
SODIUM SERPL-SCNC: 137 MMOL/L (ref 133–144)
SODIUM SERPL-SCNC: 139 MMOL/L (ref 133–144)
TROPONIN I SERPL-MCNC: 0.02 UG/L (ref 0–0.04)
TROPONIN I SERPL-MCNC: <0.015 UG/L (ref 0–0.04)
WBC # BLD AUTO: 10.4 10E9/L (ref 4–11)
WBC # BLD AUTO: 9.7 10E9/L (ref 4–11)

## 2019-02-16 PROCEDURE — 83880 ASSAY OF NATRIURETIC PEPTIDE: CPT | Performed by: EMERGENCY MEDICINE

## 2019-02-16 PROCEDURE — 83605 ASSAY OF LACTIC ACID: CPT | Performed by: INTERNAL MEDICINE

## 2019-02-16 PROCEDURE — 84484 ASSAY OF TROPONIN QUANT: CPT | Performed by: STUDENT IN AN ORGANIZED HEALTH CARE EDUCATION/TRAINING PROGRAM

## 2019-02-16 PROCEDURE — 83605 ASSAY OF LACTIC ACID: CPT

## 2019-02-16 PROCEDURE — 96375 TX/PRO/DX INJ NEW DRUG ADDON: CPT

## 2019-02-16 PROCEDURE — 36600 WITHDRAWAL OF ARTERIAL BLOOD: CPT

## 2019-02-16 PROCEDURE — 93005 ELECTROCARDIOGRAM TRACING: CPT

## 2019-02-16 PROCEDURE — 80048 BASIC METABOLIC PNL TOTAL CA: CPT | Performed by: STUDENT IN AN ORGANIZED HEALTH CARE EDUCATION/TRAINING PROGRAM

## 2019-02-16 PROCEDURE — 85025 COMPLETE CBC W/AUTO DIFF WBC: CPT | Performed by: EMERGENCY MEDICINE

## 2019-02-16 PROCEDURE — 25000132 ZZH RX MED GY IP 250 OP 250 PS 637: Performed by: STUDENT IN AN ORGANIZED HEALTH CARE EDUCATION/TRAINING PROGRAM

## 2019-02-16 PROCEDURE — 94640 AIRWAY INHALATION TREATMENT: CPT

## 2019-02-16 PROCEDURE — 25000132 ZZH RX MED GY IP 250 OP 250 PS 637: Performed by: INTERNAL MEDICINE

## 2019-02-16 PROCEDURE — 25000125 ZZHC RX 250: Performed by: INTERNAL MEDICINE

## 2019-02-16 PROCEDURE — 71045 X-RAY EXAM CHEST 1 VIEW: CPT

## 2019-02-16 PROCEDURE — 12000000 ZZH R&B MED SURG/OB

## 2019-02-16 PROCEDURE — 25000131 ZZH RX MED GY IP 250 OP 636 PS 637: Performed by: INTERNAL MEDICINE

## 2019-02-16 PROCEDURE — 25000128 H RX IP 250 OP 636: Performed by: STUDENT IN AN ORGANIZED HEALTH CARE EDUCATION/TRAINING PROGRAM

## 2019-02-16 PROCEDURE — 99285 EMERGENCY DEPT VISIT HI MDM: CPT | Mod: 25

## 2019-02-16 PROCEDURE — 82803 BLOOD GASES ANY COMBINATION: CPT

## 2019-02-16 PROCEDURE — 94640 AIRWAY INHALATION TREATMENT: CPT | Mod: 76

## 2019-02-16 PROCEDURE — 84484 ASSAY OF TROPONIN QUANT: CPT | Performed by: EMERGENCY MEDICINE

## 2019-02-16 PROCEDURE — 96365 THER/PROPH/DIAG IV INF INIT: CPT

## 2019-02-16 PROCEDURE — 25000128 H RX IP 250 OP 636: Performed by: INTERNAL MEDICINE

## 2019-02-16 PROCEDURE — 25000125 ZZHC RX 250

## 2019-02-16 PROCEDURE — 25000128 H RX IP 250 OP 636: Performed by: EMERGENCY MEDICINE

## 2019-02-16 PROCEDURE — 40000275 ZZH STATISTIC RCP TIME EA 10 MIN

## 2019-02-16 PROCEDURE — 40000274 ZZH STATISTIC RCP CONSULT EA 30 MIN

## 2019-02-16 PROCEDURE — 00000146 ZZHCL STATISTIC GLUCOSE BY METER IP

## 2019-02-16 PROCEDURE — 85027 COMPLETE CBC AUTOMATED: CPT | Performed by: STUDENT IN AN ORGANIZED HEALTH CARE EDUCATION/TRAINING PROGRAM

## 2019-02-16 PROCEDURE — 80048 BASIC METABOLIC PNL TOTAL CA: CPT | Performed by: EMERGENCY MEDICINE

## 2019-02-16 PROCEDURE — 36415 COLL VENOUS BLD VENIPUNCTURE: CPT | Performed by: STUDENT IN AN ORGANIZED HEALTH CARE EDUCATION/TRAINING PROGRAM

## 2019-02-16 PROCEDURE — 82805 BLOOD GASES W/O2 SATURATION: CPT | Performed by: STUDENT IN AN ORGANIZED HEALTH CARE EDUCATION/TRAINING PROGRAM

## 2019-02-16 PROCEDURE — 25000125 ZZHC RX 250: Performed by: EMERGENCY MEDICINE

## 2019-02-16 PROCEDURE — 94660 CPAP INITIATION&MGMT: CPT

## 2019-02-16 PROCEDURE — 71045 X-RAY EXAM CHEST 1 VIEW: CPT | Mod: 77

## 2019-02-16 PROCEDURE — 36415 COLL VENOUS BLD VENIPUNCTURE: CPT | Performed by: INTERNAL MEDICINE

## 2019-02-16 RX ORDER — FUROSEMIDE 10 MG/ML
20 INJECTION INTRAMUSCULAR; INTRAVENOUS ONCE
Status: COMPLETED | OUTPATIENT
Start: 2019-02-16 | End: 2019-02-16

## 2019-02-16 RX ORDER — ALBUTEROL SULFATE 0.83 MG/ML
2.5 SOLUTION RESPIRATORY (INHALATION)
Status: DISCONTINUED | OUTPATIENT
Start: 2019-02-16 | End: 2019-02-28 | Stop reason: HOSPADM

## 2019-02-16 RX ORDER — ACETAMINOPHEN 325 MG/1
650 TABLET ORAL EVERY 4 HOURS PRN
Status: DISCONTINUED | OUTPATIENT
Start: 2019-02-16 | End: 2019-02-28 | Stop reason: HOSPADM

## 2019-02-16 RX ORDER — CEFAZOLIN SODIUM 1 G/50ML
1750 SOLUTION INTRAVENOUS EVERY 12 HOURS
Status: DISCONTINUED | OUTPATIENT
Start: 2019-02-16 | End: 2019-02-17

## 2019-02-16 RX ORDER — OXYCODONE HYDROCHLORIDE 5 MG/1
5 TABLET ORAL EVERY 4 HOURS PRN
Status: DISCONTINUED | OUTPATIENT
Start: 2019-02-16 | End: 2019-02-17

## 2019-02-16 RX ORDER — IPRATROPIUM BROMIDE AND ALBUTEROL SULFATE 2.5; .5 MG/3ML; MG/3ML
1 SOLUTION RESPIRATORY (INHALATION)
Status: DISCONTINUED | OUTPATIENT
Start: 2019-02-16 | End: 2019-02-28 | Stop reason: HOSPADM

## 2019-02-16 RX ORDER — IPRATROPIUM BROMIDE AND ALBUTEROL SULFATE 2.5; .5 MG/3ML; MG/3ML
SOLUTION RESPIRATORY (INHALATION)
Status: COMPLETED
Start: 2019-02-16 | End: 2019-02-16

## 2019-02-16 RX ORDER — METHYLPREDNISOLONE SODIUM SUCCINATE 125 MG/2ML
125 INJECTION, POWDER, LYOPHILIZED, FOR SOLUTION INTRAMUSCULAR; INTRAVENOUS ONCE
Status: COMPLETED | OUTPATIENT
Start: 2019-02-16 | End: 2019-02-16

## 2019-02-16 RX ORDER — DEXTROSE MONOHYDRATE 25 G/50ML
25-50 INJECTION, SOLUTION INTRAVENOUS
Status: DISCONTINUED | OUTPATIENT
Start: 2019-02-16 | End: 2019-02-28 | Stop reason: HOSPADM

## 2019-02-16 RX ORDER — IPRATROPIUM BROMIDE AND ALBUTEROL SULFATE 2.5; .5 MG/3ML; MG/3ML
3 SOLUTION RESPIRATORY (INHALATION)
Status: DISCONTINUED | OUTPATIENT
Start: 2019-02-16 | End: 2019-02-16

## 2019-02-16 RX ORDER — GUAIFENESIN 600 MG/1
600 TABLET, EXTENDED RELEASE ORAL 2 TIMES DAILY PRN
Status: DISCONTINUED | OUTPATIENT
Start: 2019-02-16 | End: 2019-02-28 | Stop reason: HOSPADM

## 2019-02-16 RX ORDER — LORAZEPAM 2 MG/ML
.5-1 INJECTION INTRAMUSCULAR EVERY 6 HOURS PRN
Status: DISCONTINUED | OUTPATIENT
Start: 2019-02-16 | End: 2019-02-23

## 2019-02-16 RX ORDER — TRAZODONE HYDROCHLORIDE 50 MG/1
50 TABLET, FILM COATED ORAL AT BEDTIME
Status: DISCONTINUED | OUTPATIENT
Start: 2019-02-16 | End: 2019-02-17

## 2019-02-16 RX ORDER — FUROSEMIDE 20 MG
20 TABLET ORAL 2 TIMES DAILY
Status: DISCONTINUED | OUTPATIENT
Start: 2019-02-16 | End: 2019-02-18

## 2019-02-16 RX ORDER — TAMSULOSIN HYDROCHLORIDE 0.4 MG/1
0.4 CAPSULE ORAL DAILY
Status: DISCONTINUED | OUTPATIENT
Start: 2019-02-16 | End: 2019-02-28 | Stop reason: HOSPADM

## 2019-02-16 RX ORDER — DILTIAZEM HYDROCHLORIDE 180 MG/1
180 CAPSULE, COATED, EXTENDED RELEASE ORAL 2 TIMES DAILY
Status: DISCONTINUED | OUTPATIENT
Start: 2019-02-16 | End: 2019-02-28 | Stop reason: HOSPADM

## 2019-02-16 RX ORDER — NICOTINE POLACRILEX 4 MG
15-30 LOZENGE BUCCAL
Status: DISCONTINUED | OUTPATIENT
Start: 2019-02-16 | End: 2019-02-28 | Stop reason: HOSPADM

## 2019-02-16 RX ORDER — CEFTRIAXONE 1 G/1
1 INJECTION, POWDER, FOR SOLUTION INTRAMUSCULAR; INTRAVENOUS EVERY 24 HOURS
Status: DISCONTINUED | OUTPATIENT
Start: 2019-02-16 | End: 2019-02-16

## 2019-02-16 RX ORDER — LIDOCAINE 40 MG/G
CREAM TOPICAL
Status: DISCONTINUED | OUTPATIENT
Start: 2019-02-16 | End: 2019-02-20 | Stop reason: CLARIF

## 2019-02-16 RX ORDER — METHYLPREDNISOLONE SODIUM SUCCINATE 40 MG/ML
40 INJECTION, POWDER, LYOPHILIZED, FOR SOLUTION INTRAMUSCULAR; INTRAVENOUS EVERY 12 HOURS
Status: DISCONTINUED | OUTPATIENT
Start: 2019-02-16 | End: 2019-02-17

## 2019-02-16 RX ORDER — ALBUTEROL SULFATE 0.83 MG/ML
2.5 SOLUTION RESPIRATORY (INHALATION) ONCE
Status: COMPLETED | OUTPATIENT
Start: 2019-02-16 | End: 2019-02-16

## 2019-02-16 RX ORDER — LANOLIN ALCOHOL/MO/W.PET/CERES
3 CREAM (GRAM) TOPICAL
Status: DISCONTINUED | OUTPATIENT
Start: 2019-02-16 | End: 2019-02-28 | Stop reason: HOSPADM

## 2019-02-16 RX ORDER — NALOXONE HYDROCHLORIDE 0.4 MG/ML
.1-.4 INJECTION, SOLUTION INTRAMUSCULAR; INTRAVENOUS; SUBCUTANEOUS
Status: DISCONTINUED | OUTPATIENT
Start: 2019-02-16 | End: 2019-02-22

## 2019-02-16 RX ADMIN — INSULIN ASPART 3 UNITS: 100 INJECTION, SOLUTION INTRAVENOUS; SUBCUTANEOUS at 18:52

## 2019-02-16 RX ADMIN — Medication 225 MG: at 16:37

## 2019-02-16 RX ADMIN — GUAIFENESIN 600 MG: 600 TABLET, EXTENDED RELEASE ORAL at 18:40

## 2019-02-16 RX ADMIN — IPRATROPIUM BROMIDE AND ALBUTEROL SULFATE 3 ML: .5; 3 SOLUTION RESPIRATORY (INHALATION) at 16:22

## 2019-02-16 RX ADMIN — APIXABAN 10 MG: 5 TABLET, FILM COATED ORAL at 21:31

## 2019-02-16 RX ADMIN — IPRATROPIUM BROMIDE AND ALBUTEROL SULFATE 3 ML: .5; 3 SOLUTION RESPIRATORY (INHALATION) at 19:33

## 2019-02-16 RX ADMIN — MELATONIN TAB 3 MG 3 MG: 3 TAB at 21:32

## 2019-02-16 RX ADMIN — ALBUTEROL SULFATE 2.5 MG: 2.5 SOLUTION RESPIRATORY (INHALATION) at 22:15

## 2019-02-16 RX ADMIN — CEFTRIAXONE SODIUM 1 G: 1 INJECTION, POWDER, FOR SOLUTION INTRAMUSCULAR; INTRAVENOUS at 16:39

## 2019-02-16 RX ADMIN — FUROSEMIDE 20 MG: 10 INJECTION, SOLUTION INTRAMUSCULAR; INTRAVENOUS at 23:33

## 2019-02-16 RX ADMIN — ALBUTEROL SULFATE 2.5 MG: 2.5 SOLUTION RESPIRATORY (INHALATION) at 13:14

## 2019-02-16 RX ADMIN — TAZOBACTAM SODIUM AND PIPERACILLIN SODIUM 3.38 G: 375; 3 INJECTION, SOLUTION INTRAVENOUS at 23:33

## 2019-02-16 RX ADMIN — Medication 225 MG: at 21:31

## 2019-02-16 RX ADMIN — DILTIAZEM HYDROCHLORIDE 180 MG: 180 CAPSULE, COATED, EXTENDED RELEASE ORAL at 21:32

## 2019-02-16 RX ADMIN — TRAZODONE HYDROCHLORIDE 50 MG: 50 TABLET ORAL at 21:31

## 2019-02-16 RX ADMIN — METHYLPREDNISOLONE SODIUM SUCCINATE 40 MG: 40 INJECTION, POWDER, FOR SOLUTION INTRAMUSCULAR; INTRAVENOUS at 21:31

## 2019-02-16 RX ADMIN — LORAZEPAM 0.5 MG: 2 INJECTION INTRAMUSCULAR; INTRAVENOUS at 18:41

## 2019-02-16 RX ADMIN — IPRATROPIUM BROMIDE AND ALBUTEROL SULFATE 6 ML: .5; 3 SOLUTION RESPIRATORY (INHALATION) at 12:47

## 2019-02-16 RX ADMIN — METHYLPREDNISOLONE SODIUM SUCCINATE 125 MG: 125 INJECTION, POWDER, FOR SOLUTION INTRAMUSCULAR; INTRAVENOUS at 13:30

## 2019-02-16 RX ADMIN — OXYCODONE HYDROCHLORIDE 5 MG: 5 TABLET ORAL at 21:39

## 2019-02-16 RX ADMIN — Medication 2 G: at 13:43

## 2019-02-16 RX ADMIN — FUROSEMIDE 20 MG: 20 TABLET ORAL at 16:37

## 2019-02-16 ASSESSMENT — MIFFLIN-ST. JEOR: SCORE: 1647.62

## 2019-02-16 ASSESSMENT — ACTIVITIES OF DAILY LIVING (ADL)
ADLS_ACUITY_SCORE: 14
ADLS_ACUITY_SCORE: 14

## 2019-02-16 ASSESSMENT — ENCOUNTER SYMPTOMS: SHORTNESS OF BREATH: 1

## 2019-02-16 NOTE — PROGRESS NOTES
"[facility] RCAT     Date: 2/16/19  Admission Dx: COPD exacerbation  Pulmonary History: COPD, PE  Home Nebulizer/MDI Use: Albuterol MDI Q4 w/a, Duoneb Q6 prn, Xopenex Q4 prn,   Home Oxygen: 3L nasal cannula  Acuity Level (RCAT flow sheet): 3  Aerosol Therapy initiated: Duoneb QID, Albuterol Q2 prn      Pulmonary Hygiene initiated: Deep breathe and cough- TID      Volume Expansion initiated: IS- per nursing protocol      Current Oxygen Requirements: 3L nasal cannula  Current SpO2: 94%    Re-evaluation date: 2/19/19    Patient Education: Explained RCAT process to patient.      See \"RT Assessments\" flow sheet for patient assessment scoring and Acuity Level Details.             "

## 2019-02-16 NOTE — PHARMACY-ADMISSION MEDICATION HISTORY
Admission medication history interview status for this patient is complete. See [facility] admission navigator for allergy information, prior to admission medications and immunization status.     Medication history interview source(s):Patient and Family  Medication history resources (including written lists, pill bottles, clinic record):[facility] med list  Primary pharmacy:[facility]    Changes made to [facility] medication list:  Added: none  Deleted: none  Changed: none    Actions taken by pharmacist (provider contacted, etc):None     Additional medication history information:None    Medication reconciliation/reorder completed by provider prior to medication history? No    Do you take OTC medications (eg tylenol, ibuprofen, fish oil, eye/ear drops, etc)? No(Y/N)    For patients on insulin therapy: No (Y/N)  Lantus/levemir/NPH/Mix 70/30 dose:   (Y/N) (see Med list for doses)   Sliding scale Novolog Y/N  If Yes, do you have a baseline novolog pre-meal dose:  units with meals  Patients eat three meals a day:   Y/N    How many episodes of hypoglycemia do you have per week: _______  How many missed doses do you have per week: ______  How many times do you check your blood glucose per day: _______  Do you have a Continuous glucose monitor (CGM)   Y/N (remind pt that not approved for hospital use)   Any Barriers to therapy - Be specific :  cost of medications, comfortable with giving injections (if applicable), comfortable and confident with current diabetes regimen: Y/N ______________      Prior to Admission medications    Medication Sig Last Dose Taking? Auth Provider   albuterol (VENTOLIN HFA) 108 (90 Base) MCG/ACT inhaler Inhale 1-2 puffs into the lungs every 4 hours (while awake) PRN Past Month at Unknown time Yes Nata Mcduffie MD   ipratropium - albuterol 0.5 mg/2.5 mg/3 mL (DUONEB) 0.5-2.5 (3) MG/3ML neb solution Take 1 vial by nebulization every 6 hours as needed for shortness of breath / dyspnea or wheezing 2/16/2019 at Unknown time  Yes Unknown, Entered By History   levalbuterol (XOPENEX) 1.25 MG/3ML neb solution Take 3 mLs (1.25 mg) by nebulization every 4 hours as needed for wheezing or shortness of breath / dyspnea Past Week at Unknown time Yes Marco Masters MD   oxyCODONE (OXY-IR) 5 MG capsule Take 5 mg by mouth every 4 hours as needed for severe pain 2/15/2019 at Unknown time Yes Reported, Patient   predniSONE (DELTASONE) 10 MG tablet 4 tabs daily for 2 days, then 3 tabs daily for 2 days, then 2 tabs daily for 2 days, then 1 tab daily for 2 days, then stop. 2/15/2019 at Unknown time Yes Jon Rocha MD   tamsulosin (FLOMAX) 0.4 MG capsule Take 0.4 mg by mouth daily Past Week at Unknown time Yes Unknown, Entered By History   amoxicillin-clavulanate (AUGMENTIN) 875-125 MG tablet Take 1 tablet by mouth 2 times daily for 7 days 2/16/2019 at am  Jon Rocha MD   apixaban ANTICOAGULANT (ELIQUIS) 5 MG tablet Take 1 tablet (5 mg) by mouth 2 times daily 2/16/2019 at am  Jon Rocha MD   diltiazem ER COATED BEADS (CARDIZEM CD/CARTIA XT) 180 MG 24 hr capsule Take 180 mg by mouth 2 times daily 2/16/2019 at am  Unknown, Entered By History   furosemide (LASIX) 20 MG tablet Take 20 mg by mouth 2 times daily 2/16/2019 at am  Unknown, Entered By History   order for DME Equipment being ordered: Nebulizer   Nata Mcduffie MD   order for DME Oxygen for nocturnal use. 1 LPM via nasal cannula  Testing done at home in chronic stable state.  Condition is COPD.  Patient does not have Obstructive Sleep Apnea.  Overnight oximetry revealed 30.3 minutes of hypoxia (<= 88%).  Duration: Lifetime (99 months).   Tyson Sanders MD   propafenone (RYTHMOL) 225 MG tablet Take 1 tablet (225 mg) by mouth every 8 hours 2/16/2019 at am  Sussy Britt MD   traZODone (DESYREL) 50 MG tablet Take 50 mg by mouth At Bedtime  2/15/2019 at hs  Unknown, Entered By History

## 2019-02-16 NOTE — ED PROVIDER NOTES
History     Chief Complaint:  Shortness of breath    HPI   Tone Cooper is a 68 year old male with a history of COPD, atrial fibrillation, and on Eliquis who presents to the emergency department today for evaluation of shortness of breath. Per chart review, patient was admitted to the hospital on 02/11 for a pulmonary embolism, pneumonia, and COPD exacerbation and was discharged yesterday. Patient states that he felt somewhat better when he was discharged, however, once he got home and sat in his chair, he had increased shortness of breath. He was sent home with Duoneb treatments, Augmentin, and prednisone and he endorses the nebulizer treatments are not working or even helping his breathing at all. The patient states he feels like his throat is clogged and causing his shortness of breath. Patient denies history of CHF.    Allergies:  Amlodipine  Flecainide      Medications:    Albuterol  Eliquis  Augmentin  Lasix  Duoneb  Levalbuterol  Prednisone  Propafenone  Flomax  Desyrel     Past Medical History:    Atrial flutter  COPD  Diverticulitis  Hypertension  Persistent atrial fibrillation  Premature beats  PVC  Tricuspid valve disorder  Ventricular tachycardia    Past Surgical History:    Hernia repair right  Appendectomy  Cardioversion x3  Ablation x3  Laparoscopic cholecystectomy  Left hip replacement  Repair tricuspid valve  Small intestine surgery  Valve replacement    Family History:    Father: prostate cancer  Mother: emphysema, hypertension, cerebrovascular disease    Social History:  The patient was accompanied to the ED by himself.  Smoking Status: Former smoker  Smokeless Tobacco: Never Used  Alcohol Use: Positive  Drug Use: Negative  Marital Status:       Review of Systems   Respiratory: Positive for shortness of breath.         Difficulty breathing   All other systems reviewed and are negative.        Physical Exam     Patient Vitals for the past 24 hrs:   BP Temp Temp src Pulse Heart Rate Resp  SpO2 Height Weight   02/16/19 1400 -- -- -- -- -- -- 95 % -- --   02/16/19 1345 (!) 146/91 -- -- 89 -- -- 98 % -- --   02/16/19 1330 (!) 143/95 -- -- 99 -- -- 98 % -- --   02/16/19 1315 (!) 129/95 -- -- 133 -- -- 99 % -- --   02/16/19 1300 (!) 148/95 -- -- 86 -- -- 98 % -- --   02/16/19 1245 145/88 -- -- 98 -- -- 97 % -- --   02/16/19 1229 137/77 97.5  F (36.4  C) Temporal -- 66 24 93 % 1.829 m (6') 83.5 kg (184 lb)       Physical Exam    General: Alert.  HEENT:   The scalp and head appear normal    The pupils are equal, round, and reactive to light    Extraocular muscles are intact.    The nose is normal.    The oropharynx is normal.      Uvula is in the midline.    Neck:  Normal range of motion.    Lungs:  Marked long prolonged E to I ratio    Diffuse expiratory wheezing throughout    Laryngitis  Cardiac: Regular rate.      Normal S1 and S2.      No pathological murmur/rub    Abdomen: Soft. No distension, no localized tenderness or rebound.  MS:  Normal tone. Normal movement of all extremities.   Neurologic:     Normal mentation.  No cranial nerve deficits.  No focal motor or sensory changes.      Speech normal.  Psych:  Awake.     Alert.      Normal affect.      Appropriate interactions.  Skin:  No rash.      No lesions.      Emergency Department Course     ECG:  ECG taken at 1344, ECG read at 1350  Sinus tachycardia  Possible inferior infarct, age undetermined  Abnormal ECG  Rate 103 bpm. SC interval 192 ms. QRS duration 98 ms. QT/QTc 350/458 ms. P-R-T axes 86 65 86.    Imaging:  Radiology findings were communicated with the patient who voiced understanding of the findings.    XR Chest Port 1 View  Mild streaky atelectasis or scarring of the right lung  base, similar to prior exam. No new focal pulmonary opacities. Midline  sternal wires as before. No pleural effusions or pneumothorax. Stable  heart size.   JUNIOR HEREDIA MD  Reading per radiology    Laboratory:  Laboratory findings were communicated with the  patient who voiced understanding of the findings.    ISTAT gases lactate radhames POCT: pH 7.37, PCO2 58(H), PO2 37, Bicarbonate 34(H), O2 Sat 67, lactic 2.3(H)  Troponin (Collected 1259): 0.018  BNP: 721  CBC: WBC 10.4, HGB 14.8,   BMP: carbon dioxide 33(H), glucose 200(H), urea nitrogen 32(H) o/w WNL (Creatinine 1.07)    Interventions:  1247 Duoneb 6 ml nebulization  1314 Proventil 2.5 mg nebulization  1330 solu-medrol 125 mg IV  1343 magnesium sulfate 2 g IV    Emergency Department Course:    1236 Nursing notes and vitals reviewed.    1259 IV was inserted and blood was drawn for laboratory testing, results above.    1313 I performed an exam of the patient as documented above.     1337 The patient was sent for a chest XR while in the emergency department, results above.     1350 I spoke with Dr. Tucker of the hospitalist service from [facility] regarding patient's presentation, findings, and plan of care.    I personally reviewed the lab and image results with the patient and answered all related questions prior to admission.    Impression & Plan      Medical Decision Making:  Tone Cooper is a 68 year old male who was just discharged yesterday with COPD and pulmonary embolis, he has been taking his medications as prescribed but he became increasingly short of breath over night and this morning he could not breath. He was brought in by medics. He received a duoneb here. He has a very prolonged period of E to I ratio with diffuse tight expiratory wheezing throughout and sounded short of breath when he talked. I have given with Atrovent nebs x3 here, I gave him a burst of solumedrol 125 mg IV. I contacted Dr. Tucker who is the accepting physician to the medical floor for continued IV steroids, frequent bronchodilators. Patient is currently on antibiotics, therefor I did not obtain another set of blood cultures. He states he is correctly taking his anticoagulants.    Diagnosis:    ICD-10-CM    1. COPD  exacerbation (H) J44.1 Troponin I     Nt probnp inpatient (BNP)     CBC with platelets differential     Basic metabolic panel     Disposition:   The patient is admitted into the care of Dr. Tucker.    Scribe Disclosure:  I, Meliza Rose, am serving as a scribe at 1:18 PM on 2/16/2019 to document services personally performed by Kalia Guillermo MD based on my observations and the provider's statements to me.    [facility] EMERGENCY DEPARTMENT       Kalia Guillermo MD  02/16/19 154

## 2019-02-16 NOTE — ED NOTES
[facility]  ED Nurse Handoff Report    Tone Cooper is a 68 year old male   ED Chief complaint: Shortness of Breath  . ED Diagnosis:   Final diagnoses:   COPD exacerbation (H)     Allergies:   Allergies   Allergen Reactions     Amlodipine Swelling     Flecainide Palpitations       Code Status: Full Code  Activity level - Baseline/Home:  Independent. Activity Level - Current:   Independent. Lift room needed: No. Bariatric: No   Needed: No   Isolation: No. Infection: Not Applicable.     Vital Signs:   Vitals:    02/16/19 1315 02/16/19 1330 02/16/19 1345 02/16/19 1400   BP: (!) 129/95 (!) 143/95 (!) 146/91    Pulse: 133 99 89    Resp:       Temp:       TempSrc:       SpO2: 99% 98% 98% 95%   Weight:       Height:           Cardiac Rhythm:  ,      Pain level:    Patient confused: No. Patient Falls Risk: Yes.   Elimination Status: Has voided   Patient Report - Initial Complaint: SOB. Focused Assessment:  dim lungs t/o, exp wheezes t/o  Tests Performed: labs, imaging. Abnormal Results:   XR Chest Port 1 View   Final Result   IMPRESSION: Mild streaky atelectasis or scarring of the right lung   base, similar to prior exam. No new focal pulmonary opacities. Midline   sternal wires as before. No pleural effusions or pneumothorax. Stable   heart size.       JUNIOR HEREDIA MD        Labs Ordered and Resulted from Time of ED Arrival Up to the Time of Departure from the ED   CBC WITH PLATELETS DIFFERENTIAL - Abnormal; Notable for the following components:       Result Value    RDW 15.1 (*)     Absolute Neutrophil 9.2 (*)     Absolute Lymphocytes 0.5 (*)     All other components within normal limits   BASIC METABOLIC PANEL - Abnormal; Notable for the following components:    Carbon Dioxide 33 (*)     Glucose 200 (*)     Urea Nitrogen 32 (*)     All other components within normal limits   ISTAT  GASES LACTATE LUIS M POCT - Abnormal; Notable for the following components:    PCO2 Venous 58 (*)     Bicarbonate  Venous 34 (*)     Lactic Acid 2.3 (*)     All other components within normal limits   TROPONIN I   NT PROBNP INPATIENT   PULSE OXIMETRY NURSING   .   Treatments provided: neb, mag, solumedrol  Family Comments: wife in lobby  OBS brochure/video discussed/provided to patient:  No  ED Medications:   Medications   ipratropium - albuterol 0.5 mg/2.5 mg/3 mL (DUONEB) neb solution 3 mL (not administered)   magnesium sulfate 2 g in NS intermittent infusion ([facility] or [facility]) (2 g Intravenous New Bag 2/16/19 1343)   ipratropium - albuterol 0.5 mg/2.5 mg/3 mL (DUONEB) 0.5-2.5 (3) MG/3ML neb solution (6 mLs  Given 2/16/19 1247)   albuterol (PROVENTIL) neb solution 2.5 mg (2.5 mg Nebulization Given 2/16/19 1314)   methylPREDNISolone sodium succinate (solu-MEDROL) injection 125 mg (125 mg Intravenous Given 2/16/19 1330)     Drips infusing:  No  For the majority of the shift, the patient's behavior Green. Interventions performed were NA.     Severe Sepsis OR Septic Shock Diagnosis Present: No      ED Nurse Name/Phone Number: María Elena Denis,   2:00 PM  RECEIVING UNIT ED HANDOFF REVIEW    Above ED Nurse Handoff Report was reviewed: yes  Reviewed by: Leon Rodgers on February 16, 2019 at 2:45 PM

## 2019-02-16 NOTE — LETTER
Key Recommendations:  CTS following patient due to COPD exacerbation and high CALEB.  CTS met with patient at bedside, visit brief as patient had significant coughing, however, wanted to complete visit.  Pt admitted 2/16/19 for COPD exacerbation.  Per chart review admitted 2/11-2/14 for PE and 12/17-1/8 for acute on chronic hypoxic resp failure d/t community acquired pna and severe sepsis.  Pt receives no home services and is provided home O2 by [facility].  COPD action plan reviewed with patient as well as when to follow up, pt denies questions.  Pt will schedule his own follow up appointments as recommended. He would benefit from f/u with pulmonary.  Pt was discharged to [facility].  They will have him connect with  DME for a Bipap or NIV at time of discharge.      Laure Saini RN CTS    AVS/Discharge Summary is the source of truth; this is a helpful guide for improved communication of patient story

## 2019-02-16 NOTE — ED TRIAGE NOTES
"Recently discharged from the hospital for a\" lung problem\".  Worsening symptoms of wheezing and shortness of breath.   "

## 2019-02-16 NOTE — LETTER
Transition Communication Hand-off for Care Transitions to Next Level of Care Provider    Name: Tone Cooper  : 1950  MRN #: [id number]  Primary Care Provider: Ana [facility]  Primary Care MD Name: [facility]  Primary Clinic: [facility] [address] [address] [id number]  Primary Care Clinic Name: [facility]  Reason for Hospitalization:  COPD exacerbation (H) [J44.1]  Admit Date/Time: 2019 12:30 PM  Discharge Date: 19  Payor Source: Payor: [facility] / Plan: ARE MEDICARE NON FPA / Product Type: HMO /     Readmission Assessment Measure (CALEB) Risk Score/category: HIGH         Reason for Communication Hand-off Referral: Admission diagnoses: COPD    Discharge Plan:       Concern for non-adherence with plan of care:   NO  Discharge Needs Assessment:  Needs      Most Recent Value   Anticipated Changes Related to Illness  none   Equipment Currently Used at Home  walker, rolling   # of Referrals Placed by [facility]  Durable Medical Equipment (DME)          Already enrolled in Tele-monitoring program and name of program:  na  Follow-up specialty is recommended: Yes    Follow-up plan:  No future appointments.    Any outstanding tests or procedures:    Procedures     Future Labs/Procedures    BIPAP treatment     Comments:    Current settings: BIPAP 14/7 backup rate of 16 and 30% FIO2.          Referrals     Future Labs/Procedures    Follow-Up with Electrophysiologist     Occupational Therapy Adult Consult     Comments:    Evaluate and treat as clinically indicated.    Reason:  weakness    Physical Therapy Adult Consult     Comments:    Evaluate and treat as clinically indicated.    Reason:  weakness               Follow Up and recommended labs and tests    -- Started on Budesonide nebs and dialy azithromycin per pulmonary recommendations , given frequent COPD exacerbation   -- Requiring intermittent BiPAP support. Current settings : BIPAP 14/7 backup rate of 16 and 30% FIO2.   -- Continue  Lantus and sliding scale insulin for steroid related hyperglycemia   -- Recommend outpatient follow up with EP cardiology and pulmonology after discharge from [facility]   -- Consider home BiPAP/Trilogy machine at time of discharge home             Key Recommendations:  CTS following patient due to COPD exacerbation and high CALEB.  CTS met with patient at bedside, visit brief as patient had significant coughing, however, wanted to complete visit.  Pt admitted 2/16/19 for COPD exacerbation.  Per chart review admitted 2/11-2/14 for PE and 12/17-1/8 for acute on chronic hypoxic resp failure d/t community acquired pna and severe sepsis.  Pt receives no home services and is provided home O2 by [facility].  COPD action plan reviewed with patient as well as when to follow up, pt denies questions.  Pt will schedule his own follow up appointments as recommended. He would benefit from f/u with pulmonary.  He said he follows with Dr Espinoza with [facility].  Pt was discharged to [facility].  They will have him connect with  DME for a Bipap or NIV at time of discharge.      Laure Saini RN CTS    AVS/Discharge Summary is the source of truth; this is a helpful guide for improved communication of patient story

## 2019-02-17 LAB
ANION GAP SERPL CALCULATED.3IONS-SCNC: 3 MMOL/L (ref 3–14)
BACTERIA SPEC CULT: NO GROWTH
BACTERIA SPEC CULT: NO GROWTH
BUN SERPL-MCNC: 32 MG/DL (ref 7–30)
CALCIUM SERPL-MCNC: 8.9 MG/DL (ref 8.5–10.1)
CHLORIDE SERPL-SCNC: 101 MMOL/L (ref 94–109)
CO2 SERPL-SCNC: 33 MMOL/L (ref 20–32)
CREAT SERPL-MCNC: 1.01 MG/DL (ref 0.66–1.25)
ERYTHROCYTE [DISTWIDTH] IN BLOOD BY AUTOMATED COUNT: 14.9 % (ref 10–15)
GFR SERPL CREATININE-BSD FRML MDRD: 77 ML/MIN/{1.73_M2}
GLUCOSE BLDC GLUCOMTR-MCNC: 174 MG/DL (ref 70–99)
GLUCOSE BLDC GLUCOMTR-MCNC: 177 MG/DL (ref 70–99)
GLUCOSE BLDC GLUCOMTR-MCNC: 232 MG/DL (ref 70–99)
GLUCOSE BLDC GLUCOMTR-MCNC: 264 MG/DL (ref 70–99)
GLUCOSE BLDC GLUCOMTR-MCNC: 268 MG/DL (ref 70–99)
GLUCOSE SERPL-MCNC: 188 MG/DL (ref 70–99)
HCT VFR BLD AUTO: 42.7 % (ref 40–53)
HGB BLD-MCNC: 13.6 G/DL (ref 13.3–17.7)
INTERPRETATION ECG - MUSE: NORMAL
LACTATE BLD-SCNC: 2.5 MMOL/L (ref 0.7–2)
Lab: NORMAL
Lab: NORMAL
MCH RBC QN AUTO: 30.4 PG (ref 26.5–33)
MCHC RBC AUTO-ENTMCNC: 31.9 G/DL (ref 31.5–36.5)
MCV RBC AUTO: 96 FL (ref 78–100)
PLATELET # BLD AUTO: 167 10E9/L (ref 150–450)
POTASSIUM SERPL-SCNC: 4.6 MMOL/L (ref 3.4–5.3)
PROCALCITONIN SERPL-MCNC: 0.05 NG/ML
RBC # BLD AUTO: 4.47 10E12/L (ref 4.4–5.9)
SODIUM SERPL-SCNC: 137 MMOL/L (ref 133–144)
SPECIMEN SOURCE: NORMAL
SPECIMEN SOURCE: NORMAL
WBC # BLD AUTO: 9.5 10E9/L (ref 4–11)

## 2019-02-17 PROCEDURE — 83605 ASSAY OF LACTIC ACID: CPT | Performed by: INTERNAL MEDICINE

## 2019-02-17 PROCEDURE — 40000275 ZZH STATISTIC RCP TIME EA 10 MIN

## 2019-02-17 PROCEDURE — 36415 COLL VENOUS BLD VENIPUNCTURE: CPT | Performed by: INTERNAL MEDICINE

## 2019-02-17 PROCEDURE — 25000132 ZZH RX MED GY IP 250 OP 250 PS 637: Performed by: STUDENT IN AN ORGANIZED HEALTH CARE EDUCATION/TRAINING PROGRAM

## 2019-02-17 PROCEDURE — 25800030 ZZH RX IP 258 OP 636: Performed by: INTERNAL MEDICINE

## 2019-02-17 PROCEDURE — 94640 AIRWAY INHALATION TREATMENT: CPT

## 2019-02-17 PROCEDURE — 99233 SBSQ HOSP IP/OBS HIGH 50: CPT | Performed by: INTERNAL MEDICINE

## 2019-02-17 PROCEDURE — 25000125 ZZHC RX 250: Performed by: INTERNAL MEDICINE

## 2019-02-17 PROCEDURE — 00000146 ZZHCL STATISTIC GLUCOSE BY METER IP

## 2019-02-17 PROCEDURE — 12000000 ZZH R&B MED SURG/OB

## 2019-02-17 PROCEDURE — 94660 CPAP INITIATION&MGMT: CPT

## 2019-02-17 PROCEDURE — 85027 COMPLETE CBC AUTOMATED: CPT | Performed by: INTERNAL MEDICINE

## 2019-02-17 PROCEDURE — 25000128 H RX IP 250 OP 636: Performed by: STUDENT IN AN ORGANIZED HEALTH CARE EDUCATION/TRAINING PROGRAM

## 2019-02-17 PROCEDURE — 80048 BASIC METABOLIC PNL TOTAL CA: CPT | Performed by: INTERNAL MEDICINE

## 2019-02-17 PROCEDURE — 94640 AIRWAY INHALATION TREATMENT: CPT | Mod: 76

## 2019-02-17 PROCEDURE — 25000132 ZZH RX MED GY IP 250 OP 250 PS 637: Performed by: INTERNAL MEDICINE

## 2019-02-17 PROCEDURE — 25000128 H RX IP 250 OP 636: Performed by: INTERNAL MEDICINE

## 2019-02-17 PROCEDURE — 84145 PROCALCITONIN (PCT): CPT | Performed by: INTERNAL MEDICINE

## 2019-02-17 RX ORDER — OXYCODONE HYDROCHLORIDE 5 MG/1
5-10 TABLET ORAL EVERY 4 HOURS PRN
Status: DISCONTINUED | OUTPATIENT
Start: 2019-02-17 | End: 2019-02-19

## 2019-02-17 RX ORDER — CLONAZEPAM 0.5 MG/1
1 TABLET ORAL AT BEDTIME
Status: DISCONTINUED | OUTPATIENT
Start: 2019-02-17 | End: 2019-02-28 | Stop reason: HOSPADM

## 2019-02-17 RX ORDER — LEVALBUTEROL INHALATION SOLUTION 1.25 MG/3ML
1.25 SOLUTION RESPIRATORY (INHALATION) EVERY 4 HOURS PRN
Status: DISCONTINUED | OUTPATIENT
Start: 2019-02-17 | End: 2019-02-28 | Stop reason: HOSPADM

## 2019-02-17 RX ORDER — METHYLPREDNISOLONE SODIUM SUCCINATE 125 MG/2ML
60 INJECTION, POWDER, LYOPHILIZED, FOR SOLUTION INTRAMUSCULAR; INTRAVENOUS EVERY 12 HOURS
Status: DISCONTINUED | OUTPATIENT
Start: 2019-02-17 | End: 2019-02-22

## 2019-02-17 RX ADMIN — OXYCODONE HYDROCHLORIDE 10 MG: 5 TABLET ORAL at 14:05

## 2019-02-17 RX ADMIN — Medication 225 MG: at 14:04

## 2019-02-17 RX ADMIN — VANCOMYCIN HYDROCHLORIDE 1750 MG: 10 INJECTION, POWDER, LYOPHILIZED, FOR SOLUTION INTRAVENOUS at 00:36

## 2019-02-17 RX ADMIN — TAZOBACTAM SODIUM AND PIPERACILLIN SODIUM 3.38 G: 375; 3 INJECTION, SOLUTION INTRAVENOUS at 16:39

## 2019-02-17 RX ADMIN — IPRATROPIUM BROMIDE AND ALBUTEROL SULFATE 3 ML: .5; 3 SOLUTION RESPIRATORY (INHALATION) at 15:23

## 2019-02-17 RX ADMIN — IPRATROPIUM BROMIDE AND ALBUTEROL SULFATE 3 ML: .5; 3 SOLUTION RESPIRATORY (INHALATION) at 19:31

## 2019-02-17 RX ADMIN — INSULIN ASPART 3 UNITS: 100 INJECTION, SOLUTION INTRAVENOUS; SUBCUTANEOUS at 17:03

## 2019-02-17 RX ADMIN — METHYLPREDNISOLONE SODIUM SUCCINATE 62.5 MG: 125 INJECTION, POWDER, FOR SOLUTION INTRAMUSCULAR; INTRAVENOUS at 20:21

## 2019-02-17 RX ADMIN — TAMSULOSIN HYDROCHLORIDE 0.4 MG: 0.4 CAPSULE ORAL at 10:00

## 2019-02-17 RX ADMIN — DILTIAZEM HYDROCHLORIDE 180 MG: 180 CAPSULE, COATED, EXTENDED RELEASE ORAL at 09:59

## 2019-02-17 RX ADMIN — Medication 225 MG: at 08:37

## 2019-02-17 RX ADMIN — ALBUTEROL SULFATE 2.5 MG: 2.5 SOLUTION RESPIRATORY (INHALATION) at 17:55

## 2019-02-17 RX ADMIN — ACETAMINOPHEN 650 MG: 325 TABLET, FILM COATED ORAL at 16:38

## 2019-02-17 RX ADMIN — TAZOBACTAM SODIUM AND PIPERACILLIN SODIUM 3.38 G: 375; 3 INJECTION, SOLUTION INTRAVENOUS at 21:51

## 2019-02-17 RX ADMIN — OXYCODONE HYDROCHLORIDE 10 MG: 5 TABLET ORAL at 18:44

## 2019-02-17 RX ADMIN — TAZOBACTAM SODIUM AND PIPERACILLIN SODIUM 3.38 G: 375; 3 INJECTION, SOLUTION INTRAVENOUS at 10:36

## 2019-02-17 RX ADMIN — DILTIAZEM HYDROCHLORIDE 180 MG: 180 CAPSULE, COATED, EXTENDED RELEASE ORAL at 21:44

## 2019-02-17 RX ADMIN — APIXABAN 10 MG: 5 TABLET, FILM COATED ORAL at 21:43

## 2019-02-17 RX ADMIN — VANCOMYCIN HYDROCHLORIDE 1750 MG: 10 INJECTION, POWDER, LYOPHILIZED, FOR SOLUTION INTRAVENOUS at 11:09

## 2019-02-17 RX ADMIN — FUROSEMIDE 20 MG: 20 TABLET ORAL at 09:59

## 2019-02-17 RX ADMIN — METHYLPREDNISOLONE SODIUM SUCCINATE 40 MG: 40 INJECTION, POWDER, FOR SOLUTION INTRAMUSCULAR; INTRAVENOUS at 07:57

## 2019-02-17 RX ADMIN — Medication 225 MG: at 21:44

## 2019-02-17 RX ADMIN — TAZOBACTAM SODIUM AND PIPERACILLIN SODIUM 3.38 G: 375; 3 INJECTION, SOLUTION INTRAVENOUS at 04:14

## 2019-02-17 RX ADMIN — OXYCODONE HYDROCHLORIDE 5 MG: 5 TABLET ORAL at 08:37

## 2019-02-17 RX ADMIN — CLONAZEPAM 1 MG: 1 TABLET ORAL at 21:44

## 2019-02-17 RX ADMIN — INSULIN ASPART 1 UNITS: 100 INJECTION, SOLUTION INTRAVENOUS; SUBCUTANEOUS at 11:11

## 2019-02-17 RX ADMIN — FUROSEMIDE 20 MG: 20 TABLET ORAL at 16:39

## 2019-02-17 RX ADMIN — IPRATROPIUM BROMIDE AND ALBUTEROL SULFATE 3 ML: .5; 3 SOLUTION RESPIRATORY (INHALATION) at 07:19

## 2019-02-17 RX ADMIN — OXYCODONE HYDROCHLORIDE 10 MG: 5 TABLET ORAL at 22:23

## 2019-02-17 RX ADMIN — APIXABAN 10 MG: 5 TABLET, FILM COATED ORAL at 09:59

## 2019-02-17 RX ADMIN — GUAIFENESIN 600 MG: 600 TABLET, EXTENDED RELEASE ORAL at 16:39

## 2019-02-17 RX ADMIN — IPRATROPIUM BROMIDE AND ALBUTEROL SULFATE 3 ML: .5; 3 SOLUTION RESPIRATORY (INHALATION) at 11:26

## 2019-02-17 ASSESSMENT — ACTIVITIES OF DAILY LIVING (ADL)
ADLS_ACUITY_SCORE: 14

## 2019-02-17 NOTE — PROGRESS NOTES
[facility]  BRIEF IM PROGRESS NOTE  2/16/2019   Time Called: 2200  MD called for: Dyspnea  Code Status: Full Code    Assessment & Plan      I was paged to the bedside to evaluate Mr. Toen Cooper for an acute episode of dyspnea.     Diagnosis:  -- Acute COPD Exacerbation  -- Lactic Acidosis    Interventions ordered/provided:  -- BiPAP  -- Broaden antibiotics to Vancomycin and Zosyn  -- IV Lasix 20 mg once  -- Check CBC/BMP/troponin  -- Trend Lactate expect will improve as respiratory status stabilizes    At the conclusion of this RRT patient is stable to remain on floor on BiPAP.    Interval History     Mr. Tone Cooper is a 68 year old male who was admitted on 2/16/2019 for Acute hypoxic respiratory failure due to chronic obstructive pulmonary disease exacerbation. Has been appropriately started on IV steroids, scheduled nebulizers and other treatment for his COPD.     Patient at bedside reports that his dyspnea has only worsened since admission and that because of his coughing, feels that hes having more chest pain. No fever, no lightheadedness. He endorses feeling wheezing. He has no nausea/vomiting, no recent possible aspiration events. No abdominal pain. He has no other complaints at this time    His history is significant for:  Past Medical History:   Diagnosis Date     Atrial flutter (H)      COPD (chronic obstructive pulmonary disease) (H)      Diverticulitis      Hypertension      Persistent atrial fibrillation (H) 4/28/09    ablation 7/1/2015     Premature beats      PVC (premature ventricular contraction)     s/p ablation 12/5/2017     Tricuspid valve disorder     Dr Aj, Hx of valve repair      Ventricular tachycardia (H)     nonsustained     Past Surgical History:   Procedure Laterality Date     ABDOMEN SURGERY      hernia repair right     APPENDECTOMY       CARDIOVERSION  06/03/2009    successful for afib     CARDIOVERSION  4/20/15    successful for afib     CARDIOVERSION  5/28/15      H ABLATION ATRIAL FLUTTER       H ABLATION FOCAL AFIB  7/1/15    Left atrial linear ablation and pulmonary vein antrum ablation     H ABLATION PVC  12/5/16     INCISION AND DRAINAGE LOWER EXTREMITY, COMBINED Right 11/10/2016    Procedure: COMBINED INCISION AND DRAINAGE LOWER EXTREMITY;  Surgeon: Roger Pacheco MD;  Location: [facility] OR     LAPAROSCOPIC CHOLECYSTECTOMY  1/6/2012    Procedure:LAPAROSCOPIC CHOLECYSTECTOMY; LAPAROSCOPIC CHOLECYSTECTOMY ; Surgeon:ROGER PACHECO; Location:[facility] OR     ORTHOPEDIC SURGERY      Left hip replacement     REPAIR VALVE TRICUSPID  9/8/08    conversion to a bileaflet valve and application of a 30 mm Sanderson ring     SMALL INTESTINE SURGERY      had bowel obstruction age 9     VALVE REPLACEMENT      tricuspid       Allergies   Allergies   Allergen Reactions     Amlodipine Swelling     Flecainide Palpitations       Physical Exam     General: male in respiratory distress  Pulm: in respiratory distress trace wheezing with prolonged expiratory phase, no accessory muscle use.  CV: Tachycardic with regular rhyhtm  Extremities: no significant edema  Neuro: A/Ox3. Alert, answering questions, moving all extremities. Speech clear and fluent  Psych: anxious appearing    Vital Signs with Ranges:  Temp:  [96.1  F (35.6  C)-97.5  F (36.4  C)] 96.1  F (35.6  C)  Pulse:  [] 101  Heart Rate:  [] 104  Resp:  [20-32] 24  BP: (129-165)/() 141/86  SpO2:  [86 %-99 %] 95 %  No intake/output data recorded.    Data     EKG:  Interpreted by Russ Woo MD  Time reviewed: 2300  Symptoms at time of EKG: dyspnea   Rhythm: sinus tachycardia  Rate: 100-110  Ectopy: premature atrial contraction  Conduction: normal  ST Segments/ T Waves: No ST-T wave changes    ABG:  -  Recent Labs   Lab 02/16/19 2230   O2PER Ventilator       Troponin:    Recent Labs   Lab Test 02/16/19 2220 08/23/16  1256   TROPI <0.015   < >  --    TROPONIN  --   --  0.01    < > = values in this interval not  displayed.       IMAGING: (X-ray/CT/MRI)   Recent Results (from the past 24 hour(s))   XR Chest Port 1 View    Narrative    XR CHEST PORT 1 VW 2/16/2019 1:44 PM    HISTORY: Shortness of breath.     COMPARISON: February 11, 2019.       Impression    IMPRESSION: Mild streaky atelectasis or scarring of the right lung  base, similar to prior exam. No new focal pulmonary opacities. Midline  sternal wires as before. No pleural effusions or pneumothorax. Stable  heart size.     JUNIOR HEREDIA MD   XR Chest Port 1 View    Narrative    CHEST PORTABLE ONE VIEW   2/16/2019 10:20 PM     HISTORY: Shortness of breath.     COMPARISON: Earlier the same day at 1:40 PM.      Impression    IMPRESSION: Improving streaky bibasilar opacities since prior.  Perihilar vascular prominence is again seen. Cardiac silhouette is  unchanged. No new focal infiltrates. No significant pleural effusion  or pneumothorax.        CBC with Diff:  Recent Labs   Lab Test 02/16/19 2220  06/09/15  0620   WBC 9.7   < > 9.3   HGB 14.4   < > 15.9   MCV 96   < > 86      < > 189   INR  --   --  1.48*    < > = values in this interval not displayed.      No results found for: RETICABSCT  No results found for: RETP    Lactic Acid:    Lactic Acid   Date Value Ref Range Status   02/16/2019 2.3 (H) 0.7 - 2.1 mmol/L Final   02/11/2019 1.5 0.7 - 2.1 mmol/L Final   12/17/2018 1.7 0.7 - 2.1 mmol/L Final     Lactate for Sepsis Protocol   Date Value Ref Range Status   12/31/2018 1.3 0.7 - 2.0 mmol/L Final   12/26/2018 1.4 0.7 - 2.0 mmol/L Final   11/26/2018 1.3 0.7 - 2.0 mmol/L Final        Comprehensive Metabolic Panel:  Recent Labs   Lab 02/16/19  2220      POTASSIUM 4.7   CHLORIDE 101   CO2 28   ANIONGAP 8   *   BUN 35*   CR 1.02   GFRESTIMATED 76   GFRESTBLACK 88   WILEBRT 9.0       INR:    Recent Labs   Lab Test 06/09/15  0620   INR 1.48*       D-DIMER:  No components found for: DDIMER    BNP:  Lab Results   Component Value Date    BNP 31 05/10/2007        UA:  No results for input(s): COLOR, APPEARANCE, URINEGLC, URINEBILI, URINEKETONE, SG, UBLD, URINEPH, PROTEIN, UROBILINOGEN, NITRITE, LEUKEST, RBCU, WBCU in the last 168 hours.    Russ Woo MD  Hospitalist   Text Page  ([phone])

## 2019-02-17 NOTE — PROVIDER NOTIFICATION
MD paged in regards to pt is really struggling to breath, face is beat red, and he's sweaty. MD asked to come lay eyes on pt please. Pulse running high 100-115. /86. Resp 24. Pt reporting SOB and chest pain.

## 2019-02-17 NOTE — PLAN OF CARE
PT admitted due to SOB. Apical pulse irregular, hr  (afib). RR up to 32. IV ativan 0.5 mg provided for air hunger. 02 3 lpm nc-94-96%. A&O. Up w/ SBA and walker, urinal at bedside. Tolerating diet. BS checks ordered, 250, covered per mar. IV rocephin provided. Scheduled neb treatments provided. Prn mucinex provided-- productive cough, unable to get sputum up. LS diminished exp. Wheezing. 2+ BLE edema, elevated on pillows.

## 2019-02-17 NOTE — PHARMACY-VANCOMYCIN DOSING SERVICE
Pharmacy Vancomycin Initial Note  Date of Service 2019  Patient's  1950  68 year old, male    Indication: Sepsis    Current estimated CrCl = Estimated Creatinine Clearance: 83 mL/min (based on SCr of 1.02 mg/dL).    Creatinine for last 3 days  2019: 12:59 PM Creatinine 1.07 mg/dL; 10:20 PM Creatinine 1.02 mg/dL    Recent Vancomycin Level(s) for last 3 days  No results found for requested labs within last 72 hours.      Vancomycin IV Administrations (past 72 hours)      No vancomycin orders with administrations in past 72 hours.                Nephrotoxins and other renal medications (From now, onward)    Start     Dose/Rate Route Frequency Ordered Stop    19 2300  vancomycin (VANCOCIN) 1,750 mg in sodium chloride 0.9 % 500 mL intermittent infusion      1,750 mg  over 2 Hours Intravenous EVERY 12 HOURS 19 2253      19 2245  piperacillin-tazobactam (ZOSYN) infusion 3.375 g     Comments:  Pharmacy can adjust dose based on renal function.    3.375 g  100 mL/hr over 30 Minutes Intravenous EVERY 6 HOURS 19 2235      19 2245  furosemide (LASIX) injection 20 mg      20 mg  over 1-2 Minutes Intravenous ONCE 19 2242      19 1600  furosemide (LASIX) tablet 20 mg      20 mg Oral 2 TIMES DAILY 19 1553            Contrast Orders - past 72 hours (72h ago, onward)    None                Plan:  1.  Start vancomycin  1750 mg IV q12h.   2.  Goal Trough Level: 15-20 mg/L   3.  Pharmacy will check trough levels as appropriate in 1-3 Days.    4. Serum creatinine levels will be ordered daily for the first week of therapy and at least twice weekly for subsequent weeks.    5. Artemas method utilized to dose vancomycin therapy: Method 1    Christiano Reyna

## 2019-02-17 NOTE — PROGRESS NOTES
[facility]  Medicine Progress Note - Hospitalist Service       Date of Admission:  2/16/2019  Assessment & Plan     68-year-old gentleman with a history of advanced COPD with multiple hospitalizations for that recently.  He was just discharged from the hospital after being admitted for COPD exacerbation. He came back to the ER next day. His recent history is most notable for 4 admissions in the last 6 months for pneumonia, COPD exacerbations, MSSA bacteremia thought to be due to pneumonia.  He also has a past medical history of atrial fibrillation with previous ablation, tricuspid valve replacement, hypertension, nonsustained VT.      After admission to the hospital, pt had worsening of his symptoms and required BiPAP treatment. Improved somewhat with BiPAP treatment but still remains dyspneic with increased work of breathing      1.  Acute hypoxic respiratory failure due to COPD exacerbation.    -- continue him on IV steroids, scheduled nebulizers and other treatment for his COPD, Xopenex nebs available PRN in case he is tachycardic   -- Continue IV Piperacillin/Tazobactam, stop vancomycin, monitor cultures   -- Obtain sputum cultures  -- continue BiPAP support as needed, and at night, if further worsening then may need transfer to ICU, pt ok with intubation if needed     2.  Recent diagnosis of PE.  This was just diagnosed when he was hospitalized a few days ago over here.  He had a small PE on the CT chest. I suspect this is more of an incidental finding with the major contributor being COPD to his symptoms. He received about 4 days of heparin and Lovenox in the hospital and was sent home on Eliquis.    -- Continue Eliquis 10 mg b.i.d. given the pulmonary embolism    3.  Previous history of atrial fibrillation/flutter, status post previous ablation procedure.  He did have issues with atrial fibrillation with RVR during previous hospitalizations.  He did follow with EP Cardiology as an outpatient.   The thought was to avoid a repeat ablation procedure at this time given his recent infectious issues which would decrease his chance of success.  He did have his propafenone dose increased.    -- Continue baseline mediations, HR doing ok so far  -- In terms of his anticoagulation, he is back on Eliquis now.  It seems like this was held at the time of discharge from previous hospitalization in January due to some episode of hemoptysis. No recurrence of hemoptysis and with concurrent PE, need to continue anti-coagulation     5.  Episode of MSSA bacteremia in December.  The patient completed a course of antibiotics.  He was on Augmentin PTA due to recent COPD exacerbation.  His CT chest done recently, 5 days ago, showed that the area of consolidation from before had much improved.  There was just a small area left on the CT scan, hence I will continue him on IV ABx, could probably finish course of Augmentin at discharge      6.  Physical deconditioning.      7.  Chronic kidney disease.  Monitor his creatinine; it appears to be at his baseline. Continue lasix      8.  Steroid related hyperglycemia, we will watch his blood sugars. Add sliding scale insulin.      9.  Continue him on his baseline dose of furosemide, Flomax. Pt tells me that he is no longer on trazodone and takes clonazepam instead      10.  Deep vein thrombosis prophylaxis, on Eliquis.     11.  Lactic acidosis. Due to respiratory distress, not sepsis, improving      CODE STATUS:  Full code    Diet: Regular Diet Adult    DVT Prophylaxis: Eliquis   Rodney Catheter: not present  Code Status: Full Code      Disposition Plan   Expected discharge: 4 - 7 days, recommended to prior living arrangement once resp status better.  Entered: Best Tucker MD 02/17/2019, 3:05 PM       The patient's care was discussed with the Bedside Nurse and Patient.    Best Tucker MD  Hospitalist Service  [facility]  Hospital    ______________________________________________________________________    Interval History   Chart reviewed and patient seen. Case discussed with nursing staff and RT     Patient had RRT last night due to increased resp distress, received IV lasix and BiPAP, doing a little better this morning but still with some accessory muscle use,  No reports of abdominal pain or vomiting. No nausea, some pain from the cough, No new complaints voiced otherwise.       Data reviewed today: I reviewed all medications, new labs and imaging results over the last 24 hours. I personally reviewed    Physical Exam   Vital Signs: Temp: 96.7  F (35.9  C) Temp src: Oral BP: 143/89 Pulse: 74 Heart Rate: 92 Resp: 28 SpO2: 94 % O2 Device: Nasal cannula Oxygen Delivery: 3 LPM  Weight: 184 lbs 0 oz    GENERAL: Awake and alert, in modest respiratory distress , able to converse in complete sentences   PSYCH: appropriate affect, no acute agitation   HEENT:  Neck is Supple, trachea is midline, EOMI, conjunctiva clear  CARDIOVASCULAR: Irregular rate and rhythm, Normal S1, S2, early systolic murmur  PULMONARY:  Diminished lung sounds with bilateral exp wheezing  GI: Abdomen is soft, non tender, non-distended, bowel sounds present. No rebound or guarding   SKIN:  No cyanosis, no obvious exanthems on exposed areas   MSK: Extremities are warm and well perfused. No pitting edema   Neuro: Awake and oriented x 3. Moving all extremities with good strength     Data   Recent Labs   Lab 02/17/19  0718 02/16/19  2220 02/16/19  1259  02/11/19  1209   WBC 9.5 9.7 10.4   < > 15.1*   HGB 13.6 14.4 14.8   < > 16.9   MCV 96 96 94   < > 93    177 175   < > 118*    137 139   < > 139   POTASSIUM 4.6 4.7 4.4   < > 4.8   CHLORIDE 101 101 100   < > 104   CO2 33* 28 33*   < > 26   BUN 32* 35* 32*   < > 32*   CR 1.01 1.02 1.07   < > 1.11   ANIONGAP 3 8 6   < > 9   WILBERT 8.9 9.0 9.7   < > 9.6   * 251* 200*   < > 139*   TROPI  --  <0.015 0.018  --   0.017    < > = values in this interval not displayed.

## 2019-02-17 NOTE — PROGRESS NOTES
RT: Patient has been on/ off BIPAP throughout shift. Breathing is labored today with marked use of accessory muscles. BBS diminished with expiratory wheezes. Off BIPAP patient wears his home O2 setting of 3L. Duoneb given as scheduled.    Patient's wife wanted to know other options for managing dyspnea at home. We discussed home BIPAP. They want more information about qualifying for home BIPAP and if it would be a good option to manage patient's COPD exacerbations.    Will continue to follow and assess.

## 2019-02-17 NOTE — PLAN OF CARE
Ambulatory Status:  Pt not OOB my shift.  VS:  T 96.1, P 72, R 24, /79, O2 98% on 3 L via NC, Pt now on bi pap.  Pain:  oxycodone given for abdomen and chest pain.  Resp: LS *fine crackles and exp wheezes noted throughout.  GI:  Denies nausea.  Fair appetite and on reg diet (NPO once Bi pap started).  BS audible and active throughout.  Passing flatus.  Last BM 2-.  :  urinal voiding  Skin:  leg swelling, L peripheral IV SL  Tx:  MD paged. See previous notes.  Labs:  glucose 210 and 232, Critical Lactic 4.3 MD notified.  Consults:  advanced directive IP consult. Respiratory  Disposition:  will continue to monitor pt.

## 2019-02-17 NOTE — PROGRESS NOTES
RT Note      ABG drawn from right wrist. Patient just placed on BIPAP with 50% FIO2.      Tolerated it well.

## 2019-02-17 NOTE — PROGRESS NOTES
RT Note      Patient remain on BIPAP through the night.    BIPAP settin/7 backup rate of 14 and 40% FIO2. Patient tolerating it well.    Vital signs:  Temp: 96.1  F (35.6  C) Temp src: Oral BP: 130/79 Pulse: 101 Heart Rate: 72 Resp: 24 SpO2: 98 % O2 Device: BiPAP/CPAP Oxygen Delivery: 3 LPM Height: 182.9 cm (6') Weight: 83.5 kg (184 lb)  Estimated body mass index is 24.95 kg/m  as calculated from the following:    Height as of this encounter: 1.829 m (6').    Weight as of this encounter: 83.5 kg (184 lb).      Was unable to get a good seal with patient's beard. Tried different size masks. Patient has good tidal volumes and saturation but not a good seal.     Tolerated treatment well. Auscultated diminished expiratory wheezes. No change pre and post treatment.        Will continue to follow and monitor.      Sara Lees, RRT

## 2019-02-17 NOTE — CONSULTS
Care Transition Initial Assessment - RN        Met with: Patient.  DATA   Active Problems:    COPD exacerbation (H)       Cognitive Status: awake and alert.  Primary Care Clinic Name: [facility]  Primary Care MD Name: Ish Guerra  Contact information and PCP information verified: Yes  Lives With: spouse                     Insurance concerns: No Insurance issues identified  ASSESSMENT  Patient currently receives the following services:  Home O2 through [facility].        Identified issues/concerns regarding health management: CTS following patient due to COPD exacerbation and high CALEB.  CTS met with patient at bedside, visit brief as patient had significant coughing.  Pt admitted 2/16/19 for COPD exacerbation.  Per chart review admitted 2/11-2/14 for PE and 12/17-1/8 for acute on chronic hypoxic resp failure d/t community acquired pna and severe sepsis.  Pt receives no home services and is provided home O2 by [facility].  COPD action plan reviewed with patient as well as when to follow up, pt denies questions.  Pt will schedule his own follow up appointments as recommended.      PLAN  Financial costs for the patient not discussed.  Patient given options and choices for discharge:  N/A at this time.  Patient/family is agreeable to the plan?  Yes  Patient anticipates discharging to home w/spouse.        Patient anticipates needs for home equipment: No  Plan/Disposition: Home   Appointments: Pt will schedule as needed.    Care  (CTS) will continue to follow as needed.    Laure Saini RN CTS  Care Transitions Team  [phone]

## 2019-02-18 LAB
ANION GAP SERPL CALCULATED.3IONS-SCNC: 11 MMOL/L (ref 3–14)
BUN SERPL-MCNC: 43 MG/DL (ref 7–30)
CALCIUM SERPL-MCNC: 9.2 MG/DL (ref 8.5–10.1)
CHLORIDE SERPL-SCNC: 98 MMOL/L (ref 94–109)
CO2 SERPL-SCNC: 25 MMOL/L (ref 20–32)
CREAT SERPL-MCNC: 1.53 MG/DL (ref 0.66–1.25)
GFR SERPL CREATININE-BSD FRML MDRD: 46 ML/MIN/{1.73_M2}
GLUCOSE BLDC GLUCOMTR-MCNC: 174 MG/DL (ref 70–99)
GLUCOSE BLDC GLUCOMTR-MCNC: 225 MG/DL (ref 70–99)
GLUCOSE BLDC GLUCOMTR-MCNC: 226 MG/DL (ref 70–99)
GLUCOSE BLDC GLUCOMTR-MCNC: 228 MG/DL (ref 70–99)
GLUCOSE BLDC GLUCOMTR-MCNC: 249 MG/DL (ref 70–99)
GLUCOSE SERPL-MCNC: 198 MG/DL (ref 70–99)
INTERPRETATION ECG - MUSE: NORMAL
POTASSIUM SERPL-SCNC: 4.6 MMOL/L (ref 3.4–5.3)
SODIUM SERPL-SCNC: 134 MMOL/L (ref 133–144)

## 2019-02-18 PROCEDURE — 40000275 ZZH STATISTIC RCP TIME EA 10 MIN

## 2019-02-18 PROCEDURE — 80048 BASIC METABOLIC PNL TOTAL CA: CPT | Performed by: INTERNAL MEDICINE

## 2019-02-18 PROCEDURE — 25000128 H RX IP 250 OP 636: Performed by: STUDENT IN AN ORGANIZED HEALTH CARE EDUCATION/TRAINING PROGRAM

## 2019-02-18 PROCEDURE — 94640 AIRWAY INHALATION TREATMENT: CPT | Mod: 76

## 2019-02-18 PROCEDURE — 00000146 ZZHCL STATISTIC GLUCOSE BY METER IP

## 2019-02-18 PROCEDURE — 36415 COLL VENOUS BLD VENIPUNCTURE: CPT | Performed by: INTERNAL MEDICINE

## 2019-02-18 PROCEDURE — 25000132 ZZH RX MED GY IP 250 OP 250 PS 637: Performed by: INTERNAL MEDICINE

## 2019-02-18 PROCEDURE — 94660 CPAP INITIATION&MGMT: CPT

## 2019-02-18 PROCEDURE — 99233 SBSQ HOSP IP/OBS HIGH 50: CPT | Performed by: INTERNAL MEDICINE

## 2019-02-18 PROCEDURE — 25000125 ZZHC RX 250: Performed by: INTERNAL MEDICINE

## 2019-02-18 PROCEDURE — 12000000 ZZH R&B MED SURG/OB

## 2019-02-18 PROCEDURE — 94640 AIRWAY INHALATION TREATMENT: CPT

## 2019-02-18 PROCEDURE — 25000128 H RX IP 250 OP 636: Performed by: INTERNAL MEDICINE

## 2019-02-18 RX ORDER — MULTIPLE VITAMINS W/ MINERALS TAB 9MG-400MCG
1 TAB ORAL DAILY
Status: DISCONTINUED | OUTPATIENT
Start: 2019-02-18 | End: 2019-02-28 | Stop reason: HOSPADM

## 2019-02-18 RX ADMIN — METHYLPREDNISOLONE SODIUM SUCCINATE 62.5 MG: 125 INJECTION, POWDER, FOR SOLUTION INTRAMUSCULAR; INTRAVENOUS at 08:33

## 2019-02-18 RX ADMIN — DILTIAZEM HYDROCHLORIDE 180 MG: 180 CAPSULE, COATED, EXTENDED RELEASE ORAL at 19:36

## 2019-02-18 RX ADMIN — OXYCODONE HYDROCHLORIDE 10 MG: 5 TABLET ORAL at 15:58

## 2019-02-18 RX ADMIN — FUROSEMIDE 20 MG: 20 TABLET ORAL at 08:32

## 2019-02-18 RX ADMIN — OXYCODONE HYDROCHLORIDE 10 MG: 5 TABLET ORAL at 08:32

## 2019-02-18 RX ADMIN — CLONAZEPAM 1 MG: 1 TABLET ORAL at 19:33

## 2019-02-18 RX ADMIN — TAZOBACTAM SODIUM AND PIPERACILLIN SODIUM 3.38 G: 375; 3 INJECTION, SOLUTION INTRAVENOUS at 03:59

## 2019-02-18 RX ADMIN — METHYLPREDNISOLONE SODIUM SUCCINATE 62.5 MG: 125 INJECTION, POWDER, FOR SOLUTION INTRAMUSCULAR; INTRAVENOUS at 19:32

## 2019-02-18 RX ADMIN — IPRATROPIUM BROMIDE AND ALBUTEROL SULFATE 3 ML: .5; 3 SOLUTION RESPIRATORY (INHALATION) at 11:35

## 2019-02-18 RX ADMIN — TAZOBACTAM SODIUM AND PIPERACILLIN SODIUM 3.38 G: 375; 3 INJECTION, SOLUTION INTRAVENOUS at 18:34

## 2019-02-18 RX ADMIN — Medication 225 MG: at 13:49

## 2019-02-18 RX ADMIN — Medication 225 MG: at 05:56

## 2019-02-18 RX ADMIN — IPRATROPIUM BROMIDE AND ALBUTEROL SULFATE 3 ML: .5; 3 SOLUTION RESPIRATORY (INHALATION) at 16:18

## 2019-02-18 RX ADMIN — TAMSULOSIN HYDROCHLORIDE 0.4 MG: 0.4 CAPSULE ORAL at 08:32

## 2019-02-18 RX ADMIN — APIXABAN 10 MG: 5 TABLET, FILM COATED ORAL at 19:33

## 2019-02-18 RX ADMIN — ALBUTEROL SULFATE 2.5 MG: 2.5 SOLUTION RESPIRATORY (INHALATION) at 07:15

## 2019-02-18 RX ADMIN — IPRATROPIUM BROMIDE AND ALBUTEROL SULFATE 3 ML: .5; 3 SOLUTION RESPIRATORY (INHALATION) at 20:04

## 2019-02-18 RX ADMIN — DILTIAZEM HYDROCHLORIDE 180 MG: 180 CAPSULE, COATED, EXTENDED RELEASE ORAL at 08:32

## 2019-02-18 RX ADMIN — OXYCODONE HYDROCHLORIDE 10 MG: 5 TABLET ORAL at 03:58

## 2019-02-18 RX ADMIN — INSULIN ASPART 3 UNITS: 100 INJECTION, SOLUTION INTRAVENOUS; SUBCUTANEOUS at 17:00

## 2019-02-18 RX ADMIN — INSULIN ASPART 2 UNITS: 100 INJECTION, SOLUTION INTRAVENOUS; SUBCUTANEOUS at 08:49

## 2019-02-18 RX ADMIN — TAZOBACTAM SODIUM AND PIPERACILLIN SODIUM 3.38 G: 375; 3 INJECTION, SOLUTION INTRAVENOUS at 12:47

## 2019-02-18 RX ADMIN — MULTIPLE VITAMINS W/ MINERALS TAB 1 TABLET: TAB at 12:47

## 2019-02-18 RX ADMIN — APIXABAN 10 MG: 5 TABLET, FILM COATED ORAL at 08:32

## 2019-02-18 RX ADMIN — Medication 225 MG: at 19:32

## 2019-02-18 ASSESSMENT — ACTIVITIES OF DAILY LIVING (ADL)
ADLS_ACUITY_SCORE: 14

## 2019-02-18 NOTE — PROGRESS NOTES
Patient fell at 0630 this AM. Patient states he was using the urinal and started to drop the urinal and tried to catch it and slid to floor. Denies hitting head or any pain. States he just slid slowly to floor. Was on O2 via nasal canula at time of fall running at 3L. O2 sats dropped to 68% while on floor. Patient was unable to get up. RRT called. Then able to assist patient to bed with assist of 2 per MD instruction

## 2019-02-18 NOTE — ACP (ADVANCE CARE PLANNING)
SPIRITUAL HEALTH SERVICES Progress Note   [facility] Advance Directive Education    Responded to a consult order for Advance Care Planning (ACP) education for Tone Cooper.  ACP education and materials provided.  Tone reported that he will likely name his wife Gloria as his primary healthcare agent and his brother Christiano as his first alternate.      Oriented him to  services.  Tone shared that he recently had a conversation with one of his care providers who affirmed that he is approaching end of life.  Tone acknowledged feeling scared about it. Validated his feelings.  He described himself as a believer in [employer].  Invited him to consider his personal goals for the remainder of his life.    Informed him how he can request further  support.  This author and other chaplains remain available if pt/family request.    Du Ahuja M.Div., [facility]  Staff   Pager [phone]

## 2019-02-18 NOTE — PLAN OF CARE
Tachypnea, Afib. On and off bipap throughout shift. When off bipap patient able to maintain 02 94% w/ 3 LPM at rest. Up w/ SBA to restroom, placed on 5 LPM NC sats dropped to 92% w/ Labored breathing. Refused bipap at that time. Currently on bipap. A&Ox4. PRN oxycodone 5 mg po x1 w/ little relief, 10 mg po oxy x1 w/ increased pain relief, prn tylenol x1. Tolerating regular diet, bs 177,174,268, covered per mar. Continues on IV zosyn, IV solumedrol dose increased, sched. Nebs. LS diminished w/ Exp. Wheezing and abd muscle use. Prn mucinex.

## 2019-02-18 NOTE — PROGRESS NOTES
Rapid response was called because, while getting up to go to the bathroom, patient slid to the floor.  He sustained no injuries.  He denies any pain.  He did not hit his head.  He has a small abrasion on the right side of his back without bleeding.  He is also a bit wheezy.  Physical exam is otherwise unremarkable.  It is noted that he is anticoagulated with Eliquis.  Monitor for now.  No imaging or labs ordered at this time.

## 2019-02-18 NOTE — PLAN OF CARE
Alert and oriented. Complains of pain in abdomin and chest. Oxycodone  helpful. Up with assist of 1. Using Bi Pap most of shift. Sats  drop to upper 80's when on O2 at 4L NC. Lung sounds diminished with some expiratory wheezes.

## 2019-02-18 NOTE — PROGRESS NOTES
[facility]  Medicine Progress Note - Hospitalist Service       Date of Admission:  2/16/2019  Assessment & Plan     68-year-old gentleman with a history of advanced COPD with multiple hospitalizations for that recently.  He was just discharged from the hospital after being admitted for COPD exacerbation. He came back to the ER next day. His recent history is most notable for 4 admissions in the last 6 months for pneumonia, COPD exacerbations, MSSA bacteremia thought to be due to pneumonia.  He also has a past medical history of atrial fibrillation with previous ablation, tricuspid valve replacement, hypertension, nonsustained VT.      After admission to the hospital, pt had worsening of his symptoms and required BiPAP treatment. Improved somewhat with BiPAP treatment but still remains dyspneic with increased work of breathing      1.  Acute hypoxic respiratory failure due to COPD exacerbation.    -- Overall, breathing is a little better today  -- continue him on IV steroids, scheduled nebulizers and other treatment for his COPD, Xopenex nebs available PRN in case he is tachycardic   -- Continue IV Piperacillin/Tazobactam, stop vancomycin, monitor cultures   -- Obtain sputum cultures  -- continue BiPAP support as needed, if he has further worsening then may need transfer to ICU, pt ok with intubation if needed   -- Will ask SW consult to see if patient would be a candidate for Home Trilogy machine and NIPPV given his multiple readmissions     2.  Recent diagnosis of PE.  This was just diagnosed when he was hospitalized a few days ago over here.  He had a small PE on the CT chest. I suspect this is more of an incidental finding with the major contributor being COPD to his symptoms. He received about 4 days of heparin and Lovenox in the hospital and was sent home on Eliquis.    -- Continue Eliquis 10 mg b.i.d. given the pulmonary embolism    3.  Previous history of atrial fibrillation/flutter, status post  previous ablation procedure.  He did have issues with atrial fibrillation with RVR during previous hospitalizations.  He did follow with EP Cardiology as an outpatient.  The thought was to avoid a repeat ablation procedure at this time given his recent infectious issues which would decrease his chance of success.  He did have his propafenone dose increased.    -- Continue baseline mediations, HR doing ok so far  -- In terms of his anticoagulation, he is back on Eliquis now.  It seems like this was held at the time of discharge from previous hospitalization in January due to hemoptysis. No recurrence of hemoptysis and with concurrent PE, need to continue anti-coagulation     5.  Episode of MSSA bacteremia in December.  The patient completed a course of antibiotics.  He was on Augmentin PTA due to recent COPD exacerbation.  His CT chest done recently, 5 days ago, showed that the area of consolidation from before had much improved.  There was just a small area left on the CT scan, hence I will continue him on IV ABx, could probably finish course of Augmentin at discharge      6.  Physical deconditioning.      7. DONG on Chronic kidney disease.  Monitor his creatinine; Increase in Cr to 1.5 today, hold lasix, he received CT contrast about a week ago so could be component of contrast nephropathy, monitor closely     8.  Steroid related hyperglycemia, we will watch his blood sugars. Add sliding scale insulin.      9.  Continue him on his baseline dose of furosemide, Flomax. Pt tells me that he is no longer on trazodone and takes clonazepam instead      10.  Deep vein thrombosis prophylaxis, on Eliquis.     11.  Lactic acidosis. Due to respiratory distress, not sepsis, improving      CODE STATUS:  Full code    Diet: Regular Diet Adult    DVT Prophylaxis: Eliquis   Rodney Catheter: not present  Code Status: Full Code      Disposition Plan   Expected discharge: 4 - 7 days, recommended to prior living arrangement once resp  status better.  Entered: Best Tucker MD 02/18/2019, 3:57 PM       The patient's care was discussed with the Bedside Nurse and Patient.    Best Tucker MD  Hospitalist Service  [facility]    ______________________________________________________________________    Interval History   Chart reviewed and patient seen. Case discussed with nursing staff    Patient had another RRT overnight due to fall, he reports that he was bending down to  a urinal and slid down to floor, denies any injury, overall he feels better, breathing is better, spending more time off BiPAP, No reports of abdominal pain or vomiting. No nausea, some pain from the cough, No new complaints voiced otherwise.       Data reviewed today: I reviewed all medications, new labs and imaging results over the last 24 hours. I personally reviewed    Physical Exam   Vital Signs: Temp: 98  F (36.7  C) Temp src: Oral BP: (!) 147/92   Heart Rate: 76 Resp: 20 SpO2: 94 % O2 Device: Nasal cannula Oxygen Delivery: 7 LPM  Weight: 184 lbs 0 oz    GENERAL: Awake and alert, in modest respiratory distress , able to converse in complete sentences   PSYCH: appropriate affect, no acute agitation   HEENT:  Neck is Supple, trachea is midline, EOMI, conjunctiva clear  CARDIOVASCULAR: Irregular rate and rhythm, Normal S1, S2, early systolic murmur  PULMONARY:  Diminished lung sounds with bilateral exp wheezing  GI: Abdomen is soft, non tender, non-distended, bowel sounds present. No rebound or guarding   SKIN:  No cyanosis, no obvious exanthems on exposed areas   MSK: Extremities are warm and well perfused. No pitting edema   Neuro: Awake and oriented x 3. Moving all extremities with good strength     Data   Recent Labs   Lab 02/18/19  0656 02/17/19  0718 02/16/19  2220 02/16/19  1259   WBC  --  9.5 9.7 10.4   HGB  --  13.6 14.4 14.8   MCV  --  96 96 94   PLT  --  167 177 175    137 137 139   POTASSIUM 4.6 4.6 4.7 4.4   CHLORIDE 98 101 101  100   CO2 25 33* 28 33*   BUN 43* 32* 35* 32*   CR 1.53* 1.01 1.02 1.07   ANIONGAP 11 3 8 6   WILBERT 9.2 8.9 9.0 9.7   * 188* 251* 200*   TROPI  --   --  <0.015 0.018

## 2019-02-18 NOTE — CONSULTS
"CLINICAL NUTRITION SERVICES  -  ASSESSMENT NOTE    Malnutrition:   % Weight Loss:  None noted --> ?fluid shifts  % Intake:  Decreased intake does not meet criteria for malnutrition - eating great  Subcutaneous Fat Loss:  None observed  Muscle Loss:  None observed  Fluid Retention:  None noted    Malnutrition Diagnosis: Patient does not meet two of the above criteria necessary for diagnosing malnutrition     REASON FOR ASSESSMENT  Tone Cooper is a 68 year old male seen by Registered Dietitian for Admission Nutrition Risk Screen - reduced oral intake over the last month \"(Has gained weight)\"    NUTRITION HISTORY  - Information obtained from COPD, afib. Presented to ED with shortness of breath. Recent admission 2/11 for pulmonary embolism, pneumonia, COPD exacerbation with discharge on 2/15.  - Tone verbalizes minimal appetite, though actually eats quite well (at least while inpatient). Verbalizes mouth sores as biggest barrier.   - At home, his wife cooks but her mealtimes don't always match up to when Tone wants to eat.   - Tone describes himself at a \"grazer\", and is used to eating snacks every few hours. He even keeps snacks at his bedside.   - Tried protein drinks at home, has not found one that he likes.  - Verbalizes sustained weight gain since Nov 2018, with the addition of Prednisone to his med list. --> also a good portion likely fluid related.   - Pt follows a regular diet   - NKFA    CURRENT NUTRITION ORDERS  Diet Order:     Regular     Current Intake/Tolerance:  100% intake recorded  Pt has been ordering larger meals, some multiple per mealtime since admission.    Assisted pt in ordering lunch meal - very large meal with multiple sides, desserts, and 2 cartons milk. Apple pie with american cheese on top.     PHYSICAL FINDINGS  Observed  Nasal Cannula in place. When writer first arrived to room NC was twisted to the side- pt fixed once alerted.  Uses Bipap at night   Obtained from Chart/Interdisciplinary " "Team  Fall this morning - no sustained injury    ANTHROPOMETRICS  Height: 6' 0\"  Weight: 184 lbs 0 oz (83.5 kg)   Body mass index is 24.95 kg/m .  Weight Status:  Normal BMI  IBW: 80.9 kg  % IBW: 103%  Weight History: Suspect weight changes related to fluid shifts. Pt verbalizes weight gain.  Wt Readings from Last 10 Encounters:   02/16/19 83.5 kg (184 lb)   02/14/19 88.1 kg (194 lb 4.8 oz)   01/18/19 84.4 kg (186 lb)   01/07/19 86.6 kg (191 lb)   12/03/18 87.5 kg (193 lb)   11/23/18 80.7 kg (178 lb)   11/05/18 82.1 kg (181 lb)   10/04/18 78.5 kg (173 lb)   11/17/17 83.3 kg (183 lb 9.6 oz)   10/04/17 78.9 kg (174 lb)       LABS  Labs reviewed  Recent Labs   Lab Test 02/18/19  0656 02/17/19  0718 02/16/19  2220 02/16/19  1259 02/13/19  0634   POTASSIUM 4.6 4.6 4.7 4.4 4.6     No results for input(s): PHOS in the last 70916 hours.  Recent Labs   Lab Test 05/28/15  0954 04/20/15  0856 12/24/10  1105   MAG 2.2 2.0 2.0     Recent Labs   Lab Test 02/18/19  0656 02/17/19  0718 02/16/19  2220 02/16/19  1259 02/13/19  0634    137 137 139 135     Recent Labs   Lab Test 02/18/19  0656 02/17/19  0718 02/16/19  2220 02/16/19  1259 02/13/19  0634   CR 1.53* 1.01 1.02 1.07 1.09     Recent Labs   Lab 02/18/19  0656 02/17/19  0718 02/16/19  2220 02/16/19  1259 02/13/19  0634 02/11/19  1209   * 188* 251* 200* 175* 139*     Lab Results   Component Value Date    A1C 6.0 12/19/2018       MEDICATIONS  Medications reviewed  Lasix 20 mg BID  MSSI  Solumedrol    ASSESSED NUTRITION NEEDS PER APPROVED PRACTICE GUIDELINES:  Dosing Weight 83.5 kg  Estimated Energy Needs: 1452-7395 kcals (25-30 Kcal/Kg)  Justification: maintenance with increased work of breathing, COPD  Estimated Protein Needs: 100-125 grams protein (1.2-1.5 g pro/Kg)  Justification: preservation of lean body mass  Estimated Fluid Needs: >1 mL/kcal or per MD pending fluid status   Justification: maintenance and per provider pending fluid status    MALNUTRITION:  % " Weight Loss:  None noted --> ?fluid shifts  % Intake:  Decreased intake does not meet criteria for malnutrition - eating great  Subcutaneous Fat Loss:  None observed  Muscle Loss:  None observed  Fluid Retention:  None noted    Malnutrition Diagnosis: Patient does not meet two of the above criteria necessary for diagnosing malnutrition    NUTRITION DIAGNOSIS:  Increased nutrient needs (protein/kcal) related to high demand with chronic COPD/work of breathing as evidenced by protein/energy needs as estimated above.       NUTRITION INTERVENTIONS  Recommendations / Nutrition Prescription  Continue diet per MD     Discussed high protein supplements. No indication of need at this time given adequate intakes.     MVM daily       Implementation  Nutrition education: Provided education on supplements available, protein needs. Pt eating adequately no need for scheduled supps at this time.   Multivitamin/Mineral: see above  Collaboration and Referral of Nutrition care: patient discussed during interdisciplinary team rounds.       Nutrition Goals  Pt to tolerate at least 75% meals TID.       MONITORING AND EVALUATION:  Progress towards goals will be monitored and evaluated per protocol and Practice Guidelines    Alfreda Leone RD, LD  3rd floor/ICU: [phone]  All other floors: [phone]  Weekend/holiday: [phone]  Office: [phone]

## 2019-02-19 LAB
ALBUMIN UR-MCNC: 30 MG/DL
ANION GAP SERPL CALCULATED.3IONS-SCNC: 6 MMOL/L (ref 3–14)
APPEARANCE UR: ABNORMAL
BASE EXCESS BLDA CALC-SCNC: 7.2 MMOL/L
BASE EXCESS BLDV CALC-SCNC: 5.3 MMOL/L
BILIRUB UR QL STRIP: NEGATIVE
BUN SERPL-MCNC: 60 MG/DL (ref 7–30)
CALCIUM SERPL-MCNC: 9.4 MG/DL (ref 8.5–10.1)
CHLORIDE SERPL-SCNC: 95 MMOL/L (ref 94–109)
CO2 SERPL-SCNC: 32 MMOL/L (ref 20–32)
COLOR UR AUTO: YELLOW
CREAT SERPL-MCNC: 1.89 MG/DL (ref 0.66–1.25)
CREAT UR-MCNC: 73 MG/DL
ERYTHROCYTE [DISTWIDTH] IN BLOOD BY AUTOMATED COUNT: 15.1 % (ref 10–15)
FRACT EXCRET NA UR+SERPL-RTO: 0.3 %
GFR SERPL CREATININE-BSD FRML MDRD: 36 ML/MIN/{1.73_M2}
GLUCOSE BLDC GLUCOMTR-MCNC: 159 MG/DL (ref 70–99)
GLUCOSE BLDC GLUCOMTR-MCNC: 162 MG/DL (ref 70–99)
GLUCOSE BLDC GLUCOMTR-MCNC: 190 MG/DL (ref 70–99)
GLUCOSE BLDC GLUCOMTR-MCNC: 252 MG/DL (ref 70–99)
GLUCOSE SERPL-MCNC: 236 MG/DL (ref 70–99)
GLUCOSE UR STRIP-MCNC: 50 MG/DL
HCO3 BLD-SCNC: 36 MMOL/L (ref 21–28)
HCO3 BLDV-SCNC: 37 MMOL/L (ref 21–28)
HCT VFR BLD AUTO: 44.1 % (ref 40–53)
HGB BLD-MCNC: 13.6 G/DL (ref 13.3–17.7)
HGB UR QL STRIP: NEGATIVE
HYALINE CASTS #/AREA URNS LPF: 5 /LPF (ref 0–2)
KETONES UR STRIP-MCNC: NEGATIVE MG/DL
LEUKOCYTE ESTERASE UR QL STRIP: NEGATIVE
MCH RBC QN AUTO: 30.2 PG (ref 26.5–33)
MCHC RBC AUTO-ENTMCNC: 30.8 G/DL (ref 31.5–36.5)
MCV RBC AUTO: 98 FL (ref 78–100)
MRSA DNA SPEC QL NAA+PROBE: NEGATIVE
MUCOUS THREADS #/AREA URNS LPF: PRESENT /LPF
NITRATE UR QL: NEGATIVE
O2/TOTAL GAS SETTING VFR VENT: ABNORMAL %
O2/TOTAL GAS SETTING VFR VENT: ABNORMAL %
OXYHGB MFR BLD: 95 % (ref 92–100)
OXYHGB MFR BLDV: 81 %
PCO2 BLD: 69 MM HG (ref 35–45)
PCO2 BLDV: 99 MM HG (ref 40–50)
PH BLD: 7.32 PH (ref 7.35–7.45)
PH BLDV: 7.19 PH (ref 7.32–7.43)
PH UR STRIP: 5 PH (ref 5–7)
PLATELET # BLD AUTO: 181 10E9/L (ref 150–450)
PO2 BLD: 74 MM HG (ref 80–105)
PO2 BLDV: 46 MM HG (ref 25–47)
POTASSIUM SERPL-SCNC: 5.2 MMOL/L (ref 3.4–5.3)
RBC # BLD AUTO: 4.5 10E12/L (ref 4.4–5.9)
RBC #/AREA URNS AUTO: 2 /HPF (ref 0–2)
SODIUM SERPL-SCNC: 133 MMOL/L (ref 133–144)
SODIUM UR-SCNC: 14 MMOL/L
SOURCE: ABNORMAL
SP GR UR STRIP: 1.02 (ref 1–1.03)
SPECIMEN SOURCE: NORMAL
UROBILINOGEN UR STRIP-MCNC: 0 MG/DL (ref 0–2)
WBC # BLD AUTO: 15.4 10E9/L (ref 4–11)
WBC #/AREA URNS AUTO: 1 /HPF (ref 0–5)

## 2019-02-19 PROCEDURE — 36415 COLL VENOUS BLD VENIPUNCTURE: CPT | Performed by: STUDENT IN AN ORGANIZED HEALTH CARE EDUCATION/TRAINING PROGRAM

## 2019-02-19 PROCEDURE — 40000275 ZZH STATISTIC RCP TIME EA 10 MIN

## 2019-02-19 PROCEDURE — 40000281 ZZH STATISTIC TRANSPORT TIME EA 15 MIN

## 2019-02-19 PROCEDURE — 00000146 ZZHCL STATISTIC GLUCOSE BY METER IP

## 2019-02-19 PROCEDURE — 87641 MR-STAPH DNA AMP PROBE: CPT | Performed by: INTERNAL MEDICINE

## 2019-02-19 PROCEDURE — 99291 CRITICAL CARE FIRST HOUR: CPT | Performed by: INTERNAL MEDICINE

## 2019-02-19 PROCEDURE — 25000132 ZZH RX MED GY IP 250 OP 250 PS 637: Performed by: INTERNAL MEDICINE

## 2019-02-19 PROCEDURE — 81001 URINALYSIS AUTO W/SCOPE: CPT | Performed by: INTERNAL MEDICINE

## 2019-02-19 PROCEDURE — 25000128 H RX IP 250 OP 636: Performed by: INTERNAL MEDICINE

## 2019-02-19 PROCEDURE — 94640 AIRWAY INHALATION TREATMENT: CPT

## 2019-02-19 PROCEDURE — 80048 BASIC METABOLIC PNL TOTAL CA: CPT | Performed by: INTERNAL MEDICINE

## 2019-02-19 PROCEDURE — 87640 STAPH A DNA AMP PROBE: CPT | Performed by: INTERNAL MEDICINE

## 2019-02-19 PROCEDURE — 85027 COMPLETE CBC AUTOMATED: CPT | Performed by: INTERNAL MEDICINE

## 2019-02-19 PROCEDURE — 20000003 ZZH R&B ICU

## 2019-02-19 PROCEDURE — 36415 COLL VENOUS BLD VENIPUNCTURE: CPT | Performed by: INTERNAL MEDICINE

## 2019-02-19 PROCEDURE — 94660 CPAP INITIATION&MGMT: CPT

## 2019-02-19 PROCEDURE — 82805 BLOOD GASES W/O2 SATURATION: CPT | Performed by: INTERNAL MEDICINE

## 2019-02-19 PROCEDURE — 25000128 H RX IP 250 OP 636: Performed by: STUDENT IN AN ORGANIZED HEALTH CARE EDUCATION/TRAINING PROGRAM

## 2019-02-19 PROCEDURE — 94640 AIRWAY INHALATION TREATMENT: CPT | Mod: 76

## 2019-02-19 PROCEDURE — 25000131 ZZH RX MED GY IP 250 OP 636 PS 637: Performed by: INTERNAL MEDICINE

## 2019-02-19 PROCEDURE — 82805 BLOOD GASES W/O2 SATURATION: CPT | Performed by: STUDENT IN AN ORGANIZED HEALTH CARE EDUCATION/TRAINING PROGRAM

## 2019-02-19 PROCEDURE — 36600 WITHDRAWAL OF ARTERIAL BLOOD: CPT

## 2019-02-19 PROCEDURE — 99233 SBSQ HOSP IP/OBS HIGH 50: CPT | Performed by: INTERNAL MEDICINE

## 2019-02-19 PROCEDURE — 25000125 ZZHC RX 250: Performed by: INTERNAL MEDICINE

## 2019-02-19 PROCEDURE — 82570 ASSAY OF URINE CREATININE: CPT | Performed by: INTERNAL MEDICINE

## 2019-02-19 PROCEDURE — 84300 ASSAY OF URINE SODIUM: CPT | Performed by: INTERNAL MEDICINE

## 2019-02-19 RX ORDER — NALOXONE HYDROCHLORIDE 0.4 MG/ML
.1-.4 INJECTION, SOLUTION INTRAMUSCULAR; INTRAVENOUS; SUBCUTANEOUS
Status: DISCONTINUED | OUTPATIENT
Start: 2019-02-19 | End: 2019-02-28 | Stop reason: HOSPADM

## 2019-02-19 RX ADMIN — IPRATROPIUM BROMIDE AND ALBUTEROL SULFATE 3 ML: .5; 3 SOLUTION RESPIRATORY (INHALATION) at 12:28

## 2019-02-19 RX ADMIN — APIXABAN 10 MG: 5 TABLET, FILM COATED ORAL at 08:27

## 2019-02-19 RX ADMIN — CLONAZEPAM 1 MG: 1 TABLET ORAL at 20:48

## 2019-02-19 RX ADMIN — METHYLPREDNISOLONE SODIUM SUCCINATE 62.5 MG: 125 INJECTION, POWDER, FOR SOLUTION INTRAMUSCULAR; INTRAVENOUS at 07:56

## 2019-02-19 RX ADMIN — INSULIN GLARGINE 5 UNITS: 100 INJECTION, SOLUTION SUBCUTANEOUS at 09:56

## 2019-02-19 RX ADMIN — IPRATROPIUM BROMIDE AND ALBUTEROL SULFATE 3 ML: .5; 3 SOLUTION RESPIRATORY (INHALATION) at 15:54

## 2019-02-19 RX ADMIN — DILTIAZEM HYDROCHLORIDE 180 MG: 180 CAPSULE, COATED, EXTENDED RELEASE ORAL at 20:48

## 2019-02-19 RX ADMIN — Medication 225 MG: at 15:41

## 2019-02-19 RX ADMIN — TAZOBACTAM SODIUM AND PIPERACILLIN SODIUM 3.38 G: 375; 3 INJECTION, SOLUTION INTRAVENOUS at 18:50

## 2019-02-19 RX ADMIN — METHYLPREDNISOLONE SODIUM SUCCINATE 62.5 MG: 125 INJECTION, POWDER, FOR SOLUTION INTRAMUSCULAR; INTRAVENOUS at 20:36

## 2019-02-19 RX ADMIN — IPRATROPIUM BROMIDE AND ALBUTEROL SULFATE 3 ML: .5; 3 SOLUTION RESPIRATORY (INHALATION) at 19:44

## 2019-02-19 RX ADMIN — Medication 225 MG: at 20:49

## 2019-02-19 RX ADMIN — INSULIN ASPART 2 UNITS: 100 INJECTION, SOLUTION INTRAVENOUS; SUBCUTANEOUS at 12:18

## 2019-02-19 RX ADMIN — TAMSULOSIN HYDROCHLORIDE 0.4 MG: 0.4 CAPSULE ORAL at 08:27

## 2019-02-19 RX ADMIN — INSULIN ASPART 1 UNITS: 100 INJECTION, SOLUTION INTRAVENOUS; SUBCUTANEOUS at 18:48

## 2019-02-19 RX ADMIN — TAZOBACTAM SODIUM AND PIPERACILLIN SODIUM 3.38 G: 375; 3 INJECTION, SOLUTION INTRAVENOUS at 00:24

## 2019-02-19 RX ADMIN — OXYCODONE HYDROCHLORIDE 10 MG: 5 TABLET ORAL at 00:24

## 2019-02-19 RX ADMIN — TAZOBACTAM SODIUM AND PIPERACILLIN SODIUM 3.38 G: 375; 3 INJECTION, SOLUTION INTRAVENOUS at 12:44

## 2019-02-19 RX ADMIN — APIXABAN 10 MG: 5 TABLET, FILM COATED ORAL at 20:48

## 2019-02-19 RX ADMIN — DILTIAZEM HYDROCHLORIDE 180 MG: 180 CAPSULE, COATED, EXTENDED RELEASE ORAL at 08:27

## 2019-02-19 RX ADMIN — TAZOBACTAM SODIUM AND PIPERACILLIN SODIUM 3.38 G: 375; 3 INJECTION, SOLUTION INTRAVENOUS at 06:16

## 2019-02-19 RX ADMIN — MULTIPLE VITAMINS W/ MINERALS TAB 1 TABLET: TAB at 08:27

## 2019-02-19 RX ADMIN — Medication 225 MG: at 06:16

## 2019-02-19 RX ADMIN — IPRATROPIUM BROMIDE AND ALBUTEROL SULFATE 3 ML: .5; 3 SOLUTION RESPIRATORY (INHALATION) at 07:27

## 2019-02-19 RX ADMIN — LORAZEPAM 1 MG: 2 INJECTION INTRAMUSCULAR; INTRAVENOUS at 00:24

## 2019-02-19 ASSESSMENT — ACTIVITIES OF DAILY LIVING (ADL)
ADLS_ACUITY_SCORE: 16
ADLS_ACUITY_SCORE: 14
ADLS_ACUITY_SCORE: 14
ADLS_ACUITY_SCORE: 16
ADLS_ACUITY_SCORE: 16
ADLS_ACUITY_SCORE: 19

## 2019-02-19 NOTE — PROGRESS NOTES
Patient arrived from 5th floor and on bipap. Current settings are 16/7 R16 30%. Sats=94-98%, BS decreased t/o. ABG done at 1300, left radial, 7.32/69/74/36. Patient received duoneb QID.

## 2019-02-19 NOTE — PLAN OF CARE
Patient remains hospitalized COPD exacerbation. Remains on 5L, sats 88-94%. Regular diet, tolerating well.  and 225. Oxy x1 for pain. New IV placed. Remains on elequis. Adequate UOP. Refusing weight shift. BiPap in place for bedtime. A&O x4. Nursing will continue to monitor.

## 2019-02-19 NOTE — PROGRESS NOTES
Received a referral phone call for a noninvasive ventilator (NIV) workup. Reviewing patient's history and chart. Called patient's insurance and let  Kajal know that the patient would have a 20% copay for the lifetime rental for NIV.  Kajal states patient was transferred to the ICU so she will follow up with him tomorrow. Gave her my direct phone. Will follow.     Felicity TURCIOS  [facility]- [facility]

## 2019-02-19 NOTE — CONSULTS
Aware of consult for NIV need.  Pt is transferring to ICU.  I called Nancy with  [phone] and left a vm to check on coverage.  Will meet with pt when coverage known.  Following . Kajal MEIER CTS 1105    11:03: Per  [phone], Pt would have a 20% copay with a max out of pocket of $5,000 then he would have 100% coverage.  However, he would have a life time rental so he would have to start over every year with his copay and out of pocket.  I will update pt tomorrow of coverage since pt was just transferred to ICU. Will verify if pt can afford this.

## 2019-02-19 NOTE — PROGRESS NOTES
[facility] Intensive Care Progress Note    Problem List  Acute on chronic hypercapnic respiratory failure      Date of Service (when I saw the patient): 02/19/2019     Assessment & Plan   Tone Cooper is a 68 year old male who was admitted on 2/16/2019. Transferred to ICU on 2/19 for hypercapnea, bipap dependence    Neurology:  encephalopathy: metabolic  -hypercapnia    Cardiovascular:  Atrial fibrillation: currently rate controlled  -    Pulmonary:      Acute on chronic hypercapnic respiratory failure  COPD: emphysema  Recent acute PE on anticoagulation  Probable pneumonia  - improved with optimizing bipap, face mask leak  - repeat blood gas shows near normalization of pH    FiO2 (%): 40 %  Resp: 14      Renal  DONG: presumed related to recent IV contrast   - creat 1.89 today    ID:         Presumed pneumonia: infiltrate on CT, actually improved (2/11) compared to recent (1/4)  - possibly related to pulmonary infarct  - currently on day #3 empiric Zosyn  - negative procalcitonin and normal WBC prior to steroids, no sputum sent for testing  - likely de-escalate therapy tomorrow    GI / Nutrition  NPO while on bipap:   -    Endocrine:  hyperglycemia: subcutaneous insulin  - high dose steroids for acute exacerbation of COPD, consider reduction to 40mg daily    DVT Prophylaxis: oral anticoagulant  GI Prophylaxis: Not indicated    Restraints: Restraints for medical healing needed: NO    Family update by me today: No    Octavia Acevedo MD    Time Spent on this Encounter   Billing:  I spent 30 minutes, exclusive of procedures and teaching, bedside and on the inpatient unit today managing the critical care of Tone Cooper in relation to the issues listed in this note.    Main Plans for Today   Adjust Bipap settings, mask  Repeat ABG    Interval History   More lethargic today, received some oxycodone and ativan overnight    Physical Exam   Temp: 97.5  F (36.4  C) Temp src: Oral Temp   Min: 96  F (35.6  C)  Max: 98  F (36.7  C) BP: 142/85 Pulse: 68 Heart Rate: 67 Resp: 14 SpO2: 96 % O2 Device: BiPAP/CPAP Oxygen Delivery: 5 LPM  Vitals:    02/16/19 1229   Weight: 83.5 kg (184 lb)     I/O last 3 completed shifts:  In: 280 [P.O.:280]  Out: 150 [Urine:150]    Current Vitals:Temp:  [96  F (35.6  C)-98  F (36.7  C)] 97.5  F (36.4  C)  Pulse:  [68] 68  Heart Rate:  [59-82] 67  Resp:  [14-20] 14  BP: (142-150)/(72-92) 142/85  FiO2 (%):  [35 %-45 %] 40 %  SpO2:  [91 %-97 %] 96 %   Lethargic, unresponsive  PERRL and conjugate gaze  Full beard, full face mask on  Lungs poor air flow bilaterally  H-RR w/o murmur  Abdomen-soft, non tender, non distended  Extremities-warm and + edema  Lines: No erythema or discharge at entry site for No invasive lines  Current lines are required for patient management    Medications     - MEDICATION INSTRUCTIONS -       - MEDICATION INSTRUCTIONS -       - MEDICATION INSTRUCTIONS -       - MEDICATION INSTRUCTIONS -         apixaban ANTICOAGULANT  10 mg Oral BID     clonazePAM  1 mg Oral At Bedtime     diltiazem ER COATED BEADS  180 mg Oral BID     insulin aspart  1-7 Units Subcutaneous TID AC     insulin aspart  1-5 Units Subcutaneous At Bedtime     insulin glargine  5 Units Subcutaneous Daily     ipratropium - albuterol 0.5 mg/2.5 mg/3 mL  1 vial Nebulization 4x daily     methylPREDNISolone  62.5 mg Intravenous Q12H     multivitamin w/minerals  1 tablet Oral Daily     piperacillin-tazobactam  3.375 g Intravenous Q6H     propafenone  225 mg Oral Q8H MEGHAN     sodium chloride (PF)  3 mL Intracatheter Q8H     tamsulosin  0.4 mg Oral Daily       Data   Recent Labs   Lab 02/19/19  0728 02/18/19  0656 02/17/19  0718 02/16/19  2220 02/16/19  1259   WBC 15.4*  --  9.5 9.7 10.4   HGB 13.6  --  13.6 14.4 14.8   MCV 98  --  96 96 94     --  167 177 175    134 137 137 139   POTASSIUM 5.2 4.6 4.6 4.7 4.4   CHLORIDE 95 98 101 101 100   CO2 32 25 33* 28 33*   BUN 60* 43* 32* 35*  32*   CR 1.89* 1.53* 1.01 1.02 1.07   ANIONGAP 6 11 3 8 6   WILBERT 9.4 9.2 8.9 9.0 9.7   * 198* 188* 251* 200*   TROPI  --   --   --  <0.015 0.018     No results found for this or any previous visit (from the past 24 hour(s)).

## 2019-02-19 NOTE — PLAN OF CARE
Vitals: /82 (BP Location: Left arm)   Pulse 74   Temp 97.6  F (36.4  C) (Axillary)   Resp 20   Ht 1.829 m (6')   Wt 83.5 kg (184 lb)   SpO2 93%   BMI 24.95 kg/m    Situation/Status: COPD Excab/PE  Neuro: A/O x 4, some forgetfulness/confusion. Impulsive at times.  Pain: Rated pain at 8/10, gave prn pain med 10 mg oxy x 1, with effective relief, ice/heat offered. Req Ativan for help relax with breathing and anxiety. Effective.  Activity: Assist of 2 w/ gait belt and walker-pivot to the commode, Pt seemed to be weaker. PT should consult.  Diet: Reg diet  Lungs: LS-course and exp wheezes-Bilat, Remains on 5L, sats 88-94%., On BIPAP at night and when asleep. When off Bipap sats in 70's.  GI/: No nausea/vomiting, + Flautus, BS x4.  Skin: CMS intact, edema BLE +1  Lines/drains: IV-SL, On Zosyn  Labs:   Plan: Remains on elequis. Continue to monitor per POC.

## 2019-02-19 NOTE — PLAN OF CARE
Patient lethargic, oriented x4, arousing to voice and touch, quickly falling back to sleep. MD paged around 0730 asked to see patient, received call back with new orders. Patient pulling at bipap and taking off around 0810. NC placed on 5L, sats in the mid 90s. Up Ax2 at bedside to use urinal. VBGs drawning, pt transferred to ICU at 0930. Patients wife, Gloria updated with patients POC, and report was given to ICU nurse. All of the patients belongings sent with him. He needs new IV access, ICU nurse aware.

## 2019-02-19 NOTE — PROGRESS NOTES
[facility]  Medicine Progress Note - Hospitalist Service       Date of Admission:  2/16/2019  Assessment & Plan     68-year-old gentleman with a history of advanced COPD with multiple hospitalizations for that recently.  He was just discharged from the hospital after being admitted for COPD exacerbation. He came back to the ER next day. His recent history is most notable for 4 admissions in the last 6 months for pneumonia, COPD exacerbations, MSSA bacteremia thought to be due to pneumonia.  He also has a past medical history of atrial fibrillation with previous ablation, tricuspid valve replacement, hypertension, nonsustained VT.      After admission to the hospital, pt had worsening of his symptoms and required intermittent BiPAP treatment.  Remained dyspneic with increased work of breathing, had mild Nicolasa with increase in Cr, then developed increasing lethargy and hypercapnia on 2/19 requiring continuous BiPAP and transferred to ICU     1.  Acute hypoxic hypercapnic respiratory failure due to COPD exacerbation.    -- required transfer to ICU today, pCO2 up in 90 range, repeat gas looks improved  -- stop oxycodone, limit narcotics  -- continue him on IV steroids, scheduled nebulizers and other treatment for his COPD, Xopenex nebs available PRN in case he is tachycardic   -- Continue IV Piperacillin/Tazobactam, stop vancomycin, monitor cultures   -- Obtain sputum cultures  -- continue BiPAP support, pt ok with intubation if needed   -- Will ask SW consult to see if patient would be a candidate for Home Trilogy machine and NIPPV     2.  Recent diagnosis of PE.  This was just diagnosed when he was hospitalized a few days ago over here.  He had a small PE on the CT chest. I suspect this is more of an incidental finding with the major contributor being COPD to his symptoms. He received about 4 days of heparin and Lovenox in the hospital and was sent home on Eliquis.    -- Continue Eliquis 10 mg b.i.d. given  the pulmonary embolism x 7days and then decrease to 5 mg BID    3.  Previous history of atrial fibrillation/flutter, status post previous ablation procedure.  He did have issues with atrial fibrillation with RVR during previous hospitalizations.  He did follow with [facility] as an outpatient.  The thought was to avoid a repeat ablation procedure at this time given his recent infectious issues which would decrease his chance of success.  He did have his propafenone dose increased.    -- Continue baseline mediations, HR under good control this admission   -- In terms of his anticoagulation, he is back on Eliquis now due to PE.  It seems like this was held at the time of discharge from previous hospitalization in January due to hemoptysis. No recurrence of hemoptysis and with concurrent PE, need to continue anti-coagulation     5.  Episode of MSSA bacteremia in December. This was thought to be due to pneumonia, no other secondary site was found. The patient completed a course of antibiotics.  He was on Augmentin PTA due to recent COPD exacerbation.  His CT chest done recently, 5 days ago, showed that the area of consolidation from before had much improved.  There was just a small area left on the CT scan, hence I will continue him on IV ABx, could probably finish course of Augmentin at discharge  -- currently on Piperacillin/Tazobactam although could narrow to ceftriaxone soon       6.  Physical deconditioning.      7. DONG on Chronic kidney disease.  Monitor his creatinine; Increase in Cr to 1.8 today, hold lasix, he received CT contrast about a week ago so could be component of contrast nephropathy, monitor closely   -- Check FENA, UA    8.  Steroid related hyperglycemia, we will watch his blood sugars. Add sliding scale insulin. lantus 5 units     9.  Continue him on his baseline dose of furosemide, Flomax. Pt tells me that he is no longer on trazodone and takes clonazepam instead      10.  Deep vein thrombosis  prophylaxis, on Eliquis.     11.  Lactic acidosis. Due to respiratory distress, not sepsis, improving      CODE STATUS:  Full code    Diet: Regular Diet Adult    DVT Prophylaxis: Eliquis   Rodney Catheter: not present  Code Status: Full Code      Disposition Plan   Expected discharge: 4 - 7 days, recommended to prior living arrangement once resp status better.  Entered: Best Tucker MD 02/19/2019, 5:48 PM       The patient's care was discussed with the Bedside Nurse and Patient. Wife and brother updated     Best Tucker MD  Hospitalist Service  [facility]    ______________________________________________________________________    Interval History   Chart reviewed and patient seen. Case discussed with nursing staff    Patient more lethargic this morning, elevated pco2, transferred to ICU, reports that his abdominal muscles are sore from coughing, very hungry and wants to eat, no chest pain, confirmed full code, improved on repeat exam this afternoon, No rvomiting. No nausea, some pain from the cough, No new complaints voiced otherwise.       Data reviewed today: I reviewed all medications, new labs and imaging results over the last 24 hours. I personally reviewed    Physical Exam   Vital Signs: Temp: 96.4  F (35.8  C) Temp src: Axillary BP: (!) 145/97 Pulse: 60 Heart Rate: 70 Resp: 8 SpO2: 96 % O2 Device: BiPAP/CPAP Oxygen Delivery: 5 LPM  Weight: 184 lbs 0 oz    GENERAL: Awake , lethargic, in modest respiratory distress , able to converse  PSYCH: appropriate affect, no acute agitation   HEENT:  Neck is Supple, trachea is midline, EOMI, conjunctiva clear  CARDIOVASCULAR: Irregular rate and rhythm, Normal S1, S2, early systolic murmur  PULMONARY:  Diminished lung sounds bilaterally   GI: Abdomen is soft, non tender, non-distended, bowel sounds present. No rebound or guarding   SKIN:  No cyanosis, no obvious exanthems on exposed areas   MSK: Extremities are warm and well perfused. +1 pitting  edema   Neuro: Awake , oriented . Moving all extremities with good strength     Data   Recent Labs   Lab 02/19/19  0728 02/18/19  0656 02/17/19  0718 02/16/19  2220 02/16/19  1259   WBC 15.4*  --  9.5 9.7 10.4   HGB 13.6  --  13.6 14.4 14.8   MCV 98  --  96 96 94     --  167 177 175    134 137 137 139   POTASSIUM 5.2 4.6 4.6 4.7 4.4   CHLORIDE 95 98 101 101 100   CO2 32 25 33* 28 33*   BUN 60* 43* 32* 35* 32*   CR 1.89* 1.53* 1.01 1.02 1.07   ANIONGAP 6 11 3 8 6   WILBERT 9.4 9.2 8.9 9.0 9.7   * 198* 188* 251* 200*   TROPI  --   --   --  <0.015 0.018

## 2019-02-19 NOTE — PROGRESS NOTES
RT- patient transported from 5th floor to ICU wearing Bipap throughout transport.     Patient settings 14/8, 40% FIO2.     Janey Verde, RT  2/19/2019 9:52 AM

## 2019-02-20 ENCOUNTER — APPOINTMENT (OUTPATIENT)
Dept: CT IMAGING | Facility: CLINIC | Age: 69
DRG: 189 | End: 2019-02-20
Attending: INTERNAL MEDICINE
Payer: COMMERCIAL

## 2019-02-20 LAB
ANION GAP SERPL CALCULATED.3IONS-SCNC: 2 MMOL/L (ref 3–14)
BASE EXCESS BLDA CALC-SCNC: 8.7 MMOL/L
BUN SERPL-MCNC: 49 MG/DL (ref 7–30)
CALCIUM SERPL-MCNC: 8.9 MG/DL (ref 8.5–10.1)
CHLORIDE SERPL-SCNC: 97 MMOL/L (ref 94–109)
CO2 SERPL-SCNC: 34 MMOL/L (ref 20–32)
CREAT SERPL-MCNC: 1.3 MG/DL (ref 0.66–1.25)
GFR SERPL CREATININE-BSD FRML MDRD: 56 ML/MIN/{1.73_M2}
GLUCOSE BLDC GLUCOMTR-MCNC: 188 MG/DL (ref 70–99)
GLUCOSE BLDC GLUCOMTR-MCNC: 190 MG/DL (ref 70–99)
GLUCOSE BLDC GLUCOMTR-MCNC: 218 MG/DL (ref 70–99)
GLUCOSE BLDC GLUCOMTR-MCNC: 314 MG/DL (ref 70–99)
GLUCOSE SERPL-MCNC: 201 MG/DL (ref 70–99)
HCO3 BLD-SCNC: 37 MMOL/L (ref 21–28)
O2/TOTAL GAS SETTING VFR VENT: 3 %
OXYHGB MFR BLD: 90 % (ref 92–100)
PCO2 BLD: 68 MM HG (ref 35–45)
PH BLD: 7.35 PH (ref 7.35–7.45)
PO2 BLD: 58 MM HG (ref 80–105)
POTASSIUM SERPL-SCNC: 5 MMOL/L (ref 3.4–5.3)
SODIUM SERPL-SCNC: 133 MMOL/L (ref 133–144)

## 2019-02-20 PROCEDURE — 80048 BASIC METABOLIC PNL TOTAL CA: CPT | Performed by: INTERNAL MEDICINE

## 2019-02-20 PROCEDURE — 40000809 ZZH STATISTIC NO DOCUMENTATION TO SUPPORT CHARGE

## 2019-02-20 PROCEDURE — 25000128 H RX IP 250 OP 636: Performed by: STUDENT IN AN ORGANIZED HEALTH CARE EDUCATION/TRAINING PROGRAM

## 2019-02-20 PROCEDURE — 82805 BLOOD GASES W/O2 SATURATION: CPT | Performed by: INTERNAL MEDICINE

## 2019-02-20 PROCEDURE — 25000131 ZZH RX MED GY IP 250 OP 636 PS 637: Performed by: INTERNAL MEDICINE

## 2019-02-20 PROCEDURE — 25000132 ZZH RX MED GY IP 250 OP 250 PS 637: Performed by: INTERNAL MEDICINE

## 2019-02-20 PROCEDURE — 94640 AIRWAY INHALATION TREATMENT: CPT

## 2019-02-20 PROCEDURE — 25000128 H RX IP 250 OP 636: Performed by: INTERNAL MEDICINE

## 2019-02-20 PROCEDURE — 40000275 ZZH STATISTIC RCP TIME EA 10 MIN

## 2019-02-20 PROCEDURE — 36569 INSJ PICC 5 YR+ W/O IMAGING: CPT

## 2019-02-20 PROCEDURE — 25000125 ZZHC RX 250: Performed by: INTERNAL MEDICINE

## 2019-02-20 PROCEDURE — 36415 COLL VENOUS BLD VENIPUNCTURE: CPT | Performed by: INTERNAL MEDICINE

## 2019-02-20 PROCEDURE — 00000146 ZZHCL STATISTIC GLUCOSE BY METER IP

## 2019-02-20 PROCEDURE — 20000003 ZZH R&B ICU

## 2019-02-20 PROCEDURE — 99233 SBSQ HOSP IP/OBS HIGH 50: CPT | Performed by: INTERNAL MEDICINE

## 2019-02-20 PROCEDURE — 36600 WITHDRAWAL OF ARTERIAL BLOOD: CPT

## 2019-02-20 PROCEDURE — 94640 AIRWAY INHALATION TREATMENT: CPT | Mod: 76

## 2019-02-20 PROCEDURE — 27211289 ZZ H KIT CATH IV 4FR 8 CM OR 10 CM, POWERWAND QUICK XL

## 2019-02-20 PROCEDURE — 94660 CPAP INITIATION&MGMT: CPT

## 2019-02-20 PROCEDURE — 70450 CT HEAD/BRAIN W/O DYE: CPT

## 2019-02-20 RX ORDER — HEPARIN SODIUM,PORCINE 10 UNIT/ML
2-5 VIAL (ML) INTRAVENOUS
Status: DISCONTINUED | OUTPATIENT
Start: 2019-02-20 | End: 2019-02-20 | Stop reason: CLARIF

## 2019-02-20 RX ORDER — LIDOCAINE 40 MG/G
CREAM TOPICAL
Status: DISCONTINUED | OUTPATIENT
Start: 2019-02-20 | End: 2019-02-20 | Stop reason: CLARIF

## 2019-02-20 RX ADMIN — DILTIAZEM HYDROCHLORIDE 180 MG: 180 CAPSULE, COATED, EXTENDED RELEASE ORAL at 08:30

## 2019-02-20 RX ADMIN — IPRATROPIUM BROMIDE AND ALBUTEROL SULFATE 3 ML: .5; 3 SOLUTION RESPIRATORY (INHALATION) at 20:30

## 2019-02-20 RX ADMIN — APIXABAN 10 MG: 5 TABLET, FILM COATED ORAL at 08:29

## 2019-02-20 RX ADMIN — INSULIN GLARGINE 5 UNITS: 100 INJECTION, SOLUTION SUBCUTANEOUS at 08:25

## 2019-02-20 RX ADMIN — TAZOBACTAM SODIUM AND PIPERACILLIN SODIUM 3.38 G: 375; 3 INJECTION, SOLUTION INTRAVENOUS at 17:53

## 2019-02-20 RX ADMIN — INSULIN ASPART 2 UNITS: 100 INJECTION, SOLUTION INTRAVENOUS; SUBCUTANEOUS at 17:27

## 2019-02-20 RX ADMIN — LORAZEPAM 1 MG: 2 INJECTION INTRAMUSCULAR; INTRAVENOUS at 00:12

## 2019-02-20 RX ADMIN — TAZOBACTAM SODIUM AND PIPERACILLIN SODIUM 3.38 G: 375; 3 INJECTION, SOLUTION INTRAVENOUS at 00:02

## 2019-02-20 RX ADMIN — IPRATROPIUM BROMIDE AND ALBUTEROL SULFATE 3 ML: .5; 3 SOLUTION RESPIRATORY (INHALATION) at 11:32

## 2019-02-20 RX ADMIN — TAMSULOSIN HYDROCHLORIDE 0.4 MG: 0.4 CAPSULE ORAL at 08:30

## 2019-02-20 RX ADMIN — INSULIN ASPART 1 UNITS: 100 INJECTION, SOLUTION INTRAVENOUS; SUBCUTANEOUS at 12:13

## 2019-02-20 RX ADMIN — METHYLPREDNISOLONE SODIUM SUCCINATE 62.5 MG: 125 INJECTION, POWDER, FOR SOLUTION INTRAMUSCULAR; INTRAVENOUS at 08:27

## 2019-02-20 RX ADMIN — Medication 225 MG: at 05:55

## 2019-02-20 RX ADMIN — IPRATROPIUM BROMIDE AND ALBUTEROL SULFATE 3 ML: .5; 3 SOLUTION RESPIRATORY (INHALATION) at 08:34

## 2019-02-20 RX ADMIN — TAZOBACTAM SODIUM AND PIPERACILLIN SODIUM 3.38 G: 375; 3 INJECTION, SOLUTION INTRAVENOUS at 05:55

## 2019-02-20 RX ADMIN — IPRATROPIUM BROMIDE AND ALBUTEROL SULFATE 3 ML: .5; 3 SOLUTION RESPIRATORY (INHALATION) at 15:43

## 2019-02-20 RX ADMIN — METHYLPREDNISOLONE SODIUM SUCCINATE 62.5 MG: 125 INJECTION, POWDER, FOR SOLUTION INTRAMUSCULAR; INTRAVENOUS at 20:19

## 2019-02-20 RX ADMIN — Medication 225 MG: at 15:11

## 2019-02-20 RX ADMIN — TAZOBACTAM SODIUM AND PIPERACILLIN SODIUM 3.38 G: 375; 3 INJECTION, SOLUTION INTRAVENOUS at 12:34

## 2019-02-20 RX ADMIN — DILTIAZEM HYDROCHLORIDE 180 MG: 180 CAPSULE, COATED, EXTENDED RELEASE ORAL at 20:38

## 2019-02-20 RX ADMIN — APIXABAN 10 MG: 5 TABLET, FILM COATED ORAL at 20:38

## 2019-02-20 RX ADMIN — Medication 225 MG: at 20:38

## 2019-02-20 RX ADMIN — MULTIPLE VITAMINS W/ MINERALS TAB 1 TABLET: TAB at 08:29

## 2019-02-20 RX ADMIN — INSULIN ASPART 2 UNITS: 100 INJECTION, SOLUTION INTRAVENOUS; SUBCUTANEOUS at 08:58

## 2019-02-20 RX ADMIN — CLONAZEPAM 1 MG: 1 TABLET ORAL at 20:38

## 2019-02-20 ASSESSMENT — ACTIVITIES OF DAILY LIVING (ADL)
ADLS_ACUITY_SCORE: 18
ADLS_ACUITY_SCORE: 20
ADLS_ACUITY_SCORE: 18
ADLS_ACUITY_SCORE: 20
ADLS_ACUITY_SCORE: 18
ADLS_ACUITY_SCORE: 20

## 2019-02-20 ASSESSMENT — MIFFLIN-ST. JEOR: SCORE: 1717

## 2019-02-20 NOTE — PROGRESS NOTES
[facility]  Hospitalist Progress Note  Felipe Aquino MD 02/20/19    Reason for Stay (Diagnosis): Respiratory failure, COPD exacerbation, pneumonia         Assessment and Plan:      Summary of Stay:   Tone Cooper is a 68-year-old gentleman with a history of advanced COPD with multiple hospitalizations for that recently.  He was just discharged from the hospital after being admitted for COPD exacerbation. He came back to the ER next day.  His recent history is most notable for 4 admissions in the last 6 months for pneumonia, COPD exacerbations, MSSA bacteremia thought to be due to pneumonia.  He also has a past medical history of atrial fibrillation with previous ablation, tricuspid valve replacement, hypertension, nonsustained VT.      After admission to the hospital on 2/16/19, pt had worsening of his symptoms and required intermittent BiPAP treatment.  Remained dyspneic with increased work of breathing, had mild Nicolasa with increase in Cr, then developed increasing lethargy and hypercapnia on 2/19 requiring continuous BiPAP and transferred to ICU.  Has streaky opacities in the left side would be to actually be residual pneumonia, however with his elevated lactic acid he has remained on Zosyn.  Suspect primary problem is COPD exacerbation and he is on IV steroids with scheduled nebs for this.  Trying to wean from BiPAP.    Problem List/Assessment and Plan:   Acute hypoxic and acute on chronic hypercapnic respiratory failure due to COPD exacerbation:  required transfer to ICU for intermittent BiPAP, pCO2 up in 90 range.  Appears to have some mild chronic hypercapnia.   -stop oxycodone, limit narcotics  -continue IV Solu-Medrol 62.5 mg every 12 hours  -Scheduled duo nebs and Xopenex nebs as needed (tachycardia)  Continue IV Piperacillin/Tazobactam, stop vancomycin, monitor cultures   -BiPAP as needed, follow blood gases becoming somnolent and refusing positive pressure ventilation  -asked social work  to see if patient would be a candidate for Home Trilogy machine and NIPPV, it sounds like he is but this would be a $5000 yearly rental fee    Suspect HCAP: Recently treated for pneumonia and was on Augmentin prior to admission.  Streaky opacities remaining on the left which may just be residual from previous pneumonia.  He did have elevated lactic acid.  -Continue Zosyn started 2/16, consider switch to Augmentin tomorrow     Recent diagnosis PE: This was just diagnosed when he was hospitalized a few days ago over here.  He had a small PE on the CT chest. I suspect this is more of an incidental finding with the major contributor being COPD to his symptoms. He received about 4 days of heparin and Lovenox in the hospital and was sent home on Eliquis.    -Continue Eliquis 10 mg b.i.d. given the pulmonary embolism x 7days and then decrease to 5 mg BID     Previous history of atrial fibrillation, flutter:  status post previous ablation procedure.  He did have issues with atrial fibrillation with RVR during previous hospitalizations.  He did follow with [facility] as an outpatient.  The thought was to avoid a repeat ablation procedure at this time given his recent infectious issues which would decrease his chance of success.  He did have his propafenone dose increased.    -Continue baseline diltiazem and propafenone    -In terms of his anticoagulation, he is back on Eliquis now due to PE.  It seems like this was held at the time of discharge from previous hospitalization in January due to hemoptysis. No recurrence of hemoptysis and with concurrent PE, need to continue anti-coagulation      Episode of MSSA bacteremia in December:  This was thought to be due to pneumonia, no other secondary site was found. The patient completed a course of antibiotics.  He was on Augmentin PTA due to recent COPD exacerbation.  His CT chest done recently, 5 days ago, showed that the area of consolidation from before had much improved.   There was just a small area left on the CT scan, hence I will continue him on IV ABx, could probably finish course of Augmentin at discharge     Physical deconditioning:  PT/OT      DONG on CKD stage II: Creatinine up to 1.8.   -Creatinine improved with holding diuretic    Steroid-induced hyperglycemia: secondary to steroids.  -Continue glargine 5 units daily  -Medium dose sliding scale insulin    Lactic acidosis: Due to respiratory failure nebs.    DVT Prophylaxis: Eliquis  Code Status: Full Code, this was discussed with previous provider and spouse  FEN: Regular diet if remaining off BiPAP  Discharge Dispo: TBD.  Will need PT and OT consultation once respiratory status improved  Estimated Disch Date / # of Days until Disch: Requiring intermittent BiPAP.  Likely a few more day hospitalization    Updated patient's spouse Gloria via phone.        Interval History (Subjective):      Assumed care this morning.  Still requiring BiPAP.  Wean off BiPAP as belly breathing and is somnolent.  Patient says he is fine does not like BiPAP.                  Physical Exam:      Last Vital Signs:  /90   Pulse 56   Temp 97.5  F (36.4  C) (Oral)   Resp 25   Ht 1.829 m (6')   Wt 90.4 kg (199 lb 4.7 oz)   SpO2 96%   BMI 27.03 kg/m        Intake/Output Summary (Last 24 hours) at 2/20/2019 1629  Last data filed at 2/20/2019 1000  Gross per 24 hour   Intake 543 ml   Output 750 ml   Net -207 ml       Constitutional: Somnolent, NAD  Eyes: sclera white   HEENT:    MMM  Respiratory: On BiPAP, decreased airflow.  Belly breathing when not on BiPAP  Cardiovascular: RRR.  No murmur   GI: non-tender, not distended, bowel sounds present  Skin: no rash    Musculoskeletal/extremities: 2+ edema    Neurologic: A&O   Psychiatric: calm, cooperative          Medications:      All current medications were reviewed with changes reflected in problem list.         Data:      All new lab and imaging data was reviewed.   Labs:  Recent Labs   Lab  02/20/19  0525 02/19/19  1300   PH 7.35 7.32*   PO2 58* 74*   PCO2 68* 69*   HCO3 37* 36*   JACKLYN 8.7 7.2     Recent Labs   Lab 02/20/19  0541 02/19/19  0728 02/18/19  0656    133 134   POTASSIUM 5.0 5.2 4.6   CHLORIDE 97 95 98   CO2 34* 32 25   ANIONGAP 2* 6 11   * 236* 198*   BUN 49* 60* 43*   CR 1.30* 1.89* 1.53*   GFRESTIMATED 56* 36* 46*   GFRESTBLACK 65 42* 54*   WILBERT 8.9 9.4 9.2     Recent Labs   Lab 02/19/19  0728 02/17/19  0718 02/16/19  2220   WBC 15.4* 9.5 9.7   HGB 13.6 13.6 14.4   HCT 44.1 42.7 45.8   MCV 98 96 96    167 177      Imaging:   None today    Felipe Aquino MD

## 2019-02-20 NOTE — DISCHARGE SUMMARY
[facility]  Hospitalist Discharge Summary       Date of Admission:  2/11/2019  Date of Discharge:  2/14/2019 10:18 AM  Discharging Provider: Jon Rocha MD      Discharge Diagnoses     1. Acute on chronic hypoxic  respiratory failure: Hypoxic on home oxygen   2. Pulmonary embolism  3. Suspected pneumonia -possible gram negative   4. Acute COPD exacerbation       Follow-ups Needed After Discharge   Follow-up Appointments     Follow-up and recommended labs and tests       Follow up with primary care provider, Ana Pratt, within 7 days for hospital follow- up.  No follow up labs or test are needed.                 Hospital Course       Tone Cooper is a 68-year-old gentleman with past medical history including acute on chronic hypoxic respiratory failure, severe sepsis, community-acquired pneumonia, MSSA bacteremia, COPD, atrial fibrillation, hypertension, hyperlipidemia, chronic kidney disease who was discharged from this hospital on 01/08/2019 after he was in the hospital for about 3 weeks.  Patient presents with shortness of breath and hypoxia.  Evaluation in the emergency department revealed evidence of small pulmonary emboli and concern for pneumonia.     Problem List:   1. Acute on chronic respiratory failure: Hypoxic on home oxygen and multiple comorbidities including recent admission for pneumonia sepsis  --improved   2. Pulmonary embolism: Patient was on subcutaneous Lovenox injections, will start Eliquis, continue to monitor.       CT scan evidence of consolidation/infiltrate in right lower base, doubt this is new pneumonia.  Patient is already on Zosyn and azithromycin.  He is afebrile, no evidence of sepsis.  Infectious disease physician consulted and recommendation noted.  Patient was on zosyn    4. Atrial fibrillation, rate controlled.  Patient is to start Eliquis         Consultations This Hospital Stay   PHARMACY TO DOSE VANCO  PHARMACY TO DOSE HEPARIN  PHARMACY TO DOSE  HEPARIN  INFECTIOUS DISEASES IP CONSULT  CARE COORDINATOR IP CONSULT    Code Status   Full Code    Time Spent on this Encounter   I, Jon Rocha, personally saw the patient today and spent less than or equal to 30 minutes discharging this patient.       Jon Rocha MD  [facility]  ______________________________________________________________________    Physical Exam   Vital Signs:                   Weight: 194 lbs 4.8 oz         Primary Care Physician   Ana Pratt    Discharge Disposition   Discharged to home  Condition at discharge: Stable    Significant Results and Procedures   Most Recent 3 CBC's:  Recent Labs   Lab Test 02/19/19  0728 02/17/19  0718 02/16/19  2220   WBC 15.4* 9.5 9.7   HGB 13.6 13.6 14.4   MCV 98 96 96    167 177     Most Recent 3 BMP's:  Recent Labs   Lab Test 02/19/19  0728 02/18/19  0656 02/17/19  0718    134 137   POTASSIUM 5.2 4.6 4.6   CHLORIDE 95 98 101   CO2 32 25 33*   BUN 60* 43* 32*   CR 1.89* 1.53* 1.01   ANIONGAP 6 11 3   WILBERT 9.4 9.2 8.9   * 198* 188*     Most Recent 2 LFT's:  Recent Labs   Lab Test 01/04/19  0607 06/08/15  1415   AST 23 35   ALT 22 41   ALKPHOS 45 94   BILITOTAL 0.6 1.2     Most Recent 3 INR's:  Recent Labs   Lab Test 06/09/15  0620 06/08/15  1415   INR 1.48* 1.88*       Discharge Orders      Medication Therapy Management Referral      Reason for your hospital stay    PE     Follow-up and recommended labs and tests     Follow up with primary care provider, Ana Pratt, within 7 days for hospital follow- up.  No follow up labs or test are needed.     Activity    Your activity upon discharge: activity as tolerated     When to contact your care team    Call your primary doctor if you have any of the following:  increased shortness of breath.     Oxygen Adult    Renew Home Oxygen Order  Renew previous prescription.  Expected treatment length is indefinite (99 months).    Attending Provider: Jon THOMAS  Josefina  Physician signature: See electronic signature associated with these discharge orders  Date of Order: February 13, 2019     Diet    Follow this diet upon discharge: Orders Placed This Encounter      Combination Diet Regular Diet Adult; 2 gm NA Diet     Discharge Medications   Discharge Medication List as of 2/14/2019  9:42 AM      CONTINUE these medications which have CHANGED    Details   predniSONE (DELTASONE) 10 MG tablet 4 tabs daily for 2 days, then 3 tabs daily for 2 days, then 2 tabs daily for 2 days, then 1 tab daily for 2 days, then stop., Disp-20 tablet, R-0, E-Prescribe      amoxicillin-clavulanate (AUGMENTIN) 875-125 MG tablet Take 1 tablet by mouth 2 times daily for 7 days, Disp-14 tablet, R-0, E-Prescribe         CONTINUE these medications which have NOT CHANGED    Details   albuterol (VENTOLIN HFA) 108 (90 Base) MCG/ACT inhaler Inhale 1-2 puffs into the lungs every 4 hours (while awake) PRN, Disp-2 Inhaler, R-0, E-PrescribePlease fill Albuterol and not Ventolin as Albuterol works much better than Ventolin      ipratropium - albuterol 0.5 mg/2.5 mg/3 mL (DUONEB) 0.5-2.5 (3) MG/3ML neb solution Take 1 vial by nebulization every 6 hours as needed for shortness of breath / dyspnea or wheezing, Historical      levalbuterol (XOPENEX) 1.25 MG/3ML neb solution Take 3 mLs (1.25 mg) by nebulization every 4 hours as needed for wheezing or shortness of breath / dyspnea, Disp-270 mL, R-1, E-Prescribe      oxyCODONE (OXY-IR) 5 MG capsule Take 5 mg by mouth every 4 hours as needed for severe pain, Historical      tamsulosin (FLOMAX) 0.4 MG capsule Take 0.4 mg by mouth daily, Historical      apixaban ANTICOAGULANT (ELIQUIS) 5 MG tablet Take 1 tablet (5 mg) by mouth 2 times daily, Disp-60 tablet, R-0, Historical      diltiazem ER COATED BEADS (CARDIZEM CD/CARTIA XT) 180 MG 24 hr capsule Take 180 mg by mouth 2 times daily, Historical      furosemide (LASIX) 20 MG tablet Take 20 mg by mouth 2 times daily,  Historical      !! order for DME Equipment being ordered: NebulizerDisp-1 each, R-0, Local Print      !! order for DME Oxygen for nocturnal use. 1 LPM via nasal cannula  Testing done at home in chronic stable state.  Condition is COPD.  Patient does not have Obstructive Sleep Apnea.  Overnight oximetry revealed 30.3 minutes of hypoxia (<= 88%).  Duration: Lifetime (99 mo nths).Disp-1 each, R-99, Local Print      propafenone (RYTHMOL) 225 MG tablet Take 1 tablet (225 mg) by mouth every 8 hours, Disp-270 tablet, R-3, E-Prescribe      traZODone (DESYREL) 50 MG tablet Take 50 mg by mouth At Bedtime , Historical       !! - Potential duplicate medications found. Please discuss with provider.        Allergies   Allergies   Allergen Reactions     Amlodipine Swelling     Flecainide Palpitations

## 2019-02-20 NOTE — PLAN OF CARE
ICU End of Shift Summary.  For vital signs and complete assessments, please see documentation flowsheets.     Pertinent assessments: Pt alert and oriented throughout shift, but forgetful. Took a break from the Bipap for about 1 hour and 15 minutes at the beginning of the shift and then experienced increased SOB. Encouraged Bipap for the remainder with patient wearing it with only short breaks from 9118-2329. Denies any acute SOB and states he feels like it is back to baseline. LS diminished throughout with expiratory wheezes at times. Tele Afib with bradycardia at times. Incontinent of large amounts of urine. Edema in LEs remains.  Major Shift Events: Pt would pull off Bipap mask at times and became frustrated because he did not want to wear it. Encouraged strongly until patient finally refused at 0500. Awaiting ABG results this AM.   Plan (Upcoming Events): Continue Bipap support as tolerated and wean as able. Continue steroids, nebs, and antibiotics. Collect sputum culture if able. Pts main concern is getting something to eat.  Discharge/Transfer Needs: TBD. Continue ICU cares at this time.    Bedside Shift Report Completed   Bedside Safety Check Completed

## 2019-02-20 NOTE — PROGRESS NOTES
RT Note:    ABG drawn from patient's right radial artery without complications at 5:25. Patient is on 3L NC, which he has been on since 5:05, otherwise patient has been on BiPAP 16/7 for the rest of the shift. RT will continue to monitor.    Cristal Rouse  February 20, 2019.5:27 AM

## 2019-02-20 NOTE — PLAN OF CARE
Patient refusing bipap this am, wants to eat,  Hr sr 2nd degree block, maintaining sats on 3 L /nc very sob, increased work of breathing  Able to take in 5-6 bites of oatmeal, sips of milk, meds  Fell asleep, and decreased oxygen to 1.5 L, still able to maintain sats at 94-95, resting comfortably     throughout day, patient on bipap when in bed.  Up to chair. Ate small amount of food, took meds without difficulty. Tolerated well, cannot stand long using walker, implusive, and agitated at times, wants to eat, doesn't want to get up.  Voids using urinal in bed in chair position.  freq stooling, soft formed brown    Vss, rao, denies numbness tingling in extremities  ICU End of Shift Summary.  For vital signs and complete assessments, please see documentation flowsheets.     Pertinent assessments: with support of 2, and use of walker, up to chair or commode.  Cannot tolerate standing very long.  freq stooling, soft formed, using bipap  Major Shift Events: increasing activity, cautionously, unable to stay awake in bed. Short periods up in chair  Plan (Upcoming Events): bipap when in bed, up in chair to eat, be off bipap  Discharge/Transfer Needs: tbd     Bedside Shift Report Completed : yes  Bedside Safety Check Completed: yes   patient fell from edge of bed while staff at bedside, adamant in refusing to give consent to notify spouse Gloria of fall, despite several conversations.

## 2019-02-20 NOTE — PROGRESS NOTES
Pt agitated this am.  Declined to use Bipap. Will discuss coverage of NIV on a later date.  Will follow along.  Kajal MEIER CTS [id number]

## 2019-02-20 NOTE — PROVIDER NOTIFICATION
PICC Insertion Time-Out   Proceduralist prior to procedure:    Confirmed provider order for procedure    Verified appropriate supplies/equipment are available for procedure    Verified appropriate assessments have been completed     Prior to procedure the proceduralist instituted a 'Cease all activity' to confirm with a second nursing staff member the following:     Confirm patient identifiers using patient name and date of birth    Verify procedure to be performed    Verify consent was signed and witnessed    Verbalize any allergies    Verify code status     [Co-signature verification:  IV Access flowsheet > click any insertion column associated to a note > view Value Information)     Francisca Lutz RN

## 2019-02-21 LAB
ANION GAP SERPL CALCULATED.3IONS-SCNC: <1 MMOL/L (ref 3–14)
BASE EXCESS BLDV CALC-SCNC: 11.1 MMOL/L
BUN SERPL-MCNC: 43 MG/DL (ref 7–30)
CALCIUM SERPL-MCNC: 9.2 MG/DL (ref 8.5–10.1)
CHLORIDE SERPL-SCNC: 98 MMOL/L (ref 94–109)
CO2 SERPL-SCNC: 37 MMOL/L (ref 20–32)
CREAT SERPL-MCNC: 1.01 MG/DL (ref 0.66–1.25)
ERYTHROCYTE [DISTWIDTH] IN BLOOD BY AUTOMATED COUNT: 14.5 % (ref 10–15)
GFR SERPL CREATININE-BSD FRML MDRD: 77 ML/MIN/{1.73_M2}
GLUCOSE BLDC GLUCOMTR-MCNC: 215 MG/DL (ref 70–99)
GLUCOSE BLDC GLUCOMTR-MCNC: 220 MG/DL (ref 70–99)
GLUCOSE BLDC GLUCOMTR-MCNC: 230 MG/DL (ref 70–99)
GLUCOSE BLDC GLUCOMTR-MCNC: 250 MG/DL (ref 70–99)
GLUCOSE BLDC GLUCOMTR-MCNC: 297 MG/DL (ref 70–99)
GLUCOSE SERPL-MCNC: 193 MG/DL (ref 70–99)
HCO3 BLDV-SCNC: 39 MMOL/L (ref 21–28)
HCT VFR BLD AUTO: 41.6 % (ref 40–53)
HGB BLD-MCNC: 13.2 G/DL (ref 13.3–17.7)
MCH RBC QN AUTO: 29.8 PG (ref 26.5–33)
MCHC RBC AUTO-ENTMCNC: 31.7 G/DL (ref 31.5–36.5)
MCV RBC AUTO: 94 FL (ref 78–100)
O2/TOTAL GAS SETTING VFR VENT: ABNORMAL %
OXYHGB MFR BLDV: 90 %
PCO2 BLDV: 63 MM HG (ref 40–50)
PH BLDV: 7.4 PH (ref 7.32–7.43)
PLATELET # BLD AUTO: 152 10E9/L (ref 150–450)
PO2 BLDV: 58 MM HG (ref 25–47)
POTASSIUM SERPL-SCNC: 4.6 MMOL/L (ref 3.4–5.3)
RBC # BLD AUTO: 4.43 10E12/L (ref 4.4–5.9)
SODIUM SERPL-SCNC: 135 MMOL/L (ref 133–144)
WBC # BLD AUTO: 8.1 10E9/L (ref 4–11)

## 2019-02-21 PROCEDURE — 36415 COLL VENOUS BLD VENIPUNCTURE: CPT | Performed by: INTERNAL MEDICINE

## 2019-02-21 PROCEDURE — 40000275 ZZH STATISTIC RCP TIME EA 10 MIN

## 2019-02-21 PROCEDURE — 25000131 ZZH RX MED GY IP 250 OP 636 PS 637: Performed by: INTERNAL MEDICINE

## 2019-02-21 PROCEDURE — 20000003 ZZH R&B ICU

## 2019-02-21 PROCEDURE — 25000125 ZZHC RX 250: Performed by: INTERNAL MEDICINE

## 2019-02-21 PROCEDURE — 00000146 ZZHCL STATISTIC GLUCOSE BY METER IP

## 2019-02-21 PROCEDURE — 25000132 ZZH RX MED GY IP 250 OP 250 PS 637: Performed by: INTERNAL MEDICINE

## 2019-02-21 PROCEDURE — 94640 AIRWAY INHALATION TREATMENT: CPT | Mod: 76

## 2019-02-21 PROCEDURE — 25000128 H RX IP 250 OP 636: Performed by: INTERNAL MEDICINE

## 2019-02-21 PROCEDURE — 94660 CPAP INITIATION&MGMT: CPT

## 2019-02-21 PROCEDURE — 80048 BASIC METABOLIC PNL TOTAL CA: CPT | Performed by: INTERNAL MEDICINE

## 2019-02-21 PROCEDURE — 85027 COMPLETE CBC AUTOMATED: CPT | Performed by: INTERNAL MEDICINE

## 2019-02-21 PROCEDURE — 99233 SBSQ HOSP IP/OBS HIGH 50: CPT | Performed by: INTERNAL MEDICINE

## 2019-02-21 PROCEDURE — 94640 AIRWAY INHALATION TREATMENT: CPT

## 2019-02-21 PROCEDURE — 82805 BLOOD GASES W/O2 SATURATION: CPT | Performed by: INTERNAL MEDICINE

## 2019-02-21 PROCEDURE — 25000128 H RX IP 250 OP 636: Performed by: STUDENT IN AN ORGANIZED HEALTH CARE EDUCATION/TRAINING PROGRAM

## 2019-02-21 PROCEDURE — 25000132 ZZH RX MED GY IP 250 OP 250 PS 637: Performed by: STUDENT IN AN ORGANIZED HEALTH CARE EDUCATION/TRAINING PROGRAM

## 2019-02-21 RX ORDER — BUDESONIDE 0.5 MG/2ML
0.5 INHALANT ORAL 2 TIMES DAILY
Status: DISCONTINUED | OUTPATIENT
Start: 2019-02-21 | End: 2019-02-28 | Stop reason: HOSPADM

## 2019-02-21 RX ADMIN — IPRATROPIUM BROMIDE AND ALBUTEROL SULFATE 3 ML: .5; 3 SOLUTION RESPIRATORY (INHALATION) at 11:42

## 2019-02-21 RX ADMIN — MELATONIN TAB 3 MG 3 MG: 3 TAB at 01:56

## 2019-02-21 RX ADMIN — TAZOBACTAM SODIUM AND PIPERACILLIN SODIUM 3.38 G: 375; 3 INJECTION, SOLUTION INTRAVENOUS at 00:09

## 2019-02-21 RX ADMIN — FAMOTIDINE 20 MG: 10 INJECTION, SOLUTION INTRAVENOUS at 16:12

## 2019-02-21 RX ADMIN — INSULIN ASPART 2 UNITS: 100 INJECTION, SOLUTION INTRAVENOUS; SUBCUTANEOUS at 17:13

## 2019-02-21 RX ADMIN — METHYLPREDNISOLONE SODIUM SUCCINATE 62.5 MG: 125 INJECTION, POWDER, FOR SOLUTION INTRAMUSCULAR; INTRAVENOUS at 07:50

## 2019-02-21 RX ADMIN — METHYLPREDNISOLONE SODIUM SUCCINATE 62.5 MG: 125 INJECTION, POWDER, FOR SOLUTION INTRAMUSCULAR; INTRAVENOUS at 20:50

## 2019-02-21 RX ADMIN — TAMSULOSIN HYDROCHLORIDE 0.4 MG: 0.4 CAPSULE ORAL at 09:21

## 2019-02-21 RX ADMIN — CLONAZEPAM 1 MG: 1 TABLET ORAL at 20:49

## 2019-02-21 RX ADMIN — LORAZEPAM 1 MG: 2 INJECTION INTRAMUSCULAR; INTRAVENOUS at 00:15

## 2019-02-21 RX ADMIN — INSULIN ASPART 3 UNITS: 100 INJECTION, SOLUTION INTRAVENOUS; SUBCUTANEOUS at 12:11

## 2019-02-21 RX ADMIN — DILTIAZEM HYDROCHLORIDE 180 MG: 180 CAPSULE, COATED, EXTENDED RELEASE ORAL at 09:21

## 2019-02-21 RX ADMIN — LORAZEPAM 1 MG: 2 INJECTION INTRAMUSCULAR; INTRAVENOUS at 23:43

## 2019-02-21 RX ADMIN — Medication 225 MG: at 06:57

## 2019-02-21 RX ADMIN — IPRATROPIUM BROMIDE AND ALBUTEROL SULFATE 3 ML: .5; 3 SOLUTION RESPIRATORY (INHALATION) at 15:33

## 2019-02-21 RX ADMIN — Medication 225 MG: at 13:47

## 2019-02-21 RX ADMIN — Medication 225 MG: at 20:49

## 2019-02-21 RX ADMIN — BUDESONIDE 0.5 MG: 0.5 INHALANT RESPIRATORY (INHALATION) at 19:31

## 2019-02-21 RX ADMIN — INSULIN ASPART 2 UNITS: 100 INJECTION, SOLUTION INTRAVENOUS; SUBCUTANEOUS at 07:55

## 2019-02-21 RX ADMIN — GUAIFENESIN 600 MG: 600 TABLET, EXTENDED RELEASE ORAL at 09:21

## 2019-02-21 RX ADMIN — ACETAMINOPHEN 650 MG: 325 TABLET, FILM COATED ORAL at 09:23

## 2019-02-21 RX ADMIN — TAZOBACTAM SODIUM AND PIPERACILLIN SODIUM 3.38 G: 375; 3 INJECTION, SOLUTION INTRAVENOUS at 06:06

## 2019-02-21 RX ADMIN — APIXABAN 10 MG: 5 TABLET, FILM COATED ORAL at 20:49

## 2019-02-21 RX ADMIN — APIXABAN 10 MG: 5 TABLET, FILM COATED ORAL at 09:20

## 2019-02-21 RX ADMIN — AMOXICILLIN AND CLAVULANATE POTASSIUM 1 TABLET: 875; 125 TABLET, FILM COATED ORAL at 20:46

## 2019-02-21 RX ADMIN — IPRATROPIUM BROMIDE AND ALBUTEROL SULFATE 3 ML: .5; 3 SOLUTION RESPIRATORY (INHALATION) at 08:20

## 2019-02-21 RX ADMIN — DILTIAZEM HYDROCHLORIDE 180 MG: 180 CAPSULE, COATED, EXTENDED RELEASE ORAL at 20:48

## 2019-02-21 RX ADMIN — IPRATROPIUM BROMIDE AND ALBUTEROL SULFATE 3 ML: .5; 3 SOLUTION RESPIRATORY (INHALATION) at 19:31

## 2019-02-21 RX ADMIN — INSULIN GLARGINE 5 UNITS: 100 INJECTION, SOLUTION SUBCUTANEOUS at 07:55

## 2019-02-21 RX ADMIN — MULTIPLE VITAMINS W/ MINERALS TAB 1 TABLET: TAB at 09:21

## 2019-02-21 RX ADMIN — INSULIN GLARGINE 5 UNITS: 100 INJECTION, SOLUTION SUBCUTANEOUS at 09:22

## 2019-02-21 ASSESSMENT — ACTIVITIES OF DAILY LIVING (ADL)
ADLS_ACUITY_SCORE: 21
ADLS_ACUITY_SCORE: 26

## 2019-02-21 ASSESSMENT — MIFFLIN-ST. JEOR: SCORE: 1712

## 2019-02-21 NOTE — PROGRESS NOTES
RT Note:    Patient wore BiPAP overnight and will continue to receive Duoneb QID. RT will continue to monitor.    Cristal Rouse  February 21, 2019.6:12 AM

## 2019-02-21 NOTE — PROGRESS NOTES
[facility] Intensive Care Progress Note    Problem List  Acute on chronic hypercapnic respiratory failure  Very severe COPD with acute exacerbation  Chronic hypoxic respiratory failure    Date of Service (when I saw the patient): 02/21/2019     Assessment & Plan   Tone Cooper is a 68 year old male who was admitted on 2/16/2019. Transferred to ICU on 2/19 for hypercapnea, bipap dependence    Neurology:  encephalopathy: metabolic, improved today and   -hypercapnia    Cardiovascular:  Atrial fibrillation: currently rate controlled  - on metoprolol, has been on this for years, doesn't coincide with     Pulmonary:      Acute on chronic hypercapnic respiratory failure  COPD: emphysema  Recent acute PE on anticoagulation  Probable pneumonia  - repeat blood gas shows near normalization of pH    FiO2 (%): 30 %  Resp: 16      Renal  DONG: presumed related to recent IV contrast   - creat 1.89 today    ID:         Presumed pneumonia: infiltrate on CT, actually improved (2/11) compared to recent (1/4)  - possibly related to pulmonary infarct  - currently on day #6 empiric Zosyn  - negative procalcitonin and normal WBC prior to steroids, no sputum sent for testing  - likely de-escalate therapy tomorrow    GI / Nutrition  NPO while on bipap:   -    Endocrine:  hyperglycemia: subcutaneous insulin  - high dose steroids for acute exacerbation of COPD, consider reduction to 40mg daily    DVT Prophylaxis: oral anticoagulant  GI Prophylaxis: Not indicated    Restraints: Restraints for medical healing needed: NO    Family update by me today: No    Octavia Acevedo MD    Time Spent on this Encounter   Billing:  I spent 30 minutes, exclusive of procedures and teaching, bedside and on the inpatient unit today managing the critical care of Tone Cooper in relation to the issues listed in this note.    Main Plans for Today   Start nebulized steroids today    Interval History   Awake, communicative, wife  Gloria at bedside    Physical Exam   Temp: 97.4  F (36.3  C) Temp src: Oral Temp  Min: 96.3  F (35.7  C)  Max: 97.6  F (36.4  C) BP: (!) 153/95 Pulse: 62 Heart Rate: 63 Resp: 16 SpO2: 96 % O2 Device: BiPAP/CPAP Oxygen Delivery: 2.5 LPM  Vitals:    02/16/19 1229 02/20/19 0505 02/21/19 0500   Weight: 83.5 kg (184 lb) 90.4 kg (199 lb 4.7 oz) 89.9 kg (198 lb 3.1 oz)     I/O last 3 completed shifts:  In: 1060 [P.O.:900; I.V.:160]  Out: 1125 [Urine:1125]    Current Vitals:Temp:  [96.3  F (35.7  C)-97.6  F (36.4  C)] 97.4  F (36.3  C)  Pulse:  [54-90] 62  Heart Rate:  [57-96] 63  Resp:  [14-32] 16  BP: (136-171)/() 153/95  FiO2 (%):  [30 %] 30 %  SpO2:  [90 %-100 %] 96 %   Lethargic, unresponsive  PERRL and conjugate gaze  Full beard, full face mask on  Lungs poor air flow bilaterally  H-RR w/o murmur  Abdomen-soft, non tender, non distended  Extremities-warm and + edema  Lines: No erythema or discharge at entry site for No invasive lines  Current lines are required for patient management    Medications     - MEDICATION INSTRUCTIONS -       - MEDICATION INSTRUCTIONS -       - MEDICATION INSTRUCTIONS -       - MEDICATION INSTRUCTIONS -         amoxicillin-clavulanate  1 tablet Oral Q12H [facility]     apixaban ANTICOAGULANT  10 mg Oral BID     budesonide  0.5 mg Nebulization BID     clonazePAM  1 mg Oral At Bedtime     diltiazem ER COATED BEADS  180 mg Oral BID     famotidine  20 mg Intravenous Q12H     insulin aspart  1-7 Units Subcutaneous TID AC     insulin aspart  1-5 Units Subcutaneous At Bedtime     [START ON 2/22/2019] insulin glargine  10 Units Subcutaneous Daily     ipratropium - albuterol 0.5 mg/2.5 mg/3 mL  1 vial Nebulization 4x daily     methylPREDNISolone  62.5 mg Intravenous Q12H     multivitamin w/minerals  1 tablet Oral Daily     propafenone  225 mg Oral Q8H MEGHAN     sodium chloride (PF)  10 mL Intracatheter Q8H     tamsulosin  0.4 mg Oral Daily       Data   Recent Labs   Lab 02/21/19  0539 02/20/19  0585  02/19/19  0728  02/17/19  0718 02/16/19  2220 02/16/19  1259   WBC 8.1  --  15.4*  --  9.5 9.7 10.4   HGB 13.2*  --  13.6  --  13.6 14.4 14.8   MCV 94  --  98  --  96 96 94     --  181  --  167 177 175    133 133   < > 137 137 139   POTASSIUM 4.6 5.0 5.2   < > 4.6 4.7 4.4   CHLORIDE 98 97 95   < > 101 101 100   CO2 37* 34* 32   < > 33* 28 33*   BUN 43* 49* 60*   < > 32* 35* 32*   CR 1.01 1.30* 1.89*   < > 1.01 1.02 1.07   ANIONGAP <1* 2* 6   < > 3 8 6   WILBERT 9.2 8.9 9.4   < > 8.9 9.0 9.7   * 201* 236*   < > 188* 251* 200*   TROPI  --   --   --   --   --  <0.015 0.018    < > = values in this interval not displayed.     Recent Results (from the past 24 hour(s))   CT Head w/o Contrast    Narrative    CT SCAN OF THE HEAD WITHOUT CONTRAST   2/20/2019 8:00 PM     HISTORY: Fall with head trauma    TECHNIQUE:  Axial images of the head and coronal reformations without  IV contrast material. Radiation dose for this scan was reduced using  automated exposure control, adjustment of the mA and/or kV according  to patient size, or iterative reconstruction technique.    COMPARISON: Scan dated 3/24/2014    FINDINGS:  There is diffuse parenchymal volume loss.  White matter  changes are present in the cerebral hemispheres that are consistent  with small vessel ischemic disease in this age patient. There is no  evidence of intracranial hemorrhage, mass, acute infarct or anomaly.  The visualized portions of the sinuses and mastoids appear normal. No  intracranial hemorrhage or skull fractures are identified.      Impression    IMPRESSION: No intracranial bleed or skull fractures are identified      TORIBIO BYRNE MD

## 2019-02-21 NOTE — PROGRESS NOTES
I met with pt and wife about options for Bipap vs NIV for outpatient use.  Pt understands the importance and is willing to be adherent with machine.  However, he requests that if there is an option, if the machine won't be as loud as the machine we have here.  Wife given [facility] Contact to discuss their options and costs of machines [phone].  Pt's insurance does require a preauth.  Following.  Kjaal MEIER CTS [id number]

## 2019-02-21 NOTE — PROGRESS NOTES
[facility]  Hospitalist Progress Note  Felipe Aquino MD 02/20/19    Reason for Stay (Diagnosis): Respiratory failure, COPD exacerbation, pneumonia         Assessment and Plan:      Summary of Stay:   Tone Cooper is a 68-year-old gentleman with a history of advanced COPD with multiple hospitalizations for that recently.  He was just discharged from the hospital after being admitted for COPD exacerbation. He came back to the ER next day.  His recent history is most notable for 4 admissions in the last 6 months for pneumonia, COPD exacerbations, MSSA bacteremia thought to be due to pneumonia.  He also has a past medical history of atrial fibrillation with previous ablation, tricuspid valve replacement, hypertension, nonsustained VT.      After admission to the hospital on 2/16/19, pt had worsening of his symptoms and required intermittent BiPAP treatment.  Remained dyspneic with increased work of breathing, had mild Nicolasa with increase in Cr, then developed increasing lethargy and hypercapnia on 2/19 requiring continuous BiPAP and transferred to ICU.  Has streaky opacities in the left side would be to actually be residual pneumonia, however with his elevated lactic acid he has remained on Zosyn.  Suspect primary problem is COPD exacerbation and he is on IV steroids with scheduled nebs for this.  Trying to wean from BiPAP, but so far he remains very fatigued and wanting to sleep all day.    Problem List/Assessment and Plan:   Acute hypoxic and acute on chronic hypercapnic respiratory failure due to COPD exacerbation: Patient with advanced COPD and multiple hospitalizations in the past 6 months.  Whenever he weans steroids down his shortness of breath worsens.  Overall on exam he has very poor air movement.  Required transfer to ICU for intermittent BiPAP, pCO2 up in 90 range.  Appears to have some mild chronic hypercapnia.   -stop oxycodone, limit narcotics  -continue IV Solu-Medrol 62.5 mg every 12  hours  -Scheduled duo nebs and Xopenex nebs as needed (tachycardia)  -BiPAP when asleep.  -asked social work to see if patient would be a candidate for Home Trilogy machine and NIPPV, it sounds like he is but this would be a $5000 yearly rental fee.  There may be some other options which is being looked into  -Dr. Acevedo recommending starting budesonide neb treatment twice daily as he is unable to fully inhale deep enough to get adequate treatment with a steroid inhaler.  Additionally recommends very close follow-up with pulmonology in clinic which she will help arrange, chronic daily azithromycin therapy, and pulmonary rehab.    Suspect HCAP: Recently treated for pneumonia and was on Augmentin prior to admission.  Streaky opacities remaining on the left which may just be residual from previous pneumonia.  He did have elevated lactic acid.  -Has been on Zosyn started 2/16, changed to Augmentin today (he was on this prior to admit)     Recent diagnosis PE: This was just diagnosed when he was hospitalized a few days ago over here.  He had a small PE on the CT chest. I suspect this is more of an incidental finding with the major contributor being COPD to his symptoms. He received about 4 days of heparin and Lovenox in the hospital and was sent home on Eliquis.    -Continue Eliquis 10 mg b.i.d. given the pulmonary embolism x 7days and then decrease to 5 mg BID     Previous history of atrial fibrillation, flutter:  status post previous ablation procedure.  He did have issues with atrial fibrillation with RVR during previous hospitalizations.  He did follow with [facility] as an outpatient.  The thought was to avoid a repeat ablation procedure at this time given his recent infectious issues which would decrease his chance of success.  He did have his propafenone dose increased.    -Continue baseline diltiazem and propafenone    -In terms of his anticoagulation, he is back on Eliquis now due to PE.  It seems like this  was held at the time of discharge from previous hospitalization in January due to hemoptysis. No recurrence of hemoptysis and with concurrent PE, need to continue anti-coagulation      Episode of MSSA bacteremia in December:  This was thought to be due to pneumonia, no other secondary site was found. The patient completed a course of antibiotics.  He was on Augmentin PTA due to recent COPD exacerbation.  His CT chest done recently, 5 days ago, showed that the area of consolidation from before had much improved.  There was just a small area left on the CT scan, hence I will continue him on IV ABx, could probably finish course of Augmentin at discharge    History of tricuspid valve replacement    Chronic systolic RV CHF: Last TTE 1/4/19 showing mildly dilated RV along with decreased RV systolic function.  Does have peripheral edema.  Med list includes Lasix, however his spouse does not think he takes this.  Apparently had significant significantly more edema during previous admission per his spouse.  Now with 1-2+ lower extremity pitting edema.  Of note his weight on discharge very recently was 194 pounds and currently is 199 pounds.  -If creatinine okay tomorrow will restart Lasix     Physical deconditioning:  PT/OT      DONG on CKD stage II: Creatinine up to 1.8.   -Creatinine improved with holding diuretic    Steroid-induced hyperglycemia: secondary to steroids.  More hyperglycemic now that he is eating  -Increase Lantus to 10 units daily  -Medium dose sliding scale insulin  -May need to add mealtime insulin    Lactic acidosis: Due to respiratory failure nebs.    DVT Prophylaxis: Eliquis  Code Status: Full Code, this was discussed with previous provider and spouse  FEN: regular diet if remaining off BiPAP  Discharge Dispo: TBD.  Consult PT  Estimated Disch Date / # of Days until Disch: Requiring intermittent BiPAP so remains in ICU.  Likely a few more day hospitalization    Updated patient's spouse Gloria in  person        Interval History (Subjective):      Mostly has wanted to sleep and therefore is remained on BiPAP over the last 24 hours.  Briefly up in the chairs.  He did eat breakfast this morning but then want to get back in bed.                  Physical Exam:      Last Vital Signs:  BP (!) 166/103   Pulse 66   Temp 97.4  F (36.3  C) (Oral)   Resp 14   Ht 1.829 m (6')   Wt 89.9 kg (198 lb 3.1 oz)   SpO2 99%   BMI 26.88 kg/m        Intake/Output Summary (Last 24 hours) at 2/21/2019 1246  Last data filed at 2/21/2019 0838  Gross per 24 hour   Intake 940 ml   Output 1275 ml   Net -335 ml     Constitutional: Somnolent, NAD  Eyes: sclera white   HEENT:    MMM  Respiratory: on NC.  Very poor airflow overall.  Belly breathing at times when not on BiPAP  Cardiovascular: RRR.  No murmur   GI: non-tender, not distended, bowel sounds present  Skin: no rash    Musculoskeletal/extremities: 1-2+ edema    Neurologic: somnolent  Psychiatric: calm          Medications:      All current medications were reviewed with changes reflected in problem list.         Data:      All new lab and imaging data was reviewed.   Labs:  Recent Labs   Lab 02/20/19  0525 02/19/19  1300   PH 7.35 7.32*   PO2 58* 74*   PCO2 68* 69*   HCO3 37* 36*   JACKLYN 8.7 7.2     Recent Labs   Lab 02/21/19  0539 02/20/19  0541 02/19/19  0728    133 133   POTASSIUM 4.6 5.0 5.2   CHLORIDE 98 97 95   CO2 37* 34* 32   ANIONGAP <1* 2* 6   * 201* 236*   BUN 43* 49* 60*   CR 1.01 1.30* 1.89*   GFRESTIMATED 77 56* 36*   GFRESTBLACK 89 65 42*   WILBERT 9.2 8.9 9.4     Recent Labs   Lab 02/21/19  0539 02/19/19  0728 02/17/19  0718   WBC 8.1 15.4* 9.5   HGB 13.2* 13.6 13.6   HCT 41.6 44.1 42.7   MCV 94 98 96    181 167      Imaging:   None today    Felipe Aquino MD

## 2019-02-21 NOTE — PROGRESS NOTES
When Pt is more alert and cooperative, I will meet with him to give him options Bipap vs NIV.  There are several options that may be more cost effective.  Pt will need to talk with ivette RT BC [phone] so he knows all options and cost.  Kajal MEIER CTS [id number]    11:39 Will meet with wife and pt at 1400 today to discuss outpatient options Bipap vs NIV and cost.

## 2019-02-21 NOTE — PROGRESS NOTES
After further review of patient history. Communicated to the care coordinator yesterday that the patient does qualify for Noninvasive Ventilator as well as a BIPAP sleep device. Patient could possibly qualify for a BIPAP with backup rate as well.     Each device has different requirements and patient financial cost varies between the devices. For further details please contact [employer] at [phone].    Felicity TURCIOS  [facility]

## 2019-02-21 NOTE — PROGRESS NOTES
Patient on/off BIPAP 14/7 30% throughout day. Abdominal muscle use noted. Continues to receive duoneb QID. On 2lpm off BIPAP. RT will continue to follow.

## 2019-02-21 NOTE — PLAN OF CARE
ICU End of Shift Summary.  For vital signs and complete assessments, please see documentation flowsheets.     Pertinent assessments: Pt remains alert and oriented throughout shift, but forgetful.   C/o of not being able to sleep tonight right away. Given scheduled Klonopin with no change, PRN Ativan, and eventually Melatonin. Pt then able to rest comfortably until AM. Remains on Bipap from 2115 through shift change. LS remains diminished throughout with a congested cough with no sputum production.  Sats > 90% on Bipap 30% or 3L NC. Tele Afib CVR with heart rate in the 50s when sleeping well. Urine output unable to be measured due to patient's incontinence at times.   Major Shift Events: Pt had a fall at the beginning of this shift tonight. He had placed the call light on and support staff entered to help him use the urinal. He sat on the edge of the bed and voided. As staff was setting the urinal on the bedside table right next to the patient, he attempted to shift his weight back on the bed but leaned too far forward and lost his balance falling face first off the bed. He hit his head on the IV pole and landed on his knees. Dr. Poole paged and up to bedside to [facility]. CT head ordered and pt sent. Placed patient back in bed using the lift after ensuring there were no injuries. Neuros remain intact the rest of the shift and denies any pain or discomfort. Wife, Gloria, not updated as patient did not want us to call her.  Plan (Upcoming Events): Continue Bipap support and wean as able. Continue antibiotics, IV steroids, and nebulizers.   Discharge/Transfer Needs: TBD. Continue ICU cares at this time.    Bedside Shift Report Completed   Bedside Safety Check Completed

## 2019-02-22 LAB
ANION GAP SERPL CALCULATED.3IONS-SCNC: 2 MMOL/L (ref 3–14)
BASE EXCESS BLDV CALC-SCNC: 12.9 MMOL/L
BUN SERPL-MCNC: 37 MG/DL (ref 7–30)
CALCIUM SERPL-MCNC: 9.4 MG/DL (ref 8.5–10.1)
CHLORIDE SERPL-SCNC: 100 MMOL/L (ref 94–109)
CO2 SERPL-SCNC: 36 MMOL/L (ref 20–32)
CREAT SERPL-MCNC: 0.82 MG/DL (ref 0.66–1.25)
ERYTHROCYTE [DISTWIDTH] IN BLOOD BY AUTOMATED COUNT: 14.4 % (ref 10–15)
GFR SERPL CREATININE-BSD FRML MDRD: >90 ML/MIN/{1.73_M2}
GLUCOSE BLDC GLUCOMTR-MCNC: 186 MG/DL (ref 70–99)
GLUCOSE BLDC GLUCOMTR-MCNC: 196 MG/DL (ref 70–99)
GLUCOSE BLDC GLUCOMTR-MCNC: 246 MG/DL (ref 70–99)
GLUCOSE BLDC GLUCOMTR-MCNC: 259 MG/DL (ref 70–99)
GLUCOSE BLDC GLUCOMTR-MCNC: 269 MG/DL (ref 70–99)
GLUCOSE SERPL-MCNC: 188 MG/DL (ref 70–99)
HCO3 BLDV-SCNC: 40 MMOL/L (ref 21–28)
HCT VFR BLD AUTO: 40.6 % (ref 40–53)
HGB BLD-MCNC: 13.3 G/DL (ref 13.3–17.7)
MCH RBC QN AUTO: 30.9 PG (ref 26.5–33)
MCHC RBC AUTO-ENTMCNC: 32.8 G/DL (ref 31.5–36.5)
MCV RBC AUTO: 94 FL (ref 78–100)
O2/TOTAL GAS SETTING VFR VENT: ABNORMAL %
OXYHGB MFR BLDV: 93 %
PCO2 BLDV: 62 MM HG (ref 40–50)
PH BLDV: 7.42 PH (ref 7.32–7.43)
PLATELET # BLD AUTO: 136 10E9/L (ref 150–450)
PO2 BLDV: 66 MM HG (ref 25–47)
POTASSIUM SERPL-SCNC: 4.6 MMOL/L (ref 3.4–5.3)
RBC # BLD AUTO: 4.31 10E12/L (ref 4.4–5.9)
SODIUM SERPL-SCNC: 138 MMOL/L (ref 133–144)
WBC # BLD AUTO: 8.8 10E9/L (ref 4–11)

## 2019-02-22 PROCEDURE — 82805 BLOOD GASES W/O2 SATURATION: CPT | Performed by: INTERNAL MEDICINE

## 2019-02-22 PROCEDURE — 36415 COLL VENOUS BLD VENIPUNCTURE: CPT | Performed by: INTERNAL MEDICINE

## 2019-02-22 PROCEDURE — 25000132 ZZH RX MED GY IP 250 OP 250 PS 637: Performed by: INTERNAL MEDICINE

## 2019-02-22 PROCEDURE — 99233 SBSQ HOSP IP/OBS HIGH 50: CPT | Performed by: INTERNAL MEDICINE

## 2019-02-22 PROCEDURE — 25000131 ZZH RX MED GY IP 250 OP 636 PS 637

## 2019-02-22 PROCEDURE — 25000125 ZZHC RX 250: Performed by: INTERNAL MEDICINE

## 2019-02-22 PROCEDURE — 20000003 ZZH R&B ICU

## 2019-02-22 PROCEDURE — 85027 COMPLETE CBC AUTOMATED: CPT | Performed by: INTERNAL MEDICINE

## 2019-02-22 PROCEDURE — 80048 BASIC METABOLIC PNL TOTAL CA: CPT | Performed by: INTERNAL MEDICINE

## 2019-02-22 PROCEDURE — 25000128 H RX IP 250 OP 636: Performed by: INTERNAL MEDICINE

## 2019-02-22 PROCEDURE — 40000275 ZZH STATISTIC RCP TIME EA 10 MIN

## 2019-02-22 PROCEDURE — 94640 AIRWAY INHALATION TREATMENT: CPT

## 2019-02-22 PROCEDURE — 94640 AIRWAY INHALATION TREATMENT: CPT | Mod: 76

## 2019-02-22 PROCEDURE — 00000146 ZZHCL STATISTIC GLUCOSE BY METER IP

## 2019-02-22 PROCEDURE — 25000128 H RX IP 250 OP 636: Performed by: STUDENT IN AN ORGANIZED HEALTH CARE EDUCATION/TRAINING PROGRAM

## 2019-02-22 PROCEDURE — 94660 CPAP INITIATION&MGMT: CPT

## 2019-02-22 RX ORDER — LIDOCAINE 4 G/G
2 PATCH TOPICAL
Status: DISCONTINUED | OUTPATIENT
Start: 2019-02-22 | End: 2019-02-28 | Stop reason: HOSPADM

## 2019-02-22 RX ORDER — FUROSEMIDE 20 MG
20 TABLET ORAL DAILY
Status: DISCONTINUED | OUTPATIENT
Start: 2019-02-22 | End: 2019-02-26

## 2019-02-22 RX ORDER — FAMOTIDINE 20 MG/1
20 TABLET, FILM COATED ORAL 2 TIMES DAILY
Status: DISCONTINUED | OUTPATIENT
Start: 2019-02-22 | End: 2019-02-28 | Stop reason: HOSPADM

## 2019-02-22 RX ADMIN — FUROSEMIDE 20 MG: 20 TABLET ORAL at 09:02

## 2019-02-22 RX ADMIN — CLONAZEPAM 1 MG: 1 TABLET ORAL at 21:11

## 2019-02-22 RX ADMIN — FAMOTIDINE 20 MG: 20 TABLET ORAL at 19:59

## 2019-02-22 RX ADMIN — LORAZEPAM 1 MG: 2 INJECTION INTRAMUSCULAR; INTRAVENOUS at 12:03

## 2019-02-22 RX ADMIN — MULTIPLE VITAMINS W/ MINERALS TAB 1 TABLET: TAB at 07:59

## 2019-02-22 RX ADMIN — INSULIN ASPART 3 UNITS: 100 INJECTION, SOLUTION INTRAVENOUS; SUBCUTANEOUS at 16:27

## 2019-02-22 RX ADMIN — AMOXICILLIN AND CLAVULANATE POTASSIUM 1 TABLET: 875; 125 TABLET, FILM COATED ORAL at 19:59

## 2019-02-22 RX ADMIN — ACETAMINOPHEN 650 MG: 325 TABLET, FILM COATED ORAL at 15:55

## 2019-02-22 RX ADMIN — BUDESONIDE 0.5 MG: 0.5 INHALANT RESPIRATORY (INHALATION) at 20:12

## 2019-02-22 RX ADMIN — METHYLPREDNISOLONE SODIUM SUCCINATE 62.5 MG: 125 INJECTION, POWDER, FOR SOLUTION INTRAMUSCULAR; INTRAVENOUS at 07:54

## 2019-02-22 RX ADMIN — APIXABAN 10 MG: 5 TABLET, FILM COATED ORAL at 07:54

## 2019-02-22 RX ADMIN — LIDOCAINE 2 PATCH: 560 PATCH PERCUTANEOUS; TOPICAL; TRANSDERMAL at 12:52

## 2019-02-22 RX ADMIN — Medication 225 MG: at 06:32

## 2019-02-22 RX ADMIN — DILTIAZEM HYDROCHLORIDE 180 MG: 180 CAPSULE, COATED, EXTENDED RELEASE ORAL at 19:59

## 2019-02-22 RX ADMIN — BUDESONIDE 0.5 MG: 0.5 INHALANT RESPIRATORY (INHALATION) at 08:02

## 2019-02-22 RX ADMIN — TAMSULOSIN HYDROCHLORIDE 0.4 MG: 0.4 CAPSULE ORAL at 08:00

## 2019-02-22 RX ADMIN — APIXABAN 5 MG: 5 TABLET, FILM COATED ORAL at 19:58

## 2019-02-22 RX ADMIN — FAMOTIDINE 20 MG: 10 INJECTION, SOLUTION INTRAVENOUS at 06:39

## 2019-02-22 RX ADMIN — IPRATROPIUM BROMIDE AND ALBUTEROL SULFATE 3 ML: .5; 3 SOLUTION RESPIRATORY (INHALATION) at 20:12

## 2019-02-22 RX ADMIN — LORAZEPAM 1 MG: 2 INJECTION INTRAMUSCULAR; INTRAVENOUS at 23:30

## 2019-02-22 RX ADMIN — DILTIAZEM HYDROCHLORIDE 180 MG: 180 CAPSULE, COATED, EXTENDED RELEASE ORAL at 07:59

## 2019-02-22 RX ADMIN — IPRATROPIUM BROMIDE AND ALBUTEROL SULFATE 3 ML: .5; 3 SOLUTION RESPIRATORY (INHALATION) at 08:02

## 2019-02-22 RX ADMIN — AMOXICILLIN AND CLAVULANATE POTASSIUM 1 TABLET: 875; 125 TABLET, FILM COATED ORAL at 07:57

## 2019-02-22 RX ADMIN — INSULIN ASPART 3 UNITS: 100 INJECTION, SOLUTION INTRAVENOUS; SUBCUTANEOUS at 12:22

## 2019-02-22 RX ADMIN — IPRATROPIUM BROMIDE AND ALBUTEROL SULFATE 3 ML: .5; 3 SOLUTION RESPIRATORY (INHALATION) at 15:18

## 2019-02-22 RX ADMIN — Medication 225 MG: at 15:55

## 2019-02-22 RX ADMIN — INSULIN GLARGINE 10 UNITS: 100 INJECTION, SOLUTION SUBCUTANEOUS at 07:58

## 2019-02-22 RX ADMIN — INSULIN ASPART 1 UNITS: 100 INJECTION, SOLUTION INTRAVENOUS; SUBCUTANEOUS at 07:21

## 2019-02-22 RX ADMIN — IPRATROPIUM BROMIDE AND ALBUTEROL SULFATE 3 ML: .5; 3 SOLUTION RESPIRATORY (INHALATION) at 12:07

## 2019-02-22 RX ADMIN — ACETAMINOPHEN 650 MG: 325 TABLET, FILM COATED ORAL at 09:02

## 2019-02-22 RX ADMIN — Medication 225 MG: at 21:11

## 2019-02-22 ASSESSMENT — ACTIVITIES OF DAILY LIVING (ADL)
ADLS_ACUITY_SCORE: 21
ADLS_ACUITY_SCORE: 16
ADLS_ACUITY_SCORE: 20
ADLS_ACUITY_SCORE: 22
ADLS_ACUITY_SCORE: 20
ADLS_ACUITY_SCORE: 20

## 2019-02-22 ASSESSMENT — MIFFLIN-ST. JEOR: SCORE: 1709

## 2019-02-22 NOTE — PLAN OF CARE
ICU End of Shift Summary.  For vital signs and complete assessments, please see documentation flowsheets.     Pertinent assessments: Patient alert/oriented but forgetful at times. Lung sounds diminished throughout. Tele is A. Fib CVR. Abdomen is obese, but has positive bowel sounds. Voiding without difficulty in urinal. Skin intact with scattered bruises.   Major Shift Events: PRN ativan given for restlessness.   Plan (Upcoming Events): Continue BiPAP support, IV steroids, and supportive cares. SW following for home equipment.   Discharge/Transfer Needs: TBD    Bedside Shift Report Completed :   Bedside Safety Check Completed:

## 2019-02-22 NOTE — PLAN OF CARE
Patient alert & oriented , however situational awareness is not as clear.    ICU End of Shift Summary.  For vital signs and complete assessments, please see documentation flowsheets.     Pertinent assessments: up to chair following safety plan for patient, tolerated being up, short transfers due to weakness in legs, bp elevated at times, responded to  medications, afeb, able to maintain sats on oxygen, used bipap in bed, nasal cannula in chair and with transfers.  Good appetite, large meals, slept most of day with breaks to eat. Occasionally incontinent.  Lots of education for patient and spouse, frequent and consistent re-inforcement of safety plan  Musinex helpful for congestion,   Major Shift Events: follow safety plan, as activity increased during day, patient seemed stronger for short periods in legs when standing.     Plan (Upcoming Events): follow safety plan, would not allow patient to have mints unless up in chair, follow POC, suggest PT and OT  Discharge/Transfer Needs: tbd    Bedside Shift Report Completed : yes  Bedside Safety Check Completed: yes

## 2019-02-22 NOTE — PROGRESS NOTES
[facility]  Hospitalist Progress Note  Felipe Aquino MD 02/22/19    Reason for Stay (Diagnosis): Respiratory failure, COPD exacerbation, pneumonia         Assessment and Plan:      Summary of Stay:   Tone Cooper is a 68-year-old gentleman with a history of advanced COPD with multiple hospitalizations for that recently.  He was just discharged from the hospital after being admitted for COPD exacerbation. He came back to the ER next day.  His recent history is most notable for 4 admissions in the last 6 months for pneumonia, COPD exacerbations, MSSA bacteremia thought to be due to pneumonia.  He also has a past medical history of atrial fibrillation with previous ablation, tricuspid valve replacement, hypertension, nonsustained VT.      After admission to the hospital on 2/16/19, pt had worsening of his symptoms and required intermittent BiPAP treatment.  Remained dyspneic with increased work of breathing, had mild Nicolasa with increase in Cr, then developed increasing lethargy and hypercapnia on 2/19 requiring continuous BiPAP and transferred to ICU.  Has streaky opacities in the left side would be to actually be residual pneumonia, however with his elevated lactic acid he has remained on Zosyn.  Suspect primary problem is COPD exacerbation and he is on IV steroids with scheduled nebs for this.  Trying to wean from BiPAP, but so far he remains very fatigued and wanting to sleep all day.    Problem List/Assessment and Plan:   Acute hypoxic and acute on chronic hypercapnic respiratory failure due to COPD exacerbation: Patient with advanced COPD and multiple hospitalizations in the past 6 months.  Whenever he weans steroids down his shortness of breath worsens.  Overall on exam he has very poor air movement.  Required transfer to ICU for intermittent BiPAP, pCO2 up in 90 range.  Appears to have some mild chronic hypercapnia.   -stop oxycodone, limit narcotics  -Change Solu-Medrol to oral prednisone and  de-escalate therapy per pulmonary recommendations.  Start with 60 mg p.o. prednisone tomorrow.  Likely to need prolonged taper and to maintain at 10 mg daily given his frequent exacerbations  -Scheduled duo nebs and Xopenex nebs as needed (tachycardia)  -BiPAP never sleeping  -asked social work to see if patient would be a candidate for Home Trilogy machine and NIPPV, it sounds like he is but this would be a $5000 yearly rental fee.  There may be some other options which is being looked into  -Dr. Acevedo recommending starting budesonide neb treatment twice daily as he is unable to fully inhale deep enough to get adequate treatment with a steroid inhaler.  Additionally recommends very close follow-up with pulmonology in clinic which she will help arrange, chronic daily azithromycin therapy, and pulmonary rehab.  -Given his positive pressure ventilation dependence SW to look into possible LTACH where they have pulmonary rehab    Suspect HCAP: Recently treated for pneumonia and was on Augmentin prior to admission.  Streaky opacities remaining on the left which may just be residual from previous pneumonia.  He did have elevated lactic acid.  -Has been on Zosyn started 2/16, changed to Augmentin 2/21 (he was on this prior to admit).  Likely stop in the next 2-3 days if improved     Recent diagnosis PE: This was just diagnosed when he was hospitalized a few days ago over here.  He had a small PE on the CT chest. I suspect this is more of an incidental finding with the major contributor being COPD to his symptoms. He received about 4 days of heparin and Lovenox in the hospital and was sent home on Eliquis.    -Change Eliquis 10 mg b.i.d. to 5mg bid po      Previous history of atrial fibrillation, flutter:  status post previous ablation procedure.  He did have issues with atrial fibrillation with RVR during previous hospitalizations.  He did follow with [facility] as an outpatient.  The thought was to avoid a repeat  ablation procedure at this time given his recent infectious issues which would decrease his chance of success.  He did have his propafenone dose increased.    -Continue baseline diltiazem and propafenone    -In terms of his anticoagulation, he is back on Eliquis now due to PE.  It seems like this was held at the time of discharge from previous hospitalization in January due to hemoptysis. No recurrence of hemoptysis and with concurrent PE, need to continue anti-coagulation      Episode of MSSA bacteremia in December:  This was thought to be due to pneumonia, no other secondary site was found. The patient completed a course of antibiotics.  He was on Augmentin PTA due to recent COPD exacerbation.  His CT chest done recently, 5 days ago, showed that the area of consolidation from before had much improved.  There was just a small area left on the CT scan, hence I will continue him on IV ABx, could probably finish course of Augmentin at discharge    History of tricuspid valve replacement    Chronic systolic RV CHF: Last TTE 1/4/19 showing mildly dilated RV along with decreased RV systolic function.  Does have peripheral edema.  Med list includes Lasix, however his spouse does not think he takes this.  Apparently had significant significantly more edema during previous admission per his spouse.  Now with 1-2+ lower extremity pitting edema.  Of note his weight on discharge very recently was 194 pounds and currently is 199 pounds.  -Start Lasix 20 mg p.o. daily     Physical deconditioning:  PT/OT      DONG on CKD stage II: Creatinine up to 1.8 at peak.  Now back to baseline  -Check BMP now that diuretic resumed    Steroid-induced hyperglycemia: secondary to steroids.  More hyperglycemic now that he is eating  -Continue Lantus to 10 units daily  -Medium dose sliding scale insulin  -Reducing steroids which may help with hyperglycemia    Chronic low back pain: Has been taking oxycodone 5 mg frequently at home for this.  Now  having 10 out of 10 back pain.  His opiates have been held given his hypercapnia and tenuous respiratory status.  As his goals are restorative cares risk of opiates at this time outweigh potential benefits of pain control.  Any amount likely to increase his CO2 retention leading worsening of his respiratory failure.  Additionally he sleeps all day long and night and opiates may worsen this problem.  -Acetaminophen  -Add topical NSAID and Lidoderm patches  -Mobilize patient    Lactic acidosis: Due to respiratory failure nebs.    DVT Prophylaxis: Eliquis  Code Status: Full Code, this was discussed with previous provider and spouse  FEN: regular diet if remaining off BiPAP  Discharge Dispo: TBD.  Consult PT. given how BiPAP dependent he has been and likely a slow process he may benefit from LTACH they also have pulmonary rehab.  Social work looking into this  Estimated Disch Date / # of Days until Disch: Requiring intermittent BiPAP so remains in ICU.  Likely a few more day hospitalization unless plans for LTACH    Updated patient's spouse Gloria in person        Interval History (Subjective):      Maintained on BiPAP overnight and whenever asleep.  Continues to feel short of breath with very little movement from the bed to the chair.  Still having 10 out of 10 back pain.                  Physical Exam:      Last Vital Signs:  /81   Pulse 67   Temp 97.7  F (36.5  C) (Oral)   Resp 18   Ht 1.829 m (6')   Wt 89.6 kg (197 lb 8.5 oz)   SpO2 95%   BMI 26.79 kg/m        Intake/Output Summary (Last 24 hours) at 2/21/2019 1246  Last data filed at 2/21/2019 0838  Gross per 24 hour   Intake 940 ml   Output 1275 ml   Net -335 ml     Constitutional: NAD  Eyes: sclera white   HEENT:    MMM  Respiratory: on NC.  Very poor airflow overall.  Tachypnea with getting up to the chair  Cardiovascular: RRR.  No murmur   GI: non-tender, not distended, bowel sounds present  Skin: no rash    Musculoskeletal/extremities: 1-2+ lower  extremity edema    Neurologic: somnolent at times  Psychiatric: calm          Medications:      All current medications were reviewed with changes reflected in problem list.         Data:      All new lab and imaging data was reviewed.   Labs:  Recent Labs   Lab 02/20/19  0525 02/19/19  1300   PH 7.35 7.32*   PO2 58* 74*   PCO2 68* 69*   HCO3 37* 36*   JACKLYN 8.7 7.2     Recent Labs   Lab 02/22/19  0545 02/21/19  0539 02/20/19  0541    135 133   POTASSIUM 4.6 4.6 5.0   CHLORIDE 100 98 97   CO2 36* 37* 34*   ANIONGAP 2* <1* 2*   * 193* 201*   BUN 37* 43* 49*   CR 0.82 1.01 1.30*   GFRESTIMATED >90 77 56*   GFRESTBLACK >90 89 65   WILBERT 9.4 9.2 8.9     Recent Labs   Lab 02/22/19  0545 02/21/19  0539 02/19/19  0728   WBC 8.8 8.1 15.4*   HGB 13.3 13.2* 13.6   HCT 40.6 41.6 44.1   MCV 94 94 98   * 152 181      Imaging:   None today    Felipe Aquino MD

## 2019-02-22 NOTE — PLAN OF CARE
Pt: Received orders for evaluation and treatment.  Attempted to see patient; patient with increased SOB following transfer from chair to bed with nursing prior to PT arrival, declined further mobility with PT at this time.     PT: Attempted to see patient for evaluation in afternoon; per nursing, patient resting with BiPAP after transferring from bedside chair to bedside commode.  Nursing report patient with severely limited activity tolerance; recommended holding evaluation until tomorrow.

## 2019-02-22 NOTE — PLAN OF CARE
ICU End of Shift Summary.  For vital signs and complete assessments, please see documentation flowsheets.     Pertinent assessments: Alert to person and place only. Very Forgetful. Asleep for most of shift, only awake for meals up in chair. On BiPAP 30% while sleeping. 2LPM NC while awake. Lungs diminished, shallow. ERICKSON, SOB at rest. Utilizing abdominal muscles at rest for breathing. Tele Afib CVR, RVR with movements. States 12/10 back pain. Tylenol not helping per pt. MD notified. Lidoderm patches applied. Pt stated he doesn't help much. PRN IV Ativan x1 this shift for anxiety. Pt very impulsive and not listening well to direction when transferring. Extensive A2 with gaitbelt and walker for transfer. Pt sat down about 4 times abruptly when trying to do pericares after a BM. 2 BMs this shift. POC reviewed with pt and wife.   Major Shift Events: Up to chair for 3 meals.   Plan (Upcoming Events): Continue to monitor closely. BiPAP while sleeping.   Discharge/Transfer Needs: TBD    Bedside Shift Report Completed : yes  Bedside Safety Check Completed: yes

## 2019-02-22 NOTE — PROGRESS NOTES
Felicity from [facility] [phone] did discuss with wife the options.  They discussed a Bipap with inspiratory and expiratory pressure or a Bipap with a back up rate (overnight oximetry recommended) which can be rent to own.  Pt also has the option of NIV.  Will discuss with MD at care rounds of options.  Kajal MEIER CTS [phone]    ADDENDUM: 10:51 We discussed in care rounds if pt would be a candidate for [place].  I called Lana feliz and she will assess him and get back to me . If he is a candidate, I will discuss this option with pt and wife.      1326: Pt may be a candidate for LTACH.  However, they have no beds till Monday.  Pt was sleeping soundly and wife wasn't present.  I left a brochure by bedside.  I left a vm with wife to call me.        1443:  Nereida wife is interested in [place].  She will review the brochure.  I will touch base with her Monday.

## 2019-02-22 NOTE — PROGRESS NOTES
[facility]     Reviewed pt for possible LTACH admission and, at this time, pt may be appropriate for admission when deemed medically stable for transfer. Will update the CM/SW and continue to follow.      Please feel free to  contact me with any questions or concerns.     Thank you,    Lana Crane  Referral Specialist  [facility]   [email or web address]  [phone] (direct)

## 2019-02-22 NOTE — PROGRESS NOTES
Pt continues on Bipap. Qid Duonebs. Bs are diminished with crackles in the bases. BP (!) 153/95 (BP Location: Right arm)   Pulse 62   Temp 97.4  F (36.3  C) (Oral)   Resp 16   Ht 1.829 m (6')   Wt 89.9 kg (198 lb 3.1 oz)   SpO2 96%   BMI 26.88 kg/m

## 2019-02-22 NOTE — PROGRESS NOTES
RT note;  Pt continues to use BiPap on and off through out day, pt's sits up in chair for meals with NC at 2 lpm and maintains SPO2 90's but he returns to bed with BiPap applied soon after meal. Duonebs QID and Pulmicort BID.

## 2019-02-22 NOTE — PLAN OF CARE
Safety Plan:  to minimize risk of falls and injury    Patient is independent spirited and impulsive, very weak, katheryn in legs, large appetite, wants to eat frequently    Bed, side rails up so patient cannot sit on edge of bed  Bed alarm on   When in bed, bipap on   Call light handy and accessible  Close visual monitoring  Respond quickly to alarms and call light  Have personal items at hand so patient doesn't have to reach    No mints or food available to patient when he is in bed, or on bipap, to minimize risk of choking    Needs oxygen 2-3Lpm when off bipap    Transfers and up in room  Needs gait belt, 2 assistants,  and walker,   Chair pad alarm in chair when up  Not left alone on commode  Can only tolerate standing for 10-20 seconds and then will sit whether there is a chair or bed behind him or not

## 2019-02-23 LAB
ANION GAP SERPL CALCULATED.3IONS-SCNC: 3 MMOL/L (ref 3–14)
BUN SERPL-MCNC: 44 MG/DL (ref 7–30)
CALCIUM SERPL-MCNC: 9 MG/DL (ref 8.5–10.1)
CHLORIDE SERPL-SCNC: 100 MMOL/L (ref 94–109)
CO2 SERPL-SCNC: 37 MMOL/L (ref 20–32)
CREAT SERPL-MCNC: 0.99 MG/DL (ref 0.66–1.25)
GFR SERPL CREATININE-BSD FRML MDRD: 78 ML/MIN/{1.73_M2}
GLUCOSE BLDC GLUCOMTR-MCNC: 183 MG/DL (ref 70–99)
GLUCOSE BLDC GLUCOMTR-MCNC: 199 MG/DL (ref 70–99)
GLUCOSE BLDC GLUCOMTR-MCNC: 262 MG/DL (ref 70–99)
GLUCOSE BLDC GLUCOMTR-MCNC: 379 MG/DL (ref 70–99)
GLUCOSE SERPL-MCNC: 185 MG/DL (ref 70–99)
POTASSIUM SERPL-SCNC: 4.6 MMOL/L (ref 3.4–5.3)
SODIUM SERPL-SCNC: 140 MMOL/L (ref 133–144)

## 2019-02-23 PROCEDURE — 25000125 ZZHC RX 250: Performed by: INTERNAL MEDICINE

## 2019-02-23 PROCEDURE — 20000003 ZZH R&B ICU

## 2019-02-23 PROCEDURE — 25000132 ZZH RX MED GY IP 250 OP 250 PS 637: Performed by: INTERNAL MEDICINE

## 2019-02-23 PROCEDURE — 40000275 ZZH STATISTIC RCP TIME EA 10 MIN

## 2019-02-23 PROCEDURE — 94640 AIRWAY INHALATION TREATMENT: CPT | Mod: 76

## 2019-02-23 PROCEDURE — 80048 BASIC METABOLIC PNL TOTAL CA: CPT | Performed by: INTERNAL MEDICINE

## 2019-02-23 PROCEDURE — 25000131 ZZH RX MED GY IP 250 OP 636 PS 637: Performed by: INTERNAL MEDICINE

## 2019-02-23 PROCEDURE — 94660 CPAP INITIATION&MGMT: CPT

## 2019-02-23 PROCEDURE — 25000131 ZZH RX MED GY IP 250 OP 636 PS 637

## 2019-02-23 PROCEDURE — 25000128 H RX IP 250 OP 636: Performed by: INTERNAL MEDICINE

## 2019-02-23 PROCEDURE — 25000128 H RX IP 250 OP 636: Performed by: STUDENT IN AN ORGANIZED HEALTH CARE EDUCATION/TRAINING PROGRAM

## 2019-02-23 PROCEDURE — 94640 AIRWAY INHALATION TREATMENT: CPT

## 2019-02-23 PROCEDURE — 99233 SBSQ HOSP IP/OBS HIGH 50: CPT | Performed by: INTERNAL MEDICINE

## 2019-02-23 PROCEDURE — 36415 COLL VENOUS BLD VENIPUNCTURE: CPT | Performed by: INTERNAL MEDICINE

## 2019-02-23 PROCEDURE — 00000146 ZZHCL STATISTIC GLUCOSE BY METER IP

## 2019-02-23 RX ORDER — LORAZEPAM 2 MG/ML
.25-5 INJECTION INTRAMUSCULAR EVERY 6 HOURS PRN
Status: DISCONTINUED | OUTPATIENT
Start: 2019-02-23 | End: 2019-02-24

## 2019-02-23 RX ADMIN — FAMOTIDINE 20 MG: 20 TABLET ORAL at 19:24

## 2019-02-23 RX ADMIN — FUROSEMIDE 20 MG: 20 TABLET ORAL at 08:24

## 2019-02-23 RX ADMIN — INSULIN GLARGINE 10 UNITS: 100 INJECTION, SOLUTION SUBCUTANEOUS at 07:57

## 2019-02-23 RX ADMIN — MULTIPLE VITAMINS W/ MINERALS TAB 1 TABLET: TAB at 08:24

## 2019-02-23 RX ADMIN — Medication 225 MG: at 21:20

## 2019-02-23 RX ADMIN — DILTIAZEM HYDROCHLORIDE 180 MG: 180 CAPSULE, COATED, EXTENDED RELEASE ORAL at 21:20

## 2019-02-23 RX ADMIN — MELATONIN TAB 3 MG 3 MG: 3 TAB at 01:08

## 2019-02-23 RX ADMIN — ACETAMINOPHEN 650 MG: 325 TABLET, FILM COATED ORAL at 08:24

## 2019-02-23 RX ADMIN — IPRATROPIUM BROMIDE AND ALBUTEROL SULFATE 3 ML: .5; 3 SOLUTION RESPIRATORY (INHALATION) at 15:38

## 2019-02-23 RX ADMIN — APIXABAN 5 MG: 5 TABLET, FILM COATED ORAL at 08:24

## 2019-02-23 RX ADMIN — CLONAZEPAM 1 MG: 1 TABLET ORAL at 21:20

## 2019-02-23 RX ADMIN — IPRATROPIUM BROMIDE AND ALBUTEROL SULFATE 3 ML: .5; 3 SOLUTION RESPIRATORY (INHALATION) at 07:39

## 2019-02-23 RX ADMIN — ACETAMINOPHEN 650 MG: 325 TABLET, FILM COATED ORAL at 23:29

## 2019-02-23 RX ADMIN — FAMOTIDINE 20 MG: 20 TABLET ORAL at 08:24

## 2019-02-23 RX ADMIN — ACETAMINOPHEN 650 MG: 325 TABLET, FILM COATED ORAL at 19:24

## 2019-02-23 RX ADMIN — IPRATROPIUM BROMIDE AND ALBUTEROL SULFATE 3 ML: .5; 3 SOLUTION RESPIRATORY (INHALATION) at 12:34

## 2019-02-23 RX ADMIN — AMOXICILLIN AND CLAVULANATE POTASSIUM 1 TABLET: 875; 125 TABLET, FILM COATED ORAL at 08:24

## 2019-02-23 RX ADMIN — Medication 225 MG: at 05:02

## 2019-02-23 RX ADMIN — APIXABAN 5 MG: 5 TABLET, FILM COATED ORAL at 19:24

## 2019-02-23 RX ADMIN — INSULIN ASPART 3 UNITS: 100 INJECTION, SOLUTION INTRAVENOUS; SUBCUTANEOUS at 16:21

## 2019-02-23 RX ADMIN — AMOXICILLIN AND CLAVULANATE POTASSIUM 1 TABLET: 875; 125 TABLET, FILM COATED ORAL at 19:24

## 2019-02-23 RX ADMIN — DICLOFENAC 2 G: 10 GEL TOPICAL at 21:20

## 2019-02-23 RX ADMIN — LORAZEPAM 1 MG: 2 INJECTION INTRAMUSCULAR; INTRAVENOUS at 05:02

## 2019-02-23 RX ADMIN — ACETAMINOPHEN 650 MG: 325 TABLET, FILM COATED ORAL at 14:55

## 2019-02-23 RX ADMIN — LIDOCAINE 2 PATCH: 560 PATCH PERCUTANEOUS; TOPICAL; TRANSDERMAL at 08:23

## 2019-02-23 RX ADMIN — DICLOFENAC 2 G: 10 GEL TOPICAL at 17:13

## 2019-02-23 RX ADMIN — DILTIAZEM HYDROCHLORIDE 180 MG: 180 CAPSULE, COATED, EXTENDED RELEASE ORAL at 08:24

## 2019-02-23 RX ADMIN — TAMSULOSIN HYDROCHLORIDE 0.4 MG: 0.4 CAPSULE ORAL at 08:24

## 2019-02-23 RX ADMIN — IPRATROPIUM BROMIDE AND ALBUTEROL SULFATE 3 ML: .5; 3 SOLUTION RESPIRATORY (INHALATION) at 19:40

## 2019-02-23 RX ADMIN — PREDNISONE 60 MG: 50 TABLET ORAL at 08:24

## 2019-02-23 RX ADMIN — BUDESONIDE 0.5 MG: 0.5 INHALANT RESPIRATORY (INHALATION) at 19:40

## 2019-02-23 RX ADMIN — Medication 225 MG: at 14:55

## 2019-02-23 RX ADMIN — INSULIN ASPART 1 UNITS: 100 INJECTION, SOLUTION INTRAVENOUS; SUBCUTANEOUS at 07:56

## 2019-02-23 RX ADMIN — INSULIN ASPART 2 UNITS: 100 INJECTION, SOLUTION INTRAVENOUS; SUBCUTANEOUS at 12:03

## 2019-02-23 RX ADMIN — BUDESONIDE 0.5 MG: 0.5 INHALANT RESPIRATORY (INHALATION) at 07:39

## 2019-02-23 RX ADMIN — DICLOFENAC 2 G: 10 GEL TOPICAL at 08:24

## 2019-02-23 RX ADMIN — LORAZEPAM 0.5 MG: 2 INJECTION INTRAMUSCULAR; INTRAVENOUS at 23:28

## 2019-02-23 ASSESSMENT — ACTIVITIES OF DAILY LIVING (ADL)
ADLS_ACUITY_SCORE: 21
ADLS_ACUITY_SCORE: 16
ADLS_ACUITY_SCORE: 20
ADLS_ACUITY_SCORE: 21
ADLS_ACUITY_SCORE: 21
ADLS_ACUITY_SCORE: 20

## 2019-02-23 ASSESSMENT — MIFFLIN-ST. JEOR: SCORE: 1720

## 2019-02-23 NOTE — PLAN OF CARE
"PT: Attempted to see pt, pt keeping eyes closed except for a brief second to say \"no\" when asked if pt agreeable to working with PT.  Per nsg pt is only agreeable to getting OOB with nsg for meals and then insists on returning back to bed immediately following meals.  PT will continue to follow/check in with nsg on 2/24/19 to determine appropriateness/willingness to mobilize with PT.  "

## 2019-02-23 NOTE — PROGRESS NOTES
Patient remains on BIPAP this shift with exceptions of short breaks.  Patient becomes air hungry without mask in place.  Mask type switched out early in shift for more comfort.  VSS

## 2019-02-23 NOTE — PROGRESS NOTES
[facility]  Hospitalist Progress Note  Felipe Aquino MD 02/23/19    Reason for Stay (Diagnosis): Respiratory failure, COPD exacerbation, pneumonia         Assessment and Plan:      Summary of Stay:   Tone Cooper is a 68-year-old gentleman with a history of advanced COPD with multiple hospitalizations for that recently.  He was just discharged from the hospital after being admitted for COPD exacerbation. He came back to the ER next day.  His recent history is most notable for 4 admissions in the last 6 months for pneumonia, COPD exacerbations, MSSA bacteremia thought to be due to pneumonia.  He also has a past medical history of atrial fibrillation with previous ablation, tricuspid valve replacement, hypertension, nonsustained VT.      After admission to the hospital on 2/16/19, pt had worsening of his symptoms and required intermittent BiPAP treatment.  Remained dyspneic with increased work of breathing, had mild Nicolasa with increase in Cr, then developed increasing lethargy and hypercapnia on 2/19 requiring continuous BiPAP and transferred to ICU.  Has streaky opacities in the left side would be to actually be residual pneumonia, however with his elevated lactic acid he has remained on Zosyn.  Suspect primary problem is COPD exacerbation and he is on IV steroids with scheduled nebs for this, overall not having significant clinical improvement and decreasing steroids recommended by pulmonary so on oral prednisone.  Trying to wean from BiPAP, but so far he remains very fatigued and wanting to sleep all day.  Looking into potential LTACH for BiPAP weaning and pulmonary rehab.  Additionally having ongoing conversations with the patient regarding goals of care as he is becoming more and more frustrated with lack of improvement in his respiratory status.    Problem List/Assessment and Plan:   Acute hypoxic and acute on chronic hypercapnic respiratory failure due to COPD exacerbation: Patient with  advanced COPD and multiple hospitalizations in the past 6 months.  Whenever he weans steroids down his shortness of breath worsens.  Overall on exam he has very poor air movement.  Required transfer to ICU for intermittent BiPAP, pCO2 up in 90 range.  Appears to have some mild chronic hypercapnia.   -stopped pta oxycodone, limit narcotics  -Continue oral prednisone at 60 mg daily as de-escalation of steroids recommended by pulmonary service. Likely to need prolonged taper and to maintain at 10 mg daily given his frequent exacerbations  -Scheduled duo nebs and Xopenex nebs as needed (tachycardia)  -BiPAP whenever sleeping  -asked social work to see if patient would be a candidate for Home Trilogy machine and NIPPV, it sounds like he is but this would be a $5000 yearly rental fee.  There may be some other options which is being looked into  -Dr. Acevedo recommending starting budesonide neb treatment twice daily as he is unable to fully inhale deep enough to get adequate treatment with a steroid inhaler.  Additionally recommends very close follow-up with pulmonology in clinic which she will help arrange, chronic daily azithromycin therapy, and pulmonary rehab.  -Given his positive pressure ventilation dependence SW to look into possible LTACH where they have pulmonary rehab    Goals of care: Unfortunately despite multiple days of steroids, inhaled steroids, nebulization treatments he is not showing much clinical sign of improvement.  He is extremely fatigued and lasts only 15-20 minutes off of BiPAP at a time.  He is sleeping most of the day.  If he is to improve with current therapy it is likely to take weeks.  The patient is becoming more frustrated with his limited response to his respiratory treatment.  At times he is saying statements like he feels like he is going to die and that he does not want to do this anymore.  I spoke at length with him and his spouse regarding his current condition, current treatment plan  which includes oral and inhaled steroids and intermittent BiPAP with goal for LTACH discharge and pulmonary rehab and slow improvement in his respiratory status, and alternative options including palliative care.  After a long conversation the patient does confirm he wants to continue with current treatment plan and that he is just very frustrated overall.  This will be an ongoing conversation with the patient, his spouse, and his care team.    Suspect HCAP: Recently treated for pneumonia and was on Augmentin prior to admission.  Streaky opacities remaining on the left which may just be residual from previous pneumonia.  He did have elevated lactic acid.  -Has been on Zosyn started 2/16, changed to Augmentin 2/21 (he was on this prior to admit).  I will add an end date of 2/24 for antibiotics     Recent diagnosis PE: This was just diagnosed when he was hospitalized a few days ago over here.  He had a small PE on the CT chest. I suspect this is more of an incidental finding with the major contributor being COPD to his symptoms. He received about 4 days of heparin and Lovenox in the hospital and was sent home on Eliquis.    -Eliquis 5mg bid po      Previous history of atrial fibrillation, flutter:  status post previous ablation procedure.  He did have issues with atrial fibrillation with RVR during previous hospitalizations.  He did follow with [facility] as an outpatient.  The thought was to avoid a repeat ablation procedure at this time given his recent infectious issues which would decrease his chance of success.  He did have his propafenone dose increased.    -Continue baseline diltiazem and propafenone    -In terms of his anticoagulation, he is back on Eliquis now due to PE.  It seems like this was held at the time of discharge from previous hospitalization in January due to hemoptysis. No recurrence of hemoptysis and with concurrent PE, need to continue anti-coagulation      Episode of MSSA bacteremia in  December:  This was thought to be due to pneumonia, no other secondary site was found. The patient completed a course of antibiotics.  He was on Augmentin PTA due to recent COPD exacerbation.  His CT chest done recently, 5 days ago, showed that the area of consolidation from before had much improved.  There was just a small area left on the CT scan, hence I will continue him on IV ABx, could probably finish course of Augmentin at discharge    History of tricuspid valve replacement    Chronic systolic RV CHF: Last TTE 1/4/19 showing mildly dilated RV along with decreased RV systolic function.  Does have peripheral edema.  Med list includes Lasix, however his spouse does not think he takes this.  Apparently had significant significantly more edema during previous admission per his spouse.  Now with 1-2+ lower extremity pitting edema.  Of note his weight on discharge very recently was 194 pounds and currently is 199 pounds.  -Continue Lasix 20 mg p.o. daily     Physical deconditioning:  PT/OT      DONG on CKD stage II: Creatinine up to 1.8 at peak.  Now back to baseline  -Follow BMP now that diuretic resumed    Steroid-induced hyperglycemia: secondary to steroids.  More hyperglycemic now that he is eating  -Increase Lantus to 12 units daily  -Medium dose sliding scale insulin  -Reducing steroids which may help with hyperglycemia    Chronic low back pain: Has been taking oxycodone 5 mg frequently at home for this.  Now having 10 out of 10 back pain.  His opiates have been held given his hypercapnia and tenuous respiratory status.  As his goals are restorative cares risk of opiates at this time outweigh potential benefits of pain control.  Any amount likely to increase his CO2 retention leading worsening of his respiratory failure.  Additionally he sleeps all day long and night and opiates may worsen this problem.  -Acetaminophen  -Add topical NSAID and Lidoderm patches  -Mobilize patient    Lactic acidosis: Due to  respiratory failure nebs.    DVT Prophylaxis: Eliquis  Code Status: Full Code, this was discussed with previous provider and spouse  FEN: regular diet if remaining off BiPAP  Discharge Dispo: TBD.  Consult PT. given how BiPAP dependent he has been and likely a slow process he may benefit from LTACH they also have pulmonary rehab.  Social work looking into this  Estimated Disch Date / # of Days until Disch: Requiring intermittent BiPAP so remains in ICU.  If ongoing restorative cares still the plan will look into LTACH discharge early this week    Updated patient's spouse Gloria in person        Interval History (Subjective):      Still requiring BiPAP most of the day and night.  Sleeping most of the day and night.  Still endorsing 10 out of 10 back pain.  Was able to be off BiPAP for only about 15 or 20 minutes this morning to eat some breakfast and then lunch.  After this amount of time he is severely short of breath and tachypneic using accessory muscles to breathe and requesting to go back on the BiPAP.  Overnight he endorsed multiple times feeling like he was going to die and wanting to die and wanting to stop treatment.  I spoke with him and his spouse at length regarding the statements and the current care plan.  He said he was just frustrated that he does not want to stop treatments at this time.  They are both in agreement that we need to continue this conversation and pending his course over the next few days.                  Physical Exam:      Last Vital Signs:  BP (!) 153/94 (BP Location: Right arm)   Pulse 76   Temp 97.4  F (36.3  C) (Axillary)   Resp 19   Ht 1.829 m (6')   Wt 90.7 kg (199 lb 15.3 oz)   SpO2 97%   BMI 27.12 kg/m        Intake/Output Summary (Last 24 hours) at 2/21/2019 1246  Last data filed at 2/21/2019 0838  Gross per 24 hour   Intake 940 ml   Output 1275 ml   Net -335 ml     Constitutional: NAD  Eyes: sclera white   HEENT:    MMM  Respiratory: Tachypneic and using accessory  muscles when on nasal cannula up in the chair.  Very poor airflow overall.   Cardiovascular: RRR.  No murmur   GI: non-tender, not distended, bowel sounds present  Skin: no rash    Musculoskeletal/extremities: 1+ lower extremity edema    Neurologic: somnolent at times  Psychiatric: Frustrated at times         Medications:      All current medications were reviewed with changes reflected in problem list.         Data:      All new lab and imaging data was reviewed.   Labs:  Recent Labs   Lab 02/20/19  0525 02/19/19  1300   PH 7.35 7.32*   PO2 58* 74*   PCO2 68* 69*   HCO3 37* 36*   JACKLYN 8.7 7.2     Recent Labs   Lab 02/23/19  0551 02/22/19  0545 02/21/19  0539    138 135   POTASSIUM 4.6 4.6 4.6   CHLORIDE 100 100 98   CO2 37* 36* 37*   ANIONGAP 3 2* <1*   * 188* 193*   BUN 44* 37* 43*   CR 0.99 0.82 1.01   GFRESTIMATED 78 >90 77   GFRESTBLACK >90 >90 89   WILBERT 9.0 9.4 9.2     Recent Labs   Lab 02/22/19  0545 02/21/19  0539 02/19/19  0728   WBC 8.8 8.1 15.4*   HGB 13.3 13.2* 13.6   HCT 40.6 41.6 44.1   MCV 94 94 98   * 152 181      Imaging:   None today    Felipe Aquino MD

## 2019-02-23 NOTE — PLAN OF CARE
ICU End of Shift Summary.  For vital signs and complete assessments, please see documentation flowsheets.     Pertinent assessments: Disoriented to situation. Stated multiple times that he wanted to die and he was done this shift. Tele Afib CVR with BBB. ERICKSON, SOB at rest. Abdominal muscle use. Lungs very diminished. BiPAP while sleeping and 2 LPM NC while awake. Pt pulls off BiPAP frequently, attempts to get out of bed without calling frequently. Incont. At times with urine and bowel. A1-2 for cares. Does not follow prompts well. Eating well. Refused to stay up in chair for more than meal times. Refused PT today.   Major Shift Events:   Plan (Upcoming Events): Continue restorative cares.   Discharge/Transfer Needs: [place]? Not cooperative with PT here.     Bedside Shift Report Completed : yes  Bedside Safety Check Completed: yes

## 2019-02-24 ENCOUNTER — APPOINTMENT (OUTPATIENT)
Dept: PHYSICAL THERAPY | Facility: CLINIC | Age: 69
DRG: 189 | End: 2019-02-24
Attending: INTERNAL MEDICINE
Payer: COMMERCIAL

## 2019-02-24 LAB
ANION GAP SERPL CALCULATED.3IONS-SCNC: 4 MMOL/L (ref 3–14)
BUN SERPL-MCNC: 38 MG/DL (ref 7–30)
CALCIUM SERPL-MCNC: 9.1 MG/DL (ref 8.5–10.1)
CHLORIDE SERPL-SCNC: 102 MMOL/L (ref 94–109)
CO2 SERPL-SCNC: 34 MMOL/L (ref 20–32)
CREAT SERPL-MCNC: 0.98 MG/DL (ref 0.66–1.25)
GFR SERPL CREATININE-BSD FRML MDRD: 80 ML/MIN/{1.73_M2}
GLUCOSE BLDC GLUCOMTR-MCNC: 123 MG/DL (ref 70–99)
GLUCOSE BLDC GLUCOMTR-MCNC: 129 MG/DL (ref 70–99)
GLUCOSE BLDC GLUCOMTR-MCNC: 162 MG/DL (ref 70–99)
GLUCOSE BLDC GLUCOMTR-MCNC: 167 MG/DL (ref 70–99)
GLUCOSE BLDC GLUCOMTR-MCNC: 223 MG/DL (ref 70–99)
GLUCOSE SERPL-MCNC: 165 MG/DL (ref 70–99)
POTASSIUM SERPL-SCNC: 4.9 MMOL/L (ref 3.4–5.3)
SODIUM SERPL-SCNC: 140 MMOL/L (ref 133–144)

## 2019-02-24 PROCEDURE — 94640 AIRWAY INHALATION TREATMENT: CPT | Mod: 76

## 2019-02-24 PROCEDURE — 80048 BASIC METABOLIC PNL TOTAL CA: CPT | Performed by: INTERNAL MEDICINE

## 2019-02-24 PROCEDURE — 97161 PT EVAL LOW COMPLEX 20 MIN: CPT | Mod: GP

## 2019-02-24 PROCEDURE — 25000125 ZZHC RX 250: Performed by: INTERNAL MEDICINE

## 2019-02-24 PROCEDURE — 25000132 ZZH RX MED GY IP 250 OP 250 PS 637: Performed by: INTERNAL MEDICINE

## 2019-02-24 PROCEDURE — 25000131 ZZH RX MED GY IP 250 OP 636 PS 637: Performed by: INTERNAL MEDICINE

## 2019-02-24 PROCEDURE — 94660 CPAP INITIATION&MGMT: CPT

## 2019-02-24 PROCEDURE — 99233 SBSQ HOSP IP/OBS HIGH 50: CPT | Performed by: INTERNAL MEDICINE

## 2019-02-24 PROCEDURE — 36415 COLL VENOUS BLD VENIPUNCTURE: CPT | Performed by: INTERNAL MEDICINE

## 2019-02-24 PROCEDURE — 97530 THERAPEUTIC ACTIVITIES: CPT | Mod: GP

## 2019-02-24 PROCEDURE — 40000275 ZZH STATISTIC RCP TIME EA 10 MIN

## 2019-02-24 PROCEDURE — 97116 GAIT TRAINING THERAPY: CPT | Mod: GP

## 2019-02-24 PROCEDURE — 94640 AIRWAY INHALATION TREATMENT: CPT

## 2019-02-24 PROCEDURE — 20000003 ZZH R&B ICU

## 2019-02-24 PROCEDURE — 00000146 ZZHCL STATISTIC GLUCOSE BY METER IP

## 2019-02-24 RX ORDER — TRAZODONE HYDROCHLORIDE 50 MG/1
50 TABLET, FILM COATED ORAL AT BEDTIME
Status: DISCONTINUED | OUTPATIENT
Start: 2019-02-24 | End: 2019-02-28 | Stop reason: HOSPADM

## 2019-02-24 RX ORDER — LORAZEPAM 2 MG/ML
.25-.5 INJECTION INTRAMUSCULAR EVERY 6 HOURS PRN
Status: DISCONTINUED | OUTPATIENT
Start: 2019-02-24 | End: 2019-02-28 | Stop reason: HOSPADM

## 2019-02-24 RX ADMIN — INSULIN GLARGINE 12 UNITS: 100 INJECTION, SOLUTION SUBCUTANEOUS at 08:02

## 2019-02-24 RX ADMIN — MULTIPLE VITAMINS W/ MINERALS TAB 1 TABLET: TAB at 08:01

## 2019-02-24 RX ADMIN — IPRATROPIUM BROMIDE AND ALBUTEROL SULFATE 3 ML: .5; 3 SOLUTION RESPIRATORY (INHALATION) at 19:21

## 2019-02-24 RX ADMIN — INSULIN ASPART 2 UNITS: 100 INJECTION, SOLUTION INTRAVENOUS; SUBCUTANEOUS at 16:27

## 2019-02-24 RX ADMIN — FUROSEMIDE 20 MG: 20 TABLET ORAL at 08:01

## 2019-02-24 RX ADMIN — IPRATROPIUM BROMIDE AND ALBUTEROL SULFATE 3 ML: .5; 3 SOLUTION RESPIRATORY (INHALATION) at 12:44

## 2019-02-24 RX ADMIN — AMOXICILLIN AND CLAVULANATE POTASSIUM 1 TABLET: 875; 125 TABLET, FILM COATED ORAL at 08:01

## 2019-02-24 RX ADMIN — TRAZODONE HYDROCHLORIDE 50 MG: 50 TABLET ORAL at 20:06

## 2019-02-24 RX ADMIN — BUDESONIDE 0.5 MG: 0.5 INHALANT RESPIRATORY (INHALATION) at 19:21

## 2019-02-24 RX ADMIN — Medication 225 MG: at 15:09

## 2019-02-24 RX ADMIN — BUDESONIDE 0.5 MG: 0.5 INHALANT RESPIRATORY (INHALATION) at 07:47

## 2019-02-24 RX ADMIN — ACETAMINOPHEN 650 MG: 325 TABLET, FILM COATED ORAL at 15:09

## 2019-02-24 RX ADMIN — FAMOTIDINE 20 MG: 20 TABLET ORAL at 08:01

## 2019-02-24 RX ADMIN — CLONAZEPAM 1 MG: 1 TABLET ORAL at 19:10

## 2019-02-24 RX ADMIN — IPRATROPIUM BROMIDE AND ALBUTEROL SULFATE 3 ML: .5; 3 SOLUTION RESPIRATORY (INHALATION) at 15:35

## 2019-02-24 RX ADMIN — APIXABAN 5 MG: 5 TABLET, FILM COATED ORAL at 08:01

## 2019-02-24 RX ADMIN — DILTIAZEM HYDROCHLORIDE 180 MG: 180 CAPSULE, COATED, EXTENDED RELEASE ORAL at 08:01

## 2019-02-24 RX ADMIN — DICLOFENAC 2 G: 10 GEL TOPICAL at 11:45

## 2019-02-24 RX ADMIN — DICLOFENAC 2 G: 10 GEL TOPICAL at 19:12

## 2019-02-24 RX ADMIN — DILTIAZEM HYDROCHLORIDE 180 MG: 180 CAPSULE, COATED, EXTENDED RELEASE ORAL at 19:10

## 2019-02-24 RX ADMIN — AMOXICILLIN AND CLAVULANATE POTASSIUM 1 TABLET: 875; 125 TABLET, FILM COATED ORAL at 19:10

## 2019-02-24 RX ADMIN — ACETAMINOPHEN 650 MG: 325 TABLET, FILM COATED ORAL at 19:11

## 2019-02-24 RX ADMIN — DICLOFENAC 2 G: 10 GEL TOPICAL at 17:10

## 2019-02-24 RX ADMIN — Medication 225 MG: at 19:10

## 2019-02-24 RX ADMIN — IPRATROPIUM BROMIDE AND ALBUTEROL SULFATE 3 ML: .5; 3 SOLUTION RESPIRATORY (INHALATION) at 07:47

## 2019-02-24 RX ADMIN — Medication 225 MG: at 05:03

## 2019-02-24 RX ADMIN — APIXABAN 5 MG: 5 TABLET, FILM COATED ORAL at 19:11

## 2019-02-24 RX ADMIN — PREDNISONE 60 MG: 50 TABLET ORAL at 08:01

## 2019-02-24 RX ADMIN — TAMSULOSIN HYDROCHLORIDE 0.4 MG: 0.4 CAPSULE ORAL at 08:01

## 2019-02-24 RX ADMIN — FAMOTIDINE 20 MG: 20 TABLET ORAL at 19:11

## 2019-02-24 RX ADMIN — LIDOCAINE 2 PATCH: 560 PATCH PERCUTANEOUS; TOPICAL; TRANSDERMAL at 07:59

## 2019-02-24 RX ADMIN — ACETAMINOPHEN 650 MG: 325 TABLET, FILM COATED ORAL at 11:44

## 2019-02-24 RX ADMIN — MELATONIN TAB 3 MG 3 MG: 3 TAB at 19:11

## 2019-02-24 RX ADMIN — DICLOFENAC 2 G: 10 GEL TOPICAL at 07:59

## 2019-02-24 ASSESSMENT — ACTIVITIES OF DAILY LIVING (ADL)
ADLS_ACUITY_SCORE: 20
ADLS_ACUITY_SCORE: 20
ADLS_ACUITY_SCORE: 21
ADLS_ACUITY_SCORE: 20

## 2019-02-24 ASSESSMENT — MIFFLIN-ST. JEOR: SCORE: 1732

## 2019-02-24 NOTE — PLAN OF CARE
PT: Orders received, evaluation completed and treatment initiated. 68 year old male being treated for acute hypoxic and acute on chronic hypercapnic respiratory failure due to COPD exacerbation and recent PE. Pt reports IND with mobility at baseline within his home. Will use a FWW in the community.  Able to walk very short distances from car into a store and will use an electric cart from there. Lives with his wife and daughter in a home. There are 8 stairs to manage to the main living level. Patient reports his wife can assist a bit around the house; but not able to assist him physically.     Discharge Planner PT   Patient plan for discharge: LTACH per chart  Current status: Supine to/from sit with min A. Sit to/from stand with min A. Patient ambulates 5', 15' and 50' with FWW, CGA x 1 and a close wheelchair follow. Pt demonstrates LE buckling, variable gait speed. Seated rest breaks between bouts. Post walking SpO2 low 90's on 3L via NC.   Barriers to return to prior living situation: Stairs, fall risk, poor activity tolerance, level of A with transfers  Recommendations for discharge: Per chart discharge plan to LTACH. Pt would be appropriate for continued PT in this setting. If LTACH is not appropriate pt would need a TCU from a PT perspective.   Rationale for recommendations: Pt not moving safe enough to return home. Will need continued PT services to address strength, balance and activity tolerance deficits in another facility prior to discharge to home.        Entered by: Lucila Sims 02/24/2019 3:26 PM

## 2019-02-24 NOTE — PLAN OF CARE
ICU End of Shift Summary.  For vital signs and complete assessments, please see documentation flowsheets.     Pertinent assessments: Pt slightly improved today. Disoriented to situation, but less forgetful. Pt using call light more along with ordering meals. Pt even walked in hallway with PT briefly. Tele Afib CVR with BBB. RVR with movement. 2 LPM NC while awake, BiPAP 30% FiO2 when sleeping. On and off the BiPAP throughout the day. Lungs diminished. ERICKSON, SOB at rest. Though not using abdominal muscles with breathing as much today. Rating 9-10/10 pain in lower back. PRN Tylenol, Lidoderm patches, and Voltaren given. Pt sleeping on and off throughout day, but wakes and states high pain. MD aware. Eating and drinking well. Incont with urine and stool at times. A1-2 for cares. POC reviewed with pt and wife.   Major Shift Events: Worked with PT.   Plan (Upcoming Events): BiPAP while asleep.   Discharge/Transfer Needs: LTACH tomorrow?     Bedside Shift Report Completed : yes  Bedside Safety Check Completed: yes

## 2019-02-24 NOTE — PLAN OF CARE
ICU End of Shift Summary.  For vital signs and complete assessments, please see documentation flowsheets.     Pertinent assessments: Pt on/off bipap during shift.  Seemed much more restful. Alert, confused to situation.  Bed alarm on.  LS diminished.  BS active, smear.  Good urine output, incontinent.  Continued edema, especially LE.  Multiple bruises all over body.  Major Shift Events: slept better  Plan (Upcoming Events): continue to monitor CO2 levels  Discharge/Transfer Needs: ?LTACH    Bedside Shift Report Completed :  yes  Bedside Safety Check Completed: yes

## 2019-02-24 NOTE — PROGRESS NOTES
[facility]  Hospitalist Progress Note  Felipe Aquino MD 02/24/19    Reason for Stay (Diagnosis): Respiratory failure, COPD exacerbation, pneumonia         Assessment and Plan:      Summary of Stay:   Tone Cooper is a 68-year-old gentleman with a history of advanced COPD with multiple hospitalizations for that recently.  He was just discharged from the hospital after being admitted for COPD exacerbation. He came back to the ER next day.  His recent history is most notable for 4 admissions in the last 6 months for pneumonia, COPD exacerbations, MSSA bacteremia thought to be due to pneumonia.  He also has a past medical history of atrial fibrillation with previous ablation, tricuspid valve replacement, hypertension, nonsustained VT.      After admission to the hospital on 2/16/19, pt had worsening of his symptoms and required intermittent BiPAP treatment.  Remained dyspneic with increased work of breathing, had mild Nicolasa with increase in Cr, then developed increasing lethargy and hypercapnia on 2/19 requiring continuous BiPAP and transferred to ICU.  Has streaky opacities in the left side would be to actually be residual pneumonia, however with his elevated lactic acid he has remained on Zosyn.  Suspect primary problem is COPD exacerbation and he is on IV steroids with scheduled nebs for this, overall not having significant clinical improvement and decreasing steroids recommended by pulmonary so on oral prednisone.  Trying to wean from BiPAP, but so far he remains very fatigued and wanting to sleep all day.  Looking into potential LTACH for BiPAP weaning and pulmonary rehab.  Additionally having ongoing conversations with the patient regarding goals of care as he is becoming more and more frustrated with lack of improvement in his respiratory status.  Feeling a little bit better today.    Problem List/Assessment and Plan:   Acute hypoxic and acute on chronic hypercapnic respiratory failure due to  COPD exacerbation: Patient with advanced COPD and multiple hospitalizations in the past 6 months.  Whenever he weans steroids down his shortness of breath worsens.  Overall on exam he has very poor air movement.  Required transfer to ICU for intermittent BiPAP, pCO2 up in 90 range.  Appears to have some mild chronic hypercapnia.   -stopped pta oxycodone, limit narcotics  -Continue oral prednisone at 60 mg daily as de-escalation of steroids recommended by pulmonary service. Likely to need prolonged taper and to maintain at 10 mg daily given his frequent exacerbations  -Scheduled duo nebs and Xopenex nebs as needed (tachycardia)  -BiPAP whenever sleeping  -asked social work to see if patient would be a candidate for Home Trilogy machine and NIPPV, it sounds like he is but this would be a $5000 yearly rental fee.  There may be some other options which is being looked into  -Dr. Acevedo recommending starting budesonide neb treatment twice daily as he is unable to fully inhale deep enough to get adequate treatment with a steroid inhaler.  Additionally recommends very close follow-up with pulmonology in clinic which she will help arrange, chronic daily azithromycin therapy, and pulmonary rehab.  -Given his positive pressure ventilation dependence SW to look into possible LTACH where they have pulmonary rehab    Goals of care: Unfortunately despite multiple days of steroids, inhaled steroids, nebulization treatments he is not showing much clinical sign of improvement.  He is extremely fatigued and lasts only 15-20 minutes off of BiPAP at a time.  He is sleeping most of the day.  If he is to improve with current therapy it is likely to take weeks.  The patient is becoming more frustrated with his limited response to his respiratory treatment.  At times he is saying statements like he feels like he is going to die and that he does not want to do this anymore.  I spoke at length with him and his spouse regarding his current  condition, current treatment plan which includes oral and inhaled steroids and intermittent BiPAP with goal for LTACH discharge and pulmonary rehab and slow improvement in his respiratory status, and alternative options including palliative care.  After a long conversation the patient does confirm he wants to continue with current treatment plan and that he is just very frustrated overall.  This will be an ongoing conversation with the patient, his spouse, and his care team.  Overall this morning he seems to have a more positive outlook and feels a little bit better.    Suspect HCAP: Recently treated for pneumonia and was on Augmentin prior to admission.  Streaky opacities remaining on the left which may just be residual from previous pneumonia.  He did have elevated lactic acid.  -Has been on Zosyn started 2/16, changed to Augmentin 2/21 (he was on this prior to admit).  I will add an end date of 2/24 for antibiotics     Recent diagnosis PE: This was just diagnosed when he was hospitalized a few days ago over here.  He had a small PE on the CT chest. I suspect this is more of an incidental finding with the major contributor being COPD to his symptoms. He received about 4 days of heparin and Lovenox in the hospital and was sent home on Eliquis.    -Eliquis 5mg bid po      Previous history of atrial fibrillation, flutter:  status post previous ablation procedure.  He did have issues with atrial fibrillation with RVR during previous hospitalizations.  He did follow with [facility] as an outpatient.  The thought was to avoid a repeat ablation procedure at this time given his recent infectious issues which would decrease his chance of success.  He did have his propafenone dose increased.    -Continue baseline diltiazem and propafenone    -In terms of his anticoagulation, he is back on Eliquis now due to PE.  It seems like this was held at the time of discharge from previous hospitalization in January due to  hemoptysis. No recurrence of hemoptysis and with concurrent PE, need to continue anti-coagulation      Episode of MSSA bacteremia in December:  This was thought to be due to pneumonia, no other secondary site was found. The patient completed a course of antibiotics.  He was on Augmentin PTA due to recent COPD exacerbation.  His CT chest done recently, 5 days ago, showed that the area of consolidation from before had much improved.  There was just a small area left on the CT scan, hence I will continue him on IV ABx, could probably finish course of Augmentin at discharge    History of tricuspid valve replacement    Chronic systolic RV CHF: Last TTE 1/4/19 showing mildly dilated RV along with decreased RV systolic function.  Does have peripheral edema.  Med list includes Lasix, however his spouse does not think he takes this.  Apparently had significant significantly more edema during previous admission per his spouse.  Now with 1-2+ lower extremity pitting edema.  Of note his weight on discharge very recently was 194 pounds and currently is 199 pounds.  -Continue Lasix 20 mg p.o. daily     Physical deconditioning:  PT/OT      DONG on CKD stage II: Creatinine up to 1.8 at peak.  Now back to baseline  -Follow BMP with diuresis    Steroid-induced hyperglycemia: secondary to steroids.  More hyperglycemic now that he is eating.  He does predominantly like sweets heavy in carbohydrates and given his advanced illness favor allowing him to have what he likes to eat and increasing his insulin regimen allowing some hyperglycemia.  -Continue Lantus 12 units daily  -Medium dose sliding scale insulin  -Add 1 unit aspart per carb with meals  -Reducing steroids which may help with hyperglycemia    Chronic low back pain: Has been taking oxycodone 5 mg frequently at home for this.  Now having 10 out of 10 back pain.  His opiates have been held given his hypercapnia and tenuous respiratory status.  As his goals are restorative cares  risk of opiates at this time outweigh potential benefits of pain control.  Any amount likely to increase his CO2 retention leading worsening of his respiratory failure.  Additionally he sleeps all day long and night and opiates may worsen this problem.  -Acetaminophen  -Add topical NSAID and Lidoderm patches  -Mobilize patient    Poor sleep: Is a night I will at home and has difficulty with sleep at baseline.  He takes trazodone 50 mg at bedtime along with clonazepam 1 mg at bedtime.  -Has continued on his clonazepam at bedtime while here and will also add back his trazodone at bedtime    Lactic acidosis: Due to respiratory failure nebs.    DVT Prophylaxis: Eliquis  Code Status: Full Code, this was discussed with previous provider and spouse  FEN: regular diet if remaining off BiPAP  Discharge Dispo: TBD.  Consult PT. given how BiPAP dependent he has been and likely a slow process he may benefit from LTACH they also have pulmonary rehab.  Social work looking into this  Estimated Disch Date / # of Days until Disch: Requiring intermittent BiPAP so remains in ICU.  If ongoing slight improvement the next day or 2 and LTACH appropriate would consider discharge    Updated patient's spouse Gloria in person        Interval History (Subjective):      Poor sleep last night initially.  This morning he was feeling better and less short of breath while on nasal cannula sitting in bed.  He felt like later that he did get some good rest which is important to him.  He requested his trazodone be reordered.  He is more positive this morning as he feels a little bit better.                  Physical Exam:      Last Vital Signs:  BP (!) 156/96   Pulse 89   Temp (P) 97.6  F (36.4  C) (Oral)   Resp 21   Ht 1.829 m (6')   Wt 91.9 kg (202 lb 9.6 oz)   SpO2 99%   BMI 27.48 kg/m      Intake/Output Summary (Last 24 hours) at 2/24/2019 1214  Last data filed at 2/24/2019 1100  Gross per 24 hour   Intake 490 ml   Output 1120 ml   Net -630  ml     Constitutional: NAD  Eyes: sclera white   HEENT:    MMM  Respiratory: Very poor airflow overall.  Looked a little bit more comfortable on nasal cannula this morning.  Does have pursed lip breathing  Cardiovascular: RRR.  No murmur   GI: non-tender, not distended, bowel sounds present  Skin: no rash    Musculoskeletal/extremities: 1+ lower extremity edema    Neurologic: Alert and answering questions appropriately this morning  Psychiatric: Calm and cooperative today         Medications:      All current medications were reviewed with changes reflected in problem list.         Data:      All new lab and imaging data was reviewed.   Labs:    Recent Labs   Lab 02/24/19  0534 02/23/19  0551 02/22/19  0545    140 138   POTASSIUM 4.9 4.6 4.6   CHLORIDE 102 100 100   CO2 34* 37* 36*   ANIONGAP 4 3 2*   * 185* 188*   BUN 38* 44* 37*   CR 0.98 0.99 0.82   GFRESTIMATED 80 78 >90   GFRESTBLACK >90 >90 >90   WILBERT 9.1 9.0 9.4       Imaging:   None today    Felipe Aquino MD

## 2019-02-24 NOTE — PROGRESS NOTES
"SPIRITUAL HEALTH SERVICES Progress Note  [facility] ICU    Followed up from prior  visit per voicemail request. Introduced self and role to pt and spouse. Pt asserted, \"Yeah, I was feeling pretty bad yesterday; but I'm feeling better today.\" Pt shared a bit about his current situation, but he politely ended visit after sharing and declined SH support at this time.     No formal plans to follow but described SH's ongoing availability to pt and spouse. SH is glad to respond to any further requests or referrals.     Ruben Sandy M.Div.  Staff   Pager: [phone]  "

## 2019-02-24 NOTE — PROGRESS NOTES
02/24/19 1500   Quick Adds   Type of Visit Initial PT Evaluation   Living Environment   Lives With spouse   Living Arrangements house   Home Accessibility stairs within home   Number of Stairs, Within Home, Primary eight   Stair Railings, Within Home, Primary (One rail)   Living Environment Comment Patient reports his wife can assist a bit around the house; but not able to assist him physically.    Self-Care   Usual Activity Tolerance fair  (Minimal community ambulator)   Current Activity Tolerance poor   Equipment Currently Used at Home walker, rolling   Activity/Exercise/Self-Care Comment Able to walk very short distances from car into a store and will use an electric cart from there.    Functional Level Prior   Ambulation 0-->independent  (Walker outside of the home)   Transferring 0-->independent   Fall history within last six months yes   Number of times patient has fallen within last six months (1 fall prior to admission; three since being admitted)   General Information   Onset of Illness/Injury or Date of Surgery - Date 02/16/19   Referring Physician Miles ARNETT   Patient/Family Goals Statement To get stronger   Pertinent History of Current Problem (include personal factors and/or comorbidities that impact the POC) 68 year old male being treated for acute hypoxic and acute on chronic hypercapnic respiratory failure due to COPD exacerbation and recent PE.    Precautions/Limitations fall precautions   Cognitive Status Examination   Orientation person;place   Level of Consciousness alert   Follows Commands and Answers Questions able to follow single-step instructions   Personal Safety and Judgment impaired  (Needs cues/assist for safe mobility)   Pain Assessment   Patient Currently in Pain Yes, see Vital Sign flowsheet  (Low back)   Range of Motion (ROM)   ROM Comment Decreased LE AROM noted due to functional weakness.    Strength   Strength Comments 4/5 bilat LE; buckling/shaking noted with both LEs during  "stance.    Bed Mobility   Bed Mobility Comments Supine to sit with min A   Transfer Skills   Transfer Comments Sit to/from stand with min A   Gait   Gait Comments Ambulates one step with each LE, CGA/min A   Balance   Balance Comments Requires external assist and FWW for safe dynamic mobility   General Therapy Interventions   Planned Therapy Interventions gait training;bed mobility training;transfer training;strengthening;progressive activity/exercise   Clinical Impression   Criteria for Skilled Therapeutic Intervention yes, treatment indicated   PT Diagnosis Impaired gait   Influenced by the following impairments Pain, decreased activity tolerance, SOB/ERICKSON, impaired balance   Functional limitations due to impairments Decreased IND with bed mobility, transfers and ambulation   Clinical Presentation Evolving/Changing   Clinical Presentation Rationale Continued O2 needs; varied performance and recovery   Clinical Decision Making (Complexity) Low complexity   Therapy Frequency` 5 times/week   Predicted Duration of Therapy Intervention (days/wks) One week   Anticipated Discharge Disposition Transitional Care Facility;Other (see comments)  (Or LTACH)   Risk & Benefits of therapy have been explained Yes   Patient, Family & other staff in agreement with plan of care Yes   [facility] Skills Matter TM \"6 Clicks\"   2016, Trustees of [facility], under license to [employer].  All rights reserved.   6 Clicks Short Forms Basic Mobility Inpatient Short Form   [facility] AM-PAC  \"6 Clicks\" V.2 Basic Mobility Inpatient Short Form   1. Turning from your back to your side while in a flat bed without using bedrails? 3 - A Little   2. Moving from lying on your back to sitting on the side of a flat bed without using bedrails? 3 - A Little   3. Moving to and from a bed to a chair (including a wheelchair)? 3 - A Little   4. Standing up from a chair using your arms (e.g., wheelchair, or bedside chair)? 3 - A Little   5. To " walk in hospital room? 3 - A Little   6. Climbing 3-5 steps with a railing? 1 - Total   Basic Mobility Raw Score (Score out of 24.Lower scores equate to lower levels of function) 16   Total Evaluation Time   Total Evaluation Time (Minutes) 5

## 2019-02-24 NOTE — PROGRESS NOTES
"2100-spoke with Gloria, wife, regarding how the night is going.  Pt has been on/off bipap a few times so far but seems more settled.  Spoke about how the patient seems frustrated with his lack of improvement and if that may be causing him to express wishes to die.  Wife wonders if having someone who isn't a medical professional or her to speak to would be helpful.  Writer offered to have spiritual health come and visit.  A voicemail message was left requesting a visit and how the patient has expressed \"being done.\" Wife is concerned that the patient has given up too easily. She acknowledges that it isn't the same but she feels there is still quality to his life.  She also said that he doesn't sleep well at night and that it isn't unusual for him to spend a few hours sitting up during the night.    2245-pt has complained about pain-explained that tylenol, voltaren gel and lidocaine patches have been given.  Explained that we are avoiding any meds that may affect his breathing.  Declined further tylenol-states that \"it doesn't do crap.\" He complained about not being able to sleep.  Offered melatonin-pt stated that he eats it like candy (said this the previous night, dose didn't seem to help him sleep).  Writer encouraged pt to speak to family about his feelings. Currently pt is watching TV with NC on.  Will continue to monitor.  "

## 2019-02-25 ENCOUNTER — APPOINTMENT (OUTPATIENT)
Dept: PHYSICAL THERAPY | Facility: CLINIC | Age: 69
DRG: 189 | End: 2019-02-25
Payer: COMMERCIAL

## 2019-02-25 LAB
ANION GAP SERPL CALCULATED.3IONS-SCNC: 1 MMOL/L (ref 3–14)
BUN SERPL-MCNC: 31 MG/DL (ref 7–30)
CALCIUM SERPL-MCNC: 8.7 MG/DL (ref 8.5–10.1)
CHLORIDE SERPL-SCNC: 102 MMOL/L (ref 94–109)
CO2 SERPL-SCNC: 36 MMOL/L (ref 20–32)
CREAT SERPL-MCNC: 0.82 MG/DL (ref 0.66–1.25)
GFR SERPL CREATININE-BSD FRML MDRD: >90 ML/MIN/{1.73_M2}
GLUCOSE BLDC GLUCOMTR-MCNC: 109 MG/DL (ref 70–99)
GLUCOSE BLDC GLUCOMTR-MCNC: 116 MG/DL (ref 70–99)
GLUCOSE BLDC GLUCOMTR-MCNC: 143 MG/DL (ref 70–99)
GLUCOSE BLDC GLUCOMTR-MCNC: 197 MG/DL (ref 70–99)
GLUCOSE BLDC GLUCOMTR-MCNC: 383 MG/DL (ref 70–99)
GLUCOSE SERPL-MCNC: 151 MG/DL (ref 70–99)
POTASSIUM SERPL-SCNC: 4.3 MMOL/L (ref 3.4–5.3)
SODIUM SERPL-SCNC: 140 MMOL/L (ref 133–144)

## 2019-02-25 PROCEDURE — 25000131 ZZH RX MED GY IP 250 OP 636 PS 637: Performed by: INTERNAL MEDICINE

## 2019-02-25 PROCEDURE — 94640 AIRWAY INHALATION TREATMENT: CPT | Mod: 76

## 2019-02-25 PROCEDURE — 00000146 ZZHCL STATISTIC GLUCOSE BY METER IP

## 2019-02-25 PROCEDURE — 94640 AIRWAY INHALATION TREATMENT: CPT

## 2019-02-25 PROCEDURE — 40000275 ZZH STATISTIC RCP TIME EA 10 MIN

## 2019-02-25 PROCEDURE — 25000125 ZZHC RX 250: Performed by: INTERNAL MEDICINE

## 2019-02-25 PROCEDURE — 36415 COLL VENOUS BLD VENIPUNCTURE: CPT | Performed by: INTERNAL MEDICINE

## 2019-02-25 PROCEDURE — 80048 BASIC METABOLIC PNL TOTAL CA: CPT | Performed by: INTERNAL MEDICINE

## 2019-02-25 PROCEDURE — 94660 CPAP INITIATION&MGMT: CPT

## 2019-02-25 PROCEDURE — 25000132 ZZH RX MED GY IP 250 OP 250 PS 637: Performed by: INTERNAL MEDICINE

## 2019-02-25 PROCEDURE — 97530 THERAPEUTIC ACTIVITIES: CPT | Mod: GP

## 2019-02-25 PROCEDURE — 20000003 ZZH R&B ICU

## 2019-02-25 PROCEDURE — 99232 SBSQ HOSP IP/OBS MODERATE 35: CPT | Performed by: INTERNAL MEDICINE

## 2019-02-25 PROCEDURE — 25000132 ZZH RX MED GY IP 250 OP 250 PS 637: Performed by: STUDENT IN AN ORGANIZED HEALTH CARE EDUCATION/TRAINING PROGRAM

## 2019-02-25 PROCEDURE — 97116 GAIT TRAINING THERAPY: CPT | Mod: GP

## 2019-02-25 RX ADMIN — Medication 225 MG: at 22:10

## 2019-02-25 RX ADMIN — BUDESONIDE 0.5 MG: 0.5 INHALANT RESPIRATORY (INHALATION) at 07:56

## 2019-02-25 RX ADMIN — FUROSEMIDE 20 MG: 20 TABLET ORAL at 08:42

## 2019-02-25 RX ADMIN — MULTIPLE VITAMINS W/ MINERALS TAB 1 TABLET: TAB at 08:42

## 2019-02-25 RX ADMIN — TAMSULOSIN HYDROCHLORIDE 0.4 MG: 0.4 CAPSULE ORAL at 08:42

## 2019-02-25 RX ADMIN — GUAIFENESIN 600 MG: 600 TABLET, EXTENDED RELEASE ORAL at 20:46

## 2019-02-25 RX ADMIN — IPRATROPIUM BROMIDE AND ALBUTEROL SULFATE 3 ML: .5; 3 SOLUTION RESPIRATORY (INHALATION) at 11:35

## 2019-02-25 RX ADMIN — LIDOCAINE 2 PATCH: 560 PATCH PERCUTANEOUS; TOPICAL; TRANSDERMAL at 08:41

## 2019-02-25 RX ADMIN — Medication 225 MG: at 15:41

## 2019-02-25 RX ADMIN — ACETAMINOPHEN 650 MG: 325 TABLET, FILM COATED ORAL at 08:42

## 2019-02-25 RX ADMIN — BUDESONIDE 0.5 MG: 0.5 INHALANT RESPIRATORY (INHALATION) at 20:26

## 2019-02-25 RX ADMIN — ACETAMINOPHEN 650 MG: 325 TABLET, FILM COATED ORAL at 16:11

## 2019-02-25 RX ADMIN — IPRATROPIUM BROMIDE AND ALBUTEROL SULFATE 3 ML: .5; 3 SOLUTION RESPIRATORY (INHALATION) at 16:01

## 2019-02-25 RX ADMIN — IPRATROPIUM BROMIDE AND ALBUTEROL SULFATE 3 ML: .5; 3 SOLUTION RESPIRATORY (INHALATION) at 07:56

## 2019-02-25 RX ADMIN — ACETAMINOPHEN 650 MG: 325 TABLET, FILM COATED ORAL at 23:58

## 2019-02-25 RX ADMIN — TRAZODONE HYDROCHLORIDE 50 MG: 50 TABLET ORAL at 23:58

## 2019-02-25 RX ADMIN — INSULIN GLARGINE 12 UNITS: 100 INJECTION, SOLUTION SUBCUTANEOUS at 08:48

## 2019-02-25 RX ADMIN — PREDNISONE 60 MG: 50 TABLET ORAL at 08:42

## 2019-02-25 RX ADMIN — APIXABAN 5 MG: 5 TABLET, FILM COATED ORAL at 20:05

## 2019-02-25 RX ADMIN — ACETAMINOPHEN 650 MG: 325 TABLET, FILM COATED ORAL at 12:58

## 2019-02-25 RX ADMIN — APIXABAN 5 MG: 5 TABLET, FILM COATED ORAL at 08:42

## 2019-02-25 RX ADMIN — ACETAMINOPHEN 650 MG: 325 TABLET, FILM COATED ORAL at 04:17

## 2019-02-25 RX ADMIN — DILTIAZEM HYDROCHLORIDE 180 MG: 180 CAPSULE, COATED, EXTENDED RELEASE ORAL at 08:42

## 2019-02-25 RX ADMIN — FAMOTIDINE 20 MG: 20 TABLET ORAL at 08:42

## 2019-02-25 RX ADMIN — ACETAMINOPHEN 650 MG: 325 TABLET, FILM COATED ORAL at 20:05

## 2019-02-25 RX ADMIN — FAMOTIDINE 20 MG: 20 TABLET ORAL at 20:05

## 2019-02-25 RX ADMIN — Medication 225 MG: at 05:46

## 2019-02-25 RX ADMIN — DICLOFENAC 2 G: 10 GEL TOPICAL at 08:41

## 2019-02-25 RX ADMIN — CLONAZEPAM 1 MG: 1 TABLET ORAL at 20:05

## 2019-02-25 RX ADMIN — IPRATROPIUM BROMIDE AND ALBUTEROL SULFATE 3 ML: .5; 3 SOLUTION RESPIRATORY (INHALATION) at 20:26

## 2019-02-25 RX ADMIN — DILTIAZEM HYDROCHLORIDE 180 MG: 180 CAPSULE, COATED, EXTENDED RELEASE ORAL at 20:05

## 2019-02-25 ASSESSMENT — ACTIVITIES OF DAILY LIVING (ADL)
ADLS_ACUITY_SCORE: 19
ADLS_ACUITY_SCORE: 20
ADLS_ACUITY_SCORE: 18
ADLS_ACUITY_SCORE: 19
ADLS_ACUITY_SCORE: 18
ADLS_ACUITY_SCORE: 18

## 2019-02-25 ASSESSMENT — MIFFLIN-ST. JEOR: SCORE: 1731

## 2019-02-25 NOTE — PLAN OF CARE
Discharge Planner PT   Patient plan for discharge: LTACH per chart  Current status: Supine to/from sit with CGA. Sit to/from stand x 3 reps with CGA/min A. Patient ambulates 30', 15' and 5' x 2 with FWW and CGA/min A, close wheelchair follow. Pt impulsive with mobility and sits rapidly when fatigued. Needs mod A for sit to supine with LE management. ERICKSON/SOB noted with exertion. SpO2 remains >90% on 3L with spot checks.   Barriers to return to prior living situation: Stairs, fall risk, poor activity tolerance, level of A with transfers  Recommendations for discharge: Per chart discharge plan to LTACH. Pt would be appropriate for continued PT in this setting. If LTACH is not appropriate pt would need a TCU from a PT perspective.   Rationale for recommendations: Pt not moving safe enough to return home. Will need continued PT services to address strength, balance and activity tolerance deficits in another facility prior to discharge to home.        Entered by: Lucila Sims 02/25/2019 3:13 PM

## 2019-02-25 NOTE — PLAN OF CARE
ICU End of Shift Summary.  For vital signs and complete assessments, please see documentation flowsheets.     Pertinent assessments: VSS. A&Ox4, forgetful. LS coarse/diminished. Pt on and off of bipap multiple times throughout the day. Pt on 2L NC when off bipap. Dyspneic, short of breath. Tele afib CVR HR in the 70s. BP stable. Pt snacks multiple times throughout the day as well. Good appetite. BG remain in low 100s. Pt is assist x1 with walker and gait belt. Walked with PT today and up to the chair multiple times. Large BM today. Back pain controlled with tylenol and lidocaine patches.  Major Shift Events: On/off bipap, PT, tylenol  Plan (Upcoming Events): plan to discharge to LTACH for pulm rehab  Discharge/Transfer Needs:     Bedside Shift Report Completed :   Bedside Safety Check Completed:

## 2019-02-25 NOTE — PROGRESS NOTES
I left a vm for Lana from [place] to see if pt is still a candidate.  Pt's wife Gloria called me and she is still interested in LTACH.  Following. Kajal RN CTS [id number]    10:58 Pt is on and off bipap.  Lana from [place] will send preauth to insurance to see if they will approve.  I updated pt's wife over phone.  I will update pt as well.  Lana liaison will come to [facility] tomorrow at 10am to meet with pt and wife.      11:27.  I updated pt about discharge planning.  He is agreeable.  He was on 2 L NC and very dyspneic laying in bed.  He requested that the Bipap be reapplied.  Bedside RN notified.

## 2019-02-25 NOTE — PROGRESS NOTES
[facility]  Hospitalist Progress Note  Felipe Aquino MD 02/25/19    Reason for Stay (Diagnosis): Respiratory failure, COPD exacerbation, pneumonia         Assessment and Plan:      Summary of Stay:   Tone Cooper is a 68-year-old gentleman with a history of advanced COPD with multiple hospitalizations for that recently.  He was just discharged from the hospital after being admitted for COPD exacerbation. He came back to the ER next day.  His recent history is most notable for 4 admissions in the last 6 months for pneumonia, COPD exacerbations, MSSA bacteremia thought to be due to pneumonia.  He also has a past medical history of atrial fibrillation with previous ablation, tricuspid valve replacement, hypertension, nonsustained VT.      After admission to the hospital on 2/16/19, pt had worsening of his symptoms and required intermittent BiPAP treatment.  Remained dyspneic with increased work of breathing, had mild Nicolasa with increase in Cr, then developed increasing lethargy and hypercapnia on 2/19 requiring continuous BiPAP and transferred to ICU.  Has streaky opacities in the left side would be to actually be residual pneumonia, however with his elevated lactic acid he has remained on Zosyn.  Suspect primary problem is COPD exacerbation and he is on IV steroids with scheduled nebs for this, overall not having significant clinical improvement and decreasing steroids recommended by pulmonary so on oral prednisone.  Trying to wean from BiPAP, but so far he remains very fatigued and wanting to sleep all day.  Looking into potential LTACH for BiPAP weaning and pulmonary rehab.  Additionally having ongoing conversations with the patient regarding goals of care as he is becoming more and more frustrated with lack of improvement in his respiratory status.  Feeling a little bit better today.    Problem List/Assessment and Plan:   Acute hypoxic and acute on chronic hypercapnic respiratory failure due to  COPD exacerbation: Patient with advanced COPD and multiple hospitalizations in the past 6 months.  Whenever he weans steroids down his shortness of breath worsens.  Overall on exam he has very poor air movement.  Required transfer to ICU for intermittent BiPAP, pCO2 up in 90 range.  Appears to have some mild chronic hypercapnia.   -stopped pta oxycodone, limit narcotics  -Continue oral prednisone at 60 mg daily as de-escalation of steroids recommended by pulmonary service. Likely to need prolonged taper and to maintain at 10 mg daily given his frequent exacerbations  -Scheduled duo nebs and Xopenex nebs as needed (tachycardia)  -BiPAP whenever sleeping  -asked social work to see if patient would be a candidate for Home Trilogy machine and NIPPV, it sounds like he is but this would be a $5000 yearly rental fee.  There may be some other options which is being looked into  -Dr. Acevedo recommending starting budesonide neb treatment twice daily as he is unable to fully inhale deep enough to get adequate treatment with a steroid inhaler.  Additionally recommends very close follow-up with pulmonology in clinic which she will help arrange, chronic daily azithromycin therapy, and pulmonary rehab.  -Given his positive pressure ventilation dependence SW to look into possible LTACH where they have pulmonary rehab, however with some improvement he may not be a candidate for this    Goals of care: Unfortunately despite multiple days of steroids, inhaled steroids, nebulization treatments he is not showing much clinical sign of improvement.  He is extremely fatigued and lasts only 15-20 minutes off of BiPAP at a time.  He is sleeping most of the day.  If he is to improve with current therapy it is likely to take weeks.  The patient is becoming more frustrated with his limited response to his respiratory treatment.  At times he is saying statements like he feels like he is going to die and that he does not want to do this anymore.   I spoke at length with him and his spouse regarding his current condition, current treatment plan which includes oral and inhaled steroids and intermittent BiPAP with goal for LTACH discharge and pulmonary rehab and slow improvement in his respiratory status, and alternative options including palliative care.  After a long conversation the patient does confirm he wants to continue with current treatment plan and that he is just very frustrated overall.  This will be an ongoing conversation with the patient, his spouse, and his care team.  Overall this morning he seems to have a more positive outlook and feels a little bit better.    Suspect HCAP: Recently treated for pneumonia and was on Augmentin prior to admission.  Streaky opacities remaining on the left which may just be residual from previous pneumonia.  He did have elevated lactic acid.  -Has been on Zosyn started 2/16, changed to Augmentin 2/21, finished course of 2/24     Recent diagnosis PE: This was just diagnosed when he was hospitalized a few days ago over here.  He had a small PE on the CT chest. I suspect this is more of an incidental finding with the major contributor being COPD to his symptoms. He received about 4 days of heparin and Lovenox in the hospital and was sent home on Eliquis.    -Eliquis 5mg bid po      Previous history of atrial fibrillation, flutter:  status post previous ablation procedure.  He did have issues with atrial fibrillation with RVR during previous hospitalizations.  He did follow with [facility] as an outpatient.  The thought was to avoid a repeat ablation procedure at this time given his recent infectious issues which would decrease his chance of success.  He did have his propafenone dose increased.    -Continue baseline diltiazem and propafenone    -In terms of his anticoagulation, he is back on Eliquis now due to PE.  It seems like this was held at the time of discharge from previous hospitalization in January due to  hemoptysis. No recurrence of hemoptysis and with concurrent PE, need to continue anti-coagulation      Episode of MSSA bacteremia in December:  This was thought to be due to pneumonia, no other secondary site was found. The patient completed a course of antibiotics.     History of tricuspid valve replacement    Chronic systolic RV CHF: Last TTE 1/4/19 showing mildly dilated RV along with decreased RV systolic function.  Does have peripheral edema.  Med list includes Lasix, however his spouse does not think he takes this.  Apparently had significant significantly more edema during previous admission per his spouse.  Now with 1-2+ lower extremity pitting edema.  Of note his weight on discharge very recently was 194 pounds and currently is 199 pounds.  -Continue Lasix 20 mg p.o. daily     Physical deconditioning:  PT/OT.  Continue to try to ambulate in the murphy as able.     DONG on CKD stage II: Creatinine up to 1.8 at peak.  Now back to baseline  -Follow BMP with diuresis    Steroid-induced hyperglycemia: secondary to steroids.  More hyperglycemic now that he is eating.  He does predominantly like sweets heavy in carbohydrates and given his advanced illness favor allowing him to have what he likes to eat and increasing his insulin regimen allowing some hyperglycemia.  -Continue Lantus 12 units daily  -Medium dose sliding scale insulin  -Add 1 unit aspart per carb with meals  -Reducing steroids which may help with hyperglycemia    Chronic low back pain: Has been taking oxycodone 5 mg frequently at home for this.  Now having 10 out of 10 back pain.  His opiates have been held given his hypercapnia and tenuous respiratory status.  As his goals are restorative cares risk of opiates at this time outweigh potential benefits of pain control.  Any amount likely to increase his CO2 retention leading worsening of his respiratory failure.  Additionally he sleeps all day long and night and opiates may worsen this  problem.  -Acetaminophen  -Added topical NSAID and Lidoderm patches  -Mobilize patient    Poor sleep: Is a night I will at home and has difficulty with sleep at baseline.  He takes trazodone 50 mg at bedtime along with clonazepam 1 mg at bedtime.  -Has continued on his clonazepam at bedtime while here and will also add back his trazodone at bedtime    Lactic acidosis: Due to respiratory failure nebs.    DVT Prophylaxis: Eliquis  Code Status: Full Code, this was discussed with previous provider and spouse  FEN: regular diet  Discharge Dispo: TBD.  Consult PT. given how BiPAP dependent he has been and likely a slow process he may benefit from LTACH they also have pulmonary rehab.  Social work looking into this.  As he is improving some unclear if he remains a candidate  Estimated Disch Date / # of Days until Disch: Requiring intermittent BiPAP so remains in ICU.  Having some improvement in spending less time on BiPAP.  Will need quite a few more days in the hospital if not going to LTACH    Updated patient's spouse Gloria in person        Interval History (Subjective):      Patient feels slightly improved today.  Spending a little bit more time on nasal cannula, but still going back on BiPAP when sleeping during the day.  Remains very fatigued.  Did ambulate in halls although only 30 feet.                  Physical Exam:      Last Vital Signs:  BP (!) 138/96   Pulse 97   Temp 97.9  F (36.6  C) (Oral)   Resp 16   Ht 1.829 m (6')   Wt 91.8 kg (202 lb 6.1 oz)   SpO2 97%   BMI 27.45 kg/m      Intake/Output Summary (Last 24 hours) at 2/25/2019 1520  Last data filed at 2/25/2019 0800  Gross per 24 hour   Intake 610 ml   Output 850 ml   Net -240 ml       Constitutional: NAD  Eyes: sclera white   HEENT:    MMM  Respiratory: Very poor airflow overall.  Some belly breathing but looks comfortable on 3 L of oxygen.  Has pursed lip breathing  Cardiovascular: RRR.  No murmur   GI: non-tender, not distended, bowel sounds  present  Skin: no rash    Musculoskeletal/extremities: Trace to 1+ lower extremity edema    Neurologic: Alert and answering questions appropriately this morning  Psychiatric: Calm and cooperative         Medications:      All current medications were reviewed with changes reflected in problem list.         Data:      All new lab and imaging data was reviewed.   Labs:    Recent Labs   Lab 02/25/19  0545 02/24/19  0534 02/23/19  0551    140 140   POTASSIUM 4.3 4.9 4.6   CHLORIDE 102 102 100   CO2 36* 34* 37*   ANIONGAP 1* 4 3   * 165* 185*   BUN 31* 38* 44*   CR 0.82 0.98 0.99   GFRESTIMATED >90 80 78   GFRESTBLACK >90 >90 >90   WILBERT 8.7 9.1 9.0       Imaging:   None today    Felipe Aquino MD

## 2019-02-25 NOTE — PLAN OF CARE
ICU End of Shift Summary.  For vital signs and complete assessments, please see documentation flowsheets.     Pertinent assessments: pt much more alert and oriented overnight. Pt using the call light appropriately. Pt voiding per urinal, does have some incontinence at times. Pt with just a smear of bowel movement overnight. Pt taking tylenol for back pain, has lidoderm patches during the day. Pt on and off the bipap overnight, alternating with 2 liters oxygen by nasal cannula. Pt slept well between cares, started trazodone last night.   Major Shift Events: none, slept well  Plan (Upcoming Events): continue current cares  Discharge/Transfer Needs: possible discharge to LTACH soon for pulm rehab.     Bedside Shift Report Completed : yes  Bedside Safety Check Completed: yes

## 2019-02-25 NOTE — PROGRESS NOTES
CLINICAL NUTRITION SERVICES - REASSESSMENT NOTE      Malnutrition:   Patient does not meet two of the criteria necessary for diagnosing malnutrition     EVALUATION OF PROGRESS TOWARD GOALS/NEW FINDINGS   Progress towards goals will be monitored and evaluated per protocol and Practice Guidelines    Diet order: Regular  Per flow sheet review, % intake for documented meals. Soundly sleeping during visit - wears BiPAP while sleeping. ?LTACH soon. Per IDT rounds, patient eating well.     Meds and Labs: reviewed    ASSESSED NUTRITION NEEDS (PER APPROVED PRACTICE GUIDELINES; DW: 83.5 kg):  Estimated Energy Needs: 3192-1207 kcals (25-30 Kcal/Kg)  Justification: maintenance with increased work of breathing, COPD  Estimated Protein Needs: 100-125 grams protein (1.2-1.5 g pro/Kg)  Justification: preservation of lean body mass  Estimated Fluid Needs: >1 mL/kcal  Justification: maintenance    Previous Goals:   Pt to tolerate at least 75% meals TID.   Evaluation: Met    Previous Nutrition Diagnosis:   Increased nutrient needs (protein/kcal) related to high demand with chronic COPD/work of breathing as evidenced by protein/energy needs as estimated above.  Evaluation: Ongoing, baseline    MALNUTRITION (Assessed 2/25)  % Weight Loss:  Does not meet criteria  % Intake:  Does not meet criteria  Subcutaneous Fat Loss:  None observed  Muscle Loss:  Mild across upper body, consistent with sarcopenia  Fluid Retention:  None noted     Malnutrition Diagnosis: Patient does not meet two of the above criteria necessary for diagnosing malnutrition    CURRENT NUTRITION DIAGNOSIS  Increased nutrient needs (protein/kcal) related to high demand with chronic COPD/work of breathing as evidenced by protein/energy needs as estimated above.    INTERVENTIONS  Recommendations / Nutrition Prescription  Continue regular diet as ordered     Implementation  Nutrition education: no education needs identified  Collaboration and Referral of care:  Discussed patient during interdisciplinary care rounds this morning    Goals  Patient to consume >/=75% of meals TID     MONITORING AND EVALUATION:  Progress towards goals will be monitored and evaluated per protocol and Practice Guidelines      Luzma Robbins RDN, LD, CNSC  Pager - 3rd floor/ICU: [phone]  Pager - All other floors: [phone]  Pager - Weekend/holiday: [phone]  Office: [phone]

## 2019-02-25 NOTE — PROGRESS NOTES
[facility]     Pt remains appropriate for LTACH admission.  Insurance benefits verified and prior authorization started with [employer].    I will be on site at [facility] tomorrow morning at 1000 to meet with patient and his wife.    Referral status update provided to CC.    Thank you for the referral, please feel free to contact me with any questions or concerns.     Thank you,    Lana Crane  Referral Specialist  [facility]   [email or web address]  [phone] (direct)

## 2019-02-26 LAB
GLUCOSE BLDC GLUCOMTR-MCNC: 105 MG/DL (ref 70–99)
GLUCOSE BLDC GLUCOMTR-MCNC: 176 MG/DL (ref 70–99)
GLUCOSE BLDC GLUCOMTR-MCNC: 233 MG/DL (ref 70–99)
GLUCOSE BLDC GLUCOMTR-MCNC: 96 MG/DL (ref 70–99)

## 2019-02-26 PROCEDURE — 40000275 ZZH STATISTIC RCP TIME EA 10 MIN

## 2019-02-26 PROCEDURE — 25000128 H RX IP 250 OP 636: Performed by: INTERNAL MEDICINE

## 2019-02-26 PROCEDURE — 25000125 ZZHC RX 250: Performed by: INTERNAL MEDICINE

## 2019-02-26 PROCEDURE — 99233 SBSQ HOSP IP/OBS HIGH 50: CPT | Performed by: INTERNAL MEDICINE

## 2019-02-26 PROCEDURE — 25000132 ZZH RX MED GY IP 250 OP 250 PS 637: Performed by: INTERNAL MEDICINE

## 2019-02-26 PROCEDURE — 94660 CPAP INITIATION&MGMT: CPT

## 2019-02-26 PROCEDURE — 25000131 ZZH RX MED GY IP 250 OP 636 PS 637: Performed by: INTERNAL MEDICINE

## 2019-02-26 PROCEDURE — 00000146 ZZHCL STATISTIC GLUCOSE BY METER IP

## 2019-02-26 PROCEDURE — 20000003 ZZH R&B ICU

## 2019-02-26 PROCEDURE — 25000132 ZZH RX MED GY IP 250 OP 250 PS 637: Performed by: STUDENT IN AN ORGANIZED HEALTH CARE EDUCATION/TRAINING PROGRAM

## 2019-02-26 PROCEDURE — 94640 AIRWAY INHALATION TREATMENT: CPT

## 2019-02-26 PROCEDURE — 94640 AIRWAY INHALATION TREATMENT: CPT | Mod: 76

## 2019-02-26 RX ORDER — PREDNISONE 20 MG/1
40 TABLET ORAL DAILY
Status: DISCONTINUED | OUTPATIENT
Start: 2019-02-26 | End: 2019-02-28 | Stop reason: HOSPADM

## 2019-02-26 RX ORDER — FUROSEMIDE 20 MG
20 TABLET ORAL
Status: DISCONTINUED | OUTPATIENT
Start: 2019-02-26 | End: 2019-02-28 | Stop reason: HOSPADM

## 2019-02-26 RX ORDER — AZITHROMYCIN 250 MG/1
250 TABLET, FILM COATED ORAL DAILY
Status: DISCONTINUED | OUTPATIENT
Start: 2019-02-26 | End: 2019-02-28 | Stop reason: HOSPADM

## 2019-02-26 RX ADMIN — INSULIN GLARGINE 12 UNITS: 100 INJECTION, SOLUTION SUBCUTANEOUS at 09:27

## 2019-02-26 RX ADMIN — DILTIAZEM HYDROCHLORIDE 180 MG: 180 CAPSULE, COATED, EXTENDED RELEASE ORAL at 09:22

## 2019-02-26 RX ADMIN — FAMOTIDINE 20 MG: 20 TABLET ORAL at 22:04

## 2019-02-26 RX ADMIN — ACETAMINOPHEN 650 MG: 325 TABLET, FILM COATED ORAL at 18:36

## 2019-02-26 RX ADMIN — DICLOFENAC 2 G: 10 GEL TOPICAL at 14:50

## 2019-02-26 RX ADMIN — INSULIN ASPART 2 UNITS: 100 INJECTION, SOLUTION INTRAVENOUS; SUBCUTANEOUS at 16:26

## 2019-02-26 RX ADMIN — APIXABAN 5 MG: 5 TABLET, FILM COATED ORAL at 09:22

## 2019-02-26 RX ADMIN — IPRATROPIUM BROMIDE AND ALBUTEROL SULFATE 3 ML: .5; 3 SOLUTION RESPIRATORY (INHALATION) at 11:49

## 2019-02-26 RX ADMIN — BUDESONIDE 0.5 MG: 0.5 INHALANT RESPIRATORY (INHALATION) at 20:01

## 2019-02-26 RX ADMIN — Medication 225 MG: at 16:26

## 2019-02-26 RX ADMIN — TRAZODONE HYDROCHLORIDE 50 MG: 50 TABLET ORAL at 22:03

## 2019-02-26 RX ADMIN — ACETAMINOPHEN 650 MG: 325 TABLET, FILM COATED ORAL at 10:43

## 2019-02-26 RX ADMIN — DICLOFENAC 2 G: 10 GEL TOPICAL at 22:04

## 2019-02-26 RX ADMIN — CLONAZEPAM 1 MG: 1 TABLET ORAL at 22:04

## 2019-02-26 RX ADMIN — GUAIFENESIN 600 MG: 600 TABLET, EXTENDED RELEASE ORAL at 09:22

## 2019-02-26 RX ADMIN — PREDNISONE 40 MG: 20 TABLET ORAL at 09:22

## 2019-02-26 RX ADMIN — FUROSEMIDE 20 MG: 20 TABLET ORAL at 09:22

## 2019-02-26 RX ADMIN — DILTIAZEM HYDROCHLORIDE 180 MG: 180 CAPSULE, COATED, EXTENDED RELEASE ORAL at 22:03

## 2019-02-26 RX ADMIN — AZITHROMYCIN MONOHYDRATE 250 MG: 250 TABLET ORAL at 09:22

## 2019-02-26 RX ADMIN — MULTIPLE VITAMINS W/ MINERALS TAB 1 TABLET: TAB at 09:22

## 2019-02-26 RX ADMIN — BUDESONIDE 0.5 MG: 0.5 INHALANT RESPIRATORY (INHALATION) at 08:07

## 2019-02-26 RX ADMIN — IPRATROPIUM BROMIDE AND ALBUTEROL SULFATE 3 ML: .5; 3 SOLUTION RESPIRATORY (INHALATION) at 20:01

## 2019-02-26 RX ADMIN — LIDOCAINE 2 PATCH: 560 PATCH PERCUTANEOUS; TOPICAL; TRANSDERMAL at 09:21

## 2019-02-26 RX ADMIN — IPRATROPIUM BROMIDE AND ALBUTEROL SULFATE 3 ML: .5; 3 SOLUTION RESPIRATORY (INHALATION) at 08:07

## 2019-02-26 RX ADMIN — TAMSULOSIN HYDROCHLORIDE 0.4 MG: 0.4 CAPSULE ORAL at 09:22

## 2019-02-26 RX ADMIN — DICLOFENAC 2 G: 10 GEL TOPICAL at 17:01

## 2019-02-26 RX ADMIN — ACETAMINOPHEN 650 MG: 325 TABLET, FILM COATED ORAL at 22:04

## 2019-02-26 RX ADMIN — Medication 225 MG: at 22:02

## 2019-02-26 RX ADMIN — ACETAMINOPHEN 650 MG: 325 TABLET, FILM COATED ORAL at 06:58

## 2019-02-26 RX ADMIN — IPRATROPIUM BROMIDE AND ALBUTEROL SULFATE 3 ML: .5; 3 SOLUTION RESPIRATORY (INHALATION) at 15:44

## 2019-02-26 RX ADMIN — DICLOFENAC 2 G: 10 GEL TOPICAL at 09:21

## 2019-02-26 RX ADMIN — ACETAMINOPHEN 650 MG: 325 TABLET, FILM COATED ORAL at 14:50

## 2019-02-26 RX ADMIN — LORAZEPAM 0.5 MG: 2 INJECTION INTRAMUSCULAR; INTRAVENOUS at 22:58

## 2019-02-26 RX ADMIN — FUROSEMIDE 20 MG: 20 TABLET ORAL at 16:26

## 2019-02-26 RX ADMIN — FAMOTIDINE 20 MG: 20 TABLET ORAL at 09:22

## 2019-02-26 RX ADMIN — Medication 225 MG: at 06:18

## 2019-02-26 RX ADMIN — APIXABAN 5 MG: 5 TABLET, FILM COATED ORAL at 22:04

## 2019-02-26 ASSESSMENT — ACTIVITIES OF DAILY LIVING (ADL)
ADLS_ACUITY_SCORE: 19

## 2019-02-26 ASSESSMENT — MIFFLIN-ST. JEOR: SCORE: 1758

## 2019-02-26 NOTE — PROGRESS NOTES
Pt was on BiPAP for a part of the morning, but is currently on a 2 LPM NC sating 92%, resting comfortably and is tolerating it well. Will continue to monitor and assess the pt.    Vital signs:  Temp: 98.7  F (37.1  C) Temp src: Oral BP: (!) 157/97 Pulse: 90 Heart Rate: 106 Resp: 23 SpO2: 92 % O2 Device: Nasal cannula Oxygen Delivery: 2 LPM Height: 182.9 cm (6') Weight: 94.5 kg (208 lb 5.4 oz)  Estimated body mass index is 28.26 kg/m  as calculated from the following:    Height as of this encounter: 1.829 m (6').    Weight as of this encounter: 94.5 kg (208 lb 5.4 oz).    Past Medical History:   Diagnosis Date     Atrial flutter (H)      COPD (chronic obstructive pulmonary disease) (H)      Diverticulitis      Hypertension      Persistent atrial fibrillation (H) 4/28/09    ablation 7/1/2015     Premature beats      PVC (premature ventricular contraction)     s/p ablation 12/5/2017     Tricuspid valve disorder     Dr Aj, Hx of valve repair      Ventricular tachycardia (H)     nonsustained       Past Surgical History:   Procedure Laterality Date     ABDOMEN SURGERY      hernia repair right     APPENDECTOMY       CARDIOVERSION  06/03/2009    successful for afib     CARDIOVERSION  4/20/15    successful for afib     CARDIOVERSION  5/28/15     H ABLATION ATRIAL FLUTTER       H ABLATION FOCAL AFIB  7/1/15    Left atrial linear ablation and pulmonary vein antrum ablation     H ABLATION PVC  12/5/16     INCISION AND DRAINAGE LOWER EXTREMITY, COMBINED Right 11/10/2016    Procedure: COMBINED INCISION AND DRAINAGE LOWER EXTREMITY;  Surgeon: Roger Pacheco MD;  Location: [facility] OR     LAPAROSCOPIC CHOLECYSTECTOMY  1/6/2012    Procedure:LAPAROSCOPIC CHOLECYSTECTOMY; LAPAROSCOPIC CHOLECYSTECTOMY ; Surgeon:ROGER PACHECO; Location:[facility] OR     ORTHOPEDIC SURGERY      Left hip replacement     REPAIR VALVE TRICUSPID  9/8/08    conversion to a bileaflet valve and application of a 30 mm Sanderson ring     SMALL INTESTINE  SURGERY      had bowel obstruction age 9     VALVE REPLACEMENT      tricuspid       Family History   Problem Relation Age of Onset     Prostate Cancer Father      Family History Negative Mother      Emphysema Mother      Hypertension Mother      Cerebrovascular Disease Mother        Social History     Tobacco Use     Smoking status: Former Smoker     Last attempt to quit: 2008     Years since quittin.1     Smokeless tobacco: Never Used   Substance Use Topics     Alcohol use: Yes     Alcohol/week: 0.0 oz     Comment: 3-6 per month     Eliazar aRngel RT  2019

## 2019-02-26 NOTE — PROGRESS NOTES
RT-Pt on BIPAP 14/7, 30%. Pt seen resting comfortably. Bs  coarse/diminished. Spo2 100%. RT will continue to monitor.     Ceasar Bernstein, RT on 2/26/2019 at 4:54 AM

## 2019-02-26 NOTE — PROGRESS NOTES
[facility]  Hospitalist Progress Note  Felipe Aquino MD 02/26/19    Reason for Stay (Diagnosis): Respiratory failure, COPD exacerbation, pneumonia         Assessment and Plan:      Summary of Stay:   Tone Cooper is a 68-year-old gentleman with a history of advanced COPD with multiple hospitalizations for that recently.  He was just discharged from the hospital after being admitted for COPD exacerbation. He came back to the ER next day.  His recent history is most notable for 4 admissions in the last 6 months for pneumonia, COPD exacerbations, MSSA bacteremia thought to be due to pneumonia.  He also has a past medical history of atrial fibrillation with previous ablation, tricuspid valve replacement, hypertension, nonsustained VT.      After admission to the hospital on 2/16/19, pt had worsening of his symptoms and required intermittent BiPAP treatment.  Remained dyspneic with increased work of breathing, had mild Nicolasa with increase in Cr, then developed increasing lethargy and hypercapnia on 2/19 requiring continuous BiPAP and transferred to ICU.  Has streaky opacities in the left side would be to actually be residual pneumonia, however with his elevated lactic acid he was treated with a course of Zosyn and then narrowed to Augmentin and has finished this course.  Suspect primary problem is COPD exacerbation and he was on IV steroids with scheduled nebs for this.  Pulmonary medicine recommended steroid weaning to oral prednisone and a prolonged taper along with initiation of budesonide nebs scheduled and daily azithromycin therapy for his COPD.  Trying to wean from BiPAP, but so far he remains very fatigued and wanting sleep much of the day.  Perhaps some improvement over the last 2 days and was able to ambulate short distances in the murphy.  Has been evaluated for potential LTACH discharge for BiPAP weaning and pulmonary rehab and at this time is excepted and may have bed available tomorrow.      Problem List/Assessment and Plan:   Acute hypoxic and acute on chronic hypercapnic respiratory failure due to COPD exacerbation: Patient with advanced COPD and multiple hospitalizations in the past 6 months.  Whenever he weans steroids down his shortness of breath worsens.  Overall on exam he has very poor air movement.  Required transfer to ICU for intermittent BiPAP, pCO2 up in 90 range.  Appears to have some mild chronic hypercapnia.   -stopped pta oxycodone, limit narcotics  -Decrease prednisone to 40 mg daily today.  Likely to need prolonged taper and to maintain at 10 mg daily given his frequent exacerbations  -Scheduled duo nebs and Xopenex nebs as needed (tachycardia)  -BiPAP whenever sleeping or feeling more short of breath  -asked social work to see if patient would be a candidate for Home Trilogy machine and NIPPV, it sounds like he is but this would be a $5000 yearly rental fee.  There may be some other options.  -Dr. Acevedo from pulmonology recommended starting budesonide neb treatment twice daily as he is unable to fully inhale deep enough to get adequate treatment with a steroid inhaler and initiating chronic daily azithromycin therapy which has been done.  Also needs pulmonary rehab.  -Given his positive pressure ventilation dependence SW looked into LTACH he has been accepted, no beds available    Suspect HCAP: Recently treated for pneumonia and was on Augmentin prior to admission.  Streaky opacities remaining on the left which may just be residual from previous pneumonia.  He did have elevated lactic acid.  -Has been on Zosyn started 2/16, changed to Augmentin 2/21, finished course 2/24     Recent diagnosis PE: This was just diagnosed when he was hospitalized a few days ago over here.  He had a small PE on the CT chest. I suspect this is more of an incidental finding with the major contributor being COPD to his symptoms. He received about 4 days of heparin and Lovenox in the hospital and was  sent home on Eliquis.    -Eliquis 5mg bid po      Previous history of atrial fibrillation, flutter:  status post previous ablation procedure.  He did have issues with atrial fibrillation with RVR during previous hospitalizations.  He did follow with [facility] Cardiology as an outpatient.  The thought was to avoid a repeat ablation procedure at this time given his recent infectious issues which would decrease his chance of success.  He did have his propafenone dose increased.    -Continue baseline diltiazem and propafenone    -In terms of his anticoagulation, he is back on Eliquis now due to PE.  It seems like this was held at the time of discharge from previous hospitalization in January due to hemoptysis. No recurrence of hemoptysis and with concurrent PE, need to continue anti-coagulation      Episode of MSSA bacteremia in December:  This was thought to be due to pneumonia, no other secondary site was found. The patient completed a course of antibiotics.     History of tricuspid valve replacement    Chronic systolic RV CHF: Last TTE 1/4/19 showing mildly dilated RV along with decreased RV systolic function.  Does have peripheral edema.  Med list includes Lasix, however his spouse does not think he takes this.  Apparently had significant significantly more edema during previous admission per his spouse.  Now with 1-2+ lower extremity pitting edema.  Of note his weight on discharge very recently was 194 pounds.  -Increase Lasix to 20 mg p.o. twice daily today as his weight is going up and lower extremity edema little worse     Physical deconditioning:  PT/OT.  Continue to try to ambulate in the murphy as able.     DONG on CKD stage II: Creatinine up to 1.8 at peak.  Now back to baseline  -Follow BMP with diuresis    Steroid-induced hyperglycemia: secondary to steroids.  More hyperglycemic now that he is eating.  He does predominantly like sweets heavy in carbohydrates and given his advanced illness favor allowing him to have  what he likes to eat and increasing his insulin regimen allowing some hyperglycemia.  -Decreasing steroids so change Lantus to 10 units daily  -Medium dose sliding scale insulin  -Continue 1 unit aspart per carb with meals  -Reducing steroids which may help with hyperglycemia    Chronic low back pain: Has been taking oxycodone 5 mg frequently at home for this.  Now having 10 out of 10 back pain.  His opiates have been held given his hypercapnia and tenuous respiratory status.  As his goals are restorative cares risk of opiates at this time outweigh potential benefits of pain control.  Any amount likely to increase his CO2 retention leading worsening of his respiratory failure.  Additionally he sleeps all day long and night and opiates may worsen this problem.  -Acetaminophen  -Added topical NSAID and Lidoderm patches  -Mobilize patient    Poor sleep: Is a night I will at home and has difficulty with sleep at baseline.  He takes trazodone 50 mg at bedtime along with clonazepam 1 mg at bedtime.  -Has continued on his clonazepam at bedtime while here and will also add back his trazodone at bedtime    Lactic acidosis: Due to respiratory failure nebs.    DVT Prophylaxis: Eliquis  Code Status: Full Code, this was discussed with previous provider and spouse  FEN: regular diet  Discharge Dispo: TBD.  Consult PT. given how BiPAP dependent he has been and likely a slow process he may benefit from LTACH they also have pulmonary rehab.  Social work looking into this and he has been accepted.     Estimated Disch Date / # of Days until Disch: Requiring intermittent BiPAP so remains in ICU.  [facility] may have bed available tomorrow, but none today          Interval History (Subjective):      Continues to intermittently require BiPAP, sleeping much of the day.  Was able to come off BiPAP for a few hours this morning on 3 L and oxygen saturations are okay.  Did ambulate some in the murphy a very limited distance and he felt  short of breath after this.  Eats a lot of carbohydrates her blood sugars a little bit higher intermittently.                  Physical Exam:      Last Vital Signs:  BP (!) 151/93   Pulse 82   Temp 97.5  F (36.4  C) (Axillary)   Resp 27   Ht 1.829 m (6')   Wt 94.5 kg (208 lb 5.4 oz)   SpO2 99%   BMI 28.26 kg/m      Intake/Output Summary (Last 24 hours) at 2/26/2019 1138  Last data filed at 2/26/2019 1130  Gross per 24 hour   Intake 690 ml   Output 925 ml   Net -235 ml       Constitutional: NAD  Eyes: sclera white   HEENT:    MMM  Respiratory: Diminished airflow.  Belly breathing.  Purse lipped breathing.  No crackles.  No wheeze this morning  Cardiovascular: RRR.  No murmur   GI: non-tender, not distended, bowel sounds present  Skin: no rash    Musculoskeletal/extremities: 1+ bilateral pitting lower extremity edema    Neurologic: Alert and answering questions appropriately this morning  Psychiatric: Calm and cooperative         Medications:      All current medications were reviewed with changes reflected in problem list.         Data:      All new lab and imaging data was reviewed.   Labs:    Recent Labs   Lab 02/25/19  0545 02/24/19  0534 02/23/19  0551    140 140   POTASSIUM 4.3 4.9 4.6   CHLORIDE 102 102 100   CO2 36* 34* 37*   ANIONGAP 1* 4 3   * 165* 185*   BUN 31* 38* 44*   CR 0.82 0.98 0.99   GFRESTIMATED >90 80 78   GFRESTBLACK >90 >90 >90   WILBERT 8.7 9.1 9.0       Imaging:   None today    Felipe Auqino MD

## 2019-02-26 NOTE — PLAN OF CARE
ICU End of Shift Summary.  For vital signs and complete assessments, please see documentation flowsheets.     Pertinent assessments: Pt alert, oriented, using call light appropriately.  Slept well during the night. LS diminished/coarse, Bipap or 2 L per NC, can speak in full sentences.  Small BM, voiding without difficulty, eating well.   Major Shift Events: none  Plan (Upcoming Events): Meet with Ricardo alfaro at 1000  Discharge/Transfer Needs: ?LTACH    Bedside Shift Report Completed : Yes  Bedside Safety Check Completed: Yes

## 2019-02-26 NOTE — PROGRESS NOTES
RT note: pt on/off bipap throughout the day. BS coarse/diminished, continues to receive duoneb QID and pulmicort BID.     Whitney Sagastume, RRT

## 2019-02-26 NOTE — PLAN OF CARE
ICU End of Shift Summary.  For vital signs and complete assessments, please see documentation flowsheets.     Writer started caring for pt at 1600.     Pertinent assessments: A&Ox4, forgetful at times. VSS. Tele Afib CVR. BiPAP while sleeping 30% FiO2, 2 LPM NC while awake. Lungs diminished. ERICKSON, but improved from this weekend. Needs encouragement to get up to chair and PT. Up to chair for dinner. Eating/drinking well. , sliding scale insulin and carb counting insulin given. A1 for transfer with walker and A1 for cares. Incont. Of urine and bowel. Large BM this shift. POC reviewed with pt.   Major Shift Events: Incont. With BMs still, not telling nursing staff when incontinent either.   Plan (Upcoming Events): Continue BiPAP while sleeping. Encourage self cares.   Discharge/Transfer Needs: LTACH tomorrow.     Bedside Shift Report Completed : yes  Bedside Safety Check Completed: yes

## 2019-02-26 NOTE — PLAN OF CARE
PT: Treatment attempted. Pt declines as he just ordered food and just woke up. Will continue to follow per POC.

## 2019-02-27 LAB
ANION GAP SERPL CALCULATED.3IONS-SCNC: 2 MMOL/L (ref 3–14)
BUN SERPL-MCNC: 27 MG/DL (ref 7–30)
CALCIUM SERPL-MCNC: 8.5 MG/DL (ref 8.5–10.1)
CHLORIDE SERPL-SCNC: 100 MMOL/L (ref 94–109)
CO2 SERPL-SCNC: 37 MMOL/L (ref 20–32)
CREAT SERPL-MCNC: 0.87 MG/DL (ref 0.66–1.25)
GFR SERPL CREATININE-BSD FRML MDRD: 89 ML/MIN/{1.73_M2}
GLUCOSE BLDC GLUCOMTR-MCNC: 116 MG/DL (ref 70–99)
GLUCOSE BLDC GLUCOMTR-MCNC: 184 MG/DL (ref 70–99)
GLUCOSE BLDC GLUCOMTR-MCNC: 311 MG/DL (ref 70–99)
GLUCOSE BLDC GLUCOMTR-MCNC: 92 MG/DL (ref 70–99)
GLUCOSE SERPL-MCNC: 97 MG/DL (ref 70–99)
POTASSIUM SERPL-SCNC: 4.2 MMOL/L (ref 3.4–5.3)
SODIUM SERPL-SCNC: 139 MMOL/L (ref 133–144)

## 2019-02-27 PROCEDURE — 94640 AIRWAY INHALATION TREATMENT: CPT

## 2019-02-27 PROCEDURE — 25000125 ZZHC RX 250: Performed by: INTERNAL MEDICINE

## 2019-02-27 PROCEDURE — 00000146 ZZHCL STATISTIC GLUCOSE BY METER IP

## 2019-02-27 PROCEDURE — 40000275 ZZH STATISTIC RCP TIME EA 10 MIN

## 2019-02-27 PROCEDURE — 20000003 ZZH R&B ICU

## 2019-02-27 PROCEDURE — 25000132 ZZH RX MED GY IP 250 OP 250 PS 637: Performed by: INTERNAL MEDICINE

## 2019-02-27 PROCEDURE — 80048 BASIC METABOLIC PNL TOTAL CA: CPT | Performed by: INTERNAL MEDICINE

## 2019-02-27 PROCEDURE — 25000131 ZZH RX MED GY IP 250 OP 636 PS 637: Performed by: INTERNAL MEDICINE

## 2019-02-27 PROCEDURE — 99232 SBSQ HOSP IP/OBS MODERATE 35: CPT | Performed by: INTERNAL MEDICINE

## 2019-02-27 PROCEDURE — 94660 CPAP INITIATION&MGMT: CPT

## 2019-02-27 PROCEDURE — 36415 COLL VENOUS BLD VENIPUNCTURE: CPT | Performed by: INTERNAL MEDICINE

## 2019-02-27 PROCEDURE — 94640 AIRWAY INHALATION TREATMENT: CPT | Mod: 76

## 2019-02-27 RX ADMIN — TAMSULOSIN HYDROCHLORIDE 0.4 MG: 0.4 CAPSULE ORAL at 09:43

## 2019-02-27 RX ADMIN — BUDESONIDE 0.5 MG: 0.5 INHALANT RESPIRATORY (INHALATION) at 20:21

## 2019-02-27 RX ADMIN — CLONAZEPAM 1 MG: 1 TABLET ORAL at 22:37

## 2019-02-27 RX ADMIN — INSULIN GLARGINE 10 UNITS: 100 INJECTION, SOLUTION SUBCUTANEOUS at 08:30

## 2019-02-27 RX ADMIN — ACETAMINOPHEN 650 MG: 325 TABLET, FILM COATED ORAL at 19:36

## 2019-02-27 RX ADMIN — FAMOTIDINE 20 MG: 20 TABLET ORAL at 22:37

## 2019-02-27 RX ADMIN — IPRATROPIUM BROMIDE AND ALBUTEROL SULFATE 3 ML: .5; 3 SOLUTION RESPIRATORY (INHALATION) at 15:48

## 2019-02-27 RX ADMIN — Medication 225 MG: at 22:37

## 2019-02-27 RX ADMIN — FUROSEMIDE 20 MG: 20 TABLET ORAL at 15:38

## 2019-02-27 RX ADMIN — ACETAMINOPHEN 650 MG: 325 TABLET, FILM COATED ORAL at 15:38

## 2019-02-27 RX ADMIN — Medication 225 MG: at 15:38

## 2019-02-27 RX ADMIN — MULTIPLE VITAMINS W/ MINERALS TAB 1 TABLET: TAB at 09:43

## 2019-02-27 RX ADMIN — DICLOFENAC 2 G: 10 GEL TOPICAL at 08:29

## 2019-02-27 RX ADMIN — IPRATROPIUM BROMIDE AND ALBUTEROL SULFATE 3 ML: .5; 3 SOLUTION RESPIRATORY (INHALATION) at 11:52

## 2019-02-27 RX ADMIN — IPRATROPIUM BROMIDE AND ALBUTEROL SULFATE 3 ML: .5; 3 SOLUTION RESPIRATORY (INHALATION) at 07:49

## 2019-02-27 RX ADMIN — BUDESONIDE 0.5 MG: 0.5 INHALANT RESPIRATORY (INHALATION) at 07:49

## 2019-02-27 RX ADMIN — AZITHROMYCIN MONOHYDRATE 250 MG: 250 TABLET ORAL at 09:43

## 2019-02-27 RX ADMIN — FUROSEMIDE 20 MG: 20 TABLET ORAL at 07:32

## 2019-02-27 RX ADMIN — ACETAMINOPHEN 650 MG: 325 TABLET, FILM COATED ORAL at 07:32

## 2019-02-27 RX ADMIN — INSULIN ASPART 4 UNITS: 100 INJECTION, SOLUTION INTRAVENOUS; SUBCUTANEOUS at 17:02

## 2019-02-27 RX ADMIN — PREDNISONE 40 MG: 20 TABLET ORAL at 09:43

## 2019-02-27 RX ADMIN — APIXABAN 5 MG: 5 TABLET, FILM COATED ORAL at 09:43

## 2019-02-27 RX ADMIN — TRAZODONE HYDROCHLORIDE 50 MG: 50 TABLET ORAL at 22:37

## 2019-02-27 RX ADMIN — Medication 225 MG: at 07:32

## 2019-02-27 RX ADMIN — LIDOCAINE 2 PATCH: 560 PATCH PERCUTANEOUS; TOPICAL; TRANSDERMAL at 07:32

## 2019-02-27 RX ADMIN — ACETAMINOPHEN 650 MG: 325 TABLET, FILM COATED ORAL at 11:45

## 2019-02-27 RX ADMIN — FAMOTIDINE 20 MG: 20 TABLET ORAL at 09:43

## 2019-02-27 RX ADMIN — DICLOFENAC 2 G: 10 GEL TOPICAL at 22:37

## 2019-02-27 RX ADMIN — LIDOCAINE 2 PATCH: 560 PATCH PERCUTANEOUS; TOPICAL; TRANSDERMAL at 19:36

## 2019-02-27 RX ADMIN — ACETAMINOPHEN 650 MG: 325 TABLET, FILM COATED ORAL at 02:07

## 2019-02-27 RX ADMIN — DILTIAZEM HYDROCHLORIDE 180 MG: 180 CAPSULE, COATED, EXTENDED RELEASE ORAL at 09:43

## 2019-02-27 RX ADMIN — DILTIAZEM HYDROCHLORIDE 180 MG: 180 CAPSULE, COATED, EXTENDED RELEASE ORAL at 22:37

## 2019-02-27 RX ADMIN — APIXABAN 5 MG: 5 TABLET, FILM COATED ORAL at 22:38

## 2019-02-27 RX ADMIN — IPRATROPIUM BROMIDE AND ALBUTEROL SULFATE 3 ML: .5; 3 SOLUTION RESPIRATORY (INHALATION) at 20:21

## 2019-02-27 ASSESSMENT — ACTIVITIES OF DAILY LIVING (ADL)
ADLS_ACUITY_SCORE: 19
ADLS_ACUITY_SCORE: 18
ADLS_ACUITY_SCORE: 18
ADLS_ACUITY_SCORE: 19
ADLS_ACUITY_SCORE: 19
ADLS_ACUITY_SCORE: 18

## 2019-02-27 NOTE — PROGRESS NOTES
Discharge Planner   Discharge Plans in progress: YES  Barriers to discharge plan: awaiting bed at [place]  Follow up plan: Lana Beltrán has him on the waiting list and he is a priority.  Wife updated and pt updated this am       Entered by: Yolanda France 02/27/2019 12:54 PM

## 2019-02-27 NOTE — PROGRESS NOTES
ICU End of Shift Summary.  For vital signs and complete assessments, please see documentation flowsheets.     Pertinent assessments: A/O.  Bipap on throughout the night 30% fio2. Patient up periodically to use urinal. PRN tylenol for pain. Midline in L arm, sl.   Major Shift Events: N\A  Plan (Upcoming Events): plan to go to [place] today  Discharge/Transfer Needs: [place]    Bedside Shift Report Completed : yes  Bedside Safety Check Completed: yes

## 2019-02-27 NOTE — PROGRESS NOTES
RT-Pt seen on BIPAP 14/7, 30%. Bs diminished. Duoneb/Pulmicort given in line. Spo2 98%. RT will continue to monitor.     Ceasar Bernstein, RT on 2/27/2019 at 12:40 AM

## 2019-02-27 NOTE — PROGRESS NOTES
Pt remained on/off the BiPAP throughout the day. When off he was on a 2 LPM NC, and was tolerating it well. Will continue to monitor and assess the pt's respiratory status and needs.    Vital signs:  Temp: 97.7  F (36.5  C) Temp src: Oral BP: (!) 139/96 Pulse: 91 Heart Rate: 111 Resp: (!) 31 SpO2: 100 % O2 Device: Nasal cannula Oxygen Delivery: 2 LPM Height: 182.9 cm (6') Weight: 94.5 kg (208 lb 5.4 oz)  Estimated body mass index is 28.26 kg/m  as calculated from the following:    Height as of this encounter: 1.829 m (6').    Weight as of this encounter: 94.5 kg (208 lb 5.4 oz).    Past Medical History:   Diagnosis Date     Atrial flutter (H)      COPD (chronic obstructive pulmonary disease) (H)      Diverticulitis      Hypertension      Persistent atrial fibrillation (H) 4/28/09    ablation 7/1/2015     Premature beats      PVC (premature ventricular contraction)     s/p ablation 12/5/2017     Tricuspid valve disorder     Dr Aj, Hx of valve repair      Ventricular tachycardia (H)     nonsustained       Past Surgical History:   Procedure Laterality Date     ABDOMEN SURGERY      hernia repair right     APPENDECTOMY       CARDIOVERSION  06/03/2009    successful for afib     CARDIOVERSION  4/20/15    successful for afib     CARDIOVERSION  5/28/15     H ABLATION ATRIAL FLUTTER       H ABLATION FOCAL AFIB  7/1/15    Left atrial linear ablation and pulmonary vein antrum ablation     H ABLATION PVC  12/5/16     INCISION AND DRAINAGE LOWER EXTREMITY, COMBINED Right 11/10/2016    Procedure: COMBINED INCISION AND DRAINAGE LOWER EXTREMITY;  Surgeon: Roger Pacheco MD;  Location: [facility] OR     LAPAROSCOPIC CHOLECYSTECTOMY  1/6/2012    Procedure:LAPAROSCOPIC CHOLECYSTECTOMY; LAPAROSCOPIC CHOLECYSTECTOMY ; Surgeon:ROGER PACHECO; Location:[facility] OR     ORTHOPEDIC SURGERY      Left hip replacement     REPAIR VALVE TRICUSPID  9/8/08    conversion to a bileaflet valve and application of a 30 mm Sanderson ring     SMALL  INTESTINE SURGERY      had bowel obstruction age 9     VALVE REPLACEMENT      tricuspid       Family History   Problem Relation Age of Onset     Prostate Cancer Father      Family History Negative Mother      Emphysema Mother      Hypertension Mother      Cerebrovascular Disease Mother        Social History     Tobacco Use     Smoking status: Former Smoker     Last attempt to quit: 2008     Years since quittin.1     Smokeless tobacco: Never Used   Substance Use Topics     Alcohol use: Yes     Alcohol/week: 0.0 oz     Comment: 3-6 per month     Eliazar Rangel RT  2019

## 2019-02-27 NOTE — PROGRESS NOTES
[facility]  Hospitalist Progress Note  Best Tucker MD   02/27/2019    Reason for Stay (Diagnosis): Respiratory failure, COPD exacerbation, pneumonia         Assessment and Plan:        Summary of Stay:   Tone Cooper is a 68-year-old gentleman with a history of advanced COPD with multiple hospitalizations for that recently.  He was just discharged from the hospital after being admitted for COPD exacerbation and came back to the ER next day.  His recent history is most notable for multiple admissions in the last 6 months for pneumonia, COPD exacerbations, MSSA bacteremia thought to be due to pneumonia.  He also has a past medical history of atrial fibrillation with previous ablation, tricuspid valve replacement, hypertension, nonsustained VT.      After admission to the hospital on 2/16/19, pt had worsening of his symptoms and required intermittent BiPAP treatment.  Remained dyspneic with increased work of breathing, had mild Nicolasa with increase in Cr, then developed increasing lethargy and hypercapnia on 2/19 requiring continuous BiPAP and transferred to ICU.  Has streaky opacities in the left side, probably resolving pneumonia, however with his elevated lactic acid he was treated with a course of Zosyn and then narrowed to Augmentin and has finished this course.  Suspect primary problem is COPD exacerbation and he was on IV steroids with scheduled nebs for this.  Pulmonary medicine recommended steroid weaning to oral prednisone and a prolonged taper along with initiation of budesonide nebs scheduled and daily azithromycin therapy for his COPD.  Trying to wean from BiPAP, but so far he remains very fatigued and wanting sleep much of the day.  Gradual improvement. And was able to ambulate short distances in the murphy.  Has been evaluated for potential LTACH discharge for BiPAP weaning and pulmonary rehab pending bed placement. Consider home BiPAP or Trilogy machine once he eventually discharges to  home     Problem List/Assessment and Plan:   # Acute on chronic hypoxic and hypercapnic respiratory failure due to COPD exacerbation: Patient with advanced COPD and multiple hospitalizations in the past 6 months.  Whenever he weans steroids down his shortness of breath worsens.  Overall on exam he has very poor air movement.  Required transfer to ICU for intermittent BiPAP, pCO2 up in 90 range.  Appears to have some mild chronic hypercapnia.   -stopped pta oxycodone, limit narcotics  -Decrease prednisone to 40 mg daily.  Likely to need prolonged taper and to maintain at 10 mg daily given his frequent exacerbations  -Scheduled duo nebs and Xopenex nebs as needed (tachycardia)  -BiPAP whenever sleeping or feeling more short of breath  -asked social work to see if patient would be a candidate for Home Trilogy machine and NIPPV  -Dr. Acevedo from pulmonology recommended starting budesonide neb treatment twice daily as he is unable to fully inhale deep enough to get adequate treatment with a steroid inhaler and initiating chronic daily azithromycin therapy which has been done.  Also needs pulmonary rehab.  -Given his positive pressure ventilation dependence SW looked into LTACH he has been accepted, no beds available    Suspect HCAP: Recently treated for pneumonia and was on Augmentin prior to admission.  Streaky opacities remaining on the left which may just be residual from previous pneumonia.  He did have elevated lactic acid.  -Has been on Zosyn started 2/16, changed to Augmentin 2/21, finished course 2/24     Recent diagnosis PE: This was just diagnosed when he was hospitalized a few days ago over here.  He had a small PE on the CT chest. I suspect this is more of an incidental finding with the major contributor being COPD to his symptoms. He received about 4 days of heparin and Lovenox in the hospital and was sent home on Eliquis.    -Eliquis 5mg bid po      Previous history of atrial fibrillation, flutter:  status  post previous ablation procedure.  He did have issues with atrial fibrillation with RVR during previous hospitalizations.  He did follow with EP Cardiology as an outpatient.  The thought was to avoid a repeat ablation procedure at this time given his recent infectious issues which would decrease his chance of success.  He did have his propafenone dose increased.    -Continue baseline diltiazem and propafenone    -In terms of his anticoagulation, he is back on Eliquis now due to PE.  It seems like this was held at the time of discharge from previous hospitalization in January due to hemoptysis. No recurrence of hemoptysis and with concurrent PE, need to continue anti-coagulation      Episode of MSSA bacteremia in December:  This was thought to be due to pneumonia, no other secondary site was found. The patient completed a course of antibiotics.     History of tricuspid valve replacement    Chronic systolic RV CHF: Last TTE 1/4/19 showing mildly dilated RV along with decreased RV systolic function.  Does have peripheral edema.  Med list includes Lasix, however his spouse does not think he takes this.  Apparently had significant significantly more edema during previous admission per his spouse.  Now with 1-2+ lower extremity pitting edema.  Of note his weight on discharge very recently was 194 pounds.  -Increase Lasix to 20 mg p.o. twice daily     Physical deconditioning:  PT/OT.  Continue to try to ambulate in the murphy as able.     DONG on CKD stage II: Creatinine up to 1.8 at peak.  Now back to baseline  -Follow BMP with diuresis    Steroid-induced hyperglycemia: secondary to steroids.  More hyperglycemic now that he is eating.  He does predominantly like sweets heavy in carbohydrates and given his advanced illness favor allowing him to have what he likes to eat and increasing his insulin regimen allowing some hyperglycemia.  -Decreasing steroids so change Lantus to 10 units daily  -Medium dose sliding scale  insulin  -Continue 1 unit aspart per carb with meals  -Reducing steroids which may help with hyperglycemia    Chronic low back pain: Has been taking oxycodone 5 mg frequently at home for this.  Now having 10 out of 10 back pain.  His opiates have been held given his hypercapnia and tenuous respiratory status.  As his goals are restorative cares risk of opiates at this time outweigh potential benefits of pain control.  Any amount likely to increase his CO2 retention leading worsening of his respiratory failure.  Additionally he sleeps all day long and night and opiates may worsen this problem.  -Acetaminophen  -Added topical NSAID and Lidoderm patches  -Mobilize patient    Poor sleep: has difficulty with sleep at baseline.  He takes trazodone 50 mg at bedtime along with clonazepam 1 mg at bedtime, continue     Lactic acidosis: Due to respiratory failure, nebs.    DVT Prophylaxis: Eliquis  Code Status: Full Code  FEN: regular diet  Discharge Dispo: TBD.  Consult PT. given how BiPAP dependent he has been and likely a slow process he may benefit from LTACH they also have pulmonary rehab.  Social work looking into this and he has been accepted.     Estimated Disch Date / # of Days until Disch: pending bed availability at LTACH          Interval History (Subjective):      Chart reviewed and patient seen, discussed with ICU care team   Continues to intermittently require BiPAP, sleeping much of the day.  Was able to come off BiPAP for short periods of time, did not sleep a lot last night, no chest pain, feels a little better                   Physical Exam:      Last Vital Signs:  BP (!) 136/92 (BP Location: Right arm)   Pulse 93   Temp 97.7  F (36.5  C) (Oral)   Resp 20   Ht 1.829 m (6')   Wt 94.5 kg (208 lb 5.4 oz)   SpO2 97%   BMI 28.26 kg/m      I/O last 3 completed shifts:  In: 610 [P.O.:600; I.V.:10]  Out: 1275 [Urine:1275]    Constitutional: NAD  Eyes: sclera white   HEENT:    MMM  Respiratory: Diminished  airflow.  Purse lipped breathing.  No crackles.  No wheeze this morning  Cardiovascular: RRR.  Normal s1, s2. Early systolic murmur   GI: non-tender, not distended, bowel sounds present  Skin: no rash    Musculoskeletal/extremities: 1+ bilateral pitting lower extremity edema    Neurologic: Alert and answering questions appropriately this morning  Psychiatric: Calm and cooperative         Medications:      All current medications were reviewed with changes reflected in problem list.         Data:      All new lab and imaging data was reviewed.   Labs:    Recent Labs   Lab 02/27/19  0608 02/25/19  0545 02/24/19  0534    140 140   POTASSIUM 4.2 4.3 4.9   CHLORIDE 100 102 102   CO2 37* 36* 34*   ANIONGAP 2* 1* 4   GLC 97 151* 165*   BUN 27 31* 38*   CR 0.87 0.82 0.98   GFRESTIMATED 89 >90 80   GFRESTBLACK >90 >90 >90   WILBERT 8.5 8.7 9.1       Imaging:   No results found for this or any previous visit (from the past 24 hour(s)).      Best Tucker MD

## 2019-02-28 ENCOUNTER — RECORDS - HEALTHEAST (OUTPATIENT)
Dept: PULMONOLOGY | Facility: CLINIC | Age: 69
End: 2019-02-28

## 2019-02-28 VITALS
BODY MASS INDEX: 28.73 KG/M2 | RESPIRATION RATE: 17 BRPM | HEIGHT: 72 IN | WEIGHT: 212.08 LBS | DIASTOLIC BLOOD PRESSURE: 91 MMHG | TEMPERATURE: 97.5 F | HEART RATE: 79 BPM | OXYGEN SATURATION: 95 % | SYSTOLIC BLOOD PRESSURE: 140 MMHG

## 2019-02-28 DIAGNOSIS — J96.02 ACUTE ON CHRONIC RESPIRATORY ACIDOSIS (H): ICD-10-CM

## 2019-02-28 DIAGNOSIS — M54.9 BACK PAIN: ICD-10-CM

## 2019-02-28 DIAGNOSIS — L30.9 PERIANAL DERMATITIS: ICD-10-CM

## 2019-02-28 DIAGNOSIS — R23.9 ALTERATION IN SKIN INTEGRITY DUE TO MOISTURE: ICD-10-CM

## 2019-02-28 DIAGNOSIS — J44.9 CHRONIC OBSTRUCTIVE PULMONARY DISEASE, UNSPECIFIED COPD TYPE (H): ICD-10-CM

## 2019-02-28 DIAGNOSIS — J96.22 ACUTE ON CHRONIC RESPIRATORY FAILURE WITH HYPOXIA AND HYPERCAPNIA (H): ICD-10-CM

## 2019-02-28 DIAGNOSIS — J96.12 ACUTE ON CHRONIC RESPIRATORY ACIDOSIS (H): ICD-10-CM

## 2019-02-28 DIAGNOSIS — R73.9 HYPERGLYCEMIA: ICD-10-CM

## 2019-02-28 DIAGNOSIS — B36.9 FUNGAL DERMATITIS: ICD-10-CM

## 2019-02-28 DIAGNOSIS — J96.21 ACUTE ON CHRONIC RESPIRATORY FAILURE WITH HYPOXIA AND HYPERCAPNIA (H): ICD-10-CM

## 2019-02-28 LAB
GLUCOSE BLDC GLUCOMTR-MCNC: 118 MG/DL (ref 70–99)
GLUCOSE BLDC GLUCOMTR-MCNC: 137 MG/DL (ref 70–99)
GLUCOSE BLDC GLUCOMTR-MCNC: 157 MG/DL (ref 70–99)
GLUCOSE BLDC GLUCOMTR-MCNC: 189 MG/DL (ref 70–99)
GLUCOSE BLDC GLUCOMTR-MCNC: 255 MG/DL (ref 70–139)
GLUCOSE BLDC GLUCOMTR-MCNC: 283 MG/DL (ref 70–139)
GLUCOSE BLDC GLUCOMTR-MCNC: 283 MG/DL (ref 70–139)
INTERPRETATION ECG - MUSE: NORMAL

## 2019-02-28 PROCEDURE — 94640 AIRWAY INHALATION TREATMENT: CPT

## 2019-02-28 PROCEDURE — 40000275 ZZH STATISTIC RCP TIME EA 10 MIN

## 2019-02-28 PROCEDURE — 25000132 ZZH RX MED GY IP 250 OP 250 PS 637: Performed by: INTERNAL MEDICINE

## 2019-02-28 PROCEDURE — 94640 AIRWAY INHALATION TREATMENT: CPT | Mod: 76

## 2019-02-28 PROCEDURE — 25000125 ZZHC RX 250: Performed by: INTERNAL MEDICINE

## 2019-02-28 PROCEDURE — 25000131 ZZH RX MED GY IP 250 OP 636 PS 637: Performed by: INTERNAL MEDICINE

## 2019-02-28 PROCEDURE — 93005 ELECTROCARDIOGRAM TRACING: CPT

## 2019-02-28 PROCEDURE — 00000146 ZZHCL STATISTIC GLUCOSE BY METER IP

## 2019-02-28 PROCEDURE — 99222 1ST HOSP IP/OBS MODERATE 55: CPT | Performed by: INTERNAL MEDICINE

## 2019-02-28 PROCEDURE — 94660 CPAP INITIATION&MGMT: CPT

## 2019-02-28 PROCEDURE — 93010 ELECTROCARDIOGRAM REPORT: CPT | Performed by: INTERNAL MEDICINE

## 2019-02-28 PROCEDURE — 99239 HOSP IP/OBS DSCHRG MGMT >30: CPT | Performed by: INTERNAL MEDICINE

## 2019-02-28 RX ORDER — BUDESONIDE 0.5 MG/2ML
0.5 INHALANT ORAL 2 TIMES DAILY
DISCHARGE
Start: 2019-02-28 | End: 2019-06-28

## 2019-02-28 RX ORDER — PREDNISONE 10 MG/1
TABLET ORAL
DISCHARGE
Start: 2019-02-28 | End: 2019-05-26

## 2019-02-28 RX ORDER — IPRATROPIUM BROMIDE AND ALBUTEROL SULFATE 2.5; .5 MG/3ML; MG/3ML
1 SOLUTION RESPIRATORY (INHALATION) 4 TIMES DAILY
DISCHARGE
Start: 2019-02-28 | End: 2019-06-28

## 2019-02-28 RX ORDER — CLONAZEPAM 1 MG/1
1 TABLET ORAL AT BEDTIME
Status: ON HOLD | DISCHARGE
Start: 2019-02-28 | End: 2019-05-31

## 2019-02-28 RX ORDER — AZITHROMYCIN 250 MG/1
250 TABLET, FILM COATED ORAL DAILY
DISCHARGE
Start: 2019-03-01 | End: 2019-04-24

## 2019-02-28 RX ORDER — FAMOTIDINE 20 MG/1
20 TABLET, FILM COATED ORAL 2 TIMES DAILY
DISCHARGE
Start: 2019-02-28 | End: 2019-05-26

## 2019-02-28 RX ORDER — DILTIAZEM HYDROCHLORIDE 180 MG/1
180 CAPSULE, COATED, EXTENDED RELEASE ORAL 2 TIMES DAILY
DISCHARGE
Start: 2019-02-28

## 2019-02-28 RX ORDER — LIDOCAINE 4 G/G
2 PATCH TOPICAL EVERY 24 HOURS
DISCHARGE
Start: 2019-02-28 | End: 2019-05-26

## 2019-02-28 RX ADMIN — AZITHROMYCIN MONOHYDRATE 250 MG: 250 TABLET ORAL at 09:23

## 2019-02-28 RX ADMIN — LIDOCAINE 2 PATCH: 560 PATCH PERCUTANEOUS; TOPICAL; TRANSDERMAL at 09:23

## 2019-02-28 RX ADMIN — BUDESONIDE 0.5 MG: 0.5 INHALANT RESPIRATORY (INHALATION) at 07:58

## 2019-02-28 RX ADMIN — FUROSEMIDE 20 MG: 20 TABLET ORAL at 09:22

## 2019-02-28 RX ADMIN — Medication 225 MG: at 06:25

## 2019-02-28 RX ADMIN — LIDOCAINE 2 PATCH: 560 PATCH PERCUTANEOUS; TOPICAL; TRANSDERMAL at 15:10

## 2019-02-28 RX ADMIN — INSULIN GLARGINE 10 UNITS: 100 INJECTION, SOLUTION SUBCUTANEOUS at 07:46

## 2019-02-28 RX ADMIN — TAMSULOSIN HYDROCHLORIDE 0.4 MG: 0.4 CAPSULE ORAL at 09:22

## 2019-02-28 RX ADMIN — MULTIPLE VITAMINS W/ MINERALS TAB 1 TABLET: TAB at 09:23

## 2019-02-28 RX ADMIN — APIXABAN 5 MG: 5 TABLET, FILM COATED ORAL at 09:22

## 2019-02-28 RX ADMIN — PREDNISONE 40 MG: 20 TABLET ORAL at 09:23

## 2019-02-28 RX ADMIN — FAMOTIDINE 20 MG: 20 TABLET ORAL at 09:23

## 2019-02-28 RX ADMIN — DICLOFENAC 2 G: 10 GEL TOPICAL at 15:01

## 2019-02-28 RX ADMIN — INSULIN ASPART 1 UNITS: 100 INJECTION, SOLUTION INTRAVENOUS; SUBCUTANEOUS at 07:43

## 2019-02-28 RX ADMIN — IPRATROPIUM BROMIDE AND ALBUTEROL SULFATE 3 ML: .5; 3 SOLUTION RESPIRATORY (INHALATION) at 07:58

## 2019-02-28 RX ADMIN — DICLOFENAC 2 G: 10 GEL TOPICAL at 09:27

## 2019-02-28 RX ADMIN — ACETAMINOPHEN 650 MG: 325 TABLET, FILM COATED ORAL at 09:23

## 2019-02-28 RX ADMIN — ACETAMINOPHEN 650 MG: 325 TABLET, FILM COATED ORAL at 13:28

## 2019-02-28 RX ADMIN — IPRATROPIUM BROMIDE AND ALBUTEROL SULFATE 3 ML: .5; 3 SOLUTION RESPIRATORY (INHALATION) at 11:44

## 2019-02-28 ASSESSMENT — MIFFLIN-ST. JEOR
SCORE: 1732.42
SCORE: 1775

## 2019-02-28 ASSESSMENT — ACTIVITIES OF DAILY LIVING (ADL)
ADLS_ACUITY_SCORE: 18

## 2019-02-28 NOTE — DISCHARGE SUMMARY
[facility]  Hospitalist Discharge Summary       Date of Admission:  2/16/2019  Date of Discharge:  2/28/2019  Discharging Provider: Best Tucker MD      Discharge Diagnoses   # Acute on chronic hypoxic and hypercapnic respiratory failure due to COPD exacerbation:   # Suspect HCAP   # Recent diagnosis of PE   # Known history of atrial fibrillation, flutter  # Episode of MSSA bacteremia in December   # History of tricuspid valve replacement  # Chronic CHF   # Physical deconditioning  # DONG on CKD stage II: Due to contrast nephropathy. Resolved   # Steroid-induced hyperglycemia   # Chronic low back pain   # Insomnia  # Lactic acidosis:    Follow-ups Needed After Discharge   Follow-up Appointments     Follow Up and recommended labs and tests      -- Started on Budesonide nebs and dialy azithromycin per pulmonary   recommendations , given frequent COPD exacerbation  -- Requiring intermittent BiPAP support. Current settings : BIPAP 14/7   backup rate of 16 and 30% FIO2.   -- Continue Lantus and sliding scale insulin for steroid related   hyperglycemia   -- Recommend outpatient follow up with EP cardiology and pulmonology after   discharge from LTACH  -- Consider home BiPAP/Trilogy machine at time of discharge home             Unresulted Labs Ordered in the Past 30 Days of this Admission     No orders found from 12/18/2018 to 2/17/2019.        Hospital Course      Tone Cooper is a 68-year-old gentleman with a history of advanced COPD with multiple hospitalizations for that recently. His recent history is most notable for multiple admissions in the last 6 months for pneumonia, COPD exacerbations, MSSA bacteremia thought to be due to pneumonia.  He also has a past medical history of atrial fibrillation with previous ablation, tricuspid valve replacement, hypertension, nonsustained VT.    He was just hospitalized in early February for COPD exacerbation and found to have a small PE. He came back to the  ER next day after discharge with shortness of breath.     After admission to the hospital on 2/16/19, pt had worsening of his symptoms and required intermittent BiPAP treatment. Remained dyspneic with increased work of breathing, had mild Nicolasa with increase in Cr, then developed increasing lethargy and hypercapnia on 2/19 requiring continuous BiPAP and transferred to ICU.  Has streaky opacities in the left side, probably resolving pneumonia, however with his elevated lactic acid, he was treated with a course of Zosyn and then narrowed to Augmentin and has finished this course.  Suspect primary problem is COPD exacerbation. Pulmonary medicine recommended steroid weaning to oral prednisone and a prolonged taper along with initiation of budesonide nebs scheduled and daily azithromycin therapy for his COPD.  He remains very fatigued and gradual improvement. Still BiPAP dependant and has been evaluated for potential LTACH discharge for BiPAP weaning and pulmonary rehab. Consider home BiPAP or Trilogy machine once he eventually discharges to home     Detailed Discussion of individual issues as below:      Acute on chronic hypoxic and hypercapnic respiratory failure due to COPD exacerbation: Patient with advanced COPD and multiple hospitalizations in the past 6 months.  Whenever he weans steroids down his shortness of breath worsens.  Required transfer to ICU for BiPAP, pCO2 up in 90 range.  Appears to have some mild chronic hypercapnia.   -- Mentation is better now although still requiring intermittent BiPAP, able to take if off for meals and for short breaks   -- Stopped pta oxycodone, limit narcotics, start tylenol, ketoprofen gel and lidocaine patch for chronic back pain   -- Decrease prednisone to 40 mg daily.  Likely needs a prolonged taper and to maintain at 10 mg daily given his frequent exacerbations  -- Scheduled duo nebs and Xopenex nebs as needed   -- Evaluate at discharge home if patient would be a candidate  for Home Trilogy machine and NIPPV  -- Dr. Acevedo from pulmonology recommended starting budesonide neb treatment twice daily as he is unable to fully inhale deep enough to get adequate treatment with a steroid inhaler and initiating chronic daily azithromycin therapy which has been done.  Also needs pulmonary rehab.     Suspect HCAP: Recently treated for pneumonia and was on Augmentin prior to admission.  Streaky opacities remaining on the left which may just be residual from previous pneumonia.  He did have elevated lactic acid.  -Has been on Zosyn started 2/16, changed to Augmentin 2/21, finished course 2/24     Recent diagnosis PE: This was just diagnosed when he was hospitalized a few days ago over here.  He had a small PE on the CT chest. I suspect this is more of an incidental finding with the major contributor being COPD to his symptoms. He received about 4 days of heparin and Lovenox in the hospital and was sent home on Eliquis.  Completed course of Eliquis 10 mg BID and now down to 5 mg BID.       Previous history of atrial fibrillation, flutter:  status post previous ablation procedure.  He did have issues with atrial fibrillation with RVR during previous hospitalizations.  He did follow with EP Cardiology as an outpatient.  The thought was to avoid a repeat ablation procedure at this time given his recent infectious and pulmonary issues which would decrease his chance of success.  He did have his propafenone dose increased.    -Continue baseline diltiazem and propafenone. Still has periods of intermittent breakthrough a.fib and flutter. He was evaluted by cardiology prior to discharge and no change in medications at this time.   -In terms of his anticoagulation, he is back on Eliquis now due to PE.  It seems like he used to be on Eliquis and this was held at during a previous hospitalization in January due to hemoptysis. No recurrence of hemoptysis and with concurrent PE, need to continue  anti-coagulation      Episode of MSSA bacteremia in December:  This was thought to be due to pneumonia, no other secondary site was found. The patient completed a course of antibiotics.      History of tricuspid valve replacement     Chronic systolic RV CHF: Last TTE 1/4/19 showing mildly dilated RV along with decreased RV systolic function.  Does have peripheral edema.  Med list includes Lasix, however his spouse does not think he takes this.  Apparently had significant significantly more edema during previous admission per his spouse.  Now with 1-2+ lower extremity pitting edema.  Of note his weight on discharge very recently was 194 pounds  -Increase Lasix to 20 mg p.o. twice daily. Monitor BMP, daily weight monitoring, might need additional diuresis      Physical deconditioning:  PT/OT.    DONG on CKD stage II: Due to contrast nephropathy. Creatinine up to 1.8 at peak.  Now back to baseline     Steroid-induced hyperglycemia: secondary to steroids.  More hyperglycemic now that he is eating.  He does predominantly like sweets heavy in carbohydrates and given his advanced illness favor allowing him to have what he likes to eat and increasing his insulin regimen allowing some hyperglycemia.  -Lantus to 10 units daily  -Medium dose sliding scale insulin  -Was on 1 unit aspart per carb with meals, now that we are decreasing from IV steroids to PO prednisone 40 mg daily, anticipate his sugars will improve with steroids being weaned down      Chronic low back pain: Has been taking oxycodone 5 mg frequently at home for this. His opiates have been held given his hypercapnia and tenuous respiratory status.  As his goals are restorative cares risk of opiates at this time outweigh potential benefits of pain control.  Additionally he sleeps all day long and night and opiates may worsen this problem.  -Acetaminophen  -Added topical NSAID and Lidoderm patches  -Mobilize patient     Insomnia, poor sleep: has difficulty with sleep  at baseline.  He takes trazodone 50 mg at bedtime along with clonazepam 1 mg at bedtime     Lactic acidosis: Due to respiratory failure, nebs.    Discussed goals of care with the patient and his wife, he would like to be full code     Consultations This Hospital Stay   ADVANCE DIRECTIVE IP CONSULT  PHARMACY TO DOSE VANCO  CARE COORDINATOR IP CONSULT  SOCIAL WORK IP CONSULT  VASCULAR ACCESS ADULT IP CONSULT  PHYSICAL THERAPY ADULT IP CONSULT  CARDIOLOGY GENERAL ADULT IP CONSULT  PHYSICAL THERAPY ADULT IP CONSULT  OCCUPATIONAL THERAPY ADULT IP CONSULT    Code Status   Full Code    Time Spent on this Encounter   I, Best Tucker, personally saw the patient today and spent greater than 30 minutes discharging this patient.       Best Tucker MD  [facility]  ______________________________________________________________________    Physical Exam   Vital Signs: Temp: 97.5  F (36.4  C) Temp src: Axillary BP: 135/83 Pulse: 62 Heart Rate: 70 Resp: 16 SpO2: 100 % O2 Device: BiPAP/CPAP Oxygen Delivery: 2 LPM  Weight: 212 lbs 1.32 oz  Patient is awake and alert, no acute distress  Lungs with diminished air entry bilaterally   Cardiac exam reveals irregular rate and rhythm at this time, normal S1, S2. Tele shows a.flutter   Abdomen is soft, non-tender, no rebound or guarding       Primary Care Physician   Ana Pratt    Discharge Disposition   Discharged to LTACH   Condition at discharge: Stable        Discharge Orders      Follow-Up with Electrophysiologist      General info for SNF    Length of Stay Estimate: Short Term Care: Estimated # of Days <30  Condition at Discharge: Improving  Level of care:skilled   Rehabilitation Potential: Fair  Admission H&P remains valid and up-to-date: Yes  Recent Chemotherapy: N/A  Use Nursing Home Standing Orders: Yes     Mantoux instructions    Give two-step Mantoux (PPD) Per Facility Policy Yes     Reason for your hospital stay    COPD exacerbation     Glucose  monitor nursing POCT    Before meals and at bedtime     Intake and output    Every shift     Daily weights    Call Provider for weight gain of more than 2 pounds per day or 5 pounds per week.     Follow Up and recommended labs and tests    -- Started on Budesonide nebs and dialy azithromycin per pulmonary recommendations , given frequent COPD exacerbation  -- Requiring intermittent BiPAP support. Current settings : BIPAP 14/7 backup rate of 16 and 30% FIO2.   -- Continue Lantus and sliding scale insulin for steroid related hyperglycemia   -- Recommend outpatient follow up with EP cardiology and pulmonology after discharge from [facility]  -- Consider home BiPAP/Trilogy machine at time of discharge home     Activity - Up with nursing assistance     Full Code     Physical Therapy Adult Consult    Evaluate and treat as clinically indicated.    Reason:  weakness     Occupational Therapy Adult Consult    Evaluate and treat as clinically indicated.    Reason:  weakness     BIPAP treatment    Current settings: BIPAP 14/7 backup rate of 16 and 30% FIO2.     Fall precautions     Advance Diet as Tolerated    Follow this diet upon discharge: Orders Placed This Encounter      Regular Diet Adult     Discharge Medications   Current Discharge Medication List      START taking these medications    Details   azithromycin (ZITHROMAX) 250 MG tablet Take 1 tablet (250 mg) by mouth daily    Associated Diagnoses: COPD exacerbation (H)      budesonide (PULMICORT) 0.5 MG/2ML neb solution Take 2 mLs (0.5 mg) by nebulization 2 times daily    Associated Diagnoses: COPD exacerbation (H)      clonazePAM (KLONOPIN) 1 MG tablet Take 1 tablet (1 mg) by mouth At Bedtime    Associated Diagnoses: Insomnia, unspecified type      diclofenac (VOLTAREN) 1 % topical gel Place 2 g onto the skin 4 times daily for 7 days    Associated Diagnoses: Chronic back pain, unspecified back location, unspecified back pain laterality      famotidine (PEPCID) 20 MG tablet  Take 1 tablet (20 mg) by mouth 2 times daily    Associated Diagnoses: COPD exacerbation (H)      insulin glargine (LANTUS SOLOSTAR PEN) 100 UNIT/ML pen Inject 10 Units Subcutaneous daily    Associated Diagnoses: COPD exacerbation (H)      Lidocaine (LIDOCARE) 4 % Patch Place 2 patches onto the skin every 24 hours To area of pain on back    Associated Diagnoses: Chronic back pain, unspecified back location, unspecified back pain laterality         CONTINUE these medications which have CHANGED    Details   diltiazem ER COATED BEADS (CARDIZEM CD/CARTIA XT) 180 MG 24 hr capsule Take 1 capsule (180 mg) by mouth 2 times daily Hold for SBP < 110 or HR < 60    Associated Diagnoses: Atrial fibrillation with rapid ventricular response (H)      ipratropium - albuterol 0.5 mg/2.5 mg/3 mL (DUONEB) 0.5-2.5 (3) MG/3ML neb solution Take 1 vial (3 mLs) by nebulization 4 times daily    Associated Diagnoses: COPD exacerbation (H)      predniSONE (DELTASONE) 10 MG tablet Prednisone 40 mg by mouth daily x 3 days, then 20 by mouth x 3 days, then continue 10 mg daily until follow up with pulmonology.    Associated Diagnoses: COPD exacerbation (H)         CONTINUE these medications which have NOT CHANGED    Details   albuterol (VENTOLIN HFA) 108 (90 Base) MCG/ACT inhaler Inhale 1-2 puffs into the lungs every 4 hours (while awake) PRN  Qty: 2 Inhaler, Refills: 0    Comments: Please fill Albuterol and not Ventolin as Albuterol works much better than Ventolin  Associated Diagnoses: COPD exacerbation (H)      levalbuterol (XOPENEX) 1.25 MG/3ML neb solution Take 3 mLs (1.25 mg) by nebulization every 4 hours as needed for wheezing or shortness of breath / dyspnea  Qty: 270 mL, Refills: 1    Associated Diagnoses: COPD exacerbation (H); Acute on chronic respiratory failure with hypoxia and hypercapnia (H); Atrial fibrillation with rapid ventricular response (H)      tamsulosin (FLOMAX) 0.4 MG capsule Take 0.4 mg by mouth daily      apixaban  ANTICOAGULANT (ELIQUIS) 5 MG tablet Take 1 tablet (5 mg) by mouth 2 times daily  Qty: 60 tablet, Refills: 0      furosemide (LASIX) 20 MG tablet Take 20 mg by mouth 2 times daily      !! order for DME Equipment being ordered: Nebulizer  Qty: 1 each, Refills: 0    Associated Diagnoses: COPD exacerbation (H)      !! order for DME Oxygen for nocturnal use. 1 LPM via nasal cannula  Testing done at home in chronic stable state.  Condition is COPD.  Patient does not have Obstructive Sleep Apnea.  Overnight oximetry revealed 30.3 minutes of hypoxia (<= 88%).  Duration: Lifetime (99 months).  Qty: 1 each, Refills: 99    Associated Diagnoses: Nocturnal hypoxia      propafenone (RYTHMOL) 225 MG tablet Take 1 tablet (225 mg) by mouth every 8 hours  Qty: 270 tablet, Refills: 3    Associated Diagnoses: Paroxysmal atrial fibrillation (H)      traZODone (DESYREL) 50 MG tablet Take 50 mg by mouth At Bedtime        !! - Potential duplicate medications found. Please discuss with provider.      STOP taking these medications       amoxicillin-clavulanate (AUGMENTIN) 875-125 MG tablet Comments:   Reason for Stopping:         oxyCODONE (OXY-IR) 5 MG capsule Comments:   Reason for Stopping:             Allergies   Allergies   Allergen Reactions     Amlodipine Swelling     Flecainide Palpitations       Significant Results and Procedures   Results for orders placed or performed during the hospital encounter of 02/16/19   XR Chest Port 1 View    Narrative    XR CHEST PORT 1 VW 2/16/2019 1:44 PM    HISTORY: Shortness of breath.     COMPARISON: February 11, 2019.       Impression    IMPRESSION: Mild streaky atelectasis or scarring of the right lung  base, similar to prior exam. No new focal pulmonary opacities. Midline  sternal wires as before. No pleural effusions or pneumothorax. Stable  heart size.     JUNIOR HEREDIA MD   XR Chest Port 1 View    Narrative    CHEST PORTABLE ONE VIEW   2/16/2019 10:20 PM     HISTORY: Shortness of breath.      COMPARISON: Earlier the same day at 1:40 PM.      Impression    IMPRESSION: Improving streaky bibasilar opacities since prior.  Perihilar vascular prominence is again seen. Cardiac silhouette is  unchanged. No new focal infiltrates. No significant pleural effusion  or pneumothorax.     TALON DAWSON MD   CT Head w/o Contrast    Narrative    CT SCAN OF THE HEAD WITHOUT CONTRAST   2/20/2019 8:00 PM     HISTORY: Fall with head trauma    TECHNIQUE:  Axial images of the head and coronal reformations without  IV contrast material. Radiation dose for this scan was reduced using  automated exposure control, adjustment of the mA and/or kV according  to patient size, or iterative reconstruction technique.    COMPARISON: Scan dated 3/24/2014    FINDINGS:  There is diffuse parenchymal volume loss.  White matter  changes are present in the cerebral hemispheres that are consistent  with small vessel ischemic disease in this age patient. There is no  evidence of intracranial hemorrhage, mass, acute infarct or anomaly.  The visualized portions of the sinuses and mastoids appear normal. No  intracranial hemorrhage or skull fractures are identified.      Impression    IMPRESSION: No intracranial bleed or skull fractures are identified      TORIBIO BYRNE MD

## 2019-02-28 NOTE — PROGRESS NOTES
I faxed discharge orders to [facility] [phone].  Awaiting discharge Summary. Kajal MEIER CTS [id number]: I faxed discharge summary to [facility].

## 2019-02-28 NOTE — CONSULTS
Consult Date:  02/28/2019      CARDIOLOGY CONSULTATION      REFERRING PHYSICIAN:  Hospitalist Service.      INDICATION FOR CARDIAC CONSULTATION:  Atrial fibrillation.        HISTORY OF PRESENT ILLNESS:  It is my pleasure to see your patient, Tone Cooper.  This is a 68-year-old gentleman admitted with severe chronic obstructive pulmonary disease exacerbation.  The patient is due to go to [facility] in the next few hours.  The patient was noted to go into what was called atrial fibrillation.  However, on review of his rhythm strips, it is actually atrial flutter.  This is a patient who has had multiple issues with atrial tachyarrhythmias.  He had an atrial fibrillation ablation on 07/01/2015.  He did well for a few years on any antiarrhythmic therapy.  He had subsequent frequent PVCs and he underwent 2 further ablation procedures.  He then had an episode of atrial flutter in 2018 and for that reason he was started on propafenone 150 mg 3 times a day.  He had a Zio Patch event monitor.  The monitor detected multiple episodes of paroxysmal atrial fibrillation.  Some of the rates were over 230 beats per minute, but at that time according to Dr. Britt's note, the patient was being hospitalized for pneumonia.  Dr. Britt increased the patient's propafenone on 01/18/2019 to 225 mg 3 times a day.  The patient then had a Holter monitor placed to assess control of the atrial arrhythmias.  The Holter showed the principal rhythm was sinus rhythm with frequent supraventricular ectopy and small bursts of atrial flutter.  The atrial flutter, atrial fibrillation burden was 4%.  It was 64% in December  His last echocardiogram was on January of this year, which shows that his ejection fraction is normal at 60%-65%.  It showed that the right ventricle was mildly dilated with mildly decreased right ventricular systolic function.  There was trace mitral regurgitation.  This patient has had a previous tricuspid annuloplasty and the mean  gradient across the tricuspid repair was 4 mmHg, which is in normal range.  The patient is asymptomatic with the atrial flutter and the ventricular rate is well controlled.  His rate is in the 70 beats per minute range.  As I mentioned, he is entirely asymptomatic with this.      PAST MEDICAL HISTORY:     1.  Atrial dysrhythmias as described above.   2.  Chronic obstructive pulmonary disease.   3.  Diverticulitis.    4.  Hypertension.     5.  PVCs.   6.  Nonsustained VT.      SURGICAL HISTORY:   1.  Hernia repair in the right.   2.  Incision and drainage of lower extremity.   3.  Laparoscopic cholecystectomy.   4.  Left hip replacement.   5.  Tricuspid valve repair with an annuloplasty ring.     6.  Intestinal surgery as a child for bowel obstruction.      FAMILY HISTORY:  Mother had emphysema and hypertension and cerebrovascular disease.  Father had prostate cancer.      SOCIAL HISTORY:  He is  and his wife is with him here today.  He is a former smoker.  He stopped in 2008.  He drinks 3 to 6 drinks per month.      MEDICATIONS:   1.  Apixaban 5 mg twice a day.     2.  Azithromycin 250 mg per day.   3.  Pulmicort nebulized solution 0.5 mg 2 times a day.   4.  Clonazepam 1 mg orally at bedtime.   5.  Voltaren 1% topical gel 2 grams 4 times daily to back.   6.  Diltiazem  mg 2 times a day.   7.  Famotidine 20 mg orally 2 times a day.   8.  Furosemide 20 mg 2 times a day.   9.  Sliding scale insulin.   10.  Insulin glargine 10 units subcutaneously per day.   11.  Ipratropium albuterol nebulizer 3 mL times a day.   12.  Lidocaine patch transdermal every 24 hours.   13.  Prednisone 40 mg per day.   14.  Propafenone 220 mg orally hours.   15.  Tamsulosin 0.4 mg per day.   16.  Trazodone 50 mg orally per day.      ALLERGIES:   1.  AMLODIPINE CAUSES PERIPHERAL EDEMA.   2.  FLECAINIDE CAUSES PALPITATIONS.      REVIEW OF SYSTEMS:   CONSTITUTIONAL:  Tiredness.   EYES:  Negative.   ENT:  Negative.   CARDIOVASCULAR:   As above.   RESPIRATORY:  As above.   GASTROINTESTINAL:  Nil.   GENITOURINARY:  Nil.   MUSCULOSKELETAL:  Has back pain.   NEUROLOGIC:  Nil.   PSYCHIATRIC:  Nil.   ENDOCRINE:  Nil.   HEMATOLYMPHATIC:  Nil.   ALLERGY/IMMUNOLOGY:  As above.      PHYSICAL EXAMINATION:   GENERAL:  He is a very pleasant man.  He is in no apparent distress.  He is wearing BiPAP.   VITAL SIGNS:  Blood pressure is 135/83.  He has a pulse of 69 beats per minute, atrial flutter with predominantly 4:1 block.   HEAD, EYES, EARS, NECK, NOSE AND THROAT: He has normal facial symmetry.  His pupils are equal.  He is wearing BiPAP.  His jugular venous pulse does not appear to be raised.  His carotids are normal with no bruits.   HEART:  [place] beat is impalpable.  Heart sounds 1 and 2 were normal.   PULMONARY:  He has increased AP diameter consistent with hyperinflation.  He has diminished intensity of the breath sounds bilaterally.  No added sounds heard.   ABDOMEN:  Unremarkable with no hepatosplenomegaly, no masses or bruits.  His pedal pulses are 1+.   EXTREMITIES:  He has 1+ bilateral peripheral edema.  He moves all 4 limbs appropriately with no obvious sensory, motor loss.  He is fully oriented to time, place and person with a pleasant affect.  No obvious skin lesions were noted.      LABORATORY DATA:  Sodium is 139, potassium is 4.2, BUN is 27, creatinine is 0.87.  GFR is 89. On the 02/22/2019, which is last which is his last CBC, his white cell count was 8.8, hemoglobin is 13.3 and platelet count was mildly decreased at 136,000.      Last chest x-ray was on 02/16, some perihilar vascular prominence was noted.  Cardiac silhouette was unchanged.  No focal infiltrates.  No pleural effusions were noted.  CT of the chest on 02/11 showed positive for a small area of nonocclusive pulmonary embolism in the right upper lobe and a small area of consolidation at the right lung base posteriorly, suspicious for pneumonia.  Finally, emphysema was diagnosed  on the CT scan.  In , the patient did have a CT angiogram date of the heart, but was for pulmonary vein isolation measurements.  He did have his coronaries assessed on that test.      IMPRESSION:   1.  Paroxysmal atrial flutter.  The ventricular rate is well controlled.  The patient is hemodynamically unstable.  The patient is anticoagulated with apixaban.  This confirms what has been found on his event monitors, which are recurrent episodes of atrial flutter. This has been an ongoing issue for this patient.  However, I do not think that this will stop the patient from going to [place].  He may well reconvert back into sinus rhythm on high-dose propafenone.   2.  Chronic obstructive pulmonary disease with recent exacerbation which could also have triggered the atrial flutter.  No evidence of atrial fibrillation.   3.  Chronic steroid therapy.   4.  Bilateral lower extremity peripheral edema which is chronic and which the patient has been unchanged from previously and has not worsened.   5.  Pulmonary embolism on CT scanning.  The patient is on chronic anticoagulation.      PLAN:  At this particular stage, I would continue with all of his present medications.  This would not preclude him from going to [place].  Given that his ventricular rate is well controlled, I do not think that he needs telemetry.  He probably has been doing this on and off as evidenced by the results of the event monitors.  I will, however, have the patient follow up with Dr. Britt in about 4 weeks' time (he was supposed to see Dr. Britt yesterday ).  I know that Dr. Britt had considered in his last note another ablation procedure if atrial dysrhythmias became a significant issue.      Many thanks for allowing me to be involved in the care of this very nice patient.         SOL ALARCON MD, [facility]             D: 2019   T: 2019   MT: JASON      Name:     FELIPE AYOUB   MRN:      [id number]        Account:       [id number]   :       1950           Consult Date:  02/28/2019      Document: [id number]

## 2019-02-28 NOTE — PROGRESS NOTES
Telemetry showed new rhythm, prolonged P-R, had appearance of wenckebach, new finding. Requested EKG; on formal EKG appeared to be type 1 AV block with premature atrial complexes. Pt transferring to [facility]ACH later today, would recommend cardiology evaluation prior to transfer.    Tiffany Burch

## 2019-02-28 NOTE — PROGRESS NOTES
[facility] has accepted pt today for [facility].    [facility] transport has been set up for 1500.  Pt and wife are aware of private cost if insurance doesn't cover it.  They will bring Bipap/02 set up.    I will update pt and wife of discharge planning.      Nurse needs to call [phone] for nurse to nurse report    MD can page Dr Acharya at [phone].  They need discharge orders and discharge summary before pt leaves. MD paged.     PCS form filled out and will give to AMALIA Rdz RN CTS [id number]    ADDENDUM :9:37 Notified pt went into AFIB.  Pt has a hx of AFIB.  They will do cards consult.  Lana feliz notified and she will verify that [facility] can do cardiac monitoring.      10:40 [facility] could do tele if needed.     10:49 Per cards doesn't need tele.  Updated [facility]

## 2019-02-28 NOTE — PROGRESS NOTES
[facility]     Tone Cooper has been accepted for admission today and transport has been set up for 1500.    The accepting unit is 5[facility] and RN report can be called to [phone]  prior to transfer.    The accepting MD is Dr. Alcantar and this provider can be reached for MD sign out report by pager at [phone].    Please have the discharge summary and discharge orders completed, faxed to [phone], and available to accompany patient at least one hour before transfer time.    I have updated Kajal (CC) with admission details.     Thank you for the referral, please feel free to contact me with any questions or concerns.         Thank you,    Lana Crane  Referral Specialist  [facility]   [email or web address]  [phone] (direct)

## 2019-02-28 NOTE — PROGRESS NOTES
Transition Communication Hand-off for Care Transitions to Next Level of Care Provider    Name: Tone Cooper  : 1950  MRN #: [id number]  Primary Care Provider: [facility]  Primary Care MD Name: [facility]  Primary Clinic: [facility] [address] [address]  Primary Care Clinic Name: [facility]  Reason for Hospitalization:  COPD exacerbation (H) [J44.1]  Admit Date/Time: 2019 12:30 PM  Discharge Date: 19  Payor Source: Payor: [facility] / Plan: ARE MEDICARE NON FPA / Product Type: HMO /     Readmission Assessment Measure (CALEB) Risk Score/category: HIGH         Reason for Communication Hand-off Referral: Admission diagnoses: COPD    Discharge Plan:       Concern for non-adherence with plan of care:   NO  Discharge Needs Assessment:  Needs      Most Recent Value   Anticipated Changes Related to Illness  none   Equipment Currently Used at Home  walker, rolling   # of Referrals Placed by [facility]  Durable Medical Equipment (DME)          Already enrolled in Tele-monitoring program and name of program:  na  Follow-up specialty is recommended: Yes    Follow-up plan:  No future appointments.    Any outstanding tests or procedures:    Procedures     Future Labs/Procedures    BIPAP treatment     Comments:    Current settings: BIPAP 14/7 backup rate of 16 and 30% FIO2.          Referrals     Future Labs/Procedures    Follow-Up with Electrophysiologist     Occupational Therapy Adult Consult     Comments:    Evaluate and treat as clinically indicated.    Reason:  weakness    Physical Therapy Adult Consult     Comments:    Evaluate and treat as clinically indicated.    Reason:  weakness               Follow Up and recommended labs and tests    -- Started on Budesonide nebs and dialy azithromycin per pulmonary recommendations , given frequent COPD exacerbation   -- Requiring intermittent BiPAP support. Current settings : BIPAP 14/7 backup rate of 16 and 30% FIO2.   -- Continue  Lantus and sliding scale insulin for steroid related hyperglycemia   -- Recommend outpatient follow up with EP cardiology and pulmonology after discharge from [facility]   -- Consider home BiPAP/Trilogy machine at time of discharge home             Key Recommendations:  CTS following patient due to COPD exacerbation and high CALEB.  CTS met with patient at bedside, visit brief as patient had significant coughing, however, wanted to complete visit.  Pt admitted 2/16/19 for COPD exacerbation.  Per chart review admitted 2/11-2/14 for PE and 12/17-1/8 for acute on chronic hypoxic resp failure d/t community acquired pna and severe sepsis.  Pt receives no home services and is provided home O2 by [facility].  COPD action plan reviewed with patient as well as when to follow up, pt denies questions.  Pt will schedule his own follow up appointments as recommended. He would benefit from f/u with pulmonary.  He said he follows with Dr Espinoza with [facility].  Pt was discharged to [facility].  They will have him connect with  DME for a Bipap or NIV at time of discharge.      Laure Saini RN CTS    AVS/Discharge Summary is the source of truth; this is a helpful guide for improved communication of patient story

## 2019-02-28 NOTE — PROGRESS NOTES
ICU End of Shift Summary.  For vital signs and complete assessments, please see documentation flowsheets.     Pertinent assessments: A/O.  Bipap on throughout the night 30% fio2. Patient up periodically to use urinal. PRN tylenol for pain. No IV access.    Major Shift Events: N\A  Plan (Upcoming Events): Cards to see prior to leaving for [place]. plan to go to [place] today  Discharge/Transfer Needs: [place]      Bedside Shift Report Completed : yes  Bedside Safety Check Completed: yes

## 2019-02-28 NOTE — PLAN OF CARE
ICU End of Shift Summary.  For vital signs and complete assessments, please see documentation flowsheets.     Pertinent assessments: A&Ox4, very pleasant. Requests tylenol for chronic low back pain. Tele shows a-flutter, BPs stable. On 2L NC when awake and uses BiPAP at 30% when taking naps throughout day or during night. O2 sats stable, lungs clear/diminished, ERICKSON. BM x1. Uses urinal at bedside. Brief on d/t stool incontinence and dribbling of urine. Abdomen sooft/non-tender. Regular diet. BG elevated and continues to receive carb coverage. MDs aware and stated they are going to keep regular diet. Assist x2 with transfers d/t pts sudden need to sit down.  Major Shift Events: Pt now in A-flutter. Cards consulted. Has a history of afib with ablation, first degree AV block with PACs. Cards stated pt is fine transferring to [facility] without need for cardiac monitoring at their facility.   Plan (Upcoming Events): Monitor BG and O2 sats   Discharge/Transfer Needs: Transferring to [facility]      1315: Telephone report given to James at [facility]. No questions from [place] at this time. Transportation will arrive at approx 1500 from [facility].

## 2019-02-28 NOTE — PROGRESS NOTES
RT end of shift note:      Patient remains on BIPAP support through the night.    Settings: BIPAP 14/7 backup rate of 16 and 30% FIO2.    Vital signs:  Temp: 97.3  F (36.3  C) Temp src: Axillary BP: (!) 139/96 Pulse: 78 Heart Rate: 111 Resp: 19 SpO2: 97 % O2 Device: BiPAP/CPAP Oxygen Delivery: 2 LPM Height: 182.9 cm (6') Weight: 94.5 kg (208 lb 5.4 oz)  Estimated body mass index is 28.26 kg/m  as calculated from the following:    Height as of this encounter: 1.829 m (6').    Weight as of this encounter: 94.5 kg (208 lb 5.4 oz).      Auscultated diminished breath sounds pre and post treatment.         Will continue to follow and monitor.      Sara Lees, RRT

## 2019-02-28 NOTE — PROGRESS NOTES
Cardiology consult dictated.  Atrial flutter with controlled ventricular response in a patient who has had numerous ablation procedures and has known atrial flutter on recent  Ziopatch monitoring.  The ventricular rate is well controlled.  Patient is asymptomatic and is hemodynamically stable.  He is anticoagulated with apixaban.  This would not stop him from going to [facility].  I would continue all of his present medications.  I have put an order for the patient to follow-up with Dr. Jimmy Britt whose is his electrophysiologist, in 4 weeks time.  He will not require telemetry as he is probably been doing this on and off for weeks as noted in the event monitoring.  This was a 60-minute visit of which greater than 50% was counseling education.  This was a critical care consult.

## 2019-02-28 NOTE — PLAN OF CARE
Physical Therapy Discharge Summary    Reason for therapy discharge:    Discharged to LTACH     Progress towards therapy goal(s). See goals on Care Plan in [facility] electronic health record for goal details.  Goals not met.  Barriers to achieving goals:   discharge from facility.    Therapy recommendation(s):    Continued therapy is recommended.  Rationale/Recommendations:  Pt will need continued PT services to address strength, balance and activity tolerance deficits.

## 2019-03-01 LAB
ANION GAP SERPL CALCULATED.3IONS-SCNC: 6 MMOL/L (ref 5–18)
BUN SERPL-MCNC: 26 MG/DL (ref 8–22)
CALCIUM SERPL-MCNC: 9.4 MG/DL (ref 8.5–10.5)
CHLORIDE BLD-SCNC: 98 MMOL/L (ref 98–107)
CO2 SERPL-SCNC: 38 MMOL/L (ref 22–31)
CREAT SERPL-MCNC: 0.86 MG/DL (ref 0.7–1.3)
ERYTHROCYTE [DISTWIDTH] IN BLOOD BY AUTOMATED COUNT: 14.7 % (ref 11–14.5)
GFR SERPL CREATININE-BSD FRML MDRD: >60 ML/MIN/1.73M2
GLUCOSE BLD-MCNC: 119 MG/DL (ref 70–125)
GLUCOSE BLDC GLUCOMTR-MCNC: 123 MG/DL (ref 70–139)
GLUCOSE BLDC GLUCOMTR-MCNC: 222 MG/DL (ref 70–139)
GLUCOSE BLDC GLUCOMTR-MCNC: 251 MG/DL (ref 70–139)
GLUCOSE BLDC GLUCOMTR-MCNC: 98 MG/DL (ref 70–139)
HBA1C MFR BLD: 6.5 % (ref 4.2–6.1)
HCT VFR BLD AUTO: 41.2 % (ref 40–54)
HGB BLD-MCNC: 13.2 G/DL (ref 14–18)
MCH RBC QN AUTO: 30.6 PG (ref 27–34)
MCHC RBC AUTO-ENTMCNC: 32 G/DL (ref 32–36)
MCV RBC AUTO: 95 FL (ref 80–100)
PLATELET # BLD AUTO: 172 THOU/UL (ref 140–440)
PMV BLD AUTO: 9.6 FL (ref 8.5–12.5)
POTASSIUM BLD-SCNC: 4.1 MMOL/L (ref 3.5–5)
RBC # BLD AUTO: 4.32 MILL/UL (ref 4.4–6.2)
SODIUM SERPL-SCNC: 142 MMOL/L (ref 136–145)
WBC: 10.9 THOU/UL (ref 4–11)

## 2019-03-02 LAB
BACTERIA SPEC CULT: NORMAL
GLUCOSE BLDC GLUCOMTR-MCNC: 134 MG/DL (ref 70–139)
GLUCOSE BLDC GLUCOMTR-MCNC: 140 MG/DL (ref 70–139)
GLUCOSE BLDC GLUCOMTR-MCNC: 226 MG/DL (ref 70–139)
GLUCOSE BLDC GLUCOMTR-MCNC: 244 MG/DL (ref 70–139)

## 2019-03-02 ASSESSMENT — MIFFLIN-ST. JEOR: SCORE: 1719.27

## 2019-03-03 LAB
BNP SERPL-MCNC: 63 PG/ML (ref 0–62)
GLUCOSE BLDC GLUCOMTR-MCNC: 130 MG/DL (ref 70–139)
GLUCOSE BLDC GLUCOMTR-MCNC: 156 MG/DL (ref 70–139)
GLUCOSE BLDC GLUCOMTR-MCNC: 221 MG/DL (ref 70–139)
GLUCOSE BLDC GLUCOMTR-MCNC: 320 MG/DL (ref 70–139)

## 2019-03-04 LAB
ATRIAL RATE - MUSE: 202 BPM
DIASTOLIC BLOOD PRESSURE - MUSE: NORMAL MMHG
GLUCOSE BLDC GLUCOMTR-MCNC: 115 MG/DL (ref 70–139)
GLUCOSE BLDC GLUCOMTR-MCNC: 139 MG/DL (ref 70–139)
GLUCOSE BLDC GLUCOMTR-MCNC: 145 MG/DL (ref 70–139)
GLUCOSE BLDC GLUCOMTR-MCNC: 149 MG/DL (ref 70–139)
GLUCOSE BLDC GLUCOMTR-MCNC: 342 MG/DL (ref 70–139)
INTERPRETATION ECG - MUSE: NORMAL
P AXIS - MUSE: -82 DEGREES
PR INTERVAL - MUSE: NORMAL MS
QRS DURATION - MUSE: 112 MS
QT - MUSE: 416 MS
QTC - MUSE: 379 MS
R AXIS - MUSE: 51 DEGREES
SYSTOLIC BLOOD PRESSURE - MUSE: NORMAL MMHG
T AXIS - MUSE: 100 DEGREES
VENTRICULAR RATE- MUSE: 50 BPM

## 2019-03-04 ASSESSMENT — MIFFLIN-ST. JEOR: SCORE: 1755.55

## 2019-03-05 LAB
GLUCOSE BLDC GLUCOMTR-MCNC: 124 MG/DL (ref 70–139)
GLUCOSE BLDC GLUCOMTR-MCNC: 179 MG/DL (ref 70–139)
GLUCOSE BLDC GLUCOMTR-MCNC: 317 MG/DL (ref 70–139)
GLUCOSE BLDC GLUCOMTR-MCNC: 87 MG/DL (ref 70–139)
SPECIMEN STATUS: NORMAL

## 2019-03-06 LAB
ALBUMIN SERPL-MCNC: 2.6 G/DL (ref 3.5–5)
ALP SERPL-CCNC: 49 U/L (ref 45–120)
ALT SERPL W P-5'-P-CCNC: 123 U/L (ref 0–45)
ANION GAP SERPL CALCULATED.3IONS-SCNC: 9 MMOL/L (ref 5–18)
AST SERPL W P-5'-P-CCNC: 39 U/L (ref 0–40)
BILIRUB SERPL-MCNC: 0.4 MG/DL (ref 0–1)
BUN SERPL-MCNC: 31 MG/DL (ref 8–22)
CALCIUM SERPL-MCNC: 9.7 MG/DL (ref 8.5–10.5)
CHLORIDE BLD-SCNC: 96 MMOL/L (ref 98–107)
CO2 SERPL-SCNC: 36 MMOL/L (ref 22–31)
CREAT SERPL-MCNC: 0.95 MG/DL (ref 0.7–1.3)
GFR SERPL CREATININE-BSD FRML MDRD: >60 ML/MIN/1.73M2
GLUCOSE BLD-MCNC: 189 MG/DL (ref 70–125)
GLUCOSE BLDC GLUCOMTR-MCNC: 187 MG/DL (ref 70–139)
GLUCOSE BLDC GLUCOMTR-MCNC: 199 MG/DL (ref 70–139)
GLUCOSE BLDC GLUCOMTR-MCNC: 223 MG/DL (ref 70–139)
GLUCOSE BLDC GLUCOMTR-MCNC: 240 MG/DL (ref 70–139)
GLUCOSE BLDC GLUCOMTR-MCNC: 97 MG/DL (ref 70–139)
POTASSIUM BLD-SCNC: 4.3 MMOL/L (ref 3.5–5)
PROT SERPL-MCNC: 5.4 G/DL (ref 6–8)
SODIUM SERPL-SCNC: 141 MMOL/L (ref 136–145)

## 2019-03-07 LAB
ATRIAL RATE - MUSE: 177 BPM
BASOPHILS # BLD AUTO: 0 THOU/UL (ref 0–0.2)
BASOPHILS NFR BLD AUTO: 1 % (ref 0–2)
DIASTOLIC BLOOD PRESSURE - MUSE: NORMAL MMHG
EOSINOPHIL # BLD AUTO: 0.1 THOU/UL (ref 0–0.4)
EOSINOPHIL NFR BLD AUTO: 1 % (ref 0–6)
ERYTHROCYTE [DISTWIDTH] IN BLOOD BY AUTOMATED COUNT: 15.6 % (ref 11–14.5)
GLUCOSE BLDC GLUCOMTR-MCNC: 158 MG/DL (ref 70–139)
GLUCOSE BLDC GLUCOMTR-MCNC: 165 MG/DL (ref 70–139)
GLUCOSE BLDC GLUCOMTR-MCNC: 166 MG/DL (ref 70–139)
GLUCOSE BLDC GLUCOMTR-MCNC: 87 MG/DL (ref 70–139)
HCT VFR BLD AUTO: 41.2 % (ref 40–54)
HGB BLD-MCNC: 12.7 G/DL (ref 14–18)
INTERPRETATION ECG - MUSE: NORMAL
LYMPHOCYTES # BLD AUTO: 1.1 THOU/UL (ref 0.8–4.4)
LYMPHOCYTES NFR BLD AUTO: 13 % (ref 20–40)
MCH RBC QN AUTO: 30 PG (ref 27–34)
MCHC RBC AUTO-ENTMCNC: 30.8 G/DL (ref 32–36)
MCV RBC AUTO: 97 FL (ref 80–100)
MONOCYTES # BLD AUTO: 0.4 THOU/UL (ref 0–0.9)
MONOCYTES NFR BLD AUTO: 4 % (ref 2–10)
NEUTROPHILS # BLD AUTO: 6.4 THOU/UL (ref 2–7.7)
NEUTROPHILS NFR BLD AUTO: 81 % (ref 50–70)
P AXIS - MUSE: NORMAL DEGREES
PLATELET # BLD AUTO: 123 THOU/UL (ref 140–440)
PMV BLD AUTO: 9.7 FL (ref 8.5–12.5)
PR INTERVAL - MUSE: NORMAL MS
QRS DURATION - MUSE: 82 MS
QT - MUSE: 164 MS
QTC - MUSE: 281 MS
R AXIS - MUSE: 39 DEGREES
RBC # BLD AUTO: 4.24 MILL/UL (ref 4.4–6.2)
SYSTOLIC BLOOD PRESSURE - MUSE: NORMAL MMHG
T AXIS - MUSE: -69 DEGREES
VENTRICULAR RATE- MUSE: 177 BPM
WBC: 8.3 THOU/UL (ref 4–11)

## 2019-03-08 LAB
ALBUMIN SERPL-MCNC: 2.6 G/DL (ref 3.5–5)
ALP SERPL-CCNC: 49 U/L (ref 45–120)
ALT SERPL W P-5'-P-CCNC: 122 U/L (ref 0–45)
ANION GAP SERPL CALCULATED.3IONS-SCNC: 8 MMOL/L (ref 5–18)
AST SERPL W P-5'-P-CCNC: 47 U/L (ref 0–40)
BASE EXCESS BLDA CALC-SCNC: 14.4 MMOL/L
BILIRUB SERPL-MCNC: 0.3 MG/DL (ref 0–1)
BUN SERPL-MCNC: 34 MG/DL (ref 8–22)
CALCIUM SERPL-MCNC: 9.3 MG/DL (ref 8.5–10.5)
CHLORIDE BLD-SCNC: 96 MMOL/L (ref 98–107)
CO2 SERPL-SCNC: 35 MMOL/L (ref 22–31)
COHGB MFR BLD: 96.7 % (ref 95–96)
CREAT SERPL-MCNC: 1.04 MG/DL (ref 0.7–1.3)
FLOW: 3 LPM
GFR SERPL CREATININE-BSD FRML MDRD: >60 ML/MIN/1.73M2
GLUCOSE BLD-MCNC: 203 MG/DL (ref 70–125)
GLUCOSE BLDC GLUCOMTR-MCNC: 152 MG/DL (ref 70–139)
GLUCOSE BLDC GLUCOMTR-MCNC: 153 MG/DL (ref 70–139)
GLUCOSE BLDC GLUCOMTR-MCNC: 204 MG/DL (ref 70–139)
GLUCOSE BLDC GLUCOMTR-MCNC: 83 MG/DL (ref 70–139)
HCO3, ARTERIAL CALC - HISTORICAL: 36 MMOL/L (ref 23–29)
OXYHEMOGLOBIN - HISTORICAL: 93 % (ref 95–96)
PCO2 BLD: 72 MM HG (ref 35–45)
PH BLD: 7.39 [PH] (ref 7.37–7.44)
PO2 BLD: 75 MM HG (ref 75–85)
POTASSIUM BLD-SCNC: 4.4 MMOL/L (ref 3.5–5)
PROT SERPL-MCNC: 5.3 G/DL (ref 6–8)
SODIUM SERPL-SCNC: 139 MMOL/L (ref 136–145)
TEMPERATURE: 37 DEGREES C
VENTILATION MODE: ABNORMAL

## 2019-03-09 LAB
GLUCOSE BLDC GLUCOMTR-MCNC: 168 MG/DL (ref 70–139)
GLUCOSE BLDC GLUCOMTR-MCNC: 186 MG/DL (ref 70–139)
GLUCOSE BLDC GLUCOMTR-MCNC: 198 MG/DL (ref 70–139)
GLUCOSE BLDC GLUCOMTR-MCNC: 87 MG/DL (ref 70–139)

## 2019-03-09 ASSESSMENT — MIFFLIN-ST. JEOR: SCORE: 1755.55

## 2019-03-10 LAB
GLUCOSE BLDC GLUCOMTR-MCNC: 188 MG/DL (ref 70–139)
GLUCOSE BLDC GLUCOMTR-MCNC: 198 MG/DL (ref 70–139)
GLUCOSE BLDC GLUCOMTR-MCNC: 88 MG/DL (ref 70–139)
GLUCOSE BLDC GLUCOMTR-MCNC: 95 MG/DL (ref 70–139)

## 2019-03-11 LAB
BASE EXCESS BLDA CALC-SCNC: 12.7 MMOL/L
COHGB MFR BLD: 98.7 % (ref 95–96)
FLOW: 3 LPM
GLUCOSE BLDC GLUCOMTR-MCNC: 103 MG/DL (ref 70–139)
GLUCOSE BLDC GLUCOMTR-MCNC: 122 MG/DL (ref 70–139)
GLUCOSE BLDC GLUCOMTR-MCNC: 190 MG/DL (ref 70–139)
GLUCOSE BLDC GLUCOMTR-MCNC: 213 MG/DL (ref 70–139)
HCO3, ARTERIAL CALC - HISTORICAL: 34.7 MMOL/L (ref 23–29)
OXYHEMOGLOBIN - HISTORICAL: 94.5 % (ref 95–96)
PCO2 BLD: 68 MM HG (ref 35–45)
PH BLD: 7.39 [PH] (ref 7.37–7.44)
PO2 BLD: 83 MM HG (ref 75–85)
TEMPERATURE: 37 DEGREES C
VENTILATION MODE: ABNORMAL

## 2019-03-12 LAB
ALBUMIN SERPL-MCNC: 2.5 G/DL (ref 3.5–5)
ALP SERPL-CCNC: 43 U/L (ref 45–120)
ALT SERPL W P-5'-P-CCNC: 105 U/L (ref 0–45)
ANION GAP SERPL CALCULATED.3IONS-SCNC: 7 MMOL/L (ref 5–18)
AST SERPL W P-5'-P-CCNC: 34 U/L (ref 0–40)
BILIRUB SERPL-MCNC: 0.3 MG/DL (ref 0–1)
BUN SERPL-MCNC: 20 MG/DL (ref 8–22)
CALCIUM SERPL-MCNC: 9.3 MG/DL (ref 8.5–10.5)
CHLORIDE BLD-SCNC: 99 MMOL/L (ref 98–107)
CO2 SERPL-SCNC: 38 MMOL/L (ref 22–31)
CREAT SERPL-MCNC: 0.9 MG/DL (ref 0.7–1.3)
GFR SERPL CREATININE-BSD FRML MDRD: >60 ML/MIN/1.73M2
GLUCOSE BLD-MCNC: 127 MG/DL (ref 70–125)
GLUCOSE BLDC GLUCOMTR-MCNC: 103 MG/DL (ref 70–139)
GLUCOSE BLDC GLUCOMTR-MCNC: 169 MG/DL (ref 70–139)
GLUCOSE BLDC GLUCOMTR-MCNC: 192 MG/DL (ref 70–139)
GLUCOSE BLDC GLUCOMTR-MCNC: 242 MG/DL (ref 70–139)
POTASSIUM BLD-SCNC: 3.7 MMOL/L (ref 3.5–5)
PROT SERPL-MCNC: 5.2 G/DL (ref 6–8)
SODIUM SERPL-SCNC: 144 MMOL/L (ref 136–145)

## 2019-03-13 LAB
GLUCOSE BLDC GLUCOMTR-MCNC: 121 MG/DL (ref 70–139)
GLUCOSE BLDC GLUCOMTR-MCNC: 162 MG/DL (ref 70–139)

## 2019-03-22 ENCOUNTER — OFFICE VISIT (OUTPATIENT)
Dept: CARDIOLOGY | Facility: CLINIC | Age: 69
End: 2019-03-22
Payer: COMMERCIAL

## 2019-03-22 VITALS
HEIGHT: 71 IN | SYSTOLIC BLOOD PRESSURE: 110 MMHG | OXYGEN SATURATION: 92 % | HEART RATE: 47 BPM | DIASTOLIC BLOOD PRESSURE: 72 MMHG | BODY MASS INDEX: 26.6 KG/M2 | WEIGHT: 190 LBS

## 2019-03-22 DIAGNOSIS — J96.21 ACUTE ON CHRONIC RESPIRATORY FAILURE WITH HYPOXIA AND HYPERCAPNIA (H): ICD-10-CM

## 2019-03-22 DIAGNOSIS — J96.22 ACUTE ON CHRONIC RESPIRATORY FAILURE WITH HYPOXIA AND HYPERCAPNIA (H): ICD-10-CM

## 2019-03-22 DIAGNOSIS — I48.91 ATRIAL FIBRILLATION WITH RAPID VENTRICULAR RESPONSE (H): ICD-10-CM

## 2019-03-22 DIAGNOSIS — I07.9 TRICUSPID VALVE DISORDER: ICD-10-CM

## 2019-03-22 DIAGNOSIS — I50.42 CHRONIC COMBINED SYSTOLIC AND DIASTOLIC CONGESTIVE HEART FAILURE (H): ICD-10-CM

## 2019-03-22 DIAGNOSIS — I10 ESSENTIAL HYPERTENSION: ICD-10-CM

## 2019-03-22 DIAGNOSIS — I48.92 ATRIAL FLUTTER, UNSPECIFIED TYPE (H): Primary | ICD-10-CM

## 2019-03-22 LAB
ANION GAP SERPL CALCULATED.3IONS-SCNC: 6 MMOL/L (ref 3–14)
BUN SERPL-MCNC: 18 MG/DL (ref 7–30)
CALCIUM SERPL-MCNC: 9.7 MG/DL (ref 8.5–10.1)
CHLORIDE SERPL-SCNC: 103 MMOL/L (ref 94–109)
CO2 SERPL-SCNC: 32 MMOL/L (ref 20–32)
CREAT SERPL-MCNC: 1.19 MG/DL (ref 0.66–1.25)
GFR SERPL CREATININE-BSD FRML MDRD: 63 ML/MIN/{1.73_M2}
GLUCOSE SERPL-MCNC: 109 MG/DL (ref 70–99)
POTASSIUM SERPL-SCNC: 3.8 MMOL/L (ref 3.4–5.3)
SODIUM SERPL-SCNC: 141 MMOL/L (ref 133–144)

## 2019-03-22 PROCEDURE — 99214 OFFICE O/P EST MOD 30 MIN: CPT | Performed by: NURSE PRACTITIONER

## 2019-03-22 PROCEDURE — 80048 BASIC METABOLIC PNL TOTAL CA: CPT | Performed by: NURSE PRACTITIONER

## 2019-03-22 PROCEDURE — 36415 COLL VENOUS BLD VENIPUNCTURE: CPT | Performed by: NURSE PRACTITIONER

## 2019-03-22 RX ORDER — ACETAMINOPHEN 325 MG/1
325-650 TABLET ORAL EVERY 6 HOURS PRN
COMMUNITY

## 2019-03-22 ASSESSMENT — MIFFLIN-ST. JEOR: SCORE: 1658.96

## 2019-03-22 NOTE — PATIENT INSTRUCTIONS
Thanks for coming into [facility] clinic today.    Doing well on a cardiac standpoint  Continue on current medical therapy  BMP today after increase in diuretics  Follow up in 2 months with cardiologist.    Please call my nurse at [phone] with any questions or concerns.    Scheduling phone number: [phone]  Reminder: Please bring in all current medications, over the counter supplements and vitamin bottles to your next appointment.

## 2019-03-22 NOTE — PROGRESS NOTES
Cardiology Clinic Progress Note  Tone Cooper MRN# [id number]   YOB: 1950 Age: 68 year old     Reason For Visit:  Hospital discharge follow up   Primary Cardiologist:    Dr. Britt          History of Presenting Illness:    Tone Cooper is a pleasant 68 year old patient who carries a past medical history significant for severe COPD, on home oxygen at 3 L, atrial fibrillation, tricuspid repair, hypertension, and nonsustained VT.     Since December, he has had 3 hospitalizations for worsening shortness of breath, with the most recent being from 2/28/19 to 3/13/19.  He was found to have severe sepsis, community-acquired pneumonia of the right lung, and hypoxic respiratory failure.  He was treated with diuretics, nebulizers, BiPAP, prednisone and IV antibiotics as his blood cultures grew staph.  He also developed hemoptysis and Eliquis was stopped.  Unfortunately, chest CT showed a small pulmonary embolism in addition to chronic atrial fibrillation/flutter and his Eliquis was restarted. He completed a lengthy course of IV antibiotics outpatient and follows closely with pulmonology Dr. Espinoza. He was discharged home with physical and respiratory therapy. He remains on 3 L oxygen at home and night time Bipap. He comes to the office today for a hospital discharge follow up.      He is feeling well on a cardiac standpoint with the exception of chronic exertional shortness of breath. He denies chest pain, shortness of breath, PND, orthopnea, presyncope, syncope, edema, heart racing, or palpitations.  He states his bilateral lower extremity edema has completely resolved after increasing his 20 mg of Lasix from twice daily to 3 times daily.    He admits his breathing has been well controlled since discharge using 3 L of oxygen while at home, consistent BiPAP use, nebulizing 4 times a day, and prednisone use.  He is scheduled to follow-up with pulmonary on March 26th.    Last echocardiogram demonstrated dilated  IVC and fails to change with respiration, dilated RV, mildly decreased RV systolic function, EF 60-65%, no regional wall motion abnormalities.     Upon exam he is in normal sinus rhythm, well controlled on Rythmol, diltiazem, and Eliquis for stroke prevention.  Blood pressure is controlled at 110/72  Follow-up BMP today shows a sodium of 141, potassium 3.8, BUN 18, creatinine 1.19, GFR 63.  BMI 26.5, he is noted to be down approximately 20 lbs from discharge.    His activity consists of walking with a wheeled walker.  Admits his diet is not heart healthy.  He continues to eat out the majority of the time and does not follow a low-salt diet.  Remains compliant with all medications.                    Assessment and Plan:     1.  Atrial fibrillation-currently in normal sinus rhythm with first-degree AV block, managed well on Rythmol, diltiazem, and Eliquis for stroke prevention.    2.  Tricuspid regurgitation -last echocardiogram demonstrated a mean gradient 4 mmHg, peak 12 mmHg, mild tricuspid stenosis.     3.  Hypertension - well controlled   4. Severe COPD - follows with pulmonology, managed on prednisone, nebulizers, Bipap and home oxygen.            Thank you for allowing me to participate in this delightful patient's care. I have recommended follow up with cardiologist in 2 months.        VIRGILIO Lloyd, CNP          Review of Systems:   Review of Systems:  Skin:  Positive for bruising   Eyes:  Positive for glasses  ENT:  Positive for hearing loss  Respiratory:  Positive for shortness of breath;dyspnea on exertion;sleep apnea  Cardiovascular:    Positive for;edema;lightheadedness  Gastroenterology: Negative    Genitourinary:  Positive for urinary frequency  Musculoskeletal:  Positive for arthritis;back pain  Neurologic:  Positive for numbness or tingling of hands;numbness or tingling of feet  Psychiatric:  Negative    Heme/Lymph/Imm:  Positive for easy bruising  Endocrine:  Negative                 Physical Exam:     GEN:  In general, this is a well nourished male in no acute distress.  HEENT:  Pupils equal, round. Sclerae nonicteric. Clear oropharynx. Mucous membranes moist.  NECK: Supple, no masses appreciated. Trachea midline. No JVD   C/V:  Regular rate and rhythm, systolic murmur, rub or gallop. No S3 or RV heave.   RESP: Respirations are unlabored. No use of accessory muscles. Clear to auscultation bilaterally without wheezing, rales, or rhonchi.  GI: Abdomen soft, nontender, nondistended. No HSM appreciated.   EXTREM: No LE edema. No cyanosis or clubbing.  NEURO: Alert and oriented, cooperative. Gait stable with walker. No obvious focal deficits.   PSYCH: Normal affect.  SKIN: Warm and dry. No rashes or petechiae appreciated.          Past Medical History:     Past Medical History:   Diagnosis Date     Atrial flutter (H)      COPD (chronic obstructive pulmonary disease) (H)      Diverticulitis      Hypertension      Persistent atrial fibrillation (H) 4/28/09    ablation 7/1/2015     Premature beats      PVC (premature ventricular contraction)     s/p ablation 12/5/2017     Tricuspid valve disorder     Dr Aj, Hx of valve repair      Ventricular tachycardia (H)     nonsustained              Past Surgical History:     Past Surgical History:   Procedure Laterality Date     ABDOMEN SURGERY      hernia repair right     APPENDECTOMY       CARDIOVERSION  06/03/2009    successful for afib     CARDIOVERSION  4/20/15    successful for afib     CARDIOVERSION  5/28/15     H ABLATION ATRIAL FLUTTER       H ABLATION FOCAL AFIB  7/1/15    Left atrial linear ablation and pulmonary vein antrum ablation     H ABLATION PVC  12/5/16     INCISION AND DRAINAGE LOWER EXTREMITY, COMBINED Right 11/10/2016    Procedure: COMBINED INCISION AND DRAINAGE LOWER EXTREMITY;  Surgeon: Roger Pacheco MD;  Location: [facility] OR     LAPAROSCOPIC CHOLECYSTECTOMY  1/6/2012    Procedure:LAPAROSCOPIC CHOLECYSTECTOMY; LAPAROSCOPIC  CHOLECYSTECTOMY ; Surgeon:TALON DEVINE; Location:[facility] OR     ORTHOPEDIC SURGERY      Left hip replacement     REPAIR VALVE TRICUSPID  9/8/08    conversion to a bileaflet valve and application of a 30 mm Sanderson ring     SMALL INTESTINE SURGERY      had bowel obstruction age 9     VALVE REPLACEMENT      tricuspid              Allergies:   Amlodipine and Flecainide       Data:   All laboratory data reviewed:    LAST CHOLESTEROL:  Lab Results   Component Value Date    CHOL 178 01/17/2014     Lab Results   Component Value Date    HDL 46 01/17/2014     Lab Results   Component Value Date    LDL 89 01/20/2011     Lab Results   Component Value Date    TRIG 100 01/17/2014     Lab Results   Component Value Date    CHOLHDLRATIO 3.87 01/17/2014    CHOLHDLRATIO 3.6 01/20/2011       LAST BMP:  Lab Results   Component Value Date     03/22/2019      Lab Results   Component Value Date    POTASSIUM 3.8 03/22/2019     Lab Results   Component Value Date    CHLORIDE 103 03/22/2019     Lab Results   Component Value Date    WILBERT 9.7 03/22/2019     Lab Results   Component Value Date    CO2 32 03/22/2019     Lab Results   Component Value Date    BUN 18 03/22/2019     Lab Results   Component Value Date    CR 1.19 03/22/2019     Lab Results   Component Value Date     03/22/2019       LAST CBC:  Lab Results   Component Value Date    WBC 8.8 02/22/2019     Lab Results   Component Value Date    RBC 4.31 02/22/2019     Lab Results   Component Value Date    HGB 13.3 02/22/2019     Lab Results   Component Value Date    HCT 40.6 02/22/2019     Lab Results   Component Value Date    MCV 94 02/22/2019     Lab Results   Component Value Date    MCH 30.9 02/22/2019     Lab Results   Component Value Date    MCHC 32.8 02/22/2019     Lab Results   Component Value Date    RDW 14.4 02/22/2019     Lab Results   Component Value Date     02/22/2019

## 2019-03-22 NOTE — LETTER
3/22/2019    Ana Pratt MD  [facility] [address] [facility] [address]    RE: Tone Cooper       Dear Colleague,    I had the pleasure of seeing Tone Cooper in the [facility].    Cardiology Clinic Progress Note  Tone Cooper MRN# [id number]   YOB: 1950 Age: 68 year old     Reason For Visit:  Hospital discharge follow up   Primary Cardiologist:    Dr. Britt          History of Presenting Illness:    Tone Cooper is a pleasant 68 year old patient who carries a past medical history significant for severe COPD, on home oxygen at 3 L, atrial fibrillation, tricuspid repair, hypertension, and nonsustained VT.     Since December, he has had 3 hospitalizations for worsening shortness of breath, with the most recent being from 2/28/19 to 3/13/19.  He was found to have severe sepsis, community-acquired pneumonia of the right lung, and hypoxic respiratory failure.  He was treated with diuretics, nebulizers, BiPAP, prednisone and IV antibiotics as his blood cultures grew staph.  He also developed hemoptysis and Eliquis was stopped.  Unfortunately, chest CT showed a small pulmonary embolism in addition to chronic atrial fibrillation/flutter and his Eliquis was restarted. He completed a lengthy course of IV antibiotics outpatient and follows closely with pulmonology Dr. Espinoza. He was discharged home with physical and respiratory therapy. He remains on 3 L oxygen at home and night time Bipap. He comes to the office today for a hospital discharge follow up.      He is feeling well on a cardiac standpoint with the exception of chronic exertional shortness of breath. He denies chest pain, shortness of breath, PND, orthopnea, presyncope, syncope, edema, heart racing, or palpitations.  He states his bilateral lower extremity edema has completely resolved after increasing his 20 mg of Lasix from twice daily to 3 times daily.    He admits his breathing has  been well controlled since discharge using 3 L of oxygen while at home, consistent BiPAP use, nebulizing 4 times a day, and prednisone use.  He is scheduled to follow-up with pulmonary on March 26th.    Last echocardiogram demonstrated dilated IVC and fails to change with respiration, dilated RV, mildly decreased RV systolic function, EF 60-65%, no regional wall motion abnormalities.     Upon exam he is in normal sinus rhythm, well controlled on Rythmol, diltiazem, and Eliquis for stroke prevention.  Blood pressure is controlled at 110/72  Follow-up BMP today shows a sodium of 141, potassium 3.8, BUN 18, creatinine 1.19, GFR 63.  BMI 26.5, he is noted to be down approximately 20 lbs from discharge.    His activity consists of walking with a wheeled walker.  Admits his diet is not heart healthy.  He continues to eat out the majority of the time and does not follow a low-salt diet.  Remains compliant with all medications.                    Assessment and Plan:     1.  Atrial fibrillation-currently in normal sinus rhythm with first-degree AV block, managed well on Rythmol, diltiazem, and Eliquis for stroke prevention.    2.  Tricuspid regurgitation -last echocardiogram demonstrated a mean gradient 4 mmHg, peak 12 mmHg, mild tricuspid stenosis.     3.  Hypertension - well controlled   4. Severe COPD - follows with pulmonology, managed on prednisone, nebulizers, Bipap and home oxygen.            Thank you for allowing me to participate in this delightful patient's care. I have recommended follow up with cardiologist in 2 months.        Cheli Shea, VIRGILIO, CNP          Review of Systems:   Review of Systems:  Skin:  Positive for bruising   Eyes:  Positive for glasses  ENT:  Positive for hearing loss  Respiratory:  Positive for shortness of breath;dyspnea on exertion;sleep apnea  Cardiovascular:    Positive for;edema;lightheadedness  Gastroenterology: Negative    Genitourinary:  Positive for urinary  frequency  Musculoskeletal:  Positive for arthritis;back pain  Neurologic:  Positive for numbness or tingling of hands;numbness or tingling of feet  Psychiatric:  Negative    Heme/Lymph/Imm:  Positive for easy bruising  Endocrine:  Negative                Physical Exam:     GEN:  In general, this is a well nourished male in no acute distress.  HEENT:  Pupils equal, round. Sclerae nonicteric. Clear oropharynx. Mucous membranes moist.  NECK: Supple, no masses appreciated. Trachea midline. No JVD   C/V:  Regular rate and rhythm, systolic murmur, rub or gallop. No S3 or RV heave.   RESP: Respirations are unlabored. No use of accessory muscles. Clear to auscultation bilaterally without wheezing, rales, or rhonchi.  GI: Abdomen soft, nontender, nondistended. No HSM appreciated.   EXTREM: No LE edema. No cyanosis or clubbing.  NEURO: Alert and oriented, cooperative. Gait stable with walker. No obvious focal deficits.   PSYCH: Normal affect.  SKIN: Warm and dry. No rashes or petechiae appreciated.          Past Medical History:     Past Medical History:   Diagnosis Date     Atrial flutter (H)      COPD (chronic obstructive pulmonary disease) (H)      Diverticulitis      Hypertension      Persistent atrial fibrillation (H) 4/28/09    ablation 7/1/2015     Premature beats      PVC (premature ventricular contraction)     s/p ablation 12/5/2017     Tricuspid valve disorder     Dr Aj, Hx of valve repair      Ventricular tachycardia (H)     nonsustained              Past Surgical History:     Past Surgical History:   Procedure Laterality Date     ABDOMEN SURGERY      hernia repair right     APPENDECTOMY       CARDIOVERSION  06/03/2009    successful for afib     CARDIOVERSION  4/20/15    successful for afib     CARDIOVERSION  5/28/15     H ABLATION ATRIAL FLUTTER       H ABLATION FOCAL AFIB  7/1/15    Left atrial linear ablation and pulmonary vein antrum ablation     H ABLATION PVC  12/5/16     INCISION AND DRAINAGE LOWER  EXTREMITY, COMBINED Right 11/10/2016    Procedure: COMBINED INCISION AND DRAINAGE LOWER EXTREMITY;  Surgeon: Roger Pacheco MD;  Location: [facility] OR     LAPAROSCOPIC CHOLECYSTECTOMY  1/6/2012    Procedure:LAPAROSCOPIC CHOLECYSTECTOMY; LAPAROSCOPIC CHOLECYSTECTOMY ; Surgeon:ROGER PACHECO; Location:[facility] OR     ORTHOPEDIC SURGERY      Left hip replacement     REPAIR VALVE TRICUSPID  9/8/08    conversion to a bileaflet valve and application of a 30 mm Sanderson ring     SMALL INTESTINE SURGERY      had bowel obstruction age 9     VALVE REPLACEMENT      tricuspid              Allergies:   Amlodipine and Flecainide       Data:   All laboratory data reviewed:    LAST CHOLESTEROL:  Lab Results   Component Value Date    CHOL 178 01/17/2014     Lab Results   Component Value Date    HDL 46 01/17/2014     Lab Results   Component Value Date    LDL 89 01/20/2011     Lab Results   Component Value Date    TRIG 100 01/17/2014     Lab Results   Component Value Date    CHOLHDLRATIO 3.87 01/17/2014    CHOLHDLRATIO 3.6 01/20/2011       LAST BMP:  Lab Results   Component Value Date     03/22/2019      Lab Results   Component Value Date    POTASSIUM 3.8 03/22/2019     Lab Results   Component Value Date    CHLORIDE 103 03/22/2019     Lab Results   Component Value Date    WILBERT 9.7 03/22/2019     Lab Results   Component Value Date    CO2 32 03/22/2019     Lab Results   Component Value Date    BUN 18 03/22/2019     Lab Results   Component Value Date    CR 1.19 03/22/2019     Lab Results   Component Value Date     03/22/2019       LAST CBC:  Lab Results   Component Value Date    WBC 8.8 02/22/2019     Lab Results   Component Value Date    RBC 4.31 02/22/2019     Lab Results   Component Value Date    HGB 13.3 02/22/2019     Lab Results   Component Value Date    HCT 40.6 02/22/2019     Lab Results   Component Value Date    MCV 94 02/22/2019     Lab Results   Component Value Date    MCH 30.9 02/22/2019     Lab Results    Component Value Date    MCHC 32.8 02/22/2019     Lab Results   Component Value Date    RDW 14.4 02/22/2019     Lab Results   Component Value Date     02/22/2019     Thank you for allowing me to participate in the care of your patient.    Sincerely,     VIRGILIO Lloyd [facility]

## 2019-03-26 ENCOUNTER — TRANSFERRED RECORDS (OUTPATIENT)
Dept: HEALTH INFORMATION MANAGEMENT | Facility: CLINIC | Age: 69
End: 2019-03-26

## 2019-03-26 ENCOUNTER — DOCUMENTATION ONLY (OUTPATIENT)
Dept: CARDIOLOGY | Facility: CLINIC | Age: 69
End: 2019-03-26

## 2019-03-27 DIAGNOSIS — J44.9 COPD (CHRONIC OBSTRUCTIVE PULMONARY DISEASE) (H): Primary | ICD-10-CM

## 2019-03-28 ENCOUNTER — APPOINTMENT (OUTPATIENT)
Dept: GENERAL RADIOLOGY | Facility: CLINIC | Age: 69
End: 2019-03-28
Attending: PHYSICIAN ASSISTANT
Payer: COMMERCIAL

## 2019-03-28 ENCOUNTER — HOSPITAL ENCOUNTER (EMERGENCY)
Facility: CLINIC | Age: 69
Discharge: HOME | End: 2019-03-28
Attending: PHYSICIAN ASSISTANT | Admitting: PHYSICIAN ASSISTANT
Payer: COMMERCIAL

## 2019-03-28 VITALS
HEART RATE: 84 BPM | OXYGEN SATURATION: 91 % | SYSTOLIC BLOOD PRESSURE: 144 MMHG | DIASTOLIC BLOOD PRESSURE: 89 MMHG | RESPIRATION RATE: 21 BRPM | TEMPERATURE: 98.1 F

## 2019-03-28 DIAGNOSIS — J44.9 COPD (CHRONIC OBSTRUCTIVE PULMONARY DISEASE) (H): ICD-10-CM

## 2019-03-28 DIAGNOSIS — R04.2 HEMOPTYSIS: ICD-10-CM

## 2019-03-28 DIAGNOSIS — R05.9 COUGH: ICD-10-CM

## 2019-03-28 LAB
ANION GAP SERPL CALCULATED.3IONS-SCNC: 5 MMOL/L (ref 3–14)
BASOPHILS # BLD AUTO: 0.1 10E9/L (ref 0–0.2)
BASOPHILS NFR BLD AUTO: 0.8 %
BUN SERPL-MCNC: 22 MG/DL (ref 7–30)
CALCIUM SERPL-MCNC: 9.7 MG/DL (ref 8.5–10.1)
CHLORIDE SERPL-SCNC: 102 MMOL/L (ref 94–109)
CO2 SERPL-SCNC: 32 MMOL/L (ref 20–32)
CREAT SERPL-MCNC: 1.16 MG/DL (ref 0.66–1.25)
DIFFERENTIAL METHOD BLD: ABNORMAL
EOSINOPHIL # BLD AUTO: 0 10E9/L (ref 0–0.7)
EOSINOPHIL NFR BLD AUTO: 0.4 %
ERYTHROCYTE [DISTWIDTH] IN BLOOD BY AUTOMATED COUNT: 17.4 % (ref 10–15)
GFR SERPL CREATININE-BSD FRML MDRD: 65 ML/MIN/{1.73_M2}
GLUCOSE SERPL-MCNC: 124 MG/DL (ref 70–99)
HCT VFR BLD AUTO: 43.6 % (ref 40–53)
HGB BLD-MCNC: 14.6 G/DL (ref 13.3–17.7)
IMM GRANULOCYTES # BLD: 0.4 10E9/L (ref 0–0.4)
IMM GRANULOCYTES NFR BLD: 3.7 %
INTERPRETATION ECG - MUSE: NORMAL
LYMPHOCYTES # BLD AUTO: 1.2 10E9/L (ref 0.8–5.3)
LYMPHOCYTES NFR BLD AUTO: 12.4 %
MCH RBC QN AUTO: 31.1 PG (ref 26.5–33)
MCHC RBC AUTO-ENTMCNC: 33.5 G/DL (ref 31.5–36.5)
MCV RBC AUTO: 93 FL (ref 78–100)
MONOCYTES # BLD AUTO: 0.6 10E9/L (ref 0–1.3)
MONOCYTES NFR BLD AUTO: 6 %
NEUTROPHILS # BLD AUTO: 7.7 10E9/L (ref 1.6–8.3)
NEUTROPHILS NFR BLD AUTO: 76.7 %
NRBC # BLD AUTO: 0 10*3/UL
NRBC BLD AUTO-RTO: 0 /100
NT-PROBNP SERPL-MCNC: 282 PG/ML (ref 0–900)
PLATELET # BLD AUTO: 253 10E9/L (ref 150–450)
POTASSIUM SERPL-SCNC: 3.9 MMOL/L (ref 3.4–5.3)
RBC # BLD AUTO: 4.7 10E12/L (ref 4.4–5.9)
SODIUM SERPL-SCNC: 139 MMOL/L (ref 133–144)
TROPONIN I SERPL-MCNC: <0.015 UG/L (ref 0–0.04)
WBC # BLD AUTO: 10 10E9/L (ref 4–11)

## 2019-03-28 PROCEDURE — 93005 ELECTROCARDIOGRAM TRACING: CPT

## 2019-03-28 PROCEDURE — 84484 ASSAY OF TROPONIN QUANT: CPT | Performed by: PHYSICIAN ASSISTANT

## 2019-03-28 PROCEDURE — 99285 EMERGENCY DEPT VISIT HI MDM: CPT | Mod: 25

## 2019-03-28 PROCEDURE — 80048 BASIC METABOLIC PNL TOTAL CA: CPT | Performed by: PHYSICIAN ASSISTANT

## 2019-03-28 PROCEDURE — 83880 ASSAY OF NATRIURETIC PEPTIDE: CPT | Performed by: PHYSICIAN ASSISTANT

## 2019-03-28 PROCEDURE — 85025 COMPLETE CBC W/AUTO DIFF WBC: CPT | Performed by: PHYSICIAN ASSISTANT

## 2019-03-28 PROCEDURE — 71046 X-RAY EXAM CHEST 2 VIEWS: CPT

## 2019-03-28 RX ORDER — LEVOFLOXACIN 750 MG/1
750 TABLET, FILM COATED ORAL DAILY
Qty: 5 TABLET | Refills: 0 | Status: SHIPPED | OUTPATIENT
Start: 2019-03-28 | End: 2019-04-24

## 2019-03-28 ASSESSMENT — ENCOUNTER SYMPTOMS
VOMITING: 0
COUGH: 1
NAUSEA: 0
SHORTNESS OF BREATH: 0
FEVER: 0

## 2019-03-28 NOTE — ED TRIAGE NOTES
Pt has history of many lung issues including COPD, pneumonia, PE.  Pt reports yesterday he coughed up bright red blood, today coughed up brownish phlegm.  States last time this occurred he had pneumonia and was started on antibiotic which cleared it up.

## 2019-03-28 NOTE — ED AVS SNAPSHOT
[facility] Emergency Department  [address]  [facility] [address]  Phone:  [phone]  Fax:  [phone]                                    Tone Cooper   MRN: [id number]    Department:  [facility] Emergency Department   Date of Visit:  3/28/2019           After Visit Summary Signature Page    I have received my discharge instructions, and my questions have been answered. I have discussed any challenges I see with this plan with the nurse or doctor.    ..........................................................................................................................................  Patient/Patient Representative Signature      ..........................................................................................................................................  Patient Representative Print Name and Relationship to Patient    ..................................................               ................................................  Date                                   Time    ..........................................................................................................................................  Reviewed by Signature/Title    ...................................................              ..............................................  Date                                               Time          22EPIC Rev 08/18

## 2019-03-28 NOTE — ED PROVIDER NOTES
History     Chief Complaint:  Hemoptysis    The history is provided by the patient.      Tone Cooper is an anticoagulated 68 year old male on Eliquis with a history of atrial fibrillation, atrial flutter, PVC's, tricuspid valve disorder with replacement, cardiomyopathy, recent PE on 2/11/2019 and COPD who presents to the emergency department for evaluation of hemoptysis. To note, the patient states he has been hospitalized 7 times in the past 6 months for a similar presentation as today with coughing up blood. This episodes of hemoptysis started yesterday. He states he coughed up about a teaspoon of bright red blood yesterday. He then had another episode this morning, though notes the blood clot this time was darker in color, more brown blood than bright red blood. He called his primary care provider about this and she prompted the patient to seek evaluation here in the emergency department for a chest xray. He has been without chest pain, nausea, vomiting, or recent fevers. He has a history of PE diagnosed 8 weeks and this has cleared up since then. He has been dealing with leg swelling in the past two week, for which he was started on Lasix with significant improvement in the swelling, which he is not concerned about now. He notes he has taken antibiotics recently, finishing his last course in the past week. With his history of COPD, he states he has been told mixed things by his PCP and pulmonologist regarding his usage of home oxygen, sleeping with a CPAP machine, and using different medications. He denies significant shortness of breath here on room, with his O2 sats at 94% at the time. He denies wanting a neb now.     Allergies:  Amlodipine  Flecainide     Medications:    Albuterol  Eliquis   Pulmicort  Klonopin  Cardizem  Pepcid  Lasix  Duo Neb  Levalbuterol   Trazodone  Flomax  Propafenone  Prednisone  Lidocaine 4% patch  Mucinex  Insulin glargine    Past Medical History:    PE,  2/11/2019  Pneumonia  Chronic respiratory failure  Thrombocytopenia  Dyspnea  Diverticulitis  Cardiomyopathy  Atrial fibrillation  COPD  HTN  Tricuspid valve disorder  Atrial Flutter  Nocturnal hypoxia   Ventricular tachycardia  PVC  BPH  Hepatitis C    Past Surgical History:    Hernia repair  Appendectomy  Atrial fibrillation cardioversion  Atrial flutter ablation  Tricuspid valve replacement  Small intestine surgery  PVC ablation  Atrial fibrillation ablation  Right lower extremity D &  Left hip replacement  IMO unlisted procedure eyelids    Family History:    Prostate cancer  Emphysema  Colon Cancer  HTN  Cerebrovascular disease    Social History:  Former smoker: quit date 1/2/2008  Positive for alcohol use.  Negative for drug use.  Marital Status:        Review of Systems   Constitutional: Negative for fever.   Respiratory: Positive for cough. Negative for shortness of breath.         Positive for hemoptysis     Cardiovascular: Negative for chest pain and leg swelling.   Gastrointestinal: Negative for nausea and vomiting.   All other systems reviewed and are negative.      Physical Exam     Patient Vitals for the past 24 hrs:   BP Temp Temp src Heart Rate Resp SpO2   03/28/19 1131 (!) 135/95 98.1  F (36.7  C) Oral 78 18 94 %     Physical Exam  Constitutional: well appearing, no acute distress.  Head: No external signs of trauma noted to head or face.   Eyes: Pupils are equal, round, and reactive to light. Conjunctiva normal.   ENT: MMM. Oropharynx clear and moist. No epistaxis. Normal voice.   Neck: normal ROM.   Cardiovascular: Normal rate, regular rhythm, and intact distal pulses.    Respiratory: Effort normal. No respiratory distress. Lungs clear to auscultation bilaterally. No wheezing, rales, or rhonchi.  GI: Soft. There is no tenderness. There is no rebound.   Musculoskeletal: No deformities appreciated. Normal ROM. No edema noted.  Neurological: Alert and Oriented x 3. Speech normal. Moves all  extremities equally.  Psychiatric: Appropriate mood, affect, and behavior.   Skin: Skin is warm and dry.         Emergency Department Course   ECG:  Indication: Hemoptysis  Time: 1222  Vent. Rate 87 bpm. KS interval *. QRS duration 102. QT/QTc 364/438. P-R-T axis * 14 69. Atrial fibrillation. Inferior infarct, age undetermined. Abnormal ECG. Agrees with computer interpretation. Change noted from prior ECG dated 2019 of: Atrial fibrillation presents today. Read time: 1232.    Imaging:  Radiographic findings were communicated with the patient who voiced understanding of the findings.    XR Chest 2 views:   PA and lateral views of the chest. Lungs are clear. Heart  is normal in size. No effusions are evident. No pneumothorax.  Sternotomy wires are again noted from prior tricuspid valve repair. As per radiology.     Laboratory:  1223 Troponin: <0.015  BNP: 282  CBC: WBC: 10.0, HGB: 14.6, PLT: 253  BMP: Glucose 124 (H), o/w WNL (Creatinine: 1.16)    Emergency Department Course:  1200 Nursing notes and vitals reviewed.  I performed an exam of the patient as documented above.     Blood drawn. This was sent to the lab for further testing, results above.    EKG obtained in the ED, see results above.     The patient was sent for a chest xray while in the emergency department, findings above.     1345 I rechecked the patient and discussed the results of his workup thus far.     Findings and plan explained to the Patient. Patient discharged home with instructions regarding supportive care, medications, and reasons to return. The importance of close follow-up was reviewed. The patient was prescribed Levofloxacin.     I personally reviewed the laboratory results with the Patient and answered all related questions prior to discharge.  Impression & Plan    Medical Decision Makin year old male presenting with cough and hemoptysis. Differential diagnosis includes pneumonia, COPD, PE, among others. He is hemodynamically  stable on arrival. No hypoxia. He is not having any evidence of massive hemoptysis. His hemoglobin is stable. The remainder of his labs are unremarkable. EKG showed atrial fibrillation, which is chronic and he is appropriately anticoagulated. Troponin is negative. He does have a history of PE, but again, he is appropriately anticoagulated with Eliquis and with no significant hypoxia or SOB I do not think he has a PE at this time. CXR does not reveal any evidence of pneumonia or other pathology. He has not had any further hemoptysis here and again it seems like there was only a very small amount of blood. He is not hypoxic. I suspect the hemoptysis is secondary to his COPD. I will treat him for COPD exacerbation with antibiotics. I think he is appropriate for outpatient management as he is hemodynamically stable and is not hypoxic. He needs close follow-up with his PCP and pulmonologist. He was instructed to return to the ED immediately for any worsening hemoptysis, SOB, fever, or other concerning symptoms.     Critical Care time:  none    Diagnosis:    ICD-10-CM    1. Cough R05    2. Hemoptysis R04.2    3. COPD (chronic obstructive pulmonary disease) (H) J44.9      Disposition:  discharged to home  Discharge Medications:     Medication List      Started    levofloxacin 750 MG tablet  Commonly known as:  LEVAQUIN  750 mg, Oral, DAILY          Scribe Disclosure:  I, Brunilda Warner, am serving as a scribe on 3/28/2019 at 12:07 PM to personally document services performed by Mora Melgoza PA-C based on my observations and the provider's statements to me.     Brunilda Warner  3/28/2019   [facility] EMERGENCY DEPARTMENT       Mora Melgoza PA-C  03/28/19 1922

## 2019-03-29 NOTE — TELEPHONE ENCOUNTER
RECORDS RECEIVED FROM: Internal - Patient seen previously at [facility]   DATE RECEIVED: 4/2/19   NOTES STATUS DETAILS   OFFICE NOTE from referring provider Internal 2/16/19   OFFICE NOTE from other specialist Received [facility]:  3/26/19   DISCHARGE SUMMARY from hospital Internal [facility]:  2/28/19    [facility]:  2/11/19-2/14/19 11/23/18-11/26/18   DISCHARGE REPORT from the ER Internal [facility]:  3/28/19  2/16/19   OPERATIVE REPORT N/A    MEDICATION LIST Internal    IMAGING  (NEED IMAGES AND REPORTS)     CT SCAN Internal 2/11/19 12/17/18   CHEST XRAY (CXR) Internal 3/28/19  3/26/19  2/16/19  2/11/19  12/17/18  11/23/18  10/4/18   TESTS     PULMONARY FUNCTION TESTING (PFT) In process 4/2/19 *scheduled*   FLOW LOOP VOLUME (FVL) In process

## 2019-04-01 ENCOUNTER — DOCUMENTATION ONLY (OUTPATIENT)
Dept: CARE COORDINATION | Facility: CLINIC | Age: 69
End: 2019-04-01

## 2019-04-02 ENCOUNTER — OFFICE VISIT (OUTPATIENT)
Dept: PULMONOLOGY | Facility: CLINIC | Age: 69
End: 2019-04-02
Attending: INTERNAL MEDICINE
Payer: COMMERCIAL

## 2019-04-02 ENCOUNTER — PRE VISIT (OUTPATIENT)
Dept: PULMONOLOGY | Facility: CLINIC | Age: 69
End: 2019-04-02

## 2019-04-02 VITALS
HEART RATE: 85 BPM | HEIGHT: 71 IN | DIASTOLIC BLOOD PRESSURE: 78 MMHG | BODY MASS INDEX: 26.74 KG/M2 | OXYGEN SATURATION: 92 % | SYSTOLIC BLOOD PRESSURE: 135 MMHG | WEIGHT: 191 LBS

## 2019-04-02 DIAGNOSIS — J44.9 CHRONIC OBSTRUCTIVE PULMONARY DISEASE, UNSPECIFIED COPD TYPE (H): Primary | ICD-10-CM

## 2019-04-02 DIAGNOSIS — J44.9 COPD (CHRONIC OBSTRUCTIVE PULMONARY DISEASE) (H): ICD-10-CM

## 2019-04-02 PROCEDURE — G0463 HOSPITAL OUTPT CLINIC VISIT: HCPCS

## 2019-04-02 ASSESSMENT — MIFFLIN-ST. JEOR: SCORE: 1663.5

## 2019-04-02 ASSESSMENT — PAIN SCALES - GENERAL: PAINLEVEL: EXTREME PAIN (9)

## 2019-04-02 NOTE — LETTER
"4/2/2019       RE: Tone Cooper  [address] [address] [id number]     Dear Colleague,    Thank you for referring your patient, Tone Cooper, to the [facility] at [facility]. Please see a copy of my visit note below.    [facility] Pulmonology Follow Up Visit    Reason for Visit  Tone Cooper is a 68 year old male who is seen in follow up for COPD, emphysema and hypercapnea  Pulmonary HPI  Initially seen by me February 2019 while hospitalized at [facility] with acute respiratory failure. He has seen Dr Espinoza, his PCP has referred to her because she in with Ish. He has not tried pulmonary rehab. Despite [place] stay, feels like his legs are weak    Bipap at home, he had a room air overnight oximetry last night. The Bipap is provided by [facility] and was recently obtained from hospitalization.  Hospitalized at [facility] about half of February, discharged to [facility] on 2/28 for a week.   Last week went to the ED for hemoptysis. Prescribed levofloxacin and still finishing.     He is experiencing lightheadedness when standing.     Duoneb q4h  Levalbuterol (\"normal neb\")  Budesonide 2 or 3 times daily    Oxygen prescribed 24 hours a day but he doesn't have concentrator.    The patient was seen and examined by Octavia Acevedo MD   Current Outpatient Medications   Medication     acetaminophen (TYLENOL) 325 MG tablet     albuterol (VENTOLIN HFA) 108 (90 Base) MCG/ACT inhaler     apixaban ANTICOAGULANT (ELIQUIS) 5 MG tablet     azithromycin (ZITHROMAX) 250 MG tablet     budesonide (PULMICORT) 0.5 MG/2ML neb solution     clonazePAM (KLONOPIN) 1 MG tablet     diltiazem ER COATED BEADS (CARDIZEM CD/CARTIA XT) 180 MG 24 hr capsule     famotidine (PEPCID) 20 MG tablet     furosemide (LASIX) 20 MG tablet     ipratropium - albuterol 0.5 mg/2.5 mg/3 mL (DUONEB) 0.5-2.5 (3) MG/3ML neb solution     levalbuterol " (XOPENEX) 1.25 MG/3ML neb solution     levofloxacin (LEVAQUIN) 750 MG tablet     Lidocaine (LIDOCARE) 4 % Patch     order for DME     order for DME     predniSONE (DELTASONE) 10 MG tablet     propafenone (RYTHMOL) 225 MG tablet     tamsulosin (FLOMAX) 0.4 MG capsule     traZODone (DESYREL) 50 MG tablet     No current facility-administered medications for this visit.      Allergies   Allergen Reactions     Amlodipine Swelling     Flecainide Palpitations     Social History     Socioeconomic History     Marital status:      Spouse name: Not on file     Number of children: Not on file     Years of education: Not on file     Highest education level: Not on file   Occupational History     Not on file   Social Needs     Financial resource strain: Not on file     Food insecurity:     Worry: Not on file     Inability: Not on file     Transportation needs:     Medical: Not on file     Non-medical: Not on file   Tobacco Use     Smoking status: Former Smoker     Packs/day: 1.50     Years: 41.00     Pack years: 61.50     Start date: 1977     Last attempt to quit: 2008     Years since quittin.2     Smokeless tobacco: Never Used   Substance and Sexual Activity     Alcohol use: Yes     Alcohol/week: 0.0 oz     Comment: 3-6 per month     Drug use: No     Sexual activity: No     Partners: Female     Birth control/protection: None   Lifestyle     Physical activity:     Days per week: Not on file     Minutes per session: Not on file     Stress: Not on file   Relationships     Social connections:     Talks on phone: Not on file     Gets together: Not on file     Attends Mosque service: Not on file     Active member of club or organization: Not on file     Attends meetings of clubs or organizations: Not on file     Relationship status: Not on file     Intimate partner violence:     Fear of current or ex partner: Not on file     Emotionally abused: Not on file     Physically abused: Not on file     Forced sexual  activity: Not on file   Other Topics Concern      Service Not Asked     Blood Transfusions Not Asked     Caffeine Concern No     Comment: 1 a day      Occupational Exposure Not Asked     Hobby Hazards Not Asked     Sleep Concern Yes     Comment: nocturia     Stress Concern Not Asked     Weight Concern No     Special Diet No     Back Care Not Asked     Exercise No     Bike Helmet Yes     Seat Belt Yes     Self-Exams Not Asked     Parent/sibling w/ CABG, MI or angioplasty before 65F 55M? No   Social History Narrative    Works in [employer] ([employer] work)    Is an  for the [employer] in [place], in between [employer] and entertain     Past Medical History:   Diagnosis Date     Atrial flutter (H)      COPD (chronic obstructive pulmonary disease) (H)      Diverticulitis      Hypertension      Persistent atrial fibrillation (H) 4/28/09    ablation 7/1/2015     Premature beats      PVC (premature ventricular contraction)     s/p ablation 12/5/2017     Tricuspid valve disorder     Dr Aj, Hx of valve repair      Ventricular tachycardia (H)     nonsustained     Past Surgical History:   Procedure Laterality Date     ABDOMEN SURGERY      hernia repair right     APPENDECTOMY       CARDIOVERSION  06/03/2009    successful for afib     CARDIOVERSION  4/20/15    successful for afib     CARDIOVERSION  5/28/15     H ABLATION ATRIAL FLUTTER       H ABLATION FOCAL AFIB  7/1/15    Left atrial linear ablation and pulmonary vein antrum ablation     H ABLATION PVC  12/5/16     INCISION AND DRAINAGE LOWER EXTREMITY, COMBINED Right 11/10/2016    Procedure: COMBINED INCISION AND DRAINAGE LOWER EXTREMITY;  Surgeon: Roger Pacheco MD;  Location: [facility] OR     LAPAROSCOPIC CHOLECYSTECTOMY  1/6/2012    Procedure:LAPAROSCOPIC CHOLECYSTECTOMY; LAPAROSCOPIC CHOLECYSTECTOMY ; Surgeon:ROGER PACHECO; Location:[facility] OR     ORTHOPEDIC SURGERY      Left hip replacement     REPAIR VALVE TRICUSPID  9/8/08    conversion to  "a bileaflet valve and application of a 30 mm Sanderson ring     SMALL INTESTINE SURGERY      had bowel obstruction age 9     VALVE REPLACEMENT      tricuspid     Family History   Problem Relation Age of Onset     Prostate Cancer Father      Family History Negative Mother      Emphysema Mother      Hypertension Mother      Cerebrovascular Disease Mother      ROS Pulmonary  Dyspnea: Yes, Cough: No, Chest pain: No, Wheezing: No, Sputum Production: No, Hemoptysis: No  A complete ROS was otherwise negative except as noted in the HPI.  /78   Pulse 85   Ht 1.803 m (5' 11\")   Wt 86.6 kg (191 lb)   SpO2 92%   BMI 26.64 kg/m     Exam:   GENERAL APPEARANCE: Well developed, well nourished, alert, and in no apparent distress.  EYES: PERRL, EOMI  HENT: Nasal mucosa with no edema and no hyperemia. No nasal polyps.  EARS: Canals clear, TMs normal  MOUTH: Oral mucosa is moist, without any lesions, no tonsillar enlargement, no oropharyngeal exudate.  NECK: supple, no masses, no thyromegaly.  LYMPHATICS: No significant axillary, cervical, or supraclavicular nodes.  RESP: normal percussion, reduced air flow throughout.  No crackles. No rhonchi. No wheezes.  CV: Normal S1, S2, regular rhythm, normal rate. No murmur.  No rub. No gallop. No LE edema.   ABDOMEN:  Bowel sounds normal, soft, nontender, no HSM or masses.   MS: extremities normal. No clubbing. No cyanosis.  SKIN: no rash on limited exam  NEURO: Mentation intact, speech normal, normal strength and tone, normal gait and stance  PSYCH: mentation appears normal. and affect normal/bright  Results:  PFTs today very severe obstruction, airtrapping, severe diffusion defect. Compared to 2017, there has been a significant reduction in diffusion.  Chest imaging reviewed, Feb 2019 improved right basilar consolidation    Assessment and plan: Tone is a 67 yo male with severe COPD, chronic respiratory failure  COPD - very severe, continue current regimen of nebulizers. Quit " smoking. Planning to transfer care to me.   Budesonide BID; Duoneb Q4h, levalbuterol as needed   Received influenza and PCV 13, looks like he needs PCV 23.    We briefly discussed lung transplant today, he is interested in hearing more.   Pulmonary rehab referral  Lung cancer screening eligible - later this year, last CT Feb 2019  RTC 3 months            Again, thank you for allowing me to participate in the care of your patient.      Sincerely,    Octavia Acevedo MD

## 2019-04-02 NOTE — PATIENT INSTRUCTIONS
We will look for the overnight oxygen results.     I want you to do pulmonary rehab and they will monitor your walking oxygen and we may need to prescribe oxygen with exertion/walking.     I would check with cardiologist about the lightheadedness, maybe your medications need to be adjusted.

## 2019-04-02 NOTE — NURSING NOTE
Chief Complaint   Patient presents with     New Patient     hosp fu     Vitals were taken and medications were reconciled.     GRACIELA Montes

## 2019-04-02 NOTE — PROGRESS NOTES
"[facility] Pulmonology Follow Up Visit    Reason for Visit  Tone Cooper is a 68 year old male who is seen in follow up for COPD, emphysema and hypercapnea  Pulmonary HPI  Initially seen by me February 2019 while hospitalized at [facility] with acute respiratory failure. He has seen Dr Espinoza, his PCP has referred to her because she in with Ish. He has not tried pulmonary rehab. Despite [place] stay, feels like his legs are weak    Bipap at home, he had a room air overnight oximetry last night. The Bipap is provided by [facility] and was recently obtained from hospitalization.  Hospitalized at [facility] about half of February, discharged to [place] on 2/28 for a week.   Last week went to the ED for hemoptysis. Prescribed levofloxacin and still finishing.     He is experiencing lightheadedness when standing.     Duoneb q4h  Levalbuterol (\"normal neb\")  Budesonide 2 or 3 times daily    Oxygen prescribed 24 hours a day but he doesn't have concentrator.    The patient was seen and examined by Octavia Acevedo MD   Current Outpatient Medications   Medication     acetaminophen (TYLENOL) 325 MG tablet     albuterol (VENTOLIN HFA) 108 (90 Base) MCG/ACT inhaler     apixaban ANTICOAGULANT (ELIQUIS) 5 MG tablet     azithromycin (ZITHROMAX) 250 MG tablet     budesonide (PULMICORT) 0.5 MG/2ML neb solution     clonazePAM (KLONOPIN) 1 MG tablet     diltiazem ER COATED BEADS (CARDIZEM CD/CARTIA XT) 180 MG 24 hr capsule     famotidine (PEPCID) 20 MG tablet     furosemide (LASIX) 20 MG tablet     ipratropium - albuterol 0.5 mg/2.5 mg/3 mL (DUONEB) 0.5-2.5 (3) MG/3ML neb solution     levalbuterol (XOPENEX) 1.25 MG/3ML neb solution     levofloxacin (LEVAQUIN) 750 MG tablet     Lidocaine (LIDOCARE) 4 % Patch     order for DME     order for DME     predniSONE (DELTASONE) 10 MG tablet     propafenone (RYTHMOL) 225 MG tablet     tamsulosin (FLOMAX) 0.4 MG capsule     traZODone (DESYREL) 50 MG tablet     No " current facility-administered medications for this visit.      Allergies   Allergen Reactions     Amlodipine Swelling     Flecainide Palpitations     Social History     Socioeconomic History     Marital status:      Spouse name: Not on file     Number of children: Not on file     Years of education: Not on file     Highest education level: Not on file   Occupational History     Not on file   Social Needs     Financial resource strain: Not on file     Food insecurity:     Worry: Not on file     Inability: Not on file     Transportation needs:     Medical: Not on file     Non-medical: Not on file   Tobacco Use     Smoking status: Former Smoker     Packs/day: 1.50     Years: 41.00     Pack years: 61.50     Start date: 1977     Last attempt to quit: 2008     Years since quittin.2     Smokeless tobacco: Never Used   Substance and Sexual Activity     Alcohol use: Yes     Alcohol/week: 0.0 oz     Comment: 3-6 per month     Drug use: No     Sexual activity: No     Partners: Female     Birth control/protection: None   Lifestyle     Physical activity:     Days per week: Not on file     Minutes per session: Not on file     Stress: Not on file   Relationships     Social connections:     Talks on phone: Not on file     Gets together: Not on file     Attends Amish service: Not on file     Active member of club or organization: Not on file     Attends meetings of clubs or organizations: Not on file     Relationship status: Not on file     Intimate partner violence:     Fear of current or ex partner: Not on file     Emotionally abused: Not on file     Physically abused: Not on file     Forced sexual activity: Not on file   Other Topics Concern      Service Not Asked     Blood Transfusions Not Asked     Caffeine Concern No     Comment: 1 a day      Occupational Exposure Not Asked     Hobby Hazards Not Asked     Sleep Concern Yes     Comment: nocturia     Stress Concern Not Asked     Weight Concern No      Special Diet No     Back Care Not Asked     Exercise No     Bike Helmet Yes     Seat Belt Yes     Self-Exams Not Asked     Parent/sibling w/ CABG, MI or angioplasty before 65F 55M? No   Social History Narrative    Works in [employer] ([employer] work)    Is an  for [employer] in [place], in between sets and entertain     Past Medical History:   Diagnosis Date     Atrial flutter (H)      COPD (chronic obstructive pulmonary disease) (H)      Diverticulitis      Hypertension      Persistent atrial fibrillation (H) 4/28/09    ablation 7/1/2015     Premature beats      PVC (premature ventricular contraction)     s/p ablation 12/5/2017     Tricuspid valve disorder     Dr Aj, Hx of valve repair      Ventricular tachycardia (H)     nonsustained     Past Surgical History:   Procedure Laterality Date     ABDOMEN SURGERY      hernia repair right     APPENDECTOMY       CARDIOVERSION  06/03/2009    successful for afib     CARDIOVERSION  4/20/15    successful for afib     CARDIOVERSION  5/28/15     H ABLATION ATRIAL FLUTTER       H ABLATION FOCAL AFIB  7/1/15    Left atrial linear ablation and pulmonary vein antrum ablation     H ABLATION PVC  12/5/16     INCISION AND DRAINAGE LOWER EXTREMITY, COMBINED Right 11/10/2016    Procedure: COMBINED INCISION AND DRAINAGE LOWER EXTREMITY;  Surgeon: Roger Pacheco MD;  Location: [facility] OR     LAPAROSCOPIC CHOLECYSTECTOMY  1/6/2012    Procedure:LAPAROSCOPIC CHOLECYSTECTOMY; LAPAROSCOPIC CHOLECYSTECTOMY ; Surgeon:ROGER PACHECO; Location:[facility] OR     ORTHOPEDIC SURGERY      Left hip replacement     REPAIR VALVE TRICUSPID  9/8/08    conversion to a bileaflet valve and application of a 30 mm Sanderson ring     SMALL INTESTINE SURGERY      had bowel obstruction age 9     VALVE REPLACEMENT      tricuspid     Family History   Problem Relation Age of Onset     Prostate Cancer Father      Family History Negative Mother      Emphysema Mother      Hypertension  "Mother      Cerebrovascular Disease Mother      ROS Pulmonary  Dyspnea: Yes, Cough: No, Chest pain: No, Wheezing: No, Sputum Production: No, Hemoptysis: No  A complete ROS was otherwise negative except as noted in the HPI.  /78   Pulse 85   Ht 1.803 m (5' 11\")   Wt 86.6 kg (191 lb)   SpO2 92%   BMI 26.64 kg/m    Exam:   GENERAL APPEARANCE: Well developed, well nourished, alert, and in no apparent distress.  EYES: PERRL, EOMI  HENT: Nasal mucosa with no edema and no hyperemia. No nasal polyps.  EARS: Canals clear, TMs normal  MOUTH: Oral mucosa is moist, without any lesions, no tonsillar enlargement, no oropharyngeal exudate.  NECK: supple, no masses, no thyromegaly.  LYMPHATICS: No significant axillary, cervical, or supraclavicular nodes.  RESP: normal percussion, reduced air flow throughout.  No crackles. No rhonchi. No wheezes.  CV: Normal S1, S2, regular rhythm, normal rate. No murmur.  No rub. No gallop. No LE edema.   ABDOMEN:  Bowel sounds normal, soft, nontender, no HSM or masses.   MS: extremities normal. No clubbing. No cyanosis.  SKIN: no rash on limited exam  NEURO: Mentation intact, speech normal, normal strength and tone, normal gait and stance  PSYCH: mentation appears normal. and affect normal/bright  Results:  PFTs today very severe obstruction, airtrapping, severe diffusion defect. Compared to 2017, there has been a significant reduction in diffusion.  Chest imaging reviewed, Feb 2019 improved right basilar consolidation    Assessment and plan: Tone is a 67 yo male with severe COPD, chronic respiratory failure  COPD - very severe, continue current regimen of nebulizers. Quit smoking. Planning to transfer care to me.   Budesonide BID; Duoneb Q4h, levalbuterol as needed   Received influenza and PCV 13, looks like he needs PCV 23.    We briefly discussed lung transplant today, he is interested in hearing more.   Pulmonary rehab referral  Lung cancer screening eligible - later this year, last " CT Feb 2019  RTC 3 months

## 2019-04-03 ENCOUNTER — TELEPHONE (OUTPATIENT)
Dept: PULMONOLOGY | Facility: CLINIC | Age: 69
End: 2019-04-03

## 2019-04-03 LAB
DLCOCOR-%PRED-PRE: 42 %
DLCOCOR-PRE: 11.68 ML/MIN/MMHG
DLCOUNC-%PRED-PRE: 42 %
DLCOUNC-PRE: 11.68 ML/MIN/MMHG
DLCOUNC-PRED: 27.24 ML/MIN/MMHG
ERV-%PRED-PRE: 86 %
ERV-PRE: 1 L
ERV-PRED: 1.16 L
EXPTIME-PRE: 11.32 SEC
FEF2575-%PRED-PRE: 13 %
FEF2575-PRE: 0.35 L/SEC
FEF2575-PRED: 2.66 L/SEC
FEFMAX-%PRED-PRE: 41 %
FEFMAX-PRE: 3.65 L/SEC
FEFMAX-PRED: 8.87 L/SEC
FEV1-%PRED-PRE: 32 %
FEV1-PRE: 1.11 L
FEV1FEV6-PRE: 44 %
FEV1FEV6-PRED: 78 %
FEV1FVC-PRE: 36 %
FEV1FVC-PRED: 76 %
FEV1SVC-PRE: 34 %
FEV1SVC-PRED: 69 %
FIFMAX-PRE: 4.53 L/SEC
FRCPLETH-%PRED-PRE: 134 %
FRCPLETH-PRE: 5.02 L
FRCPLETH-PRED: 3.73 L
FVC-%PRED-PRE: 68 %
FVC-PRE: 3.11 L
FVC-PRED: 4.52 L
IC-%PRED-PRE: 57 %
IC-PRE: 2.23 L
IC-PRED: 3.84 L
RVPLETH-%PRED-PRE: 153 %
RVPLETH-PRE: 4.01 L
RVPLETH-PRED: 2.61 L
TLCPLETH-%PRED-PRE: 98 %
TLCPLETH-PRE: 7.24 L
TLCPLETH-PRED: 7.33 L
VA-%PRED-PRE: 67 %
VA-PRE: 4.47 L
VC-%PRED-PRE: 64 %
VC-PRE: 3.23 L
VC-PRED: 5.01 L

## 2019-04-03 NOTE — TELEPHONE ENCOUNTER
Pulmonary rehab referral faxed to [facility].  Request for overnight oximetry results faxed to [facility].

## 2019-04-23 ENCOUNTER — HOSPITAL ENCOUNTER (OUTPATIENT)
Dept: CARDIAC REHAB | Facility: CLINIC | Age: 69
End: 2019-04-23
Attending: INTERNAL MEDICINE
Payer: COMMERCIAL

## 2019-04-23 DIAGNOSIS — J44.9 CHRONIC OBSTRUCTIVE PULMONARY DISEASE, UNSPECIFIED COPD TYPE (H): ICD-10-CM

## 2019-04-23 PROCEDURE — G0424 PULMONARY REHAB W EXER: HCPCS

## 2019-04-23 PROCEDURE — 40000244 ZZH STATISTIC VISIT PULM REHAB

## 2019-04-24 ENCOUNTER — OFFICE VISIT (OUTPATIENT)
Dept: CARDIOLOGY | Facility: CLINIC | Age: 69
End: 2019-04-24
Attending: INTERNAL MEDICINE
Payer: COMMERCIAL

## 2019-04-24 ENCOUNTER — HOSPITAL ENCOUNTER (OUTPATIENT)
Dept: CARDIOLOGY | Facility: CLINIC | Age: 69
Discharge: HOME | End: 2019-04-24
Attending: INTERNAL MEDICINE | Admitting: INTERNAL MEDICINE
Payer: COMMERCIAL

## 2019-04-24 VITALS
HEIGHT: 71 IN | WEIGHT: 201 LBS | SYSTOLIC BLOOD PRESSURE: 144 MMHG | HEART RATE: 96 BPM | DIASTOLIC BLOOD PRESSURE: 84 MMHG | BODY MASS INDEX: 28.14 KG/M2

## 2019-04-24 DIAGNOSIS — I48.0 PAROXYSMAL ATRIAL FIBRILLATION (H): ICD-10-CM

## 2019-04-24 PROCEDURE — 93225 XTRNL ECG REC<48 HRS REC: CPT

## 2019-04-24 PROCEDURE — 93227 XTRNL ECG REC<48 HR R&I: CPT | Performed by: INTERNAL MEDICINE

## 2019-04-24 PROCEDURE — 93000 ELECTROCARDIOGRAM COMPLETE: CPT | Performed by: INTERNAL MEDICINE

## 2019-04-24 PROCEDURE — 99214 OFFICE O/P EST MOD 30 MIN: CPT | Mod: 25 | Performed by: INTERNAL MEDICINE

## 2019-04-24 ASSESSMENT — MIFFLIN-ST. JEOR: SCORE: 1708.86

## 2019-04-24 NOTE — LETTER
4/24/2019    Ana Pratt MD  [facility] [address] [address] [facility] [address]    RE: Tone Cooper       Dear Colleague,    I had the pleasure of seeing Tone Cooper in the [facility].    HPI and Plan:   See dictation    Orders Placed This Encounter   Procedures     Follow-Up with Electrophysiologist     Holter Monitor 24 hour Adult Pediatric       No orders of the defined types were placed in this encounter.      Medications Discontinued During This Encounter   Medication Reason     propafenone (RYTHMOL) 225 MG tablet      azithromycin (ZITHROMAX) 250 MG tablet Therapy completed     levofloxacin (LEVAQUIN) 750 MG tablet Therapy completed         Encounter Diagnosis   Name Primary?     persistent atrial fi        CURRENT MEDICATIONS:  Current Outpatient Medications   Medication Sig Dispense Refill     acetaminophen (TYLENOL) 325 MG tablet Take 325-650 mg by mouth every 6 hours as needed for mild pain       albuterol (VENTOLIN HFA) 108 (90 Base) MCG/ACT inhaler Inhale 1-2 puffs into the lungs every 4 hours (while awake) PRN 2 Inhaler 0     apixaban ANTICOAGULANT (ELIQUIS) 5 MG tablet Take 1 tablet (5 mg) by mouth 2 times daily 60 tablet 0     budesonide (PULMICORT) 0.5 MG/2ML neb solution Take 2 mLs (0.5 mg) by nebulization 2 times daily       clonazePAM (KLONOPIN) 1 MG tablet Take 1 tablet (1 mg) by mouth At Bedtime       diltiazem ER COATED BEADS (CARDIZEM CD/CARTIA XT) 180 MG 24 hr capsule Take 1 capsule (180 mg) by mouth 2 times daily Hold for SBP < 110 or HR < 60       famotidine (PEPCID) 20 MG tablet Take 1 tablet (20 mg) by mouth 2 times daily       furosemide (LASIX) 20 MG tablet Take 20 mg by mouth 3 times daily        ipratropium - albuterol 0.5 mg/2.5 mg/3 mL (DUONEB) 0.5-2.5 (3) MG/3ML neb solution Take 1 vial (3 mLs) by nebulization 4 times daily       levalbuterol (XOPENEX) 1.25 MG/3ML neb solution Take 3 mLs (1.25 mg) by nebulization every 4 hours  as needed for wheezing or shortness of breath / dyspnea 270 mL 1     Lidocaine (LIDOCARE) 4 % Patch Place 2 patches onto the skin every 24 hours To area of pain on back       order for DME Oxygen for nocturnal use. 1 LPM via nasal cannula  Testing done at home in chronic stable state.  Condition is COPD.  Patient does not have Obstructive Sleep Apnea.  Overnight oximetry revealed 30.3 minutes of hypoxia (<= 88%).  Duration: Lifetime (99 months). 1 each 99     predniSONE (DELTASONE) 10 MG tablet Prednisone 40 mg by mouth daily x 3 days, then 20 by mouth x 3 days, then continue 10 mg daily until follow up with pulmonology. (Patient taking differently: 10 mg daily .)       tamsulosin (FLOMAX) 0.4 MG capsule Take 0.4 mg by mouth daily       traZODone (DESYREL) 50 MG tablet Take 50 mg by mouth At Bedtime        order for DME Equipment being ordered: Nebulizer 1 each 0       ALLERGIES     Allergies   Allergen Reactions     Amlodipine Swelling     Flecainide Palpitations       PAST MEDICAL HISTORY:  Past Medical History:   Diagnosis Date     Atrial flutter (H)      COPD (chronic obstructive pulmonary disease) (H)      Diverticulitis      Hypertension      Persistent atrial fibrillation (H) 4/28/09    ablation 7/1/2015     Premature beats      PVC (premature ventricular contraction)     s/p ablation 12/5/2017     Tricuspid valve disorder     Dr Aj, Hx of valve repair      Ventricular tachycardia (H)     nonsustained       PAST SURGICAL HISTORY:  Past Surgical History:   Procedure Laterality Date     ABDOMEN SURGERY      hernia repair right     APPENDECTOMY       CARDIOVERSION  06/03/2009    successful for afib     CARDIOVERSION  4/20/15    successful for afib     CARDIOVERSION  5/28/15     H ABLATION ATRIAL FLUTTER       H ABLATION FOCAL AFIB  7/1/15    Left atrial linear ablation and pulmonary vein antrum ablation     H ABLATION PVC  12/5/16     INCISION AND DRAINAGE LOWER EXTREMITY, COMBINED Right 11/10/2016    Procedure:  COMBINED INCISION AND DRAINAGE LOWER EXTREMITY;  Surgeon: Roger Pacheco MD;  Location: [facility] OR     LAPAROSCOPIC CHOLECYSTECTOMY  2012    Procedure:LAPAROSCOPIC CHOLECYSTECTOMY; LAPAROSCOPIC CHOLECYSTECTOMY ; Surgeon:ROGER PACHECO; Location:[facility] OR     ORTHOPEDIC SURGERY      Left hip replacement     REPAIR VALVE TRICUSPID  08    conversion to a bileaflet valve and application of a 30 mm Sanderson ring     SMALL INTESTINE SURGERY      had bowel obstruction age 9     VALVE REPLACEMENT      tricuspid       FAMILY HISTORY:  Family History   Problem Relation Age of Onset     Prostate Cancer Father      Family History Negative Mother      Emphysema Mother      Hypertension Mother      Cerebrovascular Disease Mother        SOCIAL HISTORY:  Social History     Socioeconomic History     Marital status:      Spouse name: None     Number of children: None     Years of education: None     Highest education level: None   Occupational History     None   Social Needs     Financial resource strain: None     Food insecurity:     Worry: None     Inability: None     Transportation needs:     Medical: None     Non-medical: None   Tobacco Use     Smoking status: Former Smoker     Packs/day: 1.50     Years: 41.00     Pack years: 61.50     Start date: 1977     Last attempt to quit: 2008     Years since quittin.3     Smokeless tobacco: Never Used   Substance and Sexual Activity     Alcohol use: Yes     Alcohol/week: 0.0 oz     Comment: 3-6 per month     Drug use: No     Sexual activity: Never     Partners: Female     Birth control/protection: None   Lifestyle     Physical activity:     Days per week: None     Minutes per session: None     Stress: None   Relationships     Social connections:     Talks on phone: None     Gets together: None     Attends Spiritism service: None     Active member of club or organization: None     Attends meetings of clubs or organizations: None     Relationship status:  "None     Intimate partner violence:     Fear of current or ex partner: None     Emotionally abused: None     Physically abused: None     Forced sexual activity: None   Other Topics Concern      Service Not Asked     Blood Transfusions Not Asked     Caffeine Concern No     Comment: 1 a day      Occupational Exposure Not Asked     Hobby Hazards Not Asked     Sleep Concern Yes     Comment: nocturia     Stress Concern Not Asked     Weight Concern No     Special Diet No     Back Care Not Asked     Exercise No     Bike Helmet Yes     Seat Belt Yes     Self-Exams Not Asked     Parent/sibling w/ CABG, MI or angioplasty before 65F 55M? No   Social History Narrative    Works in [employer] ([employer] work)    Is an  for [employer] in [place], in between sets and entertain       Review of Systems:  Skin:  Negative for       Eyes:  Positive for visual blurring    ENT:  Negative for      Respiratory:  Positive for wheezing;dyspnea on exertion COPD, On O2 daily 3L constantly, Bipap with O2   Cardiovascular:    Positive for;fatigue;palpitations palpitaions with activity  Gastroenterology: Negative for      Genitourinary:  not assessed      Musculoskeletal:  Positive for back pain    Neurologic:  Negative for      Psychiatric:  Positive for sleep disturbances    Heme/Lymph/Imm:  Negative for      Endocrine:  Positive for diabetes      Physical Exam:  Vitals: /84   Pulse 96   Ht 1.803 m (5' 11\")   Wt 91.2 kg (201 lb)   BMI 28.03 kg/m       Constitutional:  cooperative, alert and oriented, well developed, well nourished, in no acute distress        Skin:  warm and dry to the touch, no apparent skin lesions or masses noted          Head:  normocephalic, no masses or lesions        Eyes:  no xanthalasma;EOMS intact;sclera white;conjunctivae and lids unremarkable        Lymph:      ENT:  no pallor or cyanosis, dentition good        Neck:  JVP normal;no carotid bruit        Respiratory:  clear " to auscultation diminished breath sounds bilaterally       Cardiac: normal S1 and S2;regular rhythm;no murmurs, gallops or rubs detected occasional premature beats              pulses full and equal                               right femoral bruit (-) left subclavian bruit (-)      GI:  abdomen soft        Extremities and Muscular Skeletal:  no deformities, clubbing, cyanosis, erythema observed;no edema              Neurological:           Psych:           CC  Marco Zaldivar MD  [address]  [place] [id number]                Thank you for allowing me to participate in the care of your patient.      Sincerely,     Sussy Britt MD     [facility]    cc:   Marco Zaldivar MD  [address]  [place] [id number]

## 2019-04-24 NOTE — LETTER
4/24/2019      Ana Pratt MD  [facility] [address] [address] [facility] [address]      RE: Tone Ruizgray       Dear Colleague,    I had the pleasure of seeing Tone Cooper in the [facility].    Service Date: 04/24/2019      HISTORY OF PRESENT ILLNESS:  I saw Mr. Cooper for followup of atrial fibrillation.  He is a 68-year-old white male with history of tricuspid repair.  He had symptomatic atrial fibrillation with rapid ventricular rate and underwent ablation for atrial fibrillation on 07/01/2015.  He did very well for atrial arrhythmia until last year when he had recurrent atrial fibrillation.  He also had frequent PVCs and underwent 2 ablations after the atrial fibrillation ablation.      The patient had a long history of smoking and quit in 2008.  He is now struggling with severe COPD and he has become dependent on supplemental oxygen.  The patient has had pneumonia and we initially thought his atrial fibrillation was related to pneumonia.  However, after he recovered from pneumonia after he continued to have atrial fibrillation recurrence.        When I saw him in January, I changed the dose of propafenone from 150 mg to 225 mg p.o. t.i.d.  Unfortunately, that has not been able to maintain sinus rhythm.  On EKG today, his atrial fibrillation with controlled ventricular rate.      Symptomatically, he continues to struggle with shortness of breath.  He is being followed by Pulmonology and may be considered for lung transplant.  He has no palpitation or chest pain.      PHYSICAL EXAMINATION:   VITAL SIGNS:  Blood pressure was 144/84, heart rate 96 beats per minute, body weight 201 pounds.   CARDIAC:  Rhythm was irregularly irregular.      ASSESSMENT AND RECOMMENDATIONS:  Mr. Cooper is not able to maintain sinus rhythm.  His severe COPD definitely has made it difficult to maintain sinus rhythm.  I would abandon the rhythm control strategy.  He is to stop  propafenone, but continue Eliquis and current dose of diltiazem.  The patient is to wear a 24-hour Holter monitor to make sure that his ventricular rate is acceptable for long-term on current doses.  I plan to see him for followup in 3 months.      cc:      Ana Pratt MD    [facility]   [address] [place] [address]         NORIS MARTINEZ MD             D: 2019   T: 2019   MT:       Name:     FELIPE AYOUB   MRN:      [id number]        Account:      [id number]   :      1950           Service Date: 2019      Document: [id number]           Outpatient Encounter Medications as of 2019   Medication Sig Dispense Refill     acetaminophen (TYLENOL) 325 MG tablet Take 325-650 mg by mouth every 6 hours as needed for mild pain       albuterol (VENTOLIN HFA) 108 (90 Base) MCG/ACT inhaler Inhale 1-2 puffs into the lungs every 4 hours (while awake) PRN 2 Inhaler 0     apixaban ANTICOAGULANT (ELIQUIS) 5 MG tablet Take 1 tablet (5 mg) by mouth 2 times daily 60 tablet 0     budesonide (PULMICORT) 0.5 MG/2ML neb solution Take 2 mLs (0.5 mg) by nebulization 2 times daily       clonazePAM (KLONOPIN) 1 MG tablet Take 1 tablet (1 mg) by mouth At Bedtime       diltiazem ER COATED BEADS (CARDIZEM CD/CARTIA XT) 180 MG 24 hr capsule Take 1 capsule (180 mg) by mouth 2 times daily Hold for SBP < 110 or HR < 60       famotidine (PEPCID) 20 MG tablet Take 1 tablet (20 mg) by mouth 2 times daily       furosemide (LASIX) 20 MG tablet Take 20 mg by mouth 3 times daily        ipratropium - albuterol 0.5 mg/2.5 mg/3 mL (DUONEB) 0.5-2.5 (3) MG/3ML neb solution Take 1 vial (3 mLs) by nebulization 4 times daily       levalbuterol (XOPENEX) 1.25 MG/3ML neb solution Take 3 mLs (1.25 mg) by nebulization every 4 hours as needed for wheezing or shortness of breath / dyspnea 270 mL 1     Lidocaine (LIDOCARE) 4 % Patch Place 2 patches onto the skin every 24 hours To area of pain on back       order for  DME Oxygen for nocturnal use. 1 LPM via nasal cannula  Testing done at home in chronic stable state.  Condition is COPD.  Patient does not have Obstructive Sleep Apnea.  Overnight oximetry revealed 30.3 minutes of hypoxia (<= 88%).  Duration: Lifetime (99 months). 1 each 99     predniSONE (DELTASONE) 10 MG tablet Prednisone 40 mg by mouth daily x 3 days, then 20 by mouth x 3 days, then continue 10 mg daily until follow up with pulmonology. (Patient taking differently: 10 mg daily .)       tamsulosin (FLOMAX) 0.4 MG capsule Take 0.4 mg by mouth daily       traZODone (DESYREL) 50 MG tablet Take 50 mg by mouth At Bedtime        [] diclofenac (VOLTAREN) 1 % topical gel Place 2 g onto the skin 4 times daily for 7 days       order for DME Equipment being ordered: Nebulizer 1 each 0     [DISCONTINUED] azithromycin (ZITHROMAX) 250 MG tablet Take 1 tablet (250 mg) by mouth daily (Patient not taking: Reported on 2019)       [DISCONTINUED] levofloxacin (LEVAQUIN) 750 MG tablet Take 1 tablet (750 mg) by mouth daily for 5 days 5 tablet 0     [DISCONTINUED] propafenone (RYTHMOL) 225 MG tablet Take 1 tablet (225 mg) by mouth every 8 hours 270 tablet 3     No facility-administered encounter medications on file as of 2019.        Again, thank you for allowing me to participate in the care of your patient.      Sincerely,    Sussy Britt MD     [facility]

## 2019-04-24 NOTE — PROGRESS NOTES
HPI and Plan:   See dictation    Orders Placed This Encounter   Procedures     Follow-Up with Electrophysiologist     Holter Monitor 24 hour Adult Pediatric       No orders of the defined types were placed in this encounter.      Medications Discontinued During This Encounter   Medication Reason     propafenone (RYTHMOL) 225 MG tablet      azithromycin (ZITHROMAX) 250 MG tablet Therapy completed     levofloxacin (LEVAQUIN) 750 MG tablet Therapy completed         Encounter Diagnosis   Name Primary?     persistent atrial fi        CURRENT MEDICATIONS:  Current Outpatient Medications   Medication Sig Dispense Refill     acetaminophen (TYLENOL) 325 MG tablet Take 325-650 mg by mouth every 6 hours as needed for mild pain       albuterol (VENTOLIN HFA) 108 (90 Base) MCG/ACT inhaler Inhale 1-2 puffs into the lungs every 4 hours (while awake) PRN 2 Inhaler 0     apixaban ANTICOAGULANT (ELIQUIS) 5 MG tablet Take 1 tablet (5 mg) by mouth 2 times daily 60 tablet 0     budesonide (PULMICORT) 0.5 MG/2ML neb solution Take 2 mLs (0.5 mg) by nebulization 2 times daily       clonazePAM (KLONOPIN) 1 MG tablet Take 1 tablet (1 mg) by mouth At Bedtime       diltiazem ER COATED BEADS (CARDIZEM CD/CARTIA XT) 180 MG 24 hr capsule Take 1 capsule (180 mg) by mouth 2 times daily Hold for SBP < 110 or HR < 60       famotidine (PEPCID) 20 MG tablet Take 1 tablet (20 mg) by mouth 2 times daily       furosemide (LASIX) 20 MG tablet Take 20 mg by mouth 3 times daily        ipratropium - albuterol 0.5 mg/2.5 mg/3 mL (DUONEB) 0.5-2.5 (3) MG/3ML neb solution Take 1 vial (3 mLs) by nebulization 4 times daily       levalbuterol (XOPENEX) 1.25 MG/3ML neb solution Take 3 mLs (1.25 mg) by nebulization every 4 hours as needed for wheezing or shortness of breath / dyspnea 270 mL 1     Lidocaine (LIDOCARE) 4 % Patch Place 2 patches onto the skin every 24 hours To area of pain on back       order for DME Oxygen for nocturnal use. 1 LPM via nasal  cannula  Testing done at home in chronic stable state.  Condition is COPD.  Patient does not have Obstructive Sleep Apnea.  Overnight oximetry revealed 30.3 minutes of hypoxia (<= 88%).  Duration: Lifetime (99 months). 1 each 99     predniSONE (DELTASONE) 10 MG tablet Prednisone 40 mg by mouth daily x 3 days, then 20 by mouth x 3 days, then continue 10 mg daily until follow up with pulmonology. (Patient taking differently: 10 mg daily .)       tamsulosin (FLOMAX) 0.4 MG capsule Take 0.4 mg by mouth daily       traZODone (DESYREL) 50 MG tablet Take 50 mg by mouth At Bedtime        order for DME Equipment being ordered: Nebulizer 1 each 0       ALLERGIES     Allergies   Allergen Reactions     Amlodipine Swelling     Flecainide Palpitations       PAST MEDICAL HISTORY:  Past Medical History:   Diagnosis Date     Atrial flutter (H)      COPD (chronic obstructive pulmonary disease) (H)      Diverticulitis      Hypertension      Persistent atrial fibrillation (H) 4/28/09    ablation 7/1/2015     Premature beats      PVC (premature ventricular contraction)     s/p ablation 12/5/2017     Tricuspid valve disorder     Dr Aj, Hx of valve repair      Ventricular tachycardia (H)     nonsustained       PAST SURGICAL HISTORY:  Past Surgical History:   Procedure Laterality Date     ABDOMEN SURGERY      hernia repair right     APPENDECTOMY       CARDIOVERSION  06/03/2009    successful for afib     CARDIOVERSION  4/20/15    successful for afib     CARDIOVERSION  5/28/15     H ABLATION ATRIAL FLUTTER       H ABLATION FOCAL AFIB  7/1/15    Left atrial linear ablation and pulmonary vein antrum ablation     H ABLATION PVC  12/5/16     INCISION AND DRAINAGE LOWER EXTREMITY, COMBINED Right 11/10/2016    Procedure: COMBINED INCISION AND DRAINAGE LOWER EXTREMITY;  Surgeon: Roger Pacheco MD;  Location: [facility] OR     LAPAROSCOPIC CHOLECYSTECTOMY  1/6/2012    Procedure:LAPAROSCOPIC CHOLECYSTECTOMY; LAPAROSCOPIC CHOLECYSTECTOMY ;  Surgeon:TALON DEVINE; Location:[facility] OR     ORTHOPEDIC SURGERY      Left hip replacement     REPAIR VALVE TRICUSPID  08    conversion to a bileaflet valve and application of a 30 mm Sanderson ring     SMALL INTESTINE SURGERY      had bowel obstruction age 9     VALVE REPLACEMENT      tricuspid       FAMILY HISTORY:  Family History   Problem Relation Age of Onset     Prostate Cancer Father      Family History Negative Mother      Emphysema Mother      Hypertension Mother      Cerebrovascular Disease Mother        SOCIAL HISTORY:  Social History     Socioeconomic History     Marital status:      Spouse name: None     Number of children: None     Years of education: None     Highest education level: None   Occupational History     None   Social Needs     Financial resource strain: None     Food insecurity:     Worry: None     Inability: None     Transportation needs:     Medical: None     Non-medical: None   Tobacco Use     Smoking status: Former Smoker     Packs/day: 1.50     Years: 41.00     Pack years: 61.50     Start date: 1977     Last attempt to quit: 2008     Years since quittin.3     Smokeless tobacco: Never Used   Substance and Sexual Activity     Alcohol use: Yes     Alcohol/week: 0.0 oz     Comment: 3-6 per month     Drug use: No     Sexual activity: Never     Partners: Female     Birth control/protection: None   Lifestyle     Physical activity:     Days per week: None     Minutes per session: None     Stress: None   Relationships     Social connections:     Talks on phone: None     Gets together: None     Attends [employer] service: None     Active member of club or organization: None     Attends meetings of clubs or organizations: None     Relationship status: None     Intimate partner violence:     Fear of current or ex partner: None     Emotionally abused: None     Physically abused: None     Forced sexual activity: None   Other Topics Concern      Service Not Asked  "    Blood Transfusions Not Asked     Caffeine Concern No     Comment: 1 a day      Occupational Exposure Not Asked     Hobby Hazards Not Asked     Sleep Concern Yes     Comment: nocturia     Stress Concern Not Asked     Weight Concern No     Special Diet No     Back Care Not Asked     Exercise No     Bike Helmet Yes     Seat Belt Yes     Self-Exams Not Asked     Parent/sibling w/ CABG, MI or angioplasty before 65F 55M? 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)    Is an  for [employer] in [place], in between sets and entertain       Review of Systems:  Skin:  Negative for       Eyes:  Positive for visual blurring    ENT:  Negative for      Respiratory:  Positive for wheezing;dyspnea on exertion COPD, On O2 daily 3L constantly, Bipap with O2   Cardiovascular:    Positive for;fatigue;palpitations palpitaions with activity  Gastroenterology: Negative for      Genitourinary:  not assessed      Musculoskeletal:  Positive for back pain    Neurologic:  Negative for      Psychiatric:  Positive for sleep disturbances    Heme/Lymph/Imm:  Negative for      Endocrine:  Positive for diabetes      Physical Exam:  Vitals: /84   Pulse 96   Ht 1.803 m (5' 11\")   Wt 91.2 kg (201 lb)   BMI 28.03 kg/m      Constitutional:  cooperative, alert and oriented, well developed, well nourished, in no acute distress        Skin:  warm and dry to the touch, no apparent skin lesions or masses noted          Head:  normocephalic, no masses or lesions        Eyes:  no xanthalasma;EOMS intact;sclera white;conjunctivae and lids unremarkable        Lymph:      ENT:  no pallor or cyanosis, dentition good        Neck:  JVP normal;no carotid bruit        Respiratory:  clear to auscultation diminished breath sounds bilaterally       Cardiac: normal S1 and S2;regular rhythm;no murmurs, gallops or rubs detected occasional premature beats              pulses full and equal                         "       right femoral bruit (-) left subclavian bruit (-)      GI:  abdomen soft        Extremities and Muscular Skeletal:  no deformities, clubbing, cyanosis, erythema observed;no edema              Neurological:           Psych:           CC  Marco Zaldivar MD  [id number]  [place] [id number]

## 2019-04-24 NOTE — PROGRESS NOTES
Service Date: 04/24/2019      HISTORY OF PRESENT ILLNESS:  I saw Mr. Cooper for followup of atrial fibrillation.  He is a 68-year-old white male with history of tricuspid repair.  He had symptomatic atrial fibrillation with rapid ventricular rate and underwent ablation for atrial fibrillation on 07/01/2015.  He did very well for atrial arrhythmia until last year when he had recurrent atrial fibrillation.  He also had frequent PVCs and underwent 2 ablations after the atrial fibrillation ablation.      The patient had a long history of smoking and quit in 2008.  He is now struggling with severe COPD and he has become dependent on supplemental oxygen.  The patient has had pneumonia and we initially thought his atrial fibrillation was related to pneumonia.  However, after he recovered from pneumonia after he continued to have atrial fibrillation recurrence.        When I saw him in January, I changed the dose of propafenone from 150 mg to 225 mg p.o. t.i.d.  Unfortunately, that has not been able to maintain sinus rhythm.  On EKG today, his atrial fibrillation with controlled ventricular rate.      Symptomatically, he continues to struggle with shortness of breath.  He is being followed by Pulmonology and may be considered for lung transplant.  He has no palpitation or chest pain.      PHYSICAL EXAMINATION:   VITAL SIGNS:  Blood pressure was 144/84, heart rate 96 beats per minute, body weight 201 pounds.   CARDIAC:  Rhythm was irregularly irregular.      ASSESSMENT AND RECOMMENDATIONS:  Mr. Cooper is not able to maintain sinus rhythm.  His severe COPD definitely has made it difficult to maintain sinus rhythm.  I would abandon the rhythm control strategy.  He is to stop propafenone, but continue Eliquis and current dose of diltiazem.  The patient is to wear a 24-hour Holter monitor to make sure that his ventricular rate is acceptable for long-term on current doses.  I plan to see him for followup in 3 months.      cc:       Ana Pratt MD    [facility]   [address] [place] [address]         NORIS MARTINEZ MD             D: 2019   T: 2019   MT: SARANYA      Name:     FELIPE AYOUB   MRN:      [id number]        Account:      [id number]   :      1950           Service Date: 2019      Document: [id number]

## 2019-05-02 ENCOUNTER — TELEPHONE (OUTPATIENT)
Dept: CARDIOLOGY | Facility: CLINIC | Age: 69
End: 2019-05-02

## 2019-05-02 NOTE — TELEPHONE ENCOUNTER
"Holter results available, ordered by Dr. Britt:       Per OV note from 4/24/2019: \"I would abandon the rhythm control strategy.  He is to stop propafenone, but continue Eliquis and current dose of diltiazem.  The patient is to wear a 24-hour Holter monitor to make sure that his ventricular rate is acceptable for long-term on current doses.  I plan to see him for followup in 3 months.\"    Called pt and told him Holter looked good with average heart rate of 78 bpm. Pt states understanding. Reviewed his meds. No questions.   "

## 2019-05-06 ENCOUNTER — HOSPITAL ENCOUNTER (OUTPATIENT)
Dept: CARDIAC REHAB | Facility: CLINIC | Age: 69
End: 2019-05-06
Attending: INTERNAL MEDICINE
Payer: COMMERCIAL

## 2019-05-06 PROCEDURE — G0424 PULMONARY REHAB W EXER: HCPCS

## 2019-05-06 PROCEDURE — 40000244 ZZH STATISTIC VISIT PULM REHAB

## 2019-05-14 ENCOUNTER — HOSPITAL ENCOUNTER (OUTPATIENT)
Dept: CARDIAC REHAB | Facility: CLINIC | Age: 69
End: 2019-05-14
Attending: INTERNAL MEDICINE
Payer: COMMERCIAL

## 2019-05-14 PROCEDURE — 40000244 ZZH STATISTIC VISIT PULM REHAB

## 2019-05-14 PROCEDURE — G0424 PULMONARY REHAB W EXER: HCPCS

## 2019-05-16 ENCOUNTER — HOSPITAL ENCOUNTER (OUTPATIENT)
Dept: CARDIAC REHAB | Facility: CLINIC | Age: 69
End: 2019-05-16
Attending: INTERNAL MEDICINE
Payer: COMMERCIAL

## 2019-05-16 PROCEDURE — 40000244 ZZH STATISTIC VISIT PULM REHAB

## 2019-05-16 PROCEDURE — G0424 PULMONARY REHAB W EXER: HCPCS

## 2019-05-21 ENCOUNTER — HOSPITAL ENCOUNTER (OUTPATIENT)
Dept: CARDIAC REHAB | Facility: CLINIC | Age: 69
End: 2019-05-21
Attending: INTERNAL MEDICINE
Payer: COMMERCIAL

## 2019-05-21 PROCEDURE — 40000244 ZZH STATISTIC VISIT PULM REHAB

## 2019-05-21 PROCEDURE — G0424 PULMONARY REHAB W EXER: HCPCS

## 2019-05-23 ENCOUNTER — HOSPITAL ENCOUNTER (OUTPATIENT)
Dept: CARDIAC REHAB | Facility: CLINIC | Age: 69
End: 2019-05-23
Attending: INTERNAL MEDICINE
Payer: COMMERCIAL

## 2019-05-23 PROCEDURE — G0424 PULMONARY REHAB W EXER: HCPCS

## 2019-05-23 PROCEDURE — 40000244 ZZH STATISTIC VISIT PULM REHAB

## 2019-05-26 ENCOUNTER — HOSPITAL ENCOUNTER (INPATIENT)
Facility: CLINIC | Age: 69
LOS: 5 days | Discharge: NURSING, REHAB OR LONG-TERM CARE | DRG: 190 | End: 2019-05-31
Attending: EMERGENCY MEDICINE | Admitting: INTERNAL MEDICINE
Payer: COMMERCIAL

## 2019-05-26 ENCOUNTER — APPOINTMENT (OUTPATIENT)
Dept: CT IMAGING | Facility: CLINIC | Age: 69
DRG: 190 | End: 2019-05-26
Attending: EMERGENCY MEDICINE
Payer: COMMERCIAL

## 2019-05-26 ENCOUNTER — APPOINTMENT (OUTPATIENT)
Dept: GENERAL RADIOLOGY | Facility: CLINIC | Age: 69
DRG: 190 | End: 2019-05-26
Attending: EMERGENCY MEDICINE
Payer: COMMERCIAL

## 2019-05-26 DIAGNOSIS — I07.9 TRICUSPID VALVE DISORDER: Primary | ICD-10-CM

## 2019-05-26 DIAGNOSIS — J44.1 COPD EXACERBATION (H): ICD-10-CM

## 2019-05-26 DIAGNOSIS — I48.91 ATRIAL FIBRILLATION WITH RVR (H): ICD-10-CM

## 2019-05-26 DIAGNOSIS — G47.00 INSOMNIA, UNSPECIFIED TYPE: ICD-10-CM

## 2019-05-26 LAB
ALBUMIN SERPL-MCNC: 3.3 G/DL (ref 3.4–5)
ALBUMIN UR-MCNC: 10 MG/DL
ALP SERPL-CCNC: 65 U/L (ref 40–150)
ALT SERPL W P-5'-P-CCNC: 65 U/L (ref 0–70)
ANION GAP SERPL CALCULATED.3IONS-SCNC: 4 MMOL/L (ref 3–14)
APPEARANCE UR: CLEAR
AST SERPL W P-5'-P-CCNC: 29 U/L (ref 0–45)
BASE EXCESS BLDV CALC-SCNC: 1.7 MMOL/L
BASOPHILS # BLD AUTO: 0.1 10E9/L (ref 0–0.2)
BASOPHILS NFR BLD AUTO: 0.3 %
BILIRUB SERPL-MCNC: 0.7 MG/DL (ref 0.2–1.3)
BILIRUB UR QL STRIP: NEGATIVE
BUN SERPL-MCNC: 36 MG/DL (ref 7–30)
CALCIUM SERPL-MCNC: 9.1 MG/DL (ref 8.5–10.1)
CHLORIDE SERPL-SCNC: 101 MMOL/L (ref 94–109)
CO2 SERPL-SCNC: 32 MMOL/L (ref 20–32)
COLOR UR AUTO: ABNORMAL
CREAT SERPL-MCNC: 1.43 MG/DL (ref 0.66–1.25)
DIFFERENTIAL METHOD BLD: ABNORMAL
EOSINOPHIL # BLD AUTO: 0.2 10E9/L (ref 0–0.7)
EOSINOPHIL NFR BLD AUTO: 0.9 %
ERYTHROCYTE [DISTWIDTH] IN BLOOD BY AUTOMATED COUNT: 14.1 % (ref 10–15)
GFR SERPL CREATININE-BSD FRML MDRD: 50 ML/MIN/{1.73_M2}
GLUCOSE BLDC GLUCOMTR-MCNC: 165 MG/DL (ref 70–99)
GLUCOSE BLDC GLUCOMTR-MCNC: 248 MG/DL (ref 70–99)
GLUCOSE BLDC GLUCOMTR-MCNC: 316 MG/DL (ref 70–99)
GLUCOSE SERPL-MCNC: 102 MG/DL (ref 70–99)
GLUCOSE UR STRIP-MCNC: NEGATIVE MG/DL
HCO3 BLDV-SCNC: 29 MMOL/L (ref 21–28)
HCT VFR BLD AUTO: 53.3 % (ref 40–53)
HGB BLD-MCNC: 16.3 G/DL (ref 13.3–17.7)
HGB UR QL STRIP: NEGATIVE
IMM GRANULOCYTES # BLD: 0.2 10E9/L (ref 0–0.4)
IMM GRANULOCYTES NFR BLD: 1.5 %
INR PPP: 1.04 (ref 0.86–1.14)
INTERPRETATION ECG - MUSE: NORMAL
KETONES UR STRIP-MCNC: 10 MG/DL
LEUKOCYTE ESTERASE UR QL STRIP: NEGATIVE
LYMPHOCYTES # BLD AUTO: 1.9 10E9/L (ref 0.8–5.3)
LYMPHOCYTES NFR BLD AUTO: 11.6 %
MCH RBC QN AUTO: 28.5 PG (ref 26.5–33)
MCHC RBC AUTO-ENTMCNC: 30.6 G/DL (ref 31.5–36.5)
MCV RBC AUTO: 93 FL (ref 78–100)
MONOCYTES # BLD AUTO: 0.7 10E9/L (ref 0–1.3)
MONOCYTES NFR BLD AUTO: 4.3 %
MRSA DNA SPEC QL NAA+PROBE: NEGATIVE
MUCOUS THREADS #/AREA URNS LPF: PRESENT /LPF
NEUTROPHILS # BLD AUTO: 13.2 10E9/L (ref 1.6–8.3)
NEUTROPHILS NFR BLD AUTO: 81.4 %
NITRATE UR QL: NEGATIVE
NRBC # BLD AUTO: 0 10*3/UL
NRBC BLD AUTO-RTO: 0 /100
NT-PROBNP SERPL-MCNC: 633 PG/ML (ref 0–900)
O2/TOTAL GAS SETTING VFR VENT: 30 %
OXYHGB MFR BLDV: 64 %
PCO2 BLDV: 56 MM HG (ref 40–50)
PH BLDV: 7.33 PH (ref 7.32–7.43)
PH UR STRIP: 6 PH (ref 5–7)
PLATELET # BLD AUTO: 198 10E9/L (ref 150–450)
PO2 BLDV: 35 MM HG (ref 25–47)
POTASSIUM SERPL-SCNC: 4.5 MMOL/L (ref 3.4–5.3)
PROCALCITONIN SERPL-MCNC: 0.08 NG/ML
PROT SERPL-MCNC: 6.7 G/DL (ref 6.8–8.8)
RBC # BLD AUTO: 5.72 10E12/L (ref 4.4–5.9)
RBC #/AREA URNS AUTO: <1 /HPF (ref 0–2)
SODIUM SERPL-SCNC: 137 MMOL/L (ref 133–144)
SOURCE: ABNORMAL
SP GR UR STRIP: 1.01 (ref 1–1.03)
SPECIMEN SOURCE: NORMAL
TROPONIN I SERPL-MCNC: <0.015 UG/L (ref 0–0.04)
UROBILINOGEN UR STRIP-MCNC: NORMAL MG/DL (ref 0–2)
WBC # BLD AUTO: 16.2 10E9/L (ref 4–11)
WBC #/AREA URNS AUTO: 1 /HPF (ref 0–5)

## 2019-05-26 PROCEDURE — 99223 1ST HOSP IP/OBS HIGH 75: CPT | Mod: AI | Performed by: INTERNAL MEDICINE

## 2019-05-26 PROCEDURE — 80053 COMPREHEN METABOLIC PANEL: CPT | Performed by: EMERGENCY MEDICINE

## 2019-05-26 PROCEDURE — 25000132 ZZH RX MED GY IP 250 OP 250 PS 637: Performed by: EMERGENCY MEDICINE

## 2019-05-26 PROCEDURE — 71045 X-RAY EXAM CHEST 1 VIEW: CPT

## 2019-05-26 PROCEDURE — 93005 ELECTROCARDIOGRAM TRACING: CPT

## 2019-05-26 PROCEDURE — 25000125 ZZHC RX 250: Performed by: INTERNAL MEDICINE

## 2019-05-26 PROCEDURE — 94640 AIRWAY INHALATION TREATMENT: CPT

## 2019-05-26 PROCEDURE — 25000128 H RX IP 250 OP 636: Performed by: INTERNAL MEDICINE

## 2019-05-26 PROCEDURE — 00000146 ZZHCL STATISTIC GLUCOSE BY METER IP

## 2019-05-26 PROCEDURE — 87640 STAPH A DNA AMP PROBE: CPT | Performed by: INTERNAL MEDICINE

## 2019-05-26 PROCEDURE — 83880 ASSAY OF NATRIURETIC PEPTIDE: CPT | Performed by: EMERGENCY MEDICINE

## 2019-05-26 PROCEDURE — 25000128 H RX IP 250 OP 636: Performed by: EMERGENCY MEDICINE

## 2019-05-26 PROCEDURE — 36415 COLL VENOUS BLD VENIPUNCTURE: CPT | Performed by: INTERNAL MEDICINE

## 2019-05-26 PROCEDURE — 40000275 ZZH STATISTIC RCP TIME EA 10 MIN

## 2019-05-26 PROCEDURE — 96368 THER/DIAG CONCURRENT INF: CPT

## 2019-05-26 PROCEDURE — 85025 COMPLETE CBC W/AUTO DIFF WBC: CPT | Performed by: EMERGENCY MEDICINE

## 2019-05-26 PROCEDURE — 82805 BLOOD GASES W/O2 SATURATION: CPT | Performed by: EMERGENCY MEDICINE

## 2019-05-26 PROCEDURE — 71260 CT THORAX DX C+: CPT

## 2019-05-26 PROCEDURE — 25800030 ZZH RX IP 258 OP 636: Performed by: EMERGENCY MEDICINE

## 2019-05-26 PROCEDURE — 20000003 ZZH R&B ICU

## 2019-05-26 PROCEDURE — 25000131 ZZH RX MED GY IP 250 OP 636 PS 637: Performed by: INTERNAL MEDICINE

## 2019-05-26 PROCEDURE — 87641 MR-STAPH DNA AMP PROBE: CPT | Performed by: INTERNAL MEDICINE

## 2019-05-26 PROCEDURE — 96365 THER/PROPH/DIAG IV INF INIT: CPT

## 2019-05-26 PROCEDURE — 81001 URINALYSIS AUTO W/SCOPE: CPT | Performed by: EMERGENCY MEDICINE

## 2019-05-26 PROCEDURE — 94660 CPAP INITIATION&MGMT: CPT

## 2019-05-26 PROCEDURE — 84145 PROCALCITONIN (PCT): CPT | Performed by: INTERNAL MEDICINE

## 2019-05-26 PROCEDURE — 94640 AIRWAY INHALATION TREATMENT: CPT | Mod: 76

## 2019-05-26 PROCEDURE — 25800030 ZZH RX IP 258 OP 636: Performed by: INTERNAL MEDICINE

## 2019-05-26 PROCEDURE — 85610 PROTHROMBIN TIME: CPT | Performed by: EMERGENCY MEDICINE

## 2019-05-26 PROCEDURE — 84484 ASSAY OF TROPONIN QUANT: CPT | Performed by: EMERGENCY MEDICINE

## 2019-05-26 PROCEDURE — 25000132 ZZH RX MED GY IP 250 OP 250 PS 637: Performed by: INTERNAL MEDICINE

## 2019-05-26 PROCEDURE — 25000125 ZZHC RX 250

## 2019-05-26 PROCEDURE — 96366 THER/PROPH/DIAG IV INF ADDON: CPT

## 2019-05-26 PROCEDURE — 99285 EMERGENCY DEPT VISIT HI MDM: CPT | Mod: 25

## 2019-05-26 PROCEDURE — 25000125 ZZHC RX 250: Performed by: EMERGENCY MEDICINE

## 2019-05-26 PROCEDURE — 96375 TX/PRO/DX INJ NEW DRUG ADDON: CPT

## 2019-05-26 RX ORDER — IPRATROPIUM BROMIDE AND ALBUTEROL SULFATE 2.5; .5 MG/3ML; MG/3ML
3 SOLUTION RESPIRATORY (INHALATION)
Status: COMPLETED | OUTPATIENT
Start: 2019-05-26 | End: 2019-05-26

## 2019-05-26 RX ORDER — DILTIAZEM HYDROCHLORIDE 5 MG/ML
15 INJECTION INTRAVENOUS ONCE
Status: COMPLETED | OUTPATIENT
Start: 2019-05-26 | End: 2019-05-26

## 2019-05-26 RX ORDER — LIDOCAINE 4 G/G
2 PATCH TOPICAL DAILY PRN
Status: DISCONTINUED | OUTPATIENT
Start: 2019-05-26 | End: 2019-05-31 | Stop reason: HOSPADM

## 2019-05-26 RX ORDER — AMOXICILLIN 250 MG
2 CAPSULE ORAL 2 TIMES DAILY PRN
Status: DISCONTINUED | OUTPATIENT
Start: 2019-05-26 | End: 2019-05-31 | Stop reason: HOSPADM

## 2019-05-26 RX ORDER — SODIUM CHLORIDE 9 MG/ML
1000 INJECTION, SOLUTION INTRAVENOUS CONTINUOUS
Status: DISCONTINUED | OUTPATIENT
Start: 2019-05-26 | End: 2019-05-26

## 2019-05-26 RX ORDER — METHYLPREDNISOLONE SODIUM SUCCINATE 125 MG/2ML
125 INJECTION, POWDER, LYOPHILIZED, FOR SOLUTION INTRAMUSCULAR; INTRAVENOUS ONCE
Status: COMPLETED | OUTPATIENT
Start: 2019-05-26 | End: 2019-05-26

## 2019-05-26 RX ORDER — IPRATROPIUM BROMIDE AND ALBUTEROL SULFATE 2.5; .5 MG/3ML; MG/3ML
1 SOLUTION RESPIRATORY (INHALATION) 4 TIMES DAILY
Status: DISCONTINUED | OUTPATIENT
Start: 2019-05-26 | End: 2019-05-31 | Stop reason: HOSPADM

## 2019-05-26 RX ORDER — CLONAZEPAM 1 MG/1
1 TABLET ORAL AT BEDTIME
Status: DISCONTINUED | OUTPATIENT
Start: 2019-05-26 | End: 2019-05-31 | Stop reason: HOSPADM

## 2019-05-26 RX ORDER — DILTIAZEM HYDROCHLORIDE 180 MG/1
180 CAPSULE, COATED, EXTENDED RELEASE ORAL 2 TIMES DAILY
Status: DISCONTINUED | OUTPATIENT
Start: 2019-05-26 | End: 2019-05-31 | Stop reason: HOSPADM

## 2019-05-26 RX ORDER — ONDANSETRON 2 MG/ML
4 INJECTION INTRAMUSCULAR; INTRAVENOUS EVERY 6 HOURS PRN
Status: DISCONTINUED | OUTPATIENT
Start: 2019-05-26 | End: 2019-05-31 | Stop reason: HOSPADM

## 2019-05-26 RX ORDER — AMOXICILLIN 250 MG
1 CAPSULE ORAL 2 TIMES DAILY PRN
Status: DISCONTINUED | OUTPATIENT
Start: 2019-05-26 | End: 2019-05-31 | Stop reason: HOSPADM

## 2019-05-26 RX ORDER — DEXTROSE MONOHYDRATE 25 G/50ML
25-50 INJECTION, SOLUTION INTRAVENOUS
Status: DISCONTINUED | OUTPATIENT
Start: 2019-05-26 | End: 2019-05-31 | Stop reason: HOSPADM

## 2019-05-26 RX ORDER — NICOTINE POLACRILEX 4 MG
15-30 LOZENGE BUCCAL
Status: DISCONTINUED | OUTPATIENT
Start: 2019-05-26 | End: 2019-05-31 | Stop reason: HOSPADM

## 2019-05-26 RX ORDER — IOPAMIDOL 755 MG/ML
500 INJECTION, SOLUTION INTRAVASCULAR ONCE
Status: COMPLETED | OUTPATIENT
Start: 2019-05-26 | End: 2019-05-26

## 2019-05-26 RX ORDER — BUDESONIDE 0.5 MG/2ML
0.5 INHALANT ORAL 2 TIMES DAILY
Status: DISCONTINUED | OUTPATIENT
Start: 2019-05-26 | End: 2019-05-31 | Stop reason: HOSPADM

## 2019-05-26 RX ORDER — LIDOCAINE 4 G/G
2 PATCH TOPICAL DAILY PRN
COMMUNITY
End: 2021-03-22

## 2019-05-26 RX ORDER — LEVALBUTEROL INHALATION SOLUTION 1.25 MG/3ML
1 SOLUTION RESPIRATORY (INHALATION) EVERY 4 HOURS PRN
Status: DISCONTINUED | OUTPATIENT
Start: 2019-05-26 | End: 2019-05-31 | Stop reason: HOSPADM

## 2019-05-26 RX ORDER — NALOXONE HYDROCHLORIDE 0.4 MG/ML
.1-.4 INJECTION, SOLUTION INTRAMUSCULAR; INTRAVENOUS; SUBCUTANEOUS
Status: DISCONTINUED | OUTPATIENT
Start: 2019-05-26 | End: 2019-05-31 | Stop reason: HOSPADM

## 2019-05-26 RX ORDER — TRAZODONE HYDROCHLORIDE 50 MG/1
50 TABLET, FILM COATED ORAL AT BEDTIME
Status: DISCONTINUED | OUTPATIENT
Start: 2019-05-26 | End: 2019-05-31 | Stop reason: HOSPADM

## 2019-05-26 RX ORDER — IPRATROPIUM BROMIDE AND ALBUTEROL SULFATE 2.5; .5 MG/3ML; MG/3ML
SOLUTION RESPIRATORY (INHALATION)
Status: COMPLETED
Start: 2019-05-26 | End: 2019-05-26

## 2019-05-26 RX ORDER — SODIUM CHLORIDE 9 MG/ML
INJECTION, SOLUTION INTRAVENOUS CONTINUOUS
Status: DISCONTINUED | OUTPATIENT
Start: 2019-05-26 | End: 2019-05-27

## 2019-05-26 RX ORDER — TAMSULOSIN HYDROCHLORIDE 0.4 MG/1
0.4 CAPSULE ORAL DAILY
Status: DISCONTINUED | OUTPATIENT
Start: 2019-05-26 | End: 2019-05-31 | Stop reason: HOSPADM

## 2019-05-26 RX ORDER — PREDNISONE 10 MG/1
10 TABLET ORAL DAILY
Status: ON HOLD | COMMUNITY
End: 2019-05-31

## 2019-05-26 RX ORDER — ACETAMINOPHEN 325 MG/1
650 TABLET ORAL EVERY 4 HOURS PRN
Status: DISCONTINUED | OUTPATIENT
Start: 2019-05-26 | End: 2019-05-31 | Stop reason: HOSPADM

## 2019-05-26 RX ORDER — POLYETHYLENE GLYCOL 3350 17 G/17G
17 POWDER, FOR SOLUTION ORAL DAILY PRN
Status: DISCONTINUED | OUTPATIENT
Start: 2019-05-26 | End: 2019-05-31 | Stop reason: HOSPADM

## 2019-05-26 RX ORDER — METHYLPREDNISOLONE SODIUM SUCCINATE 125 MG/2ML
60 INJECTION, POWDER, LYOPHILIZED, FOR SOLUTION INTRAMUSCULAR; INTRAVENOUS EVERY 12 HOURS
Status: DISCONTINUED | OUTPATIENT
Start: 2019-05-26 | End: 2019-05-30

## 2019-05-26 RX ORDER — ONDANSETRON 4 MG/1
4 TABLET, ORALLY DISINTEGRATING ORAL EVERY 6 HOURS PRN
Status: DISCONTINUED | OUTPATIENT
Start: 2019-05-26 | End: 2019-05-31 | Stop reason: HOSPADM

## 2019-05-26 RX ADMIN — TRAZODONE HYDROCHLORIDE 50 MG: 50 TABLET ORAL at 21:01

## 2019-05-26 RX ADMIN — CLONAZEPAM 1 MG: 1 TABLET ORAL at 21:01

## 2019-05-26 RX ADMIN — TAMSULOSIN HYDROCHLORIDE 0.4 MG: 0.4 CAPSULE ORAL at 12:58

## 2019-05-26 RX ADMIN — AZITHROMYCIN MONOHYDRATE 500 MG: 500 INJECTION, POWDER, LYOPHILIZED, FOR SOLUTION INTRAVENOUS at 12:58

## 2019-05-26 RX ADMIN — INSULIN ASPART 4 UNITS: 100 INJECTION, SOLUTION INTRAVENOUS; SUBCUTANEOUS at 21:00

## 2019-05-26 RX ADMIN — IPRATROPIUM BROMIDE AND ALBUTEROL SULFATE 3 ML: .5; 3 SOLUTION RESPIRATORY (INHALATION) at 19:25

## 2019-05-26 RX ADMIN — DILTIAZEM HYDROCHLORIDE 180 MG: 180 CAPSULE, COATED, EXTENDED RELEASE ORAL at 21:01

## 2019-05-26 RX ADMIN — DILTIAZEM HYDROCHLORIDE 5 MG/HR: 5 INJECTION INTRAVENOUS at 06:52

## 2019-05-26 RX ADMIN — IPRATROPIUM BROMIDE AND ALBUTEROL SULFATE 3 ML: .5; 3 SOLUTION RESPIRATORY (INHALATION) at 09:37

## 2019-05-26 RX ADMIN — IPRATROPIUM BROMIDE AND ALBUTEROL SULFATE 3 ML: .5; 3 SOLUTION RESPIRATORY (INHALATION) at 12:32

## 2019-05-26 RX ADMIN — BUDESONIDE 0.5 MG: 0.5 INHALANT RESPIRATORY (INHALATION) at 19:26

## 2019-05-26 RX ADMIN — APIXABAN 5 MG: 5 TABLET, FILM COATED ORAL at 09:36

## 2019-05-26 RX ADMIN — SODIUM CHLORIDE 1000 ML: 9 INJECTION, SOLUTION INTRAVENOUS at 06:47

## 2019-05-26 RX ADMIN — IPRATROPIUM BROMIDE AND ALBUTEROL SULFATE 3 ML: .5; 3 SOLUTION RESPIRATORY (INHALATION) at 15:27

## 2019-05-26 RX ADMIN — IPRATROPIUM BROMIDE AND ALBUTEROL SULFATE 3 ML: .5; 3 SOLUTION RESPIRATORY (INHALATION) at 06:28

## 2019-05-26 RX ADMIN — BUDESONIDE 0.5 MG: 0.5 INHALANT RESPIRATORY (INHALATION) at 12:32

## 2019-05-26 RX ADMIN — IPRATROPIUM BROMIDE AND ALBUTEROL SULFATE 3 ML: .5; 3 SOLUTION RESPIRATORY (INHALATION) at 06:26

## 2019-05-26 RX ADMIN — APIXABAN 5 MG: 5 TABLET, FILM COATED ORAL at 21:01

## 2019-05-26 RX ADMIN — INSULIN ASPART 3 UNITS: 100 INJECTION, SOLUTION INTRAVENOUS; SUBCUTANEOUS at 16:03

## 2019-05-26 RX ADMIN — DILTIAZEM HYDROCHLORIDE 15 MG: 5 INJECTION INTRAVENOUS at 06:49

## 2019-05-26 RX ADMIN — METHYLPREDNISOLONE SODIUM SUCCINATE 125 MG: 125 INJECTION, POWDER, FOR SOLUTION INTRAMUSCULAR; INTRAVENOUS at 06:43

## 2019-05-26 RX ADMIN — Medication 2 G: at 06:53

## 2019-05-26 RX ADMIN — DILTIAZEM HYDROCHLORIDE 180 MG: 180 CAPSULE, COATED, EXTENDED RELEASE ORAL at 12:58

## 2019-05-26 RX ADMIN — SODIUM CHLORIDE 86 ML: 9 INJECTION, SOLUTION INTRAVENOUS at 08:24

## 2019-05-26 RX ADMIN — METHYLPREDNISOLONE SODIUM SUCCINATE 62.5 MG: 125 INJECTION, POWDER, FOR SOLUTION INTRAMUSCULAR; INTRAVENOUS at 18:12

## 2019-05-26 RX ADMIN — IOPAMIDOL 73 ML: 755 INJECTION, SOLUTION INTRAVENOUS at 08:23

## 2019-05-26 ASSESSMENT — ENCOUNTER SYMPTOMS
FEVER: 0
COUGH: 1
CHILLS: 0
SHORTNESS OF BREATH: 1

## 2019-05-26 ASSESSMENT — ACTIVITIES OF DAILY LIVING (ADL)
ADLS_ACUITY_SCORE: 18
ADLS_ACUITY_SCORE: 18

## 2019-05-26 ASSESSMENT — MIFFLIN-ST. JEOR: SCORE: 1704.32

## 2019-05-26 NOTE — PROGRESS NOTES
Pt presents with SOB and tachypnea. RR >28 and breath sounds are diminished. Pt has been placed on BIPAP 12/6 R12 30% I-time .9 sec and appears to be improving in condition. In-line nebs have been administered x3. Respiratory will continue to follow.

## 2019-05-26 NOTE — H&P
[facility]  Hospitalist Admission Note  Name: Tone Cooper    MRN: [id number]  YOB: 1950    Age: 68 year old  Date of admission: 5/26/2019  Primary care provider: Ana Pratt    Chief Complaint: Shortness of breath    Assessment and Plan:   Acute on chronic hypoxemic and hypercapnic respiratory failure, COPD exacerbation: His prolonged hospital stay in 2/2019 here and unable to wean from BiPAP so was discharged to [facility] for additional week.  Now following with Dr. Acevedo in pulmonary clinic and has bipap at home.  Goes to pulmonary rehab.  On 10 mg daily prednisone chronically.  Presenting with 3 to 4 days of worsening shortness of breath requiring BiPAP during the day.  Suspect viral respiratory versus bronchitis trigger resulting in COPD exacerbation.  Unlikely to turn around quickly given his history.  VBG showing PCO2 of 56 and pH 7.33 while on BiPAP.  CTPA negative for PE or obvious sign of infection.  -BiPAP continuous throughout the day today, p.o.  -Solu-Medrol 62.5 mg every 12 hours  -Scheduled duo nebs 4 times daily and Xopenex nebs as needed for tachycardia  -Azithromycin  -Previous issues with steroid-induced hyperglycemia so we will start medium dose sliding scale insulin  Addendum: Despite my long discussion with the patient regarding risks of aspiration and concern for worsening respiratory status if we take the BiPAP machine off to let him eat today he and his family are insistent that he is taken off the BiPAP now and allowed to eat now.  They are aware of the current risk so will take BiPAP off now and if respiratory status does not significantly decline over the next 15 to 30 minutes he will eat.    Possible viral URI versus bronchitis:   Cough productive of white and yellow sputum for the past 4 days.  CTPA showed some new nodules, but no opacity.  Possible viral URI versus bronchitis.  Does have leukocytosis to 16, but this may be stress demargination  and is chronically on prednisone.  Febrile.  -Azithromycin, if fevers develop add ceftriaxone  -Sputum culture    DONG: Creatinine baseline 1.1, currently up to 1.4.  Is on diuretics.  -Hold Lasix today and give gentle IV fluids 50 ml/hr overnight, BMP in the morning    A. fib with RVR: hx of A. fib and a flutter with previous ablation.  PTA on diltiazem and Eliquis.  Was on propafenone that was stopped by his cardiologist last month.  Setting with heart rates in the 130-150 range, EKG looks like atrial flutter.  Heart rate improved on diltiazem drip.  -Add back oral diltiazem and then stop diltiazem drip  -Continue Eliquis  -Telemetry    Lung nodules: CT chest showing a new since 2/11/2019 right middle lobe 0.4 cm spiculated nodule along with several additional smaller nonspecific nodules in the right middle lobe.  New right hilar adenopathy with a 1.5 cm node that has increased in size since last scan.  Former smoker so concerning for malignancy.  I discussed this with the patient.  He does follow with Dr. Acevedo in pulmonary clinic and he will need close follow-up with her for this which he understands.    History of PE: Continue Eliquis.    History MSSA bacteremia: Was treated for MSSA bacteremia in December 2018 which was thought to be secondary to pneumonia.    Chronic RV systolic CHF: Is on Lasix.  Has trace bilateral lower extremity edema.  Mild DONG so we will hold Lasix today.  Suspect decline in respiratory status is primarily secondary to COPD exacerbation and not CHF.  BNP not elevated and troponin undetectable.    History of tricuspid valve replacement    DVT Prophylaxis: eliquis  Code Status: Full Code  FEN: npo for bipap, NS at 50 ml/hr  Discharge Dispo: tbd  Estimated Disch Date / # of Days until Disch: Admit to ICU as inpatient for respiratory failure requiring BiPAP, given history anticipate multiple day hospitalization prior to improvement      History of Present Illness:  Tone Cooper is a 68  year old male with PMH including advanced COPD on BiPAP at at bedtime with prolonged hospitalization here in February for COPD exacerbation followed by 1 week at LTAC, A. fib and a flutter with previous ablation on Eliquis, PE, tricuspid valve replacement, HTN, chronic RV systolic CHF, former smoker, and previous MSSA bacteremia who presents with cough and shortness of breath for the past 3 to 4 days.  His cough is productive of white and sometimes yellow sputum.  He said progressive shortness of breath over this time.  He has continued on prednisone 10 mg daily for some time and follows closely in the pulmonary clinic.  He is using BiPAP at night, but has also been using during the day when feeling short of breath the past 2 days.  Has not had any fevers or chills.  Denies any chest pain or chest pressure sensation.  Has been more wheezy and is using nebulization treatments.  He was making progress in the pulmonary rehab clinic prior to this.  He reports his cardiologist took him off of propafenone last month.  He did miss his medications today including diltiazem and Eliquis.  On review of systems denies any headache, abdominal pain, nausea, vomiting, diarrhea, dysuria.    History obtained from patient, medical record, and from Dr. Blum in the emergency department.  Presenting with significant respiratory distress and was placed on BiPAP for this.  Tachypnea improved some.  Very poor air movement per report.  He was given 125 mg IV Solu-Medrol along with DuoNeb x2 and 2 g of IV magnesium.  Found to be in A. fib with RVR with heart rates 1 30-1 50 so was started on a diltiazem drip and given a dose of Eliquis.  Also given 1 L of normal saline.  EKG actually looks more like atrial flutter.  A CTPA was obtained due to missed Eliquis dosing that was negative for PE or obvious opacities to suggest pneumonia, but did show new nodules which the patient was informed of.  Does have leukocytosis to 16.2 but is afebrile.   Creatinine elevated from baseline at 1.43.  Troponin undetectable and BNP is 633.  VBG obtained later on BiPAP showing compensated hypercapnia.  Urinalysis unremarkable.  Admit to ICU for ongoing BiPAP therapy and IV steroids.     Past Medical History reviewed:  Past Medical History:   Diagnosis Date     Atrial flutter (H)      COPD (chronic obstructive pulmonary disease) (H)      Diverticulitis      Hypertension      Persistent atrial fibrillation (H) 4/28/09    ablation 7/1/2015     Premature beats      PVC (premature ventricular contraction)     s/p ablation 12/5/2017     Tricuspid valve disorder     Dr Aj, Hx of valve repair      Ventricular tachycardia (H)     nonsustained     Past Surgical History reviewed:  Past Surgical History:   Procedure Laterality Date     ABDOMEN SURGERY      hernia repair right     APPENDECTOMY       CARDIOVERSION  06/03/2009    successful for afib     CARDIOVERSION  4/20/15    successful for afib     CARDIOVERSION  5/28/15     H ABLATION ATRIAL FLUTTER       H ABLATION FOCAL AFIB  7/1/15    Left atrial linear ablation and pulmonary vein antrum ablation     H ABLATION PVC  12/5/16     INCISION AND DRAINAGE LOWER EXTREMITY, COMBINED Right 11/10/2016    Procedure: COMBINED INCISION AND DRAINAGE LOWER EXTREMITY;  Surgeon: Roger Pacheco MD;  Location: [facility] OR     LAPAROSCOPIC CHOLECYSTECTOMY  1/6/2012    Procedure:LAPAROSCOPIC CHOLECYSTECTOMY; LAPAROSCOPIC CHOLECYSTECTOMY ; Surgeon:ROGER PACHECO; Location:[facility] OR     ORTHOPEDIC SURGERY      Left hip replacement     REPAIR VALVE TRICUSPID  9/8/08    conversion to a bileaflet valve and application of a 30 mm Sanderson ring     SMALL INTESTINE SURGERY      had bowel obstruction age 9     VALVE REPLACEMENT      tricuspid     Social History reviewed:  Social History     Tobacco Use     Smoking status: Former Smoker     Packs/day: 1.50     Years: 41.00     Pack years: 61.50     Start date: 4/2/1977     Last attempt to quit:  2008     Years since quittin.4     Smokeless tobacco: Never Used   Substance Use Topics     Alcohol use: Yes     Alcohol/week: 0.0 oz     Comment: 3-6 per month     Social History     Social History Narrative    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 Auth Provider   acetaminophen (TYLENOL) 325 MG tablet Take 325-650 mg by mouth every 6 hours as needed for mild pain 2019 Yes Reported, Patient   albuterol (VENTOLIN HFA) 108 (90 Base) MCG/ACT inhaler Inhale 1-2 puffs into the lungs every 4 hours (while awake) PRN 2019 Yes Nata Mcduffie MD   apixaban ANTICOAGULANT (ELIQUIS) 5 MG tablet Take 1 tablet (5 mg) by mouth 2 times daily 2019 Yes Jon Rocha MD   budesonide (PULMICORT) 0.5 MG/2ML neb solution Take 2 mLs (0.5 mg) by nebulization 2 times daily 2019 Yes Best Tucker MD   clonazePAM (KLONOPIN) 1 MG tablet Take 1 tablet (1 mg) by mouth At Bedtime 2019 Yes Best Tucker MD   diltiazem ER COATED BEADS (CARDIZEM CD/CARTIA XT) 180 MG 24 hr capsule Take 1 capsule (180 mg) by mouth 2 times daily Hold for SBP < 110 or HR < 60 2019 Yes Best Tucker MD   famotidine (PEPCID) 20 MG tablet Take 1 tablet (20 mg) by mouth 2 times daily 2019 Yes Best Tucker MD   furosemide (LASIX) 20 MG tablet Take 20 mg by mouth 3 times daily  2019 Yes Unknown, Entered By History   ipratropium - albuterol 0.5 mg/2.5 mg/3 mL (DUONEB) 0.5-2.5 (3) MG/3ML neb solution Take 1  "vial (3 mLs) by nebulization 4 times daily 5/25/2019 Yes Best Tucker MD   levalbuterol (XOPENEX) 1.25 MG/3ML neb solution Take 3 mLs (1.25 mg) by nebulization every 4 hours as needed for wheezing or shortness of breath / dyspnea 5/25/2019 Yes Marco Masters MD   Lidocaine (LIDOCARE) 4 % Patch Place 2 patches onto the skin every 24 hours To area of pain on back 5/25/2019 Yes Best Tucker MD   order for DME Equipment being ordered: Nebulizer 5/25/2019 Yes Nata Mcduffie MD   order for DME Oxygen for nocturnal use. 1 LPM via nasal cannula  Testing done at home in chronic stable state.  Condition is COPD.  Patient does not have Obstructive Sleep Apnea.  Overnight oximetry revealed 30.3 minutes of hypoxia (<= 88%).  Duration: Lifetime (99 months). 5/25/2019 Yes Tyson Sanders MD   predniSONE (DELTASONE) 10 MG tablet Prednisone 40 mg by mouth daily x 3 days, then 20 by mouth x 3 days, then continue 10 mg daily until follow up with pulmonology.  Patient taking differently: 10 mg daily . 5/25/2019 Yes Best Tucker MD   tamsulosin (FLOMAX) 0.4 MG capsule Take 0.4 mg by mouth daily 5/25/2019 Yes Unknown, Entered By History   traZODone (DESYREL) 50 MG tablet Take 50 mg by mouth At Bedtime  5/25/2019 Yes Unknown, Entered By History     Review of Systems:  A Comprehensive greater than 10 system review of systems was carried out.  Pertinent positives and negatives are noted above.  Otherwise negative.     Physical Exam:  Blood pressure 127/86, pulse 106, temperature 97.4  F (36.3  C), temperature source Tympanic, resp. rate 19, height 1.803 m (5' 11\"), weight 90.7 kg (200 lb), SpO2 97 %.  Wt Readings from Last 1 Encounters:   05/26/19 90.7 kg (200 lb)     Exam:  Constitutional: Awake, NAD   Eyes: sclera white   HEENT: atraumatic, MMM  Respiratory: On BiPAP, mild tachypnea, no significant wheeze but poor airflow movement overall.  No focal crackles  Cardiovascular: Irregularly, irregular " rhythm, regular rate.  No murmur  GI: non-tender, not distended, bowel sounds present  Skin: no rash or lesions, acyanotic  Musculoskeletal/extremities: atraumatic, no major deformities.  Bilateral lower extremity edema  Neurologic: A&O, speech clear  Psychiatric: calm, cooperative, normal affect    Lab and imaging data personally reviewed:  Labs:  Recent Labs   Lab 05/26/19  0930   PHV 7.33   PO2V 35   PCO2V 56*   HCO3V 29*     Recent Labs   Lab 05/26/19  0650   WBC 16.2*   HGB 16.3   HCT 53.3*   MCV 93        Recent Labs   Lab 05/26/19  0650      POTASSIUM 4.5   CHLORIDE 101   CO2 32   ANIONGAP 4   *   BUN 36*   CR 1.43*   GFRESTIMATED 50*   GFRESTBLACK 58*   WILBERT 9.1     Recent Labs   Lab 05/26/19  0650   NTBNPI 633     Recent Labs   Lab 05/26/19  0650   INR 1.04     No results for input(s): LACT in the last 168 hours.  Recent Labs   Lab 05/26/19  0650   TROPI <0.015     Recent Labs   Lab 05/26/19  0930   COLOR Light Yellow   APPEARANCE Clear   URINEGLC Negative   URINEBILI Negative   URINEKETONE 10*   SG 1.010   UBLD Negative   URINEPH 6.0   PROTEIN 10*   NITRITE Negative   LEUKEST Negative   RBCU <1   WBCU 1       EKG: Atrial flutter with RVR    Imaging:  Recent Results (from the past 24 hour(s))   XR Chest Port 1 View    Narrative    CHEST ONE VIEW PORTABLE   5/26/2019 6:40 AM     HISTORY: Shortness of breath.    COMPARISON: 3/28/2019.      Impression    IMPRESSION: Prior median sternotomy. Chronic-appearing interstitial  abnormalities similar to prior. No airspace consolidation,  pneumothorax, or pleural effusion.    SAMANTHA OLIVAS MD   Chest CT, IV contrast only - PE protocol    Narrative    CT CHEST PULMONARY EMBOLISM WITH CONTRAST 5/26/2019 8:34 AM     HISTORY: Shortness of breath. Off blood thinner.    CONTRAST DOSE: 73mL Isovue-370.    Radiation dose for this scan was reduced using automated exposure  control, adjustment of the mA and/or kV according to patient size, or  iterative  reconstruction technique.    COMPARISON: 2/11/2019 CT    FINDINGS:  There is a good contrast bolus within the pulmonary  arteries. Pulmonary embolism within the right upper lobe is no longer  definitively seen. No new pulmonary arterial filling defects are  demonstrated to indicate pulmonary embolism. Although contrast  enhancement within the aorta is less optimal, there is no evidence of  aortic dissection. Aortic and coronary artery calcifications are  noted. Right hilar adenopathy is noted with a lymph node measuring 1.5  cm which has increased in size since February. No mediastinal or left  hilar adenopathy. 0.4 cm nodule is noted within the right middle lobe.  This has a slightly spiculated margin and appears new since February.  There are several additional smaller nodules in the right middle lobe  some which appear new and some which are unchanged. Pulmonary  emphysematous and fibrotic changes are noted. No pleural effusion.  Hepatic fatty infiltration is noted.      Impression    IMPRESSION:  1. No evidence of acute pulmonary embolism.  2. Pulmonary emphysema and fibrosis.  3. Right middle lobe 0.4 cm spiculated nodule, new since 2/11/2019 and  suspicious for neoplastic disease. Several additional smaller  nonspecific nodules are also noted in the right middle lobe.  4. Right hilar adenopathy with a 1.5 cm node which has increased in  size since 2/11/2019.  5. Coronary artery calcification.  6. Hepatic fatty infiltration--possible etiologies include consumption  of alcohol or excessive carbohydrate intake, especially  sugar/fructose.  Metabolic syndrome commonly occurs in combination  with nonalcoholic fatty liver disease. Although often reversible,  nonalcoholic fatty liver disease can progress to steatohepatitis and  cirrhosis.    MD Felipe ROSAS MD  Hospitalist  [facility]

## 2019-05-26 NOTE — ED TRIAGE NOTES
Pt has been sob for 2 days at home, here with daughter. Used his CPAP at home yesterday as well as all night. 2 word sentence.

## 2019-05-26 NOTE — PHARMACY-ADMISSION MEDICATION HISTORY
Admission medication history interview status for this patient is complete. See [facility] admission navigator for allergy information, prior to admission medications and immunization status.     Medication history interview source(s):Patient  Medication history resources (including written lists, pill bottles, clinic record):Care Everywhere  Primary pharmacy:[facility]    Changes made to PTA medication list:  Added: None  Deleted: Pepcid  Changed: Lasix from 20mg tid to 60mg bid, Prednisone taper to 10mg daily, Lidocaine patch to prn    Actions taken by pharmacist (provider contacted, etc):None     Additional medication history information:None    Medication reconciliation/reorder completed by provider prior to medication history? No    Do you take OTC medications (eg tylenol, ibuprofen, fish oil, eye/ear drops, etc)? Y    Prior to Admission medications    Medication Sig Last Dose Taking? Auth Provider   budesonide (PULMICORT) 0.5 MG/2ML neb solution Take 2 mLs (0.5 mg) by nebulization 2 times daily 5/25/2019 Yes Best Tucker MD   clonazePAM (KLONOPIN) 1 MG tablet Take 1 tablet (1 mg) by mouth At Bedtime 5/25/2019 Yes Best Tucker MD   diltiazem ER COATED BEADS (CARDIZEM CD/CARTIA XT) 180 MG 24 hr capsule Take 1 capsule (180 mg) by mouth 2 times daily Hold for SBP < 110 or HR < 60 5/25/2019 Yes Best Tucker MD   furosemide (LASIX) 20 MG tablet Take 60 mg by mouth 2 times daily at 8am and early afternoon 5/25/2019 Yes Unknown, Entered By History   ipratropium - albuterol 0.5 mg/2.5 mg/3 mL (DUONEB) 0.5-2.5 (3) MG/3ML neb solution Take 1 vial (3 mLs) by nebulization 4 times daily 5/25/2019 Yes Best Tucker MD   levalbuterol (XOPENEX) 1.25 MG/3ML neb solution Take 3 mLs (1.25 mg) by nebulization every 4 hours as needed for wheezing or shortness of breath / dyspnea 5/25/2019 Yes Marco Masters MD   order for DME Equipment being ordered: Nebulizer 5/25/2019 Yes Nata Mcduffie MD   order for  DME Oxygen for nocturnal use. 1 LPM via nasal cannula  Testing done at home in chronic stable state.  Condition is COPD.  Patient does not have Obstructive Sleep Apnea.  Overnight oximetry revealed 30.3 minutes of hypoxia (<= 88%).  Duration: Lifetime (99 months). 5/25/2019 Yes Tysno Sanders MD   predniSONE (DELTASONE) 10 MG tablet Take 10 mg by mouth daily 5/25/2019 at Unknown time Yes Unknown, Entered By History   tamsulosin (FLOMAX) 0.4 MG capsule Take 0.4 mg by mouth daily 5/25/2019 Yes Unknown, Entered By History   traZODone (DESYREL) 50 MG tablet Take 50 mg by mouth At Bedtime  5/25/2019 Yes Unknown, Entered By History   acetaminophen (TYLENOL) 325 MG tablet Take 325-650 mg by mouth every 6 hours as needed for mild pain Unknown at Unknown time  Reported, Patient   albuterol (VENTOLIN HFA) 108 (90 Base) MCG/ACT inhaler Inhale 1-2 puffs into the lungs every 4 hours (while awake) PRN Unknown at Unknown time  Nata Mcduffie MD   apixaban ANTICOAGULANT (ELIQUIS) 5 MG tablet Take 1 tablet (5 mg) by mouth 2 times daily More than a month at Unknown time  Jon Rocha MD   Lidocaine (LIDOCARE) 4 % Patch Place 2 patches onto the skin daily as needed for moderate pain More than a month at Unknown time  Unknown, Entered By History

## 2019-05-26 NOTE — ED NOTES
Pt resting in bed with BiPap in place. Dilt and mag running. Awaiting ICU placement. Wife at bedside. VSS. Will continue to monitor.

## 2019-05-26 NOTE — LETTER
Key Recommendations:    CTS following for COPD and High eddie score. He says he was at [place] for a couple of weeks and had been doing well until the last couple of days when he became increasingly short of breath and was needing his bipap more. He continues to use home oxygen at 3L from [facility]. He has a nebulizer that he uses as he was prescribed. He has a bipap that he faithfully wears all night and has been needing it more during the day. He says he follows with Dr Acevedo and he really likes her care. He has been following with Pulmonary rehab at [place] twice a week and feels it is getting him stronger. He saw Dr Acevedo in April and already has follow up scheduled with her. He anticipates he will discharge in a day or 2 back home with his wife.     He should follow up with PCP with in 1 week and continue Pulm Rehab as scheduled. He should continue using his BiPap and recommended by Dr Acevedo.    discharge recommendations:      Lana Sofia    AVS/Discharge Summary is the source of truth; this is a helpful guide for improved communication of patient story

## 2019-05-26 NOTE — LETTER
Transition Communication Hand-off for Care Transitions to Next Level of Care Provider    Name: Tone Cooper  : 1950  MRN #: [id number]  Primary Care Provider: Ana Pratt  Primary Care MD Name: [facility]  Primary Clinic: [facility] [address] [facility] [address]  Primary Care Clinic Name: Dr Pratt  Reason for Hospitalization:  COPD exacerbation (H) [J44.1]  Admit Date/Time: 2019  6:15 AM  Discharge Date: 19  Payor Source: Payor: [facility] / Plan: [facility] MEDICARE NON FPA / Product Type: HMO /     Readmission Assessment Measure (CALEB) Risk Score/category: HIGH         Reason for Communication Hand-off Referral: Admission diagnoses: COPD  Fragility    Discharge Plan:       Concern for non-adherence with plan of care:   NO  Discharge Needs Assessment:  Needs      Most Recent Value   Equipment Currently Used at Home  walker, rolling   # of Referrals Placed by CTS  Post Acute Facilities          Already enrolled in Tele-monitoring program and name of program:  na  Follow-up specialty is recommended: Yes    Follow-up plan:    Future Appointments   Date Time Provider Department Center   2019  9:00 AM 1, [facility]   2019  9:00 AM 1, [facility]   2019  9:00 AM 1, [facility]   2019  9:00 AM 1, [facility]   2019  9:00 AM 1, [facility]   2019 10:00 AM [facility]1 [facility]   2019 11:00 AM UC PFL B [facility]   2019 11:30 AM Octavia Acevedo MD [facility]   2019  3:00 PM 1, [facility]   2019  9:00 AM 1, [facility]   2019  9:00 AM 1, [facility]       Any outstanding tests or procedures:    Procedures     Future Labs/Procedures    BIPAP treatment     Comments:    BIPAP: 14/6, Rate 12, 30% FiO2. Use while sleeping  and/or per symptoms.    Oxygen - Nasal cannula     Comments:    3-4 Lpm by nasal cannula to keep O2 sats 92% or greater.          Referrals     Future Labs/Procedures    Occupational Therapy Adult Consult     Comments:    Evaluate and treat as clinically indicated.    Reason:  Deconditioning. Steroid use COPD    Physical Therapy Adult Consult     Comments:    Evaluate and treat as clinically indicated.    Reason:  Deconditioning. Steroid use COPD          Key Recommendations:    CTS following for COPD and High eddie score. He says he was at [place] for a couple of weeks and had been doing well until the last couple of days when he became increasingly short of breath and was needing his bipap more. He continues to use home oxygen at 3L from [facility]. He has a nebulizer that he uses as he was prescribed. He has a bipap that he faithfully wears all night and has been needing it more during the day. He says he follows with Dr Acevedo and he really likes her care. He has been following with Pulmonary rehab at [place] twice a week and feels it is getting him stronger. He saw Dr Acevedo in April and already has follow up scheduled with her.     He should follow up with PCP with in 1 week and continue Pulm Rehab as scheduled. He should continue using his BiPap and recommended by Dr Acevedo.    discharge recommendations: Pt was discharged to [facility]       Lana Sofia    AVS/Discharge Summary is the source of truth; this is a helpful guide for improved communication of patient story

## 2019-05-26 NOTE — PLAN OF CARE
ICU End of Shift Summary.  For vital signs and complete assessments, please see documentation flowsheets.     Pertinent assessments: AOX4, LS dim on NC 3L baseline per pt, tele A-fib CVR, GI/ no n/v/d smear of BM X 1, voids ok. BIPAP taken off due to pt insisting on eating, did not want to wait for 30 mins until safe to eat despite being educated on potential aspiration risks, pt & family adamant about eating regardless, Dr. Aquino aware.  Major Shift Events: see above  Plan (Upcoming Events): continue abx, nebs, steroids, follow labs, monitor resp status.  Discharge/Transfer Needs: TBD    Addendum: pt had just finished eating his meal and was having increased WOB/SOB, so he asked to be put back on BIPAP, pt is aware of possible aspiration risk.    Bedside Shift Report Completed : Y  Bedside Safety Check Completed: Y

## 2019-05-26 NOTE — ED PROVIDER NOTES
History     Chief Complaint:  Shortness of Breath    HPI   Tone Cooper is a 68 year old male with a history of atrial fibrillation, COPD, and PE on Eliquis who presents with shortness of breath. The patient has a longstanding history of COPD for which he has been hospitalized on multiple occasions. Yesterday, the patient reports he woke up and started feeling short of breath, which progressed throughout the day, so he used nebulizations intermittently (last 4854-3027 last night). However, this morning, the patient states he woke up severely short of breath despite being on BiPAP all night, so he had his spouse bring him to the ED for further evaluation. Here, the patient denies any fevers, chills, recent ill contacts, or other acute symptoms. His wife notes he has a chronic cough, which seems more congested as of the last few days, so she is concerned for possible pneumonia. The patient reports he is in and out of atrial fibrillation, but was taken off his Propafenone by cardiologist Dr. Britt at the end of April 2019. The patient notes he has not taken his Aspirin or Eliquis in 2-3 days because he ran out.    PE/DVT RISK FACTORS:  Sex:    Male  Hormones:   No  Tobacco:   Former smoker, quit 2008  Cancer:   No  Travel:   No  Surgery:   No  Other immobilization: No  Personal history:  Yes  Family history:  No    Allergies:  Amlodipine  Flecainide     Medications:    Eliquis  Aspirin  Albuterol inhaler  Pulmicort neb solution  Diltiazem  Famotidine  Lasix  Duoneb solution  Xopenex solution  Prednisone  Trazodone    Past Medical History:    Atrial flutter  Persistent atrial fibrillation  Chronic obstructive pulmonary disease  Premature ventricular contraction  Pulmonary embolism  Wide-complex tachycardia    Past Surgical History:    Right hernia repair  Appendectomy  Atrial flutter ablation  Atrial fibrillation ablation  Incision and drainage, right lower extremity  Laparoscopic cholecystectomy  Left hip  "replacement  Tricuspid valve replacement  Bowel obstruction repair    Family History:    Prostate cancer  Emphysema  Hypertension  Cerebrovascular disease    Social History:  Smoking status: Former smoker, quit 1/2/2008  Alcohol use: Yes, 3-6 monthly  Drug use: No  PCP: Ana Shayla Pratt  Presents to the ED with his spouse  Marital Status:  [2]     Review of Systems   Constitutional: Negative for chills and fever.   HENT: Positive for congestion.    Respiratory: Positive for cough and shortness of breath.    Cardiovascular: Negative for chest pain.   All other systems reviewed and are negative.    Physical Exam     Patient Vitals for the past 24 hrs:   BP Temp Temp src Pulse Heart Rate Resp SpO2 Height Weight   05/26/19 0715 127/86 -- -- 106 99 19 97 % -- --   05/26/19 0700 (!) 129/93 -- -- 103 104 21 96 % -- --   05/26/19 0645 (!) 137/96 -- -- 125 121 25 96 % -- --   05/26/19 0639 -- 97.4  F (36.3  C) Tympanic -- -- -- -- -- --   05/26/19 0634 (!) 159/111 -- -- -- 103 29 91 % 1.803 m (5' 11\") 90.7 kg (200 lb)     Physical Exam   Constitutional: He appears well-developed and well-nourished. He appears distressed.   HENT:   Right Ear: External ear normal.   Left Ear: External ear normal.   Mouth/Throat: Oropharynx is clear and moist. No oropharyngeal exudate.   TM's clear bilaterally   Eyes: Pupils are equal, round, and reactive to light. Conjunctivae and EOM are normal. No scleral icterus.   Neck: Normal range of motion. Neck supple. No JVD present.   Cardiovascular: Normal heart sounds and intact distal pulses. Exam reveals no gallop and no friction rub.   No murmur heard.  Irregularly irregular   Pulmonary/Chest: He is in respiratory distress. He has wheezes. He has no rales.   Tachypneic, retractions throughout B lung fields, faint exp wheezing at bases, dec air movement throughout   Abdominal: Soft. Bowel sounds are normal. He exhibits no distension and no mass. There is no tenderness. "   Musculoskeletal: Normal range of motion. He exhibits no edema.   Lymphadenopathy:     He has no cervical adenopathy.   Neurological: He is alert.   Skin: Skin is warm and dry. No rash noted.   Psychiatric: He has a normal mood and affect.     Emergency Department Course   ECG (06:18:32):  Rate 131 bpm. VA interval *. QRS duration 78. QT/QTc 276/407. P-R-T axes * 209 74. Atrial fibrillation with rapid ventricular response. Right superior axis deviation. Possible inferior infarct, age undetermined. Abnormal ECG. Interpreted at 0618 by Xavier Blum MD.    Imaging:  Radiographic findings were communicated with the patient who voiced understanding of the findings.    XR Chest Port 1 view:  Prior median sternotomy. Chronic-appearing interstitial  abnormalities similar to prior. No airspace consolidation,  pneumothorax, or pleural effusion.  As read by Radiology.    Chest CT, IV contrast only - PE protocol:  1. No evidence of acute pulmonary embolism.  2. Pulmonary emphysema and fibrosis.  3. Right middle lobe 0.4 cm spiculated nodule, new since 2/11/2019 and  suspicious for neoplastic disease. Several additional smaller  nonspecific nodules are also noted in the right middle lobe.  4. Right hilar adenopathy with a 1.5 cm node which has increased in  size since 2/11/2019.  5. Coronary artery calcification.  6. Hepatic fatty infiltration--possible etiologies include consumption  of alcohol or excessive carbohydrate intake, especially  sugar/fructose.  Metabolic syndrome commonly occurs in combination  with nonalcoholic fatty liver disease. Although often reversible,  nonalcoholic fatty liver disease can progress to steatohepatitis and  cirrhosis.  As read by Radiology.    Laboratory:  CBC: WBC 16.2 (H), o/w WNL (HGB 16.3, )  CMP: Glucose 102 (H), BUN 36 (H), Creatinine 1.43 (H), GFR 50 (L), Albumin 3.3 (L), Protein total 6.7 (H), o/w WNL  INR: 1.04  BNP: 633  Troponin (0650): <0.015  Blood gas venous: pH 7.33,  pCO2 56 (H), pO2 35, bicarbonate 29 (H), FIO2 30, oxyhemoglobin 64, base excess 1.7  UA: Ketones 10, protein albumin 10, mucous present, o/w negative    Interventions:  0626: DuoNeb, 2.5mg/3 mL, Inhalation solution, Nebulizer   0628: DuoNeb, 2.5mg/3 mL, Inhalation solution, Nebulizer   0643: 125 mg Solu-medrol IV  0647: NS 1L IV Bolus  0649: 15 mg Diltiazem IV  0652: 125 mg Diltiazem IV infusion - 5 mg/hour  0653: 2 g Magnesium sulfate IV  0936: 5 mg Eliquis PO  0937: DuoNeb, 2.5mg/3 mL, Inhalation solution, Nebulizer     Emergency Department Course:  Past medical records, nursing notes, and vitals reviewed.  0617: I performed an exam of the patient and obtained history, as documented above.  ECG performed, results above.  IV inserted and blood drawn. The patient was placed on continuous cardiac monitoring and pulse oximetry. UA performed, results above.  The patient was sent for a chest x-ray while in the emergency department, findings above.    0623: BiPAP initiated by RT. Diltiazem ordered    0652: I rechecked the patient. Explained findings to the patient and his spouse. He consents to admission.     0717: I rechecked the patient. He will be sent for a chest CT, findings above. Diltiazem gtt at 5mg/hr, HR , still atrial fib    0834: I rechecked the patient after he returned from CT.  0902: I rechecked the patient and discussed the results of his chest CT.    0912: I discussed the patient's case with Dr. Aquino of the hospitalist service, who will admit the patient to an ICU bed for further monitoring, evaluation, and treatment.     Impression & Plan    Medical Decision Making:  Tone Cooper is a 68 year old male who presents in respiratory distress that has been ongoing for the past two days. The patient had retractions and was found to be in rapid atrial fibrillation, which he says is a known diagnosis of his. Recently, he was taken off his Propafenone. He was immediately placed on BiPAP here and given  multiple nebs as well as magnesium IV. We also started Diltiazem to control his heart rate as this is likely causing some of his symptoms. His symptoms improved, but he still required BiPAP for respiratory status. There was no evidence of pneumonia on x-ray or infectious source identified at this point. His elevated white count is likely due to stress. He did not take any of his Eliquis today or yesterday. Therefore, we did rule him out for PE. He does a new nodule on his CT, so I informed his wife he will need to follow-up with his pulmonologist as an outpatient. He was admitted to ICU with continued exacerbation of his COPD. Dr. Aquino took over care of him.    Diagnosis:    ICD-10-CM   1. COPD exacerbation (H) J44.1   2. Atrial fib with RVR      Critical care time : 30 minutes    Disposition: Admitted to ICU    Wilda No  5/26/2019   [facility] EMERGENCY DEPARTMENT    Wilda PANIAGUA, am serving as a scribe at 6:17 AM on 5/26/2019 to document services personally performed by Xavier Blum MD based on my observations and the provider's statements to me.      Xavier Blum MD  05/26/19 0510

## 2019-05-27 LAB
ANION GAP SERPL CALCULATED.3IONS-SCNC: 3 MMOL/L (ref 3–14)
BASE EXCESS BLDV CALC-SCNC: 3.9 MMOL/L
BUN SERPL-MCNC: 36 MG/DL (ref 7–30)
CALCIUM SERPL-MCNC: 8.6 MG/DL (ref 8.5–10.1)
CHLORIDE SERPL-SCNC: 106 MMOL/L (ref 94–109)
CO2 SERPL-SCNC: 29 MMOL/L (ref 20–32)
CREAT SERPL-MCNC: 1.07 MG/DL (ref 0.66–1.25)
ERYTHROCYTE [DISTWIDTH] IN BLOOD BY AUTOMATED COUNT: 13.6 % (ref 10–15)
GFR SERPL CREATININE-BSD FRML MDRD: 71 ML/MIN/{1.73_M2}
GLUCOSE BLDC GLUCOMTR-MCNC: 147 MG/DL (ref 70–99)
GLUCOSE BLDC GLUCOMTR-MCNC: 214 MG/DL (ref 70–99)
GLUCOSE BLDC GLUCOMTR-MCNC: 216 MG/DL (ref 70–99)
GLUCOSE BLDC GLUCOMTR-MCNC: 217 MG/DL (ref 70–99)
GLUCOSE BLDC GLUCOMTR-MCNC: 253 MG/DL (ref 70–99)
GLUCOSE BLDC GLUCOMTR-MCNC: 293 MG/DL (ref 70–99)
GLUCOSE SERPL-MCNC: 205 MG/DL (ref 70–99)
HCO3 BLDV-SCNC: 33 MMOL/L (ref 21–28)
HCT VFR BLD AUTO: 47.2 % (ref 40–53)
HGB BLD-MCNC: 14.2 G/DL (ref 13.3–17.7)
MCH RBC QN AUTO: 28.4 PG (ref 26.5–33)
MCHC RBC AUTO-ENTMCNC: 30.1 G/DL (ref 31.5–36.5)
MCV RBC AUTO: 94 FL (ref 78–100)
O2/TOTAL GAS SETTING VFR VENT: ABNORMAL %
OXYHGB MFR BLDV: 82 %
PCO2 BLDV: 67 MM HG (ref 40–50)
PH BLDV: 7.3 PH (ref 7.32–7.43)
PLATELET # BLD AUTO: 178 10E9/L (ref 150–450)
PO2 BLDV: 51 MM HG (ref 25–47)
POTASSIUM SERPL-SCNC: 4.9 MMOL/L (ref 3.4–5.3)
RBC # BLD AUTO: 5 10E12/L (ref 4.4–5.9)
SODIUM SERPL-SCNC: 138 MMOL/L (ref 133–144)
WBC # BLD AUTO: 13.4 10E9/L (ref 4–11)

## 2019-05-27 PROCEDURE — 25000132 ZZH RX MED GY IP 250 OP 250 PS 637: Performed by: INTERNAL MEDICINE

## 2019-05-27 PROCEDURE — 40000275 ZZH STATISTIC RCP TIME EA 10 MIN

## 2019-05-27 PROCEDURE — 20000003 ZZH R&B ICU

## 2019-05-27 PROCEDURE — 99233 SBSQ HOSP IP/OBS HIGH 50: CPT | Performed by: INTERNAL MEDICINE

## 2019-05-27 PROCEDURE — 25000131 ZZH RX MED GY IP 250 OP 636 PS 637: Performed by: INTERNAL MEDICINE

## 2019-05-27 PROCEDURE — 82805 BLOOD GASES W/O2 SATURATION: CPT | Performed by: INTERNAL MEDICINE

## 2019-05-27 PROCEDURE — 36415 COLL VENOUS BLD VENIPUNCTURE: CPT | Performed by: INTERNAL MEDICINE

## 2019-05-27 PROCEDURE — 25000125 ZZHC RX 250: Performed by: INTERNAL MEDICINE

## 2019-05-27 PROCEDURE — 85027 COMPLETE CBC AUTOMATED: CPT | Performed by: INTERNAL MEDICINE

## 2019-05-27 PROCEDURE — 94640 AIRWAY INHALATION TREATMENT: CPT | Mod: 76

## 2019-05-27 PROCEDURE — 94660 CPAP INITIATION&MGMT: CPT

## 2019-05-27 PROCEDURE — 80048 BASIC METABOLIC PNL TOTAL CA: CPT | Performed by: INTERNAL MEDICINE

## 2019-05-27 PROCEDURE — 25800030 ZZH RX IP 258 OP 636: Performed by: INTERNAL MEDICINE

## 2019-05-27 PROCEDURE — 27210300 ZZH CANNULA HIGH FLOW, ADULT

## 2019-05-27 PROCEDURE — 00000146 ZZHCL STATISTIC GLUCOSE BY METER IP

## 2019-05-27 PROCEDURE — 25000128 H RX IP 250 OP 636: Performed by: INTERNAL MEDICINE

## 2019-05-27 PROCEDURE — 94640 AIRWAY INHALATION TREATMENT: CPT

## 2019-05-27 RX ORDER — FUROSEMIDE 40 MG
40 TABLET ORAL
Status: DISCONTINUED | OUTPATIENT
Start: 2019-05-27 | End: 2019-05-31 | Stop reason: HOSPADM

## 2019-05-27 RX ADMIN — INSULIN GLARGINE 15 UNITS: 100 INJECTION, SOLUTION SUBCUTANEOUS at 16:44

## 2019-05-27 RX ADMIN — FUROSEMIDE 40 MG: 40 TABLET ORAL at 11:44

## 2019-05-27 RX ADMIN — IPRATROPIUM BROMIDE AND ALBUTEROL SULFATE 3 ML: .5; 3 SOLUTION RESPIRATORY (INHALATION) at 11:55

## 2019-05-27 RX ADMIN — BUDESONIDE 0.5 MG: 0.5 INHALANT RESPIRATORY (INHALATION) at 19:27

## 2019-05-27 RX ADMIN — INSULIN ASPART 2 UNITS: 100 INJECTION, SOLUTION INTRAVENOUS; SUBCUTANEOUS at 00:04

## 2019-05-27 RX ADMIN — LIDOCAINE 2 PATCH: 560 PATCH PERCUTANEOUS; TOPICAL; TRANSDERMAL at 08:31

## 2019-05-27 RX ADMIN — INSULIN ASPART 3 UNITS: 100 INJECTION, SOLUTION INTRAVENOUS; SUBCUTANEOUS at 16:08

## 2019-05-27 RX ADMIN — INSULIN ASPART 1 UNITS: 100 INJECTION, SOLUTION INTRAVENOUS; SUBCUTANEOUS at 08:02

## 2019-05-27 RX ADMIN — INSULIN ASPART 2 UNITS: 100 INJECTION, SOLUTION INTRAVENOUS; SUBCUTANEOUS at 03:46

## 2019-05-27 RX ADMIN — AZITHROMYCIN MONOHYDRATE 500 MG: 500 INJECTION, POWDER, LYOPHILIZED, FOR SOLUTION INTRAVENOUS at 12:04

## 2019-05-27 RX ADMIN — IPRATROPIUM BROMIDE AND ALBUTEROL SULFATE 3 ML: .5; 3 SOLUTION RESPIRATORY (INHALATION) at 15:18

## 2019-05-27 RX ADMIN — CLONAZEPAM 1 MG: 1 TABLET ORAL at 21:57

## 2019-05-27 RX ADMIN — TAMSULOSIN HYDROCHLORIDE 0.4 MG: 0.4 CAPSULE ORAL at 08:37

## 2019-05-27 RX ADMIN — BUDESONIDE 0.5 MG: 0.5 INHALANT RESPIRATORY (INHALATION) at 07:29

## 2019-05-27 RX ADMIN — INSULIN ASPART 2 UNITS: 100 INJECTION, SOLUTION INTRAVENOUS; SUBCUTANEOUS at 12:03

## 2019-05-27 RX ADMIN — APIXABAN 5 MG: 5 TABLET, FILM COATED ORAL at 21:57

## 2019-05-27 RX ADMIN — TRAZODONE HYDROCHLORIDE 50 MG: 50 TABLET ORAL at 21:57

## 2019-05-27 RX ADMIN — DILTIAZEM HYDROCHLORIDE 180 MG: 180 CAPSULE, COATED, EXTENDED RELEASE ORAL at 08:37

## 2019-05-27 RX ADMIN — IPRATROPIUM BROMIDE AND ALBUTEROL SULFATE 3 ML: .5; 3 SOLUTION RESPIRATORY (INHALATION) at 07:29

## 2019-05-27 RX ADMIN — APIXABAN 5 MG: 5 TABLET, FILM COATED ORAL at 08:37

## 2019-05-27 RX ADMIN — IPRATROPIUM BROMIDE AND ALBUTEROL SULFATE 3 ML: .5; 3 SOLUTION RESPIRATORY (INHALATION) at 19:27

## 2019-05-27 RX ADMIN — METHYLPREDNISOLONE SODIUM SUCCINATE 62.5 MG: 125 INJECTION, POWDER, FOR SOLUTION INTRAMUSCULAR; INTRAVENOUS at 17:16

## 2019-05-27 RX ADMIN — ACETAMINOPHEN 650 MG: 325 TABLET, FILM COATED ORAL at 21:58

## 2019-05-27 RX ADMIN — DILTIAZEM HYDROCHLORIDE 180 MG: 180 CAPSULE, COATED, EXTENDED RELEASE ORAL at 21:57

## 2019-05-27 RX ADMIN — FUROSEMIDE 40 MG: 40 TABLET ORAL at 16:08

## 2019-05-27 RX ADMIN — METHYLPREDNISOLONE SODIUM SUCCINATE 62.5 MG: 125 INJECTION, POWDER, FOR SOLUTION INTRAMUSCULAR; INTRAVENOUS at 06:24

## 2019-05-27 RX ADMIN — INSULIN ASPART 4 UNITS: 100 INJECTION, SOLUTION INTRAVENOUS; SUBCUTANEOUS at 19:55

## 2019-05-27 ASSESSMENT — ACTIVITIES OF DAILY LIVING (ADL)
SWALLOWING: 0-->SWALLOWS FOODS/LIQUIDS WITHOUT DIFFICULTY
ADLS_ACUITY_SCORE: 21
DRESS: 0-->INDEPENDENT
TRANSFERRING: 1-->ASSISTIVE EQUIPMENT
NUMBER_OF_TIMES_PATIENT_HAS_FALLEN_WITHIN_LAST_SIX_MONTHS: 1
ADLS_ACUITY_SCORE: 18
ADLS_ACUITY_SCORE: 18
COGNITION: 0 - NO COGNITION ISSUES REPORTED
ADLS_ACUITY_SCORE: 21
TOILETING: 0-->INDEPENDENT
RETIRED_EATING: 0-->INDEPENDENT
FALL_HISTORY_WITHIN_LAST_SIX_MONTHS: YES
AMBULATION: 1-->ASSISTIVE EQUIPMENT
AMBULATION: 2-->ASSISTIVE PERSON
BATHING: 0-->INDEPENDENT
ADLS_ACUITY_SCORE: 18
ADLS_ACUITY_SCORE: 18
RETIRED_COMMUNICATION: 0-->UNDERSTANDS/COMMUNICATES WITHOUT DIFFICULTY

## 2019-05-27 ASSESSMENT — MIFFLIN-ST. JEOR: SCORE: 1719.13

## 2019-05-27 NOTE — CONSULTS
Care Transition Initial Assessment - RN        Met with: Patient.  DATA   Active Problems:    COPD exacerbation (H)       Cognitive Status: awake, alert and oriented.  Primary Care Clinic Name: Dr Pratt  Primary Care MD Name: Ish Guerra  Contact information and PCP information verified: Yes  Lives With: spouse      Quality of Family Relationships: helpful, supportive  Description of Support System: Supportive, Involved   Who is your support system?: Wife   Support Assessment: Adequate family and caregiver support   Insurance concerns: No Insurance issues identified  ASSESSMENT  Patient currently receives the following services:  Home oxygen at 3L from [facility] home oxygen, he has a bipap at night at during the day when needed, home nebulizer, Pulmonary rehab at [facility] [place]       Identified issues/concerns regarding health management: COPD/home oxygen, bipap compliance    CTS following for COPD and High eddie score. Pt is well known to [facility] due to frequent hospitalizations. Pt states he lives at home with his wife in [place]. He says he was at [facility] for a couple of weeks and had been doing well until the last couple of days when he became increasingly short of breath and was needing his bipap more. He continues to use home oxygen at 3L from [facility]. He has a nebulizer that he uses as he was prescribed. He has a bipap that he faithfully wears all night and has been needing it more during the day. He says he follows with Dr Acevedo and he really likes her care. He has been following with Pulmonary rehab at [facility] [place] twice a week and feels it is getting him stronger. He saw Dr Acevedo in April and already has follow up scheduled with her. He also follows with Dr Britt from Cardiology. He says the only med changes for him is his propafenone that Dr Britt changed and he feels this episode is due to that. He takes prednisone daily at home. He recently saw Dr Pratt at the beginning of May for a physical. He anticipates he  will discharge in a day or 2 back home with his wife. He would like to have follow up scheduled with Dr Pratt. CTS to call tomorrow when they are open.    PLAN  Patient anticipates discharging to home with wife .        Patient anticipates needs for home equipment: No  Plan/Disposition: Home   Appointments: CTS will call to make PCP follow up prior to discharge.      Care  (CTS) will continue to follow as needed. Handoff will be sent to CTS for PCP on discharge.    Lana Sofia RN CTS  Care Transitions  [phone]

## 2019-05-27 NOTE — PLAN OF CARE
ICU End of Shift Summary.  For vital signs and complete assessments, please see documentation flowsheets.     Pertinent assessments: Sleeping most of shift, A&Ox4, LS dim, O2 sats maintained on 3L O2.  Calling appropriately.  Adequate urine output  Major Shift Events: Refusing BIPAP this shift.  Plan (Upcoming Events): Continue ICU cares and monitor respiratory status.  Discharge/Transfer Needs: Continue ICU cares    Bedside Shift Report Completed : y  Bedside Safety Check Completed:y

## 2019-05-27 NOTE — PLAN OF CARE
ICU End of Shift Summary.  For vital signs and complete assessments, please see documentation flowsheets.     Pertinent assessments:  VSS, in A fib controlled. Using bedside commode. S.O.B with activity.  Major Shift Events: Pt requested to sleep til noon. Pt will request bipap when needed. Refused hygiene cares except for combing hair. Re started lasix.  Plan (Upcoming Events): Continue bipap as needed..   Discharge/Transfer Needs: TBD    Bedside Shift Report Completed :   Bedside Safety Check Completed:

## 2019-05-27 NOTE — PROGRESS NOTES
[facility]  Hospitalist Progress Note  Low Donaldson MD 05/27/2019    Reason for Stay (Diagnosis): Acute  On chronic respiratory failure.         Assessment and Plan:      Summary of Stay: Tone Cooper is a 68 year old male with PMH  Of advanced COPD on BiPAP at at night with prolonged hospitalization here in February for COPD exacerbation followed by 1 week at LTAC, A. fib and a flutter with previous ablation on Eliquis, PE, tricuspid valve replacement, HTN, chronic RV systolic CHF, former smoker, and previous MSSA bacteremia who presents with cough and shortness of breath for 3 to 4 days and admitted on 5/26/2019 with respiratory failure.    Problem List:   1. Acute on chronic hypoxemic and hypercapnic respiratory failure, COPD exacerbation:   - His prolonged hospital stay in 2/2019 here and unable to wean from BiPAP so [facility]    - He has follow up with Pulmonologist at pulmonary clinic and has Bipap at home.  - CTPA negative for PE or obvious sign of infection.  -BiPAP continuous, wean him as tolerated when bawake.  -Solu-Medrol 60 mg every 12 hours  -Scheduled duo nebs 4 times daily and Xopenex nebs as needed for tachycardia  -Continue Azithromycin  -Monitor glucose, started on sliding scale insulin and Low dose Lantus, due to issues with steroid-induced hyperglycemia.  -Patient asks when he needs as to when he wants the BIPAP and when to eat. There is a risk of aspiration if he is back on BIPPAP after eating, and he is aware.    2. Possible viral URI versus bronchitis:   Cough productive of whitish and yellowish sputum for 4 days PTA.  - CTPA showed some new nodules, but no opacity.  Possible viral URI versus bronchitis, has eukocytosis to 16, but this may be stress demargination and is chronically on prednisone.  Febrile.  -Azithromycin, no fever, continue Zithromax for now, consider adding antibiotics if no improvement or dev elopes  fever.  -Sputum culture     3. DONG: Creatinine baseline 1.1, currently up to 1.4, down to 1.07, resumed diuretics.  -Hold Lasix today and give gentle IV fluids 50 ml/hr overnight, BMP in the morning     4. A. fib with RVR: hx of A. fib and a flutter with previous ablation.  PTA on diltiazem and Eliquis.  Propafenone was stopped by his cardiologist last month.  Setting with heart rates in the 130-150 range, EKG looks like atrial flutter.   -Cardizem drip stopped, and started on oral diltiazem.  -Continue Eliquis  - Monitor onTelemetry     5. Lung nodules: CT chest showing a new since 2/11/2019 right middle lobe 0.4 cm spiculated nodule along with several additional smaller nonspecific nodules in the right middle lobe.  New right hilar adenopathy with a 1.5 cm node that has increased in size since last scan.  Former smoker so concerning for malignancy.  I discussed this with the patient.  He does follow with Dr. Acevedo in pulmonary clinic and he will need close follow-up with her for this which he understands.     6.  History of PE: Continue Eliquis. No CT evidence at this point.     7.  History MSSA bacteremia: Was treated for MSSA bacteremia in December 2018 which was thought to be secondary to pneumonia.     8.  Chronic RV systolic CHF: Restarted lasix at 40 mg po BID. Has trace bilateral lower extremity edema.  Mild DONG so we will hold Lasix today.  Suspect decline in respiratory status is primarily secondary to COPD exacerbation.  BNP not elevated and troponin undetectable.       DVT Prophylaxis: on Eliquis.  Code Status: Full Code  Discharge Dispo: ICU care  Estimated Disch Date / # of Days until Disch: expect in hospital > 2-3 days.        Interval History (Subjective):      Patient seen and examined, assumed care today, no new overnight issues, on BIPAP this morning.                  Physical Exam:      Last Vital Signs:  /79   Pulse 63   Temp 97.6  F (36.4  C) (Axillary)   Resp 22   Ht 1.803 m (5'  "11\")   Wt 92.2 kg (203 lb 4.2 oz)   SpO2 98%   BMI 28.35 kg/m      I/O last 3 completed shifts:  In: 1493.84 [P.O.:240; I.V.:1253.84]  Out: 1775 [Urine:1775]  Wt Readings from Last 1 Encounters:   05/27/19 92.2 kg (203 lb 4.2 oz)     Current Facility-Administered Medications   Medication     acetaminophen (TYLENOL) tablet 650 mg    Or     acetaminophen (TYLENOL) solution 650 mg     apixaban ANTICOAGULANT (ELIQUIS) tablet 5 mg     azithromycin (ZITHROMAX) 500 mg in sodium chloride 0.9 % 250 mL intermittent infusion     budesonide (PULMICORT) neb solution 0.5 mg     clonazePAM (klonoPIN) tablet 1 mg     glucose gel 15-30 g    Or     dextrose 50 % injection 25-50 mL    Or     glucagon injection 1 mg     diltiazem (CARDIZEM) 125 mg in sodium chloride 0.9 % 125 mL infusion     diltiazem ER COATED BEADS (CARDIZEM CD/CARTIA XT) 24 hr capsule 180 mg     furosemide (LASIX) tablet 40 mg     insulin aspart (NovoLOG) inj (RAPID ACTING)     ipratropium - albuterol 0.5 mg/2.5 mg/3 mL (DUONEB) neb solution 3 mL     levalbuterol (XOPENEX) neb solution 1.25 mg     Lidocaine (LIDOCARE) 4 % Patch 2 patch     lidocaine patch in PLACE     lidocaine patch REMOVAL     methylPREDNISolone sodium succinate (solu-MEDROL) injection 62.5 mg     naloxone (NARCAN) injection 0.1-0.4 mg     ondansetron (ZOFRAN-ODT) ODT tab 4 mg    Or     ondansetron (ZOFRAN) injection 4 mg     Patient is already receiving anticoagulation with heparin, enoxaparin (LOVENOX), warfarin (COUMADIN)  or other anticoagulant medication     polyethylene glycol (MIRALAX/GLYCOLAX) Packet 17 g     senna-docusate (SENOKOT-S/PERICOLACE) 8.6-50 MG per tablet 1 tablet    Or     senna-docusate (SENOKOT-S/PERICOLACE) 8.6-50 MG per tablet 2 tablet     tamsulosin (FLOMAX) capsule 0.4 mg     traZODone (DESYREL) tablet 50 mg       Constitutional: Awake, cooperative, no apparent distress, on BIPAP.   Respiratory: Clear to auscultation bilaterally, no crackles or wheezing "   Cardiovascular: Regular rate and rhythm, normal S1 and S2, and no murmur noted   Abdomen: Normal bowel sounds, soft, non-distended, non-tender   Skin: No rashes, no cyanosis, dry to touch   Neuro: Alert and oriented x3, no weakness, numbness, memory loss   Extremities: No edema, normal range of motion   Other(s):HEENT        All other systems: Negative          Medications:      All current medications were reviewed with changes reflected in problem list.         Data:      All new lab and imaging data was reviewed.   Labs:  No results for input(s): CULT in the last 168 hours.  Recent Labs   Lab 05/27/19  0531 05/26/19  0650    137   POTASSIUM 4.9 4.5   CHLORIDE 106 101   CO2 29 32   ANIONGAP 3 4   * 102*   BUN 36* 36*   CR 1.07 1.43*   GFRESTIMATED 71 50*   GFRESTBLACK 83 58*   WILBERT 8.6 9.1     Recent Labs   Lab 05/27/19  0531 05/26/19  0650   WBC 13.4* 16.2*   HGB 14.2 16.3   HCT 47.2 53.3*   MCV 94 93    198     Recent Labs   Lab 05/27/19  1201 05/27/19  0758 05/27/19  0531 05/27/19  0346 05/27/19  0001 05/26/19  2059  05/26/19  0650   GLC  --   --  205*  --   --   --   --  102*   * 147*  --  217* 216* 316*   < >  --     < > = values in this interval not displayed.      Imaging:   Results for orders placed or performed during the hospital encounter of 05/26/19   XR Chest Port 1 View    Narrative    CHEST ONE VIEW PORTABLE   5/26/2019 6:40 AM     HISTORY: Shortness of breath.    COMPARISON: 3/28/2019.      Impression    IMPRESSION: Prior median sternotomy. Chronic-appearing interstitial  abnormalities similar to prior. No airspace consolidation,  pneumothorax, or pleural effusion.    SAMANTHA OLIVAS MD   Chest CT, IV contrast only - PE protocol    Narrative    CT CHEST PULMONARY EMBOLISM WITH CONTRAST 5/26/2019 8:34 AM     HISTORY: Shortness of breath. Off blood thinner.    CONTRAST DOSE: 73mL Isovue-370.    Radiation dose for this scan was reduced using automated exposure  control,  adjustment of the mA and/or kV according to patient size, or  iterative reconstruction technique.    COMPARISON: 2/11/2019 CT    FINDINGS:  There is a good contrast bolus within the pulmonary  arteries. Pulmonary embolism within the right upper lobe is no longer  definitively seen. No new pulmonary arterial filling defects are  demonstrated to indicate pulmonary embolism. Although contrast  enhancement within the aorta is less optimal, there is no evidence of  aortic dissection. Aortic and coronary artery calcifications are  noted. Right hilar adenopathy is noted with a lymph node measuring 1.5  cm which has increased in size since February. No mediastinal or left  hilar adenopathy. 0.4 cm nodule is noted within the right middle lobe.  This has a slightly spiculated margin and appears new since February.  There are several additional smaller nodules in the right middle lobe  some which appear new and some which are unchanged. Pulmonary  emphysematous and fibrotic changes are noted. No pleural effusion.  Hepatic fatty infiltration is noted.      Impression    IMPRESSION:  1. No evidence of acute pulmonary embolism.  2. Pulmonary emphysema and fibrosis.  3. Right middle lobe 0.4 cm spiculated nodule, new since 2/11/2019 and  suspicious for neoplastic disease. Several additional smaller  nonspecific nodules are also noted in the right middle lobe.  4. Right hilar adenopathy with a 1.5 cm node which has increased in  size since 2/11/2019.  5. Coronary artery calcification.  6. Hepatic fatty infiltration--possible etiologies include consumption  of alcohol or excessive carbohydrate intake, especially  sugar/fructose.  Metabolic syndrome commonly occurs in combination  with nonalcoholic fatty liver disease. Although often reversible,  nonalcoholic fatty liver disease can progress to steatohepatitis and  cirrhosis.    SALINA ARCHULETA MD

## 2019-05-27 NOTE — PROGRESS NOTES
RT note: pt on/off bipap until pt had a coughing fit and unable to catch his breath. Placed on HFNC to help with productive cough. Currently on 50 lpm 30% FiO2 and tolerating well. Continues to receive duoneb QID and pulmicort BID. BS ex wheeze, coarse at times.

## 2019-05-28 DIAGNOSIS — R91.1 LUNG NODULE: Primary | ICD-10-CM

## 2019-05-28 DIAGNOSIS — R59.0 MEDIASTINAL ADENOPATHY: ICD-10-CM

## 2019-05-28 LAB
GLUCOSE BLDC GLUCOMTR-MCNC: 160 MG/DL (ref 70–99)
GLUCOSE BLDC GLUCOMTR-MCNC: 182 MG/DL (ref 70–99)
GLUCOSE BLDC GLUCOMTR-MCNC: 185 MG/DL (ref 70–99)
GLUCOSE BLDC GLUCOMTR-MCNC: 193 MG/DL (ref 70–99)
GLUCOSE BLDC GLUCOMTR-MCNC: 216 MG/DL (ref 70–99)
GLUCOSE BLDC GLUCOMTR-MCNC: 290 MG/DL (ref 70–99)

## 2019-05-28 PROCEDURE — 94640 AIRWAY INHALATION TREATMENT: CPT | Mod: 76

## 2019-05-28 PROCEDURE — 40000275 ZZH STATISTIC RCP TIME EA 10 MIN

## 2019-05-28 PROCEDURE — 25000132 ZZH RX MED GY IP 250 OP 250 PS 637: Performed by: INTERNAL MEDICINE

## 2019-05-28 PROCEDURE — 94660 CPAP INITIATION&MGMT: CPT

## 2019-05-28 PROCEDURE — 94640 AIRWAY INHALATION TREATMENT: CPT

## 2019-05-28 PROCEDURE — 25800030 ZZH RX IP 258 OP 636: Performed by: INTERNAL MEDICINE

## 2019-05-28 PROCEDURE — 20000003 ZZH R&B ICU

## 2019-05-28 PROCEDURE — 99233 SBSQ HOSP IP/OBS HIGH 50: CPT | Performed by: INTERNAL MEDICINE

## 2019-05-28 PROCEDURE — 25000128 H RX IP 250 OP 636: Performed by: INTERNAL MEDICINE

## 2019-05-28 PROCEDURE — 25000131 ZZH RX MED GY IP 250 OP 636 PS 637: Performed by: INTERNAL MEDICINE

## 2019-05-28 PROCEDURE — 00000146 ZZHCL STATISTIC GLUCOSE BY METER IP

## 2019-05-28 PROCEDURE — 25000125 ZZHC RX 250: Performed by: INTERNAL MEDICINE

## 2019-05-28 PROCEDURE — 99291 CRITICAL CARE FIRST HOUR: CPT | Performed by: INTERNAL MEDICINE

## 2019-05-28 RX ORDER — GUAIFENESIN AND DEXTROMETHORPHAN HYDROBROMIDE 600; 30 MG/1; MG/1
1 TABLET, EXTENDED RELEASE ORAL 2 TIMES DAILY PRN
Status: DISCONTINUED | OUTPATIENT
Start: 2019-05-28 | End: 2019-05-31 | Stop reason: HOSPADM

## 2019-05-28 RX ADMIN — INSULIN ASPART 2 UNITS: 100 INJECTION, SOLUTION INTRAVENOUS; SUBCUTANEOUS at 07:53

## 2019-05-28 RX ADMIN — IPRATROPIUM BROMIDE AND ALBUTEROL SULFATE 3 ML: .5; 3 SOLUTION RESPIRATORY (INHALATION) at 07:37

## 2019-05-28 RX ADMIN — INSULIN ASPART 1 UNITS: 100 INJECTION, SOLUTION INTRAVENOUS; SUBCUTANEOUS at 00:01

## 2019-05-28 RX ADMIN — TAMSULOSIN HYDROCHLORIDE 0.4 MG: 0.4 CAPSULE ORAL at 08:06

## 2019-05-28 RX ADMIN — GUAIFENESIN AND DEXTROMETHORPHAN HYDROBROMIDE 1 TABLET: 600; 30 TABLET, EXTENDED RELEASE ORAL at 19:42

## 2019-05-28 RX ADMIN — AZITHROMYCIN MONOHYDRATE 500 MG: 500 INJECTION, POWDER, LYOPHILIZED, FOR SOLUTION INTRAVENOUS at 12:16

## 2019-05-28 RX ADMIN — ACETAMINOPHEN 650 MG: 325 TABLET, FILM COATED ORAL at 08:10

## 2019-05-28 RX ADMIN — FUROSEMIDE 40 MG: 40 TABLET ORAL at 16:38

## 2019-05-28 RX ADMIN — FUROSEMIDE 40 MG: 40 TABLET ORAL at 08:06

## 2019-05-28 RX ADMIN — INSULIN ASPART 1 UNITS: 100 INJECTION, SOLUTION INTRAVENOUS; SUBCUTANEOUS at 03:59

## 2019-05-28 RX ADMIN — IPRATROPIUM BROMIDE AND ALBUTEROL SULFATE 3 ML: .5; 3 SOLUTION RESPIRATORY (INHALATION) at 19:39

## 2019-05-28 RX ADMIN — BUDESONIDE 0.5 MG: 0.5 INHALANT RESPIRATORY (INHALATION) at 19:39

## 2019-05-28 RX ADMIN — CLONAZEPAM 1 MG: 1 TABLET ORAL at 21:21

## 2019-05-28 RX ADMIN — ACETAMINOPHEN 650 MG: 325 TABLET, FILM COATED ORAL at 16:38

## 2019-05-28 RX ADMIN — INSULIN GLARGINE 15 UNITS: 100 INJECTION, SOLUTION SUBCUTANEOUS at 07:53

## 2019-05-28 RX ADMIN — BUDESONIDE 0.5 MG: 0.5 INHALANT RESPIRATORY (INHALATION) at 07:37

## 2019-05-28 RX ADMIN — METHYLPREDNISOLONE SODIUM SUCCINATE 62.5 MG: 125 INJECTION, POWDER, FOR SOLUTION INTRAMUSCULAR; INTRAVENOUS at 06:09

## 2019-05-28 RX ADMIN — METHYLPREDNISOLONE SODIUM SUCCINATE 62.5 MG: 125 INJECTION, POWDER, FOR SOLUTION INTRAMUSCULAR; INTRAVENOUS at 17:24

## 2019-05-28 RX ADMIN — DILTIAZEM HYDROCHLORIDE 180 MG: 180 CAPSULE, COATED, EXTENDED RELEASE ORAL at 08:06

## 2019-05-28 RX ADMIN — IPRATROPIUM BROMIDE AND ALBUTEROL SULFATE 3 ML: .5; 3 SOLUTION RESPIRATORY (INHALATION) at 15:23

## 2019-05-28 RX ADMIN — DILTIAZEM HYDROCHLORIDE 180 MG: 180 CAPSULE, COATED, EXTENDED RELEASE ORAL at 21:21

## 2019-05-28 RX ADMIN — APIXABAN 5 MG: 5 TABLET, FILM COATED ORAL at 21:21

## 2019-05-28 RX ADMIN — TRAZODONE HYDROCHLORIDE 50 MG: 50 TABLET ORAL at 21:21

## 2019-05-28 RX ADMIN — IPRATROPIUM BROMIDE AND ALBUTEROL SULFATE 3 ML: .5; 3 SOLUTION RESPIRATORY (INHALATION) at 11:57

## 2019-05-28 RX ADMIN — APIXABAN 5 MG: 5 TABLET, FILM COATED ORAL at 08:06

## 2019-05-28 ASSESSMENT — ACTIVITIES OF DAILY LIVING (ADL)
ADLS_ACUITY_SCORE: 21

## 2019-05-28 ASSESSMENT — MIFFLIN-ST. JEOR: SCORE: 1700.13

## 2019-05-28 NOTE — PROGRESS NOTES
"[facility] Intensive Care Unit  Intensivist Note  May 28, 2019    Assessment and Plan:  Tone Cooper is a 68 year old male admitted on 5/26/2019 for resp failure and remains critically ill due to following problems.   I have personally reviewed the daily labs, imaging studies, cultures and discussed the case during multidisciplinary ICU meeting with entire team.     # Hypoxic resp failure due to COPD exac, no evidence of infection, procal is negative, treated w solumedrol and azithro, FEV1 32% of predicted (1.1L), DLCO of 42 % of predicted, communicated with Dr. Acevedo (his pulmonologist)    # New 4 mm spiculated nodule and enlarging R hilar node per CT (could be reactive), would need short term follow up as an outpatient w a CT chest    # A fib/RVR, well controlled, on eliquis, CHF-on diuresis    ICU prophylaxis:  SCDs, eliquis  Billing: total time spend providing critical care was 35 min, excluding procedure time.    Medications:  Reviewed    Physical examination:  General: Alert, oriented, not in distress  /82   Pulse 70   Temp 97.7  F (36.5  C) (Oral)   Resp 18   Ht 1.803 m (5' 11\")   Wt 90.3 kg (199 lb 1.2 oz)   SpO2 94%   BMI 27.77 kg/m    HEENT: neck supple, symmetrical, no palpable nodes  Lungs: Auscultation of lungs- decreased bronchovesicular sounds, expirium prolongation, wheezing,  CVS: Normal S1 and S2, no additional heart sounds, murmur  Abdomen: Bowel sounds present, soft, non tender, non distended  Extremities/musculoskeletal: no edema, deformity, cyanosis  Neurology: alert, orientedx3, no motor/sensorial deficits  Skin: no rash,ecchymosis  Psychiatry: Mood and affect are appropriate   Exam of Line sites: No erythema or discharge.          Data:     Recent Labs   Lab 05/27/19 0531 05/26/19  0930   O2PER 4LNC 30     Recent Labs   Lab 05/27/19 0531 05/26/19  0650   WBC 13.4* 16.2*   RBC 5.00 5.72   HGB 14.2 16.3   HCT 47.2 53.3*    198     Recent Labs   Lab 05/27/19  0531 " 05/26/19  0650    137   POTASSIUM 4.9 4.5   CHLORIDE 106 101   CO2 29 32   BUN 36* 36*   CR 1.07 1.43*   * 102*   WILBERT 8.6 9.1     Procalcitonin: 0.08 (5/26)  Echo: Jan 2019, The right ventricle is mildly dilated.  Mildly decreased right ventricular systolic function  TAPSE 1.52cm,  TASV 10.1cm/s  An annuloplasty ring is noted in the tricuspid position.  mean gradient 4mmHg, peak 12 mmHg. Left ventricular systolic function is normal. The visual ejection fraction is  estimated at 60-65%.  Chest CT: 5/26 1. No evidence of acute pulmonary embolism.  2. Pulmonary emphysema and fibrosis.  3. Right middle lobe 0.4 cm spiculated nodule, new since 2/11/2019 and  suspicious for neoplastic disease. Several additional smaller  nonspecific nodules are also noted in the right middle lobe.  4. Right hilar adenopathy with a 1.5 cm node which has increased in  size since 2/11/2019.    GUANAKITO Membreno MD  #[phone]

## 2019-05-28 NOTE — PROGRESS NOTES
NUTRITION BRIEF NOTE    Patient requesting Boost added with meals TID per MD during IDT rounds. Followed by [facility] RD team during previous late 2018/early 2019 admit. Full diet and wt history not obtained as patient with family and providers - stopped briefly to verify nutrition requests were being acknowledged. Will follow up with full assessment if remains hospitalized.      Luzma Robbins RDN, LD, [facility]  Pager - 3rd floor/ICU: [phone]  Pager - All other floors: [phone]  Pager - Weekend/holiday: [phone]  Office: [phone]

## 2019-05-28 NOTE — PROGRESS NOTES
"SPIRITUAL HEALTH SERVICES Progress Note  [facility] ICU    SH consult to facilitate ACP conversation with pt, Tone.   Tone shares that he was admitted to the hospital for a COPD exacerbation and notes that \"I've been here for every holiday - almost every month for the last four months. He notes that \"I've only got 30% function in my lung.\"     As Tone shared his medical hx, he became tearful, expressing that \"I didn't think I was going to make it out of here last time. I can't do the things a man should do, I'm old school, but that's the way I am. It's impacting the quality of my life.\"     Tone notes that he has a previous HCD from 2007 but can't locate it. He would like to complete a new one \"at home with my wife.\" Provided resources and orientation to HCD form including it's purpose, function, and how to make it a legal document. Encouraged Tone that once document is legal to take to [facility] to have it added to his medical record.     In addition to providing HCD support, also engaged with pt to provide emotional support through normalization/validation of his expressed feelings. Will continue to follow 1x/wk while in the hospital. Oriented Tone how to request SH support if desired.     RACHELE Macario.  Staff    Pager #[phone]     "

## 2019-05-28 NOTE — PROGRESS NOTES
RT end of shift note:      Patient was received on HFNC and placed on BIPAP for NOC.    Settings: BIPAP 14/6 backup rate of 12 and 30% FIO2.    Auscultated diminished breath sounds pre and post treatment.        Will continue to follow and monitor.      Sara Lees, RRT

## 2019-05-28 NOTE — PLAN OF CARE
ICU End of Shift Summary.  For vital signs and complete assessments, please see documentation flowsheets.     Pertinent assessments: A&O, LS dim, wheezy at times.  ERICKSON.  Pt tolerating bipap for most of shift, HFNC when not using.  Pt HR controlled with po dilt.  Major Shift Events: BiPAP used most of shift  Plan (Upcoming Events): Continue to monitor respiratory status  Discharge/Transfer Needs: Continue ICU cares    Bedside Shift Report Completed : y  Bedside Safety Check Completed:y

## 2019-05-28 NOTE — PROGRESS NOTES
"RT- Patient has been switching between HFNC and BIPAP today. Still very SOB with any exertion. BS very diminished. Receiving scheduled nebulizers today.     HFNC settings are\"  50 LPM  35% FIO2    BIPAP settings are    IPAP/EPAP: 14/6   Rate: 12  Oxygen: 30%    Will continue to monitor.  "

## 2019-05-28 NOTE — PROGRESS NOTES
[facility]  Hospitalist Progress Note  Low Donaldson MD 05/28/2019    Reason for Stay (Diagnosis): Acute  On chronic respiratory failure.         Assessment and Plan:      Summary of Stay: Tone Cooper is a 68 year old male with PMH  Of advanced COPD on BiPAP at at night with prolonged hospitalization here in February for COPD exacerbation followed by 1 week at LTAC, A. fib and a flutter with previous ablation on Eliquis, PE, tricuspid valve replacement, HTN, chronic RV systolic CHF, former smoker, and previous MSSA bacteremia who presents with cough and shortness of breath for 3 to 4 days and admitted on 5/26/2019 with respiratory failure.    Problem List:   1. Acute on chronic hypoxemic and hypercapnic respiratory failure, COPD exacerbation:   - His prolonged hospital stay in 2/2019 here and unable to wean from [facility]    - He has follow up with Pulmonologist at pulmonary clinic and has Bipap at home.  - CTPA negative for PE or obvious sign of infection.  -BiPAP continuous at night, and on HiFlo this morning.  -Solu-Medrol 60 mg every 12 hours.  -Scheduled duo nebs 4 times daily and Xopenex nebs as needed for tachycardia  -Continue Azithromycin  -Monitor glucose, started on sliding scale insulin and Low dose Lantus, due to issues with steroid-induced hyperglycemia.  -Patient asks when he needs as to when he wants the BIPAP and when to eat. There is a risk of aspiration if he is back on BIPPAP after eating, and he is aware.    2. Possible viral URI versus bronchitis:   Cough productive of whitish and yellowish sputum for 4 days PTA.  - CTPA showed some new nodules, but no opacity.  Possible viral URI versus bronchitis, has eukocytosis to 16, but this may be stress demargination and is chronically on prednisone.  Febrile.  -Azithromycin, no fever, continue Zithromax for now, consider adding antibiotics if no improvement or dev elopes  fever.  -Sputum culture     3. DONG: Creatinine baseline 1.1, currently up to 1.4, down to 1.07, resumed diuretics.  -Hold Lasix today and give gentle IV fluids 50 ml/hr overnight, BMP in the morning     4. A. fib with RVR: hx of A. fib and a flutter with previous ablation.  PTA on diltiazem and Eliquis.  Propafenone was stopped by his cardiologist last month.  Setting with heart rates in the 130-150 range, EKG looks like atrial flutter.   -Cardizem drip stopped, and started on oral diltiazem.  -Continue Eliquis  - Monitor onTelemetry, now in sinus.     5. Lung nodules: CT chest showing a new since 2/11/2019 right middle lobe 0.4 cm spiculated nodule along with several additional smaller nonspecific nodules in the right middle lobe.  New right hilar adenopathy with a 1.5 cm node that has increased in size since last scan.  Former smoker so concerning for malignancy.  I discussed this with the patient.  He does follow with Dr. Acevedo in pulmonary clinic and he will need close follow-up with her for this which he understands.     6.  History of PE: Continue Eliquis. No CT evidence at this point.     7.  History MSSA bacteremia: Was treated for MSSA bacteremia in December 2018 which was thought to be secondary to pneumonia.     8.  Chronic RV systolic CHF: Restarted lasix at 40 mg po BID. Has trace bilateral lower extremity edema.  Mild DONG so we will hold Lasix today.  Suspect decline in respiratory status is primarily secondary to COPD exacerbation.  BNP not elevated and troponin undetectable.  -Check BMP in AM, started on Lasix.       DVT Prophylaxis: on Eliquis.  Code Status: Full Code  Discharge Dispo: ICU care  Estimated Disch Date / # of Days until Disch: expect in hospital > 2-3 days.        Interval History (Subjective):      Patient seen and examined, no new overnight issues, used BIPAP over night, now on HiFlo,  no new overnight issues.                  Physical Exam:      Last Vital Signs:  BP (!) 140/91    "Pulse 85   Temp 97.7  F (36.5  C) (Oral)   Resp 20   Ht 1.803 m (5' 11\")   Wt 90.3 kg (199 lb 1.2 oz)   SpO2 94%   BMI 27.77 kg/m      I/O last 3 completed shifts:  In: 990 [P.O.:560; I.V.:430]  Out: 2625 [Urine:2625]  Wt Readings from Last 1 Encounters:   05/28/19 90.3 kg (199 lb 1.2 oz)     Current Facility-Administered Medications   Medication     acetaminophen (TYLENOL) tablet 650 mg    Or     acetaminophen (TYLENOL) solution 650 mg     apixaban ANTICOAGULANT (ELIQUIS) tablet 5 mg     azithromycin (ZITHROMAX) 500 mg in sodium chloride 0.9 % 250 mL intermittent infusion     budesonide (PULMICORT) neb solution 0.5 mg     clonazePAM (klonoPIN) tablet 1 mg     glucose gel 15-30 g    Or     dextrose 50 % injection 25-50 mL    Or     glucagon injection 1 mg     diltiazem ER COATED BEADS (CARDIZEM CD/CARTIA XT) 24 hr capsule 180 mg     furosemide (LASIX) tablet 40 mg     insulin aspart (NovoLOG) inj (RAPID ACTING)     insulin aspart (NovoLOG) inj (RAPID ACTING)     insulin glargine (LANTUS) injection 15 Units     ipratropium - albuterol 0.5 mg/2.5 mg/3 mL (DUONEB) neb solution 3 mL     levalbuterol (XOPENEX) neb solution 1.25 mg     Lidocaine (LIDOCARE) 4 % Patch 2 patch     lidocaine patch in PLACE     lidocaine patch REMOVAL     methylPREDNISolone sodium succinate (solu-MEDROL) injection 62.5 mg     naloxone (NARCAN) injection 0.1-0.4 mg     ondansetron (ZOFRAN-ODT) ODT tab 4 mg    Or     ondansetron (ZOFRAN) injection 4 mg     Patient is already receiving anticoagulation with heparin, enoxaparin (LOVENOX), warfarin (COUMADIN)  or other anticoagulant medication     polyethylene glycol (MIRALAX/GLYCOLAX) Packet 17 g     senna-docusate (SENOKOT-S/PERICOLACE) 8.6-50 MG per tablet 1 tablet    Or     senna-docusate (SENOKOT-S/PERICOLACE) 8.6-50 MG per tablet 2 tablet     tamsulosin (FLOMAX) capsule 0.4 mg     traZODone (DESYREL) tablet 50 mg       Constitutional: Awake, cooperative, no apparent distress, on BIPAP. "   Respiratory: Clear to auscultation bilaterally, no crackles or wheezing   Cardiovascular: Regular rate and rhythm, normal S1 and S2, and no murmur noted   Abdomen: Normal bowel sounds, soft, non-distended, non-tender   Skin: No rashes, no cyanosis, dry to touch   Neuro: Alert and oriented x3, no weakness, numbness, memory loss   Extremities: No edema, normal range of motion   Other(s):HEENT        All other systems: Negative          Medications:      All current medications were reviewed with changes reflected in problem list.         Data:      All new lab and imaging data was reviewed.   Labs:  No results for input(s): CULT in the last 168 hours.  Recent Labs   Lab 05/27/19  0531 05/26/19  0650    137   POTASSIUM 4.9 4.5   CHLORIDE 106 101   CO2 29 32   ANIONGAP 3 4   * 102*   BUN 36* 36*   CR 1.07 1.43*   GFRESTIMATED 71 50*   GFRESTBLACK 83 58*   WILBERT 8.6 9.1     Recent Labs   Lab 05/27/19  0531 05/26/19  0650   WBC 13.4* 16.2*   HGB 14.2 16.3   HCT 47.2 53.3*   MCV 94 93    198     Recent Labs   Lab 05/28/19  1130 05/28/19  0751 05/28/19  0358 05/27/19  2359 05/27/19  1953  05/27/19  0531  05/26/19  0650   GLC  --   --   --   --   --   --  205*  --  102*   * 216* 185* 182* 293*   < >  --    < >  --     < > = values in this interval not displayed.      Imaging:   Results for orders placed or performed during the hospital encounter of 05/26/19   XR Chest Port 1 View    Narrative    CHEST ONE VIEW PORTABLE   5/26/2019 6:40 AM     HISTORY: Shortness of breath.    COMPARISON: 3/28/2019.      Impression    IMPRESSION: Prior median sternotomy. Chronic-appearing interstitial  abnormalities similar to prior. No airspace consolidation,  pneumothorax, or pleural effusion.    SAMANTHA OLIVAS MD   Chest CT, IV contrast only - PE protocol    Narrative    CT CHEST PULMONARY EMBOLISM WITH CONTRAST 5/26/2019 8:34 AM     HISTORY: Shortness of breath. Off blood thinner.    CONTRAST DOSE: 73mL  Isovue-[id number].    Radiation dose for this scan was reduced using automated exposure  control, adjustment of the mA and/or kV according to patient size, or  iterative reconstruction technique.    COMPARISON: 2/11/2019 CT    FINDINGS:  There is a good contrast bolus within the pulmonary  arteries. Pulmonary embolism within the right upper lobe is no longer  definitively seen. No new pulmonary arterial filling defects are  demonstrated to indicate pulmonary embolism. Although contrast  enhancement within the aorta is less optimal, there is no evidence of  aortic dissection. Aortic and coronary artery calcifications are  noted. Right hilar adenopathy is noted with a lymph node measuring 1.5  cm which has increased in size since February. No mediastinal or left  hilar adenopathy. 0.4 cm nodule is noted within the right middle lobe.  This has a slightly spiculated margin and appears new since February.  There are several additional smaller nodules in the right middle lobe  some which appear new and some which are unchanged. Pulmonary  emphysematous and fibrotic changes are noted. No pleural effusion.  Hepatic fatty infiltration is noted.      Impression    IMPRESSION:  1. No evidence of acute pulmonary embolism.  2. Pulmonary emphysema and fibrosis.  3. Right middle lobe 0.4 cm spiculated nodule, new since 2/11/2019 and  suspicious for neoplastic disease. Several additional smaller  nonspecific nodules are also noted in the right middle lobe.  4. Right hilar adenopathy with a 1.5 cm node which has increased in  size since 2/11/2019.  5. Coronary artery calcification.  6. Hepatic fatty infiltration--possible etiologies include consumption  of alcohol or excessive carbohydrate intake, especially  sugar/fructose.  Metabolic syndrome commonly occurs in combination  with nonalcoholic fatty liver disease. Although often reversible,  nonalcoholic fatty liver disease can progress to steatohepatitis and  cirrhosis.    SALINA ARCHULETA,  MD

## 2019-05-29 LAB
ANION GAP SERPL CALCULATED.3IONS-SCNC: 2 MMOL/L (ref 3–14)
BUN SERPL-MCNC: 44 MG/DL (ref 7–30)
CALCIUM SERPL-MCNC: 9 MG/DL (ref 8.5–10.1)
CHLORIDE SERPL-SCNC: 101 MMOL/L (ref 94–109)
CO2 SERPL-SCNC: 34 MMOL/L (ref 20–32)
CREAT SERPL-MCNC: 1.08 MG/DL (ref 0.66–1.25)
GFR SERPL CREATININE-BSD FRML MDRD: 70 ML/MIN/{1.73_M2}
GLUCOSE BLDC GLUCOMTR-MCNC: 185 MG/DL (ref 70–99)
GLUCOSE BLDC GLUCOMTR-MCNC: 203 MG/DL (ref 70–99)
GLUCOSE BLDC GLUCOMTR-MCNC: 233 MG/DL (ref 70–99)
GLUCOSE BLDC GLUCOMTR-MCNC: 257 MG/DL (ref 70–99)
GLUCOSE BLDC GLUCOMTR-MCNC: 269 MG/DL (ref 70–99)
GLUCOSE BLDC GLUCOMTR-MCNC: 303 MG/DL (ref 70–99)
GLUCOSE BLDC GLUCOMTR-MCNC: 306 MG/DL (ref 70–99)
GLUCOSE SERPL-MCNC: 195 MG/DL (ref 70–99)
POTASSIUM SERPL-SCNC: 4.4 MMOL/L (ref 3.4–5.3)
SODIUM SERPL-SCNC: 137 MMOL/L (ref 133–144)

## 2019-05-29 PROCEDURE — 99233 SBSQ HOSP IP/OBS HIGH 50: CPT | Performed by: INTERNAL MEDICINE

## 2019-05-29 PROCEDURE — 80048 BASIC METABOLIC PNL TOTAL CA: CPT | Performed by: INTERNAL MEDICINE

## 2019-05-29 PROCEDURE — 00000146 ZZHCL STATISTIC GLUCOSE BY METER IP

## 2019-05-29 PROCEDURE — 94660 CPAP INITIATION&MGMT: CPT

## 2019-05-29 PROCEDURE — 94640 AIRWAY INHALATION TREATMENT: CPT

## 2019-05-29 PROCEDURE — 40000275 ZZH STATISTIC RCP TIME EA 10 MIN

## 2019-05-29 PROCEDURE — 25000132 ZZH RX MED GY IP 250 OP 250 PS 637: Performed by: INTERNAL MEDICINE

## 2019-05-29 PROCEDURE — 36415 COLL VENOUS BLD VENIPUNCTURE: CPT | Performed by: INTERNAL MEDICINE

## 2019-05-29 PROCEDURE — 25000128 H RX IP 250 OP 636: Performed by: INTERNAL MEDICINE

## 2019-05-29 PROCEDURE — 25000125 ZZHC RX 250: Performed by: INTERNAL MEDICINE

## 2019-05-29 PROCEDURE — 20000003 ZZH R&B ICU

## 2019-05-29 PROCEDURE — 94640 AIRWAY INHALATION TREATMENT: CPT | Mod: 76

## 2019-05-29 PROCEDURE — 99291 CRITICAL CARE FIRST HOUR: CPT | Performed by: INTERNAL MEDICINE

## 2019-05-29 PROCEDURE — 40000809 ZZH STATISTIC NO DOCUMENTATION TO SUPPORT CHARGE

## 2019-05-29 PROCEDURE — 25000131 ZZH RX MED GY IP 250 OP 636 PS 637: Performed by: INTERNAL MEDICINE

## 2019-05-29 RX ORDER — FAMOTIDINE 20 MG/1
20 TABLET, FILM COATED ORAL 2 TIMES DAILY
Status: DISCONTINUED | OUTPATIENT
Start: 2019-05-29 | End: 2019-05-31 | Stop reason: HOSPADM

## 2019-05-29 RX ORDER — AZITHROMYCIN 250 MG/1
250 TABLET, FILM COATED ORAL DAILY
Status: DISCONTINUED | OUTPATIENT
Start: 2019-05-29 | End: 2019-05-31 | Stop reason: HOSPADM

## 2019-05-29 RX ADMIN — BUDESONIDE 0.5 MG: 0.5 INHALANT RESPIRATORY (INHALATION) at 19:15

## 2019-05-29 RX ADMIN — APIXABAN 5 MG: 5 TABLET, FILM COATED ORAL at 21:54

## 2019-05-29 RX ADMIN — IPRATROPIUM BROMIDE AND ALBUTEROL SULFATE 3 ML: .5; 3 SOLUTION RESPIRATORY (INHALATION) at 19:15

## 2019-05-29 RX ADMIN — BUDESONIDE 0.5 MG: 0.5 INHALANT RESPIRATORY (INHALATION) at 07:49

## 2019-05-29 RX ADMIN — LEVALBUTEROL HYDROCHLORIDE 1.25 MG: 1.25 SOLUTION RESPIRATORY (INHALATION) at 00:34

## 2019-05-29 RX ADMIN — FAMOTIDINE 20 MG: 20 TABLET, FILM COATED ORAL at 13:08

## 2019-05-29 RX ADMIN — METHYLPREDNISOLONE SODIUM SUCCINATE 62.5 MG: 125 INJECTION, POWDER, FOR SOLUTION INTRAMUSCULAR; INTRAVENOUS at 18:26

## 2019-05-29 RX ADMIN — FUROSEMIDE 40 MG: 40 TABLET ORAL at 15:26

## 2019-05-29 RX ADMIN — IPRATROPIUM BROMIDE AND ALBUTEROL SULFATE 3 ML: .5; 3 SOLUTION RESPIRATORY (INHALATION) at 11:53

## 2019-05-29 RX ADMIN — APIXABAN 5 MG: 5 TABLET, FILM COATED ORAL at 08:53

## 2019-05-29 RX ADMIN — AZITHROMYCIN MONOHYDRATE 250 MG: 250 TABLET ORAL at 13:08

## 2019-05-29 RX ADMIN — FAMOTIDINE 20 MG: 20 TABLET, FILM COATED ORAL at 21:54

## 2019-05-29 RX ADMIN — FUROSEMIDE 40 MG: 40 TABLET ORAL at 08:53

## 2019-05-29 RX ADMIN — IPRATROPIUM BROMIDE AND ALBUTEROL SULFATE 3 ML: .5; 3 SOLUTION RESPIRATORY (INHALATION) at 15:55

## 2019-05-29 RX ADMIN — TRAZODONE HYDROCHLORIDE 50 MG: 50 TABLET ORAL at 21:54

## 2019-05-29 RX ADMIN — INSULIN GLARGINE 15 UNITS: 100 INJECTION, SOLUTION SUBCUTANEOUS at 08:12

## 2019-05-29 RX ADMIN — DILTIAZEM HYDROCHLORIDE 180 MG: 180 CAPSULE, COATED, EXTENDED RELEASE ORAL at 08:53

## 2019-05-29 RX ADMIN — TAMSULOSIN HYDROCHLORIDE 0.4 MG: 0.4 CAPSULE ORAL at 08:53

## 2019-05-29 RX ADMIN — IPRATROPIUM BROMIDE AND ALBUTEROL SULFATE 3 ML: .5; 3 SOLUTION RESPIRATORY (INHALATION) at 07:49

## 2019-05-29 RX ADMIN — CLONAZEPAM 1 MG: 1 TABLET ORAL at 21:54

## 2019-05-29 RX ADMIN — DILTIAZEM HYDROCHLORIDE 180 MG: 180 CAPSULE, COATED, EXTENDED RELEASE ORAL at 20:00

## 2019-05-29 RX ADMIN — METHYLPREDNISOLONE SODIUM SUCCINATE 62.5 MG: 125 INJECTION, POWDER, FOR SOLUTION INTRAMUSCULAR; INTRAVENOUS at 05:34

## 2019-05-29 ASSESSMENT — ACTIVITIES OF DAILY LIVING (ADL)
ADLS_ACUITY_SCORE: 21

## 2019-05-29 ASSESSMENT — MIFFLIN-ST. JEOR: SCORE: 1697.52

## 2019-05-29 NOTE — PLAN OF CARE
ICU End of Shift Summary.  For vital signs and complete assessments, please see documentation flowsheets.     Pertinent assessments: pt alert and oriented, makes his needs known. With clear lung sounds, back and forth between 30% bipap and 4 liters nasal cannula, sats 95% and above. Pt voiding per urinal. Pt continues on IV solumedrol. Tele remains a fib controlled rate.   Major Shift Events: slept between cares  Plan (Upcoming Events): continue current cares  Discharge/Transfer Needs: possible transfer to the floor today    Bedside Shift Report Completed : y  Bedside Safety Check Completed:y

## 2019-05-29 NOTE — PLAN OF CARE
ICU End of Shift Summary.  For vital signs and complete assessments, please see documentation flowsheets.     Pertinent assessments: A/Ox4. AVSS ex HTN at times- MD aware. LS diminished, expiratory wheezing at times. Dyspnea on exertion. Tele a-fib, CVR, with occasional PVC. Tolerating regular diet. Had BM this shift, up to bedside commode with A1. Uses urinal to void. Unable to obtain sputum sample- nonproductive cough.   Major Shift Events: Interchanged between HFNC/ bipap/nasal cannula throughout the day. Was on BiPAP for a nap during the day, refused hi-flow afterwards & was on 3-4LPM via n/c for the evening. Tylenol prn given for back pain. PRN Mucinex given for cough. Was up in chair for the evening. Placed on BiPAP at hs.  Plan (Upcoming Events): Continue to monitor respiratory status. Pt was requesting copy of previous advance directive; however, did not see one scanned in his epic records.   Discharge/Transfer Needs: TBD    Bedside Shift Report Completed : yes  Bedside Safety Check Completed: yes

## 2019-05-29 NOTE — PROGRESS NOTES
RT- Patient has been on and off BIPAP throughout the day. Refusing HFNC when off BIPAP and has been on 3-4L nasal cannula. Dyspneic with any kind of exertion. BS diminished. Will continue to monitor.

## 2019-05-29 NOTE — PLAN OF CARE
ICU End of Shift Summary.  For vital signs and complete assessments, please see documentation flowsheets.     Pertinent assessments: patient alert and oriented, Tele A-fib, VSS- afebrile- lung sounds decreased, changes between Bi-pap and 3-4L nasal cannula. Good appetite - voiding adequate amounts - 1 medium BM today.  Major Shift Events: Changed IV antibiotic to PO   Plan (Upcoming Events): continue cares, ? Transfer to floor  Discharge/Transfer Needs:  transfer to [facility] in next few days    Bedside Shift Report Completed :   Bedside Safety Check Completed:

## 2019-05-29 NOTE — PROGRESS NOTES
[facility]    Hospitalist Progress Note      Assessment & Plan   Tone Cooper is a 68 year old male who was admitted on 5/26/2019.    Summary of Stay:   Tone Cooper is a 68 year old male with PMH  Of advanced COPD on BiPAP at at night with prolonged hospitalization here in February for COPD exacerbation followed by 1 week at LTAC, A. fib and a flutter with previous ablation on Eliquis, PE, tricuspid valve replacement, HTN, chronic RV systolic CHF, former smoker, and previous MSSA bacteremia who presents with cough and shortness of breath for 3 to 4 days and admitted on 5/26/2019 with respiratory failure.      He remains on BiPAP with breaks.  Currently on nasal cannula.  He does not like the high flow.    Plan:    Acute on chronic hypoxic and hypercapnic respiratory failure.  COPD exacerbation.  - Pulmonology consult appreciated.  - Continue bronchodilators and steroids.  Also on azithromycin, no pneumonia on x-ray.  -Slow improvement.  Still has bilateral expiratory wheezing.  Significant shortness of breath with minimal activity.  - At baseline he is on 3 L of oxygen with nasal cannula.  Currently requiring 8 to 10 L.  On BiPAP in between.  He uses BiPAP at home at night.  -On 10 mg prednisone at baseline.    A. fib.  -Rate is controlled.  On home diltiazem.  - On Eliquis.  Oral Lasix.  He takes Lasix at home.  He reports that he often needs a higher dose of Lasix when he is on higher dose of prednisone.  -Last echo in January showed normal EF with mild right heart dysfunction.  Appears euvolemic.    Lung nodules  - Plan as outlined by pulmonology.  Quick follow-up in pulmonary clinic is recommended.    Mild acute kidney injury  -Improved.    Physical therapy consult as patient has limited activity and is on high-dose steroids.    DVT Prophylaxis: Oral Eliquis.  Code Status: Full Code  Expected discharge: Possibly to LTAC.  Next 2 to 3 days.    Toy Tejada MD  Text Page (7am - 6pm,  M-F)    Interval History   Patient was evaluated with nursing staff. Overnight issues discussed.  Feels that the breathing is slightly better.  Still has bilateral wheezings.  Shortness of breath with minimal activity.  Currently on nasal cannula as he is refusing high flow.  Using BiPAP in between.    -Data reviewed today: Labs and medications.    Physical Exam   Temp: 98.2  F (36.8  C) Temp src: Axillary BP: 126/76 Pulse: 67 Heart Rate: 62 Resp: 18 SpO2: 94 % O2 Device: BiPAP/CPAP Oxygen Delivery: 4 LPM  Vitals:    05/27/19 0600 05/28/19 0400 05/29/19 0600   Weight: 92.2 kg (203 lb 4.2 oz) 90.3 kg (199 lb 1.2 oz) 90 kg (198 lb 8 oz)     Vital Signs with Ranges  Temp:  [97.7  F (36.5  C)-98.2  F (36.8  C)] 98.2  F (36.8  C)  Pulse:  [] 67  Heart Rate:  [] 62  Resp:  [18-20] 18  BP: (126-165)/() 126/76  FiO2 (%):  [30 %-35 %] 30 %  SpO2:  [87 %-100 %] 94 %  I/O last 3 completed shifts:  In: 1150 [P.O.:1150]  Out: 1500 [Urine:1500]    Constitutional: Awake, alert, cooperative, no apparent distress  HEENT: Trachea midline, sclera is clear   Respiratory: Bilateral wheezing, expiratory.  Cardiovascular: Regular rate and rhythm, normal S1 and S2, and no murmur noted  GI: Normal bowel sounds, soft, non-distended, non-tender  Skin/Integumen: No rashes, no cyanosis, no edema  Psych: appropriate affect, no agitation   Extremities: No pitting edema     Medications     - MEDICATION INSTRUCTIONS -         apixaban ANTICOAGULANT  5 mg Oral BID     azithromycin  500 mg Intravenous Q24H     budesonide  0.5 mg Nebulization BID     clonazePAM  1 mg Oral At Bedtime     diltiazem ER COATED BEADS  180 mg Oral BID     furosemide  40 mg Oral BID     insulin aspart  1-7 Units Subcutaneous TID AC     insulin aspart  1-5 Units Subcutaneous At Bedtime     insulin glargine  15 Units Subcutaneous QAM     ipratropium - albuterol 0.5 mg/2.5 mg/3 mL  1 vial Nebulization 4x Daily     lidocaine   Transdermal Q8H     lidocaine    Transdermal Q24H     methylPREDNISolone  62.5 mg Intravenous Q12H     tamsulosin  0.4 mg Oral Daily     traZODone  50 mg Oral At Bedtime       Data   Recent Labs   Lab 05/29/19  0537 05/27/19  0531 05/26/19  0650   WBC  --  13.4* 16.2*   HGB  --  14.2 16.3   MCV  --  94 93   PLT  --  178 198   INR  --   --  1.04    138 137   POTASSIUM 4.4 4.9 4.5   CHLORIDE 101 106 101   CO2 34* 29 32   BUN 44* 36* 36*   CR 1.08 1.07 1.43*   ANIONGAP 2* 3 4   WILBERT 9.0 8.6 9.1   * 205* 102*   ALBUMIN  --   --  3.3*   PROTTOTAL  --   --  6.7*   BILITOTAL  --   --  0.7   ALKPHOS  --   --  65   ALT  --   --  65   AST  --   --  29   TROPI  --   --  <0.015       No results found for this or any previous visit (from the past 24 hour(s)).

## 2019-05-29 NOTE — PROGRESS NOTES
Discharge Planner   Discharge Plans in progress: YES  Barriers to discharge plan: none  Follow up plan: Pt is agreeable to [facility].  He has been there in the past.  However, he said he didn't have a positive experience there compared to [facility], [place] and the .  I did offer pt the choice of [facility].  However, he pleasantly declined.  He said he talked to management at [facility] about his concerns and he feels he can safely return there.  I updated pt's wife about discharge planning per his request.  I sent a referral to [facility] to check benefits and if they can accept him medically.  MD thought discharge in 2-3 days. Pt was also wondering about a document he submitted about his organ donation to the  in February.  I was unable to locate it in the MEDIA tab.  [facility] will tell Pt to resubmit it.         Entered by: Yolanda France 05/29/2019 11:09 AM 1741         ADDENDUM 1326: Lana from [facility] said pt would have coverage for LTACH.  However, his insurance requires pre authorization, which usually takes 1 day.  He also has a copay of $300 from Day 1 to Day 5.  I updated pt and he was surprised about copay but agreeable to it.  He will have his wife call his insurance to verify the copay.

## 2019-05-29 NOTE — PROGRESS NOTES
"[facility] Intensive Care Unit  Intensivist Note  May 29, 2019    Assessment and Plan:  Tone Cooper is a 68 year old male admitted on 5/26/2019 for resp failure and remains critically ill due to following problems.   I have personally reviewed the daily labs, imaging studies, cultures and discussed the case during multidisciplinary ICU meeting with entire team.     # Hypoxic resp failure due to COPD exac, no evidence of infection, procal is negative, treated w solumedrol and azithro, FEV1 32% of predicted (1.1L), DLCO of 42 % of predicted, communicated with Dr. Acevedo (his pulmonologist) in re to pulm nodule follow up and explore his candidacy for lung transplantation    # New 4 mm spiculated nodule and enlarging R hilar node per CT (could be reactive), would need short term follow up as an outpatient w a CT chest    # A fib/RVR, well controlled, on eliquis, CHF-on diuresis    ICU prophylaxis:  SCDs, eliquis  Billing: total time spend providing critical care was 35 min, excluding procedure time.    Medications:  Reviewed    Physical examination:  General: Alert, oriented, not in distress at rest but in resp distress w minimal movement  /76 (BP Location: Left arm)   Pulse 67   Temp 98.2  F (36.8  C) (Axillary)   Resp 18   Ht 1.803 m (5' 11\")   Wt 90 kg (198 lb 8 oz)   SpO2 94%   BMI 27.69 kg/m    HEENT: neck supple, symmetrical, no palpable nodes  Lungs: Auscultation of lungs- decreased bronchovesicular sounds, expirium prolongation, wheezing,  CVS: Normal S1 and S2, no additional heart sounds, murmur  Abdomen: Bowel sounds present, soft, non tender, non distended  Extremities/musculoskeletal: no edema, deformity, cyanosis  Neurology: alert, orientedx3, no motor/sensorial deficits  Skin: no rash,ecchymosis  Psychiatry: Mood and affect are appropriate   Exam of Line sites: No erythema or discharge.          Data:     Recent Labs   Lab 05/27/19  0531 05/26/19  0930   O2PER 4LNC 30     Recent Labs "   Lab 05/27/19  0531 05/26/19  0650   WBC 13.4* 16.2*   RBC 5.00 5.72   HGB 14.2 16.3   HCT 47.2 53.3*    198     Recent Labs   Lab 05/29/19  0537 05/27/19  0531 05/26/19  0650    138 137   POTASSIUM 4.4 4.9 4.5   CHLORIDE 101 106 101   CO2 34* 29 32   BUN 44* 36* 36*   CR 1.08 1.07 1.43*   * 205* 102*   WILBERT 9.0 8.6 9.1     Procalcitonin: 0.08 (5/26)  Echo: Jan 2019, The right ventricle is mildly dilated.  Mildly decreased right ventricular systolic function  TAPSE 1.52cm,  TASV 10.1cm/s  An annuloplasty ring is noted in the tricuspid position.  mean gradient 4mmHg, peak 12 mmHg. Left ventricular systolic function is normal. The visual ejection fraction is  estimated at 60-65%.  Chest CT: 5/26 1. No evidence of acute pulmonary embolism.  2. Pulmonary emphysema and fibrosis.  3. Right middle lobe 0.4 cm spiculated nodule, new since 2/11/2019 and  suspicious for neoplastic disease. Several additional smaller  nonspecific nodules are also noted in the right middle lobe.  4. Right hilar adenopathy with a 1.5 cm node which has increased in  size since 2/11/2019.    GUANAKITO Membreno MD  #[phone]

## 2019-05-29 NOTE — PROGRESS NOTES
RT- Patient on BIPAP 14/6, Rate 12, 30% to sleep. Patient tolerating 2-4 L NC while awake. BS diminished. Will continue to monitor and support.     Micheal Hyatt, RT on 5/29/2019 at 1:55 AM

## 2019-05-30 LAB
GLUCOSE BLDC GLUCOMTR-MCNC: 161 MG/DL (ref 70–99)
GLUCOSE BLDC GLUCOMTR-MCNC: 161 MG/DL (ref 70–99)
GLUCOSE BLDC GLUCOMTR-MCNC: 190 MG/DL (ref 70–99)
GLUCOSE BLDC GLUCOMTR-MCNC: 248 MG/DL (ref 70–99)
GLUCOSE BLDC GLUCOMTR-MCNC: 272 MG/DL (ref 70–99)

## 2019-05-30 PROCEDURE — 25000128 H RX IP 250 OP 636: Performed by: INTERNAL MEDICINE

## 2019-05-30 PROCEDURE — 99291 CRITICAL CARE FIRST HOUR: CPT | Performed by: INTERNAL MEDICINE

## 2019-05-30 PROCEDURE — 25000132 ZZH RX MED GY IP 250 OP 250 PS 637: Performed by: INTERNAL MEDICINE

## 2019-05-30 PROCEDURE — 20000003 ZZH R&B ICU

## 2019-05-30 PROCEDURE — 94640 AIRWAY INHALATION TREATMENT: CPT

## 2019-05-30 PROCEDURE — 99233 SBSQ HOSP IP/OBS HIGH 50: CPT | Performed by: INTERNAL MEDICINE

## 2019-05-30 PROCEDURE — 00000146 ZZHCL STATISTIC GLUCOSE BY METER IP

## 2019-05-30 PROCEDURE — 94660 CPAP INITIATION&MGMT: CPT

## 2019-05-30 PROCEDURE — 25000125 ZZHC RX 250: Performed by: INTERNAL MEDICINE

## 2019-05-30 PROCEDURE — 25000131 ZZH RX MED GY IP 250 OP 636 PS 637: Performed by: INTERNAL MEDICINE

## 2019-05-30 PROCEDURE — 94640 AIRWAY INHALATION TREATMENT: CPT | Mod: 76

## 2019-05-30 PROCEDURE — 40000275 ZZH STATISTIC RCP TIME EA 10 MIN

## 2019-05-30 RX ORDER — METHYLPREDNISOLONE SODIUM SUCCINATE 125 MG/2ML
60 INJECTION, POWDER, LYOPHILIZED, FOR SOLUTION INTRAMUSCULAR; INTRAVENOUS EVERY 12 HOURS
Status: COMPLETED | OUTPATIENT
Start: 2019-05-30 | End: 2019-05-30

## 2019-05-30 RX ADMIN — TAMSULOSIN HYDROCHLORIDE 0.4 MG: 0.4 CAPSULE ORAL at 08:19

## 2019-05-30 RX ADMIN — FAMOTIDINE 20 MG: 20 TABLET, FILM COATED ORAL at 08:18

## 2019-05-30 RX ADMIN — APIXABAN 5 MG: 5 TABLET, FILM COATED ORAL at 22:08

## 2019-05-30 RX ADMIN — FUROSEMIDE 40 MG: 40 TABLET ORAL at 08:18

## 2019-05-30 RX ADMIN — BUDESONIDE 0.5 MG: 0.5 INHALANT RESPIRATORY (INHALATION) at 08:01

## 2019-05-30 RX ADMIN — DILTIAZEM HYDROCHLORIDE 180 MG: 180 CAPSULE, COATED, EXTENDED RELEASE ORAL at 08:18

## 2019-05-30 RX ADMIN — IPRATROPIUM BROMIDE AND ALBUTEROL SULFATE 3 ML: .5; 3 SOLUTION RESPIRATORY (INHALATION) at 19:25

## 2019-05-30 RX ADMIN — METHYLPREDNISOLONE SODIUM SUCCINATE 62.5 MG: 125 INJECTION, POWDER, FOR SOLUTION INTRAMUSCULAR; INTRAVENOUS at 06:58

## 2019-05-30 RX ADMIN — BUDESONIDE 0.5 MG: 0.5 INHALANT RESPIRATORY (INHALATION) at 19:25

## 2019-05-30 RX ADMIN — DILTIAZEM HYDROCHLORIDE 180 MG: 180 CAPSULE, COATED, EXTENDED RELEASE ORAL at 20:11

## 2019-05-30 RX ADMIN — APIXABAN 5 MG: 5 TABLET, FILM COATED ORAL at 08:18

## 2019-05-30 RX ADMIN — AZITHROMYCIN MONOHYDRATE 250 MG: 250 TABLET ORAL at 08:18

## 2019-05-30 RX ADMIN — IPRATROPIUM BROMIDE AND ALBUTEROL SULFATE 3 ML: .5; 3 SOLUTION RESPIRATORY (INHALATION) at 15:49

## 2019-05-30 RX ADMIN — TRAZODONE HYDROCHLORIDE 50 MG: 50 TABLET ORAL at 22:08

## 2019-05-30 RX ADMIN — FAMOTIDINE 20 MG: 20 TABLET, FILM COATED ORAL at 22:08

## 2019-05-30 RX ADMIN — CLONAZEPAM 1 MG: 1 TABLET ORAL at 22:08

## 2019-05-30 RX ADMIN — IPRATROPIUM BROMIDE AND ALBUTEROL SULFATE 3 ML: .5; 3 SOLUTION RESPIRATORY (INHALATION) at 11:44

## 2019-05-30 RX ADMIN — GUAIFENESIN AND DEXTROMETHORPHAN HYDROBROMIDE 1 TABLET: 600; 30 TABLET, EXTENDED RELEASE ORAL at 09:15

## 2019-05-30 RX ADMIN — INSULIN GLARGINE 15 UNITS: 100 INJECTION, SOLUTION SUBCUTANEOUS at 08:22

## 2019-05-30 RX ADMIN — METHYLPREDNISOLONE SODIUM SUCCINATE 62.5 MG: 125 INJECTION, POWDER, FOR SOLUTION INTRAMUSCULAR; INTRAVENOUS at 17:24

## 2019-05-30 RX ADMIN — IPRATROPIUM BROMIDE AND ALBUTEROL SULFATE 3 ML: .5; 3 SOLUTION RESPIRATORY (INHALATION) at 08:01

## 2019-05-30 RX ADMIN — ACETAMINOPHEN 650 MG: 325 TABLET, FILM COATED ORAL at 15:55

## 2019-05-30 RX ADMIN — FUROSEMIDE 40 MG: 40 TABLET ORAL at 15:36

## 2019-05-30 ASSESSMENT — ACTIVITIES OF DAILY LIVING (ADL)
ADLS_ACUITY_SCORE: 21
ADLS_ACUITY_SCORE: 19
ADLS_ACUITY_SCORE: 21
ADLS_ACUITY_SCORE: 21

## 2019-05-30 ASSESSMENT — MIFFLIN-ST. JEOR: SCORE: 1697.13

## 2019-05-30 NOTE — PLAN OF CARE
ICU End of Shift Summary.  For vital signs and complete assessments, please see documentation flowsheets.     Pertinent assessments: A&Ox4, calling appropriately.  Pt LS diminished with intermittent wheezes.  Pt tele Afib with variable ventricular rate.  Pt BG controlled with sliding scale insulin, tolerating regular diet.  Pt using urinal at bedside.  Pt denies pain, nausea.  Major Shift Events: On BiPAP for large portion of shift, tolerating when in use.  Bloody nose noted, BiPAP not applied for some time following resolution and humidification applied to O2 per NC.  Pt with good urine output.  Sleeping between cares  Plan (Upcoming Events): Continue to look for LTACH placement, continue to work with PT.  Discharge/Transfer Needs: Confirm placement.      Bedside Shift Report Completed : y  Bedside Safety Check Completed:y

## 2019-05-30 NOTE — PLAN OF CARE
PT: Eval orders received, chart reviewed. Pt declining PT evaluation this day, RN aware. Pt plans to discharge to LTACH in 1-2 days per chart review

## 2019-05-30 NOTE — PROGRESS NOTES
"SPIRITUAL HEALTH SERVICES Progress Note  [facility] ICU 3rd floor    SH f/u to assess needs for information for HCD. Pt, Tone, is sitting up and visiting with his wife, Gloria.   Pt states that he and Gloria have completed his HCD and want his brother to look at it before getting it notarized. Emphasized importance of completing document and then taking to [facility] to have it validated and entered into his medical record.   Pt then shares that he is expecting to discharge tomorrow and continue rehab at [facility] before transitioning home. He also notes that he is hoping to be able to ride his motorcycle around [facility] with his three buddies while his wife is on a trip with their dtr.   No other needs expressed at this time. Affirmed his hope that he is able to do this \"one last trip.\"   Pt expresses hope for this opportunity and this is providing goal for his rehab at this time.   SH remains available, no plans to follow.       RACHELE Macario.  Staff    Pager #[phone]     "

## 2019-05-30 NOTE — PLAN OF CARE
ICU End of Shift Summary.  For vital signs and complete assessments, please see documentation flowsheets.     Pertinent assessments: A&OX4- VSS - Afebrile -Lungs decreased bilaterally- patient on Bi-Pap 30% alternating to 3L nasal cannula-  Tele Afib, rate controlled- Appetite good, blood glucose controlled with sliding scale insulin.  One BM today. Wife here to visit this afternoon, and updated on plan of care.  Major Shift Events:  Showered today. Tylenol given for headache. Up in chair for most of afternoon.  Plan (Upcoming Events):  Plan to discharge to [place] - when ready.  Discharge/Transfer Needs:TBD    Bedside Shift Report Completed : y  Bedside Safety Check Completed:y

## 2019-05-30 NOTE — PHARMACY
Anticoagulation coverage check.  Patient has Medicare D through [facility].  Pt reported he is having difficulty affording his PTA Eliquis.    Xarelto/Eliquis  Currently, patient is paying $110/mo ($75/mo at [facility], [facility], [facility], [facility]).  If he reaches the coverage gap, this will increase to $110/mo at all pharmacies.    Pradaxa is non-formulary and more expensive.    Jantoven (warfarin)  $5/mo    Copay reduction cards do not work with Medicare D so unfortunately I don't have any cost savings to offer.  If patient thinks he may meet criteria, I would be happy to supply the application for the Social Security Subsidy for Medicare D (https://www.ssa.gov/benefits/medicare/prescriptionhelp/).  If he meets financial criteria and is approved for the program, this would reduce his copays to $3.40 per month per generic and $8.50 per month per brand name drug.    ALLISON Santizo, Pharmacy Technician/Liaison, Discharge Pharmacy *[phone]

## 2019-05-30 NOTE — PROGRESS NOTES
RT- Patient received scheduled nebs today, has been on BIPAP intermittently but on home O2 of 3L most of the day. BS diminished with some expiratory wheezes at times. Wears BIPAP at night.     Geovanna Cotto , RT  May 30, 2019 4:22 PM

## 2019-05-30 NOTE — PROGRESS NOTES
[facility]    Hospitalist Progress Note      Assessment & Plan   Tone Cooper is a 68 year old male who was admitted on 5/26/2019.    Summary of Stay:   Tone Cooper is a 68 year old male with PMH  Of advanced COPD on BiPAP at at night with prolonged hospitalization here in February for COPD exacerbation followed by 1 week at LTAC, A. fib and a flutter with previous ablation on Eliquis, PE, tricuspid valve replacement, HTN, chronic RV systolic CHF, former smoker, and previous MSSA bacteremia who presents with cough and shortness of breath for 3 to 4 days and admitted on 5/26/2019 with respiratory failure.       He remains on BiPAP with breaks.  Currently on nasal cannula.  He does not like the high flow.    He gets short of breath with minimal exertion.  Continues to have bilateral wheezing.    Plan:    Acute on chronic hypoxic and hypercapnic respiratory failure.  COPD exacerbation.  - Pulmonology consult appreciated.  - Continue bronchodilators and steroids.  Also on azithromycin, no pneumonia on x-ray.  -Slow improvement.  Still has bilateral expiratory wheezing.  Significant shortness of breath with minimal activity.  - Currently requiring 3 to 4 L of oxygen by nasal cannula. On BiPAP in between.  He uses BiPAP at home at night.  -On 10 mg prednisone at baseline.  - We will change IV Solu-Medrol to oral prednisone once a day.     A. fib.  -Rate is controlled.  On home diltiazem.  - On Eliquis.  Oral Lasix.  He takes Lasix at home.  He reports that he often needs a higher dose of Lasix when he is on higher dose of prednisone.  -Last echo in January showed normal EF with mild right heart dysfunction.  Appears euvolemic.    Diabetes  - In the setting of steroid use.  On Lantus and sliding scale.     Lung nodules  - Plan as outlined by pulmonology.  Quick follow-up in pulmonary clinic is recommended.     Mild acute kidney injury  -Improved.     Physical therapy consult as patient has limited  activity and is on high-dose steroids.  On famotidine for GI prophylaxis.     DVT Prophylaxis: Oral Eliquis.  Code Status: Full Code  Expected discharge: Possibly to LTAC.    In the next 1 to 2 days.    Toy Tejada MD  Text Page (7am - 6pm, M-F)    Interval History   Patient was evaluated with nursing staff. Overnight issues discussed.  Feels more or less the same.  Gets short of breath and tachycardic with minimal exertion.  At rest he is comfortable.  Denies any significant cough.  He had small epistaxis last night.    -Data reviewed today: Labs and medications.    Physical Exam   Temp: 97.8  F (36.6  C) Temp src: Oral BP: 145/87 Pulse: 79 Heart Rate: 80 Resp: 22 SpO2: 98 % O2 Device: Nasal cannula Oxygen Delivery: 3 LPM  Vitals:    05/28/19 0400 05/29/19 0600 05/30/19 0600   Weight: 90.3 kg (199 lb 1.2 oz) 90 kg (198 lb 8 oz) 90 kg (198 lb 6.6 oz)     Vital Signs with Ranges  Temp:  [97  F (36.1  C)-98.9  F (37.2  C)] 97.8  F (36.6  C)  Pulse:  [] 79  Heart Rate:  [] 80  Resp:  [22-24] 22  BP: (137-178)/() 145/87  FiO2 (%):  [30 %] 30 %  SpO2:  [91 %-99 %] 98 %  I/O last 3 completed shifts:  In: 950 [P.O.:950]  Out: 1925 [Urine:1925]    Constitutional: Awake, alert, cooperative, no apparent distress  HEENT: Trachea midline, sclera is clear   Respiratory: Bilateral expiratory wheezing with prolonged expiratory phase.  Cardiovascular: Irregular rate and rhythm, normal S1 and S2, and no murmur noted  GI: Normal bowel sounds, soft, non-distended, non-tender  Skin/Integumen: No rashes, no cyanosis, no edema  Psych: appropriate affect, no agitation   Extremities: No pitting edema     Medications     - MEDICATION INSTRUCTIONS -         apixaban ANTICOAGULANT  5 mg Oral BID     azithromycin  250 mg Oral Daily     budesonide  0.5 mg Nebulization BID     clonazePAM  1 mg Oral At Bedtime     diltiazem ER COATED BEADS  180 mg Oral BID     famotidine  20 mg Oral BID     furosemide  40 mg Oral BID      insulin aspart   Subcutaneous TID w/meals     insulin aspart   Subcutaneous TID AC     insulin aspart  1-7 Units Subcutaneous TID AC     insulin aspart  1-5 Units Subcutaneous At Bedtime     insulin glargine  15 Units Subcutaneous QAM     ipratropium - albuterol 0.5 mg/2.5 mg/3 mL  1 vial Nebulization 4x Daily     lidocaine   Transdermal Q8H     lidocaine   Transdermal Q24H     methylPREDNISolone  62.5 mg Intravenous Q12H     tamsulosin  0.4 mg Oral Daily     traZODone  50 mg Oral At Bedtime       Data   Recent Labs   Lab 05/29/19  0537 05/27/19  0531 05/26/19  0650   WBC  --  13.4* 16.2*   HGB  --  14.2 16.3   MCV  --  94 93   PLT  --  178 198   INR  --   --  1.04    138 137   POTASSIUM 4.4 4.9 4.5   CHLORIDE 101 106 101   CO2 34* 29 32   BUN 44* 36* 36*   CR 1.08 1.07 1.43*   ANIONGAP 2* 3 4   WILBERT 9.0 8.6 9.1   * 205* 102*   ALBUMIN  --   --  3.3*   PROTTOTAL  --   --  6.7*   BILITOTAL  --   --  0.7   ALKPHOS  --   --  65   ALT  --   --  65   AST  --   --  29   TROPI  --   --  <0.015       No results found for this or any previous visit (from the past 24 hour(s)).

## 2019-05-30 NOTE — PROGRESS NOTES
RT- Patient resting comfortably on BIPAP 14/6, Rate 12, 30% throughout the night. Will continue to monitor and support.    Micheal Hyatt, RT on 5/30/2019 at 2:42 AM

## 2019-05-30 NOTE — PROGRESS NOTES
"[facility] Intensive Care Unit  Intensivist Note  May 30, 2019    Assessment and Plan:  Tone Cooper is a 68 year old male admitted on 5/26/2019 for resp failure and remains critically ill due to following problems.   I have personally reviewed the daily labs, imaging studies, cultures and discussed the case during multidisciplinary ICU meeting with entire team.     # Hypoxic resp failure due to COPD exac, no evidence of infection, procal is negative, treated w solumedrol and azithro, FEV1 32% of predicted (1.1L), DLCO of 42 % of predicted, communicated with Dr. Acevedo (his pulmonologist) in re to pulm nodule follow up and explore his candidacy for lung transplantation, improving. Will plan to discharge to LTACH in 1-2 days    # New 4 mm spiculated nodule and enlarging R hilar node per CT (could be reactive), would need short term follow up as an outpatient w a CT chest    # A fib/RVR, well controlled, on eliquis, CHF-on diuresis    ICU prophylaxis:  SCDs, eliquis  Billing: total time spend providing critical care was 35 min, excluding procedure time.    Medications:  Reviewed    Physical examination:  General: Alert, oriented, not in distress at rest but in resp distress w minimal movement  /82   Pulse 60   Temp 97.8  F (36.6  C) (Oral)   Resp 24   Ht 1.803 m (5' 11\")   Wt 90 kg (198 lb 6.6 oz)   SpO2 95%   BMI 27.67 kg/m    HEENT: neck supple, symmetrical, no palpable nodes  Lungs: Auscultation of lungs- decreased bronchovesicular sounds, expirium prolongation, wheezing,  CVS: Normal S1 and S2, no additional heart sounds, murmur  Abdomen: Bowel sounds present, soft, non tender, non distended  Extremities/musculoskeletal: no edema, deformity, cyanosis  Neurology: alert, orientedx3, no motor/sensorial deficits  Skin: no rash,ecchymosis  Psychiatry: Mood and affect are appropriate   Exam of Line sites: No erythema or discharge.          Data:     Recent Labs   Lab 05/27/19  0531 05/26/19  0930 "   O2PER 4LNC 30     Recent Labs   Lab 05/27/19  0531 05/26/19  0650   WBC 13.4* 16.2*   RBC 5.00 5.72   HGB 14.2 16.3   HCT 47.2 53.3*    198     Recent Labs   Lab 05/29/19  0537 05/27/19  0531 05/26/19  0650    138 137   POTASSIUM 4.4 4.9 4.5   CHLORIDE 101 106 101   CO2 34* 29 32   BUN 44* 36* 36*   CR 1.08 1.07 1.43*   * 205* 102*   WILBERT 9.0 8.6 9.1     Procalcitonin: 0.08 (5/26)  Echo: Jan 2019, The right ventricle is mildly dilated.  Mildly decreased right ventricular systolic function  TAPSE 1.52cm,  TASV 10.1cm/s  An annuloplasty ring is noted in the tricuspid position.  mean gradient 4mmHg, peak 12 mmHg. Left ventricular systolic function is normal. The visual ejection fraction is  estimated at 60-65%.  Chest CT: 5/26 1. No evidence of acute pulmonary embolism.  2. Pulmonary emphysema and fibrosis.  3. Right middle lobe 0.4 cm spiculated nodule, new since 2/11/2019 and  suspicious for neoplastic disease. Several additional smaller  nonspecific nodules are also noted in the right middle lobe.  4. Right hilar adenopathy with a 1.5 cm node which has increased in  size since 2/11/2019.    GUANAKITO Membreno MD  #[phone]

## 2019-05-31 ENCOUNTER — APPOINTMENT (OUTPATIENT)
Dept: PHYSICAL THERAPY | Facility: CLINIC | Age: 69
DRG: 190 | End: 2019-05-31
Attending: INTERNAL MEDICINE
Payer: COMMERCIAL

## 2019-05-31 VITALS
OXYGEN SATURATION: 99 % | BODY MASS INDEX: 27.78 KG/M2 | HEART RATE: 86 BPM | TEMPERATURE: 97.9 F | HEIGHT: 71 IN | RESPIRATION RATE: 24 BRPM | DIASTOLIC BLOOD PRESSURE: 85 MMHG | SYSTOLIC BLOOD PRESSURE: 142 MMHG | WEIGHT: 198.41 LBS

## 2019-05-31 LAB
GLUCOSE BLDC GLUCOMTR-MCNC: 118 MG/DL (ref 70–139)
GLUCOSE BLDC GLUCOMTR-MCNC: 167 MG/DL (ref 70–139)
GLUCOSE BLDC GLUCOMTR-MCNC: 168 MG/DL (ref 70–99)
GLUCOSE BLDC GLUCOMTR-MCNC: 169 MG/DL (ref 70–99)
GLUCOSE BLDC GLUCOMTR-MCNC: 227 MG/DL (ref 70–99)
GLUCOSE BLDC GLUCOMTR-MCNC: 258 MG/DL (ref 70–139)

## 2019-05-31 PROCEDURE — 00000146 ZZHCL STATISTIC GLUCOSE BY METER IP

## 2019-05-31 PROCEDURE — 25000132 ZZH RX MED GY IP 250 OP 250 PS 637: Performed by: INTERNAL MEDICINE

## 2019-05-31 PROCEDURE — 99239 HOSP IP/OBS DSCHRG MGMT >30: CPT | Performed by: INTERNAL MEDICINE

## 2019-05-31 PROCEDURE — 97116 GAIT TRAINING THERAPY: CPT | Mod: GP

## 2019-05-31 PROCEDURE — 25000125 ZZHC RX 250: Performed by: INTERNAL MEDICINE

## 2019-05-31 PROCEDURE — 25000131 ZZH RX MED GY IP 250 OP 636 PS 637: Performed by: INTERNAL MEDICINE

## 2019-05-31 PROCEDURE — 94640 AIRWAY INHALATION TREATMENT: CPT | Mod: 76

## 2019-05-31 PROCEDURE — 99291 CRITICAL CARE FIRST HOUR: CPT | Performed by: INTERNAL MEDICINE

## 2019-05-31 PROCEDURE — 97161 PT EVAL LOW COMPLEX 20 MIN: CPT | Mod: GP

## 2019-05-31 PROCEDURE — 97530 THERAPEUTIC ACTIVITIES: CPT | Mod: GP

## 2019-05-31 PROCEDURE — 40000275 ZZH STATISTIC RCP TIME EA 10 MIN

## 2019-05-31 PROCEDURE — 94660 CPAP INITIATION&MGMT: CPT

## 2019-05-31 PROCEDURE — 94640 AIRWAY INHALATION TREATMENT: CPT

## 2019-05-31 RX ORDER — CLONAZEPAM 1 MG/1
1 TABLET ORAL
Start: 2019-05-31

## 2019-05-31 RX ORDER — FUROSEMIDE 40 MG
40 TABLET ORAL
Start: 2019-05-31 | End: 2019-07-31

## 2019-05-31 RX ORDER — PREDNISONE 20 MG/1
60 TABLET ORAL DAILY
Start: 2019-06-01 | End: 2019-06-28

## 2019-05-31 RX ADMIN — DILTIAZEM HYDROCHLORIDE 180 MG: 180 CAPSULE, COATED, EXTENDED RELEASE ORAL at 08:47

## 2019-05-31 RX ADMIN — TAMSULOSIN HYDROCHLORIDE 0.4 MG: 0.4 CAPSULE ORAL at 08:47

## 2019-05-31 RX ADMIN — PREDNISONE 60 MG: 10 TABLET ORAL at 08:47

## 2019-05-31 RX ADMIN — FAMOTIDINE 20 MG: 20 TABLET, FILM COATED ORAL at 08:47

## 2019-05-31 RX ADMIN — FUROSEMIDE 40 MG: 40 TABLET ORAL at 08:47

## 2019-05-31 RX ADMIN — IPRATROPIUM BROMIDE AND ALBUTEROL SULFATE 3 ML: .5; 3 SOLUTION RESPIRATORY (INHALATION) at 07:25

## 2019-05-31 RX ADMIN — APIXABAN 5 MG: 5 TABLET, FILM COATED ORAL at 08:47

## 2019-05-31 RX ADMIN — INSULIN GLARGINE 15 UNITS: 100 INJECTION, SOLUTION SUBCUTANEOUS at 08:38

## 2019-05-31 RX ADMIN — BUDESONIDE 0.5 MG: 0.5 INHALANT RESPIRATORY (INHALATION) at 07:25

## 2019-05-31 RX ADMIN — IPRATROPIUM BROMIDE AND ALBUTEROL SULFATE 3 ML: .5; 3 SOLUTION RESPIRATORY (INHALATION) at 11:36

## 2019-05-31 RX ADMIN — ACETAMINOPHEN 650 MG: 325 TABLET, FILM COATED ORAL at 13:21

## 2019-05-31 RX ADMIN — AZITHROMYCIN MONOHYDRATE 250 MG: 250 TABLET ORAL at 08:47

## 2019-05-31 ASSESSMENT — ACTIVITIES OF DAILY LIVING (ADL)
ADLS_ACUITY_SCORE: 21
ADLS_ACUITY_SCORE: 19
ADLS_ACUITY_SCORE: 21
ADLS_ACUITY_SCORE: 19
ADLS_ACUITY_SCORE: 19

## 2019-05-31 ASSESSMENT — MIFFLIN-ST. JEOR: SCORE: 1739.22

## 2019-05-31 NOTE — PROGRESS NOTES
Discharge Planner   Discharge Plans in progress: Yes, [facility] has accepted patient and has a bed available today after 3PM. Patient will discharge to unit [address] [place] [address] [phone]. [facility] liaison notifying patient wife of discharge plan.    Barriers to discharge plan: RN and MD to call [facility] for handoff report.   Follow up plan: Wife to transport patient to [facility]. Patient to discharge with discharge orders and discharge summary.        Entered by: Judith Saldivar 05/31/2019 11:37 AM       Judith PATTERSON  Care Transition Services  [phone]

## 2019-05-31 NOTE — DISCHARGE SUMMARY
[facility]    Discharge Summary  Hospitalist    Date of Admission:  5/26/2019  Date of Discharge:  5/31/2019  Discharging Provider: Toy Tejada MD  Date of Service (when I saw the patient): 05/31/19    Discharge Diagnoses   Severe COPD exacerbation.  Likely viral bronchitis.  Acute on chronic hypoxic and hypercapnic respiratory failure.  Oxygen dependent.  Also on BiPAP as needed.  At home, on prednisone 10 mg daily.  Atrial fibrillation.  On Eliquis.  Hyperglycemia due to steroid use.  Lung nodule.  Follow-up in pulmonology clinic with   History of PE in the past.  History of MSSA bacteremia in December 2018.  Heart failure with preserved ejection fraction.  EF 60 to 65% in January.  Right heart dysfunction.  Status post tricuspid valve replacement.    History of Present Illness   Tone Cooper is an 68 year old male who presented with Tone Cooper is a 68 year old male with PMH including advanced COPD on BiPAP at at bedtime with prolonged hospitalization here in February for COPD exacerbation followed by 1 week at LTAC, A. fib and a flutter with previous ablation on Eliquis, PE, tricuspid valve replacement, HTN, chronic RV systolic CHF, former smoker, and previous MSSA bacteremia who presented with cough and shortness of breath for the past 3 to 4 days.     Hospital Course     Patient was admitted to internal medicine hospitalist service.  He was admitted to ICU for his significant respiratory distress and the need of BiPAP.  He was started on empirical antibiotic in form of azithromycin as well as high-dose IV steroids.  He takes 10 mg of prednisone every day at home.  On imaging done in the emergency room, no evidence of pulmonary embolism or pneumonia.    During his hospital stay, patient has made somewhat slow but steady recovery.  Initially he required BiPAP almost continuously.  We managed to slowly wean him off of that and started him on nasal cannula oxygen.  We did try to start  him on high flow oxygen but he declined that.    Currently patient is on oxygen by nasal cannula 3 to 4 L/min.  In between he is also using BiPAP for his respiratory distress and while sleeping.  He does get very short of breath with minimal exertion such as trying to get out of bed and also gets hypoxic and tachycardic.  His functional status has significantly declined compared to his baseline.  For that reason he is being considered a good candidate for pulmonary rehab at an LTAC.  He is in agreement.    He takes prednisone 10 mg daily on a chronic basis.  Currently he is on prednisone 60 mg daily.  The goal is to very slowly wean him down on the steroid.  He says that whenever he gets tapered down to fast his COPD flareup comes back again.  No further antibiotics are necessary.  For now he is being kept on 60 mg daily of prednisone, which can slowly be titrated down.    Patient has a history of lung nodules for which he sees Dr. Acevedo.  They will also be assessing her for referral for lung transplantation.    Rest of his medical issues are stable.  He will be continued on his medications for his A. fib which includes diltiazem and apixaban.  He is also been continued on his home Lasix.    Otherwise the patient is stable to be discharged.    I personally evaluated and examined the patient on the day of discharge.    Toy Tejada MD      Pending Results   These results will be followed up by PCP  Unresulted Labs Ordered in the Past 30 Days of this Admission     No orders found from 3/27/2019 to 5/27/2019.               Primary Care Physician   Ana Pratt        Discharge Disposition   Transferred to LTAC  Condition at discharge: Stable    Consultations This Hospital Stay   CARE COORDINATOR IP CONSULT  ADVANCE DIRECTIVE IP CONSULT  PHYSICAL THERAPY ADULT IP CONSULT  PHARMACY DISCHARGE EDUCATION BY PHARMACIST    Time Spent on this Encounter   Discharge time: greater than 30 minutes.    Discharge Orders    No discharge procedures on file.  Allergies   Allergies   Allergen Reactions     Amlodipine Swelling     Flecainide Palpitations         Data   Most Recent 3 CBC's:  Recent Labs   Lab Test 05/27/19  0531 05/26/19  0650 03/28/19  1223   WBC 13.4* 16.2* 10.0   HGB 14.2 16.3 14.6   MCV 94 93 93    198 253      Most Recent 3 BMP's:  Recent Labs   Lab Test 05/29/19  0537 05/27/19  0531 05/26/19  0650    138 137   POTASSIUM 4.4 4.9 4.5   CHLORIDE 101 106 101   CO2 34* 29 32   BUN 44* 36* 36*   CR 1.08 1.07 1.43*   ANIONGAP 2* 3 4   WILBERT 9.0 8.6 9.1   * 205* 102*     Most Recent 2 LFT's:  Recent Labs   Lab Test 05/26/19  0650 01/04/19  0607   AST 29 23   ALT 65 22   ALKPHOS 65 45   BILITOTAL 0.7 0.6     Most Recent INR's and Anticoagulation Dosing History:  Anticoagulation Dose History     Recent Dosing and Labs Latest Ref Rng & Units 9/4/2008 9/5/2008 9/6/2008 6/3/2009 6/8/2015 6/9/2015 5/26/2019    INR 0.86 - 1.14 1.12 1.06 1.04 3.84(H) 1.88(H) 1.48(H) 1.04        Most Recent 3 Troponin's:  Recent Labs   Lab Test 05/26/19  0650 03/28/19  1223 02/16/19  2220  08/23/16  1256   TROPI <0.015 <0.015 <0.015   < >  --    TROPONIN  --   --   --   --  0.01    < > = values in this interval not displayed.     Most Recent Cholesterol Panel:  Recent Labs   Lab Test 01/17/14   CHOL 178   HDL 46   TRIG 100     Most Recent 6 Bacteria Isolates From Any Culture (See EPIC Reports for Culture Details):  Recent Labs   Lab Test 02/11/19  1256 02/11/19  1244 01/05/19  1545 12/19/18  1023 12/19/18  1013 12/18/18  1027   CULT No growth No growth Moderate growth  Normal tushar   No growth No growth Light growth  Normal tushar    Moderate growth  Staphylococcus aureus  *     Most Recent TSH, T4 and A1c Labs:  Recent Labs   Lab Test 12/19/18  1405   A1C 6.0*      Tone Cooper   Home Medication Instructions FLORENTINO:[id number]    Printed on:05/31/19 1238   Medication Information                      acetaminophen (TYLENOL) 325 MG  tablet  Take 325-650 mg by mouth every 6 hours as needed for mild pain             albuterol (VENTOLIN HFA) 108 (90 Base) MCG/ACT inhaler  Inhale 1-2 puffs into the lungs every 4 hours (while awake) PRN             apixaban ANTICOAGULANT (ELIQUIS) 5 MG tablet  Take 1 tablet (5 mg) by mouth 2 times daily             budesonide (PULMICORT) 0.5 MG/2ML neb solution  Take 2 mLs (0.5 mg) by nebulization 2 times daily             clonazePAM (KLONOPIN) 1 MG tablet  Take 1 tablet (1 mg) by mouth nightly as needed for anxiety             diltiazem ER COATED BEADS (CARDIZEM CD/CARTIA XT) 180 MG 24 hr capsule  Take 1 capsule (180 mg) by mouth 2 times daily Hold for SBP < 110 or HR < 60             furosemide (LASIX) 40 MG tablet  Take 1 tablet (40 mg) by mouth 2 times daily             ipratropium - albuterol 0.5 mg/2.5 mg/3 mL (DUONEB) 0.5-2.5 (3) MG/3ML neb solution  Take 1 vial (3 mLs) by nebulization 4 times daily             levalbuterol (XOPENEX) 1.25 MG/3ML neb solution  Take 3 mLs (1.25 mg) by nebulization every 4 hours as needed for wheezing or shortness of breath / dyspnea             Lidocaine (LIDOCARE) 4 % Patch  Place 2 patches onto the skin daily as needed for moderate pain             order for DME  Oxygen for nocturnal use. 1 LPM via nasal cannula  Testing done at home in chronic stable state.  Condition is COPD.  Patient does not have Obstructive Sleep Apnea.  Overnight oximetry revealed 30.3 minutes of hypoxia (<= 88%).  Duration: Lifetime (99 months).             order for DME  Equipment being ordered: Nebulizer             predniSONE (DELTASONE) 20 MG tablet  Take 3 tablets (60 mg) by mouth daily             tamsulosin (FLOMAX) 0.4 MG capsule  Take 0.4 mg by mouth daily             traZODone (DESYREL) 50 MG tablet  Take 50 mg by mouth At Bedtime

## 2019-05-31 NOTE — PLAN OF CARE
ICU End of Shift Summary.  For vital signs and complete assessments, please see documentation flowsheets.     Pertinent assessments: A&Ox4. Afib w/ occasional PVC's. Lungs diminished,ERICKSON and SOB, on BIPAP and 3L NC intermittently. Ate 100% meals. Afebrile. Using urinal appropriately w/ good output.  Major Shift Events: Worked w/ Pt.   Plan (Upcoming Events): Discharge to [facility] w/ wife to transport.   Discharge/Transfer Needs: AVS printed. Report given to accepting facility. Discharged w/ all belongings.    Bedside Shift Report Completed : Y  Bedside Safety Check Completed:Y

## 2019-05-31 NOTE — PROGRESS NOTES
05/31/19 0928   Quick Adds   Type of Visit Initial PT Evaluation   Living Environment   Lives With spouse   Living Arrangements house   Home Accessibility stairs within home   Number of Stairs, Within Home, Primary 8   Living Environment Comment Patient reports his wife can assist a bit around the house; but not able to assist him physically.   Self-Care   Usual Activity Tolerance moderate   Current Activity Tolerance fair   Equipment Currently Used at Home walker, rolling   Activity/Exercise/Self-Care Comment Able to walk very short distances from car into a store and will use an electric cart from there.    Functional Level Prior   Ambulation 1-->assistive equipment   Transferring 1-->assistive equipment   Toileting 0-->independent   Bathing 0-->independent   Communication 0-->understands/communicates without difficulty   Swallowing 0-->swallows foods/liquids without difficulty   Cognition 0 - no cognition issues reported   Fall history within last six months yes   Number of times patient has fallen within last six months 1   Which of the above functional risks had a recent onset or change? none   Prior Functional Level Comment Patient reports use of walker with ambulation, family assists with ADLs as needed.   General Information   Onset of Illness/Injury or Date of Surgery - Date 05/26/19   Referring Physician Toy Tejada MD   Patient/Family Goals Statement patient plans to discharge to LTACH, then return to home   Pertinent History of Current Problem (include personal factors and/or comorbidities that impact the POC) Tone Cooper is a 68 year old male with PMH  Of advanced COPD on BiPAP at at night with prolonged hospitalization here in February for COPD exacerbation followed by 1 week at LTAC, A. fib and a flutter with previous ablation on Eliquis, PE, tricuspid valve replacement, HTN, chronic RV systolic CHF, former smoker, and previous MSSA bacteremia who presents with cough and shortness of breath  "for 3 to 4 days and admitted on 5/26/2019 with respiratory failure.   Precautions/Limitations fall precautions;oxygen therapy device and L/min  (3L NC)   Cognitive Status Examination   Orientation orientation to person, place and time   Personal Safety and Judgment impulsive   Memory intact   Pain Assessment   Patient Currently in Pain No   Posture    Posture Forward head position;Protracted shoulders   Range of Motion (ROM)   ROM Comment WFL   Strength   Strength Comments Patient with moderate strength deficits, activity tolerance deficits.  Patient with decreased eccentric control noted with transfers, SOB/fatigues easily with activity.   Bed Mobility   Bed Mobility Comments independent   Transfer Skills   Transfer Comments SBA with 2WW   Gait   Gait Comments Amb 200 feet with 2WW and graded assist from CGA to SBA   Balance   Balance Comments patient with fall history, impulsive with mobility due to poor activity tolerance   General Therapy Interventions   Planned Therapy Interventions balance training;gait training;strengthening;transfer training;home program guidelines;progressive activity/exercise   Clinical Impression   Criteria for Skilled Therapeutic Intervention yes, treatment indicated   PT Diagnosis decreased independence with mobility   Influenced by the following impairments decreased strength, decreased activity tolerance   Functional limitations due to impairments gait, transfers   Clinical Presentation Stable/Uncomplicated   Clinical Presentation Rationale complex pmh, stable presentation, fair social support   Clinical Decision Making (Complexity) Low complexity   Therapy Frequency` 5 times/week   Predicted Duration of Therapy Intervention (days/wks) 3 days   Anticipated Discharge Disposition   (per medical team, LTACH)   Risk & Benefits of therapy have been explained Yes   Patient, Family & other staff in agreement with plan of care Yes   [facility] AM-PAC TM \"6 Clicks\"   2016, Trustees of " "[facility], under license to [employer].  All rights reserved.   6 Clicks Short Forms Basic Mobility Inpatient Short Form   [facility] AM-PAC  \"6 Clicks\" V.2 Basic Mobility Inpatient Short Form   1. Turning from your back to your side while in a flat bed without using bedrails? 4 - None   2. Moving from lying on your back to sitting on the side of a flat bed without using bedrails? 4 - None   3. Moving to and from a bed to a chair (including a wheelchair)? 4 - None   4. Standing up from a chair using your arms (e.g., wheelchair, or bedside chair)? 4 - None   5. To walk in hospital room? 3 - A Little   6. Climbing 3-5 steps with a railing? 2 - A Lot   Basic Mobility Raw Score (Score out of 24.Lower scores equate to lower levels of function) 21   Total Evaluation Time   Total Evaluation Time (Minutes) 5     "

## 2019-05-31 NOTE — PROGRESS NOTES
"[facility] Intensive Care Unit  Intensivist Note  May 31, 2019    Assessment and Plan:  Tone Cooper is a 68 year old male admitted on 5/26/2019 for resp failure and remains critically ill due to following problems.   I have personally reviewed the daily labs, imaging studies, cultures and discussed the case during multidisciplinary ICU meeting with entire team.     # Hypoxic resp failure due to COPD exac, no evidence of infection, procal is negative, treated w steroids and azithro, FEV1 32% of predicted (1.1L), DLCO of 42 % of predicted, communicated with Dr. Acevedo (his pulmonologist) in re to pulm nodule follow up and explore his candidacy for lung transplantation, improving. Will plan to discharge to LTACH in 1-2 days. Would taper/wean of prednisone next 2 weeks.    # New 4 mm spiculated nodule and enlarging R hilar node per CT (could be reactive), would need short term follow up as an outpatient w a CT chest    # A fib/RVR, well controlled, on eliquis, CHF-on diuresis    ICU prophylaxis:  SCDs, eliquis  Billing: total time spend providing critical care was 35 min, excluding procedure time.    Medications:  Reviewed    Physical examination:  General: Alert, oriented, not in distress at rest but in resp distress w minimal movement  /88   Pulse 70   Temp 98.6  F (37  C) (Oral)   Resp 14   Ht 1.803 m (5' 11\")   Wt 90 kg (198 lb 6.6 oz)   SpO2 91%   BMI 27.67 kg/m    HEENT: neck supple, symmetrical, no palpable nodes  Lungs: Auscultation of lungs- decreased bronchovesicular sounds, expirium prolongation, wheezing,  CVS: Normal S1 and S2, no additional heart sounds, murmur  Abdomen: Bowel sounds present, soft, non tender, non distended  Extremities/musculoskeletal: no edema, deformity, cyanosis  Neurology: alert, orientedx3, no motor/sensorial deficits  Skin: no rash,ecchymosis  Psychiatry: Mood and affect are appropriate   Exam of Line sites: No erythema or discharge.          Data:     Recent " Labs   Lab 05/27/19  0531 05/26/19  0930   O2PER 4LNC 30     Recent Labs   Lab 05/27/19  0531 05/26/19  0650   WBC 13.4* 16.2*   RBC 5.00 5.72   HGB 14.2 16.3   HCT 47.2 53.3*    198     Recent Labs   Lab 05/29/19  0537 05/27/19  0531 05/26/19  0650    138 137   POTASSIUM 4.4 4.9 4.5   CHLORIDE 101 106 101   CO2 34* 29 32   BUN 44* 36* 36*   CR 1.08 1.07 1.43*   * 205* 102*   WILBERT 9.0 8.6 9.1     Procalcitonin: 0.08 (5/26)  Echo: Jan 2019, The right ventricle is mildly dilated.  Mildly decreased right ventricular systolic function  TAPSE 1.52cm,  TASV 10.1cm/s  An annuloplasty ring is noted in the tricuspid position.  mean gradient 4mmHg, peak 12 mmHg. Left ventricular systolic function is normal. The visual ejection fraction is  estimated at 60-65%.  Chest CT: 5/26 1. No evidence of acute pulmonary embolism.  2. Pulmonary emphysema and fibrosis.  3. Right middle lobe 0.4 cm spiculated nodule, new since 2/11/2019 and  suspicious for neoplastic disease. Several additional smaller  nonspecific nodules are also noted in the right middle lobe.  4. Right hilar adenopathy with a 1.5 cm node which has increased in  size since 2/11/2019.    GUANAKITO Membreno MD  #[phone]

## 2019-05-31 NOTE — PROGRESS NOTES
RT- Patient on BIPAP 14/6, Rate 12, 30% to sleep. Patient resting comfortably. Will continue to monitor and support.     Micheal Hyatt, RT on 5/31/2019 at 1:58 AM

## 2019-05-31 NOTE — PLAN OF CARE
PT: Patient seen by physical therapy for evaluation and treatment.  Patient with PMH including advanced COPD on BiPAP at night,  3-4L NC during the day, A-fib and A-flutter, PE, tricuspid valve replacement, HTN, chronic RV systolic CHF, former smoker, now admitted with acute on chronic hypoxic and hypercapnic respiratory failure, COPD exacerbation.  At baseline, patient lives in a split level home with his wife.  Wife is unable to provide physical help, but is present to assist with household needs.  Patient has a 2WW, but typically is independent with gait.     Discharge Planner PT   Patient plan for discharge: Per patient, EMR and medical team, plan is for LTACH  Current status: Patient supine upon arrival, on 3L NC throughout session.  Patient is independent with bed mobility.  Patient transferred between sitting and standing with 2WW and graded assist from CGA to SBA.  Patient amb 200 feet with 2WW and graded assist from CGA to SBA.  Upon return to room, patient with noted fatigue, SOB, wheezing and increased impulsivity.  SaO2 between 90-91% on 3L NC. Up at EOB with urinal, nursing present at end of session.  Barriers to return to prior living situation: decreased activity tolerance, increased impulsivity/fall risk with increased SOB, decreased safety awareness with increased SOB.  Recommendations for discharge: LTACH  Rationale for recommendations: Patient presents below baseline for functional mobility secondary to decreased activity tolerance and strength.  Recommend ongoing physical therapy in order to increase strength, activity tolerance and independence with mobility.       Entered by: Elana Adhikari 05/31/2019 9:40 AM

## 2019-06-01 ENCOUNTER — RECORDS - HEALTHEAST (OUTPATIENT)
Dept: NEUROLOGY | Facility: CLINIC | Age: 69
End: 2019-06-01

## 2019-06-01 DIAGNOSIS — J44.9 CHRONIC OBSTRUCTIVE PULMONARY DISEASE, UNSPECIFIED COPD TYPE (H): ICD-10-CM

## 2019-06-01 DIAGNOSIS — D69.6 THROMBOCYTOPENIA (H): ICD-10-CM

## 2019-06-01 DIAGNOSIS — I10 UNCONTROLLED HYPERTENSION: ICD-10-CM

## 2019-06-01 LAB
GLUCOSE BLDC GLUCOMTR-MCNC: 132 MG/DL (ref 70–139)
GLUCOSE BLDC GLUCOMTR-MCNC: 202 MG/DL (ref 70–139)
GLUCOSE BLDC GLUCOMTR-MCNC: 271 MG/DL (ref 70–139)
GLUCOSE BLDC GLUCOMTR-MCNC: 279 MG/DL (ref 70–139)

## 2019-06-01 NOTE — PLAN OF CARE
"  Physical Therapy Discharge Summary    Reason for therapy discharge:    Discharged to LTACH     Progress towards therapy goal(s). See goals on Care Plan in [facility] electronic health record for goal details.  Goals not met.  Barriers to achieving goals:   discharge from facility.  Pt seen for evaluation on 5/31/19:    \"Discharge Planner PT   Patient plan for discharge: Per patient, EMR and medical team, plan is for LTACH  Current status: Patient supine upon arrival, on 3L NC throughout session.  Patient is independent with bed mobility.  Patient transferred between sitting and standing with 2WW and graded assist from CGA to SBA.  Patient amb 200 feet with 2WW and graded assist from CGA to SBA.  Upon return to room, patient with noted fatigue, SOB, wheezing and increased impulsivity.  SaO2 between 90-91% on 3L NC. Up at EOB with urinal, nursing present at end of session.  Barriers to return to prior living situation: decreased activity tolerance, increased impulsivity/fall risk with increased SOB, decreased safety awareness with increased SOB.  Recommendations for discharge: LTACH  Rationale for recommendations: Patient presents below baseline for functional mobility secondary to decreased activity tolerance and strength.  Recommend ongoing physical therapy in order to increase strength, activity tolerance and independence with mobility.\"      Therapy recommendation(s):    Continued therapy is recommended.  Rationale/Recommendations:  Ongoing PT services at next level of care to optimize strength and functional mobility.    **Pt not seen by discharging therapist on this date, note written based on previous treating therapist's notes and recommendations.    "

## 2019-06-02 LAB
ANION GAP SERPL CALCULATED.3IONS-SCNC: 12 MMOL/L (ref 5–18)
BACTERIA SPEC CULT: NORMAL
BUN SERPL-MCNC: 40 MG/DL (ref 8–22)
CALCIUM SERPL-MCNC: 9.8 MG/DL (ref 8.5–10.5)
CHLORIDE BLD-SCNC: 94 MMOL/L (ref 98–107)
CO2 SERPL-SCNC: 37 MMOL/L (ref 22–31)
CREAT SERPL-MCNC: 1.15 MG/DL (ref 0.7–1.3)
GFR SERPL CREATININE-BSD FRML MDRD: >60 ML/MIN/1.73M2
GLUCOSE BLD-MCNC: 158 MG/DL (ref 70–125)
GLUCOSE BLDC GLUCOMTR-MCNC: 123 MG/DL (ref 70–139)
GLUCOSE BLDC GLUCOMTR-MCNC: 168 MG/DL (ref 70–139)
GLUCOSE BLDC GLUCOMTR-MCNC: 219 MG/DL (ref 70–139)
GLUCOSE BLDC GLUCOMTR-MCNC: 235 MG/DL (ref 70–139)
POTASSIUM BLD-SCNC: 4 MMOL/L (ref 3.5–5)
SODIUM SERPL-SCNC: 143 MMOL/L (ref 136–145)

## 2019-06-03 LAB
ANION GAP SERPL CALCULATED.3IONS-SCNC: 8 MMOL/L (ref 5–18)
BUN SERPL-MCNC: 36 MG/DL (ref 8–22)
CALCIUM SERPL-MCNC: 9.5 MG/DL (ref 8.5–10.5)
CHLORIDE BLD-SCNC: 93 MMOL/L (ref 98–107)
CO2 SERPL-SCNC: 41 MMOL/L (ref 22–31)
CREAT SERPL-MCNC: 0.91 MG/DL (ref 0.7–1.3)
GFR SERPL CREATININE-BSD FRML MDRD: >60 ML/MIN/1.73M2
GLUCOSE BLD-MCNC: 115 MG/DL (ref 70–125)
GLUCOSE BLDC GLUCOMTR-MCNC: 181 MG/DL (ref 70–139)
GLUCOSE BLDC GLUCOMTR-MCNC: 245 MG/DL (ref 70–139)
GLUCOSE BLDC GLUCOMTR-MCNC: 389 MG/DL (ref 70–139)
GLUCOSE BLDC GLUCOMTR-MCNC: 98 MG/DL (ref 70–139)
POTASSIUM BLD-SCNC: 3.7 MMOL/L (ref 3.5–5)
SODIUM SERPL-SCNC: 142 MMOL/L (ref 136–145)

## 2019-06-03 NOTE — PROGRESS NOTES
Transition Communication Hand-off for Care Transitions to Next Level of Care Provider    Name: Tone Cooper  : 1950  MRN #: [id number]  Primary Care Provider: Ana Pratt  [facility] Care [facility]: [facility]  Primary Clinic: [facility] [address] [facility] [id number]  Primary Care Clinic Name: Dr Pratt  Reason for Hospitalization:  COPD exacerbation (H) [J44.1]  Admit Date/Time: 2019  6:15 AM  Discharge Date: 19  Payor Source: Payor: [facility] / Plan: [facility] MEDICARE NON FPA / Product Type: HMO /     Readmission Assessment Measure (CALEB) Risk Score/category: HIGH         Reason for Communication Hand-off Referral: Admission diagnoses: COPD  Fragility    Discharge Plan:       Concern for non-adherence with plan of care:   NO  Discharge Needs Assessment:  Needs      Most Recent Value   Equipment Currently Used at Home  walker, rolling   # of Referrals Placed by CTS  Post Acute Facilities          Already enrolled in Tele-monitoring program and name of program:  na  Follow-up specialty is recommended: Yes    Follow-up plan:    Future Appointments   Date Time Provider Department Center   2019  9:00 AM 1, [facility]   2019  9:00 AM 1, [facility]   2019  9:00 AM 1, [facility]   2019  9:00 AM 1, [facility]   2019  9:00 AM 1, [facility]   2019 10:00 AM UCCT1 [facility]   2019 11:00 AM UC PFL B [facility]FT [facility]   2019 11:30 AM Octavia Acevedo MD [facility]   2019  3:00 PM 1, [facility]   2019  9:00 AM 1, [facility]   2019  9:00 AM 1, [facility]       Any outstanding tests or procedures:    Procedures     Future Labs/Procedures    BIPAP treatment     Comments:    BIPAP: 14/6, Rate 12, 30% FiO2. Use while sleeping  and/or per symptoms.    Oxygen - Nasal cannula     Comments:    3-4 Lpm by nasal cannula to keep O2 sats 92% or greater.          Referrals     Future Labs/Procedures    Occupational Therapy Adult Consult     Comments:    Evaluate and treat as clinically indicated.    Reason:  Deconditioning. Steroid use COPD    Physical Therapy Adult Consult     Comments:    Evaluate and treat as clinically indicated.    Reason:  Deconditioning. Steroid use COPD          Key Recommendations:    CTS following for COPD and High eddie score. He says he was at [facility] for a couple of weeks and had been doing well until the last couple of days when he became increasingly short of breath and was needing his bipap more. He continues to use home oxygen at 3L from [facility]. He has a nebulizer that he uses as he was prescribed. He has a bipap that he faithfully wears all night and has been needing it more during the day. He says he follows with Dr Acevedo and he really likes her care. He has been following with Pulmonary rehab at [facility] twice a week and feels it is getting him stronger. He saw Dr Acevedo in April and already has follow up scheduled with her.     He should follow up with PCP with in 1 week and continue Pulm Rehab as scheduled. He should continue using his BiPap and recommended by Dr Acevedo.    discharge recommendations: Pt was discharged to [facility]       Lana Sofia    AVS/Discharge Summary is the source of truth; this is a helpful guide for improved communication of patient story   Poor

## 2019-06-04 LAB
GLUCOSE BLDC GLUCOMTR-MCNC: 122 MG/DL (ref 70–139)
GLUCOSE BLDC GLUCOMTR-MCNC: 181 MG/DL (ref 70–139)
GLUCOSE BLDC GLUCOMTR-MCNC: 207 MG/DL (ref 70–139)
GLUCOSE BLDC GLUCOMTR-MCNC: 350 MG/DL (ref 70–139)
SPECIMEN STATUS: NORMAL

## 2019-06-05 LAB
GLUCOSE BLDC GLUCOMTR-MCNC: 124 MG/DL (ref 70–139)
GLUCOSE BLDC GLUCOMTR-MCNC: 194 MG/DL (ref 70–139)
GLUCOSE BLDC GLUCOMTR-MCNC: 219 MG/DL (ref 70–139)
GLUCOSE BLDC GLUCOMTR-MCNC: 227 MG/DL (ref 70–139)

## 2019-06-05 ASSESSMENT — MIFFLIN-ST. JEOR: SCORE: 1677.54

## 2019-06-06 LAB
GLUCOSE BLDC GLUCOMTR-MCNC: 107 MG/DL (ref 70–139)
GLUCOSE BLDC GLUCOMTR-MCNC: 139 MG/DL (ref 70–139)

## 2019-06-11 ENCOUNTER — HOSPITAL ENCOUNTER (OUTPATIENT)
Dept: CARDIAC REHAB | Facility: CLINIC | Age: 69
End: 2019-06-11
Attending: INTERNAL MEDICINE
Payer: COMMERCIAL

## 2019-06-11 PROCEDURE — G0424 PULMONARY REHAB W EXER: HCPCS

## 2019-06-11 PROCEDURE — 40000244 ZZH STATISTIC VISIT PULM REHAB

## 2019-06-13 ENCOUNTER — HOSPITAL ENCOUNTER (OUTPATIENT)
Dept: CARDIAC REHAB | Facility: CLINIC | Age: 69
End: 2019-06-13
Attending: INTERNAL MEDICINE
Payer: COMMERCIAL

## 2019-06-13 PROCEDURE — G0424 PULMONARY REHAB W EXER: HCPCS

## 2019-06-13 PROCEDURE — 40000244 ZZH STATISTIC VISIT PULM REHAB

## 2019-06-14 ENCOUNTER — OFFICE VISIT (OUTPATIENT)
Dept: PULMONOLOGY | Facility: CLINIC | Age: 69
End: 2019-06-14
Attending: INTERNAL MEDICINE
Payer: COMMERCIAL

## 2019-06-14 VITALS
WEIGHT: 198 LBS | OXYGEN SATURATION: 97 % | TEMPERATURE: 98.2 F | HEART RATE: 85 BPM | HEIGHT: 71 IN | BODY MASS INDEX: 27.72 KG/M2 | SYSTOLIC BLOOD PRESSURE: 127 MMHG | RESPIRATION RATE: 16 BRPM | DIASTOLIC BLOOD PRESSURE: 78 MMHG

## 2019-06-14 DIAGNOSIS — J43.1 PANLOBULAR EMPHYSEMA (H): Primary | ICD-10-CM

## 2019-06-14 DIAGNOSIS — J44.9 CHRONIC OBSTRUCTIVE PULMONARY DISEASE, UNSPECIFIED COPD TYPE (H): Primary | ICD-10-CM

## 2019-06-14 PROCEDURE — G0463 HOSPITAL OUTPT CLINIC VISIT: HCPCS | Mod: ZF

## 2019-06-14 RX ORDER — LISINOPRIL 10 MG/1
10 TABLET ORAL DAILY
COMMUNITY
Start: 2019-06-07 | End: 2019-07-31

## 2019-06-14 ASSESSMENT — PAIN SCALES - GENERAL: PAINLEVEL: NO PAIN (0)

## 2019-06-14 ASSESSMENT — MIFFLIN-ST. JEOR: SCORE: 1694.99

## 2019-06-14 NOTE — PATIENT INSTRUCTIONS
1. Continue using your current nebulizer regimen until we are able to check on your insurance coverage for the new, once a day inhaler, Westonlegy.  We will call you once we know if this will be covered.     2. Start using the aerobika device at least 4 times daily.     3. Plan to have a repeat chest CT in 2 months (late August or early September).      4. Please do a 6 minute walk test.      5. Reduce your prednisone dosage to 15 mg daily starting tomorrow and reduce this by 5 mg daily every week.  Call us if you notice any change in your symptoms.      6. We will ask the transplant coordinator to set up an appointment with you.      7. I will talk to the interventional pulmonologists regarding a plan for the new nodule and call you with these recommendations.      Wash your hands regularly and avoid sick contacts.  Have a nice spring / summer!

## 2019-06-14 NOTE — PROGRESS NOTES
[facility] Pulmonology Follow Up Visit    Tone Cooper is a 68 year old male who is seen in follow up for COPD.     Assessment and plan: Tone Cooper is a 69 yo male with very severe COPD, chronic respiratory failure requiring supplemental O2, atrial fibrillation s/p ablation on apixaban, and tricuspid repair who presents to clinic today for follow-up of COPD.  Currently doing well but does have a history of recurrent exacerbations since October.  He is doing pulmonary rehab and benefiting from this; will plan to optimize inhaled medication therapy as well.    1. Very severe COPD (FEV1 31%)  2. ISIS, on Bpap  3. Hypoxic/hypercapnic respiratory failure, on 3.5 L supplemental O2  4. A. Fib on apixaban  5. New 4.4 mm RML spiculated nodule (since 2/2019) on chest CT    Recommendations are as follows:   - switch inhaled therapies to trelegy and stop xopenex, duoneb (unless prn); if this is too expensive he will call in to order alternative therapies and he knows not to stop his inhaled medications until he is able to get the new inhaler   - continue supplemenatal O2, 6 MWT/oxygen titration  - aerobika device: use this at least QID (teaching provided today)  - he will need pneumovax   - repeat CT chest w/o contrast to f/u new 4.4 mm nodule in August/September 2019  - referral to lung transplant; will ask them to call him to set up an appt   - continue pulmonary rehab  - wash hands regularly and avoid sick contacts   - will plan to discuss with IP after repeat imaging of lung nodule     RTC 3 months with Dr. Acevedo or myself.     Patient seen and discussed with staff pulmonologist, Dr. Perlman.   Zoie Evans MD  Pulmonary and Critical Care Fellow, PGY-6  Pager: [phone]  Attending Note:    The patient was seen examined by me with the pulmonary fellow, I have personally reviewed the imaging studies, lab and culture results.  The note reflects our joint findings, assessment and plan.      Jamar  Perlman, M.D.  Pulmonary, Allergy and Critical Care.    Pulmonary HPI  Mr. Cooper is present in the pulmonary clinic today with his wife for follow-up of COPD.  He is present today with his wife.  He was last seen in the pulmonary clinic by Dr. Acevedo in April 2019.    He reports recurrent COPD exacerbations since October 2018.  His most recent was in May during which time he was hospitalized from 5/26/2019 until 5/31/2019.  He reports that each exacerbation is similar to others and is usually precipitated by cough with worsens sputum production and difficulty clearing this thick sputum.  Per chart review he was treated for other COPD exacerbations in February 2019, January 2019, November 2018, October 2018.  He was also found to have a small right upper lobe PE in February 2019 after a prolonged hospitalization.    He did discharge to [facility] on 5/31/2019 but has been home for the last week.  He does say that his breathing is significantly improved and back to his baseline.  He was given prednisone, antibiotics and taper this to 10 mg daily about 1-1/2 weeks ago but did notice worsened cough and breathing so this was increased 1 week ago to 20 mg daily.  He is currently using 3-1/2 L/min around-the-clock but does forget to use this about 10% of the time.  She is using duo nebs every 4 hours, Xopenex about once a day.  He reports that he did use Pulmicort nebs for about 5 days but felt like this made him sick and have trouble breathing.  He was also prescribed Spiriva, and an albuterol inhaler at one point but does not use these either.    He denies allergies, GERD, postnasal drainage.  He has no pets at home no mold at home.  He is taking Lasix for swollen ankles which is helping.  He reports that prior to October 2018 he was able to walk downstairs and do laundry he has not been able to do this in his split level home.  He is doing pulmonary rehab and feels like this is helping he is planning to extend this.   "He is able to walk 10 minutes before taking a break.  He has an extensive smoking history but did quit in 2008.  He asks about lung transplantation.      The patient was seen and examined by  Zoie Evans MD   Current Outpatient Medications   Medication     acetaminophen (TYLENOL) 325 MG tablet     albuterol (VENTOLIN HFA) 108 (90 Base) MCG/ACT inhaler     apixaban ANTICOAGULANT (ELIQUIS) 5 MG tablet     budesonide (PULMICORT) 0.5 MG/2ML neb solution     clonazePAM (KLONOPIN) 1 MG tablet     diltiazem ER COATED BEADS (CARDIZEM CD/CARTIA XT) 180 MG 24 hr capsule     furosemide (LASIX) 40 MG tablet     ipratropium - albuterol 0.5 mg/2.5 mg/3 mL (DUONEB) 0.5-2.5 (3) MG/3ML neb solution     levalbuterol (XOPENEX) 1.25 MG/3ML neb solution     Lidocaine (LIDOCARE) 4 % Patch     lisinopril (PRINIVIL/ZESTRIL) 10 MG tablet     order for DME     order for DME     predniSONE (DELTASONE) 20 MG tablet     tamsulosin (FLOMAX) 0.4 MG capsule     traZODone (DESYREL) 50 MG tablet     No current facility-administered medications for this visit.      Review of Systems:   A complete ROS was otherwise negative except as noted in the HPI.  /78 (BP Location: Right arm, Patient Position: Chair, Cuff Size: Adult Regular)   Pulse 85   Temp 98.2  F (36.8  C) (Oral)   Resp 16   Ht 1.803 m (5' 10.98\")   Wt 89.8 kg (198 lb)   SpO2 97%   BMI 27.63 kg/m    Exam:   GENERAL APPEARANCE: Well developed, well nourished, alert, and in no apparent distress.  Pleasant male.   EYES: PERRL, EOMI  HENT: Nasal mucosa with no edema and no hyperemia.   MOUTH: Oral mucosa is moist, without any lesions.    NECK: supple, no masses, no thyromegaly.  LYMPHATICS: No significant axillary, cervical, or supraclavicular nodes.  RESP: Distant breath sounds bilaterally.  Reduced air flow throughout.  No crackles. No rhonchi. No wheezes.  CV: Normal S1, S2, regular rhythm, normal rate. No murmur.  No rub. No gallop. 2+ b/l  LE edema to ankles.   MS: " extremities normal. No clubbing. No cyanosis.  SKIN: no rash on limited exam  NEURO: Mentation intact, speech normal, normal strength and tone, normal gait and stance  PSYCH: mentation appears normal. and affect normal/bright  Results:  PFTs: 6/14/19 - very severe obstruction.  Lung volumes not obtained.  Severe diffusion defect.      CTPE study - 5/26/19 -                                                                   IMPRESSION:  1. No evidence of acute pulmonary embolism.  2. Pulmonary emphysema and fibrosis.  3. Right middle lobe 0.4 cm spiculated nodule, new since 2/11/2019 and  suspicious for neoplastic disease. Several additional smaller  nonspecific nodules are also noted in the right middle lobe.  4. Right hilar adenopathy with a 1.5 cm node which has increased in  size since 2/11/2019.  5. Coronary artery calcification.  6. Hepatic fatty infiltration--possible etiologies include consumption  of alcohol or excessive carbohydrate intake, especially  sugar/fructose.  Metabolic syndrome commonly occurs in combination  with nonalcoholic fatty liver disease. Although often reversible,  nonalcoholic fatty liver disease can progress to steatohepatitis and  cirrhosis.

## 2019-06-14 NOTE — LETTER
6/14/2019       RE: Tone Cooper  [address] [address] [facility] [address]     Dear Colleague,    Thank you for referring your patient, Tone Cooper, to the [facility]. Please see a copy of my visit note below.    [facility] Pulmonology Follow Up Visit    Tone Cooper is a 68 year old male who is seen in follow up for COPD.     Assessment and plan: Tone Cooper is a 69 yo male with very severe COPD, chronic respiratory failure requiring supplemental O2, atrial fibrillation s/p ablation on apixaban, and tricuspid repair who presents to clinic today for follow-up of COPD.  Currently doing well but does have a history of recurrent exacerbations since October.  He is doing pulmonary rehab and benefiting from this; will plan to optimize inhaled medication therapy as well.    1. Very severe COPD (FEV1 31%)  2. ISIS, on Bpap  3. Hypoxic/hypercapnic respiratory failure, on 3.5 L supplemental O2  4. A. Fib on apixaban  5. New 4.4 mm RML spiculated nodule (since 2/2019) on chest CT    Recommendations are as follows:   - switch inhaled therapies to trelegy and stop xopenex, duoneb (unless prn); if this is too expensive he will call in to order alternative therapies and he knows not to stop his inhaled medications until he is able to get the new inhaler   - continue supplemenatal O2, 6 MWT/oxygen titration  - aerobika device: use this at least QID (teaching provided today)  - he will need pneumovax   - repeat CT chest w/o contrast to f/u new 4.4 mm nodule in August/September 2019  - referral to lung transplant; will ask them to call him to set up an appt   - continue pulmonary rehab  - wash hands regularly and avoid sick contacts   - will plan to discuss with IP after repeat imaging of lung nodule     RTC 3 months with Dr. Acevedo or myself.     Patient seen and discussed with staff pulmonologist, Dr. Perlman.    Zoie Evans MD  Pulmonary and Critical Care Fellow, PGY-6  Pager: [phone]  Attending Note:    The patient was seen examined by me with the pulmonary fellow, I have personally reviewed the imaging studies, lab and culture results.  The note reflects our joint findings, assessment and plan.      David Perlman, M.D.  Pulmonary, Allergy and Critical Care.    Pulmonary HPI  Mr. Cooper is present in the pulmonary clinic today with his wife for follow-up of COPD.  He is present today with his wife.  He was last seen in the pulmonary clinic by Dr. Acevedo in April 2019.    He reports recurrent COPD exacerbations since October 2018.  His most recent was in May during which time he was hospitalized from 5/26/2019 until 5/31/2019.  He reports that each exacerbation is similar to others and is usually precipitated by cough with worsens sputum production and difficulty clearing this thick sputum.  Per chart review he was treated for other COPD exacerbations in February 2019, January 2019, November 2018, October 2018.  He was also found to have a small right upper lobe PE in February 2019 after a prolonged hospitalization.    He did discharge to [facility] on 5/31/2019 but has been home for the last week.  He does say that his breathing is significantly improved and back to his baseline.  He was given prednisone, antibiotics and taper this to 10 mg daily about 1-1/2 weeks ago but did notice worsened cough and breathing so this was increased 1 week ago to 20 mg daily.  He is currently using 3-1/2 L/min around-the-clock but does forget to use this about 10% of the time.  She is using duo nebs every 4 hours, Xopenex about once a day.  He reports that he did use Pulmicort nebs for about 5 days but felt like this made him sick and have trouble breathing.  He was also prescribed Spiriva, and an albuterol inhaler at one point but does not use these either.    He denies allergies, GERD, postnasal drainage.  He has no pets at  "home no mold at home.  He is taking Lasix for swollen ankles which is helping.  He reports that prior to October 2018 he was able to walk downstairs and do laundry he has not been able to do this in his split level home.  He is doing pulmonary rehab and feels like this is helping he is planning to extend this.  He is able to walk 10 minutes before taking a break.  He has an extensive smoking history but did quit in 2008.  He asks about lung transplantation.      The patient was seen and examined by  Zoie Evans MD   Current Outpatient Medications   Medication     acetaminophen (TYLENOL) 325 MG tablet     albuterol (VENTOLIN HFA) 108 (90 Base) MCG/ACT inhaler     apixaban ANTICOAGULANT (ELIQUIS) 5 MG tablet     budesonide (PULMICORT) 0.5 MG/2ML neb solution     clonazePAM (KLONOPIN) 1 MG tablet     diltiazem ER COATED BEADS (CARDIZEM CD/CARTIA XT) 180 MG 24 hr capsule     furosemide (LASIX) 40 MG tablet     ipratropium - albuterol 0.5 mg/2.5 mg/3 mL (DUONEB) 0.5-2.5 (3) MG/3ML neb solution     levalbuterol (XOPENEX) 1.25 MG/3ML neb solution     Lidocaine (LIDOCARE) 4 % Patch     lisinopril (PRINIVIL/ZESTRIL) 10 MG tablet     order for DME     order for DME     predniSONE (DELTASONE) 20 MG tablet     tamsulosin (FLOMAX) 0.4 MG capsule     traZODone (DESYREL) 50 MG tablet     No current facility-administered medications for this visit.      Review of Systems:   A complete ROS was otherwise negative except as noted in the HPI.  /78 (BP Location: Right arm, Patient Position: Chair, Cuff Size: Adult Regular)   Pulse 85   Temp 98.2  F (36.8  C) (Oral)   Resp 16   Ht 1.803 m (5' 10.98\")   Wt 89.8 kg (198 lb)   SpO2 97%   BMI 27.63 kg/m     Exam:   GENERAL APPEARANCE: Well developed, well nourished, alert, and in no apparent distress.  Pleasant male.   EYES: PERRL, EOMI  HENT: Nasal mucosa with no edema and no hyperemia.   MOUTH: Oral mucosa is moist, without any lesions.    NECK: supple, no masses, no " thyromegaly.  LYMPHATICS: No significant axillary, cervical, or supraclavicular nodes.  RESP: Distant breath sounds bilaterally.  Reduced air flow throughout.  No crackles. No rhonchi. No wheezes.  CV: Normal S1, S2, regular rhythm, normal rate. No murmur.  No rub. No gallop. 2+ b/l  LE edema to ankles.   MS: extremities normal. No clubbing. No cyanosis.  SKIN: no rash on limited exam  NEURO: Mentation intact, speech normal, normal strength and tone, normal gait and stance  PSYCH: mentation appears normal. and affect normal/bright  Results:  PFTs: 6/14/19 - very severe obstruction.  Lung volumes not obtained.  Severe diffusion defect.      CTPE study - 5/26/19 -                                                                   IMPRESSION:  1. No evidence of acute pulmonary embolism.  2. Pulmonary emphysema and fibrosis.  3. Right middle lobe 0.4 cm spiculated nodule, new since 2/11/2019 and  suspicious for neoplastic disease. Several additional smaller  nonspecific nodules are also noted in the right middle lobe.  4. Right hilar adenopathy with a 1.5 cm node which has increased in  size since 2/11/2019.  5. Coronary artery calcification.  6. Hepatic fatty infiltration--possible etiologies include consumption  of alcohol or excessive carbohydrate intake, especially  sugar/fructose.  Metabolic syndrome commonly occurs in combination  with nonalcoholic fatty liver disease. Although often reversible,  nonalcoholic fatty liver disease can progress to steatohepatitis and  cirrhosis.      Again, thank you for allowing me to participate in the care of your patient.      Sincerely,    Zoie Evans MD

## 2019-06-16 ENCOUNTER — APPOINTMENT (OUTPATIENT)
Dept: ULTRASOUND IMAGING | Facility: CLINIC | Age: 69
End: 2019-06-16
Attending: EMERGENCY MEDICINE
Payer: COMMERCIAL

## 2019-06-16 ENCOUNTER — HOSPITAL ENCOUNTER (EMERGENCY)
Facility: CLINIC | Age: 69
Discharge: HOME | End: 2019-06-16
Attending: EMERGENCY MEDICINE | Admitting: EMERGENCY MEDICINE
Payer: COMMERCIAL

## 2019-06-16 VITALS
OXYGEN SATURATION: 98 % | HEART RATE: 86 BPM | TEMPERATURE: 98.7 F | DIASTOLIC BLOOD PRESSURE: 85 MMHG | RESPIRATION RATE: 19 BRPM | SYSTOLIC BLOOD PRESSURE: 122 MMHG

## 2019-06-16 DIAGNOSIS — I48.20 CHRONIC ATRIAL FIBRILLATION (H): ICD-10-CM

## 2019-06-16 DIAGNOSIS — S70.12XA HEMATOMA OF LEFT THIGH, INITIAL ENCOUNTER: ICD-10-CM

## 2019-06-16 DIAGNOSIS — J44.9 CHRONIC OBSTRUCTIVE PULMONARY DISEASE, UNSPECIFIED COPD TYPE (H): ICD-10-CM

## 2019-06-16 LAB
ANION GAP SERPL CALCULATED.3IONS-SCNC: 3 MMOL/L (ref 3–14)
BASOPHILS # BLD AUTO: 0.1 10E9/L (ref 0–0.2)
BASOPHILS NFR BLD AUTO: 0.4 %
BUN SERPL-MCNC: 37 MG/DL (ref 7–30)
CALCIUM SERPL-MCNC: 9 MG/DL (ref 8.5–10.1)
CHLORIDE SERPL-SCNC: 100 MMOL/L (ref 94–109)
CO2 SERPL-SCNC: 37 MMOL/L (ref 20–32)
CREAT SERPL-MCNC: 1.42 MG/DL (ref 0.66–1.25)
DIFFERENTIAL METHOD BLD: ABNORMAL
EOSINOPHIL # BLD AUTO: 0.1 10E9/L (ref 0–0.7)
EOSINOPHIL NFR BLD AUTO: 0.8 %
ERYTHROCYTE [DISTWIDTH] IN BLOOD BY AUTOMATED COUNT: 14.6 % (ref 10–15)
GFR SERPL CREATININE-BSD FRML MDRD: 51 ML/MIN/{1.73_M2}
GLUCOSE SERPL-MCNC: 113 MG/DL (ref 70–99)
HCT VFR BLD AUTO: 47.8 % (ref 40–53)
HGB BLD-MCNC: 14.7 G/DL (ref 13.3–17.7)
IMM GRANULOCYTES # BLD: 0.4 10E9/L (ref 0–0.4)
IMM GRANULOCYTES NFR BLD: 2.3 %
LYMPHOCYTES # BLD AUTO: 0.9 10E9/L (ref 0.8–5.3)
LYMPHOCYTES NFR BLD AUTO: 5.8 %
MCH RBC QN AUTO: 28.5 PG (ref 26.5–33)
MCHC RBC AUTO-ENTMCNC: 30.8 G/DL (ref 31.5–36.5)
MCV RBC AUTO: 93 FL (ref 78–100)
MONOCYTES # BLD AUTO: 0.3 10E9/L (ref 0–1.3)
MONOCYTES NFR BLD AUTO: 1.7 %
NEUTROPHILS # BLD AUTO: 13.9 10E9/L (ref 1.6–8.3)
NEUTROPHILS NFR BLD AUTO: 89 %
NRBC # BLD AUTO: 0 10*3/UL
NRBC BLD AUTO-RTO: 0 /100
PLATELET # BLD AUTO: 143 10E9/L (ref 150–450)
POTASSIUM SERPL-SCNC: 4 MMOL/L (ref 3.4–5.3)
RBC # BLD AUTO: 5.15 10E12/L (ref 4.4–5.9)
SODIUM SERPL-SCNC: 140 MMOL/L (ref 133–144)
WBC # BLD AUTO: 15.6 10E9/L (ref 4–11)

## 2019-06-16 PROCEDURE — 93971 EXTREMITY STUDY: CPT | Mod: LT

## 2019-06-16 PROCEDURE — 99285 EMERGENCY DEPT VISIT HI MDM: CPT | Mod: 25

## 2019-06-16 PROCEDURE — 85025 COMPLETE CBC W/AUTO DIFF WBC: CPT | Performed by: EMERGENCY MEDICINE

## 2019-06-16 PROCEDURE — 36415 COLL VENOUS BLD VENIPUNCTURE: CPT | Performed by: EMERGENCY MEDICINE

## 2019-06-16 PROCEDURE — 25000125 ZZHC RX 250: Performed by: EMERGENCY MEDICINE

## 2019-06-16 PROCEDURE — 94640 AIRWAY INHALATION TREATMENT: CPT

## 2019-06-16 PROCEDURE — 80048 BASIC METABOLIC PNL TOTAL CA: CPT | Performed by: EMERGENCY MEDICINE

## 2019-06-16 PROCEDURE — 93005 ELECTROCARDIOGRAM TRACING: CPT

## 2019-06-16 RX ORDER — IPRATROPIUM BROMIDE AND ALBUTEROL SULFATE 2.5; .5 MG/3ML; MG/3ML
3 SOLUTION RESPIRATORY (INHALATION)
Status: DISCONTINUED | OUTPATIENT
Start: 2019-06-16 | End: 2019-06-16 | Stop reason: HOSPADM

## 2019-06-16 RX ADMIN — IPRATROPIUM BROMIDE AND ALBUTEROL SULFATE 3 ML: .5; 3 SOLUTION RESPIRATORY (INHALATION) at 12:58

## 2019-06-16 ASSESSMENT — ENCOUNTER SYMPTOMS
SHORTNESS OF BREATH: 1
COLOR CHANGE: 1
FEVER: 0
CHILLS: 0

## 2019-06-16 NOTE — ED TRIAGE NOTES
Patient presents with complaints of SOB. Patient states he is normally SOB but today is much worse. Patient denies any chest pain today but states he had pain yesterday. Patient also states he has a bruise in his groin which has been increasing,  ABC intact without need for intervention at this time.

## 2019-06-16 NOTE — ED AVS SNAPSHOT
[facility]  [address]  [facility] [address]  Phone:  [phone]  Fax:  [phone]                                    Tone Cooper   MRN: [id number]    Department:  [facility]   Date of Visit:  6/16/2019           After Visit Summary Signature Page    I have received my discharge instructions, and my questions have been answered. I have discussed any challenges I see with this plan with the nurse or doctor.    ..........................................................................................................................................  Patient/Patient Representative Signature      ..........................................................................................................................................  Patient Representative Print Name and Relationship to Patient    ..................................................               ................................................  Date                                   Time    ..........................................................................................................................................  Reviewed by Signature/Title    ...................................................              ..............................................  Date                                               Time          22EPIC Rev 08/18

## 2019-06-16 NOTE — ED PROVIDER NOTES
History     Chief Complaint:  Left Leg Pain/Redness & Shortness of Breath    HPI   Tone Cooper is a 68 year old male with a history of COPD on 3 L oxygen at home, PE, atrial fibrillation and flutter on Eliquis who presents with left leg pain and redness and shortness of breath. The patient reports that about 1 week ago he pulled his groin and developed a bruise. This morning the patient states he woke up and noticed redness and pain to his left inner thigh. The patient states he is still able to bear weight on his leg. He has some numbness and tingling in his bilateral feet at baseline. The patient additionally notes that he has shortness of breath at baseline, however this has been increasing in severity recently causing him to have to increase his home O2 up to 4 L at times. Of note, the patient states that he stopped taking Eliquis about 1 month ago when he ran out of it due to not being able to afford it. The patient states that he instead has been taking 81 mg aspirin daily. He has not followed up with his PCP regarding this yet, however states that he has an appointment scheduled for the upcoming week. The patient denies any fevers, chills, chest pain or leg swelling.     Allergies:  Amlodipine  Flecainide     Medications:    Tylenol  Ventolin HFA 90 Base  Eliquis  Pulmicort  Klonopin  Cardizem CD/Cartia XT  Trelegy Ellipta  Lasix  DuoNeb  Xopenex  Lidocare  Lisinopril  Deltasone  Aerobika  Flomax  Desyrel    Past Medical History:    Atrial Flutter  COPD  Cardiomyopathy  Diverticulitis  Hypertension  Persistent atrial fibrillation  Premature beats  PVC  Tricuspid valve disorder  Ventricular tachycardia  Nocturnal hypoxia  Pneumonia   PE    Past Surgical History:    Right hernia repair  Appendectomy  Cardioversion x 3  Ablation x 3  Incision and drainage lower extremity, combined, right  Laparoscopic cholecystectomy  Left hip replacement  Repair tricuspid valve  Small intestine surgery for bowel  obstruction    Family History:    Prostate Cancer  Emphysema  Hypertension  Cerebrovascular Disease    Social History:  Smoking Status: Former Smoker  Alcohol Use: Yes  Patient presents with his wife.  Marital Status:       Review of Systems   Constitutional: Negative for chills and fever.   Respiratory: Positive for shortness of breath.    Cardiovascular: Negative for chest pain and leg swelling.   Musculoskeletal:        + Left thigh pain   Skin: Positive for color change.   All other systems reviewed and are negative.    Physical Exam     Patient Vitals for the past 24 hrs:   BP Temp Temp src Pulse Heart Rate Resp SpO2   06/16/19 1315 122/85 -- -- 86 93 14 97 %   06/16/19 1300 119/84 -- -- 83 96 16 98 %   06/16/19 1245 120/75 -- -- 89 96 18 98 %   06/16/19 1235 -- 98.7  F (37.1  C) Oral -- -- -- --   06/16/19 1210 (!) 136/101 -- -- -- 98 23 97 %   06/16/19 1154 (!) 166/139 -- -- 95 95 22 96 %     Physical Exam  Gen: Pleasant, appears stated age.  Chronically ill appearing.    Eye:   Pupils are equal, round, and reactive.     Sclera non-injected.    ENT:   Moist mucus membranes.     Normal tongue.    Oropharynx without lesions.    Cardiac:     Normal rate. Heart is irregularly irregular.    No murmurs, gallops, or rubs.   2+ femoral pulse, 2+ DP pulse on left.    Pulmonary:     Poor air movement.    Speaking in 3-4 word sentences.   Appears dyspneic.    Abdomen:     Normal active bowel sounds.     Abdomen is soft and non-distended, without focal tenderness.    Musculoskeletal:     Normal movement of all extremities without evidence for deficit.    Extremities:    No edema.   Calf is non-tender.   FROM in left knee and ankle.  Fine touch intact.    Skin:   Redness extending from the proxmial left inner thigh to the posterior knee.    No warmth, no crepitus. Non-blanching, appears bruised in places.    Neurologic:    Non-focal exam without asymmetric weakness or numbness.    Normal tone    Psychiatric:      Normal affect with appropriate interaction with examiner.    Emergency Department Course     ECG (11:55:24):  Rate 103 bpm. HI interval * ms. QRS duration 92 ms. QT/QTc 314/411 ms. P-R-T axes * 116 80.   Atrial fibrillation with rapid ventricular response with premature ventricular or aberrantly conducted complexes.  Right axis deviation.  Low voltage QRS.  Cannot rule out Anterior infarct, age undetermined.  Abnormal ECG.  No significant change compared to ECG dated 5/26/19.  Interpreted at 1225 by Rowan Larson MD.    Imaging:  Radiographic findings were communicated with the patient and family who voiced understanding of the findings.    US Lower Extremity Venous Duplex Left   Final Result   IMPRESSION: No evidence of deep venous thrombosis.      JAZMIN VASQUEZ MD        Laboratory:    BMP: Creatinine 1.42 (H), Carbon Dioxide 37 (H), Glucose 113 (H), Urea Nitrogen 37 (H), GFR Estimate 51 (L), o/w AWNL    CBC: WBC 15.6 (H),  (L), MCHC 30.8 (L), Absolute Neutrophil 13.9 (H), o/w AWNL (HGB 14.7)    Interventions:  Medications   ipratropium - albuterol 0.5 mg/2.5 mg/3 mL (DUONEB) neb solution 3 mL (3 mLs Nebulization Given 6/16/19 1258)     1258: Duoneb, 3 mL, nebulization     Emergency Department Course:  Past medical records, nursing notes, and vitals reviewed.  1210: I performed an exam of the patient and obtained history, as documented above.    IV inserted and blood drawn.    The patient was sent for an ultrasound while in the emergency department, findings above.     1500: Patient re-evaluated.  Sat is 96% on baseline 3L.  He and wife feel his respiratory status is at baseline.  They are comfortable with plan to discharge home.  Calls made to outpatient pharmacy to discuss options for paying for Eliquis.    Impression & Plan      Medical Decision Making:  This is a 68-year-old male with history of advanced COPD, pulmonary embolism, atrial fibrillation who presents with chief complaint of left  thigh bruising, redness, and pain.  Chief complaint was noted to be shortness of breath, however the patient does not feel that this is significantly different from baseline.  He was initially in atrial fibrillation with RVR, but heart rate is now 80's to 90's.  He was not able to take his morning nebulizer, and at this point after nebs is feeling improved back to baseline as far as his shortness of breath.  His oxygenation seems to be baseline for him on 3 L of oxygen.  Otherwise, symptoms are not consistent with exacerbation of COPD, pneumonia, or CHF.  In regards to the patient's primary complaint of left leg redness and pain, DVT was ruled out with a negative left lower extremely ultrasound.  Clinical exam does not suggest cellulitis.  The area is not red, nor is her blanching erythema.  The appearance is consistent with a deep bruise.  The patient notes that he had a muscle tear early in the week which I think resulted in hematoma that is now tracked along the soft tissues with gravity.  The patient has a mild leukocytosis likely related to prednisone taper and demargination.  Otherwise, he has been afebrile, and has no fever in the ED.  Patient notes that he has been off Eliquis for about 3 weeks because of issues with payment payment or the medication.  He has been taking the baby dose of aspirin daily.  He was discharged with a prescription for 7 days of Eliquis, which will cost $25 and the patient can afford.  That will bridge him to his next PCP appointment this upcoming week where he thinks his PCP can provide more samples.  He will return to the ED for increasing leg pain, fever, increasing redness, worsened shortness of breath, or other concerns.    Diagnosis:    ICD-10-CM    1. Hematoma of left thigh, initial encounter S70.12XA    2. Chronic obstructive pulmonary disease, unspecified COPD type (H) J44.9    3. Chronic atrial fibrillation (H) I48.2        Disposition:  discharged to home    Discharge  Medications:     Medication List      Modified    * ELIQUIS 5 MG tablet  Generic drug:  apixaban ANTICOAGULANT  What changed:  Another medication with the same name was added. Make sure you understand how and when to take each.     * apixaban ANTICOAGULANT 5 MG tablet  Commonly known as:  ELIQUIS  5 mg, Oral, 2 TIMES DAILY  What changed:  You were already taking a medication with the same name, and this prescription was added. Make sure you understand how and when to take each.         * This list has 2 medication(s) that are the same as other medications prescribed for you. Read the directions carefully, and ask your doctor or other care provider to review them with you.                  Chanel Sánchez  6/16/2019   [facility] EMERGENCY DEPARTMENT  I, Chanel Sánchez, am serving as a scribe at 12:10 PM on 6/16/2019 to document services personally performed by Rowan Larson MD based on my observations and the provider's statements to me.        Rowan Larson MD  06/16/19 1959

## 2019-06-17 LAB — INTERPRETATION ECG - MUSE: NORMAL

## 2019-06-18 ENCOUNTER — TELEPHONE (OUTPATIENT)
Dept: PULMONOLOGY | Facility: CLINIC | Age: 69
End: 2019-06-18

## 2019-06-18 ENCOUNTER — REFERRAL (OUTPATIENT)
Dept: TRANSPLANT | Facility: CLINIC | Age: 69
End: 2019-06-18

## 2019-06-18 NOTE — LETTER
June 26, 2019      Tone Cooper  [address] [address] [id number]      Dear Tone,    Thank you for your interest in the Transplant Center at [facility], [facility]. We look forward to being a part of your care team and assisting you through the transplant process.    As we discussed, your transplant coordinator is Jennifer Coffey (Lung).  You may call your coordinator at any time with questions or concerns.  Your first scheduled call will be on July 3, 2019.  If this needs to change, call [phone].    Please complete the following.    1. Fill out and return the enclosed forms    Authorization for Electronic Communication    Authorization to Discuss Protected Health Information    Authorization for Release of Protected Health Information    Authorization for Care Everywhere Release of Information    2. Sign up for:    Comekst, access to your electronic medical record (see enclosed pamphlet)    ReunifytransplantArtBinder, a transplant education website    You can use these tools to learn more about your transplant, communicate with your care team, and track your medical details      Sincerely,    Solid Organ Transplant  [facility]    Cc: Octavia Acevedo MD(Referring), Ana Pratt MD(PCP)

## 2019-06-18 NOTE — LETTER
July 24, 2019    From:  [facility] Lung Transplant     To: Mr. rosalie Cooper  [address] [id number]      Dear Tone Cooper,    Thank you for considering the [facility] Lung Transplant Program. Dr. Acevedo referred you to our program for consideration of lung transplant evaluation. We have reviewed your records and you unfortunately do not meet our criteria for lung transplant evaluation.      As we noted during your lung transplant new patient visit with Dr Green on 7/22/2019, although your lung disease may be severe enough to consider lung transplant your history of multiple severe co morbidities to include: persistent A. fib/a flutter, history of tricuspid valve repair, recurrent acute kidney injury, history of hepatitis C status post treatment but now with elevated LFTs of unclear etiology, and recurrent C. Difficile infection,  would put your health and survival at great risk after lung transplantation.        Thank you for considering the [facility] Lung Transplant Program for your health care needs. Please feel free to contact us at [phone] if you would like to discuss our decision or with any concerns.     Sincerely,    Brayan Meyer MD,  Medical Director, Lung Transplantation  [facility] Primary Care Provider        Referring Provider

## 2019-06-18 NOTE — TELEPHONE ENCOUNTER
Left message with patient to take the Trelegy inhaler as prescribed as well as as needed inhaler. Spoke with pharmacy and he had picked the inhaler up this am for $99. Left number incase he had questions.

## 2019-06-20 ENCOUNTER — HOSPITAL ENCOUNTER (OUTPATIENT)
Dept: CARDIAC REHAB | Facility: CLINIC | Age: 69
End: 2019-06-20
Attending: INTERNAL MEDICINE
Payer: COMMERCIAL

## 2019-06-20 VITALS — WEIGHT: 180 LBS | HEIGHT: 72 IN | BODY MASS INDEX: 24.38 KG/M2

## 2019-06-20 LAB
DLCOUNC-%PRED-PRE: 48 %
DLCOUNC-PRE: 13.1 ML/MIN/MMHG
DLCOUNC-PRED: 27.24 ML/MIN/MMHG
ERV-%PRED-PRE: 14 %
ERV-PRE: 0.17 L
ERV-PRED: 1.18 L
EXPTIME-PRE: 7.74 SEC
FEF2575-%PRED-PRE: 16 %
FEF2575-PRE: 0.43 L/SEC
FEF2575-PRED: 2.66 L/SEC
FEFMAX-%PRED-PRE: 34 %
FEFMAX-PRE: 3.06 L/SEC
FEFMAX-PRED: 8.87 L/SEC
FEV1-%PRED-PRE: 31 %
FEV1-PRE: 1.09 L
FEV1FEV6-PRE: 43 %
FEV1FEV6-PRED: 78 %
FEV1FVC-PRE: 39 %
FEV1FVC-PRED: 76 %
FEV1SVC-PRE: 33 %
FEV1SVC-PRED: 69 %
FIFMAX-PRE: 4.87 L/SEC
FVC-%PRED-PRE: 61 %
FVC-PRE: 2.77 L
FVC-PRED: 4.52 L
IC-%PRED-PRE: 81 %
IC-PRE: 3.11 L
IC-PRED: 3.83 L
VA-%PRED-PRE: 73 %
VA-PRE: 4.91 L
VC-%PRED-PRE: 65 %
VC-PRE: 3.28 L
VC-PRED: 5.01 L

## 2019-06-20 PROCEDURE — 40000244 ZZH STATISTIC VISIT PULM REHAB

## 2019-06-20 PROCEDURE — G0424 PULMONARY REHAB W EXER: HCPCS

## 2019-06-20 ASSESSMENT — MIFFLIN-ST. JEOR: SCORE: 1621.53

## 2019-06-20 NOTE — TELEPHONE ENCOUNTER
SOT LUNG INTAKE    June 20, 2019    Referring Provider: Octavia Acevedo  Primary Provider: Ana Deng  Specialist: Cardiologist Sussy Britt  Diagnosis:COPD  Source/Facility: [facility]    Critical History:  Smoking/nicotine history: 61.5  pack-years, quit 1/02/08  Alcohol use history: yes  3-6 per month  Drug use history: no  Cancer history:  Basal cell-nose  Cardiac history:  yes  BMI:24.8      Notes:     Insurance information:  [employer] Non Fpa  Policy gates:  self  Subscriber/policy/ID number:  [id number]  Group Number:  [id number]    Is patient in a group home/assisted living? no  Does patient have a guardian? no    Referral intake process completed.  Patient is aware that after financial approval is received, medical records will be requested.   Patient confirmed for a callback from transplant coordinator on July 3, 2019. (within 2 weeks)  Tentative evaluation date TBD. (within 4 weeks)    Confirmed coordinator will discuss evaluation process in more detail at the time of their call.   Patient is aware of the need to arrange age appropriate cancer screening, vaccinations, and dental care.  Reminded patient to complete questionnaire, complete medical records release, and review packet prior to evaluation visit .  Assessed patient for special needs (ie--wheelchair, assistance, guardian, and ): Wheelchair prn (when its humid out) uses a walker  O2 @ 3 LPM during the day, Bi-pap at [facility]  Patient instructed to call [phone] with questions.

## 2019-06-28 ENCOUNTER — APPOINTMENT (OUTPATIENT)
Dept: GENERAL RADIOLOGY | Facility: CLINIC | Age: 69
DRG: 372 | End: 2019-06-28
Attending: EMERGENCY MEDICINE
Payer: COMMERCIAL

## 2019-06-28 ENCOUNTER — APPOINTMENT (OUTPATIENT)
Dept: NUCLEAR MEDICINE | Facility: CLINIC | Age: 69
DRG: 372 | End: 2019-06-28
Attending: EMERGENCY MEDICINE
Payer: COMMERCIAL

## 2019-06-28 ENCOUNTER — HOSPITAL ENCOUNTER (INPATIENT)
Facility: CLINIC | Age: 69
LOS: 5 days | Discharge: HOME | DRG: 372 | End: 2019-07-03
Attending: EMERGENCY MEDICINE | Admitting: INTERNAL MEDICINE
Payer: COMMERCIAL

## 2019-06-28 ENCOUNTER — APPOINTMENT (OUTPATIENT)
Dept: CT IMAGING | Facility: CLINIC | Age: 69
DRG: 372 | End: 2019-06-28
Attending: PHYSICIAN ASSISTANT
Payer: COMMERCIAL

## 2019-06-28 DIAGNOSIS — E09.9 STEROID-INDUCED DIABETES MELLITUS (H): ICD-10-CM

## 2019-06-28 DIAGNOSIS — A04.72 C. DIFFICILE COLITIS: Primary | ICD-10-CM

## 2019-06-28 DIAGNOSIS — T38.0X5A STEROID-INDUCED DIABETES MELLITUS (H): ICD-10-CM

## 2019-06-28 DIAGNOSIS — K92.1 MELENA: ICD-10-CM

## 2019-06-28 DIAGNOSIS — I48.3 TYPICAL ATRIAL FLUTTER (H): ICD-10-CM

## 2019-06-28 DIAGNOSIS — R06.00 DYSPNEA: ICD-10-CM

## 2019-06-28 DIAGNOSIS — K57.32 DIVERTICULITIS OF COLON: ICD-10-CM

## 2019-06-28 PROBLEM — K57.92 ACUTE DIVERTICULITIS: Status: ACTIVE | Noted: 2019-06-28

## 2019-06-28 LAB
ABO + RH BLD: NORMAL
ABO + RH BLD: NORMAL
ALBUMIN SERPL-MCNC: 2.9 G/DL (ref 3.4–5)
ALP SERPL-CCNC: 84 U/L (ref 40–150)
ALT SERPL W P-5'-P-CCNC: 137 U/L (ref 0–70)
ANION GAP SERPL CALCULATED.3IONS-SCNC: 12 MMOL/L (ref 3–14)
AST SERPL W P-5'-P-CCNC: 104 U/L (ref 0–45)
BASOPHILS # BLD AUTO: 0.1 10E9/L (ref 0–0.2)
BASOPHILS NFR BLD AUTO: 0.3 %
BILIRUB DIRECT SERPL-MCNC: 0.9 MG/DL (ref 0–0.2)
BILIRUB SERPL-MCNC: 1.7 MG/DL (ref 0.2–1.3)
BLD GP AB SCN SERPL QL: NORMAL
BLOOD BANK CMNT PATIENT-IMP: NORMAL
BUN SERPL-MCNC: 54 MG/DL (ref 7–30)
CALCIUM SERPL-MCNC: 9.8 MG/DL (ref 8.5–10.1)
CHLORIDE SERPL-SCNC: 98 MMOL/L (ref 94–109)
CO2 BLDCOV-SCNC: 28 MMOL/L (ref 21–28)
CO2 SERPL-SCNC: 24 MMOL/L (ref 20–32)
CREAT SERPL-MCNC: 2.41 MG/DL (ref 0.66–1.25)
DIFFERENTIAL METHOD BLD: ABNORMAL
EOSINOPHIL # BLD AUTO: 0 10E9/L (ref 0–0.7)
EOSINOPHIL NFR BLD AUTO: 0 %
ERYTHROCYTE [DISTWIDTH] IN BLOOD BY AUTOMATED COUNT: 17.4 % (ref 10–15)
GFR SERPL CREATININE-BSD FRML MDRD: 27 ML/MIN/{1.73_M2}
GLUCOSE SERPL-MCNC: 146 MG/DL (ref 70–99)
HCT VFR BLD AUTO: 48.7 % (ref 40–53)
HEMOCCULT STL QL: POSITIVE
HGB BLD-MCNC: 13 G/DL (ref 13.3–17.7)
HGB BLD-MCNC: 15.5 G/DL (ref 13.3–17.7)
IMM GRANULOCYTES # BLD: 0.4 10E9/L (ref 0–0.4)
IMM GRANULOCYTES NFR BLD: 2 %
INTERPRETATION ECG - MUSE: NORMAL
LACTATE BLD-SCNC: 1.9 MMOL/L (ref 0.7–2.1)
LYMPHOCYTES # BLD AUTO: 0.5 10E9/L (ref 0.8–5.3)
LYMPHOCYTES NFR BLD AUTO: 2.8 %
MCH RBC QN AUTO: 28.3 PG (ref 26.5–33)
MCHC RBC AUTO-ENTMCNC: 31.8 G/DL (ref 31.5–36.5)
MCV RBC AUTO: 89 FL (ref 78–100)
MONOCYTES # BLD AUTO: 0.4 10E9/L (ref 0–1.3)
MONOCYTES NFR BLD AUTO: 2.1 %
NEUTROPHILS # BLD AUTO: 17.2 10E9/L (ref 1.6–8.3)
NEUTROPHILS NFR BLD AUTO: 92.8 %
NRBC # BLD AUTO: 0 10*3/UL
NRBC BLD AUTO-RTO: 0 /100
NT-PROBNP SERPL-MCNC: 2430 PG/ML (ref 0–900)
PCO2 BLDV: 51 MM HG (ref 40–50)
PH BLDV: 7.35 PH (ref 7.32–7.43)
PLATELET # BLD AUTO: 324 10E9/L (ref 150–450)
PO2 BLDV: 20 MM HG (ref 25–47)
POTASSIUM SERPL-SCNC: 5.2 MMOL/L (ref 3.4–5.3)
PROT SERPL-MCNC: 7.1 G/DL (ref 6.8–8.8)
RBC # BLD AUTO: 5.47 10E12/L (ref 4.4–5.9)
SAO2 % BLDV FROM PO2: 30 %
SODIUM SERPL-SCNC: 134 MMOL/L (ref 133–144)
SPECIMEN EXP DATE BLD: NORMAL
TROPONIN I SERPL-MCNC: 0.02 UG/L (ref 0–0.04)
WBC # BLD AUTO: 18.5 10E9/L (ref 4–11)

## 2019-06-28 PROCEDURE — 25000125 ZZHC RX 250: Performed by: PHYSICIAN ASSISTANT

## 2019-06-28 PROCEDURE — 83605 ASSAY OF LACTIC ACID: CPT

## 2019-06-28 PROCEDURE — 96375 TX/PRO/DX INJ NEW DRUG ADDON: CPT

## 2019-06-28 PROCEDURE — 99285 EMERGENCY DEPT VISIT HI MDM: CPT | Mod: 25

## 2019-06-28 PROCEDURE — 85018 HEMOGLOBIN: CPT | Performed by: INTERNAL MEDICINE

## 2019-06-28 PROCEDURE — 86900 BLOOD TYPING SEROLOGIC ABO: CPT | Performed by: EMERGENCY MEDICINE

## 2019-06-28 PROCEDURE — 96365 THER/PROPH/DIAG IV INF INIT: CPT

## 2019-06-28 PROCEDURE — 27210210 NM LUNG SCAN VENTILATION AND PERFUSION

## 2019-06-28 PROCEDURE — 25800030 ZZH RX IP 258 OP 636: Performed by: EMERGENCY MEDICINE

## 2019-06-28 PROCEDURE — 34300033 ZZH RX 343: Performed by: EMERGENCY MEDICINE

## 2019-06-28 PROCEDURE — 84484 ASSAY OF TROPONIN QUANT: CPT | Performed by: EMERGENCY MEDICINE

## 2019-06-28 PROCEDURE — 96366 THER/PROPH/DIAG IV INF ADDON: CPT

## 2019-06-28 PROCEDURE — 96367 TX/PROPH/DG ADDL SEQ IV INF: CPT

## 2019-06-28 PROCEDURE — A9540 TC99M MAA: HCPCS | Performed by: EMERGENCY MEDICINE

## 2019-06-28 PROCEDURE — 71046 X-RAY EXAM CHEST 2 VIEWS: CPT

## 2019-06-28 PROCEDURE — 25000128 H RX IP 250 OP 636: Performed by: PHYSICIAN ASSISTANT

## 2019-06-28 PROCEDURE — C9113 INJ PANTOPRAZOLE SODIUM, VIA: HCPCS | Performed by: INTERNAL MEDICINE

## 2019-06-28 PROCEDURE — 85025 COMPLETE CBC W/AUTO DIFF WBC: CPT | Performed by: EMERGENCY MEDICINE

## 2019-06-28 PROCEDURE — 40000274 ZZH STATISTIC RCP CONSULT EA 30 MIN

## 2019-06-28 PROCEDURE — A9567 TECHNETIUM TC-99M AEROSOL: HCPCS | Performed by: EMERGENCY MEDICINE

## 2019-06-28 PROCEDURE — 25000125 ZZHC RX 250: Performed by: EMERGENCY MEDICINE

## 2019-06-28 PROCEDURE — 99223 1ST HOSP IP/OBS HIGH 75: CPT | Mod: AI | Performed by: INTERNAL MEDICINE

## 2019-06-28 PROCEDURE — 96361 HYDRATE IV INFUSION ADD-ON: CPT

## 2019-06-28 PROCEDURE — 83880 ASSAY OF NATRIURETIC PEPTIDE: CPT | Performed by: EMERGENCY MEDICINE

## 2019-06-28 PROCEDURE — 86850 RBC ANTIBODY SCREEN: CPT | Performed by: EMERGENCY MEDICINE

## 2019-06-28 PROCEDURE — 86901 BLOOD TYPING SEROLOGIC RH(D): CPT | Performed by: EMERGENCY MEDICINE

## 2019-06-28 PROCEDURE — 87493 C DIFF AMPLIFIED PROBE: CPT | Performed by: PHYSICIAN ASSISTANT

## 2019-06-28 PROCEDURE — 25000128 H RX IP 250 OP 636: Performed by: EMERGENCY MEDICINE

## 2019-06-28 PROCEDURE — 25000128 H RX IP 250 OP 636: Performed by: INTERNAL MEDICINE

## 2019-06-28 PROCEDURE — 82803 BLOOD GASES ANY COMBINATION: CPT

## 2019-06-28 PROCEDURE — 94640 AIRWAY INHALATION TREATMENT: CPT

## 2019-06-28 PROCEDURE — 80076 HEPATIC FUNCTION PANEL: CPT | Performed by: EMERGENCY MEDICINE

## 2019-06-28 PROCEDURE — 12000000 ZZH R&B MED SURG/OB

## 2019-06-28 PROCEDURE — 74176 CT ABD & PELVIS W/O CONTRAST: CPT

## 2019-06-28 PROCEDURE — 80048 BASIC METABOLIC PNL TOTAL CA: CPT | Performed by: EMERGENCY MEDICINE

## 2019-06-28 PROCEDURE — 25800030 ZZH RX IP 258 OP 636: Performed by: INTERNAL MEDICINE

## 2019-06-28 PROCEDURE — 87506 IADNA-DNA/RNA PROBE TQ 6-11: CPT | Performed by: PHYSICIAN ASSISTANT

## 2019-06-28 PROCEDURE — 25000132 ZZH RX MED GY IP 250 OP 250 PS 637: Performed by: INTERNAL MEDICINE

## 2019-06-28 PROCEDURE — 36415 COLL VENOUS BLD VENIPUNCTURE: CPT | Performed by: INTERNAL MEDICINE

## 2019-06-28 PROCEDURE — 82272 OCCULT BLD FECES 1-3 TESTS: CPT | Performed by: EMERGENCY MEDICINE

## 2019-06-28 RX ORDER — ONDANSETRON 4 MG/1
4 TABLET, ORALLY DISINTEGRATING ORAL EVERY 6 HOURS PRN
Status: DISCONTINUED | OUTPATIENT
Start: 2019-06-28 | End: 2019-07-03 | Stop reason: HOSPADM

## 2019-06-28 RX ORDER — IPRATROPIUM BROMIDE AND ALBUTEROL SULFATE 2.5; .5 MG/3ML; MG/3ML
3 SOLUTION RESPIRATORY (INHALATION)
Status: COMPLETED | OUTPATIENT
Start: 2019-06-28 | End: 2019-06-28

## 2019-06-28 RX ORDER — SODIUM CHLORIDE 9 MG/ML
INJECTION, SOLUTION INTRAVENOUS CONTINUOUS
Status: DISCONTINUED | OUTPATIENT
Start: 2019-06-28 | End: 2019-07-01

## 2019-06-28 RX ORDER — LIDOCAINE 40 MG/G
CREAM TOPICAL
Status: DISCONTINUED | OUTPATIENT
Start: 2019-06-28 | End: 2019-07-03 | Stop reason: HOSPADM

## 2019-06-28 RX ORDER — ALBUTEROL SULFATE 0.83 MG/ML
2.5 SOLUTION RESPIRATORY (INHALATION) ONCE
Status: COMPLETED | OUTPATIENT
Start: 2019-06-28 | End: 2019-06-28

## 2019-06-28 RX ORDER — OXYCODONE HYDROCHLORIDE 10 MG/1
10 TABLET ORAL EVERY 8 HOURS PRN
COMMUNITY

## 2019-06-28 RX ORDER — DILTIAZEM HYDROCHLORIDE 180 MG/1
180 CAPSULE, COATED, EXTENDED RELEASE ORAL 2 TIMES DAILY
Status: DISCONTINUED | OUTPATIENT
Start: 2019-06-28 | End: 2019-07-03 | Stop reason: HOSPADM

## 2019-06-28 RX ORDER — CLONAZEPAM 0.5 MG/1
1 TABLET ORAL AT BEDTIME
Status: DISCONTINUED | OUTPATIENT
Start: 2019-06-28 | End: 2019-07-03 | Stop reason: HOSPADM

## 2019-06-28 RX ORDER — METHYLPREDNISOLONE SODIUM SUCCINATE 125 MG/2ML
60 INJECTION, POWDER, LYOPHILIZED, FOR SOLUTION INTRAMUSCULAR; INTRAVENOUS EVERY 12 HOURS
Status: DISCONTINUED | OUTPATIENT
Start: 2019-06-29 | End: 2019-06-29

## 2019-06-28 RX ORDER — OXYCODONE HYDROCHLORIDE 5 MG/1
5 TABLET ORAL EVERY 4 HOURS PRN
Status: DISCONTINUED | OUTPATIENT
Start: 2019-06-28 | End: 2019-07-03 | Stop reason: HOSPADM

## 2019-06-28 RX ORDER — TRAZODONE HYDROCHLORIDE 50 MG/1
50 TABLET, FILM COATED ORAL AT BEDTIME
Status: DISCONTINUED | OUTPATIENT
Start: 2019-06-28 | End: 2019-07-03 | Stop reason: HOSPADM

## 2019-06-28 RX ORDER — IPRATROPIUM BROMIDE AND ALBUTEROL SULFATE 2.5; .5 MG/3ML; MG/3ML
3 SOLUTION RESPIRATORY (INHALATION)
Status: DISCONTINUED | OUTPATIENT
Start: 2019-06-28 | End: 2019-07-01

## 2019-06-28 RX ORDER — IPRATROPIUM BROMIDE AND ALBUTEROL SULFATE 2.5; .5 MG/3ML; MG/3ML
3 SOLUTION RESPIRATORY (INHALATION)
Status: DISCONTINUED | OUTPATIENT
Start: 2019-06-28 | End: 2019-06-28

## 2019-06-28 RX ORDER — METHYLPREDNISOLONE SODIUM SUCCINATE 125 MG/2ML
125 INJECTION, POWDER, LYOPHILIZED, FOR SOLUTION INTRAMUSCULAR; INTRAVENOUS ONCE
Status: COMPLETED | OUTPATIENT
Start: 2019-06-28 | End: 2019-06-28

## 2019-06-28 RX ORDER — PREDNISONE 10 MG/1
10 TABLET ORAL DAILY
Status: ON HOLD | COMMUNITY
End: 2021-03-24

## 2019-06-28 RX ORDER — NALOXONE HYDROCHLORIDE 0.4 MG/ML
.1-.4 INJECTION, SOLUTION INTRAMUSCULAR; INTRAVENOUS; SUBCUTANEOUS
Status: DISCONTINUED | OUTPATIENT
Start: 2019-06-28 | End: 2019-07-03 | Stop reason: HOSPADM

## 2019-06-28 RX ORDER — CEFTRIAXONE 2 G/1
2 INJECTION, POWDER, FOR SOLUTION INTRAMUSCULAR; INTRAVENOUS ONCE
Status: COMPLETED | OUTPATIENT
Start: 2019-06-28 | End: 2019-06-28

## 2019-06-28 RX ORDER — CEFTRIAXONE 1 G/1
1 INJECTION, POWDER, FOR SOLUTION INTRAMUSCULAR; INTRAVENOUS EVERY 24 HOURS
Status: DISCONTINUED | OUTPATIENT
Start: 2019-06-29 | End: 2019-06-29

## 2019-06-28 RX ORDER — LIDOCAINE 4 G/G
2 PATCH TOPICAL DAILY PRN
Status: DISCONTINUED | OUTPATIENT
Start: 2019-06-28 | End: 2019-07-03 | Stop reason: HOSPADM

## 2019-06-28 RX ORDER — ONDANSETRON 2 MG/ML
4 INJECTION INTRAMUSCULAR; INTRAVENOUS EVERY 6 HOURS PRN
Status: DISCONTINUED | OUTPATIENT
Start: 2019-06-28 | End: 2019-07-03 | Stop reason: HOSPADM

## 2019-06-28 RX ADMIN — DILTIAZEM HYDROCHLORIDE 180 MG: 180 CAPSULE, COATED, EXTENDED RELEASE ORAL at 21:56

## 2019-06-28 RX ADMIN — SODIUM CHLORIDE, POTASSIUM CHLORIDE, SODIUM LACTATE AND CALCIUM CHLORIDE 1000 ML: 600; 310; 30; 20 INJECTION, SOLUTION INTRAVENOUS at 14:53

## 2019-06-28 RX ADMIN — TRAZODONE HYDROCHLORIDE 50 MG: 50 TABLET ORAL at 21:56

## 2019-06-28 RX ADMIN — PANTOPRAZOLE SODIUM 40 MG: 40 INJECTION, POWDER, FOR SOLUTION INTRAVENOUS at 21:57

## 2019-06-28 RX ADMIN — SODIUM CHLORIDE: 9 INJECTION, SOLUTION INTRAVENOUS at 21:52

## 2019-06-28 RX ADMIN — METRONIDAZOLE 500 MG: 500 INJECTION, SOLUTION INTRAVENOUS at 18:11

## 2019-06-28 RX ADMIN — ALBUTEROL SULFATE 2.5 MG: 2.5 SOLUTION RESPIRATORY (INHALATION) at 17:39

## 2019-06-28 RX ADMIN — IPRATROPIUM BROMIDE AND ALBUTEROL SULFATE 3 ML: .5; 3 SOLUTION RESPIRATORY (INHALATION) at 14:19

## 2019-06-28 RX ADMIN — CEFTRIAXONE SODIUM 2 G: 2 INJECTION, POWDER, FOR SOLUTION INTRAMUSCULAR; INTRAVENOUS at 17:28

## 2019-06-28 RX ADMIN — IPRATROPIUM BROMIDE AND ALBUTEROL SULFATE 3 ML: .5; 3 SOLUTION RESPIRATORY (INHALATION) at 14:52

## 2019-06-28 RX ADMIN — KIT FOR THE PREPARATION OF TECHNETIUM TC 99M PENTETATE 59 MCI.: 20 INJECTION, POWDER, LYOPHILIZED, FOR SOLUTION INTRAVENOUS; RESPIRATORY (INHALATION) at 16:23

## 2019-06-28 RX ADMIN — CLONAZEPAM 1 MG: 0.5 TABLET ORAL at 21:56

## 2019-06-28 RX ADMIN — Medication 3.3 MCI.: at 15:58

## 2019-06-28 RX ADMIN — METHYLPREDNISOLONE SODIUM SUCCINATE 125 MG: 125 INJECTION, POWDER, FOR SOLUTION INTRAMUSCULAR; INTRAVENOUS at 14:21

## 2019-06-28 ASSESSMENT — ENCOUNTER SYMPTOMS
ABDOMINAL PAIN: 1
DIZZINESS: 1
DIAPHORESIS: 1
VOMITING: 0
DIARRHEA: 1
NAUSEA: 0
CHILLS: 1
BLOOD IN STOOL: 1
FEVER: 0
WOUND: 1
SHORTNESS OF BREATH: 1

## 2019-06-28 NOTE — PHARMACY-ADMISSION MEDICATION HISTORY
Admission medication history interview status for this patient is complete. See [facility] admission navigator for allergy information, prior to admission medications and immunization status.     Medication history interview source(s):Patient  Medication history resources (including written lists, pill bottles, clinic record): care everywhere  Primary pharmacy:[facility]    Changes made to PTA medication list:  Added: multivitamin, oxycodone  Deleted: iprat duoneb, levalbuterol, tamsulosin, albuterol, budesonide  Changed: lisinopril dosing, prednisone dosing    Actions taken by pharmacist (provider contacted, etc): called his pharmacy to verify the instructions and strength on prednisone - he is taking differently than prescribed. Prescribed is 10 mg tablet once/day, but he is taking prednisone 15 mg 3x/day. He said he is weaning off of taking prednisone 60 mg     Additional medication history information: see above. Patient said he stopped taking his medications on Monday    Medication reconciliation/reorder completed by provider prior to medication history? Yes    Do you take OTC medications (eg tylenol, ibuprofen, fish oil, eye/ear drops, etc)? Y (Y/N)    For patients on insulin therapy: N (Y/N)      Prior to Admission medications    Medication Sig Last Dose Taking? Auth Provider   acetaminophen (TYLENOL) 325 MG tablet Take 325-650 mg by mouth every 6 hours as needed for mild pain Past Week at Unknown time Yes Reported, Patient   Lidocaine (LIDOCARE) 4 % Patch Place 2 patches onto the skin daily as needed for moderate pain Past Month at Unknown time Yes Unknown, Entered By History   Multiple Vitamins-Minerals (MULTIVITAMIN ADULTS PO) Take 1 tablet by mouth daily 6/24/2019 Yes Unknown, Entered By History   oxyCODONE IR (ROXICODONE) 10 MG tablet Take 10 mg by mouth every 8 hours as needed for severe pain 6/14/2019 at Unknown time Yes Unknown, Entered By History   apixaban ANTICOAGULANT (ELIQUIS) 5 MG tablet Take 1  tablet (5 mg) by mouth 2 times daily 6/24/2019  Jon Rocha MD   clonazePAM (KLONOPIN) 1 MG tablet Take 1 tablet (1 mg) by mouth nightly as needed for anxiety 6/23/2019 at pm  Toy Tejada MD   diltiazem ER COATED BEADS (CARDIZEM CD/CARTIA XT) 180 MG 24 hr capsule Take 1 capsule (180 mg) by mouth 2 times daily Hold for SBP < 110 or HR < 60 6/24/2019  Best Tucker MD   Fluticasone-Umeclidin-Vilanterol (TRELEGY ELLIPTA) 100-62.5-25 MCG/INH oral inhaler Inhale 1 puff into the lungs daily 6/24/2019  Zoie Evans MD   furosemide (LASIX) 40 MG tablet Take 1 tablet (40 mg) by mouth 2 times daily 6/17/2019  Toy Tejada MD   lisinopril (PRINIVIL/ZESTRIL) 10 MG tablet Take 10 mg by mouth daily  6/24/2019  Reported, Patient   order for DME Equipment being ordered: Nebulizer   Nata Mcduffie MD   order for DME Oxygen for nocturnal use. 1 LPM via nasal cannula  Testing done at home in chronic stable state.  Condition is COPD.  Patient does not have Obstructive Sleep Apnea.  Overnight oximetry revealed 30.3 minutes of hypoxia (<= 88%).  Duration: Lifetime (99 months).   Tyson Sanders MD   predniSONE (DELTASONE) 10 MG tablet Take 15 mg by mouth 3 times daily  6/24/2019  Unknown, Entered By History   Respiratory Therapy Supplies (AEROBIKA) KEELY 1 applicator 4 times daily   Zoie Evans MD   traZODone (DESYREL) 50 MG tablet Take 50 mg by mouth At Bedtime  6/24/2019 at pm  Unknown, Entered By History

## 2019-06-28 NOTE — ED PROVIDER NOTES
History     Chief Complaint:  Shortness of Breath      VEENA Cooper is a 68 year old male who presents with shortness of breath.       Allergies:  Amlodipine  Flecainide     Medications:    Albuterol  Prednisone  Diltiazem  apixaban  Trazodone  Clonazepam  Budesonide  Furosemide  Lisinopril  Tamsulosin    Past Medical History:    Atrial flutter  Acute on Chronic respitory failure with hypoxia and hypercapnia  Pneumonia  Pulmonary Embolism   Cancer  COPD  Cardiomyopathy  Diverticulitis  Hepatitis  Hypertension  Persistent Atrial fibrillation  PVC  S/P radiofrequency ablation operation for Arrhythmia  Tricuspid Valve disorder  Ventricular tachycardia      Past Surgical History:    Abdomen surgery  Appendectomy  Cardiac Surgery  Cardioversion x3  Genitourinary Surgery  H Ablation atrial flutter  H Ablation atrial Focal AFIB  H Ablation PVC  Cholecystectomy  Orthopedic Surgery  Repair valve tricuspid  Small intestine       LAPAROSCOPIC CHOLECYSTECTOMY  2012    Procedure:LAPAROSCOPIC CHOLECYSTECTOMY; LAPAROSCOPIC CHOLECYSTECTOMY ; Surgeon:TALON DEVINE; Location:[facility] OR     ORTHOPEDIC SURGERY      Left hip replacement     REPAIR VALVE TRICUSPID  08    conversion to a bileaflet valve and application of a 30 mm Sanderson ring     SMALL INTESTINE SURGERY      had bowel obstruction age 9     THORACIC SURGERY       VALVE REPLACEMENT      tricuspid       Family History:    ***  Family History   Problem Relation Age of Onset     Prostate Cancer Father      Family History Negative Mother      Emphysema Mother      Hypertension Mother      Cerebrovascular Disease Mother        Social History:  ***  Marital Status:   [2]  Social History     Tobacco Use     Smoking status: Former Smoker     Packs/day: 1.50     Years: 41.00     Pack years: 61.50     Types: Paan with tobacco, gutka, zarda, khaini     Start date: 1977     Last attempt to quit: 2008     Years since quittin.4     Smokeless  tobacco: Never Used   Substance Use Topics     Alcohol use: Yes     Alcohol/week: 0.0 oz     Comment: 3-6 per month     Drug use: No        Review of Systems  ***    Physical Exam     Patient Vitals for the past 24 hrs:   BP Temp Temp src Pulse Heart Rate Resp SpO2 Weight   06/28/19 1348 -- -- -- -- -- -- -- 88.9 kg (196 lb)   06/28/19 1346 106/88 98.4  F (36.9  C) Oral 106 106 22 95 % --       Physical Exam  ***    Emergency Department Course     ECG:  ECG taken at 1340, ECG read at 1340  Atrial fibrillation with rapid ventricular response  Anterior infarct, age undetermined   Abnormal ECG  Rate 110 bpm. VA interval * ms. QRS duration 86 ms. QT/QTc 302/408 ms. P-R-T axes * 87 82.    Imaging:  Radiology findings were communicated with the *** who voiced understanding of the findings.    XR Chest 2 Views    (Results Pending)     Laboratory:  Laboratory findings were communicated with the *** who voiced understanding of the findings.    ***  CBC: WBC ***, HGB ***, PLT ***  ***MP: *** o/w WNL (Creatinine ***)  ***    Procedures:  ***    Interventions:  ***  Medications   ipratropium - albuterol 0.5 mg/2.5 mg/3 mL (DUONEB) neb solution 3 mL (3 mLs Nebulization Given 6/28/19 1419)   ipratropium - albuterol 0.5 mg/2.5 mg/3 mL (DUONEB) neb solution 3 mL (has no administration in time range)   lactated ringers BOLUS 1,000 mL (has no administration in time range)   methylPREDNISolone sodium succinate (solu-MEDROL) injection 125 mg (125 mg Intravenous Given 6/28/19 1421)     Emergency Department Course:    *** Nursing notes and vitals reviewed.  1340 EKG obtained in the ED, see results above.   *** The patient was sent for a Chest XR while in the emergency department, results above.     *** I performed an exam of the patient as documented above.     *** ***    *** ***    *** I personally reviewed the *** results with the *** and answered all related questions prior to ***.    Impression & Plan      Medical Decision  "Making:  Tone Cooper is a 68 year old male who presents to the emergency department today for evaluation of ***    Diagnosis:  ***  No diagnosis found.  Disposition:   ***    Critical Care time was *** minutes for this patient excluding procedures.     CMS Diagnoses: {Sepsis/Septic Shock/Stemi/Stroke:194423::\" \"}      {trauma activation?:341152::\" \"}    Discharge Medications:  ***     Review of your medicines      UNREVIEWED medicines. Ask your doctor about these medicines      Dose / Directions   acetaminophen 325 MG tablet  Commonly known as:  TYLENOL      Dose:  325-650 mg  Take 325-650 mg by mouth every 6 hours as needed for mild pain  Refills:  0     albuterol 108 (90 Base) MCG/ACT inhaler  Commonly known as:  VENTOLIN HFA  Used for:  COPD exacerbation (H)      Dose:  1-2 puff  Inhale 1-2 puffs into the lungs every 4 hours (while awake) PRN  Quantity:  2 Inhaler  Refills:  0     budesonide 0.5 MG/2ML neb solution  Commonly known as:  PULMICORT  Used for:  COPD exacerbation (H)      Dose:  0.5 mg  Take 2 mLs (0.5 mg) by nebulization 2 times daily  Refills:  0     clonazePAM 1 MG tablet  Commonly known as:  klonoPIN  Used for:  Insomnia, unspecified type      Dose:  1 mg  Take 1 tablet (1 mg) by mouth nightly as needed for anxiety  Refills:  0     diltiazem ER COATED BEADS 180 MG 24 hr capsule  Commonly known as:  CARDIZEM CD/CARTIA XT  Used for:  Atrial fibrillation with rapid ventricular response (H)      Dose:  180 mg  Take 1 capsule (180 mg) by mouth 2 times daily Hold for SBP < 110 or HR < 60  Refills:  0     * ELIQUIS 5 MG tablet  Generic drug:  apixaban ANTICOAGULANT      Dose:  5 mg  Take 1 tablet (5 mg) by mouth 2 times daily  Quantity:  60 tablet  Refills:  0     * apixaban ANTICOAGULANT 5 MG tablet  Commonly known as:  ELIQUIS  Ask about: Should I take this medication?      Dose:  5 mg  Take 1 tablet (5 mg) by mouth 2 times daily for 7 days  Quantity:  14 tablet  Refills:  0     * " Fluticasone-Umeclidin-Vilanterol 100-62.5-25 MCG/INH oral inhaler  Commonly known as:  TRELEGY ELLIPTA  Used for:  Chronic obstructive pulmonary disease, unspecified COPD type (H)      Dose:  1 puff  Inhale 1 puff into the lungs daily  Quantity:  1 Inhaler  Refills:  2     * Fluticasone-Umeclidin-Vilanterol 100-62.5-25 MCG/INH oral inhaler  Commonly known as:  TRELEGY ELLIPTA  Used for:  Chronic obstructive pulmonary disease, unspecified COPD type (H)      Dose:  1 puff  Inhale 1 puff into the lungs daily  Quantity:  1 Inhaler  Refills:  3     furosemide 40 MG tablet  Commonly known as:  LASIX  Used for:  Tricuspid valve disorder      Dose:  40 mg  Take 1 tablet (40 mg) by mouth 2 times daily  Refills:  0     ipratropium - albuterol 0.5 mg/2.5 mg/3 mL 0.5-2.5 (3) MG/3ML neb solution  Commonly known as:  DUONEB  Used for:  COPD exacerbation (H)      Dose:  1 vial  Take 1 vial (3 mLs) by nebulization 4 times daily  Refills:  0     levalbuterol 1.25 MG/3ML neb solution  Commonly known as:  XOPENEX  Used for:  COPD exacerbation (H), Acute on chronic respiratory failure with hypoxia and hypercapnia (H), Atrial fibrillation with rapid ventricular response (H)      Dose:  1 ampule  Take 3 mLs (1.25 mg) by nebulization every 4 hours as needed for wheezing or shortness of breath / dyspnea  Quantity:  270 mL  Refills:  1     Lidocaine 4 % Patch  Commonly known as:  LIDOCARE      Dose:  2 patch  Place 2 patches onto the skin daily as needed for moderate pain  Refills:  0     lisinopril 10 MG tablet  Commonly known as:  PRINIVIL/ZESTRIL      Dose:  10 mg  Take 10 mg by mouth  Refills:  0     predniSONE 20 MG tablet  Commonly known as:  DELTASONE  Used for:  COPD exacerbation (H)      Dose:  60 mg  Take 3 tablets (60 mg) by mouth daily  Refills:  0     tamsulosin 0.4 MG capsule  Commonly known as:  FLOMAX      Dose:  0.4 mg  Take 0.4 mg by mouth daily  Refills:  0     traZODone 50 MG tablet  Commonly known as:  DESYREL       Dose:  50 mg  Take 50 mg by mouth At Bedtime  Refills:  0         * This list has 4 medication(s) that are the same as other medications prescribed for you. Read the directions carefully, and ask your doctor or other care provider to review them with you.            CONTINUE these medicines which have NOT CHANGED      Dose / Directions   NEEL Mckeon      Dose:  1 applicator  1 applicator 4 times daily  Quantity:  1 each  Refills:  0     order for DME  Used for:  Nocturnal hypoxia      Oxygen for nocturnal use. 1 LPM via nasal cannula  Testing done at home in chronic stable state.  Condition is COPD.  Patient does not have Obstructive Sleep Apnea.  Overnight oximetry revealed 30.3 minutes of hypoxia (<= 88%).  Duration: Lifetime (99 months).  Quantity:  1 each  Refills:  99     order for DME  Used for:  COPD exacerbation (H)      Equipment being ordered: Nebulizer  Quantity:  1 each  Refills:  0            Scribe Disclosure:  Loyda PANIAGUA, am serving as a scribe at 2:25 PM*** on 6/28/2019 to document services personally performed by Xavier Blum MD*** based on my observations and the provider's statements to me.  [facility] EMERGENCY DEPARTMENT

## 2019-06-28 NOTE — H&P
[facility]  Hospitalist Admission Note  Name: Tone Cooper    MRN: [id number]  YOB: 1950    Age: 68 year old  Date of admission: 6/28/2019  Primary care provider: Ana Pratt    Chief Complaint: Abdominal pain, diarrhea, shortness of breath    Assessment and Plan:   Acute diverticulitis: Multiple liquid stool diarrhea for the past 4 to 5 days with left lower quadrant abdominal pain and tenderness on exam.  CT abdomen pelvis showing likely acute diverticulitis involving the descending colon and sigmoid colon.  Denies any recent antibiotics.  Does have leukocytosis to 18.  Afebrile.  Slight elevation in LFTs, but no right upper quadrant tenderness or abnormal findings on CT involving the liver.  -Was placed on ceftriaxone and Flagyl in the ED for diverticulitis, continue for now but could change to oral Cipro and Flagyl if clinically improving  -Obtain enteric panel and C. difficile PCR, enteric isolation until then.  If these are negative start Imodium as needed  -Oxycodone as needed    Dyspnea on top of chronic hypoxemic and hypercapnic respiratory failure, possible COPD exacerbation: Baseline advanced COPD and follows in pulmonary clinic.  Uses BiPAP at night.  Is on prednisone 15 mg 3 times daily chronically.  Also on Trelegy inhaler.  Does duo nebs as needed.  Has been more short of breath for the past 3 days.  Has noted more wheezing I suspect primary issue is COPD exacerbation and he received 125 mill grams IV site Medrol in the ED.  SPO2 was slightly low at 20 on VBG.  O2 sats now okay at 3 to 4 L.  Uses 3 L at baseline but increase this past week with improvement in symptoms.  Chest x-ray negative for infiltrate or edema.  Considered acute PE given his Eliquis melted on his road trip and is been out of it for at least 2 days.  VQ scan was done in the ED which is low probability for PE.  Also with some right-sided chest pain that is very transient only lasting a few  seconds and is after swallowing cold water only it is new for couple of days.  Troponin is not elevated and EKG without any ischemic change, doubt cardiac ischemia at this point.  His BNP is elevated at 2430, and he has known right-sided heart failure for which he takes Lasix daily, but he appears intravascularly depleted with acute kidney injury likely from dehydration as below.  Has some trace peripheral edema which is baseline.  For now we will treat as COPD exacerbation.  -Continue IV Solu-Medrol at 62.5 mg every 12 hours (hold his PTA prednisone while on this dose)  -Scheduled duo nebs and albuterol nebs as needed  -Continue his trelegy inhaler  -BiPAP at bedtime  -If not improving may need further evaluation of cardiac function    Possible GI bleed: Patient noted darker colored stools and some red blood on toilet paper with his diarrhea for the past few days.  He is on Eliquis so possibility of GI bleed, although no significant history of this.  Is actually not at his Eliquis for 2 days as the prescription during his trip.  Hemoccult blood is positive and stools reported as mixed brown and dark in the ED.  His hemoglobin is 15.5 which is his baseline, but he is intravascularly dry.  Doubt rapid bleed.  -For now have him on IV Protonix twice daily  -Allow diet as long as not having profuse bloody stools  -Repeat hemoglobin at 10 PM and CBC in the morning  -Holding Eliquis for now likely can resume in 1 to 2 days if no sign of bleeding  -With hemoglobin of 15 and potential mild bleeding from diverticulitis or hemorrhoid will hold off GI consult for now unless develops significant occult bleeding, drop in hemoglobin, or other indication where endoscopy/colonoscopy may be considered    DONG on CKD stageII: Creatinine baseline approximately 1.1.  Creatinine up to 2.4 secondary to acute injury likely from hypovolemia from ongoing diarrhea and recent prolonged sun exposure on road trip.  He has continued his Lasix and  lisinopril which are contributing as well.  Received 1 L LR in ED.  -Normal saline at 100 ml/hr overnight and repeat BMP tomorrow morning  -Hold lisinopril Lasix for now    Chronic A. fib: PTA on diltiazem  mg twice daily along with Eliquis.  EKG shows A. fib with a heart rate above 100, down to 80s after fluid bolus.  All of his medication melted so likely has not had any for the past 2 days he thinks.  -Resume diltiazem  -Hold Eliquis in case of GI bleed, likely can resume in next 1 to 2 days if no sign of significant bleeding  -Remote telemetry    History of PE: VQ scan with low probability of PE to explain his intermittent sharp chest pain and increased shortness of breath.  Likely only missed 2 dose of Eliquis I think this is unlikely.  -Holding Eliquis for now as above    HTN: PTA lisinopril 10 mg daily, diltiazem 180 twice daily, and Lasix 40 mg daily.  DONG and intravascularly dry as above.  Also blood pressure soft.  -Hold lisinopril and Lasix  -Resume diltiazem with hold parameters for SBP less than 100    Elevated LFTs: Mild elevation in AST, ALT, bilirubin.  Has had previous cholecystectomy.  May be secondary to hypovolemia and diverticulitis.  -Repeat CMP tomorrow    Poor sleep: Resume PTA trazodone and clonazepam at bedtime.    Chronic shoulder pain: Rarely takes oxycodone for this.  Also has lidocaine patch.  -Continue lidocaine patch and low-dose oxycodone as needed    Chronic RV systolic CHF: secondary to his advanced COPD.  PTA on Lasix.  Has chronic trace bilateral lower extremity edema.  BNP is elevated, but clinically does not have acute CHF.  -Holding Lasix as above    History of tricuspid valve replacement    Lung nodules: Follows in lung nodule clinic with pulmonary      DVT Prophylaxis: Pneumatic Compression Devices  Code Status: Full Code  FEN: Regular diet, 100 ml/hr nS  Discharge Dispo: Home  Estimated Disch Date / # of Days until Disch: Admit inpatient, expect at least 2 night  hospitalization for antibiotics.      History of Present Illness:  Tone Cooper is a 68 year old male with PMH including advanced COPD using BiPAP at night and 3 L of oxygen continuously, chronic prednisone use, chronic A. fib on Eliquis, PE, tricuspid valve replaced, HTN, chronic RV systolic CHF, MSSA bacteremia, and CKD stage II who presents with shortness of breath and diarrhea.  The patient is known to me from previous admissions.  He was hospitalized 4 months ago for a prolonged period in the ICU for COPD that was not improving any discharge to LTAC for continuous BiPAP.  Since then has done quite well with pulmonary rehab.  He returned today from a 5-day motorcycle trip around [facility] and he presents to the ER for shortness of breath, abdominal pain, diarrhea.  He notes that he developed liquid stool diarrhea 4 days ago and he said numerous episodes every single day.  Has noted some darker stools and once in a while has red blood on the toilet paper when wiping.  He does note left lower quadrant abdominal pain described as cramping and moderate in intensity.  Denies any nausea or vomiting.  He has been trying to keep up on fluid intake drinking approximately 32 ounces of water each day on his trip.  He did get sunburn and was on sun all day each day.  He has continued taking his Lasix and lisinopril.  He does mention however though that 2 days ago his pills melted together after being in the sun and he has been scooping them out of his pillboxes so is not sure exactly what or how much he is taking occluding his Eliquis and diltiazem.  He has noted increasing shortness of breath for the past 2 to 3 days.  Has had increased wheezing.  He has also had new right-sided chest pain that is intermittent and occurs after drinking cold water.  Is sharp and only lasts a few seconds.  Has never had this before.  Denies any chest pressure sensation.  Has not had any palpitations.  Has not noted any increase in his  lower extremity swelling.  Has not noticed any fevers or chills.  Has had malaise.      History obtained from patient, medical record, and from Dr. Blum in the emergency department.  Blood pressure in the 100 range systolic and initial heart rate 105 and A. fib on EKG.  Some improvement in both with 1 L LR bolus.  Labs notable for creatinine rise to 2.4 from baseline of 1.1.  BNP elevated at 2430.  Troponin 0 0.021.  Slight elevation in ALT at 137 , bilirubin 1.7.  Lactic acid not elevated 1.9.  VBG showing pH 7.35, PCO2 51, PO2 20, bicarb 28.  Has leukocytosis to 18.5.  Chest x-ray negative for infiltrate.  CT abdomen pelvis showing acute DVT colitis and he was started on ceftriaxone for his renal impairment and Flagyl.  His stool Hemoccult is positive and some concern for GI bleed.  Per report stool was mixed brown and slightly dark on exam.  Due to concern for possible PE with missing a couple doses of Eliquis he underwent a VQ scan that was low probability for PE.  He was given 125 mg IV Solu-Medrol for COPD exacerbation and multiple DuoNeb's and feels like his shortness of breath is improved with this treatment.  Inpatient for diverticulitis and suspected COPD exacerbation.     Past Medical History reviewed:  Past Medical History:   Diagnosis Date     Atrial flutter (H)      Cancer (H)     basal cell-nose     COPD (chronic obstructive pulmonary disease) (H)      Diverticulitis      Hepatitis 2011    Hepatitis C     Hypertension      Persistent atrial fibrillation (H) 4/28/09    ablation 7/1/2015     Premature beats      PVC (premature ventricular contraction)     s/p ablation 12/5/2017     Tricuspid valve disorder     Dr Aj, Hx of valve repair      Ventricular tachycardia (H)     nonsustained     Past Surgical History reviewed:  Past Surgical History:   Procedure Laterality Date     ABDOMEN SURGERY      hernia repair right     APPENDECTOMY  as a child     [facility]  [place]     CARDIOVERSION  2009    successful for afib     CARDIOVERSION  4/20/15    successful for afib     CARDIOVERSION  5/28/15     COLONOSCOPY  2018    [facility]     GENITOURINARY SURGERY      undescended testicle     H ABLATION ATRIAL FLUTTER       H ABLATION FOCAL AFIB  7/1/15    Left atrial linear ablation and pulmonary vein antrum ablation     H ABLATION PVC  16     INCISION AND DRAINAGE LOWER EXTREMITY, COMBINED Right 11/10/2016    Procedure: COMBINED INCISION AND DRAINAGE LOWER EXTREMITY;  Surgeon: Roger Pacheco MD;  Location: [facility] OR     LAPAROSCOPIC CHOLECYSTECTOMY  2012    Procedure:LAPAROSCOPIC CHOLECYSTECTOMY; LAPAROSCOPIC CHOLECYSTECTOMY ; Surgeon:ROGER PACHECO; Location:[facility] OR     ORTHOPEDIC SURGERY      Left hip replacement     REPAIR VALVE TRICUSPID  08    conversion to a bileaflet valve and application of a 30 mm Sanderson ring     SMALL INTESTINE SURGERY      had bowel obstruction age 9     THORACIC SURGERY       VALVE REPLACEMENT      tricuspid     Social History reviewed:  Social History     Tobacco Use     Smoking status: Former Smoker     Packs/day: 1.50     Years: 41.00     Pack years: 61.50     Types: Paan with tobacco, gutka, zarda, khaini     Start date: 1977     Last attempt to quit: 2008     Years since quittin.4     Smokeless tobacco: Never Used   Substance Use Topics     Alcohol use: Yes     Alcohol/week: 0.0 oz     Comment: 3-6 per month     Social History     Social History Narrative    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 Auth Provider   acetaminophen (TYLENOL) 325 MG tablet Take 325-650 mg by mouth every 6 hours as needed for mild pain Past Week at Unknown time Yes Reported, Patient   apixaban ANTICOAGULANT (ELIQUIS) 5 MG tablet Take 1 tablet (5 mg) by mouth 2 times daily 6/24/2019 Yes Jon Rocha MD   diltiazem ER COATED BEADS (CARDIZEM CD/CARTIA XT) 180 MG 24 hr capsule Take 1 capsule (180 mg) by mouth 2 times daily Hold for SBP < 110 or HR < 60 6/24/2019 Yes Best Tucker MD   Fluticasone-Umeclidin-Vilanterol (TRELEGY ELLIPTA) 100-62.5-25 MCG/INH oral inhaler Inhale 1 puff into the lungs daily 6/24/2019 Yes Zoie Evans MD   furosemide (LASIX) 40 MG tablet Take 1 tablet (40 mg) by mouth 2 times daily 6/24/2019 Yes Toy Tejada MD   Lidocaine (LIDOCARE) 4 % Patch Place 2 patches onto the skin daily as needed for moderate pain Past Month at Unknown time Yes Unknown, Entered By History   Multiple Vitamins-Minerals (MULTIVITAMIN ADULTS PO) Take 1 tablet by mouth daily 6/24/2019 Yes Unknown, Entered By History   oxyCODONE IR (ROXICODONE) 10 MG tablet Take 10 mg by mouth every 8 hours as needed for severe pain 6/14/2019 at Unknown time Yes Unknown, Entered By History   predniSONE (DELTASONE) 10 MG tablet Take 15 mg by mouth 3 times daily  6/24/2019 Yes Unknown, Entered By History   traZODone (DESYREL) 50 MG tablet Take 50 mg by mouth At Bedtime  6/24/2019 Yes Unknown, Entered By History   clonazePAM (KLONOPIN) 1 MG tablet Take 1 tablet (1 mg) by mouth nightly as needed for anxiety 6/23/2019 at pm  Toy eTjada MD   lisinopril (PRINIVIL/ZESTRIL) 10 MG tablet Take 10 mg by mouth daily  6/24/2019  Reported, Patient   order for DME Equipment being ordered: Nebulizer   Nata Mcduffie MD   order for DME Oxygen for nocturnal use. 1 LPM via nasal cannula  Testing done at home in chronic stable state.  Condition is  COPD.  Patient does not have Obstructive Sleep Apnea.  Overnight oximetry revealed 30.3 minutes of hypoxia (<= 88%).  Duration: Lifetime (99 months).   Tyson Sanders MD   Respiratory Therapy Supplies ([employer]) KEELY 1 applicator 4 times daily   Zoie Evans MD     Review of Systems:  A Comprehensive greater than 10 system review of systems was carried out.  Pertinent positives and negatives are noted above.  Otherwise negative.     Physical Exam:  Blood pressure 116/70, pulse 83, temperature 98.4  F (36.9  C), temperature source Oral, resp. rate 26, weight 88.9 kg (196 lb), SpO2 96 %.  Wt Readings from Last 1 Encounters:   06/28/19 88.9 kg (196 lb)     Exam:  Constitutional: Awake, NAD  Eyes: sclera white   HEENT: atraumatic, MMM  Respiratory: No focal crackles.  No wheezing although had 3 nebs.  Poor airflow movement overall  Cardiovascular: Irregularly, irregular rhythm, regular rate, no murmur  GI: Mild tenderness to the left lower quadrant without guarding, not distended, bowel sounds present  Skin: Sunburn to face and hands  Musculoskeletal/extremities: atraumatic, no major deformities.  Trace bilateral lower extremity pitting edema  Neurologic: A&O, speech clear, strength and light touch sensation grossly normal  Psychiatric: calm, cooperative, normal affect    Lab and imaging data personally reviewed:  Labs:  Recent Labs   Lab 06/28/19  1417   PHV 7.35   PO2V 20*   PCO2V 51*   HCO3V 28     Recent Labs   Lab 06/28/19  1402   WBC 18.5*   HGB 15.5   HCT 48.7   MCV 89        Recent Labs   Lab 06/28/19  1402      POTASSIUM 5.2   CHLORIDE 98   CO2 24   ANIONGAP 12   *   BUN 54*   CR 2.41*   GFRESTIMATED 27*   GFRESTBLACK 31*   WILBERT 9.8   PROTTOTAL 7.1   ALBUMIN 2.9*   BILITOTAL 1.7*   ALKPHOS 84   *   *     Recent Labs   Lab 06/28/19  1402   NTBNPI 2,430*     Recent Labs   Lab 06/28/19  1417   LACT 1.9     Recent Labs   Lab 06/28/19  1402   TROPI 0.021      Positive Hemoccult    EKG: A. fib, slight tachycardia, no ST elevation or depression    Imaging:  Recent Results (from the past 24 hour(s))   XR Chest 2 Views    Narrative    CHEST TWO VIEWS   6/28/2019 3:34 PM    HISTORY: Cough    COMPARISON: 5/26/2019      Impression    IMPRESSION: Again seen are surgical changes of a median sternotomy.  The lungs are clear. No other abnormality is seen. I see no definite  change since the previous examination.     HAROLDO LIVE MD   CT Abdomen Pelvis w/o Contrast    Narrative    CT ABDOMEN/PELVIS WITHOUT CONTRAST June 28, 2019 3:43 PM     HISTORY: Left lower quadrant pain. History of diverticulitis.    TECHNIQUE: CT of the abdomen and pelvis was performed without  intravenous contrast. Radiation dose for this scan was reduced using  automated exposure control, adjustment of the mA and/or kV according  to patient size, or iterative reconstruction technique.    COMPARISON: CT of the abdomen/pelvis dated 3/4/2014.    FINDINGS: There are scattered diverticuli in the colon. There is a  question of mild inflammatory changes adjacent to the sigmoid colon in  an area of diverticuli at its junction with the descending colon.  There also appears to be mild inflammatory fat stranding in the  midportion of the descending colon in an area of diverticuli. The  remaining bowel appears grossly unremarkable.    There are cysts in the kidneys. Remaining solid organs appear grossly  unremarkable. Patient is status post a cholecystectomy. There are  emphysematous changes at the lung bases.      Impression    IMPRESSION: CT findings suggest acute diverticulitis involving the mid  descending colon and possibly the sigmoid colon at its junction with  the descending colon.    VY MOLINA MD   Lung vent and perf (NM)    Narrative    NUCLEAR MEDICINE LUNG VENTILATION AND PERFUSION 6/28/2019 4:51 PM     HISTORY: Shortness of breath.    COMPARISON: Chest x-ray 6/20/2019.    TECHNIQUE: Tc-99m  MAA injected intravenously for evaluation of  pulmonary perfusion. Tc-99m DTPA inhaled for the ventilation phase.    DOSE: 3.3 mCi 99mTc-MAA,  59.0 mCi 99mTc-DTPA.    FINDINGS: There is mildly heterogeneous perfusion bilaterally. Few  small perfusion defects which are all matched on ventilation imaging.  No unmatched perfusion defects.      Impression    IMPRESSION: Low probability for pulmonary embolism.    MD Felipe ALLEN MD  Hospitalist  [facility]

## 2019-06-28 NOTE — ED NOTES
[facility]  ED Nurse Handoff Report    Tone Cooper is a 68 year old male   ED Chief complaint: Shortness of Breath  . ED Diagnosis:   Final diagnoses:   Diverticulitis of colon   Dyspnea   Melena     Allergies:   Allergies   Allergen Reactions     Amlodipine Swelling     Flecainide Palpitations       Code Status: Full Code  Activity level - Baseline/Home: Independent. Activity Level - Current:   Assist X 1. Lift room needed: No. Bariatric: No   Needed: No   Isolation: Yes. Enteric Infection: Not Applicable     Vital Signs:   Vitals:    06/28/19 1457 06/28/19 1515 06/28/19 1520 06/28/19 1730   BP: (!) 107/96 108/80  116/70   Pulse:  83     Resp: 26 25 26 26   Temp:       TempSrc:       SpO2: 98% 96% 95% 96%   Weight:           Cardiac Rhythm: Chronic A-fib  Pain level:    Patient confused: No. Patient Falls Risk: Yes.   Elimination Status: Has voided   Patient Report - Initial Complaint: SOB, Right sided CP, diarrhea with bloody stools. Focused Assessment: Increased respiratory effort, lungs diminished bilateral, + Hemoccult   Tests Performed: CT, Xray, VQ scan. Abnormal Results:   Labs Ordered and Resulted from Time of ED Arrival Up to the Time of Departure from the ED   CBC WITH PLATELETS DIFFERENTIAL - Abnormal; Notable for the following components:       Result Value    WBC 18.5 (*)     RDW 17.4 (*)     Absolute Neutrophil 17.2 (*)     Absolute Lymphocytes 0.5 (*)     All other components within normal limits   BASIC METABOLIC PANEL - Abnormal; Notable for the following components:    Glucose 146 (*)     Urea Nitrogen 54 (*)     Creatinine 2.41 (*)     GFR Estimate 27 (*)     GFR Estimate If Black 31 (*)     All other components within normal limits   NT PROBNP INPATIENT - Abnormal; Notable for the following components:    N-Terminal Pro BNP Inpatient 2,430 (*)     All other components within normal limits   HEPATIC PANEL - Abnormal; Notable for the following components:    Bilirubin  Direct 0.9 (*)     Bilirubin Total 1.7 (*)     Albumin 2.9 (*)      (*)      (*)     All other components within normal limits   OCCULT BLOOD STOOL - Abnormal; Notable for the following components:    Occult Blood Positive (*)     All other components within normal limits   ISTAT  GASES LACTATE LUIS M POCT - Abnormal; Notable for the following components:    PCO2 Venous 51 (*)     PO2 Venous 20 (*)     All other components within normal limits   TROPONIN I   NURSING DRAW AND HOLD   ISTAT CG4 GASES LACTATE LUIS M NURSING POCT   VITAL SIGNS   PULSE OXIMETRY NURSING   CARDIAC CONTINUOUS MONITORING   PERIPHERAL IV CATHETER   ABO/RH TYPE AND SCREEN   ENTERIC BACTERIA AND VIRUS PANEL BY MAU STOOL   CLOSTRIDIUM DIFFICILE TOXIN B     Lung vent and perf (NM)   Final Result   IMPRESSION: Low probability for pulmonary embolism.      TOM JIMÉNEZ MD      CT Abdomen Pelvis w/o Contrast   Final Result   IMPRESSION: CT findings suggest acute diverticulitis involving the mid   descending colon and possibly the sigmoid colon at its junction with   the descending colon.      VY MOLINA MD      XR Chest 2 Views   Final Result   IMPRESSION: Again seen are surgical changes of a median sternotomy.   The lungs are clear. No other abnormality is seen. I see no definite   change since the previous examination.       HAROLDO LIVE MD        .   Treatments provided: Nebulizers, IV Fluids, IV antibiotics, IV solumedrol  Family Comments: None at bedside  OBS brochure/video discussed/provided to patient:  N/A  ED Medications:   Medications   ipratropium - albuterol 0.5 mg/2.5 mg/3 mL (DUONEB) neb solution 3 mL (has no administration in time range)   iohexol (OMNIPAQUE) solution 50 mL (has no administration in time range)   metroNIDAZOLE (FLAGYL) infusion 500 mg (has no administration in time range)   ipratropium - albuterol 0.5 mg/2.5 mg/3 mL (DUONEB) neb solution 3 mL (3 mLs Nebulization Given 6/28/19 7115)    methylPREDNISolone sodium succinate (solu-MEDROL) injection 125 mg (125 mg Intravenous Given 6/28/19 1421)   lactated ringers BOLUS 1,000 mL (1,000 mLs Intravenous New Bag 6/28/19 1453)   technetium pertechnetate with albumin (Tc99m MAA) radioisotope injection 3 mCi (3.3 mCi Intravenous Given 6/28/19 1558)   technetium pentetate Tc99m (DTPA) inhaled radioisotope 65 mCi (59 mCi Inhalation Given 6/28/19 1623)   cefTRIAXone (ROCEPHIN) 2 g vial to attach to  ml bag for ADULTS or NS 50 ml bag for PEDS (2 g Intravenous New Bag 6/28/19 2706)   albuterol (PROVENTIL) neb solution 2.5 mg (2.5 mg Nebulization Given 6/28/19 6996)     Drips infusing:  No  For the majority of the shift, the patient's behavior Green. Interventions performed were none.     Severe Sepsis OR Septic Shock Diagnosis Present: No      ED Nurse Name/Phone Number: Janey Garcia,   6:00 PM    RECEIVING UNIT ED HANDOFF REVIEW    Above ED Nurse Handoff Report was reviewed: Yes  Reviewed by: Leilani Ramos on June 28, 2019 at 6:43 PM

## 2019-06-28 NOTE — ED PROVIDER NOTES
Emergency Department Attending Supervision Note  6/28/2019  2:47 PM      I evaluated this patient in conjunction with Roger Ashby PA-C.      Briefly, the patient presented with dLLQ abdominal pain, 1 week of SOB with increased use of oxygen, 1 week of black diarrhea and poor oral intake. Denies fever. Just returned from a motorcycles tour around [facility] with friends. His friends are well. Diarrhea black at times, sometimes darker brown. Denies vomiting. No chest pain currently. Taken off albuterol and switched to Trelegy inhaler by pulmonary. 10 days ago.    On my exam, diffusely decreased BS bilaterally with mild tachypnea. Significant left abdominal pain, worst in the LLQ. In a fib but rate controlled    Results: see above    ED course:  1410 I spoke with and checked on the patient.   1500 I checked on and updated the patient.    My impression is the patient presented with shortness of breath, abdominal pain, diarrhea. Patient was quite positive per his fecal occult test he has significant left lower quadrant by my exam so CT was obtained which did show diverticulitis. He is also likely COPD exacerbation along with being dehydrated and having diverticulitis and will need fluids and admission. Antibiotic choices were discussed with pharmacist based on his kidney function which has worsened today. He was given antibiotics and multiple nebulizers, and solumedrol. He will need to be admitted for further monitoring and treatment. He will be admitted to the care of Dr. Prajapati to a telle bed. He is chronic A-Fib, but well control.    Diagnosis    ICD-10-CM    1. Diverticulitis of colon K57.32 Enteric Bacteria and Virus Panel by [facility] Stool     Clostridium difficile toxin B PCR   2. Dyspnea R06.00    3. Melena K92.1        Xavier Blum MD Cheng, Wenlan, MD  06/28/19 3734

## 2019-06-28 NOTE — ED PROVIDER NOTES
History     Chief Complaint:  Dyspnea and Dizziness     HPI   Tone Cooper is a 68 year old male with a complex medical history, including COPD, chronic Afib, Vtach, diverticulitis, on chronic O2 3L, who presents to the ED for evaluation of dyspnea and dizziness.  The patient reports worsening of chronic dyspnea over the past few weeks, as well as onset of dizziness, chills, and diaphoresis with standing over the past few weeks.  He notes that he has had to increase his oxygen flow to 4L.  He also reports LLQ abdominal pain for the past few weeks, as well as right-sided chest pain, however notes that the chest pain is nonexertional and intermittent.  He also notes black stool for the past few weeks with intermittent BRBPR.  He states that he has had constant diarrhea for the past 4 days.  He denies any fevers, nausea, or vomiting.  Of note, the patient [place] and some of his medication melted, so he missed the last four days of his Eliquis.     Allergies:  Amlodipine  Flecainide     Medications:      acetaminophen (TYLENOL) 325 MG tablet   apixaban ANTICOAGULANT (ELIQUIS) 5 MG tablet   diltiazem ER COATED BEADS (CARDIZEM CD/CARTIA XT) 180 MG 24 hr capsule   Fluticasone-Umeclidin-Vilanterol (TRELEGY ELLIPTA) 100-62.5-25 MCG/INH oral inhaler   furosemide (LASIX) 40 MG tablet   Lidocaine (LIDOCARE) 4 % Patch   Multiple Vitamins-Minerals (MULTIVITAMIN ADULTS PO)   oxyCODONE IR (ROXICODONE) 10 MG tablet   predniSONE (DELTASONE) 10 MG tablet   traZODone (DESYREL) 50 MG tablet   clonazePAM (KLONOPIN) 1 MG tablet   lisinopril (PRINIVIL/ZESTRIL) 10 MG tablet   order for DME   order for DME   Respiratory Therapy Supplies (AEROBIKA) KEELY       Past Medical History:    Past Medical History:   Diagnosis Date     Atrial flutter (H)      Cancer (H)      COPD (chronic obstructive pulmonary disease) (H)      Diverticulitis      Hepatitis 2011     Hypertension      Persistent atrial  fibrillation (H) 4/28/09     Premature beats      PVC (premature ventricular contraction)      Tricuspid valve disorder      Ventricular tachycardia (H)        Patient Active Problem List    Diagnosis Date Noted     Pulmonary embolism (H) 02/11/2019     Priority: Medium     Pneumonia 12/17/2018     Priority: Medium     Acute on chronic respiratory failure with hypoxia and hypercapnia (H) 11/26/2018     Priority: Medium     Dyspnea 11/23/2018     Priority: Medium     Nocturnal hypoxia 11/01/2017     Priority: Medium     Likely related to COPD.  See on 10/29/2017 Polysomnography during REM sleep.       S/P radiofrequency ablation operation for arrhythmia      Priority: Medium     Hypertension      Priority: Medium     Atrial flutter (H)      Priority: Medium     Wide-complex tachycardia (H) 06/08/2015     Priority: Medium     Tricuspid valve disorder      Priority: Medium     Dr Aj, hx of valve repair       COPD exacerbation (H)      Priority: Medium     Atrial fibrillation with rapid ventricular response (H)      Priority: Medium     Cardiomyopathy (H)      Priority: Medium     Diverticulitis 03/04/2014     Priority: Medium        Past Surgical History:    Past Surgical History:   Procedure Laterality Date     ABDOMEN SURGERY      hernia repair right     APPENDECTOMY  as a child     CARDIAC SURGERY      multiple ablations-[place]     CARDIOVERSION  06/03/2009    successful for afib     CARDIOVERSION  4/20/15    successful for afib     CARDIOVERSION  5/28/15     COLONOSCOPY  01/2018    [facility]     GENITOURINARY SURGERY      undescended testicle     H ABLATION ATRIAL FLUTTER       H ABLATION FOCAL AFIB  7/1/15    Left atrial linear ablation and pulmonary vein antrum ablation     H ABLATION PVC  12/5/16     INCISION AND DRAINAGE LOWER EXTREMITY, COMBINED Right 11/10/2016    Procedure: COMBINED INCISION AND DRAINAGE LOWER EXTREMITY;  Surgeon: Roger Pacheco MD;  Location: [facility] OR     LAPAROSCOPIC  CHOLECYSTECTOMY  1/6/2012    Procedure:LAPAROSCOPIC CHOLECYSTECTOMY; LAPAROSCOPIC CHOLECYSTECTOMY ; Surgeon:TALON DEVINE; Location:[facility] OR     ORTHOPEDIC SURGERY  2004    Left hip replacement     REPAIR VALVE TRICUSPID  9/8/08    conversion to a bileaflet valve and application of a 30 mm Sanderson ring     SMALL INTESTINE SURGERY      had bowel obstruction age 9     THORACIC SURGERY       VALVE REPLACEMENT      tricuspid        Family History:    family history includes Cerebrovascular Disease in his mother; Emphysema in his mother; Family History Negative in his mother; Hypertension in his mother; Prostate Cancer in his father.    Social History:   reports that he quit smoking about 11 years ago. His smoking use included paan with tobacco, gutka, zarda, khaini. He started smoking about 42 years ago. He has a 61.50 pack-year smoking history. He has never used smokeless tobacco. He reports that he drinks alcohol. He reports that he does not use drugs.    PCP: Ana Pratt     Review of Systems   Constitutional: Positive for chills and diaphoresis. Negative for fever.   Respiratory: Positive for shortness of breath.    Cardiovascular: Positive for chest pain and leg swelling.   Gastrointestinal: Positive for abdominal pain, blood in stool and diarrhea. Negative for nausea and vomiting.   Skin: Positive for wound.   Neurological: Positive for dizziness.   All other systems reviewed and are negative.        Physical Exam     Patient Vitals for the past 24 hrs:   BP Temp Temp src Pulse Heart Rate Resp SpO2 Weight   06/28/19 1730 116/70 -- -- -- 90 26 96 % --   06/28/19 1520 -- -- -- -- 87 26 95 % --   06/28/19 1515 108/80 -- -- 83 86 25 96 % --   06/28/19 1457 (!) 107/96 -- -- -- 87 26 98 % --   06/28/19 1348 -- -- -- -- -- -- -- 88.9 kg (196 lb)   06/28/19 1346 106/88 98.4  F (36.9  C) Oral 106 106 22 95 % --        Physical Exam  Constitutional: Pleasant. Cooperative.   Eyes: Pupils equally round and  reactive  HENT: Head is normal in appearance. Oropharynx is normal with moist mucus membranes.  Cardiovascular: Tachycardic. Irregularly irregular. No murmurs.  Respiratory: Decreased breath sounds bilaterally. Scattered wheezes.  GI: LLQ abdominal tenderness, otherwise soft, non-distended. No guarding, rebound, or rigidity.  Musculoskeletal: No asymmetry of the lower extremities, bilateral lower extremity 1+ pitting edema.  Skin: Healing wound to upper abdomen.   Neurologic: Cranial nerves grossly intact, normal cognition, no focal deficits. Alert and oriented x 3.   Psychiatric: Normal affect.  Rectal: Mild erythema on bilateral buttocks. No fissure. No external hemorrhoid noted. No internal hemorrhoid palpated. Green and black stool noted. No bright red blood.  Nursing notes and vital signs reviewed.    Emergency Department Course     Imaging:  Lung vent and perf (NM)   Final Result   IMPRESSION: Low probability for pulmonary embolism.      TOM JIMÉNEZ MD      CT Abdomen Pelvis w/o Contrast   Final Result   IMPRESSION: CT findings suggest acute diverticulitis involving the mid   descending colon and possibly the sigmoid colon at its junction with   the descending colon.      VY MOLINA MD      XR Chest 2 Views   Final Result   IMPRESSION: Again seen are surgical changes of a median sternotomy.   The lungs are clear. No other abnormality is seen. I see no definite   change since the previous examination.       HAROLDO LIVE MD           Laboratory:  Labs Ordered and Resulted from Time of ED Arrival Up to the Time of Departure from the ED   CBC WITH PLATELETS DIFFERENTIAL - Abnormal; Notable for the following components:       Result Value    WBC 18.5 (*)     RDW 17.4 (*)     Absolute Neutrophil 17.2 (*)     Absolute Lymphocytes 0.5 (*)     All other components within normal limits   BASIC METABOLIC PANEL - Abnormal; Notable for the following components:    Glucose 146 (*)     Urea Nitrogen 54 (*)      Creatinine 2.41 (*)     GFR Estimate 27 (*)     GFR Estimate If Black 31 (*)     All other components within normal limits   NT PROBNP INPATIENT - Abnormal; Notable for the following components:    N-Terminal Pro BNP Inpatient 2,430 (*)     All other components within normal limits   HEPATIC PANEL - Abnormal; Notable for the following components:    Bilirubin Direct 0.9 (*)     Bilirubin Total 1.7 (*)     Albumin 2.9 (*)      (*)      (*)     All other components within normal limits   OCCULT BLOOD STOOL - Abnormal; Notable for the following components:    Occult Blood Positive (*)     All other components within normal limits   ISTAT  GASES LACTATE LUIS M POCT - Abnormal; Notable for the following components:    PCO2 Venous 51 (*)     PO2 Venous 20 (*)     All other components within normal limits   TROPONIN I   NURSING DRAW AND HOLD   ISTAT CG4 GASES LACTATE LUIS M NURSING POCT   VITAL SIGNS   PULSE OXIMETRY NURSING   CARDIAC CONTINUOUS MONITORING   PERIPHERAL IV CATHETER   ABO/RH TYPE AND SCREEN   ENTERIC BACTERIA AND VIRUS PANEL BY MAU STOOL   CLOSTRIDIUM DIFFICILE TOXIN B      Interventions:  Medications   ipratropium - albuterol 0.5 mg/2.5 mg/3 mL (DUONEB) neb solution 3 mL (has no administration in time range)   iohexol (OMNIPAQUE) solution 50 mL (has no administration in time range)   metroNIDAZOLE (FLAGYL) infusion 500 mg (500 mg Intravenous New Bag 6/28/19 1811)   ipratropium - albuterol 0.5 mg/2.5 mg/3 mL (DUONEB) neb solution 3 mL (3 mLs Nebulization Given 6/28/19 1452)   methylPREDNISolone sodium succinate (solu-MEDROL) injection 125 mg (125 mg Intravenous Given 6/28/19 1421)   lactated ringers BOLUS 1,000 mL (1,000 mLs Intravenous New Bag 6/28/19 1453)   technetium pertechnetate with albumin (Tc99m MAA) radioisotope injection 3 mCi (3.3 mCi Intravenous Given 6/28/19 1558)   technetium pentetate Tc99m (DTPA) inhaled radioisotope 65 mCi (59 mCi Inhalation Given 6/28/19 1623)   cefTRIAXone  (ROCEPHIN) 2 g vial to attach to  ml bag for ADULTS or NS 50 ml bag for PEDS (0 g Intravenous Stopped 6/28/19 1811)   albuterol (PROVENTIL) neb solution 2.5 mg (2.5 mg Nebulization Given 6/28/19 1739)        Emergency Department Course:  Past medical records, nursing notes, and vitals reviewed.  I performed an exam of the patient and obtained history, as documented above.  I ordered the above labs and imaging.  I ordered the above interventions.  I discussed results with patient. Advised admission.  Dr. Blum spoke with Dr. Aquino regarding the patient's admission.  Patient admitted to the hospitalist service.    Impression & Plan      DDx: ACS, HF, COPD, Pneumonia, PE, Anemia, Colitis, Diverticulitis    Medical Decision Making:  Tone Cooper is a 68 year old male who presents today for evaluation of dyspnea and dizziness. The patient has a complex medical history, including COPD, chronic A. fib, diverticulitis, and he is on chronic O2 at 3 L.  He presents today with worsening of dyspnea over the past few weeks as well as dizziness when standing.  He notes that he has had to increase his O2 to 4L recently, and also notes that he has had LLQ abdominal pain for the past few weeks.  He also notes black stool for the past few weeks.  See HPI for additional details.  On my evaluation, patient is tachycardic and tachypneic, however other vitals are within normal.  Physical exam is notable for decreased breath sounds bilaterally and scattered wheezes, along with bilateral lower extremity pitting edema.  Rectal exam revealed green stool with black speckled throughout, no fissure, external hemorrhoids, or internal hemorrhoids palpated.  Patient is guaiac positive.  The above differential was considered.  The above work-up was performed.  The patient has a leukocytosis at 18.5.  Patient's kidney function is decreased with a creatinine of 2.41.  BNP is elevated to 430.  Stool studies were ordered however were not  obtained prior to admission.  CT scan revealed diverticulitis.  The V/Q scan was low probability for PE.  Patient received 1 L of LR here in the ED.  He was also provided a DuoNeb and an albuterol neb with some resolution of his dyspnea.  Solu-Medrol was provided as well.  After discussion with pharmacist, patient was provided for Flagyl and Rocephin for his diverticulitis. Dyspnea may be secondary to COPD exacerbation vs new onset HF. Low likelihood PE, no evidence of pneumonia. In light of the patient's ongoing dyspnea, diverticulitis diagnosis, and decreased kidney function, patient offered admission and he accepted.  Dr. Blum discussed case with hospitalist, who accepted for admission.  Patient was admitted to the hospital in stable condition.    Diagnosis:    ICD-10-CM    1. Diverticulitis of colon K57.32    2. Dyspnea R06.00    3. Melena K92.1       Roger Ashby PA-C  [facility] ED  June 28, 2019        Roger Ashby PA-C  06/28/19 2148

## 2019-06-28 NOTE — ED TRIAGE NOTES
"Pt C/O SOB past 3 days. On 3LNC home oxygen, has had to increase his oxygen. States right sided CP, onset 9am today. States diarrhea and blood in stool since Monday. \"I notice bright red blood when I wipe myself and dark brown stools.\" Dizziness with standing.  "

## 2019-06-29 LAB
ALBUMIN SERPL-MCNC: 2.5 G/DL (ref 3.4–5)
ALP SERPL-CCNC: 66 U/L (ref 40–150)
ALT SERPL W P-5'-P-CCNC: 120 U/L (ref 0–70)
ANION GAP SERPL CALCULATED.3IONS-SCNC: 7 MMOL/L (ref 3–14)
AST SERPL W P-5'-P-CCNC: 90 U/L (ref 0–45)
BILIRUB DIRECT SERPL-MCNC: 0.3 MG/DL (ref 0–0.2)
BILIRUB SERPL-MCNC: 0.6 MG/DL (ref 0.2–1.3)
BUN SERPL-MCNC: 51 MG/DL (ref 7–30)
C COLI+JEJUNI+LARI FUSA STL QL NAA+PROBE: NOT DETECTED
C DIFF TOX B STL QL: POSITIVE
CALCIUM SERPL-MCNC: 8.7 MG/DL (ref 8.5–10.1)
CHLORIDE SERPL-SCNC: 104 MMOL/L (ref 94–109)
CO2 SERPL-SCNC: 27 MMOL/L (ref 20–32)
CREAT SERPL-MCNC: 1.87 MG/DL (ref 0.66–1.25)
EC STX1 GENE STL QL NAA+PROBE: NOT DETECTED
EC STX2 GENE STL QL NAA+PROBE: NOT DETECTED
ENTERIC PATHOGEN COMMENT: NORMAL
ERYTHROCYTE [DISTWIDTH] IN BLOOD BY AUTOMATED COUNT: 16.3 % (ref 10–15)
GFR SERPL CREATININE-BSD FRML MDRD: 36 ML/MIN/{1.73_M2}
GLUCOSE BLDC GLUCOMTR-MCNC: 300 MG/DL (ref 70–99)
GLUCOSE SERPL-MCNC: 252 MG/DL (ref 70–99)
HBA1C MFR BLD: 7 % (ref 0–5.6)
HCT VFR BLD AUTO: 41.7 % (ref 40–53)
HGB BLD-MCNC: 13.2 G/DL (ref 13.3–17.7)
MCH RBC QN AUTO: 28.1 PG (ref 26.5–33)
MCHC RBC AUTO-ENTMCNC: 31.7 G/DL (ref 31.5–36.5)
MCV RBC AUTO: 89 FL (ref 78–100)
NOROV GI+II ORF1-ORF2 JNC STL QL NAA+PR: NOT DETECTED
PLATELET # BLD AUTO: 225 10E9/L (ref 150–450)
POTASSIUM SERPL-SCNC: 5.1 MMOL/L (ref 3.4–5.3)
PROT SERPL-MCNC: 6 G/DL (ref 6.8–8.8)
RBC # BLD AUTO: 4.69 10E12/L (ref 4.4–5.9)
RVA NSP5 STL QL NAA+PROBE: NOT DETECTED
SALMONELLA SP RPOD STL QL NAA+PROBE: NOT DETECTED
SHIGELLA SP+EIEC IPAH STL QL NAA+PROBE: NOT DETECTED
SODIUM SERPL-SCNC: 138 MMOL/L (ref 133–144)
SPECIMEN SOURCE: ABNORMAL
V CHOL+PARA RFBL+TRKH+TNAA STL QL NAA+PR: NOT DETECTED
WBC # BLD AUTO: 10 10E9/L (ref 4–11)
Y ENTERO RECN STL QL NAA+PROBE: NOT DETECTED

## 2019-06-29 PROCEDURE — 00000146 ZZHCL STATISTIC GLUCOSE BY METER IP

## 2019-06-29 PROCEDURE — 00000146 ZZHCL STATISTIC GLUCOSE BY METER IP: Performed by: INTERNAL MEDICINE

## 2019-06-29 PROCEDURE — 25000132 ZZH RX MED GY IP 250 OP 250 PS 637: Performed by: INTERNAL MEDICINE

## 2019-06-29 PROCEDURE — 12000000 ZZH R&B MED SURG/OB

## 2019-06-29 PROCEDURE — 25000125 ZZHC RX 250: Performed by: INTERNAL MEDICINE

## 2019-06-29 PROCEDURE — 80076 HEPATIC FUNCTION PANEL: CPT | Performed by: INTERNAL MEDICINE

## 2019-06-29 PROCEDURE — 25000128 H RX IP 250 OP 636: Performed by: INTERNAL MEDICINE

## 2019-06-29 PROCEDURE — 94640 AIRWAY INHALATION TREATMENT: CPT

## 2019-06-29 PROCEDURE — 85027 COMPLETE CBC AUTOMATED: CPT | Performed by: INTERNAL MEDICINE

## 2019-06-29 PROCEDURE — 94640 AIRWAY INHALATION TREATMENT: CPT | Mod: 76

## 2019-06-29 PROCEDURE — 40000275 ZZH STATISTIC RCP TIME EA 10 MIN

## 2019-06-29 PROCEDURE — 25800030 ZZH RX IP 258 OP 636: Performed by: INTERNAL MEDICINE

## 2019-06-29 PROCEDURE — C9113 INJ PANTOPRAZOLE SODIUM, VIA: HCPCS | Performed by: INTERNAL MEDICINE

## 2019-06-29 PROCEDURE — 36415 COLL VENOUS BLD VENIPUNCTURE: CPT | Performed by: INTERNAL MEDICINE

## 2019-06-29 PROCEDURE — 83036 HEMOGLOBIN GLYCOSYLATED A1C: CPT | Performed by: INTERNAL MEDICINE

## 2019-06-29 PROCEDURE — 80048 BASIC METABOLIC PNL TOTAL CA: CPT | Performed by: INTERNAL MEDICINE

## 2019-06-29 PROCEDURE — 99233 SBSQ HOSP IP/OBS HIGH 50: CPT | Performed by: INTERNAL MEDICINE

## 2019-06-29 PROCEDURE — 94660 CPAP INITIATION&MGMT: CPT

## 2019-06-29 PROCEDURE — 25000131 ZZH RX MED GY IP 250 OP 636 PS 637: Performed by: INTERNAL MEDICINE

## 2019-06-29 RX ORDER — NICOTINE POLACRILEX 4 MG
15-30 LOZENGE BUCCAL
Status: DISCONTINUED | OUTPATIENT
Start: 2019-06-29 | End: 2019-07-03 | Stop reason: HOSPADM

## 2019-06-29 RX ORDER — PREDNISONE 20 MG/1
60 TABLET ORAL DAILY
Status: DISCONTINUED | OUTPATIENT
Start: 2019-06-29 | End: 2019-07-02

## 2019-06-29 RX ORDER — VANCOMYCIN HYDROCHLORIDE 50 MG/ML
250 KIT ORAL 4 TIMES DAILY
Status: DISCONTINUED | OUTPATIENT
Start: 2019-06-29 | End: 2019-07-03 | Stop reason: HOSPADM

## 2019-06-29 RX ORDER — DEXTROSE MONOHYDRATE 25 G/50ML
25-50 INJECTION, SOLUTION INTRAVENOUS
Status: DISCONTINUED | OUTPATIENT
Start: 2019-06-29 | End: 2019-07-03 | Stop reason: HOSPADM

## 2019-06-29 RX ADMIN — IPRATROPIUM BROMIDE AND ALBUTEROL SULFATE 3 ML: .5; 3 SOLUTION RESPIRATORY (INHALATION) at 15:25

## 2019-06-29 RX ADMIN — VANCOMYCIN HYDROCHLORIDE 250 MG: KIT at 21:03

## 2019-06-29 RX ADMIN — METRONIDAZOLE 500 MG: 500 INJECTION, SOLUTION INTRAVENOUS at 10:43

## 2019-06-29 RX ADMIN — VANCOMYCIN HYDROCHLORIDE 250 MG: KIT at 01:38

## 2019-06-29 RX ADMIN — SODIUM CHLORIDE: 9 INJECTION, SOLUTION INTRAVENOUS at 17:12

## 2019-06-29 RX ADMIN — SODIUM CHLORIDE: 9 INJECTION, SOLUTION INTRAVENOUS at 08:52

## 2019-06-29 RX ADMIN — VANCOMYCIN HYDROCHLORIDE 250 MG: KIT at 13:43

## 2019-06-29 RX ADMIN — IPRATROPIUM BROMIDE AND ALBUTEROL SULFATE 3 ML: .5; 3 SOLUTION RESPIRATORY (INHALATION) at 07:24

## 2019-06-29 RX ADMIN — INSULIN ASPART 4 UNITS: 100 INJECTION, SOLUTION INTRAVENOUS; SUBCUTANEOUS at 17:13

## 2019-06-29 RX ADMIN — PREDNISONE 60 MG: 20 TABLET ORAL at 14:51

## 2019-06-29 RX ADMIN — APIXABAN 5 MG: 5 TABLET, FILM COATED ORAL at 18:15

## 2019-06-29 RX ADMIN — METRONIDAZOLE 500 MG: 500 INJECTION, SOLUTION INTRAVENOUS at 01:16

## 2019-06-29 RX ADMIN — IPRATROPIUM BROMIDE AND ALBUTEROL SULFATE 3 ML: .5; 3 SOLUTION RESPIRATORY (INHALATION) at 11:43

## 2019-06-29 RX ADMIN — TRAZODONE HYDROCHLORIDE 50 MG: 50 TABLET ORAL at 21:03

## 2019-06-29 RX ADMIN — CLONAZEPAM 1 MG: 0.5 TABLET ORAL at 21:03

## 2019-06-29 RX ADMIN — PANTOPRAZOLE SODIUM 40 MG: 40 INJECTION, POWDER, FOR SOLUTION INTRAVENOUS at 21:03

## 2019-06-29 RX ADMIN — VANCOMYCIN HYDROCHLORIDE 250 MG: KIT at 08:56

## 2019-06-29 RX ADMIN — DILTIAZEM HYDROCHLORIDE 180 MG: 180 CAPSULE, COATED, EXTENDED RELEASE ORAL at 21:02

## 2019-06-29 RX ADMIN — METHYLPREDNISOLONE SODIUM SUCCINATE 62.5 MG: 125 INJECTION, POWDER, FOR SOLUTION INTRAMUSCULAR; INTRAVENOUS at 01:16

## 2019-06-29 RX ADMIN — VANCOMYCIN HYDROCHLORIDE 250 MG: KIT at 18:15

## 2019-06-29 RX ADMIN — DILTIAZEM HYDROCHLORIDE 180 MG: 180 CAPSULE, COATED, EXTENDED RELEASE ORAL at 08:52

## 2019-06-29 RX ADMIN — IPRATROPIUM BROMIDE AND ALBUTEROL SULFATE 3 ML: .5; 3 SOLUTION RESPIRATORY (INHALATION) at 20:16

## 2019-06-29 RX ADMIN — Medication 1 MG: at 23:53

## 2019-06-29 RX ADMIN — OXYCODONE HYDROCHLORIDE 5 MG: 5 TABLET ORAL at 18:15

## 2019-06-29 RX ADMIN — PANTOPRAZOLE SODIUM 40 MG: 40 INJECTION, POWDER, FOR SOLUTION INTRAVENOUS at 08:54

## 2019-06-29 ASSESSMENT — ACTIVITIES OF DAILY LIVING (ADL)
ADLS_ACUITY_SCORE: 15
ADLS_ACUITY_SCORE: 15
ADLS_ACUITY_SCORE: 14
ADLS_ACUITY_SCORE: 15
ADLS_ACUITY_SCORE: 14
ADLS_ACUITY_SCORE: 14

## 2019-06-29 NOTE — PROGRESS NOTES
"[facility] RCAT     Date: 6/28/2019   Admission Dx: Acute Diverticulitis  Pulmonary History COPD- lung nodules  Home Nebulizer/MDI Use: Duoneb as needed  trelogy  Home Oxygen: 2-3L  Acuity Level (RCAT flow sheet): 3   Aerosol Therapy initiated: Duo QID, Alb Q2prn      Pulmonary Hygiene initiated: coughing techniques      Volume Expansion initiated: IS TID per nursing      Current Oxygen Requirements: 3L   Current SpO2: 95%     Re-evaluation date: 7/1/2019    Patient Education:      See \"RT Assessments\" flow sheet for patient assessment scoring and Acuity Level Details.             "

## 2019-06-29 NOTE — PLAN OF CARE
Pt up Ax1. Alert & oriented x4. VSS. +BS, no BM this shift. C-diff positive, started on oral vanco. Reports SOB, LS diminished. Bipap on for sleep. Tele afib controlled.

## 2019-06-29 NOTE — PLAN OF CARE
Pt admitted this shift from ED.  A&O, calls appropriately, alarms on until acclimated with room.  VSS, afebrile and sats maintained on baseline 3LPM O2.  C/o headache, declines intervention.  LS diminished, SOB with activity.  Hgb 13.0 this shift, no BM since up to floor.  Regular diet, good appetite, denies n/v.  Remains on enteric isolation, stool sample pending.  Receiving IV Rocephin, flagyl, solumedrol.  Discharge plans TBD.

## 2019-06-29 NOTE — PLAN OF CARE
Ambulatory Status:  Pt up with assist of 1.  VS:  VSS, afebrile, 3L o2- baseline. Bipap w/ sleep  Pain:  Minimal  Resp: LS dim  GI:  denies nausea.  good appetite and on regular diet.  BS active.  Passing flatus.  Last BM this shift, loose but not frequent.  :  dark jean marie urine, good output  Skin:  bruised, sandra, some blanchable redness to buttocks, barrier cream applied  Tx:  PO vanco, PO prednisone  Labs:  creat 1.87 hgb 13.2, a1c 7.0, k 5.1  Consults:  n/a  Disposition:  home in 2-3 days

## 2019-06-29 NOTE — PROGRESS NOTES
[facility]  Hospitalist Progress Note  Name: Tone Cooper    MRN: [id number]  Physician:  Edwin Lazo DO, FHM (Text Page)    Summary of Stay: Tone Cooper is a 68 year old male who was admitted on 6/28/2019.  He presented with several days of loose stools, abdominal discomfort, increased SOB, and DONG.  Following admission his stool study returned C. Diff positive.    Assessment & Plan    C. Difficile colitis  Abnormal CT suggestive of bowel inflammation  Leukocytosis:  -  Suspect main problem is C. Difficile colitis.  I'm not convinced he has definite diverticulitis.  Antibiotics outside of C. Difficile tx stopped.  If he doesn't improve/worsens with just C. Difficile tx would then consider more coverage for diverticulitis.    Acute COPD exacerbation superimposed on chronic COPD/respiratory failure (chronically on 3L)  ISIS:  -  Suspect strain with C. Diff and DONG contributed to mild COPD exacerbation.  -  O2  -  Nebs  -  Change solumedrol to oral prednisone, plan slow wean  -  Continue baseline pulm meds  -  Continue home BiPAP with sleep    DONG, suspect dehydration with C. Diff:  -  IVF, creatine improving.  Holding lisinopril and lasix again today.    Hyperglycemia:  -  Suspect steroid induced.  Added sliding scale insulin and glu checks.    Concern initially of GI bleed:  -  No overt bleeding.  Hgb still about normal range even after hydration.   Monitor, but will resume cautiously his anticoag for his cardiac issues tonight.    Chronic Afib  History of PE:  V/Q low probability on admission  -  Continue diltiazem  -  Resume eliquis this evening    HTN: PTA lisinopril 10 mg daily, diltiazem 180 twice daily, and Lasix 40 mg daily.  DONG and intravascularly dry as above.  Also blood pressure soft.  -Hold lisinopril and Lasix  -Diltiazem with hold parameters for SBP less than 100    Elevated LFTs: Mild elevation in AST, ALT, bilirubin.  Has had previous cholecystectomy.  May be secondary to  hypovolemia and diverticulitis.  -Added on LFT's to AM labs today    Insomnia:  -  Trazodone and clonazepam    Chronic shoulder pain: Rarely takes oxycodone for this.  Also has lidocaine patch.  -Continue lidocaine patch and low-dose oxycodone as needed     Chronic RV systolic CHF: secondary to his advanced COPD.  PTA on Lasix.  Has chronic trace bilateral lower extremity edema.  BNP is elevated, but clinically does not have acute CHF.  -Holding Lasix as above     History of tricuspid valve replacement     Lung nodules: Follows in lung nodule clinic with pulmonary         Diet: High Consistent CHO Diet    DVT Prophylaxis: Resuming   Rodney Catheter: not present  Code Status: Full Code      Disposition Plan   Expected discharge in around Tuesday timeframe to prior living arrangement once stooling improved, renal failure resolved, tolerating diet, and respiratory status improved.     Entered: Edwin Lazo 06/29/2019, 4:06 PM       Interval History   Assumed care, history reviewed.  Mr. Cooper is feeling better though still having loose stools.  Still has mild abdominal pain.  Appetite improved and ate more today.  SOB also improved.  No other new complaints.    -Data reviewed today: I reviewed all new labs and imaging reports over the last 24 hours. I personally reviewed no images or EKG's today.    Physical Exam   Temp: 97.3  F (36.3  C) Temp src: Oral BP: 129/71 Pulse: 100 Heart Rate: 58 Resp: 20 SpO2: 95 % O2 Device: Nasal cannula Oxygen Delivery: 3 LPM  Vitals:    06/28/19 1348 06/29/19 0705   Weight: 88.9 kg (196 lb) 84.6 kg (186 lb 8 oz)     Vital Signs with Ranges  Temp:  [95.7  F (35.4  C)-97.4  F (36.3  C)] 97.3  F (36.3  C)  Pulse:  [] 100  Heart Rate:  [] 58  Resp:  [16-26] 20  BP: (102-129)/(62-88) 129/71  SpO2:  [93 %-97 %] 95 %  I/O last 3 completed shifts:  In: 1120 [P.O.:1020; IV Piggyback:100]  Out: 975 [Urine:975]    GEN:  Alert, oriented x 3, appears comfortable, no overt  distress.  HEENT:  Normocephalic/atraumatic, no scleral icterus, no nasal discharge, mouth moist.  CV:  Regular rate and rhythm, no loud murmur or rub.  LUNGS:  Decreased breath sounds, no wheezing.  Symmetric chest rise on inhalation noted.  ABD:  Active bowel sounds, soft, mild lower quadrant tenderness, mildly distended.  No guarding/rigidity.  EXT:  No edema.  No cyanosis.  No acute joint synovitis noted.  SKIN:  Dry to touch, no exanthems noted in the visualized areas.    Medications     sodium chloride 100 mL/hr at 06/29/19 0852       apixaban ANTICOAGULANT  5 mg Oral BID     clonazePAM  1 mg Oral At Bedtime     diltiazem ER COATED BEADS  180 mg Oral BID     Fluticasone-Umeclidin-Vilanterol  1 puff Inhalation Daily     insulin aspart  1-7 Units Subcutaneous TID AC     insulin aspart  1-5 Units Subcutaneous At Bedtime     ipratropium - albuterol 0.5 mg/2.5 mg/3 mL  3 mL Nebulization 4x daily     lidocaine   Transdermal Q8H     pantoprazole (PROTONIX) IV  40 mg Intravenous BID     predniSONE  60 mg Oral Daily     sodium chloride (PF)  3 mL Intracatheter Q8H     traZODone  50 mg Oral At Bedtime     vancomycin  250 mg Oral 4x Daily     Data     Recent Labs   Lab 06/29/19  0722 06/28/19  2210 06/28/19  1402   WBC 10.0  --  18.5*   HGB 13.2* 13.0* 15.5   HCT 41.7  --  48.7   MCV 89  --  89     --  324       Recent Labs   Lab 06/29/19  0722 06/28/19  1402    134   POTASSIUM 5.1 5.2   CHLORIDE 104 98   CO2 27 24   ANIONGAP 7 12   * 146*   BUN 51* 54*   CR 1.87* 2.41*   GFRESTIMATED 36* 27*   GFRESTBLACK 42* 31*   WILBERT 8.7 9.8   PROTTOTAL  --  7.1   ALBUMIN  --  2.9*   BILITOTAL  --  1.7*   ALKPHOS  --  84   AST  --  104*   ALT  --  137*     Recent Labs   Lab 06/29/19  0722 06/28/19  1402   * 146*       Recent Results (from the past 24 hour(s))   Lung vent and perf (NM)    Narrative    NUCLEAR MEDICINE LUNG VENTILATION AND PERFUSION 6/28/2019 4:51 PM     HISTORY: Shortness of  breath.    COMPARISON: Chest x-ray 6/20/2019.    TECHNIQUE: Tc-99m MAA injected intravenously for evaluation of  pulmonary perfusion. Tc-99m DTPA inhaled for the ventilation phase.    DOSE: 3.3 mCi 99mTc-MAA,  59.0 mCi 99mTc-DTPA.    FINDINGS: There is mildly heterogeneous perfusion bilaterally. Few  small perfusion defects which are all matched on ventilation imaging.  No unmatched perfusion defects.      Impression    IMPRESSION: Low probability for pulmonary embolism.    TOM JIMÉNEZ MD

## 2019-06-29 NOTE — PROGRESS NOTES
Cross coverage:    Was called about positive C. difficile.  - Start the patient on oral vancomycin.  -Already on enteric precautions.  - I discontinued IV ceftriaxone as CT findings may be related to C. difficile colitis rather than diverticulitis.  -Further recommendations per day team.

## 2019-06-29 NOTE — PROGRESS NOTES
CTS identifies patient as high risk due to elevated CALEB score. Currently admitted for acute diverticulitis, this is his 6th admission in 6 months. He has been admitted for thrombocytopenia, COPD X2, back pain & pulmonary embolism. He has had 2 ED only visits in the last 6 months for hematoma & hemoptysis/COPD.     His history includes right-sided heart failure & COPD. He is on home oxygen 3L continuously and uses a BiPap at night.     His current medications that contribute to his CALEB risk are Eliquis, Lidocaine patch, Oxycodone, Clonazepam & prednisone. He was discharged from [place] on a Diabetic diet. No history of DM found. No DM medications in his med list.     Per chart review, pt resides at home with spouse, Gloria. Baseline mobility is independent.      Will follow along with SW for DC planning. Will give hand off to clinic RN CTS at time of discharge and assist in making follow up appointments.      Dulce Denney RN, BSN, CTS  [facility]  [phone]

## 2019-06-30 ENCOUNTER — APPOINTMENT (OUTPATIENT)
Dept: PHYSICAL THERAPY | Facility: CLINIC | Age: 69
DRG: 372 | End: 2019-06-30
Attending: INTERNAL MEDICINE
Payer: COMMERCIAL

## 2019-06-30 LAB
ALBUMIN SERPL-MCNC: 2.1 G/DL (ref 3.4–5)
ALBUMIN UR-MCNC: 10 MG/DL
ALP SERPL-CCNC: 55 U/L (ref 40–150)
ALT SERPL W P-5'-P-CCNC: 93 U/L (ref 0–70)
ANION GAP SERPL CALCULATED.3IONS-SCNC: 4 MMOL/L (ref 3–14)
APPEARANCE UR: CLEAR
AST SERPL W P-5'-P-CCNC: 43 U/L (ref 0–45)
BILIRUB SERPL-MCNC: 0.4 MG/DL (ref 0.2–1.3)
BILIRUB UR QL STRIP: NEGATIVE
BUN SERPL-MCNC: 41 MG/DL (ref 7–30)
CALCIUM SERPL-MCNC: 7.9 MG/DL (ref 8.5–10.1)
CHLORIDE SERPL-SCNC: 106 MMOL/L (ref 94–109)
CO2 SERPL-SCNC: 27 MMOL/L (ref 20–32)
COLOR UR AUTO: ABNORMAL
CREAT SERPL-MCNC: 1.24 MG/DL (ref 0.66–1.25)
ERYTHROCYTE [DISTWIDTH] IN BLOOD BY AUTOMATED COUNT: 16.4 % (ref 10–15)
GFR SERPL CREATININE-BSD FRML MDRD: 60 ML/MIN/{1.73_M2}
GLUCOSE BLDC GLUCOMTR-MCNC: 205 MG/DL (ref 70–99)
GLUCOSE BLDC GLUCOMTR-MCNC: 207 MG/DL (ref 70–99)
GLUCOSE BLDC GLUCOMTR-MCNC: 211 MG/DL (ref 70–99)
GLUCOSE BLDC GLUCOMTR-MCNC: 237 MG/DL (ref 70–99)
GLUCOSE BLDC GLUCOMTR-MCNC: 241 MG/DL (ref 70–99)
GLUCOSE BLDC GLUCOMTR-MCNC: 298 MG/DL (ref 70–99)
GLUCOSE SERPL-MCNC: 215 MG/DL (ref 70–99)
GLUCOSE UR STRIP-MCNC: >1000 MG/DL
HCT VFR BLD AUTO: 35.4 % (ref 40–53)
HGB BLD-MCNC: 11.3 G/DL (ref 13.3–17.7)
HGB BLD-MCNC: 12.4 G/DL (ref 13.3–17.7)
HGB UR QL STRIP: NEGATIVE
HYALINE CASTS #/AREA URNS LPF: 1 /LPF (ref 0–2)
KETONES UR STRIP-MCNC: NEGATIVE MG/DL
LEUKOCYTE ESTERASE UR QL STRIP: NEGATIVE
MCH RBC QN AUTO: 28.8 PG (ref 26.5–33)
MCHC RBC AUTO-ENTMCNC: 31.9 G/DL (ref 31.5–36.5)
MCV RBC AUTO: 90 FL (ref 78–100)
NITRATE UR QL: NEGATIVE
PH UR STRIP: 6 PH (ref 5–7)
PLATELET # BLD AUTO: 193 10E9/L (ref 150–450)
POTASSIUM SERPL-SCNC: 5 MMOL/L (ref 3.4–5.3)
PROT SERPL-MCNC: 4.9 G/DL (ref 6.8–8.8)
RBC # BLD AUTO: 3.92 10E12/L (ref 4.4–5.9)
RBC #/AREA URNS AUTO: <1 /HPF (ref 0–2)
SODIUM SERPL-SCNC: 137 MMOL/L (ref 133–144)
SOURCE: ABNORMAL
SP GR UR STRIP: 1.02 (ref 1–1.03)
UROBILINOGEN UR STRIP-MCNC: NORMAL MG/DL (ref 0–2)
WBC # BLD AUTO: 14.5 10E9/L (ref 4–11)
WBC #/AREA URNS AUTO: 1 /HPF (ref 0–5)

## 2019-06-30 PROCEDURE — 97530 THERAPEUTIC ACTIVITIES: CPT | Mod: GP | Performed by: PHYSICAL THERAPIST

## 2019-06-30 PROCEDURE — 36415 COLL VENOUS BLD VENIPUNCTURE: CPT | Performed by: INTERNAL MEDICINE

## 2019-06-30 PROCEDURE — 94640 AIRWAY INHALATION TREATMENT: CPT | Mod: 76

## 2019-06-30 PROCEDURE — 25000132 ZZH RX MED GY IP 250 OP 250 PS 637: Performed by: INTERNAL MEDICINE

## 2019-06-30 PROCEDURE — 25000125 ZZHC RX 250: Performed by: INTERNAL MEDICINE

## 2019-06-30 PROCEDURE — 99233 SBSQ HOSP IP/OBS HIGH 50: CPT | Performed by: INTERNAL MEDICINE

## 2019-06-30 PROCEDURE — 85027 COMPLETE CBC AUTOMATED: CPT | Performed by: INTERNAL MEDICINE

## 2019-06-30 PROCEDURE — 25000131 ZZH RX MED GY IP 250 OP 636 PS 637: Performed by: INTERNAL MEDICINE

## 2019-06-30 PROCEDURE — 97161 PT EVAL LOW COMPLEX 20 MIN: CPT | Mod: GP | Performed by: PHYSICAL THERAPIST

## 2019-06-30 PROCEDURE — C9113 INJ PANTOPRAZOLE SODIUM, VIA: HCPCS | Performed by: INTERNAL MEDICINE

## 2019-06-30 PROCEDURE — 94640 AIRWAY INHALATION TREATMENT: CPT

## 2019-06-30 PROCEDURE — 85018 HEMOGLOBIN: CPT | Performed by: INTERNAL MEDICINE

## 2019-06-30 PROCEDURE — 12000000 ZZH R&B MED SURG/OB

## 2019-06-30 PROCEDURE — 25800030 ZZH RX IP 258 OP 636: Performed by: INTERNAL MEDICINE

## 2019-06-30 PROCEDURE — 80053 COMPREHEN METABOLIC PANEL: CPT | Performed by: INTERNAL MEDICINE

## 2019-06-30 PROCEDURE — 00000146 ZZHCL STATISTIC GLUCOSE BY METER IP

## 2019-06-30 PROCEDURE — 94660 CPAP INITIATION&MGMT: CPT

## 2019-06-30 PROCEDURE — 81001 URINALYSIS AUTO W/SCOPE: CPT | Performed by: INTERNAL MEDICINE

## 2019-06-30 PROCEDURE — 40000275 ZZH STATISTIC RCP TIME EA 10 MIN

## 2019-06-30 PROCEDURE — 25000128 H RX IP 250 OP 636: Performed by: INTERNAL MEDICINE

## 2019-06-30 RX ADMIN — SODIUM CHLORIDE: 9 INJECTION, SOLUTION INTRAVENOUS at 13:36

## 2019-06-30 RX ADMIN — OXYCODONE HYDROCHLORIDE 5 MG: 5 TABLET ORAL at 22:07

## 2019-06-30 RX ADMIN — INSULIN ASPART 2 UNITS: 100 INJECTION, SOLUTION INTRAVENOUS; SUBCUTANEOUS at 13:38

## 2019-06-30 RX ADMIN — CLONAZEPAM 1 MG: 0.5 TABLET ORAL at 22:07

## 2019-06-30 RX ADMIN — LIDOCAINE 2 PATCH: 560 PATCH PERCUTANEOUS; TOPICAL; TRANSDERMAL at 22:10

## 2019-06-30 RX ADMIN — PANTOPRAZOLE SODIUM 40 MG: 40 INJECTION, POWDER, FOR SOLUTION INTRAVENOUS at 08:50

## 2019-06-30 RX ADMIN — INSULIN ASPART 3 UNITS: 100 INJECTION, SOLUTION INTRAVENOUS; SUBCUTANEOUS at 18:36

## 2019-06-30 RX ADMIN — APIXABAN 5 MG: 5 TABLET, FILM COATED ORAL at 22:07

## 2019-06-30 RX ADMIN — VANCOMYCIN HYDROCHLORIDE 250 MG: KIT at 22:07

## 2019-06-30 RX ADMIN — DILTIAZEM HYDROCHLORIDE 180 MG: 180 CAPSULE, COATED, EXTENDED RELEASE ORAL at 08:50

## 2019-06-30 RX ADMIN — VANCOMYCIN HYDROCHLORIDE 250 MG: KIT at 13:32

## 2019-06-30 RX ADMIN — IPRATROPIUM BROMIDE AND ALBUTEROL SULFATE 3 ML: .5; 3 SOLUTION RESPIRATORY (INHALATION) at 15:04

## 2019-06-30 RX ADMIN — PREDNISONE 60 MG: 20 TABLET ORAL at 08:50

## 2019-06-30 RX ADMIN — VANCOMYCIN HYDROCHLORIDE 250 MG: KIT at 18:33

## 2019-06-30 RX ADMIN — DILTIAZEM HYDROCHLORIDE 180 MG: 180 CAPSULE, COATED, EXTENDED RELEASE ORAL at 22:07

## 2019-06-30 RX ADMIN — TRAZODONE HYDROCHLORIDE 50 MG: 50 TABLET ORAL at 22:07

## 2019-06-30 RX ADMIN — INSULIN ASPART 2 UNITS: 100 INJECTION, SOLUTION INTRAVENOUS; SUBCUTANEOUS at 08:51

## 2019-06-30 RX ADMIN — APIXABAN 5 MG: 5 TABLET, FILM COATED ORAL at 08:50

## 2019-06-30 RX ADMIN — IPRATROPIUM BROMIDE AND ALBUTEROL SULFATE 3 ML: .5; 3 SOLUTION RESPIRATORY (INHALATION) at 20:00

## 2019-06-30 RX ADMIN — INSULIN GLARGINE 5 UNITS: 100 INJECTION, SOLUTION SUBCUTANEOUS at 08:50

## 2019-06-30 RX ADMIN — PANTOPRAZOLE SODIUM 40 MG: 40 INJECTION, POWDER, FOR SOLUTION INTRAVENOUS at 22:07

## 2019-06-30 RX ADMIN — VANCOMYCIN HYDROCHLORIDE 250 MG: KIT at 08:50

## 2019-06-30 RX ADMIN — IPRATROPIUM BROMIDE AND ALBUTEROL SULFATE 3 ML: .5; 3 SOLUTION RESPIRATORY (INHALATION) at 07:08

## 2019-06-30 RX ADMIN — IPRATROPIUM BROMIDE AND ALBUTEROL SULFATE 3 ML: .5; 3 SOLUTION RESPIRATORY (INHALATION) at 11:19

## 2019-06-30 ASSESSMENT — ACTIVITIES OF DAILY LIVING (ADL)
ADLS_ACUITY_SCORE: 14

## 2019-06-30 NOTE — PROGRESS NOTES
06/30/19 1342   Quick Adds   Type of Visit Initial PT Evaluation   Living Environment   Lives With significant other;child(harry), adult   Living Arrangements house   Home Accessibility stairs within home   Number of Stairs, Within Home, Primary 8   Stair Railings, Within Home, Primary railings safe and in good condition   Transportation Anticipated family or friend will provide   Self-Care   Regular Exercise Yes   Activity/Exercise Type strength training;walking  (recently working with pulmonary rehab)   Exercise Amount/Frequency other (see comments)  (1/week)   Equipment Currently Used at Home walker, rolling   Functional Level Prior   Ambulation 1-->assistive equipment   Transferring 0-->independent   Toileting 0-->independent   Bathing 0-->independent   Communication 0-->understands/communicates without difficulty   Swallowing 0-->swallows foods/liquids without difficulty   Cognition 0 - no cognition issues reported   Fall history within last six months yes   Number of times patient has fallen within last six months 2   Prior Functional Level Comment Pt able to perform own ADLs, family assists with IADLs   General Information   Onset of Illness/Injury or Date of Surgery - Date 06/28/19   Referring Physician Dr. Lazo   Patient/Family Goals Statement Pt hoping to return home   Pertinent History of Current Problem (include personal factors and/or comorbidities that impact the POC)  Tone Cooper is a 68 year old male who was admitted on 6/28/2019.  He presented with several days of loose stools, abdominal discomfort, increased SOB, and DONG.  Following admission his stool study returned C. Diff positive.   Precautions/Limitations fall precautions;oxygen therapy device and L/min  (3 liters baseline)   Cognitive Status Examination   Orientation orientation to person, place and time   Level of Consciousness alert   Follows Commands and Answers Questions 100% of the time;able to follow multistep instructions  "  Personal Safety and Judgment impulsive   Memory intact   Pain Assessment   Patient Currently in Pain Yes, see Vital Sign flowsheet  (chronic low back pain, HA)   Posture    Posture Forward head position;Protracted shoulders   Range of Motion (ROM)   ROM Comment WFL   Strength   Strength Comments functional weakness with SOB with activity   Bed Mobility   Bed Mobility Comments inde   Transfer Skills   Transfer Comments SBA   Gait   Gait Comments SBA, SOB with ambulation 3 liters, sats 94% following   Balance   Balance Comments no LOB currently but poor control with return to sit and past history of falls   General Therapy Interventions   Planned Therapy Interventions gait training;strengthening;transfer training;risk factor education;home program guidelines;progressive activity/exercise   Clinical Impression   Criteria for Skilled Therapeutic Intervention yes, treatment indicated   PT Diagnosis decreased functional activity tolerance   Influenced by the following impairments decreased ambulation, poor tolerance to safe transfer   Functional limitations due to impairments decreased gait, transfers, stairs   Clinical Presentation Stable/Uncomplicated   Clinical Presentation Rationale improving   Clinical Decision Making (Complexity) Low complexity   Therapy Frequency 3x/week   Predicted Duration of Therapy Intervention (days/wks) 1 week   Anticipated Discharge Disposition Home with Assist;Home with Outpatient Therapy  (continued OP pulm rehab)   Risk & Benefits of therapy have been explained Yes   Patient, Family & other staff in agreement with plan of care Yes   [facility] AM-PAC  \"6 Clicks\" V.2 Basic Mobility Inpatient Short Form   1. Turning from your back to your side while in a flat bed without using bedrails? 4 - None   2. Moving from lying on your back to sitting on the side of a flat bed without using bedrails? 4 - None   3. Moving to and from a bed to a chair (including a wheelchair)? 4 - None   4. " Standing up from a chair using your arms (e.g., wheelchair, or bedside chair)? 4 - None   5. To walk in hospital room? 3 - A Little   6. Climbing 3-5 steps with a railing? 3 - A Little   Basic Mobility Raw Score (Score out of 24.Lower scores equate to lower levels of function) 22   Total Evaluation Time   Total Evaluation Time (Minutes) 10

## 2019-06-30 NOTE — PLAN OF CARE
Ambulatory Status:  Pt up A1  VS:  vss  Pain:  denies pain  Resp: LS diminished, bipap on tonight  GI:  denies nausea.  High Cho diet.  BS active.  Passing flatus.  Last BM 6/29.  Tx:  vanco and prednisone  Labs:  , MD paged and additional 6 units of insulin was ordered and given  Disposition:  tbd     Patient unable to sleep tonight despite sleep medications given

## 2019-06-30 NOTE — PROGRESS NOTES
[facility]  Hospitalist Progress Note  Name: Tone Cooper    MRN: [id number]  Physician:  Edwin Lazo DO, [facility] (Text Page)    Summary of Stay: Tone Cooper is a 68 year old male who was admitted on 6/28/2019.  He presented with several days of loose stools, abdominal discomfort, increased SOB, and DONG.  Following admission his stool study returned C. Diff positive.    Assessment & Plan    C. Difficile colitis  Abnormal CT suggestive of bowel inflammation  Leukocytosis:  -  Suspect main problem is C. Difficile colitis.  I'm not convinced he has definite diverticulitis.  Antibiotics outside of C. Difficile tx stopped.  If he doesn't improve/worsens with just C. Difficile tx would then consider more coverage for diverticulitis.  Continue oral vancomycin QID.  Would recommend an at least 2 week course.    Acute COPD exacerbation superimposed on chronic COPD/respiratory failure (chronically on 3L)  ISIS:  -  Suspect strain with C. Diff and DONG contributed to mild COPD exacerbation.  -  O2 (on chronically)  -  Nebs  -  Changed solumedrol to oral prednisone 6/29, plan gradual wean.  Of note he has been on chronic prednisone so goal would be to wean back to prior dose.  -  Continue baseline pulm meds  -  Continue home BiPAP with sleep    DONG, suspect dehydration with C. Diff:  -  DONG resolving with fluids.  I will decrease rate to 50 ml/hr.  Holding lisinopril and lasix again today.  May resume lasix tomorrow.    Hyperglycemia, probable underlying DM:  -  Suspect steroid induced to a degree.  A1C I requested returned at 7 suggesting a chronic significant hyperglycemia.  Added sliding scale insulin here.  Given glu trend I also added low dose lantus this AM.  Home insulin/diabetic supplies and teaching may need to be considered at discharge.    Concern initially of GI bleed:  -  No overt bleeding beyond some streaking when he wipes his very irritated gluteal region.  Hgb did drop but that followed  substantial IVF.  Recheck hgb again this afternoon and in the AM.  I asked the RN to notify if there is any visible blood actually in his stool.  Given his PE risk I will cautiously continue his PTA anticoag for now.    Chronic Afib  History of PE:  V/Q low probability on admission  -  Continue diltiazem  -  Cautious eliquis give bleed concern    HTN: PTA lisinopril 10 mg daily, diltiazem 180 twice daily, and Lasix 40 mg daily.  DONG and intravascularly dry as above.  Also blood pressure soft.  -Hold lisinopril and Lasix  -Diltiazem with hold parameters for SBP less than 100    Elevated LFTs: Mild elevation in AST, ALT, bilirubin.  Has had previous cholecystectomy.  May be secondary to hypovolemia and diverticulitis.  -Trending down.  Will recheck one more time tomorrow.    Insomnia:  -  Trazodone and clonazepam    Chronic shoulder pain: Rarely takes oxycodone for this.  Also has lidocaine patch.  -Continue lidocaine patch and low-dose oxycodone as needed     Chronic RV systolic CHF: secondary to his advanced COPD.  PTA on Lasix.  Has chronic trace bilateral lower extremity edema.  BNP is elevated, but clinically does not have acute CHF.  -Holding Lasix as above, may resume tomorrow     History of tricuspid valve replacement     Lung nodules: Follows in lung nodule clinic with pulmonary         Diet: High Consistent CHO Diet    DVT Prophylaxis: Resuming   Rodney Catheter: not present  Code Status: Full Code      Disposition Plan   Expected discharge in around Tuesday timeframe to prior living arrangement once stooling improved, renal failure resolved, tolerating diet, and respiratory status improved.     Entered: Edwin Lazo 06/30/2019, 11:38 AM       Interval History   Mr. Cooper is feeling better though still having loose stools.  ABD pain has mostly resolved.  Tolerating po intake.  SOB improved closer to baseline.  He had no new complaints otherwise.    -Data reviewed today: I reviewed all new labs and  imaging reports over the last 24 hours. I personally reviewed no images or EKG's today.    Physical Exam   Temp: 96  F (35.6  C) Temp src: Oral BP: 105/66 Pulse: 56 Heart Rate: 56 Resp: 20 SpO2: 92 % O2 Device: Nasal cannula Oxygen Delivery: 3 LPM  Vitals:    06/28/19 1348 06/29/19 0705 06/30/19 0828   Weight: 88.9 kg (196 lb) 84.6 kg (186 lb 8 oz) 88.5 kg (195 lb)     Vital Signs with Ranges  Temp:  [96  F (35.6  C)-97.3  F (36.3  C)] 96  F (35.6  C)  Pulse:  [56] 56  Heart Rate:  [] 56  Resp:  [20] 20  BP: (105-132)/(66-84) 105/66  SpO2:  [92 %-95 %] 92 %  I/O last 3 completed shifts:  In: 3503 [P.O.:1560; I.V.:1943]  Out: 1500 [Urine:1500]    GEN:  Alert, oriented x 3, appears ill but comfortable, no overt distress.  HEENT:  Normocephalic/atraumatic, no scleral icterus, no nasal discharge, mouth moist.  CV:  Regular rate and rhythm, no loud murmur or rub.  LUNGS:  Decreased breath sounds, no wheezing.  Symmetric chest rise on inhalation noted.  ABD:  Active bowel sounds, soft, mild lower quadrant tenderness, mildly distended.  No guarding/rigidity.  EXT:  Trace edema.  No cyanosis.  No acute joint synovitis noted.  SKIN:  Dry to touch, no exanthems noted in the visualized areas.    Medications     sodium chloride 100 mL/hr at 06/29/19 1712       apixaban ANTICOAGULANT  5 mg Oral BID     clonazePAM  1 mg Oral At Bedtime     diltiazem ER COATED BEADS  180 mg Oral BID     Fluticasone-Umeclidin-Vilanterol  1 puff Inhalation Daily     insulin aspart  1-7 Units Subcutaneous TID AC     insulin aspart  1-5 Units Subcutaneous At Bedtime     insulin glargine  5 Units Subcutaneous QAM AC     ipratropium - albuterol 0.5 mg/2.5 mg/3 mL  3 mL Nebulization 4x daily     lidocaine   Transdermal Q8H     pantoprazole (PROTONIX) IV  40 mg Intravenous BID     predniSONE  60 mg Oral Daily     sodium chloride (PF)  3 mL Intracatheter Q8H     traZODone  50 mg Oral At Bedtime     vancomycin  250 mg Oral 4x Daily     Data     Recent  Labs   Lab 06/30/19  0656 06/29/19  0722 06/28/19  2210 06/28/19  1402   WBC 14.5* 10.0  --  18.5*   HGB 11.3* 13.2* 13.0* 15.5   HCT 35.4* 41.7  --  48.7   MCV 90 89  --  89    225  --  324       Recent Labs   Lab 06/30/19  0656 06/29/19  0722 06/28/19  1402    138 134   POTASSIUM 5.0 5.1 5.2   CHLORIDE 106 104 98   CO2 27 27 24   ANIONGAP 4 7 12   * 252* 146*   BUN 41* 51* 54*   CR 1.24 1.87* 2.41*   GFRESTIMATED 60* 36* 27*   GFRESTBLACK 69 42* 31*   WILBERT 7.9* 8.7 9.8   PROTTOTAL 4.9* 6.0* 7.1   ALBUMIN 2.1* 2.5* 2.9*   BILITOTAL 0.4 0.6 1.7*   ALKPHOS 55 66 84   AST 43 90* 104*   ALT 93* 120* 137*     Recent Labs   Lab 06/30/19  0712 06/30/19  0656 06/30/19  0400 06/30/19  0246 06/29/19  1704 06/29/19  0722 06/28/19  1402   GLC  --  215*  --   --   --  252* 146*   *  --  237* 298* 300*  --   --          No results found for this or any previous visit (from the past 24 hour(s)).

## 2019-06-30 NOTE — PLAN OF CARE
PT: PT bethany completed. He presented with several days of loose stools, abdominal discomfort, increased SOB, and DONG.  Following admission his stool study returned C. Diff positive. Pt lives with wife and adult child in split level home. Of note is working with OP pulmonary rehab.  Discharge Planner PT   Patient plan for discharge: Home  Current status: Pt was able to perform supine<>sit inde. Pt FWW adjusted for improved fit.  Pt able to perform sit to stand with SBA. Pt cued for 100 feet of ambulation sats 94% with this activity. Pt was cued for safety in BR and needing A with hygeine. Pt mildly impulsive with stand to sit, but urgently needing to have BM. Pt was agreeable to walking program 3-4/day.  Barriers to return to prior living situation: none, likely able to progress back to OP pulmonary rehab  Recommendations for discharge: home with OP Pulmonary rehab, family to assist with ADLs/IADLs PRN  Rationale for recommendations: Pt SBA with activity, fair tolerance to activity. Pt reporting agreement in walking program.        Entered by: Tootie Mesa 06/30/2019 2:36 PM

## 2019-06-30 NOTE — PLAN OF CARE
"Pt A&O, uses call light appropriately.  Up to bathroom with Ax1 & walker.  Encouraged further activity, pt declined - \"I'll do it tomorrow.\"  VSS, afebrile & sats maintained on baseline 3LPM.  Tele monitoring, afib CVR with occasional PVCs per tele tech.  C/o headache and minimal abdominal tenderness - \"better.\" Oxy x1 with some relief.  High CHO diet,  + 260. BS+, +soft BM x1 this shift.  Urine concentrated but adequate UOP. Continues IVF.'  Care coordinator following. Discharge plans TBD.  "

## 2019-06-30 NOTE — PLAN OF CARE
Ambulatory Status:  Pt up with assist of 1.  VS:  VSS, afebrile, remains on 3L o2 (baseline)  Pain:  Denies  Resp: LS dim  GI:  Denies nausea.  Fair appetite and on high CHO diet.  BS hyperactive.  Passing flatus.  Last BM this shift, some diarrhea, stool incontinence.  :  Urine orange, adequate amount out, sample sent  Skin:  red, sandra, bruise, scabs  Tx:  PO vanco, fluids  Labs:  WBC 14.5, creat 1.24, hgb 11.3/12.4, blood sugars elevated- novolog & lantus administered  Consults:  n/a  Disposition:  home in 2-3 days per dr bull

## 2019-07-01 LAB
ALBUMIN SERPL-MCNC: 2.6 G/DL (ref 3.4–5)
ALP SERPL-CCNC: 68 U/L (ref 40–150)
ALT SERPL W P-5'-P-CCNC: 110 U/L (ref 0–70)
ANION GAP SERPL CALCULATED.3IONS-SCNC: 4 MMOL/L (ref 3–14)
AST SERPL W P-5'-P-CCNC: 48 U/L (ref 0–45)
BILIRUB SERPL-MCNC: 0.4 MG/DL (ref 0.2–1.3)
BUN SERPL-MCNC: 37 MG/DL (ref 7–30)
CALCIUM SERPL-MCNC: 8.5 MG/DL (ref 8.5–10.1)
CHLORIDE SERPL-SCNC: 109 MMOL/L (ref 94–109)
CO2 SERPL-SCNC: 26 MMOL/L (ref 20–32)
CREAT SERPL-MCNC: 1.08 MG/DL (ref 0.66–1.25)
ERYTHROCYTE [DISTWIDTH] IN BLOOD BY AUTOMATED COUNT: 16.6 % (ref 10–15)
GFR SERPL CREATININE-BSD FRML MDRD: 70 ML/MIN/{1.73_M2}
GLUCOSE BLDC GLUCOMTR-MCNC: 165 MG/DL (ref 70–99)
GLUCOSE BLDC GLUCOMTR-MCNC: 185 MG/DL (ref 70–99)
GLUCOSE BLDC GLUCOMTR-MCNC: 186 MG/DL (ref 70–99)
GLUCOSE BLDC GLUCOMTR-MCNC: 223 MG/DL (ref 70–99)
GLUCOSE BLDC GLUCOMTR-MCNC: 249 MG/DL (ref 70–99)
GLUCOSE SERPL-MCNC: 166 MG/DL (ref 70–99)
HCT VFR BLD AUTO: 42.3 % (ref 40–53)
HGB BLD-MCNC: 13.1 G/DL (ref 13.3–17.7)
LACTATE BLD-SCNC: 1.9 MMOL/L (ref 0.7–2)
MCH RBC QN AUTO: 28.4 PG (ref 26.5–33)
MCHC RBC AUTO-ENTMCNC: 31 G/DL (ref 31.5–36.5)
MCV RBC AUTO: 92 FL (ref 78–100)
PLATELET # BLD AUTO: 244 10E9/L (ref 150–450)
POTASSIUM SERPL-SCNC: 5.3 MMOL/L (ref 3.4–5.3)
PROT SERPL-MCNC: 5.9 G/DL (ref 6.8–8.8)
RBC # BLD AUTO: 4.61 10E12/L (ref 4.4–5.9)
SODIUM SERPL-SCNC: 139 MMOL/L (ref 133–144)
WBC # BLD AUTO: 19.6 10E9/L (ref 4–11)

## 2019-07-01 PROCEDURE — 25000125 ZZHC RX 250: Performed by: INTERNAL MEDICINE

## 2019-07-01 PROCEDURE — C9113 INJ PANTOPRAZOLE SODIUM, VIA: HCPCS | Performed by: INTERNAL MEDICINE

## 2019-07-01 PROCEDURE — 40000274 ZZH STATISTIC RCP CONSULT EA 30 MIN

## 2019-07-01 PROCEDURE — 80048 BASIC METABOLIC PNL TOTAL CA: CPT | Performed by: INTERNAL MEDICINE

## 2019-07-01 PROCEDURE — 25000132 ZZH RX MED GY IP 250 OP 250 PS 637: Performed by: INTERNAL MEDICINE

## 2019-07-01 PROCEDURE — 25800030 ZZH RX IP 258 OP 636: Performed by: INTERNAL MEDICINE

## 2019-07-01 PROCEDURE — 85027 COMPLETE CBC AUTOMATED: CPT | Performed by: INTERNAL MEDICINE

## 2019-07-01 PROCEDURE — 94640 AIRWAY INHALATION TREATMENT: CPT | Mod: 76

## 2019-07-01 PROCEDURE — 99233 SBSQ HOSP IP/OBS HIGH 50: CPT | Performed by: INTERNAL MEDICINE

## 2019-07-01 PROCEDURE — 83605 ASSAY OF LACTIC ACID: CPT | Performed by: PHYSICIAN ASSISTANT

## 2019-07-01 PROCEDURE — 00000146 ZZHCL STATISTIC GLUCOSE BY METER IP

## 2019-07-01 PROCEDURE — 94640 AIRWAY INHALATION TREATMENT: CPT

## 2019-07-01 PROCEDURE — 36415 COLL VENOUS BLD VENIPUNCTURE: CPT | Performed by: INTERNAL MEDICINE

## 2019-07-01 PROCEDURE — 94660 CPAP INITIATION&MGMT: CPT

## 2019-07-01 PROCEDURE — 25000128 H RX IP 250 OP 636: Performed by: INTERNAL MEDICINE

## 2019-07-01 PROCEDURE — 25000131 ZZH RX MED GY IP 250 OP 636 PS 637: Performed by: INTERNAL MEDICINE

## 2019-07-01 PROCEDURE — 36415 COLL VENOUS BLD VENIPUNCTURE: CPT | Performed by: PHYSICIAN ASSISTANT

## 2019-07-01 PROCEDURE — 80053 COMPREHEN METABOLIC PANEL: CPT | Performed by: INTERNAL MEDICINE

## 2019-07-01 PROCEDURE — 12000000 ZZH R&B MED SURG/OB

## 2019-07-01 PROCEDURE — 40000275 ZZH STATISTIC RCP TIME EA 10 MIN

## 2019-07-01 PROCEDURE — 85018 HEMOGLOBIN: CPT | Performed by: INTERNAL MEDICINE

## 2019-07-01 RX ORDER — FUROSEMIDE 40 MG
40 TABLET ORAL DAILY
Status: DISCONTINUED | OUTPATIENT
Start: 2019-07-01 | End: 2019-07-03 | Stop reason: HOSPADM

## 2019-07-01 RX ORDER — IPRATROPIUM BROMIDE AND ALBUTEROL SULFATE 2.5; .5 MG/3ML; MG/3ML
3 SOLUTION RESPIRATORY (INHALATION) EVERY 4 HOURS PRN
Status: DISCONTINUED | OUTPATIENT
Start: 2019-07-01 | End: 2019-07-03 | Stop reason: HOSPADM

## 2019-07-01 RX ADMIN — OXYCODONE HYDROCHLORIDE 5 MG: 5 TABLET ORAL at 07:59

## 2019-07-01 RX ADMIN — INSULIN ASPART 3 UNITS: 100 INJECTION, SOLUTION INTRAVENOUS; SUBCUTANEOUS at 16:24

## 2019-07-01 RX ADMIN — CLONAZEPAM 1 MG: 0.5 TABLET ORAL at 22:20

## 2019-07-01 RX ADMIN — TRAZODONE HYDROCHLORIDE 50 MG: 50 TABLET ORAL at 22:20

## 2019-07-01 RX ADMIN — PANTOPRAZOLE SODIUM 40 MG: 40 INJECTION, POWDER, FOR SOLUTION INTRAVENOUS at 20:20

## 2019-07-01 RX ADMIN — OXYCODONE HYDROCHLORIDE 5 MG: 5 TABLET ORAL at 22:20

## 2019-07-01 RX ADMIN — VANCOMYCIN HYDROCHLORIDE 250 MG: KIT at 22:20

## 2019-07-01 RX ADMIN — IPRATROPIUM BROMIDE AND ALBUTEROL SULFATE 3 ML: .5; 3 SOLUTION RESPIRATORY (INHALATION) at 21:12

## 2019-07-01 RX ADMIN — APIXABAN 5 MG: 5 TABLET, FILM COATED ORAL at 17:55

## 2019-07-01 RX ADMIN — OXYCODONE HYDROCHLORIDE 5 MG: 5 TABLET ORAL at 16:26

## 2019-07-01 RX ADMIN — DILTIAZEM HYDROCHLORIDE 180 MG: 180 CAPSULE, COATED, EXTENDED RELEASE ORAL at 07:58

## 2019-07-01 RX ADMIN — VANCOMYCIN HYDROCHLORIDE 250 MG: KIT at 12:09

## 2019-07-01 RX ADMIN — VANCOMYCIN HYDROCHLORIDE 250 MG: KIT at 07:59

## 2019-07-01 RX ADMIN — VANCOMYCIN HYDROCHLORIDE 250 MG: KIT at 17:55

## 2019-07-01 RX ADMIN — IPRATROPIUM BROMIDE AND ALBUTEROL SULFATE 3 ML: .5; 3 SOLUTION RESPIRATORY (INHALATION) at 11:46

## 2019-07-01 RX ADMIN — INSULIN ASPART 1 UNITS: 100 INJECTION, SOLUTION INTRAVENOUS; SUBCUTANEOUS at 08:00

## 2019-07-01 RX ADMIN — FUROSEMIDE 40 MG: 40 TABLET ORAL at 15:10

## 2019-07-01 RX ADMIN — INSULIN ASPART 1 UNITS: 100 INJECTION, SOLUTION INTRAVENOUS; SUBCUTANEOUS at 12:13

## 2019-07-01 RX ADMIN — PREDNISONE 60 MG: 20 TABLET ORAL at 07:58

## 2019-07-01 RX ADMIN — SODIUM CHLORIDE: 9 INJECTION, SOLUTION INTRAVENOUS at 09:48

## 2019-07-01 RX ADMIN — IPRATROPIUM BROMIDE AND ALBUTEROL SULFATE 3 ML: .5; 3 SOLUTION RESPIRATORY (INHALATION) at 08:17

## 2019-07-01 RX ADMIN — APIXABAN 5 MG: 5 TABLET, FILM COATED ORAL at 07:59

## 2019-07-01 RX ADMIN — DILTIAZEM HYDROCHLORIDE 180 MG: 180 CAPSULE, COATED, EXTENDED RELEASE ORAL at 17:55

## 2019-07-01 RX ADMIN — OXYCODONE HYDROCHLORIDE 5 MG: 5 TABLET ORAL at 12:08

## 2019-07-01 RX ADMIN — PANTOPRAZOLE SODIUM 40 MG: 40 INJECTION, POWDER, FOR SOLUTION INTRAVENOUS at 07:57

## 2019-07-01 RX ADMIN — INSULIN GLARGINE 5 UNITS: 100 INJECTION, SOLUTION SUBCUTANEOUS at 07:59

## 2019-07-01 ASSESSMENT — ACTIVITIES OF DAILY LIVING (ADL)
ADLS_ACUITY_SCORE: 14

## 2019-07-01 NOTE — PROGRESS NOTES
Patient alert and oriented. Up with standby assistance.  O2 94% on 3L via NC.  Lung sounds dim/clear.  Bowel sounds active.  Pain managed with oxycodone.  Mod carb diet blood sugar 249.  Tele: A-fib controlled.  Will continue to monitor.

## 2019-07-01 NOTE — PLAN OF CARE
Patient afebrile, continues to have shortness of breath, continues on oxygen 3L which is his baseline, did have bldy nose today which resolved easily and humidity added, fluids stopped this afternoon per MD and lasix 40mg added orally, up to BR x2 for loose stools with some incontinence, buttocks red and blanchable-incontinence protocol in use, up with Ax1 with walker, gd appetite, continues on po prednisone and changed nebs to prn from scheduled, tele continues with afib controlled rate, continues on vanco po for positive c diff

## 2019-07-01 NOTE — PROGRESS NOTES
Infection Prevention:    Patient requires Enteric precautions because of Cdiff. Please contact Infection Prevention with any questions/concerns at *[phone].    Oriana Baldwin, ICP

## 2019-07-01 NOTE — PLAN OF CARE
"Pt A&O, up in room with SBA & walker.   VSS, afebrile & sats maintained on 3LPM.  Tele monitoring, afib CVR.  C/o severe back pain, oxycodone & lidocaine patch applied.  High CHO diet, fair appetite,  & 211, sliding scale insulin.  Continues PO vanco for c.diff, BM x1 this shift.  New wound noted to L knuckles - \"It was a sunburn and then the skin got ripped by tape.\"  PT and care coordinator following. Discharge plans TBD.  "

## 2019-07-01 NOTE — PLAN OF CARE
Physical Therapy - Attempted to see patient at scheduled time in AM.  Pt sleeping.  Pt woke up and asked to be seen later, reporting that he didn't sleep well last night.    Attempted to see patient a second time at 12:15.  Pt reports he was up and walking in the halls from end to end with nursing.  Pt states he does not want to walk again at this time.  Pt encouraged to amb with therapist on stairs.  Pt states he does not like our cement steps as they scare him.  Pt agreeable to walking up 4 wooden steps in [place] tomorrow.

## 2019-07-01 NOTE — PLAN OF CARE
Ambulatory Status:  Pt up A1 and walker  VS:  vss  Pain:  denies  Resp: LS diminished, bipap on for sleep  GI:  denies nausea.  High cho diet.  BS active.  Passing flatus.  Last BM 6/30.  Tx:  vanco/prednisone  Labs:  bg 223  Consults:  Pt  Disposition:  tbd

## 2019-07-01 NOTE — PROGRESS NOTES
"[facility]     Date: 6-28-19  Admission Dx: Acute Diverticulitis  Pulmonary History:  COPD  Home Nebulizer/MDI Use: Duoneb as needed, MDI  Home Oxygen: 3 lpm during day, 5 lpm with bipap at noc  Acuity Level (RCAT flow sheet): 4  Aerosol Therapy initiated: Duoneb QID       Pulmonary Hygiene initiated: C & DB      Volume Expansion initiated: Incentive Spirometry      Current Oxygen Requirements: 3 lpm day, 5 lpm with BiPap noc  Current SpO2: 98%    Re-evaluation date: 7-4-2019    Patient Education: proper use of MDI and nebulizer treatments.      See \"RT Assessments\" flow sheet for patient assessment scoring and Acuity Level Details.             "

## 2019-07-01 NOTE — PROGRESS NOTES
[facility]  Hospitalist Progress Note  Name: Tone Cooper    MRN: [id number]  Physician:  Edwin Lazo DO, ROXANN (Text Page)    Summary of Stay: Tone Cooper is a 68 year old male who was admitted on 6/28/2019.  He presented with several days of loose stools, abdominal discomfort, increased SOB, and DONG.  Following admission his stool study returned C. Diff positive.  His renal function has gradually improved with hydration.    Assessment & Plan    C. Difficile colitis  Abnormal CT suggestive of bowel inflammation  Leukocytosis:  -  Suspect main problem is C. Difficile colitis.  I'm not convinced he has definite diverticulitis.  Antibiotics outside of C. Difficile tx stopped at admission.  Since then his LLQ pain has mostly resolved..  If he wer to worsen with just C. Difficile tx would then consider more coverage for diverticulitis.  Continue oral vancomycin QID.  Would recommend an at least 2 week course.  -  WBC increase likely related to C. Diff + steroids.  He clinically is much better.    Acute COPD exacerbation superimposed on chronic COPD/respiratory failure (chronically on 3L)  ISIS:  -  Suspect strain with C. Diff and DONG contributed to mild COPD exacerbation.  -  O2 (on chronically)  -  Nebs  -  Changed solumedrol to oral prednisone 6/29, plan gradual wean.  Of note he has been on chronic prednisone so goal would be to wean back to prior dose.  Will decrease to 50 mg tomorrow.  -  Continue baseline pulm meds  -  Continue home BiPAP with sleep    DONG felt due to dehydration with C. Diff:  -  DONG resolved with fluids.  I will stop IVF as his stooling has considerably improved and he is taking good po intake.  I will resume half PTA dose lasix at 40 mg daily.  I will keep the ACEI on hold today.  It likely could be considered for restart tomorrow or the next day.    Hyperglycemia, probable underlying DM:  -  Suspect steroid induced to a degree.  A1C I requested returned at 7 suggesting a  chronic significant hyperglycemia.  Added sliding scale insulin here and lantus 5 units daily.  Glu still elevated but under 200 today.  I am decreasing prednisone slightly tomorrow.  I will not increase lantus further given glu trend and planned reduction of prednisone.    Concern initially of GI bleed:  -  No overt bleeding beyond some streaking when he wipes his very irritated gluteal region.  Hgb did drop but that followed substantial IVF.  It since has rebounded back to 13.  Continue current anticoagulation given clot/PE risk if off.    Chronic Afib  History of PE:  V/Q low probability on admission  -  Continue diltiazem  -  Cautious eliquis use to continue given bleed concern    HTN: PTA lisinopril 10 mg daily, diltiazem 180 twice daily, and Lasix 40 mg daily.  DONG and intravascularly dry as above.  Also blood pressure soft first couple days.  -Resuming partial dose PTA lasix.  Continue to hold ACEI today, consider resuming tomorrow.  BP trending up.  Continue diltiazem with hold parameters for SBP less than 100    Elevated LFTs: Mild elevation in AST, ALT, bilirubin.  Has had previous cholecystectomy.  May be secondary to hypovolemia and diverticulitis.  -Trending down.  Will recheck one more time tomorrow.    Insomnia:  -  Trazodone and clonazepam    Chronic shoulder pain: Rarely takes oxycodone for this.  Also has lidocaine patch.  -Continue lidocaine patch and low-dose oxycodone as needed     Chronic RV systolic CHF: secondary to his advanced COPD.  PTA on Lasix.  Has chronic trace bilateral lower extremity edema.  BNP is elevated, but clinically does not have acute CHF.  -Holding Lasix as above, may resume tomorrow     History of tricuspid valve replacement     Lung nodules: Follows in lung nodule clinic with pulmonary         Diet: High Consistent CHO Diet    DVT Prophylaxis: Resuming   Rodney Catheter: not present  Code Status: Full Code      Disposition Plan   Expected discharge in around Tuesday  timeframe to prior living arrangement once stooling improved, renal failure resolved, tolerating diet, and respiratory status improved.     Entered: Edwin UNRULY Clifton 07/01/2019, 2:24 PM       Interval History   Mr. Cooper is feeling better though still having loose stools.  ABD pain has mostly resolved.  Tolerating po intake.  SOB improved closer to baseline.  He had no new complaints otherwise.    -Data reviewed today: I reviewed all new labs and imaging reports over the last 24 hours. I personally reviewed no images or EKG's today.    Physical Exam   Temp: 96.7  F (35.9  C) Temp src: Oral BP: 145/89 Pulse: 85 Heart Rate: 69 Resp: 24 SpO2: 92 % O2 Device: Nasal cannula Oxygen Delivery: 3 LPM  Vitals:    06/29/19 0705 06/30/19 0828 07/01/19 0706   Weight: 84.6 kg (186 lb 8 oz) 88.5 kg (195 lb) 91.6 kg (202 lb)     Vital Signs with Ranges  Temp:  [96.1  F (35.6  C)-97.6  F (36.4  C)] 96.7  F (35.9  C)  Pulse:  [85] 85  Heart Rate:  [57-81] 69  Resp:  [16-24] 24  BP: (115-145)/(64-89) 145/89  FiO2 (%):  [35 %] 35 %  SpO2:  [92 %-97 %] 92 %  I/O last 3 completed shifts:  In: 854 [P.O.:480; I.V.:374]  Out: 600 [Urine:600]    GEN:  Alert, oriented x 3, appears less ill, appears comfortable, no overt distress.  HEENT:  Normocephalic/atraumatic, no scleral icterus, no nasal discharge, mouth moist.  CV:  Regular rate and rhythm, no loud murmur or rub.  LUNGS:  Decreased breath sounds, no wheezing.  Symmetric chest rise on inhalation noted.  ABD:  Active bowel sounds, soft, non-tender, mildly distended.  No guarding/rigidity.  EXT:  Trace edema.  No cyanosis.  No acute joint synovitis noted.  SKIN:  Dry to touch, no exanthems noted in the visualized areas.    Medications     sodium chloride 50 mL/hr at 07/01/19 1214       apixaban ANTICOAGULANT  5 mg Oral BID     clonazePAM  1 mg Oral At Bedtime     diltiazem ER COATED BEADS  180 mg Oral BID     Fluticasone-Umeclidin-Vilanterol  1 puff Inhalation Daily     insulin aspart   1-7 Units Subcutaneous TID AC     insulin aspart  1-5 Units Subcutaneous At Bedtime     insulin glargine  5 Units Subcutaneous QAM AC     lidocaine   Transdermal Q8H     pantoprazole (PROTONIX) IV  40 mg Intravenous BID     predniSONE  60 mg Oral Daily     sodium chloride (PF)  3 mL Intracatheter Q8H     traZODone  50 mg Oral At Bedtime     vancomycin  250 mg Oral 4x Daily     Data     Recent Labs   Lab 07/01/19  0817 06/30/19  1405 06/30/19  0656 06/29/19  0722   WBC 19.6*  --  14.5* 10.0   HGB 13.1* 12.4* 11.3* 13.2*   HCT 42.3  --  35.4* 41.7   MCV 92  --  90 89     --  193 225       Recent Labs   Lab 07/01/19  0817 06/30/19  0656 06/29/19  0722    137 138   POTASSIUM 5.3 5.0 5.1   CHLORIDE 109 106 104   CO2 26 27 27   ANIONGAP 4 4 7   * 215* 252*   BUN 37* 41* 51*   CR 1.08 1.24 1.87*   GFRESTIMATED 70 60* 36*   GFRESTBLACK 82 69 42*   WILBERT 8.5 7.9* 8.7   PROTTOTAL 5.9* 4.9* 6.0*   ALBUMIN 2.6* 2.1* 2.5*   BILITOTAL 0.4 0.4 0.6   ALKPHOS 68 55 66   AST 48* 43 90*   * 93* 120*     Recent Labs   Lab 07/01/19  1146 07/01/19  0817 07/01/19  0722 07/01/19  0204 06/30/19  2222 06/30/19  1832  06/30/19  0656  06/29/19  0722 06/28/19  1402   GLC  --  166*  --   --   --   --   --  215*  --  252* 146*   *  --  186* 223* 211* 241*   < >  --    < >  --   --     < > = values in this interval not displayed.         No results found for this or any previous visit (from the past 24 hour(s)).

## 2019-07-02 LAB
ANION GAP SERPL CALCULATED.3IONS-SCNC: 3 MMOL/L (ref 3–14)
BUN SERPL-MCNC: 34 MG/DL (ref 7–30)
CALCIUM SERPL-MCNC: 8.2 MG/DL (ref 8.5–10.1)
CHLORIDE SERPL-SCNC: 105 MMOL/L (ref 94–109)
CO2 SERPL-SCNC: 29 MMOL/L (ref 20–32)
CREAT SERPL-MCNC: 1.02 MG/DL (ref 0.66–1.25)
GFR SERPL CREATININE-BSD FRML MDRD: 75 ML/MIN/{1.73_M2}
GLUCOSE BLDC GLUCOMTR-MCNC: 122 MG/DL (ref 70–99)
GLUCOSE BLDC GLUCOMTR-MCNC: 166 MG/DL (ref 70–99)
GLUCOSE BLDC GLUCOMTR-MCNC: 182 MG/DL (ref 70–99)
GLUCOSE BLDC GLUCOMTR-MCNC: 218 MG/DL (ref 70–99)
GLUCOSE BLDC GLUCOMTR-MCNC: 259 MG/DL (ref 70–99)
GLUCOSE BLDC GLUCOMTR-MCNC: 260 MG/DL (ref 70–99)
GLUCOSE SERPL-MCNC: 139 MG/DL (ref 70–99)
LACTATE BLD-SCNC: 1.7 MMOL/L (ref 0.7–2)
POTASSIUM SERPL-SCNC: 5.4 MMOL/L (ref 3.4–5.3)
SODIUM SERPL-SCNC: 137 MMOL/L (ref 133–144)

## 2019-07-02 PROCEDURE — 25000132 ZZH RX MED GY IP 250 OP 250 PS 637: Performed by: INTERNAL MEDICINE

## 2019-07-02 PROCEDURE — 83605 ASSAY OF LACTIC ACID: CPT | Performed by: INTERNAL MEDICINE

## 2019-07-02 PROCEDURE — 12000000 ZZH R&B MED SURG/OB

## 2019-07-02 PROCEDURE — C9113 INJ PANTOPRAZOLE SODIUM, VIA: HCPCS | Performed by: INTERNAL MEDICINE

## 2019-07-02 PROCEDURE — 36415 COLL VENOUS BLD VENIPUNCTURE: CPT | Performed by: INTERNAL MEDICINE

## 2019-07-02 PROCEDURE — 94660 CPAP INITIATION&MGMT: CPT

## 2019-07-02 PROCEDURE — 25000131 ZZH RX MED GY IP 250 OP 636 PS 637: Performed by: INTERNAL MEDICINE

## 2019-07-02 PROCEDURE — 40000275 ZZH STATISTIC RCP TIME EA 10 MIN

## 2019-07-02 PROCEDURE — 99233 SBSQ HOSP IP/OBS HIGH 50: CPT | Performed by: INTERNAL MEDICINE

## 2019-07-02 PROCEDURE — 00000146 ZZHCL STATISTIC GLUCOSE BY METER IP

## 2019-07-02 PROCEDURE — 80048 BASIC METABOLIC PNL TOTAL CA: CPT | Performed by: INTERNAL MEDICINE

## 2019-07-02 PROCEDURE — 25000128 H RX IP 250 OP 636: Performed by: INTERNAL MEDICINE

## 2019-07-02 PROCEDURE — 94640 AIRWAY INHALATION TREATMENT: CPT

## 2019-07-02 RX ORDER — PANTOPRAZOLE SODIUM 40 MG/1
40 TABLET, DELAYED RELEASE ORAL 2 TIMES DAILY
Status: DISCONTINUED | OUTPATIENT
Start: 2019-07-02 | End: 2019-07-03 | Stop reason: HOSPADM

## 2019-07-02 RX ORDER — PREDNISONE 20 MG/1
40 TABLET ORAL DAILY
Status: DISCONTINUED | OUTPATIENT
Start: 2019-07-03 | End: 2019-07-03 | Stop reason: HOSPADM

## 2019-07-02 RX ADMIN — VANCOMYCIN HYDROCHLORIDE 250 MG: KIT at 19:33

## 2019-07-02 RX ADMIN — PREDNISONE 60 MG: 20 TABLET ORAL at 09:07

## 2019-07-02 RX ADMIN — PANTOPRAZOLE SODIUM 40 MG: 40 TABLET, DELAYED RELEASE ORAL at 21:57

## 2019-07-02 RX ADMIN — INSULIN ASPART 3 UNITS: 100 INJECTION, SOLUTION INTRAVENOUS; SUBCUTANEOUS at 18:10

## 2019-07-02 RX ADMIN — APIXABAN 5 MG: 5 TABLET, FILM COATED ORAL at 09:07

## 2019-07-02 RX ADMIN — TRAZODONE HYDROCHLORIDE 50 MG: 50 TABLET ORAL at 21:57

## 2019-07-02 RX ADMIN — CLONAZEPAM 1 MG: 0.5 TABLET ORAL at 21:57

## 2019-07-02 RX ADMIN — VANCOMYCIN HYDROCHLORIDE 250 MG: KIT at 09:07

## 2019-07-02 RX ADMIN — VANCOMYCIN HYDROCHLORIDE 250 MG: KIT at 13:02

## 2019-07-02 RX ADMIN — DILTIAZEM HYDROCHLORIDE 180 MG: 180 CAPSULE, COATED, EXTENDED RELEASE ORAL at 09:07

## 2019-07-02 RX ADMIN — VANCOMYCIN HYDROCHLORIDE 250 MG: KIT at 21:57

## 2019-07-02 RX ADMIN — OXYCODONE HYDROCHLORIDE 5 MG: 5 TABLET ORAL at 22:06

## 2019-07-02 RX ADMIN — OXYCODONE HYDROCHLORIDE 5 MG: 5 TABLET ORAL at 13:23

## 2019-07-02 RX ADMIN — INSULIN GLARGINE 5 UNITS: 100 INJECTION, SOLUTION SUBCUTANEOUS at 10:10

## 2019-07-02 RX ADMIN — DILTIAZEM HYDROCHLORIDE 180 MG: 180 CAPSULE, COATED, EXTENDED RELEASE ORAL at 18:56

## 2019-07-02 RX ADMIN — FUROSEMIDE 40 MG: 40 TABLET ORAL at 09:07

## 2019-07-02 RX ADMIN — APIXABAN 5 MG: 5 TABLET, FILM COATED ORAL at 18:56

## 2019-07-02 RX ADMIN — PANTOPRAZOLE SODIUM 40 MG: 40 INJECTION, POWDER, FOR SOLUTION INTRAVENOUS at 09:07

## 2019-07-02 RX ADMIN — INSULIN ASPART 1 UNITS: 100 INJECTION, SOLUTION INTRAVENOUS; SUBCUTANEOUS at 13:02

## 2019-07-02 ASSESSMENT — ACTIVITIES OF DAILY LIVING (ADL)
ADLS_ACUITY_SCORE: 14

## 2019-07-02 ASSESSMENT — MIFFLIN-ST. JEOR: SCORE: 1722.47

## 2019-07-02 NOTE — PLAN OF CARE
Pt sleeping well overnight, pain relieved after PRN oxy at HS, VSS, 1 stool this shift - formed, enteric isolation maintained, SBA with gait belt for mobility, PIV saline locked, continues PO vanco, BGs 185 / 166, PRN neb x1 and BiPAP on overnight, TELE active. Expected discharge TBD.

## 2019-07-02 NOTE — PROGRESS NOTES
RT- Pt placed on BIPAP 14/6, 35% for the night. Bs diminished. Spo2 93-96%. RT will continue to monitor.    Ceasar Bernstein, RT on 7/2/2019 at 5:11 AM

## 2019-07-02 NOTE — PROGRESS NOTES
[facility]  Hospitalist Progress Note  Name: Tone Cooper    MRN: [id number]  YOB: 1950    Age: 68 year old  Date of admission: 6/28/2019  Primary care provider: Ana Pratt      Reason for Stay (Diagnosis): C. difficile colitis/acute COPD exacerbation         Assessment and Plan:      Summary of Stay:  Tone Cooper is a 68 year old male who was admitted on 6/28/2019.  He presented with several days of loose stools, abdominal discomfort, increased SOB, and DONG.  Following admission his stool study returned C. Diff positive.  His renal function has gradually improved with hydration.     Assessment & Plan      C. Difficile colitis  Abnormal CT suggestive of bowel inflammation  Leukocytosis:  -  Suspect main problem is C. Difficile colitis.  Not convinced he has definite diverticulitis.  Antibiotics outside of C. Difficile tx stopped at admission.  Since then his LLQ pain has mostly resolved..  If he were to worsen with just C. Difficile tx would then consider more coverage for diverticulitis.  Continue oral vancomycin QID.  Would recommend an at least 2 week course.  -  WBC increase likely related to C. Diff + steroids.  He clinically is much better.     Acute COPD exacerbation superimposed on chronic COPD/respiratory failure (chronically on 3L)  ISIS:  -  Suspect strain with C. Diff and DONG contributed to mild COPD exacerbation.  -  O2 (on chronically)  -  Nebs  -  Changed solumedrol to oral prednisone 6/29, plan gradual wean.  Of note he has been on chronic prednisone so goal would be to wean back to prior dose.  Will decrease to 40 mg tomorrow.  -  Continue baseline pulm meds  -  Continue home BiPAP with sleep     DONG felt due to dehydration with C. Diff:  -  DONG resolved with fluids. Resume half PTA dose lasix at 40 mg daily.  I will keep the ACEI on hold today given mild hyperkalemia.  It likely could be considered for restart tomorrow.y.     Diabetes  mellitus, steroid-induced  -  A1C I returned at 7 suggesting a chronic significant hyperglycemia.    Continue sliding scale insulin here and increase Lantus to 8 units subcu every morning.  Continue to monitor blood glucose while going down on p.o. Prednisone  -  Discussed with nursing staff.  Will order patient a glucometer, supplies, and offer diabetes education     Concerns initially of GI bleed:  -  No overt bleeding beyond some streaking when he wipes his very irritated gluteal region.  Hgb did drop but that followed substantial IVF.  It since has rebounded back to 13.  Continue current anticoagulation given clot/PE risks.     Chronic Afib  History of PE:  V/Q low probability on admission  -  Continue diltiazem  -  Cautious eliquis use to continue given bleed concern     HTN: PTA lisinopril 10 mg daily, diltiazem 180 twice daily, and Lasix 40 mg daily.  DONG and intravascularly dry as above.  Also blood pressure soft first couple days.  -Resuming partial dose PTA lasix.  Continue to hold ACEI today, consider resuming tomorrow.  BP trending up.  Continue diltiazem with hold parameters for SBP less than 100     Elevated LFTs: Mild elevation in AST, ALT, bilirubin.  Has had previous cholecystectomy.  May be secondary to hypovolemia and diverticulitis.  -Trending down.  Will recheck one more time tomorrow.     Insomnia:  -  Trazodone and clonazepam     Chronic shoulder pain: Rarely takes oxycodone for this.  Also has lidocaine patch.  -Continue lidocaine patch and low-dose oxycodone as needed     Chronic RV systolic CHF: secondary to his advanced COPD.  Continue PTA on Lasix.  Has chronic trace bilateral lower extremity edema. BNP is elevated, but clinically does not have acute CHF.     History of tricuspid valve replacement     Lung nodules: Follows in lung nodule clinic with pulmonary           Diet: High Consistent CHO Diet    DVT Prophylaxis: Resuming apixaban  Rodney Catheter: not present  Code Status: Full Code  "    Estimated Disch Date / # of Days until Disch: In 1 to 2 days if respiratory status is stable    Time spent: Greater than 35 minutes      Interval History (Subjective):      Reports that bowel movements are less.  Breathing is stable and back on chronic 3 L/min         Physical Exam:      Vital signs:  Temp: 97  F (36.1  C) Temp src: Oral BP: 149/74   Heart Rate: 99 Resp: 22 SpO2: 94 % O2 Device: Nasal cannula Oxygen Delivery: 3 LPM Height: 180.3 cm (5' 11\") Weight: 92.5 kg (204 lb)  Estimated body mass index is 28.45 kg/m  as calculated from the following:    Height as of this encounter: 1.803 m (5' 11\").    Weight as of this encounter: 92.5 kg (204 lb).    I/O last 3 completed shifts:  In: 1700 [P.O.:1700]  Out: 1800 [Urine:1800]  Vitals:    06/28/19 1348 06/29/19 0705 06/30/19 0828 07/01/19 0706   Weight: 88.9 kg (196 lb) 84.6 kg (186 lb 8 oz) 88.5 kg (195 lb) 91.6 kg (202 lb)    07/02/19 1145   Weight: 92.5 kg (204 lb)       Constitutional: Awake, alert, cooperative, no apparent distress   Respiratory: Nl work of breathing.  Prolonged expiratory phase but no audible wheezes.   Cardiovascular: Regular rate and rhythm, normal S1 and S2, and no murmur noted   Abdomen: Normal bowel sounds, soft, non-distended, non-tender   Skin: No rashes, no cyanosis, dry to touch   Neuro: CN 2-12 intact, no localizing weakness   Extremities: No edema, normal range of motion   HEENT Normocephalic, atraumatic, normal nasal turbinates; oropharynx clear   Neck Supple; nl inspection; trachea midline; no thryomegaly   Psychiatric: A+O x3. Normal affect          Medications:      All current medications were reviewed with changes reflected in problem list.         Data:      All new lab and imaging data was reviewed.   Labs:  No results for input(s): CULT in the last 168 hours.  Recent Labs   Lab 07/01/19  0817 06/30/19  1405 06/30/19  0656 06/29/19  0722   WBC 19.6*  --  14.5* 10.0   HGB 13.1* 12.4* 11.3* 13.2*   HCT 42.3  --  35.4* " 41.7   MCV 92  --  90 89     --  193 225     Recent Labs   Lab 07/02/19  0737 07/01/19  0817 06/30/19  0656 06/29/19  0722    139 137 138   POTASSIUM 5.4* 5.3 5.0 5.1   CHLORIDE 105 109 106 104   CO2 29 26 27 27   ANIONGAP 3 4 4 7   * 166* 215* 252*   BUN 34* 37* 41* 51*   CR 1.02 1.08 1.24 1.87*   GFRESTIMATED 75 70 60* 36*   GFRESTBLACK 87 82 69 42*   WILBERT 8.2* 8.5 7.9* 8.7   PROTTOTAL  --  5.9* 4.9* 6.0*   ALBUMIN  --  2.6* 2.1* 2.5*   BILITOTAL  --  0.4 0.4 0.6   ALKPHOS  --  68 55 66   AST  --  48* 43 90*   ALT  --  110* 93* 120*      Imaging:   No results found for this or any previous visit (from the past 24 hour(s)).    Kacie Mariscal [phone]

## 2019-07-02 NOTE — PLAN OF CARE
PT- per conversation with Rn and staff will not see at this time. He wants to sleep as pain 9/10 current. They will call if/when he awakes this PM

## 2019-07-03 VITALS
TEMPERATURE: 96 F | DIASTOLIC BLOOD PRESSURE: 87 MMHG | SYSTOLIC BLOOD PRESSURE: 138 MMHG | RESPIRATION RATE: 20 BRPM | HEIGHT: 71 IN | BODY MASS INDEX: 28.34 KG/M2 | HEART RATE: 85 BPM | OXYGEN SATURATION: 94 % | WEIGHT: 202.4 LBS

## 2019-07-03 LAB
ANION GAP SERPL CALCULATED.3IONS-SCNC: 5 MMOL/L (ref 3–14)
BUN SERPL-MCNC: 35 MG/DL (ref 7–30)
CALCIUM SERPL-MCNC: 9 MG/DL (ref 8.5–10.1)
CHLORIDE SERPL-SCNC: 103 MMOL/L (ref 94–109)
CO2 SERPL-SCNC: 28 MMOL/L (ref 20–32)
CREAT SERPL-MCNC: 1.07 MG/DL (ref 0.66–1.25)
ERYTHROCYTE [DISTWIDTH] IN BLOOD BY AUTOMATED COUNT: 17.2 % (ref 10–15)
GFR SERPL CREATININE-BSD FRML MDRD: 71 ML/MIN/{1.73_M2}
GLUCOSE BLDC GLUCOMTR-MCNC: 137 MG/DL (ref 70–99)
GLUCOSE BLDC GLUCOMTR-MCNC: 163 MG/DL (ref 70–99)
GLUCOSE BLDC GLUCOMTR-MCNC: 209 MG/DL (ref 70–99)
GLUCOSE SERPL-MCNC: 154 MG/DL (ref 70–99)
HCT VFR BLD AUTO: 45.9 % (ref 40–53)
HGB BLD-MCNC: 14.3 G/DL (ref 13.3–17.7)
MCH RBC QN AUTO: 28.4 PG (ref 26.5–33)
MCHC RBC AUTO-ENTMCNC: 31.2 G/DL (ref 31.5–36.5)
MCV RBC AUTO: 91 FL (ref 78–100)
PLATELET # BLD AUTO: 247 10E9/L (ref 150–450)
POTASSIUM SERPL-SCNC: 4.9 MMOL/L (ref 3.4–5.3)
RBC # BLD AUTO: 5.04 10E12/L (ref 4.4–5.9)
SODIUM SERPL-SCNC: 136 MMOL/L (ref 133–144)
WBC # BLD AUTO: 21.7 10E9/L (ref 4–11)

## 2019-07-03 PROCEDURE — 94660 CPAP INITIATION&MGMT: CPT

## 2019-07-03 PROCEDURE — 85027 COMPLETE CBC AUTOMATED: CPT | Performed by: INTERNAL MEDICINE

## 2019-07-03 PROCEDURE — 94640 AIRWAY INHALATION TREATMENT: CPT

## 2019-07-03 PROCEDURE — 80048 BASIC METABOLIC PNL TOTAL CA: CPT | Performed by: INTERNAL MEDICINE

## 2019-07-03 PROCEDURE — 40000275 ZZH STATISTIC RCP TIME EA 10 MIN

## 2019-07-03 PROCEDURE — 25000131 ZZH RX MED GY IP 250 OP 636 PS 637: Performed by: INTERNAL MEDICINE

## 2019-07-03 PROCEDURE — 99239 HOSP IP/OBS DSCHRG MGMT >30: CPT | Performed by: INTERNAL MEDICINE

## 2019-07-03 PROCEDURE — 36415 COLL VENOUS BLD VENIPUNCTURE: CPT | Performed by: INTERNAL MEDICINE

## 2019-07-03 PROCEDURE — 00000146 ZZHCL STATISTIC GLUCOSE BY METER IP

## 2019-07-03 PROCEDURE — 25000132 ZZH RX MED GY IP 250 OP 250 PS 637: Performed by: INTERNAL MEDICINE

## 2019-07-03 RX ORDER — VANCOMYCIN HYDROCHLORIDE 250 MG/1
250 CAPSULE ORAL 4 TIMES DAILY
Qty: 36 CAPSULE | Refills: 0 | Status: SHIPPED | OUTPATIENT
Start: 2019-07-03 | End: 2019-10-09

## 2019-07-03 RX ADMIN — PREDNISONE 40 MG: 20 TABLET ORAL at 07:43

## 2019-07-03 RX ADMIN — APIXABAN 5 MG: 5 TABLET, FILM COATED ORAL at 07:43

## 2019-07-03 RX ADMIN — INSULIN GLARGINE 8 UNITS: 100 INJECTION, SOLUTION SUBCUTANEOUS at 07:47

## 2019-07-03 RX ADMIN — VANCOMYCIN HYDROCHLORIDE 250 MG: KIT at 07:42

## 2019-07-03 RX ADMIN — DILTIAZEM HYDROCHLORIDE 180 MG: 180 CAPSULE, COATED, EXTENDED RELEASE ORAL at 07:42

## 2019-07-03 RX ADMIN — FUROSEMIDE 40 MG: 40 TABLET ORAL at 07:43

## 2019-07-03 RX ADMIN — VANCOMYCIN HYDROCHLORIDE 250 MG: KIT at 12:44

## 2019-07-03 RX ADMIN — INSULIN ASPART 2 UNITS: 100 INJECTION, SOLUTION INTRAVENOUS; SUBCUTANEOUS at 12:39

## 2019-07-03 RX ADMIN — PANTOPRAZOLE SODIUM 40 MG: 40 TABLET, DELAYED RELEASE ORAL at 07:42

## 2019-07-03 ASSESSMENT — ACTIVITIES OF DAILY LIVING (ADL)
ADLS_ACUITY_SCORE: 14

## 2019-07-03 ASSESSMENT — MIFFLIN-ST. JEOR: SCORE: 1715.21

## 2019-07-03 NOTE — PROGRESS NOTES
RT-Pt placed on BIPAP 14/6, 35% for the noc. Pt seen resting comfortably. Spo2 99%. RT will continue to monitor.    Ceasar Bernstein, RT on 7/3/2019 at 4:34 AM

## 2019-07-03 NOTE — PLAN OF CARE
PT-attempted to see, p[planned discharge at 1500 today, only seen for PT bethany on 6.30.19 has declined all other attempts. Goals not met as dod not wish to so stairs that were concrete and only would do [facility] then declined all other attempts. Returning home with no DME issued for this admission

## 2019-07-03 NOTE — PLAN OF CARE
Pt up to bathroom x2 for small formed stool, SBA for mobility, resting comfortably in bed with 3LPM NC and BiPAP on at night, sleeping well and using urinal at bedside, denies pain after HS oxycodone, VSS, TELE in place, continues PO vanco QID, BGs  218 and 163 and pt gave self 1 unit of novolog at bedtime-required cues through process but able to demonstrate ability. Enteric isolation maintained. Expected discharge today or tomorrow.

## 2019-07-03 NOTE — PLAN OF CARE
Pt admitted for C.diff colitis. Pt discharging home on PO vanco. Stools slowed to 3 or so per day that are soft and non-bloody. Good appetite, tolerating diet. AVS reviewed with patient and spouse. Pt given lantus at discharge. Pt demonstrated insulin administration earlier in day with novolog pen which writer educated that the same process is to be followed for the lantus. Diabetic education booklet provided and reviewed with patient and spouse. Glucometer set up and sent home with patient. Pt able to independently perform glucose check. Filled scripts for lantus, insulin needles, vanco, and metformin provided. Education handouts on all new meds provided as well. All questions answered. Pt to  his one week supply of elequis at his [facility] in [place]. Pt to address financial barriers of elequis with his primary MD at follow up. Pt had his portable oxygen concentrator in use at time of discharge. Pt escorted to his personal vehicle via wheelchair and nursing staff. Pt driving himself home, MD was aware and there were no barriers to plan of discharge. Belongings from security and meds locked in pharmacy returned to patient prior to discharge. All belongings packed and sent with patient at time of discharge.

## 2019-07-03 NOTE — PHARMACY - DISCHARGE MEDICATION RECONCILIATION AND EDUCATION
Tone Cooper     1950      male    [id number]                                [phone]    Allergy: Amlodipine and Flecainide    RX: Discharge Medication Consult by Pharmacist  EDUCATION:   Discharge Medication List   Tone Cooper   Home Medication Instructions FLORENTINO:[id number]    Printed on:07/03/19 3853   Medication Information                      acetaminophen (TYLENOL) 325 MG tablet  Take 325-650 mg by mouth every 6 hours as needed for mild pain             apixaban ANTICOAGULANT (ELIQUIS) 5 MG tablet  Take 1 tablet (5 mg) by mouth 2 times daily             clonazePAM (KLONOPIN) 1 MG tablet  Take 1 tablet (1 mg) by mouth nightly as needed for anxiety             diltiazem ER COATED BEADS (CARDIZEM CD/CARTIA XT) 180 MG 24 hr capsule  Take 1 capsule (180 mg) by mouth 2 times daily Hold for SBP < 110 or HR < 60             Fluticasone-Umeclidin-Vilanterol (TRELEGY ELLIPTA) 100-62.5-25 MCG/INH oral inhaler  Inhale 1 puff into the lungs daily             furosemide (LASIX) 40 MG tablet  Take 1 tablet (40 mg) by mouth 2 times daily             insulin glargine (LANTUS SOLOSTAR PEN) 100 UNIT/ML pen  Inject 8 Units Subcutaneous every morning             Lidocaine (LIDOCARE) 4 % Patch  Place 2 patches onto the skin daily as needed for moderate pain             lisinopril (PRINIVIL/ZESTRIL) 10 MG tablet  Take 10 mg by mouth daily              metFORMIN (GLUCOPHAGE) 500 MG tablet  Take 0.5 tablets (250 mg) by mouth daily (with breakfast)             Multiple Vitamins-Minerals (MULTIVITAMIN ADULTS PO)  Take 1 tablet by mouth daily             order for DME  Oxygen for nocturnal use. 1 LPM via nasal cannula  Testing done at home in chronic stable state.  Condition is COPD.  Patient does not have Obstructive Sleep Apnea.  Overnight oximetry revealed 30.3 minutes of hypoxia (<= 88%).  Duration: Lifetime (99 months).             order for DME  Equipment being ordered: Nebulizer             oxyCODONE IR (ROXICODONE) 10  MG tablet  Take 10 mg by mouth every 8 hours as needed for severe pain             predniSONE (DELTASONE) 10 MG tablet  Take 15 mg by mouth 3 times daily              Respiratory Therapy Supplies (AEROBIKA) KEELY  1 applicator 4 times daily             traZODone (DESYREL) 50 MG tablet  Take 50 mg by mouth At Bedtime              vancomycin (VANCOCIN) 250 MG capsule  Take 1 capsule (250 mg) by mouth 4 times daily for 9 days                 Patient was informed to STOP taking the following HOME medications:   none    Patient was informed to start taking the following NEW medications:   Insulin glargine, metformin, oral vancomycin     Patient was educated on the following for each discharge medication:  Rationale for therapy  Duration of treatment  Dosing and or monitoring drug levels  Common side effects  Importance of compliance  Drug/food interactions  Missed doses  Self monitoring parameters    OUTCOMES:  Patient verbalized understanding. Patient states that he received insulin education this morning and is aware that Lantus dose will need to be decreased along with prednisone taper. Per Ester (RN), insulin supplies were ordered per verbal order from Dr. Mariscal.   Left written materials and instructions (Clinical notes from [facility]).  IMPORTANT FOLLOW UP NOTES:   DM education at [facility] -Dr Redd-Friday July 5th at 10:00 AM.

## 2019-07-03 NOTE — DISCHARGE SUMMARY
Discharge Summary  [facility]    Tone Cooper MRN# [id number]   YOB: 1950 Age: 68 year old     Date of Admission:  6/28/2019  Date of Discharge:  7/3/2019  4:16 PM  Admitting Physician:  Felipe Aquino MD  Discharge Physician: Kacie Mariscal MD  Discharging Service: Hospitalist     Primary Provider: Ana Beckman  Primary Care Physician Phone Number: [phone]         Discharge Diagnoses/Problem Oriented Hospital Course (Providers):    Tone Cooper was admitted on 6/28/2019 by Felipe Aquino MD and I would refer you to their history and physical.  The following problems were addressed during his hospitalization:     I saw and evaluated the patient   Time spent: Greater than 30 minutes    Discharge diagnoses:  1. C. difficile colitis  2. Acute COPD exacerbation  3. Chronic respiratory failure with baseline FiO2 requirements of 3 L/min  4. Steroid-induced diabetes mellitus, new diagnosis this admission  5. Acute kidney injury secondary to prerenal acidemia with diarrheal losses  6. Chronic atrial fibrillation  7. History of pulmonary embolus: Chronically on anticoagulation for this and problem #6  8. Hypertension  9. Chronic insomnia  10. Chronic shoulder pain; prior to admission diagnoses  11. History of chronic right-sided congestive heart failure  12. History of tricuspid valve replacement  13. Lung nodules      Hospital course:    C. Difficile colitis/Abnormal CT suggestive of bowel inflammation/Leukocytosis:  Tone Cooper is a 68 year old male who was admitted on 6/28/2019.  He presented with several days of loose stools, abdominal discomfort, increased SOB, and DONG.  Following admission his stool study returned C. Diff positive.. Suspect main problem is C. Difficile colitis.  Not convinced he has definite diverticulitis.  Antibiotics outside of C. Difficile tx stopped at admission.  Since then his LLQ pain has mostly resolved.. We did consider that if  he were to worsen with just C. Difficile tx would then consider more coverage for diverticulitis.  This improved and his stools were more formed prior to discharge.  Continue oral vancomycin QID with plan treatment of 2 weeks.      Acute COPD exacerbation superimposed on chronic COPD/respiratory failure (chronically on 3L)  ISIS:  Suspect strain with C. Diff and DONG contributed to mild COPD exacerbation.  Patient was placed on IV stress dose steroids and tapered to 40 mg of p.o. prednisone.  According to patient, patient is on redness on 15 mg which she takes 3 times a day and follows with a pulmonologist.  Patient was transitioned back to his chronic dosing at discharge.  His baseline inhalers were continued. Continue home BiPAP with sleep     DONG felt due to dehydration with C. Diff:  DONG resolved with fluids. Patient was resumed back on his chronic Lasix at discharge.  His lisinopril was also held secondary to DONG and some mild hyperkalemia but could be resumed at discharge and was instructed to have a BMP performed with primary MD follow-up.     Diabetes mellitus, steroid-induced, new diagnosis this admission  Patient was notably hyperglycemic and hemoglobin A1C I returned at 7 suggesting a chronic significant hyperglycemia.   Patient was placed on a sliding scale insulin here and Lantus 8 units subcu every morning.   There was a point where his blood glucose was 300 and he understands that this is steroid-induced and would at least need continued insulin treatment.  At discharge, patient will continue on Lantus 8 units subcu every morning along with metformin 250 mg p.o. daily and increase as tolerated.  He was educated that the metformin may cause some diarrheal issues that is not related to his C. difficile colitis.  He was provided diabetes education and was given a glucometer this admission and strongly advised that he follows up with his primary MD for further diabetes education and management.     Initial  concerns initially of GI bleed:  There was no overt bleeding beyond some streaking when he wipes his very irritated gluteal region.  Hgb did drop but that followed substantial IVF.  It since has rebounded back to the normal range of 14.3 prior to discharge. Continue current anticoagulation given clot/PE risks.     Chronic Afib  History of PE:  V/Q low probability on admission.  Continue diltiazem.  May also resume Eliquis at discharge.  Patient was given a prescription for 1 week due to financial constraints and will work with his primary MD on continuing course of chronic anticoagulation.  Heart rate was otherwise controlled this admission.     HTN: Resume chronic medications as outlined above.     Elevated LFTs: Mild elevation in AST, ALT, bilirubin.  Has had previous cholecystectomy.  May be secondary to hypovolemia and diverticulitis.  Outpatient follow-up with repeat LFTs.     Insomnia:  -  Trazodone and clonazepam     Chronic shoulder pain: Rarely takes oxycodone for this.  Also has lidocaine patch. Continue lidocaine patch and low-dose oxycodone as needed     Chronic RV systolic CHF: secondary to his advanced COPD.  Continue PTA on Lasix.  Has chronic trace bilateral lower extremity edema. BNP is elevated, but clinically does not have acute CHF.     History of tricuspid valve replacement     Lung nodules: Follows in lung nodule clinic with pulmonary               Pending Results:        Unresulted Labs Ordered in the Past 30 Days of this Admission     No orders found from 4/29/2019 to 6/29/2019.               Discharge Instructions and Follow-Up:      Follow-up Appointments     Follow-up and recommended labs and tests       Follow up with primary care provider, Ana Pratt, within 7 days   for hospital follow- up.  The following labs/tests are recommended: bmp.                    Discharge Disposition:      Discharged to home          Discharge Medications:        Discharge Medication List as of  7/3/2019  3:05 PM      START taking these medications    Details   insulin glargine (LANTUS SOLOSTAR PEN) 100 UNIT/ML pen Inject 8 Units Subcutaneous every morning, Disp-2.4 mL, R-0, E-Prescribe      metFORMIN (GLUCOPHAGE) 500 MG tablet Take 0.5 tablets (250 mg) by mouth daily (with breakfast), Disp-15 tablet, R-0, E-Prescribe      vancomycin (VANCOCIN) 250 MG capsule Take 1 capsule (250 mg) by mouth 4 times daily for 9 days, Disp-36 capsule, R-0, E-Prescribe         CONTINUE these medications which have NOT CHANGED    Details   acetaminophen (TYLENOL) 325 MG tablet Take 325-650 mg by mouth every 6 hours as needed for mild pain, Historical      clonazePAM (KLONOPIN) 1 MG tablet Take 1 tablet (1 mg) by mouth nightly as needed for anxiety, No Print Out      diltiazem ER COATED BEADS (CARDIZEM CD/CARTIA XT) 180 MG 24 hr capsule Take 1 capsule (180 mg) by mouth 2 times daily Hold for SBP < 110 or HR < 60, Transitional      Fluticasone-Umeclidin-Vilanterol (TRELEGY ELLIPTA) 100-62.5-25 MCG/INH oral inhaler Inhale 1 puff into the lungs daily, Disp-1 Inhaler, R-2, Local Print      furosemide (LASIX) 40 MG tablet Take 1 tablet (40 mg) by mouth 2 times daily, No Print Out      Lidocaine (LIDOCARE) 4 % Patch Place 2 patches onto the skin daily as needed for moderate painHistorical      lisinopril (PRINIVIL/ZESTRIL) 10 MG tablet Take 10 mg by mouth daily , Historical      Multiple Vitamins-Minerals (MULTIVITAMIN ADULTS PO) Take 1 tablet by mouth daily, Historical      !! order for DME Equipment being ordered: NebulizerDisp-1 each, R-0, Local Print      !! order for DME Oxygen for nocturnal use. 1 LPM via nasal cannula  Testing done at home in chronic stable state.  Condition is COPD.  Patient does not have Obstructive Sleep Apnea.  Overnight oximetry revealed 30.3 minutes of hypoxia (<= 88%).  Duration: Lifetime (99 mo nths).Disp-1 each, R-99, Local Print      oxyCODONE IR (ROXICODONE) 10 MG tablet Take 10 mg by mouth every 8  hours as needed for severe pain, Historical      predniSONE (DELTASONE) 10 MG tablet Take 15 mg by mouth 3 times daily , Historical      Respiratory Therapy Supplies (AEROBIKA) KEELY 1 applicator 4 times daily, Disp-1 each, R-0, Historical      traZODone (DESYREL) 50 MG tablet Take 50 mg by mouth At Bedtime , Historical      apixaban ANTICOAGULANT (ELIQUIS) 5 MG tablet Take 1 tablet (5 mg) by mouth 2 times daily, Disp-60 tablet, R-0, Historical       !! - Potential duplicate medications found. Please discuss with provider.               Allergies:         Allergies   Allergen Reactions     Amlodipine Swelling     Flecainide Palpitations            Consultations This Hospital Stay:      No consultations were requested during this admission           Discharge Orders for Skilled Facility (from Discharge Orders):        After Care Instructions     Activity      Your activity upon discharge: activity as tolerated         Diet      Follow this diet upon discharge: Orders Placed This Encounter      Regular Diet Adult                    Rehab orders for Skilled Facility (from Discharge Orders):                Image Results From This Hospital Stay (For Non-EPIC Providers):        Results for orders placed or performed during the hospital encounter of 06/28/19   XR Chest 2 Views    Narrative    CHEST TWO VIEWS   6/28/2019 3:34 PM    HISTORY: Cough    COMPARISON: 5/26/2019      Impression    IMPRESSION: Again seen are surgical changes of a median sternotomy.  The lungs are clear. No other abnormality is seen. I see no definite  change since the previous examination.     HAROLDO LIVE MD   Lung vent and perf (NM)    Narrative    NUCLEAR MEDICINE LUNG VENTILATION AND PERFUSION 6/28/2019 4:51 PM     HISTORY: Shortness of breath.    COMPARISON: Chest x-ray 6/20/2019.    TECHNIQUE: Tc-99m MAA injected intravenously for evaluation of  pulmonary perfusion. Tc-99m DTPA inhaled for the ventilation phase.    DOSE: 3.3 mCi  99mTc-MAA,  59.0 mCi 99mTc-DTPA.    FINDINGS: There is mildly heterogeneous perfusion bilaterally. Few  small perfusion defects which are all matched on ventilation imaging.  No unmatched perfusion defects.      Impression    IMPRESSION: Low probability for pulmonary embolism.    TOM JIMÉNEZ MD   CT Abdomen Pelvis w/o Contrast    Narrative    CT ABDOMEN/PELVIS WITHOUT CONTRAST June 28, 2019 3:43 PM     HISTORY: Left lower quadrant pain. History of diverticulitis.    TECHNIQUE: CT of the abdomen and pelvis was performed without  intravenous contrast. Radiation dose for this scan was reduced using  automated exposure control, adjustment of the mA and/or kV according  to patient size, or iterative reconstruction technique.    COMPARISON: CT of the abdomen/pelvis dated 3/4/2014.    FINDINGS: There are scattered diverticuli in the colon. There is a  question of mild inflammatory changes adjacent to the sigmoid colon in  an area of diverticuli at its junction with the descending colon.  There also appears to be mild inflammatory fat stranding in the  midportion of the descending colon in an area of diverticuli. The  remaining bowel appears grossly unremarkable.    There are cysts in the kidneys. Remaining solid organs appear grossly  unremarkable. Patient is status post a cholecystectomy. There are  emphysematous changes at the lung bases.      Impression    IMPRESSION: CT findings suggest acute diverticulitis involving the mid  descending colon and possibly the sigmoid colon at its junction with  the descending colon.    VY MOLINA MD

## 2019-07-03 NOTE — PLAN OF CARE
[id number]:  VS: Stable but HR variable during shift from 60-100s, irregular. Remains on telemetry.   GI: BS active, passing flatus. BMx2 this shift; soft/non-bloody. Good appetite. Blood sugar monitoring with insulin as ordered.   LS: Diminished. Remains on oxygen per baseline. Very dyspneic with mild exertion, refused ambulating in halls with PT and nursing today.   : Voiding without issue.   Neuro: Alert and oriented x4. CMS intact.   Mobility: Up with SBA, unsteady gait.   Disposition: Likely home tomorrow per MD.

## 2019-07-03 NOTE — CONSULTS
Pt is agreeable to DM education at his primary care clinic [facility] and for f/u with Dr. Pratt.  Pt will need a referral for DM education so he will need to discuss this at his f/u on Friday.      [facility] -Dr Redd-Friday July 5th at 10:00 AM   Family Practice   Address: [id number] [place] [id number]   Phone: [phone]    Kajal MEIER CTS [id number]

## 2019-07-03 NOTE — TELEPHONE ENCOUNTER
"SOT LUNG INTAKE    July 3, 2019    Tone Cooper  [id number]  Referring Provider: Octavia Acevedo  Primary Provider: Ana Deng  Specialist: Cardiologist Sussy Britt (Electrophysiology - cardiology)  Diagnosis:COPD  Source/Facility: [facility]  Diagnosis: COPD  68 year old  Height: 5'11\"  Weight: 202 lb  28.2 BMI:   (baseline weight 185 (BMI 25.8) Patient reported he has had a significant weight gain due to prednisone/water weight.     Social History:    Social History     Socioeconomic History     Marital status:      Spouse name: Not on file     Number of children: Not on file     Years of education: Not on file     Highest education level: Not on file   Occupational History     Not on file   Social Needs     Financial resource strain: Not on file     Food insecurity:     Worry: Not on file     Inability: Not on file     Transportation needs:     Medical: Not on file     Non-medical: Not on file   Tobacco Use     Smoking status: Former Smoker     Packs/day: 1.50     Years: 41.00     Pack years: 61.50     Types: Paan with tobacco, gutka, zarda, khaini     Start date: 1977     Last attempt to quit: 2008     Years since quittin.5     Smokeless tobacco: Never Used   Substance and Sexual Activity     Alcohol use: Yes     Alcohol/week: 0.0 oz     Comment: 3-6 per month     Drug use: No     Sexual activity: Never     Partners: Female     Birth control/protection: None   Lifestyle     Physical activity:     Days per week: Not on file     Minutes per session: Not on file     Stress: Not on file   Relationships     Social connections:     Talks on phone: Not on file     Gets together: Not on file     Attends Islam service: Not on file     Active member of club or organization: Not on file     Attends meetings of clubs or organizations: Not on file     Relationship status: Not on file     Intimate partner violence:     Fear of current or ex partner: Not on file     Emotionally abused: Not on " file     Physically abused: Not on file     Forced sexual activity: Not on file   Other Topics Concern      Service Not Asked     Blood Transfusions Not Asked     Caffeine Concern No     Comment: 1 a day      Occupational Exposure Not Asked     Hobby Hazards Not Asked     Sleep Concern Yes     Comment: nocturia     Stress Concern Not Asked     Weight Concern No     Special Diet No     Back Care Not Asked     Exercise No     Bike Helmet Yes     Seat Belt Yes     Self-Exams Not Asked     Parent/sibling w/ CABG, MI or angioplasty before 65F 55M? No   Social History Narrative    Works in [employer] ([employer] work)    Is an  for the [employer] in [place], in between sets and entertain     Smoking History: 61.5 pack years, started smoking at age 18, 1 ppd- 1.5ppd   Quit Date: 2008  Tobacco Use: None   Quit date: n/a  Street Drug Use: Marijuana   Quit Date: Has not smoked since 20's  ETOH Use: Occasional, sometimes 1-2 beers, 1-2 times per month   Quit Date: n/a  Second-hand Smoke exposure: No, lives in a smoke free house    Family History:    Family History   Problem Relation Age of Onset     Prostate Cancer Father      Family History Negative Mother      Emphysema Mother      Hypertension Mother      Cerebrovascular Disease Mother        Past Medical History  Pulmonary Manifestations date: 2006   Details: Started to have difficulty mowing the lawn went to [facility], was told he had tricuspid needed to be repaired.     Diabetes: Steroid induced hyperglycemia during hospitalization but does not monitor outside hospital  Coronary Artery Disease: No known history  Hypertension: Yes, treated with lisinopril  Previous transfusion(s): No known   History of Cancer: Basel cell - nose   GERD: No  Bleeding Disorders: no    Other Past Medical History: Repair of his tricuspid valve 2008    Past Medical History:   Diagnosis Date     Atrial flutter (H)      Cancer (H)     basal cell-nose     COPD (chronic  obstructive pulmonary disease) (H)      Diverticulitis      Hepatitis 2011    Hepatitis C     Hypertension      Persistent atrial fibrillation (H) 4/28/09    ablation 7/1/2015     Premature beats      PVC (premature ventricular contraction)     s/p ablation 12/5/2017     Tricuspid valve disorder     Dr Aj, Hx of valve repair      Ventricular tachycardia (H)     nonsustained     Surgical History   Lung Biopsy: No  Pneumothorax: No  Chest Surgery: 2008, Tricuspid Surgery    Past Surgical History:   Procedure Laterality Date     ABDOMEN SURGERY      hernia repair right     APPENDECTOMY  as a child     CARDIAC SURGERY      multiple ablations-[facility]     CARDIOVERSION  06/03/2009    successful for afib     CARDIOVERSION  4/20/15    successful for afib     CARDIOVERSION  5/28/15     COLONOSCOPY  01/2018    [facility]     GENITOURINARY SURGERY      undescended testicle     H ABLATION ATRIAL FLUTTER       H ABLATION FOCAL AFIB  7/1/15    Left atrial linear ablation and pulmonary vein antrum ablation     H ABLATION PVC  12/5/16     INCISION AND DRAINAGE LOWER EXTREMITY, COMBINED Right 11/10/2016    Procedure: COMBINED INCISION AND DRAINAGE LOWER EXTREMITY;  Surgeon: Roger Pacheco MD;  Location: [facility] OR     LAPAROSCOPIC CHOLECYSTECTOMY  1/6/2012    Procedure:LAPAROSCOPIC CHOLECYSTECTOMY; LAPAROSCOPIC CHOLECYSTECTOMY ; Surgeon:ROGER PACHECO; Location:[facility] OR     ORTHOPEDIC SURGERY  2004    Left hip replacement     REPAIR VALVE TRICUSPID  9/8/08    conversion to a bileaflet valve and application of a 30 mm Sanderson ring     SMALL INTESTINE SURGERY      had bowel obstruction age 9     THORACIC SURGERY       VALVE REPLACEMENT      tricuspid     Mental Health History  Depression: No, feels well adjusted, gets along with his wife/daughter, blended family 4 children, nine grandchildren,   Anxiety: No  Other: No    Medications  No current facility-administered medications for this visit.      No current  outpatient medications on file.     Facility-Administered Medications Ordered in Other Visits   Medication     apixaban ANTICOAGULANT (ELIQUIS) tablet 5 mg     benzocaine-menthol (CHLORASEPTIC) 6-10 MG lozenge 1 lozenge     clonazePAM (klonoPIN) tablet 1 mg     glucose gel 15-30 g    Or     dextrose 50 % injection 25-50 mL    Or     glucagon injection 1 mg     diltiazem ER COATED BEADS (CARDIZEM CD/CARTIA XT) 24 hr capsule 180 mg     Fluticasone-Umeclidin-Vilanterol (TRELEGY ELLIPTA) 100-62.5-25 MCG/INH oral inhaler 1 puff     furosemide (LASIX) tablet 40 mg     insulin aspart (NovoLOG) inj (RAPID ACTING)     insulin aspart (NovoLOG) inj (RAPID ACTING)     insulin glargine (LANTUS) injection 8 Units     ipratropium - albuterol 0.5 mg/2.5 mg/3 mL (DUONEB) neb solution 3 mL     Lidocaine (LIDOCARE) 4 % Patch 2 patch     lidocaine (LMX4) cream     lidocaine 1 % 0.1-1 mL     lidocaine patch in PLACE     lidocaine patch REMOVAL     melatonin tablet 1 mg     naloxone (NARCAN) injection 0.1-0.4 mg     ondansetron (ZOFRAN-ODT) ODT tab 4 mg    Or     ondansetron (ZOFRAN) injection 4 mg     oxyCODONE (ROXICODONE) tablet 5 mg     pantoprazole (PROTONIX) EC tablet 40 mg     predniSONE (DELTASONE) tablet 40 mg     sodium chloride (PF) 0.9% PF flush 3 mL     sodium chloride (PF) 0.9% PF flush 3 mL     traZODone (DESYREL) tablet 50 mg     vancomycin (FIRVANQ) oral solution 250 mg     Blood thinner hx: Eliquis for a fib  Prednisone hx: 1.5 mg TID possibly since cardioablastion   Antibiotic hx: No, currently on abx for c-diff; takes abx prior to dental visit  Narcotic hx: Bad shoulder from a fall (rotator cuff injury); oxycodone PRN, (1 every two weeks)    Allergies  Amlodipine and Flecainide      Pulmonary Tests and Status  PFT's  Date: 06/14/2019 FVC 2.77 (61%)   FEV1 1.09 (31%)  DLCOunc  13.10 (48%)  Date: 04/02/2019 FVC 3.11 (68%)   FEV1 1.11 (32%)  DLCOunc  11.68 (42%) TLC 7.24 (98%)    ABG's  Results for FELIPE AYOUB (MRN  [id number]) as of 7/22/2019 09:10   Ref. Range 5/27/2019 05:31 6/28/2019 14:17   FIO2 Unknown 4LNC    Ph Venous Latest Ref Range: 7.32 - 7.43 pH 7.30 (L) 7.35   PCO2 Venous Latest Ref Range: 40 - 50 mm Hg 67 (H) 51 (H)   PO2 Venous Latest Ref Range: 25 - 47 mm Hg 51 (H) 20 (L)   Bicarbonate Venous Latest Ref Range: 21 - 28 mmol/L 33 (H) 28   Base Excess Venous Latest Units: mmol/L 3.9    Oxyhemoglobin Venous Latest Units: % 82    O2 Sat Venous Latest Units: %  30     6 Minute Walk: Will complete on day of NPT  Oxygen use   At rest: 3L    Sleep: 3L with BiPAP   With Activity: 3L, will sometimes require more O2 with activity   Date of O2 initiation:     Sleep Study: 10/29/2017, [facility] [place]  Assessment:     Normal sleep latency without sleep aid, normal arousal index, and normal sleep efficiency.    Loud snoring.  Large number of scored events were post-arousal central in nature. Overall burden of obstructive events does not meet criteria for ISIS.  Hypoxia noted during REM sleep independent of any sleep-disordered breathing events and appears consistent with known pulmonary disease process.    Frequent PACs and abnormal P wave morphology suspicious for left atrial enlargement.      Scattered PLMs of low clinical significance.   Recommendations:    Evaluation for nocturnal oxygen use with home overnight oximetry.    Advise regarding the risks of drowsy driving.    Suggest optimizing sleep schedule and avoiding sleep deprivation.    Pharmacologic therapy should be used for management of restless legs syndrome only if present and clinically indicated and not based on the presence of periodic limb movements alone.    BiPAP/CPAP: Yes, currently uses   Settings: Patient was unsure of his current settings.   Overnight oximetry Study: 11/06/2017  Assessment:   Hypoxemia present  Sustained pattern suggestive of cardiopulmonary disease or hypoventilation  Recommendations:  The following changes in O2/PAP settings  "were ordered:  Start on oxygen at 1 LPM for COPD without Obstructive Sleep Apnea.    Pulmonary Rehab: Currently attending  Date: 1 month  Location: [facility]    Current activity:  Basic ADLs    Feeding No issues noted  Toileting No issues noted  Selecting proper attire: Phone interview, not assessed  Grooming: Will get short of breath, needs to take breaks  Maintaining continence: No issues noted  Putting on clothing: Able to dress independently but will have shortness of breath   Bathing: Uses a stool, uses shower \"wand\" to help him bath, reports significant shortness of breath  Walking & Transferring: Split level home eight stairs able to walk up and down stairs but needs to rest at top and bottom    Instrumental ADLs    Managing Finances: Limited income but making all of his bills for co pay  Handling Transportation: No concerns   Shopping: Uses scooter to help him get around, will ask for assistance loading groceries, wife/daughter will carry in to them  Preparing meals: Able to make simple meals, wife cooks full meals  Using telephone & communication devices: Has computer and Internet but not able to use independtdently  Managing medications: No concerns, uses a pill box   Housework & basic home maintenance: Will use walker to walk around mall once a week/two      Hospitalizations in prior 12 months  Date:  Currently Hospitalized  Location: [facility]  Reason: C-Diff    Date:  Dec 17 - Jan 17 Location: [facility]  Reason: COPD Exacerbation  Discharged to [place], once discharged from [place] patient had RN, OT, PT for one week  Date: Oct 2018  Location: [facility]  Reason: COPD Exacerbation  Date: November  Location: [facility] Reason: COPD Exacerbation      Diagnostic Tests/Imaging  Heart cath:   Stress Test:   ECHO: 1/4/2019 [facility]  Interpretation Summary  The right ventricle is mildly dilated.  Mildly decreased right ventricular systolic function  TAPSE 1.52cm,  TASV 10.1cm/s  An " annuloplasty ring is noted in the tricuspid position.mean gradient 4mmHg, peak 12mmHg  The study was technically difficult. Compared to prior study, changes are noted.    Chest CT: 02/11/2019 [facility]  IMPRESSION:   1. The exam is positive for a small area of nonocclusive pulmonary  embolism in the right upper lobe.  2. Small area of consolidation at the right lung base posteriorly is  suspicious for pneumonia.  3. Emphysema.     Chest CT: 05/26/2019 [facility]  IMPRESSION:  1. No evidence of acute pulmonary embolism.  2. Pulmonary emphysema and fibrosis.  3. Right middle lobe 0.4 cm spiculated nodule, new since 2/11/2019 and  suspicious for neoplastic disease. Several additional smaller  nonspecific nodules are also noted in the right middle lobe.  4. Right hilar adenopathy with a 1.5 cm node which has increased in  size since 2/11/2019.  5. Coronary artery calcification.  6. Hepatic fatty infiltration--possible etiologies include consumption  of alcohol or excessive carbohydrate intake, especially  sugar/fructose.  Metabolic syndrome commonly occurs in combination  with nonalcoholic fatty liver disease. Although often reversible,  nonalcoholic fatty liver disease can progress to steatohepatitis and  cirrhosis.    DEXA: Patient denies, no record in EMR  Upper GI: Patient denies, no record in EMR    Primary Care  Health Maintenance   Topic Date Due     HF ACTION PLAN  1950     HEPATITIS C SCREENING  1950     COPD ACTION PLAN  1950     ZOSTER IMMUNIZATION (1 of 2) 12/27/2001     LIPID  01/17/2015     MEDICARE ANNUAL WELLNESS VISIT  12/27/2016     FALL RISK ASSESSMENT  12/27/2016     PNEUMOCOCCAL IMMUNIZATION 65+ LOW/MEDIUM RISK (2 of 2 - PPSV23) 12/18/2018     INFLUENZA VACCINE (1) 09/01/2019     BMP  01/02/2020     ALT  07/01/2020     CBC  07/01/2020     ADVANCE CARE PLANNING  12/18/2023     DTAP/TDAP/TD IMMUNIZATION (2 - Td) 01/17/2024     COLONOSCOPY  01/02/2028     SPIROMETRY  Completed     PHQ-2   Completed     AORTIC ANEURYSM SCREENING (SYSTEM ASSIGNED)  Completed     IPV IMMUNIZATION  Aged Out     MENINGITIS IMMUNIZATION  Aged Out     PSA: Unsure if completed, will request PCP records, no results found in EMR  Colonoscopy:04/01/2019 [facility]     Impression:     Diverticulosis of colon without diverticulitis     Hemorrhoids, external     Recommendations: Follow up in 10 years  Dental: Full dentures, no concerns noted, sees dentist annually   Immunization: Per [facility]  H1N1 Novel Flu  12/09/2009    Full    No    HepA  04/27/2009  1 of 2   Full    No    HepA  04/11/2011  2 of 2   Full    No    HepB  04/27/2009  1 of 3   Full    No    HepB  06/05/2009  2 of 3   Full    No    HepB  04/11/2011  3 of 3   Full    No    Influenza  10/09/2008  Booster   Full    No    Influenza  09/18/2009  Booster   Full    No    Influenza  12/29/2011  Booster   Full    No    Influenza  09/25/2012  Booster   Full    No    Influenza  01/17/2014  Booster   Full    No    Influenza  12/28/2015  Booster   Full    No    Influenza  11/07/2016  Booster  Fluarix quad 3+ yrs  Full    No    Influenza  10/16/2017  Booster  Fluad (65+)  Full    No    Influenza  10/07/2018  Booster   Full    No    Pneumo-conj  12/18/2017  1 of 1  Prevnar 13  Full    No    Pneumo-poly  09/15/2006  1 of 2      Yes    Td/Tdap  10/14/2004  1 of 4  Td 7+ yrs     Yes    Td/Tdap  01/17/2014  2 of 4  Boostrix 10+ yrs  Full         Labs  A1AT: No  Rheumatology: No concerns noted  Cystic Fibrosis: No history, no family history  Cultures: 02/11/2019, Blood, No Growth    Psycho-Social Assessment  Spouse/Significant Other/Partner: , lives with his wife, Gloria Gay   Location: [place]   Distance from [facility]: 30 Minutes  Support System: Adult daughter, Adult son    Location: Daughter lives with patient,  Son lives in [facility]    Employment Status: Retired  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 Home has eight stairs, patient reports he is able to climb up and down stairs but not right after on or the other, he reports needing to take breaks at top or bottom os stairs.     Pets/Birds: None, No history of pets or birds    Home Health care utilized: RN/ PT/ OT after last hospitalization    Financial Concerns: No concerns noted    Plan: NPT 7/22/2019 at 1020 with Dr Green, 6MW and LABs, patient has recent PFTs in EMR.     Concerns:    - Heart history; Tricuspid valve replaced  - History of DONG (07/03/2019 Cr 1.07/ GFR 71%; 06/28/2019 Cr 2.41/GFR 27%)  - History of Chronic Hepatitis - No current virology in EMR  Liver Biopsy 4/30/2007; FINAL DIAGNOSIS: Liver, biopsy - Consistent with chronic hepatitis C, grade 2, stage 2.

## 2019-07-05 ENCOUNTER — TELEPHONE (OUTPATIENT)
Dept: CARDIOLOGY | Facility: CLINIC | Age: 69
End: 2019-07-05

## 2019-07-05 DIAGNOSIS — I48.3 TYPICAL ATRIAL FLUTTER (H): ICD-10-CM

## 2019-07-05 NOTE — TELEPHONE ENCOUNTER
Pt called is needing some help with his Eliquis as he can not afford.  Pt will  2 boxes of samples in [place] and will then come to [place] next week and get more.  Will initiate a PA and see if this can help. Nurse to set out 2 boxes in [place].  Mell

## 2019-07-08 NOTE — TELEPHONE ENCOUNTER
PA Initiation    Medication: Eliquis 5 mg -   Insurance Company: [employer] - Phone [phone] Fax [phone]  Pharmacy Filling the Rx: [id number] - [place], [place] - [address]  Filling Pharmacy Phone: [phone]  Filling Pharmacy Fax: [phone]  Start Date: 7/8/2019

## 2019-07-10 NOTE — TELEPHONE ENCOUNTER
"Tier Exception Denied    Insurance Company: [employer] - Phone [phone] Fax [phone]    Medication: Eliquis 5 mg - DENIED    Denial Date: 7/9/2019    Denial Rational:           Appeal Information:     Please advise if appeal is necessary and place a letter of medical necessity under the \"letters\" tab in patient's chart.        "

## 2019-07-17 DIAGNOSIS — J44.9 COPD (CHRONIC OBSTRUCTIVE PULMONARY DISEASE) (H): Primary | ICD-10-CM

## 2019-07-17 DIAGNOSIS — Z51.81 ENCOUNTER FOR THERAPEUTIC DRUG LEVEL MONITORING: ICD-10-CM

## 2019-07-17 DIAGNOSIS — Z76.82 LUNG TRANSPLANT CANDIDATE: ICD-10-CM

## 2019-07-22 ENCOUNTER — OFFICE VISIT (OUTPATIENT)
Dept: TRANSPLANT | Facility: CLINIC | Age: 69
End: 2019-07-22
Attending: INTERNAL MEDICINE
Payer: COMMERCIAL

## 2019-07-22 VITALS
HEIGHT: 71 IN | BODY MASS INDEX: 25.77 KG/M2 | HEART RATE: 96 BPM | SYSTOLIC BLOOD PRESSURE: 87 MMHG | OXYGEN SATURATION: 97 % | WEIGHT: 184.1 LBS | DIASTOLIC BLOOD PRESSURE: 60 MMHG | TEMPERATURE: 97.4 F

## 2019-07-22 DIAGNOSIS — Z76.82 LUNG TRANSPLANT CANDIDATE: ICD-10-CM

## 2019-07-22 DIAGNOSIS — Z51.81 ENCOUNTER FOR THERAPEUTIC DRUG LEVEL MONITORING: ICD-10-CM

## 2019-07-22 DIAGNOSIS — J44.9 COPD (CHRONIC OBSTRUCTIVE PULMONARY DISEASE) (H): ICD-10-CM

## 2019-07-22 DIAGNOSIS — Z76.82 LUNG TRANSPLANT CANDIDATE: Primary | ICD-10-CM

## 2019-07-22 LAB
ALBUMIN SERPL-MCNC: 2.9 G/DL (ref 3.4–5)
ALP SERPL-CCNC: 76 U/L (ref 40–150)
ALT SERPL W P-5'-P-CCNC: 222 U/L (ref 0–70)
ANION GAP SERPL CALCULATED.3IONS-SCNC: 8 MMOL/L (ref 3–14)
AST SERPL W P-5'-P-CCNC: 97 U/L (ref 0–45)
BASE EXCESS BLDV CALC-SCNC: 4.6 MMOL/L
BILIRUB SERPL-MCNC: 0.7 MG/DL (ref 0.2–1.3)
BUN SERPL-MCNC: 68 MG/DL (ref 7–30)
CALCIUM SERPL-MCNC: 9.5 MG/DL (ref 8.5–10.1)
CHLORIDE SERPL-SCNC: 94 MMOL/L (ref 94–109)
CO2 SERPL-SCNC: 30 MMOL/L (ref 20–32)
CREAT SERPL-MCNC: 2.49 MG/DL (ref 0.66–1.25)
ERYTHROCYTE [DISTWIDTH] IN BLOOD BY AUTOMATED COUNT: 18.1 % (ref 10–15)
GFR SERPL CREATININE-BSD FRML MDRD: 26 ML/MIN/{1.73_M2}
GLUCOSE SERPL-MCNC: 129 MG/DL (ref 70–99)
HCO3 BLDV-SCNC: 32 MMOL/L (ref 21–28)
HCT VFR BLD AUTO: 49.2 % (ref 40–53)
HGB BLD-MCNC: 15.5 G/DL (ref 13.3–17.7)
MCH RBC QN AUTO: 28.2 PG (ref 26.5–33)
MCHC RBC AUTO-ENTMCNC: 31.5 G/DL (ref 31.5–36.5)
MCV RBC AUTO: 90 FL (ref 78–100)
O2/TOTAL GAS SETTING VFR VENT: ABNORMAL %
PCO2 BLDV: 55 MM HG (ref 40–50)
PH BLDV: 7.37 PH (ref 7.32–7.43)
PLATELET # BLD AUTO: 190 10E9/L (ref 150–450)
PO2 BLDV: 26 MM HG (ref 25–47)
POTASSIUM SERPL-SCNC: 4.2 MMOL/L (ref 3.4–5.3)
PROT SERPL-MCNC: 6.5 G/DL (ref 6.8–8.8)
RBC # BLD AUTO: 5.5 10E12/L (ref 4.4–5.9)
SODIUM SERPL-SCNC: 132 MMOL/L (ref 133–144)
WBC # BLD AUTO: 14.9 10E9/L (ref 4–11)

## 2019-07-22 PROCEDURE — 80307 DRUG TEST PRSMV CHEM ANLYZR: CPT | Performed by: INTERNAL MEDICINE

## 2019-07-22 PROCEDURE — 82803 BLOOD GASES ANY COMBINATION: CPT | Performed by: INTERNAL MEDICINE

## 2019-07-22 PROCEDURE — 85027 COMPLETE CBC AUTOMATED: CPT | Performed by: INTERNAL MEDICINE

## 2019-07-22 PROCEDURE — 36415 COLL VENOUS BLD VENIPUNCTURE: CPT | Performed by: INTERNAL MEDICINE

## 2019-07-22 PROCEDURE — G0463 HOSPITAL OUTPT CLINIC VISIT: HCPCS | Mod: ZF

## 2019-07-22 PROCEDURE — 80053 COMPREHEN METABOLIC PANEL: CPT | Performed by: INTERNAL MEDICINE

## 2019-07-22 ASSESSMENT — PAIN SCALES - GENERAL: PAINLEVEL: NO PAIN (0)

## 2019-07-22 ASSESSMENT — MIFFLIN-ST. JEOR: SCORE: 1632.2

## 2019-07-22 NOTE — PATIENT INSTRUCTIONS
"Pulmonary Rehab: Please remember to stay active.  Continue exercises learned in pulmonary rehab or continue participating in pulmonary rehab, if able.     Current oxygen use: Rest 3L  Activity 3L  Sleep 3L      5' 11\" 184 lbs 1.6 oz Body mass index is 25.68 kg/m .  Goal BMI greater than 18, less than 30 for lung transplant.     Medication changes: Please stop you Lasix, your kidney functio has declined and your BP is low. Drink 64 oz of water  Future orders: Please call your primary and get your labs redrawn to see if your kidney function has improved with stopping lasix  Next appointment: No return to clinic scheduled    Please remember to stay up to date with your primary care requirements including:  Annual check-ups with primary care physician   Mammogram   Pap smear (as appropriate for women)    PSA (for men over 50)   Dental visits   Annual flu shots/ immunizations as needed   Colonoscopy (patients over 50).     Thank-you for allowing us to participate in your care.    If your condition should change, please contact your transplant coordinator. This includes: worsening symptoms, need for antibiotics, hospitalizations, transfusions.    Thoracic Transplant Office phone [phone], option 2, fax [phone]    Office Hours 8:30 - 5:00 pm        "

## 2019-07-22 NOTE — NURSING NOTE
"Chief Complaint   Patient presents with     Consult     COPD     BP (!) 87/60   Pulse 96   Temp 97.4  F (36.3  C) (Oral)   Ht 1.803 m (5' 11\")   Wt 83.5 kg (184 lb 1.6 oz)   SpO2 97%   BMI 25.68 kg/m    Melissa Leos [facility]  7/22/2019 10:12 AM      "

## 2019-07-22 NOTE — LETTER
2019      RE: Tone Cooper  [address] [facility] [address]       [facility]  Tone Cooper 68 year old male  : 1950  MRN: [id number]  DATE OF SERVICE 2019      Primary Care Provider: Ana Pratt    Referring Provider: Zoie Evans    Reason For Visit: End stage lung disease, evaluation of lung transplant candidacy.    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  Patient has significant other comorbidities including persistent A. fib/a flutter, history of tricuspid valve repair, recurrent acute kidney injury, history of hepatitis C status post treatment but now with elevated LFTs of unclear etiology, recurrent C. difficile infections and significant frailty.    Due to his multiple comorbidities the patient is not a candidate for lung transplantation.  We discussed the survival statistics of lung transplantation and surgical procedure briefly.  The patient was advised to continue his current COPD treatment.  For his new onset acute kidney injury, he was advised to discontinue the use of Lasix, increase his oral fluid intake and follow-up with his primary care provider.  He was offered to suggest to go to the ER for fluid sedation however he is not interested in this.  He was advised to maintain healthy lifestyle and consider pulmonary rehab.      History of Present Illness: The patient is a 68-year-old gentleman with history of severe COPD, hepatitis C status post treatment but with elevated LFTs of unclear etiology, recurrent episodes of acute kidney injury, difficulty initiating sleep, mild obstructive sleep apnea with significant sleep associated hypoxemia requiring BiPAP, tricuspid regurgitation status post tricuspid valve repair, recurrent A. fib/a flutter,  recurrent C. difficile infection.  He is referred to a lung transplant clinic for evaluation of his lung transplant candidacy.    Patient reports that he was diagnosed with COPD after his tricuspid valve repair in 2008.  He had been smoking anywhere from 2 to 3 packs/day for approximately 40 years prior to that.  He quit smoking following this.  He has had progressive decline in his breathing with frequent exacerbations including 6 hospitalizations within last 6 months.  He reports no shortness of breath at rest and saturations running around 90 to 92% on room air.  He uses 3 L of supplemental oxygen around-the-clock.  He has dyspnea with mild to moderate exertion.  Is significantly limited in his ADLs.  He does not have frequent cough, he has occasional green sputum.  He denies any hemoptysis or frequent wheezing.  In addition to his inhalers he is also on 10 to 15 mg of prednisone per day.    Review of Systems:  CONSTITUTIONAL: negative for fever, chills,.  He reports good appetite but significant weight loss within the last few months.  INTEGUMENTARY/SKIN: no rash or obvious new lesions  ENT/MOUTH: no sore throat, new sinus pain or nasal drainage  RESP: see interval history  CV: negative for chest pain, palpitations or peripheral edema, reports lightheadedness.    GI: no nausea, vomiting, change in stools  : no dysuria  MUSCULOSKELETAL: no myalgias, arthralgias  ENDOCRINE: blood sugars with adequate control  PSYCHIATRIC: mood stable  LYMPHATIC: no new lymphadenopathy  HEME: Easy bruisability noted  NEURO: no numbness, weakness, headaches  Otherwise negative    Social History: Patient lives at home with his wife.  They have children who either live locally or in other states.  He also has several siblings however he only sees the rest of his family on holidays and other special occasions.  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  Smoked 2 to 3 packs/day for 30 to 40  years but quit 10 years ago.  Reports occasional alcohol use and any illicit drug use.    Past Medical History:  Past Medical History:   Diagnosis Date     Atrial flutter (H)      Cancer (H)     basal cell-nose     COPD (chronic obstructive pulmonary disease) (H)      Diverticulitis      Hepatitis 2011    Hepatitis C     Hypertension      Persistent atrial fibrillation (H) 4/28/09    ablation 7/1/2015     Premature beats      PVC (premature ventricular contraction)     s/p ablation 12/5/2017     Tricuspid valve disorder     Dr Aj, Hx of valve repair      Ventricular tachycardia (H)     nonsustained       Past Surgical History:  History of thoracic surgery: Tricuspid valve repair  Past Surgical History:   Procedure Laterality Date     ABDOMEN SURGERY      hernia repair right     APPENDECTOMY  as a child     CARDIAC SURGERY      multiple ablations-[place]     CARDIOVERSION  06/03/2009    successful for afib     CARDIOVERSION  4/20/15    successful for afib     CARDIOVERSION  5/28/15     COL[facility]OSCOPY  01/2018    [facility]     GENITOURINARY SURGERY      undescended testicle     H ABLATION ATRIAL FLUTTER       H ABLATION FOCAL AFIB  7/1/15    Left atrial linear ablation and pulmonary vein antrum ablation     H ABLATION PVC  12/5/16     INCISION AND DRAINAGE LOWER EXTREMITY, COMBINED Right 11/10/2016    Procedure: COMBINED INCISION AND DRAINAGE LOWER EXTREMITY;  Surgeon: Roger Pacheco MD;  Location: [facility] OR     LAPAROSCOPIC CHOLECYSTECTOMY  1/6/2012    Procedure:LAPAROSCOPIC CHOLECYSTECTOMY; LAPAROSCOPIC CHOLECYSTECTOMY ; Surgeon:ROGER PACHECO; Location:[facility] OR     ORTHOPEDIC SURGERY  2004    Left hip replacement     REPAIR VALVE TRICUSPID  9/8/08    conversion to a bileaflet valve and application of a 30 mm Sanderson ring     SMALL INTESTINE SURGERY      had bowel obstruction age 9     THORACIC SURGERY       VALVE REPLACEMENT      tricuspid       Allergies:  Allergies   Allergen Reactions      Amlodipine Swelling     Flecainide Palpitations       Medications:  Current Outpatient Medications   Medication Sig Dispense Refill     acetaminophen (TYLENOL) 325 MG tablet Take 325-650 mg by mouth every 6 hours as needed for mild pain       clonazePAM (KLONOPIN) 1 MG tablet Take 1 tablet (1 mg) by mouth nightly as needed for anxiety       diltiazem ER COATED BEADS (CARDIZEM CD/CARTIA XT) 180 MG 24 hr capsule Take 1 capsule (180 mg) by mouth 2 times daily Hold for SBP < 110 or HR < 60       Fluticasone-Umeclidin-Vilanterol (TRELEGY ELLIPTA) 100-62.5-25 MCG/INH oral inhaler Inhale 1 puff into the lungs daily 1 Inhaler 2     furosemide (LASIX) 40 MG tablet Take 1 tablet (40 mg) by mouth 2 times daily       insulin glargine (LANTUS SOLOSTAR PEN) 100 UNIT/ML pen Inject 8 Units Subcutaneous every morning 2.4 mL 0     Lidocaine (LIDOCARE) 4 % Patch Place 2 patches onto the skin daily as needed for moderate pain       lisinopril (PRINIVIL/ZESTRIL) 10 MG tablet Take 10 mg by mouth daily        metFORMIN (GLUCOPHAGE) 500 MG tablet Take 0.5 tablets (250 mg) by mouth daily (with breakfast) 15 tablet 0     Multiple Vitamins-Minerals (MULTIVITAMIN ADULTS PO) Take 1 tablet by mouth daily       order for DME Equipment being ordered: Nebulizer 1 each 0     order for DME Oxygen for nocturnal use. 1 LPM via nasal cannula  Testing done at home in chronic stable state.  Condition is COPD.  Patient does not have Obstructive Sleep Apnea.  Overnight oximetry revealed 30.3 minutes of hypoxia (<= 88%).  Duration: Lifetime (99 months). 1 each 99     oxyCODONE IR (ROXICODONE) 10 MG tablet Take 10 mg by mouth every 8 hours as needed for severe pain       predniSONE (DELTASONE) 10 MG tablet Take 15 mg by mouth 3 times daily        Respiratory Therapy Supplies (AEROBIKA) KEELY 1 applicator 4 times daily 1 each 0     traZODone (DESYREL) 50 MG tablet Take 50 mg by mouth At Bedtime          Family History:  Family History   Problem Relation Age of  "Onset     Prostate Cancer Father      Family History Negative Mother      Emphysema Mother      Hypertension Mother      Cerebrovascular Disease Mother        Physical Examination:   VS: BP (!) 87/60   Pulse 96   Temp 97.4  F (36.3  C) (Oral)   Ht 1.803 m (5' 11\")   Wt 83.5 kg (184 lb 1.6 oz)   SpO2 97%   BMI 25.68 kg/m     General: Pleasant, speaking in full sentences without significant discomfort. Appears frail.  eyes: No conjunctival pallor or icterus  HENT: Moist mucous membranes, no cervical or submandibular lymphadenopathy  Pulmonary: Bilaterally reduced breath sounds with no added sounds  Cardiovascular: S1S2 normal with no added sounds.  Irregularly irregular rhythm.  Abdomen: Soft, non tender, non distended, normoactive bowel sounds.  Appears obese  Musculoskeletal: No lower extremities edema, no upper extremities tremors, clubbing or cyanosis.  Wasting of upper extremities muscles and chest muscles noted  Skin: Both arms with bruises.  Neuro: Grossly normal  Psych: Normal affect    PFT's:  FEV1 1.09 L, 31% predicted.  FVC 2.77 L, 61% predicted.  FEV1/FVC 39%.  DLCO uncorrected for hemoglobin 40.8 percent.  This test shows severe obstructive lung disease with moderate diffusion defect.  His FEV1 has been stable over last 2 years.    I spent a total of 80 minutes face to face with Tone Cooper during today's office visit. Over 50% of this time was spent counseling the patient and/or coordinating care regarding their lung transplant candidacy.     Manny Green MD   of Medicine  Pulmonary, Critical Care and Sleep Medicine  [facility]  Pager: [phone]                  "

## 2019-07-22 NOTE — PROGRESS NOTES
[facility]  Tone Cooper 68 year old male  : 1950  MRN: [id number]  DATE OF SERVICE 2019      Primary Care Provider: Ana Pratt    Referring Provider: Zoie Evans    Reason For Visit: End stage lung disease, evaluation of lung transplant candidacy.    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  Patient has significant other comorbidities including persistent A. fib/a flutter, history of tricuspid valve repair, recurrent acute kidney injury, history of hepatitis C status post treatment but now with elevated LFTs of unclear etiology, recurrent C. difficile infections and significant frailty.    Due to his multiple comorbidities the patient is not a candidate for lung transplantation.  We discussed the survival statistics of lung transplantation and surgical procedure briefly.  The patient was advised to continue his current COPD treatment.  For his new onset acute kidney injury, he was advised to discontinue the use of Lasix, increase his oral fluid intake and follow-up with his primary care provider.  He was offered to suggest to go to the ER for fluid sedation however he is not interested in this.  He was advised to maintain healthy lifestyle and consider pulmonary rehab.      History of Present Illness: The patient is a 68-year-old gentleman with history of severe COPD, hepatitis C status post treatment but with elevated LFTs of unclear etiology, recurrent episodes of acute kidney injury, difficulty initiating sleep, mild obstructive sleep apnea with significant sleep associated hypoxemia requiring BiPAP, tricuspid regurgitation status post tricuspid valve repair, recurrent A. fib/a flutter, recurrent C. difficile infection.  He is referred to a lung transplant clinic for  evaluation of his lung transplant candidacy.    Patient reports that he was diagnosed with COPD after his tricuspid valve repair in 2008.  He had been smoking anywhere from 2 to 3 packs/day for approximately 40 years prior to that.  He quit smoking following this.  He has had progressive decline in his breathing with frequent exacerbations including 6 hospitalizations within last 6 months.  He reports no shortness of breath at rest and saturations running around 90 to 92% on room air.  He uses 3 L of supplemental oxygen around-the-clock.  He has dyspnea with mild to moderate exertion.  Is significantly limited in his ADLs.  He does not have frequent cough, he has occasional green sputum.  He denies any hemoptysis or frequent wheezing.  In addition to his inhalers he is also on 10 to 15 mg of prednisone per day.    Review of Systems:  CONSTITUTIONAL: negative for fever, chills,.  He reports good appetite but significant weight loss within the last few months.  INTEGUMENTARY/SKIN: no rash or obvious new lesions  ENT/MOUTH: no sore throat, new sinus pain or nasal drainage  RESP: see interval history  CV: negative for chest pain, palpitations or peripheral edema, reports lightheadedness.    GI: no nausea, vomiting, change in stools  : no dysuria  MUSCULOSKELETAL: no myalgias, arthralgias  ENDOCRINE: blood sugars with adequate control  PSYCHIATRIC: mood stable  LYMPHATIC: no new lymphadenopathy  HEME: Easy bruisability noted  NEURO: no numbness, weakness, headaches  Otherwise negative    Social History: Patient lives at home with his wife.  They have children who either live locally or in other states.  He also has several siblings however he only sees the rest of his family on holidays and other special occasions.  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  Smoked 2 to 3 packs/day for 30 to 40 years but quit 10 years ago.  Reports occasional alcohol use and any illicit drug  use.    Past Medical History:  Past Medical History:   Diagnosis Date     Atrial flutter (H)      Cancer (H)     basal cell-nose     COPD (chronic obstructive pulmonary disease) (H)      Diverticulitis      Hepatitis 2011    Hepatitis C     Hypertension      Persistent atrial fibrillation (H) 4/28/09    ablation 7/1/2015     Premature beats      PVC (premature ventricular contraction)     s/p ablation 12/5/2017     Tricuspid valve disorder     Dr Aj, Hx of valve repair      Ventricular tachycardia (H)     nonsustained       Past Surgical History:  History of thoracic surgery: Tricuspid valve repair  Past Surgical History:   Procedure Laterality Date     ABDOMEN SURGERY      hernia repair right     APPENDECTOMY  as a child     CARDIAC SURGERY      multiple ablations-[facility]     CARDIOVERSION  06/03/2009    successful for afib     CARDIOVERSION  4/20/15    successful for afib     CARDIOVERSION  5/28/15     COLONOSCOPY  01/2018    [facility]     GENITOURINARY SURGERY      undescended testicle     H ABLATION ATRIAL FLUTTER       H ABLATION FOCAL AFIB  7/1/15    Left atrial linear ablation and pulmonary vein antrum ablation     H ABLATION PVC  12/5/16     INCISION AND DRAINAGE LOWER EXTREMITY, COMBINED Right 11/10/2016    Procedure: COMBINED INCISION AND DRAINAGE LOWER EXTREMITY;  Surgeon: Roger Pacheco MD;  Location: [facility] OR     LAPAROSCOPIC CHOLECYSTECTOMY  1/6/2012    Procedure:LAPAROSCOPIC CHOLECYSTECTOMY; LAPAROSCOPIC CHOLECYSTECTOMY ; Surgeon:ROGER PACHECO; Location:[facility] OR     ORTHOPEDIC SURGERY  2004    Left hip replacement     REPAIR VALVE TRICUSPID  9/8/08    conversion to a bileaflet valve and application of a 30 mm Sanderson ring     SMALL INTESTINE SURGERY      had bowel obstruction age 9     THORACIC SURGERY       VALVE REPLACEMENT      tricuspid       Allergies:  Allergies   Allergen Reactions     Amlodipine Swelling     Flecainide Palpitations       Medications:  Current Outpatient  Medications   Medication Sig Dispense Refill     acetaminophen (TYLENOL) 325 MG tablet Take 325-650 mg by mouth every 6 hours as needed for mild pain       clonazePAM (KLONOPIN) 1 MG tablet Take 1 tablet (1 mg) by mouth nightly as needed for anxiety       diltiazem ER COATED BEADS (CARDIZEM CD/CARTIA XT) 180 MG 24 hr capsule Take 1 capsule (180 mg) by mouth 2 times daily Hold for SBP < 110 or HR < 60       Fluticasone-Umeclidin-Vilanterol (TRELEGY ELLIPTA) 100-62.5-25 MCG/INH oral inhaler Inhale 1 puff into the lungs daily 1 Inhaler 2     furosemide (LASIX) 40 MG tablet Take 1 tablet (40 mg) by mouth 2 times daily       insulin glargine (LANTUS SOLOSTAR PEN) 100 UNIT/ML pen Inject 8 Units Subcutaneous every morning 2.4 mL 0     Lidocaine (LIDOCARE) 4 % Patch Place 2 patches onto the skin daily as needed for moderate pain       lisinopril (PRINIVIL/ZESTRIL) 10 MG tablet Take 10 mg by mouth daily        metFORMIN (GLUCOPHAGE) 500 MG tablet Take 0.5 tablets (250 mg) by mouth daily (with breakfast) 15 tablet 0     Multiple Vitamins-Minerals (MULTIVITAMIN ADULTS PO) Take 1 tablet by mouth daily       order for DME Equipment being ordered: Nebulizer 1 each 0     order for DME Oxygen for nocturnal use. 1 LPM via nasal cannula  Testing done at home in chronic stable state.  Condition is COPD.  Patient does not have Obstructive Sleep Apnea.  Overnight oximetry revealed 30.3 minutes of hypoxia (<= 88%).  Duration: Lifetime (99 months). 1 each 99     oxyCODONE IR (ROXICODONE) 10 MG tablet Take 10 mg by mouth every 8 hours as needed for severe pain       predniSONE (DELTASONE) 10 MG tablet Take 15 mg by mouth 3 times daily        Respiratory Therapy Supplies (AEROBIKA) KEELY 1 applicator 4 times daily 1 each 0     traZODone (DESYREL) 50 MG tablet Take 50 mg by mouth At Bedtime          Family History:  Family History   Problem Relation Age of Onset     Prostate Cancer Father      Family History Negative Mother      Emphysema  "Mother      Hypertension Mother      Cerebrovascular Disease Mother        Physical Examination:   VS: BP (!) 87/60   Pulse 96   Temp 97.4  F (36.3  C) (Oral)   Ht 1.803 m (5' 11\")   Wt 83.5 kg (184 lb 1.6 oz)   SpO2 97%   BMI 25.68 kg/m    General: Pleasant, speaking in full sentences without significant discomfort. Appears frail.  eyes: No conjunctival pallor or icterus  HENT: Moist mucous membranes, no cervical or submandibular lymphadenopathy  Pulmonary: Bilaterally reduced breath sounds with no added sounds  Cardiovascular: S1S2 normal with no added sounds.  Irregularly irregular rhythm.  Abdomen: Soft, non tender, non distended, normoactive bowel sounds.  Appears obese  Musculoskeletal: No lower extremities edema, no upper extremities tremors, clubbing or cyanosis.  Wasting of upper extremities muscles and chest muscles noted  Skin: Both arms with bruises.  Neuro: Grossly normal  Psych: Normal affect    PFT's:  FEV1 1.09 L, 31% predicted.  FVC 2.77 L, 61% predicted.  FEV1/FVC 39%.  DLCO uncorrected for hemoglobin 40.8 percent.  This test shows severe obstructive lung disease with moderate diffusion defect.  His FEV1 has been stable over last 2 years.    I spent a total of 80 minutes face to face with Tone Coopre during today's office visit. Over 50% of this time was spent counseling the patient and/or coordinating care regarding their lung transplant candidacy.     Manny Green MD   of Medicine  Pulmonary, Critical Care and Sleep Medicine  [facility]  Pager: [phone]                  "

## 2019-07-23 LAB — COTININE UR QL: NEGATIVE NG/ML

## 2019-07-23 NOTE — PROGRESS NOTES
Pulmonary Rehab Discharge Summary    Reason for discharge:    Patient/family request discontinuation of services.    Progress towards goals:  Goals partially met.  Barriers to achieving goals:   Pt requested discontinuation due to illness. Wants to return when he is weel again and after a planned vacation in August..    Recommendation(s):    Continued therapy is recommended.  Rationale/Recommendations:  When well he has requested to return..      Heather Lopez, RT on 7/23/2019 at 5:01 PM

## 2019-07-24 NOTE — TELEPHONE ENCOUNTER
Patient seen in clinic 7/22/2019 by Dr Green. Deemed not a candidate for lung transplant due to multiple significant co morbidities noted by transplant pulmonologist to include: persistent A. fib/a flutter, history of tricuspid valve repair, recurrent acute kidney injury (current GFR 27), history of hepatitis C status post treatment but now with elevated LFTs of unclear etiology, recurrent C. difficile infections and significant frailty. These concerns were discussed in regard to potential effect on outcomes post transplant. Patient acknowledged understanding. Referral to be closed and letter sent to referring MD, PCP, and patient.

## 2019-07-26 ENCOUNTER — TELEPHONE (OUTPATIENT)
Dept: TRANSPLANT | Facility: CLINIC | Age: 69
End: 2019-07-26

## 2019-07-26 NOTE — TELEPHONE ENCOUNTER
Received call from [facility] requesting lab orders for patient. Contacted both [facility] (Dr Pratt) and patient to verify patient should discuss repeat labs with his PCP or Dr Morrison as instructed during his new patient pulmonary transplant appointment.    Patient had labs drawn on 7/22 as part of his transplant clinic appointment. Cr elevated, GFR substantially decreased, low BP, patient reporting he was often dizzy. Dr Green instructed patient to drink fluids, stop lasix, and follow up with either his PCP or Dr Morrison, Cardiologist, to discuss repeat labs and starting a different diuretic.     Patient and Dr Pratt not available at time of call. Left detailed messages with above information as well as contact number if either patient or MD had additional questions or concerns.

## 2019-07-26 NOTE — TELEPHONE ENCOUNTER
Patient called regarding his lab order patient would like the order faxed to his PCP Ana Pratt at [phone].

## 2019-07-26 NOTE — TELEPHONE ENCOUNTER
Spoke to pt about the PA denial and pt states that he lives on his social security each month and it is only $1200.  Asked if pt would be ok with trying for patient assistance with cathy fernandez and pt state Yes, to do what we can.  Pt will see Dr Britt on 7/31 and will then have him  a month of samples d/t the time that the process takes.  Susy has been messaged to start the process. Mell

## 2019-07-26 NOTE — TELEPHONE ENCOUNTER
Patient Call: Transplant Lab/Orders  Route to LPN  Post Transplant Days:   When patient is less than 60 days post-transplant, route high priority    Reason for Call: Missing labs/orders; which labs/orders? CBC and BMP and Lazics (If patient is at a clinic without orders, [facility] then page LPN)  Callback needed? Yes  Pt will be there at  10:15am  Fax#:[phone]  Return Call Needed  Same as documented in contacts section  When to return call?: Same day: Route High Priority

## 2019-07-31 ENCOUNTER — OFFICE VISIT (OUTPATIENT)
Dept: CARDIOLOGY | Facility: CLINIC | Age: 69
End: 2019-07-31
Attending: INTERNAL MEDICINE
Payer: COMMERCIAL

## 2019-07-31 VITALS
HEART RATE: 88 BPM | HEIGHT: 71 IN | SYSTOLIC BLOOD PRESSURE: 96 MMHG | DIASTOLIC BLOOD PRESSURE: 60 MMHG | WEIGHT: 187.5 LBS | BODY MASS INDEX: 26.25 KG/M2

## 2019-07-31 DIAGNOSIS — I48.0 PAROXYSMAL ATRIAL FIBRILLATION (H): ICD-10-CM

## 2019-07-31 PROCEDURE — 99213 OFFICE O/P EST LOW 20 MIN: CPT | Performed by: INTERNAL MEDICINE

## 2019-07-31 ASSESSMENT — MIFFLIN-ST. JEOR: SCORE: 1647.62

## 2019-07-31 NOTE — PROGRESS NOTES
HPI and Plan:   See dictation    Orders Placed This Encounter   Procedures     Follow-Up with Electrophysiologist     EKG 12-lead complete w/read (Future)- to be scheduled       No orders of the defined types were placed in this encounter.      Medications Discontinued During This Encounter   Medication Reason     metFORMIN (GLUCOPHAGE) 500 MG tablet Stopped by Patient     insulin glargine (LANTUS SOLOSTAR PEN) 100 UNIT/ML pen Stopped by Patient     furosemide (LASIX) 40 MG tablet Discontinued by another Health Care Provider     lisinopril (PRINIVIL/ZESTRIL) 10 MG tablet          Encounter Diagnosis   Name Primary?     persistent atrial fi        CURRENT MEDICATIONS:  Current Outpatient Medications   Medication Sig Dispense Refill     acetaminophen (TYLENOL) 325 MG tablet Take 325-650 mg by mouth every 6 hours as needed for mild pain       apixaban ANTICOAGULANT (ELIQUIS) 5 MG tablet Take 1 tablet (5 mg) by mouth 2 times daily 180 tablet 3     clonazePAM (KLONOPIN) 1 MG tablet Take 1 tablet (1 mg) by mouth nightly as needed for anxiety       diltiazem ER COATED BEADS (CARDIZEM CD/CARTIA XT) 180 MG 24 hr capsule Take 1 capsule (180 mg) by mouth 2 times daily Hold for SBP < 110 or HR < 60       Fluticasone-Umeclidin-Vilanterol (TRELEGY ELLIPTA) 100-62.5-25 MCG/INH oral inhaler Inhale 1 puff into the lungs daily 1 Inhaler 2     Lidocaine (LIDOCARE) 4 % Patch Place 2 patches onto the skin daily as needed for moderate pain       oxyCODONE IR (ROXICODONE) 10 MG tablet Take 10 mg by mouth every 8 hours as needed for severe pain       predniSONE (DELTASONE) 10 MG tablet Take 10 mg by mouth 3 times daily        traZODone (DESYREL) 50 MG tablet Take 50 mg by mouth At Bedtime        Multiple Vitamins-Minerals (MULTIVITAMIN ADULTS PO) Take 1 tablet by mouth daily       order for DME Equipment being ordered: Nebulizer (Patient not taking: Reported on 7/31/2019) 1 each 0     order for DME Oxygen for nocturnal use. 1 LPM via nasal  cannula  Testing done at home in chronic stable state.  Condition is COPD.  Patient does not have Obstructive Sleep Apnea.  Overnight oximetry revealed 30.3 minutes of hypoxia (<= 88%).  Duration: Lifetime (99 months). (Patient not taking: Reported on 7/31/2019) 1 each 99     Respiratory Therapy Supplies (AEROBIKA) KEELY 1 applicator 4 times daily 1 each 0       ALLERGIES     Allergies   Allergen Reactions     Amlodipine Swelling     Flecainide Palpitations       PAST MEDICAL HISTORY:  Past Medical History:   Diagnosis Date     Atrial flutter (H)      Cancer (H)     basal cell-nose     COPD (chronic obstructive pulmonary disease) (H)      Diverticulitis      Hepatitis 2011    Hepatitis C     Hypertension      Persistent atrial fibrillation (H) 4/28/09    ablation 7/1/2015     Premature beats      PVC (premature ventricular contraction)     s/p ablation 12/5/2017     Tricuspid valve disorder     Dr Aj, Hx of valve repair      Ventricular tachycardia (H)     nonsustained       PAST SURGICAL HISTORY:  Past Surgical History:   Procedure Laterality Date     ABDOMEN SURGERY      hernia repair right     APPENDECTOMY  as a child     CARDIAC SURGERY      [facility] ablations-[facility]     CARDIOVERSION  06/03/2009    successful for afib     CARDIOVERSION  4/20/15    successful for afib     CARDIOVERSION  5/28/15     COLONOSCOPY  01/2018    [facility]     GENITOURINARY SURGERY      undescended testicle     H ABLATION ATRIAL FLUTTER       H ABLATION FOCAL AFIB  7/1/15    Left atrial linear ablation and pulmonary vein antrum ablation     H ABLATION PVC  12/5/16     INCISION AND DRAINAGE LOWER EXTREMITY, COMBINED Right 11/10/2016    Procedure: COMBINED INCISION AND DRAINAGE LOWER EXTREMITY;  Surgeon: Roger Pacheco MD;  Location:  OR     LAPAROSCOPIC CHOLECYSTECTOMY  1/6/2012    Procedure:LAPAROSCOPIC CHOLECYSTECTOMY; LAPAROSCOPIC CHOLECYSTECTOMY ; Surgeon:ROGER PACHECO; Location: OR     ORTHOPEDIC SURGERY       Left hip replacement     REPAIR VALVE TRICUSPID  08    conversion to a bileaflet valve and application of a 30 mm Sanderson ring     SMALL INTESTINE SURGERY      had bowel obstruction age 9     THORACIC SURGERY       VALVE REPLACEMENT      tricuspid       FAMILY HISTORY:  Family History   Problem Relation Age of Onset     Prostate Cancer Father      Family History Negative Mother      Emphysema Mother      Hypertension Mother      Cerebrovascular Disease Mother        SOCIAL HISTORY:  Social History     Socioeconomic History     Marital status:      Spouse name: None     Number of children: None     Years of education: None     Highest education level: None   Occupational History     None   Social Needs     Financial resource strain: None     Food insecurity:     Worry: None     Inability: None     Transportation needs:     Medical: None     Non-medical: None   Tobacco Use     Smoking status: Former Smoker     Packs/day: 1.50     Years: 41.00     Pack years: 61.50     Types: Paan with tobacco, gutka, zarda, khaini     Start date: 1977     Last attempt to quit: 2008     Years since quittin.5     Smokeless tobacco: Never Used   Substance and Sexual Activity     Alcohol use: Yes     Alcohol/week: 0.0 oz     Comment: 3-6 per month     Drug use: No     Sexual activity: Never     Partners: Female     Birth control/protection: None   Lifestyle     Physical activity:     Days per week: None     Minutes per session: None     Stress: None   Relationships     Social connections:     Talks on phone: None     Gets together: None     Attends Buddhist service: None     Active member of club or organization: None     Attends meetings of clubs or organizations: None     Relationship status: None     Intimate partner violence:     Fear of current or ex partner: None     Emotionally abused: None     Physically abused: None     Forced sexual activity: None   Other Topics Concern      Service Not  "Asked     Blood Transfusions Not Asked     Caffeine Concern No     Comment: 1 a day      Occupational Exposure Not Asked     Hobby Hazards Not Asked     Sleep Concern Yes     Comment: nocturia     Stress Concern Not Asked     Weight Concern No     Special Diet No     Back Care Not Asked     Exercise No     Bike Helmet Yes     Seat Belt Yes     Self-Exams Not Asked     Parent/sibling w/ CABG, MI or angioplasty before 65F 55M? 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)    Is an  for [employer] in [place], in between sets and entertain       Review of Systems:  Skin:  Positive for bruising     Eyes:  Positive for glasses;visual blurring    ENT:  Positive for hearing loss hearing aids  Respiratory:    wheezing;dyspnea on exertion COPD, On O2 daily 3L constantly, Bipap with O2   Cardiovascular:    Positive for;dizziness;palpitations;fatigue    Gastroenterology: Positive for   hx Cdiff; softer stools  Genitourinary:  Positive for urinary frequency drinking a lot of water per pt  Musculoskeletal:  Positive for back pain;muscular weakness    Neurologic:  Positive for numbness or tingling of hands;numbness or tingling of feet;headaches    Psychiatric:  Positive for excessive stress    Heme/Lymph/Imm:  Positive for easy bruising    Endocrine:  Negative diabetes      Physical Exam:  Vitals: BP 96/60 (BP Location: Right arm, Patient Position: Chair, Cuff Size: Adult Regular)   Pulse 88   Ht 1.803 m (5' 11\")   Wt 85 kg (187 lb 8 oz)   BMI 26.15 kg/m      Constitutional:  cooperative, alert and oriented, well developed, well nourished, in no acute distress        Skin:  warm and dry to the touch, no apparent skin lesions or masses noted          Head:  normocephalic, no masses or lesions        Eyes:  no xanthalasma;EOMS intact;sclera white;conjunctivae and lids unremarkable        Lymph:      ENT:  no pallor or cyanosis, dentition good        Neck:  JVP normal;no carotid " bruit        Respiratory:  clear to auscultation diminished breath sounds bilaterally       Cardiac: normal S1 and S2;regular rhythm;no murmurs, gallops or rubs detected occasional premature beats              pulses full and equal                               right femoral bruit (-) left subclavian bruit (-)      GI:  abdomen soft        Extremities and Muscular Skeletal:  no deformities, clubbing, cyanosis, erythema observed;no edema              Neurological:           Psych:           CC  Sussy Britt MD  [address]  [id number]  [place] [id number]

## 2019-07-31 NOTE — TELEPHONE ENCOUNTER
"7/31/19 Call from \"Angela\" at [facility] inquiring about Eliquis samples for pt. Pt was under the impression samples would be in [place] but samples are here in [place]. Angela is able to give samples from [facility] so will do so. Pt updated that Patient Assistance may take a few weeks to go through. Collin 2:51 pm  "

## 2019-07-31 NOTE — LETTER
7/31/2019      Ana Pratt MD  [facility] [address] [facility] [address]      RE: Tone GARZA Allison       Dear Colleague,    I had the pleasure of seeing Tone Cooper in the [facility].    Service Date: 07/31/2019      CLINIC NOTE      HISTORY OF PRESENT ILLNESS:  I saw Mr. Cooper for followup of atrial fibrillation.  He is a 68-year-old white male with a history of tricuspid repair.  The patient had atrial fibrillation and underwent catheter ablation on 07/01/2015 after he failed medical therapy.  He did well for about 2-3 years after the ablation without recurrent atrial fibrillation.  In between, he had frequent symptomatic PVCs and underwent 2 attempted PVC ablations.  Unfortunately, the patient had developed recurrent atrial fibrillation a few months ago.  He failed to maintain sinus rhythm on propafenone and we have been treating him with rate control and anticoagulation.  He had a visit with me on 04/24/2019.  Afterwards, he had a Holter that showed average ventricular rate of 76 beats per minute while on high-dose diltiazem 360 mg a day.  Symptomatically, he has no palpitation or chest pain.      The patient is having severe oxygen-dependent COPD.  He has been evaluated by Pulmonology and is considered not a candidate for lung transplant because of chronic renal insufficiency and other issues.      He has leg edema and worsening creatinine.  His Lasix has been stopped by Pulmonology.      The patient came to the clinic with his wife today.  He wears an oxygen tank.  He complains of dizziness with change of the position from the up position.  He has not had syncope or near syncope.      PHYSICAL EXAMINATION:    VITALS SIGNS:  Today, shows blood pressure 96/60.  The other reading on 07/22 showed blood pressure 87/60.      ASSESSMENT AND RECOMMENDATIONS:  Mr. Cooper has severe COPD and is oxygen dependent.  He is on a steroid and O2 supplement.  He  has been considered not a candidate for lung transplant and he will continue to rely on supplemental oxygen in the future.      The patient's atrial fibrillation is controlled on current dose of diltiazem.  However, his blood pressure is quite low.  I have decided to stop his lisinopril.  Hopefully, his blood pressure will improve.  I plan to see him in about 2 months.  If he continues to have a controlled ventricular rate without hypotension, we will continue medical therapy.  If he develops hypotension, we may have to talk about discontinuation of diltiazem and proceed with AV node ablation with pacemaker implantation.      cc:   Ana Pratt MD    [facility]    [address] [place] [address]         NORIS MARTINEZ MD             D: 2019   T: 2019   MT: KAYCE      Name:     FELIPE AYOUB   MRN:      [id number]        Account:      [id number]   :      1950           Service Date: 2019      Document: [id number]           Outpatient Encounter Medications as of 2019   Medication Sig Dispense Refill     acetaminophen (TYLENOL) 325 MG tablet Take 325-650 mg by mouth every 6 hours as needed for mild pain       apixaban ANTICOAGULANT (ELIQUIS) 5 MG tablet Take 1 tablet (5 mg) by mouth 2 times daily 180 tablet 3     clonazePAM (KLONOPIN) 1 MG tablet Take 1 tablet (1 mg) by mouth nightly as needed for anxiety       diltiazem ER COATED BEADS (CARDIZEM CD/CARTIA XT) 180 MG 24 hr capsule Take 1 capsule (180 mg) by mouth 2 times daily Hold for SBP < 110 or HR < 60       Fluticasone-Umeclidin-Vilanterol (TRELEGY ELLIPTA) 100-62.5-25 MCG/INH oral inhaler Inhale 1 puff into the lungs daily 1 Inhaler 2     Lidocaine (LIDOCARE) 4 % Patch Place 2 patches onto the skin daily as needed for moderate pain       oxyCODONE IR (ROXICODONE) 10 MG tablet Take 10 mg by mouth every 8 hours as needed for severe pain       predniSONE (DELTASONE) 10 MG tablet Take 10 mg by mouth 3 times daily         traZODone (DESYREL) 50 MG tablet Take 50 mg by mouth At Bedtime        Multiple Vitamins-Minerals (MULTIVITAMIN ADULTS PO) Take 1 tablet by mouth daily       order for DME Equipment being ordered: Nebulizer (Patient not taking: Reported on 2019) 1 each 0     order for DME Oxygen for nocturnal use. 1 LPM via nasal cannula  Testing done at home in chronic stable state.  Condition is COPD.  Patient does not have Obstructive Sleep Apnea.  Overnight oximetry revealed 30.3 minutes of hypoxia (<= 88%).  Duration: Lifetime (99 months). (Patient not taking: Reported on 2019) 1 each 99     Respiratory Therapy Supplies (AEROBIKA) KEELY 1 applicator 4 times daily 1 each 0     [] vancomycin (VANCOCIN) 250 MG capsule Take 1 capsule (250 mg) by mouth 4 times daily for 9 days 36 capsule 0     [DISCONTINUED] furosemide (LASIX) 40 MG tablet Take 1 tablet (40 mg) by mouth 2 times daily (Patient not taking: Reported on 2019)       [DISCONTINUED] insulin glargine (LANTUS SOLOSTAR PEN) 100 UNIT/ML pen Inject 8 Units Subcutaneous every morning (Patient not taking: Reported on 2019) 2.4 mL 0     [DISCONTINUED] lisinopril (PRINIVIL/ZESTRIL) 10 MG tablet Take 10 mg by mouth daily        [DISCONTINUED] metFORMIN (GLUCOPHAGE) 500 MG tablet Take 0.5 tablets (250 mg) by mouth daily (with breakfast) (Patient not taking: Reported on 2019) 15 tablet 0     No facility-administered encounter medications on file as of 2019.        Again, thank you for allowing me to participate in the care of your patient.      Sincerely,    Sussy Britt MD     [facility]

## 2019-07-31 NOTE — PROGRESS NOTES
Service Date: 07/31/2019      CLINIC NOTE      HISTORY OF PRESENT ILLNESS:  I saw Mr. Cooper for followup of atrial fibrillation.  He is a 68-year-old white male with a history of tricuspid repair.  The patient had atrial fibrillation and underwent catheter ablation on 07/01/2015 after he failed medical therapy.  He did well for about 2-3 years after the ablation without recurrent atrial fibrillation.  In between, he had frequent symptomatic PVCs and underwent 2 attempted PVC ablations.  Unfortunately, the patient had developed recurrent atrial fibrillation a few months ago.  He failed to maintain sinus rhythm on propafenone and we have been treating him with rate control and anticoagulation.  He had a visit with me on 04/24/2019.  Afterwards, he had a Holter that showed average ventricular rate of 76 beats per minute while on high-dose diltiazem 360 mg a day.  Symptomatically, he has no palpitation or chest pain.      The patient is having severe oxygen-dependent COPD.  He has been evaluated by Pulmonology and is considered not a candidate for lung transplant because of chronic renal insufficiency and other issues.      He has leg edema and worsening creatinine.  His Lasix has been stopped by Pulmonology.      The patient came to the clinic with his wife today.  He wears an oxygen tank.  He complains of dizziness with change of the position from the up position.  He has not had syncope or near syncope.      PHYSICAL EXAMINATION:    VITALS SIGNS:  Today, shows blood pressure 96/60.  The other reading on 07/22 showed blood pressure 87/60.      ASSESSMENT AND RECOMMENDATIONS:  Mr. Cooper has severe COPD and is oxygen dependent.  He is on a steroid and O2 supplement.  He has been considered not a candidate for lung transplant and he will continue to rely on supplemental oxygen in the future.      The patient's atrial fibrillation is controlled on current dose of diltiazem.  However, his blood pressure is quite low.   I have decided to stop his lisinopril.  Hopefully, his blood pressure will improve.  I plan to see him in about 2 months.  If he continues to have a controlled ventricular rate without hypotension, we will continue medical therapy.  If he develops hypotension, we may have to talk about discontinuation of diltiazem and proceed with AV node ablation with pacemaker implantation.      cc:   Ana Pratt MD    [address] [address]         NORIS MARTINEZ MD             D: 2019   T: 2019   MT: KAYCE      Name:     FELIPE AYOUB   MRN:      [id number]        Account:      [id number]   :      1950           Service Date: 2019      Document: [id number]

## 2019-07-31 NOTE — LETTER
7/31/2019    Ana Pratt MD  Formerly [facility] [address] [facility] [address]    RE: Tone Cooper       Dear Colleague,    I had the pleasure of seeing Tone Cooper in the [facility].    HPI and Plan:   See dictation    Orders Placed This Encounter   Procedures     Follow-Up with Electrophysiologist     EKG 12-lead complete w/read (Future)- to be scheduled       No orders of the defined types were placed in this encounter.      Medications Discontinued During This Encounter   Medication Reason     metFORMIN (GLUCOPHAGE) 500 MG tablet Stopped by Patient     insulin glargine (LANTUS SOLOSTAR PEN) 100 UNIT/ML pen Stopped by Patient     furosemide (LASIX) 40 MG tablet Discontinued by another Health Care Provider     lisinopril (PRINIVIL/ZESTRIL) 10 MG tablet          Encounter Diagnosis   Name Primary?     persistent atrial fi        CURRENT MEDICATIONS:  Current Outpatient Medications   Medication Sig Dispense Refill     acetaminophen (TYLENOL) 325 MG tablet Take 325-650 mg by mouth every 6 hours as needed for mild pain       apixaban ANTICOAGULANT (ELIQUIS) 5 MG tablet Take 1 tablet (5 mg) by mouth 2 times daily 180 tablet 3     clonazePAM (KLONOPIN) 1 MG tablet Take 1 tablet (1 mg) by mouth nightly as needed for anxiety       diltiazem ER COATED BEADS (CARDIZEM CD/CARTIA XT) 180 MG 24 hr capsule Take 1 capsule (180 mg) by mouth 2 times daily Hold for SBP < 110 or HR < 60       Fluticasone-Umeclidin-Vilanterol (TRELEGY ELLIPTA) 100-62.5-25 MCG/INH oral inhaler Inhale 1 puff into the lungs daily 1 Inhaler 2     Lidocaine (LIDOCARE) 4 % Patch Place 2 patches onto the skin daily as needed for moderate pain       oxyCODONE IR (ROXICODONE) 10 MG tablet Take 10 mg by mouth every 8 hours as needed for severe pain       predniSONE (DELTASONE) 10 MG tablet Take 10 mg by mouth 3 times daily        traZODone (DESYREL) 50 MG tablet Take 50 mg by mouth At Bedtime         Multiple Vitamins-Minerals (MULTIVITAMIN ADULTS PO) Take 1 tablet by mouth daily       order for DME Equipment being ordered: Nebulizer (Patient not taking: Reported on 7/31/2019) 1 each 0     order for DME Oxygen for nocturnal use. 1 LPM via nasal cannula  Testing done at home in chronic stable state.  Condition is COPD.  Patient does not have Obstructive Sleep Apnea.  Overnight oximetry revealed 30.3 minutes of hypoxia (<= 88%).  Duration: Lifetime (99 months). (Patient not taking: Reported on 7/31/2019) 1 each 99     Respiratory Therapy Supplies (AEROBIKA) KEELY 1 applicator 4 times daily 1 each 0       ALLERGIES     Allergies   Allergen Reactions     Amlodipine Swelling     Flecainide Palpitations       PAST MEDICAL HISTORY:  Past Medical History:   Diagnosis Date     Atrial flutter (H)      Cancer (H)     basal cell-nose     COPD (chronic obstructive pulmonary disease) (H)      Diverticulitis      Hepatitis 2011    Hepatitis C     Hypertension      Persistent atrial fibrillation (H) 4/28/09    ablation 7/1/2015     Premature beats      PVC (premature ventricular contraction)     s/p ablation 12/5/2017     Tricuspid valve disorder     Dr Aj, Hx of valve repair      Ventricular tachycardia (H)     nonsustained       PAST SURGICAL HISTORY:  Past Surgical History:   Procedure Laterality Date     ABDOMEN SURGERY      hernia repair right     APPENDECTOMY  as a child     CARDIAC SURGERY      [place]lations-[facility] [place]     CARDIOVERSION  06/03/2009    successful for afib     CARDIOVERSION  4/20/15    successful for afib     CARDIOVERSION  5/28/15     [facility]  [facility]     GENITOURINARY SURGERY      undescended testicle     H ABLATION ATRIAL FLUTTER       H ABLATION FOCAL AFIB  7/1/15    Left atrial linear ablation and pulmonary vein antrum ablation     H ABLATION PVC  12/5/16     INCISION AND DRAINAGE LOWER EXTREMITY, COMBINED Right 11/10/2016    Procedure: COMBINED INCISION AND DRAINAGE LOWER  EXTREMITY;  Surgeon: Roger Pacheco MD;  Location: [facility] OR     LAPAROSCOPIC CHOLECYSTECTOMY  2012    Procedure:LAPAROSCOPIC CHOLECYSTECTOMY; LAPAROSCOPIC CHOLECYSTECTOMY ; Surgeon:ROGER PACHECO; Location:[facility] OR     ORTHOPEDIC SURGERY      Left hip replacement     REPAIR VALVE TRICUSPID  08    conversion to a bileaflet valve and application of a 30 mm Sanderson ring     SMALL INTESTINE SURGERY      had bowel obstruction age 9     THORACIC SURGERY       VALVE REPLACEMENT      tricuspid       FAMILY HISTORY:  Family History   Problem Relation Age of Onset     Prostate Cancer Father      Family History Negative Mother      Emphysema Mother      Hypertension Mother      Cerebrovascular Disease Mother        SOCIAL HISTORY:  Social History     Socioeconomic History     Marital status:      Spouse name: None     Number of children: None     Years of education: None     Highest education level: None   Occupational History     None   Social Needs     Financial resource strain: None     Food insecurity:     Worry: None     Inability: None     Transportation needs:     Medical: None     Non-medical: None   Tobacco Use     Smoking status: Former Smoker     Packs/day: 1.50     Years: 41.00     Pack years: 61.50     Types: Paan with tobacco, gutka, zarda, khaini     Start date: 1977     Last attempt to quit: 2008     Years since quittin.5     Smokeless tobacco: Never Used   Substance and Sexual Activity     Alcohol use: Yes     Alcohol/week: 0.0 oz     Comment: 3-6 per month     Drug use: No     Sexual activity: Never     Partners: Female     Birth control/protection: None   Lifestyle     Physical activity:     Days per week: None     Minutes per session: None     Stress: None   Relationships     Social connections:     Talks on phone: None     Gets together: None     Attends Orthodox service: None     Active member of club or organization: None     Attends meetings of clubs or  "organizations: None     Relationship status: None     Intimate partner violence:     Fear of current or ex partner: None     Emotionally abused: None     Physically abused: None     Forced sexual activity: None   Other Topics Concern      Service Not Asked     Blood Transfusions Not Asked     Caffeine Concern No     Comment: 1 a day      Occupational Exposure Not Asked     Hobby Hazards Not Asked     Sleep Concern Yes     Comment: nocturia     Stress Concern Not Asked     Weight Concern No     Special Diet No     Back Care Not Asked     Exercise No     Bike Helmet Yes     Seat Belt Yes     Self-Exams Not Asked     Parent/sibling w/ CABG, MI or angioplasty before 65F 55M? No   Social History Narrative    Works in [employer] ([employer] work)    Is an  for the [employer] [facility] in [place], in between sets and entertain       Review of Systems:  Skin:  Positive for bruising     Eyes:  Positive for glasses;visual blurring    ENT:  Positive for hearing loss hearing aids  Respiratory:    wheezing;dyspnea on exertion COPD, On O2 daily 3L constantly, Bipap with O2   Cardiovascular:    Positive for;dizziness;palpitations;fatigue    Gastroenterology: Positive for   hx Cdiff; softer stools  Genitourinary:  Positive for urinary frequency drinking a lot of water per pt  Musculoskeletal:  Positive for back pain;muscular weakness    Neurologic:  Positive for numbness or tingling of hands;numbness or tingling of feet;headaches    Psychiatric:  Positive for excessive stress    Heme/Lymph/Imm:  Positive for easy bruising    Endocrine:  Negative diabetes      Physical Exam:  Vitals: BP 96/60 (BP Location: Right arm, Patient Position: Chair, Cuff Size: Adult Regular)   Pulse 88   Ht 1.803 m (5' 11\")   Wt 85 kg (187 lb 8 oz)   BMI 26.15 kg/m       Constitutional:  cooperative, alert and oriented, well developed, well nourished, in no acute distress        Skin:  warm and dry to the touch, no apparent skin " lesions or masses noted          Head:  normocephalic, no masses or lesions        Eyes:  no xanthalasma;EOMS intact;sclera white;conjunctivae and lids unremarkable        Lymph:      ENT:  no pallor or cyanosis, dentition good        Neck:  JVP normal;no carotid bruit        Respiratory:  clear to auscultation diminished breath sounds bilaterally       Cardiac: normal S1 and S2;regular rhythm;no murmurs, gallops or rubs detected occasional premature beats              pulses full and equal                               right femoral bruit (-) left subclavian bruit (-)      GI:  abdomen soft        Extremities and Muscular Skeletal:  no deformities, clubbing, cyanosis, erythema observed;no edema              Neurological:           Psych:           CC  Sussy Britt MD  [address]  [place] [address]                Thank you for allowing me to participate in the care of your patient.      Sincerely,     Sussy Britt MD     [facility]    cc:   Sussy Britt MD  [address]  [address]  [place] [address]

## 2019-08-13 NOTE — TELEPHONE ENCOUNTER
KELSEY for pt to call back as he does not have the paper work for the patient assistance program. Mell

## 2019-09-05 ENCOUNTER — ANCILLARY PROCEDURE (OUTPATIENT)
Dept: CT IMAGING | Facility: CLINIC | Age: 69
End: 2019-09-05
Attending: INTERNAL MEDICINE
Payer: COMMERCIAL

## 2019-09-05 DIAGNOSIS — R59.0 MEDIASTINAL ADENOPATHY: ICD-10-CM

## 2019-09-05 DIAGNOSIS — R91.1 LUNG NODULE: ICD-10-CM

## 2019-09-05 DIAGNOSIS — R06.00 DYSPNEA, UNSPECIFIED TYPE: Primary | ICD-10-CM

## 2019-09-05 LAB
6 MIN WALK (FT): 775 FT
6 MIN WALK (M): 236 M
CREAT BLD-MCNC: 1.6 MG/DL (ref 0.66–1.25)
GFR SERPL CREATININE-BSD FRML MDRD: 43 ML/MIN/{1.73_M2}

## 2019-09-05 RX ORDER — IOPAMIDOL 755 MG/ML
92 INJECTION, SOLUTION INTRAVASCULAR ONCE
Status: COMPLETED | OUTPATIENT
Start: 2019-09-05 | End: 2019-09-05

## 2019-09-05 RX ADMIN — IOPAMIDOL 92 ML: 755 INJECTION, SOLUTION INTRAVASCULAR at 12:35

## 2019-09-06 ENCOUNTER — TELEPHONE (OUTPATIENT)
Dept: PULMONOLOGY | Facility: CLINIC | Age: 69
End: 2019-09-06

## 2019-09-06 NOTE — TELEPHONE ENCOUNTER
Notes recorded by Octavia Acevedo MD on 9/6/2019 at 12:26 PM CDT:  Nodules resolved.  I have no additional recommendations at this time. Keep scheduled follow up.      Call placed to pt regarding CT results completed 9/5/19. Nodules resolved. Pt very thankful for call and glad to have good news. Recommendation is to follow up as scheduled. Per last office note pt was to follow up in 3 months, he currently has nothing scheduled. Next available appointment scheduled for 10/18/19.

## 2019-09-08 LAB — FIO2-PRE: 32 %

## 2019-09-23 ENCOUNTER — TELEPHONE (OUTPATIENT)
Dept: CARDIOLOGY | Facility: CLINIC | Age: 69
End: 2019-09-23

## 2019-09-23 NOTE — TELEPHONE ENCOUNTER
"9/23/19 Pt called stating that he has been out of Eliquis for about a week, stating the cost is \"way too high for him to pay\". Pt stated he has been on Warfarin in the past but he has trouble w transportation so coming in for blood tests is very inconvenient for him. Set aside 1 mo of Eliquis 5 mg samples for pt to  in [place] on 9/25. Will send his information to PA team for review. Pt voiced understanding and agreement w beata Polanco 3:55 pm  "

## 2019-10-02 ENCOUNTER — HEALTH MAINTENANCE LETTER (OUTPATIENT)
Age: 69
End: 2019-10-02

## 2019-10-09 ENCOUNTER — OFFICE VISIT (OUTPATIENT)
Dept: CARDIOLOGY | Facility: CLINIC | Age: 69
End: 2019-10-09
Attending: INTERNAL MEDICINE
Payer: COMMERCIAL

## 2019-10-09 VITALS
DIASTOLIC BLOOD PRESSURE: 76 MMHG | WEIGHT: 192.3 LBS | BODY MASS INDEX: 26.92 KG/M2 | SYSTOLIC BLOOD PRESSURE: 128 MMHG | HEART RATE: 94 BPM | HEIGHT: 71 IN

## 2019-10-09 DIAGNOSIS — I48.0 PAROXYSMAL ATRIAL FIBRILLATION (H): ICD-10-CM

## 2019-10-09 PROCEDURE — 99214 OFFICE O/P EST MOD 30 MIN: CPT | Mod: 25 | Performed by: INTERNAL MEDICINE

## 2019-10-09 PROCEDURE — 93000 ELECTROCARDIOGRAM COMPLETE: CPT | Performed by: INTERNAL MEDICINE

## 2019-10-09 RX ORDER — METOPROLOL SUCCINATE 50 MG/1
50 TABLET, EXTENDED RELEASE ORAL DAILY
Qty: 50 TABLET | Refills: 3 | Status: SHIPPED | OUTPATIENT
Start: 2019-10-09 | End: 2019-12-26

## 2019-10-09 RX ORDER — BUMETANIDE 1 MG/1
1 TABLET ORAL DAILY
COMMUNITY

## 2019-10-09 RX ORDER — LEVOFLOXACIN 500 MG/1
500 TABLET, FILM COATED ORAL DAILY
COMMUNITY
End: 2019-12-26

## 2019-10-09 ASSESSMENT — MIFFLIN-ST. JEOR: SCORE: 1669.4

## 2019-10-09 NOTE — LETTER
10/9/2019      Ana Pratt MD  [facility] [address] [facility] [address]      RE: Tone Cooper       Dear Colleague,    I had the pleasure of seeing Tone Cooper in the [facility].    Service Date: 10/09/2019      Ana Pratt MD   [facility]    [address] [place] [address]      RE: Tone Cooper    MRN: [id number]   : 1950      Dear Dr. Pratt:       I saw Mr. Cooper for followup of atrial fibrillation.  He is a 68-year-old white male who had a previous catheter ablation of atrial fibrillation and maintained sinus rhythm well for a few years after the ablation.  He had subsequent recurrent frequent PVCs and underwent 2 attempted PVC ablations.  His PVC has not been a problem over the last couple of years.  The patient had recurrent atrial fibrillation/flutter while struggling with severe COPD.  He has been evaluated by Pulmonology, and he is not a candidate for a lung transplant.  He is on steroids and currently is being followed by Pulmonology.  During the last clinic visit, his blood pressure was quite low.  For the last few weeks, he has been doing better with no dizziness.  He requires 24 hour supplemental oxygen.  He does not feel palpitations.  He denies chest pain.      PHYSICAL EXAMINATION:   VITAL SIGNS:  Blood pressure 128/76, heart rate 107 beats per minute, body weight up to 192 pounds.   LUNGS:  Clear.   CARDIAC:  Rhythm was irregularly irregular.  The heart sounds were normal without murmur.   ABDOMEN:  No hepatomegaly.   EXTREMITIES:  There was no pedal edema.      ASSESSMENT AND RECOMMENDATIONS:  Mr. Cooper is relatively stable from the Cardiology point of view.  His blood pressure has improved somewhat.  His heart rate is slightly fast.  I therefore added Toprol XL 50 mg p.o. daily for better rate control.  Because he is on steroids, I am not comfortable to ablate the AV node ablation and give him  pacemaker implantation.  I plan to see him for followup in about 6 months. Repeat AF  Ablation can be considered if his lung conditions are stable.     Sincerely,      MD NORIS De Leon MD             D: 10/09/2019   T: 10/09/2019   MT: kiran      Name:     FELIPE AYOUB   MRN:      [id number]        Account:      [id number]   :      1950           Service Date: 10/09/2019      Document: [id number]           Outpatient Encounter Medications as of 10/9/2019   Medication Sig Dispense Refill     acetaminophen (TYLENOL) 325 MG tablet Take 325-650 mg by mouth every 6 hours as needed for mild pain       apixaban ANTICOAGULANT (ELIQUIS) 5 MG tablet Take 1 tablet (5 mg) by mouth 2 times daily 180 tablet 3     Bumetanide (BUMEX PO) Dosage is unknown per pt       clonazePAM (KLONOPIN) 1 MG tablet Take 1 tablet (1 mg) by mouth nightly as needed for anxiety       diltiazem ER COATED BEADS (CARDIZEM CD/CARTIA XT) 180 MG 24 hr capsule Take 1 capsule (180 mg) by mouth 2 times daily Hold for SBP < 110 or HR < 60       Fluticasone-Umeclidin-Vilanterol (TRELEGY ELLIPTA) 100-62.5-25 MCG/INH oral inhaler Inhale 1 puff into the lungs daily 1 Inhaler 2     guaiFENesin (MUCINEX PO) Take by mouth as needed       levofloxacin (LEVAQUIN) 500 MG tablet Take 500 mg by mouth daily       Lidocaine (LIDOCARE) 4 % Patch Place 2 patches onto the skin daily as needed for moderate pain       metoprolol succinate ER (TOPROL XL) 50 MG 24 hr tablet Take 1 tablet (50 mg) by mouth daily 50 tablet 3     order for DME Equipment being ordered: Nebulizer 1 each 0     oxyCODONE IR (ROXICODONE) 10 MG tablet Take 10 mg by mouth every 8 hours as needed for severe pain       predniSONE (DELTASONE) 10 MG tablet Take 5 mg by mouth 2 times daily        Respiratory Therapy Supplies (AEROBIKA) KEELY 1 applicator 4 times daily 1 each 0     traZODone (DESYREL) 50 MG tablet Take 50 mg by mouth At Bedtime        Multiple Vitamins-Minerals  (MULTIVITAMIN ADULTS PO) Take 1 tablet by mouth daily       order for DME Oxygen for nocturnal use. 1 LPM via nasal cannula  Testing done at home in chronic stable state.  Condition is COPD.  Patient does not have Obstructive Sleep Apnea.  Overnight oximetry revealed 30.3 minutes of hypoxia (<= 88%).  Duration: Lifetime (99 months). (Patient not taking: Reported on 7/31/2019) 1 each 99     [DISCONTINUED] vancomycin (VANCOCIN) 250 MG capsule Take 1 capsule (250 mg) by mouth 4 times daily for 9 days (Patient not taking: Reported on 10/9/2019) 36 capsule 0     Facility-Administered Encounter Medications as of 10/9/2019   Medication Dose Route Frequency Provider Last Rate Last Dose     Saline for CT  60 mL Intravenous Once Eliazar Valencia, DO                   Again, thank you for allowing me to participate in the care of your patient.      Sincerely,    Sussy Britt MD     [facility]

## 2019-10-09 NOTE — LETTER
10/9/2019    Ana Pratt MD  [facility] [address] [address] [facility] [address]    RE: Tone Cooper       Dear Colleague,    I had the pleasure of seeing Tone Cooper in the [facility].    HPI and Plan:   See dictation    Orders Placed This Encounter   Procedures     Follow-Up with Electrophysiologist     EKG 12-lead complete w/read (Future)- to be scheduled       Orders Placed This Encounter   Medications     Bumetanide (BUMEX PO)     Sig: Dosage is unknown per pt     guaiFENesin (MUCINEX PO)     Sig: Take by mouth as needed     levofloxacin (LEVAQUIN) 500 MG tablet     Sig: Take 500 mg by mouth daily     metoprolol succinate ER (TOPROL XL) 50 MG 24 hr tablet     Sig: Take 1 tablet (50 mg) by mouth daily     Dispense:  50 tablet     Refill:  3       There are no discontinued medications.      Encounter Diagnosis   Name Primary?     persistent atrial fi        CURRENT MEDICATIONS:  Current Outpatient Medications   Medication Sig Dispense Refill     acetaminophen (TYLENOL) 325 MG tablet Take 325-650 mg by mouth every 6 hours as needed for mild pain       apixaban ANTICOAGULANT (ELIQUIS) 5 MG tablet Take 1 tablet (5 mg) by mouth 2 times daily 180 tablet 3     Bumetanide (BUMEX PO) Dosage is unknown per pt       clonazePAM (KLONOPIN) 1 MG tablet Take 1 tablet (1 mg) by mouth nightly as needed for anxiety       diltiazem ER COATED BEADS (CARDIZEM CD/CARTIA XT) 180 MG 24 hr capsule Take 1 capsule (180 mg) by mouth 2 times daily Hold for SBP < 110 or HR < 60       Fluticasone-Umeclidin-Vilanterol (TRELEGY ELLIPTA) 100-62.5-25 MCG/INH oral inhaler Inhale 1 puff into the lungs daily 1 Inhaler 2     guaiFENesin (MUCINEX PO) Take by mouth as needed       levofloxacin (LEVAQUIN) 500 MG tablet Take 500 mg by mouth daily       Lidocaine (LIDOCARE) 4 % Patch Place 2 patches onto the skin daily as needed for moderate pain       metoprolol succinate ER (TOPROL XL) 50 MG 24 hr  tablet Take 1 tablet (50 mg) by mouth daily 50 tablet 3     order for DME Equipment being ordered: Nebulizer 1 each 0     oxyCODONE IR (ROXICODONE) 10 MG tablet Take 10 mg by mouth every 8 hours as needed for severe pain       predniSONE (DELTASONE) 10 MG tablet Take 5 mg by mouth 2 times daily        Respiratory Therapy Supplies (AEROBIKA) EKELY 1 applicator 4 times daily 1 each 0     traZODone (DESYREL) 50 MG tablet Take 50 mg by mouth At Bedtime        Multiple Vitamins-Minerals (MULTIVITAMIN ADULTS PO) Take 1 tablet by mouth daily       order for DME Oxygen for nocturnal use. 1 LPM via nasal cannula  Testing done at home in chronic stable state.  Condition is COPD.  Patient does not have Obstructive Sleep Apnea.  Overnight oximetry revealed 30.3 minutes of hypoxia (<= 88%).  Duration: Lifetime (99 months). (Patient not taking: Reported on 7/31/2019) 1 each 99       ALLERGIES     Allergies   Allergen Reactions     Amlodipine Swelling     Flecainide Palpitations and Rash       PAST MEDICAL HISTORY:  Past Medical History:   Diagnosis Date     Atrial flutter (H)      Cancer (H)     basal cell-nose     COPD (chronic obstructive pulmonary disease) (H)      Diverticulitis      Hepatitis 2011    Hepatitis C     Hypertension      Persistent atrial fibrillation 4/28/09    ablation 7/1/2015     Premature beats      PVC (premature ventricular contraction)     s/p ablation 12/5/2017     Tricuspid valve disorder     Dr Aj, Hx of valve repair      Ventricular tachycardia (H)     nonsustained       PAST SURGICAL HISTORY:  Past Surgical History:   Procedure Laterality Date     ABDOMEN SURGERY      hernia repair right     APPENDECTOMY  as a child     CARDIAC SURGERY      [facility] ablations-[facility]     CARDIOVERSION  06/03/2009    successful for afib     CARDIOVERSION  4/20/15    successful for afib     CARDIOVERSION  5/28/15     COL[facility]OSCOPY  [facility]/2018    [facility]     GENITOURINARY SURGERY      undescended testicle     H  ABLATION ATRIAL FLUTTER       H ABLATION FOCAL AFIB  7/1/15    Left atrial linear ablation and pulmonary vein antrum ablation     H ABLATION PVC  16     INCISION AND DRAINAGE LOWER EXTREMITY, COMBINED Right 11/10/2016    Procedure: COMBINED INCISION AND DRAINAGE LOWER EXTREMITY;  Surgeon: Roger Pacheco MD;  Location: [facility] OR     LAPAROSCOPIC CHOLECYSTECTOMY  2012    Procedure:LAPAROSCOPIC CHOLECYSTECTOMY; LAPAROSCOPIC CHOLECYSTECTOMY ; Surgeon:ROGER PACHECO; Location:[facility] OR     ORTHOPEDIC SURGERY      Left hip replacement     REPAIR VALVE TRICUSPID  08    conversion to a bileaflet valve and application of a 30 mm Sanderson ring     SMALL INTESTINE SURGERY      had bowel obstruction age 9     THORACIC SURGERY       VALVE REPLACEMENT      tricuspid       FAMILY HISTORY:  Family History   Problem Relation Age of Onset     Prostate Cancer Father      Family History Negative Mother      Emphysema Mother      Hypertension Mother      Cerebrovascular Disease Mother        SOCIAL HISTORY:  Social History     Socioeconomic History     Marital status:      Spouse name: None     Number of children: None     Years of education: None     Highest education level: None   Occupational History     None   Social Needs     Financial resource strain: None     Food insecurity:     Worry: None     Inability: None     Transportation needs:     Medical: None     Non-medical: None   Tobacco Use     Smoking status: Former Smoker     Packs/day: 1.50     Years: 41.00     Pack years: 61.50     Types: Paan with tobacco, gutka, zarda, khaini     Start date: 1977     Last attempt to quit: 2008     Years since quittin.7     Smokeless tobacco: Never Used   Substance and Sexual Activity     Alcohol use: Yes     Alcohol/week: 0.0 standard drinks     Comment: 3 per month     Drug use: No     Sexual activity: Never     Partners: Female     Birth control/protection: None   Lifestyle     Physical  activity:     Days per week: None     Minutes per session: None     Stress: None   Relationships     Social connections:     Talks on phone: None     Gets together: None     Attends Holiness service: None     Active member of club or organization: None     Attends meetings of clubs or organizations: None     Relationship status: None     Intimate partner violence:     Fear of current or ex partner: None     Emotionally abused: None     Physically abused: None     Forced sexual activity: None   Other Topics Concern      Service Not Asked     Blood Transfusions Not Asked     Caffeine Concern No     Comment: 1 a day      Occupational Exposure Not Asked     Hobby Hazards Not Asked     Sleep Concern Yes     Comment: nocturia     Stress Concern Not Asked     Weight Concern No     Special Diet No     Back Care Not Asked     Exercise No     Bike Helmet Yes     Seat Belt Yes     Self-Exams Not Asked     Parent/sibling w/ CABG, MI or angioplasty before 65F 55M? No   Social History Narrative    Works in [employer] ([employer] work)    Is an  for the [employer] in [place], in between sets and entertain       Review of Systems:  Skin:  Positive for bruising skin cancer removed recently   Eyes:  Positive for glasses;visual blurring    ENT:  Positive for hearing loss hearing aids  Respiratory:  Positive for wheezing;dyspnea on exertion COPD, On O2 daily 3L when at home, Bipap with O2; wheezing has improved   Cardiovascular:    Positive for;chest pain;edema;fatigue chest pain on the left side last night - lasted about 1 hour  Gastroenterology: not assessed      Genitourinary:  not assessed      Musculoskeletal:  Positive for back pain;muscular weakness R elbow  Neurologic:  Positive for numbness or tingling of hands;numbness or tingling of feet right hand thumb and 1st 2 fingers, and bottom of both foot  Psychiatric:  not assessed      Heme/Lymph/Imm:  Positive for easy bruising    Endocrine:  Negative  "       Physical Exam:  Vitals: /76   Pulse 94   Ht 1.803 m (5' 11\")   Wt 87.2 kg (192 lb 4.8 oz)   BMI 26.82 kg/m       Constitutional:           Skin:             Head:           Eyes:           Lymph:      ENT:           Neck:           Respiratory:            Cardiac:                                                           GI:           Extremities and Muscular Skeletal:                 Neurological:           Psych:           CC  Sussy Britt MD  [address]  [address]  [place] [address]                Thank you for allowing me to participate in the care of your patient.      Sincerely,     Sussy Britt MD     [facility]    cc:   Sussy Britt MD  [address]  [address]  [place] [address]        "

## 2019-10-09 NOTE — PROGRESS NOTES
Service Date: 10/09/2019      Ana Partt MD   [facility]    [address] [place] [address]      RE: Tone Cooper    MRN: [id number]   : 1950      Dear Dr. Pratt:       I saw Mr. Cooper for followup of atrial fibrillation.  He is a 68-year-old white male who had a previous catheter ablation of atrial fibrillation and maintained sinus rhythm well for a few years after the ablation.  He had subsequent recurrent frequent PVCs and underwent 2 attempted PVC ablations.  His PVC has not been a problem over the last couple of years.  The patient had recurrent atrial fibrillation/flutter while struggling with severe COPD.  He has been evaluated by Pulmonology, and he is not a candidate for a lung transplant.  He is on steroids and currently is being followed by Pulmonology.  During the last clinic visit, his blood pressure was quite low.  For the last few weeks, he has been doing better with no dizziness.  He requires 24 hour supplemental oxygen.  He does not feel palpitations.  He denies chest pain.      PHYSICAL EXAMINATION:   VITAL SIGNS:  Blood pressure 128/76, heart rate 107 beats per minute, body weight up to 192 pounds.   LUNGS:  Clear.   CARDIAC:  Rhythm was irregularly irregular.  The heart sounds were normal without murmur.   ABDOMEN:  No hepatomegaly.   EXTREMITIES:  There was no pedal edema.      ASSESSMENT AND RECOMMENDATIONS:  Mr. Cooper is relatively stable from the Cardiology point of view.  His blood pressure has improved somewhat.  His heart rate is slightly fast.  I therefore added Toprol XL 50 mg p.o. daily for better rate control.  Because he is on steroids, I am not comfortable to ablate the AV node ablation and give him pacemaker implantation.  I plan to see him for followup in about 6 months. Repeat AF  Ablation can be considered if his lung conditions are stable.     Sincerely,      MD NORIS De Leon MD             D: 10/09/2019   T: 10/09/2019   MT: kiran       Name:     FELIPE AYOUB   MRN:      [id number]        Account:      [id number]   :      1950           Service Date: 10/09/2019      Document: [id number]

## 2019-10-09 NOTE — PROGRESS NOTES
HPI and Plan:   See dictation    Orders Placed This Encounter   Procedures     Follow-Up with Electrophysiologist     EKG 12-lead complete w/read (Future)- to be scheduled       Orders Placed This Encounter   Medications     Bumetanide (BUMEX PO)     Sig: Dosage is unknown per pt     guaiFENesin (MUCINEX PO)     Sig: Take by mouth as needed     levofloxacin (LEVAQUIN) 500 MG tablet     Sig: Take 500 mg by mouth daily     metoprolol succinate ER (TOPROL XL) 50 MG 24 hr tablet     Sig: Take 1 tablet (50 mg) by mouth daily     Dispense:  50 tablet     Refill:  3       There are no discontinued medications.      Encounter Diagnosis   Name Primary?     persistent atrial fi        CURRENT MEDICATIONS:  Current Outpatient Medications   Medication Sig Dispense Refill     acetaminophen (TYLENOL) 325 MG tablet Take 325-650 mg by mouth every 6 hours as needed for mild pain       apixaban ANTICOAGULANT (ELIQUIS) 5 MG tablet Take 1 tablet (5 mg) by mouth 2 times daily 180 tablet 3     Bumetanide (BUMEX PO) Dosage is unknown per pt       clonazePAM (KLONOPIN) 1 MG tablet Take 1 tablet (1 mg) by mouth nightly as needed for anxiety       diltiazem ER COATED BEADS (CARDIZEM CD/CARTIA XT) 180 MG 24 hr capsule Take 1 capsule (180 mg) by mouth 2 times daily Hold for SBP < 110 or HR < 60       Fluticasone-Umeclidin-Vilanterol (TRELEGY ELLIPTA) 100-62.5-25 MCG/INH oral inhaler Inhale 1 puff into the lungs daily 1 Inhaler 2     guaiFENesin (MUCINEX PO) Take by mouth as needed       levofloxacin (LEVAQUIN) 500 MG tablet Take 500 mg by mouth daily       Lidocaine (LIDOCARE) 4 % Patch Place 2 patches onto the skin daily as needed for moderate pain       metoprolol succinate ER (TOPROL XL) 50 MG 24 hr tablet Take 1 tablet (50 mg) by mouth daily 50 tablet 3     order for DME Equipment being ordered: Nebulizer 1 each 0     oxyCODONE IR (ROXICODONE) 10 MG tablet Take 10 mg by mouth every 8 hours as needed for severe pain       predniSONE  (DELTASONE) 10 MG tablet Take 5 mg by mouth 2 times daily        Respiratory Therapy Supplies (AEROBIKA) KEELY 1 applicator 4 times daily 1 each 0     traZODone (DESYREL) 50 MG tablet Take 50 mg by mouth At Bedtime        Multiple Vitamins-Minerals (MULTIVITAMIN ADULTS PO) Take 1 tablet by mouth daily       order for DME Oxygen for nocturnal use. 1 LPM via nasal cannula  Testing done at home in chronic stable state.  Condition is COPD.  Patient does not have Obstructive Sleep Apnea.  Overnight oximetry revealed 30.3 minutes of hypoxia (<= 88%).  Duration: Lifetime (99 months). (Patient not taking: Reported on 7/31/2019) 1 each 99       ALLERGIES     Allergies   Allergen Reactions     Amlodipine Swelling     Flecainide Palpitations and Rash       PAST MEDICAL HISTORY:  Past Medical History:   Diagnosis Date     Atrial flutter (H)      Cancer (H)     basal cell-nose     COPD (chronic obstructive pulmonary disease) (H)      Diverticulitis      Hepatitis 2011    Hepatitis C     Hypertension      Persistent atrial fibrillation 4/28/09    ablation 7/1/2015     Premature beats      PVC (premature ventricular contraction)     s/p ablation 12/5/2017     Tricuspid valve disorder     Dr Aj, Hx of valve repair      Ventricular tachycardia (H)     nonsustained       PAST SURGICAL HISTORY:  Past Surgical History:   Procedure Laterality Date     ABDOMEN SURGERY      hernia repair right     APPENDECTOMY  as a child     CARDIAC SURGERY      [place]lations[place]     CARDIOVERSION  06/03/2009    successful for afib     CARDIOVERSION  4/20/15    successful for afib     CARDIOVERSION  5/28/15     COLONOSCOPY  01/2018    [facility]     GENITOURINARY SURGERY      undescended testicle     H ABLATION ATRIAL FLUTTER       H ABLATION FOCAL AFIB  7/1/15    Left atrial linear ablation and pulmonary vein antrum ablation     H ABLATION PVC  12/5/16     INCISION AND DRAINAGE LOWER EXTREMITY, COMBINED Right 11/10/2016    Procedure:  COMBINED INCISION AND DRAINAGE LOWER EXTREMITY;  Surgeon: Roger Pacheco MD;  Location: [facility] OR     LAPAROSCOPIC CHOLECYSTECTOMY  2012    Procedure:LAPAROSCOPIC CHOLECYSTECTOMY; LAPAROSCOPIC CHOLECYSTECTOMY ; Surgeon:ROGER PACHECO; Location:[facility] OR     ORTHOPEDIC SURGERY      Left hip replacement     REPAIR VALVE TRICUSPID  08    conversion to a bileaflet valve and application of a 30 mm Sanderson ring     SMALL INTESTINE SURGERY      had bowel obstruction age 9     THORACIC SURGERY       VALVE REPLACEMENT      tricuspid       FAMILY HISTORY:  Family History   Problem Relation Age of Onset     Prostate Cancer Father      Family History Negative Mother      Emphysema Mother      Hypertension Mother      Cerebrovascular Disease Mother        SOCIAL HISTORY:  Social History     Socioeconomic History     Marital status:      Spouse name: None     Number of children: None     Years of education: None     Highest education level: None   Occupational History     None   Social Needs     Financial resource strain: None     Food insecurity:     Worry: None     Inability: None     Transportation needs:     Medical: None     Non-medical: None   Tobacco Use     Smoking status: Former Smoker     Packs/day: 1.50     Years: 41.00     Pack years: 61.50     Types: Paan with tobacco, gutka, zarda, khaini     Start date: 1977     Last attempt to quit: 2008     Years since quittin.7     Smokeless tobacco: Never Used   Substance and Sexual Activity     Alcohol use: Yes     Alcohol/week: 0.0 standard drinks     Comment: 3 per month     Drug use: No     Sexual activity: Never     Partners: Female     Birth control/protection: None   Lifestyle     Physical activity:     Days per week: None     Minutes per session: None     Stress: None   Relationships     Social connections:     Talks on phone: None     Gets together: None     Attends Sikh service: None     Active member of club or  "organization: None     Attends meetings of clubs or organizations: None     Relationship status: None     Intimate partner violence:     Fear of current or ex partner: None     Emotionally abused: None     Physically abused: None     Forced sexual activity: None   Other Topics Concern      Service Not Asked     Blood Transfusions Not Asked     Caffeine Concern No     Comment: 1 a day      Occupational Exposure Not Asked     Hobby Hazards Not Asked     Sleep Concern Yes     Comment: nocturia     Stress Concern Not Asked     Weight Concern No     Special Diet No     Back Care Not Asked     Exercise No     Bike Helmet Yes     Seat Belt Yes     Self-Exams Not Asked     Parent/sibling w/ CABG, MI or angioplasty before 65F 55M? No   Social History Narrative    Works in [employer] ([employer] work)    Is an  for [employer] [facility] in [place], in between sets and entertain       Review of Systems:  Skin:  Positive for bruising skin cancer removed recently   Eyes:  Positive for glasses;visual blurring    ENT:  Positive for hearing loss hearing aids  Respiratory:  Positive for wheezing;dyspnea on exertion COPD, On O2 daily 3L when at home, Bipap with O2; wheezing has improved   Cardiovascular:    Positive for;chest pain;edema;fatigue chest pain on the left side last night - lasted about 1 hour  Gastroenterology: not assessed      Genitourinary:  not assessed      Musculoskeletal:  Positive for back pain;muscular weakness R elbow  Neurologic:  Positive for numbness or tingling of hands;numbness or tingling of feet right hand thumb and 1st 2 fingers, and bottom of both foot  Psychiatric:  not assessed      Heme/Lymph/Imm:  Positive for easy bruising    Endocrine:  Negative        Physical Exam:  Vitals: /76   Pulse 94   Ht 1.803 m (5' 11\")   Wt 87.2 kg (192 lb 4.8 oz)   BMI 26.82 kg/m      Constitutional:           Skin:             Head:           Eyes:           Lymph:      ENT:       "     Neck:           Respiratory:            Cardiac:                                                           GI:           Extremities and Muscular Skeletal:                 Neurological:           Psych:           CC  Sussy Britt MD  [id number]  [id number]  [place] [id number]

## 2019-10-18 ENCOUNTER — OFFICE VISIT (OUTPATIENT)
Dept: PULMONOLOGY | Facility: CLINIC | Age: 69
End: 2019-10-18
Attending: INTERNAL MEDICINE
Payer: COMMERCIAL

## 2019-10-18 VITALS
SYSTOLIC BLOOD PRESSURE: 134 MMHG | OXYGEN SATURATION: 93 % | HEIGHT: 71 IN | HEART RATE: 92 BPM | BODY MASS INDEX: 26.88 KG/M2 | RESPIRATION RATE: 18 BRPM | DIASTOLIC BLOOD PRESSURE: 78 MMHG | WEIGHT: 192 LBS

## 2019-10-18 DIAGNOSIS — J41.8 MIXED SIMPLE AND MUCOPURULENT CHRONIC BRONCHITIS (H): Primary | ICD-10-CM

## 2019-10-18 PROCEDURE — G0463 HOSPITAL OUTPT CLINIC VISIT: HCPCS | Mod: ZF

## 2019-10-18 RX ORDER — AZITHROMYCIN 250 MG/1
250 TABLET, FILM COATED ORAL DAILY
Qty: 30 TABLET | Refills: 3 | Status: SHIPPED | OUTPATIENT
Start: 2019-10-18 | End: 2019-12-26

## 2019-10-18 ASSESSMENT — ENCOUNTER SYMPTOMS
DECREASED CONCENTRATION: 1
SNORES LOUDLY: 0
PANIC: 0
WHEEZING: 1
SLEEP DISTURBANCES DUE TO BREATHING: 0
NERVOUS/ANXIOUS: 0
ORTHOPNEA: 0
SYNCOPE: 0
LEG PAIN: 0
DEPRESSION: 0
HYPERTENSION: 0
SHORTNESS OF BREATH: 1
COUGH: 1
INSOMNIA: 1
DYSPNEA ON EXERTION: 1
PALPITATIONS: 1
LIGHT-HEADEDNESS: 1
SPUTUM PRODUCTION: 1
HYPOTENSION: 0
HEMOPTYSIS: 0
COUGH DISTURBING SLEEP: 0
POSTURAL DYSPNEA: 0
EXERCISE INTOLERANCE: 1

## 2019-10-18 ASSESSMENT — MIFFLIN-ST. JEOR: SCORE: 1667.78

## 2019-10-18 ASSESSMENT — PAIN SCALES - GENERAL: PAINLEVEL: NO PAIN (0)

## 2019-10-18 NOTE — LETTER
10/18/2019       RE: Tone Cooper  [address] [facility] [id number]     Dear Colleague,    Thank you for referring your patient, Tone Cooper, to the [facility] at [facility]. Please see a copy of my visit note below.    [facility] Pulmonology Follow Up Visit    Tone Cooper is a 68 year old male who is seen in follow up for COPD.     Assessment and plan: Tone Cooper is a 67 yo male with very severe COPD, chronic respiratory failure requiring supplemental O2, atrial fibrillation s/p ablation on apixaban, and tricuspid repair who presents to clinic today for follow-up of COPD.  Currently doing well but does have a history of recurrent exacerbations since October 2018.  He finished pulmonary rehab and has benefited from this.  Will start him on azithromycin for steroid-sparing agent given high rate of recurrent exacerbations.   1. Very severe COPD (FEV1 31%)  2. ISIS, on Bpap  3. Hypoxic/hypercapnic respiratory failure, on 3.5 L supplemental O2  4. A. Fib on apixaban  5. New 4.4 mm RML spiculated nodule (since 2/2019) on chest CT, reduced in size in f/u chest CT and does not need further following     Recommendations are as follows:   - continue trelegy  - Sputum sample cups given to patient today to be dropped off at a [place]-lab  - Azithromycin 250 mg daily to be started for recurrent exacerbations of COPD, QTC is okay   - Taper prednisone as follows: 10 mg daily for a week and then 5 mg daily for a month then 2-1/2 mg daily for a month and 1 mg daily for a month  - continue supplemenatal O2  - aerobika device: use this at least QID (teaching provided today)  - he will need pneumovax   - wash hands regularly and avoid sick contacts     RTC 2 months.   Patient seen and discussed with staff pulmonologist, Dr. Matias.   Zoie Evans MD  Pulmonary and Critical Care Fellow, PGY-7  Pager:  [phone]    Attending note:  Patient seen, examined and discussed with Dr. Evans.  All data reviewed. Agree with the assessment and plan as outlined in the above note.   Severe COPD. Will start on daily azithromycin; continue slow prednisone taper.  Lucinda Matias MD  [phone]    Pulmonary HPI  Mr. Cooper is present in the pulmonary clinic today with his wife for follow-up of COPD.  He is present today with his wife.  He was last seen in the pulmonary clinic in June 2019.    Today he reports that he is doing okay.  He has started the trilogy inhaler and does like using this.  He says that he coughs up quite a bit of stuff that is not different from his baseline but on certain days it is worse than others and on those days when he has more mucus he feels poorly.  This is always later in the day.  The mucus is yellow in color no blood.  He does use BiPAP at night.  He reports receiving multiple courses of antibiotics recently including a Z-Basim for 5 days, Levaquin for 7 days which is been extended for a few more days.  He did finish antibiotics a few days ago.  He says that while off on antibiotics his cough improved significantly but then after these are completed it returns.  He was on 5 mg of prednisone daily up until a few days ago when he and his wife decided to increase his prednisone to 20 mg daily.    He denies GERD, postnasal drainage, seasonal allergies.    He did receive his flu shot on September 23.  He can walk about 1 or 2 blocks with his walker and this is not significantly changed from May.  When it's hot he cannot walk as far, more along the lines of half a block.  He does report triggers to his breathing including secondhand smoke.      He was evaluated by the lung transplant team who deemed that he is not a candidate due to his multiple comorbidities as well as concern for malignancy.  It was offered that he return to have further evaluation given that his nodule is likely benign but he declined at  "this time.        The patient was seen and examined by  Zoie Evans MD   Current Outpatient Medications   Medication     acetaminophen (TYLENOL) 325 MG tablet     apixaban ANTICOAGULANT (ELIQUIS) 5 MG tablet     Bumetanide (BUMEX PO)     clonazePAM (KLONOPIN) 1 MG tablet     diltiazem ER COATED BEADS (CARDIZEM CD/CARTIA XT) 180 MG 24 hr capsule     Fluticasone-Umeclidin-Vilanterol (TRELEGY ELLIPTA) 100-62.5-25 MCG/INH oral inhaler     guaiFENesin (MUCINEX PO)     levofloxacin (LEVAQUIN) 500 MG tablet     Lidocaine (LIDOCARE) 4 % Patch     metoprolol succinate ER (TOPROL XL) 50 MG 24 hr tablet     Multiple Vitamins-Minerals (MULTIVITAMIN ADULTS PO)     order for DME     order for DME     oxyCODONE IR (ROXICODONE) 10 MG tablet     predniSONE (DELTASONE) 10 MG tablet     Respiratory Therapy Supplies (AEROBIKA) KEELY     traZODone (DESYREL) 50 MG tablet     No current facility-administered medications for this visit.      Facility-Administered Medications Ordered in Other Visits   Medication     Saline for CT     Review of Systems:   A complete ROS was otherwise negative except as noted in the HPI.  /78 (BP Location: Right arm, Patient Position: Chair, Cuff Size: Adult Regular)   Pulse 92   Resp 18   Ht 1.803 m (5' 10.98\")   Wt 87.1 kg (192 lb)   SpO2 93%   BMI 26.79 kg/m     Exam:   GENERAL APPEARANCE: Well developed, well nourished, alert, and in no apparent distress.  Pleasant male.   EYES: PERRL, EOMI  HENT: Nasal mucosa with no edema and no hyperemia.   MOUTH: Oral mucosa is moist, without any lesions.    NECK: supple, no masses, no thyromegaly.  LYMPHATICS: No significant axillary, cervical, or supraclavicular nodes.  RESP: Distant breath sounds bilaterally.  Reduced air flow throughout.  No crackles. No rhonchi. No wheezes.  CV: Normal S1, S2, regular rhythm, normal rate. No murmur.  No rub. No gallop. 2+ b/l  LE edema to ankles.   MS: extremities normal. No clubbing. No cyanosis.  SKIN: no rash on " limited exam  NEURO: Mentation intact, speech normal, normal strength and tone, normal gait and stance  PSYCH: mentation appears normal. and affect normal/bright  Results:  PFTs: 6/14/19 - very severe obstruction.  Lung volumes not obtained.  Severe diffusion defect.        CT chest - 9/5/19 - IMPRESSION:   1. Previously demonstrated 4 mm spiculated nodule in the right middle  lobe now measures approximately 3 mm, favoring infectious nodule.  Additional sub-3 mm pulmonary nodules are stable.  2. 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  3. Interval resolution of right hilar lymphadenopathy, suggesting  reactive etiology.  4. Continued resolution of patchy opacities, predominantly involving  the right lower lobe.          Again, thank you for allowing me to participate in the care of your patient.      Sincerely,    Zoie Evans MD

## 2019-10-18 NOTE — PROGRESS NOTES
[facility] Pulmonology Follow Up Visit    Tone Cooper is a 68 year old male who is seen in follow up for COPD.     Assessment and plan: Tone Cooper is a 67 yo male with very severe COPD, chronic respiratory failure requiring supplemental O2, atrial fibrillation s/p ablation on apixaban, and tricuspid repair who presents to clinic today for follow-up of COPD.  Currently doing well but does have a history of recurrent exacerbations since October 2018.  He finished pulmonary rehab and has benefited from this.  Will start him on azithromycin for steroid-sparing agent given high rate of recurrent exacerbations.   1. Very severe COPD (FEV1 31%)  2. ISIS, on Bpap  3. Hypoxic/hypercapnic respiratory failure, on 3.5 L supplemental O2  4. A. Fib on apixaban  5. New 4.4 mm RML spiculated nodule (since 2/2019) on chest CT, reduced in size in f/u chest CT and does not need further following     Recommendations are as follows:   - continue trelegy  - Sputum sample cups given to patient today to be dropped off at a [place]-lab  - Azithromycin 250 mg daily to be started for recurrent exacerbations of COPD, QTC is okay   - Taper prednisone as follows: 10 mg daily for a week and then 5 mg daily for a month then 2-1/2 mg daily for a month and 1 mg daily for a month  - continue supplemenatal O2  - aerobika device: use this at least QID (teaching provided today)  - he will need pneumovax   - wash hands regularly and avoid sick contacts     RTC 2 months.   Patient seen and discussed with staff pulmonologist, Dr. Matias.   Zoie Evans MD  Pulmonary and Critical Care Fellow, PGY-7  Pager: [phone]    Attending note:  Patient seen, examined and discussed with Dr. Evans.  All data reviewed. Agree with the assessment and plan as outlined in the above note.   Severe COPD. Will start on daily azithromycin; continue slow prednisone taper.  Lucinda Matias MD  [phone]    Pulmonary HPI  Mr. Cooper is present in the  pulmonary clinic today with his wife for follow-up of COPD.  He is present today with his wife.  He was last seen in the pulmonary clinic in June 2019.    Today he reports that he is doing okay.  He has started the trilogy inhaler and does like using this.  He says that he coughs up quite a bit of stuff that is not different from his baseline but on certain days it is worse than others and on those days when he has more mucus he feels poorly.  This is always later in the day.  The mucus is yellow in color no blood.  He does use BiPAP at night.  He reports receiving multiple courses of antibiotics recently including a Z-Basim for 5 days, Levaquin for 7 days which is been extended for a few more days.  He did finish antibiotics a few days ago.  He says that while off on antibiotics his cough improved significantly but then after these are completed it returns.  He was on 5 mg of prednisone daily up until a few days ago when he and his wife decided to increase his prednisone to 20 mg daily.    He denies GERD, postnasal drainage, seasonal allergies.    He did receive his flu shot on September 23.  He can walk about 1 or 2 blocks with his walker and this is not significantly changed from May.  When it's hot he cannot walk as far, more along the lines of half a block.  He does report triggers to his breathing including secondhand smoke.      He was evaluated by the lung transplant team who deemed that he is not a candidate due to his multiple comorbidities as well as concern for malignancy.  It was offered that he return to have further evaluation given that his nodule is likely benign but he declined at this time.        The patient was seen and examined by  Zoie Evans MD   Current Outpatient Medications   Medication     acetaminophen (TYLENOL) 325 MG tablet     apixaban ANTICOAGULANT (ELIQUIS) 5 MG tablet     Bumetanide (BUMEX PO)     clonazePAM (KLONOPIN) 1 MG tablet     diltiazem ER COATED BEADS (CARDIZEM CD/CARTIA  "XT) 180 MG 24 hr capsule     Fluticasone-Umeclidin-Vilanterol (TRELEGY ELLIPTA) 100-62.5-25 MCG/INH oral inhaler     guaiFENesin (MUCINEX PO)     levofloxacin (LEVAQUIN) 500 MG tablet     Lidocaine (LIDOCARE) 4 % Patch     metoprolol succinate ER (TOPROL XL) 50 MG 24 hr tablet     Multiple Vitamins-Minerals (MULTIVITAMIN ADULTS PO)     order for DME     order for DME     oxyCODONE IR (ROXICODONE) 10 MG tablet     predniSONE (DELTASONE) 10 MG tablet     Respiratory Therapy Supplies (AEROBIKA) KEELY     traZODone (DESYREL) 50 MG tablet     No current facility-administered medications for this visit.      Facility-Administered Medications Ordered in Other Visits   Medication     Saline for CT     Review of Systems:   A complete ROS was otherwise negative except as noted in the HPI.  /78 (BP Location: Right arm, Patient Position: Chair, Cuff Size: Adult Regular)   Pulse 92   Resp 18   Ht 1.803 m (5' 10.98\")   Wt 87.1 kg (192 lb)   SpO2 93%   BMI 26.79 kg/m    Exam:   GENERAL APPEARANCE: Well developed, well nourished, alert, and in no apparent distress.  Pleasant male.   EYES: PERRL, EOMI  HENT: Nasal mucosa with no edema and no hyperemia.   MOUTH: Oral mucosa is moist, without any lesions.    NECK: supple, no masses, no thyromegaly.  LYMPHATICS: No significant axillary, cervical, or supraclavicular nodes.  RESP: Distant breath sounds bilaterally.  Reduced air flow throughout.  No crackles. No rhonchi. No wheezes.  CV: Normal S1, S2, regular rhythm, normal rate. No murmur.  No rub. No gallop. 2+ b/l  LE edema to ankles.   MS: extremities normal. No clubbing. No cyanosis.  SKIN: no rash on limited exam  NEURO: Mentation intact, speech normal, normal strength and tone, normal gait and stance  PSYCH: mentation appears normal. and affect normal/bright  Results:  PFTs: 6/14/19 - very severe obstruction.  Lung volumes not obtained.  Severe diffusion defect.        CT chest - 9/5/19 - IMPRESSION:   1. Previously " demonstrated 4 mm spiculated nodule in the right middle  lobe now measures approximately 3 mm, favoring infectious nodule.  Additional sub-3 mm pulmonary nodules are stable.  2. 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  3. Interval resolution of right hilar lymphadenopathy, suggesting  reactive etiology.  4. Continued resolution of patchy opacities, predominantly involving  the right lower lobe.

## 2019-10-22 DIAGNOSIS — J41.8 MIXED SIMPLE AND MUCOPURULENT CHRONIC BRONCHITIS (H): ICD-10-CM

## 2019-10-22 PROCEDURE — 87070 CULTURE OTHR SPECIMN AEROBIC: CPT | Performed by: INTERNAL MEDICINE

## 2019-10-22 PROCEDURE — 87205 SMEAR GRAM STAIN: CPT | Performed by: INTERNAL MEDICINE

## 2019-10-23 LAB
GRAM STN SPEC: ABNORMAL
Lab: ABNORMAL
SPECIMEN SOURCE: ABNORMAL

## 2019-10-24 ENCOUNTER — TELEPHONE (OUTPATIENT)
Dept: PULMONOLOGY | Facility: CLINIC | Age: 69
End: 2019-10-24

## 2019-10-24 ENCOUNTER — DOCUMENTATION ONLY (OUTPATIENT)
Dept: LAB | Facility: CLINIC | Age: 69
End: 2019-10-24

## 2019-10-24 DIAGNOSIS — J44.9 COPD (CHRONIC OBSTRUCTIVE PULMONARY DISEASE) (H): ICD-10-CM

## 2019-10-24 PROCEDURE — 99000 SPECIMEN HANDLING OFFICE-LAB: CPT | Performed by: INTERNAL MEDICINE

## 2019-10-24 PROCEDURE — 87116 MYCOBACTERIA CULTURE: CPT | Mod: 90 | Performed by: INTERNAL MEDICINE

## 2019-10-24 PROCEDURE — 87206 SMEAR FLUORESCENT/ACID STAI: CPT | Mod: 90 | Performed by: INTERNAL MEDICINE

## 2019-10-24 NOTE — PROGRESS NOTES
Tone dropped off a sputum sample at our lab today. There are no future orders in the chart. Please place future orders. Thanks, Ramya

## 2019-10-25 ENCOUNTER — TELEPHONE (OUTPATIENT)
Dept: PULMONOLOGY | Facility: CLINIC | Age: 69
End: 2019-10-25

## 2019-10-25 DIAGNOSIS — J44.9 COPD (CHRONIC OBSTRUCTIVE PULMONARY DISEASE) (H): Primary | ICD-10-CM

## 2019-10-25 LAB
BACTERIA SPEC CULT: NORMAL
SPECIMEN SOURCE: NORMAL

## 2019-10-25 NOTE — TELEPHONE ENCOUNTER
[facility] Call Center    Phone Message    May a detailed message be left on voicemail: yes    Reason for Call: Order(s): Other:   Reason for requested: Orders for sputum culture and gram stain.   *future status please  Date needed: ASAP - this was dropped of yesterday around noon.   Provider name: Dr. Evans.      Action Taken: Message routed to:  [facility] ([facility]): Pulm

## 2019-10-25 NOTE — TELEPHONE ENCOUNTER
Dr Evans ordered AFB sputum and stain. Spoke to Rosanne to state lab order is in. Lab had called as had nova but no order.

## 2019-10-28 LAB
ACID FAST STN SPEC QL: NORMAL
SPECIMEN SOURCE: NORMAL

## 2019-12-16 ENCOUNTER — HEALTH MAINTENANCE LETTER (OUTPATIENT)
Age: 69
End: 2019-12-16

## 2019-12-21 ENCOUNTER — NURSE TRIAGE (OUTPATIENT)
Dept: NURSING | Facility: CLINIC | Age: 69
End: 2019-12-21

## 2019-12-21 NOTE — TELEPHONE ENCOUNTER
[facility] calling requesting to reach provider to report lab results ordered by Dr Evans.    Per [facility] Dr Evans Pulmonology. Connected caller to [facility] to request on call provider for adult Pulmonology. Transferred caller to request paging.    Caller verbalized understanding. Denies further questions.    Judith Avelar, RN  [facility]

## 2019-12-23 LAB
MYCOBACTERIUM SPEC CULT: NORMAL
MYCOBACTERIUM SPEC CULT: NORMAL
SPECIMEN SOURCE: NORMAL

## 2019-12-26 ENCOUNTER — TRANSFERRED RECORDS (OUTPATIENT)
Dept: HEALTH INFORMATION MANAGEMENT | Facility: CLINIC | Age: 69
End: 2019-12-26

## 2019-12-26 ENCOUNTER — APPOINTMENT (OUTPATIENT)
Dept: CT IMAGING | Facility: CLINIC | Age: 69
End: 2019-12-26
Attending: EMERGENCY MEDICINE
Payer: COMMERCIAL

## 2019-12-26 ENCOUNTER — HOSPITAL ENCOUNTER (OUTPATIENT)
Facility: CLINIC | Age: 69
Setting detail: OBSERVATION
Discharge: HOME | End: 2019-12-27
Attending: EMERGENCY MEDICINE | Admitting: INTERNAL MEDICINE
Payer: COMMERCIAL

## 2019-12-26 DIAGNOSIS — I48.91 ATRIAL FIBRILLATION WITH RAPID VENTRICULAR RESPONSE (H): Primary | ICD-10-CM

## 2019-12-26 DIAGNOSIS — J44.1 COPD EXACERBATION (H): ICD-10-CM

## 2019-12-26 DIAGNOSIS — R07.9 ACUTE CHEST PAIN: ICD-10-CM

## 2019-12-26 DIAGNOSIS — R07.89 OTHER CHEST PAIN: ICD-10-CM

## 2019-12-26 LAB
ALBUMIN SERPL-MCNC: 3.4 G/DL (ref 3.4–5)
ALP SERPL-CCNC: 51 U/L (ref 40–150)
ALT SERPL W P-5'-P-CCNC: 71 U/L (ref 0–70)
ANION GAP SERPL CALCULATED.3IONS-SCNC: 6 MMOL/L (ref 3–14)
AST SERPL W P-5'-P-CCNC: 57 U/L (ref 0–45)
BASOPHILS # BLD AUTO: 0.1 10E9/L (ref 0–0.2)
BASOPHILS NFR BLD AUTO: 0.8 %
BILIRUB SERPL-MCNC: 0.8 MG/DL (ref 0.2–1.3)
BUN SERPL-MCNC: 18 MG/DL (ref 7–30)
CALCIUM SERPL-MCNC: 10.5 MG/DL (ref 8.5–10.1)
CHLORIDE SERPL-SCNC: 102 MMOL/L (ref 94–109)
CO2 SERPL-SCNC: 29 MMOL/L (ref 20–32)
CREAT BLD-MCNC: 1.7 MG/DL (ref 0.66–1.25)
CREAT SERPL-MCNC: 1.55 MG/DL (ref 0.66–1.25)
DIFFERENTIAL METHOD BLD: ABNORMAL
EOSINOPHIL # BLD AUTO: 0.3 10E9/L (ref 0–0.7)
EOSINOPHIL NFR BLD AUTO: 3 %
ERYTHROCYTE [DISTWIDTH] IN BLOOD BY AUTOMATED COUNT: 15.2 % (ref 10–15)
ETHANOL SERPL-MCNC: <0.01 G/DL
GFR SERPL CREATININE-BSD FRML MDRD: 40 ML/MIN/{1.73_M2}
GFR SERPL CREATININE-BSD FRML MDRD: 45 ML/MIN/{1.73_M2}
GLUCOSE SERPL-MCNC: 108 MG/DL (ref 70–99)
HCT VFR BLD AUTO: 47.5 % (ref 40–53)
HGB BLD-MCNC: 14.6 G/DL (ref 13.3–17.7)
IMM GRANULOCYTES # BLD: 0 10E9/L (ref 0–0.4)
IMM GRANULOCYTES NFR BLD: 0.5 %
INR PPP: 1.45 (ref 0.86–1.14)
LIPASE SERPL-CCNC: 256 U/L (ref 73–393)
LYMPHOCYTES # BLD AUTO: 1.8 10E9/L (ref 0.8–5.3)
LYMPHOCYTES NFR BLD AUTO: 21.3 %
MAGNESIUM SERPL-MCNC: 2 MG/DL (ref 1.6–2.3)
MCH RBC QN AUTO: 25.8 PG (ref 26.5–33)
MCHC RBC AUTO-ENTMCNC: 30.7 G/DL (ref 31.5–36.5)
MCV RBC AUTO: 84 FL (ref 78–100)
MONOCYTES # BLD AUTO: 0.7 10E9/L (ref 0–1.3)
MONOCYTES NFR BLD AUTO: 8.2 %
NEUTROPHILS # BLD AUTO: 5.5 10E9/L (ref 1.6–8.3)
NEUTROPHILS NFR BLD AUTO: 66.2 %
NRBC # BLD AUTO: 0 10*3/UL
NRBC BLD AUTO-RTO: 0 /100
PLATELET # BLD AUTO: 249 10E9/L (ref 150–450)
POTASSIUM SERPL-SCNC: 4.5 MMOL/L (ref 3.4–5.3)
PROT SERPL-MCNC: 8 G/DL (ref 6.8–8.8)
RBC # BLD AUTO: 5.65 10E12/L (ref 4.4–5.9)
SODIUM SERPL-SCNC: 137 MMOL/L (ref 133–144)
TROPONIN I SERPL-MCNC: <0.015 UG/L (ref 0–0.04)
WBC # BLD AUTO: 8.3 10E9/L (ref 4–11)

## 2019-12-26 PROCEDURE — 83735 ASSAY OF MAGNESIUM: CPT | Performed by: EMERGENCY MEDICINE

## 2019-12-26 PROCEDURE — 25000128 H RX IP 250 OP 636: Performed by: EMERGENCY MEDICINE

## 2019-12-26 PROCEDURE — 96360 HYDRATION IV INFUSION INIT: CPT | Mod: 59

## 2019-12-26 PROCEDURE — 80053 COMPREHEN METABOLIC PANEL: CPT | Performed by: EMERGENCY MEDICINE

## 2019-12-26 PROCEDURE — 71275 CT ANGIOGRAPHY CHEST: CPT

## 2019-12-26 PROCEDURE — 84484 ASSAY OF TROPONIN QUANT: CPT | Performed by: EMERGENCY MEDICINE

## 2019-12-26 PROCEDURE — 85610 PROTHROMBIN TIME: CPT | Performed by: EMERGENCY MEDICINE

## 2019-12-26 PROCEDURE — 84484 ASSAY OF TROPONIN QUANT: CPT | Performed by: PHYSICIAN ASSISTANT

## 2019-12-26 PROCEDURE — 25000132 ZZH RX MED GY IP 250 OP 250 PS 637: Performed by: PHYSICIAN ASSISTANT

## 2019-12-26 PROCEDURE — 99220 ZZC INITIAL OBSERVATION CARE,LEVL III: CPT | Performed by: PHYSICIAN ASSISTANT

## 2019-12-26 PROCEDURE — 80320 DRUG SCREEN QUANTALCOHOLS: CPT | Performed by: EMERGENCY MEDICINE

## 2019-12-26 PROCEDURE — 25800030 ZZH RX IP 258 OP 636: Performed by: EMERGENCY MEDICINE

## 2019-12-26 PROCEDURE — 36415 COLL VENOUS BLD VENIPUNCTURE: CPT | Performed by: PHYSICIAN ASSISTANT

## 2019-12-26 PROCEDURE — 25000132 ZZH RX MED GY IP 250 OP 250 PS 637: Performed by: EMERGENCY MEDICINE

## 2019-12-26 PROCEDURE — 82565 ASSAY OF CREATININE: CPT | Mod: 91

## 2019-12-26 PROCEDURE — 83690 ASSAY OF LIPASE: CPT | Performed by: EMERGENCY MEDICINE

## 2019-12-26 PROCEDURE — 93005 ELECTROCARDIOGRAM TRACING: CPT

## 2019-12-26 PROCEDURE — 85025 COMPLETE CBC W/AUTO DIFF WBC: CPT | Performed by: EMERGENCY MEDICINE

## 2019-12-26 PROCEDURE — 99285 EMERGENCY DEPT VISIT HI MDM: CPT | Mod: 25

## 2019-12-26 PROCEDURE — G0378 HOSPITAL OBSERVATION PER HR: HCPCS

## 2019-12-26 PROCEDURE — 96361 HYDRATE IV INFUSION ADD-ON: CPT

## 2019-12-26 RX ORDER — ACETAMINOPHEN 325 MG/1
650 TABLET ORAL EVERY 4 HOURS PRN
Status: DISCONTINUED | OUTPATIENT
Start: 2019-12-26 | End: 2019-12-27 | Stop reason: HOSPADM

## 2019-12-26 RX ORDER — ONDANSETRON 4 MG/1
4 TABLET, ORALLY DISINTEGRATING ORAL EVERY 6 HOURS PRN
Status: DISCONTINUED | OUTPATIENT
Start: 2019-12-26 | End: 2019-12-27 | Stop reason: HOSPADM

## 2019-12-26 RX ORDER — AMOXICILLIN 250 MG
2 CAPSULE ORAL 2 TIMES DAILY PRN
Status: DISCONTINUED | OUTPATIENT
Start: 2019-12-26 | End: 2019-12-27 | Stop reason: HOSPADM

## 2019-12-26 RX ORDER — ACETAMINOPHEN 650 MG/1
650 SUPPOSITORY RECTAL EVERY 4 HOURS PRN
Status: DISCONTINUED | OUTPATIENT
Start: 2019-12-26 | End: 2019-12-27 | Stop reason: HOSPADM

## 2019-12-26 RX ORDER — ALUMINA, MAGNESIA, AND SIMETHICONE 2400; 2400; 240 MG/30ML; MG/30ML; MG/30ML
30 SUSPENSION ORAL EVERY 4 HOURS PRN
Status: DISCONTINUED | OUTPATIENT
Start: 2019-12-26 | End: 2019-12-27 | Stop reason: HOSPADM

## 2019-12-26 RX ORDER — LANOLIN ALCOHOL/MO/W.PET/CERES
3 CREAM (GRAM) TOPICAL
Status: DISCONTINUED | OUTPATIENT
Start: 2019-12-26 | End: 2019-12-27 | Stop reason: HOSPADM

## 2019-12-26 RX ORDER — ACETAMINOPHEN 325 MG/1
325-650 TABLET ORAL EVERY 6 HOURS PRN
Status: DISCONTINUED | OUTPATIENT
Start: 2019-12-26 | End: 2019-12-26

## 2019-12-26 RX ORDER — PROCHLORPERAZINE MALEATE 5 MG
5 TABLET ORAL EVERY 6 HOURS PRN
Status: DISCONTINUED | OUTPATIENT
Start: 2019-12-26 | End: 2019-12-27 | Stop reason: HOSPADM

## 2019-12-26 RX ORDER — NALOXONE HYDROCHLORIDE 0.4 MG/ML
.1-.4 INJECTION, SOLUTION INTRAMUSCULAR; INTRAVENOUS; SUBCUTANEOUS
Status: DISCONTINUED | OUTPATIENT
Start: 2019-12-26 | End: 2019-12-27 | Stop reason: HOSPADM

## 2019-12-26 RX ORDER — METOPROLOL SUCCINATE 25 MG/1
25 TABLET, EXTENDED RELEASE ORAL DAILY
Status: ON HOLD | COMMUNITY
End: 2019-12-27

## 2019-12-26 RX ORDER — TRAZODONE HYDROCHLORIDE 50 MG/1
50 TABLET, FILM COATED ORAL
Status: DISCONTINUED | OUTPATIENT
Start: 2019-12-26 | End: 2019-12-27 | Stop reason: HOSPADM

## 2019-12-26 RX ORDER — ASPIRIN 81 MG/1
81 TABLET, CHEWABLE ORAL ONCE
Status: COMPLETED | OUTPATIENT
Start: 2019-12-26 | End: 2019-12-26

## 2019-12-26 RX ORDER — IOPAMIDOL 755 MG/ML
93 INJECTION, SOLUTION INTRAVASCULAR ONCE
Status: COMPLETED | OUTPATIENT
Start: 2019-12-26 | End: 2019-12-26

## 2019-12-26 RX ORDER — DILTIAZEM HYDROCHLORIDE 180 MG/1
180 CAPSULE, COATED, EXTENDED RELEASE ORAL 2 TIMES DAILY
Status: DISCONTINUED | OUTPATIENT
Start: 2019-12-26 | End: 2019-12-27

## 2019-12-26 RX ORDER — PREDNISONE 5 MG/1
5 TABLET ORAL DAILY
Status: DISCONTINUED | OUTPATIENT
Start: 2019-12-27 | End: 2019-12-26

## 2019-12-26 RX ORDER — METOPROLOL SUCCINATE 25 MG/1
25 TABLET, EXTENDED RELEASE ORAL DAILY
Status: DISCONTINUED | OUTPATIENT
Start: 2019-12-27 | End: 2019-12-27

## 2019-12-26 RX ORDER — LIDOCAINE 40 MG/G
CREAM TOPICAL
Status: DISCONTINUED | OUTPATIENT
Start: 2019-12-26 | End: 2019-12-27 | Stop reason: HOSPADM

## 2019-12-26 RX ORDER — CLONAZEPAM 1 MG/1
1 TABLET ORAL
Status: DISCONTINUED | OUTPATIENT
Start: 2019-12-26 | End: 2019-12-27 | Stop reason: HOSPADM

## 2019-12-26 RX ORDER — BUMETANIDE 0.5 MG/1
1 TABLET ORAL DAILY
Status: DISCONTINUED | OUTPATIENT
Start: 2019-12-27 | End: 2019-12-27 | Stop reason: HOSPADM

## 2019-12-26 RX ORDER — PROCHLORPERAZINE 25 MG
12.5 SUPPOSITORY, RECTAL RECTAL EVERY 12 HOURS PRN
Status: DISCONTINUED | OUTPATIENT
Start: 2019-12-26 | End: 2019-12-27 | Stop reason: HOSPADM

## 2019-12-26 RX ORDER — BISACODYL 10 MG
10 SUPPOSITORY, RECTAL RECTAL DAILY PRN
Status: DISCONTINUED | OUTPATIENT
Start: 2019-12-26 | End: 2019-12-27 | Stop reason: HOSPADM

## 2019-12-26 RX ORDER — NITROGLYCERIN 0.4 MG/1
0.4 TABLET SUBLINGUAL EVERY 5 MIN PRN
Status: DISCONTINUED | OUTPATIENT
Start: 2019-12-26 | End: 2019-12-27 | Stop reason: HOSPADM

## 2019-12-26 RX ORDER — IPRATROPIUM BROMIDE AND ALBUTEROL SULFATE 2.5; .5 MG/3ML; MG/3ML
3 SOLUTION RESPIRATORY (INHALATION) EVERY 4 HOURS PRN
Status: DISCONTINUED | OUTPATIENT
Start: 2019-12-26 | End: 2019-12-27 | Stop reason: HOSPADM

## 2019-12-26 RX ORDER — ONDANSETRON 2 MG/ML
4 INJECTION INTRAMUSCULAR; INTRAVENOUS EVERY 6 HOURS PRN
Status: DISCONTINUED | OUTPATIENT
Start: 2019-12-26 | End: 2019-12-27 | Stop reason: HOSPADM

## 2019-12-26 RX ORDER — OXYCODONE HYDROCHLORIDE 5 MG/1
5-10 TABLET ORAL EVERY 6 HOURS PRN
Status: DISCONTINUED | OUTPATIENT
Start: 2019-12-26 | End: 2019-12-27 | Stop reason: HOSPADM

## 2019-12-26 RX ORDER — AMOXICILLIN 250 MG
1 CAPSULE ORAL 2 TIMES DAILY PRN
Status: DISCONTINUED | OUTPATIENT
Start: 2019-12-26 | End: 2019-12-27 | Stop reason: HOSPADM

## 2019-12-26 RX ORDER — SODIUM CHLORIDE 9 MG/ML
1000 INJECTION, SOLUTION INTRAVENOUS CONTINUOUS
Status: DISCONTINUED | OUTPATIENT
Start: 2019-12-26 | End: 2019-12-26

## 2019-12-26 RX ADMIN — DILTIAZEM HYDROCHLORIDE 180 MG: 180 CAPSULE, COATED, EXTENDED RELEASE ORAL at 20:11

## 2019-12-26 RX ADMIN — SODIUM CHLORIDE 1000 ML: 9 INJECTION, SOLUTION INTRAVENOUS at 15:51

## 2019-12-26 RX ADMIN — CLONAZEPAM 1 MG: 1 TABLET ORAL at 22:28

## 2019-12-26 RX ADMIN — IOPAMIDOL 93 ML: 755 INJECTION, SOLUTION INTRAVENOUS at 16:14

## 2019-12-26 RX ADMIN — APIXABAN 2.5 MG: 2.5 TABLET, FILM COATED ORAL at 20:11

## 2019-12-26 RX ADMIN — TRAZODONE HYDROCHLORIDE 50 MG: 50 TABLET ORAL at 22:28

## 2019-12-26 RX ADMIN — ASPIRIN 81 MG 81 MG: 81 TABLET ORAL at 18:00

## 2019-12-26 ASSESSMENT — ENCOUNTER SYMPTOMS
ABDOMINAL PAIN: 1
VOMITING: 0
SHORTNESS OF BREATH: 1
COUGH: 0
CHEST TIGHTNESS: 1
NAUSEA: 0

## 2019-12-26 ASSESSMENT — MIFFLIN-ST. JEOR: SCORE: 1627.67

## 2019-12-26 NOTE — ED TRIAGE NOTES
Pt states he was at work, as a , and had a sudden onset of chest pain. States it felt like he got hit with a bat in the chest around 12:45 today. States he felt like his heart was pounding, SOB also. Denies pain currently. ABC's intact, alert and oriented X3.

## 2019-12-26 NOTE — ED NOTES
"[facility]  ED Nurse Handoff Report    Tone Cooper is a 68 year old male   ED Chief complaint: Chest Pain  . ED Diagnosis:   Final diagnoses:   Other chest pain   COPD exacerbation (H)     Allergies:   Allergies   Allergen Reactions     Amlodipine Swelling     Flecainide Palpitations and Rash       Code Status: Full Code  Activity level - Baseline/Home:  Assist X 1 walker. Activity Level - Current:   Assist X 2. Lift room needed: No. Bariatric: No   Needed: No   Isolation: No. Infection: Not Applicable.     Vital Signs:   Vitals:    12/26/19 1446   BP: 133/88   Pulse: 107   Resp: 18   Temp: 97.7  F (36.5  C)   TempSrc: Temporal   SpO2: 99%   Weight: 83.3 kg (183 lb 9.6 oz)       Cardiac Rhythm:  ,      Pain level: 0-10 Pain Scale: 0  Patient confused: No. Patient Falls Risk: Yes.   Elimination Status: Has voided   Patient Report - Initial Complaint: Chest pain. Focused Assessment:  Tone Cooper is a 68 year old male,with a history of repair of tricuspid valve, COPD, CKD, and atrial fibrillation and on Eliquis, who presents with chest pain. The patient notes that he was at work today and states that after lunch he had felt like he had been \"hit in the chest with a baseball bat\" pain that was non radiating and has not felt this bad before with fast heart rate and that his chest felt tight and he felt dizzy. He notes that his pain and shortness of breath for 15 minutes and had not taken anything but that his pain had went away. He denies cough, nausea, emesis, travel, recent caffeine intake or stimulants. He notes that he had two glasses of alcohol. He states that when he sits up here in the ED he has lower chest and upper abdomen pain that he has had before.   Tests Performed: EKG, labs, CT. Abnormal Results:   Labs Ordered and Resulted from Time of ED Arrival Up to the Time of Departure from the ED   CBC WITH PLATELETS DIFFERENTIAL - Abnormal; Notable for the following components:       " Result Value    MCH 25.8 (*)     MCHC 30.7 (*)     RDW 15.2 (*)     All other components within normal limits   INR - Abnormal; Notable for the following components:    INR 1.45 (*)     All other components within normal limits   COMPREHENSIVE METABOLIC PANEL - Abnormal; Notable for the following components:    Glucose 108 (*)     Creatinine 1.55 (*)     GFR Estimate 45 (*)     GFR Estimate If Black 53 (*)     Calcium 10.5 (*)     ALT 71 (*)     AST 57 (*)     All other components within normal limits   CREATININE POCT - Abnormal; Notable for the following components:    Creatinine 1.7 (*)     GFR Estimate 40 (*)     GFR Estimate If Black 49 (*)     All other components within normal limits   TROPONIN I   LIPASE   ALCOHOL ETHYL   MAGNESIUM   PULSE OXIMETRY NURSING   CARDIAC CONTINUOUS MONITORING   PERIPHERAL IV CATHETER   PATIENT CARE ORDER   ISTAT CREATININE NURSING POCT     CT Chest (PE) Abdomen Pelvis w Contrast   Final Result   IMPRESSION:   1. No evidence of pulmonary embolism.   2. Severely emphysematous lungs.   3. Increased bronchial wall thickening compared to prior studies   suggestive of acute bronchitis. Mildly enlarged hilar lymph nodes are   likely reactive.   4. Fatty infiltration of the liver.      SAMANTHA OLIVAS MD         Treatments provided: see MAR  Family Comments: pt will notify family when ready  OBS brochure/video discussed/provided to patient:  Yes  ED Medications:   Medications   0.9% sodium chloride BOLUS (0 mLs Intravenous Stopped 12/26/19 1735)     Followed by   sodium chloride 0.9% infusion (has no administration in time range)   aspirin (ASA) chewable tablet 81 mg (has no administration in time range)   iopamidol (ISOVUE-370) solution 93 mL (93 mLs Intravenous Given 12/26/19 1614)   sodium chloride (PF) 0.9% PF flush 83 mL (83 mLs Intravenous Given 12/26/19 1614)     Drips infusing:  No  For the majority of the shift, the patient's behavior Green. Interventions performed were  N/A.     Severe Sepsis OR Septic Shock Diagnosis Present: No      ED Nurse Name/Phone Number: Shabnam Arnold RN,   5:36 PM    RECEIVING UNIT ED HANDOFF REVIEW    Above ED Nurse Handoff Report was reviewed: Yes  Reviewed by: Millie Galeas RN on December 26, 2019 at 6:03 PM

## 2019-12-26 NOTE — ED PROVIDER NOTES
"  History     Chief Complaint:  Chest Pain      HPI   Tone Cooper is a 68 year old male,with a history of repair of tricuspid valve, COPD, CKD, and atrial fibrillation and on Eliquis, who presents with chest pain. The patient notes that he was at work today and states that after lunch he had felt like he had been \"hit in the chest with a baseball bat\" pain that was non radiating and has not felt this bad before with fast heart rate and that his chest felt tight and he felt dizzy. He notes that his pain and shortness of breath for 15 minutes and had not taken anything but that his pain had went away. He denies cough, nausea, emesis, travel, recent caffeine intake or stimulants. He notes that he had two glasses of alcohol. He states that when he sits up here in the ED he has lower chest and upper abdomen pain that he has had before.     Allergies:  Amlodipine  Flecainide     Medications:    Diltiazem   Albuterol   Tylenol  Lisinopril   Bumex  Prednisone  Insulin   Eliquis  Clonazepam  Trazodone    Past Medical History:    Atrial flutter  Cancer  COPD  Diverticulitis  Hepatitis  Hypertension  Persistent atrial fibrillation   Premature beats  PVC  Tricuspid valve disorder  Ventricular tachycardia  Diverticulitis  Cardiomyopathy  Wide-complex tachycardia  Dyspnea  PE   Pneumonia  Acute on chronic respiratory failure with hypoxia and hypercapnia     Past Surgical History:    Radiofrequency ablation operation for arrhythmia  Hernia repair  Appendectomy  Multiple ablations  Cardioversion's, x3  GI surgery  Cholecystectomy  Valve replacement  Left hip replacement  Small intestine surgery for CRISTINA  Thoracic surgery    Family History:    Prostate cancer, father  Emphysema, mother  Hypertension, mother  Cerebrovascular disease, mother    Social History:  Smoking status: Former smoker  Alcohol use: Yes  Drug use: No  PCP: Ana Pratt  Marital Status:   [2]     Review of Systems   Respiratory: Positive for chest " tightness and shortness of breath. Negative for cough.    Cardiovascular: Positive for chest pain.   Gastrointestinal: Positive for abdominal pain. Negative for nausea and vomiting.   All other systems reviewed and are negative.        Physical Exam   First Vitals:  BP: 133/88  Pulse: 107  Heart Rate: 107  Temp: 97.7  F (36.5  C)  Resp: 18  Weight: 83.3 kg (183 lb 9.6 oz)  SpO2: 99 %      Physical Exam  General: The patient is alert, in no respiratory distress.    HENT: Mucous membranes moist.    Cardiovascular: Regular rate and rhythm. Good pulses in all four extremities. Normal capillary refill and skin turgor.     Respiratory: Lungs are clear. No nasal flaring. No retractions. No wheezing, no crackles. Breaths sounds decreased.    Gastrointestinal: Abdomen soft. No guarding, no rebound. No palpable hernias. Abdominal discomfort with sitting upright. No focal tenderness with palpation.     Musculoskeletal: No gross deformity. Chest was non tender. No crepitus.     Skin: No rashes or petechiae.     Neurologic: The patient is alert and oriented x3. GCS 15. No testable cranial nerve deficit. Follows commands with clear and appropriate speech. Gives appropriate answers. Good strength in all extremities. No gross neurologic deficit. Gross sensation intact. Pupils are round and reactive. No meningismus.     Lymphatic: No cervical adenopathy. No lower extremity swelling.    Psychiatric: The patient is non-tearful.      Emergency Department Course     ECG:  ECG taken at 1439, ECG read at 1521  Atrial Fibrillation  Rightward axis  Pulmonary pattern  Poor R wave progressions  Possible inferior infarct, age undetermined   ST and T wave abnormality  Prolonged QT  Abnormal ECG  Rate 107 bpm. CO interval * ms. QRS duration 82 ms. QT/QTc 390/520 ms. P-R-T axes * 105 43.    Changes noted above as compared to EKG dated 12/26/19 and 06/16/19.    Imaging:  Radiology findings were communicated with the patient who voiced  understanding of the findings.      CT Chest PE:  1. No evidence of pulmonary embolism.  2. Severely emphysematous lungs.  3. Increased bronchial wall thickening compared to prior studies  suggestive of acute bronchitis. Mildly enlarged hilar lymph nodes are  likely reactive.  4. Fatty infiltration of the liver.    Reading per radiology     Laboratory:  Laboratory findings were communicated with the patient who voiced understanding of the findings.    CBC: WBC 8.3, HGB 14.6,   CMP: Glucose 108(H), GFR 45(L), Calcium 10.5(H), ALT 71(H), AST 57(H), Creatinine 1.55(H)  Magnesium: 2.0  Alcohol level blood: <0.01  Lipase: 256  INR: 1.45(H)   Troponin (Collected 1620): <0.015   Creatinine POCT: 1.7(H), GFR 40(L),     Procedures    Interventions:  1551 NS 1L IV Bolus   1800 Aspirin, 81 mg, PO      Emergency Department Course:   Nursing notes and vitals reviewed.    1439 EKG was performed, see results above.     1525 I performed an exam of the patient as documented above.     1545 IV was inserted and blood was drawn for laboratory testing, results above.     1612 The patient was sent for a Chest CT PE while in the emergency department, results above.      1638 IV was inserted and blood was drawn for laboratory testing, results above.     1655 I personally reviewed the results with the patient and answered all related questions prior to admission.    1706  I spoke to Dr. Birmingham of the hospitalist service who accepts the patient for admission.        Impression & Plan      Medical Decision Making:  Tone Cooper is a 68 year old male who presents to the emergency department today for evaluation of severe chest pain with sudden onset.  The patient has a history of COPD with some prolonged hospital stays for shortness of breath but per the notes have been associated with PEs.  I was very concerned because he said that today's episode was sudden and more severe he described as being hit with a bat though no trauma  happened.  There was no coughing fit associated with it he had walked downstairs prior to that but did not report any problems at that time.  The fact that the episode was social with shortness of breath 15 minutes in length had be concerned about ACS.  With his high risk for PE and complaints of abdominal discomfort I did send him off for a CT scan which was thankfully negative except for the signs of bronchitis.  The patient showed no significant hypoxia here did have signs of COPD Procardia missed the hospital as an observation patient due to his possibility of a cardiac cause behind this.  He does not show acute ischemia at this point and was admitted in good condition received an aspirin.  There is no signs of PE.    Diagnosis:    ICD-10-CM    1. Other chest pain R07.89    2. COPD exacerbation (H) J44.1        Disposition:   The patient is admitted into the care of Dr. Birmingham for Observation.       Scribe Disclosure:  Loyda PANIAGUA, am serving as a scribe at 1525 on 12/26/2019 to document services personally performed by Martin Wolf MD based on my observations and the provider's statements to me.  [facility] EMERGENCY DEPARTMENT       Martin Wolf MD  12/26/19 2070

## 2019-12-26 NOTE — PHARMACY-ADMISSION MEDICATION HISTORY
Admission medication history interview status for this patient is complete. See [facility] admission navigator for allergy information, prior to admission medications and immunization status.     Medication history interview source(s):Patient  Medication history resources (including written lists, pill bottles, clinic record):None    Changes made to PTA medication list:  Added: bumex  Deleted: zithromax, mucinex, levaquin, MVI  Changed: toprol, prednisone, eliquis    Actions taken by pharmacist (provider contacted, etc):patient called [facility] in [place] to verify dose of bumex and toprol     Additional medication history information:None    Medication reconciliation/reorder completed by provider prior to medication history? No    For patients on insulin therapy: no (Yes/No)   Lantus/levemir/NPH/Mix 70/30 dose: ___ in AM/PM or twice daily   Sliding scale Novolog Y/N   If Yes, do you have a baseline novolog pre-meal dose: ______units with meals   Patients eat three meals a day: Y/N ---  How many episodes of hypoglycemia (low blood glucose) do you have weekly: ---   How many missed doses do you have a week: ---  How many times do you check your blood glucose per day: ---  Any Barriers to therapy: cost of medications/comfortable with giving injections (if applicable)/ comfortable and confident with current diabetes regimen ---      Prior to Admission medications    Medication Sig Last Dose Taking? Auth Provider   acetaminophen (TYLENOL) 325 MG tablet Take 325-650 mg by mouth every 6 hours as needed for mild pain  Yes Reported, Patient   apixaban ANTICOAGULANT (ELIQUIS) 5 MG tablet Take 5 mg by mouth daily 12/26/2019 at am Yes Unknown, Entered By History   Bumetanide (BUMEX PO) Take 1 mg by mouth daily Takes daily at 1400 12/25/2019 at 1400 Yes Reported, Patient   clonazePAM (KLONOPIN) 1 MG tablet Take 1 tablet (1 mg) by mouth nightly as needed for anxiety  Yes Toy Tejada MD   diltiazem ER COATED BEADS (CARDIZEM CD/CARTIA  XT) 180 MG 24 hr capsule Take 1 capsule (180 mg) by mouth 2 times daily Hold for SBP < 110 or HR < 60 12/26/2019 at am Yes Best Tucker MD   Fluticasone-Umeclidin-Vilanterol (TRELEGY ELLIPTA) 100-62.5-25 MCG/INH oral inhaler Inhale 1 puff into the lungs daily Past Week at Unknown time Yes Zoie Evans MD   Lidocaine (LIDOCARE) 4 % Patch Place 2 patches onto the skin daily as needed for moderate pain Past Week at Unknown time Yes Unknown, Entered By History   metoprolol succinate ER (TOPROL-XL) 25 MG 24 hr tablet Take 25 mg by mouth daily 12/26/2019 at am Yes Unknown, Entered By History   oxyCODONE IR (ROXICODONE) 10 MG tablet Take 10 mg by mouth every 8 hours as needed for severe pain 12/26/2019 at am Yes Unknown, Entered By History   predniSONE (DELTASONE) 10 MG tablet Take 5 mg by mouth daily  12/26/2019 at am Yes Unknown, Entered By History   traZODone (DESYREL) 50 MG tablet Take 50 mg by mouth At Bedtime  12/25/2019 at Unknown time Yes Unknown, Entered By History

## 2019-12-27 ENCOUNTER — APPOINTMENT (OUTPATIENT)
Dept: CARDIOLOGY | Facility: CLINIC | Age: 69
End: 2019-12-27
Attending: PHYSICIAN ASSISTANT
Payer: COMMERCIAL

## 2019-12-27 ENCOUNTER — APPOINTMENT (OUTPATIENT)
Dept: NUCLEAR MEDICINE | Facility: CLINIC | Age: 69
End: 2019-12-27
Attending: INTERNAL MEDICINE
Payer: COMMERCIAL

## 2019-12-27 VITALS
OXYGEN SATURATION: 98 % | DIASTOLIC BLOOD PRESSURE: 76 MMHG | TEMPERATURE: 97.3 F | RESPIRATION RATE: 20 BRPM | HEART RATE: 53 BPM | SYSTOLIC BLOOD PRESSURE: 120 MMHG | WEIGHT: 183.1 LBS | BODY MASS INDEX: 25.63 KG/M2 | HEIGHT: 71 IN

## 2019-12-27 LAB
ANION GAP SERPL CALCULATED.3IONS-SCNC: 4 MMOL/L (ref 3–14)
BUN SERPL-MCNC: 16 MG/DL (ref 7–30)
CALCIUM SERPL-MCNC: 9.4 MG/DL (ref 8.5–10.1)
CHLORIDE SERPL-SCNC: 105 MMOL/L (ref 94–109)
CO2 SERPL-SCNC: 29 MMOL/L (ref 20–32)
CREAT SERPL-MCNC: 1.29 MG/DL (ref 0.66–1.25)
CV STRESS MAX HR HE: 57
GFR SERPL CREATININE-BSD FRML MDRD: 57 ML/MIN/{1.73_M2}
GLUCOSE SERPL-MCNC: 95 MG/DL (ref 70–99)
INTERPRETATION ECG - MUSE: NORMAL
NUC STRESS EJECTION FRACTION: 68 %
POTASSIUM SERPL-SCNC: 4.1 MMOL/L (ref 3.4–5.3)
RATE PRESSURE PRODUCT: 6498
SODIUM SERPL-SCNC: 138 MMOL/L (ref 133–144)
STRESS ECHO BASELINE DIASTOLIC HE: 70
STRESS ECHO BASELINE HR: 56
STRESS ECHO BASELINE SYSTOLIC BP: 120
STRESS ECHO CALCULATED PERCENT HR: 38 %
STRESS ECHO LAST STRESS DIASTOLIC BP: 62
STRESS ECHO LAST STRESS SYSTOLIC BP: 114
STRESS ECHO TARGET HR: 152
TROPONIN I SERPL-MCNC: <0.015 UG/L (ref 0–0.04)
TSH SERPL DL<=0.005 MIU/L-ACNC: 2.1 MU/L (ref 0.4–4)

## 2019-12-27 PROCEDURE — 25000132 ZZH RX MED GY IP 250 OP 250 PS 637: Performed by: PHYSICIAN ASSISTANT

## 2019-12-27 PROCEDURE — 78452 HT MUSCLE IMAGE SPECT MULT: CPT

## 2019-12-27 PROCEDURE — 99204 OFFICE O/P NEW MOD 45 MIN: CPT | Performed by: INTERNAL MEDICINE

## 2019-12-27 PROCEDURE — 25000132 ZZH RX MED GY IP 250 OP 250 PS 637: Performed by: INTERNAL MEDICINE

## 2019-12-27 PROCEDURE — 93017 CV STRESS TEST TRACING ONLY: CPT

## 2019-12-27 PROCEDURE — 78452 HT MUSCLE IMAGE SPECT MULT: CPT | Mod: 26 | Performed by: INTERNAL MEDICINE

## 2019-12-27 PROCEDURE — 99217 ZZC OBSERVATION CARE DISCHARGE: CPT | Performed by: PHYSICIAN ASSISTANT

## 2019-12-27 PROCEDURE — 93306 TTE W/DOPPLER COMPLETE: CPT | Mod: 26 | Performed by: INTERNAL MEDICINE

## 2019-12-27 PROCEDURE — 34300033 ZZH RX 343: Performed by: INTERNAL MEDICINE

## 2019-12-27 PROCEDURE — 84443 ASSAY THYROID STIM HORMONE: CPT | Performed by: PHYSICIAN ASSISTANT

## 2019-12-27 PROCEDURE — 80048 BASIC METABOLIC PNL TOTAL CA: CPT | Performed by: PHYSICIAN ASSISTANT

## 2019-12-27 PROCEDURE — G0378 HOSPITAL OBSERVATION PER HR: HCPCS

## 2019-12-27 PROCEDURE — 40000275 ZZH STATISTIC RCP TIME EA 10 MIN

## 2019-12-27 PROCEDURE — 94640 AIRWAY INHALATION TREATMENT: CPT

## 2019-12-27 PROCEDURE — 25000125 ZZHC RX 250: Performed by: PHYSICIAN ASSISTANT

## 2019-12-27 PROCEDURE — 93016 CV STRESS TEST SUPVJ ONLY: CPT

## 2019-12-27 PROCEDURE — 25000128 H RX IP 250 OP 636

## 2019-12-27 PROCEDURE — 93018 CV STRESS TEST I&R ONLY: CPT | Performed by: INTERNAL MEDICINE

## 2019-12-27 PROCEDURE — 25500064 ZZH RX 255 OP 636: Performed by: INTERNAL MEDICINE

## 2019-12-27 PROCEDURE — 84484 ASSAY OF TROPONIN QUANT: CPT | Performed by: PHYSICIAN ASSISTANT

## 2019-12-27 PROCEDURE — A9502 TC99M TETROFOSMIN: HCPCS | Performed by: INTERNAL MEDICINE

## 2019-12-27 PROCEDURE — 36415 COLL VENOUS BLD VENIPUNCTURE: CPT | Performed by: PHYSICIAN ASSISTANT

## 2019-12-27 RX ORDER — REGADENOSON 0.08 MG/ML
INJECTION, SOLUTION INTRAVENOUS
Status: COMPLETED
Start: 2019-12-27 | End: 2019-12-27

## 2019-12-27 RX ORDER — AMINOPHYLLINE 25 MG/ML
50-100 INJECTION, SOLUTION INTRAVENOUS
Status: DISCONTINUED | OUTPATIENT
Start: 2019-12-27 | End: 2019-12-27 | Stop reason: HOSPADM

## 2019-12-27 RX ORDER — ACYCLOVIR 200 MG/1
0-1 CAPSULE ORAL
Status: DISCONTINUED | OUTPATIENT
Start: 2019-12-27 | End: 2019-12-27 | Stop reason: HOSPADM

## 2019-12-27 RX ORDER — METOPROLOL SUCCINATE 25 MG/1
75 TABLET, EXTENDED RELEASE ORAL DAILY
Qty: 90 TABLET | Refills: 0 | Status: SHIPPED | OUTPATIENT
Start: 2019-12-27 | End: 2020-03-04

## 2019-12-27 RX ORDER — METOPROLOL SUCCINATE 25 MG/1
25 TABLET, EXTENDED RELEASE ORAL DAILY
Status: DISCONTINUED | OUTPATIENT
Start: 2019-12-27 | End: 2019-12-27

## 2019-12-27 RX ORDER — METOPROLOL SUCCINATE 50 MG/1
50 TABLET, EXTENDED RELEASE ORAL DAILY
Status: DISCONTINUED | OUTPATIENT
Start: 2019-12-27 | End: 2019-12-27

## 2019-12-27 RX ORDER — DILTIAZEM HYDROCHLORIDE 180 MG/1
180 CAPSULE, COATED, EXTENDED RELEASE ORAL 2 TIMES DAILY
Status: DISCONTINUED | OUTPATIENT
Start: 2019-12-27 | End: 2019-12-27 | Stop reason: HOSPADM

## 2019-12-27 RX ORDER — REGADENOSON 0.08 MG/ML
0.4 INJECTION, SOLUTION INTRAVENOUS ONCE
Status: COMPLETED | OUTPATIENT
Start: 2019-12-27 | End: 2019-12-27

## 2019-12-27 RX ORDER — ALBUTEROL SULFATE 90 UG/1
2 AEROSOL, METERED RESPIRATORY (INHALATION) EVERY 5 MIN PRN
Status: DISCONTINUED | OUTPATIENT
Start: 2019-12-27 | End: 2019-12-27 | Stop reason: HOSPADM

## 2019-12-27 RX ADMIN — APIXABAN 2.5 MG: 2.5 TABLET, FILM COATED ORAL at 08:07

## 2019-12-27 RX ADMIN — REGADENOSON 0.4 MG: 0.08 INJECTION, SOLUTION INTRAVENOUS at 12:18

## 2019-12-27 RX ADMIN — BUMETANIDE 1 MG: 0.5 TABLET ORAL at 13:36

## 2019-12-27 RX ADMIN — TETROFOSMIN 10.2 MCI.: 1.38 INJECTION, POWDER, LYOPHILIZED, FOR SOLUTION INTRAVENOUS at 09:44

## 2019-12-27 RX ADMIN — IPRATROPIUM BROMIDE AND ALBUTEROL SULFATE 3 ML: .5; 3 SOLUTION RESPIRATORY (INHALATION) at 08:44

## 2019-12-27 RX ADMIN — TETROFOSMIN 28 MCI.: 1.38 INJECTION, POWDER, LYOPHILIZED, FOR SOLUTION INTRAVENOUS at 12:21

## 2019-12-27 RX ADMIN — METOPROLOL SUCCINATE 75 MG: 50 TABLET, EXTENDED RELEASE ORAL at 08:59

## 2019-12-27 RX ADMIN — DILTIAZEM HYDROCHLORIDE 180 MG: 180 CAPSULE, COATED, EXTENDED RELEASE ORAL at 08:59

## 2019-12-27 RX ADMIN — OXYCODONE HYDROCHLORIDE 5 MG: 5 TABLET ORAL at 08:59

## 2019-12-27 RX ADMIN — HUMAN ALBUMIN MICROSPHERES AND PERFLUTREN 3 ML: 10; .22 INJECTION, SOLUTION INTRAVENOUS at 07:35

## 2019-12-27 NOTE — PLAN OF CARE
PRIMARY DIAGNOSIS: CHEST PAIN  OUTPATIENT/OBSERVATION GOALS TO BE MET BEFORE DISCHARGE:  1. Ruled out acute coronary syndrome (negative or stable Troponin):  Yes- trops neg x4  2. Pain Status: Pain free.  3. Appropriate provocative testing performed: Not yet   - Stress Test Procedure: Angela   - Interpretation of cardiac rhythm per telemetry tech: Afib CVR rate 60's    4. Cleared by Consultants (if applicable):No  5. Return to near baseline physical activity: No  Discharge Planner Nurse   Safe discharge environment identified: No  Barriers to discharge: No       Entered by: Elizabeth Galaviz 12/27/2019 6:13 AM     Continues to have very frequent pauses, longest pause at 0457 was 2.6 sec, asymptomatic, HR dipping down to high 40's-50's overnight. VSS on 3L O2. Denies any chest pain. SOB with exertion. SBA for safety. Trops negative x4. NPO at 4am. Plan for serial trops, tele, echo, lexiscan in AM. Will continue to monitor.   Please review provider order for any additional goals.   Nurse to notify provider when observation goals have been met and patient is ready for discharge.

## 2019-12-27 NOTE — CONSULTS
Care Transition Initial Assessment - RN        Met with: Patient.  DATA   Principal Problem:    Acute chest pain  Active Problems:    Atrial fibrillation with rapid ventricular response (H)       Cognitive Status: awake, alert and oriented.  [facility]  Primary Care MD Name: Dr. Ana Pratt  Contact information and PCP information verified: Yes                        Insurance concerns: No Insurance issues identified  ASSESSMENT  Patient currently receives the following services:  None        Identified issues/concerns regarding health management: Patient admitted with chest pain. Received consult to discuss O2 and options for portable O2. Patient has home O2 and BiPAP through [employer]. Patient also has a portable concentrator through [employer]. Patient uses O2 at home and while he is in the car. Patient states has not been using at work. Patient started at [employer] 3 weeks ago and is concerned about using O2 at work. Discussed that not wearing O2 at work for up to 6 hours at a time is not ideal and encouraged patient to begin to wear O2 at work. Patient states will follow up with HR and PCP to establish restrictions at work (needing O2 and lifting restrictions). Patient states had an incident at work 3 weeks ago where he over exerted himself and needed about 10 minutes to recover. Patient follows with Pulmonology through [facility]. Patient will reschedule appointment that was originally scheduled for today 12/27.   Patient's wife assists with managing medications. Patient has support from his wife and daughter at home.   PLAN  Financial costs for the patient were not discussed .  Patient given options and choices for discharge Yes .  Patient/family is agreeable to the plan?  Yes: pending lexiscan results.   Patient anticipates discharging to Home .        Patient anticipates needs for home equipment: No  Transportation/person available to transport on day of discharge   is self as patient drove self to hospital.  Plan/Disposition: Home   Appointments: Scheduled PCP post hospitalization appointment and updated to AVS.    Thursday January 2nd at 2:25 PM [facility] with Dr. Pratt.    Care  (CTS) will continue to follow as needed.    Judith Saldivar MA/RN Case Manager  Inpatient Care Coordination  [facility]   [phone]

## 2019-12-27 NOTE — PLAN OF CARE
PRIMARY DIAGNOSIS: CHEST PAIN  OUTPATIENT/OBSERVATION GOALS TO BE MET BEFORE DISCHARGE:  1. Ruled out acute coronary syndrome (negative or stable Troponin):  Yes- trops neg x2   2. Pain Status: Pain free.  3. Appropriate provocative testing performed: Not yet   - Stress Test Procedure: Angela   - Interpretation of cardiac rhythm per telemetry tech: Afib CVR rate 60's    4. Cleared by Consultants (if applicable):No  5. Return to near baseline physical activity: No  Discharge Planner Nurse   Safe discharge environment identified: No  Barriers to discharge: No       Entered by: Elizabeth Galaviz 12/27/2019 12:50 AM     VSS on 3L-4L O2. Denies any chest pain. SOB with exertion. SBA for safety. Trops negative x2, will have more drawn tonight. Plan for serial trops, tele, and lexiscan in AM. Will continue to monitor.   Update: Pt had approx 9 pauses on tele, longest 2.4 sec, betty ARNETT and is aware. Pt asymptomatic.   Please review provider order for any additional goals.   Nurse to notify provider when observation goals have been met and patient is ready for discharge.

## 2019-12-27 NOTE — PLAN OF CARE
PRIMARY DIAGNOSIS: CHEST PAIN  OUTPATIENT/OBSERVATION GOALS TO BE MET BEFORE DISCHARGE:  1. Ruled out acute coronary syndrome (negative or stable Troponin):  Yes- trops neg x2   2. Pain Status: Pain free.  3. Appropriate provocative testing performed: Not yet   - Stress Test Procedure: Angela   - Interpretation of cardiac rhythm per telemetry tech: Afib CVR rate 60's    4. Cleared by Consultants (if applicable):No  5. Return to near baseline physical activity: No  Discharge Planner Nurse   Safe discharge environment identified: No  Barriers to discharge: No       Entered by: Elizabeth Galaviz 12/27/2019 5:04 AM     VSS on 3L O2. Denies any chest pain. SOB with exertion. SBA for safety. Trops negative x4. NPO at 4am. Plan for serial trops, tele, and lexiscan in AM. Will continue to monitor.   Please review provider order for any additional goals.   Nurse to notify provider when observation goals have been met and patient is ready for discharge.

## 2019-12-27 NOTE — ED NOTES
"Patient has  [place] to Observation  order. Patient has been given the Observation brochure -  What does Observation mean to me.\"  Patient has been given the opportunity to ask questions about observation status and their plan of care.      Shabnam Arnold RN    "

## 2019-12-27 NOTE — PLAN OF CARE
PRIMARY DIAGNOSIS: CHEST PAIN  OUTPATIENT/OBSERVATION GOALS TO BE MET BEFORE DISCHARGE:  1. Ruled out acute coronary syndrome (negative or stable Troponin):  Yes- trops neg x2   2. Pain Status: Pain free.  3. Appropriate provocative testing performed: Not yet   - Stress Test Procedure: Angela   - Interpretation of cardiac rhythm per telemetry tech: Tele placed       4. Cleared by Consultants (if applicable):No  5. Return to near baseline physical activity: No  Discharge Planner Nurse   Safe discharge environment identified: No  Barriers to discharge: No       Entered by: Elizabeth Galaviz 12/26/2019 9:19 PM     VSS on 3L O2. Denies any chest pain. SOB with exertion. LS diminished, expiratory wheezing at times. SBA for safety. Plan for serial trops, tele, and lexiscan in AM. Will continue to monitor.   Please review provider order for any additional goals.   Nurse to notify provider when observation goals have been met and patient is ready for discharge.

## 2019-12-27 NOTE — DISCHARGE SUMMARY
[facility] Outpatient / Observation Unit  Discharge Summary        Tone Cooper MRN# [id number]   YOB: 1950 Age: 69 year old     Date of Admission:  12/26/2019  Date of Discharge:  12/27/2019  Admitting Physician:  Latanya Boo MD  Discharge Physician: Sarika Davis PA-C  Discharging Service: Hospitalist      Primary Provider: Ana Pratt  Primary Care Physician Phone Number: [phone]         Primary Discharge Diagnoses:    Tone Cooper was admitted on 12/26/2019 for concerns of acute chest pain.     1. Chest pain: Ruled out ACS. Suspect stable cardiac source. Serial troponins were negative. Cardiology was consulted and felt sx were likely related to a fib with RVR vs underlying pulmonary issue vs stable angina, and increased home metoprolol to 75 mg daily and recommended repeat NM Lexiscan. This was abnormal but unchanged from previous. CT of the chest showed findings consistent with acute bronchitis. Pt denies distinct worsening of cough or increase in sputum production with cough. Would have a low threshold to start abx if cough worsens or becomes more productive.   2. COPD - end stage, O2 dependent. On baseline O2 requirements, on chronic steroids   3. Persistent a fib - on diltiazem and metoprolol, metoprolol increased. Resume Eliquis on discharge, unclear if he has been taking it BID but re-emphasized appropriate dosing.        Secondary Discharge Diagnoses:     Past Medical History:   Diagnosis Date     Atrial flutter (H)      Cancer (H)     basal cell-nose     COPD (chronic obstructive pulmonary disease) (H)      Diverticulitis      Hepatitis 2011    Hepatitis C     Hypertension      Persistent atrial fibrillation 4/28/09    ablation 7/1/2015     Premature beats      PVC (premature ventricular contraction)     s/p ablation 12/5/2017     Tricuspid valve disorder     Dr Aj, Hx of valve repair      Ventricular tachycardia (H)     nonsustained                Code Status:       Full Code        Brief Hospital Summary:        Reason for your hospital stay      Chest pain. Serial troponins were negative. Cardiology was consulted and   recommended NM Lexiscan which was abnormal but unchanged from previous.   Cardiology did increase home metoprolol but no other work up or   interventions recommended at this time.             Please refer to initial admission history and physical for further details.   Briefly, Tone Cooper was admitted on 12/26/2019 for concerns of acute chest pain.   Initial work up in the ED did not reveal evidence of STEMI or findings consistent with unstable angina or acute coronary ischemia. Pt was registered to the Observation Unit for further evaluation.   Pt ruled out with serial troponins, underwent Echocardiogram, which was unchanged from previous. Cardiology was consulted, increased metoprolol and recommended Lexiscan Stress Thallium test. This was abnormal but unchanged from previous test. Labs were reviewed and significant results addressed. On the day of discharge, pt was pain free, and SOB is stable. Medications were reviewed and adjustments made as necessary. Pt is instructed to follow up as below.           Significant Lab During Hospitalization:      Recent Labs   Lab 12/26/19  1529   WBC 8.3   HGB 14.6   HCT 47.5   MCV 84        Recent Labs   Lab 12/27/19  0604 12/26/19  1548 12/26/19  1529     --  137   POTASSIUM 4.1  --  4.5   CHLORIDE 105  --  102   CO2 29  --  29   ANIONGAP 4  --  6   GLC 95  --  108*   BUN 16  --  18   CR 1.29*  --  1.55*   GFRESTIMATED 57* 40* 45*   GFRESTBLACK 66 49* 53*   WILBERT 9.4  --  10.5*   MAG  --   --  2.0   PROTTOTAL  --   --  8.0   ALBUMIN  --   --  3.4   BILITOTAL  --   --  0.8   ALKPHOS  --   --  51   AST  --   --  57*   ALT  --   --  71*     Recent Labs   Lab 12/26/19  1529   INR 1.45*     Recent Labs   Lab 12/26/19  1529   LIPASE 256     Recent Labs   Lab 12/27/19  0604   TSH 2.10     Recent Labs   Lab  12/27/19  0235 12/26/19  2237 12/26/19  1914   TROPI <0.015 <0.015 <0.015                Significant Imaging During Hospitalization:      Recent Results (from the past 48 hour(s))   CT Chest (PE) Abdomen Pelvis w Contrast    Narrative    CT CHEST PE ABDOMEN AND PELVIS WITH CONTRAST   12/26/2019 4:26 PM     HISTORY: Sudden onset chest pain, evaluate for pulmonary embolism,  associated abdominal pain.    TECHNIQUE: 93mL Isovue-370. CT images of the chest, abdomen, and  pelvis after nonionic intravenous contrast. Radiation dose for this  scan was reduced using automated exposure control, adjustment of the  mA and/or kV according to patient size, or iterative reconstruction  technique.    COMPARISON: 9/5/2019.    FINDINGS:     CHEST: No evidence of pulmonary embolism or acute thoracic aortic  abnormality. There is mild coronary atherosclerosis. No pneumothorax.  No pleural or pericardial effusion. The lungs are severely  emphysematous.    There is increased bronchial wall thickening compared to prior  studies. Patchy areas of atelectasis noted in the lower lobes, also  new from prior. There are prominent but not pathologically enlarged  hilar lymph nodes. No mediastinal or hilar adenopathy. Right atrial  enlargement is noted.    ABDOMEN AND PELVIS: There is mild diffuse decreased attenuation of the  liver without focal lesion. The gallbladder has been removed. The  spleen, pancreas, adrenal glands, and kidneys contain no worrisome  focal lesions. There is moderate nonaneurysmal aortic atherosclerosis.  No abdominal or retroperitoneal lymphadenopathy. No abnormal bowel  distention, free air, or ascites. There is sigmoid diverticulosis  without evidence of acute diverticulitis. No pelvic adenopathy or free  fluid.    No lytic or blastic bone lesions. There is a left hip replacement.      Impression    IMPRESSION:  1. No evidence of pulmonary embolism.  2. Severely emphysematous lungs.  3. Increased bronchial wall  thickening compared to prior studies  suggestive of acute bronchitis. Mildly enlarged hilar lymph nodes are  likely reactive.  4. Fatty infiltration of the liver.    SAMANTHA OLIVAS MD   Echocardiogram Complete    Narrative    [id number]  [id number]  [id number]  [id number]^LÓPEZ^JC^[facility]  Echocardiography Laboratory  [address] [place] [address]        Name: FELIPE AYOUB  MRN: [id number]  : 1950  Study Date: 2019 07:12 AM  Age: 69 yrs  Gender: Male  Patient Location: [facility]  Reason For Study: Chest Pain, Chest Pressure, Chest Tightness  Ordering Physician: JC DAWN  Referring Physician: Ana Pratt  Performed By: Davi Brush RDCS     BSA: 2.0 m2  Height: 71 in  Weight: 183 lb  HR: 61  BP: 138/66 mmHg  _____________________________________________________________________________  __        Procedure  Complete Portable Echo Adult. Optison (NDC #3102-0686) given intravenously.  _____________________________________________________________________________  __        Interpretation Summary     Left ventricular systolic function is low normal.  The visual ejection fraction is estimated at 50-55%.  S/P TV repair. Mean Gradient across TV uis 3mm ar 61 bpm.  No tricuspid regurgitation.  The right ventricle is mild to moderately dilated.  The right ventricular systolic function is mild to moderately reduced.  The study was technically difficult. Compared to the prior study dated ,  there have been no changes.  _____________________________________________________________________________  __        Left Ventricle  The left ventricle is borderline dilated. There is normal left ventricular  wall thickness. Left ventricular systolic function is low normal. The visual  ejection fraction is estimated at 50-55%. Diastolic function not assessed due  to atrial fibrillation. There is borderline global hypokinesia of the left  ventricle. Paradoxical septal  motion is consistent with right ventricular  volume overload.     Right Ventricle  The right ventricle is mild to moderately dilated. The right ventricular  systolic function is mild to moderately reduced.     Atria  Normal left atrial size. The right atrium is mildly dilated.     Mitral Valve  The mitral valve is not well visualized. There is trace mitral regurgitation.        Tricuspid Valve  No tricuspid regurgitation. IVC diameter >2.1 cm collapsing <50% with sniff  suggests a high RA pressure estimated at 15 mmHg or greater. Right ventricular  systolic pressure could not be approximated due to inadequate tricuspid  regurgitation. S/P TV repair.     Aortic Valve  There is mild trileaflet aortic sclerosis. No aortic regurgitation is present.  No aortic stenosis is present.     Pulmonic Valve  The pulmonic valve is not well seen, but is grossly normal.     Vessels  Borderline aortic root dilatation.     Pericardium  There is no pericardial effusion.        Rhythm  The rhythm was atrial fibrillation.  _____________________________________________________________________________  __  MMode/2D Measurements & Calculations  IVSd: 0.88 cm     LVIDd: 5.7 cm  LVIDs: 4.9 cm  LVPWd: 0.81 cm  FS: 14.1 %  LV mass(C)d: 184.4 grams  LV mass(C)dI: 90.8 grams/m2  Ao root diam: 3.7 cm  LA dimension: 3.3 cm  LA/Ao: 0.90  LVOT diam: 2.4 cm  LVOT area: 4.4 cm2  RWT: 0.28        Doppler Measurements & Calculations  MV E max fabrice: 95.3 cm/sec  MV dec time: 0.25 sec  TV V2 max: 106.7 cm/sec  TV max P.6 mmHg  TV mean PG: 3.0 mmHg  TV V2 VTI: 43.0 cm  PA acc time: 0.12 sec              _____________________________________________________________________________  __        Report approved by: Sami Miller 2019 08:30 AM      NM MPI w Lexiscan   Result Value    Target     Baseline Systolic     Baseline Diastolic BP 70    Last Stress Systolic     Last Stress Diastolic BP 62    Baseline HR 56    Max HR 57     Calculated Percent HR 38    Rate Pressure Product 6,498.0    Left Ventricular EF 68    Narrative       The nuclear stress test is abnormal.     There is a medium sized area of nontransmural infarction in the apical,   inferior, inferolateral and inferoseptal segment(s) of the left ventricle   associated with a moderate  degree of paula-infarct ischemia.     Left ventricular function is normal.     The left ventricular ejection fraction at stress is 68%.     A prior study was conducted on 8/24/2016.  This study has no change   when compared with the prior study.                  Pending Results:        Unresulted Labs Ordered in the Past 30 Days of this Admission     No orders found for last 31 day(s).              Consultations This Hospital Stay:      Consultation during this admission received from cardiology         Discharge Instructions and Follow-Up:      Follow-up Appointments     Follow-up and recommended labs and tests       Follow up with primary care provider, Ana Pratt, as needed for   hospital follow- up.  No follow up labs or test are needed.   CT showed acute bronchitis, consider antibiotics if cough increases or   becomes more productive.   Follow up with Cardiology in 2 weeks.           Pt instructed to follow up with PCP in 7 days and with Consultant if indicated.   Follow-up Labs None        Discharge Disposition:      Discharged to home         Discharge Medications:        Current Discharge Medication List      CONTINUE these medications which have CHANGED    Details   apixaban ANTICOAGULANT (ELIQUIS) 5 MG tablet Take 1 tablet (5 mg) by mouth 2 times daily      metoprolol succinate ER (TOPROL-XL) 25 MG 24 hr tablet Take 3 tablets (75 mg) by mouth daily  Qty: 90 tablet, Refills: 0    Associated Diagnoses: Atrial fibrillation with rapid ventricular response (H)         CONTINUE these medications which have NOT CHANGED    Details   acetaminophen (TYLENOL) 325 MG tablet Take 325-650 mg by  "mouth every 6 hours as needed for mild pain      Bumetanide (BUMEX PO) Take 1 mg by mouth daily Takes daily at 1400      clonazePAM (KLONOPIN) 1 MG tablet Take 1 tablet (1 mg) by mouth nightly as needed for anxiety    Associated Diagnoses: Insomnia, unspecified type      diltiazem ER COATED BEADS (CARDIZEM CD/CARTIA XT) 180 MG 24 hr capsule Take 1 capsule (180 mg) by mouth 2 times daily Hold for SBP < 110 or HR < 60    Associated Diagnoses: Atrial fibrillation with rapid ventricular response (H)      Fluticasone-Umeclidin-Vilanterol (TRELEGY ELLIPTA) 100-62.5-25 MCG/INH oral inhaler Inhale 1 puff into the lungs daily  Qty: 1 Inhaler, Refills: 2    Associated Diagnoses: Chronic obstructive pulmonary disease, unspecified COPD type (H)      Lidocaine (LIDOCARE) 4 % Patch Place 2 patches onto the skin daily as needed for moderate pain      oxyCODONE IR (ROXICODONE) 10 MG tablet Take 10 mg by mouth every 8 hours as needed for severe pain      predniSONE (DELTASONE) 10 MG tablet Take 5 mg by mouth daily       traZODone (DESYREL) 50 MG tablet Take 50 mg by mouth At Bedtime                  Allergies:         Allergies   Allergen Reactions     Amlodipine Swelling     Flecainide Palpitations and Rash           Condition and Physical on Discharge:      Discharge condition: Stable   Vitals: Blood pressure 120/76, pulse 53, temperature 97.3  F (36.3  C), temperature source Oral, resp. rate 20, height 1.803 m (5' 11\"), weight 83.1 kg (183 lb 1.6 oz), SpO2 98 %.  183 lbs 1.6 oz      GENERAL:  Comfortable.  PSYCH: pleasant, oriented, No acute distress.  HEART:  RRR.  LUNGS:  Stable dyspnea, on home O2.   EXTREMITIES:  Able to ambulate independently.  SKIN:  Dry to touch, No rash, wound or ulcerations.  NEUROLOGIC:  Grossly intact    Sarika Davis PA-C   "

## 2019-12-27 NOTE — CONSULTS
Cardiology Inpatient Consult:     Consult Date:  12/27/2019      HISTORY OF PRESENT ILLNESS:  I had the opportunity to see the patient, Tone Cooper, today in hospital for Cardiology consultation.  As you know, he is a 69-year-old male whose birthday is today, with a past medical history significant for permanent atrial fibrillation, right heart failure with cor pulmonale, hypertension, CKD, status post open tricuspid valve repair, prior VTE on apixaban, hepatitis C, chronic back pain and oxygen-dependent COPD who presented on 12/26/2019 with new onset left-sided chest pain and dizziness.  The patient was previously seen by Dr. Britt with Electrophysiology on 10/09/2019.  At that time his cardiac history including frequent PVCs status post 2 attempted PVC ablations was reviewed in clinic.  At that time his heart rate was found to be slightly elevated and so he was started on metoprolol succinate 50 mg daily.  The decision of whether or not to ablate his AV node and place a permanent pacemaker was discussed, however, since he was on steroids this was decided to be suboptimal and it was opted to continue with medical therapy.      The patient reports that he was in his usual state of health yesterday working at [employer] as a  when he felt acute onset left-sided chest discomfort.  This chest pain is described as a bat hitting his chest, and was associated with a rapid heartbeat.  He has had intermittent episodes of palpitations/rapid heartbeat over the years; however, none of these episodes were associated with chest discomfort.  He denies any abnormal shortness of breath above baseline.  No recent increase in his oxygen demands.  As aforementioned, he is on baseline supplemental oxygen; however, his requirements have not changed.  He lives in a split-level home and has to navigate 9 steps several times a day and has not noticed any appreciable change in his ability to do so.  He denies any fever, chills, night  sweats abnormal cough, abnormal sputum production.  Denies any recent episodes of nausea, vomiting, or diarrhea.  He has been able to take his medications as prescribed without adverse side effects.  He has been chest pain-free since admission.  Since admission, his heart rate has been primarily in the 70s-90s, rhythm atrial fibrillation.  He did have a few runs of pauses overnight with a rate in the 30s; however, these were all overnight at around 2:00 a.m. or 3:00 a.m.  He reports that he was asymptomatic during these episodes.      ASSESSMENT AND PLAN:     In summary patient Tone Ayoub is a pleasant 69-year-old male whose birthday is today who presents for further evaluation and management of chest discomfort.  He has a known history of permanent atrial fibrillation as well as prior PVCs, status post attempted PVC ablations for which he is followed by Dr. Lorenza catherine.  He also carries a diagnosis of oxygen dependent COPD.      The etiology of his chest discomfort is not entirely clear, but may be due to atrial fibrillation with RVR versus something related to his underlying lung process versus underlying ischemia.  I note that a CT PE study done yesterday, 12/26/2019, was negative for PE; however, it did show severely emphysematous lungs and possible findings concerning for acute bronchitis.     Recommend continuing Diltiazem extended release 180 mg b.i.d., aspirin 81 mg daily, Apixaban and Bumex 1 mg daily.   Increase metoprolol succinate from 50 mg daily to 75 mg daily.   N.p.o. for Lexiscan.   Further evaluation and management of possible bronchitis per primary team.      Thank you for allowing our team to participate in the care of Mr. Ayoub.  Please do not hesitate to page or call me anytime with questions or concerns.      Shar Knight MD  Cardiology  Pager:  [phone]  Text Page   December 27, 2019       D: 12/27/2019   T: 12/27/2019   MT: CANDY      Name:     TONE AYOUB   MRN:      [id number]         Account:       [id number]   :      1950           Consult Date:  2019      Document: [id number]

## 2019-12-27 NOTE — PROGRESS NOTES
Cross-cover  Patient has slow atrial fib and pauses longest 2.4 sec but asymptomatic, will hold metoprolol and diltiazem and needs the rounder to resume today and adjust dose.

## 2019-12-27 NOTE — DISCHARGE INSTRUCTIONS
Your hospital follow up appointment has been scheduled for you with Dr. Pratt at [facility] for Thursday January 2nd at 2:25 PM. This is the latest appointment time available for Dr. Pratt. Please bring your hospital discharge instructions, a photo ID, insurance information and any new medications with you to your appointment. Please call the clinic at [phone] if you need to reschedule. Please be prepared to pay a $35 copay upon arrival.

## 2019-12-27 NOTE — PLAN OF CARE
PRIMARY DIAGNOSIS: CHEST PAIN  OUTPATIENT/OBSERVATION GOALS TO BE MET BEFORE DISCHARGE:  1. Ruled out acute coronary syndrome (negative or stable Troponin):  Yes  2. Pain Status: Improved-controlled with oral pain medications.  3. Appropriate provocative testing performed: Yes  - Stress Test Procedure: Echo  - Interpretation of cardiac rhythm per telemetry tech: Afib SVR/CVR VE    4. Cleared by Consultants (if applicable):No  5. Return to near baseline physical activity: Yes  Discharge Planner Nurse   Safe discharge environment identified: Yes  Barriers to discharge: Yes       Entered by: Oly Weir 12/27/2019 9:19 AM     A&Ox4, SBA, 2g NA, tele- Afib SVR/CVR VE, echo this am, voiding without difficulty  Please review provider order for any additional goals.   Nurse to notify provider when observation goals have been met and patient is ready for discharge.

## 2019-12-27 NOTE — PLAN OF CARE
PRIMARY DIAGNOSIS: CHEST PAIN  OUTPATIENT/OBSERVATION GOALS TO BE MET BEFORE DISCHARGE:  1. Ruled out acute coronary syndrome (negative or stable Troponin):  Yes- trops neg x2   2. Pain Status: Pain free.  3. Appropriate provocative testing performed: Not yet   - Stress Test Procedure: Angela   - Interpretation of cardiac rhythm per telemetry tech: Afib CVR rate 60's    4. Cleared by Consultants (if applicable):No  5. Return to near baseline physical activity: No  Discharge Planner Nurse   Safe discharge environment identified: No  Barriers to discharge: No       Entered by: Elizabeth Galaviz 12/27/2019 12:23 AM     VSS on 3L-4L O2. Denies any chest pain. SOB with exertion. SBA for safety. Trops negative x2, will have more drawn tonight. Plan for serial trops, tele, and lexiscan in AM. Will continue to monitor.   Please review provider order for any additional goals.   Nurse to notify provider when observation goals have been met and patient is ready for discharge.

## 2019-12-27 NOTE — PLAN OF CARE
PRIMARY DIAGNOSIS: CHEST PAIN  OUTPATIENT/OBSERVATION GOALS TO BE MET BEFORE DISCHARGE:  1. Ruled out acute coronary syndrome (negative or stable Troponin):  No  2. Pain Status: Pain free.  3. Appropriate provocative testing performed: No  - Stress Test Procedure: awaiting orders  - Interpretation of cardiac rhythm per telemetry tech: waiting for tele box      4. Cleared by Consultants (if applicable):No  5. Return to near baseline physical activity: No  Discharge Planner Nurse   Safe discharge environment identified: No  Barriers to discharge: No       Entered by: Millie Galeas 12/26/2019 6:55 PM     Please review provider order for any additional goals.   Nurse to notify provider when observation goals have been met and patient is ready for discharge.

## 2019-12-27 NOTE — PROGRESS NOTES
"Patient's After Visit Summary was reviewed with patient .   Patient verbalized understanding of After Visit Summary, recommended follow up and was given an opportunity to ask questions.   Discharge medications sent home with patient/family: No   Discharged to home.    Patient at baseline uses 3L O2, the patient was educated of the importance of using O2 and what can happen if patient decides not to use O2. Patient did not have O2 to drive home and refused to obtain O2 to go home. States \"I understand what can happen.\"    A&Ox4, Indep, regular diet, tele, lungs- diminished, bowels- active, voiding, PIV removed.     OBSERVATION patient END time: 1450    "

## 2019-12-27 NOTE — PLAN OF CARE
ROOM # 22    Living Situation (if not independent, order SW consult):indep  Facility name:[facility]  : wifr    Activity level at baseline: up with walker  Activity level on admit: SBA with walker      Patient registered to observation; given Patient Bill of Rights; given the opportunity to ask questions about observation status and their plan of care.  Patient has been oriented to the observation room, bathroom and call light is in place.    Discussed discharge goals and expectations with patient/family.

## 2019-12-27 NOTE — H&P
"[facility] [place]  History and Physical  Hospitalist       Date of Admission:  12/26/2019    Assessment & Plan   Tone Cooper is a 68 year old male with chronic atrial fibrillation, cor pulmonale, hypertension, CKD, history of open tricuspid valve repair, history of DVT/PE on Eliquis, Hep C, chronic back pain on chronic oxycodone, and oxygen-dependent COPD who presents to [facility] ED on 12/26/2019 with sudden onset severe left-sided chest pain and lightheadedness x30 minutes.     Severe left-sided chest pain with associated lightheadedness  Self-limiting.  No radiation into the jaw or down the arm.  Was not associated with diaphoresis or nausea.  Appeared to get better if patient was hunched over and holding his chest, a move that would hypothetically increase abdominal pressure and change vagal tone.  Patient reports a rapid heart rate occurring with said chest pain, which felt somewhat similar to prior episodes of atrial fibrillation.  Initial troponin <0.015.  No leukocytosis or electrolyte derangement. EKG showing\" undetermined rhythm\" with a prolonged QTC of 520.  On my read it looks like a variable atrial flutter with PVCs.  CT Chest PE rule out negative for PE.  Suspect this was an episode of atrial fibrillation with RVR vs Ventricular tachycardia.  ZIO Patch performed 12/2018 revealed episodes of atrial fibrillation/flutter, SVT, and NSVT.  Ventricular tachycardia occurred 92 times with a variable heart rate of  bpm.  Atrial fibrillation/flutter occurred 64% of the time with variable rate of  bpm.  Longest period of atrial fibrillation lasted 7 days 10 hours.  SVT occurred 71 times with an average heart rate of  bpm.    --Troponins cycled negative x2.    --Repeat echocardiogram ordered.    --Will hold off on stress test for now, but if patient does need stress test, consider Lexiscan as patient will be unable to exercise due to his pulmonary disease.  --Tele  --TSH/T4  --Patient is " having episodes of bradycardia into the 50s. Because of this, hesitant to uptitrate metoprolol or diltiazem.  Cardiology consult.       Paroxysmal atrial fibrillation   Hypertension   History of DVT/PE  Continue PTA diltiazem and metoprolol with hold parameters.  Patient is on Eliquis both for atrial fibrillation and also for his history of DVT/PE.  Current dose is somewhat unusual at 5 mg once a day.  DVT/PE prophylaxis is 2.5 mg twice daily.  I have changed his dose to this.  Defer to rounding provider as to which dose to continue at discharge.    End-stage oxygen dependent COPD  Appears compensated.  No wheezing on exam.  Patient has home O2 but does not have a concentrator for work as he cannot afford this.  Social work consult to see if there are any community resources available to help patient obtain a concentrator.     CKD, stage III  Baseline Cr is somewhat labile but appears to range anywhere from 0.9 - 1.6.  Today as 1.55.  Repeat BMP tomorrow.       Mild transaminitis  Noted, possibly related to fatty liver or Hep C.  Defer to PMD.      Mild hypercalcemia  Noted.  Doesn't appear to be causing symptoms.  Etiology remains somewhat unclear, question dehydration.  Given patient's smoking history and pulmonary disease in addition to his noted bronchial wall thickening and mildly enlarged hilar lymph nodes, further investigation may need to be pursued to evaluate for underlying malignancy.  Wwill repeat calcium level in a.m.  Defer to primary care provider for additional work-up if needed.       DVT Prophylaxis:  Eliquis  Code Status: Full Code    Disposition: Expected discharge in 1-2 days.    [facility] Merged with [facility]    PRIMARY CARE PROVIDER: Ana Pratt    CHIEF COMPLAINT  Chest pain, lightheadedness    History is obtained from the patient and [facility]    HISTORY OF PRESENT ILLNESS  Tone Cooper is a 68 year old male with chronic atrial fibrillation, cor pulmonale, hypertension, CKD, history of open  "tricuspid valve repair, history of DVT/PE on Eliquis, Hep C, chronic back pain on chronic oxycodone, and oxygen-dependent COPD who presents to [facility] ED on 12/26/2019 with sudden onset severe left-sided chest pain and lightheadedness x30 minutes.  Patient was in his usual state of health at work today when he developed sudden onset severe chest pain.  He states it was like he was \"hit by a bat\".  Was associated with lightheadedness and dizziness.  It did not radiate into the jaw or down the arm.  No associated diaphoresis or nausea.  It seemed to get better if he would lean forward and clutch his chest.  Due to the severity of the pain, he left work and sought care in the emergency room.  By the time he arrived, his chest pain had started to resolve.    In the ER, blood pressure 133/88, heart rate 107, respiratory rate 18, SPO2 99% at rest on room air, temperature 97.7.  Sodium 137, potassium 4.5, chloride 102, bicarb 29, BUN 18, creatinine 1.55, GFR 45.  Calcium 10.5, total bilirubin 0.8, alkaline phosphatase 51, ALT 71, AST 57, lipase 256.  Troponin < 0.015.  WBC 8.3, hemoglobin 14.6, platelet 249.  INR 1.45.  EKG showing\" undetermined rhythm\" with a prolonged QTC of 520.  CT Chest PE rule out negative for PE but did show severely emphysematous lungs, increased bronchial wall thickening suggestive of acute bronchitis, and fatty infiltration of the liver.  Admission requested for chest pain rule out.    Patient seen on the floor where present time he is resting comfortably.  Pain is resolved.  He notes that he has had atrial fibrillation for a long time.  He noted that during today's episode of chest pain, he also had a very fast heart rate.  Felt similar to prior episodes of atrial fibrillation with RVR, however, he has never had pain similar to this before.  He denies any recent illness.  His dyspnea has been relatively stable.  He notes that he does not have money for a concentrator and so he will often go to work " without oxygen on.  Once he is at work, he is able to sit on a stool and not move much, therefore not having significant change in his respiratory status.  When he gets home, he puts on oxygen.  He denies any recent fevers, chills, sweats, worsening shortness of breath, abdominal pain.  No new cough or URI type symptoms.  No body aches.      PAST MEDICAL HISTORY  I have reviewed this patient's medical history and updated it with pertinent information if needed.   Past Medical History:   Diagnosis Date     Atrial flutter (H), history of an ablation      Cancer (H)     basal cell-nose     COPD (chronic obstructive pulmonary disease) (H)      Diverticulitis      Hepatitis 2011    Hepatitis C     Hypertension      Persistent atrial fibrillation 4/28/09    ablation 7/1/2015     Premature beats      PVC (premature ventricular contraction)     s/p ablation 12/5/2017     Tricuspid valve disorder     Dr Aj, Hx of valve repair      Ventricular tachycardia (H)     nonsustained     Cor pulmonale.  Decreased RVSF noted on Echo 1/2019.    ZIO Patch performed 12/2018 revealed episodes of atrial fibrillation/flutter, SVT, and NSVT.  Ventricular tachycardia occurred 92 times with a variable heart rate of  bpm.  Atrial fibrillation/flutter occurred 64% of the time with variable rate of  bpm.  Longest period of atrial fibrillation lasted 7 days 10 hours.  SVT occurred 71 times with an average heart rate of  bpm.      PAST SURGICAL HISTORY  I have reviewed this patient's surgical history and updated it with pertinent information if needed.  Past Surgical History:   Procedure Laterality Date     ABDOMEN SURGERY      hernia repair right     APPENDECTOMY  as a child     CARDIAC SURGERY      multiple ablations-[place]     CARDIOVERSION  06/03/2009    successful for afib     CARDIOVERSION  4/20/15    successful for afib     CARDIOVERSION  5/28/15     COLONOSCOPY  01/2018    [facility]     GENITOURINARY SURGERY       undescended testicle     H ABLATION ATRIAL FLUTTER       H ABLATION FOCAL AFIB  7/1/15    Left atrial linear ablation and pulmonary vein antrum ablation     H ABLATION PVC  12/5/16     INCISION AND DRAINAGE LOWER EXTREMITY, COMBINED Right 11/10/2016    Procedure: COMBINED INCISION AND DRAINAGE LOWER EXTREMITY;  Surgeon: Roger Pacheco MD;  Location: [facility] OR     LAPAROSCOPIC CHOLECYSTECTOMY  1/6/2012    Procedure:LAPAROSCOPIC CHOLECYSTECTOMY; LAPAROSCOPIC CHOLECYSTECTOMY ; Surgeon:ROGER PACHECO; Location:[facility] OR     ORTHOPEDIC SURGERY  2004    Left hip replacement     REPAIR VALVE TRICUSPID  9/8/08    conversion to a bileaflet valve and application of a 30 mm Sanderson ring     SMALL INTESTINE SURGERY      had bowel obstruction age 9     THORACIC SURGERY       VALVE REPLACEMENT      tricuspid       PRIOR TO ADMISSION MEDICATIONS  Prior to Admission Medications   Prescriptions Last Dose Informant Patient Reported? Taking?   Bumetanide (BUMEX PO) 12/25/2019 at 1400  Yes Yes   Sig: Take 1 mg by mouth daily Takes daily at 1400   Fluticasone-Umeclidin-Vilanterol (TRELEGY ELLIPTA) 100-62.5-25 MCG/INH oral inhaler Past Week at Unknown time  No Yes   Sig: Inhale 1 puff into the lungs daily   Lidocaine (LIDOCARE) 4 % Patch Past Week at Unknown time  Yes Yes   Sig: Place 2 patches onto the skin daily as needed for moderate pain   acetaminophen (TYLENOL) 325 MG tablet   Yes Yes   Sig: Take 325-650 mg by mouth every 6 hours as needed for mild pain   apixaban ANTICOAGULANT (ELIQUIS) 5 MG tablet 12/26/2019 at am  Yes Yes   Sig: Take 5 mg by mouth daily   clonazePAM (KLONOPIN) 1 MG tablet   No Yes   Sig: Take 1 tablet (1 mg) by mouth nightly as needed for anxiety   diltiazem ER COATED BEADS (CARDIZEM CD/CARTIA XT) 180 MG 24 hr capsule 12/26/2019 at am  No Yes   Sig: Take 1 capsule (180 mg) by mouth 2 times daily Hold for SBP < 110 or HR < 60   metoprolol succinate ER (TOPROL-XL) 25 MG 24 hr tablet 12/26/2019 at am  Yes  Yes   Sig: Take 25 mg by mouth daily   oxyCODONE IR (ROXICODONE) 10 MG tablet 12/26/2019 at am  Yes Yes   Sig: Take 10 mg by mouth every 8 hours as needed for severe pain   predniSONE (DELTASONE) 10 MG tablet 12/26/2019 at am  Yes Yes   Sig: Take 5 mg by mouth daily    traZODone (DESYREL) 50 MG tablet 12/25/2019 at Unknown time  Yes Yes   Sig: Take 50 mg by mouth At Bedtime       Facility-Administered Medications: None       ALLERGIES  Allergies   Allergen Reactions     Amlodipine Swelling     Flecainide Palpitations and Rash       SOCIAL HISTORY  .  Former smoker.  Works at [employer].  Occasional alcohol use.    FAMILY HISTORY  I have reviewed this patient's family history and updated it with pertinent information if needed.   Family History   Problem Relation Age of Onset     Prostate Cancer Father      Family History Negative Mother      Emphysema Mother      Hypertension Mother      Cerebrovascular Disease Mother        REVIEW OF SYSTEMS:  14 point comprehensive ROS undertaken with pertinent positives and negatives as above and otherwise unremarkable.     PHYSICAL EXAM  Temp: 97.5  F (36.4  C) Temp src: Oral BP: 138/86 Pulse: 67 Heart Rate: 70 Resp: 21 SpO2: 94 % O2 Device: Nasal cannula Oxygen Delivery: 3 LPM  Vital Signs with Ranges  Temp:  [97.5  F (36.4  C)-97.7  F (36.5  C)] 97.5  F (36.4  C)  Pulse:  [] 67  Heart Rate:  [] 70  Resp:  [18-21] 21  BP: (129-172)/() 138/86  SpO2:  [94 %-99 %] 94 %  183 lbs 1.6 oz    GENERAL:  Pleasant, cooperative, alert. Well developed, well nourished.  Appears comfortable at rest.  Trevor complexion.  HEENT: Normocephalic, atraumatic.  Extra occular mm intact.  Sclera clear. PERRL.  Mucous membranes moist.    PULMONARY: Scattered coarse sounds throughout but no wheeze.  CARDIAC: Appears regular at present moment.  No appreciable murmur.    ABDOMEN: Soft, nontender non distended.    MUSCULOSKELETAL:  Moving x 4 spontaneously with CMS intact x4.  Normal  bulk and tone.  No LE edema.     NEURO: Alert and oriented x3.  CN II-XII grossly intact and symmetric.  No ataxia or tremor.   SKIN:  Warm, pink, dry.    DATA  Data reviewed today:  I personally reviewed the EKG tracing showing Variable atrial flutter.  Prolonged QTC noted..    Most Recent 3 CBC's:  Recent Labs   Lab Test 12/26/19  1529 07/22/19  0924 07/03/19  0813   WBC 8.3 14.9* 21.7*   HGB 14.6 15.5 14.3   MCV 84 90 91    190 247      Most Recent 3 BMP's:  Recent Labs   Lab Test 12/26/19  1529 07/22/19  0924 07/03/19  0813    132* 136   POTASSIUM 4.5 4.2 4.9   CHLORIDE 102 94 103   CO2 29 30 28   BUN 18 68* 35*   CR 1.55* 2.49* 1.07   ANIONGAP 6 8 5   WILBERT 10.5* 9.5 9.0   * 129* 154*     Most Recent 2 LFT's:  Recent Labs   Lab Test 12/26/19  1529 07/22/19  0924   AST 57* 97*   ALT 71* 222*   ALKPHOS 51 76   BILITOTAL 0.8 0.7     Most Recent 3 Troponin's:  Recent Labs   Lab Test 12/26/19  1914 12/26/19  1529 06/28/19  1402  08/23/16  1256   TROPI <0.015 <0.015 0.021   < >  --    TROPONIN  --   --   --   --  0.01    < > = values in this interval not displayed.       Recent Results (from the past 24 hour(s))   CT Chest (PE) Abdomen Pelvis w Contrast    Narrative    CT CHEST PE ABDOMEN AND PELVIS WITH CONTRAST   12/26/2019 4:26 PM     HISTORY: Sudden onset chest pain, evaluate for pulmonary embolism,  associated abdominal pain.    TECHNIQUE: 93mL [id number]. CT images of the chest, abdomen, and  pelvis after nonionic intravenous contrast. Radiation dose for this  scan was reduced using automated exposure control, adjustment of the  mA and/or kV according to patient size, or iterative reconstruction  technique.    COMPARISON: 9/5/2019.    FINDINGS:     CHEST: No evidence of pulmonary embolism or acute thoracic aortic  abnormality. There is mild coronary atherosclerosis. No pneumothorax.  No pleural or pericardial effusion. The lungs are severely  emphysematous.    There is increased bronchial wall  thickening compared to prior  studies. Patchy areas of atelectasis noted in the lower lobes, also  new from prior. There are prominent but not pathologically enlarged  hilar lymph nodes. No mediastinal or hilar adenopathy. Right atrial  enlargement is noted.    ABDOMEN AND PELVIS: There is mild diffuse decreased attenuation of the  liver without focal lesion. The gallbladder has been removed. The  spleen, pancreas, adrenal glands, and kidneys contain no worrisome  focal lesions. There is moderate nonaneurysmal aortic atherosclerosis.  No abdominal or retroperitoneal lymphadenopathy. No abnormal bowel  distention, free air, or ascites. There is sigmoid diverticulosis  without evidence of acute diverticulitis. No pelvic adenopathy or free  fluid.    No lytic or blastic bone lesions. There is a left hip replacement.      Impression    IMPRESSION:  1. No evidence of pulmonary embolism.  2. Severely emphysematous lungs.  3. Increased bronchial wall thickening compared to prior studies  suggestive of acute bronchitis. Mildly enlarged hilar lymph nodes are  likely reactive.  4. Fatty infiltration of the liver.    SAMANTHA OLIVAS MD

## 2019-12-27 NOTE — PLAN OF CARE
PRIMARY DIAGNOSIS: CHEST PAIN    OUTPATIENT/OBSERVATION GOALS TO BE MET BEFORE DISCHARGE:    1. Ruled out acute coronary syndrome (negative or stable Troponin):  Yes  2. Pain Status: Improved-controlled with oral pain medications.  3. Appropriate provocative testing performed: Yes  - Stress Test Procedure: Echo and Lesi  - Interpretation of cardiac rhythm per telemetry tech: Afib SVR/CVR VE  4. Cleared by Consultants (if applicable):No  5. Return to near baseline physical activity: Yes  Discharge Planner Nurse   Safe discharge environment identified: Yes  Barriers to discharge: Yes       Entered by: Oly Weir 12/27/2019 9:19 AM  A&Ox4, VSS- 3L, SBA, 2g NA, tele- Afib SVR/CVR VE, echo and Lesi, voiding without difficulty, awaiting lesiscan results, cards consult   Please review provider order for any additional goals.   Nurse to notify provider when observation goals have been met and patient is ready for discharge.

## 2019-12-27 NOTE — CONSULTS
Cardiology Consult Note-- SEE DICTATION    Tone Cooper MRN#: [id number]   YOB: 1950 Age: 69 year old     Date of Admission: 12/26/2019  Consult indication: chest pain         HPI and Assessment and Recommendations/Plan:      Please see dictation. #[id number]    - Lexiscan, if no change then can discharge  - incr metoprolol succinate to 75mg daily  - continue diltiazem 180mg BID  - continue ASA 81mg daily  - continue apixaban  - continue bumex 1mg daily    Thank you for allowing our team to participate in the care of Tone Cooper.  Please do not hesitate to page me with any questions or concerns.     Shar Knight MD  Cardiology  Pager:  [phone]  Text Page   December 27, 2019         Past Medical History:   I have reviewed this patient's past medical history  Past Medical History:   Diagnosis Date     Atrial flutter (H)      Cancer (H)     basal cell-nose     COPD (chronic obstructive pulmonary disease) (H)      Diverticulitis      Hepatitis 2011    Hepatitis C     Hypertension      Persistent atrial fibrillation 4/28/09    ablation 7/1/2015     Premature beats      PVC (premature ventricular contraction)     s/p ablation 12/5/2017     Tricuspid valve disorder     Dr Aj, Hx of valve repair      Ventricular tachycardia (H)     nonsustained             Past Surgical History:   I have reviewed this patient's past surgical history   Past Surgical History:   Procedure Laterality Date     ABDOMEN SURGERY      hernia repair right     APPENDECTOMY  as a child     CARDIAC SURGERY      [facility]lations[facility]     CARDIOVERSION  06/03/2009    successful for afib     CARDIOVERSION  4/20/15    successful for afib     CARDIOVERSION  5/28/15     COLONOSCOPY  01/2018    [facility]     GENITOURINARY SURGERY      undescended testicle     H ABLATION ATRIAL FLUTTER       H ABLATION FOCAL AFIB  7/1/15    Left atrial linear ablation and pulmonary vein antrum ablation     H ABLATION PVC  12/5/16     INCISION AND  DRAINAGE LOWER EXTREMITY, COMBINED Right 11/10/2016    Procedure: COMBINED INCISION AND DRAINAGE LOWER EXTREMITY;  Surgeon: Roger Pacheco MD;  Location: [facility] OR     LAPAROSCOPIC CHOLECYSTECTOMY  2012    Procedure:LAPAROSCOPIC CHOLECYSTECTOMY; LAPAROSCOPIC CHOLECYSTECTOMY ; Surgeon:ROGER PACHECO; Location:[facility] OR     ORTHOPEDIC SURGERY      Left hip replacement     REPAIR VALVE TRICUSPID  08    conversion to a bileaflet valve and application of a 30 mm Sanderson ring     SMALL INTESTINE SURGERY      had bowel obstruction age 9     THORACIC SURGERY       VALVE REPLACEMENT      tricuspid             Social History:   I have reviewed this patient's social history  Social History     Tobacco Use     Smoking status: Former Smoker     Packs/day: 1.50     Years: 41.00     Pack years: 61.50     Types: Paan with tobacco, gutka, zarda, khaini     Start date: 1977     Last attempt to quit: 2008     Years since quittin.9     Smokeless tobacco: Never Used   Substance Use Topics     Alcohol use: Yes     Alcohol/week: 0.0 standard drinks     Comment: 3 per month             Family History:   I have reviewed this patient's family history  Family History   Problem Relation Age of Onset     Prostate Cancer Father      Family History Negative Mother      Emphysema Mother      Hypertension Mother      Cerebrovascular Disease Mother              Allergies:   I have reviewed this patient's allergy history  Allergies   Allergen Reactions     Amlodipine Swelling     Flecainide Palpitations and Rash             Medications reviewed:   Prior to admission medications:  Prior to Admission medications    Medication Sig Start Date End Date Taking? Authorizing Provider   acetaminophen (TYLENOL) 325 MG tablet Take 325-650 mg by mouth every 6 hours as needed for mild pain   Yes Reported, Patient   apixaban ANTICOAGULANT (ELIQUIS) 5 MG tablet Take 5 mg by mouth daily   Yes Unknown, Entered By History    Bumetanide (BUMEX PO) Take 1 mg by mouth daily Takes daily at 1400   Yes Reported, Patient   clonazePAM (KLONOPIN) 1 MG tablet Take 1 tablet (1 mg) by mouth nightly as needed for anxiety 5/31/19  Yes Toy Tejada MD   diltiazem ER COATED BEADS (CARDIZEM CD/CARTIA XT) 180 MG 24 hr capsule Take 1 capsule (180 mg) by mouth 2 times daily Hold for SBP < 110 or HR < 60 2/28/19  Yes Best Tucker MD   Fluticasone-Umeclidin-Vilanterol (TRELEGY ELLIPTA) 100-62.5-25 MCG/INH oral inhaler Inhale 1 puff into the lungs daily 6/14/19  Yes Zoie Evans MD   Lidocaine (LIDOCARE) 4 % Patch Place 2 patches onto the skin daily as needed for moderate pain   Yes Unknown, Entered By History   metoprolol succinate ER (TOPROL-XL) 25 MG 24 hr tablet Take 25 mg by mouth daily   Yes Unknown, Entered By History   oxyCODONE IR (ROXICODONE) 10 MG tablet Take 10 mg by mouth every 8 hours as needed for severe pain   Yes Unknown, Entered By History   predniSONE (DELTASONE) 10 MG tablet Take 5 mg by mouth daily    Yes Unknown, Entered By History   traZODone (DESYREL) 50 MG tablet Take 50 mg by mouth At Bedtime    Yes Unknown, Entered By History      Current medications:  Current Facility-Administered Medications Ordered in Epic   Medication Dose Route Frequency Last Rate Last Dose     acetaminophen (TYLENOL) Suppository 650 mg  650 mg Rectal Q4H PRN         acetaminophen (TYLENOL) tablet 650 mg  650 mg Oral Q4H PRN         alum & mag hydroxide-simethicone (MYLANTA ES/MAALOX  ES) suspension 30 mL  30 mL Oral Q4H PRN         apixaban ANTICOAGULANT (ELIQUIS) tablet 2.5 mg  2.5 mg Oral BID   2.5 mg at 12/27/19 0807     bisacodyl (DULCOLAX) Suppository 10 mg  10 mg Rectal Daily PRN         bumetanide (BUMEX) tablet 1 mg  1 mg Oral Daily         clonazePAM (klonoPIN) tablet 1 mg  1 mg Oral At Bedtime PRN   1 mg at 12/26/19 2228     diltiazem ER COATED BEADS (CARDIZEM CD/CARTIA XT) 24 hr capsule 180 mg  180 mg Oral BID   180 mg at  12/27/19 0859     Fluticasone-Umeclidin-Vilanterol (TRELEGY ELLIPTA) 100-62.5-25 MCG/INH oral inhaler 1 puff  1 puff Inhalation Daily         ipratropium - albuterol 0.5 mg/2.5 mg/3 mL (DUONEB) neb solution 3 mL  3 mL Nebulization Q4H PRN   3 mL at 12/27/19 0844     lidocaine (LMX4) cream   Topical Q1H PRN         lidocaine 1 % 0.1-1 mL  0.1-1 mL Other Q1H PRN         magnesium hydroxide (MILK OF MAGNESIA) suspension 30 mL  30 mL Oral Daily PRN         melatonin tablet 3 mg  3 mg Oral At Bedtime PRN         metoprolol succinate ER (TOPROL-XL) 24 hr tablet 75 mg  75 mg Oral Daily   75 mg at 12/27/19 0859     naloxone (NARCAN) injection 0.1-0.4 mg  0.1-0.4 mg Intravenous Q2 Min PRN         naloxone (NARCAN) injection 0.1-0.4 mg  0.1-0.4 mg Intravenous Q2 Min PRN         nitroGLYcerin (NITROSTAT) sublingual tablet 0.4 mg  0.4 mg Sublingual Q5 Min PRN         ondansetron (ZOFRAN-ODT) ODT tab 4 mg  4 mg Oral Q6H PRN        Or     ondansetron (ZOFRAN) injection 4 mg  4 mg Intravenous Q6H PRN         oxyCODONE (ROXICODONE) tablet 5-10 mg  5-10 mg Oral Q6H PRN   5 mg at 12/27/19 0859     prochlorperazine (COMPAZINE) injection 5 mg  5 mg Intravenous Q6H PRN        Or     prochlorperazine (COMPAZINE) tablet 5 mg  5 mg Oral Q6H PRN        Or     prochlorperazine (COMPAZINE) Suppository 12.5 mg  12.5 mg Rectal Q12H PRN         Saline for CT  60 mL Intravenous Once         senna-docusate (SENOKOT-S/PERICOLACE) 8.6-50 MG per tablet 1 tablet  1 tablet Oral BID PRN        Or     senna-docusate (SENOKOT-S/PERICOLACE) 8.6-50 MG per tablet 2 tablet  2 tablet Oral BID PRN         sodium chloride (PF) 0.9% PF flush 1-10 mL  1-10 mL Intravenous Q10 Min PRN         sodium chloride (PF) 0.9% PF flush 3 mL  3 mL Intracatheter q1 min prn         sodium chloride (PF) 0.9% PF flush 3 mL  3 mL Intracatheter Q8H         traZODone (DESYREL) tablet 50 mg  50 mg Oral At Bedtime PRN   50 mg at 12/26/19 2222     No current Epic-ordered outpatient  medications on file.             Review of Systems:   A complete review of systems was performed and was negative except as mentioned in the HPI.          Physical Exam:   Vital signs were personally reviewed:  Temperatures:  Current - Temp: 97.6  F (36.4  C); Max - Temp  Av.4  F (36.3  C)  Min: 96.5  F (35.8  C)  Max: 97.7  F (36.5  C)  Respiration range: Resp  Av.3  Min: 18  Max: 21  Pulse range: Pulse  Av.7  Min: 53  Max: 107  Blood pressure range: Systolic (24hrs), Av , Min:119 , Max:172   ; Diastolic (24hrs), Av, Min:74, Max:101    Pulse oximetry range: SpO2  Av %  Min: 94 %  Max: 99 %    Intake/Output Summary (Last 24 hours) at 2019 0931  Last data filed at 2019 1826  Gross per 24 hour   Intake 1240 ml   Output --   Net 1240 ml     183 lbs 1.6 oz  Body mass index is 25.54 kg/m .   Body surface area is 2.04 meters squared.    Constitutional: appears stated age, in no apparent distress, appears to be well nourished, on O2 by NC  Eyes: sclera anicteric, conjunctiva normal, no lesions on eyelids or lashes  ENT: normocephalic, without obvious abnormality, atraumatic, external ears without lesions   Pulmonary: minimal wheezing bilat  Cardiovascular: JVP normal, regular rate, irregularly irregular   Gastrointestinal: abdominal exam benign, non-tender, no rigidity, no guarding  Neurologic: awake, alert, face symmetrical, moves all extremities  Skin: no abnormal rashes or lesions on limited exam, nails normal without discoloration or clubbing, no jaundice  Psychiatric: affect is normal, answers questions appropriately, oriented to self and place         Laboratory tests:   Laboratory tests personally reviewed:   CMP  Recent Labs   Lab 19  0604 19  1548 19  1529     --  137   POTASSIUM 4.1  --  4.5   CHLORIDE 105  --  102   CO2 29  --  29   ANIONGAP 4  --  6   GLC 95  --  108*   BUN 16  --  18   CR 1.29*  --  1.55*   GFRESTIMATED 57* 40* 45*   GFRESTBLACK  66 49* 53*   WILBERT 9.4  --  10.5*   MAG  --   --  2.0   PROTTOTAL  --   --  8.0   ALBUMIN  --   --  3.4   BILITOTAL  --   --  0.8   ALKPHOS  --   --  51   AST  --   --  57*   ALT  --   --  71*     CBC  Recent Labs   Lab 19  1529   WBC 8.3   RBC 5.65   HGB 14.6   HCT 47.5   MCV 84   MCH 25.8*   MCHC 30.7*   RDW 15.2*        INR  Recent Labs   Lab 19  1529   INR 1.45*     Lab Results   Component Value Date    TROPI <0.015 2019    TROPI <0.015 2019    TROPI <0.015 2019    TROPONIN 0.01 2016    TROPONIN <0.04 2006    TROPONIN <0.04 2006     Recent Labs   Lab Test 14   CHOL 178   HDL 46   TRIG 100   CHOLHDLRATIO 3.87     Lab Results   Component Value Date    A1C 7.0 2019    A1C 6.0 2018     TSH   Date Value Ref Range Status   2019 2.10 0.40 - 4.00 mU/L Final            Imaging and Additional Data:   Additional data personally reviewed:  Recent Results (from the past 4320 hour(s))   Echocardiogram Complete    Narrative    [id number]  [id number]  [id number]  [id number]^LÓPEZ^JC^[facility]  Echocardiography Laboratory  [address] [place] [address]        Name: FELIPE AYOUB  MRN: [id number]  : 1950  Study Date: 2019 07:12 AM  Age: 69 yrs  Gender: Male  Patient Location: [facility]  Reason For Study: Chest Pain, Chest Pressure, Chest Tightness  Ordering Physician: JC DAWN  Referring Physician: Ana Pratt  Performed By: Davi Brush RDCS     BSA: 2.0 m2  Height: 71 in  Weight: 183 lb  HR: 61  BP: 138/66 mmHg  _____________________________________________________________________________  __        Procedure  Complete Portable Echo Adult. Optison (NDC #6587-0745) given intravenously.  _____________________________________________________________________________  __        Interpretation Summary     Left ventricular systolic function is low normal.  The visual ejection fraction is estimated  at 50-55%.  S/P TV repair. Mean Gradient across TV uis 3mm ar 61 bpm.  No tricuspid regurgitation.  The right ventricle is mild to moderately dilated.  The right ventricular systolic function is mild to moderately reduced.  The study was technically difficult. Compared to the prior study dated 1/19,  there have been no changes.  _____________________________________________________________________________  __        Left Ventricle  The left ventricle is borderline dilated. There is normal left ventricular  wall thickness. Left ventricular systolic function is low normal. The visual  ejection fraction is estimated at 50-55%. Diastolic function not assessed due  to atrial fibrillation. There is borderline global hypokinesia of the left  ventricle. Paradoxical septal motion is consistent with right ventricular  volume overload.     Right Ventricle  The right ventricle is mild to moderately dilated. The right ventricular  systolic function is mild to moderately reduced.     Atria  Normal left atrial size. The right atrium is mildly dilated.     Mitral Valve  The mitral valve is not well visualized. There is trace mitral regurgitation.        Tricuspid Valve  No tricuspid regurgitation. IVC diameter >2.1 cm collapsing <50% with sniff  suggests a high RA pressure estimated at 15 mmHg or greater. Right ventricular  systolic pressure could not be approximated due to inadequate tricuspid  regurgitation. S/P TV repair.     Aortic Valve  There is mild trileaflet aortic sclerosis. No aortic regurgitation is present.  No aortic stenosis is present.     Pulmonic Valve  The pulmonic valve is not well seen, but is grossly normal.     Vessels  Borderline aortic root dilatation.     Pericardium  There is no pericardial effusion.        Rhythm  The rhythm was atrial fibrillation.  _____________________________________________________________________________  __  MMode/2D Measurements & Calculations  IVSd: 0.88 cm     LVIDd: 5.7  cm  LVIDs: 4.9 cm  LVPWd: 0.81 cm  FS: 14.1 %  LV mass(C)d: 184.4 grams  LV mass(C)dI: 90.8 grams/m2  Ao root diam: 3.7 cm  LA dimension: 3.3 cm  LA/Ao: 0.90  LVOT diam: 2.4 cm  LVOT area: 4.4 cm2  RWT: 0.28        Doppler Measurements & Calculations  MV E max fabrice: 95.3 cm/sec  MV dec time: 0.25 sec  TV V2 max: 106.7 cm/sec  TV max P.6 mmHg  TV mean PG: 3.0 mmHg  TV V2 VTI: 43.0 cm  PA acc time: 0.12 sec              _____________________________________________________________________________  __        Report approved by: Sami Miller 2019 08:30 AM         CT PE 2019  IMPRESSION:  1. No evidence of pulmonary embolism.  2. Severely emphysematous lungs.  3. Increased bronchial wall thickening compared to prior studies  suggestive of acute bronchitis. Mildly enlarged hilar lymph nodes are  likely reactive.  4. Fatty infiltration of the liver.

## 2020-01-02 ENCOUNTER — TELEPHONE (OUTPATIENT)
Dept: CARDIOLOGY | Facility: CLINIC | Age: 70
End: 2020-01-02

## 2020-01-02 NOTE — TELEPHONE ENCOUNTER
Patient was evaluated by cardiology while inpatient for left sided chest pain. PMH of 02 dependent COPD, chronic A. Fib (takes Eliquis, Diltiazem and Metoprolol), PVC's. PE ruled out. Lexiscan shows abnormal findings, but unchanged from previous. Writer attempted to call patient to discuss any post hospital d/c questions he may have, review medication changes, and confirm f/u appts, but no answer and mailbox was full, so unable to leave a VM. Will try again later. RN will confirm with patient that he has an apt scheduled on 1/10/20 with GREG Cheli Shea. KEYLA Quijano RN.

## 2020-01-03 ENCOUNTER — TELEPHONE (OUTPATIENT)
Dept: CARDIOLOGY | Facility: CLINIC | Age: 70
End: 2020-01-03

## 2020-01-03 NOTE — TELEPHONE ENCOUNTER
Patient called requesting samples of eliquis. Reports he can not afford the medication and has been out of the medication for about a week.  He is on a fixed income and has to meet a $5500.00 deductible before insurance will kick in.  Requesting to  samples in [place].  Spoke to nursing team in [place] and they can provide patient with two week supply.  Discussed with patient the assistance program with eliquis to see if he has ever done this and he has not.  Will message Susy Casas LPN who manages this program to see if we can get patient set up for this to see if he qualifies.  Also requesting that patient bring in insurance information when he picks up samples as we have nothing documented in his records.  Patient provided verbal understanding regarding above.   HARDEEP Elizalde

## 2020-01-06 ENCOUNTER — DOCUMENTATION ONLY (OUTPATIENT)
Dept: CARDIOLOGY | Facility: CLINIC | Age: 70
End: 2020-01-06

## 2020-01-06 NOTE — PROGRESS NOTES
Tone is picking up additional samples eliquis, I sent MD portion of the [employer] assistance  application  inter office to , for DR Knight to sign application, he consulted pt in the hospital, sent to Selina Pitt RN, asked her to inter office the signed document back to me  [facility] lpn      All documents and RX completed, faxed to [facility] 1-  [facility] lpn      Received from [facility] a letter that pt was NOT approved for free eliquis-I have called and did not reach pt, I have samples for him IF he wishes, await a call back-[facility] lpn  
Breath sounds are clear, no distress present, no wheeze, rales, rhonchi or tachypnea. Normal rate and effort.

## 2020-01-09 NOTE — TELEPHONE ENCOUNTER
Writer attempted to call pt, but again, no answer. VM left reminding pt of schedule f/u OV as below and instructed to call back with any further questions. Will close this encounter. KEYLA Quijano RN.

## 2020-01-10 ENCOUNTER — OFFICE VISIT (OUTPATIENT)
Dept: CARDIOLOGY | Facility: CLINIC | Age: 70
End: 2020-01-10
Attending: INTERNAL MEDICINE
Payer: COMMERCIAL

## 2020-01-10 VITALS
HEIGHT: 71 IN | WEIGHT: 189 LBS | BODY MASS INDEX: 26.46 KG/M2 | SYSTOLIC BLOOD PRESSURE: 128 MMHG | HEART RATE: 70 BPM | OXYGEN SATURATION: 85 % | DIASTOLIC BLOOD PRESSURE: 68 MMHG

## 2020-01-10 DIAGNOSIS — I48.91 ATRIAL FIBRILLATION WITH RAPID VENTRICULAR RESPONSE (H): ICD-10-CM

## 2020-01-10 DIAGNOSIS — R06.00 DYSPNEA, UNSPECIFIED TYPE: ICD-10-CM

## 2020-01-10 DIAGNOSIS — I42.9 CARDIOMYOPATHY, UNSPECIFIED TYPE (H): ICD-10-CM

## 2020-01-10 DIAGNOSIS — R07.9 ACUTE CHEST PAIN: Primary | ICD-10-CM

## 2020-01-10 DIAGNOSIS — I10 ESSENTIAL HYPERTENSION: ICD-10-CM

## 2020-01-10 DIAGNOSIS — J44.1 COPD EXACERBATION (H): ICD-10-CM

## 2020-01-10 LAB
ANION GAP SERPL CALCULATED.3IONS-SCNC: 4 MMOL/L (ref 3–14)
BUN SERPL-MCNC: 47 MG/DL (ref 7–30)
CALCIUM SERPL-MCNC: 9.6 MG/DL (ref 8.5–10.1)
CHLORIDE SERPL-SCNC: 104 MMOL/L (ref 94–109)
CO2 SERPL-SCNC: 30 MMOL/L (ref 20–32)
CREAT SERPL-MCNC: 1.59 MG/DL (ref 0.66–1.25)
GFR SERPL CREATININE-BSD FRML MDRD: 44 ML/MIN/{1.73_M2}
GLUCOSE SERPL-MCNC: 110 MG/DL (ref 70–99)
NT-PROBNP SERPL-MCNC: 2535 PG/ML (ref 0–125)
POTASSIUM SERPL-SCNC: 4.1 MMOL/L (ref 3.4–5.3)
SODIUM SERPL-SCNC: 138 MMOL/L (ref 133–144)

## 2020-01-10 PROCEDURE — 80048 BASIC METABOLIC PNL TOTAL CA: CPT | Performed by: INTERNAL MEDICINE

## 2020-01-10 PROCEDURE — 36415 COLL VENOUS BLD VENIPUNCTURE: CPT | Performed by: INTERNAL MEDICINE

## 2020-01-10 PROCEDURE — 83880 ASSAY OF NATRIURETIC PEPTIDE: CPT | Performed by: INTERNAL MEDICINE

## 2020-01-10 PROCEDURE — 99214 OFFICE O/P EST MOD 30 MIN: CPT | Performed by: NURSE PRACTITIONER

## 2020-01-10 ASSESSMENT — MIFFLIN-ST. JEOR: SCORE: 1649.43

## 2020-01-10 NOTE — PATIENT INSTRUCTIONS
Thanks for coming into [facility] today.    Shortness of breath, cough, likely related to COPD  Continue  Oxygen, Bipap, nebs, and contact pulmonology regarding financial assistance for inhalers.  BMP and BNP today  Continue on current cardiac medications  Rates are well controlled on metoprolol and diltiazem  Continue on Eliquis for stroke prevention.   Cardiac rehab  Follow up in 3 months with GREG     Please call my nurse at [phone] with any questions or concerns.    Scheduling phone number: [phone]  Reminder: Please bring in all current medications, over the counter supplements and vitamin bottles to your next appointment.      3

## 2020-01-10 NOTE — PROGRESS NOTES
Cardiology Clinic Progress Note  Tone Cooper MRN# [id number]   YOB: 1950 Age: 69 year old     Reason For Visit:  Hospital follow-up   Primary Cardiologist:   Dr. Britt          History of Presenting Illness:      Tone Cooper is a pleasant 69 year old patient who carries a past medical history significant for atrial fib/flutter status post ablation, nonsustained VT, cardiomyopathy, COPD on oxygen, tricuspid valve repair in 2008, hypertension, and remote smoker.     On 12/26/2019, he presented to the emergency room with chest pain. He was ruled out for ACS with serial negative troponin, echocardiogram demonstrated a low normal ejection fraction 50 to 55%, mild to moderately dilated RV, RV systolic function moderately reduced, no aortic regurgitation or stenosis, normal pulmonary valve, and trace MR.  A follow-up nuclear stress test demonstrated a medium sized area of nontransmural infarction in the apical, inferior, inferior lateral and inferior septal segments of the LV associated with a moderate degree of paula-infarct ischemia.  In review of his prior study in 2016, there has been no changes.  CT of the chest demonstrated acute changes consistent with bronchitis.  Cardiac symptoms were felt to be related to A. fib with RVR.  Metoprolol was increased to 75 mg daily, he continues on diltiazem and Eliquis for stroke prevention.  He returns to the office today for follow up.     He is feeling well on a cardiac standpoint with the exception of chronic shortness of breath and cough on continuous oxygen and Bipap.He denies chest pain, PND, orthopnea, presyncope, syncope, edema, heart racing, or palpitations.     Blood pressure is well controlled at 128/68, managed on diltiazem, metoprolol, and Bumex  BMP today showed a sodium of 138, potassium 4.1, BUN 47, creatinine 1.59, GFR 44  Hemoglobin 14.6 and platelet 249  BMI 26.36    Activity is primarily sedentary due to his severe COPD.  However, he does work  part-time as a  at [employer]  Diet is poor, consisting of high sodium microwavable meals.  Remains compliant with all medications.                   Assessment and Plan:       1.  Chest pain /history of cardiomyopathy .-Ruled out for ACS, symptoms felt to be related to A. fib with RVR and bronchitis.  Negative troponin, echocardiogram demonstrated a low normal ejection fraction 50 to 55%, mild to moderately dilated RV, RV systolic function moderately reduced, no aortic regurgitation or stenosis, normal pulmonary valve, and trace MR.  A follow-up nuclear stress test demonstrated a medium sized area of nontransmural infarction in the apical, inferior, inferior lateral and inferior septal segments of the LV associated with a moderate degree of paula-infarct ischemia, unchanged from previous study.  Continue on current medical therapy.  Creatinine 1.5, consistent with baseline.  Will get BMP and BNP today.  Encourage activity.    2.  Atrial fibrillation/history of nonsustained VT-rate controlled on diltiazem and metoprolol, anticoagulated on Eliquis.  3.  History of tricuspid valve repair -recent echocardiogram demonstrates no tricuspid regurgitation.  4.  Severe COPD -on continuous oxygen and nighttime BiPAP.  Continue with daily nebulizers and inhaler use.  Instructed patient to follow-up with pulmonology as he is unable to afford Trelegy.   5.  Hypertension -well-controlled on current medical therapy             Thank you for allowing me to participate in this delightful patient's care. I have recommended he follow up in 3 months with GREG.        VIRGILIO Lloyd CNP         Review of Systems:     Review of Systems:  Skin:  Positive for bruising   Eyes:  Positive for glasses;visual blurring  ENT:  Positive for nasal congestion  Respiratory:  Positive for shortness of breath;dyspnea on exertion;cough  Cardiovascular:  palpitations    Gastroenterology: Positive for poor appetite;constipation  Genitourinary:     urinary frequency  Musculoskeletal:  Positive for neck pain  Neurologic:  Positive for numbness or tingling of feet  Psychiatric:  Positive for anxiety  Heme/Lymph/Imm:  Positive for easy bruising  Endocrine:  Negative                Physical Exam:     GEN:  In general, this is a well nourished male in no acute distress.  HEENT:  Pupils equal, round. Sclerae nonicteric. Clear oropharynx. Mucous membranes moist.  NECK: Supple, no masses appreciated. Trachea midline. No JVD   C/V:  Regular rate and irregular rhythm, No murmur, rub or gallop. No S3 or RV heave.   RESP: Respirations are unlabored. No use of accessory muscles. Clear to auscultation bilaterally without wheezing, rales, or rhonchi.  GI: Abdomen soft, nontender, nondistended. No HSM appreciated.   EXTREM: No LE edema. No cyanosis or clubbing.  NEURO: Alert and oriented, cooperative. Gait steady with wheeled walker.   PSYCH: Depressed  SKIN: Warm and dry. No rashes or petechiae appreciated.          Past Medical History:     Past Medical History:   Diagnosis Date     Atrial flutter (H)      Cancer (H)     basal cell-nose     COPD (chronic obstructive pulmonary disease) (H)      Diverticulitis      Hepatitis 2011    Hepatitis C     Hypertension      Persistent atrial fibrillation 4/28/09    ablation 7/1/2015     Premature beats      PVC (premature ventricular contraction)     s/p ablation 12/5/2017     Tricuspid valve disorder     Dr Aj, Hx of valve repair      Ventricular tachycardia (H)     nonsustained              Past Surgical History:     Past Surgical History:   Procedure Laterality Date     ABDOMEN SURGERY      hernia repair right     APPENDECTOMY  as a child     CARDIAC SURGERY      multiple ablations-[facility]     CARDIOVERSION  06/03/2009    successful for afib     CARDIOVERSION  4/20/15    successful for afib     CARDIOVERSION  5/28/15     COLONOSCOPY  01/2018    [facility]     GENITOURINARY SURGERY      undescended testicle     H ABLATION  ATRIAL FLUTTER       H ABLATION FOCAL AFIB  7/1/15    Left atrial linear ablation and pulmonary vein antrum ablation     H ABLATION PVC  12/5/16     INCISION AND DRAINAGE LOWER EXTREMITY, COMBINED Right 11/10/2016    Procedure: COMBINED INCISION AND DRAINAGE LOWER EXTREMITY;  Surgeon: Roger Pacheco MD;  Location: [facility] OR     LAPAROSCOPIC CHOLECYSTECTOMY  1/6/2012    Procedure:LAPAROSCOPIC CHOLECYSTECTOMY; LAPAROSCOPIC CHOLECYSTECTOMY ; Surgeon:ROGER PACHECO; Location:[facility] OR     ORTHOPEDIC SURGERY  2004    Left hip replacement     REPAIR VALVE TRICUSPID  9/8/08    conversion to a bileaflet valve and application of a 30 mm Sanderson ring     SMALL INTESTINE SURGERY      had bowel obstruction age 9     THORACIC SURGERY       VALVE REPLACEMENT      tricuspid              Allergies:   Amlodipine and Flecainide       Data:   All laboratory data reviewed:    LAST CHOLESTEROL:  Lab Results   Component Value Date    CHOL 178 01/17/2014     Lab Results   Component Value Date    HDL 46 01/17/2014     Lab Results   Component Value Date    LDL 89 01/20/2011     Lab Results   Component Value Date    TRIG 100 01/17/2014     Lab Results   Component Value Date    CHOLHDLRATIO 3.87 01/17/2014    CHOLHDLRATIO 3.6 01/20/2011       LAST BMP:  Lab Results   Component Value Date     12/27/2019      Lab Results   Component Value Date    POTASSIUM 4.1 12/27/2019     Lab Results   Component Value Date    CHLORIDE 105 12/27/2019     Lab Results   Component Value Date    WILBERT 9.4 12/27/2019     Lab Results   Component Value Date    CO2 29 12/27/2019     Lab Results   Component Value Date    BUN 16 12/27/2019     Lab Results   Component Value Date    CR 1.29 12/27/2019     Lab Results   Component Value Date    GLC 95 12/27/2019       LAST CBC:  Lab Results   Component Value Date    WBC 8.3 12/26/2019     Lab Results   Component Value Date    RBC 5.65 12/26/2019     Lab Results   Component Value Date    HGB 14.6 12/26/2019      Lab Results   Component Value Date    HCT 47.5 12/26/2019     Lab Results   Component Value Date    MCV 84 12/26/2019     Lab Results   Component Value Date    MCH 25.8 12/26/2019     Lab Results   Component Value Date    MCHC 30.7 12/26/2019     Lab Results   Component Value Date    RDW 15.2 12/26/2019     Lab Results   Component Value Date     12/26/2019

## 2020-01-10 NOTE — LETTER
1/10/2020    Ana Pratt MD  [facility] [address] [address] [facility] [id number]    RE: Tone Cooper       Dear Colleague,    I had the pleasure of seeing Tone Cooper in the [facility].    Cardiology Clinic Progress Note  Tone Cooper MRN# [id number]   YOB: 1950 Age: 69 year old     Reason For Visit:  Hospital follow-up   Primary Cardiologist:   Dr. Britt          History of Presenting Illness:      Tone Cooper is a pleasant 69 year old patient who carries a past medical history significant for atrial fib/flutter status post ablation, nonsustained VT, cardiomyopathy, COPD on oxygen, tricuspid valve repair in 2008, hypertension, and remote smoker.     On 12/26/2019, he presented to the emergency room with chest pain. He was ruled out for ACS with serial negative troponin, echocardiogram demonstrated a low normal ejection fraction 50 to 55%, mild to moderately dilated RV, RV systolic function moderately reduced, no aortic regurgitation or stenosis, normal pulmonary valve, and trace MR.  A follow-up nuclear stress test demonstrated a medium sized area of nontransmural infarction in the apical, inferior, inferior lateral and inferior septal segments of the LV associated with a moderate degree of paula-infarct ischemia.  In review of his prior study in 2016, there has been no changes.  CT of the chest demonstrated acute changes consistent with bronchitis.  Cardiac symptoms were felt to be related to A. fib with RVR.  Metoprolol was increased to 75 mg daily, he continues on diltiazem and Eliquis for stroke prevention.  He returns to the office today for follow up.     He is feeling well on a cardiac standpoint with the exception of chronic shortness of breath and cough on continuous oxygen and Bipap.He denies chest pain, PND, orthopnea, presyncope, syncope, edema, heart racing, or palpitations.     Blood pressure is well controlled at 128/68, managed  on diltiazem, metoprolol, and Bumex  BMP today showed a sodium of 138, potassium 4.1, BUN 47, creatinine 1.59, GFR 44  Hemoglobin 14.6 and platelet 249  BMI 26.36    Activity is primarily sedentary due to his severe COPD.  However, he does work part-time as a  at [employer]  Diet is poor, consisting of high sodium microwavable meals.  Remains compliant with all medications.                   Assessment and Plan:       1.  Chest pain /history of cardiomyopathy .-Ruled out for ACS, symptoms felt to be related to A. fib with RVR and bronchitis.  Negative troponin, echocardiogram demonstrated a low normal ejection fraction 50 to 55%, mild to moderately dilated RV, RV systolic function moderately reduced, no aortic regurgitation or stenosis, normal pulmonary valve, and trace MR.  A follow-up nuclear stress test demonstrated a medium sized area of nontransmural infarction in the apical, inferior, inferior lateral and inferior septal segments of the LV associated with a moderate degree of paula-infarct ischemia, unchanged from previous study.  Continue on current medical therapy.  Creatinine 1.5, consistent with baseline.  Will get BMP and BNP today.  Encourage activity.    2.  Atrial fibrillation/history of nonsustained VT-rate controlled on diltiazem and metoprolol, anticoagulated on Eliquis.  3.  History of tricuspid valve repair -recent echocardiogram demonstrates no tricuspid regurgitation.  4.  Severe COPD -on continuous oxygen and nighttime BiPAP.  Continue with daily nebulizers and inhaler use.  Instructed patient to follow-up with pulmonology as he is unable to afford Trelegy.   5.  Hypertension -well-controlled on current medical therapy             Thank you for allowing me to participate in this delightful patient's care. I have recommended he follow up in 3 months with GREG.        VIRGILIO Lloyd CNP         Review of Systems:     Review of Systems:  Skin:  Positive for bruising   Eyes:  Positive for  glasses;visual blurring  ENT:  Positive for nasal congestion  Respiratory:  Positive for shortness of breath;dyspnea on exertion;cough  Cardiovascular:  palpitations    Gastroenterology: Positive for poor appetite;constipation  Genitourinary:    urinary frequency  Musculoskeletal:  Positive for neck pain  Neurologic:  Positive for numbness or tingling of feet  Psychiatric:  Positive for anxiety  Heme/Lymph/Imm:  Positive for easy bruising  Endocrine:  Negative                Physical Exam:     GEN:  In general, this is a well nourished male in no acute distress.  HEENT:  Pupils equal, round. Sclerae nonicteric. Clear oropharynx. Mucous membranes moist.  NECK: Supple, no masses appreciated. Trachea midline. No JVD   C/V:  Regular rate and irregular rhythm, No murmur, rub or gallop. No S3 or RV heave.   RESP: Respirations are unlabored. No use of accessory muscles. Clear to auscultation bilaterally without wheezing, rales, or rhonchi.  GI: Abdomen soft, nontender, nondistended. No HSM appreciated.   EXTREM: No LE edema. No cyanosis or clubbing.  NEURO: Alert and oriented, cooperative. Gait steady with wheeled walker.   PSYCH: Depressed  SKIN: Warm and dry. No rashes or petechiae appreciated.          Past Medical History:     Past Medical History:   Diagnosis Date     Atrial flutter (H)      Cancer (H)     basal cell-nose     COPD (chronic obstructive pulmonary disease) (H)      Diverticulitis      Hepatitis 2011    Hepatitis C     Hypertension      Persistent atrial fibrillation 4/28/09    ablation 7/1/2015     Premature beats      PVC (premature ventricular contraction)     s/p ablation 12/5/2017     Tricuspid valve disorder     Dr Aj, Hx of valve repair      Ventricular tachycardia (H)     nonsustained              Past Surgical History:     Past Surgical History:   Procedure Laterality Date     ABDOMEN SURGERY      hernia repair right     APPENDECTOMY  as a child     CARDIAC SURGERY      multiple  ablations-[facility]     CARDIOVERSION  06/03/2009    successful for afib     CARDIOVERSION  4/20/15    successful for afib     CARDIOVERSION  5/28/15     COLONOSCOPY  01/2018    [facility]     GENITOURINARY SURGERY      undescended testicle     H ABLATION ATRIAL FLUTTER       H ABLATION FOCAL AFIB  7/1/15    Left atrial linear ablation and pulmonary vein antrum ablation     H ABLATION PVC  12/5/16     INCISION AND DRAINAGE LOWER EXTREMITY, COMBINED Right 11/10/2016    Procedure: COMBINED INCISION AND DRAINAGE LOWER EXTREMITY;  Surgeon: Roger Pacheco MD;  Location: [facility] OR     LAPAROSCOPIC CHOLECYSTECTOMY  1/6/2012    Procedure:LAPAROSCOPIC CHOLECYSTECTOMY; LAPAROSCOPIC CHOLECYSTECTOMY ; Surgeon:ROGER PACHECO; Location:[facility] OR     ORTHOPEDIC SURGERY  2004    Left hip replacement     REPAIR VALVE TRICUSPID  9/8/08    conversion to a bileaflet valve and application of a 30 mm Sanderson ring     SMALL INTESTINE SURGERY      had bowel obstruction age 9     THORACIC SURGERY       VALVE REPLACEMENT      tricuspid              Allergies:   Amlodipine and Flecainide       Data:   All laboratory data reviewed:    LAST CHOLESTEROL:  Lab Results   Component Value Date    CHOL 178 01/17/2014     Lab Results   Component Value Date    HDL 46 01/17/2014     Lab Results   Component Value Date    LDL 89 01/20/2011     Lab Results   Component Value Date    TRIG 100 01/17/2014     Lab Results   Component Value Date    CHOLHDLRATIO 3.87 01/17/2014    CHOLHDLRATIO 3.6 01/20/2011       LAST BMP:  Lab Results   Component Value Date     12/27/2019      Lab Results   Component Value Date    POTASSIUM 4.1 12/27/2019     Lab Results   Component Value Date    CHLORIDE 105 12/27/2019     Lab Results   Component Value Date    WILBERT 9.4 12/27/2019     Lab Results   Component Value Date    CO2 29 12/27/2019     Lab Results   Component Value Date    BUN 16 12/27/2019     Lab Results   Component Value Date    CR 1.29 12/27/2019      Lab Results   Component Value Date    GLC 95 12/27/2019       LAST CBC:  Lab Results   Component Value Date    WBC 8.3 12/26/2019     Lab Results   Component Value Date    RBC 5.65 12/26/2019     Lab Results   Component Value Date    HGB 14.6 12/26/2019     Lab Results   Component Value Date    HCT 47.5 12/26/2019     Lab Results   Component Value Date    MCV 84 12/26/2019     Lab Results   Component Value Date    MCH 25.8 12/26/2019     Lab Results   Component Value Date    MCHC 30.7 12/26/2019     Lab Results   Component Value Date    RDW 15.2 12/26/2019     Lab Results   Component Value Date     12/26/2019             Thank you for allowing me to participate in the care of your patient.      Sincerely,     VIRGILIO Lloyd [facility]

## 2020-01-17 ENCOUNTER — DOCUMENTATION ONLY (OUTPATIENT)
Dept: CARDIOLOGY | Facility: CLINIC | Age: 70
End: 2020-01-17

## 2020-01-17 DIAGNOSIS — I48.91 ATRIAL FIBRILLATION WITH RAPID VENTRICULAR RESPONSE (H): Primary | ICD-10-CM

## 2020-01-17 NOTE — PROGRESS NOTES
Care coordination called and needs hard script to send to [facility] for refill of medication. Script printed, signed and faxed to Susy Casas at [phone]    EDVIN Pitt RN, BSN.   01/17/20 4:27 PM

## 2020-02-02 NOTE — TELEPHONE ENCOUNTER
Faxed to [facility] pharmacy at [phone]   Pulses equal bilaterally, no edema present.

## 2020-02-06 ENCOUNTER — HOSPITAL ENCOUNTER (OUTPATIENT)
Dept: CARDIAC REHAB | Facility: CLINIC | Age: 70
End: 2020-02-06
Attending: FAMILY MEDICINE
Payer: COMMERCIAL

## 2020-02-06 PROCEDURE — G0424 PULMONARY REHAB W EXER: HCPCS

## 2020-02-06 PROCEDURE — 40000244 ZZH STATISTIC VISIT PULM REHAB

## 2020-02-21 ENCOUNTER — TELEPHONE (OUTPATIENT)
Dept: CARDIOLOGY | Facility: CLINIC | Age: 70
End: 2020-02-21

## 2020-02-21 NOTE — TELEPHONE ENCOUNTER
We have already tried for patient assistance, he was denied  Due to having [facility]

## 2020-02-21 NOTE — TELEPHONE ENCOUNTER
PA Initiation    Medication: Eliquis 5MG- Tier Exception -   Insurance Company: [employer] - Phone [phone] Fax [phone]  Pharmacy Filling the Rx: [id number] - [place], [place] - [address]  Filling Pharmacy Phone: [phone]  Filling Pharmacy Fax: [phone]  Start Date: 2/21/2020

## 2020-02-21 NOTE — TELEPHONE ENCOUNTER
02/21/20 Pt called stating he is out of Eliquis and has been out for about a week. Stated he cannot afford the cost as he is on a very limited budget, retired , working part time and has [facility]. Explained that [facility] may have other OAC at a cheaper price , enc pt to call them to find out and call us back. Pt states he has been on Warfarin in the past but had trouble maintaining therapeutic INRs. Set aside 4 boxes of Eliquis , signed out per clinic protocol for pt to . Will alert Prior Auth team as well as patient assistance. Pt will call back next week with his info he obtains from [facility]. Pt has no further questions at this time. Collin 922 am   Anesthesia Volume In Cc: 6

## 2020-02-24 NOTE — TELEPHONE ENCOUNTER
"Tier Exception Denied    Insurance Company: [employer] - Phone [phone] Fax [phone]    Medication: Eliquis 5MG- Tier Exception - DENIED    Denial Date: 2/22/2020    Denial Rational:     The medication is already covered at the lowest tier possible for the patient.          Appeal Information:     Please advise if appeal is necessary and place a letter of medical necessity under the \"letters\" tab in patient's chart.      "

## 2020-03-02 ENCOUNTER — HOSPITAL ENCOUNTER (OUTPATIENT)
Dept: CARDIAC REHAB | Facility: CLINIC | Age: 70
End: 2020-03-02
Attending: FAMILY MEDICINE
Payer: COMMERCIAL

## 2020-03-02 PROCEDURE — G0424 PULMONARY REHAB W EXER: HCPCS | Performed by: REHABILITATION PRACTITIONER

## 2020-03-02 PROCEDURE — 40000244 ZZH STATISTIC VISIT PULM REHAB: Performed by: REHABILITATION PRACTITIONER

## 2020-03-04 ENCOUNTER — OFFICE VISIT (OUTPATIENT)
Dept: CARDIOLOGY | Facility: CLINIC | Age: 70
End: 2020-03-04
Attending: INTERNAL MEDICINE
Payer: COMMERCIAL

## 2020-03-04 VITALS
HEIGHT: 71 IN | SYSTOLIC BLOOD PRESSURE: 110 MMHG | HEART RATE: 72 BPM | WEIGHT: 188.3 LBS | BODY MASS INDEX: 26.36 KG/M2 | DIASTOLIC BLOOD PRESSURE: 68 MMHG

## 2020-03-04 DIAGNOSIS — I48.0 PAROXYSMAL ATRIAL FIBRILLATION (H): ICD-10-CM

## 2020-03-04 PROCEDURE — 99214 OFFICE O/P EST MOD 30 MIN: CPT | Performed by: INTERNAL MEDICINE

## 2020-03-04 PROCEDURE — 93000 ELECTROCARDIOGRAM COMPLETE: CPT | Performed by: INTERNAL MEDICINE

## 2020-03-04 RX ORDER — IPRATROPIUM BROMIDE AND ALBUTEROL SULFATE 2.5; .5 MG/3ML; MG/3ML
1 SOLUTION RESPIRATORY (INHALATION) EVERY 8 HOURS
COMMUNITY
Start: 2020-01-23

## 2020-03-04 RX ORDER — ALBUTEROL SULFATE 90 UG/1
1-2 AEROSOL, METERED RESPIRATORY (INHALATION) EVERY 6 HOURS PRN
COMMUNITY

## 2020-03-04 RX ORDER — METOPROLOL SUCCINATE 50 MG/1
50 TABLET, EXTENDED RELEASE ORAL DAILY
Qty: 90 TABLET | Refills: 3 | Status: SHIPPED | OUTPATIENT
Start: 2020-03-04 | End: 2021-04-09

## 2020-03-04 ASSESSMENT — MIFFLIN-ST. JEOR: SCORE: 1646.25

## 2020-03-04 NOTE — LETTER
3/4/2020    Ana Pratt MD  [facility] [address] [facility] [address]    RE: Tone Cooper       Dear Colleague,    I had the pleasure of seeing Tone Cooper in the [facility].    HPI and Plan:   See dictation    Orders Placed This Encounter   Procedures     Follow-Up with Electrophysiologist     EKG 12-lead complete w/read - Clinics (performed today)     EKG 12-lead complete w/read (Future)- to be scheduled       Orders Placed This Encounter   Medications     albuterol (PROAIR HFA/PROVENTIL HFA/VENTOLIN HFA) 108 (90 Base) MCG/ACT inhaler     Sig: Inhale 1-2 puffs into the lungs     Pharmacy may dispense brand covered by insurance (Proair, or proventil or ventolin or generic albuterol inhaler)     ipratropium - albuterol 0.5 mg/2.5 mg/3 mL (DUONEB) 0.5-2.5 (3) MG/3ML neb solution     Sig: Inhale 3 mLs into the lungs     metoprolol succinate ER (TOPROL XL) 50 MG 24 hr tablet     Sig: Take 1 tablet (50 mg) by mouth daily     Dispense:  90 tablet     Refill:  3       Medications Discontinued During This Encounter   Medication Reason     metoprolol succinate ER (TOPROL-XL) 25 MG 24 hr tablet          Encounter Diagnosis   Name Primary?     persistent atrial fi        CURRENT MEDICATIONS:  Current Outpatient Medications   Medication Sig Dispense Refill     acetaminophen (TYLENOL) 325 MG tablet Take 325-650 mg by mouth every 6 hours as needed for mild pain       albuterol (PROAIR HFA/PROVENTIL HFA/VENTOLIN HFA) 108 (90 Base) MCG/ACT inhaler Inhale 1-2 puffs into the lungs       apixaban ANTICOAGULANT (ELIQUIS) 5 MG tablet Take 1 tablet (5 mg) by mouth 2 times daily 90 tablet 3     Bumetanide (BUMEX PO) Take 1 mg by mouth daily Takes daily at 1400       clonazePAM (KLONOPIN) 1 MG tablet Take 1 tablet (1 mg) by mouth nightly as needed for anxiety       diltiazem ER COATED BEADS (CARDIZEM CD/CARTIA XT) 180 MG 24 hr capsule Take 1 capsule (180 mg) by mouth 2 times  daily Hold for SBP < 110 or HR < 60       ipratropium - albuterol 0.5 mg/2.5 mg/3 mL (DUONEB) 0.5-2.5 (3) MG/3ML neb solution Inhale 3 mLs into the lungs       Lidocaine (LIDOCARE) 4 % Patch Place 2 patches onto the skin daily as needed for moderate pain       metoprolol succinate ER (TOPROL XL) 50 MG 24 hr tablet Take 1 tablet (50 mg) by mouth daily 90 tablet 3     oxyCODONE IR (ROXICODONE) 10 MG tablet Take 10 mg by mouth every 8 hours as needed for severe pain       predniSONE (DELTASONE) 10 MG tablet Take 10 mg by mouth daily        traZODone (DESYREL) 50 MG tablet Take 50 mg by mouth At Bedtime        Fluticasone-Umeclidin-Vilanterol (TRELEGY ELLIPTA) 100-62.5-25 MCG/INH oral inhaler Inhale 1 puff into the lungs daily (Patient not taking: Reported on 3/4/2020) 1 Inhaler 2       ALLERGIES     Allergies   Allergen Reactions     Amlodipine Swelling     Flecainide Palpitations and Rash       PAST MEDICAL HISTORY:  Past Medical History:   Diagnosis Date     Atrial flutter (H)      Cancer (H)     basal cell-nose     COPD (chronic obstructive pulmonary disease) (H)      Diverticulitis      Hepatitis 2011    Hepatitis C     Hypertension      Persistent atrial fibrillation 4/28/09    ablation 7/1/2015     Premature beats      PVC (premature ventricular contraction)     s/p ablation 12/5/2017     Tricuspid valve disorder     Dr Aj, Hx of valve repair      Ventricular tachycardia (H)     nonsustained       PAST SURGICAL HISTORY:  Past Surgical History:   Procedure Laterality Date     ABDOMEN SURGERY      hernia repair right     APPENDECTOMY  as a child     CARDIAC SURGERY      multiple ablations-[facility]     CARDIOVERSION  06/03/2009    successful for afib     CARDIOVERSION  4/20/15    successful for afib     CARDIOVERSION  5/28/15     COL[facility]OSCOPY  01/2018    [facility]     GENITOURINARY SURGERY      undescended testicle     H ABLATION ATRIAL FLUTTER       H ABLATION FOCAL AFIB  7/1/15    Left atrial linear ablation  and pulmonary vein antrum ablation     H ABLATION PVC  17     INCISION AND DRAINAGE LOWER EXTREMITY, COMBINED Right 11/10/2016    Procedure: COMBINED INCISION AND DRAINAGE LOWER EXTREMITY;  Surgeon: Roger Pacheco MD;  Location: [facility] OR     LAPAROSCOPIC CHOLECYSTECTOMY  2012    Procedure:LAPAROSCOPIC CHOLECYSTECTOMY; LAPAROSCOPIC CHOLECYSTECTOMY ; Surgeon:ROGER PACHECO; Location:[facility] OR     ORTHOPEDIC SURGERY  2004    Left hip replacement     REPAIR VALVE TRICUSPID  08    conversion to a bileaflet valve and application of a 30 mm Sanderson ring     SMALL INTESTINE SURGERY      had bowel obstruction age 9     THORACIC SURGERY       VALVE REPLACEMENT      tricuspid       FAMILY HISTORY:  Family History   Problem Relation Age of Onset     Prostate Cancer Father      Family History Negative Mother      Emphysema Mother      Hypertension Mother      Cerebrovascular Disease Mother        SOCIAL HISTORY:  Social History     Socioeconomic History     Marital status:      Spouse name: None     Number of children: None     Years of education: None     Highest education level: None   Occupational History     None   Social Needs     Financial resource strain: None     Food insecurity:     Worry: None     Inability: None     Transportation needs:     Medical: None     Non-medical: None   Tobacco Use     Smoking status: Former Smoker     Packs/day: 1.50     Years: 41.00     Pack years: 61.50     Types: Paan with tobacco, gutka, zarda, khaini     Start date: 1977     Last attempt to quit: 2008     Years since quittin.1     Smokeless tobacco: Never Used   Substance and Sexual Activity     Alcohol use: Yes     Alcohol/week: 0.0 standard drinks     Comment: 3 per week     Drug use: No     Sexual activity: Never     Partners: Female     Birth control/protection: None   Lifestyle     Physical activity:     Days per week: None     Minutes per session: None     Stress: None   Relationships      Social connections:     Talks on phone: None     Gets together: None     Attends Yazidi service: None     Active member of club or organization: None     Attends meetings of clubs or organizations: None     Relationship status: None     Intimate partner violence:     Fear of current or ex partner: None     Emotionally abused: None     Physically abused: None     Forced sexual activity: None   Other Topics Concern      Service Not Asked     Blood Transfusions Not Asked     Caffeine Concern No     Comment: 1 a day      Occupational Exposure Not Asked     Hobby Hazards Not Asked     Sleep Concern Yes     Comment: nocturia     Stress Concern Not Asked     Weight Concern No     Special Diet No     Back Care Not Asked     Exercise No     Bike Helmet Yes     Seat Belt Yes     Self-Exams Not Asked     Parent/sibling w/ CABG, MI or angioplasty before 65F 55M? No   Social History Narrative    Works in [employer] work)    Is an  for the [employer] in [place], in between sets and entertain       Review of Systems:  Skin:  Positive for bruising hx skin cancer   Eyes:  Positive for glasses;visual blurring    ENT:  Positive for nasal congestion;hearing loss hearing aids  Respiratory:  Positive for shortness of breath;dyspnea on exertion;cough;sleep apnea;wheezing bipap; productive cough   Cardiovascular:    Positive for;lightheadedness;dizziness;edema;fatigue    Gastroenterology: Positive for poor appetite hx Cdiff  Genitourinary:  Positive for urinary frequency    Musculoskeletal:  Negative      Neurologic:  Positive for numbness or tingling of feet;numbness or tingling of hands right hand thumb and 1st 2 fingers and bottom of both foot  Psychiatric:  Negative      Heme/Lymph/Imm:  Positive for easy bruising;allergies RX  Endocrine:  Negative        Physical Exam:  Vitals: /68 (BP Location: Right arm, Patient Position: Sitting, Cuff Size: Adult Regular)   Pulse 72   Ht 1.803  "m (5' 11\")   Wt 85.4 kg (188 lb 4.8 oz)   BMI 26.26 kg/m       Constitutional:  cooperative, alert and oriented, well developed, well nourished, in no acute distress        Skin:  warm and dry to the touch, no apparent skin lesions or masses noted          Head:  normocephalic, no masses or lesions        Eyes:  no xanthalasma;EOMS intact;sclera white;conjunctivae and lids unremarkable        Lymph:      ENT:  no pallor or cyanosis, dentition good        Neck:  JVP normal;no carotid bruit        Respiratory:  clear to auscultation diminished breath sounds bilaterally       Cardiac: normal S1 and S2;regular rhythm;no murmurs, gallops or rubs detected occasional premature beats              pulses full and equal                               right femoral bruit (-) left subclavian bruit (-)      GI:  abdomen soft        Extremities and Muscular Skeletal:  no deformities, clubbing, cyanosis, erythema observed;no edema              Neurological:           Psych:           CC  Ana Pratt MD  [place] [id number]                Thank you for allowing me to participate in the care of your patient.      Sincerely,     Sussy Britt MD     [facility]    cc:   Ana Pratt MD  [address] [address]        "

## 2020-03-04 NOTE — PROGRESS NOTES
HPI and Plan:   See dictation    Orders Placed This Encounter   Procedures     Follow-Up with Electrophysiologist     EKG 12-lead complete w/read - Clinics (performed today)     EKG 12-lead complete w/read (Future)- to be scheduled       Orders Placed This Encounter   Medications     albuterol (PROAIR HFA/PROVENTIL HFA/VENTOLIN HFA) 108 (90 Base) MCG/ACT inhaler     Sig: Inhale 1-2 puffs into the lungs     Pharmacy may dispense brand covered by insurance (Proair, or proventil or ventolin or generic albuterol inhaler)     ipratropium - albuterol 0.5 mg/2.5 mg/3 mL (DUONEB) 0.5-2.5 (3) MG/3ML neb solution     Sig: Inhale 3 mLs into the lungs     metoprolol succinate ER (TOPROL XL) 50 MG 24 hr tablet     Sig: Take 1 tablet (50 mg) by mouth daily     Dispense:  90 tablet     Refill:  3       Medications Discontinued During This Encounter   Medication Reason     metoprolol succinate ER (TOPROL-XL) 25 MG 24 hr tablet          Encounter Diagnosis   Name Primary?     persistent atrial fi        CURRENT MEDICATIONS:  Current Outpatient Medications   Medication Sig Dispense Refill     acetaminophen (TYLENOL) 325 MG tablet Take 325-650 mg by mouth every 6 hours as needed for mild pain       albuterol (PROAIR HFA/PROVENTIL HFA/VENTOLIN HFA) 108 (90 Base) MCG/ACT inhaler Inhale 1-2 puffs into the lungs       apixaban ANTICOAGULANT (ELIQUIS) 5 MG tablet Take 1 tablet (5 mg) by mouth 2 times daily 90 tablet 3     Bumetanide (BUMEX PO) Take 1 mg by mouth daily Takes daily at 1400       clonazePAM (KLONOPIN) 1 MG tablet Take 1 tablet (1 mg) by mouth nightly as needed for anxiety       diltiazem ER COATED BEADS (CARDIZEM CD/CARTIA XT) 180 MG 24 hr capsule Take 1 capsule (180 mg) by mouth 2 times daily Hold for SBP < 110 or HR < 60       ipratropium - albuterol 0.5 mg/2.5 mg/3 mL (DUONEB) 0.5-2.5 (3) MG/3ML neb solution Inhale 3 mLs into the lungs       Lidocaine (LIDOCARE) 4 % Patch Place 2 patches onto the skin daily as needed for  moderate pain       metoprolol succinate ER (TOPROL XL) 50 MG 24 hr tablet Take 1 tablet (50 mg) by mouth daily 90 tablet 3     oxyCODONE IR (ROXICODONE) 10 MG tablet Take 10 mg by mouth every 8 hours as needed for severe pain       predniSONE (DELTASONE) 10 MG tablet Take 10 mg by mouth daily        traZODone (DESYREL) 50 MG tablet Take 50 mg by mouth At Bedtime        Fluticasone-Umeclidin-Vilanterol (TRELEGY ELLIPTA) 100-62.5-25 MCG/INH oral inhaler Inhale 1 puff into the lungs daily (Patient not taking: Reported on 3/4/2020) 1 Inhaler 2       ALLERGIES     Allergies   Allergen Reactions     Amlodipine Swelling     Flecainide Palpitations and Rash       PAST MEDICAL HISTORY:  Past Medical History:   Diagnosis Date     Atrial flutter (H)      Cancer (H)     basal cell-nose     COPD (chronic obstructive pulmonary disease) (H)      Diverticulitis      Hepatitis 2011    Hepatitis C     Hypertension      Persistent atrial fibrillation 4/28/09    ablation 7/1/2015     Premature beats      PVC (premature ventricular contraction)     s/p ablation 12/5/2017     Tricuspid valve disorder     Dr Aj, Hx of valve repair      Ventricular tachycardia (H)     nonsustained       PAST SURGICAL HISTORY:  Past Surgical History:   Procedure Laterality Date     ABDOMEN SURGERY      hernia repair right     APPENDECTOMY  as a child     CARDIAC SURGERY      multiple ablations-[facility]     CARDIOVERSION  06/03/2009    successful for afib     CARDIOVERSION  4/20/15    successful for afib     CARDIOVERSION  5/28/15     COL[facility]OSCOPY  01/2018    [facility]     GENITOURINARY SURGERY      undescended testicle     H ABLATION ATRIAL FLUTTER       H ABLATION FOCAL AFIB  7/1/15    Left atrial linear ablation and pulmonary vein antrum ablation     H ABLATION PVC  12/5/16     INCISION AND DRAINAGE LOWER EXTREMITY, COMBINED Right 11/10/2016    Procedure: COMBINED INCISION AND DRAINAGE LOWER EXTREMITY;  Surgeon: Roger Pacheco MD;   Location: [facility] OR     LAPAROSCOPIC CHOLECYSTECTOMY  2012    Procedure:LAPAROSCOPIC CHOLECYSTECTOMY; LAPAROSCOPIC CHOLECYSTECTOMY ; Surgeon:TALON DEVINE; Location:[facility] OR     ORTHOPEDIC SURGERY      Left hip replacement     REPAIR VALVE TRICUSPID  08    conversion to a bileaflet valve and application of a 30 mm Sanderson ring     SMALL INTESTINE SURGERY      had bowel obstruction age 9     THORACIC SURGERY       VALVE REPLACEMENT      tricuspid       FAMILY HISTORY:  Family History   Problem Relation Age of Onset     Prostate Cancer Father      Family History Negative Mother      Emphysema Mother      Hypertension Mother      Cerebrovascular Disease Mother        SOCIAL HISTORY:  Social History     Socioeconomic History     Marital status:      Spouse name: None     Number of children: None     Years of education: None     Highest education level: None   Occupational History     None   Social Needs     Financial resource strain: None     Food insecurity:     Worry: None     Inability: None     Transportation needs:     Medical: None     Non-medical: None   Tobacco Use     Smoking status: Former Smoker     Packs/day: 1.50     Years: 41.00     Pack years: 61.50     Types: Paan with tobacco, gutka, zarda, khaini     Start date: 1977     Last attempt to quit: 2008     Years since quittin.1     Smokeless tobacco: Never Used   Substance and Sexual Activity     Alcohol use: Yes     Alcohol/week: 0.0 standard drinks     Comment: 3 per week     Drug use: No     Sexual activity: Never     Partners: Female     Birth control/protection: None   Lifestyle     Physical activity:     Days per week: None     Minutes per session: None     Stress: None   Relationships     Social connections:     Talks on phone: None     Gets together: None     Attends Pentecostalism service: None     Active member of club or organization: None     Attends meetings of clubs or organizations: None     Relationship status:  "None     Intimate partner violence:     Fear of current or ex partner: None     Emotionally abused: None     Physically abused: None     Forced sexual activity: None   Other Topics Concern      Service Not Asked     Blood Transfusions Not Asked     Caffeine Concern No     Comment: 1 a day      Occupational Exposure Not Asked     Hobby Hazards Not Asked     Sleep Concern Yes     Comment: nocturia     Stress Concern Not Asked     Weight Concern No     Special Diet No     Back Care Not Asked     Exercise No     Bike Helmet Yes     Seat Belt Yes     Self-Exams Not Asked     Parent/sibling w/ CABG, MI or angioplasty before 65F 55M? No   Social History Narrative    Works in [employer] work)    Is an  for [employer] in [place], in between sets and entertain       Review of Systems:  Skin:  Positive for bruising hx skin cancer   Eyes:  Positive for glasses;visual blurring    ENT:  Positive for nasal congestion;hearing loss hearing aids  Respiratory:  Positive for shortness of breath;dyspnea on exertion;cough;sleep apnea;wheezing bipap; productive cough   Cardiovascular:    Positive for;lightheadedness;dizziness;edema;fatigue    Gastroenterology: Positive for poor appetite hx Cdiff  Genitourinary:  Positive for urinary frequency    Musculoskeletal:  Negative      Neurologic:  Positive for numbness or tingling of feet;numbness or tingling of hands right hand thumb and 1st 2 fingers and bottom of both foot  Psychiatric:  Negative      Heme/Lymph/Imm:  Positive for easy bruising;allergies RX  Endocrine:  Negative        Physical Exam:  Vitals: /68 (BP Location: Right arm, Patient Position: Sitting, Cuff Size: Adult Regular)   Pulse 72   Ht 1.803 m (5' 11\")   Wt 85.4 kg (188 lb 4.8 oz)   BMI 26.26 kg/m      Constitutional:  cooperative, alert and oriented, well developed, well nourished, in no acute distress        Skin:  warm and dry to the touch, no apparent skin lesions " or masses noted          Head:  normocephalic, no masses or lesions        Eyes:  no xanthalasma;EOMS intact;sclera white;conjunctivae and lids unremarkable        Lymph:      ENT:  no pallor or cyanosis, dentition good        Neck:  JVP normal;no carotid bruit        Respiratory:  clear to auscultation diminished breath sounds bilaterally       Cardiac: normal S1 and S2;regular rhythm;no murmurs, gallops or rubs detected occasional premature beats              pulses full and equal                               right femoral bruit (-) left subclavian bruit (-)      GI:  abdomen soft        Extremities and Muscular Skeletal:  no deformities, clubbing, cyanosis, erythema observed;no edema              Neurological:           Psych:           CC  Ana Pratt MD  [facility]  [address] [place] [address]

## 2020-03-04 NOTE — PROGRESS NOTES
Service Date: 2020      HISTORY OF PRESENT ILLNESS:  I saw Mr. Ayoub for followup of atrial fibrillation.  He is a 69-year-old white male with a history of previous tricuspid repair.  He has severe COPD.  The patient had previous atrial fibrillation and PVC ablation.  Unfortunately, with the progression of his severe COPD, he is not able to maintain sinus rhythm.  He has been in atrial fibrillation in the last few years.  He is on Eliquis for anticoagulation.  On current rate control medications, he has controlled ventricular rate.  He appeared to tolerate the rate control medications well.  He does not feel palpitations.  He is now on regular supplemental oxygen to maintain saturation.  The patient works part-time at [email or web address] as a .      PHYSICAL EXAMINATION:   VITAL SIGNS:  Blood pressure was to 110/68, heart rate 72 beats per minute, body weight 188 pounds.   GENERAL:  He uses a walker and carries an oxygen tank.   CARDIAC:  Rhythm was irregularly irregular.  Heart sounds were normal without murmur.   LUNGS:  Had no crackles or wheezing.   EXTREMITIES:  There was no pedal edema.      EKG showed atrial fibrillation with ventricular rate 71 beats per minute.      ASSESSMENT AND RECOMMENDATIONS:  Mr. Ayoub is stable from the Cardiology point of view.  He can continue metoprolol 50 mg once a day and diltiazem 360 mg once a day for ventricular rate control.  He will continue Eliquis indefinitely.  I agree for him to sit down as needed at work.  In addition, I asked him not to do heavy weightlifting more than 50 pounds.  If he is stable, he can return for followup in 1 year.      cc:   Ana Pratt MD    [address]  [address]         NORIS MARTINEZ MD             D: 2020   T: 2020   MT: ROBERTO      Name:     FELIPE AYOUB   MRN:      [id number]        Account:      [id number]   :      1950           Service Date: 2020      Document:  [id number]

## 2020-03-04 NOTE — LETTER
3/4/2020      Ana Pratt MD  [facility] [address] [facility] [address]      RE: Tone Ruizgray       Dear Colleague,    I had the pleasure of seeing Tone Cooper in the [facility].    Service Date: 03/04/2020      HISTORY OF PRESENT ILLNESS:  I saw Mr. Cooper for followup of atrial fibrillation.  He is a 69-year-old white male with a history of previous tricuspid repair.  He has severe COPD.  The patient had previous atrial fibrillation and PVC ablation.  Unfortunately, with the progression of his severe COPD, he is not able to maintain sinus rhythm.  He has been in atrial fibrillation in the last few years.  He is on Eliquis for anticoagulation.  On current rate control medications, he has controlled ventricular rate.  He appeared to tolerate the rate control medications well.  He does not feel palpitations.  He is now on regular supplemental oxygen to maintain saturation.  The patient works part-time at [employer] as a .      PHYSICAL EXAMINATION:   VITAL SIGNS:  Blood pressure was to 110/68, heart rate 72 beats per minute, body weight 188 pounds.   GENERAL:  He uses a walker and carries an oxygen tank.   CARDIAC:  Rhythm was irregularly irregular.  Heart sounds were normal without murmur.   LUNGS:  Had no crackles or wheezing.   EXTREMITIES:  There was no pedal edema.      EKG showed atrial fibrillation with ventricular rate 71 beats per minute.      ASSESSMENT AND RECOMMENDATIONS:  Mr. Cooper is stable from the Cardiology point of view.  He can continue metoprolol 50 mg once a day and diltiazem 360 mg once a day for ventricular rate control.  He will continue Eliquis indefinitely.  I agree for him to sit down as needed at work.  In addition, I asked him not to do heavy weightlifting more than 50 pounds.  If he is stable, he can return for followup in 1 year.      cc:   Ana Pratt MD    [facility]    [address] [place]   [address]         NORIS MARTINEZ MD             D: 2020   T: 2020   MT: ROBERTO      Name:     FELIPE AYOUB   MRN:      [id number]        Account:      [id number]   :      1950           Service Date: 2020      Document: [id number]           Outpatient Encounter Medications as of 3/4/2020   Medication Sig Dispense Refill     acetaminophen (TYLENOL) 325 MG tablet Take 325-650 mg by mouth every 6 hours as needed for mild pain       albuterol (PROAIR HFA/PROVENTIL HFA/VENTOLIN HFA) 108 (90 Base) MCG/ACT inhaler Inhale 1-2 puffs into the lungs       apixaban ANTICOAGULANT (ELIQUIS) 5 MG tablet Take 1 tablet (5 mg) by mouth 2 times daily 90 tablet 3     Bumetanide (BUMEX PO) Take 1 mg by mouth daily Takes daily at 1400       clonazePAM (KLONOPIN) 1 MG tablet Take 1 tablet (1 mg) by mouth nightly as needed for anxiety       diltiazem ER COATED BEADS (CARDIZEM CD/CARTIA XT) 180 MG 24 hr capsule Take 1 capsule (180 mg) by mouth 2 times daily Hold for SBP < 110 or HR < 60       ipratropium - albuterol 0.5 mg/2.5 mg/3 mL (DUONEB) 0.5-2.5 (3) MG/3ML neb solution Inhale 3 mLs into the lungs       Lidocaine (LIDOCARE) 4 % Patch Place 2 patches onto the skin daily as needed for moderate pain       metoprolol succinate ER (TOPROL XL) 50 MG 24 hr tablet Take 1 tablet (50 mg) by mouth daily 90 tablet 3     oxyCODONE IR (ROXICODONE) 10 MG tablet Take 10 mg by mouth every 8 hours as needed for severe pain       predniSONE (DELTASONE) 10 MG tablet Take 10 mg by mouth daily        traZODone (DESYREL) 50 MG tablet Take 50 mg by mouth At Bedtime        Fluticasone-Umeclidin-Vilanterol (TRELEGY ELLIPTA) 100-62.5-25 MCG/INH oral inhaler Inhale 1 puff into the lungs daily (Patient not taking: Reported on 3/4/2020) 1 Inhaler 2     [DISCONTINUED] metoprolol succinate ER (TOPROL-XL) 25 MG 24 hr tablet Take 3 tablets (75 mg) by mouth daily (Patient not taking: Reported on 3/4/2020) 90 tablet 0     Facility-Administered  Encounter Medications as of 3/4/2020   Medication Dose Route Frequency Provider Last Rate Last Dose     Saline for CT  60 mL Intravenous Once Eliazar Valencia, DO           Again, thank you for allowing me to participate in the care of your patient.      Sincerely,    Sussy Britt MD     [facility]

## 2020-03-10 ENCOUNTER — HOSPITAL ENCOUNTER (OUTPATIENT)
Dept: CARDIAC REHAB | Facility: CLINIC | Age: 70
End: 2020-03-10
Attending: FAMILY MEDICINE
Payer: COMMERCIAL

## 2020-03-10 PROCEDURE — G0424 PULMONARY REHAB W EXER: HCPCS

## 2020-03-10 PROCEDURE — 40000244 ZZH STATISTIC VISIT PULM REHAB

## 2020-03-12 ENCOUNTER — HOSPITAL ENCOUNTER (OUTPATIENT)
Dept: CARDIAC REHAB | Facility: CLINIC | Age: 70
End: 2020-03-12
Attending: FAMILY MEDICINE
Payer: COMMERCIAL

## 2020-03-12 PROCEDURE — 40000244 ZZH STATISTIC VISIT PULM REHAB

## 2020-03-12 PROCEDURE — G0424 PULMONARY REHAB W EXER: HCPCS

## 2020-03-17 ENCOUNTER — HOSPITAL ENCOUNTER (OUTPATIENT)
Dept: CARDIAC REHAB | Facility: CLINIC | Age: 70
End: 2020-03-17
Attending: FAMILY MEDICINE
Payer: COMMERCIAL

## 2020-03-17 PROCEDURE — G0424 PULMONARY REHAB W EXER: HCPCS

## 2020-03-17 PROCEDURE — 40000244 ZZH STATISTIC VISIT PULM REHAB

## 2020-04-10 ENCOUNTER — TELEPHONE (OUTPATIENT)
Dept: PULMONOLOGY | Facility: CLINIC | Age: 70
End: 2020-04-10

## 2020-04-10 DIAGNOSIS — J44.9 CHRONIC OBSTRUCTIVE PULMONARY DISEASE, UNSPECIFIED COPD TYPE (H): ICD-10-CM

## 2020-04-10 NOTE — TELEPHONE ENCOUNTER
Prior Authorization Approval    Authorization Effective Date: 3/11/2020  Authorization Expiration Date: 4/10/2021  Medication: Trelegy Ellipta 100-62.5-25-PA APPROVED   Approved Dose/Quantity:   Reference #: CASE # [id number]   Insurance Company: [employer] - Phone [phone] Fax [phone]  Expected CoPay:       CoPay Card Available:      Foundation Assistance Needed:    Which Pharmacy is filling the prescription (Not needed for infusion/clinic administered): [id number] - [place], [place] - [address] [address] [facility]  Pharmacy Notified: Yes- **Instructed pharmacy to notify patient when script is ready to /ship.**  Patient Notified: Yes

## 2020-04-12 NOTE — PATIENT INSTRUCTIONS
1. Start azithromycin as prescribed.     2. Reduce daily prednisone dose - trial 10 mg daily for a week then 5 mg daily for a month.  Then 2.5 mg daily for a month then 1 mg daily for a month.  If you notice increased cough, trouble breathing then please call in and we may need to increase the prednisone.      3. Continue all of your inhalers as you are.      4. Wash your hands regularly and avoid sick contacts.     5. Have a great fall/winter!   Pt is on crestor 20 at home, therapeautic interchange  - cont with atorvastatin 20

## 2020-05-26 NOTE — PROGRESS NOTES
Discharge Planner   Discharge Plans in progress: home with home infusion and home care RN hopefully Monday 12/24  Barriers to discharge plan: improvement in oxygen needs, blood cultures  Follow up plan: Benefits check completed with [facility]. Supply cost will be $105.00 a week with $17.75 a week for the drug cost for a total of $122.75 a week. Nursing should be covered as long as pt is homebound. Call [facility] at [phone] with discharge updates.       Entered by: Tish Hyde 12/21/2018 11:35 AM          Hatchet Flap Text: The defect edges were debeveled with a #15 scalpel blade.  Given the location of the defect, shape of the defect and the proximity to free margins a hatchet flap was deemed most appropriate.  Using a sterile surgical marker, an appropriate hatchet flap was drawn incorporating the defect and placing the expected incisions within the relaxed skin tension lines where possible.    The area thus outlined was incised deep to adipose tissue with a #15 scalpel blade.  The skin margins were undermined to an appropriate distance in all directions utilizing iris scissors.

## 2020-06-12 ENCOUNTER — VIRTUAL VISIT (OUTPATIENT)
Dept: PULMONOLOGY | Facility: CLINIC | Age: 70
End: 2020-06-12
Payer: COMMERCIAL

## 2020-06-12 DIAGNOSIS — J43.8 OTHER EMPHYSEMA (H): Primary | ICD-10-CM

## 2020-06-12 RX ORDER — ASPIRIN 81 MG/1
81 TABLET ORAL DAILY
COMMUNITY
End: 2021-04-02

## 2020-06-12 NOTE — PROGRESS NOTES
"[facility] Pulmonology Follow Up Visit    Tone Cooper is a 68 year old male who is seen in follow up for COPD.     Assessment and plan: Tone Cooper is a 67 yo male with very severe COPD, chronic respiratory failure requiring supplemental O2, atrial fibrillation s/p ablation (should be on apixaban), and tricuspid repair who presents to clinic today for follow-up of COPD. He is \"seen\" via telephone visit per patient request.  Currently doing well but does have a history of recurrent exacerbations since October 2018 for which we had originally started azithromycin in place of the chronic prednisone he was on.  He has been doing pulmonary rehab with good improvement and no recurrent exacerbations. recurrent exacerbations.   1. Very severe COPD (FEV1 31%; DLCOunc 48%)  2. ISIS, on Bpap   3. Hypoxic/hypercapnic respiratory failure, on 3.5 L supplemental O2  4. A. Fib on apixaban (Dr. El, cardiologist); low dose aspirin b/c of costs   5. New 4.4 mm RML spiculated nodule (since 2/2019) on chest CT, reduced in size in f/u chest CT and does not need further following     Recommendations are as follows:   - continue trelegy but unaffordable so using intermittently; will assess for possibility of coupons vs. Changing to other triple therapy (advair + spiriva, for example)  - continue albuterol/duoneb inhaler/nebulizer prn   - we started azithromycin but he d/khalif this at some point; would restart in future as steroid-sparing agent  - continue on prednisone 10 mg daily and will work to taper this to none in the next few months given difficulty with humidity over the summer  - continue supplemenatal O2 (3 LPM); portable oxygenator at work ([employer]ogen)  - aerobika device: use this at least QID (teaching provided today)  - he will need pneumovax vaccine  - wash hands regularly and avoid sick contacts   RTC 2 months.   Patient seen and discussed with staff pulmonologist, Dr. Pavon.   Zoie Evans, " MD  Pulmonary and Critical Care Fellow, PGY-7  Pager: [phone]  Mail avs  Attending note:  Patient seen, examined and discussed with Dr. Evans.  All data reviewed. Agree with the assessment and plan as outlined in the above note.       Pulmonary HPI  Mr. Cooper is seen today via telephone visit for follow-up of COPD.      He was last seen in October 2019.  He is currently doing well.  Feels better than ever.  Attributes this partially to having started part-time work at [employer] as a . Enjoys the interactions and demands which he is tolerating well.  No exacerbations since last visit but as the humidity increase in the environment about 4 weeks ago he felt his breathing worsened and he had LGFs. He underwent COVID-19 testing which was negative and his symptoms resolved spontaneously.  Currently masking at work, washing hansd regularly or using hand  but he does encounter about 700 people a day.      He has more energy and is able to do yard work again outside his home. He has some trouble with thick mucous and has to cough forcefully to break it loose about once per day. Uses the aerobika but only once daily.  Is using telegy once every other day due to cost, $300/month. Also uses albuterol neb or duoneb once daily in the am alternatively each day. Did do well with pulmonary rehab but this was stopped due to COVID-19.     He reports ongoing use of prednisone 10 mg daily. When reduced to 5 mg daily had more trouble breathing and PCP increased this again. Stopped daily azithromycin at some point, possibly due to unclear recommendation.     The patient was seen and examined by  Zoie Evans MD   Current Outpatient Medications   Medication     albuterol (PROAIR HFA/PROVENTIL HFA/VENTOLIN HFA) 108 (90 Base) MCG/ACT inhaler     aspirin 81 MG EC tablet     Bumetanide (BUMEX PO)     clonazePAM (KLONOPIN) 1 MG tablet     diltiazem ER COATED BEADS (CARDIZEM CD/CARTIA XT) 180 MG 24 hr capsule      Fluticasone-Umeclidin-Vilanterol (TRELEGY ELLIPTA) 100-62.5-25 MCG/INH oral inhaler     ipratropium - albuterol 0.5 mg/2.5 mg/3 mL (DUONEB) 0.5-2.5 (3) MG/3ML neb solution     metoprolol succinate ER (TOPROL XL) 50 MG 24 hr tablet     oxyCODONE IR (ROXICODONE) 10 MG tablet     predniSONE (DELTASONE) 10 MG tablet     traZODone (DESYREL) 50 MG tablet     acetaminophen (TYLENOL) 325 MG tablet     apixaban ANTICOAGULANT (ELIQUIS) 5 MG tablet     Lidocaine (LIDOCARE) 4 % Patch     No current facility-administered medications for this visit.      Facility-Administered Medications Ordered in Other Visits   Medication     Saline for CT     Review of Systems:   A complete ROS was otherwise negative except as noted in the HPI.  There were no vitals taken for this visit.  Exam:   GENERAL APPEARANCE: Pleasant male, engaged, in no distress.     HENT: Nasal mucosa with no edema and no hyperemia.   RESP: No obvious wheezes. Speaking in full sentences.   NEURO: Mentation intact, speech normal  PSYCH: mentation appears normal. and affect normal/bright  Results:  PFTs: 6/14/19 - very severe obstruction.  Lung volumes not obtained.  Severe diffusion defect.      CTPE study - 12/26/19 -   Study Result     CT CHEST PE ABDOMEN AND PELVIS WITH CONTRAST   12/26/2019 4:26 PM      HISTORY: Sudden onset chest pain, evaluate for pulmonary embolism,  associated abdominal pain.     TECHNIQUE: 93mL Isovue-370. CT images of the chest, abdomen, and  pelvis after nonionic intravenous contrast. Radiation dose for this  scan was reduced using automated exposure control, adjustment of the  mA and/or kV according to patient size, or iterative reconstruction  technique.     COMPARISON: 9/5/2019.     FINDINGS:      CHEST: No evidence of pulmonary embolism or acute thoracic aortic  abnormality. There is mild coronary atherosclerosis. No pneumothorax.  No pleural or pericardial effusion. The lungs are severely  emphysematous.     There is increased  bronchial wall thickening compared to prior  studies. Patchy areas of atelectasis noted in the lower lobes, also  new from prior. There are prominent but not pathologically enlarged  hilar lymph nodes. No mediastinal or hilar adenopathy. Right atrial  enlargement is noted.     ABDOMEN AND PELVIS: There is mild diffuse decreased attenuation of the  liver without focal lesion. The gallbladder has been removed. The  spleen, pancreas, adrenal glands, and kidneys contain no worrisome  focal lesions. There is moderate nonaneurysmal aortic atherosclerosis.  No abdominal or retroperitoneal lymphadenopathy. No abnormal bowel  distention, free air, or ascites. There is sigmoid diverticulosis  without evidence of acute diverticulitis. No pelvic adenopathy or free  fluid.     No lytic or blastic bone lesions. There is a left hip replacement.                                                                      IMPRESSION:  1. No evidence of pulmonary embolism.  2. Severely emphysematous lungs.  3. Increased bronchial wall thickening compared to prior studies  suggestive of acute bronchitis. Mildly enlarged hilar lymph nodes are  likely reactive.  4. Fatty infiltration of the liver.     Attending statement:    The patient was seen and examined by me with house staff.  The case was discussed at length.  Vitals, lab results and imaging from today were reviewed.  The note reflects our joint assessment and plan.Briefly, Sever COPD on ICS/LABA/LAMA (Trelegy) but using every other day due to expense of the medication. Also on Prednisone 10 mg/day.  Was started on azithromycin at the last clinic visit but stopped for unclear reasons.  Overall verbalizes good control of symptoms in acceptable quality of life.  Reports BiPAP ordered for nocturnal hypoventilation/hypoxia but using it on a regular basis.  Will ask care coordinators to help with  coupons for trilogy.  If that is not available, will switch inhalers so that he is  on daily inhaler regimen.    Stas Pavon MD  Pulmonary, Critical Care Medicine

## 2020-06-15 ENCOUNTER — TELEPHONE (OUTPATIENT)
Dept: PULMONOLOGY | Facility: CLINIC | Age: 70
End: 2020-06-15

## 2020-06-15 NOTE — PATIENT INSTRUCTIONS
Mr. Cooper,     Your diagnoses:   1. Very severe COPD (FEV1 31%; DLCOunc 48%)  2. Sleep apnea, on Bpap   3. Hypoxic/hypercapnic respiratory failure, on 3.5 L supplemental O2  4. A. Fib on apixaban (Dr. El, cardiologist); low dose aspirin b/c of costs   5. New 4.4 mm RML spiculated nodule (since 2/2019) on chest CT, reduced in size in f/u chest CT and does not need further following     Recommendations are as follows:   - continue trelegy but unaffordable so using intermittently; will assess for possibility of coupons vs. Changing to other triple therapy (advair + spiriva, for example); my team will call you about options   - continue albuterol/duoneb inhaler/nebulizer as needed   - we started daily azithromycin but he d/khalif this at some point; would restart in future as steroid-sparing agent  - continue on prednisone 10 mg daily and will work to taper this to none in the next few months given difficulty with humidity over the summer  - continue supplemenatal O2 (3 LPM); portable oxygenator at work (iogen)  - aerobika device: use this at least twice per day   - he will need pneumovax vaccine  - wash hands regularly and avoid sick contacts     Return To Clinic in 3 months. Please call the clinic with any questions or concerns: [phone].     Have a nice summer and stay well!    Sincerely,     Dr. Evans

## 2020-06-15 NOTE — TELEPHONE ENCOUNTER
Pt is paying roughly $300 for Trelegy. Spoke with pt and he wants to try to qualify for the [employer] patient assistance program. Sent application to pt and provided address to mail back once completed.

## 2020-07-08 DIAGNOSIS — J44.9 COPD (CHRONIC OBSTRUCTIVE PULMONARY DISEASE) (H): Primary | ICD-10-CM

## 2020-07-08 NOTE — TELEPHONE ENCOUNTER
Received [employer] application back from the pt. Will send message to nurse to print prescription and have it signed by the doctor.

## 2020-10-23 ENCOUNTER — HOSPITAL ENCOUNTER (EMERGENCY)
Facility: CLINIC | Age: 70
Discharge: LEFT BEFORE DISCHARGE | End: 2020-10-23
Payer: COMMERCIAL

## 2020-10-23 VITALS
DIASTOLIC BLOOD PRESSURE: 102 MMHG | WEIGHT: 190 LBS | OXYGEN SATURATION: 96 % | SYSTOLIC BLOOD PRESSURE: 135 MMHG | TEMPERATURE: 98.4 F | RESPIRATION RATE: 18 BRPM | HEART RATE: 99 BPM | HEIGHT: 71 IN | BODY MASS INDEX: 26.6 KG/M2

## 2020-10-23 ASSESSMENT — MIFFLIN-ST. JEOR: SCORE: 1653.96

## 2020-10-23 NOTE — ED TRIAGE NOTES
ABCs intact. Pt c/o chest pain since yesterday. Pt hx cardiac, copd. Pt has missed doses of his water pills. Pt states he only takes them about 2 times a week, because he forgets. Pt c/o feeling feverish, sob, body aches.

## 2020-10-24 LAB — INTERPRETATION ECG - MUSE: NORMAL

## 2020-12-03 NOTE — PROGRESS NOTES
"Tone Cooper is a 69 year old male who is being evaluated via a billable video visit.      The patient has been notified of following:     \"This video visit will be conducted via a call between you and your physician/provider. We have found that certain health care needs can be provided without the need for an in-person physical exam.  This service lets us provide the care you need with a video conversation.  If a prescription is necessary we can send it directly to your pharmacy.  If lab work is needed we can place an order for that and you can then stop by our lab to have the test done at a later time.    Video visits are billed at different rates depending on your insurance coverage.  Please reach out to your insurance provider with any questions.    If during the course of the call the physician/provider feels a video visit is not appropriate, you will not be charged for this service.\"    Patient has given verbal consent for Video visit? Yes  How would you like to obtain your AVS? MyChart  If you are dropped from the video visit, the video invite should be resent to: Send to e-mail at: [email or web address]  Will anyone else be joining your video visit? No        Video-Visit Details    Type of service:  Video Visit    Video Start Time: 2:30 PM  Video End Time: 2:57 PM    Originating Location (pt. Location): Home    Distant Location (provider location):  [facility]     Platform used for Video Visit: Adonay SONG MD           [facility]   Outpatient Sleep Medicine Consultation  December 4, 2020      Name: Tone Cooper MRN# [id number]   Age: 69 year old YOB: 1950     Date of Consultation: December 4, 2020  Consultation is requested by: No referring provider defined for this encounter. No ref. provider found  Primary care provider: Ana Pratt         Reason for Sleep Consult:     Tone Cooper is a 69 year old male with " complex cardiopulmonary disease and mild obstructive sleep apnea with nocturnal hypoxemia who wishes to establish long-term care for management of sleep disordered breathing         Assessment and Plan:     Summary Sleep Diagnoses:      Mild sleep apnea 2017 (AHI 7.2 with hypoxemia) on bilevel positive airway pressure    Comorbid Diagnoses:      Severe COPD FEV1 26% of predicted 2019    Cardiomyopathy with predominantly right ventricular overload and preserved left ventricular ejection fraction 2019    Chronic hypercapnic, hypoxemic respiratory failure with acute deteriorations (Pa/v CO2 51-67 mmHg)    Atrial flutter and fibrillation status post radiofrequency ablation x2    Pulmonary embolism-purportedly small pulmonary emboli right lung February 2019    Benign prostatic hypertrophy     Summary Recommendations:      Continue bilevel positive airway pressure 14/7 with 3 L of oxygen while sleeping and oxygen during the day    Return to clinic in 1 year or earlier if there is recurrent nocturnal sleep disruption, snoring or daytime sleepiness           History of Present Illness:     Tone Cooper is a 69 year old male with severe chronic obstructive pulmonary disease complicated by chronic hypercapnic hypoxemic respiratory failure on 3 L of oxygen during the day and with BiPAP at night.  In 2017 sleep studies performed without CO2 monitoring did demonstrate prolonged nocturnal hypoxemia and mild obstructive sleep apnea.  He is using his bilevel device with resolution of sleep disruption that recurs if he takes nights without it.  He has complicating right ventricular enlargement as well as previous pulmonary thromboembolic disease.  He is currently using his bilevel positive airway pressure device 14/7 and with the exception of a brief hiatus out of town he uses it nightly with adherence 80% of days greater than 4 hours average use on days used 7-1/2 hours with out significant residual sleep apnea.    He denies  "difficulty initiating or maintaining sleep although he is using clonazepam and trazodone for sleep initiation.    PREVIOUS IN- LAB or HOME SLEEP STUDIES:   Date:  Patient: Tone Cooper  YOB: 1950  Study Date: 10/29/2017  MRN: [id number]  Referring Provider: ROB Penn Katherine  Ordering Provider: MD Alvarado Rakesh     Indications for Polysomnography: The patient is a 66 y old Male who is 5' 10\" and weighs 170.0 lbs.  His BMI is 24.6, [place] sleepiness scale 7.0 and neck size is 38.0.  Relevant medical history includes paroxysmal atrial fibrillation, PVCs, coronary artery disease and COPD. A diagnostic polysomnogram was performed to evaluate for ISIS, hypoventilation and hypoxemia.     Polysomnogram Data:  A full night polysomnogram recorded the standard physiologic parameters including EEG, EOG, EMG, ECG, nasal and oral airflow.  Respiratory parameters of chest and abdominal movements were recorded with respiratory inductance plethysmography.  Oxygen saturation was recorded by pulse oximetry.       Sleep Architecture: Normal sleep latency without sleep aid, normal arousal index, and normal sleep efficiency.  The total recording time of the polysomnogram was 538.4 minutes.  The total sleep time was 492.5 minutes.  Sleep latency was normal at 4.8 minutes without the use of a sleep aid.  REM latency was 33.5 minutes.  Arousal index was normal at 20.1 arousals per hour.  Sleep efficiency was normal at 91.5%.  Wake after sleep onset was 40.0 minutes.  The patient spent 7.8% of total sleep time in Stage N1, 52.3% in Stage N2, 17.7% in Stages N3, and 22.2% in REM.  Time in REM supine was 44.0 minutes.     Respiration: Loud snoring.  Large number of scored events were post-arousal central in nature. Overall burden of obstructive events does not meet criteria for ISIS.  Hypoxia noted during REM sleep independent of any sleep-disordered breathing events and appears consistent with known pulmonary disease " process.    Events - The polysomnogram revealed a presence of 13 obstructive, 18 central, and - mixed apneas resulting in an apnea index of 3.8 events per hour.  There were 28 hypopneas resulting in a hypopnea index of 3.4 events per hour.  The combined apnea/hypopnea index was 7.2 events per hour.  The REM AHI was 5.5 events per hour.  The supine AHI was 10.9 events per hour.  The RERA index was 7.1 events per hour.   The RDI was 14.3 events per hour.    Snoring - was reported as loud.    Respiratory rate and pattern - was normal.    Sustained Sleep Associated Hypoventilation - Transcutaneous carbon dioxide monitoring was used, but significant hypoventilation was not present. Maximum change of 5 mmHg (wake 40 / REM 45 mmHg).    Sleep Associated Hypoxemia - (Greater than 5 minutes O2 sat below 89%) was / was not present.  Baseline oxygen saturation was 90.4%. Lowest oxygen saturation was 84.0%.  Time spent less than or equal to 88% was 29.6 minutes.  Time spent less than or equal to 89% was 88.9 minutes.  14.3 7.1 7.2      Movement Activity: Scattered PLMs of low clinical significance.    Periodic Limb Activity - There were 81 PLMs during the entire study. The PLM index was 9.9 movements per hour.  The PLM Arousal Index was 1.1 per hour.    REM EMG Activity - Excessive muscle activity was not present.    Nocturnal Behavior - Abnormal sleep related behaviors were not noted.    Bruxism - None apparent.     Cardiac Summary: Frequent PACs and abnormal P wave morphology suspicious for left atrial enlargement.      SLEEP-WAKE SCHEDULE:       .                  SCALES      SLEEP COMPLAINTS:  Cardio-respiratory    Snoring- /week  Dyspnea- denies  Morning headaches or confusion-denies  Coexisting Lung disease: denies    Coexisting Heart disease: denies    Does patient have a bed partner: denies  Has bed partner been sleeping separately because of snoring:  denies            RLS Screen: When you try to relax in the evening or  sleep at  night, do you ever have unpleasant, restless feelings in your  legs that can be relieved by walking or movement? denies    Periodic limb movement: denies      Sleep Behaviors:    Leg symptoms/movements: denies    Motor restlessness:denies    Night terrors: denies    Bruxism: **denies    Automatic behaviors: none    Other subjective complaints:    Anxiety or rumination denies    Pain and discomfort at  night: denies    Waking up with heart pounding or racing: denies    GERD or aspiration:denies         Parasomnia:   NREM - denies recurrent persistent confusional arousal, night eating, sleep walking or sleep terrors   REM  - denies dream enactment; injuries              Medications:     Current Outpatient Medications   Medication Sig     acetaminophen (TYLENOL) 325 MG tablet Take 325-650 mg by mouth every 6 hours as needed for mild pain     albuterol (PROAIR HFA/PROVENTIL HFA/VENTOLIN HFA) 108 (90 Base) MCG/ACT inhaler Inhale 1-2 puffs into the lungs     apixaban ANTICOAGULANT (ELIQUIS) 5 MG tablet Take 1 tablet (5 mg) by mouth 2 times daily     aspirin 81 MG EC tablet Take 81 mg by mouth daily     Bumetanide (BUMEX PO) Take 1 mg by mouth daily Takes daily at 1400     clonazePAM (KLONOPIN) 1 MG tablet Take 1 tablet (1 mg) by mouth nightly as needed for anxiety     diltiazem ER COATED BEADS (CARDIZEM CD/CARTIA XT) 180 MG 24 hr capsule Take 1 capsule (180 mg) by mouth 2 times daily Hold for SBP < 110 or HR < 60     Fluticasone-Umeclidin-Vilanterol (TRELEGY ELLIPTA) 100-62.5-25 MCG/INH oral inhaler Inhale 1 puff into the lungs daily     Fluticasone-Umeclidin-Vilanterol (TRELEGY ELLIPTA) 100-62.5-25 MCG/INH oral inhaler Inhale 1 puff into the lungs daily     Fluticasone-Umeclidin-Vilanterol (TRELEGY ELLIPTA) 100-62.5-25 MCG/INH oral inhaler Inhale 1 puff into the lungs daily     ipratropium - albuterol 0.5 mg/2.5 mg/3 mL (DUONEB) 0.5-2.5 (3) MG/3ML neb solution Inhale 3 mLs into the lungs     Lidocaine  (LIDOCARE) 4 % Patch Place 2 patches onto the skin daily as needed for moderate pain     metoprolol succinate ER (TOPROL XL) 50 MG 24 hr tablet Take 1 tablet (50 mg) by mouth daily     oxyCODONE IR (ROXICODONE) 10 MG tablet Take 10 mg by mouth every 8 hours as needed for severe pain     predniSONE (DELTASONE) 10 MG tablet Take 10 mg by mouth daily      traZODone (DESYREL) 50 MG tablet Take 50 mg by mouth At Bedtime      No current facility-administered medications for this visit.      Facility-Administered Medications Ordered in Other Visits   Medication     Saline for CT        Allergies   Allergen Reactions     Amlodipine Swelling     Flecainide Palpitations and Rash            Past Medical History:     t   Past Medical History:   Diagnosis Date     Atrial flutter (H)      Cancer (H)     basal cell-nose     COPD (chronic obstructive pulmonary disease) (H)      Diverticulitis      Hepatitis 2011    Hepatitis C     Hypertension      Persistent atrial fibrillation 4/28/09    ablation 7/1/2015     Premature beats      PVC (premature ventricular contraction)     s/p ablation 12/5/2017     Tricuspid valve disorder     Dr Aj, Hx of valve repair      Ventricular tachycardia (H)     nonsustained             Past Surgical History:       Past Surgical History:   Procedure Laterality Date     ABDOMEN SURGERY      hernia repair right     APPENDECTOMY  as a child     CARDIAC SURGERY      multiple ablations-[facility]     CARDIOVERSION  06/03/2009    successful for afib     CARDIOVERSION  4/20/15    successful for afib     CARDIOVERSION  5/28/15     COLONOSCOPY  01/2018    [facility]     GENITOURINARY SURGERY      undescended testicle     H ABLATION ATRIAL FLUTTER       H ABLATION FOCAL AFIB  7/1/15    Left atrial linear ablation and pulmonary vein antrum ablation     H ABLATION PVC  12/5/16     INCISION AND DRAINAGE LOWER EXTREMITY, COMBINED Right 11/10/2016    Procedure: COMBINED INCISION AND DRAINAGE LOWER EXTREMITY;   Surgeon: Roegr Pacheco MD;  Location: [facility] OR     LAPAROSCOPIC CHOLECYSTECTOMY  2012    Procedure:LAPAROSCOPIC CHOLECYSTECTOMY; LAPAROSCOPIC CHOLECYSTECTOMY ; Surgeon:ROGER PACHECO; Location:[facility] OR     ORTHOPEDIC SURGERY      Left hip replacement     REPAIR VALVE TRICUSPID  08    conversion to a bileaflet valve and application of a 30 mm Sanderson ring     SMALL INTESTINE SURGERY      had bowel obstruction age 9     THORACIC SURGERY       VALVE REPLACEMENT      tricuspid            Social History:     Social History     Tobacco Use     Smoking status: Former Smoker     Packs/day: 1.50     Years: 41.00     Pack years: 61.50     Types: Paan with tobacco, gutka, zarda, khaini     Start date: 1977     Quit date: 2008     Years since quittin.9     Smokeless tobacco: Never Used   Substance Use Topics     Alcohol use: Yes     Alcohol/week: 0.0 standard drinks     Comment: 3 per week                  Family History:     Family History   Problem Relation Age of Onset     Prostate Cancer Father      Family History Negative Mother      Emphysema Mother      Hypertension Mother      Cerebrovascular Disease Mother         *         Review of Systems:     A complete 10 point review of systems was negative other than HPI or as commented below:     Dyspnea on exertion and mobility limitations         Physical Examination:     Objective   Reported vitals:  There were no vitals taken for this visit.   healthy, alert and no distress  Psych: Alert and oriented times 3; coherent speech, normal   rate and volume, able to articulate logical thoughts, able   to abstract reason, no tangential thoughts, no hallucinations   or delusions  Patient affect is normal.             Data: All pertinent previous laboratory data reviewed     Lab Results   Component Value Date    PH 7.35 2019    PH 7.32 (L) 2019    PO2 58 (L) 2019    PO2 74 (L) 2019    PCO2 68 (H) 2019    PCO2 69 (H)  02/19/2019    HCO3 37 (H) 02/20/2019    HCO3 36 (H) 02/19/2019    JACKLYN 8.7 02/20/2019    JACKLYN 7.2 02/19/2019     Lab Results   Component Value Date    TSH 2.10 12/27/2019    TSH 1.63 05/10/2007     Lab Results   Component Value Date     (H) 01/10/2020    GLC 95 12/27/2019     Lab Results   Component Value Date    HGB 14.6 12/26/2019    HGB 15.5 07/22/2019     Lab Results   Component Value Date    BUN 47 (H) 01/10/2020    BUN 16 12/27/2019    CR 1.59 (H) 01/10/2020    CR 1.29 (H) 12/27/2019     Lab Results   Component Value Date    AST 57 (H) 12/26/2019    AST 97 (H) 07/22/2019    ALT 71 (H) 12/26/2019     (H) 07/22/2019    ALKPHOS 51 12/26/2019    ALKPHOS 76 07/22/2019    BILITOTAL 0.8 12/26/2019    BILITOTAL 0.7 07/22/2019    BILICONJ 0.0 05/23/2007         Pulmonary function test March 2019:       Echocardiology: 2019  Interpretation Summary     Left ventricular systolic function is low normal.  The visual ejection fraction is estimated at 50-55%.  S/P TV repair. Mean Gradient across TV uis 3mm ar 61 bpm.  No tricuspid regurgitation.  The right ventricle is mild to moderately dilated.  The right ventricular systolic function is mild to moderately reduced.  The study was technically difficult. Compared to the prior study dated 1/19,  there have been no changes.    The left ventricle is borderline dilated. There is normal left ventricular  wall thickness. Left ventricular systolic function is low normal. The visual  ejection fraction is estimated at 50-55%. Diastolic function not assessed due  to atrial fibrillation. There is borderline global hypokinesia of the left  ventricle. Paradoxical septal motion is consistent with right ventricular  volume overload.   Right Ventricle  The right ventricle is mild to moderately dilated. The right ventricular  systolic function is mild to moderately reduced.   Atria  Normal left atrial size. The right atrium is mildly dilated.   Mitral Valve  The mitral valve is  not well visualized. There is trace mitral regurgitation.   Tricuspid Valve  No tricuspid regurgitation. IVC diameter >2.1 cm collapsing <50% with sniff  suggests a high RA pressure estimated at 15 mmHg or greater. Right ventricular  systolic pressure could not be approximated due to inadequate tricuspid  regurgitation. S/P TV repair.    Copy to: Ana Pratt MD 12/4/2020

## 2020-12-03 NOTE — PATIENT INSTRUCTIONS

## 2020-12-04 ENCOUNTER — VIRTUAL VISIT (OUTPATIENT)
Dept: SLEEP MEDICINE | Facility: CLINIC | Age: 70
End: 2020-12-04
Payer: COMMERCIAL

## 2020-12-04 DIAGNOSIS — G47.34 NOCTURNAL HYPOXIA: ICD-10-CM

## 2020-12-04 DIAGNOSIS — G47.33 OSA (OBSTRUCTIVE SLEEP APNEA): Primary | ICD-10-CM

## 2020-12-04 PROBLEM — R91.1 LUNG NODULE: Status: ACTIVE | Noted: 2019-08-11

## 2020-12-04 PROBLEM — I49.3 PVC (PREMATURE VENTRICULAR CONTRACTION): Status: ACTIVE | Noted: 2017-02-25

## 2020-12-04 PROBLEM — D69.6 THROMBOCYTOPENIA (H): Status: ACTIVE | Noted: 2019-02-28

## 2020-12-04 PROBLEM — Z98.890 H/O TRICUSPID VALVE REPAIR: Status: ACTIVE | Noted: 2020-12-04

## 2020-12-04 PROBLEM — J96.12 ACUTE ON CHRONIC RESPIRATORY ACIDOSIS (H): Status: ACTIVE | Noted: 2019-02-28

## 2020-12-04 PROBLEM — Z79.01 ANTICOAGULATION MONITORING, SPECIAL RANGE: Status: ACTIVE | Noted: 2019-08-20

## 2020-12-04 PROBLEM — Z86.711 HISTORY OF PULMONARY EMBOLUS (PE): Status: ACTIVE | Noted: 2019-07-08

## 2020-12-04 PROBLEM — J96.02 ACUTE ON CHRONIC RESPIRATORY ACIDOSIS (H): Status: ACTIVE | Noted: 2019-02-28

## 2020-12-04 PROBLEM — E11.9 DIABETES MELLITUS, TYPE 2 (H): Status: ACTIVE | Noted: 2020-12-04

## 2020-12-04 PROCEDURE — 99214 OFFICE O/P EST MOD 30 MIN: CPT | Mod: 95 | Performed by: INTERNAL MEDICINE

## 2021-01-05 ENCOUNTER — HOSPITAL ENCOUNTER (OUTPATIENT)
Dept: CARDIOLOGY | Facility: CLINIC | Age: 71
Discharge: HOME | End: 2021-01-05
Attending: INTERNAL MEDICINE | Admitting: INTERNAL MEDICINE
Payer: COMMERCIAL

## 2021-01-05 DIAGNOSIS — I50.9 HEART FAILURE, UNSPECIFIED (H): ICD-10-CM

## 2021-01-05 DIAGNOSIS — J44.9 CHRONIC OBSTRUCTIVE PULMONARY DISEASE (COPD) (H): ICD-10-CM

## 2021-01-05 PROCEDURE — 93306 TTE W/DOPPLER COMPLETE: CPT

## 2021-01-05 PROCEDURE — 93306 TTE W/DOPPLER COMPLETE: CPT | Mod: 26 | Performed by: INTERNAL MEDICINE

## 2021-02-16 ENCOUNTER — TELEPHONE (OUTPATIENT)
Dept: CARDIOLOGY | Facility: CLINIC | Age: 71
End: 2021-02-16

## 2021-02-16 NOTE — TELEPHONE ENCOUNTER
Faxed signed medical release to [facility] with copy of medication list.  Sent original to HIM to scan.  HARDEEP Elizalde

## 2021-02-17 ENCOUNTER — TELEPHONE (OUTPATIENT)
Dept: CARDIOLOGY | Facility: CLINIC | Age: 71
End: 2021-02-17

## 2021-02-17 NOTE — TELEPHONE ENCOUNTER
Pt states that he will be having dental extractions and asking about antibiotics.  Explained that for his heart rhythms an Abx is not needed.  Asked if pt has taken before and states that his PMD has him take amoxicillin prior to, because of the ring that was replaced with his tricuspid valve.  Pt states that he had some at his home and as long as it is ok will take.  Explained that if will be fine.  Pt needs to discuss with PMD going forward. Mell

## 2021-03-22 ENCOUNTER — TELEPHONE (OUTPATIENT)
Dept: CARDIOLOGY | Facility: CLINIC | Age: 71
End: 2021-03-22

## 2021-03-22 ENCOUNTER — APPOINTMENT (OUTPATIENT)
Dept: CT IMAGING | Facility: CLINIC | Age: 71
DRG: 191 | End: 2021-03-22
Attending: EMERGENCY MEDICINE
Payer: COMMERCIAL

## 2021-03-22 ENCOUNTER — HOSPITAL ENCOUNTER (INPATIENT)
Facility: CLINIC | Age: 71
LOS: 2 days | Discharge: HOME | DRG: 191 | End: 2021-03-24
Attending: EMERGENCY MEDICINE | Admitting: INTERNAL MEDICINE
Payer: COMMERCIAL

## 2021-03-22 DIAGNOSIS — I48.91 ATRIAL FIBRILLATION WITH RVR (H): ICD-10-CM

## 2021-03-22 DIAGNOSIS — J44.1 COPD EXACERBATION (H): ICD-10-CM

## 2021-03-22 DIAGNOSIS — J96.21 ACUTE ON CHRONIC RESPIRATORY FAILURE WITH HYPOXIA (H): ICD-10-CM

## 2021-03-22 LAB
ALBUMIN SERPL-MCNC: 3.2 G/DL (ref 3.4–5)
ALP SERPL-CCNC: 54 U/L (ref 40–150)
ALT SERPL W P-5'-P-CCNC: 39 U/L (ref 0–70)
ANION GAP SERPL CALCULATED.3IONS-SCNC: 7 MMOL/L (ref 3–14)
AST SERPL W P-5'-P-CCNC: 25 U/L (ref 0–45)
BASOPHILS # BLD AUTO: 0.1 10E9/L (ref 0–0.2)
BASOPHILS NFR BLD AUTO: 0.7 %
BILIRUB SERPL-MCNC: 1 MG/DL (ref 0.2–1.3)
BUN SERPL-MCNC: 28 MG/DL (ref 7–30)
CALCIUM SERPL-MCNC: 9.5 MG/DL (ref 8.5–10.1)
CHLORIDE SERPL-SCNC: 102 MMOL/L (ref 94–109)
CO2 SERPL-SCNC: 33 MMOL/L (ref 20–32)
CREAT SERPL-MCNC: 1.26 MG/DL (ref 0.66–1.25)
D DIMER PPP FEU-MCNC: 1 UG/ML FEU (ref 0–0.5)
DIFFERENTIAL METHOD BLD: ABNORMAL
EOSINOPHIL # BLD AUTO: 0.2 10E9/L (ref 0–0.7)
EOSINOPHIL NFR BLD AUTO: 2.2 %
ERYTHROCYTE [DISTWIDTH] IN BLOOD BY AUTOMATED COUNT: 15.2 % (ref 10–15)
FLUAV RNA RESP QL NAA+PROBE: NEGATIVE
FLUBV RNA RESP QL NAA+PROBE: NEGATIVE
GFR SERPL CREATININE-BSD FRML MDRD: 58 ML/MIN/{1.73_M2}
GLUCOSE BLDC GLUCOMTR-MCNC: 241 MG/DL (ref 70–99)
GLUCOSE BLDC GLUCOMTR-MCNC: 322 MG/DL (ref 70–99)
GLUCOSE SERPL-MCNC: 112 MG/DL (ref 70–99)
HBA1C MFR BLD: 5.8 % (ref 0–5.6)
HCT VFR BLD AUTO: 54.1 % (ref 40–53)
HGB BLD-MCNC: 16.5 G/DL (ref 13.3–17.7)
IMM GRANULOCYTES # BLD: 0.1 10E9/L (ref 0–0.4)
IMM GRANULOCYTES NFR BLD: 0.9 %
LABORATORY COMMENT REPORT: NORMAL
LYMPHOCYTES # BLD AUTO: 2.3 10E9/L (ref 0.8–5.3)
LYMPHOCYTES NFR BLD AUTO: 20.3 %
MCH RBC QN AUTO: 27.4 PG (ref 26.5–33)
MCHC RBC AUTO-ENTMCNC: 30.5 G/DL (ref 31.5–36.5)
MCV RBC AUTO: 90 FL (ref 78–100)
MONOCYTES # BLD AUTO: 0.9 10E9/L (ref 0–1.3)
MONOCYTES NFR BLD AUTO: 8 %
NEUTROPHILS # BLD AUTO: 7.6 10E9/L (ref 1.6–8.3)
NEUTROPHILS NFR BLD AUTO: 67.9 %
NRBC # BLD AUTO: 0 10*3/UL
NRBC BLD AUTO-RTO: 0 /100
NT-PROBNP SERPL-MCNC: 513 PG/ML (ref 0–900)
PLATELET # BLD AUTO: 182 10E9/L (ref 150–450)
POTASSIUM SERPL-SCNC: 4.2 MMOL/L (ref 3.4–5.3)
PROT SERPL-MCNC: 6.9 G/DL (ref 6.8–8.8)
RBC # BLD AUTO: 6.03 10E12/L (ref 4.4–5.9)
RSV RNA SPEC QL NAA+PROBE: NORMAL
SARS-COV-2 RNA RESP QL NAA+PROBE: NEGATIVE
SODIUM SERPL-SCNC: 142 MMOL/L (ref 133–144)
SPECIMEN SOURCE: NORMAL
TROPONIN I SERPL-MCNC: <0.015 UG/L (ref 0–0.04)
WBC # BLD AUTO: 11.1 10E9/L (ref 4–11)

## 2021-03-22 PROCEDURE — C9803 HOPD COVID-19 SPEC COLLECT: HCPCS

## 2021-03-22 PROCEDURE — 96361 HYDRATE IV INFUSION ADD-ON: CPT

## 2021-03-22 PROCEDURE — 999N000157 HC STATISTIC RCP TIME EA 10 MIN

## 2021-03-22 PROCEDURE — 99223 1ST HOSP IP/OBS HIGH 75: CPT | Mod: AI | Performed by: INTERNAL MEDICINE

## 2021-03-22 PROCEDURE — 99285 EMERGENCY DEPT VISIT HI MDM: CPT | Mod: 25

## 2021-03-22 PROCEDURE — 120N000001 HC R&B MED SURG/OB

## 2021-03-22 PROCEDURE — 250N000013 HC RX MED GY IP 250 OP 250 PS 637: Performed by: INTERNAL MEDICINE

## 2021-03-22 PROCEDURE — 85379 FIBRIN DEGRADATION QUANT: CPT | Performed by: EMERGENCY MEDICINE

## 2021-03-22 PROCEDURE — 93005 ELECTROCARDIOGRAM TRACING: CPT

## 2021-03-22 PROCEDURE — 71275 CT ANGIOGRAPHY CHEST: CPT

## 2021-03-22 PROCEDURE — 94640 AIRWAY INHALATION TREATMENT: CPT

## 2021-03-22 PROCEDURE — 999N001017 HC STATISTIC GLUCOSE BY METER IP

## 2021-03-22 PROCEDURE — 250N000013 HC RX MED GY IP 250 OP 250 PS 637: Performed by: EMERGENCY MEDICINE

## 2021-03-22 PROCEDURE — 250N000009 HC RX 250: Performed by: EMERGENCY MEDICINE

## 2021-03-22 PROCEDURE — 83880 ASSAY OF NATRIURETIC PEPTIDE: CPT | Performed by: EMERGENCY MEDICINE

## 2021-03-22 PROCEDURE — 96375 TX/PRO/DX INJ NEW DRUG ADDON: CPT

## 2021-03-22 PROCEDURE — 94640 AIRWAY INHALATION TREATMENT: CPT | Mod: 76

## 2021-03-22 PROCEDURE — 96374 THER/PROPH/DIAG INJ IV PUSH: CPT | Mod: 59

## 2021-03-22 PROCEDURE — 5A09357 ASSISTANCE WITH RESPIRATORY VENTILATION, LESS THAN 24 CONSECUTIVE HOURS, CONTINUOUS POSITIVE AIRWAY PRESSURE: ICD-10-PCS | Performed by: INTERNAL MEDICINE

## 2021-03-22 PROCEDURE — 83036 HEMOGLOBIN GLYCOSYLATED A1C: CPT | Performed by: EMERGENCY MEDICINE

## 2021-03-22 PROCEDURE — 84484 ASSAY OF TROPONIN QUANT: CPT | Performed by: EMERGENCY MEDICINE

## 2021-03-22 PROCEDURE — 250N000009 HC RX 250: Performed by: INTERNAL MEDICINE

## 2021-03-22 PROCEDURE — 80053 COMPREHEN METABOLIC PANEL: CPT | Performed by: EMERGENCY MEDICINE

## 2021-03-22 PROCEDURE — 250N000012 HC RX MED GY IP 250 OP 636 PS 637: Performed by: INTERNAL MEDICINE

## 2021-03-22 PROCEDURE — 94660 CPAP INITIATION&MGMT: CPT

## 2021-03-22 PROCEDURE — 258N000003 HC RX IP 258 OP 636: Performed by: EMERGENCY MEDICINE

## 2021-03-22 PROCEDURE — 250N000011 HC RX IP 250 OP 636: Performed by: EMERGENCY MEDICINE

## 2021-03-22 PROCEDURE — 85025 COMPLETE CBC W/AUTO DIFF WBC: CPT | Performed by: EMERGENCY MEDICINE

## 2021-03-22 PROCEDURE — 87636 SARSCOV2 & INF A&B AMP PRB: CPT | Performed by: EMERGENCY MEDICINE

## 2021-03-22 RX ORDER — TRAZODONE HYDROCHLORIDE 50 MG/1
50 TABLET, FILM COATED ORAL AT BEDTIME
Status: DISCONTINUED | OUTPATIENT
Start: 2021-03-22 | End: 2021-03-24 | Stop reason: HOSPADM

## 2021-03-22 RX ORDER — OXYCODONE HYDROCHLORIDE 5 MG/1
5-10 TABLET ORAL EVERY 6 HOURS PRN
Status: DISCONTINUED | OUTPATIENT
Start: 2021-03-22 | End: 2021-03-24 | Stop reason: HOSPADM

## 2021-03-22 RX ORDER — DILTIAZEM HYDROCHLORIDE 180 MG/1
180 CAPSULE, COATED, EXTENDED RELEASE ORAL 2 TIMES DAILY
Status: DISCONTINUED | OUTPATIENT
Start: 2021-03-22 | End: 2021-03-24 | Stop reason: HOSPADM

## 2021-03-22 RX ORDER — LANOLIN ALCOHOL/MO/W.PET/CERES
3 CREAM (GRAM) TOPICAL
Status: DISCONTINUED | OUTPATIENT
Start: 2021-03-22 | End: 2021-03-24 | Stop reason: HOSPADM

## 2021-03-22 RX ORDER — METOPROLOL SUCCINATE 50 MG/1
50 TABLET, EXTENDED RELEASE ORAL DAILY
Status: DISCONTINUED | OUTPATIENT
Start: 2021-03-22 | End: 2021-03-24 | Stop reason: HOSPADM

## 2021-03-22 RX ORDER — METHYLPREDNISOLONE SODIUM SUCCINATE 125 MG/2ML
60 INJECTION, POWDER, LYOPHILIZED, FOR SOLUTION INTRAMUSCULAR; INTRAVENOUS EVERY 12 HOURS
Status: DISCONTINUED | OUTPATIENT
Start: 2021-03-23 | End: 2021-03-24 | Stop reason: HOSPADM

## 2021-03-22 RX ORDER — DILTIAZEM HYDROCHLORIDE 5 MG/ML
15 INJECTION INTRAVENOUS ONCE
Status: COMPLETED | OUTPATIENT
Start: 2021-03-22 | End: 2021-03-22

## 2021-03-22 RX ORDER — ONDANSETRON 4 MG/1
4 TABLET, ORALLY DISINTEGRATING ORAL EVERY 6 HOURS PRN
Status: DISCONTINUED | OUTPATIENT
Start: 2021-03-22 | End: 2021-03-24 | Stop reason: HOSPADM

## 2021-03-22 RX ORDER — CLONAZEPAM 1 MG/1
1 TABLET ORAL
Status: DISCONTINUED | OUTPATIENT
Start: 2021-03-22 | End: 2021-03-24 | Stop reason: HOSPADM

## 2021-03-22 RX ORDER — DEXTROSE MONOHYDRATE 25 G/50ML
25-50 INJECTION, SOLUTION INTRAVENOUS
Status: DISCONTINUED | OUTPATIENT
Start: 2021-03-22 | End: 2021-03-24 | Stop reason: HOSPADM

## 2021-03-22 RX ORDER — NALOXONE HYDROCHLORIDE 0.4 MG/ML
0.2 INJECTION, SOLUTION INTRAMUSCULAR; INTRAVENOUS; SUBCUTANEOUS
Status: DISCONTINUED | OUTPATIENT
Start: 2021-03-22 | End: 2021-03-24 | Stop reason: HOSPADM

## 2021-03-22 RX ORDER — BISACODYL 10 MG
10 SUPPOSITORY, RECTAL RECTAL DAILY PRN
Status: DISCONTINUED | OUTPATIENT
Start: 2021-03-22 | End: 2021-03-24 | Stop reason: HOSPADM

## 2021-03-22 RX ORDER — ASPIRIN 81 MG/1
81 TABLET ORAL DAILY
Status: DISCONTINUED | OUTPATIENT
Start: 2021-03-22 | End: 2021-03-24 | Stop reason: HOSPADM

## 2021-03-22 RX ORDER — IPRATROPIUM BROMIDE AND ALBUTEROL SULFATE 2.5; .5 MG/3ML; MG/3ML
6 SOLUTION RESPIRATORY (INHALATION) ONCE
Status: COMPLETED | OUTPATIENT
Start: 2021-03-22 | End: 2021-03-22

## 2021-03-22 RX ORDER — ACETAMINOPHEN 325 MG/1
325-650 TABLET ORAL EVERY 6 HOURS PRN
Status: DISCONTINUED | OUTPATIENT
Start: 2021-03-22 | End: 2021-03-22

## 2021-03-22 RX ORDER — IPRATROPIUM BROMIDE AND ALBUTEROL SULFATE 2.5; .5 MG/3ML; MG/3ML
6 SOLUTION RESPIRATORY (INHALATION) ONCE
Status: DISCONTINUED | OUTPATIENT
Start: 2021-03-22 | End: 2021-03-22

## 2021-03-22 RX ORDER — DOXYCYCLINE 100 MG/1
100 CAPSULE ORAL EVERY 12 HOURS SCHEDULED
Status: DISCONTINUED | OUTPATIENT
Start: 2021-03-22 | End: 2021-03-24 | Stop reason: HOSPADM

## 2021-03-22 RX ORDER — METOPROLOL SUCCINATE 50 MG/1
50 TABLET, EXTENDED RELEASE ORAL ONCE
Status: COMPLETED | OUTPATIENT
Start: 2021-03-22 | End: 2021-03-22

## 2021-03-22 RX ORDER — IPRATROPIUM BROMIDE AND ALBUTEROL SULFATE 2.5; .5 MG/3ML; MG/3ML
1 SOLUTION RESPIRATORY (INHALATION)
Status: DISCONTINUED | OUTPATIENT
Start: 2021-03-22 | End: 2021-03-24 | Stop reason: HOSPADM

## 2021-03-22 RX ORDER — BUMETANIDE 0.5 MG/1
1 TABLET ORAL DAILY
Status: DISCONTINUED | OUTPATIENT
Start: 2021-03-22 | End: 2021-03-24 | Stop reason: HOSPADM

## 2021-03-22 RX ORDER — NICOTINE POLACRILEX 4 MG
15-30 LOZENGE BUCCAL
Status: DISCONTINUED | OUTPATIENT
Start: 2021-03-22 | End: 2021-03-24 | Stop reason: HOSPADM

## 2021-03-22 RX ORDER — NALOXONE HYDROCHLORIDE 0.4 MG/ML
0.4 INJECTION, SOLUTION INTRAMUSCULAR; INTRAVENOUS; SUBCUTANEOUS
Status: DISCONTINUED | OUTPATIENT
Start: 2021-03-22 | End: 2021-03-24 | Stop reason: HOSPADM

## 2021-03-22 RX ORDER — IOPAMIDOL 755 MG/ML
500 INJECTION, SOLUTION INTRAVASCULAR ONCE
Status: COMPLETED | OUTPATIENT
Start: 2021-03-22 | End: 2021-03-22

## 2021-03-22 RX ORDER — ACETAMINOPHEN 325 MG/1
650 TABLET ORAL EVERY 4 HOURS PRN
Status: DISCONTINUED | OUTPATIENT
Start: 2021-03-22 | End: 2021-03-24 | Stop reason: HOSPADM

## 2021-03-22 RX ORDER — POLYETHYLENE GLYCOL 3350 17 G/17G
17 POWDER, FOR SOLUTION ORAL DAILY PRN
Status: DISCONTINUED | OUTPATIENT
Start: 2021-03-22 | End: 2021-03-24 | Stop reason: HOSPADM

## 2021-03-22 RX ORDER — DOXYCYCLINE 100 MG/1
100 CAPSULE ORAL ONCE
Status: COMPLETED | OUTPATIENT
Start: 2021-03-22 | End: 2021-03-22

## 2021-03-22 RX ORDER — ONDANSETRON 2 MG/ML
4 INJECTION INTRAMUSCULAR; INTRAVENOUS EVERY 6 HOURS PRN
Status: DISCONTINUED | OUTPATIENT
Start: 2021-03-22 | End: 2021-03-24 | Stop reason: HOSPADM

## 2021-03-22 RX ORDER — METHYLPREDNISOLONE SODIUM SUCCINATE 125 MG/2ML
80 INJECTION, POWDER, LYOPHILIZED, FOR SOLUTION INTRAMUSCULAR; INTRAVENOUS ONCE
Status: COMPLETED | OUTPATIENT
Start: 2021-03-22 | End: 2021-03-22

## 2021-03-22 RX ADMIN — BUMETANIDE 1 MG: 0.5 TABLET ORAL at 16:33

## 2021-03-22 RX ADMIN — METHYLPREDNISOLONE SODIUM SUCCINATE 81.25 MG: 125 INJECTION, POWDER, FOR SOLUTION INTRAMUSCULAR; INTRAVENOUS at 10:31

## 2021-03-22 RX ADMIN — METOPROLOL SUCCINATE 50 MG: 50 TABLET, EXTENDED RELEASE ORAL at 13:24

## 2021-03-22 RX ADMIN — DOXYCYCLINE HYCLATE 100 MG: 100 CAPSULE ORAL at 13:24

## 2021-03-22 RX ADMIN — IPRATROPIUM BROMIDE AND ALBUTEROL SULFATE 6 ML: .5; 3 SOLUTION RESPIRATORY (INHALATION) at 12:46

## 2021-03-22 RX ADMIN — ASPIRIN 81 MG: 81 TABLET ORAL at 16:33

## 2021-03-22 RX ADMIN — DILTIAZEM HYDROCHLORIDE 180 MG: 180 CAPSULE, COATED, EXTENDED RELEASE ORAL at 20:15

## 2021-03-22 RX ADMIN — DILTIAZEM HYDROCHLORIDE 15 MG: 5 INJECTION, SOLUTION INTRAVENOUS at 10:30

## 2021-03-22 RX ADMIN — APIXABAN 5 MG: 5 TABLET, FILM COATED ORAL at 20:14

## 2021-03-22 RX ADMIN — IPRATROPIUM BROMIDE AND ALBUTEROL SULFATE 3 ML: .5; 3 SOLUTION RESPIRATORY (INHALATION) at 17:45

## 2021-03-22 RX ADMIN — IPRATROPIUM BROMIDE AND ALBUTEROL SULFATE 3 ML: .5; 3 SOLUTION RESPIRATORY (INHALATION) at 20:35

## 2021-03-22 RX ADMIN — SODIUM CHLORIDE 84 ML: 9 INJECTION, SOLUTION INTRAVENOUS at 11:18

## 2021-03-22 RX ADMIN — CLONAZEPAM 1 MG: 1 TABLET ORAL at 21:23

## 2021-03-22 RX ADMIN — IOPAMIDOL 65 ML: 755 INJECTION, SOLUTION INTRAVENOUS at 11:18

## 2021-03-22 RX ADMIN — INSULIN ASPART 4 UNITS: 100 INJECTION, SOLUTION INTRAVENOUS; SUBCUTANEOUS at 17:45

## 2021-03-22 RX ADMIN — SODIUM CHLORIDE 500 ML: 9 INJECTION, SOLUTION INTRAVENOUS at 11:32

## 2021-03-22 RX ADMIN — METOPROLOL SUCCINATE 50 MG: 50 TABLET, EXTENDED RELEASE ORAL at 16:33

## 2021-03-22 RX ADMIN — TRAZODONE HYDROCHLORIDE 50 MG: 50 TABLET ORAL at 21:23

## 2021-03-22 RX ADMIN — DOXYCYCLINE HYCLATE 100 MG: 100 CAPSULE ORAL at 19:33

## 2021-03-22 RX ADMIN — IPRATROPIUM BROMIDE AND ALBUTEROL SULFATE 6 ML: .5; 3 SOLUTION RESPIRATORY (INHALATION) at 10:39

## 2021-03-22 ASSESSMENT — ENCOUNTER SYMPTOMS
SHORTNESS OF BREATH: 1
HEADACHES: 0
FEVER: 0
NAUSEA: 0
WEAKNESS: 0
DIZZINESS: 1
VOMITING: 0
ABDOMINAL PAIN: 0
COUGH: 0
NUMBNESS: 0
CHILLS: 0

## 2021-03-22 ASSESSMENT — ACTIVITIES OF DAILY LIVING (ADL)
ADLS_ACUITY_SCORE: 15
ADLS_ACUITY_SCORE: 14

## 2021-03-22 ASSESSMENT — MIFFLIN-ST. JEOR: SCORE: 1660.3

## 2021-03-22 NOTE — LETTER
March 24, 2021    Tone Cooper  [address] [facility] [address]         SARS-CoV-2 PCR Result (no units)   Date Value   03/22/2021 NEGATIVE       This letter provides a written record that you were tested for COVID-19.    Your result was negative. This means that we didn t find the virus that causes COVID-19 in your sample. A test may show negative when you do actually have the virus. This can happen when the virus is in the early stages of infection, before you feel illness symptoms.    If you have symptoms   Stay home and away from others (self-isolate) until you meet ALL of the guidelines below:    You ve had no fever--and no medicine that reduces fever--for 1 full day (24 hours). And      Your other symptoms have gotten better. For example, your cough or breathing has improved. And     At least 10 days have passed since your symptoms started. (If you've been told by a doctor that you have a weak immune system, wait 20 days)    During this time:    Stay home. Don t go to work, school or anywhere else.     Stay in your own room, including for meals. Use your own bathroom if you can.    Stay away from others in your home. No hugging, kissing or shaking hands. No visitors.    Clean  high touch  surfaces often (doorknobs, counters, handles, etc.). Use a household cleaning spray or wipes. You can find a full list on the EPA website at www.epa.gov/pesticide-registration/list-n-disinfectants-use-against-sars-cov-2.    Cover your mouth and nose with a mask or other face covering to avoid spreading germs.    Wash your hands and face often with soap and water.    Going back to work  Check with your employer for any guidelines to follow for going back to work.    Employers: This document serves as formal notice that your employee tested negative for COVID-19, as of the testing date shown above.

## 2021-03-22 NOTE — ED NOTES
Pt reports sudden onset of migraine about 5-10 minutes ago, states it is in bilateral temple areas. MD notified.

## 2021-03-22 NOTE — LETTER
[place] [address] [facility]  [address]  [facility] [address]  [phone]  3/24/2021    TO WHOM IT MAY CONCERN               Tone Cooper (YOB: 1950) was admitted to the hospital on 3/22/2021 for medical care.     Patient is being discharged from the hospital today (3/24/2021).     Patient can return to work tomorrow, Thursday, 3/25/2021 with use of portable oxygen.     Thank you.     Best Tucker MD  Internal Medicine   [facility]  [address] [place] [address]  [phone]

## 2021-03-22 NOTE — ED PROVIDER NOTES
History   Chief Complaint:  Shortness of Breath and Hypertension     The history is provided by the patient.   Tone Cooper is a 70 year old male with a history of T2DM, chronic atrial fibrillation, PE and COPD on Eliquis who presents for evaluation of non-exertional shortness of breath that started this morning when he woke up.  He has a history of COPD feels like that may be acting up.  Has been using nebulizer treatment at home without improvement.  He has not had any recent cough or infections.  No recent antibiotic or steroid use.  He also has complained of intermittent dizziness over the past few weeks. He took his blood pressure this morning that was 160 systolic which he is most concerned about. On arrival, his blood pressure is 103/91. He denies headaches, vision changes, numbness, weakness or difficulty ambulating. He denies fever, cough, chest pain or known COVID-19 exposure. He has a history of chronic atrial fibrillation reports that he is always in atrial fibrillation.  He was on Eliquis, however, has not been taking it since December 2020 due to cost.  He has been taking 81 mg aspirin since then.  He has no lower extremity swelling or pain.    Review of Systems   Constitutional: Negative for chills and fever.   Eyes: Negative for visual disturbance.   Respiratory: Positive for shortness of breath. Negative for cough.    Cardiovascular: Negative for chest pain.   Gastrointestinal: Negative for abdominal pain, nausea and vomiting.   Musculoskeletal: Negative for gait problem.   Neurological: Positive for dizziness. Negative for weakness, numbness and headaches.   All other systems reviewed and are negative.      Allergies:  Amlodipine  Flecainide    Medications:  Albuterol inhaler   Eliquis   Aspirin 81 mg   Bumex   Klonopin  Diltiazem  Trelegy ellipta  Duoneb  Toprol  Oxycodone  Deltasone  Trazodone    Past Medical History:    Atrial fibrillation  Cancer   COPD   Diabetes mellitus, type II    Diverticulitis   Hepatitis   Hypertension   PVC   Tricuspid valve disorder  Ventricular tachycardia  Lung nodule   PE   Diverticulitis   Pneumonia  Cardiomyopathy   LUTS   Chronic hepatitis C virus infection  Back pain    Past Surgical History:    Hernia repair   Appendectomy   Multiple cardiac ablations  Cardioversion x3  Colonoscopy    surgery; undescended testicle   I & D, lower extremity   Cholecystectomy   Left hip replacement   Repair tricuspid valve  Small bowl obstruction  Thoracic surgery     Family History:    Prostate cancer - father   Emphysema - mother   Hypertension - mother   Cerebrovascular disease - mother    Social History:  Presents to the ED: private care   Tobacco use: Former Smoker  Alcohol use: Yes   PCP: Ana Pratt    Physical Exam     Patient Vitals for the past 24 hrs:   BP Temp Pulse Resp SpO2   03/22/21 1324 (!) 143/120 -- 124 -- --   03/22/21 1200 (!) 148/97 -- 106 12 94 %   03/22/21 1100 (!) 132/91 -- 100 -- --   03/22/21 1045 (!) 135/94 -- 105 11 91 %   03/22/21 1030 (!) 158/95 -- 107 17 93 %   03/22/21 1001 (!) 103/91 98.3  F (36.8  C) 67 30 96 %       Physical Exam  Constitutional: Nontoxic appearing.  HEENT: Atraumatic.  PERRL.  EOMI.  Moist mucous membranes.  Neck: Soft.  Supple.  No JVD.  Cardiac: Tachycardic rate with an irregularly irregular rhythm.  No murmur or rub.  Respiratory: He has diminished air movement throughout with faint expiratory wheezing.  No rhonchi or rales.  Speaking in full sentences.  Mild tachypnea.  Abdomen: Soft and nontender. Nondistended.  Musculoskeletal: No edema.  Normal range of motion.  Neurologic: Alert and oriented x3.  Normal tone and bulk.    Skin: No rashes.  No edema.  Psych: Normal affect.  Normal behavior.    Emergency Department Course     ECG:  ECG taken at 1015, ECG read at 1020  Atrial fibrillation with RVR   Rightward axis   Nonspecific ST abnormality   Abnormal ECG  When compared to prior ECG from 10/23/20: no  significant change was found.  Rate 127 bpm. NV interval * ms. QRS duration 74 ms. QT/QTc 290/421 ms. P-R-T axes * 90 68.    Imaging:  CT Chest Pulmonary Embolism with Contrast  1.  No pulmonary emboli.   2.  Moderate to severe centrilobular emphysema.   3.  No pulmonary infiltrate or pleural effusion.   4.  Mild bronchial wall thickening in the lung bases may be due to   bronchitis.     Reading per radiology.    Laboratory:  CBC: WBC: 11.1 (H), HGB: 16.5, PLT: 182    CMP: Glucose 112 (H), Carbon Dioxide: 33 (H), eGFR 58 (L), Albumin 3.2 (L) o/w WNL (Creatinine: 1.26 (H))    Troponin (Collected 1021): <0.015    D-dimer: 1.0    BNP: 513    Symptomatic Influenza A/B antigen & COVID-19 PCR: negative    Emergency Department Course:    Reviewed:  I reviewed the patient's nursing notes, vitals and past medical history.     Assessments:  1010 I performed an exam of the patient in room ED15 as documented above.  1104 Patient rechecked and updated.    1233 Nursing informed me that the patient is continuing to feel shortness of breath.  1325 I updated the patient on results and discussed plan of care.    Consults:    I spoke with Dr. Knowles of the hospitalist service regarding patient's presentation, findings, and plan of care.      Interventions:  1030 Diltiazem, 15 mg, IV   1031 Solu-medrol, 81.25 mg, IV   1039 Duoneb, 6 mL, nebulization  1132 NS, 500 mL, IV  1246 Duoneb, 6 mL, nebulization  1324 Toprol, 50 mg, Oral   1324 doxycycline, 100 mg, Oral      Disposition:  The patient was admitted to the hospital under the care of Dr. Tucker.     Impression & Plan     CMS Diagnoses: None    Medical Decision Making:  Tone Cooper is a 70 year old male who is afebrile but tachycardic.  He is in mild respiratory distress with diminished air movement throughout but speaking in full sentences and not requiring BiPAP at this time.  He was given multiple breathing treatments and IV Solu-Medrol for potential COPD.  He has not been  taking his Eliquis so a D-dimer was obtained and revealed an elevated D-dimer.  Subsequent PE study of the chest revealed no evidence of acute PE or other acute cardiopulmonary disease.  His lab work-up is noted as above.  His nonspecific leukocytosis but has no obvious evidence of pneumonia.  There is question of potential bronchitis on CT scan.  Will be treated with doxycycline per COPD protocol.  We did attempt ambulation, however, when he just stands up at the bedside he desaturates down to the lower 80s.  He does have oxygen for comfort at home but sounds like he can go throughout most of the day without using it.  He remains tachycardic and was given his home oral metoprolol.  We discussed the need for admission and he is in agreement.  I spoke with the hospitalist service accepts him to the cardiac telemetry floor and he was in stable condition at time of admission.      Covid-19  Tone Cooper was evaluated during a global COVID-19 pandemic, which necessitated consideration that the patient might be at risk for infection with the SARS-CoV-2 virus that causes COVID-19.   Applicable protocols for evaluation were followed during the patient's care.   COVID-19 was considered as part of the patient's evaluation. The plan for testing is:  a NEGATIVE test was obtained during this visit.    Diagnosis:    ICD-10-CM    1. Acute on chronic respiratory failure with hypoxia (H)  J96.21    2. COPD exacerbation (H)  J44.1    3. Atrial fibrillation with RVR (H)  I48.91        Scribe Disclosure:  I, Ana Sanchezvickie, am serving as a scribe at 10:08 AM on 3/22/2021 to document services personally performed by Anival Cruz MD based on my observations and the provider's statements to me.    This note was completed in part using Dragon voice recognition software. Although reviewed after completion, some word and grammatical errors may occur.      Anival Cruz MD  03/22/21 0769

## 2021-03-22 NOTE — PHARMACY-ADMISSION MEDICATION HISTORY
Admission medication history interview status for this patient is complete. See [facility] admission navigator for allergy information, prior to admission medications and immunization status.     Medication history interview done, indicate source(s): Patient  Medication history resources (including written lists, pill bottles, clinic record):[place]  Pharmacy: [facility] on [address]    Changes made to PTA medication list:  Added: tylenol #3, ipratropium nebs  Deleted: lidocaine, Trelegy Ellipta, Eliquis  Changed: albuterol sig, duo-neb sig, predinsone dose    Actions taken by pharmacist (provider contacted, etc):MD Pugh     Additional medication history information:     Tone reports he hasn't taken Eliquis or Trelegy Ellipta in >2 months as they are too expensive with his insurance. He is hoping his insurance will cover them better starting 4/1/21.     He reports taking 20 mg prednisone lately however, he stopped taking 2 days ago due to fluid retention.     Medication reconciliation/reorder completed by provider prior to medication history?  N   (Y/N)       Prior to Admission medications    Medication Sig Last Dose Taking? Auth Provider   acetaminophen (TYLENOL) 325 MG tablet Take 325-650 mg by mouth every 6 hours as needed for mild pain Past Week at Unknown time Yes Reported, Patient   acetaminophen-codeine (TYLENOL #3) 300-30 MG tablet Take 1-2 tablets by mouth every 4 hours as needed Past Month at Unknown time Yes Unknown, Entered By History   albuterol (PROAIR HFA/PROVENTIL HFA/VENTOLIN HFA) 108 (90 Base) MCG/ACT inhaler Inhale 1-2 puffs into the lungs every 6 hours as needed  Past Week at Unknown time Yes Reported, Patient   aspirin 81 MG EC tablet Take 81 mg by mouth daily 3/21/2021 at Unknown time Yes Reported, Patient   bumetanide (BUMEX) 1 MG tablet Take 1 mg by mouth daily Takes daily at 1400 3/21/2021 at Unknown time Yes Reported, Patient   clonazePAM (KLONOPIN) 1 MG tablet Take 1 tablet (1 mg) by  mouth nightly as needed for anxiety 3/21/2021 at Unknown time Yes Toy Tejada MD   diltiazem ER COATED BEADS (CARDIZEM CD/CARTIA XT) 180 MG 24 hr capsule Take 1 capsule (180 mg) by mouth 2 times daily Hold for SBP < 110 or HR < 60 3/21/2021 at Unknown time Yes Best Tucker MD   ipratropium (ATROVENT) 0.02 % neb solution Take 0.5 mg by nebulization every 8 hours Alternates with Duo-nebs every 4 hours 3/21/2021 at Unknown time Yes Unknown, Entered By History   ipratropium - albuterol 0.5 mg/2.5 mg/3 mL (DUONEB) 0.5-2.5 (3) MG/3ML neb solution Inhale 1 vial into the lungs every 8 hours Alternates with Atrovent nebs 3/21/2021 at Unknown time Yes Reported, Patient   metoprolol succinate ER (TOPROL XL) 50 MG 24 hr tablet Take 1 tablet (50 mg) by mouth daily 3/21/2021 at Unknown time Yes Sussy Britt MD   oxyCODONE IR (ROXICODONE) 10 MG tablet Take 10 mg by mouth every 8 hours as needed for severe pain Past Month at Unknown time Yes Unknown, Entered By History   traZODone (DESYREL) 50 MG tablet Take 50 mg by mouth At Bedtime  3/21/2021 at Unknown time Yes Unknown, Entered By History   predniSONE (DELTASONE) 10 MG tablet Take 10 mg by mouth daily May increase to 20 mg daily if needed 3/19/2021 at 20 mg  Unknown, Entered By History

## 2021-03-22 NOTE — TELEPHONE ENCOUNTER
Pt called from the ER where he is noted to be in A Fib.  Pt went in with shortness of breath and HTN.  Pt wanted an OV with Dr Britt.  Nurse came into room and stated that pt will most likely being admitted and a Diltiazem started. Pt has not been on Eliquis d/t the cost. Pt is d/t for a follow-up with Dr Britt, so have asked pt to call when discharged and we can get him set up with a f/u.  Pt states understanding. Mell

## 2021-03-22 NOTE — ED TRIAGE NOTES
Patient presents to the ED stating awoke this morning feeling SOB. History of COPD. States checked BP and SBP was in the 160's.

## 2021-03-22 NOTE — H&P
[facility]    History and Physical  Hospitalist    Name: Tone Cooper    MRN: [id number]  YOB: 1950    Age: 70 year old  Primary care provider: Ana Pratt       Date of Admission:  3/22/2021  Date of Service (when I saw the patient): 03/22/21    Assessment & Plan   Tone Cooper is a 70 year old male with a past medical history significant for advanced COPD with multiple hospitalizations for that, MSSA bacteremia, atrial fibrillation with previous ablation, tricuspid valve replacement, hypertension, nonsustained VT, Hx of PE.     Patient had multiple hospitalizations in 2019 with concerns for his COPD.  He was eventually able to get nocturnal BiPAP and is subsequently doing much better.  Has been sometime since he has required hospitalization.  Follows with pulmonology as an outpatient.  He is on chronic prednisone.  Patient also uses oxygen intermittently during the daytime.  Reports that he has a portable oxygen for up to 4 L. On this occasion presents to the hospital with concerns for increasing shortness of breath.  Patient reports that this has been going on for several days now but has been getting more worse today.  Has been using nebulizer at home without much improvement.  Denies any fevers, chills, nausea, vomiting, chest pain.  He is also been not able to take some of his inhalers and his Eliquis due to insurance issues.  In the ER patient was found to be in some respiratory distress with decreased air entry to the lungs.  He was given multiple nebs and steroids with improvement.  He was also in A. fib with RVR and given IV diltiazem and p.o. metoprolol with heart rate improving now.    #Acute COPD exacerbation.  Patient advanced COPD at baseline.  His CT chest today was negative for pulmonary embolism.  Shows advanced emphysema and possible bronchitis.  -Continue IV Solu-Medrol twice daily  -Continue doxycycline for the bronchitis  -Scheduled  nebulizers  -Patient reports that he is switching to a new insurance and will soon have the ability to afford his Trelegy inhaler.  Request pharmacy consult to assist with that.  -Continue nocturnal BiPAP    # Atrial fibrillation with rapid ventricular rate.  History of atrial flutter status post previous ablation procedure.  Follows with EP cardiology.  His heart rate is doing better now after receiving IV diltiazem.  Suspect this is triggered by his respiratory illness and work of breathing.    -currently heart rate sustaining below 120.  I think be reasonable to resume his oral diltiazem and metoprolol at baseline doses.  Monitor heart rate trend.  On telemetry.  --Resume Apixaban, discussed with pt      # Hx of PE. Apixaban     #History of tricuspid valve replacement  #Chronic right sided heart failure. Not on diuretics at baseline, monitor volume status   #Steroid-induced hyperglycemia: monitor sugars, add sliding scale insulin   #Chronic low back pain: On oxycodone at baseline     COVID-19 Status. Negative     DVT Prophylaxis: Apixaban   Code Status: Full Code    Disposition: Expected discharge in 2-3 days once COPD better.    Plan of care was discussed with the patient in great detail. All of the questions were answered extensively. Patient is comfortable with the plan and agrees with it.     Covid-19  Tone Cooper was evaluated during a global COVID-19 pandemic, and applicable protocols for evaluation were followed during the patient's care.      Best Tucker    Chief Complaint   SOB    History is obtained from the patient     Case discussed with ER provider     History of Present Illness   Tone Cooper is a 70 year old male who presents with breathing problems. Details of HPI above     Past Medical History    I have reviewed this patient's medical history and updated it with pertinent information if needed.   Past Medical History:   Diagnosis Date     Atrial flutter (H)      Cancer (H)     basal  cell-nose     COPD (chronic obstructive pulmonary disease) (H)      Diabetes mellitus, type 2 (H) 12/4/2020     Diverticulitis      Hepatitis 2011    Hepatitis C     Hypertension      Persistent atrial fibrillation (H) 4/28/09    ablation 7/1/2015     Premature beats      PVC (premature ventricular contraction)     s/p ablation 12/5/2017     Tricuspid valve disorder     Dr Aj, Hx of valve repair      Ventricular tachycardia (H)     nonsustained       Past Surgical History   I have reviewed this patient's surgical history and updated it with pertinent information if needed.  Past Surgical History:   Procedure Laterality Date     ABDOMEN SURGERY      hernia repair right     APPENDECTOMY  as a child     CARDIAC SURGERY      multiple ablations-[facility]     CARDIOVERSION  06/03/2009    successful for afib     CARDIOVERSION  4/20/15    successful for afib     CARDIOVERSION  5/28/15     COLONOSCOPY  01/2018    [facility]     GENITOURINARY SURGERY      undescended testicle     H ABLATION ATRIAL FLUTTER       H ABLATION FOCAL AFIB  7/1/15    Left atrial linear ablation and pulmonary vein antrum ablation     H ABLATION PVC  12/5/16     INCISION AND DRAINAGE LOWER EXTREMITY, COMBINED Right 11/10/2016    Procedure: COMBINED INCISION AND DRAINAGE LOWER EXTREMITY;  Surgeon: Roger Pacheco MD;  Location: [facility] OR     LAPAROSCOPIC CHOLECYSTECTOMY  1/6/2012    Procedure:LAPAROSCOPIC CHOLECYSTECTOMY; LAPAROSCOPIC CHOLECYSTECTOMY ; Surgeon:ROGER PACHECO; Location:[facility] OR     ORTHOPEDIC SURGERY  2004    Left hip replacement     REPAIR VALVE TRICUSPID  9/8/08    conversion to a bileaflet valve and application of a 30 mm Sanderson ring     SMALL INTESTINE SURGERY      had bowel obstruction age 9     THORACIC SURGERY       VALVE REPLACEMENT      tricuspid       Prior to Admission Medications   Prior to Admission Medications   Prescriptions Last Dose Informant Patient Reported? Taking?   acetaminophen (TYLENOL) 325 MG  tablet Past Week at Unknown time  Yes Yes   Sig: Take 325-650 mg by mouth every 6 hours as needed for mild pain   acetaminophen-codeine (TYLENOL #3) 300-30 MG tablet Past Month at Unknown time  Yes Yes   Sig: Take 1-2 tablets by mouth every 4 hours as needed   albuterol (PROAIR HFA/PROVENTIL HFA/VENTOLIN HFA) 108 (90 Base) MCG/ACT inhaler Past Week at Unknown time  Yes Yes   Sig: Inhale 1-2 puffs into the lungs every 6 hours as needed    aspirin 81 MG EC tablet 3/21/2021 at Unknown time  Yes Yes   Sig: Take 81 mg by mouth daily   bumetanide (BUMEX) 1 MG tablet 3/21/2021 at Unknown time  Yes Yes   Sig: Take 1 mg by mouth daily Takes daily at 1400   clonazePAM (KLONOPIN) 1 MG tablet 3/21/2021 at Unknown time  No Yes   Sig: Take 1 tablet (1 mg) by mouth nightly as needed for anxiety   diltiazem ER COATED BEADS (CARDIZEM CD/CARTIA XT) 180 MG 24 hr capsule 3/21/2021 at Unknown time  No Yes   Sig: Take 1 capsule (180 mg) by mouth 2 times daily Hold for SBP < 110 or HR < 60   ipratropium (ATROVENT) 0.02 % neb solution 3/21/2021 at Unknown time  Yes Yes   Sig: Take 0.5 mg by nebulization every 8 hours Alternates with Duo-nebs every 4 hours   ipratropium - albuterol 0.5 mg/2.5 mg/3 mL (DUONEB) 0.5-2.5 (3) MG/3ML neb solution 3/21/2021 at Unknown time  Yes Yes   Sig: Inhale 1 vial into the lungs every 8 hours Alternates with ipratropium (Atrovent) every 4 hours   metoprolol succinate ER (TOPROL XL) 50 MG 24 hr tablet 3/21/2021 at Unknown time  No Yes   Sig: Take 1 tablet (50 mg) by mouth daily   oxyCODONE IR (ROXICODONE) 10 MG tablet Past Month at Unknown time  Yes Yes   Sig: Take 10 mg by mouth every 8 hours as needed for severe pain   predniSONE (DELTASONE) 10 MG tablet 3/19/2021 at 20 mg  Yes No   Sig: Take 10 mg by mouth daily May increase to 20 mg daily if needed   traZODone (DESYREL) 50 MG tablet 3/21/2021 at Unknown time  Yes Yes   Sig: Take 50 mg by mouth At Bedtime       Facility-Administered Medications: None      Allergies   Allergies   Allergen Reactions     Amlodipine Swelling     Flecainide Palpitations and Rash       Social History   I have reviewed this patient's social history and updated it with pertinent information if needed.   Social History     Socioeconomic History     Marital status:      Spouse name: Not on file     Number of children: Not on file     Years of education: Not on file     Highest education level: Not on file   Occupational History     Not on file   Social Needs     Financial resource strain: Not on file     Food insecurity     Worry: Not on file     Inability: Not on file     Transportation needs     Medical: Not on file     Non-medical: Not on file   Tobacco Use     Smoking status: Former Smoker     Packs/day: 1.50     Years: 41.00     Pack years: 61.50     Types: Paan with tobacco, gutka, zarda, khaini     Start date: 1977     Quit date: 2008     Years since quittin.2     Smokeless tobacco: Never Used   Substance and Sexual Activity     Alcohol use: Yes     Alcohol/week: 0.0 standard drinks     Comment: 3 per week     Drug use: No     Sexual activity: Never     Partners: Female     Birth control/protection: None   Lifestyle     Physical activity     Days per week: Not on file     Minutes per session: Not on file     Stress: Not on file   Relationships     Social connections     Talks on phone: Not on file     Gets together: Not on file     Attends Church service: Not on file     Active member of club or organization: Not on file     Attends meetings of clubs or organizations: Not on file     Relationship status: Not on file     Intimate partner violence     Fear of current or ex partner: Not on file     Emotionally abused: Not on file     Physically abused: Not on file     Forced sexual activity: Not on file   Other Topics Concern      Service Not Asked     Blood Transfusions Not Asked     Caffeine Concern No     Comment: 1 a day      Occupational Exposure Not  Asked     Hobby Hazards Not Asked     Sleep Concern Yes     Comment: nocturia     Stress Concern Not Asked     Weight Concern No     Special Diet No     Back Care Not Asked     Exercise No     Bike Helmet Yes     Seat Belt Yes     Self-Exams Not Asked     Parent/sibling w/ CABG, MI or angioplasty before 65F 55M? 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   Family History   Problem Relation Age of Onset     Prostate Cancer Father      Family History Negative Mother      Emphysema Mother      Hypertension Mother      Cerebrovascular Disease Mother        Review of Systems   The 10 point Review of Systems is negative other than noted in the HPI or here.     Physical Exam   Temp: 97.4  F (36.3  C) Temp src: Oral BP: (!) 154/81 Pulse: 73   Resp: 25 SpO2: 93 % O2 Device: None (Room air)    Vital Signs with Ranges  Temp:  [97.4  F (36.3  C)-98.3  F (36.8  C)] 97.4  F (36.3  C)  Pulse:  [] 73  Resp:  [8-30] 25  BP: (103-168)/() 154/81  SpO2:  [91 %-96 %] 93 %  192 lbs 8 oz    GENERAL:  Awake and alert, Comfortable appearing, No acute distress.  PSYCH: Pleasant, Cooperative, appropriate affect, no hallucinations   EYES: EOMI, conjunctiva clear  HEENT:  Head is normocephalic, atraumatic, Neck is Supple, trachea is midline   CARDIOVASCULAR: Regular rate and rhythm, Normal S1, S2, early systolic murmur   PULMONARY:  Decreased air entry bilaterally, no crackles, able to speak in full sentences   GI: Abdomen is soft, non tender, non-distended, normal bowel sounds. No rebound or guarding   SKIN:  Dry, warm to touch. Some areas of skin bruising, presumably from being on anticoagulation   EXTREMITIES:  Good capillary refill with signs of adequate peripheral perfusion. No peripheral  edema   Neuro: Awake and oriented x 3. No focal weakness or numbness is noted. Moving all extremities with good strength, Grossly non-focal.   MSK:   No acute joint synovitis of the major joints is noted.     Data   Data reviewed today:  I personally reviewed.    All lab data and imaging results from today have been reviewed.     Recent Labs   Lab 03/22/21  1021   WBC 11.1*   HGB 16.5   MCV 90         POTASSIUM 4.2   CHLORIDE 102   CO2 33*   BUN 28   CR 1.26*   ANIONGAP 7   WILBERT 9.5   *   ALBUMIN 3.2*   PROTTOTAL 6.9   BILITOTAL 1.0   ALKPHOS 54   ALT 39   AST 25   TROPI <0.015       Recent Results (from the past 24 hour(s))   CT Chest Pulmonary Embolism w Contrast    Narrative    CT CHEST PULMONARY EMBOLISM W CONTRAST 3/22/2021 11:28 AM    CLINICAL HISTORY: PE suspected, low/intermediate prob, positive  D-dimer  TECHNIQUE: CT angiogram chest during arterial phase injection IV  contrast. 2D and 3D MIP reconstructions were performed by the CT  technologist. Dose reduction techniques were used.     CONTRAST: 65mL Isovue-370    COMPARISON: 12/26/2019    FINDINGS:  ANGIOGRAM CHEST: Pulmonary arteries are normal caliber and negative  for pulmonary emboli. Thoracic aorta is negative for dissection. No CT  evidence of right heart strain.    LUNGS AND PLEURA: Moderate to severe centrilobular emphysema. The  central tracheobronchial tree is clear. Mild bronchial wall thickening  in the lung bases, likely secondary to bronchitis. No infiltrate or  pleural effusion. Stable 2 mm right middle lobe nodule (series 7,  image 167 and decreased size of a 3 mm right middle lobe nodule  (series 7, image 170), previously measuring 8 mm. A few punctate  calcified granulomas are unchanged. No new or enlarging nodules.    MEDIASTINUM/AXILLAE: No lymphadenopathy. No thoracic aortic aneurysm.  Moderate coronary artery calcifications. Tricuspid valve prosthesis.  No cardiomegaly. No pericardial effusion.    UPPER ABDOMEN:  Stable subcentimeter hypodense lesion in the central  right hepatic lobe, likely a cyst or hemangioma. Cholecystectomy.  Colonic diverticulosis.    MUSCULOSKELETAL: Median sternotomy. Degenerative changes in the spine.  No suspicious lesions of the bones. Stable mild wedge compression  deformity of the upper endplate of a lower thoracic vertebral body.      Impression    IMPRESSION:  1.  No pulmonary emboli.  2.  Moderate to severe centrilobular emphysema.  3.  No pulmonary infiltrate or pleural effusion.  4.  Mild bronchial wall thickening in the lung bases may be due to  bronchitis.    AYAH ABAD MD

## 2021-03-22 NOTE — ED NOTES
[facility]  ED Nurse Handoff Report    Tone Cooper is a 70 year old male   ED Chief complaint: Shortness of Breath and Hypertension  . ED Diagnosis:   Final diagnoses:   None     Allergies:   Allergies   Allergen Reactions     Amlodipine Swelling     Flecainide Palpitations and Rash       Code Status: Full Code  Activity level - Baseline/Home:  Independent. Activity Level - Current:   Stand by Assist. Lift room needed: No. Bariatric: No   Needed: No   Isolation: No. Infection: Not Applicable.     Vital Signs:   Vitals:    03/22/21 1324 03/22/21 1330 03/22/21 1345 03/22/21 1415   BP: (!) 143/120 139/84 (!) 168/102 (!) 149/85   Pulse: 124 133 129 104   Resp:  11 8 8   Temp:       SpO2:    93%       Cardiac Rhythm:  ,   Cardiac  Cardiac Rhythm: Atrial fibrillation  Pain level:    Patient confused: No. Patient Falls Risk: Yes.   Elimination Status: Has voided   Patient Report - Initial Complaint: Patient presents to the ED stating awoke this morning feeling SOB. History of COPD. States checked BP and SBP was in the 160's. Baseline wears 2L 02 while at home and at night. Goes to work without 02.   Focused Assessment:  Respiratory - Respiratory WDL: .WDL except; rhythm/pattern  Rhythm/Pattern, Respiratory: shortness of breath  Expansion/Accessory Muscles/Retractions: no use of accessory muscles  Breath Sounds: All Fields   Head To Toe Assessment - All Lung Fields Breath Sounds: Posterior:; diminished; wheezes, expiratory   Cardiac - Cardiac WDL: .WDL except; rhythm  Cardiac Rhythm: apical pulse irregular; tachycardic   Chest Pain Assessment - Rating (0-10): 0   Cardiac Monitoring - EKG Monitoring: Yes  Cardiac Rhythm: Atrial fibrillation   Tests Performed: 12 lead EKG, Labs, Chest CT  Abnormal Results:   Abnormal Labs Reviewed   CBC WITH PLATELETS DIFFERENTIAL - Abnormal; Notable for the following components:       Result Value    WBC 11.1 (*)     RBC Count 6.03 (*)     Hematocrit 54.1 (*)      MCHC 30.5 (*)     RDW 15.2 (*)     All other components within normal limits   COMPREHENSIVE METABOLIC PANEL - Abnormal; Notable for the following components:    Carbon Dioxide 33 (*)     Glucose 112 (*)     Creatinine 1.26 (*)     GFR Estimate 58 (*)     Albumin 3.2 (*)     All other components within normal limits   D DIMER QUANTITATIVE - Abnormal; Notable for the following components:    D Dimer 1.0 (*)     All other components within normal limits     CT Chest Pulmonary Embolism w Contrast   Final Result   IMPRESSION:   1.  No pulmonary emboli.   2.  Moderate to severe centrilobular emphysema.   3.  No pulmonary infiltrate or pleural effusion.   4.  Mild bronchial wall thickening in the lung bases may be due to   bronchitis.      AYAH ABAD MD          Treatments provided: Duoneb x2, solumedrol, diltiazem IVP, metoprolol  Family Comments: N/A  OBS brochure/video discussed/provided to patient:  N/A  ED Medications:   Medications   ipratropium - albuterol 0.5 mg/2.5 mg/3 mL (DUONEB) neb solution 6 mL (6 mLs Nebulization Not Given 3/22/21 1325)   ipratropium - albuterol 0.5 mg/2.5 mg/3 mL (DUONEB) neb solution 6 mL (6 mLs Nebulization Given 3/22/21 1039)   methylPREDNISolone sodium succinate (solu-MEDROL) injection 81.25 mg (81.25 mg Intravenous Given 3/22/21 1031)   diltiazem (CARDIZEM) injection 15 mg (15 mg Intravenous Given 3/22/21 1030)   0.9% sodium chloride BOLUS (0 mLs Intravenous Stopped 3/22/21 1311)   CT Saline Flush (84 mLs Intravenous Given 3/22/21 1118)   iopamidol (ISOVUE-370) solution 500 mL (65 mLs Intravenous Given 3/22/21 1118)   ipratropium - albuterol 0.5 mg/2.5 mg/3 mL (DUONEB) neb solution 6 mL (6 mLs Nebulization Given 3/22/21 1246)   metoprolol succinate ER (TOPROL-XL) 24 hr tablet 50 mg (50 mg Oral Given 3/22/21 1324)   doxycycline hyclate (VIBRAMYCIN) capsule 100 mg (100 mg Oral Given 3/22/21 8910)     Drips infusing:  No  For the majority of the shift, the patient's behavior Green.  Interventions performed were N/A.    Sepsis treatment initiated: No     Patient tested for COVID 19 prior to admission: YES    ED Nurse Name/Phone Number: Shabnam Reyes RN,   2:20 PM    RECEIVING UNIT ED HANDOFF REVIEW    Above ED Nurse Handoff Report was reviewed: Yes  Reviewed by: Nayana Valencia RN on March 22, 2021 at 3:38 PM

## 2021-03-23 LAB
ANION GAP SERPL CALCULATED.3IONS-SCNC: 4 MMOL/L (ref 3–14)
BASOPHILS # BLD AUTO: 0 10E9/L (ref 0–0.2)
BASOPHILS NFR BLD AUTO: 0.1 %
BUN SERPL-MCNC: 29 MG/DL (ref 7–30)
CALCIUM SERPL-MCNC: 9.1 MG/DL (ref 8.5–10.1)
CHLORIDE SERPL-SCNC: 103 MMOL/L (ref 94–109)
CO2 SERPL-SCNC: 29 MMOL/L (ref 20–32)
CREAT SERPL-MCNC: 1.05 MG/DL (ref 0.66–1.25)
DIFFERENTIAL METHOD BLD: ABNORMAL
EOSINOPHIL # BLD AUTO: 0 10E9/L (ref 0–0.7)
EOSINOPHIL NFR BLD AUTO: 0 %
ERYTHROCYTE [DISTWIDTH] IN BLOOD BY AUTOMATED COUNT: 14.7 % (ref 10–15)
GFR SERPL CREATININE-BSD FRML MDRD: 72 ML/MIN/{1.73_M2}
GLUCOSE BLDC GLUCOMTR-MCNC: 170 MG/DL (ref 70–99)
GLUCOSE BLDC GLUCOMTR-MCNC: 199 MG/DL (ref 70–99)
GLUCOSE BLDC GLUCOMTR-MCNC: 216 MG/DL (ref 70–99)
GLUCOSE BLDC GLUCOMTR-MCNC: 224 MG/DL (ref 70–99)
GLUCOSE BLDC GLUCOMTR-MCNC: 295 MG/DL (ref 70–99)
GLUCOSE SERPL-MCNC: 189 MG/DL (ref 70–99)
HCT VFR BLD AUTO: 50.8 % (ref 40–53)
HGB BLD-MCNC: 15.4 G/DL (ref 13.3–17.7)
IMM GRANULOCYTES # BLD: 0.1 10E9/L (ref 0–0.4)
IMM GRANULOCYTES NFR BLD: 0.6 %
INTERPRETATION ECG - MUSE: NORMAL
LACTATE BLD-SCNC: 3.2 MMOL/L (ref 0.7–2)
LYMPHOCYTES # BLD AUTO: 0.6 10E9/L (ref 0.8–5.3)
LYMPHOCYTES NFR BLD AUTO: 5.9 %
MAGNESIUM SERPL-MCNC: 2.1 MG/DL (ref 1.6–2.3)
MCH RBC QN AUTO: 26.9 PG (ref 26.5–33)
MCHC RBC AUTO-ENTMCNC: 30.3 G/DL (ref 31.5–36.5)
MCV RBC AUTO: 89 FL (ref 78–100)
MONOCYTES # BLD AUTO: 0.4 10E9/L (ref 0–1.3)
MONOCYTES NFR BLD AUTO: 3.6 %
NEUTROPHILS # BLD AUTO: 9.4 10E9/L (ref 1.6–8.3)
NEUTROPHILS NFR BLD AUTO: 89.8 %
NRBC # BLD AUTO: 0 10*3/UL
NRBC BLD AUTO-RTO: 0 /100
PLATELET # BLD AUTO: 178 10E9/L (ref 150–450)
POTASSIUM SERPL-SCNC: 4.3 MMOL/L (ref 3.4–5.3)
RBC # BLD AUTO: 5.72 10E12/L (ref 4.4–5.9)
SODIUM SERPL-SCNC: 136 MMOL/L (ref 133–144)
WBC # BLD AUTO: 10.4 10E9/L (ref 4–11)

## 2021-03-23 PROCEDURE — 250N000009 HC RX 250: Performed by: INTERNAL MEDICINE

## 2021-03-23 PROCEDURE — 120N000001 HC R&B MED SURG/OB

## 2021-03-23 PROCEDURE — 99232 SBSQ HOSP IP/OBS MODERATE 35: CPT | Performed by: INTERNAL MEDICINE

## 2021-03-23 PROCEDURE — 250N000013 HC RX MED GY IP 250 OP 250 PS 637: Performed by: INTERNAL MEDICINE

## 2021-03-23 PROCEDURE — 999N000156 HC STATISTIC RCP CONSULT EA 30 MIN

## 2021-03-23 PROCEDURE — 999N000157 HC STATISTIC RCP TIME EA 10 MIN

## 2021-03-23 PROCEDURE — 94660 CPAP INITIATION&MGMT: CPT

## 2021-03-23 PROCEDURE — 94640 AIRWAY INHALATION TREATMENT: CPT | Mod: 76

## 2021-03-23 PROCEDURE — 250N000012 HC RX MED GY IP 250 OP 636 PS 637: Performed by: INTERNAL MEDICINE

## 2021-03-23 PROCEDURE — 83605 ASSAY OF LACTIC ACID: CPT | Performed by: INTERNAL MEDICINE

## 2021-03-23 PROCEDURE — 94640 AIRWAY INHALATION TREATMENT: CPT

## 2021-03-23 PROCEDURE — 36415 COLL VENOUS BLD VENIPUNCTURE: CPT | Performed by: INTERNAL MEDICINE

## 2021-03-23 PROCEDURE — 83735 ASSAY OF MAGNESIUM: CPT | Performed by: INTERNAL MEDICINE

## 2021-03-23 PROCEDURE — 250N000011 HC RX IP 250 OP 636: Performed by: INTERNAL MEDICINE

## 2021-03-23 PROCEDURE — 85025 COMPLETE CBC W/AUTO DIFF WBC: CPT | Performed by: INTERNAL MEDICINE

## 2021-03-23 PROCEDURE — 999N001017 HC STATISTIC GLUCOSE BY METER IP

## 2021-03-23 PROCEDURE — 80048 BASIC METABOLIC PNL TOTAL CA: CPT | Performed by: INTERNAL MEDICINE

## 2021-03-23 RX ORDER — ALBUTEROL SULFATE 0.83 MG/ML
2.5 SOLUTION RESPIRATORY (INHALATION)
Status: DISCONTINUED | OUTPATIENT
Start: 2021-03-23 | End: 2021-03-24 | Stop reason: HOSPADM

## 2021-03-23 RX ADMIN — IPRATROPIUM BROMIDE AND ALBUTEROL SULFATE 3 ML: .5; 3 SOLUTION RESPIRATORY (INHALATION) at 19:35

## 2021-03-23 RX ADMIN — DILTIAZEM HYDROCHLORIDE 180 MG: 180 CAPSULE, COATED, EXTENDED RELEASE ORAL at 20:43

## 2021-03-23 RX ADMIN — APIXABAN 5 MG: 5 TABLET, FILM COATED ORAL at 08:28

## 2021-03-23 RX ADMIN — OXYCODONE HYDROCHLORIDE 5 MG: 5 TABLET ORAL at 13:56

## 2021-03-23 RX ADMIN — DOXYCYCLINE HYCLATE 100 MG: 100 CAPSULE ORAL at 08:28

## 2021-03-23 RX ADMIN — DOXYCYCLINE HYCLATE 100 MG: 100 CAPSULE ORAL at 20:43

## 2021-03-23 RX ADMIN — TRAZODONE HYDROCHLORIDE 50 MG: 50 TABLET ORAL at 22:07

## 2021-03-23 RX ADMIN — OXYCODONE HYDROCHLORIDE 5 MG: 5 TABLET ORAL at 20:43

## 2021-03-23 RX ADMIN — IPRATROPIUM BROMIDE AND ALBUTEROL SULFATE 3 ML: .5; 3 SOLUTION RESPIRATORY (INHALATION) at 08:22

## 2021-03-23 RX ADMIN — INSULIN ASPART 1 UNITS: 100 INJECTION, SOLUTION INTRAVENOUS; SUBCUTANEOUS at 10:15

## 2021-03-23 RX ADMIN — METHYLPREDNISOLONE SODIUM SUCCINATE 62.5 MG: 125 INJECTION, POWDER, FOR SOLUTION INTRAMUSCULAR; INTRAVENOUS at 08:28

## 2021-03-23 RX ADMIN — INSULIN ASPART 2 UNITS: 100 INJECTION, SOLUTION INTRAVENOUS; SUBCUTANEOUS at 17:52

## 2021-03-23 RX ADMIN — IPRATROPIUM BROMIDE AND ALBUTEROL SULFATE 3 ML: .5; 3 SOLUTION RESPIRATORY (INHALATION) at 15:45

## 2021-03-23 RX ADMIN — METHYLPREDNISOLONE SODIUM SUCCINATE 62.5 MG: 125 INJECTION, POWDER, FOR SOLUTION INTRAMUSCULAR; INTRAVENOUS at 20:43

## 2021-03-23 RX ADMIN — DILTIAZEM HYDROCHLORIDE 180 MG: 180 CAPSULE, COATED, EXTENDED RELEASE ORAL at 08:28

## 2021-03-23 RX ADMIN — INSULIN ASPART 2 UNITS: 100 INJECTION, SOLUTION INTRAVENOUS; SUBCUTANEOUS at 12:13

## 2021-03-23 RX ADMIN — BUMETANIDE 1 MG: 0.5 TABLET ORAL at 08:28

## 2021-03-23 RX ADMIN — METOPROLOL SUCCINATE 50 MG: 50 TABLET, EXTENDED RELEASE ORAL at 08:29

## 2021-03-23 RX ADMIN — IPRATROPIUM BROMIDE AND ALBUTEROL SULFATE 3 ML: .5; 3 SOLUTION RESPIRATORY (INHALATION) at 11:47

## 2021-03-23 RX ADMIN — INSULIN GLARGINE 8 UNITS: 100 INJECTION, SOLUTION SUBCUTANEOUS at 10:15

## 2021-03-23 RX ADMIN — CLONAZEPAM 1 MG: 1 TABLET ORAL at 22:07

## 2021-03-23 RX ADMIN — ASPIRIN 81 MG: 81 TABLET ORAL at 08:28

## 2021-03-23 RX ADMIN — APIXABAN 5 MG: 5 TABLET, FILM COATED ORAL at 20:43

## 2021-03-23 ASSESSMENT — ACTIVITIES OF DAILY LIVING (ADL)
ADLS_ACUITY_SCORE: 14
ADLS_ACUITY_SCORE: 15
ADLS_ACUITY_SCORE: 14

## 2021-03-23 NOTE — PROGRESS NOTES
I was called for 2.0 to 2.2-second pauses.  No symptoms.  Morning electrolytes are pending.  Defer any changes to rounder.

## 2021-03-23 NOTE — PLAN OF CARE
VSS. SPO2 93-95% RA. LS: expiratory wheezing, Pt on neb treatments.  Infrequent nonproductive cough.Tele: Afib RVR. Denies pain. A&OX4, Up with SBA, Pt uses urinal at bedside. CR 1.26. Hemoglobin A1C 5.8. &241 & sliding scale insulin coverage. Several bruises on BUE. Pt on PO Doxycycline. CiPAP set for Pt to use at night time. Continue monitoring Pt to discharge in 2-3 days once COPD is better.

## 2021-03-23 NOTE — PROGRESS NOTES
[facility]    Medicine Progress Note - Hospitalist Service       Date of Admission:  3/22/2021  Date of Service: 03/23/2021    Assessment & Plan     Tone Cooper is a 70 year old male with a past medical history significant for advanced COPD with multiple hospitalizations for that, MSSA bacteremia, atrial fibrillation with previous ablation, tricuspid valve replacement, hypertension, nonsustained VT, Hx of PE. Patient had multiple hospitalizations in 2019 with concerns for his COPD.  He was eventually able to get nocturnal BiPAP and is subsequently doing much better.  Has been sometime since he has required hospitalization.  Follows with pulmonology as an outpatient.  He is on chronic prednisone.  Patient also uses oxygen intermittently during the daytime.  Reports that he has a portable oxygen for up to 4 L.     On this occasion presents to the hospital with concerns for increasing shortness of breath.  He is also been not able to take his Eliquis and Trelegy due to insurance issues.  In the ER patient was found to be in some respiratory distress with decreased air entry to the lungs.  He was given multiple nebs and steroids with improvement.  He was also in A. fib with RVR and given IV diltiazem and p.o. metoprolol with heart rate improving now.     #Acute COPD exacerbation.  Patient advanced COPD at baseline.  His CT chest was negative for pulmonary embolism.  Shows advanced emphysema and possible bronchitis.  -Continue IV Solu-Medrol twice daily  -Continue doxycycline for the bronchitis  -Scheduled nebulizers  -Patient reports that he is switching to a new insurance and will soon have the t ability to afford his Trelegy inhaler.  Request pharmacy consult to assist with that.  -Continue nocturnal BiPAP     # Atrial fibrillation with rapid ventricular rate.  History of atrial flutter status post previous ablation procedure.  Follows with EP cardiology.  Suspect this is triggered by his  respiratory illness and work of breathing.    -On telemetry  --Resume Apixaban   --Patient was tachycardic in the ER.  He was resumed on his home dose of metoprolol and diltiazem, overnight he had asymptomatic pauses about 2-2.2-second.  Currently heart rate stable in the 50s-60s range.  For now discontinue his routine baseline medications and monitor the heart rate.     # Hx of PE. Apixaban      #History of tricuspid valve replacement  #Chronic right sided heart failure.  Patient is on chronic Bumex which is resumed.  #Steroid-induced hyperglycemia: monitor sugars, add sliding scale insulin   #Chronic low back pain: On oxycodone at baseline      COVID-19 Status. Negative     Diet: Regular Diet Adult    DVT Prophylaxis: Eliquis  Rodney Catheter: not present  Code Status: Full Code      Disposition Plan   Expected discharge: Tomorrow, recommended to prior living arrangement once breathing improved.  Patient reports he is scheduled for his COVID-19 vaccine tomorrow and hopes to leave the hospital so that he can get that.  Entered: Best Tucker MD 03/23/2021, 3:40 PM       The patient's care was discussed with the Bedside Nurse and Patient.    Best Tucker MD  Hospitalist Service  [facility]    ______________________________________________________________________    Interval History   Chart reviewed and patient seen. Case discussed with nursing staff.     Patient feels well, Denies any chest pain, breathing is much better, discussed the issues of sinus pauses overnight, potential need for pacemaker if it exacerbates,    No new complaints voiced otherwise.       Data reviewed today: I reviewed all medications, new labs and imaging results over the last 24 hours. I personally reviewed    Physical Exam   Vital Signs: Temp: 96.4  F (35.8  C) Temp src: Oral BP: 130/63 Pulse: 54   Resp: 20 SpO2: 95 % O2 Device: Nasal cannula Oxygen Delivery: 3 LPM  Weight: 192 lbs 8 oz    GENERAL:  Awake  and alert, No acute distress.  PSYCH: appropriate affect, no acute agitation   HEENT:  Neck is Supple, trachea is midline, EOMI, conjunctiva clear  CARDIOVASCULAR: Irregular rate and rhythm, Normal S1, S2, early systolic murmur   PULMONARY:  Improved air entry, no major wheezing bilaterally. Good air entry on both sides  GI: Abdomen is soft, non tender, non-distended, bowel sounds present. No rebound or guarding   SKIN:  No cyanosis or clubbing, Some areas of skin bruising on arms, presumably from being on anticoagulation   MSK: Extremities are warm and well perfused. No pitting edema   Neuro: Awake and oriented x 3. Moving all extremities with good strength     Data   Recent Labs   Lab 03/23/21  0636 03/22/21  1021   WBC 10.4 11.1*   HGB 15.4 16.5   MCV 89 90    182    142   POTASSIUM 4.3 4.2   CHLORIDE 103 102   CO2 29 33*   BUN 29 28   CR 1.05 1.26*   ANIONGAP 4 7   WILBERT 9.1 9.5   * 112*   ALBUMIN  --  3.2*   PROTTOTAL  --  6.9   BILITOTAL  --  1.0   ALKPHOS  --  54   ALT  --  39   AST  --  25   TROPI  --  <0.015       No results found for this or any previous visit (from the past 24 hour(s)).  Medications     - MEDICATION INSTRUCTIONS -         apixaban ANTICOAGULANT  5 mg Oral BID     aspirin  81 mg Oral Daily     bumetanide  1 mg Oral Daily     diltiazem ER COATED BEADS  180 mg Oral BID     doxycycline hyclate  100 mg Oral Q12H MEGHAN     insulin aspart  1-7 Units Subcutaneous TID AC     insulin aspart  1-5 Units Subcutaneous At Bedtime     insulin glargine  8 Units Subcutaneous Daily     ipratropium - albuterol 0.5 mg/2.5 mg/3 mL  1 vial Inhalation 4x daily     methylPREDNISolone  62.5 mg Intravenous Q12H     metoprolol succinate ER  50 mg Oral Daily     sodium chloride (PF)  3 mL Intracatheter Q8H     traZODone  50 mg Oral At Bedtime

## 2021-03-23 NOTE — PROVIDER NOTIFICATION
MD notified: Tele monitoring: Pt is having pauses 2.1, 2.2. 2.2 and 2.3. Also having bradycardia where heart rate dips to 48 then returns to 50's. B/P 140/81. Asymptomatic.

## 2021-03-23 NOTE — PROGRESS NOTES
Sepsis Evaluation Progress Note    I was called to see Tone Cooper due to abnormal vital signs triggering the Sepsis SIRS screening alert. He is not known to have an infection.     Physical Exam   Vital Signs:  Temp: 97  F (36.1  C) Temp src: Oral BP: 133/79 Pulse: 70   Resp: 24 SpO2: 96 % O2 Device: Nasal cannula Oxygen Delivery: 3 LPM       Data   Lactic Acid   Date Value Ref Range Status   06/28/2019 1.9 0.7 - 2.1 mmol/L Final   02/17/2019 2.5 (H) 0.7 - 2.0 mmol/L Final     Lactate for Sepsis Protocol   Date Value Ref Range Status   03/23/2021 3.2 (H) 0.7 - 2.0 mmol/L Final     Comment:     Significant value called to and read back by  MARY SULTANA (MS3) [id number] 3/23/21 [facility]     07/02/2019 1.7 0.7 - 2.0 mmol/L Final       Assessment & Plan   NO EVIDENCE OF SEPSIS at this time.  Vital sign, physical exam, and lab findings are likely due to Severe COPD, steroids, nebs.    Disposition: The patient will remain on the current unit. We will continue to monitor this patient closely..  Best Tucker MD    Sepsis Criteria   Sepsis: 2+ SIRS criteria due to infection  Severe Sepsis: Sepsis AND 1+ new sign of acute organ dysfunction (Note: lactate >2 or acute encephalopathy each qualify as organ dysfunction)  Septic Shock: Sepsis AND hypotension despite volume resuscitation with 30 ml/kg crystalloid or lactate >=4  Note: HYPOTENSION is defined as 2 BP readings measured 3 hrs apart that have a SBP <90, MAP <65, or decrease >40 mmHg, occurring 6 hrs before or after t-zero

## 2021-03-23 NOTE — PLAN OF CARE
VSS on ra. A/o. Tele: A fib CVR. PVS's. Had 2 episodes of 2 second pauses. Sleeping with Cpap on. LS clear. No wheezing noted. Denies dizziness or sob. Stating feeling really good now. Slept most of the night. Uses a urinal at bed side. SBA with transfers.

## 2021-03-23 NOTE — PLAN OF CARE
A&O. Complaining of a headache, PRN oxycodone. VSS. O2 95% on 3 L. Patient reports that he has a portable oxygen for up to 4 L at home. ERICKSON. Tele: HANNAH Sim CVR

## 2021-03-23 NOTE — PROGRESS NOTES
"[facility] RCAT     Date: 3/23/21    Admission Dx: COPD Exacerbation    Pulmonary History: COPD, Former smoker    Home Nebulizer/MDI Use: Atrovent Q8, Duoneb Q4, Albuterol MDI Q6prn    Home Oxygen: Yes 3-4L NC    Acuity Level (RCAT flow sheet): Level 3    Aerosol Therapy initiated: Duoneb QID and Albuterol Q2prn    Pulmonary Hygiene initiated: Deep breathing and good cough techniques.    Volume Expansion initiated: IS    Current Oxygen Requirements: 3L NC    Current SpO2: 97%    Re-evaluation date: 3/26/21    Patient Education: discuss with patient the indication, benefit and side effect of nebulizer.      See \"RT Assessments\" flow sheet for patient assessment scoring and Acuity Level Details.    Tonya Witt RT, RT  3/23/2021 9:41 AM                   "

## 2021-03-24 ENCOUNTER — APPOINTMENT (OUTPATIENT)
Dept: CARDIOLOGY | Facility: CLINIC | Age: 71
DRG: 191 | End: 2021-03-24
Attending: INTERNAL MEDICINE
Payer: COMMERCIAL

## 2021-03-24 VITALS
HEIGHT: 71 IN | TEMPERATURE: 97.7 F | HEART RATE: 75 BPM | BODY MASS INDEX: 26.95 KG/M2 | SYSTOLIC BLOOD PRESSURE: 146 MMHG | WEIGHT: 192.5 LBS | OXYGEN SATURATION: 96 % | RESPIRATION RATE: 20 BRPM | DIASTOLIC BLOOD PRESSURE: 87 MMHG

## 2021-03-24 LAB
GLUCOSE BLDC GLUCOMTR-MCNC: 160 MG/DL (ref 70–99)
GLUCOSE BLDC GLUCOMTR-MCNC: 183 MG/DL (ref 70–99)
GLUCOSE BLDC GLUCOMTR-MCNC: 265 MG/DL (ref 70–99)

## 2021-03-24 PROCEDURE — 250N000013 HC RX MED GY IP 250 OP 250 PS 637: Performed by: INTERNAL MEDICINE

## 2021-03-24 PROCEDURE — 94640 AIRWAY INHALATION TREATMENT: CPT | Mod: 76

## 2021-03-24 PROCEDURE — 999N001017 HC STATISTIC GLUCOSE BY METER IP

## 2021-03-24 PROCEDURE — 94660 CPAP INITIATION&MGMT: CPT

## 2021-03-24 PROCEDURE — 93248 EXT ECG>7D<15D REV&INTERPJ: CPT | Performed by: INTERNAL MEDICINE

## 2021-03-24 PROCEDURE — 999N000157 HC STATISTIC RCP TIME EA 10 MIN

## 2021-03-24 PROCEDURE — 250N000009 HC RX 250: Performed by: INTERNAL MEDICINE

## 2021-03-24 PROCEDURE — 250N000011 HC RX IP 250 OP 636: Performed by: INTERNAL MEDICINE

## 2021-03-24 PROCEDURE — 94640 AIRWAY INHALATION TREATMENT: CPT

## 2021-03-24 PROCEDURE — 99239 HOSP IP/OBS DSCHRG MGMT >30: CPT | Performed by: INTERNAL MEDICINE

## 2021-03-24 PROCEDURE — 93246 EXT ECG>7D<15D RECORDING: CPT

## 2021-03-24 PROCEDURE — 250N000012 HC RX MED GY IP 250 OP 636 PS 637: Performed by: INTERNAL MEDICINE

## 2021-03-24 RX ORDER — PREDNISONE 10 MG/1
TABLET ORAL
COMMUNITY
Start: 2021-03-24

## 2021-03-24 RX ORDER — DOXYCYCLINE 100 MG/1
100 CAPSULE ORAL 2 TIMES DAILY
Qty: 10 CAPSULE | Refills: 0 | Status: SHIPPED | OUTPATIENT
Start: 2021-03-24 | End: 2021-03-29

## 2021-03-24 RX ADMIN — DILTIAZEM HYDROCHLORIDE 180 MG: 180 CAPSULE, COATED, EXTENDED RELEASE ORAL at 09:12

## 2021-03-24 RX ADMIN — INSULIN GLARGINE 8 UNITS: 100 INJECTION, SOLUTION SUBCUTANEOUS at 09:16

## 2021-03-24 RX ADMIN — METOPROLOL SUCCINATE 50 MG: 50 TABLET, EXTENDED RELEASE ORAL at 09:12

## 2021-03-24 RX ADMIN — APIXABAN 5 MG: 5 TABLET, FILM COATED ORAL at 09:12

## 2021-03-24 RX ADMIN — ASPIRIN 81 MG: 81 TABLET ORAL at 09:12

## 2021-03-24 RX ADMIN — IPRATROPIUM BROMIDE AND ALBUTEROL SULFATE 3 ML: .5; 3 SOLUTION RESPIRATORY (INHALATION) at 11:16

## 2021-03-24 RX ADMIN — METHYLPREDNISOLONE SODIUM SUCCINATE 62.5 MG: 125 INJECTION, POWDER, FOR SOLUTION INTRAMUSCULAR; INTRAVENOUS at 09:13

## 2021-03-24 RX ADMIN — INSULIN ASPART 1 UNITS: 100 INJECTION, SOLUTION INTRAVENOUS; SUBCUTANEOUS at 12:38

## 2021-03-24 RX ADMIN — DOXYCYCLINE HYCLATE 100 MG: 100 CAPSULE ORAL at 09:12

## 2021-03-24 RX ADMIN — INSULIN ASPART 1 UNITS: 100 INJECTION, SOLUTION INTRAVENOUS; SUBCUTANEOUS at 09:13

## 2021-03-24 RX ADMIN — OXYCODONE HYDROCHLORIDE 5 MG: 5 TABLET ORAL at 09:11

## 2021-03-24 RX ADMIN — IPRATROPIUM BROMIDE AND ALBUTEROL SULFATE 3 ML: .5; 3 SOLUTION RESPIRATORY (INHALATION) at 07:32

## 2021-03-24 RX ADMIN — BUMETANIDE 1 MG: 0.5 TABLET ORAL at 09:12

## 2021-03-24 ASSESSMENT — ACTIVITIES OF DAILY LIVING (ADL)
ADLS_ACUITY_SCORE: 14

## 2021-03-24 NOTE — PLAN OF CARE
Aox4. 3L NC, this is his baseline need. Expiratory wheezing noted. Tele afib CVR. Continent. Uses urinal at the bedside. VSS. See flowsheets. Head pain noted 9/10, oxycodone given. R-side has shin scabbing and pt has bruising on upper arms. SL on R. B, 2 units given. Pt IND in the room and refusing alarms. Educated on importance of alarms for safety. Continued to refused. Reminded to use call light.   Pt triggered sepsis this shift. Lactic came back at 3.2. MD notified but no intervention because he is not here because of an infection. Continue to monitor.   Plan is to discharge tomorrow so that he may receive his COVID vaccine. Continue POC,

## 2021-03-24 NOTE — PHARMACY-RX INSURANCE COVERAGE
Coverage check on Eliquis and Trelegy.  Patient currently has Medicare D through [facility] with unmet $396 (of $445 deductible).  Patient is switching to [employer] Medicare D on 4/1/21.    March 2021:    Eliquis + Trelegy = $569.    Patient has already used an Eliquis voucher, but not Xarelto.  Upon receipt of RX, Discharge [facility] could provide 1 mo free of Xarelto.  If this is done, Trelegy alone would cost $446.    Pricing April 2021 and beyond on [employer] plan:     Trelegy Eliquis Total   April $47  $47  $94 + $250 deductible = $344   May-June $47/mo $47/mo $94/mo   July-Dec $159/mo $132/mo $291/mo     Resources for paying for Xarelto or Eliquis:    Both Xarelto and Eliquis have income-based patient assistance programs that would send patient free drug in the mail if he  qualifies (income less than $38,280 (single) or $52,260 () + must have spent 4% of income on prescriptions this calendar year). More information and application can be found at:   Eliquis: http://www.bmspaf.org or by calling 1-652.805.9592.  Xarelto: http://www.Keynoirjpaf.org or by calling 1-679.632.4865.    Xarelto does offer an additional program that patients can take advantage of when they enter the coverage gap, regardless of income level.  [employer] provides Xarelto for $85/mo through the mail. Information can be found at https://www.[employer]/ or by calling 1-628.785.8538.    Resources for paying for Trelegy:    [employer] has an income-based patient assistance programs that would send patient free drug in the mail if he qualifies (income less than $32,200 (single) or $43,550 () + must have spent $600 on prescriptions this calendar year). More information and application can be found at  https://www.L[employer]Lion/medicare-part-d-patient-assistance/ or by calling 1-522.981.4199.    Resources for paying for all drugs:     Social Security offers a subsidy for drug costs.  Eligibility for this is based on both assets and income.  If  approved for the full subsidy, patient would pay $9.20/mo for all covered brand name drugs, and $3.70/mo for all covered generics (no premium, deductible, or coverage gap).  Patient can apply at http://ssa.gov/extrahelp, by calling 1-696.899.8315, or at the Social Security office.    If patient would like help assistance with eligibility and applications for either of these programs, The Senior LinkAge Line is a free service of the [place] [employer] in partnership with [place] Agencies on Aging that assists people of all ages, incomes and insurances with prescription assistance.  They can be reached at [phone].    -EDVIN Santizo, Pharmacy Technician/Liaison, Discharge [facility]acy [phone]

## 2021-03-24 NOTE — PROVIDER NOTIFICATION
MD paged: FYI Pt had another 2.4 sec pause (frequent now). VS stable, pt denies pain, remains afib cvr     Thank you

## 2021-03-24 NOTE — PLAN OF CARE
"Pt A&Ox4, Independent (refused alarms), denies pain, uses urinal @bedside. 3L O2, LS Dim, Tele Afib CVR, , PIV (R) forearm saline locked. Skin WDL except some bruising on hands. Pt had 2.4 sec pauses overnight; MD paged (Cont. Monitor pt Tele)   /72   Pulse 61   Temp 98.5  F (36.9  C) (Oral)   Resp 20   Ht 1.803 m (5' 11\")   Wt 87.3 kg (192 lb 8 oz)   SpO2 97%   BMI 26.85 kg/m     "

## 2021-03-24 NOTE — PROGRESS NOTES
AVS reviewd with pt. All questions answered. Pt denies any further questions or concerns. PIV removed, no complications. Telemetry monitor removed. All belongings returned. Discharge meds sent with pt. Pt escorted to front by [place] staff.

## 2021-03-24 NOTE — PROGRESS NOTES
Pt wore hosp BiPAP overnight.  S/T mode, IPAP 10, EPAP 5, 3 lpm bleed-in O2  RT to monitor and assess as needed.    Nevaeh Burton, RRT

## 2021-03-24 NOTE — PLAN OF CARE
Vitals: Temp: 97.7  F (36.5  C) Temp src: Oral BP: (!) 146/87 Pulse: 75   Resp: 20 SpO2: 96 % O2 Device: Nasal cannula Oxygen Delivery: 3 LPM   PRN oxycodone x1 for headache; effective per pt.   Neuro: Ao4, able to make needs known  Respiratory: Ls dim, exp whz, [place]  Cardiac/tele: tele afib CVR. Denies CP  GI/: WDL; uses urinal @ bedisde  Skin: WDL ex bruises  LDAs: PIV to right SL  Labs: ,   Diet: Regular  Activity: SBA; refuses bed alarms, education provided.   Plan: Possible discharge today. Will continue POC.

## 2021-03-24 NOTE — PROGRESS NOTES
Pt currently on 3 lpm nasal cannula   BS clear.  Duo neb given as scheduled this shift, no change after treatment.  BiPAP at bedside ready for use.  IPAP 10, EPAP 5, R 8, with 3 lpm bleed-in O2.  RT to monitor and assess as needed.    Nevaeh Burton, RRT

## 2021-03-24 NOTE — DISCHARGE SUMMARY
[facility]  Hospitalist Discharge Summary       Date of Admission:  3/22/2021  Date of Discharge:  3/24/2021  2:48 PM  Discharging Provider: Best Tucker MD      Discharge Diagnoses   # Acute COPD exacerbation   # Atrial Fibrillation with RVR  # Chronic hypoxic respiratory failure, on home oxygen and BiPAP  # Sinus pauses  # History of PE  # S/p Tricuspid valve repair  # Chronic right sided CHFpEF  # Chronic back pain     Follow-ups Needed After Discharge   Follow-up Appointments     Follow-up and recommended labs and tests       Follow up with primary care provider, Ana Pratt, within 7 days   for hospital follow- up.      Wear the heart monitor at home as advised and follow up with your   cardiologist in 2-4 weeks to discuss results    Stop the baby aspirin once your start taking the Apixaban (Eliquis) blood   thinner             Unresulted Labs Ordered in the Past 30 Days of this Admission     No orders found from 2/20/2021 to 3/23/2021.          Hospital Course     Tone Cooper is a 70 year old male with a past medical history significant for advanced COPD with multiple hospitalizations for that, MSSA bacteremia, atrial fibrillation with previous ablation, tricuspid valve replacement, hypertension, nonsustained VT, Hx of PE. Patient had multiple hospitalizations in 2019 with concerns for his COPD.  He was eventually able to get nocturnal BiPAP and is subsequently doing much better.  Has been sometime since he has required hospitalization.  Follows with pulmonology as an outpatient.  He is on chronic prednisone.  Patient also uses oxygen intermittently during the daytime.  Reports that he has a portable oxygen for up to 4 L.      On this occasion presents to the hospital with concerns for increasing shortness of breath.  He is also been not able to take his Eliquis and Trelegy due to insurance issues.  In the ER patient was found to be in some respiratory distress with  decreased air entry to the lungs.  He was given multiple nebs and steroids with improvement.  He was also in A. fib with RVR and given IV diltiazem and p.o. metoprolol with heart rate improving now.    Overall, patient is improved in the hospital stay with treatment of his COPD.  Suspect his A. fib with RVR was triggered by the respiratory distress and also doing much better now.  He reports that he is due for his COVID-19 vaccine today and requesting to leave the hospital.  This seems reasonable.  Also noticed to have some sinus pauses and will discharge with a cardiac monitor.  Stable for discharge with close outpatient follow-up.    Detailed discussion of hospital issues as follows     #Acute COPD exacerbation.  Patient advanced COPD at baseline, on home oxygen and BiPAP.  His CT chest was negative for pulmonary embolism.  Shows advanced emphysema and possible bronchitis.  Patient was treated with IV Solu-Medrol and also received doxycycline from bronchitis.    He has advanced COPD and then has not been able to afford his trilogy inhaler which exacerbated his breathing. Patient reports that he is switching to a new insurance and will soon have the ability to afford his Trelegy inhaler.  Continue nocturnal BiPAP    At this point he is doing much better.  His breathing is much improved.  He feels closer to his baseline.  He wants to leave the hospital today so that he can get his COVID-19 vaccine.  Seems reasonable.  Will discharge with oral steroids and antibiotics.  He already has prednisone at home and was given instructions on the taper.     # Atrial fibrillation with rapid ventricular rate.  History of atrial flutter status post previous ablation procedure.  Follows with EP cardiology.  Suspect this is triggered by his respiratory illness and work of breathing.  Patient has not been able to take his Eliquis although again he reports that he will be able to get it soon with a change in his insurance    Patient  was tachycardic in the ER.  He was resumed on his home dose of metoprolol and diltiazem, overnight he had asymptomatic pauses about 2-2.4-second.  Currently heart rate stable in the 50s-60s range.    At this point we will continue him on his baseline rate control medications.  Will discharge home with a Zio patch and outpatient follow-up with cardiology.     # Hx of PE.  Continue Eliquis.  Given a new prescription.  Also on it for his A. fib.     #History of tricuspid valve replacement  #Chronic right sided heart failure.  Patient is on Bumex  #Chronic low back pain: On oxycodone at baseline     Consultations This Hospital Stay   PHARMACY LIAISON FOR MEDICATION COVERAGE CONSULT    Code Status   Full Code    Time Spent on this Encounter   I, Best Tucker MD, personally saw the patient today and spent greater than 30 minutes discharging this patient.       Best Tucker MD  [facility]  ______________________________________________________________________    Physical Exam   Vital Signs: Temp: 97.7  F (36.5  C) Temp src: Oral BP: (!) 146/87 Pulse: 75   Resp: 20 SpO2: 96 % O2 Device: Nasal cannula Oxygen Delivery: 3 LPM  Weight: 192 lbs 8 oz  Patient is awake and alert, no acute distress  Lungs are clear to auscultation with much improvement in aeration, only minimal wheeze       Primary Care Physician   Ana Pratt    Discharge Disposition   Discharged to home  Condition at discharge: Stable        Discharge Orders      Reason for your hospital stay    COPD exacerbation     Activity    Your activity upon discharge: activity as tolerated     Follow-up and recommended labs and tests     Follow up with primary care provider, Ana Pratt, within 7 days for hospital follow- up.      Wear the heart monitor at home as advised and follow up with your cardiologist in 2-4 weeks to discuss results    Stop the baby aspirin once your start taking the Apixaban (Eliquis) blood  thinner     Diet    Follow this diet upon discharge: As before, low salt and diabetic diet     Discharge Medications   Current Discharge Medication List      START taking these medications    Details   apixaban ANTICOAGULANT (ELIQUIS) 5 MG tablet Take 1 tablet (5 mg) by mouth 2 times daily  Qty: 30 tablet, Refills: 1    Comments: Future refills by PCP Dr. Ana Pratt with phone number [phone].  Associated Diagnoses: Atrial fibrillation with RVR (H)      doxycycline hyclate (VIBRAMYCIN) 100 MG capsule Take 1 capsule (100 mg) by mouth 2 times daily for 5 days  Qty: 10 capsule, Refills: 0    Associated Diagnoses: COPD exacerbation (H)      Fluticasone-Umeclidin-Vilanterol (TRELEGY ELLIPTA) 100-62.5-25 MCG/INH oral inhaler Inhale 1 puff into the lungs daily  Qty: 1 each, Refills: 1    Comments: Future refills by PCP Dr. Ana Pratt with phone number [phone].  Associated Diagnoses: COPD exacerbation (H)         CONTINUE these medications which have NOT CHANGED    Details   acetaminophen (TYLENOL) 325 MG tablet Take 325-650 mg by mouth every 6 hours as needed for mild pain      acetaminophen-codeine (TYLENOL #3) 300-30 MG tablet Take 1-2 tablets by mouth every 4 hours as needed      albuterol (PROAIR HFA/PROVENTIL HFA/VENTOLIN HFA) 108 (90 Base) MCG/ACT inhaler Inhale 1-2 puffs into the lungs every 6 hours as needed     Comments: Pharmacy may dispense brand covered by insurance (Proair, or proventil or ventolin or generic albuterol inhaler)      aspirin 81 MG EC tablet Take 81 mg by mouth daily      bumetanide (BUMEX) 1 MG tablet Take 1 mg by mouth daily Takes daily at 1400      clonazePAM (KLONOPIN) 1 MG tablet Take 1 tablet (1 mg) by mouth nightly as needed for anxiety    Associated Diagnoses: Insomnia, unspecified type      diltiazem ER COATED BEADS (CARDIZEM CD/CARTIA XT) 180 MG 24 hr capsule Take 1 capsule (180 mg) by mouth 2 times daily Hold for SBP < 110 or HR < 60    Associated  Diagnoses: Atrial fibrillation with rapid ventricular response (H)      ipratropium (ATROVENT) 0.02 % neb solution Take 0.5 mg by nebulization every 8 hours Alternates with Duo-nebs every 4 hours      ipratropium - albuterol 0.5 mg/2.5 mg/3 mL (DUONEB) 0.5-2.5 (3) MG/3ML neb solution Inhale 1 vial into the lungs every 8 hours Alternates with ipratropium (Atrovent) every 4 hours      metoprolol succinate ER (TOPROL XL) 50 MG 24 hr tablet Take 1 tablet (50 mg) by mouth daily  Qty: 90 tablet, Refills: 3    Associated Diagnoses: Paroxysmal atrial fibrillation (H)      oxyCODONE IR (ROXICODONE) 10 MG tablet Take 10 mg by mouth every 8 hours as needed for severe pain      predniSONE (DELTASONE) 10 MG tablet Take 60 mg daily daily x 3 days, then 40 mg daily x 3 days, then 20 mg daily x 3 days, then continue 10 mg daily until your follow-up      traZODone (DESYREL) 50 MG tablet Take 50 mg by mouth At Bedtime            Allergies   Allergies   Allergen Reactions     Amlodipine Swelling     Flecainide Palpitations and Rash       Significant Results and Procedures   Results for orders placed or performed during the hospital encounter of 03/22/21   CT Chest Pulmonary Embolism w Contrast    Narrative    CT CHEST PULMONARY EMBOLISM W CONTRAST 3/22/2021 11:28 AM    CLINICAL HISTORY: PE suspected, low/intermediate prob, positive  D-dimer  TECHNIQUE: CT angiogram chest during arterial phase injection IV  contrast. 2D and 3D MIP reconstructions were performed by the CT  technologist. Dose reduction techniques were used.     CONTRAST: 65mL Isovue-370    COMPARISON: 12/26/2019    FINDINGS:  ANGIOGRAM CHEST: Pulmonary arteries are normal caliber and negative  for pulmonary emboli. Thoracic aorta is negative for dissection. No CT  evidence of right heart strain.    LUNGS AND PLEURA: Moderate to severe centrilobular emphysema. The  central tracheobronchial tree is clear. Mild bronchial wall thickening  in the lung bases, likely secondary  to bronchitis. No infiltrate or  pleural effusion. Stable 2 mm right middle lobe nodule (series 7,  image 167 and decreased size of a 3 mm right middle lobe nodule  (series 7, image 170), previously measuring 8 mm. A few punctate  calcified granulomas are unchanged. No new or enlarging nodules.    MEDIASTINUM/AXILLAE: No lymphadenopathy. No thoracic aortic aneurysm.  Moderate coronary artery calcifications. Tricuspid valve prosthesis.  No cardiomegaly. No pericardial effusion.    UPPER ABDOMEN: Stable subcentimeter hypodense lesion in the central  right hepatic lobe, likely a cyst or hemangioma. Cholecystectomy.  Colonic diverticulosis.    MUSCULOSKELETAL: Median sternotomy. Degenerative changes in the spine.  No suspicious lesions of the bones. Stable mild wedge compression  deformity of the upper endplate of a lower thoracic vertebral body.      Impression    IMPRESSION:  1.  No pulmonary emboli.  2.  Moderate to severe centrilobular emphysema.  3.  No pulmonary infiltrate or pleural effusion.  4.  Mild bronchial wall thickening in the lung bases may be due to  bronchitis.    AYAH ABAD MD

## 2021-04-02 ENCOUNTER — OFFICE VISIT (OUTPATIENT)
Dept: CARDIOLOGY | Facility: CLINIC | Age: 71
End: 2021-04-02
Attending: INTERNAL MEDICINE
Payer: COMMERCIAL

## 2021-04-02 VITALS
OXYGEN SATURATION: 96 % | BODY MASS INDEX: 27.17 KG/M2 | HEART RATE: 68 BPM | HEIGHT: 71 IN | SYSTOLIC BLOOD PRESSURE: 131 MMHG | DIASTOLIC BLOOD PRESSURE: 84 MMHG | WEIGHT: 194.1 LBS

## 2021-04-02 DIAGNOSIS — I48.0 PAROXYSMAL ATRIAL FIBRILLATION (H): ICD-10-CM

## 2021-04-02 PROCEDURE — 99213 OFFICE O/P EST LOW 20 MIN: CPT | Performed by: INTERNAL MEDICINE

## 2021-04-02 PROCEDURE — 93000 ELECTROCARDIOGRAM COMPLETE: CPT | Performed by: INTERNAL MEDICINE

## 2021-04-02 ASSESSMENT — MIFFLIN-ST. JEOR: SCORE: 1667.56

## 2021-04-02 NOTE — LETTER
4/2/2021    Ana Pratt MD  [facility] [address] [facility] [address]    RE: Tone Cooper       Dear Colleague,    I had the pleasure of seeing Tone Cooper in the [facility].    HPI and Plan:   See dictation    Orders Placed This Encounter   Procedures     Follow-Up with Electrophysiologist     EKG 12-lead complete w/read - Clinics (performed today)     EKG 12-lead complete w/read (Future)- to be scheduled       No orders of the defined types were placed in this encounter.      Medications Discontinued During This Encounter   Medication Reason     aspirin 81 MG EC tablet          Encounter Diagnosis   Name Primary?     persistent atrial fi        CURRENT MEDICATIONS:  Current Outpatient Medications   Medication Sig Dispense Refill     acetaminophen (TYLENOL) 325 MG tablet Take 325-650 mg by mouth every 6 hours as needed for mild pain       acetaminophen-codeine (TYLENOL #3) 300-30 MG tablet Take 1-2 tablets by mouth every 4 hours as needed       albuterol (PROAIR HFA/PROVENTIL HFA/VENTOLIN HFA) 108 (90 Base) MCG/ACT inhaler Inhale 1-2 puffs into the lungs every 6 hours as needed        apixaban ANTICOAGULANT (ELIQUIS) 5 MG tablet Take 1 tablet (5 mg) by mouth 2 times daily 30 tablet 1     bumetanide (BUMEX) 1 MG tablet Take 1 mg by mouth daily Takes daily at 1400       clonazePAM (KLONOPIN) 1 MG tablet Take 1 tablet (1 mg) by mouth nightly as needed for anxiety       diltiazem ER COATED BEADS (CARDIZEM CD/CARTIA XT) 180 MG 24 hr capsule Take 1 capsule (180 mg) by mouth 2 times daily Hold for SBP < 110 or HR < 60       Fluticasone-Umeclidin-Vilanterol (TRELEGY ELLIPTA) 100-62.5-25 MCG/INH oral inhaler Inhale 1 puff into the lungs daily 1 each 1     ipratropium (ATROVENT) 0.02 % neb solution Take 0.5 mg by nebulization every 8 hours Alternates with Duo-nebs every 4 hours       ipratropium - albuterol 0.5 mg/2.5 mg/3 mL (DUONEB)  0.5-2.5 (3) MG/3ML neb solution Inhale 1 vial into the lungs every 8 hours Alternates with ipratropium (Atrovent) every 4 hours       metoprolol succinate ER (TOPROL XL) 50 MG 24 hr tablet Take 1 tablet (50 mg) by mouth daily 90 tablet 3     oxyCODONE IR (ROXICODONE) 10 MG tablet Take 10 mg by mouth every 8 hours as needed for severe pain       predniSONE (DELTASONE) 10 MG tablet Take 60 mg daily daily x 3 days, then 40 mg daily x 3 days, then 20 mg daily x 3 days, then continue 10 mg daily until your follow-up       traZODone (DESYREL) 50 MG tablet Take 50 mg by mouth At Bedtime          ALLERGIES     Allergies   Allergen Reactions     Amlodipine Swelling     Flecainide Palpitations and Rash       PAST MEDICAL HISTORY:  Past Medical History:   Diagnosis Date     Atrial flutter (H)      Cancer (H)     basal cell-nose     COPD (chronic obstructive pulmonary disease) (H)      Diabetes mellitus, type 2 (H) 12/4/2020     Diverticulitis      Hepatitis 2011    Hepatitis C     Hypertension      Persistent atrial fibrillation (H) 4/28/09    ablation 7/1/2015     Premature beats      PVC (premature ventricular contraction)     s/p ablation 12/5/2017     Tricuspid valve disorder     Dr Aj, Hx of valve repair      Ventricular tachycardia (H)     nonsustained       PAST SURGICAL HISTORY:  Past Surgical History:   Procedure Laterality Date     ABDOMEN SURGERY      hernia repair right     APPENDECTOMY  as a child     CARDIAC SURGERY      [facility] ablations-[facility]     CARDIOVERSION  06/03/2009    successful for afib     CARDIOVERSION  4/20/15    successful for afib     CARDIOVERSION  5/28/15     COLONOSCOPY  01/2018    [facility]     GENITOURINARY SURGERY      undescended testicle     H ABLATION ATRIAL FLUTTER       H ABLATION FOCAL AFIB  7/1/15    Left atrial linear ablation and pulmonary vein antrum ablation     H ABLATION PVC  12/5/16     INCISION AND DRAINAGE LOWER EXTREMITY, COMBINED Right 11/10/2016    Procedure:  COMBINED INCISION AND DRAINAGE LOWER EXTREMITY;  Surgeon: Roger Pacheco MD;  Location: [facility] OR     LAPAROSCOPIC CHOLECYSTECTOMY  2012    Procedure:LAPAROSCOPIC CHOLECYSTECTOMY; LAPAROSCOPIC CHOLECYSTECTOMY ; Surgeon:ROGER PACHECO; Location:[facility] OR     ORTHOPEDIC SURGERY      Left hip replacement     REPAIR VALVE TRICUSPID  08    conversion to a bileaflet valve and application of a 30 mm Sanderson ring     SMALL INTESTINE SURGERY      had bowel obstruction age 9     THORACIC SURGERY       VALVE REPLACEMENT      tricuspid       FAMILY HISTORY:  Family History   Problem Relation Age of Onset     Prostate Cancer Father      Family History Negative Mother      Emphysema Mother      Hypertension Mother      Cerebrovascular Disease Mother        SOCIAL HISTORY:  Social History     Socioeconomic History     Marital status:      Spouse name: None     Number of children: None     Years of education: None     Highest education level: None   Occupational History     None   Social Needs     Financial resource strain: None     Food insecurity     Worry: None     Inability: None     Transportation needs     Medical: None     Non-medical: None   Tobacco Use     Smoking status: Former Smoker     Packs/day: 1.50     Years: 41.00     Pack years: 61.50     Types: Paan with tobacco, gutka, zarda, khaini     Start date: 1977     Quit date: 2008     Years since quittin.2     Smokeless tobacco: Never Used   Substance and Sexual Activity     Alcohol use: Yes     Alcohol/week: 0.0 standard drinks     Comment: 3 per week     Drug use: No     Sexual activity: Never     Partners: Female     Birth control/protection: None   Lifestyle     Physical activity     Days per week: None     Minutes per session: None     Stress: None   Relationships     Social connections     Talks on phone: None     Gets together: None     Attends Buddhism service: None     Active member of club or organization: None      "Attends meetings of clubs or organizations: None     Relationship status: None     Intimate partner violence     Fear of current or ex partner: None     Emotionally abused: None     Physically abused: None     Forced sexual activity: None   Other Topics Concern      Service Not Asked     Blood Transfusions Not Asked     Caffeine Concern No     Comment: 1 a day      Occupational Exposure Not Asked     Hobby Hazards Not Asked     Sleep Concern Yes     Comment: nocturia     Stress Concern Not Asked     Weight Concern No     Special Diet No     Back Care Not Asked     Exercise No     Bike Helmet Yes     Seat Belt Yes     Self-Exams Not Asked     Parent/sibling w/ CABG, MI or angioplasty before 65F 55M? No   Social History Narrative    Works in [employer])    Is an  for the [employer] in [place], in between sets and entertain       Review of Systems:  Skin:  Positive for bruising hx skin cancer   Eyes:  Positive for glasses;visual blurring    ENT:  Positive for nasal congestion;hearing loss hearing aids  Respiratory:  Positive for shortness of breath;dyspnea on exertion;cough;sleep apnea;wheezing bipap; productive cough   Cardiovascular:    Positive for;lightheadedness;dizziness;edema;fatigue;palpitations chest pain with nebulizer  Gastroenterology: Positive for poor appetite hx Cdiff  Genitourinary:  Positive for urinary frequency    Musculoskeletal:  Negative      Neurologic:  Positive for numbness or tingling of feet;numbness or tingling of hands right hand thumb and 1st 2 fingers and bottom of both foot  Psychiatric:  Negative      Heme/Lymph/Imm:  Positive for easy bruising;allergies RX  Endocrine:  Negative        Physical Exam:  Vitals: /84 (BP Location: Left arm, Patient Position: Sitting, Cuff Size: Adult Large)   Pulse 68   Ht 1.803 m (5' 11\")   Wt 88 kg (194 lb 1.6 oz)   SpO2 96%   BMI 27.07 kg/m      Constitutional:  cooperative, alert and oriented, " well developed, well nourished, in no acute distress        Skin:  warm and dry to the touch, no apparent skin lesions or masses noted          Head:  normocephalic, no masses or lesions        Eyes:  no xanthalasma;EOMS intact;sclera white;conjunctivae and lids unremarkable        Lymph:      ENT:  no pallor or cyanosis, dentition good        Neck:  JVP normal;no carotid bruit        Respiratory:  clear to auscultation diminished breath sounds bilaterally       Cardiac: normal S1 and S2;regular rhythm;no murmurs, gallops or rubs detected occasional premature beats              pulses full and equal                               right femoral bruit (-) left subclavian bruit (-)      GI:  abdomen soft        Extremities and Muscular Skeletal:  no deformities, clubbing, cyanosis, erythema observed;no edema              Neurological:           Psych:         Thank you for allowing me to participate in the care of your patient.      Sincerely,     Sussy Britt MD     [facility]    cc:   Sussy Britt MD  [address]  [address] [place] [id number]

## 2021-04-02 NOTE — PROGRESS NOTES
HPI and Plan:   See dictation    Orders Placed This Encounter   Procedures     Follow-Up with Electrophysiologist     EKG 12-lead complete w/read - Clinics (performed today)     EKG 12-lead complete w/read (Future)- to be scheduled       No orders of the defined types were placed in this encounter.      Medications Discontinued During This Encounter   Medication Reason     aspirin 81 MG EC tablet          Encounter Diagnosis   Name Primary?     persistent atrial fi        CURRENT MEDICATIONS:  Current Outpatient Medications   Medication Sig Dispense Refill     acetaminophen (TYLENOL) 325 MG tablet Take 325-650 mg by mouth every 6 hours as needed for mild pain       acetaminophen-codeine (TYLENOL #3) 300-30 MG tablet Take 1-2 tablets by mouth every 4 hours as needed       albuterol (PROAIR HFA/PROVENTIL HFA/VENTOLIN HFA) 108 (90 Base) MCG/ACT inhaler Inhale 1-2 puffs into the lungs every 6 hours as needed        apixaban ANTICOAGULANT (ELIQUIS) 5 MG tablet Take 1 tablet (5 mg) by mouth 2 times daily 30 tablet 1     bumetanide (BUMEX) 1 MG tablet Take 1 mg by mouth daily Takes daily at 1400       clonazePAM (KLONOPIN) 1 MG tablet Take 1 tablet (1 mg) by mouth nightly as needed for anxiety       diltiazem ER COATED BEADS (CARDIZEM CD/CARTIA XT) 180 MG 24 hr capsule Take 1 capsule (180 mg) by mouth 2 times daily Hold for SBP < 110 or HR < 60       Fluticasone-Umeclidin-Vilanterol (TRELEGY ELLIPTA) 100-62.5-25 MCG/INH oral inhaler Inhale 1 puff into the lungs daily 1 each 1     ipratropium (ATROVENT) 0.02 % neb solution Take 0.5 mg by nebulization every 8 hours Alternates with Duo-nebs every 4 hours       ipratropium - albuterol 0.5 mg/2.5 mg/3 mL (DUONEB) 0.5-2.5 (3) MG/3ML neb solution Inhale 1 vial into the lungs every 8 hours Alternates with ipratropium (Atrovent) every 4 hours       metoprolol succinate ER (TOPROL XL) 50 MG 24 hr tablet Take 1 tablet (50 mg) by mouth daily 90 tablet 3     oxyCODONE IR (ROXICODONE)  10 MG tablet Take 10 mg by mouth every 8 hours as needed for severe pain       predniSONE (DELTASONE) 10 MG tablet Take 60 mg daily daily x 3 days, then 40 mg daily x 3 days, then 20 mg daily x 3 days, then continue 10 mg daily until your follow-up       traZODone (DESYREL) 50 MG tablet Take 50 mg by mouth At Bedtime          ALLERGIES     Allergies   Allergen Reactions     Amlodipine Swelling     Flecainide Palpitations and Rash       PAST MEDICAL HISTORY:  Past Medical History:   Diagnosis Date     Atrial flutter (H)      Cancer (H)     basal cell-nose     COPD (chronic obstructive pulmonary disease) (H)      Diabetes mellitus, type 2 (H) 12/4/2020     Diverticulitis      Hepatitis 2011    Hepatitis C     Hypertension      Persistent atrial fibrillation (H) 4/28/09    ablation 7/1/2015     Premature beats      PVC (premature ventricular contraction)     s/p ablation 12/5/2017     Tricuspid valve disorder     Dr Aj, Hx of valve repair      Ventricular tachycardia (H)     nonsustained       PAST SURGICAL HISTORY:  Past Surgical History:   Procedure Laterality Date     ABDOMEN SURGERY      hernia repair right     APPENDECTOMY  as a child     CARDIAC SURGERY      [place] ablations[place]     CARDIOVERSION  06/03/2009    successful for afib     CARDIOVERSION  4/20/15    successful for afib     CARDIOVERSION  5/28/15     COLONOSCOPY  01/2018    [facility]     GENITOURINARY SURGERY      undescended testicle     H ABLATION ATRIAL FLUTTER       H ABLATION FOCAL AFIB  7/1/15    Left atrial linear ablation and pulmonary vein antrum ablation     H ABLATION PVC  12/5/16     INCISION AND DRAINAGE LOWER EXTREMITY, COMBINED Right 11/10/2016    Procedure: COMBINED INCISION AND DRAINAGE LOWER EXTREMITY;  Surgeon: Roger Pacheco MD;  Location:  OR     LAPAROSCOPIC CHOLECYSTECTOMY  1/6/2012    Procedure:LAPAROSCOPIC CHOLECYSTECTOMY; LAPAROSCOPIC CHOLECYSTECTOMY ; Surgeon:ROGER PACHECO; Location: OR      ORTHOPEDIC SURGERY  2004    Left hip replacement     REPAIR VALVE TRICUSPID  08    conversion to a bileaflet valve and application of a 30 mm Sanderson ring     SMALL INTESTINE SURGERY      had bowel obstruction age 9     THORACIC SURGERY       VALVE REPLACEMENT      tricuspid       FAMILY HISTORY:  Family History   Problem Relation Age of Onset     Prostate Cancer Father      Family History Negative Mother      Emphysema Mother      Hypertension Mother      Cerebrovascular Disease Mother        SOCIAL HISTORY:  Social History     Socioeconomic History     Marital status:      Spouse name: None     Number of children: None     Years of education: None     Highest education level: None   Occupational History     None   Social Needs     Financial resource strain: None     Food insecurity     Worry: None     Inability: None     Transportation needs     Medical: None     Non-medical: None   Tobacco Use     Smoking status: Former Smoker     Packs/day: 1.50     Years: 41.00     Pack years: 61.50     Types: Paan with tobacco, gutka, zarda, khaini     Start date: 1977     Quit date: 2008     Years since quittin.2     Smokeless tobacco: Never Used   Substance and Sexual Activity     Alcohol use: Yes     Alcohol/week: 0.0 standard drinks     Comment: 3 per week     Drug use: No     Sexual activity: Never     Partners: Female     Birth control/protection: None   Lifestyle     Physical activity     Days per week: None     Minutes per session: None     Stress: None   Relationships     Social connections     Talks on phone: None     Gets together: None     Attends Church service: None     Active member of club or organization: None     Attends meetings of clubs or organizations: None     Relationship status: None     Intimate partner violence     Fear of current or ex partner: None     Emotionally abused: None     Physically abused: None     Forced sexual activity: None   Other Topics Concern      " Service Not Asked     Blood Transfusions Not Asked     Caffeine Concern No     Comment: 1 a day      Occupational Exposure Not Asked     Hobby Hazards Not Asked     Sleep Concern Yes     Comment: nocturia     Stress Concern Not Asked     Weight Concern No     Special Diet No     Back Care Not Asked     Exercise No     Bike Helmet Yes     Seat Belt Yes     Self-Exams Not Asked     Parent/sibling w/ CABG, MI or angioplasty before 65F 55M? No   Social History Narrative    Works in [employer] ([employer] work)    Is an  for the [employer] in [place], in between sets and entertain       Review of Systems:  Skin:  Positive for bruising hx skin cancer   Eyes:  Positive for glasses;visual blurring    ENT:  Positive for nasal congestion;hearing loss hearing aids  Respiratory:  Positive for shortness of breath;dyspnea on exertion;cough;sleep apnea;wheezing bipap; productive cough   Cardiovascular:    Positive for;lightheadedness;dizziness;edema;fatigue;palpitations chest pain with nebulizer  Gastroenterology: Positive for poor appetite hx Cdiff  Genitourinary:  Positive for urinary frequency    Musculoskeletal:  Negative      Neurologic:  Positive for numbness or tingling of feet;numbness or tingling of hands right hand thumb and 1st 2 fingers and bottom of both foot  Psychiatric:  Negative      Heme/Lymph/Imm:  Positive for easy bruising;allergies RX  Endocrine:  Negative        Physical Exam:  Vitals: /84 (BP Location: Left arm, Patient Position: Sitting, Cuff Size: Adult Large)   Pulse 68   Ht 1.803 m (5' 11\")   Wt 88 kg (194 lb 1.6 oz)   SpO2 96%   BMI 27.07 kg/m      Constitutional:  cooperative, alert and oriented, well developed, well nourished, in no acute distress        Skin:  warm and dry to the touch, no apparent skin lesions or masses noted          Head:  normocephalic, no masses or lesions        Eyes:  no xanthalasma;EOMS intact;sclera white;conjunctivae and lids " unremarkable        Lymph:      ENT:  no pallor or cyanosis, dentition good        Neck:  JVP normal;no carotid bruit        Respiratory:  clear to auscultation diminished breath sounds bilaterally       Cardiac: normal S1 and S2;regular rhythm;no murmurs, gallops or rubs detected occasional premature beats              pulses full and equal                               right femoral bruit (-) left subclavian bruit (-)      GI:  abdomen soft        Extremities and Muscular Skeletal:  no deformities, clubbing, cyanosis, erythema observed;no edema              Neurological:           Psych:           CC  Sussy Britt MD  [address]  [address]  [place] [address]

## 2021-04-02 NOTE — PROGRESS NOTES
Service Date: 2021      HISTORY OF PRESENT ILLNESS:  I saw Mr. Ayoub for followup of atrial fibrillation.  He is a 70-year-old white male with a history of tricuspid repair.  He had recurrent atrial fibrillation and underwent a catheter ablation of atrial fibrillation once.  He subsequently had PVC ablations.  He did well in sinus rhythm for a few years and subsequently developed chronic atrial fibrillation.  He has been on rate control and anticoagulation.  From the Cardiology point of view, he has been relatively stable.      He is known to have severe lung disease that requires supplemental oxygen.  He has pulmonologist who is following him regularly.  He is currently on steroids.      Symptomatically, he still has some shortness of breath with exertion.  He has no chest pain or palpitation.      PHYSICAL EXAMINATION:   VITAL SIGNS:  Blood pressure was 131/84, heart rate 68 beats per minute, body weight 194 pounds.   GENERAL:  He came to the clinic with a walker.  He had multiple subcutaneous bruises.   LUNGS:  No crackles or wheezing.   CARDIAC:  Rhythm was irregularly irregular.  Heart sounds were normal without murmur.   ABDOMEN:  Examination showed no obesity.   EXTREMITIES:  There was no pedal edema.      EKG showed atrial fibrillation with controlled ventricular rate.      ASSESSMENT AND RECOMMENDATIONS:  Mr. Ayoub is relatively stable from the Cardiology point of view.  He can continue Eliquis, current dose of diltiazem and metoprolol.  I stopped aspirin to reduce the risk of bleeding.  He is scheduled to return for followup in 1 year.      cc:   Ana Pratt MD    [facility]    [address] [place]  [address]         NORIS MARTINEZ MD             D: 2021   T: 2021   MT: ROBERTO      Name:     FELIPE AYOUB   MRN:      [id number]        Account:      [id number]   :      1950           Service Date: 2021      Document: [id number]

## 2021-04-07 DIAGNOSIS — G47.33 OSA (OBSTRUCTIVE SLEEP APNEA): Primary | ICD-10-CM

## 2021-04-09 DIAGNOSIS — I48.0 PAROXYSMAL ATRIAL FIBRILLATION (H): ICD-10-CM

## 2021-04-09 RX ORDER — METOPROLOL SUCCINATE 50 MG/1
50 TABLET, EXTENDED RELEASE ORAL DAILY
Qty: 90 TABLET | Refills: 3 | Status: SHIPPED | OUTPATIENT
Start: 2021-04-09

## 2021-04-17 ENCOUNTER — HOSPITAL ENCOUNTER (EMERGENCY)
Facility: CLINIC | Age: 71
Discharge: HOME | End: 2021-04-17
Attending: PHYSICIAN ASSISTANT | Admitting: PHYSICIAN ASSISTANT
Payer: COMMERCIAL

## 2021-04-17 ENCOUNTER — APPOINTMENT (OUTPATIENT)
Dept: GENERAL RADIOLOGY | Facility: CLINIC | Age: 71
End: 2021-04-17
Attending: PHYSICIAN ASSISTANT
Payer: COMMERCIAL

## 2021-04-17 ENCOUNTER — APPOINTMENT (OUTPATIENT)
Dept: ULTRASOUND IMAGING | Facility: CLINIC | Age: 71
End: 2021-04-17
Attending: PHYSICIAN ASSISTANT
Payer: COMMERCIAL

## 2021-04-17 VITALS
TEMPERATURE: 97.5 F | SYSTOLIC BLOOD PRESSURE: 109 MMHG | HEART RATE: 67 BPM | BODY MASS INDEX: 28.7 KG/M2 | RESPIRATION RATE: 16 BRPM | DIASTOLIC BLOOD PRESSURE: 73 MMHG | OXYGEN SATURATION: 96 % | WEIGHT: 205 LBS | HEIGHT: 71 IN

## 2021-04-17 DIAGNOSIS — S70.11XA HEMATOMA OF RIGHT THIGH, INITIAL ENCOUNTER: ICD-10-CM

## 2021-04-17 DIAGNOSIS — R60.0 PERIPHERAL EDEMA: ICD-10-CM

## 2021-04-17 DIAGNOSIS — I07.9 TRICUSPID VALVE DISORDER: ICD-10-CM

## 2021-04-17 DIAGNOSIS — I42.9 CARDIOMYOPATHY, UNSPECIFIED TYPE (H): ICD-10-CM

## 2021-04-17 LAB
ALBUMIN SERPL-MCNC: 3.2 G/DL (ref 3.4–5)
ALBUMIN UR-MCNC: NEGATIVE MG/DL
ALP SERPL-CCNC: 56 U/L (ref 40–150)
ALT SERPL W P-5'-P-CCNC: 42 U/L (ref 0–70)
ANION GAP SERPL CALCULATED.3IONS-SCNC: 1 MMOL/L (ref 3–14)
APPEARANCE UR: CLEAR
AST SERPL W P-5'-P-CCNC: 23 U/L (ref 0–45)
BASOPHILS # BLD AUTO: 0 10E9/L (ref 0–0.2)
BASOPHILS NFR BLD AUTO: 0.4 %
BILIRUB SERPL-MCNC: 0.8 MG/DL (ref 0.2–1.3)
BILIRUB UR QL STRIP: NEGATIVE
BUN SERPL-MCNC: 37 MG/DL (ref 7–30)
CALCIUM SERPL-MCNC: 9.3 MG/DL (ref 8.5–10.1)
CHLORIDE SERPL-SCNC: 97 MMOL/L (ref 94–109)
CK SERPL-CCNC: 54 U/L (ref 30–300)
CO2 SERPL-SCNC: 39 MMOL/L (ref 20–32)
COLOR UR AUTO: ABNORMAL
CREAT SERPL-MCNC: 1.26 MG/DL (ref 0.66–1.25)
DIFFERENTIAL METHOD BLD: ABNORMAL
EOSINOPHIL # BLD AUTO: 0.1 10E9/L (ref 0–0.7)
EOSINOPHIL NFR BLD AUTO: 0.7 %
ERYTHROCYTE [DISTWIDTH] IN BLOOD BY AUTOMATED COUNT: 15.6 % (ref 10–15)
GFR SERPL CREATININE-BSD FRML MDRD: 58 ML/MIN/{1.73_M2}
GLUCOSE SERPL-MCNC: 167 MG/DL (ref 70–99)
GLUCOSE UR STRIP-MCNC: NEGATIVE MG/DL
HCT VFR BLD AUTO: 48 % (ref 40–53)
HGB BLD-MCNC: 14.9 G/DL (ref 13.3–17.7)
HGB UR QL STRIP: NEGATIVE
HYALINE CASTS #/AREA URNS LPF: 3 /LPF (ref 0–2)
IMM GRANULOCYTES # BLD: 0.2 10E9/L (ref 0–0.4)
IMM GRANULOCYTES NFR BLD: 1.7 %
KETONES UR STRIP-MCNC: NEGATIVE MG/DL
LEUKOCYTE ESTERASE UR QL STRIP: NEGATIVE
LYMPHOCYTES # BLD AUTO: 0.5 10E9/L (ref 0.8–5.3)
LYMPHOCYTES NFR BLD AUTO: 4.6 %
MCH RBC QN AUTO: 27.7 PG (ref 26.5–33)
MCHC RBC AUTO-ENTMCNC: 31 G/DL (ref 31.5–36.5)
MCV RBC AUTO: 89 FL (ref 78–100)
MONOCYTES # BLD AUTO: 0.3 10E9/L (ref 0–1.3)
MONOCYTES NFR BLD AUTO: 2.9 %
MUCOUS THREADS #/AREA URNS LPF: PRESENT /LPF
NEUTROPHILS # BLD AUTO: 9.5 10E9/L (ref 1.6–8.3)
NEUTROPHILS NFR BLD AUTO: 89.7 %
NITRATE UR QL: NEGATIVE
NRBC # BLD AUTO: 0 10*3/UL
NRBC BLD AUTO-RTO: 0 /100
NT-PROBNP SERPL-MCNC: 670 PG/ML (ref 0–900)
PH UR STRIP: 6.5 PH (ref 5–7)
PLATELET # BLD AUTO: 155 10E9/L (ref 150–450)
POTASSIUM SERPL-SCNC: 4.8 MMOL/L (ref 3.4–5.3)
PROT SERPL-MCNC: 6.6 G/DL (ref 6.8–8.8)
RBC # BLD AUTO: 5.38 10E12/L (ref 4.4–5.9)
RBC #/AREA URNS AUTO: 0 /HPF (ref 0–2)
SODIUM SERPL-SCNC: 137 MMOL/L (ref 133–144)
SOURCE: ABNORMAL
SP GR UR STRIP: 1.01 (ref 1–1.03)
UROBILINOGEN UR STRIP-MCNC: NORMAL MG/DL (ref 0–2)
WBC # BLD AUTO: 10.6 10E9/L (ref 4–11)
WBC #/AREA URNS AUTO: <1 /HPF (ref 0–5)

## 2021-04-17 PROCEDURE — 93971 EXTREMITY STUDY: CPT | Mod: RT

## 2021-04-17 PROCEDURE — 82550 ASSAY OF CK (CPK): CPT | Performed by: PHYSICIAN ASSISTANT

## 2021-04-17 PROCEDURE — 85025 COMPLETE CBC W/AUTO DIFF WBC: CPT | Performed by: PHYSICIAN ASSISTANT

## 2021-04-17 PROCEDURE — 93005 ELECTROCARDIOGRAM TRACING: CPT

## 2021-04-17 PROCEDURE — 81001 URINALYSIS AUTO W/SCOPE: CPT | Performed by: PHYSICIAN ASSISTANT

## 2021-04-17 PROCEDURE — 83880 ASSAY OF NATRIURETIC PEPTIDE: CPT | Performed by: PHYSICIAN ASSISTANT

## 2021-04-17 PROCEDURE — 71046 X-RAY EXAM CHEST 2 VIEWS: CPT

## 2021-04-17 PROCEDURE — 99285 EMERGENCY DEPT VISIT HI MDM: CPT | Mod: 25

## 2021-04-17 PROCEDURE — 80053 COMPREHEN METABOLIC PANEL: CPT | Performed by: PHYSICIAN ASSISTANT

## 2021-04-17 ASSESSMENT — ENCOUNTER SYMPTOMS
UNEXPECTED WEIGHT CHANGE: 1
SHORTNESS OF BREATH: 0
FEVER: 0

## 2021-04-17 ASSESSMENT — MIFFLIN-ST. JEOR: SCORE: 1717

## 2021-04-17 NOTE — ED TRIAGE NOTES
Pt presents for complaint of bilateral leg swelling and pain of the right leg. Pt also has extensive bruising of the right leg. Denies injury that he is aware of. ABC intact, A&Ox4.

## 2021-04-17 NOTE — LETTER
April 17, 2021      To Whom It May Concern:      Tone Cooper was seen in our Emergency Department today, 04/17/21.  I expect his condition to improve over the next 2-3 days.  He may return to work when improved.    Sincerely,        Karen Malone RN

## 2021-04-17 NOTE — ED PROVIDER NOTES
History   Chief Complaint:  Leg Swelling       HPI   Tone Cooper is a 70 year old male on Eliquis with history of atrial fibrillation, diabetes mellitus, COPD, and chronic kidney disease who presents with leg swelling. The patient states that his right leg recently became swelling, and then it started bruising and causing him pain. He says both legs feel swollen, but the pain is mainly in the right leg. He notes that he recently was told by his cardiologist to cut back on his Eliquis, and he is now on 5 mg twice per day, and the swelling started just after the medication change. Because of the swelling, he did take another 5 mg last night. He states that he recently gained about 10 pounds over the last several weeks. The patient does not have any chest pain or increased SOB from baseline.  Denies abdominal pain or new distention.  He uses 3L O2 chronically for COPD.  He notes he has been taking his Bumex daily but notes that his diet has been somewhat poor recently.    Review of Systems   Constitutional: Positive for unexpected weight change. Negative for fever.   Respiratory: Negative for shortness of breath.    Cardiovascular: Positive for chest pain and leg swelling.   All other systems reviewed and are negative.      Allergies:  Amlodipine  Flecainide      Medications:  Albuterol inhaler  Eliquis  Bumex  Klonopin  Diltiazem  Trelegy ellipta inhaler  Duoneb  Metoprolol succinate  Prednisone  Trazodone      Past Medical History:    Atrial flutter  Cancer  COPD  Diabetes mellitus, type II  Diverticulitis  Hepatitis  Hypertension  Persistent atrial fibrillation  Premature beats  PVC  Tricuspid valve disorder  Ventricular tachycardia  Lung nodule  PE  Pneumonia  Cardiomyopathy  Flexion deformity of right elbow  Hypertrophy of prostate with urinary obstruction and other LUTS   Chronic hepatitis C virus infection  Chronic kidney disease, stage III  Steroid-induced hyperglycemia      Past Surgical History:   "  Hernia repair, right  Appendectomy  Cardiac ablations, multiple  Cardioversion x 3  Colonoscopy  Undescended testicle surgery  Incision and drainage lower extremity, combined, right  Laparoscopic cholecystectomy  Orthopedic surgery, left hip replacement  Repair valve tricuspid  Small intestine surgery  Thoracic surgery  Eyelid procedure  Staphylococcus aureus methic asp      Family History:    Prostate cancer  Hypertension  Cerebrovascular disease  DVT  Colon cancer  COPD      Social History:  Accompanied by wife.  Arrives from home.    Physical Exam     Patient Vitals for the past 24 hrs:   BP Temp Temp src Pulse Resp SpO2 Height Weight   04/17/21 1150 109/73 97.5  F (36.4  C) Oral 67 16 96 % 1.803 m (5' 11\") 93 kg (205 lb)       Physical Exam  General: Alert and cooperative with exam. Wearing 3 L O2 nasal cannula.  Head:  Scalp is NC/AT  Eyes:  No scleral icterus, PERRL  ENT:  The external nose and ears are normal.   Neck:  Normal range of motion without rigidity.  CV:  Regular rate and rhythm    No pathologic murmur, rubs, or gallops.  Resp:  Breath sounds are clear bilaterally.  No crackles, wheezes, rhonchi, stridor.    Non-labored, no retractions or accessory muscle use  GI:  Abdomen is soft, no distension, no tenderness, no masses. No peritoneal signs.  Bowel sounds present in all quadrants  :  No suprapubic or flank tenderness  MS:  3+ symmetric lower extremity edema bilaterally pitting to knees.  Normal ROM in all joints without effusions.    No midline cervical, thoracic, or lumbar tenderness  Skin:  There is superficial ecchymosis over the inside portion of the right thigh.  Compartments soft.  No pulsatile mass.  Warm and dry, No rash or lesions noted. 2+ peripheral pulses in all extremities  Neuro: Oriented. No gross motor deficits. GCS 15  Normal strength and sensation BL in hips, knee, ankles, toes.  Psych: Awake. Alert. Normal affect. Appropriate interactions.    Emergency Department Course "     Imaging:  Chest XR,  PA & LAT  Sternotomy and tricuspid valve prosthesis. Heart size  appears stable. Pulmonary vascularity is within normal limits. Aortic  calcification. Lungs clear. No pleural effusions.    LANI CHACON MD  Reading per radiology    US Lower Extremity Venous Duplex Right  No DVT demonstrated.  Reading per radiology      Laboratory:  CBC: WBC 10.6, HGB 14.9,    CMP: Carbon dioxide 39 (H), Anion gap 1 (L), Glucose 167 (H), BUN 37 (H), GFR 58 (L), Albumin 3.2 (L), Protein total 6.6 (L) o/w WNL (Creatinine 1.26 (H))     CK total: 54  BNP: 670    UA with microscopic: Mucous present (A), Hyaline casts 3 (H) o/w WNL       Emergency Department Course:    Reviewed:  I reviewed nursing notes, vitals, past medical history and care everywhere    Assessments:  1219 I obtained history and examined the patient as noted above.   1527 I rechecked the patient and explained findings.     Disposition:  The patient was discharged to home.     Impression & Plan   Medical Decision Makin-year-old male with complex past medical history including COPD, cardiomyopathy, atrial fibrillation, among others who presents with bruising to his right thigh and bilateral leg swelling.  Work-up here is reassuring.  BNP is normal, chest x-ray clear, denies any chest pain or shortness of breath, no increased O2 requirements and no evidence of acute CHF at this time.  Kidney and liver function are at baseline.  Ultrasound of the right lower extremity is negative for DVT and he is already on Eliquis and low suspicion for occult DVT.  He does not have any evidence of arterial insufficiency, compartment syndrome, fracture, or infectious process.  Is able to bear weight without difficulty.  Hemoglobin is stable and no evidence concerning for large volume blood loss or hemorrhage.    Unclear source of the bruising though suspect occult mild trauma in the setting of Eliquis use.  No indication for advanced imaging.   Regarding his bilateral lower extremity edema he is already on 1 mg of Bumex daily I will have him temporarily increase this to 1.5 mg.  I did stress that he should have a repeat echo done as his most recent echo was in January and I did schedule this for him and also instructed him to call his cardiologist on Monday to arrange for follow-up.  He will return immediately if he develops chest pain, shortness of breath, abdominal pain, fever, inability to bear weight, or other new or worsening symptoms.      Diagnosis:    ICD-10-CM    1. Peripheral edema  R60.9 Echocardiogram Complete   2. Hematoma of right thigh, initial encounter  S70.11XA    3. Tricuspid valve disorder  I07.9 Echocardiogram Complete   4. Cardiomyopathy, unspecified type (H)  I42.9 Echocardiogram Complete       Scribe Disclosure:  Loyda PANIAGUA, am serving as a scribe at 11:59 AM on 4/17/2021 to document services personally performed by Vinicius Ngo PA-C based on my observations and the provider's statements to me.      Vinicius Ngo PA-C  04/17/21 [id number]

## 2021-04-17 NOTE — DISCHARGE INSTRUCTIONS
You can increase your Bumex from 1 mg daily to 1.5 mg daily.  You should call your doctor to discuss follow-up appointment and repeat echocardiogram.

## 2021-04-26 ENCOUNTER — HOSPITAL ENCOUNTER (OUTPATIENT)
Dept: CARDIOLOGY | Facility: CLINIC | Age: 71
Discharge: HOME | End: 2021-04-26
Attending: PHYSICIAN ASSISTANT | Admitting: PHYSICIAN ASSISTANT
Payer: COMMERCIAL

## 2021-04-26 DIAGNOSIS — R60.0 PERIPHERAL EDEMA: ICD-10-CM

## 2021-04-26 DIAGNOSIS — I07.9 TRICUSPID VALVE DISORDER: ICD-10-CM

## 2021-04-26 DIAGNOSIS — I42.9 CARDIOMYOPATHY, UNSPECIFIED TYPE (H): ICD-10-CM

## 2021-04-26 PROCEDURE — 93325 DOPPLER ECHO COLOR FLOW MAPG: CPT | Mod: 26 | Performed by: INTERNAL MEDICINE

## 2021-04-26 PROCEDURE — 93321 DOPPLER ECHO F-UP/LMTD STD: CPT | Mod: 26 | Performed by: INTERNAL MEDICINE

## 2021-04-26 PROCEDURE — 93321 DOPPLER ECHO F-UP/LMTD STD: CPT

## 2021-04-26 PROCEDURE — 93308 TTE F-UP OR LMTD: CPT | Mod: 26 | Performed by: INTERNAL MEDICINE

## 2021-07-22 VITALS
HEIGHT: 72 IN | WEIGHT: 210 LBS | HEIGHT: 72 IN | BODY MASS INDEX: 26.11 KG/M2 | BODY MASS INDEX: 28.44 KG/M2 | WEIGHT: 192.8 LBS

## 2021-07-22 NOTE — PLAN OF CARE
Patient's bp this am was 187/103. Scheduled med and prn hydralazine given. Rechecked bp for 135/74. Patient c/o headache, tylenol given with little relief. Text message sent to MD for stronger pain med. Will continue to monitor.

## 2021-07-22 NOTE — PROGRESS NOTES
[facility]ist Daily Progress Note    Summary:  Tone Coopre is a 68 year old male with history of severe COPD (on home oxygen 3 lpm), atrial fibrillation (anticoagulated with Elliquis), tricuspid valve repair, hypertension, nonsustained VT, and two hospitalizations in the last two months of 2018 for COPD exacerbations. He presented to the ED on 12/17/18 with 1.5 weeks of productive cough and shortness of breath.      He was found to have severe sepsis, community acquired pneumonia of right lung (by CXR), and hypoxic respiratory failure. He was admitted for further evaluation and treatment. Initially, he required BIPAP support. He was treated with Rocephin, Zithromax, nebs, and Prednisone. Rocephin was changed to Zosyn shortly after admission. One of two admission blood cultures grew staph. Infectious disease was consulted. Vancomycin was started. This staph ended up being MSSA and it grew in sputum, also. Antibiotics were changed to Ancef. Given history of tricuspid valve repair a prolonged course of IV Ancef was planned.      Tone was somewhat slow to improve, although he did come off of BIPAP and his blood pressure improved. He developed some hemoptysis in sputum. Prior to admission Eliquis was stopped. CT of chest was obtained on 1/4/19. This showed areas of consolidation consistent with recent pneumonia. Streaky hemoptysis persisted. Pulmonary medicine at the [facility] was consulted with by phone. There was concern about possible small abscesses related to recent pneumonia. It was thought that bronchoscopy was not needed but that a prolonged course of antibiotics would be necessary. There was also consideration of bronchial artery embolization if hemoptysis worsened. For the last couple of days cannot has seems to stabilize. Now he is having just occasional streaky hemoptysis in his sputum. His breathing has improved. Infectious diseases recommending completion of 4 weeks of IV Ancef  followed by 4 weeks of oral Augmentin.  He was dismissed home on 1/8/19 on baseline 3 L supplemental oxygen.     He again presented to [facility] on 2/11/19 complaining of shortness of breath.  Chest CT showed small pulmonary emboli and concern for pneumonia.  Treated with Azithromycin for pneumonia.  Began taking Eliquis for pulmonary embolism.  He was dismissed home on 2/14/19.He again presented to [facility] on 2/16/19 complaining of shortness of breath.  His acute on chronic hypoxic respiratory failure was felt to be due to COPD exacerbation.  He was treated with parenteral corticosteroids, nebulized bronchodilators and noninvasive ventilation (BiPAP).       After admission to  hospital, pt had worsening dyspnea and required intermittent BiPAP treatment. Remained dyspneic with increased work of breathing, had mild Nicolasa with increase in Cr, then developed increasing lethargy and hypercapnia on 2/19 requiring continuous BiPAP and transferred to ICU. Has streaky opacities in the left side would be to actually be residual pneumonia, however with his elevated lactic acid he has remained on Zosyn but, suspect primary problem is COPD exacerbation and he is on IV steroids with scheduled nebs.He has a history of atrial fibrillation/flutter, status post prior ablation procedure.  Notes indicate he had episodes of atrial fibrillation with rapid ventricular response during recent hospital stays, and did follow-up with EP cardiology as outpatient.  They felt he may avoid repeat ablation, since atrial arrhythmias were likely exacerbated by his pulmonary problems and until respiratory status was stable, this would decrease chance of success with any ablation.  Dose of pro-path and known was increased.     As noted above, he had an episode of MSSA bacteremia in December.  He completed a lengthy course of IV antibiotics.  He was on Augmentin prior to admission due to recent COPD exacerbation.  He was  "treated with IV antibiotics and did complete a course of Augmentin.He had an episode where he was up to the bathroom and, \"slid to the floor.\"  Because he is on anticoagulants, head CT was obtained.  This was negative for intracranial hemorrhage, did show small vessel ischemic changes.  His dyspnea has improved and he  dismissed to [facility] for further evaluation and treatment.      Assessment/Plan  Principal Problem:    Acute on chronic respiratory failure with hypoxia and hypercapnia (H)    COPD (chronic obstructive pulmonary disease) (H)    ? Notes indicate he has, \"advanced COPD\" with multiple hospitalizations in the past 6 months.  He has had worsening dyspnea anytime his steroids are weaned.  ? Continue corticosteroids.  ? Continue duo nebs, Xopenex nebs as needed.  ? BiPAP at night  ? Per outside hospital, \"social work to see if patient would be a candidate for Home Trilogy machine and NIPPV, it sounds like he is but this would be a $5000 yearly rental fee.\"  ? pulmonary specialist recommended very close follow-up with pulmonology in clinic, chronic daily azithromycin therapy, and pulmonary rehab  ? Has been on Zosyn started 2/16, changed to Augmentin (completed course).   -We are arranging for the BiPAP machine @ home.  Along with a portable tank.  - Him and his wife would like to go home and not a TCU; they  feel that they are comfortably situated at home.        Active Problems:    Atrial flutter (H)    Bradycardia  ? status post previous ablation procedure.  He did have issues with atrial fibrillation with RVR during previous hospitalizations.  He did follow with [facility] Cardiology as an outpatient.  The thought was to avoid a repeat ablation procedure at this time given his recent infectious issues which would decrease his chance of success.  He did have his propafenone dose increased.    ? Continue diltiazem and propafenone   ? Cardiac monitoring  ? 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 "  EP wanting to avoid atempting flutter ablation until pulmonary issues are better controlled  ? Hold parameters added to Diltiazem, propafenone, but bradycardia during sleep not unexpected  ? If he has symptomatic bradycardia, I would discuss with EP, decrease calcium channel blocker  ? Continue Eliquis for thromboembolism prevention. This was held at the time of discharge from previous hospitalization in January due to hemoptysis. No recurrence of hemoptysis and with concurrent PE, need to continue anti-coagulation   ? Asymptomatic bradycardia occasionally persists last EKG revealed a flutter with block.       H/O tricuspid valve repair    Pulmonary emboli (H)  ? just diagnosed a few days prior to recent admission  ? he had a small PE on the CT chest, probably an incidental finding, dyspnea mainly due severe COPD, in exacerbation  ? Continue Eliquis        Mixed hyperlipidemia  ? Not on a statin       BPH (benign prostatic hyperplasia)  ? Had been on terazosin  ? Assess voiding, consider bladder scan       Thrombocytopenia (H)  ? Mild  ? Recheck platelet count      Chronic right heart failure (H)  History of tricuspid valve replacement  ? Last TTE 1/4/19 showing mildly dilated RV along with decreased RV systolic function. Does have peripheral edema. Med list includes Lasix, however his spouse does not think he takes this. Apparently had significant significantly more edema during previous admission per his spouse.  ? Local compression for peripheral edema  ? Avoid overdiuresis due to recent acute kidney injury    Stress hyperglycemia  ? Continue basal insulin  ? Continue corrective dose insulin  ? hemoglobin A1c is at goal  ? Most blood sugar readings are at goal     Chronic low back pain:  ? Had been taking oxycodone 5 mg frequently at home for this.  His opiates have been held given his hypercapnia and tenuous respiratory status. As his goals are restorative, risk of opiates at this time outweigh potential  "benefits of pain control. Any amount likely to increase his CO2 retention leading worsening of his respiratory failure. Per outside hospital records, he also struggles with daytime sleepiness  ? Acetaminophen  ? Continue topical NSAID and Lidoderm patches  ? Mobilize patient  ? Patient refers to Trazodone and Seroquel to promote restful sleep     Goals of care  ? See discussion in note from outside hospital 2/26: \"Unfortunately despite multiple days of steroids, inhaled steroids, nebulization treatments he is not showing much clinical sign of improvement. He is extremely fatigued and lasts only 15-20 minutes off of BiPAP at a time. He is sleeping most of the day. If he is to improve with current therapy it is likely to take weeks. The patient is becoming more frustrated with his limited response to his respiratory treatment. At times he is saying statements like he feels like he is going to die and that he does not want to do this anymore. ...I spoke at length with him and his spouse regarding his current condition, current treatment plan which includes oral and inhaled steroids and intermittent BiPAP with goal for LTACH discharge and pulmonary rehab and slow improvement in his respiratory status, and alternative options including palliative care. After a long conversation the patient does confirm he wants to continue with current treatment plan and that he is just very frustrated overall. This will be an ongoing conversation with the patient, his spouse, and his care team.\"  ? Consider Palliative Care consult       Barriers to disposition:  Stable respiratory status, progress in therapies      Subjective:   No acute issues reported. Stable, working with PT.     Objective:  Vital signs in last 24 hours:  Temp:  [96.9  F (36.1  C)-98.2  F (36.8  C)] 97.6  F (36.4  C)  Heart Rate:  [55-90] 90  Resp:  [12-24] 20  BP: (122-162)/(72-90) 131/72  FiO2 (%):  [30 %] 30 %  Weight:   210 lb (95.3 kg)    Intake/Output last 3 " shifts:  I/O last 3 completed shifts:  In: 9 [I.V.:9]  Out: 3425 [Urine:3425]  Intake/Output this shift:  I/O this shift:  In: -   Out: 750 [Urine:750]    Physical Exam:  General Appearance: Alert, cooperative, no distress.   Head: Normocephalic, without obvious abnormality, atraumatic  Eyes: PERRL, conjunctiva/corneas clear, both eyes.  Wears glasses.  Nose: Nares normal, no drainage.  Wearing nasal cannula.  Throat: has upper denture,   Neck: Supple, symmetrical, trachea midline, no adenopathy;              Lungs: decreased breath sounds throughout  Chest Wall: well healed midline sternotomy  Heart: Regular rate and rhythm, with occasional extra beats  Abdomen: Soft, non-tender, bowel sounds active. No obvious organomegaly or mass. Small, healed incision midline (likely site of prior chest tube)  Extremities: bilateral lower extremity edema  Pulses: DP pulses are 1-2+ bilat.    Skin: no rashes or lesions.  Trevor complexion.  Neurologic: moves both arms and legs.        apixaban  5 mg Oral BID     azithromycin  250 mg Oral Q M, W, F     budesonide  0.5 mg Nebulization BID     clonazePAM  1 mg Oral QHS     diclofenac sodium  2 g Topical QID     diltiazem  180 mg Oral BID     famotidine  20 mg Oral BID     furosemide  20 mg Oral BID     guaiFENesin ER  600 mg Oral BID     insulin aspart (NovoLOG) injection   Subcutaneous TID with meals     insulin aspart (NovoLOG) injection   Subcutaneous QHS     insulin glargine  10 Units Subcutaneous QHS     ipratropium-albuterol  3 mL Nebulization Q4H while awake     lidocaine  2 patch Transdermal Q24H     menthol-zinc oxide   Topical TID     miconazole   Topical BID     miconazole nitrate   Topical BID     predniSONE  10 mg Oral Daily with brkfst     propafenone  225 mg Oral Q8H     sodium chloride  3 mL Intravenous Line Care     tamsulosin  0.4 mg Oral Daily after brkfst     traZODone  50 mg Oral QHS     Imaging: (I have personally reviewed the results) CHEST PORTABLE ONE VIEW    2/16/2019 10:20 PM     HISTORY: Shortness of breath.     COMPARISON: Earlier the same day at 1:40 PM.      IMPRESSION: Improving streaky bibasilar opacities since prior.  Perihilar vascular prominence is again seen. Cardiac silhouette is  unchanged. No new focal infiltrates. No significant pleural effusion  or pneumothorax.     TALON DAWSON MD    Lab Results: (I have personally reviewed the results)    All medication issues identified within the last 24 hours have been addressed and completed as appropriate.    DVT prophylaxis:  Eliquis  GI prophylaxis:  PPI    Sahil Solomon MD,[facility].     Prolonged care addendum

## 2021-07-22 NOTE — PLAN OF CARE
Problem: Pain  Goal: Patient's pain/discomfort is manageable  Outcome: Progressing     Problem: Safety  Goal: Patient will be injury free during hospitalization  Outcome: Progressing     Problem: Daily Care  Goal: Daily care needs are met  Outcome: Progressing   Patient slept well with BIPAP on; appears comfortable in bed.

## 2021-07-22 NOTE — PLAN OF CARE
Patient's pain/discomfort is manageable Progressing      Prn tylenol and diclofenac cream given for back pain; effective. Pt slept afterwards. Sleeping most of shift, wakes to reposition and toilet.

## 2021-07-22 NOTE — PLAN OF CARE
Impaired Gas Exchange      Demonstrate improved ventilation and adequate oxygenation of tissues as evidenced by absence of respiratory distress Progressing        Vital signs stable. /83 (Patient Position: Lying)   Pulse 63   Temp 97.4  F (36.3  C) (Axillary)   Resp 16   Ht 6' (1.829 m)   Wt 210 lb (95.3 kg)   SpO2 96%   BMI 28.48 kg/m       Pt on BIPAP V 60 S/T mode 14/7 RR 12 30% FiO2. PT tolerating BiPAP well Nasal Mask fitting well. Sat 96%, HR 45-63, RR 15-18. Plan is ABG tomorrow morning after on BiPAP 4- 6 hrs.

## 2021-07-22 NOTE — PLAN OF CARE
Pain      Patient's pain/discomfort is manageable Progressing        Complained of back pain, got some relief after dose of tylenol.  Potential for hemodynamic instability      Cardiac output is adequate Progressing          Risk of myocardial ischemia or infarction      Early detection of myocardial ischemia/infarction Progressing        On telemetry,noted atrial fibrillation, bradycardia reported from overnight while asleep, denied chest pain, MD updated, order obtained for EKG.

## 2021-07-22 NOTE — PLAN OF CARE
Problem: Pain  Goal: Patient's pain/discomfort is manageable  Outcome: Progressing     Problem: Safety  Goal: Patient will be injury free during hospitalization  Outcome: Progressing     Problem: Daily Care  Goal: Daily care needs are met  Outcome: Progressing   Patient slept well all night; used urinal appropriately; BIPAP on.  Patient sleeping at this time; appears comfortable in bed.

## 2021-07-22 NOTE — PLAN OF CARE
Patient's pain/discomfort is manageable Progressing    Prn tylenol given for lower back pain of 9. Tolerable at 7 at  this time in combination with lidocaine patch. EKG done overnight for azeb.

## 2021-07-22 NOTE — PROGRESS NOTES
"Clinical Nutrition Therapy Assessment Note      Reason for Assessment:   Tone Cooper is a 68 y.o. male assessed by the registered Dietitian for consult.    Subjective:  Pt reports appetite is \"so-so.\"  Unhappy with initial generated meals - adjusted so pt able to select.  Discussed recommendation for sodium controlled diet to help manage fluid retention r/t heart failure.  Pt acknowledged need for sodium controlled diet but did not wish to alter diet order. Pt requests to continue Boost GC once daily.     Nutrition History:  Information from patient, chart and transfer facility.  Diet prior to admission at [facility]: full liquid diet + Boost TID reported per RD at [facility].  Baseline: POLINA diet  Recent food/fluid intake: good when not limited by liquid diet  Recent Supplements: Boost GC  Cultural/[employer] food needs or preferences: none  Food allergies or intolerances: NKFA    Current Nutrition Prescription:   Diet: regular     Current Nutrition Intake:  The patient's current meal intake is good > 75%     Anthropometrics:  Height: 6' (182.9 cm)  Admission weight: 206 lb with LE +2 edema  Weight: 206 lb 6.4 oz (93.6 kg)  BMI (Calculated): 28  BMI indication: 25-29.9 overweight  Ideal body weight 178 lb +/- 10%  Usual body weight pt reports dry wt ~ 180 lb    Weight History:  2/28/19 204 lb  3/4/19 210 lb  4/24/19 201 lb  5/30/19 198 lb    RD Nutrition Focused Physical Exam:  The patient has the following physical signs which could indicate malnutrition: Fluid accumulation and Edema - +2    GI Status/Output:   Last BM: 5/31 per nurse. nsg notes abd distention.    Skin/Wound:  Nathaniel score Nathaniel Scale Score: 18    Medications:  Medications reviewed.  Note SSI, prednisone, lasix    Labs:  Pending  FSBG 118-258 (steroid related hyperglycemia)      Estimated Nutrition Needs:  Assessment weight is 86 kg, dry weight    Energy Needs: 3789-1858 kcals daily, 25-30 kcal/kg  Protein Needs:  g daily, 1-1.5 " g/kg.  Fluid Needs: 2150 mls daily, 25 mls/kg    Malnutrition: Not noted    Nutrition Risk Level: moderate risk    Nutrition dx:   Altered nutrition-related lab values r/t steroid as evidenced by hyperglycemia.   Fluid wt gain r/t heart failure evidenced by edema, increased fluid wt.    Goal:   BG   Decrease fluid wt  Pt to self limit Na+ intake <3-4 gm daily    Intervention:   Add Boost GC daily per pt request  Pt to select menus, self-limit sodium  Monitor need for carb controlled diet    Monitoring/Evaluation:   Po intake, wts, labs    Electronically signed by:  Glenna Balderas RD

## 2021-07-22 NOTE — PLAN OF CARE
Problem: Discharge Barriers  Goal: Patient's discharge needs are met  Outcome: Progressing  Care Management Progression of Care Update        DR  GLOS 3/11-3/13/19  Target D/C Date 3/13/19        PLAN/GOALS    1. Pulmonary following. In process for qualifying for home BIPAP. O2 at 3 liters NC.     2. Once process for qualifying for home BIPAP is completed and is medically cleared for discharge, SW will coordinate discharge to home with homecare.       BARRIERS    1. Acute on chronic respiratory failure with hypoxia and hypercapnia/COPD    2. Placement     Disposition: Home with home care     Care Manager Name:Niyah Moran RN Care Manager    Date/Time: 3/8/2019, 5:35 PM          none

## 2021-07-22 NOTE — PLAN OF CARE
Patient's pain/discomfort is manageable Progressing      Patient vitals within normal limits. Pt complained of pain throughout shift 8 out of 10. Tylenol was not helping with pain management. RN received a one time order for  5mg Oxycodone. Pt did have some pain relief with that, pt said his pain was at 6 after that.

## 2021-07-22 NOTE — PROGRESS NOTES
DIABETES CARE NOTE  Situation: Patient contacted writer via telephone today 3/14/19 regarding how to use glucose meter provided yesterday.  Background: Full education provided yesterday on insulin instructions and glucose meter instructions. Refer to note from 3/13/19.  Assessment: Patient sent home on insulin and blood sugar checks. Pt having difficulty with checking BG and getting the meter and lancet device together properly. After walking patient through the steps of putting the meter together and checking BG he was able to check his BG fasting this morning and it was 136 mg/dL.   Patient feels more comfortable now with checking BG. Wife present to watch steps as well.  Recommendations: Patient encouraged to follow up outpatient. Pt has writers contact information.    Thank you,  Ava Guerrier RDN, LD, CDE  Certified Diabetes Educator  Zenon Aburto  Pager: [phone]     Telephone conversation today after discharge 3/14/19: 10:30 am

## 2021-07-22 NOTE — PLAN OF CARE
Impaired Gas Exchange      Demonstrate improved ventilation and adequate oxygenation of tissues as evidenced by absence of respiratory distress Progressing        Ineffective Airway Clearance      Maintain airway patency Progressing        Pt placed on V60 BiPAP at 2210 (14/7, BUR 12, 30%). Vitals stable. Cont oximeter on. Sats 95-97%, HR 65-80, RR 17-20. BBS clear/ diminished. ABG due on 3/11. Pt to use BiPAP on Sunday noc for 4-6 hours with no back up rate. All scheduled nebs given. Pt uses 3L NC when off BiPAP. Titrate O2 as able. Will cont to monitor.    /77   Pulse 65   Temp 98.3  F (36.8  C) (Oral)   Resp 17   Ht 6' (1.829 m)   Wt 210 lb (95.3 kg)   SpO2 97%   BMI 28.48 kg/m

## 2021-07-22 NOTE — PROGRESS NOTES
Occupational Therapy       03/01/19 0816   Visit Specifics   Eval Type Inital eval   Treatment Time   ADL Training 10  (given instr. on breathing tech w/ exertion, max cues to use)   General   Onset date 02/16/19   Additional Pertinent History Pt admitted c acute on chronic resp failure c advanced COPD exacerbation (3L-BiPAP yet), Aflutter c hx Vtach, hx PVC s/p ablation '17, diverticiulitis, HTN, R HF, HLD< PD, thrombocytopenia, tricuspid valve repair. Chart indicates pt frustration with lengthened recovery.   Chart Reviewed Yes   PT/OT Patient/Caregiver Stated Goals to get up and walk   Family/Caregiver Present No   Precautions   General Precautions Bed alarm/Tab alarm   Home Living   Type of Home House   Home Layout Laundry in basement   Bathroom Shower/Tub Tub/shower unit   Bathroom Toilet Standard   Bathroom Equipment Shower chair   Mobility Equipment Walker-4 wheeled   Prior Status   Independent With All ADL's   Needs Assistance With Driving;Laundry;Shopping;Cleaning;Cooking   Lives With Spouse   Receives Help From Family   Vocational Retired   Comments Pt reports his wife is able to run errands and do chores at home.   Activity Tolerance   Endurance Fair   Activity Tolerance Comments 3 liters O2 per NC   Bed Mobility   Bed mobility comments Pt declined as he had just woken up.  Per PT report he needed SBA, it was effortful with SOB.   Functional Transfers   Comments Min to CGA sit to stand per PT report.   Ambulation   Comments See PT report.   Cognition   Overall Cognitive Status WFL  (Pt denies change in cog.  Rec assess and tx prn.)   Comments Pt required repetition of cues after educated on breathing techniques.  He consistently held his breath with exertion.   RUE Assessment   RUE Assessment WFL except  (general weakness)   LUE Assessment   LUE Assessment WFL Except  (general weakness)   Hand Function   Gross Grasp Functional   Assessment   Assessment Decreased ADL status;Decreased funtional  mobility;Decreased UE strength;Decreased endurance  (assess cog and tx prn if deficit noted during tx)   Prognosis Good   Treatment/Interventions ADL training;Functional transfer training;Neuromuscular re-ed  (cog prn)   OT Frequency (4-6x/week, 4-5 week ELOS)   Goal Formulation Patient   Recommendations   Treatment Suggestions for Next Session 3/1/19 Precautions: 3 liters O2 at eval, reports no history of education for breathing tech/energy saving.  Tx: ADLs/mobility with instruction on PLB/exhale with exertion and pacing, general exercise.  Cognition assess/tx if you note deficits during tx (requires some repetition for new learning at eval but not likely a barrier to discharge).   OT Summary Pt pleasant, frustrated with LE weakness.  He has not had respiratory education in past per his report.  He is a good candidate for OT training, see OT POC.   Eveline Reno, OTRL/CBIS

## 2021-07-22 NOTE — PLAN OF CARE
Problem: Physical Therapy  Goal: PT Goals  Description  LTGS to be met by: 6/28/19  In order to progress towards home,  1. Patient will ascend/descend 10 stairs with railing(s) SBA.  2. Patient will ambulate 250ft with Mod I assistance and least restrictive device with 02 sat 88% or greater.   3. Patient will score 24 or greater on Tinetti.    STGs to be met by: 6/14/19  In order to progress towards LTGs  1. Patient will ascend/descend 3 stairs with railing(s) CGA.  2. Patient will ambulate 100ft with SBA assistance and least restrictive device with 02 sat 88% or greater.   3. Patient will tolerate 5 minutes of continuous activity with 02 sat 88% or greater     Goals were initiated on 6/1/2019 by Jamar Almonte, PT.       Outcome: Adequate for Discharge    Physical Therapy Discharge Summary    Date of PT Discharge: 6/6/2019  Refer to Care Plan goals above for progress towards goals at time of discharge.   Goals Met? No  Barriers to achieve above goals: slower than anticipated progress  Recommended Equipment: none  Discharge Destination: home  Discharge Recommendations: continue daily walking program for activity tolerance

## 2021-07-22 NOTE — PLAN OF CARE
Problem: Occupational Therapy  Goal: OT Goals  Long Term Goal to be met by 4/5/19:   - Bed mobility with modified independence.  - Basic transfer with modified independence.  - Advanced transfers with SBA.  - UB dressing with SBA.  - LB dressing with SBA.  - Participation in grooming/hygiene tasks with SBA standing EOB/at sink.  - Participation in 20 minutes of B UE Exercise with moderate resistance.  - SBA simple IADLs.  - Pt will incorporate breathing techniques/energy saving 75% of the time during ADLs/IADLs/mobility with min cues.    Short Term Goals to be met by 3/15/19:  - Bed mobility with CGA.- GOAL MET  - Basic transfer with min assist.  - UB dressing with set-up seated.  - LB dressing with mod assist, using appropriate adaptive equipment PRN.  - Participation in grooming/hygiene tasks with SBA.- GOAL MET  - Participation in cognitive assessment to assist with treatment and discharge planning.- D/C goal, NA  - Participation in 10 minutes of BUE Exercise with light resistance.  - Participate in cog assess/tx.- D/C goal- NA  - Pt will incorporate breathing techniques/energy saving with moderate cues during ADLs/IADLs/mobility.- in progress    Goals revised on 3/7/2019 by Karen Steen OT.        Occupational Therapy Discharge Summary    Date of OT Discharge: 3/14/2019  Refer to daily doc flowsheet for equipment issued and current functional status.  Discharge Destination: Home  Discharge Comments: Pt d/c'd home w/ family assist. Pt had met partial goals. NO further skilled OT recommended at this time.       3/14/2019 by Marilu Mariscal OT

## 2021-07-22 NOTE — PLAN OF CARE
Patient placed on BIPAP,  ST- Mode: 14/7, 12 RR, 30% FIO2.  Patient is tolerating well with the BIPAP. Blood pressure 126/72, pulse 61, temperature 96.9  F (36.1  C), temperature source Axillary, resp. rate 17, height 6' (1.829 m), weight 210 lb (95.3 kg), SpO2 97 %.  Continue current POC.

## 2021-07-22 NOTE — PROGRESS NOTES
(WOC) WCC PT Progress Note    Today's Visit: No new skin concerns reported to writer, routine reassessment of wound, ostomy and skin concerns.      Labs:  Albumin   Date/Time Value Ref Range Status   03/12/2019 06:46 AM 2.5 (L) 3.5 - 5.0 g/dL Final     Hemoglobin A1c   Date/Time Value Ref Range Status   03/01/2019 06:58 AM 6.5 (H) 4.2 - 6.1 % Final     No results for input(s): HGB in the last 72 hours.    Invalid input(s): HEM    Vitals:   The last obtained vitals are:   Temp: 97.8  F (36.6  C)  Heart Rate: 80  Resp: 17  BP: 145/88    Nathaniel:  Nathaniel Scale Score: 18  Specialty Bed: Isogel ([place])  207 lb 8 oz (94.1 kg)  Body mass index is 28.14 kg/m .      Wound Ostomy Continence Assessment/Plan:  Wound Left forearm skin tear   0.7x2.3cm 100% clean, stable   Treatment Plan: Mepilex (3x3) change every 5 days     Right shin abrasion   Per patient fell into a fire pit 2x0.3cm 100% clean, start Mepilex and stop Aquaphor    Last Prealbumin: None on file  Nathaniel score: 18          Bed: Isogel          Pillows for heel elevation     Discussed plan of care with nurse and patient.   Outcomes and treatment recommendations are to promote skin integrity, contain exudate and promote wound healing.    Jamar Almonte, PT, WCC, CLT

## 2021-07-22 NOTE — PLAN OF CARE
Problem: Inadequate Airway Clearance  Goal: Patient will maintain patent airway  Outcome: Progressing  Goal: Patient will achieve/maintain normal respiratory rate/effort  Outcome: Progressing     Problem: Inadequate Gas Exchange  Goal: Patient is adequately oxygenated and ventilation is improved  Outcome: Progressing   INITIAL 3 NIGHTS BIPAP/CPAP NOTE    UNIT TYPE:   V 60 Bipap  MASK TYPE:  Face Mask    SETTINGS:      MODE: BIPAP, ST mode   IPAP:    14   EPAP:    6   RATE:   12   FI02:      25%    TIME OF MASK PLACEMENT:  2143    TWO HOUR SKIN CHECK DONE AT:  0007  Skin condition is good, no redness, break down, or swelling noted this shift.    INTERVENTION INITIATED:  Gel pad in place, no intervention is necessary at this time.     PATIENT'S TOLERANCE OF DEVICE:  Pt is on V60 Bipap 14/6, rate of 12, 25% at night and tolerating well with no distress.  Pt uses 4 L nasal canula during the day or when off the Bipap.  Vital signs: Blood pressure 128/78, pulse 62, temperature 97.6  F (36.4  C), temperature source Axillary, resp. rate 14, height 6' (1.829 m), weight 207 lb 8 oz (94.1 kg), SpO2 93 %.  Continue current care plan.

## 2021-07-22 NOTE — PROGRESS NOTES
[facility] Hospitalist Daily Progress Note    Summary:  Tone Cooper is a 68 year old male with history of severe COPD (on home oxygen 3 lpm), atrial fibrillation (anticoagulated with Elliquis), tricuspid valve repair, hypertension, nonsustained VT, and two hospitalizations in the last two months of 2018 for COPD exacerbations. He presented to the ED on 12/17/18 with 1.5 weeks of productive cough and shortness of breath.      He was found to have severe sepsis, community acquired pneumonia of right lung (by CXR), and hypoxic respiratory failure. He was admitted for further evaluation and treatment. Initially, he required BIPAP support. He was treated with Rocephin, Zithromax, nebs, and Prednisone. Rocephin was changed to Zosyn shortly after admission. One of two admission blood cultures grew staph. Infectious disease was consulted. Vancomycin was started. This staph ended up being MSSA and it grew in sputum, also. Antibiotics were changed to Ancef. Given history of tricuspid valve repair a prolonged course of IV Ancef was planned.      Tone was somewhat slow to improve, although he did come off of BIPAP and his blood pressure improved. He developed some hemoptysis in sputum. Prior to admission Eliquis was stopped. CT of chest was obtained on 1/4/19. This showed areas of consolidation consistent with recent pneumonia. Streaky hemoptysis persisted. Pulmonary medicine at the [facility] was consulted with by phone. There was concern about possible small abscesses related to recent pneumonia. It was thought that bronchoscopy was not needed but that a prolonged course of antibiotics would be necessary. There was also consideration of bronchial artery embolization if hemoptysis worsened. For the last couple of days cannot has seems to stabilize. Now he is having just occasional streaky hemoptysis in his sputum. His breathing has improved. Infectious diseases recommending completion of 4 weeks of IV Ancef  followed by 4 weeks of oral Augmentin.  He was dismissed home on 1/8/19 on baseline 3 L supplemental oxygen.     He again presented to [facility] on 2/11/19 complaining of shortness of breath.  Chest CT showed small pulmonary emboli and concern for pneumonia.  Treated with Azithromycin for pneumonia.  Began taking Eliquis for pulmonary embolism.  He was dismissed home on 2/14/19.He again presented to [facility] on 2/16/19 complaining of shortness of breath.  His acute on chronic hypoxic respiratory failure was felt to be due to COPD exacerbation.  He was treated with parenteral corticosteroids, nebulized bronchodilators and noninvasive ventilation (BiPAP).       After admission to  hospital, pt had worsening dyspnea and required intermittent BiPAP treatment. Remained dyspneic with increased work of breathing, had mild Nicolasa with increase in Cr, then developed increasing lethargy and hypercapnia on 2/19 requiring continuous BiPAP and transferred to ICU. Has streaky opacities in the left side would be to actually be residual pneumonia, however with his elevated lactic acid he has remained on Zosyn but, suspect primary problem is COPD exacerbation and he is on IV steroids with scheduled nebs.He has a history of atrial fibrillation/flutter, status post prior ablation procedure.  Notes indicate he had episodes of atrial fibrillation with rapid ventricular response during recent hospital stays, and did follow-up with [facility] cardiology as outpatient.  They felt he may avoid repeat ablation, since atrial arrhythmias were likely exacerbated by his pulmonary problems and until respiratory status was stable, this would decrease chance of success with any ablation.  Dose of pro-path and known was increased.     As noted above, he had an episode of MSSA bacteremia in December.  He completed a lengthy course of IV antibiotics.  He was on Augmentin prior to admission due to recent COPD exacerbation.  He was  "treated with IV antibiotics and did complete a course of Augmentin.He had an episode where he was up to the bathroom and, \"slid to the floor.\"  Because he is on anticoagulants, head CT was obtained.  This was negative for intracranial hemorrhage, did show small vessel ischemic changes.  His dyspnea has improved and he  dismissed to [facility] for further evaluation and treatment.      Assessment/Plan  Principal Problem:    Acute on chronic respiratory failure with hypoxia and hypercapnia (H)    COPD (chronic obstructive pulmonary disease) (H)    ? Notes indicate he has, \"advanced COPD\" with multiple hospitalizations in the past 6 months.  He has had worsening dyspnea anytime his steroids are weaned.  ? Continue corticosteroids.  ? Continue duo nebs, Xopenex nebs as needed.  ? BiPAP at night  ? Per outside hospital, \"social work to see if patient would be a candidate for Home Trilogy machine and NIPPV, it sounds like he is but this would be a $5000 yearly rental fee.\"  ? pulmonary specialist recommended very close follow-up with pulmonology in clinic, chronic daily azithromycin therapy, and pulmonary rehab  ? Has been on Zosyn started 2/16, changed to Augmentin (completed course).  ? We are arranging for the BiPAP machine and portable oxygen  ? would like to go home, not a TCU.       Active Problems:    Atrial flutter (H)    Bradycardia  ? status post previous ablation procedure.  He did have issues with atrial fibrillation with RVR during previous hospitalizations.  He did follow with [facility] as an outpatient.  The thought was to avoid a repeat ablation procedure at this time given his recent infectious issues which would decrease his chance of success.  He did have his propafenone dose increased.    ? Continue diltiazem and propafenone   ? Cardiac monitoring  ? 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  EP wanting to avoid atempting flutter ablation until pulmonary issues are better " controlled  ? Hold parameters added to Diltiazem, propafenone, but bradycardia during sleep not unexpected  ? If he has symptomatic bradycardia, I would discuss with EP, decrease calcium channel blocker  ? Continue Eliquis for thromboembolism prevention. This was held at the time of discharge from previous hospitalization in January due to hemoptysis. No recurrence of hemoptysis and with concurrent PE, need to continue anti-coagulation   ? Asymptomatic bradycardia occasionally persists last EKG revealed a flutter with block.       H/O tricuspid valve repair    Pulmonary emboli (H)  ? just diagnosed a few days prior to recent admission  ? he had a small PE on the CT chest, probably an incidental finding, dyspnea mainly due severe COPD, in exacerbation  ? Continue Eliquis        Mixed hyperlipidemia  ? Not on a statin       BPH (benign prostatic hyperplasia)  ? Had been on terazosin  ? Assess voiding, consider bladder scan       Thrombocytopenia (H)  ? Mild  ? Recheck platelet count      Chronic right heart failure (H)  History of tricuspid valve replacement  ? Last TTE 1/4/19 showing mildly dilated RV along with decreased RV systolic function. Does have peripheral edema. Med list includes Lasix, however his spouse does not think he takes this. Apparently had significant significantly more edema during previous admission per his spouse.  ? Local compression for peripheral edema  ? Avoid overdiuresis due to recent acute kidney injury    Stress hyperglycemia  ? Continue basal insulin  ? Continue corrective dose insulin  ? hemoglobin A1c is at goal  ? Most blood sugar readings are at goal     Chronic low back pain:  ? Had been taking oxycodone 5 mg frequently at home for this.  His opiates have been held given his hypercapnia and tenuous respiratory status. As his goals are restorative, risk of opiates at this time outweigh potential benefits of pain control. Any amount likely to increase his CO2 retention leading  "worsening of his respiratory failure. Per outside hospital records, he also struggles with daytime sleepiness  ? Acetaminophen  ? Continue topical NSAID and Lidoderm patches  ? Mobilize patient  ? Patient refers to Trazodone and Seroquel to promote restful sleep     Goals of care  ? See discussion in note from outside hospital 2/26: \"Unfortunately despite multiple days of steroids, inhaled steroids, nebulization treatments he is not showing much clinical sign of improvement. He is extremely fatigued and lasts only 15-20 minutes off of BiPAP at a time. He is sleeping most of the day. If he is to improve with current therapy it is likely to take weeks. The patient is becoming more frustrated with his limited response to his respiratory treatment. At times he is saying statements like he feels like he is going to die and that he does not want to do this anymore. ...I spoke at length with him and his spouse regarding his current condition, current treatment plan which includes oral and inhaled steroids and intermittent BiPAP with goal for LTACH discharge and pulmonary rehab and slow improvement in his respiratory status, and alternative options including palliative care. After a long conversation the patient does confirm he wants to continue with current treatment plan and that he is just very frustrated overall. This will be an ongoing conversation with the patient, his spouse, and his care team.\"  ? Consider Palliative Care consult       Barriers to disposition:  Stable respiratory status, progress in therapies.  Plan discharge tomorrow, 3/13.      Subjective:   \"I turn just fine on my own.\"  Nursing notes indicate patient is reluctant to be turned during the night.  No respiratory or GI complaints.     Objective:  Vital signs in last 24 hours:  Temp:  [96.7  F (35.9  C)-98.4  F (36.9  C)] 96.7  F (35.9  C)  Heart Rate:  [60-70] 65  Resp:  [18-20] 18  BP: (129-155)/(71-92) 155/92  Weight:   210 lb (95.3 " kg)    Intake/Output last 3 shifts:  I/O last 3 completed shifts:  In: 3 [I.V.:3]  Out: 2000 [Urine:2000]  Intake/Output this shift:  No intake/output data recorded.    Physical Exam:  General Appearance: Alert, cooperative, no distress.   Head: Normocephalic, without obvious abnormality, atraumatic  Eyes: PERRL, conjunctiva/corneas clear, both eyes.  Wears glasses.  Nose: Nares normal, no drainage.  Wearing nasal cannula.  Throat: has upper denture,   Neck: Supple, symmetrical, trachea midline, no adenopathy;              Lungs: decreased breath sounds throughout  Chest Wall: well healed midline sternotomy  Heart: Regular rate and rhythm, with occasional extra beats  Abdomen: Soft, non-tender, bowel sounds active. No obvious organomegaly or mass. Small, healed incision midline (likely site of prior chest tube)  Extremities: bilateral lower extremity edema  Pulses: DP pulses are 1-2+ bilat.    Skin: no rashes or lesions.  Trevor complexion.  Neurologic: moves both arms and legs.        apixaban  5 mg Oral BID     azithromycin  250 mg Oral Q M, W, F     budesonide  0.5 mg Nebulization BID     clonazePAM  1 mg Oral QHS     diclofenac sodium  2 g Topical QID     diltiazem  180 mg Oral BID     famotidine  20 mg Oral BID     furosemide  20 mg Oral BID     guaiFENesin ER  600 mg Oral BID     insulin aspart (NovoLOG) injection   Subcutaneous TID with meals     insulin aspart (NovoLOG) injection   Subcutaneous QHS     insulin glargine  10 Units Subcutaneous QHS     ipratropium-albuterol  3 mL Nebulization Q4H while awake     lidocaine  2 patch Transdermal Q24H     menthol-zinc oxide   Topical TID     miconazole   Topical BID     miconazole nitrate   Topical BID     predniSONE  10 mg Oral Daily with brkfst     propafenone  225 mg Oral Q8H     sodium chloride  3 mL Intravenous Line Care     tamsulosin  0.4 mg Oral Daily after brkfst     traZODone  50 mg Oral QHS     Imaging: (I have personally reviewed the results) CHEST  PORTABLE ONE VIEW   2/16/2019 10:20 PM     HISTORY: Shortness of breath.     COMPARISON: Earlier the same day at 1:40 PM.      IMPRESSION: Improving streaky bibasilar opacities since prior.  Perihilar vascular prominence is again seen. Cardiac silhouette is  unchanged. No new focal infiltrates. No significant pleural effusion  or pneumothorax.     TALON DAWSON MD    Lab Results: (I have personally reviewed the results)    All medication issues identified within the last 24 hours have been addressed and completed as appropriate.    DVT prophylaxis:  Eliquis  GI prophylaxis:  PPI    Nitza Alcantar M.D.  Internal Medicine  [facility]  Pager: [phone]     Total time on visit: 35 minutes  Time spent for counseling, coordination of care:   > 20 minutes including personal review and interpretation of labs and studies above, and discussion with patient, RT, charge RN, RN care manager regarding biPAP, home care, pain control, discharge planning

## 2021-07-22 NOTE — PLAN OF CARE
Problem: Pain  Goal: Patient's pain/discomfort is manageable  Outcome: Progressing   PRN codeine given for headache with good effect.    Problem: Daily Care  Goal: Daily care needs are met  Outcome: Progressing  Patient uses call light appropriately. Makes needs known. Compliant with cares. Refused mucinex dose due at HS stating he does not wanting to be coughing stuff up at night with bipap on.     Problem: Blood pressure is elevated above 140 over 90 mmHg  Goal: Blood pressure (BP) is within acceptable limits  Outcome: Progressing  BP has been within acceptable limits throughout shift.

## 2021-07-22 NOTE — PROGRESS NOTES
Spiritual Care Note:   Initial visit with Tone for on-going assessment. Pt has good support from family and his spouse in particular. Pt is [employer] and draws on his spirituality through riding his motorcycle and the various riding communities. He stated he has no critical spiritual or emotional support needs.  will visit pt for on-going spiritual assessment. Will make additional visits as requested by Tone, his family, or staff.     Stu Blankenship, PhD   Staff

## 2021-07-22 NOTE — PROGRESS NOTES
(WOC) WCC PT Assessment Note    Today's Visit: New consult 4 days after admit, full head to toe assessment completed. Areas of concern listed below.    Labs:  Hemoglobin A1c   Date/Time Value Ref Range Status   03/01/2019 06:58 AM 6.5 (H) 4.2 - 6.1 % Final     No results for input(s): HGB in the last 72 hours.    Invalid input(s): HEM    Vitals:   The last obtained vitals are:   Temp: 97.6  F (36.4  C)  Heart Rate: 72  Resp: 20  BP: (!) 154/95    Nathaniel:  Nathaniel Scale Score: 19  Specialty Bed: Isogel ([place])  210 lb (95.3 kg)  Body mass index is 28.48 kg/m .      Wound Ostomy Continence Assessment/Plan:  Continence Scrotum Fungal Dermatitis    Entire scrotum is dry, red, and fungal in appearance  Treatment Plan: Recommend Critic Aid AF    Gluteal and perianal Moisture Associated Skin Damage  Perianal Incontinence associated dermatitis 6x5cm area of blanhcable erythema with 20 measuring 3.5x0.5cm  Treatment Plan: Recommend Calmoseptine q shift   Other Xerosis   Treatment Plan: Aquaphor ointment daily to feet and dry skin.    Weight shifting  Patient educated why we perform wt. Shifting every 2 hours, patient agreeable when writer left but will likely need to be re-educated in the future.   Last Prealbumin: none recorded  Nathaniel score: 19          Bed: Isogel           daily for heel elevation     Discussed plan of care with nurse and patient.   Outcomes and treatment recommendations are to promote skin integrity, contain exudate and promote wound healing.  Jamar Almonte, PT, WCC, CLT

## 2021-07-22 NOTE — PROGRESS NOTES
[facility] Daily Progress Note    Summary:  Tone Cooper is a 68 year old male with history of severe COPD (on home oxygen 3 lpm), atrial fibrillation (anticoagulated with Elliquis), tricuspid valve repair, hypertension, nonsustained VT, and two hospitalizations in the last two months of 2018 for COPD exacerbations. He presented to the ED on 12/17/18 with 1.5 weeks of productive cough and shortness of breath.      He was found to have severe sepsis, community acquired pneumonia of right lung (by CXR), and hypoxic respiratory failure. He was admitted for further evaluation and treatment. Initially, he required BIPAP support. He was treated with Rocephin, Zithromax, nebs, and Prednisone. Rocephin was changed to Zosyn shortly after admission. One of two admission blood cultures grew staph. Infectious disease was consulted. Vancomycin was started. This staph ended up being MSSA and it grew in sputum, also. Antibiotics were changed to Ancef. Given history of tricuspid valve repair a prolonged course of IV Ancef was planned.      Tone was somewhat slow to improve, although he did come off of BIPAP and his blood pressure improved. He developed some hemoptysis in sputum. Prior to admission Eliquis was stopped. CT of chest was obtained on 1/4/19. This showed areas of consolidation consistent with recent pneumonia. Streaky hemoptysis persisted. Pulmonary medicine at the [facility] was consulted with by phone. There was concern about possible small abscesses related to recent pneumonia. It was thought that bronchoscopy was not needed but that a prolonged course of antibiotics would be necessary. There was also consideration of bronchial artery embolization if hemoptysis worsened. For the last couple of days cannot has seems to stabilize. Now he is having just occasional streaky hemoptysis in his sputum. His breathing has improved. Infectious diseases recommending completion of 4 weeks of IV Ancef  followed by 4 weeks of oral Augmentin.  He was dismissed home on 1/8/19 on baseline 3 L supplemental oxygen.     He again presented to [facility] on 2/11/19 complaining of shortness of breath.  Chest CT showed small pulmonary emboli and concern for pneumonia.  Treated with Azithromycin for pneumonia.  Began taking Eliquis for pulmonary embolism.  He was dismissed home on 2/14/19.He again presented to [facility] on 2/16/19 complaining of shortness of breath.  His acute on chronic hypoxic respiratory failure was felt to be due to COPD exacerbation.  He was treated with parenteral corticosteroids, nebulized bronchodilators and noninvasive ventilation (BiPAP).       After admission to  hospital, pt had worsening dyspnea and required intermittent BiPAP treatment. Remained dyspneic with increased work of breathing, had mild Nicolasa with increase in Cr, then developed increasing lethargy and hypercapnia on 2/19 requiring continuous BiPAP and transferred to ICU. Has streaky opacities in the left side would be to actually be residual pneumonia, however with his elevated lactic acid he has remained on Zosyn but, suspect primary problem is COPD exacerbation and he is on IV steroids with scheduled nebs.He has a history of atrial fibrillation/flutter, status post prior ablation procedure.  Notes indicate he had episodes of atrial fibrillation with rapid ventricular response during recent hospital stays, and did follow-up with EP cardiology as outpatient.  They felt he may avoid repeat ablation, since atrial arrhythmias were likely exacerbated by his pulmonary problems and until respiratory status was stable, this would decrease chance of success with any ablation.  Dose of pro-path and known was increased.     As noted above, he had an episode of MSSA bacteremia in December.  He completed a lengthy course of IV antibiotics.  He was on Augmentin prior to admission due to recent COPD exacerbation.  He was  "treated with IV antibiotics and did complete a course of Augmentin.He had an episode where he was up to the bathroom and, \"slid to the floor.\"  Because he is on anticoagulants, head CT was obtained.  This was negative for intracranial hemorrhage, did show small vessel ischemic changes.  His dyspnea has improved and he  dismissed to [facility] for further evaluation and treatment.      Assessment/Plan  Principal Problem:    Acute on chronic respiratory failure with hypoxia and hypercapnia (H)    COPD (chronic obstructive pulmonary disease) (H)    ? Notes indicate he has, \"advanced COPD\" with multiple hospitalizations in the past 6 months.  He has had worsening dyspnea anytime his steroids are weaned.  ? Continue corticosteroids.  ? Continue duo nebs, Xopenex nebs as needed.  ? BiPAP at night  ? Per outside hospital, \"social work to see if patient would be a candidate for Home Trilogy machine and NIPPV, it sounds like he is but this would be a $5000 yearly rental fee.\"  ? pulmonary specialist recommended very close follow-up with pulmonology in clinic, chronic daily azithromycin therapy, and pulmonary rehab  ? Has been on Zosyn started 2/16, changed to Augmentin (completed course).       Active Problems:    Atrial flutter (H)    Bradycardia  ? status post previous ablation procedure.  He did have issues with atrial fibrillation with RVR during previous hospitalizations.  He did follow with [facility] Cardiology as an outpatient.  The thought was to avoid a repeat ablation procedure at this time given his recent infectious issues which would decrease his chance of success.  He did have his propafenone dose increased.    ? Continue diltiazem and propafenone   ? Cardiac monitoring  ? 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  EP wanting to avoid atempting flutter ablation until pulmonary issues are better controlled  ? Hold parameters added to Diltiazem, propafenone, but bradycardia during sleep not " unexpected  ? If he has symptomatic bradycardia, I would discuss with EP, decrease calcium channel blocker  ? Continue Eliquis for thromboembolism prevention. This was held at the time of discharge from previous hospitalization in January due to hemoptysis. No recurrence of hemoptysis and with concurrent PE, need to continue anti-coagulation   ? Asymptomatic bradycardia occasionally persists last EKG revealed a flutter with block.       H/O tricuspid valve repair    Pulmonary emboli (H)  ? just diagnosed a few days prior to recent admission  ? he had a small PE on the CT chest, probably an incidental finding, dyspnea mainly due severe COPD, in exacerbation  ? Continue Eliquis        Mixed hyperlipidemia  ? Not on a statin       BPH (benign prostatic hyperplasia)  ? Had been on terazosin  ? Assess voiding, consider bladder scan       Thrombocytopenia (H)  ? Mild  ? Recheck platelet count      Chronic right heart failure (H)  History of tricuspid valve replacement  ? Last TTE 1/4/19 showing mildly dilated RV along with decreased RV systolic function. Does have peripheral edema. Med list includes Lasix, however his spouse does not think he takes this. Apparently had significant significantly more edema during previous admission per his spouse.  ? Local compression for peripheral edema  ? Avoid overdiuresis due to recent acute kidney injury    Stress hyperglycemia  ? Continue basal insulin  ? Continue corrective dose insulin  ? hemoglobin A1c is at goal  ? Most blood sugar readings are at goal     Chronic low back pain:  ? Had been taking oxycodone 5 mg frequently at home for this.  His opiates have been held given his hypercapnia and tenuous respiratory status. As his goals are restorative, risk of opiates at this time outweigh potential benefits of pain control. Any amount likely to increase his CO2 retention leading worsening of his respiratory failure. Per outside hospital records, he also struggles with daytime  "sleepiness  ? Acetaminophen  ? Continue topical NSAID and Lidoderm patches  ? Mobilize patient  ? Patient refers to Trazodone and Seroquel to promote restful sleep     Goals of care  ? See discussion in note from outside hospital 2/26: \"Unfortunately despite multiple days of steroids, inhaled steroids, nebulization treatments he is not showing much clinical sign of improvement. He is extremely fatigued and lasts only 15-20 minutes off of BiPAP at a time. He is sleeping most of the day. If he is to improve with current therapy it is likely to take weeks. The patient is becoming more frustrated with his limited response to his respiratory treatment. At times he is saying statements like he feels like he is going to die and that he does not want to do this anymore. ...I spoke at length with him and his spouse regarding his current condition, current treatment plan which includes oral and inhaled steroids and intermittent BiPAP with goal for LTACH discharge and pulmonary rehab and slow improvement in his respiratory status, and alternative options including palliative care. After a long conversation the patient does confirm he wants to continue with current treatment plan and that he is just very frustrated overall. This will be an ongoing conversation with the patient, his spouse, and his care team.\"  ? Consider Palliative Care consult       Barriers to disposition:  Stable respiratory status, progress in therapies      Subjective:   No acute issues reported. Stable, working with PT. will likely need TCU    Objective:  Vital signs in last 24 hours:  Temp:  [96.9  F (36.1  C)-98.5  F (36.9  C)] 97.8  F (36.6  C)  Heart Rate:  [47-80] 80  Resp:  [14-20] 18  BP: (128-163)/(78-87) 163/82  FiO2 (%):  [30 %] 30 %  Weight:   210 lb (95.3 kg)    Intake/Output last 3 shifts:  I/O last 3 completed shifts:  In: 1206 [P.O.:1200; I.V.:6]  Out: 1900 [Urine:1900]  Intake/Output this shift:  I/O this shift:  In: 243 [P.O.:240; " I.V.:3]  Out: 175 [Urine:175]    Physical Exam:  General Appearance: Alert, cooperative, no distress.   Head: Normocephalic, without obvious abnormality, atraumatic  Eyes: PERRL, conjunctiva/corneas clear, both eyes.  Wears glasses.  Nose: Nares normal, no drainage.  Wearing nasal cannula.  Throat: has upper denture,   Neck: Supple, symmetrical, trachea midline, no adenopathy;              Lungs: decreased breath sounds throughout  Chest Wall: well healed midline sternotomy  Heart: Regular rate and rhythm, with occasional extra beats  Abdomen: Soft, non-tender, bowel sounds active. No obvious organomegaly or mass. . Small, healed incision midline (likely site of prior chest tube)  Extremities: bilateral lower extremity edema  Pulses: DP pulses are 1-2+ bilat.    Skin: no rashes or lesions.  Trevor complexion.  Neurologic: moves both arms and legs.        apixaban  5 mg Oral BID     azithromycin  250 mg Oral Q M, W, F     budesonide  0.5 mg Nebulization BID     clonazePAM  1 mg Oral QHS     diclofenac sodium  2 g Topical QID     diltiazem  180 mg Oral BID     famotidine  20 mg Oral BID     furosemide  20 mg Oral BID     guaiFENesin ER  600 mg Oral BID     insulin aspart (NovoLOG) injection   Subcutaneous TID with meals     insulin aspart (NovoLOG) injection   Subcutaneous QHS     insulin glargine  10 Units Subcutaneous QHS     ipratropium-albuterol  3 mL Nebulization Q4H while awake     lidocaine  2 patch Transdermal Q24H     menthol-zinc oxide   Topical TID     miconazole   Topical BID     miconazole nitrate   Topical BID     predniSONE  10 mg Oral Daily with brkfst     propafenone  225 mg Oral Q8H     sodium chloride  3 mL Intravenous Line Care     tamsulosin  0.4 mg Oral Daily after brkfst     traZODone  50 mg Oral QHS     Imaging: (I have personally reviewed the results) CHEST PORTABLE ONE VIEW   2/16/2019 10:20 PM     HISTORY: Shortness of breath.     COMPARISON: Earlier the same day at 1:40 PM.      IMPRESSION:  Improving streaky bibasilar opacities since prior.  Perihilar vascular prominence is again seen. Cardiac silhouette is  unchanged. No new focal infiltrates. No significant pleural effusion  or pneumothorax.     TALON DAWSON MD    Lab Results: (I have personally reviewed the results)    All medication issues identified within the last 24 hours have been addressed and completed as appropriate.    DVT prophylaxis:  Eliquis  GI prophylaxis:  PPI    Sahil Solomon MD,[facility] Medicine.

## 2021-07-22 NOTE — PLAN OF CARE
Blood pressure 148/85, pulse 80, temperature 97.6  F (36.4  C), temperature source Oral, resp. rate 26, height 6' (1.829 m), weight 204 lb 14.4 oz (92.9 kg), SpO2 95 %.        INITIAL 3 NIGHTS BIPAP NOTE :    UNIT TYPE:  V60   MASK TYPE:  Medium    SETTINGS:      MODE - S/T   IPAP - 14   EPAP -7   RATE - 12    FI02 - 30%      TIME OF MASK PLACEMENT : 10:50 pm  TWO HOUR SKIN CHECK DONE AT :     INTERVENTION INITIATED -  Pt does have a clearTegaderm Film over his nose bridge from other Hospital. No skin breaks noted.     PATIENT'S TOLERANCE OF DEVICE -   Pt is currently being placed on Bipap Machine and tolerating well. Pt is able to maintain adequate saturations and stable respirations. RT will continue to monitor and support.

## 2021-07-22 NOTE — PLAN OF CARE
Problem: Pain  Goal: Patient's pain/discomfort is manageable  Outcome: Progressing  Pt's pain was managed by PRN Tylenol, scheduled Voltaren and Lidocaine.    Problem: Blood pressure is elevated above 140 over 90 mmHg  Goal: Blood pressure (BP) is within acceptable limits  Outcome: Not Progressing  SBP continues to be above 140, is on scheduled propafenone and Diltiazem.

## 2021-07-22 NOTE — PROGRESS NOTES
[facility] Pulmonary Consult Note  Tone Cooper,  1950, MRN [id number]  Admitting Dx: acute on chronic respiratroy failure    Overnight Events  Did not tolerate bipap last night  Sleeping this morning, no complaints    Assessment/Plan : severe COPD on home oxygen 3 lpm, HTN, paroxysmal atrial fibrillation, anticoagulated, tricuspid valve repair, nonsustained VT and several hospitalization over the last few months who was admitted to [place] for [facility] on .    COPD with acute exacerbation causing acute resp failure with hypoxia  - on azithro MWF  - on duonebs  - on bipap at night (however, had a negative sleep study)  - He did not tolerate the biapp last night, only wore it for 2 hours  - will see how he does off it today and it will be available if needed    Radha Greene DO  Pulmonary and Critical Care Attending  pgr [phone]    Patient Care Time excluding procedures and family discussions greater than: 30 Minutes    Allergies   Allergen Reactions     Amlodipine      Flecainide      Meds: See MAR    Physical Exam:  BP (!) 154/95   Pulse 72   Temp 97.6  F (36.4  C) (Oral)   Resp 20   Ht 6' (1.829 m)   Wt 210 lb (95.3 kg)   SpO2 97%   BMI 28.48 kg/m    Intake/Output this shift:  I/O this shift:  In: 240 [P.O.:240]  Out: 275 [Urine:275]  GEN: NAD  HEENT: Pupils reactive, MMM  CVS: regular rhythm, no murmurs, 2+LE edema  RESP: CTA BL  Chest/MS: no chest wall abnormalities  ABD: No abdominal pain with palpation, no guarding, no rigidity  Skin: Warm, no rashes on visible skin  Vasc: radial pulses intact carmen  NEURO:  CN2-12 grossly intact, moving all extremities  PSYCH: Normal affect    Pertinent Labs  Lab Results: personally reviewed.   Recent Results (from the past 24 hour(s))   POCT Glucose - 4 times daily before meals and at bedtime   Result Value Ref Range    Glucose 149 (H) 70 - 139 mg/dL   POCT Glucose - 4 times daily before meals and at bedtime   Result Value Ref Range     Glucose 342 (H) 70 - 139 mg/dL   POCT Glucose - 4 times daily before meals and at bedtime   Result Value Ref Range    Glucose 145 (H) 70 - 139 mg/dL   POCT Glucose   Result Value Ref Range    Glucose 139 70 - 139 mg/dL   POCT Glucose - 4 times daily before meals and at bedtime   Result Value Ref Range    Glucose 87 70 - 139 mg/dL       Radha Greene DO  Pulmonary and Critical Care Attending  pgr [phone]

## 2021-07-22 NOTE — PLAN OF CARE
Impaired Gas Exchange      Demonstrate improved ventilation and adequate oxygenation of tissues as evidenced by absence of respiratory distress Progressing        Ineffective Airway Clearance      Maintain airway patency Progressing         BIPAP/CPAP NOTE    UNIT TYPE:  V60  MASK TYPE:  Special nasal/oral mask. Pt has moderate leaks at mask (70 LPM) d/t beard.    SETTINGS:      BIPAP - S/T, RR 4 (Unit has minimum rate of 4), 14/7, 30%    PATIENT'S TOLERANCE OF DEVICE - Nanci well.    D: BiPAP on hold tonight per order to do overnight oxim recording on usual O2 during the day - 2 or 3L NC and no BIPAP. O2 titrated to 2L NC.    A/R: Scheduled neb tx's nanci well. Good, congested cough. Pt sx's self orally.    P: Continue current resp tx's.

## 2021-07-22 NOTE — PROGRESS NOTES
[facility]ist Daily Progress Note    Assessment/Plan  68 y.o. old male with a PMH significant for COPD both steroid and oxygen dependent, right heart failure, atrial fibrillation on chronic anticoagulation, hypertension admitted to [facility] on 5/26/2019 with a acute on chronic respiratory failure secondary to COPD exacerbation.  He was admitted to the ICU and started on BiPAP.  He was treated with empiric antibiotics as well as high-dose steroids.  He initially required BiPAP almost continuously but is now requiring it intermittently.  He continues to require 3 to 4 L/min oxygen per nasal cannula.  Note that he tires 20 mg of prednisone at home chronically as well as oxygen per nasal cannula at 3 L/min continuously.  He was transitioned to oral steroids and is now on oral prednisone.  He gets short of breath with minimal exertion such as getting out of bed.  This is coupled with hypoxia and tachycardia.    Acute on chronic respiratory failure hypoxia and hypercapnia  COPD exacerbation  On 3 L continuous nasal cannula and chronic prednisone (recently increased to 20 mg) at home.  Thought to be a viral bronchitis.  Continues to need bipap prn for dyspnea.  Chest pain likely related to tachycardia/afib due to hypoxia.  -Continue 3 L oxygen via continuous nasal cannula  -BiPAP at night and as needed  -Continue Pulmicort nebs, duo nebs scheduled  -Continue Xopenex nebs as needed  -continue twice daily Mucomyst nebs  -Continue prednisone 60 mg, follow on this dose for now but when he weans it will be a slow wean and he will end at 20 mg which was his new previous home dose.     Poorly controlled hyperglycemia secondary to steroids  Blood glucose 202-258.  -add NPH 5U qam with prednisone  -Continue Accu-Cheks and sliding scale insulin  -Follow for need to add scheduled insulin as he had been on recently.     Atrial fibrillation  Hypertension  Right heart failure  BLE edema c/w volume overload due to  acute right sided congestive heart failure  -increase Lasix to 40 mg 3x daily  -Continue Eliquis and diltiazem     Lung nodules  -Follow-up as outpatient    DVT prophylaxis:  Eliquis  GI prophylaxis:  None, eating.    Subjective:  Chest pain earlier with dyspnea, now ok.  Thinks it may be related to bipap machine being unhooked.  Details unclear.  Wants medication timing adjusted, breathing still more labored than baseline, no other questions.    Objective:  Vital signs in last 24 hours:  Temp:  [96.7  F (35.9  C)-98.3  F (36.8  C)] 97.8  F (36.6  C)  Heart Rate:  [55-85] 74  Resp:  [14-24] 24  BP: (133-174)/(72-94) 142/93  Weight:   206 lb 6.4 oz (93.6 kg)    Intake/Output last 3 shifts:  I/O last 3 completed shifts:  In: -   Out: 500 [Urine:500]  Intake/Output this shift:  I/O this shift:  In: 340 [P.O.:340]  Out: -     Physical Exam:  General Appearance: Lying in bed, increased work of breathing  HEENT:  NC/AT, nasal cannula   Lungs:  CTA B, distant, decreased air movement  Cardiovascular:  Irreg irreg, trace - 1+ BLE edema  Abdomen:  S/NT/ND, +BS  Extremities:  Well developed  Skin:  Erythematous face  Neurologic:  A&O x3  Psychologic:  Calm    Imaging: I have independently visualized the image.  None    Lab Results:  BG per above       All medication issues identified within the last 24 hours have been addressed and completed as appropriate.    Barriers to disposition:  Intermittent bipap.    D/w nursing.    Jolene Wagner MD  Internal Medicine  [facility]

## 2021-07-22 NOTE — PLAN OF CARE
Daily Care      Daily care needs are met Progressing        Pain      Patient's pain/discomfort is manageable Progressing        Safety      Patient will be injury free during hospitalization Progressing    Vitals stable, HR occasional bradycardiac.  PRN Tylenol given once for low back pain.  Pt is on tele: A-fib or A-flutter. All daily cares done. Wife visited, supportive.  Will continue to monitor.

## 2021-07-22 NOTE — PROGRESS NOTES
Therapeutic Recreation Evaluation    Summary: Pt alert X4, independent in leisure activity, enjoys the company of his spouse, watches TV, has phone to access the internet, declines TR resources, approach is room checks for leisure needs.       06/03/19 1030   Time Spent With Patient   Treatment Start Time 1030   Treatment End Time 1045   Leisure Interests   Leisure Interests Computer social networking;Cooking/Baking;Exercise;Fishing;Movies/TV;Listening to music;Watching sports;Volunteering  (motorcycling)   Prior Status   Independent With Leisure/Interests   Lives With Spouse   Cognition   Responsiveness WFL   Attention WFL   Orientation WFL   Memory WFL   Evaluation   Arousal/Alertness WFL   Direction Following Intact         Get To Know Me Poster     Occupation: Board Director of [employer]   Important People: Wife, daughter, son, 8 grandkids   Pets:   Stress me out: Jake Navarro   Make me feel better: Music   Achievements:   Movie: Adventure/drama   TV Show: LAUREL RIVAS the code   Book:   Music: [employer] Dsouza Buddy Guy   Sport/Team: Football   Activities/Hobbies: mowing, shoveling, yard work, motorcycling, cooking/baking, computer, exercising, fishing, music   Foods: hamburgers   Understand Info Best:   Other Things to Know:     Mau Yancey, Rec Therapist

## 2021-07-22 NOTE — PLAN OF CARE
Pt came on 3LNC. Sat 90-91, hr 83-85, rr 18-20, pt will go on Bipap :V60, IPAP 14, EPAP 6, RATE 12, fio2 30%,  Plan: cont. Current plan of care

## 2021-07-22 NOTE — PROGRESS NOTES
[facility] Hospitalist Daily Progress Note    Summary:  Tone Cooper is a 68 year old male with history of severe COPD (on home oxygen 3 lpm), atrial fibrillation (anticoagulated with Elliquis), tricuspid valve repair, hypertension, nonsustained VT, and two hospitalizations in the last two months of 2018 for COPD exacerbations. He presented to the ED on 12/17/18 with 1.5 weeks of productive cough and shortness of breath.      He was found to have severe sepsis, community acquired pneumonia of right lung (by CXR), and hypoxic respiratory failure. He was admitted for further evaluation and treatment. Initially, he required BIPAP support. He was treated with Rocephin, Zithromax, nebs, and Prednisone. Rocephin was changed to Zosyn shortly after admission. One of two admission blood cultures grew staph. Infectious disease was consulted. Vancomycin was started. This staph ended up being MSSA and it grew in sputum, also. Antibiotics were changed to Ancef. Given history of tricuspid valve repair a prolonged course of IV Ancef was planned.      Tone was somewhat slow to improve, although he did come off of BIPAP and his blood pressure improved. He developed some hemoptysis in sputum. Prior to admission Eliquis was stopped. CT of chest was obtained on 1/4/19. This showed areas of consolidation consistent with recent pneumonia. Streaky hemoptysis persisted. Pulmonary medicine at the [facility] was consulted with by phone. There was concern about possible small abscesses related to recent pneumonia. It was thought that bronchoscopy was not needed but that a prolonged course of antibiotics would be necessary. There was also consideration of bronchial artery embolization if hemoptysis worsened. For the last couple of days cannot has seems to stabilize. Now he is having just occasional streaky hemoptysis in his sputum. His breathing has improved. Infectious diseases recommending completion of 4 weeks of IV Ancef  followed by 4 weeks of oral Augmentin.  He was dismissed home on 1/8/19 on baseline 3 L supplemental oxygen.     He again presented to [facility] on 2/11/19 complaining of shortness of breath.  Chest CT showed small pulmonary emboli and concern for pneumonia.  Treated with Azithromycin for pneumonia.  Began taking Eliquis for pulmonary embolism.  He was dismissed home on 2/14/19.      He again presented to [facility] on 2/16/19 complaining of shortness of breath.  His acute on chronic hypoxic respiratory failure was felt to be due to COPD exacerbation.  He was treated with parenteral corticosteroids, nebulized bronchodilators and noninvasive ventilation (BiPAP).       After admission to  hospital, pt had worsening dyspnea and required intermittent BiPAP treatment. Remained dyspneic with increased work of breathing, had mild Nicolasa with increase in Cr, then developed increasing lethargy and hypercapnia on 2/19 requiring continuous BiPAP and transferred to ICU. Has streaky opacities in the left side would be to actually be residual pneumonia, however with his elevated lactic acid he has remained on Zosyn but, suspect primary problem is COPD exacerbation and he is on IV steroids with scheduled nebs.     He has a history of atrial fibrillation/flutter, status post prior ablation procedure.  Notes indicate he had episodes of atrial fibrillation with rapid ventricular response during recent hospital stays, and did follow-up with EP cardiology as outpatient.  They felt he may avoid repeat ablation, since atrial arrhythmias were likely exacerbated by his pulmonary problems and until respiratory status was stable, this would decrease chance of success with any ablation.  Dose of pro-path and known was increased.     As noted above, he had an episode of MSSA bacteremia in December.  He completed a lengthy course of IV antibiotics.  He was on Augmentin prior to admission due to recent COPD  "exacerbation.  He was treated with IV antibiotics and did complete a course of Augmentin.     He had an episode where he was up to the bathroom and, \"slid to the floor.\"  Because he is on anticoagulants, head CT was obtained.  This was negative for intracranial hemorrhage, did show small vessel ischemic changes.  His dyspnea has improved and he is dismissed to [facility] for further evaluation and treatment        Assessment/Plan  Principal Problem:    Acute on chronic respiratory failure with hypoxia and hypercapnia (H)    COPD (chronic obstructive pulmonary disease) (H)  ? Notes indicate he has, \"advanced COPD\" with multiple hospitalizations in the past 6 months.  He has had worsening dyspnea anytime his steroids are weaned.  ? Oxycodone was discontinued, felt that it could contribute to respiratory depression  ? Continue corticosteroids  ? Continue duo nebs, Xopenex nebs as needed  ? BiPAP at night  ? Per outside hospital, \"social work to see if patient would be a candidate for Home Trilogy machine and NIPPV, it sounds like he is but this would be a $5000 yearly rental fee.\"  ? pulmonary specialist recommended very close follow-up with pulmonology in clinic, chronic daily azithromycin therapy, and pulmonary rehab  ? Suspect HCAP: Recently treated for pneumonia and was on Augmentin prior to admission. Streaky opacities remaining on the left which may just be residual from previous pneumonia. He did have elevated lactic acid.  ? Has been on Zosyn started 2/16, changed to Augmentin (completed course)     Active Problems:    Atrial flutter (H)  ? status post previous ablation procedure.  He did have issues with atrial fibrillation with RVR during previous hospitalizations.  He did follow with [facility] Cardiology as an outpatient.  The thought was to avoid a repeat ablation procedure at this time given his recent infectious issues which would decrease his chance of success.  He did have his propafenone dose " increased.    ? Continue diltiazem and propafenone   ? Monitor with telemetry  ? Continue Eliquis for thromboembolism prevention. It seems like this was held at the time of discharge from previous hospitalization in January due to hemoptysis. No recurrence of hemoptysis and with concurrent PE, need to continue anti-coagulation        H/O tricuspid valve repair    Pulmonary emboli (H)  ? just diagnosed a few days prior to recent admission  ? he had a small PE on the CT chest. I suspect this is more of an incidental finding with the major contributor being COPD to his symptoms. He received about 4 days of heparin and Lovenox in the hospital and was sent home on Eliquis.   ? Continue Eliquis        Mixed hyperlipidemia  ? Not on a statin       BPH (benign prostatic hyperplasia)  ? Had been on terazosin  ? Assess voiding, consider bladder scan       Thrombocytopenia (H)  ? Mild  ? Recheck platelet count       Chronic right heart failure (H)  History of tricuspid valve replacement  ? Last TTE 1/4/19 showing mildly dilated RV along with decreased RV systolic function. Does have peripheral edema. Med list includes Lasix, however his spouse does not think he takes this. Apparently had significant significantly more edema during previous admission per his spouse.  ? Local compression for peripheral edema  ? Avoid overdiuresis due to recent acute kidney injury    Stress hyperglycemia  ? Continue basal insulin  ? Add corrective dose insulin, assess need for prandial insulin  ? Check hemoglobin A1c     Chronic low back pain:  ? Had been taking oxycodone 5 mg frequently at home for this.  His opiates have been held given his hypercapnia and tenuous respiratory status. As his goals are restorative, risk of opiates at this time outweigh potential benefits of pain control. Any amount likely to increase his CO2 retention leading worsening of his respiratory failure. Per outside hospital records, he also struggles with daytime  "sleepiness  ? Acetaminophen  ? Continue topical NSAID and Lidoderm patches  ? Mobilize patient  ? Patient refers to Trazodone and Seroquel as, \"my pain pills.\"  We discussed that while these medications are not indicated for pain, promoting restful sleep can help pain.  Times adjusted per patient request.     Goals of care  ? See discussion in note from outside hospital 2/26: \"Unfortunately despite multiple days of steroids, inhaled steroids, nebulization treatments he is not showing much clinical sign of improvement. He is extremely fatigued and lasts only 15-20 minutes off of BiPAP at a time. He is sleeping most of the day. If he is to improve with current therapy it is likely to take weeks. The patient is becoming more frustrated with his limited response to his respiratory treatment. At times he is saying statements like he feels like he is going to die and that he does not want to do this anymore. ...I spoke at length with him and his spouse regarding his current condition, current treatment plan which includes oral and inhaled steroids and intermittent BiPAP with goal for LTACH discharge and pulmonary rehab and slow improvement in his respiratory status, and alternative options including palliative care. After a long conversation the patient does confirm he wants to continue with current treatment plan and that he is just very frustrated overall. This will be an ongoing conversation with the patient, his spouse, and his care team.\"  ? Consider Palliative Care consult       Barriers to disposition:  Stable respiratory status, progress in therapies      Subjective:   \"Not too well, actually.\"  Patient says he was given Trazodone and Seroquel at bedtime (2200), he woke at 0200 and couldn't get back to sleep.  Denies shortness of breath at rest. No chest pain.  No GI complaints.    Objective:  Vital signs in last 24 hours:  Temp:  [97.6  F (36.4  C)-98.4  F (36.9  C)] 98.4  F (36.9  C)  Heart Rate:  [76-90] " 76  Resp:  [14-28] 17  BP: (130-148)/(79-86) 140/86  FiO2 (%):  [30 %] 30 %  Weight:   204 lb 14.4 oz (92.9 kg)    Intake/Output last 3 shifts:  I/O last 3 completed shifts:  In: -   Out: 1475 [Urine:1475]  Intake/Output this shift:  No intake/output data recorded.    Physical Exam:  General Appearance: Alert, cooperative, no distress.  Good sense of humor.  Head: Normocephalic, without obvious abnormality, atraumatic  Eyes: PERRL, conjunctiva/corneas clear, both eyes.  Wears glasses.  Nose: Nares normal, no drainage.  Wearing nasal cannula.  Throat: has upper denture, not in at time of exam  Neck: Supple, symmetrical, trachea midline, no adenopathy;              Lungs: decreased breath sounds throughout  Chest Wall: well healed midline sternotomy  Heart: Regular rate and rhythm, with occasional extra beats  Abdomen: Soft, non-tender, bowel sounds active. No obvious organomegaly or mass.  Eccymosis consistent with recent injection. Small, healed incision midline (likely site of prior chest tube)  Extremities: bilateral lower extremity edema  Pulses: DP pulses are 1-2+ bilat.    Skin: no rashes or lesions.  Trevor complexion.  Neurologic: moves both arms and legs.        Imaging: (I have personally reviewed the results) CHEST PORTABLE ONE VIEW   2/16/2019 10:20 PM     HISTORY: Shortness of breath.     COMPARISON: Earlier the same day at 1:40 PM.      IMPRESSION: Improving streaky bibasilar opacities since prior.  Perihilar vascular prominence is again seen. Cardiac silhouette is  unchanged. No new focal infiltrates. No significant pleural effusion  or pneumothorax.     TALON DAWSON MD    Lab Results: (I have personally reviewed the results)    All medication issues identified within the last 24 hours have been addressed and completed as appropriate.      DVT prophylaxis:  Eliquis  GI prophylaxis:  JENNY Alcantar M.D.  Internal Medicine  [facility] Service  Pager: [phone]     Total time on visit:  35 minutes  Time spent for counseling, coordination of care:   > 20 minutes including personal review and interpretation of labs and studies above, and discussion with patient, RN care manager, social work regarding respiratory failure, back pain, sedatives, pain relief

## 2021-07-22 NOTE — PROGRESS NOTES
Met with patient and Niyah CALABRESE RN, CM for What To Expect today and completed assessment with pt. Typed copy to follow.  Lucinda Clay, MSW, LICSW

## 2021-07-22 NOTE — PLAN OF CARE
Problem: Physical Therapy  Goal: PT Goals  LTGS to be met by: 3/29/19  In order to progress towards home,  1. Patient will perform basic transfers [place]  2. Patient will perform car transfer [place]  3. Patient will ambulate 200ft with no AD [place], vitals stable.   4. Patient will ascend/descend 1 flight of steps with one handrail and [place], vitals stable.  5. Patient will score low falls risk; >= 20/24 on DGI     STGS to be met by: 3/15/19  To progress towards LTGs,  1. Patient will perform basic transfers standby  2. Patient will perform car transfer prashant  3. Patient will ambulate 150ft with RW & stable vitals.  4. Patient will ascend/descend 3 steps with one handrail and contact guard assistance c vitals stable in <= 2min.   5. Patient will progress towards score low falls risk, intiaiting DGI s AD.    Goals were initiated on 3/1/2019 by Brittany L Dressler, PT.  Goals reviewed 3/7/2019 by Brittany L Dressler, PT.   Goals were reviewed on 3/7/2019 by Madiha Garber PTA.      Outcome: Completed Date Met: 03/13/19  Physical Therapy Discharge Summary    Date of PT Discharge: 3/13/2019   Refer to Care Plan goals above for progress towards goals at time of discharge.   Goals Met? partially  Barriers to achieve above goals: some goals above pt's baseline, time for recovery on others.   Recommended Equipment: Walker- provided.   Discharge Destination: Home with family assist.  Discharge Recommendations: Ongoing short, frequent bouts of activity, take your time on the stairs with breaks ahead of time.   Discharge Comments: All the best!

## 2021-07-22 NOTE — PROGRESS NOTES
[facility] Hospitalist Daily Progress Note     Summary:  68 y.o. old male with a PMH significant for COPD both steroid and oxygen dependent, right heart failure, atrial fibrillation on chronic anticoagulation, hypertension admitted to [facility] on 5/26/2019 with a acute on chronic respiratory failure secondary to COPD exacerbation.  He was admitted to the ICU and started on BiPAP.  He was treated with empiric antibiotics as well as high-dose steroids.  He initially required BiPAP almost continuously but is now requiring it intermittently.  He continues to require 3 to 4 L/min oxygen per nasal cannula.  Note that he tires 20 mg of prednisone at home chronically as well as oxygen per nasal cannula at 3 L/min continuously.  He was transitioned to oral steroids and is now on oral prednisone.  He gets short of breath with minimal exertion such as getting out of bed.  This is coupled with hypoxia and tachycardia.    Assessment/Plan  Acute on chronic respiratory failure hypoxia and hypercapnia  COPD exacerbation  On 3 L continuous nasal cannula and chronic prednisone (recently increased to 20 mg) at home. Continues to need bipap prn for dyspnea.  -Continue 3 L oxygen via continuous nasal cannula  -BiPAP at night and as needed  -Continue duo nebs scheduled  -No Mucomyst and budesonide nebs per patient request  -Continue Xopenex nebs as needed  -Continue prednisone 60 mg for 7 days total (6/1- 6/7), decreased by 10 mg every week until he reaches 20 mg.  Keep him on 20 mg until he follows up with his primary care doctor.  -Continue guaifenesin long-acting 600 mg twice a day      Poorly controlled hyperglycemia secondary to steroids  Blood glucose 123-235  -Increase NPH to 7 units qam with prednisone  -Continue Accu-Cheks and sliding scale insulin  -Today's blood sugar readings are at goal     Atrial fibrillation  Hypertension  Right heart failure  BLE edema c/w volume overload due to acute right sided congestive  "heart failure, urinating frequently.  PTA dose 40 mg 3 times daily per patient.  Contraction alkalosis and decreasing prerenal azotemia noted.  -Continue Lasix 40 mg 3x daily (patient prefers the current dosage timing)  -Continue Eliquis and diltiazem  -Follow BMP  -Trial of lisinopril 10 mg daily, titrate up for adequate response  -Blood pressure readings are closer to goal     Lung nodules  -Follow-up as outpatient    DVT prophylaxis:  Eliquis  GI prophylaxis:  None, eating.    Subjective: \"The only nebulizer I am using is my DuoNebs.  I used those other ones and I needed CPAP for the whole day.\"  Patient believes Mucomyst and other nebulizer treatments worsen his respiratory distress.  He is also concerned about his blood pressure, says there are wide swings in blood pressure, \"I need to get that under control.\"  Feels he is ready for discharge, says the distance between his bed and the bathroom at home is very short, he can navigate without falling at home.  Tells me he understands the seriousness of his medical conditions and is checking things off his \"bucket list.\"    Objective:  Vital signs in last 24 hours:  Temp:  [96.8  F (36  C)-97.8  F (36.6  C)] 97.8  F (36.6  C)  Heart Rate:  [65-94] 80  Resp:  [15-22] 17  BP: (135-160)/() 145/88  FiO2 (%):  [25 %] 25 %  Weight:   207 lb 8 oz (94.1 kg)    Intake/Output last 3 shifts:  I/O last 3 completed shifts:  In: 840 [P.O.:840]  Out: 2400 [Urine:2400]  Intake/Output this shift:  I/O this shift:  In: -   Out: 300 [Urine:300]    Physical Exam:  General Appearance: Lying in bed, increased work of breathing  HEENT:  NC/AT, nasal cannula   Lungs:  CTA B, distant, decreased air movement  Cardiovascular:  Irreg irreg, trace - 1+ BLE edema  Abdomen:  S/NT/ND, +BS  Extremities:  Well developed  Skin:  Erythematous face  Neurologic:  A&O x3  Psychologic:  Calm    Imaging: I have independently visualized the image.  None    Lab Results: Sodium 142 potassium 3.7 " chloride 93 CO2 21 BUN 36 creatinine 0.91 calcium 9.5 glucose 115       All medication issues identified within the last 24 hours have been addressed and completed as appropriate.    Barriers to disposition:  Intermittent bipap.    Nitza Alcantar M.D.  Internal Medicine  Hospital Medicine Service  Pager: [phone]     Total unit/floor time on visit: 35 minutes  Time spent for counseling, coordination of care:   > 20 minutes including personal review and interpretation of labs and studies above, and discussion with patient, charge RN, unit RN, RN care manager regarding acute on chronic respiratory failure, nebulizer treatments, antihypertensive medications, progress in therapies, discharge planning

## 2021-07-22 NOTE — PLAN OF CARE
Vital signs stable. /84   Pulse (P) 78   Temp 98.5  F (36.9  C) (Oral)   Resp 20   Ht 6' (1.829 m)   Wt 210 lb (95.3 kg)   SpO2 (P) 96%   BMI 28.48 kg/m       Placed pt on BIPAP 14/7, RATE 12,30% for night,, SPO2 95-98%, HR 65-78, RR 20-22. BRADYCARDIA reported from overnight last night, EKG needs to be done when and if HR IS 40 AND  BELOW .,  ABG ALSO NEEDS TO BE CHECKED WHEN PATIENT IS ON BIPAP   atleast 4-6 hours

## 2021-07-22 NOTE — PROGRESS NOTES
[facility] Hospitalist Daily Progress Note    Assessment/Plan  68 y.o. old male with a PMH significant for COPD both steroid and oxygen dependent, right heart failure, atrial fibrillation on chronic anticoagulation, hypertension admitted to [facility] on 5/26/2019 with a acute on chronic respiratory failure secondary to COPD exacerbation.  He was admitted to the ICU and started on BiPAP.  He was treated with empiric antibiotics as well as high-dose steroids.  He initially required BiPAP almost continuously but is now requiring it intermittently.  He continues to require 3 to 4 L/min oxygen per nasal cannula.  Note that he tires 20 mg of prednisone at home chronically as well as oxygen per nasal cannula at 3 L/min continuously.  He was transitioned to oral steroids and is now on oral prednisone.  He gets short of breath with minimal exertion such as getting out of bed.  This is coupled with hypoxia and tachycardia.    Acute on chronic respiratory failure hypoxia and hypercapnia  COPD exacerbation  On 3 L continuous nasal cannula and chronic prednisone (recently increased to 20 mg) at home. Continues to need bipap prn for dyspnea.  -Continue 3 L oxygen via continuous nasal cannula  -BiPAP at night and as needed  -Continue duo nebs scheduled  -No Mucomyst and budesonide nebs per patient request  -Continue Xopenex nebs as needed  -Continue prednisone 60 mg for 7 days total (6/1- 6/7), decreased by 10 mg every week until he reaches 20 mg.  Keep him on 20 mg until he follows up with his primary care doctor.  -Continue guaifenesin long-acting 600 mg twice a day      Poorly controlled hyperglycemia secondary to steroids  Blood glucose 123-235  -Increase NPH to 7 units qam with prednisone  -Continue Accu-Cheks and sliding scale insulin     Atrial fibrillation  Hypertension  Right heart failure  BLE edema c/w volume overload due to acute right sided congestive heart failure, urinating frequently.  PTA dose 40 mg 3  times daily per patient.  Contraction alkalosis and decreasing prerenal azotemia noted.  -Continue Lasix 40 mg 3x daily (patient prefers the current dosage timing)  -Continue Eliquis and diltiazem  -Follow BMP  -Trial of lisinopril 10 mg daily, titrate up for adequate response     Lung nodules  -Follow-up as outpatient    DVT prophylaxis:  Eliquis  GI prophylaxis:  None, eating.    Subjective:  Called [facility] while I'm in his room in order to find out what other medication he is on.  Turns out he was on propofenone for a while but that was discontinued.  Concerned about his high blood pressure, likes the guaifenesin long-acting pills, breathing feels labored but no worse than yesterday, wife is at the bedside, does not want to change the time that he gets his Lasix despite his wife thinking that its too difficult for him to take his Lasix at night.  No other questions.    Objective:  Vital signs in last 24 hours:  Temp:  [97.6  F (36.4  C)-97.7  F (36.5  C)] 97.7  F (36.5  C)  Heart Rate:  [] 74  Resp:  [14-22] 15  BP: (128-168)/(78-99) 160/88  FiO2 (%):  [25 %] 25 %  Weight:   207 lb 8 oz (94.1 kg)    Intake/Output last 3 shifts:  I/O last 3 completed shifts:  In: 1044 [P.O.:1044]  Out: 2140 [Urine:2140]  Intake/Output this shift:  No intake/output data recorded.    Physical Exam:  General Appearance: Lying in bed, increased work of breathing  HEENT:  NC/AT, nasal cannula   Lungs:  CTA B, distant, decreased air movement  Cardiovascular:  Irreg irreg, trace - 1+ BLE edema  Abdomen:  S/NT/ND, +BS  Extremities:  Well developed  Skin:  Erythematous face  Neurologic:  A&O x3  Psychologic:  Calm    Imaging: I have independently visualized the image.  None    Lab Results: Sodium 142 potassium 3.7 chloride 93 CO2 21 BUN 36 creatinine 0.91 calcium 9.5 glucose 115       All medication issues identified within the last 24 hours have been addressed and completed as appropriate.    Barriers to disposition:  Intermittent  bipap.    D/w nursing, wife    Jolene Wagner MD  Internal Medicine  [facility] Service

## 2021-07-22 NOTE — DISCHARGE SUMMARY
[place] Discharge Summary    Admit date: 2/28/2019  Discharge date: 3/13/2019  Patient YOB: 1950   Disposition: Home with home health care    Discharge Diagnoses  Principal Problem:    Acute on chronic respiratory failure with hypoxia and hypercapnia (H)  Active Problems:    Atrial flutter (H)    COPD (chronic obstructive pulmonary disease) (H)    H/O tricuspid valve repair    Pulmonary emboli (H)    Mixed hyperlipidemia    BPH (benign prostatic hyperplasia)    Thrombocytopenia (H)    Chronic right heart failure (H)    Acute on chronic respiratory acidosis      Reason for Admission/HPI: Tone Cooper is a 68 year old male with history of severe COPD (on home oxygen 3 lpm), atrial fibrillation (anticoagulated with Elliquis), tricuspid valve repair, hypertension, nonsustained VT, and two hospitalizations in the last two months of 2018 for COPD exacerbations. He presented to the ED on 12/17/18 with 1.5 weeks of productive cough and shortness of breath.      He was found to have severe sepsis, community acquired pneumonia of right lung (by CXR), and hypoxic respiratory failure. He was admitted for further evaluation and treatment. Initially, he required BIPAP support. He was treated with Rocephin, Zithromax, nebs, and Prednisone. Rocephin was changed to Zosyn shortly after admission. One of two admission blood cultures grew staph. Infectious disease was consulted. Vancomycin was started. This staph ended up being MSSA and it grew in sputum, also. Antibiotics were changed to Ancef. Given history of tricuspid valve repair a prolonged course of IV Ancef was planned.      Tone was somewhat slow to improve, although he did come off of BIPAP and his blood pressure improved. He developed some hemoptysis in sputum. Prior to admission Eliquis was stopped. CT of chest was obtained on 1/4/19. This showed areas of consolidation consistent with recent pneumonia. Streaky hemoptysis persisted.      There was  concern about possible small abscesses related to recent pneumonia. It was thought that bronchoscopy was not needed but that a prolonged course of antibiotics would be necessary. There was also consideration of bronchial artery embolization if hemoptysis worsened. For the last couple of days cannot has seems to stabilize. Now he is having just occasional streaky hemoptysis in his sputum. His breathing has improved. Infectious diseases recommending completion of 4 weeks of IV Ancef followed by 4 weeks of oral Augmentin.  He was dismissed home on 1/8/19 on baseline 3 L supplemental oxygen.     He again presented to [facility] on 2/11/19 complaining of shortness of breath.  Chest CT showed small pulmonary emboli and concern for pneumonia.  Treated with Azithromycin for pneumonia.  Began taking Eliquis for pulmonary embolism.  He was dismissed home on 2/14/19.He again presented to [facility] on 2/16/19 complaining of shortness of breath.  His acute on chronic hypoxic respiratory failure was felt to be due to COPD exacerbation.  He was treated with parenteral corticosteroids, nebulized bronchodilators and noninvasive ventilation (BiPAP).       After admission to  hospital, pt had worsening dyspnea and required intermittent BiPAP treatment. Remained dyspneic with increased work of breathing, had mild Nicolasa with increase in Cr, then developed increasing lethargy and hypercapnia on 2/19 requiring continuous BiPAP and transferred to ICU. Has streaky opacities in the left side would be to actually be residual pneumonia, however with his elevated lactic acid he has remained on Zosyn but, suspect primary problem is COPD exacerbation and he is on IV steroids with scheduled nebs.    He has a history of atrial fibrillation/flutter, status post prior ablation procedure.  Notes indicate he had episodes of atrial fibrillation with rapid ventricular response during recent hospital stays, and did follow-up  "with EP cardiology as outpatient.  They felt he may avoid repeat ablation, since atrial arrhythmias were likely exacerbated by his pulmonary problems and until respiratory status was stable, this would decrease chance of success with any ablation.  Dose of pro-path and known was increased.     As noted above, he had an episode of MSSA bacteremia in December.  He completed a lengthy course of IV antibiotics.  He was on Augmentin prior to admission due to recent COPD exacerbation.  He was treated with IV antibiotics and did complete a course of Augmentin.He had an episode where he was up to the bathroom and, \"slid to the floor.\"  Because he is on anticoagulants, head CT was obtained.  This was negative for intracranial hemorrhage, did show small vessel ischemic changes.  His dyspnea has improved and he  dismissed to [facility] for further evaluation and treatment.       Hospital Course:   Tone was treated with corticosteroids, duo nebs, Xopenex nebs, supplemental oxygen and BiPAP at night.  He was seen by pulmonary specialists, they indicate he needs to continue using nocturnal BiPAP indefinitely.  He is on a azithromycin, now changed to 3 times weekly, Monday Wednesday Friday.  Discharging respiratory therapist will order BiPAP machine set at 14/7.  Patient should continue taking prednisone 10 mg daily until seen by his outpatient pulmonologist, Dr. Nancy Espinoza.  He should continue Mucinex 2 times daily.  RT is checking oxygen needs with exertion to arrange oxygen with portability at discharge.    Tone has atrial flutter heart rate was monitored on telemetry.  He had some bradycardia at rest, with heart rates as low as 40 bpm during sleep.  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  Notes from electrophysiology at outside hospital indicate they would like to avoid attempting repeat flutter ablation until his pulmonary issues are better controlled.  He continues Eliquis for thromboembolism prevention, and " for known, small pulmonary embolus.    He had been taking oxycodone 5 mg frequently at home for chronic low back pain.  Opiates were held at the outside hospital given his hypercapnia and tenuous respiratory status.  As his goals are restorative, the risk of opiates at this time were felt to outweigh the potential benefits.  Opiates can, of course, increase his CO2 retention and worsen respiratory failure.  Notes also indicate he struggles with daytime sleepiness.  He is using acetaminophen, topical NSAID, Lidoderm patches for back pain, also uses trazodone and Seroquel to promote restful sleep.    Tone has made progress in physical and respiratory therapy and is dismissed home with home health care in good condition.      Significant Diagnostic Studies and Procedures:  None at [facility]    Discharge Medications:     Medication List      ASK your doctor about these medications    apixaban 5 mg Tab tablet  Commonly known as:  ELIQUIS     azithromycin 250 MG tablet  Commonly known as:  ZITHROMAX     budesonide 0.5 mg/2 mL nebulizer solution  Commonly known as:  PULMICORT     clonazePAM 1 MG tablet  Commonly known as:  KlonoPIN     diclofenac sodium 1 % Gel  Commonly known as:  VOLTAREN     diltiazem 180 MG 24 hr capsule  Commonly known as:  CARDIZEM CD     famotidine 20 MG tablet  Commonly known as:  PEPCID     furosemide 40 MG tablet  Commonly known as:  LASIX     insulin glargine 100 unit/mL injection  Commonly known as:  LANTUS     ipratropium-albuterol 0.5-2.5 mg/3 mL nebulizer  Commonly known as:  DUO-NEB     levalbuterol 1.25 mg/3 mL nebulizer solution  Commonly known as:  XOPENEX     lidocaine 4 % patch     predniSONE 10 mg tablet  Commonly known as:  DELTASONE     propafenone 225 MG tablet  Commonly known as:  RYTHMOL     tamsulosin 0.4 mg Cap  Commonly known as:  FLOMAX     traZODone 50 MG tablet  Commonly known as:  DESYREL     VENTOLIN HFA 90 mcg/actuation inhaler  Generic drug:  albuterol             Discharge Orders   Bipap   Standing Status: Future Standing Exp. Date: 03/12/20   Order Comments: BIPAP at bedtime   IPAP 14, EPAP 7         Follow up needed  Follow-up with Dr. Nancy Espinoza at [facility] within 2-3 weeks after discharge      Diet: Regular  Weight bearing restrictions: none  Code Status: Full    Physical exam  /80 (Patient Position: Lying)   Pulse 83   Temp 98  F (36.7  C) (Oral)   Resp 16   Ht 6' (1.829 m)   Wt 210 lb (95.3 kg)   SpO2 94%   BMI 28.48 kg/m      I saw and examined the patient on the date of discharge.    Discharge Physician: Nitza Alcantar.      Total time spent on this discharge was greater than 30 minutes of which greater than 50% of the time was spent on patient care coordination and review of chart.

## 2021-07-22 NOTE — PROGRESS NOTES
P: SW will continue to follow for psychosocial and emotional support of patient and family. Anticipate discharge home with [facility] RN, PT and OT when respiratory equipment needs finalized and pt is deemed medically stable. Barriers to discharge: respiratory issues (BiPap vs NIV), telemetry.      SD: Patient was sitting up in bed, interactive and joking during meeting. His wife, Gloria, attended and asked questions appropriate to care progression. They both stated that their goal is to keep pt out of ED and hospital once he returns home.    OD: A Care Progression Meeting was held today in the pt's room. Staff present included Charo (RT), Nancy Russell (Pulmonary CNP), Ester (PT), Niyah Lujan (RN CM) and this SW.    RT/CNP: RT discussed respiratory equipment plans in depth. In process of qualifying pt for either home BiPap or NIV (Astral) through his home respiratory provider, [facility]. Will also work on portable o2 at pt's request. Confirmed that suction machine would not be covered under pt's diagnosis. Pt and wife will pursue private pay suction as they feel this has been helpful with his chronic cough. CNP confirmed respiratory plans. Anticipate testing Sunday and Monday nights.    PT: Doing well. Able to take a full lap around unit with 02. Focus is on building stamina. Patient confirmed that his baseline at home has been short walks and climbing 8 steps to kitchen. He required rest with exertion. Therapists recommend home PT and OT. Confirmed that pt has a walker at home. Suggested he get second walker so has one on each level. He and wife will pursue.    OT (PT reported): Working on ADLs. Requires SBA for upper dressing, grooming and hygiene. Lower not tested. Limited by fatigue and back pain. Cognition is at baseline.     MD: Discussed plan for home. Confirmed that family is available to assist most of the time. Discussed chronic nature of his disease -- best we can do is tune him  up. Pt expressed worry about feeling good now but having to go to ED once he's home. MD said that visits with primary MD, cardiologist, home care support and respiratory support at night should address his issues and help keep him out of the hospital.    MINA and RN CM: Discussed anticipated discharge mid-end of next week once home respiratory equipment determined and pt is medically stable. Pt and wife would like [facility] as he has been using [facility].     Lucinda Clay, MSW, LICSW

## 2021-07-22 NOTE — PLAN OF CARE
Problem: Inadequate Airway Clearance  Goal: Patient will maintain patent airway  Outcome: Progressing  Goal: Patient will achieve/maintain normal respiratory rate/effort  Outcome: Progressing     Problem: Inadequate Gas Exchange  Goal: Patient is adequately oxygenated and ventilation is improved  Outcome: Progressing   INITIAL 3 NIGHTS BIPAP/CPAP NOTE    UNIT TYPE:   V 60  MASK TYPE:  Face Mask    SETTINGS:      MODE: BIPAP   IPAP:    14   EPAP:    6   RATE:   12   FI02:      30%    TIME OF MASK PLACEMENT:  20:21    TWO HOUR SKIN CHECK DONE AT:  0032  Skin condition is good, no redness, break down, or swelling noted this shift.    INTERVENTION INITIATED:  Gel pad in place, no intervention is necessary at this time.     PATIENT'S TOLERANCE OF DEVICE:  Pt is on V60 Bipap 14/6, rate of 12, 30% at night and tolerating well with no distress.  Vital signs: Blood pressure 147/89, pulse 61, temperature 97.4  F (36.3  C), temperature source Axillary, resp. rate 15, height 6' (1.829 m), weight 207 lb 8 oz (94.1 kg), SpO2 95 %.  This is the second night on Bipap, continue current care plan.

## 2021-07-22 NOTE — PLAN OF CARE
Impaired Gas Exchange      Demonstrate improved ventilation and adequate oxygenation of tissues as evidenced by absence of respiratory distress Progressing        Ineffective Airway Clearance      Maintain airway patency Progressing         BIPAP/CPAP NOTE    UNIT TYPE:  V60  MASK TYPE:  Special nasal/oral mask. Pt has moderate leaks at mask (45-65L) d/t beard.    SETTINGS:      BIPAP - S/T, RR 12, 14/7, 30%    PATIENT'S TOLERANCE OF DEVICE - Nanci well.    D: Pt returned to BiPAP for night. Pt on 3L NC when off BiPAP.    A/R: Scheduled neb tx's nanci well. Good, congested cough. Pt sx's self orally.    P: Continue current resp tx's.

## 2021-07-22 NOTE — PROGRESS NOTES
[place] Hospitalist Daily Progress Note    Summary:  Tone Cooper is a 68 year old male with history of severe COPD (on home oxygen 3 lpm), atrial fibrillation (anticoagulated with Elliquis), tricuspid valve repair, hypertension, nonsustained VT, and two hospitalizations in the last two months of 2018 for COPD exacerbations. He presented to the ED on 12/17/18 with 1.5 weeks of productive cough and shortness of breath.      He was found to have severe sepsis, community acquired pneumonia of right lung (by CXR), and hypoxic respiratory failure. He was admitted for further evaluation and treatment. Initially, he required BIPAP support. He was treated with Rocephin, Zithromax, nebs, and Prednisone. Rocephin was changed to Zosyn shortly after admission. One of two admission blood cultures grew staph. Infectious disease was consulted. Vancomycin was started. This staph ended up being MSSA and it grew in sputum, also. Antibiotics were changed to Ancef. Given history of tricuspid valve repair a prolonged course of IV Ancef was planned.      Tone was somewhat slow to improve, although he did come off of BIPAP and his blood pressure improved. He developed some hemoptysis in sputum. Prior to admission Eliquis was stopped. CT of chest was obtained on 1/4/19. This showed areas of consolidation consistent with recent pneumonia. Streaky hemoptysis persisted. Pulmonary medicine at the [facility] was consulted with by phone. There was concern about possible small abscesses related to recent pneumonia. It was thought that bronchoscopy was not needed but that a prolonged course of antibiotics would be necessary. There was also consideration of bronchial artery embolization if hemoptysis worsened. For the last couple of days cannot has seems to stabilize. Now he is having just occasional streaky hemoptysis in his sputum. His breathing has improved. Infectious diseases recommending completion of 4 weeks of IV Ancef  followed by 4 weeks of oral Augmentin.  He was dismissed home on 1/8/19 on baseline 3 L supplemental oxygen.     He again presented to [facility] on 2/11/19 complaining of shortness of breath.  Chest CT showed small pulmonary emboli and concern for pneumonia.  Treated with Azithromycin for pneumonia.  Began taking Eliquis for pulmonary embolism.  He was dismissed home on 2/14/19.He again presented to [facility] on 2/16/19 complaining of shortness of breath.  His acute on chronic hypoxic respiratory failure was felt to be due to COPD exacerbation.  He was treated with parenteral corticosteroids, nebulized bronchodilators and noninvasive ventilation (BiPAP).       After admission to  hospital, pt had worsening dyspnea and required intermittent BiPAP treatment. Remained dyspneic with increased work of breathing, had mild Nicolasa with increase in Cr, then developed increasing lethargy and hypercapnia on 2/19 requiring continuous BiPAP and transferred to ICU. Has streaky opacities in the left side would be to actually be residual pneumonia, however with his elevated lactic acid he has remained on Zosyn but, suspect primary problem is COPD exacerbation and he is on IV steroids with scheduled nebs.He has a history of atrial fibrillation/flutter, status post prior ablation procedure.  Notes indicate he had episodes of atrial fibrillation with rapid ventricular response during recent hospital stays, and did follow-up with EP cardiology as outpatient.  They felt he may avoid repeat ablation, since atrial arrhythmias were likely exacerbated by his pulmonary problems and until respiratory status was stable, this would decrease chance of success with any ablation.  Dose of pro-path and known was increased.     As noted above, he had an episode of MSSA bacteremia in December.  He completed a lengthy course of IV antibiotics.  He was on Augmentin prior to admission due to recent COPD exacerbation.  He was  "treated with IV antibiotics and did complete a course of Augmentin.He had an episode where he was up to the bathroom and, \"slid to the floor.\"  Because he is on anticoagulants, head CT was obtained.  This was negative for intracranial hemorrhage, did show small vessel ischemic changes.  His dyspnea has improved and he  dismissed to [facility] for further evaluation and treatment.      Assessment/Plan  Principal Problem:    Acute on chronic respiratory failure with hypoxia and hypercapnia (H)    COPD (chronic obstructive pulmonary disease) (H)    ? Notes indicate he has, \"advanced COPD\" with multiple hospitalizations in the past 6 months.  He has had worsening dyspnea anytime his steroids are weaned.  ? Continue corticosteroids.  ? Continue duo nebs, Xopenex nebs as needed.  ? BiPAP at night  ? Per outside hospital, \"social work to see if patient would be a candidate for Home Trilogy machine and NIPPV, it sounds like he is but this would be a $5000 yearly rental fee.\"  ? pulmonary specialist recommended very close follow-up with pulmonology in clinic, chronic daily azithromycin therapy, and pulmonary rehab  ? Has been on Zosyn started 2/16, changed to Augmentin (completed course).     Active Problems:    Atrial flutter (H)    Bradycardia  ? status post previous ablation procedure.  He did have issues with atrial fibrillation with RVR during previous hospitalizations.  He did follow with [facility] Cardiology as an outpatient.  The thought was to avoid a repeat ablation procedure at this time given his recent infectious issues which would decrease his chance of success.  He did have his propafenone dose increased.    ? Continue diltiazem and propafenone   ? Cardiac monitoring  ? 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  EP wanting to avoid atempting flutter ablation until pulmonary issues are better controlled  ? Hold parameters added to Diltiazem, propafenone, but bradycardia during sleep not " unexpected  ? If he has symptomatic bradycardia, I would discuss with EP, decrease calcium channel blocker  ? Continue Eliquis for thromboembolism prevention. This was held at the time of discharge from previous hospitalization in January due to hemoptysis. No recurrence of hemoptysis and with concurrent PE, need to continue anti-coagulation        H/O tricuspid valve repair    Pulmonary emboli (H)  ? just diagnosed a few days prior to recent admission  ? he had a small PE on the CT chest, probably an incidental finding, dyspnea mainly due severe COPD, in exacerbation  ? Continue Eliquis        Mixed hyperlipidemia  ? Not on a statin       BPH (benign prostatic hyperplasia)  ? Had been on terazosin  ? Assess voiding, consider bladder scan       Thrombocytopenia (H)  ? Mild  ? Recheck platelet count      Chronic right heart failure (H)  History of tricuspid valve replacement  ? Last TTE 1/4/19 showing mildly dilated RV along with decreased RV systolic function. Does have peripheral edema. Med list includes Lasix, however his spouse does not think he takes this. Apparently had significant significantly more edema during previous admission per his spouse.  ? Local compression for peripheral edema  ? Avoid overdiuresis due to recent acute kidney injury    Stress hyperglycemia  ? Continue basal insulin  ? Continue corrective dose insulin  ? hemoglobin A1c is at goal  ? Most blood sugar readings are at goal     Chronic low back pain:  ? Had been taking oxycodone 5 mg frequently at home for this.  His opiates have been held given his hypercapnia and tenuous respiratory status. As his goals are restorative, risk of opiates at this time outweigh potential benefits of pain control. Any amount likely to increase his CO2 retention leading worsening of his respiratory failure. Per outside hospital records, he also struggles with daytime sleepiness  ? Acetaminophen  ? Continue topical NSAID and Lidoderm patches  ? Mobilize  "patient  ? Patient refers to Trazodone and Seroquel to promotw restful sleep     Goals of care  ? See discussion in note from outside hospital 2/26: \"Unfortunately despite multiple days of steroids, inhaled steroids, nebulization treatments he is not showing much clinical sign of improvement. He is extremely fatigued and lasts only 15-20 minutes off of BiPAP at a time. He is sleeping most of the day. If he is to improve with current therapy it is likely to take weeks. The patient is becoming more frustrated with his limited response to his respiratory treatment. At times he is saying statements like he feels like he is going to die and that he does not want to do this anymore. ...I spoke at length with him and his spouse regarding his current condition, current treatment plan which includes oral and inhaled steroids and intermittent BiPAP with goal for LTACH discharge and pulmonary rehab and slow improvement in his respiratory status, and alternative options including palliative care. After a long conversation the patient does confirm he wants to continue with current treatment plan and that he is just very frustrated overall. This will be an ongoing conversation with the patient, his spouse, and his care team.\"  ? Consider Palliative Care consult       Barriers to disposition:  Stable respiratory status, progress in therapies      Subjective:   No acute issues reported. Stable, working with PT      Objective:  Vital signs in last 24 hours:  Temp:  [97.6  F (36.4  C)-98.2  F (36.8  C)] 97.9  F (36.6  C)  Heart Rate:  [61-82] 82  Resp:  [18-24] 20  BP: (135-154)/(77-95) 135/82  FiO2 (%):  [30 %] 30 %  Weight:   210 lb (95.3 kg)    Intake/Output last 3 shifts:  I/O last 3 completed shifts:  In: 120 [P.O.:120]  Out: 3375 [Urine:3375]  Intake/Output this shift:  I/O this shift:  In: 240 [P.O.:240]  Out: 475 [Urine:475]    Physical Exam:  General Appearance: Alert, cooperative, no distress.   Head: Normocephalic, without " obvious abnormality, atraumatic  Eyes: PERRL, conjunctiva/corneas clear, both eyes.  Wears glasses.  Nose: Nares normal, no drainage.  Wearing nasal cannula.  Throat: has upper denture,   Neck: Supple, symmetrical, trachea midline, no adenopathy;              Lungs: decreased breath sounds throughout  Chest Wall: well healed midline sternotomy  Heart: Regular rate and rhythm, with occasional extra beats  Abdomen: Soft, non-tender, bowel sounds active. No obvious organomegaly or mass. . Small, healed incision midline (likely site of prior chest tube)  Extremities: bilateral lower extremity edema  Pulses: DP pulses are 1-2+ bilat.    Skin: no rashes or lesions.  Trevor complexion.  Neurologic: moves both arms and legs.        Imaging: (I have personally reviewed the results) CHEST PORTABLE ONE VIEW   2/16/2019 10:20 PM     HISTORY: Shortness of breath.     COMPARISON: Earlier the same day at 1:40 PM.      IMPRESSION: Improving streaky bibasilar opacities since prior.  Perihilar vascular prominence is again seen. Cardiac silhouette is  unchanged. No new focal infiltrates. No significant pleural effusion  or pneumothorax.     TALON DAWSON MD    Lab Results: (I have personally reviewed the results)    All medication issues identified within the last 24 hours have been addressed and completed as appropriate.    DVT prophylaxis:  Eliquis  GI prophylaxis:  PPI    Sahil Solomon MD,[facility].

## 2021-07-22 NOTE — PROGRESS NOTES
(WOC) WCC PT Progress Note    Today's Visit: No new skin concerns reported to writer, routine reassessment of wound, ostomy and skin concerns.      Labs:  Albumin   Date/Time Value Ref Range Status   03/06/2019 12:01 PM 2.6 (L) 3.5 - 5.0 g/dL Final     Hemoglobin A1c   Date/Time Value Ref Range Status   03/01/2019 06:58 AM 6.5 (H) 4.2 - 6.1 % Final     Recent Labs     03/07/19  0639   HGB 12.7*       Vitals:   The last obtained vitals are:   Temp: 98.6  F (37  C)  Heart Rate: (!) 59  Resp: 18  BP: 139/75    Nathaniel:  Nathaniel Scale Score: 19  Specialty Bed: Isogel ([place])  210 lb (95.3 kg)  Body mass index is 28.48 kg/m .      Wound Ostomy Continence Assessment/Plan:  Continence Scrotum Fungal Dermatitis    Entire scrotum is red and fungal in appearance, but less dry  Treatment Plan: Recommend Critic Aid AF    Gluteal and perianal Moisture Associated Skin Damage  Perianal blanchable erythema resolved, continues to have 3.5x0.5cm clean partial thickness at gluteal cleft.  Patient now ambulating to toilet for BMs, changing from Calmoseptine to mepilex on gluteal cleft  Treatment Plan: Start Mepilex and stop Calmoseptine q shift   Last Prealbumin: none recorded  Nathaniel score: 19          Bed: Isogel           daily for heel elevation     Discussed plan of care with nurse and patient.   Outcomes and treatment recommendations are to promote skin integrity, contain exudate and promote wound healing.  Jamar Almonte, PT, WCC, CLT

## 2021-07-22 NOTE — PROGRESS NOTES
WOC RN Assessment Note     Today's Visit: New consult, full head to toe assessment completed. Areas of concern listed below.    Labs:  Albumin   Date/Time Value Ref Range Status   03/12/2019 06:46 AM 2.5 (L) 3.5 - 5.0 g/dL Final     Hemoglobin A1c   Date/Time Value Ref Range Status   03/01/2019 06:58 AM 6.5 (H) 4.2 - 6.1 % Final     No results for input(s): HGB in the last 72 hours.    Invalid input(s): HEM    Vitals:   The last obtained vitals are:   Temp: 97.8  F (36.6  C)  Heart Rate: 74  Resp: 24  BP: (!) 142/93    Nathaniel:  Nathaniel Scale Score: 18  Specialty Bed: Isogel ([place])  206 lb 6.4 oz (93.6 kg)  Body mass index is 27.99 kg/m .      Wound Ostomy Continence Assessment/Plan:  Wound Left forearm skin tear   0.7x2.3cm 100% clean   Treatment Plan: Mepilex (3x3) change every 5 days     Right shin abrasion   Per patient fell into a fire pit 2x0.5cm thick superficial scabbing   Treatment Plan: Start Aquaphor        Last Prealbumin: None on file  Nathaniel score: 18          Bed: Isogel          Pillows for heel elevation     Discussed plan of care with nurse and patient.   Outcomes and treatment recommendations are to promote skin integrity, contain exudate and promote wound healing.    Judith Cooper BSN RN CWOCN

## 2021-07-22 NOTE — PROGRESS NOTES
Therapeutic Recreation Evaluation        03/01/19 0900   Time Spent With Patient   Treatment Start Time 930   Treatment End Time 1000   Leisure Interests   Leisure Interests Cooking/Baking;Computer social networking;Exercise;Fishing;Movies/TV;Listening to music;Watching sports  (mow, shovel, yardwork, motorcycing )   Prior Status   Independent With Leisure/Interests   Lives With Spouse   Vision-Basic Assessment   Current Vision Wears glasses all the time   Cognition   Responsiveness WFL   Orientation WFL   Memory WFL   Evaluation   Hearing Aids Yes   Arousal/Alertness WFL     Pt was alert and able to answer all leisure interests questions. Pt was provided with leisure resource list and declined all materials and 1:1 sessions at this time. Plan to do room checks and see if pt needs anything.   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[place] [employer]   Important People: Wife, daughter, son, 8 grandkids   Pets:   Stress me out: Jake Navarro   Make me feel better: Music   Achievements:   Movie: Adventure/drama   TV Show: LAUREL RIVAS, the code   Book:   Music: [employer] Dsouza Buddy Guy   Sport/Team: Football   Activities/Hobbies: mowing, shoveling, yard work, motorcycling, cooking/baking, computer, exercising, fishing, music   Foods: hamburgers   Understand Info Best:   Other Things to Know:   Wilda Koenig, Recreational Therapist Student   Student in direct line of sight of supervising therapist with supervising therapist directing treatment and plan of care.    Oriana Ruelas, Rec Therapist

## 2021-07-22 NOTE — PLAN OF CARE
Problem: Physical Therapy  Goal: PT Goals  LTGS to be met by: 3/29/19  In order to progress towards home,  1. Patient will perform basic transfers Wood[place]  2. Patient will perform car transfer [place]  3. Patient will ambulate 200ft with no AD [place], vitals stable.   4. Patient will ascend/descend 1 flight of steps with one handrail and [place]row, vitals stable.  5. Patient will score low falls risk; >= 20/24 on DGI     STGS to be met by: 3/15/19  To progress towards LTGs,  1. Patient will perform basic transfers standby  2. Patient will perform car transfer prashant  3. Patient will ambulate 150ft with RW & stable vitals.  4. Patient will ascend/descend 3 steps with one handrail and contact guard assistance c vitals stable in <= 2min.   5. Patient will progress towards score low falls risk, intiaiting DGI s AD.    Goals were initiated on 3/1/2019 by Brittany L Dressler, PT.  Goals reviewed 3/7/2019 by Brittany L Dressler, PT.   Goals were reviewed on 3/7/2019 by Madiha Garber PTA.       Physical Therapy Weekly Summary    Current Mobility:  Bed Mobility - SBA    Transfers - sBA  Ambulation- up to 60' min/CGA FWW  Stairs- not tested  Balance:  Balance Test - not tested  Fall Risk - High   Equipment - NC. Telemetry.  Precautions - Telemetry.     Frequency: 4-6 x week    Barriers to therapy: Room/unit treatment. Telemetry. Deconditioned. Chronic back pain. Low activity tolerance. De SATs easily.  Assessment/Progress towards goals: Pt making slow gains due to above barriers. Rapidly fatigues with all activity. Requires long rests with PLB cues to continue to participate.   Continue per established POC

## 2021-07-22 NOTE — PROGRESS NOTES
[facility] Pulmonary Consult Note  BRENDA Constantino 1950, MRN [id number]  Admitting Dx: acute on chronic respiratroy failure    Overnight Events  Was on bipap last night  No concerns this morning  Gets winded and short of breath with going to the bathroom  Wants compression socks    Assessment/Plan : severe COPD on home oxygen 3 lpm, HTN, paroxysmal atrial fibrillation, anticoagulated, tricuspid valve repair, nonsustained VT and several hospitalization over the last few months who was admitted to [place] for [facility] on .    COPD with acute exacerbation causing acute resp failure with hypoxia  - on azithro MWF  - on duonebs  - on bipap at night (however, had a negative sleep study)  - may need to get a ABG at some point if he wants to go home with bipap    Radha Greene DO  Pulmonary and Critical Care Attending  pgr [phone]    Patient Care Time excluding procedures and family discussions greater than: 30 Minutes    Allergies   Allergen Reactions     Amlodipine      Flecainide      Meds: See MAR    Physical Exam:  /85 (Patient Position: Lying)   Pulse 82   Temp 97.8  F (36.6  C) (Oral)   Resp 24   Ht 6' (1.829 m)   Wt 202 lb (91.6 kg)   SpO2 93%   BMI 27.40 kg/m    Intake/Output this shift:  I/O this shift:  In: -   Out: 375 [Urine:375]  GEN: NAD  HEENT: Pupils reactive, MMM  CVS: regular rhythm, no murmurs, 2+LE edema  RESP: CTA BL  Chest/MS: no chest wall abnormalities  ABD: No abdominal pain with palpation, no guarding, no rigidity  Skin: Warm, no rashes on visible skin  Vasc: radial pulses intact carmen  NEURO:  CN2-12 grossly intact, moving all extremities  PSYCH: Normal affect    Pertinent Labs  Lab Results: personally reviewed.   Recent Results (from the past 24 hour(s))   POCT Glucose - 4 times daily before meals and at bedtime   Result Value Ref Range    Glucose 320 (H) 70 - 139 mg/dL   POCT Glucose - 4 times daily before meals and at bedtime   Result Value Ref Range     Glucose 221 (H) 70 - 139 mg/dL   POCT Glucose - 4 times daily before meals and at bedtime   Result Value Ref Range    Glucose 115 70 - 139 mg/dL       Cultures:  personally reviewed images:  No results found.     Radha Greene DO  Pulmonary and Critical Care Attending  pgr [phone]

## 2021-07-22 NOTE — PLAN OF CARE
Vital signs stable. /77 (Patient Position: Lying)   Pulse 61   Temp 97.6  F (36.4  C) (Axillary)   Resp 21   Ht 6' (1.829 m)   Wt 210 lb (95.3 kg)   SpO2 97%   BMI 28.48 kg/m       Pt wore BIPAP for 2 hrs and 16 min, and pulled the mask off, RT tried to put mask back on, pt refused , pt was placed on 3L/NC rest of the night, SPO2 96-97%

## 2021-07-22 NOTE — PLAN OF CARE
Patient/family/caregiver demonstrates understanding of disease process, treatment plan, medications, and discharge instructions Progressing      Patient's pain/discomfort is manageable Progressing      Perineal skin integrity is maintained or improved Progressing      Patient has been continent with urinal multiple times this shift. No new skin concerns. Patient slept approximately 5 hours this shift. Patient rates pain appropriately and requests tylenol PRN, rating lower back pain 9/10. Sleeping upon reassessment. Pleasant and cooperative with all cares

## 2021-07-22 NOTE — PLAN OF CARE
INITIAL 3 NIGHTS BIPAP/CPAP NOTE    UNIT TYPE:  V 60  MASK TYPE:  Nose/mouth mask    SETTINGS:      MODE - st   IPAP - 14   EPAP - 7   RATE - 12   VT -    MIN PRESSURE -    MAX PRESSURE -    FI02 - 30%   02 BLED IN -   TIME OF MASK PLACEMENT - 2200  TWO HOUR SKIN CHECK DONE AT  - 0426    INTERVENTION INITIATED - no gel applied nasal cushion mask  PATIENT'S TOLERANCE OF DEVICE - pt. Tolerated well on mask. However, pt. Had low HR 44 bpm-58 bpm and ECG done. Result was transmitted. Continue monitoring.

## 2021-07-22 NOTE — PLAN OF CARE
Daily Care      Daily care needs are met Progressing        Pain      Patient's pain/discomfort is manageable Progressing        Safety      Patient will be injury free during hospitalization Progressing    BP was elevated at the beginning of shift, BP rechecked WNL.  PRN Tylenol given twice for low back pain.  Skin cares done.  One medium formed BM. No visitors this evening. All daily cares done. Will continue to monitor.

## 2021-07-22 NOTE — PLAN OF CARE
Problem: Impaired Gas Exchange  Goal: Demonstrate improved ventilation and adequate oxygenation of tissues as evidenced by absence of respiratory distress  Outcome: Progressing     Problem: Knowlegde Deficit  Goal: Demonstrate technique for CPAP/BiPAP  Outcome: Progressing      BIPAP/CPAP NOTE    UNIT TYPE:  V60  Pt has home BiPAP & home O2. Pt states that his home equipment is ready to use when he gets home tomorrow.    MASK TYPE:  Full face mask. Pt also uses oral/under-the-nose mask occasionally.  Pt wants to take masks & tubing home.    SETTINGS:   BIPAP - S/T, RR 12, 14/6, 24%    PATIENT'S TOLERANCE OF DEVICE - Nanci well. Assisted pt with placing mask on.    D: Pt on 4L NC during daytime.    A/R: Scheduled neb tx's nanci well.    P: Continue current resp tx's. DC to home pending tomorrow, Thursday, 6/06.

## 2021-07-22 NOTE — PLAN OF CARE
Problem: Safety  Goal: Patient will be injury free during hospitalization  Outcome: Progressing     Problem: Daily Care  Goal: Daily care needs are met  Outcome: Progressing     Problem: Psychosocial Needs  Goal: Demonstrates ability to cope with hospitalization/illness  Outcome: Progressing   Pt was admitted to unit at 1517. Pt is alert and oriented and is able to make needs known to staff. Wife was at bedside for most of the shift. Pt had good appetite, ate 100% of dinner. Pt received PRN Clonazepam at bedtime and is on Bipap at night.

## 2021-07-22 NOTE — PROGRESS NOTES
Progress Notes by Ron Villavicencio PharmD at 5/31/2019  3:50 PM     Author: Hay, Ron R, PharmD Service: Pharmacy Author Type: Pharmacist    Filed: 5/31/2019  3:53 PM Date of Service: 5/31/2019  3:50 PM Status: Attested    : Ron Villavicencio PharmD (Pharmacist) Cosigner: Jolene Wagner MD at 5/31/2019  5:14 PM    Attestation signed by Jolene Wagner MD at 5/31/2019  5:14 PM    All medication issues identified in the admission drug regimen review have been addressed and completed as appropriate.                Pharmacy Note: Admission Drug Regimen Review    Admission drug regimen review has been completed. The following medication issues were identified.    1. Pt was on Lantus 15 units daily and Novolog sliding scale, but not ordered on discharge. It looks like this was due to hyperglycemia due to prednisone. Please evaluate if needed.  2. Pt was on famotidine at [facility], but not on discharge orders.    Thank you,  Ron Villavicencio PharmD 5/31/2019 3:50 PM

## 2021-07-22 NOTE — PLAN OF CARE
INITIAL 3 NIGHTS BIPAP/CPAP NOTE.  Third night on BIPAP mask.    UNIT TYPE:  V 60  MASK TYPE:  Nose/mouth mask    SETTINGS:      MODE - ST   IPAP - 14   EPAP - 7   RATE - 12    FI02 - 30%   02 BLED IN -   TIME OF MASK PLACEMENT - 2300  TWO HOUR SKIN CHECK DONE AT  - 0154    INTERVENTION INITIATED - no gel applied nasal cushion mask  PATIENT'S TOLERANCE OF DEVICE -  Patient is tolerating well. Patient had same leak around his mask but declined for mask adgestment. Blood pressure 160/87, pulse (!) 56, temperature 97.1  F (36.2  C), temperature source Axillary, resp. rate 16, height 6' (1.829 m), weight 202 lb (91.6 kg), SpO2 93 %.

## 2021-07-22 NOTE — PLAN OF CARE
Pt resting comfort on BiPAP  V60: ST 14/6, RR 12, O2 25 %. RR 16, STV >600cc,  sat 94%.during the day he was on 4l nc. Sat 92%,   BS  decreased T/O, faint e. Wheezes.. Duo neb x 2 given  Pt has strong congested  cough. Contin to monitor      EDWAR GonzalezT

## 2021-07-22 NOTE — PLAN OF CARE
Potential for Compromised Skin Integrity      Skin integrity is maintained or improved Progressing        Skin denuded due to urinary incontinence, cleanse perineum/buttocks, and apply moist barrier cream after incontinence. Offer/encourage use of urinal.

## 2021-07-22 NOTE — PROGRESS NOTES
"[facility] - Progress Note    Assessment:  Principal Problem:    Acute on chronic respiratory failure with hypoxia and hypercapnia (H)  Active Problems:    Atrial flutter (H)    COPD (chronic obstructive pulmonary disease) (H)    H/O tricuspid valve repair    Pulmonary emboli (H)    Mixed hyperlipidemia    BPH (benign prostatic hyperplasia)    Thrombocytopenia (H)    Chronic right heart failure (H)    Acute on chronic respiratory acidosis    Overnight events: on BIPAP most of night, abg not done  12lead EKG done-pt denied any CP \"they just woke me up\"  afib/flutter    Assessment:  1. COPD with acute exacerbation causing acute resp failure with hypoxia  - on azithro MWF  - on duonebs, change to Q 4 hours while awake, allowing treatent if awake at night  - Continue bipap at night (however, had a negative sleep study)  -check ABG when he has slept at least 4-6 hours on BIPAP-Rt did not do today-will try again 3/8  -May need to qualify him for NIPPV for home  -Agree with maintaining prednisone 10 mg daily until seen as outpatient  -Continue mucinex two times a day  -Will need to arrange for O2 with portability at discharge    2.  Severe COPD, O2 dependent  To f/u with Dr Espinoza [facility] after DC    3. Aflutter  4. bradycardia    Subjective  68 year old with severe COPD on home oxygen 3 lpm, HTN, paroxysmal atrial fibrillation, anticoagulated, tricuspid valve repair, nonsustained VT and 2 hospitalization over the last few months for COPD exacerbations. These hospitalization required BIPAP and treatment for small pulmonary emboli and staph pneumonia and bacteremia,treated with Ancef. He was admitted to [facility] for [facility] care on 2/28.    Today he says he is shortness of breath at rest, which is unchanged. He does not have pain. He says he sleeps ok on the BIPAP. He is able to cough productively. He denies wheezing. He says his pulmonologist is Nancy Espinoza, although he has not seen her yet as he keeps being " readmitted to hospital. He takes duonebs scheduled, has home O2, but not porability, and uses albuterol for emergencies.    Current FiO2 (%): 30 % BIPAP 14/7 ST with rr12 with sats high 90's%; 3 L during the day with sats 91-95%; he tolerated BIPAP better last night with better fitting mask (no gel applied)    Objective    Vital signs in last 24 hours: aferile; HR 4-83; BP stable  Vitals:    03/07/19 0356 03/07/19 0442 03/07/19 0713 03/07/19 0748   BP: 128/78   163/82   Patient Position: Lying   Lying   Pulse: 69 (!) 47 (!) 56 (!) 57   Resp: 18 14 20 18   Temp: 96.9  F (36.1  C)   97.8  F (36.6  C)   TempSrc: Axillary   Oral   SpO2: 96% 97% 94% 95%   Weight:       Height:           Weight:   210 lb (95.3 kg)    Intake/Output last 3 shifts    Intake/Output Summary (Last 24 hours) at 3/7/2019 0903  Last data filed at 3/7/2019 0356  Gross per 24 hour   Intake 1203 ml   Output 1575 ml   Net -372 ml       Review of Systems   Pertinent items are noted in HPI.    Physical Exam:  /78 (Patient Position: Lying)   Pulse 65   Temp 98.5  F (36.9  C) (Oral)   Resp 20   Ht 6' (1.829 m)   Wt 210 lb (95.3 kg)   SpO2 97%   BMI 28.48 kg/m    Intake/Output this shift:  No intake/output data recorded.    GEN: NAD  HEENT: NC PERRL  CVS: regular rhythm, no murmurs, 2+pedal edema  RESP: CTA BL  Chest/MS: no chest wall abnormalities  ABD: No abdominal pain with palpation, no guarding, no rigidity  Skin: Warm, no rashes on visible skin  Vasc: radial pulses intact carmen  NEURO:  CN2-12 grossly intact, moving all extremities  PSYCH: Normal affect    Pertinent Labs:   Lab Results:   Lab Results   Component Value Date     03/06/2019    K 4.3 03/06/2019    CL 96 (L) 03/06/2019    CO2 36 (H) 03/06/2019    BUN 31 (H) 03/06/2019    CREATININE 0.95 03/06/2019    CALCIUM 9.7 03/06/2019     Lab Results   Component Value Date    WBC 8.3 03/07/2019    HGB 12.7 (L) 03/07/2019    HCT 41.2 03/07/2019    MCV 97 03/07/2019     (L)  03/07/2019       Pertinent Radiology:  CHEST PORTABLE ONE VIEW   2/16/2019 10:20 PM     HISTORY: Shortness of breath.     COMPARISON: Earlier the same day at 1:40 PM.   Other Result Information   Interface, Radiant Ib - 02/16/2019 10:53 PM CST  CHEST PORTABLE ONE VIEW   2/16/2019 10:20 PM     HISTORY: Shortness of breath.     COMPARISON: Earlier the same day at 1:40 PM.      IMPRESSION: Improving streaky bibasilar opacities since prior.  Perihilar vascular prominence is again seen. Cardiac silhouette is  unchanged. No new focal infiltrates. No significant pleural effusion     Total time spent on pt examination and coordination of care was 40 min  I discussed junior bowie with Dr Gavin Ovalle, CNP   [facility]

## 2021-07-22 NOTE — PLAN OF CARE
Problem: Daily Care  Goal: Daily care needs are met  Outcome: Progressing     Problem: Blood pressure is elevated above 140 over 90 mmHg  Goal: Blood pressure (BP) is within acceptable limits  Outcome: Progressing     Patients daily care needs met. Pt currently in bed on Bipap appears to be sleeping and comfortable. Patients BP high during shift, PRN hydralazine given as well as scheduled diltiazem for hypertension.  MD notified and updated. RN will continue to monitor.

## 2021-07-22 NOTE — PLAN OF CARE
Impaired Gas Exchange      Demonstrate improved ventilation and adequate oxygenation of tissues as evidenced by absence of respiratory distress Progressing        Ineffective Airway Clearance      Maintain airway patency Progressing         BIPAP/CPAP NOTE    UNIT TYPE:  V60  MASK TYPE:  Special nasal/oral mask.    SETTINGS:      BIPAP - S/T, RR 12, 14/7, 30%    PATIENT'S TOLERANCE OF DEVICE - Nanci well.    D: Pt returned to BiPAP for night. Pt on 3L NC when off BiPAP.    A/R: Scheduled neb tx's nanci well. Good, congested cough. Pt sx's self orally.     Per pt request, pt wore BiPAP for 25min (4944-4003) d/t ERICKSON after use of bathroom.    P: Continue current resp tx's.

## 2021-07-22 NOTE — PLAN OF CARE
Daily Care      Daily care needs are met Progressing        Pain      Patient's pain/discomfort is manageable Progressing        Psychosocial Needs      Demonstrates ability to cope with hospitalization/illness Progressing        Pt was admitted to unit at 1600. Wife at bedside. VSS. Pt is pleasant, A&O x4, and is able to make needs known to staff. Blood sugars were 283 and 255. Pt's back pain was managed with PRN Tylenol with some relief. Had good appetite, ate 95% of dinner.

## 2021-07-22 NOTE — PROGRESS NOTES
Pt cont on 3lnc bs decreased and clear, strong congested cough at times productive. Received duoneb x2, pulmicort x1. Stated he would like not to be waken up at night to adjust mask.

## 2021-07-22 NOTE — PLAN OF CARE
Problem: Inadequate Airway Clearance  Goal: Patient will maintain patent airway  Outcome: Progressing  Goal: Patient will achieve/maintain normal respiratory rate/effort  Outcome: Progressing     Problem: Inadequate Gas Exchange  Goal: Patient is adequately oxygenated and ventilation is improved  Outcome: Progressing   INITIAL 3 NIGHTS BIPAP/CPAP NOTE    UNIT TYPE: V 60  MASK TYPE: Face Mask    SETTINGS:      MODE -BIPAP   IPAP - 14   EPAP - 6   RATE -12   VT - *   MIN PRESSURE -    MAX PRESSURE -    FI02 - 30%   02 BLED IN -    TIME OF MASK PLACEMENT - 20:21    TWO HOUR SKIN CHECK DONE AT  22:21-       INTERVENTION INITIATED -  Gel pad applied     PATIENT'S TOLERANCE OF DEVICE - tolerating well

## 2021-07-22 NOTE — PLAN OF CARE
Problem: Occupational Therapy  Goal: OT Goals  Long Term Goal to be met by 4/5/19:   - Bed mobility with modified independence.  - Basic transfer with modified independence.  - Advanced transfers with SBA.  - UB dressing with SBA.  - LB dressing with SBA.  - Participation in grooming/hygiene tasks with SBA standing EOB/at sink.  - Participation in 20 minutes of B UE Exercise with moderate resistance.  - SBA simple IADLs.  - Pt will incorporate breathing techniques/energy saving 75% of the time during ADLs/IADLs/mobility with min cues.    Short Term Goals to be met by 3/15/19:  - Bed mobility with CGA.  - Basic transfer with min assist.  - UB dressing with set-up, using appropriate adaptive equipment PRN.  - LB dressing with mod assist, using appropriate adaptive equipment PRN.  - Participation in grooming/hygiene tasks with SBA.  - Participation in cognitive assessment to assist with treatment and discharge planning.  - Participation in 10 minutes of B UE Exercise with light resistance.  - Participate in cog assess/tx.  - Pt will incorporate breathing techniques/energy saving with moderate cues during ADLs/IADLs/mobility.    Goals initiated on 3/1/2019 by ANDREW Begum/DONNELL     See OT eval completed on 3/1/19 for further details.  ANDREW Mahmood/DONNELL

## 2021-07-22 NOTE — PLAN OF CARE
Anxiety is at manageable level Progressing    Patient calm and compliant with most cares.    Patient's pain/discomfort is manageable Progressing    Patient continues to have ongoing pain, especially in back. PRN and scheduled medication given and effective for patient. Previous night patient required addt'l PRN medication.     Nursing staff will continue to monitor. Jamar Bolaños 3/11/2019

## 2021-07-22 NOTE — H&P
[facility]     Tone Cooper,  1950, MRN [id number]    PCP: Ana Pratt MD, [phone]      Transferring facility   [facility]       Admission diagnoses Severe COPD exacerbation secondary to likely viral bronchitis.  Acute on chronic hypoxic and hypercapnic respiratory failure.  Oxygen dependent  Chronic prednisone  Atrial fibrillation. On Eliquis.  Hyperglycemia due to steroid use.  Lung nodule  History of PE  Heart failure with preserved ejection fraction  Right heart failure     PMH Past Medical History:   Diagnosis Date     Atrial flutter (H)      Cardiomyopathy (H)      COPD (chronic obstructive pulmonary disease) (H)      Diverticulitis      H/O tricuspid valve repair      HTN (hypertension)      Pulmonary emboli (H)      Past Surgical History:   Procedure Laterality Date     APPENDECTOMY       HERNIA REPAIR       LAPAROSCOPIC CHOLECYSTECTOMY       TOTAL HIP ARTHROPLASTY Left      Tricuspid valve repair          HPI:    Tone Cooper is a 68 y.o. old male with a PMH significant for COPD both steroid and oxygen dependent, right heart failure, atrial fibrillation on chronic anticoagulation, hypertension. History is provided by reviewing the records from [facility], speaking with the transferring provider, and talking with the patient and his wife.    Patient was admitted to [facility] on 2019 with a acute on chronic respiratory failure secondary to COPD exacerbation.  He was admitted to the ICU and started on BiPAP.  He was treated with empiric antibiotics as well as high-dose steroids.  He initially required BiPAP almost continuously but is now requiring it intermittently.  He continues to require 3 to 4 L/min oxygen per nasal cannula.  Note that he tires 20 mg of prednisone at home chronically as well as oxygen per nasal cannula at 3 L/min continuously.  He was transitioned to oral steroids and is now on oral  prednisone.  He gets short of breath with minimal exertion such as getting out of bed.  This is coupled with hypoxia and tachycardia.  He was medically optimized and transferred to [facility] for ongoing medical management including intermittent BiPAP need secondary to ongoing acute on chronic respiratory failure.  Upon arrival, he is comfortable.  His breathing feels okay with his current nasal cannula oxygen, he has no pain, he admits to becoming dyspneic with the littlest of activities including getting out of bed.  He does not feel weak, he was able to ambulate independently at [facility].  He has no other questions, his wife is at the bedside.      Review of Systems:  A complete review of systems was obtained and is negative except as outlined in the HPI.    Allergies Allergies   Allergen Reactions     Amlodipine      Flecainide         Medications Scheduled Meds:    acetylcysteine (MUCOMYST) 20% inhalation solution  4 mL Inhalation BID - RT     apixaban  5 mg Oral BID     budesonide  0.5 mg Nebulization BID - RT     diltiazem  180 mg Oral BID     furosemide  40 mg Oral BID - diuretic     insulin aspart (NovoLOG) injection   Subcutaneous TID with meals     insulin aspart (NovoLOG) injection   Subcutaneous QHS     ipratropium-albuterol  3 mL Nebulization QID - RT     lidocaine  2 patch Transdermal Q24H MEGHAN     [START ON 6/1/2019] predniSONE  60 mg Oral DAILY     [START ON 6/1/2019] tamsulosin  0.4 mg Oral Daily after brkfst     traZODone  50 mg Oral QHS     Continuous Infusions:  PRN Meds:.acetaminophen, albuterol, alteplase, clonazePAM, dextrose 50 % (D50W), glucagon (human recombinant), levalbuterol, sodium chloride 0.9%     Family History Family History   Problem Relation Age of Onset     Stroke Mother      COPD Mother      Hypertension Mother      Prostate cancer Father         Social History Social History     Socioeconomic History     Marital status:      Spouse name: None     Number of  children: None     Years of education: None     Highest education level: None   Occupational History     None   Social Needs     Financial resource strain: None     Food insecurity:     Worry: None     Inability: None     Transportation needs:     Medical: None     Non-medical: None   Tobacco Use     Smoking status: Former Smoker     Types: Cigarettes     Last attempt to quit: 2/28/2009     Years since quitting: 10.2     Smokeless tobacco: Never Used   Substance and Sexual Activity     Alcohol use: Yes     Alcohol/week: 1.8 oz     Types: 3 Cans of beer per week     Frequency: 2-3 times a week     Drinks per session: 3 or 4     Binge frequency: Never     Drug use: No     Sexual activity: Not Currently   Lifestyle     Physical activity:     Days per week: None     Minutes per session: None     Stress: None   Relationships     Social connections:     Talks on phone: None     Gets together: None     Attends Islam service: None     Active member of club or organization: None     Attends meetings of clubs or organizations: None     Relationship status: None     Intimate partner violence:     Fear of current or ex partner: None     Emotionally abused: None     Physically abused: None     Forced sexual activity: None   Other Topics Concern     None   Social History Narrative    .        Physical Exam:  BP (!) 155/94 (Patient Position: Lying)   Pulse 85   Temp 97.8  F (36.6  C) (Oral)   Resp 20   Ht 6' (1.829 m)   Wt 206 lb 6.4 oz (93.6 kg)   SpO2 90%   BMI 27.99 kg/m      General Appearance: Mild increased work of breathing, erythematous face, lying in hospital bed with oxygen via nasal cannula  Head: Normocephalic, without obvious abnormality, atraumatic  Eyes: PERRL, conjunctiva/corneas clear, EOMI   Nose: Nares normal, no drainage, nasal cannula  Throat: Lips, mucosa, and tongue normal, teeth and gums normal  Neck: Supple, symmetrical, trachea midline, no adenopathy.   Lungs: Decreased  throughout  Heart: Regular rate and rhythm, no murmur, no LE edema  Abdomen: Soft, non-tender, bowel sounds active.  : No Rodney  Extremities:   Well developed.  Skin: No rashes or lesions  Neurologic: Normal and equal strength BUE and BLE.  Alert and oriented x3        Assessment and Plan:     Acute on chronic respiratory failure hypoxia and hypercapnia  COPD exacerbation  On 3 L continuous nasal cannula and chronic prednisone (recently increased to 20 mg) at home.  Thought to be a viral bronchitis.  Uses BiPAP at night, recently requiring as needed BiPAP during the day for dyspnea.  -Continue 3 L oxygen via continuous nasal cannula  -BiPAP at night and as needed  -Continue Pulmicort nebs, duo nebs scheduled  -Continue Xopenex nebs as needed  -Add twice daily Mucomyst nebs to see if his sputum can be expectorated better  -Continue prednisone 60 mg, follow on this dose for now but when he weans it will be a slow wean and he will end at 20 mg which was his new previous home dose.    Hyperglycemia secondary to steroids  -Continue Accu-Cheks and sliding scale insulin  -Follow for need to add scheduled insulin as he had been on recently.    Atrial fibrillation  Hypertension  Right heart failure  -Continue home dose Lasix 40 mg twice daily  -Continue Eliquis and diltiazem    Lung nodules  -Follow-up as outpatient      Code status:  Full Code confirmed with patient.  He states that he has a 2-week limit of being on a ventilator, however.  His wife is aware.    DVT Prophylaxis: Eliquis  Lines: None  GI Prophylaxis: None  Diet: Regular  Drains/tubes: None  Weight bearing restrictions: None    All medication issues identified in the admission drug regimen review have been addressed and completed as appropriate.      Total time spent was 80 minutes in which greater than 50% of the time was spent on coordination of care consisting of talking with the transferring physician, patient, nursing, RT, wife about care as outlined  above and  counseling patient on plan of care for BiPAP, steroids, therapies, plan to slow steroid taper..    Jolene Wagner MD  [facility]

## 2021-07-22 NOTE — PLAN OF CARE
"Patient's pain/discomfort is manageable Progressing      Perineal skin integrity is maintained or improved Progressing      Patient continent with urinal this shift. Patient rating pain appropriately, 8/10 abdominal pain from excessive coughing. PRN tylenol given and effective. Patient on tele, A flutter at beginning of shift. Second tele assessment A flutter with bradycardia and PVCs. Later in shift, approximately 0413, patient began having episodes of bradycardia. Heart rate reaching as low as 47. Vitals otherwise WNL and patient had no complaints of chest pain or cardiac symptoms. House Officer notified and advised to continue to monitor. When asked orientation questions, patient thought today was February 3rd. Writer reoriented patient, to which he jokingly replied \"give or take a month.\" No new skin concerns at this time. Patient currently on nasal canula. Had snack of audi crackers with peanut butter. Slept roughly 5 hours.   "

## 2021-07-22 NOTE — PLAN OF CARE
Ineffective Airway Clearance      Maintain airway patency Progressing          Pt is currently on 2L NC, oxygen sats 92-98%, HR 69-80, RR 19. New order with BIPAP settings: IPAP 14, EPAP 7, no back up rate. Keep saturations >88%.   Blood pressure (!) 142/97, pulse 77, temperature 97.4  F (36.3  C), temperature source Oral, resp. rate 19, height 6' (1.829 m), weight 210 lb (95.3 kg), SpO2 98 %.

## 2021-07-22 NOTE — PLAN OF CARE
Impaired Gas Exchange      Demonstrate improved ventilation and adequate oxygenation of tissues as evidenced by absence of respiratory distress Progressing    Patient was on off the bipap, and 3 liters nasal cannula.  Had abg done early this morning,   Need to be on bipap with no back up rate set this Danny night,   Then get abg Monday am

## 2021-07-22 NOTE — PROGRESS NOTES
[place] Hospitalist Daily Progress Note    Summary:  Tone Cooper is a 68 year old male with history of severe COPD (on home oxygen 3 lpm), atrial fibrillation (anticoagulated with Elliquis), tricuspid valve repair, hypertension, nonsustained VT, and two hospitalizations in the last two months of 2018 for COPD exacerbations. He presented to the ED on 12/17/18 with 1.5 weeks of productive cough and shortness of breath.      He was found to have severe sepsis, community acquired pneumonia of right lung (by CXR), and hypoxic respiratory failure. He was admitted for further evaluation and treatment. Initially, he required BIPAP support. He was treated with Rocephin, Zithromax, nebs, and Prednisone. Rocephin was changed to Zosyn shortly after admission. One of two admission blood cultures grew staph. Infectious disease was consulted. Vancomycin was started. This staph ended up being MSSA and it grew in sputum, also. Antibiotics were changed to Ancef. Given history of tricuspid valve repair a prolonged course of IV Ancef was planned.      Tone was somewhat slow to improve, although he did come off of BIPAP and his blood pressure improved. He developed some hemoptysis in sputum. Prior to admission Eliquis was stopped. CT of chest was obtained on 1/4/19. This showed areas of consolidation consistent with recent pneumonia. Streaky hemoptysis persisted. Pulmonary medicine at the [facility] was consulted with by phone. There was concern about possible small abscesses related to recent pneumonia. It was thought that bronchoscopy was not needed but that a prolonged course of antibiotics would be necessary. There was also consideration of bronchial artery embolization if hemoptysis worsened. For the last couple of days cannot has seems to stabilize. Now he is having just occasional streaky hemoptysis in his sputum. His breathing has improved. Infectious diseases recommending completion of 4 weeks of IV Ancef  followed by 4 weeks of oral Augmentin.  He was dismissed home on 1/8/19 on baseline 3 L supplemental oxygen.     He again presented to [facility] on 2/11/19 complaining of shortness of breath.  Chest CT showed small pulmonary emboli and concern for pneumonia.  Treated with Azithromycin for pneumonia.  Began taking Eliquis for pulmonary embolism.  He was dismissed home on 2/14/19.      He again presented to [facility] on 2/16/19 complaining of shortness of breath.  His acute on chronic hypoxic respiratory failure was felt to be due to COPD exacerbation.  He was treated with parenteral corticosteroids, nebulized bronchodilators and noninvasive ventilation (BiPAP).       After admission to  hospital, pt had worsening dyspnea and required intermittent BiPAP treatment. Remained dyspneic with increased work of breathing, had mild Nicolasa with increase in Cr, then developed increasing lethargy and hypercapnia on 2/19 requiring continuous BiPAP and transferred to ICU. Has streaky opacities in the left side would be to actually be residual pneumonia, however with his elevated lactic acid he has remained on Zosyn but, suspect primary problem is COPD exacerbation and he is on IV steroids with scheduled nebs.     He has a history of atrial fibrillation/flutter, status post prior ablation procedure.  Notes indicate he had episodes of atrial fibrillation with rapid ventricular response during recent hospital stays, and did follow-up with [facility] cardiology as outpatient.  They felt he may avoid repeat ablation, since atrial arrhythmias were likely exacerbated by his pulmonary problems and until respiratory status was stable, this would decrease chance of success with any ablation.  Dose of pro-path and known was increased.     As noted above, he had an episode of MSSA bacteremia in December.  He completed a lengthy course of IV antibiotics.  He was on Augmentin prior to admission due to recent COPD  "exacerbation.  He was treated with IV antibiotics and did complete a course of Augmentin.     He had an episode where he was up to the bathroom and, \"slid to the floor.\"  Because he is on anticoagulants, head CT was obtained.  This was negative for intracranial hemorrhage, did show small vessel ischemic changes.  His dyspnea has improved and he  dismissed to [facility] for further evaluation and treatment.      Assessment/Plan  Principal Problem:    Acute on chronic respiratory failure with hypoxia and hypercapnia (H)    COPD (chronic obstructive pulmonary disease) (H)  ? Notes indicate he has, \"advanced COPD\" with multiple hospitalizations in the past 6 months.  He has had worsening dyspnea anytime his steroids are weaned.  ? Continue corticosteroids  ? Continue duo nebs, Xopenex nebs as needed  ? BiPAP at night  ? Per outside hospital, \"social work to see if patient would be a candidate for Home Trilogy machine and NIPPV, it sounds like he is but this would be a $5000 yearly rental fee.\"  ? pulmonary specialist recommended very close follow-up with pulmonology in clinic, chronic daily azithromycin therapy, and pulmonary rehab  ? Has been on Zosyn started 2/16, changed to Augmentin (completed course)     Active Problems:    Atrial flutter (H)    Bradycardia  ? status post previous ablation procedure.  He did have issues with atrial fibrillation with RVR during previous hospitalizations.  He did follow with [facility] as an outpatient.  The thought was to avoid a repeat ablation procedure at this time given his recent infectious issues which would decrease his chance of success.  He did have his propafenone dose increased.    ? Continue diltiazem and propafenone   ? Cardiac monitoring  ? 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  EP wanting to avoid attempting flutter ablation until pulmonary issues are better controlled  ? Hold parameters added to Diltiazem, propafenone, but bradycardia during " sleep not unexpected  ? If he has symptomatic bradycardia, I would discuss with EP, decrease calcium channel blocker  ? Continue Eliquis for thromboembolism prevention. This was held at the time of discharge from previous hospitalization in January due to hemoptysis. No recurrence of hemoptysis and with concurrent PE, need to continue anti-coagulation        H/O tricuspid valve repair    Pulmonary emboli (H)  ? just diagnosed a few days prior to recent admission  ? he had a small PE on the CT chest, probably an incidental finding, dyspnea mainly due severe COPD, in exacerbation  ? Continue Eliquis        Mixed hyperlipidemia  ? Not on a statin       BPH (benign prostatic hyperplasia)  ? Had been on terazosin  ? Assess voiding, consider bladder scan       Thrombocytopenia (H)  ? Mild  ? Recheck platelet count       Chronic right heart failure (H)  History of tricuspid valve replacement  ? Last TTE 1/4/19 showing mildly dilated RV along with decreased RV systolic function. Does have peripheral edema. Med list includes Lasix, however his spouse does not think he takes this. Apparently had significant significantly more edema during previous admission per his spouse.  ? Local compression for peripheral edema  ? Avoid overdiuresis due to recent acute kidney injury    Stress hyperglycemia  ? Continue basal insulin  ? Continue corrective dose insulin  ? hemoglobin A1c is at goal  ? Most blood sugar readings are at goal     Chronic low back pain:  ? Had been taking oxycodone 5 mg frequently at home for this.  His opiates have been held given his hypercapnia and tenuous respiratory status. As his goals are restorative, risk of opiates at this time outweigh potential benefits of pain control. Any amount likely to increase his CO2 retention leading worsening of his respiratory failure. Per outside hospital records, he also struggles with daytime sleepiness  ? Acetaminophen  ? Continue topical NSAID and Lidoderm  "patches  ? Mobilize patient  ? Patient refers to Trazodone and Seroquel to promotw restful sleep     Goals of care  ? See discussion in note from outside hospital 2/26: \"Unfortunately despite multiple days of steroids, inhaled steroids, nebulization treatments he is not showing much clinical sign of improvement. He is extremely fatigued and lasts only 15-20 minutes off of BiPAP at a time. He is sleeping most of the day. If he is to improve with current therapy it is likely to take weeks. The patient is becoming more frustrated with his limited response to his respiratory treatment. At times he is saying statements like he feels like he is going to die and that he does not want to do this anymore. ...I spoke at length with him and his spouse regarding his current condition, current treatment plan which includes oral and inhaled steroids and intermittent BiPAP with goal for LTACH discharge and pulmonary rehab and slow improvement in his respiratory status, and alternative options including palliative care. After a long conversation the patient does confirm he wants to continue with current treatment plan and that he is just very frustrated overall. This will be an ongoing conversation with the patient, his spouse, and his care team.\"  ? Consider Palliative Care consult       Barriers to disposition:  Stable respiratory status, progress in therapies      Subjective:   \"They need to learn how to work the machine.\"  Patient says he was frustrated by how long it took to apply biPAP mask.  Denies any symptom related to bradycardia, I.e. not lightheaded, no abnormal fatigue.  No GI complaints.    Objective:  Vital signs in last 24 hours:  Temp:  [97.3  F (36.3  C)-97.9  F (36.6  C)] 97.8  F (36.6  C)  Heart Rate:  [56-88] 60  Resp:  [14-22] 20  BP: (124-152)/(70-88) 124/79  FiO2 (%):  [30 %] 30 %  Weight:   202 lb (91.6 kg)    Intake/Output last 3 shifts:  I/O last 3 completed shifts:  In: 480 [P.O.:480]  Out: 2050 " [Urine:2050]  Intake/Output this shift:  No intake/output data recorded.    Physical Exam:  General Appearance: Alert, cooperative, no distress.  Good sense of humor.  Head: Normocephalic, without obvious abnormality, atraumatic  Eyes: PERRL, conjunctiva/corneas clear, both eyes.  Wears glasses.  Nose: Nares normal, no drainage.  Wearing nasal cannula.  Throat: has upper denture, not in at time of exam  Neck: Supple, symmetrical, trachea midline, no adenopathy;              Lungs: again with decreased breath sounds throughout; tachypneic  Chest Wall: well healed midline sternotomy  Heart: Regular rate and rhythm, with occasional extra beats  Abdomen: Soft, non-tender, bowel sounds active. No obvious organomegaly or mass. . Small, healed incision midline (likely site of prior chest tube)  Extremities: bilateral lower extremity edema  Pulses: DP pulses are 1-2+ bilat.    Skin: no rashes or lesions.  Trevro complexion.  Neurologic: moves both arms and legs.        Imaging: (I have personally reviewed the results) CHEST PORTABLE ONE VIEW   2/16/2019 10:20 PM     HISTORY: Shortness of breath.     COMPARISON: Earlier the same day at 1:40 PM.      IMPRESSION: Improving streaky bibasilar opacities since prior.  Perihilar vascular prominence is again seen. Cardiac silhouette is  unchanged. No new focal infiltrates. No significant pleural effusion  or pneumothorax.     TALON DAWSON MD    Lab Results: (I have personally reviewed the results)    All medication issues identified within the last 24 hours have been addressed and completed as appropriate.    DVT prophylaxis:  Eliquis  GI prophylaxis:  PPI    Nitza Alcantar M.D.  Internal Medicine  [facility] Service  Pager: [phone]     Total time on visit: 35 minutes  Time spent for counseling, coordination of care:   > 20 minutes including personal review and interpretation of labs and studies above, and discussion with patient, unit RN, charge RN, RT regarding  bradycardia, biPAP, anti-arrhythmic medications, insomnia

## 2021-07-22 NOTE — PLAN OF CARE
Anxiety is at manageable level Progressing      Blood pressure (BP) is within acceptable limits Progressing        Pt has c/o back pain that he said is being relieved by the prn tylonel, this RN told him to let the team know if the tylonel doesn't work for him during the day. Pt has had no c/o anxiety during shift, nor has he shown any signs or symptoms. Pt is being repositioned, does do minor self-repositioning, but skin is being monitored. No new skin concerns. Vital signs are within normal parameters, and telemetry is being monitored. Wife is currently present. He is tolerating oral intake and scheduled medicine. Call light is within reach, bed is in lowest position, and bed alarm is on. Will continue to monitor and assess.

## 2021-07-22 NOTE — PLAN OF CARE
Pt started shift on BiPAP, sleeping.  V60: ST 14/6, RR 12, O2 25 %. RR 16, , sat 92%. At 1700 pt put on 4L NC. Sat 92%,  HR. BS clear decreased T/O. Duo neb x 2 given on time. Pt has strong congested non-productive cough. At 2210 pt put on BiPAP for the night. RR 21, , sat 96%.      Todd De Leon, EDWART

## 2021-07-22 NOTE — PROGRESS NOTES
Patient in room 583 has an area of denudement on proximal part of intergluteal cleft.  Patient is very resistant to these kinds of cares as well as being turned.  He does turn himself to some degree.

## 2021-07-22 NOTE — PROGRESS NOTES
[place] Hospitalist Daily Progress Note    Summary:  Tone Cooper is a 68 year old male with history of severe COPD (on home oxygen 3 lpm), atrial fibrillation (anticoagulated with Elliquis), tricuspid valve repair, hypertension, nonsustained VT, and two hospitalizations in the last two months of 2018 for COPD exacerbations. He presented to the ED on 12/17/18 with 1.5 weeks of productive cough and shortness of breath.      He was found to have severe sepsis, community acquired pneumonia of right lung (by CXR), and hypoxic respiratory failure. He was admitted for further evaluation and treatment. Initially, he required BIPAP support. He was treated with Rocephin, Zithromax, nebs, and Prednisone. Rocephin was changed to Zosyn shortly after admission. One of two admission blood cultures grew staph. Infectious disease was consulted. Vancomycin was started. This staph ended up being MSSA and it grew in sputum, also. Antibiotics were changed to Ancef. Given history of tricuspid valve repair a prolonged course of IV Ancef was planned.      Tone was somewhat slow to improve, although he did come off of BIPAP and his blood pressure improved. He developed some hemoptysis in sputum. Prior to admission Eliquis was stopped. CT of chest was obtained on 1/4/19. This showed areas of consolidation consistent with recent pneumonia. Streaky hemoptysis persisted. Pulmonary medicine at the [facility] was consulted with by phone. There was concern about possible small abscesses related to recent pneumonia. It was thought that bronchoscopy was not needed but that a prolonged course of antibiotics would be necessary. There was also consideration of bronchial artery embolization if hemoptysis worsened. For the last couple of days cannot has seems to stabilize. Now he is having just occasional streaky hemoptysis in his sputum. His breathing has improved. Infectious diseases recommending completion of 4 weeks of IV Ancef  followed by 4 weeks of oral Augmentin.  He was dismissed home on 1/8/19 on baseline 3 L supplemental oxygen.     He again presented to [facility] on 2/11/19 complaining of shortness of breath.  Chest CT showed small pulmonary emboli and concern for pneumonia.  Treated with Azithromycin for pneumonia.  Began taking Eliquis for pulmonary embolism.  He was dismissed home on 2/14/19.He again presented to [facility] on 2/16/19 complaining of shortness of breath.  His acute on chronic hypoxic respiratory failure was felt to be due to COPD exacerbation.  He was treated with parenteral corticosteroids, nebulized bronchodilators and noninvasive ventilation (BiPAP).       After admission to  hospital, pt had worsening dyspnea and required intermittent BiPAP treatment. Remained dyspneic with increased work of breathing, had mild Nicolasa with increase in Cr, then developed increasing lethargy and hypercapnia on 2/19 requiring continuous BiPAP and transferred to ICU. Has streaky opacities in the left side would be to actually be residual pneumonia, however with his elevated lactic acid he has remained on Zosyn but, suspect primary problem is COPD exacerbation and he is on IV steroids with scheduled nebs.He has a history of atrial fibrillation/flutter, status post prior ablation procedure.  Notes indicate he had episodes of atrial fibrillation with rapid ventricular response during recent hospital stays, and did follow-up with EP cardiology as outpatient.  They felt he may avoid repeat ablation, since atrial arrhythmias were likely exacerbated by his pulmonary problems and until respiratory status was stable, this would decrease chance of success with any ablation.  Dose of pro-path and known was increased.     As noted above, he had an episode of MSSA bacteremia in December.  He completed a lengthy course of IV antibiotics.  He was on Augmentin prior to admission due to recent COPD exacerbation.  He was  "treated with IV antibiotics and did complete a course of Augmentin.He had an episode where he was up to the bathroom and, \"slid to the floor.\"  Because he is on anticoagulants, head CT was obtained.  This was negative for intracranial hemorrhage, did show small vessel ischemic changes.  His dyspnea has improved and he  dismissed to Health system for further evaluation and treatment.      Assessment/Plan  Principal Problem:    Acute on chronic respiratory failure with hypoxia and hypercapnia (H)    COPD (chronic obstructive pulmonary disease) (H)    ? Notes indicate he has, \"advanced COPD\" with multiple hospitalizations in the past 6 months.  He has had worsening dyspnea anytime his steroids are weaned.  ? Continue corticosteroids.  ? Continue duo nebs, Xopenex nebs as needed.  ? BiPAP at night  ? Per outside hospital, \"social work to see if patient would be a candidate for Home Trilogy machine and NIPPV, it sounds like he is but this would be a $5000 yearly rental fee.\"  ? pulmonary specialist recommended very close follow-up with pulmonology in clinic, chronic daily azithromycin therapy, and pulmonary rehab  ? Has been on Zosyn started 2/16, changed to Augmentin (completed course).     Active Problems:    Atrial flutter (H)    Bradycardia  ? status post previous ablation procedure.  He did have issues with atrial fibrillation with RVR during previous hospitalizations.  He did follow with [facility] Cardiology as an outpatient.  The thought was to avoid a repeat ablation procedure at this time given his recent infectious issues which would decrease his chance of success.  He did have his propafenone dose increased.    ? Continue diltiazem and propafenone   ? Cardiac monitoring  ? 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  EP wanting to avoid atempting flutter ablation until pulmonary issues are better controlled  ? Hold parameters added to Diltiazem, propafenone, but bradycardia during sleep not " unexpected  ? If he has symptomatic bradycardia, I would discuss with EP, decrease calcium channel blocker  ? Continue Eliquis for thromboembolism prevention. This was held at the time of discharge from previous hospitalization in January due to hemoptysis. No recurrence of hemoptysis and with concurrent PE, need to continue anti-coagulation   ? Asymptomatic bradycardia occasionally persists last EKG revealed a flutter with variable block.       H/O tricuspid valve repair    Pulmonary emboli (H)  ? just diagnosed a few days prior to recent admission  ? he had a small PE on the CT chest, probably an incidental finding, dyspnea mainly due severe COPD, in exacerbation  ? Continue Eliquis        Mixed hyperlipidemia  ? Not on a statin       BPH (benign prostatic hyperplasia)  ? Had been on terazosin  ? Assess voiding, consider bladder scan       Thrombocytopenia (H)  ? Mild  ? Recheck platelet count      Chronic right heart failure (H)  History of tricuspid valve replacement  ? Last TTE 1/4/19 showing mildly dilated RV along with decreased RV systolic function. Does have peripheral edema. Med list includes Lasix, however his spouse does not think he takes this. Apparently had significant significantly more edema during previous admission per his spouse.  ? Local compression for peripheral edema  ? Avoid overdiuresis due to recent acute kidney injury    Stress hyperglycemia  ? Continue basal insulin  ? Continue corrective dose insulin  ? hemoglobin A1c is at goal  ? Most blood sugar readings are at goal     Chronic low back pain:  ? Had been taking oxycodone 5 mg frequently at home for this.  His opiates have been held given his hypercapnia and tenuous respiratory status. As his goals are restorative, risk of opiates at this time outweigh potential benefits of pain control. Any amount likely to increase his CO2 retention leading worsening of his respiratory failure. Per outside hospital records, he also struggles with  "daytime sleepiness  ? Acetaminophen  ? Continue topical NSAID and Lidoderm patches  ? Mobilize patient  ? Patient refers to Trazodone and Seroquel to promotw restful sleep     Goals of care  ? See discussion in note from outside hospital 2/26: \"Unfortunately despite multiple days of steroids, inhaled steroids, nebulization treatments he is not showing much clinical sign of improvement. He is extremely fatigued and lasts only 15-20 minutes off of BiPAP at a time. He is sleeping most of the day. If he is to improve with current therapy it is likely to take weeks. The patient is becoming more frustrated with his limited response to his respiratory treatment. At times he is saying statements like he feels like he is going to die and that he does not want to do this anymore. ...I spoke at length with him and his spouse regarding his current condition, current treatment plan which includes oral and inhaled steroids and intermittent BiPAP with goal for LTACH discharge and pulmonary rehab and slow improvement in his respiratory status, and alternative options including palliative care. After a long conversation the patient does confirm he wants to continue with current treatment plan and that he is just very frustrated overall. This will be an ongoing conversation with the patient, his spouse, and his care team.\"  ? Consider Palliative Care consult       Barriers to disposition:  Stable respiratory status, progress in therapies      Subjective:   No acute issues reported. Stable, working with PT; gets winded when he tries to walk in the hallway.     Objective:  Vital signs in last 24 hours:  Temp:  [93.2  F (34  C)-98.4  F (36.9  C)] 97.9  F (36.6  C)  Heart Rate:  [] 60  Resp:  [16-24] 20  BP: (121-164)/(78-87) 164/80  FiO2 (%):  [30 %] 30 %  Weight:   210 lb (95.3 kg)    Intake/Output last 3 shifts:  I/O last 3 completed shifts:  In: 486 [P.O.:480; I.V.:6]  Out: 1400 [Urine:1400]  Intake/Output this shift:  I/O this " shift:  In: 483 [P.O.:480; I.V.:3]  Out: -     Physical Exam:  General Appearance: Alert, cooperative, no distress.   Head: Normocephalic, without obvious abnormality, atraumatic  Eyes: PERRL, conjunctiva/corneas clear, both eyes.  Wears glasses.  Nose: Nares normal, no drainage.  Wearing nasal cannula.  Throat: has upper denture,   Neck: Supple, symmetrical, trachea midline, no adenopathy;              Lungs: decreased breath sounds throughout  Chest Wall: well healed midline sternotomy  Heart: Regular rate and rhythm, with occasional extra beats  Abdomen: Soft, non-tender, bowel sounds active. No obvious organomegaly or mass. . Small, healed incision midline (likely site of prior chest tube)  Extremities: bilateral lower extremity edema  Pulses: DP pulses are 1-2+ bilat.    Skin: no rashes or lesions.  Trevor complexion.  Neurologic: moves both arms and legs.        apixaban  5 mg Oral BID     azithromycin  250 mg Oral Q M, W, F     budesonide  0.5 mg Nebulization BID     clonazePAM  1 mg Oral QHS     diclofenac sodium  2 g Topical QID     diltiazem  180 mg Oral BID     famotidine  20 mg Oral BID     furosemide  20 mg Oral BID     guaiFENesin ER  600 mg Oral BID     insulin aspart (NovoLOG) injection   Subcutaneous TID with meals     insulin aspart (NovoLOG) injection   Subcutaneous QHS     insulin glargine  10 Units Subcutaneous QHS     ipratropium-albuterol  3 mL Nebulization QID - RT     lidocaine  2 patch Transdermal Q24H     menthol-zinc oxide   Topical TID     miconazole   Topical BID     miconazole nitrate   Topical BID     [START ON 3/7/2019] predniSONE  10 mg Oral Daily with brkfst     propafenone  225 mg Oral Q8H     sodium chloride  3 mL Intravenous Line Care     tamsulosin  0.4 mg Oral Daily after brkfst     traZODone  50 mg Oral QHS     Imaging: (I have personally reviewed the results) CHEST PORTABLE ONE VIEW   2/16/2019 10:20 PM     HISTORY: Shortness of breath.     COMPARISON: Earlier the same day at  1:40 PM.      IMPRESSION: Improving streaky bibasilar opacities since prior.  Perihilar vascular prominence is again seen. Cardiac silhouette is  unchanged. No new focal infiltrates. No significant pleural effusion  or pneumothorax.     TALON DAWSON MD    Lab Results: (I have personally reviewed the results)    All medication issues identified within the last 24 hours have been addressed and completed as appropriate.    DVT prophylaxis:  Eliquis  GI prophylaxis:  PPI    Sahil Solomon MD,[facility].     Total unit/floor time 36 minutes, time consisted of the followig: examination of the patient, reviewing the record and lab results, and completing documentation. Coordination of care time with nurse and other healthcare providers 20 minutes, time consisted of the following: Reviewing rhythm strips, ekg, prior cardiology notes.  Discussion of symptoms with patient and RN.  Blood glucose log review and med review

## 2021-07-22 NOTE — PROGRESS NOTES
06/02/19 1135   Visit Specifics   Eval Type Inital eval   General   Onset date 05/26/19   Additional Pertinent History Patient was admitted to [facility] on 5/26/2019 with a acute on chronic respiratory failure secondary to COPD exacerbation.  He was admitted to the ICU and started on BiPAP.  He was treated with empiric antibiotics as well as high-dose steroids.  He initially required BiPAP almost continuously but is now requiring it intermittently.  He continues to require 3 to 4 L/min oxygen per nasal cannula.  Note that he tires 20 mg of prednisone at home chronically as well as oxygen per nasal cannula at 3 L/min continuously.  He was transitioned to oral steroids and is now on oral prednisone.  He gets short of breath with minimal exertion such as getting out of bed.  This is coupled with hypoxia and tachycardia.  He was medically optimized and transferred to [facility] for ongoing medical management including intermittent BiPAP need secondary to ongoing acute on chronic respiratory failure.  Upon arrival, he is comfortable.  His breathing feels okay with his current nasal cannula oxygen, he has no pain, he admits to becoming dyspneic with the littlest of activities including getting out of bed.  He does not feel weak, he was able to ambulate independently at [facility].  He has no other questions, his wife is at the bedside.   Chart Reviewed Yes   PT/OT Patient/Caregiver Stated Goals Be able to walk   Family/Caregiver Present No   Home Living   Type of Home House   Home Layout Multi-level  (split level)   ADL Devices Dentures   Bathroom Shower/Tub Tub/shower unit   Bathroom Equipment Grab bars in shower;Hand-held shower   Mobility Equipment Walker-front wheeled;Walker-4 wheeled  (has a SEC avail but does not use)   Prior Status   Independent With All ADL's  (with extra time to complete)   Lives With Family   Device Used Yes   Comments 3L O2 at home, per pt has a portable O2 tank    ADL   Grooming All grooming   All Grooming Modified independent (Device);Sitting   Bathing Upper body   Upper Body Modified independent (Device);Sitting   Upper Body Dressing Pull over   Pull Over Modified independent (Device);Sitting   Lower Body Dressing Pants   Pants SBA;Modified independent (Device);Sitting   ADL Comments Needing lengthy breaks to manage fatigue/SOB. O2 4 liters, SATs 90-94%. Using PLB during with cues. Cues for not holding breath during ADLs   Activity Tolerance   Endurance Fair   Balance   Sitting Balance Independent   Standing Balance Standby;Independent   Bed Mobility   Bed Mobility Rolling   Rolling Moderately independent (Device)   Supine to Sit Modified Independent   Sit to Supine Modified Independent   Bed mobility comments cues to not hold breath    Functional Transfers   Functional Transfers Bed Transfers   Bed Transfers Modified independent   Transfer Deficits Fatigue;SOB   Comments SATs 90-91%   Cognition   Overall Cognitive Status WFL   Vision-Basic Assessment   Current Vision No visual deficits   Vision/Perception   Vision/Perception Intact   RUE Assessment   RUE Assessment WFL   Comments MMT 3/5   LUE Assessment   LUE Assessment WFL   Comments MMT 3/5   Hand Function   Gross Grasp Functional   Gross Motor Coordination Functional   Fine Motor Coordination Functional   Sensation   Light Touch No apparent deficits   Skin Integrity   Edema None  (BUE)   Assessment   Assessment Decreased ADL status;Decreased funtional mobility;Decreased UE strength;Decreased endurance   Prognosis Good;Fair   Treatment/Interventions ADL training;Functional transfer training  (activity tolerance building, resp educ/energy conserva)   OT Frequency Daily;5-6x/wk  (x2-3 weeks LOS)   Goal Formulation Patient   Recommendations   OT Discharge Recommendation Home with family support/assistance   Treatment Suggestions for Next Session (updated 6/2) Precautions:Resp needs- several rest breaks, O2 4-5L nc, does  not want therapy prio to 9:30. Tx ideas: actovoty tolerance building, review of resp edu/energy conservation tech, standing tolerance with daily tasks, BUE strengthening, ADLs/trf with resp educ/PLB incorp.   OT Summary Pt seen bedside for OT eval. Needing several rest breaks to complete dressing. DeSATing to 88-91% with tasks, O2 4 L. Pt demo pacing and PLB with cues. Demo decreased activity tolerance for ADL/trf skill and bed mobility. Decreased strength/endurance BUE for ADl completion. Approp for OT to addres area noted to increase indep for d/c to least restrictive setting.

## 2021-07-22 NOTE — H&P
[facility] Admission History and Physical     Tone Cooper,  1950, MRN [id number]    [facility]  No admission diagnoses are documented for this encounter.    PCP: Ana Pratt MD, [phone]    Transferring facility   [facility]       Admission diagnoses Acute on chronic respiratory failure with hypoxia and hypercapnia (H)  Active Ambulatory (Non-Hospital) Problems    Diagnosis     HTN (hypertension)     Past Medical History:   Diagnosis Date     Atrial flutter (H)      Cardiomyopathy (H)      COPD (chronic obstructive pulmonary disease) (H)      Diverticulitis      H/O tricuspid valve repair      HTN (hypertension)      Pulmonary emboli (H)      He  has a past surgical history that includes Total hip arthroplasty (Left); Appendectomy; Laparoscopic cholecystectomy; and Hernia repair.         PMH Active Ambulatory (Non-Hospital) Problems    Diagnosis     HTN (hypertension)     Past Medical History:   Diagnosis Date     Atrial flutter (H)      Cardiomyopathy (H)      COPD (chronic obstructive pulmonary disease) (H)      Diverticulitis      H/O tricuspid valve repair      HTN (hypertension)      Pulmonary emboli (H)      He  has a past surgical history that includes Total hip arthroplasty (Left); Appendectomy; Laparoscopic cholecystectomy; and Hernia repair.     HPI:    Tone Cooper is a 68 y.o. old male. History is provided by reviewing the records that came from [facility], speaking with transferring provider and patient.    Tone Cooper is a 68 year old male with history of severe COPD (on home oxygen 3 lpm), atrial fibrillation (anticoagulated with Elliquis), tricuspid valve repair, hypertension, nonsustained VT, and two hospitalizations in the last two months of 2018 for COPD exacerbations. He presented to the ED on 18 with 1.5 weeks of productive cough and shortness of breath.     He was found to have severe sepsis, community acquired  pneumonia of right lung (by CXR), and hypoxic respiratory failure. He was admitted for further evaluation and treatment. Initially, he required BIPAP support. He was treated with Rocephin, Zithromax, nebs, and Prednisone. Rocephin was changed to Zosyn shortly after admission. One of two admission blood cultures grew staph. Infectious disease was consulted. Vancomycin was started. This staph ended up being MSSA and it grew in sputum, also. Antibiotics were changed to Ancef. Given history of tricuspid valve repair a prolonged course of IV Ancef was planned.     Tone was somewhat slow to improve, although he did come off of BIPAP and his blood pressure improved. He developed some hemoptysis in sputum. Prior to admission Eliquis was stopped. CT of chest was obtained on 1/4/19. This showed areas of consolidation consistent with recent pneumonia. Streaky hemoptysis persisted. Pulmonary medicine at the [facility] was consulted with by phone. There was concern about possible small abscesses related to recent pneumonia. It was thought that bronchoscopy was not needed but that a prolonged course of antibiotics would be necessary. There was also consideration of bronchial artery embolization if hemoptysis worsened. For the last couple of days cannot has seems to stabilize. Now he is having just occasional streaky hemoptysis in his sputum. His breathing has improved. Infectious diseases recommending completion of 4 weeks of IV Ancef followed by 4 weeks of oral Augmentin.  He was dismissed home on 1/8/19 on baseline 3 L supplemental oxygen.    He again presented to [facility] on 2/11/19 complaining of shortness of breath.  Chest CT showed small pulmonary emboli and concern for pneumonia.  Treated with Azithromycin for pneumonia.  Began taking Eliquis for pulmonary embolism.  He was dismissed home on 2/14/19.      He again presented to [facility] on 2/16/19 complaining of shortness of  "breath.  His acute on chronic hypoxic respiratory failure was felt to be due to COPD exacerbation.  He was treated with parenteral corticosteroids, nebulized bronchodilators and noninvasive ventilation (BiPAP).      After admission to  hospital, pt had worsening dyspnea and required intermittent BiPAP treatment. Remained dyspneic with increased work of breathing, had mild Nicolasa with increase in Cr, then developed increasing lethargy and hypercapnia on 2/19 requiring continuous BiPAP and transferred to ICU. Has streaky opacities in the left side would be to actually be residual pneumonia, however with his elevated lactic acid he has remained on Zosyn but, suspect primary problem is COPD exacerbation and he is on IV steroids with scheduled nebs.    He has a history of atrial fibrillation/flutter, status post prior ablation procedure.  Notes indicate he had episodes of atrial fibrillation with rapid ventricular response during recent hospital stays, and did follow-up with EP cardiology as outpatient.  They felt he may avoid repeat ablation, since atrial arrhythmias were likely exacerbated by his pulmonary problems and until respiratory status was stable, this would decrease chance of success with any ablation.  Dose of pro-path and known was increased.    As noted above, he had an episode of MSSA bacteremia in December.  He completed a lengthy course of IV antibiotics.  He was on Augmentin prior to admission due to recent COPD exacerbation.  He was treated with IV antibiotics and did complete a course of Augmentin.    He had an episode where he was up to the bathroom and, \"slid to the floor.\"  Because he is on anticoagulants, head CT was obtained.  This was negative for intracranial hemorrhage, did show small vessel ischemic changes.  His dyspnea has improved and he is dismissed to [facility] for further evaluation and treatment    Review of Systems:    Pertinent items are noted in HPI.      Social History " "Reviewed, and he  reports that he has quit smoking. He does not have any smokeless tobacco history on file. He reports that he drinks alcohol.     Family History Reviewed, and family history includes COPD in his mother; Hypertension in his mother; Prostate cancer in his father; Stroke in his mother.       Allergies Allergies   Allergen Reactions     Amlodipine      Flecainide         Medications No current facility-administered medications for this encounter.   No current outpatient medications on file.       Physical Exam:  BP: ()/()   Arterial Line BP: ()/()   Temp:  [97.6  F (36.4  C)] 97.6  F (36.4  C)  Heart Rate:  [81] 81  Resp:  [28] 28  BP: (148)/(85) 148/85    {General Appearance: Alert, cooperative, no distress.  Good sense of humor.  Head: Normocephalic, without obvious abnormality, atraumatic  Eyes: PERRL, conjunctiva/corneas clear, both eyes.  Wears glasses.  Nose: Nares normal, no drainage.  Wearing nasal cannula.  Throat: has upper denture, not in at time of exam  Neck: Supple, symmetrical, trachea midline, no adenopathy;    Lungs: decreased breath sounds throughout  Chest Wall: well healed midline sternotomy  Heart: Regular rate and rhythm, with occasional extra beats  Abdomen: Soft, non-tender, bowel sounds active. No obvious organomegaly or mass.  Eccymosis consistent with recent injection. Small, healed incision midline (likely site of prior chest tube)  Extremities: bilateral lower extremity edema  Pulses: DP pulses are 1-2+ bilat.    Skin: no rashes or lesions.  Trevor complexion.  Neurologic: moves both arms and legs.        Assessment and Plan:   Principal Problem:    Acute on chronic respiratory failure with hypoxia and hypercapnia (H)    COPD (chronic obstructive pulmonary disease) (H)    Notes indicate he has, \"advanced COPD\" with multiple hospitalizations in the past 6 months.  He has had worsening dyspnea anytime his steroids are weaned.    Oxycodone was discontinued, felt that it could " "contribute to respiratory depression    Continue corticosteroids    Continue duo nebs, Xopenex nebs as needed    BiPAP at night    Per outside hospital, \"social work to see if patient would be a candidate for Home Trilogy machine and NIPPV, it sounds like he is but this would be a $5000 yearly rental fee.\"    pulmonary specialist recommended very close follow-up with pulmonology in clinic, chronic daily azithromycin therapy, and pulmonary rehab    Suspect HCAP: Recently treated for pneumonia and was on Augmentin prior to admission. Streaky opacities remaining on the left which may just be residual from previous pneumonia. He did have elevated lactic acid.    Has been on Zosyn started 2/16, changed to Augmentin (completed course)     Active Problems:    Atrial flutter (H)    status post previous ablation procedure.  He did have issues with atrial fibrillation with RVR during previous hospitalizations.  He did follow with [facility] Cardiology as an outpatient.  The thought was to avoid a repeat ablation procedure at this time given his recent infectious issues which would decrease his chance of success.  He did have his propafenone dose increased.      Continue diltiazem and propafenone     Monitor with telemetry    Continue Eliquis for thromboembolism prevention. It seems like this was held at the time of discharge from previous hospitalization in January due to hemoptysis. No recurrence of hemoptysis and with concurrent PE, need to continue anti-coagulation        H/O tricuspid valve repair    Pulmonary emboli (H)    just diagnosed a few days prior to recent admission    he had a small PE on the CT chest. I suspect this is more of an incidental finding with the major contributor being COPD to his symptoms. He received about 4 days of heparin and Lovenox in the hospital and was sent home on Eliquis.     Continue Eliquis       Mixed hyperlipidemia    Not on a statin      BPH (benign prostatic hyperplasia)    Had been on " "terazosin    Assess voiding, consider bladder scan      Thrombocytopenia (H)    Mild    Recheck platelet count      Chronic right heart failure (H)  History of tricuspid valve replacement    Last TTE 1/4/19 showing mildly dilated RV along with decreased RV systolic function. Does have peripheral edema. Med list includes Lasix, however his spouse does not think he takes this. Apparently had significant significantly more edema during previous admission per his spouse.    Local compression for peripheral edema    Avoid overdiuresis due to recent acute kidney injury    Stress hyperglycemia    Continue basal insulin    Add corrective dose insulin, assess need for prandial insulin    Check hemoglobin A1c    Chronic low back pain:    Has been taking oxycodone 5 mg frequently at home for this.  His opiates have been held given his hypercapnia and tenuous respiratory status. As his goals are restorative, risk of opiates at this time outweigh potential benefits of pain control. Any amount likely to increase his CO2 retention leading worsening of his respiratory failure. Per outside hospital records, he also struggles with daytime sleepiness    Acetaminophen    Continue topical NSAID and Lidoderm patches    Mobilize patient    Goals of care    See discussion in note from outside hospital 2/26: \"Unfortunately despite multiple days of steroids, inhaled steroids, nebulization treatments he is not showing much clinical sign of improvement. He is extremely fatigued and lasts only 15-20 minutes off of BiPAP at a time. He is sleeping most of the day. If he is to improve with current therapy it is likely to take weeks. The patient is becoming more frustrated with his limited response to his respiratory treatment. At times he is saying statements like he feels like he is going to die and that he does not want to do this anymore. ...I spoke at length with him and his spouse regarding his current condition, current treatment plan which " "includes oral and inhaled steroids and intermittent BiPAP with goal for LTACH discharge and pulmonary rehab and slow improvement in his respiratory status, and alternative options including palliative care. After a long conversation the patient does confirm he wants to continue with current treatment plan and that he is just very frustrated overall. This will be an ongoing conversation with the patient, his spouse, and his care team.\"    Consider Palliative Care consult      Code status:  Full  DVT Prophylaxis: Eliquis   Lines: peripheral IV  GI Prophylaxis: PPI  Diet: No diet orders on file  Drains/tubes: none  Weight bearing restrictions: none    All medication issues identified in the admission drug regimen review have been addressed and completed as appropriate.     Nitza Alcantar M.D.  Internal Medicine  [facility]  Pager: [phone]     Total time spent was 75 minutes in which greater than 50% of the time was spent on face to face time with the patient, patient care coordination, review of chart and physician to physician phone call.        "

## 2021-07-22 NOTE — PLAN OF CARE
Daily Care      Daily care needs are met Progressing        Pain      Patient's pain/discomfort is manageable Progressing        Safety      Patient will be injury free during hospitalization Progressing    Pt is alert and oriented x4. Vitals stable.  PRN Tylenol given x2 for low back pain.  Pt is on tele:  A-flutter. All daily cares done. Will continue to monitor.

## 2021-07-22 NOTE — PLAN OF CARE
Patient/family/caregiver demonstrates understanding of disease process, treatment plan, medications, and discharge instructions Progressing      Patient's pain/discomfort is manageable Progressing      Skin integrity is maintained or improved Progressing      Demonstrates ability to cope with hospitalization/illness Progressing      Patient had complaint of 8/10 abdominal pain from coughing. PRN tylenol given and patient sleeping on reassessment. Patient perineal denudement seems improved compared to yesterday. Patient assisted with reposition this shift. Alert and oriented x4. Generally cooperative with staff and cares. Patient rates pain appropriately. Slept approximately 4-5 hours this shift. On telemetry, A flutter with occasional PVCs.

## 2021-07-22 NOTE — PLAN OF CARE
Problem: Inadequate Airway Clearance  Goal: Patient will maintain patent airway  6/1/2019 2129 by Jamilah Andrade, LRT  Outcome: Progressing  6/1/2019 2125 by Jamilah Andrade, LRT  Outcome: Progressing  Goal: Patient will achieve/maintain normal respiratory rate/effort  6/1/2019 2129 by Jamilah Andrade, LRT  Outcome: Progressing  6/1/2019 2125 by Jamilah Andrade, LRT  Outcome: Progressing     Problem: Inadequate Gas Exchange  Goal: Patient is adequately oxygenated and ventilation is improved  6/1/2019 2129 by Jamilah Andrade, LRT  Outcome: Progressing  6/1/2019 2125 by Jamilah Andrade, LRT  Outcome: Progressing  Pt  on 4 LNC , sat 95-96%, hr , RR 19-20, placed on Bipap(V60) , IPAP 14, EPAP 6, RATE 12, FIO2 25% @21:43 , pt was on Bipap on and off during day time , duoneb given x2, plan: cont. Current plan of car e

## 2021-07-22 NOTE — PLAN OF CARE
Daily Care      Daily care needs are met Adequate for Discharge          Discharge Barriers      Patient's discharge needs are met Adequate for Discharge          Impaired Gas Exchange      Demonstrate improved ventilation and adequate oxygenation of tissues as evidenced by absence of respiratory distress Adequate for Discharge          Ineffective Airway Clearance      Maintain airway patency Adequate for Discharge          Potential for Compromised Skin Integrity      Nutritional status is improving Adequate for Discharge          Risk for clot formation with irregular heart rhythm      Patient remains free of clots in atrium Adequate for Discharge          Risk of myocardial ischemia or infarction      Early detection of myocardial ischemia/infarction Adequate for Discharge          Safety      Patient will be injury free during hospitalization Adequate for Discharge          Urinary Incontinence      Perineal skin integrity is maintained or improved Adequate for Discharge          Anxiety      Anxiety is at manageable level Completed          Psychosocial Needs      Demonstrates ability to cope with hospitalization/illness Completed      Collaborate with patient/family/caregiver to identify patient specific goals for this hospitalization Completed          Blood pressure is elevated above 140 over 90 mmHg      Blood pressure (BP) is within acceptable limits Progressing          Glucose Imbalance      Achieve optimal glucose control Progressing          Knowledge Deficit      Patient/family/caregiver demonstrates understanding of disease process, treatment plan, medications, and discharge instructions Progressing          Pain      Patient's pain/discomfort is manageable Progressing          Potential for Compromised Skin Integrity      Skin integrity is maintained or improved Progressing          Potential for Falls      Patient will remain free of falls Progressing        Discharged at 1500, left by car,  accompanied by wife, all belongings sent along, including a walker.

## 2021-07-22 NOTE — PLAN OF CARE
Pain      Patient's pain/discomfort is manageable Progressing        Frequent complaint of back pain, given tylenol with some relief. Encouraged change in position, offered to ambulate on the floor, not willing to try to walk, afraid of getting shortness of breath, and loosing balance.

## 2021-07-22 NOTE — PLAN OF CARE
Problem: Pain  Goal: Patient's pain/discomfort is manageable  Outcome: Progressing   Patient denied pain this shift, was restful.    Problem: Safety  Goal: Patient will be injury free during hospitalization  Outcome: Progressing   Bed alarm used this shift, patient able to sit up in bed unassisted then attempts to ambulate to bathroom on own. Patient remained free from injury this shift, presently wearing Bipap.    Problem: Daily Care  Goal: Daily care needs are met  Outcome: Progressing   BP not elevated this shift, patient requested information on BP and O2 readings, patient satisfied with results. Needs are met at this time, will continue to monitor.

## 2021-07-22 NOTE — PLAN OF CARE
Anxiety      Anxiety is at manageable level Progressing        Impaired Gas Exchange      Demonstrate improved ventilation and adequate oxygenation of tissues as evidenced by absence of respiratory distress Progressing        Ineffective Airway Clearance      Maintain airway patency Progressing        Safety      Patient will be injury free during hospitalization Progressing        Pt. On bipap over the night with no resp. Distress. Did have bradycardia when in a deep sleep of 40-50 BPM. AM Rythmol held per orders. Pt. Alert and oriented X4. Ambulates SBA. Used urinal to void over the night. Uses call light appropriately for all needs. Will continue to monitor.    Chano Valladares RN

## 2021-07-22 NOTE — PLAN OF CARE
Problem: Blood pressure is elevated above 140 over 90 mmHg  Goal: Blood pressure (BP) is within acceptable limits  6/2/2019 2255 by Santo Alegre RN  Outcome: Progressing  6/2/2019 2219 by Santo Alegre RN  Outcome: Progressing     Problem: Pain  Goal: Patient's pain/discomfort is manageable  6/2/2019 2255 by Santo Alegre RN  Outcome: Progressing  6/2/2019 2219 by Santo Alegre RN  Outcome: Progressing     Patients BP High during shift. PRN antihypertensive given. Message sent to MD to review BP medications. Patient reported 9/10 pain at beginning of shift. PRN given for pain. Reassessment revealed pain had decreased to 5/10. RN will continue to monitor.

## 2021-07-22 NOTE — PLAN OF CARE
BIPAP/CPAP NOTE    UNIT TYPE:  V60. BIPAP.  MASK TYPE:  Nasal /face -cushion mask.    SETTINGS:      CPAP - 7   BIPAP - 14   FI02 - 30%       PATIENT'S TOLERANCE OF DEVICE - patient is tolerating well with the BIPAP and no respiratory distress noted. Blood pressure 99/51, pulse 61, temperature 99.1  F (37.3  C), temperature source Oral, resp. rate 15, height 6' (1.829 m), weight 210 lb (95.3 kg), SpO2 96 %.   Cont current plan of cares and support.

## 2021-07-22 NOTE — PLAN OF CARE
INITIAL 3 NIGHTS BIPAP/CPAP NOTE    UNIT TYPE:  V 60  MASK TYPE:  Nose/mouth mask    SETTINGS:      MODE - st   IPAP - 14   EPAP - 7   RATE - 12   VT -    MIN PRESSURE -    MAX PRESSURE -    FI02 - 30%   02 BLED IN -   TIME OF MASK PLACEMENT - 0054    TWO HOUR SKIN CHECK DONE AT  - 0314  INTERVENTION INITIATED - no gel applied nasal mask  PATIENT'S TOLERANCE OF DEVICE - pt. Tolerated well.

## 2021-07-22 NOTE — PLAN OF CARE
Problem: Inadequate Airway Clearance  Goal: Patient will achieve/maintain normal respiratory rate/effort  Outcome: Progressing    Pt has been on BIPAP for much of the shift because he wants to rest, and has been feeling shortness of breath.  He was on 3-4lpm NC for 2 hrs and felt well until he became dyspneic with lite activity and he requested to go back on BIPAP. Pt resting comfortably. SpO2 92-93% on 25% FiO2.  BS scattered crackles this AM, now clear diminished. Pt refused all of his nebs today. He said they make him cough too much.  I suggested doing his nebs without the Mucomyst, but he refused.  Will continue to monitor.

## 2021-07-22 NOTE — PROGRESS NOTES
Clinical Nutrition Therapy Assessment Note      Reason for Assessment:   Tone Cooper is a 68 y.o. male assessed by the registered Dietitian for protocol/pathway order.    Nutrition History:  Information from patient and chart.  Diet prior to admission: Regular, high protein   Recent food/fluid intake: good  Recent Supplements: Boost at home mixed with milk  Food allergies or intolerances: nkfa    Current Nutrition Prescription:   Diet: Regular  Diet Supplements:   IV dextrose or Fluids:     Current Nutrition Intake:  The patient's current meal intake is good > 75%   Fish portion small, tator tots too many, ate a good breakfast    Anthropometrics:  Height: 6' (182.9 cm)  Admission weight: 204 lb (2/28)  Weight: 204 lb 14.4 oz (92.9 kg)  BMI (Calculated): 27.8  BMI indication: 25-29.9 overweight  Ideal body weight 178 lb  Usual body weight 189 lb, fluid positive    RD Nutrition Focused Physical Exam:  The patient has the following physical signs which could indicate malnutrition: Edema - Generalized    GI Status/Output:   GI symptoms include:WDL per nurse  Last BM: 2/28 per nurse    Skin/Wound:  Nathaniel score Nathaniel Scale Score: 18    No wound was noted.  Sore on lip  Red sacrum/coccyx, swollen scrotum, rash    Medications:  Medications reviewed.  Zithromax, pepcid, lasix, ssi, lantus 10 u, prednisone    Labs:  Labs reviewed  Glucose 119  Glu, poc 255 - 283    Estimated Nutrition Needs:  Assessment weight is 86 kg, other weight, UBW    Energy Needs: 9501-7804 kcals daily, 25-30 kcal/kg  Protein Needs: 86 - 129 g daily, 1-1,5 g/kg.  Fluid Needs: 4183-3116 mls daily, 25-30 mls/kg    Malnutrition: Patient does not meet 2 diagnostic criteria for malnutrition    Nutrition Risk Level: low risk    Intervention:   Boost BC chocolate daily (increase protein)    Monitoring/Evaluation:   Intake, wt, skin, labs    Electronically signed by:  Keyanna Gallegos RD

## 2021-07-22 NOTE — PROGRESS NOTES
Met with patient and reviewed discharge medication instructions.  Reviewed each medication name, dose and indication.  Instructed to take only the medications on this discharge list.  Do not resume prior home medications UNLESS on this list at the same dosage.  Patient given [facility] direct phone number for futher questions.    RX supplies eprescribed to [facility]     Diabetic ed to see for home Lantus and two times a day accucheck (rx sent to [facility])

## 2021-07-22 NOTE — PROGRESS NOTES
Pharmacy Note: Admission Drug Regimen Review    Admission drug regimen review has been completed. The following medication issues were identified.    1. No issues noted.    Thank you,  Damien Frias RPh 2/28/2019 4:14 PM

## 2021-07-22 NOTE — PROGRESS NOTES
Pt cont on 3lnc bs decreased, and clear. Pt received duo neb x2 pulmicort x1. Pt not available for noon tx. Mentioned to pt he will have a abg in am when on bipap at least 4-6 hr.

## 2021-07-22 NOTE — PROGRESS NOTES
INPATIENT CLINICAL SOCIAL WORK PSYCHOSOCIAL ASSESSMENT    Original Assessment completed by Shannon Clay [facility] on 03/01/19    Updated Assessment completed by Laure Rosado [facility] on 06/03/19     PRIMARY DECISION MAKER FOR HEALTHCARE  Primary Decision Maker: Patient     EMERGENCY CONTACT/S  Name Home Phone Work Phone Mobile Phone Relationship Lgl ALIYA Bourne [phone]     Spouse     DECLINED, DECLINED [phone]     Declined           HEALTHCARE DIRECTIVE  Advance Directive: Patient has advance directive, copy not in chart(Pt says he has HCD from 15 years ago but doesn't knonw where it is. He would like to do a new one.)  Advance Directive not in Chart: (Provided [employer] Cho[employer] forms to pt. SW checked if pt had completed forms yet, pt stated he has spoken to his spouse and she will act as Primary Health Care Agent. Pt would like his brother as his secondary Health Care Agent but has not spoken in depth with his sibling about this role. 06/03/19)     PRESENTING REASON FOR HOSPITALIZATION:  Tone Cooper is a 68 y.o. old male with a PMH significant for COPD both steroid and oxygen dependent, right heart failure, atrial fibrillation on chronic anticoagulation, hypertension. History is provided by reviewing the records from [facility], speaking with the transferring provider, and talking with the patient and his wife.    Patient was admitted to [facility] on 5/26/2019 with a acute on chronic respiratory failure secondary to COPD exacerbation.  He was admitted to the ICU and started on BiPAP.  He was treated with empiric antibiotics as well as high-dose steroids.  He initially required BiPAP almost continuously but is now requiring it intermittently.  He continues to require 3 to 4 L/min oxygen per nasal cannula.  Note that he tires 20 mg of prednisone at home chronically as well as oxygen per nasal cannula at 3 L/min continuously.  He was transitioned to oral steroids and is now on  oral prednisone.  He gets short of breath with minimal exertion such as getting out of bed.  This is coupled with hypoxia and tachycardia.  He was medically optimized and transferred to [facility] for ongoing medical management including intermittent BiPAP need secondary to ongoing acute on chronic respiratory failure.  Upon arrival, he is comfortable.  His breathing feels okay with his current nasal cannula oxygen, he has no pain, he admits to becoming dyspneic with the littlest of activities including getting out of bed.     LEGAL  Legal Status: None     FINANCIAL  Payor/s     Payor Plan    [facility] MEDICARE      COBRA Eligible?: No  VA Benefits? 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  Highest Education: Other (comment)([facility])     SPIRITUAL / CULTURAL  Identified Adventist: Latter day([employer])  Traditions/Cultural Practices that Help: No     CHEMICAL /PSYCHIATRIC BEHAVIORAL HEALTH  Concerns of substance / chemical use? : No         Concerns for mental/behavioral wellbeing? : Yes(Has been depressed at inability to do tasks around house like shoveling, house maintenence due to health issues.)     HOME / LIVING ENVIRONMENT  Living Arrangements: Spouse/significant other, Children(Lives with wife, 26 year old stepdaughter and her boyfriend.)  Type of Residence: Private residence  Type of Home/Layout: Single family home, Split level/walkout(2 steps in and 8 steps between floors.)     DAILY ROUTINE / FUNCTIONAL STATUS  ADL / IADL Limits: Yes ADL Limits : (Needed frequent rest breaks with exertion.)  Drives?: Yes  Prior Home Equipment: Oxygen/other respiratory equipment, nebulizer, bathing supplies, Raised toilet  seat, Grab bars(May need home BiPap or NIV at [facility], uses [facility] for respiratory equipment.)     COMMUNITY SERVICES  Community Services : [facility](Had [facility] for IV home infusion in past.)  Primary Care Provider: Ana Pratt MD     FAMILY / SOCIAL SUPPORT  Primary Caregiver: Self  Identified Primary Caregiver in anticipation of a discharge home with aftercare needs: Self, Family  Support Systems: Spouse/significant other, Family members   He and his wife Gloria have a blended family. Tone's son Jan lives in [place] and her 3 daughters and one son live in the area. They have 8 grandchildren.     Tone enjoys motorcycles, Blues music and travel. He has a lot on his bucket list: plans to drive [address] in a convertible.     ANTICIPATED DISCHARGE NEEDS  Anticipated discharge needs: TBD(Patient hopes to return home. 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 Pt also has paid for and arranged a motorcycle tour around [facility] which begins on 06/22/19. Pt has also contracted to  a Festival event on 06/30/19. Pt advised he will not miss any of these events and will be discharging before 06/12/19.)    SW will partner with pt & family on discharge plans. Will provide them a list of Medicare-certified SNFs and/or HHAs once post-acute care needs are determined.     SUBJECTIVE ASSESSMENT  Pt was recently at [facility] on 3/1/19. This SW reviewed previous assessment information with pt and updated any changes. SW provided information re role of SW and contact information. Tone was alert, oriented, and engaged. He responded to questions with good humor and appeared to enjoy our meeting. SW will follow for any additional needs pt may have during his current stay at . Pt reported he is well set up with equipment etc in his home and purchased a battery system so he can travel with his O2 while he is at concerts and festivals. Pt does not foresee any further needed  equipment at this time. Pt may want to explore if Medicare will cover a suctioning unit, SW will assist with this need if necessary.      PLAN / FOLLOW-UP  SW will continue to follow for psychosocial and emotional support of patient and family. Pt declined to Care Progression Meeting scheduled for     PUMA Wolfe

## 2021-07-22 NOTE — PLAN OF CARE
"Anxiety      Anxiety is at manageable level Progressing        Daily Care      Daily care needs are met Progressing        Pain      Patient's pain/discomfort is manageable Progressing        Potential for Compromised Skin Integrity      Skin integrity is maintained or improved Progressing        Potential for Falls      Patient will remain free of falls Progressing        Safety      Patient will be injury free during hospitalization Progressing        Patient is alert and orientated.  C/o mod pain that is somewhat tolerable with RN tylenol.  Patient is sometimes gruff with staff when trying to perform cares.  When ZACH came in at beginning of shift patient responded rather firmly \"I have already been turned! I dont need any of that.\"  ZACH and I do believe that patient turns self somewhat.   Patient also resistant to having staff check skin especially in private areas.  Refused skin check from day nurse and was somewhat compliant to writers request.  Writer did notice that there was some skin breakdown in the intergluteal cleft and that Calmoseptine was stinging.   Patient refused help wiping after BM that was accomplished on toilet in bathroom as well.  Patient refused voltaren cream for pain and miconazole powder in groin area.   "

## 2021-07-22 NOTE — PLAN OF CARE
Problem: Occupational Therapy  Goal: OT Goals  Description  Long Term/Short Term Goal to be met by 6/19/19:   - Bed mobility with indep w/SATs >92%.  - Basic transfers with mod I, w/SATs >92%.  - Advanced transfers SBA, A.E. And SATs >92%.  - FB dressing with mod I, using resp edu and SATs >92%  - Upper body grooming and hygiene tasks with mod I, SATs>92%.  - Tolerate activity for 20 minutes and maintain oxygen saturations > 92% with activity.  - Verbalize and demonstrate understanding of pursed lip breathing with I/ADL tasks with SBA.  - Demo standing (static or dynamic) with functional activity 5 minutes, mod I and SATs  >90%  - Participation in cognitive testing in order to assist with safe discharge planning.-as needed    Goals initiated on 6/2/2019 by Kyle Anna OTRL/DONNELL.         See OT eval completed on 6/2/19 for further details.

## 2021-07-22 NOTE — PROGRESS NOTES
Clinical Social Work - Discharge Note    Plan:       Discharge Date: 06/06/19    Disposition (include location/agency name, phone #, fax # and additional info):      [x]  Other: Home with No Services per patient.      Additional Discharge Information / Recommendation: IMM Provided       [x]Primary Community MD / Clinic (include phone #): Ana Pratt -[phone]     Subjective Data:    SW has been meeting with pt over the past  week to coordinate discharge from . Pt has been pleasantly engaged in discharge planning and demonstrated good insight to his post-acute care needs. Pt is fully capable of managing his care needs in his home with assistance from his family.     Objective Data:  Primary Decision Maker: Patient    Per MD and  Care Teams pt. is medically stable to discharge to a lower-level of care for ongoing cares. SW has been meeting with pt for the past week in order to prepare pt for discharge from .  Pt has a clear understanding of his post-acute needs and is fully capable of meeting his needs in the home setting. Pt has all equipment and [place] provides pt with O2 in his home as well as needed equipment. Pt will discharge to home with family support via family transportation.       Funding Information: Kimberly Rosado [facility]

## 2021-07-22 NOTE — PLAN OF CARE
Pt currently on v60 BIPAP for the night .pt is on 2-3L NC when off the BIPAP. Schedule neb's has been given. Muco x1, Pulmx1 and Duo-Nebx1 . Vital sings are stable. No signs of resp distress noted this shift. Pt refused Gel Pad and stated that he doesn't wan't to be awaken to check on him. I explained to him that we need to be checking his skin in two hours, but pt refused and stated he is not new to bipap mask and he will not get skin break.   RT will cont to monitor and assess. Blood pressure 142/83, pulse 64, temperature 96.7  F (35.9  C), temperature source Axillary, resp. rate 22, height 6' (1.829 m), weight 206 lb 6.4 oz (93.6 kg), SpO2 98 %.

## 2021-07-22 NOTE — PROGRESS NOTES
Skin/Ostomy Concerns and/ or WOC Consult Request       Identify concerning skin/ostomy issues:    Abrasion location: lower lip  Rash location: Chest, buttock, perineum        Order(s) Requested:    Pt has some bruises on both forearms and abdomen, rashes on chest, perineum and buttock and his lower lip has an abrasion/scab.    Nurse submitting note:      Libby Alonzo      Provider Cosigning:    I have examined the skin concern identified in this note and agree with the RN documentation and order request(s).

## 2021-07-22 NOTE — PROGRESS NOTES
[place] Hospitalist Daily Progress Note    Summary:  Tnoe Cooper is a 68 year old male with history of severe COPD (on home oxygen 3 lpm), atrial fibrillation (anticoagulated with Elliquis), tricuspid valve repair, hypertension, nonsustained VT, and two hospitalizations in the last two months of 2018 for COPD exacerbations. He presented to the ED on 12/17/18 with 1.5 weeks of productive cough and shortness of breath.      He was found to have severe sepsis, community acquired pneumonia of right lung (by CXR), and hypoxic respiratory failure. He was admitted for further evaluation and treatment. Initially, he required BIPAP support. He was treated with Rocephin, Zithromax, nebs, and Prednisone. Rocephin was changed to Zosyn shortly after admission. One of two admission blood cultures grew staph. Infectious disease was consulted. Vancomycin was started. This staph ended up being MSSA and it grew in sputum, also. Antibiotics were changed to Ancef. Given history of tricuspid valve repair a prolonged course of IV Ancef was planned.      Tone was somewhat slow to improve, although he did come off of BIPAP and his blood pressure improved. He developed some hemoptysis in sputum. Prior to admission Eliquis was stopped. CT of chest was obtained on 1/4/19. This showed areas of consolidation consistent with recent pneumonia. Streaky hemoptysis persisted. Pulmonary medicine at the [facility] was consulted with by phone. There was concern about possible small abscesses related to recent pneumonia. It was thought that bronchoscopy was not needed but that a prolonged course of antibiotics would be necessary. There was also consideration of bronchial artery embolization if hemoptysis worsened. For the last couple of days cannot has seems to stabilize. Now he is having just occasional streaky hemoptysis in his sputum. His breathing has improved. Infectious diseases recommending completion of 4 weeks of IV Ancef  followed by 4 weeks of oral Augmentin.  He was dismissed home on 1/8/19 on baseline 3 L supplemental oxygen.     He again presented to [facility] on 2/11/19 complaining of shortness of breath.  Chest CT showed small pulmonary emboli and concern for pneumonia.  Treated with Azithromycin for pneumonia.  Began taking Eliquis for pulmonary embolism.  He was dismissed home on 2/14/19.      He again presented to [facility] on 2/16/19 complaining of shortness of breath.  His acute on chronic hypoxic respiratory failure was felt to be due to COPD exacerbation.  He was treated with parenteral corticosteroids, nebulized bronchodilators and noninvasive ventilation (BiPAP).       After admission to  hospital, pt had worsening dyspnea and required intermittent BiPAP treatment. Remained dyspneic with increased work of breathing, had mild Nicolasa with increase in Cr, then developed increasing lethargy and hypercapnia on 2/19 requiring continuous BiPAP and transferred to ICU. Has streaky opacities in the left side would be to actually be residual pneumonia, however with his elevated lactic acid he has remained on Zosyn but, suspect primary problem is COPD exacerbation and he is on IV steroids with scheduled nebs.     He has a history of atrial fibrillation/flutter, status post prior ablation procedure.  Notes indicate he had episodes of atrial fibrillation with rapid ventricular response during recent hospital stays, and did follow-up with EP cardiology as outpatient.  They felt he may avoid repeat ablation, since atrial arrhythmias were likely exacerbated by his pulmonary problems and until respiratory status was stable, this would decrease chance of success with any ablation.  Dose of pro-path and known was increased.     As noted above, he had an episode of MSSA bacteremia in December.  He completed a lengthy course of IV antibiotics.  He was on Augmentin prior to admission due to recent COPD  "exacerbation.  He was treated with IV antibiotics and did complete a course of Augmentin.     He had an episode where he was up to the bathroom and, \"slid to the floor.\"  Because he is on anticoagulants, head CT was obtained.  This was negative for intracranial hemorrhage, did show small vessel ischemic changes.  His dyspnea has improved and he  dismissed to [facility] for further evaluation and treatment.      Assessment/Plan  Principal Problem:    Acute on chronic respiratory failure with hypoxia and hypercapnia (H)    COPD (chronic obstructive pulmonary disease) (H)  ? Notes indicate he has, \"advanced COPD\" with multiple hospitalizations in the past 6 months.  He has had worsening dyspnea anytime his steroids are weaned.  ? Continue corticosteroids  ? Continue duo nebs, Xopenex nebs as needed  ? BiPAP at night  ? Per outside hospital, \"social work to see if patient would be a candidate for Home Trilogy machine and NIPPV, it sounds like he is but this would be a $5000 yearly rental fee.\"  ? pulmonary specialist recommended very close follow-up with pulmonology in clinic, chronic daily azithromycin therapy, and pulmonary rehab  ? Has been on Zosyn started 2/16, changed to Augmentin (completed course)     Active Problems:    Atrial flutter (H)    Bradycardia  ? status post previous ablation procedure.  He did have issues with atrial fibrillation with RVR during previous hospitalizations.  He did follow with [facility] Cardiology as an outpatient.  The thought was to avoid a repeat ablation procedure at this time given his recent infectious issues which would decrease his chance of success.  He did have his propafenone dose increased.    ? Continue diltiazem and propafenone   ? Cardiac monitoring  ? 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  EP wanting to avoid attempting flutter ablation until pulmonary issues are better controlled  ? Hold parameters added to Diltiazem, propafenone, but bradycardia during " sleep not unexpected  ? If he has symptomatic bradycardia, I would discuss with EP, decrease calcium channel blocker  ? Continue Eliquis for thromboembolism prevention. This was held at the time of discharge from previous hospitalization in January due to hemoptysis. No recurrence of hemoptysis and with concurrent PE, need to continue anti-coagulation        H/O tricuspid valve repair    Pulmonary emboli (H)  ? just diagnosed a few days prior to recent admission  ? he had a small PE on the CT chest, probably an incidental finding, dyspnea mainly due severe COPD, in exacerbation  ? Continue Eliquis        Mixed hyperlipidemia  ? Not on a statin       BPH (benign prostatic hyperplasia)  ? Had been on terazosin  ? Assess voiding, consider bladder scan       Thrombocytopenia (H)  ? Mild  ? Recheck platelet count      Chronic right heart failure (H)  History of tricuspid valve replacement  ? Last TTE 1/4/19 showing mildly dilated RV along with decreased RV systolic function. Does have peripheral edema. Med list includes Lasix, however his spouse does not think he takes this. Apparently had significant significantly more edema during previous admission per his spouse.  ? Local compression for peripheral edema  ? Avoid overdiuresis due to recent acute kidney injury    Stress hyperglycemia  ? Continue basal insulin  ? Continue corrective dose insulin  ? hemoglobin A1c is at goal  ? Most blood sugar readings are at goal     Chronic low back pain:  ? Had been taking oxycodone 5 mg frequently at home for this.  His opiates have been held given his hypercapnia and tenuous respiratory status. As his goals are restorative, risk of opiates at this time outweigh potential benefits of pain control. Any amount likely to increase his CO2 retention leading worsening of his respiratory failure. Per outside hospital records, he also struggles with daytime sleepiness  ? Acetaminophen  ? Continue topical NSAID and Lidoderm  "patches  ? Mobilize patient  ? Patient refers to Trazodone and Seroquel to promotw restful sleep     Goals of care  ? See discussion in note from outside hospital 2/26: \"Unfortunately despite multiple days of steroids, inhaled steroids, nebulization treatments he is not showing much clinical sign of improvement. He is extremely fatigued and lasts only 15-20 minutes off of BiPAP at a time. He is sleeping most of the day. If he is to improve with current therapy it is likely to take weeks. The patient is becoming more frustrated with his limited response to his respiratory treatment. At times he is saying statements like he feels like he is going to die and that he does not want to do this anymore. ...I spoke at length with him and his spouse regarding his current condition, current treatment plan which includes oral and inhaled steroids and intermittent BiPAP with goal for LTACH discharge and pulmonary rehab and slow improvement in his respiratory status, and alternative options including palliative care. After a long conversation the patient does confirm he wants to continue with current treatment plan and that he is just very frustrated overall. This will be an ongoing conversation with the patient, his spouse, and his care team.\"  ? Consider Palliative Care consult       Barriers to disposition:  Stable respiratory status, progress in therapies      Subjective:   \"I take a sleeping pill.  I do not want them in here trying to turn me every 2 hours.\"  We discussed need for repositioning to avoid pressure ulcers.  Patient stresses that he moves on his own, does not want to be turned during the night.  Asks about his heart rhythm, should he have follow-up with the EP MD?  No GI complaints.  Objective:  Vital signs in last 24 hours:  Temp:  [97.1  F (36.2  C)-98.2  F (36.8  C)] 97.2  F (36.2  C)  Heart Rate:  [55-83] 60  Resp:  [14-28] 28  BP: (133-160)/(78-93) 151/89  FiO2 (%):  [30 %] 30 %  Weight:   202 lb (91.6 " kg)    Intake/Output last 3 shifts:  I/O last 3 completed shifts:  In: -   Out: 2175 [Urine:2175]  Intake/Output this shift:  I/O this shift:  In: -   Out: 375 [Urine:375]    Physical Exam:  General Appearance: Alert, cooperative, no distress.   Head: Normocephalic, without obvious abnormality, atraumatic  Eyes: PERRL, conjunctiva/corneas clear, both eyes.  Wears glasses.  Nose: Nares normal, no drainage.  Wearing nasal cannula.  Throat: has upper denture,   Neck: Supple, symmetrical, trachea midline, no adenopathy;              Lungs: decreased breath sounds throughout  Chest Wall: well healed midline sternotomy  Heart: Regular rate and rhythm, with occasional extra beats  Abdomen: Soft, non-tender, bowel sounds active. No obvious organomegaly or mass. . Small, healed incision midline (likely site of prior chest tube)  Extremities: bilateral lower extremity edema  Pulses: DP pulses are 1-2+ bilat.    Skin: no rashes or lesions.  Trevor complexion.  Neurologic: moves both arms and legs.        Imaging: (I have personally reviewed the results) CHEST PORTABLE ONE VIEW   2/16/2019 10:20 PM     HISTORY: Shortness of breath.     COMPARISON: Earlier the same day at 1:40 PM.      IMPRESSION: Improving streaky bibasilar opacities since prior.  Perihilar vascular prominence is again seen. Cardiac silhouette is  unchanged. No new focal infiltrates. No significant pleural effusion  or pneumothorax.     TALON DAWSON MD    Lab Results: (I have personally reviewed the results)    All medication issues identified within the last 24 hours have been addressed and completed as appropriate.    DVT prophylaxis:  Eliquis  GI prophylaxis:  JENNY Alcantar M.D.  Internal Medicine  [facility]  Pager: [phone]     Total time on visit: 35 minutes  Time spent for counseling, coordination of care:   > 20 minutes including personal review and interpretation of labs and studies above, and discussion with patient, unit  RN, charge RN, RN care manager regarding atrial flutter, antiarrhythmics, BiPAP, supplemental oxygen, progress in therapies

## 2021-07-22 NOTE — PROGRESS NOTES
[facility] Hospitalist Daily Progress Note     Summary:  68 y.o. old male with a PMH significant for COPD both steroid and oxygen dependent, right heart failure, atrial fibrillation on chronic anticoagulation, hypertension admitted to [facility] on 5/26/2019 with a acute on chronic respiratory failure secondary to COPD exacerbation.  He was admitted to the ICU and started on BiPAP.  He was treated with empiric antibiotics as well as high-dose steroids.  He initially required BiPAP almost continuously but is now requiring it intermittently.  He continues to require 3 to 4 L/min oxygen per nasal cannula.  Note that he tires 20 mg of prednisone at home chronically as well as oxygen per nasal cannula at 3 L/min continuously.  He was transitioned to oral steroids and is now on oral prednisone.  He gets short of breath with minimal exertion such as getting out of bed.  This is coupled with hypoxia and tachycardia.    Assessment/Plan  Acute on chronic respiratory failure hypoxia and hypercapnia  COPD exacerbation    On 3 L continuous nasal cannula and chronic prednisone (recently increased to 20 mg) at home. Continues to need bipap prn for dyspnea.    Continue 3 L oxygen via continuous nasal cannula    BiPAP at night and as needed    Continue duo nebs scheduled    No Mucomyst and budesonide nebs per patient request    Continue Xopenex nebs as needed    Continue prednisone 60 mg for 7 days total (6/1- 6/7), decreased by 10 mg every week until he reaches 20 mg.  Keep him on 20 mg until he follows up with his primary care doctor.    Continue guaifenesin long-acting 600 mg twice a day      Poorly controlled hyperglycemia secondary to steroids    Blood glucose 123-235    Increase NPH to 7 units qam with prednisone    Continue Accu-Cheks and sliding scale insulin    Occasional blood sugar reading is above goal     Atrial fibrillation  Hypertension  Right heart failure    BLE edema c/w volume overload due to acute  "right sided congestive heart failure, urinating frequently.  PTA dose 40 mg 3 times daily per patient.  Contraction alkalosis and decreasing prerenal azotemia noted.    Continue Lasix 40 mg 3x daily (patient prefers the current dosage timing)    Continue Eliquis and diltiazem    Follow BMP    Trial of lisinopril 10 mg daily, titrate up for adequate response    Blood pressure readings are closer to goal     Lung nodules    Follow-up as outpatient    DVT prophylaxis:  Eliquis  GI prophylaxis:  None, eating.    Subjective:   \" I am getting my account ready for my motorcycle trip.\"  Patient is talking on the phone to a radio , he says getting ready for a motorcycle trip by [facility].  He acknowledges he feels short of breath with distended speech.  No GI complaints.  Feels ready for discharge.    Objective:  Vital signs in last 24 hours:  Temp:  [98  F (36.7  C)-98.1  F (36.7  C)] 98.1  F (36.7  C)  Heart Rate:  [63-90] 85  Resp:  [13-22] 20  BP: (109-143)/(65-89) 128/84  FiO2 (%):  [25 %] 25 %  Weight:   192 lb 12.8 oz (87.5 kg)    Intake/Output last 3 shifts:  I/O last 3 completed shifts:  In: 480 [P.O.:480]  Out: 3420 [Urine:3420]  Intake/Output this shift:  I/O this shift:  In: -   Out: 350 [Urine:350]    Physical Exam:  General Appearance: Awake, alert.  Breathless with extended speech.  HEENT:  NC/AT, nasal cannula   Lungs:  CTA B, distant, decreased air movement  Cardiovascular:  Irreg irreg, trace - 1+ BLE edema  Abdomen:  S/NT/ND, +BS  Extremities:  Well developed  Skin:  Erythematous face  Neurologic:  A&O x3  Psychologic:  Calm    Imaging: I have independently visualized the image.  None    Lab Results: Sodium 142 potassium 3.7 chloride 93 CO2 21 BUN 36 creatinine 0.91 calcium 9.5 glucose 115       All medication issues identified within the last 24 hours have been addressed and completed as appropriate.    Barriers to disposition:  Intermittent bipap.    Nitza Alcantar" M.D.  Internal Medicine  [facility]  Pager: [phone]     Total unit/floor time on visit: 35 minutes  Time spent for counseling, coordination of care:   > 20 minutes including personal review and interpretation of labs and studies above, and discussion with patient, charge RN, unit RN, RN care manager regarding respiratory failure and oxygen needs, insulin dosing, antihypertensives, progress in therapies, discharge planning   Clothing

## 2021-07-22 NOTE — PLAN OF CARE
Impaired Gas Exchange      Demonstrate improved ventilation and adequate oxygenation of tissues as evidenced by absence of respiratory distress Progressing        Vital signs stable. /78 (Patient Position: Lying)   Pulse 74   Temp 97.5  F (36.4  C) (Axillary)   Resp 20   Ht 6' (1.829 m)   Wt 210 lb (95.3 kg)   SpO2 97%   BMI 28.48 kg/m       Pt on BIPAP V 60 S/T mode 14/7 RR 12 30% FiO2. PT tolerating BiPAP well . Sat 96%, HR 45-63, RR 15-18.  ABG was drawn after patient came off the BIPAP and wore 5 hrs 45 min.

## 2021-07-22 NOTE — PROGRESS NOTES
Pt on 3lnc (Home O2 baseline), bs diminished, strong congested cough at times productive. Received duoneb x3 via face mask. Cont on nc during tameka, Bipap at night.

## 2021-07-22 NOTE — PLAN OF CARE
Patient's pain/discomfort is manageable Progressing      Prn tylenol given once for back pain; effective. Pt slept afterwards. Comfortable without distress.

## 2021-07-22 NOTE — PLAN OF CARE
INITIAL 3 NIGHTS BIPAP/CPAP NOTE    UNIT TYPE:  V 60  MASK TYPE:  Nose/mouth mask    SETTINGS:      MODE - st   IPAP - 14   EPAP - 7   RATE - 12   VT -    MIN PRESSURE -    MAX PRESSURE -    FI02 - 30%   02 BLED IN -   TIME OF MASK PLACEMENT - 8319  TWO HOUR SKIN CHECK DONE AT  - 1449    INTERVENTION INITIATED - no gel applied nasal cushion mask.   PATIENT'S TOLERANCE OF DEVICE - pt. Tolerated well on mask. HR 44-74 bpm. Continue monitoring.

## 2021-07-22 NOTE — PLAN OF CARE
Pt on 2L NC during the day. Sat 96-97%, RR 20. Duo and Duo/ Pulmicort nebs given on scheduled time. Bs clear decreased T/O. Pt has mod, congestive, productive cough, small amount of white secretions, using Yankauer. Pt put on BIPAP for the night, settings: ST 14/ 7, RR 4,  O2 30%. Pt put on BiPAP at 2211, RR 10, , sat 97%. For shift HR 70-84.      EDWAR SpearT

## 2021-07-22 NOTE — PLAN OF CARE
Daily Care      Daily care needs are met Progressing        Potential for Falls      Patient will remain free of falls Progressing        Safety      Patient will be injury free during hospitalization Progressing        Pt. Alert and oriented. Using call light appropriately. On bipap at HS. Was coughing quite a bit at HS and prn neb given. Coughing did subside after about 5 minutes and pt. Now comfortable. Will continue to monitor.    Chano Valladares

## 2021-07-22 NOTE — PLAN OF CARE
Problem: Pain  Goal: Patient's pain/discomfort is manageable  Outcome: Progressing   Pain managed with PRN codeine    Problem: Daily Care  Goal: Daily care needs are met  Outcome: Progressing  Patient compliant with cares and medication regiment. Patient looking forward to discharging home tomorrow.     Problem: Blood pressure is elevated above 140 over 90 mmHg  Goal: Blood pressure (BP) is within acceptable limits  Outcome: Progressing   BP in acceptable limits throughout shift.

## 2021-07-22 NOTE — PLAN OF CARE
Glucose Imbalance      Achieve optimal glucose control Progressing         at dinner and 145 at HS. SS novolog covered for dinner.   Pain      Patient's pain/discomfort is manageable Progressing        Patient continues to have back pain in which PRN tylenol is being given every 4 hours on the dot. Has tried repositioning with little relief.   Psychosocial Needs      Demonstrates ability to cope with hospitalization/illness Progressing        Patient is joking with staff and seems in good spirits. Is finding TV shows to keep him entertained while here.

## 2021-07-22 NOTE — PROGRESS NOTES
[facility] Hospitalist Daily Progress Note    Summary:  Tone Cooper is a 68 year old male with history of severe COPD (on home oxygen 3 lpm), atrial fibrillation (anticoagulated with Elliquis), tricuspid valve repair, hypertension, nonsustained VT, and two hospitalizations in the last two months of 2018 for COPD exacerbations. He presented to the ED on 12/17/18 with 1.5 weeks of productive cough and shortness of breath.      He was found to have severe sepsis, community acquired pneumonia of right lung (by CXR), and hypoxic respiratory failure. He was admitted for further evaluation and treatment. Initially, he required BIPAP support. He was treated with Rocephin, Zithromax, nebs, and Prednisone. Rocephin was changed to Zosyn shortly after admission. One of two admission blood cultures grew staph. Infectious disease was consulted. Vancomycin was started. This staph ended up being MSSA and it grew in sputum, also. Antibiotics were changed to Ancef. Given history of tricuspid valve repair a prolonged course of IV Ancef was planned.      Tone was somewhat slow to improve, although he did come off of BIPAP and his blood pressure improved. He developed some hemoptysis in sputum. Prior to admission Eliquis was stopped. CT of chest was obtained on 1/4/19. This showed areas of consolidation consistent with recent pneumonia. Streaky hemoptysis persisted. Pulmonary medicine at the [facility] was consulted with by phone. There was concern about possible small abscesses related to recent pneumonia. It was thought that bronchoscopy was not needed but that a prolonged course of antibiotics would be necessary. There was also consideration of bronchial artery embolization if hemoptysis worsened. For the last couple of days cannot has seems to stabilize. Now he is having just occasional streaky hemoptysis in his sputum. His breathing has improved. Infectious diseases recommending completion of 4 weeks of IV Ancef  followed by 4 weeks of oral Augmentin.  He was dismissed home on 1/8/19 on baseline 3 L supplemental oxygen.     He again presented to [facility] on 2/11/19 complaining of shortness of breath.  Chest CT showed small pulmonary emboli and concern for pneumonia.  Treated with Azithromycin for pneumonia.  Began taking Eliquis for pulmonary embolism.  He was dismissed home on 2/14/19.He again presented to [facility] on 2/16/19 complaining of shortness of breath.  His acute on chronic hypoxic respiratory failure was felt to be due to COPD exacerbation.  He was treated with parenteral corticosteroids, nebulized bronchodilators and noninvasive ventilation (BiPAP).       After admission to  hospital, pt had worsening dyspnea and required intermittent BiPAP treatment. Remained dyspneic with increased work of breathing, had mild Nicolasa with increase in Cr, then developed increasing lethargy and hypercapnia on 2/19 requiring continuous BiPAP and transferred to ICU. Has streaky opacities in the left side would be to actually be residual pneumonia, however with his elevated lactic acid he has remained on Zosyn but, suspect primary problem is COPD exacerbation and he is on IV steroids with scheduled nebs.He has a history of atrial fibrillation/flutter, status post prior ablation procedure.  Notes indicate he had episodes of atrial fibrillation with rapid ventricular response during recent hospital stays, and did follow-up with [facility] cardiology as outpatient.  They felt he may avoid repeat ablation, since atrial arrhythmias were likely exacerbated by his pulmonary problems and until respiratory status was stable, this would decrease chance of success with any ablation.  Dose of pro-path and known was increased.     As noted above, he had an episode of MSSA bacteremia in December.  He completed a lengthy course of IV antibiotics.  He was on Augmentin prior to admission due to recent COPD exacerbation.  He was  "treated with IV antibiotics and did complete a course of Augmentin.He had an episode where he was up to the bathroom and, \"slid to the floor.\"  Because he is on anticoagulants, head CT was obtained.  This was negative for intracranial hemorrhage, did show small vessel ischemic changes.  His dyspnea has improved and he  dismissed to [facility] for further evaluation and treatment.      Assessment/Plan  Principal Problem:    Acute on chronic respiratory failure with hypoxia and hypercapnia (H)    COPD (chronic obstructive pulmonary disease) (H)    ? Notes indicate he has, \"advanced COPD\" with multiple hospitalizations in the past 6 months.  He has had worsening dyspnea anytime his steroids are weaned.  ? Continue corticosteroids.  ? Continue duo nebs, Xopenex nebs as needed.  ? BiPAP at night  ? Per outside hospital, \"social work to see if patient would be a candidate for Home Trilogy machine and NIPPV, it sounds like he is but this would be a $5000 yearly rental fee.\"  ? pulmonary specialist recommended very close follow-up with pulmonology in clinic, chronic daily azithromycin therapy, and pulmonary rehab  ? Has been on Zosyn started 2/16, changed to Augmentin (completed course).   -We are arranging for the BiPAP machine @ home.  Along with a portable tank.  - Him and his wife would like to go home and not a TCU; they  feel that they are comfortably situated at home.        Active Problems:    Atrial flutter (H)    Bradycardia  ? status post previous ablation procedure.  He did have issues with atrial fibrillation with RVR during previous hospitalizations.  He did follow with [facility] as an outpatient.  The thought was to avoid a repeat ablation procedure at this time given his recent infectious issues which would decrease his chance of success.  He did have his propafenone dose increased.    ? Continue diltiazem and propafenone   ? Cardiac monitoring  ? 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 "  EP wanting to avoid atempting flutter ablation until pulmonary issues are better controlled  ? Hold parameters added to Diltiazem, propafenone, but bradycardia during sleep not unexpected  ? If he has symptomatic bradycardia, I would discuss with EP, decrease calcium channel blocker  ? Continue Eliquis for thromboembolism prevention. This was held at the time of discharge from previous hospitalization in January due to hemoptysis. No recurrence of hemoptysis and with concurrent PE, need to continue anti-coagulation   ? Asymptomatic bradycardia occasionally persists last EKG revealed a flutter with block.       H/O tricuspid valve repair    Pulmonary emboli (H)  ? just diagnosed a few days prior to recent admission  ? he had a small PE on the CT chest, probably an incidental finding, dyspnea mainly due severe COPD, in exacerbation  ? Continue Eliquis        Mixed hyperlipidemia  ? Not on a statin       BPH (benign prostatic hyperplasia)  ? Had been on terazosin  ? Assess voiding, consider bladder scan       Thrombocytopenia (H)  ? Mild  ? Recheck platelet count      Chronic right heart failure (H)  History of tricuspid valve replacement  ? Last TTE 1/4/19 showing mildly dilated RV along with decreased RV systolic function. Does have peripheral edema. Med list includes Lasix, however his spouse does not think he takes this. Apparently had significant significantly more edema during previous admission per his spouse.  ? Local compression for peripheral edema  ? Avoid overdiuresis due to recent acute kidney injury    Stress hyperglycemia  ? Continue basal insulin  ? Continue corrective dose insulin  ? hemoglobin A1c is at goal  ? Most blood sugar readings are at goal     Chronic low back pain:  ? Had been taking oxycodone 5 mg frequently at home for this.  His opiates have been held given his hypercapnia and tenuous respiratory status. As his goals are restorative, risk of opiates at this time outweigh potential  "benefits of pain control. Any amount likely to increase his CO2 retention leading worsening of his respiratory failure. Per outside hospital records, he also struggles with daytime sleepiness  ? Acetaminophen  ? Continue topical NSAID and Lidoderm patches  ? Mobilize patient  ? Patient refers to Trazodone and Seroquel to promote restful sleep     Goals of care  ? See discussion in note from outside hospital 2/26: \"Unfortunately despite multiple days of steroids, inhaled steroids, nebulization treatments he is not showing much clinical sign of improvement. He is extremely fatigued and lasts only 15-20 minutes off of BiPAP at a time. He is sleeping most of the day. If he is to improve with current therapy it is likely to take weeks. The patient is becoming more frustrated with his limited response to his respiratory treatment. At times he is saying statements like he feels like he is going to die and that he does not want to do this anymore. ...I spoke at length with him and his spouse regarding his current condition, current treatment plan which includes oral and inhaled steroids and intermittent BiPAP with goal for LTACH discharge and pulmonary rehab and slow improvement in his respiratory status, and alternative options including palliative care. After a long conversation the patient does confirm he wants to continue with current treatment plan and that he is just very frustrated overall. This will be an ongoing conversation with the patient, his spouse, and his care team.\"  ? Consider Palliative Care consult       Barriers to disposition:  Stable respiratory status, progress in therapies      Subjective:   No acute issues reported. Stable, working with PT.     Objective:  Vital signs in last 24 hours:  Temp:  [97.3  F (36.3  C)-98.6  F (37  C)] 98.6  F (37  C)  Heart Rate:  [55-90] 81  Resp:  [12-22] 22  BP: (121-155)/(70-88) 126/74  FiO2 (%):  [30 %] 30 %  Weight:   210 lb (95.3 kg)    Intake/Output last 3 " shifts:  I/O last 3 completed shifts:  In: 806 [P.O.:800; I.V.:6]  Out: 2100 [Urine:2100]  Intake/Output this shift:  I/O this shift:  In: 3 [I.V.:3]  Out: 300 [Urine:300]    Physical Exam:  General Appearance: Alert, cooperative, no distress.   Head: Normocephalic, without obvious abnormality, atraumatic  Eyes: PERRL, conjunctiva/corneas clear, both eyes.  Wears glasses.  Nose: Nares normal, no drainage.  Wearing nasal cannula.  Throat: has upper denture,   Neck: Supple, symmetrical, trachea midline, no adenopathy;              Lungs: decreased breath sounds throughout  Chest Wall: well healed midline sternotomy  Heart: Regular rate and rhythm, with occasional extra beats  Abdomen: Soft, non-tender, bowel sounds active. No obvious organomegaly or mass. Small, healed incision midline (likely site of prior chest tube)  Extremities: bilateral lower extremity edema  Pulses: DP pulses are 1-2+ bilat.    Skin: no rashes or lesions.  Trevor complexion.  Neurologic: moves both arms and legs.        apixaban  5 mg Oral BID     azithromycin  250 mg Oral Q M, W, F     budesonide  0.5 mg Nebulization BID     clonazePAM  1 mg Oral QHS     diclofenac sodium  2 g Topical QID     diltiazem  180 mg Oral BID     famotidine  20 mg Oral BID     furosemide  20 mg Oral BID     guaiFENesin ER  600 mg Oral BID     insulin aspart (NovoLOG) injection   Subcutaneous TID with meals     insulin aspart (NovoLOG) injection   Subcutaneous QHS     insulin glargine  10 Units Subcutaneous QHS     ipratropium-albuterol  3 mL Nebulization Q4H while awake     lidocaine  2 patch Transdermal Q24H     menthol-zinc oxide   Topical TID     miconazole   Topical BID     miconazole nitrate   Topical BID     predniSONE  10 mg Oral Daily with brkfst     propafenone  225 mg Oral Q8H     sodium chloride  3 mL Intravenous Line Care     tamsulosin  0.4 mg Oral Daily after brkfst     traZODone  50 mg Oral QHS     Imaging: (I have personally reviewed the results) CHEST  PORTABLE ONE VIEW   2/16/2019 10:20 PM     HISTORY: Shortness of breath.     COMPARISON: Earlier the same day at 1:40 PM.      IMPRESSION: Improving streaky bibasilar opacities since prior.  Perihilar vascular prominence is again seen. Cardiac silhouette is  unchanged. No new focal infiltrates. No significant pleural effusion  or pneumothorax.     TALON DAWSON MD    Lab Results: (I have personally reviewed the results)    All medication issues identified within the last 24 hours have been addressed and completed as appropriate.    DVT prophylaxis:  Eliquis  GI prophylaxis:  PPI    Sahil Solomon MD,[facility].     Prolonged care addendum    Family care conference took place on 03/08/19  1-1:36 : greater than 35 minutes face-to-face time.  Medical care plan discussed at length, Including discussion of all diagnosis in the assessment plan, as well as prognosis and disposition in presence of physical therapy/occupational therapy/speech therapist/dietitian/care management.  Plan for BiPAP at home, with oxygen tank discussed.  Plan for follow-up with cardiology and primary care also discussed.

## 2021-07-22 NOTE — PLAN OF CARE
Impaired Gas Exchange      Demonstrate improved ventilation and adequate oxygenation of tissues as evidenced by absence of respiratory distress Progressing        Pt on 3 LNC during day time and wears BiPAP/V 60/Nasal Mask ,IPAP 14, EPAP 7, rate 14, fio2  30%@night , sat 95-96%, HR 68-80, rr 20, bs clear decreased, duoneb/pulmicort neb given x1, pt refused neb x1, plan: Sunday evening have patient sleep with BIPAP 14/7 but with no back up rate , ABG  Monday morning -  reflecting 4-6 hours sleep on BIPAP 14/7 with no back up rate

## 2021-07-22 NOTE — PLAN OF CARE
Problem: Pain  Goal: Patient's pain/discomfort is manageable  Outcome: Progressing   Pt denied pain throughout the shift. Will continue to monitor.  Problem: Safety  Goal: Patient will be injury free during hospitalization  Outcome: Progressing   Safety measures in place, pt did not attempt to get up to bathroom  by himself during the shift. Remained safe. Will continue to monitor. Elan Summers RN

## 2021-07-22 NOTE — PLAN OF CARE
Patient's pain/discomfort is manageable Progressing      Patient's pain/discomfort is manageable Progressing      Patient will remain free of falls Progressing      Patient will be injury free during hospitalization Progressing    Pt is progressing with these goals.  Pain controlled with tylenol.  Calls appropriately.  VSS.

## 2021-07-22 NOTE — PROGRESS NOTES
Clinical Social Work - Discharge Note    Plan:      Discharge Date: 3/13/19 @ 1600 hours via family transport and spouse providing transport oxygen tank    Disposition (include location/agency name, phone #, fax # and additional info):      []  Transitional Care Unit:    [x]  Other: Home with [facility] services - RN, PT, & OT. P: [phone] F: [phone]    []  24 Hour Supervision Provided By:    [x]  Equipment Ordered: BiPAP through [facility]; standard tall walker to be dispensed by PT upon discharge.      Additional Discharge Information / Recommendation: 2nd IMM provided at 1330 hours on 3/13     [] Patient / Family working on finding MD    [x]Primary Community MD / Clinic (include phone #): Ana Pratt MD P: [phone] F: [phone]. Esperanza, charge RN verified that Dr. Pratt has agreed to follow for home care orders upon discharge.     Subjective Data: MINA Aguilera and this writer met with Tone at bedside and spoke with spouse Gloria over the phone several times to facilitate discharge arrangements. Tone reported preference HHC through [facility] as receives DME through [facility]. Tone and Gloria are in agreement to plan to return home with [facility].      Objective Data: Tone Cooper is a 68 y.o.   male who admitted to [facility] on 2/16/19 with COPD/acute exacerbation. He has had multiple hospitalizations in past six months for pneumonia and COPD exacerbations. Please refer to H & P for detailed medical information. He was admitted to [facility] on 2/28/19 for BiPap weaning and therapies. He was seen by pulmonary specialists, who indicated need for nocturnal BiPAP indefinitely. Tone is medically stable to discharge per MD at this time. Tone has made progress with therapies and will be discharging home with RN, PT, & OT [facility] services. Diabetic Educator consulted and provided training and education for new DM2 on 3/13.    Primary Decision Maker: Tone     Funding Information: [facility]  Medicare     Resources Provided:   []  Honoring Choices   []  Guardianship Info   []  MA Info & Mirta   []  Waiver Programs   []  SSI/SSDI   []  Brain Injury Association   []  TCU/SNF List   []  Assisted Living List   []  Group Home List   []  Community Services List   []  Legal Assistance   []  Driving Evaluation Info   []   Services   []  Home Care   []  County Contact Info   []  Other:    Janey Porter, CARO, SW Student

## 2021-07-22 NOTE — PROGRESS NOTES
Physical Therapy       03/01/19 0744   Visit Specifics   Eval Type Initial eval   Inital PT Consult 03/01/19   Bed/Tabs/Pad Alarm Applied Yes   General   Onset date 02/16/19   Additional Pertinent History Pt admitted c acute on chronic resp failure c advanced COPD exacerbation (3L-BiPAP yet), Aflutter c hx Vtach, hx PVC s/p ablation '17, diverticiulitis, HTN, R HF, HLD< PD, thrombocytopenia, tricuspid valve repair. Chart indicates pt frustration with lengthened recovery.   Chart Reviewed Yes   PT/OT Patient/Caregiver Stated Goals To be SBA-Woodrow in hospital room.    Family/Caregiver Present No   Treatment Time   Theraputic Activity 30   Precautions   Weight Bearing Status FWB   General Precautions Bed alarm/Tab alarm  (COPD)   Home Living   Type of Home House   Home Layout Laundry in basement;Bed/bath upstairs;Stairs to enter without rails   Bathroom Shower/Tub Tub/shower unit   Bathroom Toilet Standard   Bathroom Equipment Shower chair;Vanity near toilet;Grab bars in shower   Mobility Equipment Walker-4 wheeled   Additional Comments 2-3 DIANA s rail, enter onto kitchen level, split-entry up to bed/bath.    Prior Status   Independent With Dressing;Toileting;Transfers   Needs Assistance With Driving;Laundry;Shopping;Cleaning;Cooking;Bathing   Prior Device Use None of the above   Indoor Mobility Independent   Lives With Spouse   Receives Help From Family   Vocational Retired   Device Used No   Comments Able to walk ~to mailbox. 3L O2 at home, O2 not portable.    Cognition   Overall Cognitive Status WFL   LLE Assessment   LLE Assessment (MIldly deconditioned grossly - UEs assist & effot to stand)   Endurance Deficit   Endurance Deficit Yes   Endurance Deficit Description Cardiopulm & neuromuscular.    Wheelchair Activities   Wheelchair Yes   Wheelchair Type Standard   Wheelchair Cushion Standard   Wheelchair Assessment Ordered 3/1/19   Balance   Sitting Balance Standby   Standing Balance CGA  50' x 2 c RW & CGA on 3L,  Prashant to stand frmo low cahir, SBA bed mobility   Fall Risk   Fall Risk High   Plan   Treatment/Interventions Functional transfer training;Gait/stair training;Home exercise program;Neuromuscular re-ed;Strengthening/ROM   Assessment   Prognosis Good   Problem List Decreased strength;Decreased endurance;Impaired balance;Decreased mobility;Decreased safety awareness;Obesity   Barriers to Discharge Inaccessible home environment;Decreased caregiver support   Recommendation   PT Discharge Recommendation Home with assist  (Pending progress)   PT Equipment Recommendation Transfer / gait belt;Raised Toilet Seat;Wheelchair - standard;Rolling walker / FWW   Treatment Suggestions for Next Session Activity tolerance, leg power, balance / AD wean progressions.    PT Summary Tone admitted c acute on chronic resp failure. PT appropriate to progress pt towards prior function.   PT Care Plan REVIEWED DAILY Yes, goals remain appropriate     Problem: Physical Therapy  Goal: PT Goals  LTGS to be met by: 3/29/19  In order to progress towards home,  1. Patient will perform basic transfers Woodrow  2. Patient will perform car transfer Supervisino  3. Patient will ambulate 200ft with no AD, vitals stable.   4. Patient will ascend/descend 1 flight of steps with one handrail and Woodrow, vitals stable.  5. Patient will score low falls risk; >= 20/24 on DGI     STGS to be met by: 3/15/19  To progress towards LTGs,  1. Patient will perform basic transfers standby  2. Patient will perform car transfer prashant  3. Patient will ambulate 150ft with RW & stable vitals.  4. Patient will ascend/descend 3 steps with one handrail and contact guard assistance c vitals stable in <= 2min.   5. Patient will progress towards score low falls risk, intiaiting DGI s AD.    Goals were initiated on 3/1/2019 by Brittany L Dressler, PT.

## 2021-07-22 NOTE — PLAN OF CARE
Problem: Daily Care  Goal: Daily care needs are met  Outcome: Adequate for Discharge    Patient was discharged home in a stable condition with all his belongings. He was transported by his wife via a w/c.

## 2021-07-22 NOTE — PLAN OF CARE
Problem: Physical Therapy  Goal: PT Goals  LTGS to be met by: 3/29/19  In order to progress towards home,  1. Patient will perform basic transfers Woodrow  2. Patient will perform car transfer Supervisino  3. Patient will ambulate 200ft with no AD Woodrow, vitals stable.   4. Patient will ascend/descend 1 flight of steps with one handrail and Woodrow, vitals stable.  5. Patient will score low falls risk; >= 20/24 on DGI     STGS to be met by: 3/15/19  To progress towards LTGs,  1. Patient will perform basic transfers standby  2. Patient will perform car transfer prashant  3. Patient will ambulate 150ft with RW & stable vitals.  4. Patient will ascend/descend 3 steps with one handrail and contact guard assistance c vitals stable in <= 2min.   5. Patient will progress towards score low falls risk, intiaiting DGI s AD.    Goals were initiated on 3/1/2019 by Brittany L Dressler, PT.  Goals reviewed 3/7/2019 by Brittany L Dressler, PT.   Outcome: Progressing    Physical Therapy Weekly Summary    Current Mobility:  Bed Mobility - SBA    Transfers - CGA  Ambulation- CGA-Prashant up to 120' over 2-3 walks/session c RW  Stairs- NA  Balance: SBA sitting, CGA-Prashant standing/dynamic  Fall Risk - High   Equipment - O2    Frequency: 4-6x/wk    Barriers to therapy: None  Assessment/Progress towards goals: Tone is motivated and hard-working, showing activity tolerance and distance progressions. Looks to do well with goals set for home.  Continue per established POC    Discharge Recommendation: home with assist pending progress per established recommendations

## 2021-07-22 NOTE — PROGRESS NOTES
INPATIENT CLINICAL SOCIAL WORK PSYCHOSOCIAL ASSESSMENT    PRIMARY DECISION MAKER FOR HEALTHCARE  Primary Decision Maker: Patient    EMERGENCY CONTACT/S  Name Home Phone Work Phone Mobile Phone Relationship Lgl GrALIYA Ohara [phone]   Spouse    DECLINED, DECLINED [phone]   Declined        HEALTHCARE DIRECTIVE  Advance Directive: Patient has advance directive, copy not in chart(Pt says he has HCD from 15 years ago but doesn't knonw where it is. He would like to do a new one.)  Advance Directive not in Chart: (Provided Honoring Choices forms to pt.)    PRESENTING REASON FOR HOSPITALIZATION:  Tone Cooper is a 68 y.o.   male who admitted to [facility] on 2/16/19 with COPD/acute exacerbation. He has had multiple hospitalizations in past six months for pneumonia and COPD exacerbations. Please refer to H & P for detailed medical information. He was admitted to [facility] on 2/28/19 for BiPap weaning and therapies.    LEGAL  Legal Status: None    FINANCIAL  Payor/s     Payor Plan    [facility] MEDICARE      COBRA Eligible?: No  VA Benefits? 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  Highest Education: Other (comment)([facility])    SPIRITUAL / CULTURAL  Identified Anabaptism: Catholic([employer])  Traditions/Cultural Practices that Help: No    CHEMICAL /PSYCHIATRIC BEHAVIORAL HEALTH  Concerns of substance / chemical use? : No         Concerns for mental/behavioral wellbeing? : Yes(Has been depressed at inability to do tasks around house like shoveling, house maintenence due to health issues.)    HOME / LIVING ENVIRONMENT  Living Arrangements: Spouse/significant other, Children(Lives with wife, 26  year old stepdaughter and her boyfriend.)  Type of Residence: Private residence  Type of Home/Layout: Single family home, Split level/walkout(2 steps in and 8 steps between floors.)    DAILY ROUTINE / FUNCTIONAL STATUS  ADL / IADL Limits: Yes ADL Limits : (Needed frequent rest breaks with exertion.)  Drives?: Yes  Prior Home Equipment: Oxygen/other respiratory equipment, nebulizer, bathing supplies, Raised toilet seat, Grab bars(May need home BiPap or NIV at [place], uses [facility] for respiratory equipment.)    COMMUNITY SERVICES  Community Services : [facility](Had [facility] for IV home infusion in past.)  Primary Care Provider: Ana Pratt MD    FAMILY / SOCIAL SUPPORT  Primary Caregiver: Self  Identified Primary Caregiver in anticipation of a discharge home with aftercare needs: Self, Family  Support Systems: Spouse/significant other, Family members   He and his wife Gloria have a blended family. Tone's son Jan lives in [place] and her 3 daughters and one son live in the area. They have 8 grandchildren.    Tone enjoys motorcycles, the blues and travel. He has a lot on his bucket list: plans to drive [address] in a convertible.    ANTICIPATED DISCHARGE NEEDS  Anticipated discharge needs: TBD(Patient hopes to return home.)  MINA will partner with pt & family on discharge plans. Will provide them a list of Medicare-certified SNFs and/or HHAs once post-acute care needs are determined.    SUBJECTIVE ASSESSMENT  This MINA and Niyah CALABRESE RN CM met with patient for What To Expect on 3/1/19. Provided information re [facility] staffing and services and targeted discharge date of 3/11/19. Tone was alert, oriented, and engaged. He responded to questions with good humor and appeared to enjoy our meeting. MINA met with him later that day to complete assessment and called his wife to schedule Care Progression meeting.    PLAN / FOLLOW-UP  SW will continue to follow for psychosocial and emotional support of patient and family. Care Progression Meeting  scheduled for Friday 3/8 @ 1330 in pt's room.  REGINA Aguilera, LICSW        Shannon Clay

## 2021-07-22 NOTE — CONSULTS
DIABETES CARE  Situation:  Consulted by Provider for Diabetes Education  68 year old male with type 2 Diabetes. Patient was admitted for Acute on chronic respiratory failure.     Background:  Related Co-morbidities include: Newly diagnosed diabetes, Cardiomyopathy, COPD, Diverticulitis, HTN  PCP: Ana Pratt MD  Social: Lives at home with wife    Nutrition & Diabetes History: Pt has been told in the past he was on the edge of becoming diabetic. Pt reports that he is willing to take insulin and check his blood sugars.   Meds for BG Management PTA:  -None    Current Inpatient Meds for BG Management:  -Lantus 10 units daily at bedtime  -Novolog three times a day with meals and bedtime standard scale    Labs:  Hemoglobin A1C: 6.5(H) - 3/1/19              Hgb: 13.2(L) - 3/1/19    SCr: 0.90(WNL) - 3/12/19    GFR: >60 (WNL) - 3/12/19     Blood Glucose POC: Results for FELIPE AYOUB (MRN [id number]) as of 3/13/2019 11:02   Ref. Range 3/12/2019 08:55 3/12/2019 12:30 3/12/2019 17:18 3/12/2019 20:56 3/13/2019 07:45   Glucose Latest Ref Range: 70 - 139 mg/dL 103 242 (H) 169 (H) 192 (H) 121     Diet Order:  Regular   Intake: Good   Weight: 210 lb (95.3 kg)    Body mass index is 28.48 kg/m .    DM EDUCATION/COUNSELING:  Barriers to Learning and/or DM Self-Management: None  Previous DM Education: None - new diagnosis  Current Education and/or visit with Patient and/or caregiver(s):  Reviewed diabetes disease with patient. Explained normal/good blood sugar control, after meal levels and importance of checking blood glucose levels and keeping a log. Reviewed hypoglycemia/hyperglycemia symptoms and risks of complications with continued high blood glucose. Reviewed carbohydrates briefly and treatment for low blood sugars.     Instructed on insulin pen use. Medications were explained, including how they each acted on blood sugar, their timing and differences in dosing. The insulin pen was demonstrated by writer and patient. Also  "discussed site rotation, proper storage and safe needle disposal.   Reviewed use of home glucose meter and patient able to demonstrate use with no difficulty.   (See also \"Diabetic Ed Flowsheet\"  Or Education tab-diabetes for any education topic details.)    Assessment:  Pt appears able to monitor BG and take insulin - needs close follow up.   -Glucocorticoid type and duration of action must be considered in determining insulin treatment regimens.   -Once-a-day, short acting glucocorticoids such as prednisone peak in about 4 to 8 hr. Often once a day intermediate insulin (NPH) can cover BG rises from prednisone.  -Depending on the orders that are started, adjustments based on anticipated changes in glucocorticoid dosing and POC glucose test results are critical.    Recommendations:  1. Consider removing bedtime correction insulin to allow better assessment of fasting BG and basal insulin dose.  2. Likely prednisone caused increased insulin resistance and need for insulin. Will need close follow up on BG log and insulin needs - discussed with patient.     Refer to \"Recommendations for Management of Hyperglycemia in the Hospitalized Patient\" and \"ADA Standards of Medical Care in DM - 2019\", found on the DM Med Order set and on the InfoNet under Clinical Services> Diabetes Care.  [facility] BG goals for blood glucose levels are < 180 for improved health outcomes.    F/U Plans:  None inpatient  DISCHARGE NEEDS:   RX needed at discharge (preferred by patient's insurance):  Meter:  1. OneTouch Flex Verio test strips, 2 boxes of 50  2. Delica lancets, box of 100  Insulin:  1. Lantus SoloStar pen - box of 5  2. BD silver pen needs - box of 100    Thank you,   Ava Guerrier RDN, LD, CDE  Certified Diabetes Educator  Zenon Aburto  Pager: [phone]           "

## 2021-07-22 NOTE — PROGRESS NOTES
Pt cont on 3lnc bs decreased, strong congested cough. Received duoneb x3 pulmicort x2. Pt refused 4 pm tx. Cont on nc into tameka, bipap at night.

## 2021-07-22 NOTE — PLAN OF CARE
Problem: Inadequate Airway Clearance  Goal: Patient will maintain patent airway  Outcome: Progressing  Goal: Patient will achieve/maintain normal respiratory rate/effort  Outcome: Progressing     Problem: Inadequate Gas Exchange  Goal: Patient is adequately oxygenated and ventilation is improved  Outcome: Progressing   INITIAL 3 NIGHTS BIPAP/CPAP NOTE    UNIT TYPE: V 60  MASK TYPE: Face Mask    SETTINGS:      MODE -BIPAP   IPAP - 14   EPAP - 6   RATE -12   VT - *   MIN PRESSURE -    MAX PRESSURE -    FI02 - 30%   02 BLED IN -    TIME OF MASK PLACEMENT - 21:43    TWO HOUR SKIN CHECK DONE AT 23:43      INTERVENTION INITIATED -  Gel pad applied     PATIENT'S TOLERANCE OF DEVICE - tolerating well

## 2021-07-22 NOTE — PLAN OF CARE
Anxiety is at manageable level Progressing      Patient's pain/discomfort is manageable Progressing        Pt has had c/o back pain at beginning of shift and requested prn tylenol. He states that this does help, and has been resting comfortably in bed. He has otherwise not c/o anxiety, nor has he shown any signs or symptoms. He is self-repositioning, but is being repositioned q2h for skin integrity and comfort, no new skin concerns. Pt did have a bloody lip upon seeing him this morning, after cleaning him up, it appears to have been a cracked lip. He states to think it is from the bipap machine at night. Vital signs are within normal parameters. He is tolerating oral intake and scheduled medicine. No visitors thus far into shift. Call light is within reach, bed is in lowest position, and bed alarm is on. Will continue to monitor and assess.

## 2021-07-22 NOTE — PROGRESS NOTES
WOC APRN Progress Note     Today's Visit:  No new skin concerns, routine reassessment of wound, ostomy and skin concerns. Discharging today to home with home care.     Labs:  Albumin   Date/Time Value Ref Range Status   03/12/2019 06:46 AM 2.5 (L) 3.5 - 5.0 g/dL Final     Hemoglobin A1c   Date/Time Value Ref Range Status   03/01/2019 06:58 AM 6.5 (H) 4.2 - 6.1 % Final     No results for input(s): HGB in the last 72 hours.    Invalid input(s): HEM    Vitals:  The last obtained vitals are:   Temp: 99.1  F (37.3  C)  Heart Rate: 66  Resp: 15  BP: 160/80    Nathaniel:  Nathaniel Scale Score: 18  Specialty Bed: [facility])  210 lb (95.3 kg)  Body mass index is 28.48 kg/m .    Output for Last Shift:  Urine Occurrence: 1   Urine: 400 mL  Urinary Incontinence: (Used bedpan)  Stool Occurrence: 1          Outputs for 24 hours:  I/O last 3 completed shifts:  In: 3 [I.V.:3]  Out: 600 [Urine:600]  _______________________________________________________________    Physical Exam:  General: Alert and oriented, follows commands  General appearance: Appears stated age, pleasant, cooperative, in no apparent distress   Lungs: No Shortness of breath nasal canula   : Voids  Skin: Skin color, texture, turgor normal. No rashes  Pain: Denies any pain      Wound Ostomy Continence Assessment/Plan:  Continence Scrotum Fungal Dermatitis    Resolved. Anti-fungal powder PRN      Gluteal and perianal Moisture Associated Skin Damage  Perianal blanchable erythema resolved, continues to have 3.5x2.5cm 50% affected.  Patient states he Use dimethicone barrier wipes with routine care and this may be keeping are too moist.  Licdocaine patches on lower back and suspect the mepilex also ball up therefor no in use. Calmoseptine will keep it to moist. Leave open to air. Patient start to shower and discussed different techniques for wiping/ dabbing area and not having toilet paper or wipes - wipe across the gluteal cleft region. He verbalized understanding    Last Prealbumin: none recorded    Nathaniel score: 19          Bed: [place]           daily for heel elevation      Discussed plan of care with nurse and patient.     Outcomes and treatment recommendations are to promote skin integrity, contain exudate and promote wound healing.    Discharge paperwork completed:  Discharging to: Home with home care   Orders written: See above for specific WOC locations  Follow up appointments with NA  Coordinated care with patient  Patient/caregiver had no questions.     Twila Patterson MBA MSN RN APRN CNP CWOCN

## 2021-07-22 NOTE — PROGRESS NOTES
[place] Hospitalist Daily Progress Note    Summary:  Tone Cooper is a 68 year old male with history of severe COPD (on home oxygen 3 lpm), atrial fibrillation (anticoagulated with Elliquis), tricuspid valve repair, hypertension, nonsustained VT, and two hospitalizations in the last two months of 2018 for COPD exacerbations. He presented to the ED on 12/17/18 with 1.5 weeks of productive cough and shortness of breath.      He was found to have severe sepsis, community acquired pneumonia of right lung (by CXR), and hypoxic respiratory failure. He was admitted for further evaluation and treatment. Initially, he required BIPAP support. He was treated with Rocephin, Zithromax, nebs, and Prednisone. Rocephin was changed to Zosyn shortly after admission. One of two admission blood cultures grew staph. Infectious disease was consulted. Vancomycin was started. This staph ended up being MSSA and it grew in sputum, also. Antibiotics were changed to Ancef. Given history of tricuspid valve repair a prolonged course of IV Ancef was planned.      Tone was somewhat slow to improve, although he did come off of BIPAP and his blood pressure improved. He developed some hemoptysis in sputum. Prior to admission Eliquis was stopped. CT of chest was obtained on 1/4/19. This showed areas of consolidation consistent with recent pneumonia. Streaky hemoptysis persisted. Pulmonary medicine at the [facility] was consulted with by phone. There was concern about possible small abscesses related to recent pneumonia. It was thought that bronchoscopy was not needed but that a prolonged course of antibiotics would be necessary. There was also consideration of bronchial artery embolization if hemoptysis worsened. For the last couple of days cannot has seems to stabilize. Now he is having just occasional streaky hemoptysis in his sputum. His breathing has improved. Infectious diseases recommending completion of 4 weeks of IV Ancef  followed by 4 weeks of oral Augmentin.  He was dismissed home on 1/8/19 on baseline 3 L supplemental oxygen.     He again presented to [facility] on 2/11/19 complaining of shortness of breath.  Chest CT showed small pulmonary emboli and concern for pneumonia.  Treated with Azithromycin for pneumonia.  Began taking Eliquis for pulmonary embolism.  He was dismissed home on 2/14/19.He again presented to [facility] on 2/16/19 complaining of shortness of breath.  His acute on chronic hypoxic respiratory failure was felt to be due to COPD exacerbation.  He was treated with parenteral corticosteroids, nebulized bronchodilators and noninvasive ventilation (BiPAP).       After admission to  hospital, pt had worsening dyspnea and required intermittent BiPAP treatment. Remained dyspneic with increased work of breathing, had mild Nicolasa with increase in Cr, then developed increasing lethargy and hypercapnia on 2/19 requiring continuous BiPAP and transferred to ICU. Has streaky opacities in the left side would be to actually be residual pneumonia, however with his elevated lactic acid he has remained on Zosyn but, suspect primary problem is COPD exacerbation and he is on IV steroids with scheduled nebs.He has a history of atrial fibrillation/flutter, status post prior ablation procedure.  Notes indicate he had episodes of atrial fibrillation with rapid ventricular response during recent hospital stays, and did follow-up with [facility] cardiology as outpatient.  They felt he may avoid repeat ablation, since atrial arrhythmias were likely exacerbated by his pulmonary problems and until respiratory status was stable, this would decrease chance of success with any ablation.  Dose of pro-path and known was increased.     As noted above, he had an episode of MSSA bacteremia in December.  He completed a lengthy course of IV antibiotics.  He was on Augmentin prior to admission due to recent COPD exacerbation.  He was  "treated with IV antibiotics and did complete a course of Augmentin.He had an episode where he was up to the bathroom and, \"slid to the floor.\"  Because he is on anticoagulants, head CT was obtained.  This was negative for intracranial hemorrhage, did show small vessel ischemic changes.  His dyspnea has improved and he  dismissed to [facility] for further evaluation and treatment.      Assessment/Plan  Principal Problem:    Acute on chronic respiratory failure with hypoxia and hypercapnia (H)    COPD (chronic obstructive pulmonary disease) (H)    ? Notes indicate he has, \"advanced COPD\" with multiple hospitalizations in the past 6 months.  He has had worsening dyspnea anytime his steroids are weaned.  ? Continue corticosteroids.  ? Continue duo nebs, Xopenex nebs as needed.  ? BiPAP at night  ? Per outside hospital, \"social work to see if patient would be a candidate for Home Trilogy machine and NIPPV, it sounds like he is but this would be a $5000 yearly rental fee.\"  ? pulmonary specialist recommended very close follow-up with pulmonology in clinic, chronic daily azithromycin therapy, and pulmonary rehab  ? Has been on Zosyn started 2/16, changed to Augmentin (completed course).   -We are arranging for the BiPAP machine @ home.  Along with a portable tank.  - Him and his wife would like to go home - and not a TCU; they  feel that they are comfortably situated at home.        Active Problems:    Atrial flutter (H)    Bradycardia  ? status post previous ablation procedure.  He did have issues with atrial fibrillation with RVR during previous hospitalizations.  He did follow with [facility] as an outpatient.  The thought was to avoid a repeat ablation procedure at this time given his recent infectious issues which would decrease his chance of success.  He did have his propafenone dose increased.    ? Continue diltiazem and propafenone   ? Cardiac monitoring  ? How with resting bradycardia, suspect " tachy-azeb.  EP wanting to avoid atempting flutter ablation until pulmonary issues are better controlled  ? Hold parameters added to Diltiazem, propafenone, but bradycardia during sleep not unexpected  ? If he has symptomatic bradycardia, I would discuss with EP, decrease calcium channel blocker  ? Continue Eliquis for thromboembolism prevention. This was held at the time of discharge from previous hospitalization in January due to hemoptysis. No recurrence of hemoptysis and with concurrent PE, need to continue anti-coagulation   ? Asymptomatic bradycardia occasionally persists last EKG revealed a flutter with block.       H/O tricuspid valve repair    Pulmonary emboli (H)  ? just diagnosed a few days prior to recent admission  ? he had a small PE on the CT chest, probably an incidental finding, dyspnea mainly due severe COPD, in exacerbation  ? Continue Eliquis        Mixed hyperlipidemia  ? Not on a statin       BPH (benign prostatic hyperplasia)  ? Had been on terazosin  ? Assess voiding, consider bladder scan       Thrombocytopenia (H)  ? Mild  ? Recheck platelet count      Chronic right heart failure (H)  History of tricuspid valve replacement  ? Last TTE 1/4/19 showing mildly dilated RV along with decreased RV systolic function. Does have peripheral edema. Med list includes Lasix, however his spouse does not think he takes this. Apparently had significant significantly more edema during previous admission per his spouse.  ? Local compression for peripheral edema  ? Avoid overdiuresis due to recent acute kidney injury    Stress hyperglycemia  ? Continue basal insulin  ? Continue corrective dose insulin  ? hemoglobin A1c is at goal  ? Most blood sugar readings are at goal     Chronic low back pain:  ? Had been taking oxycodone 5 mg frequently at home for this.  His opiates have been held given his hypercapnia and tenuous respiratory status. As his goals are restorative, risk of opiates at this time outweigh  "potential benefits of pain control. Any amount likely to increase his CO2 retention leading worsening of his respiratory failure. Per outside hospital records, he also struggles with daytime sleepiness  ? Acetaminophen  ? Continue topical NSAID and Lidoderm patches  ? Mobilize patient  ? Patient refers to Trazodone and Seroquel to promote restful sleep     Goals of care  ? See discussion in note from outside hospital 2/26: \"Unfortunately despite multiple days of steroids, inhaled steroids, nebulization treatments he is not showing much clinical sign of improvement. He is extremely fatigued and lasts only 15-20 minutes off of BiPAP at a time. He is sleeping most of the day. If he is to improve with current therapy it is likely to take weeks. The patient is becoming more frustrated with his limited response to his respiratory treatment. At times he is saying statements like he feels like he is going to die and that he does not want to do this anymore. ...I spoke at length with him and his spouse regarding his current condition, current treatment plan which includes oral and inhaled steroids and intermittent BiPAP with goal for LTACH discharge and pulmonary rehab and slow improvement in his respiratory status, and alternative options including palliative care. After a long conversation the patient does confirm he wants to continue with current treatment plan and that he is just very frustrated overall. This will be an ongoing conversation with the patient, his spouse, and his care team.\"  ? Consider Palliative Care consult       Barriers to disposition:  Stable respiratory status, progress in therapies      Subjective:   No acute issues reported. Stable, working with PT.     Objective:  Vital signs in last 24 hours:  Temp:  [97.4  F (36.3  C)-98.3  F (36.8  C)] 97.9  F (36.6  C)  Heart Rate:  [50-91] 65  Resp:  [14-24] 20  BP: (131-165)/(72-92) 144/83  FiO2 (%):  [30 %] 30 %  Weight:   210 lb (95.3 kg)    Intake/Output " last 3 shifts:  I/O last 3 completed shifts:  In: 3 [I.V.:3]  Out: 2000 [Urine:2000]  Intake/Output this shift:  I/O this shift:  In: -   Out: 200 [Urine:200]    Physical Exam:  General Appearance: Alert, cooperative, no distress.   Head: Normocephalic, without obvious abnormality, atraumatic  Eyes: PERRL, conjunctiva/corneas clear, both eyes.  Wears glasses.  Nose: Nares normal, no drainage.  Wearing nasal cannula.  Throat: has upper denture,   Neck: Supple, symmetrical, trachea midline, no adenopathy;              Lungs: decreased breath sounds throughout  Chest Wall: well healed midline sternotomy  Heart: Regular rate and rhythm, with occasional extra beats  Abdomen: Soft, non-tender, bowel sounds active. No obvious organomegaly or mass. Small, healed incision midline (likely site of prior chest tube)  Extremities: bilateral lower extremity edema  Pulses: DP pulses are 1-2+ bilat.    Skin: no rashes or lesions.  Trevor complexion.  Neurologic: moves both arms and legs.        apixaban  5 mg Oral BID     azithromycin  250 mg Oral Q M, W, F     budesonide  0.5 mg Nebulization BID     clonazePAM  1 mg Oral QHS     diclofenac sodium  2 g Topical QID     diltiazem  180 mg Oral BID     famotidine  20 mg Oral BID     furosemide  20 mg Oral BID     guaiFENesin ER  600 mg Oral BID     insulin aspart (NovoLOG) injection   Subcutaneous TID with meals     insulin aspart (NovoLOG) injection   Subcutaneous QHS     insulin glargine  10 Units Subcutaneous QHS     ipratropium-albuterol  3 mL Nebulization Q4H while awake     lidocaine  2 patch Transdermal Q24H     menthol-zinc oxide   Topical TID     miconazole   Topical BID     miconazole nitrate   Topical BID     predniSONE  10 mg Oral Daily with brkfst     propafenone  225 mg Oral Q8H     sodium chloride  3 mL Intravenous Line Care     tamsulosin  0.4 mg Oral Daily after brkfst     traZODone  50 mg Oral QHS     Imaging: (I have personally reviewed the results) CHEST [facility] ONE  VIEW   2/16/2019 10:20 PM     HISTORY: Shortness of breath.     COMPARISON: Earlier the same day at 1:40 PM.      IMPRESSION: Improving streaky bibasilar opacities since prior.  Perihilar vascular prominence is again seen. Cardiac silhouette is  unchanged. No new focal infiltrates. No significant pleural effusion  or pneumothorax.     TALON DAWSON MD    Lab Results: (I have personally reviewed the results)    All medication issues identified within the last 24 hours have been addressed and completed as appropriate.    DVT prophylaxis:  Eliquis  GI prophylaxis:  PPI    Sahil Solomon MD,[facility] Medicine.

## 2021-07-22 NOTE — PLAN OF CARE
Problem: Daily Care  Goal: Daily care needs are met  Outcome: Progressing     Problem: Potential for Falls  Goal: Patient will remain free of falls  Outcome: Progressing     Daily care needs met. No PRNs given. Patient injury free from falls. RN will continue to monitor.

## 2021-07-22 NOTE — PLAN OF CARE
Problem: Inadequate Airway Clearance  Goal: Patient will maintain patent airway  Outcome: Progressing  Goal: Patient will achieve/maintain normal respiratory rate/effort  Outcome: Progressing     Problem: Inadequate Gas Exchange  Goal: Patient is adequately oxygenated and ventilation is improved  Outcome: Progressing   INITIAL 3 NIGHTS BIPAP/CPAP NOTE    UNIT TYPE:   V 60 Bipap  MASK TYPE:  Face Mask    SETTINGS:      MODE: BIPAP, ST mode   IPAP:    14   EPAP:    6   RATE:   12   FI02:      25%    PATIENT'S TOLERANCE OF DEVICE:  Pt is on V60 Bipap 14/6, rate of 12, 25% at night and tolerating well with no distress.  Skin condition is good, no break down, swelling, or redness noted this shift.  Gel pad in place.  Pt uses 4 L nasal canula during the day or when off the Bipap.  Vital signs: Blood pressure 135/77, pulse 89, temperature 96.8  F (36  C), temperature source Axillary, resp. rate 17, height 6' (1.829 m), weight 207 lb 8 oz (94.1 kg), SpO2 97 %.  Continue current care plan.

## 2021-07-22 NOTE — PLAN OF CARE
Impaired Gas Exchange      Demonstrate improved ventilation and adequate oxygenation of tissues as evidenced by absence of respiratory distress Progressing        Ineffective Airway Clearance      Maintain airway patency Progressing           BIPAP/CPAP NOTE    UNIT TYPE:  V-60  MASK TYPE:  Face mask    SETTINGS: S/T         BIPAP - 14/7 RR12   FI02 - 30%         PATIENT'S TOLERANCE OF DEVICE - Pt wore bipap 7 hours last night. ABG's drawn were, 7.39/72/75/36/96.7%. Pt wearing 3L nc during the day, started at 0540. Continue with current plan of care. Will continue to monitor pt.

## 2021-07-22 NOTE — PLAN OF CARE
Problem: Inadequate Airway Clearance  Goal: Patient will maintain patent airway  6/1/2019 2129 by Jamilah Andrade, LRT  Outcome: Progressing  6/1/2019 2125 by Jamilah Andrade, LRT  Outcome: Progressing  Goal: Patient will achieve/maintain normal respiratory rate/effort  6/1/2019 2129 by Jamilah Andrade, LRT  Outcome: Progressing  6/1/2019 2125 by Jamilah Andrade, LRT  Outcome: Progressing     Problem: Inadequate Gas Exchange  Goal: Patient is adequately oxygenated and ventilation is improved  6/1/2019 2129 by Jamilah Andrade, LRT  Outcome: Progressing  6/1/2019 2125 by Jamilah Andrade, LRT  Outcome: Progressing  Pt  on 3 LNC , sat 94-98%, hr 90-99, RR 20-22, placed on Bipap(V60) , IPAP 14, EPAP 6, RATE 12, FIO2 30% @20:21 -Pt requested to go on BIPAP due to shortness of breath, duoneb given x2, mucomyst neb given x1, Pulmicort neb given x1, plan: cont. Current plan of car RegisterPatient

## 2021-07-22 NOTE — PLAN OF CARE
Problem: Pain  Goal: Patient's pain/discomfort is manageable  Outcome: Progressing     Problem: Safety  Goal: Patient will be injury free during hospitalization  Outcome: Progressing     Problem: Potential for Falls  Goal: Patient will remain free of falls  Outcome: Progressing     Pt vitals are WNL this shift.  Denies pain.  BiPap on during sleeping hours.  Pt uses urinal at bedside.  A&O x 4.  Slept 5-6 hours.

## 2021-07-22 NOTE — PLAN OF CARE
Anxiety is at manageable level Progressing      Patient's pain/discomfort is manageable Progressing      Prn tylenol given twice for back pain; effective. Ongoing azeb overnight; asymptomatic. Slept most of shift, comfortable without anxiety.

## 2021-07-22 NOTE — PLAN OF CARE
Problem: Pain  Goal: Patient's pain/discomfort is manageable  Outcome: Progressing   Pt. Request prn pain medications this am at 0921 with relief.   Problem: Safety  Goal: Patient will be injury free during hospitalization  Outcome: Progressing   Pt. Pleasant and cooperative with cares,   Problem: Glucose Imbalance  Goal: Achieve optimal glucose control  Outcome: Progressing   Bg today 122 and 181, ate well for meals.

## 2021-07-22 NOTE — PLAN OF CARE
Anxiety is at manageable level Progressing      Blood pressure (BP) is within acceptable limits Progressing        Pt has c/o pain in back, and was given scheduled medicines and will receive his prn tylenol as requested when due. He has otherwise shown no signs or symptoms of other pain, nor with anxiety. Pt is being repositioned q2h for skin integrity and comfort, no new skin concerns. Vital signs are within normal parameters. He is tolerating oral intake and scheduled medicine. He has been pleasantly grumpy during shift, and admits that on nights he doesn't want people to wake him up. This RN reminded him that even though sleep is important that the team still needs to do their assessments and reposition him. Telemetry is being monitored. No visitors thus far into shift. Call light is within reach, bed is in lowest position, and bed alarm is on. Will continue to monitor and assess.

## 2021-07-22 NOTE — PLAN OF CARE
Daily Care      Daily care needs are met Progressing        Knowledge Deficit      Patient/family/caregiver demonstrates understanding of disease process, treatment plan, medications, and discharge instructions Progressing        Pain      Patient's pain/discomfort is manageable Progressing        Potential for Compromised Skin Integrity      Skin integrity is maintained or improved Progressing        Psychosocial Needs      Collaborate with patient/family/caregiver to identify patient specific goals for this hospitalization Progressing            Pt alert and oriented throughout shift, able to make needs known. Daily cares provided as needed. Pt involved with POC. Pt reported ongoing pain throughout shift, given PRN tylenol X2, see MAR. Skin check completed with no new concerns.

## 2021-07-22 NOTE — PROGRESS NOTES
D: patient taken off V60 bipap at 0715 this a.m. and placed on 4 lpm nasal cannula until 1455, the requested to go back on bipap to rest. A: nebs times two; sats are 91-95%. Strong, npc. R: breath sounds are clear and decreased. P: cont. as ordered.

## 2021-07-22 NOTE — PROGRESS NOTES
[facility]ist Daily Progress Note    Summary:  Tone Cooper is a 68 year old male with history of severe COPD (on home oxygen 3 lpm), atrial fibrillation (anticoagulated with Elliquis), tricuspid valve repair, hypertension, nonsustained VT, and two hospitalizations in the last two months of 2018 for COPD exacerbations. He presented to the ED on 12/17/18 with 1.5 weeks of productive cough and shortness of breath.      He was found to have severe sepsis, community acquired pneumonia of right lung (by CXR), and hypoxic respiratory failure. He was admitted for further evaluation and treatment. Initially, he required BIPAP support. He was treated with Rocephin, Zithromax, nebs, and Prednisone. Rocephin was changed to Zosyn shortly after admission. One of two admission blood cultures grew staph. Infectious disease was consulted. Vancomycin was started. This staph ended up being MSSA and it grew in sputum, also. Antibiotics were changed to Ancef. Given history of tricuspid valve repair a prolonged course of IV Ancef was planned.      Tone was somewhat slow to improve, although he did come off of BIPAP and his blood pressure improved. He developed some hemoptysis in sputum. Prior to admission Eliquis was stopped. CT of chest was obtained on 1/4/19. This showed areas of consolidation consistent with recent pneumonia. Streaky hemoptysis persisted. Pulmonary medicine at the [facility] was consulted with by phone. There was concern about possible small abscesses related to recent pneumonia. It was thought that bronchoscopy was not needed but that a prolonged course of antibiotics would be necessary. There was also consideration of bronchial artery embolization if hemoptysis worsened. For the last couple of days cannot has seems to stabilize. Now he is having just occasional streaky hemoptysis in his sputum. His breathing has improved. Infectious diseases recommending completion of 4 weeks of IV Ancef  followed by 4 weeks of oral Augmentin.  He was dismissed home on 1/8/19 on baseline 3 L supplemental oxygen.     He again presented to [facility] on 2/11/19 complaining of shortness of breath.  Chest CT showed small pulmonary emboli and concern for pneumonia.  Treated with Azithromycin for pneumonia.  Began taking Eliquis for pulmonary embolism.  He was dismissed home on 2/14/19.He again presented to [facility] on 2/16/19 complaining of shortness of breath.  His acute on chronic hypoxic respiratory failure was felt to be due to COPD exacerbation.  He was treated with parenteral corticosteroids, nebulized bronchodilators and noninvasive ventilation (BiPAP).       After admission to  hospital, pt had worsening dyspnea and required intermittent BiPAP treatment. Remained dyspneic with increased work of breathing, had mild Nicolasa with increase in Cr, then developed increasing lethargy and hypercapnia on 2/19 requiring continuous BiPAP and transferred to ICU. Has streaky opacities in the left side would be to actually be residual pneumonia, however with his elevated lactic acid he has remained on Zosyn but, suspect primary problem is COPD exacerbation and he is on IV steroids with scheduled nebs.He has a history of atrial fibrillation/flutter, status post prior ablation procedure.  Notes indicate he had episodes of atrial fibrillation with rapid ventricular response during recent hospital stays, and did follow-up with EP cardiology as outpatient.  They felt he may avoid repeat ablation, since atrial arrhythmias were likely exacerbated by his pulmonary problems and until respiratory status was stable, this would decrease chance of success with any ablation.  Dose of pro-path and known was increased.     As noted above, he had an episode of MSSA bacteremia in December.  He completed a lengthy course of IV antibiotics.  He was on Augmentin prior to admission due to recent COPD exacerbation.  He was  "treated with IV antibiotics and did complete a course of Augmentin.He had an episode where he was up to the bathroom and, \"slid to the floor.\"  Because he is on anticoagulants, head CT was obtained.  This was negative for intracranial hemorrhage, did show small vessel ischemic changes.  His dyspnea has improved and he  dismissed to [facility] for further evaluation and treatment.      Assessment/Plan  Principal Problem:    Acute on chronic respiratory failure with hypoxia and hypercapnia (H)    COPD (chronic obstructive pulmonary disease) (H)    ? Notes indicate he has, \"advanced COPD\" with multiple hospitalizations in the past 6 months.  He has had worsening dyspnea anytime his steroids are weaned.  ? Continue corticosteroids.  ? Continue duo nebs, Xopenex nebs as needed.  ? BiPAP at night  ? Per outside hospital, \"social work to see if patient would be a candidate for Home Trilogy machine and NIPPV, it sounds like he is but this would be a $5000 yearly rental fee.\"  ? pulmonary specialist recommended very close follow-up with pulmonology in clinic, chronic daily azithromycin therapy, and pulmonary rehab  ? Has been on Zosyn started 2/16, changed to Augmentin (completed course).   -We are arranging for the BiPAP machine @ home.  Along with a portable tank.  - Him and his wife would like to go home - and not a TCU; they  feel that they are comfortably situated at home. Possibly on Wednesday.       Active Problems:    Atrial flutter (H)    Bradycardia  ? status post previous ablation procedure.  He did have issues with atrial fibrillation with RVR during previous hospitalizations.  He did follow with [facility] Cardiology as an outpatient.  The thought was to avoid a repeat ablation procedure at this time given his recent infectious issues which would decrease his chance of success.  He did have his propafenone dose increased.    ? Continue diltiazem and propafenone   ? Cardiac monitoring  ? How with resting " bradycardia, suspect tachy-azeb.  EP wanting to avoid atempting flutter ablation until pulmonary issues are better controlled  ? Hold parameters added to Diltiazem, propafenone, but bradycardia during sleep not unexpected  ? If he has symptomatic bradycardia, I would discuss with EP, decrease calcium channel blocker  ? Continue Eliquis for thromboembolism prevention. This was held at the time of discharge from previous hospitalization in January due to hemoptysis. No recurrence of hemoptysis and with concurrent PE, need to continue anti-coagulation   ? Asymptomatic bradycardia occasionally persists last EKG revealed a flutter with block.       H/O tricuspid valve repair    Pulmonary emboli (H)  ? just diagnosed a few days prior to recent admission  ? he had a small PE on the CT chest, probably an incidental finding, dyspnea mainly due severe COPD, in exacerbation  ? Continue Eliquis        Mixed hyperlipidemia  ? Not on a statin       BPH (benign prostatic hyperplasia)  ? Had been on terazosin  ? Assess voiding, consider bladder scan       Thrombocytopenia (H)  ? Mild  ? Recheck platelet count      Chronic right heart failure (H)  History of tricuspid valve replacement  ? Last TTE 1/4/19 showing mildly dilated RV along with decreased RV systolic function. Does have peripheral edema. Med list includes Lasix, however his spouse does not think he takes this. Apparently had significant significantly more edema during previous admission per his spouse.  ? Local compression for peripheral edema  ? Avoid overdiuresis due to recent acute kidney injury    Stress hyperglycemia  ? Continue basal insulin  ? Continue corrective dose insulin  ? hemoglobin A1c is at goal  ? Most blood sugar readings are at goal     Chronic low back pain:  ? Had been taking oxycodone 5 mg frequently at home for this.  His opiates have been held given his hypercapnia and tenuous respiratory status. As his goals are restorative, risk of opiates at  "this time outweigh potential benefits of pain control. Any amount likely to increase his CO2 retention leading worsening of his respiratory failure. Per outside hospital records, he also struggles with daytime sleepiness  ? Acetaminophen  ? Continue topical NSAID and Lidoderm patches  ? Mobilize patient  ? Patient refers to Trazodone and Seroquel to promote restful sleep     Goals of care  ? See discussion in note from outside hospital 2/26: \"Unfortunately despite multiple days of steroids, inhaled steroids, nebulization treatments he is not showing much clinical sign of improvement. He is extremely fatigued and lasts only 15-20 minutes off of BiPAP at a time. He is sleeping most of the day. If he is to improve with current therapy it is likely to take weeks. The patient is becoming more frustrated with his limited response to his respiratory treatment. At times he is saying statements like he feels like he is going to die and that he does not want to do this anymore. ...I spoke at length with him and his spouse regarding his current condition, current treatment plan which includes oral and inhaled steroids and intermittent BiPAP with goal for LTACH discharge and pulmonary rehab and slow improvement in his respiratory status, and alternative options including palliative care. After a long conversation the patient does confirm he wants to continue with current treatment plan and that he is just very frustrated overall. This will be an ongoing conversation with the patient, his spouse, and his care team.\"  ? Consider Palliative Care consult       Barriers to disposition:  Stable respiratory status, progress in therapies      Subjective:   No acute issues reported. Stable, no chest pain or light-headedness    Objective:  Vital signs in last 24 hours:  Temp:  [97.4  F (36.3  C)-98.1  F (36.7  C)] 97.4  F (36.3  C)  Heart Rate:  [52-85] 65  Resp:  [8-28] 20  BP: (128-140)/(70-85) 132/78  FiO2 (%):  [30 %] 30 %  Weight: "   210 lb (95.3 kg)    Intake/Output last 3 shifts:  I/O last 3 completed shifts:  In: 3 [I.V.:3]  Out: 1650 [Urine:1650]  Intake/Output this shift:  No intake/output data recorded.    Physical Exam:  General Appearance: Alert, cooperative, no distress.   Head: Normocephalic, without obvious abnormality, atraumatic  Eyes: PERRL, conjunctiva/corneas clear, both eyes.  Wears glasses.  Nose: Nares normal, no drainage.  Wearing nasal cannula.  Throat: has upper denture,   Neck: Supple, symmetrical, trachea midline, no adenopathy;              Lungs: decreased breath sounds throughout  Chest Wall: well healed midline sternotomy  Heart: Regular rate and rhythm, with occasional extra beats  Abdomen: Soft, non-tender, bowel sounds active. No obvious organomegaly or mass. Small, healed incision midline (likely site of prior chest tube)  Extremities: bilateral lower extremity edema  Pulses: DP pulses are 1-2+ bilat.    Skin: no rashes or lesions.  Trevor complexion.  Neurologic: moves both arms and legs.        apixaban  5 mg Oral BID     azithromycin  250 mg Oral Q M, W, F     budesonide  0.5 mg Nebulization BID     clonazePAM  1 mg Oral QHS     diclofenac sodium  2 g Topical QID     diltiazem  180 mg Oral BID     famotidine  20 mg Oral BID     furosemide  20 mg Oral BID     guaiFENesin ER  600 mg Oral BID     insulin aspart (NovoLOG) injection   Subcutaneous TID with meals     insulin aspart (NovoLOG) injection   Subcutaneous QHS     insulin glargine  10 Units Subcutaneous QHS     ipratropium-albuterol  3 mL Nebulization Q4H while awake     lidocaine  2 patch Transdermal Q24H     menthol-zinc oxide   Topical TID     miconazole   Topical BID     miconazole nitrate   Topical BID     predniSONE  10 mg Oral Daily with brkfst     propafenone  225 mg Oral Q8H     sodium chloride  3 mL Intravenous Line Care     tamsulosin  0.4 mg Oral Daily after brkfst     traZODone  50 mg Oral QHS     Imaging: (I have personally reviewed the  results) CHEST PORTABLE ONE VIEW   2/16/2019 10:20 PM     HISTORY: Shortness of breath.     COMPARISON: Earlier the same day at 1:40 PM.      IMPRESSION: Improving streaky bibasilar opacities since prior.  Perihilar vascular prominence is again seen. Cardiac silhouette is  unchanged. No new focal infiltrates. No significant pleural effusion  or pneumothorax.     TALON DAWSON MD    Lab Results: (I have personally reviewed the results)    All medication issues identified within the last 24 hours have been addressed and completed as appropriate.    DVT prophylaxis:  Eliquis  GI prophylaxis:  PPI    Sahil Solomon MD,[facility].

## 2021-07-22 NOTE — PROGRESS NOTES
Physical Therapy     06/01/19 1106   Visit Specifics   Eval Type Initial eval   General   Onset date 05/26/19   Additional Pertinent History Patient was admitted to [facility] on 5/26/2019 with a acute on chronic respiratory failure secondary to COPD exacerbation.  He was admitted to the ICU and started on BiPAP.  He was treated with empiric antibiotics as well as high-dose steroids.  He initially required BiPAP almost continuously but is now requiring it intermittently.  He continues to require 3 to 4 L/min oxygen per nasal cannula.  Note that he tires 20 mg of prednisone at home chronically as well as oxygen per nasal cannula at 3 L/min continuously.  He was transitioned to oral steroids and is now on oral prednisone.  He gets short of breath with minimal exertion such as getting out of bed.  This is coupled with hypoxia and tachycardia.  He was medically optimized and transferred to [facility] for ongoing medical management including intermittent BiPAP need secondary to ongoing acute on chronic respiratory failure.  Upon arrival, he is comfortable.  His breathing feels okay with his current nasal cannula oxygen, he has no pain, he admits to becoming dyspneic with the littlest of activities including getting out of bed.  He does not feel weak, he was able to ambulate independently at [facility].  He has no other questions, his wife is at the bedside.   Chart Reviewed Yes   PT/OT Patient/Caregiver Stated Goals go home   Family/Caregiver Present No   Treatment Time   Gait Training 10   Home Living   Type of Home House   Home Layout Multi-level  (split level)   Mobility Equipment Walker-front wheeled;Walker-4 wheeled   Prior Status   Prior Device Use None of the above   Indoor Mobility Independent   Lives With Family   Device Used Yes   Comments Pt. would amb with either FWW or 4WW and was on 3L NC at home   RLE Assessment   RLE Assessment WFL   LLE Assessment   LLE Assessment WFL   Sensory    Sensory Impairment Light touch   Light Touch No apparent deficits   Skin Integrity   Edema +1 pitting, lasts <5 sec  (Per pt. family to bring compression socks from home)   Bed Mobility   Supine to Sit Independent   Sit to Supine Independent   Transfer    Sit To Stand Stand by assistance   Stand To Sit Stand by assistance   Ambulation    Distance (ft)  50x2   Assistance Close supervision   Assistive Device None   Verbal Cues Safety   Quality of Gait/Comment WBOS slightly unsteady, cues for safety due to NC tubing   Pattern Wide IGNACIA;Unsteady;Decreased step length;Decreased pace   Endurance Deficit   Endurance Deficit Yes   Endurance Deficit Description fatigued after amb dyspnea 7/10   Wheelchair Activities   Wheelchair Yes   Wheelchair Type Standard   Wheelchair Cushion Standard   Balance   Sitting Balance Independent   Standing Balance Standby   Plan   Treatment/Interventions Functional transfer training;Gait/stair training;Home exercise program;Neuromuscular re-ed;Strengthening/ROM   PT Frequency 4-6x/wk   Assessment   Prognosis Good   Problem List Decreased endurance;Impaired balance;Decreased mobility   Barriers to Discharge Inaccessible home environment   Barriers to Discharge Comments stairs   Recommendation   PT Discharge Recommendation Home with assist;TCU   PT Equipment Recommendation Rolling walker / FWW;Wheelchair - standard;Transfer / gait belt   Treatment Suggestions for Next Session amb, Tinetti, stairs, endurance activities   PT Summary Pt. with decreased activity tolerance limiting ability to ambulate, patient appropriate for 1:1 PT services at this time.     LTGS to be met by: 6/28/19  In order to progress towards home,  1. Patient will ascend/descend 10 stairs with railing(s) SBA.  2. Patient will ambulate 250ft with Mod I assistance and least restrictive device with 02 sat 88% or greater.   3. Patient will score 24 or greater on Tinetti.    STGs to be met by: 6/14/19  In order to progress  towards LTGs  1. Patient will ascend/descend 3 stairs with railing(s) CGA.  2. Patient will ambulate 100ft with SBA assistance and least restrictive device with 02 sat 88% or greater.   3. Patient will tolerate 5 minutes of continuous activity with 02 sat 88% or greater     Goals were initiated on 6/1/2019 by Jamar Almonte PT.

## 2021-07-22 NOTE — PLAN OF CARE
Pt is on a 3L NC   Sat 95-96%   HR 66-79   RR 20-24   BS clear   Pt said he is not ready to go on his BiPAP yet

## 2021-07-22 NOTE — PLAN OF CARE
"Impaired Gas Exchange      Demonstrate improved ventilation and adequate oxygenation of tissues as evidenced by absence of respiratory distress Progressing        Ineffective Airway Clearance      Maintain airway patency Progressing         BIPAP/CPAP NOTE    UNIT TYPE:  V60  MASK TYPE:  Special nasal/oral mask. Pt has moderate leaks at mask (70 LPM) d/t beard.    SETTINGS:      BIPAP - S/T, RR 4 (Unit has minimum rate of 4), 14/7, 30%    PATIENT'S TOLERANCE OF DEVICE - Nanci well.    D: Pt returned to BiPAP for night. Pt on 3L NC when off BiPAP.    A/R: Scheduled neb tx's nanci well. (PNA for 1600 tx.) Good, congested cough. Pt sx's self orally.    P: Continue current resp tx's.     AM ABG pending \"reflecting 4-6 hours sleep on BIPAP 14/7 with no back up rate.\"  "

## 2021-07-22 NOTE — PLAN OF CARE
Ineffective Airway Clearance      Maintain airway patency Progressing        Pain      Patient's pain/discomfort is manageable Progressing        Potential for Falls      Patient will remain free of falls Progressing        Safety      Patient will be injury free during hospitalization Progressing        Pt. Slept in between cares, was on bipap over the night. RT following as well. Denied pain. Using urinal for voids. Bed alarm on for safety. Using call light for needs, no safety issues noted, will continue to monitor.     Chano Valladares

## 2021-07-22 NOTE — PLAN OF CARE
Problem: Pain  Goal: Patient's pain/discomfort is manageable  Outcome: Progressing   Pt. Request prn medication this afternoon with relief for HA, pleasant and cooperative, ate well for meals, will cont to monitor.

## 2021-07-22 NOTE — PROGRESS NOTES
Clinical Nutrition Therapy Reassessment Note    Subjective: pt reports good appetite & meals are going well other than reports inconsistent portion sizes.    Nutrition History:  Information from patient and chart.  Diet prior to admission: Regular, high protein   Recent food/fluid intake: good  Recent Supplements: Boost at home mixed with milk  Food allergies or intolerances: nkfa    Current Nutrition Prescription:   Diet: Regular  Diet Supplements: Boost GC daily     Current Nutrition Intake:  The patient's current meal intake is good > 75% .  Pt ordering 3105-2919 kcal, 104-120 gm protein daily.    Anthropometrics:  Height: 6' (182.9 cm)  Admission weight: 204 lb (2/28)  Weight: 210 lb (95.3 kg), 3/4/19  BMI (Calculated): 27.8  BMI indication: 25-29.9 overweight  Ideal body weight 178 lb  Usual body weight 189 lb, fluid positive    RD Nutrition Focused Physical Exam:  The patient has the following physical signs which could indicate malnutrition: Edema - Generalized    GI Status/Output:   Formed BM's    Skin/Wound:  Nathaniel score Nathaniel Scale Score: 19    No wound was noted.    Medications:  Medications reviewed.  Zithromax, pepcid, lasix, ssi, lantus 10 u, prednisone    Labs:  Labs reviewed  FSBG  (on Prednisone)    Estimated Nutrition Needs:  Assessment weight is 86 kg, other weight, UBW    Energy Needs: 3474-9629 kcals daily, 25-30 kcal/kg  Protein Needs: 86 - 129 g daily, 1-1.5 g/kg.  Fluid Needs: 7754-5984 mls daily, 25-30 mls/kg    Malnutrition: Patient does not meet 2 diagnostic criteria for malnutrition    Nutrition Risk Level: low risk    Intervention:   Continue current nutrition Rx    Monitoring/Evaluation:   Intake, wt, skin, labs    Electronically signed by:  Glenna Balderas RD

## 2021-07-22 NOTE — PLAN OF CARE
"Around 0030, was called to room by RN to assess BiPAP to see if RN had correctly fixed a leak that had occurred. The leak had been fixed correctly, but I noticed that the mask was pushing into the pts nose, as well as a mask leak of 49. When I tried to adjust the mask without waking the pt, the pt woke up and ripped the mask off, complaining that I woke him up and that there wasn't anything wrong with the mask. He refused to allow me to put the mask back on. I tried to explain to him that it would sometimes be necessary for staff to adjust the mask etc. He replied \"I'm not going to argue with you\". He was resting on 3 LPM NC when I left. RN and RT who is assigned to the pt notified.  "

## 2021-07-22 NOTE — PLAN OF CARE
Problem: Physical Therapy  Goal: PT Goals  LTGS to be met by: 3/29/19  In order to progress towards home,  1. Patient will perform basic transfers Woodrow  2. Patient will perform car transfer Supervisino  3. Patient will ambulate 200ft with no AD, vitals stable.   4. Patient will ascend/descend 1 flight of steps with one handrail and Woodrow, vitals stable.  5. Patient will score low falls risk; >= 20/24 on DGI     STGS to be met by: 3/15/19  To progress towards LTGs,  1. Patient will perform basic transfers standby  2. Patient will perform car transfer prashant  3. Patient will ambulate 150ft with RW & stable vitals.  4. Patient will ascend/descend 3 steps with one handrail and contact guard assistance c vitals stable in <= 2min.   5. Patient will progress towards score low falls risk, intiaiting DGI s AD.    Goals were initiated on 3/1/2019 by Brittany L Dressler, PT.             See PT eval dated 3/1/2019 for further details.

## 2021-07-22 NOTE — PLAN OF CARE
Skin integrity is maintained or improved Not Progressing      Patient/family/caregiver demonstrates understanding of disease process, treatment plan, medications, and discharge instructions Progressing      Patient's pain/discomfort is manageable Progressing      Patient is alert and oriented. Had small episode of incontinence in brief of bowel and bladder. Writer noted increasing denuded area on patient gluteal cleft and buttocks. Perineal cares provided. Patient on tele, A flutter with occasional PVCs. Patient heart rate got as low as 42 on this shift but had no complaints of any cardiac symptoms of concern. Patient rates pain appropriately and was given prn tylenolx1 for 9/10 abdominal pain from coughing. Patient is pleasant with staff and cooperative with cares. Continue to monitor.

## 2021-07-22 NOTE — PROGRESS NOTES
Last OV 12/19/2019   Next OV Visit date not found  Last [facility] 10/9/2020 Skin/Ostomy Concerns and/ or WOC Consult Request       Identify concerning skin/ostomy issues:    Abrasion location:   Blister location:   Denudement location:   Erythema location:   Hypergranulation tissue location:   Ostomy/Fistula location:   Pressure ulcer location:   Rash location:   Skin tear location: L forearm covered with mepilex.  Surgical wound location:   Suspected deep tissue injury location:     Other: Pt has some bruises on both R and L forearms and upper arms and also his Abdomen. Pt has a scab on R foot.           Order(s) Requested:      Nurse submitting note:      Libby Alonzo      Provider Cosigning:    I have examined the skin concern identified in this note and agree with the RN documentation and order request(s).

## 2021-07-22 NOTE — PROGRESS NOTES
[facility] - Progress Note    Assessment:  Principal Problem:    Acute on chronic respiratory failure with hypoxia and hypercapnia (H)  Active Problems:    Atrial flutter (H)    COPD (chronic obstructive pulmonary disease) (H)    H/O tricuspid valve repair    Pulmonary emboli (H)    Mixed hyperlipidemia    BPH (benign prostatic hyperplasia)    Thrombocytopenia (H)    Chronic right heart failure (H)    Acute on chronic respiratory acidosis        Assessment:  1. COPD with acute exacerbation causing acute resp failure with hypoxia  - on azithro MWF  - on duonebs, change to Q 4 hours while awake, allowing treatent if awake at night  - Continue bipap at night (however, had a negative sleep study)  -check ABG when he has slept at least 4-6 hours on BIPAP  -May need to qualify him for NIPPV for home  -Agree with maintaining prednisone 10 mg daily until seen as outpatient  -Continue mucinex two times a day  -Will need to arrange for O2 with portability at discharge    2.  Severe COPD, O2 dependent    Subjective  68 year old with severe COPD on home oxygen 3 lpm, HTN, paroxysmal atrial fibrillation, anticoagulated, tricuspid valve repair, nonsustained VT and 2 hospitalization over the last few months for COPD exacerbations. These hospitalization required BIPAP and treatment for small pulmonary emboli and staph pneumonia and bacteremia,treated with Ancef. He was admitted to [facility] for [facility] on 2/28.    Today he says he is shortness of breath at rest, which is unchanged. He does not have pain. He says he sleeps ok on the BIPAP. He is able to cough productively. He denies wheezing. He says his pulmonologist is Nancy Espinoza, although he has not seen her yet as he keeps being readmitted to hospital. He takes duonebs scheduled, has home O2, but not porability, and uses albuterol for emergencies.    Current FiO2 (%): 30 % BIPAP 14/7 ST with rr12 with sats high 90's%; 3 L during the day with sats 91-95%;  he tolerated BIPAP better last night with better fitting mask (no gel applied)    Objective    Vital signs in last 24 hours: aferile; HR 4-83; BP stable  Vitals:    03/06/19 1154 03/06/19 1228 03/06/19 1532 03/06/19 1540   BP:  142/85 138/78    Patient Position:  Lying Lying    Pulse: (!) 57 83 69 65   Resp: 18 18 18 20   Temp:  98.2  F (36.8  C) 98.5  F (36.9  C)    TempSrc:   Oral    SpO2: 96% 96% 95% 97%   Weight:       Height:           Weight:   210 lb (95.3 kg)    Intake/Output last 3 shifts    Intake/Output Summary (Last 24 hours) at 3/6/2019 1558  Last data filed at 3/6/2019 1330  Gross per 24 hour   Intake 1209 ml   Output 1875 ml   Net -666 ml       Review of Systems   Pertinent items are noted in HPI.    Physical Exam:  /78 (Patient Position: Lying)   Pulse 65   Temp 98.5  F (36.9  C) (Oral)   Resp 20   Ht 6' (1.829 m)   Wt 210 lb (95.3 kg)   SpO2 97%   BMI 28.48 kg/m    Intake/Output this shift:  No intake/output data recorded.    GEN: NAD  HEENT: Pupils reactive, MMM  CVS: regular rhythm, no murmurs, 2+pedal edema  RESP: CTA BL  Chest/MS: no chest wall abnormalities  ABD: No abdominal pain with palpation, no guarding, no rigidity  Skin: Warm, no rashes on visible skin  Vasc: radial pulses intact carmen  NEURO:  CN2-12 grossly intact, moving all extremities  PSYCH: Normal affect    Pertinent Labs:   Lab Results:   Lab Results   Component Value Date     03/06/2019    K 4.3 03/06/2019    CL 96 (L) 03/06/2019    CO2 36 (H) 03/06/2019    BUN 31 (H) 03/06/2019    CREATININE 0.95 03/06/2019    CALCIUM 9.7 03/06/2019     Lab Results   Component Value Date    WBC 10.9 03/01/2019    HGB 13.2 (L) 03/01/2019    HCT 41.2 03/01/2019    MCV 95 03/01/2019     03/01/2019       Pertinent Radiology:  CHEST PORTABLE ONE VIEW   2/16/2019 10:20 PM     HISTORY: Shortness of breath.     COMPARISON: Earlier the same day at 1:40 PM.   Other Result Information   Interface, Radiant Ib - 02/16/2019 10:53 PM  CST  CHEST PORTABLE ONE VIEW   2/16/2019 10:20 PM     HISTORY: Shortness of breath.     COMPARISON: Earlier the same day at 1:40 PM.      IMPRESSION: Improving streaky bibasilar opacities since prior.  Perihilar vascular prominence is again seen. Cardiac silhouette is  unchanged. No new focal infiltrates. No significant pleural effusion       Geovanna Russell, CNP   [facility]

## 2021-07-22 NOTE — PLAN OF CARE
Problem: Inadequate Airway Clearance  Goal: Patient will achieve/maintain normal respiratory rate/effort  Outcome: Progressing     Problem: Inadequate Gas Exchange  Goal: Patient is adequately oxygenated and ventilation is improved  Outcome: Progressing     Pt is on 3lpm NC. SpO2 92-95%. Nebs given as ordered. BS this AM bilateral crackles. This afternoon, clear left lung, crackles in RLL. No distress.Strong NPC. Will continue to monitor. Pt uses BIPAP at night.

## 2021-07-22 NOTE — PLAN OF CARE
Problem: Pain  Goal: Patient's pain/discomfort is manageable  Outcome: Progressing     Problem: Daily Care  Goal: Daily care needs are met  Outcome: Progressing   Patient alert and oriented x 4. Patient tolerated BIPAP most of the night. . His HR was low  between 47 - 55 and higher  majority of the night while he is asleep.  Patient denied discomfort and he is aware that that his HR  goes low. At 0630, Pt c/o chest tightness   rated 8/10 and headache. PRN Tylenol  and scheduled Diltiazem given which  helped as he claimed , pain was  5/10  . His BP was  169/83 and he has labored breathing with RR of 21. HO informed and  suggested to  give  neb. His HR since he was awake was   on 70's. Patient feels much better at about 0700. Will continue to monitor.

## 2021-07-22 NOTE — PLAN OF CARE
Problem: Inadequate Airway Clearance  Goal: Patient will maintain patent airway  Outcome: Progressing  Goal: Patient will achieve/maintain normal respiratory rate/effort  Outcome: Progressing     Problem: Inadequate Gas Exchange  Goal: Patient is adequately oxygenated and ventilation is improved  Outcome: Progressing   INITIAL 3 NIGHTS BIPAP/CPAP NOTE     UNIT TYPE:  V 60  MASK TYPE:  Medium face mask     SETTINGS:                 MODE - ST              IPAP - 14              EPAP - 4              RATE - 12              FI02 - 30%                    TIME OF MASK PLACEMENT - 2145     TWO HOUR SKIN CHECK DONE AT   -   Pt refused for 2 hours skin check and Gell Pad.     INTERVENTION INITIATED - none at this time     PATIENT'S TOLERANCE OF DEVICE - pt was off BIPAP couple times.  Sats 95-98%, hr 55-71, rr 14-22, SVT  477-977, MV 10.5-14.3. Pt goes on 3L-4L nc during day

## 2021-07-22 NOTE — CONSULTS
PULMONARY / CRITICAL CARE MEDICINE   CONSULT NOTE    Tone Cooper,  1950, MRN [id number]    [facility]  acute on chronic respiratroy failure    PCP: Ana Pratt MD, [phone]   Code status:  Full Code       Extended Emergency Contact Information  Primary Emergency Contact: ALIYA VILLARREAL  Home Phone: [phone]  Relation: Spouse  Secondary Emergency Contact: DECLINED, DECLINED  Home Phone: [phone]  Relation: Declined       Chief Complaint: Acute on chronic respiratory failure with hypoxia and hypercapnia (H)     Assessment :   1. Chronic hypercapnic hypoxic respiratory failure  Underlying COPD. Negative sleep study for sleep apnea.   Treated for PE.   Continue to titrate FiO2, BiPAP therapy. Taper systemic steroids. Follow up BNP level .   2. Severe COPD with acute exacerbation   3. Pulmonary emboli    Continue long term anticoagulation  4. Paroxysmal atrial flutter/ atrial fibrillation   5. S/p TV repair     Plan:   1. Titrate FiO2  2. BiPAP therapy at night and as needed  3. Schedule bronchodilators  4. Steroid taper  5. Recently started on azithromycin due to recurrent COPD exacerbation   6. Titrate BP meds and diuresis   7. Follow up BNP level   8. Glucose level monitoring  9. Continue long term anticoagulation  10. H2 blocker for GI prophylaxis   11. PT, OT evaluation     Please contact me if you have any questions.    Ruben Allen  Pulmonary / Critical Care  3/1/2019   12:00 PM        HPI:    Tone Cooper is a 68 y.o. old male. History is provided by patient and reviewing medical records.   Pt has history of severe COPD on home oxygen 3 lpm, HTN, paroxysmal atrial fibrillation, anticoagulated, tricuspid valve repair, nonsustained VT.    Several hospitalization over the last few months.    Patient was admitted at the beginning of the year for evaluation of shortness of breath and fever, diagnosed with sepsis secondary to community acquired pneumonia  and hypoxic respiratory failure. Patient required BIPAP support. Treated with Rocephin, Zithromax, nebs, and steroids. One of two admission blood cultures grew staph. Infectious disease was consulted. Vancomycin was started. This staph ended up being MSSA and also grew in sputum, antibiotics were changed to Ancef. Given history of tricuspid valve repair a prolonged course of IV Ancef was planned.   Patient was admitted at [facility] on 2/11/19 complaining of shortness of breath.  Chest CT showed small pulmonary emboli and pneumonia. Started on Eliquis for pulmonary embolism. And  He was dismissed home on 2/14/19.  Patient was admitted to [facility] on 2/16/19 complaining of shortness of breath. Found in acute on chronic hypoxic respiratory failure and treated for COPD exacerbation ,required BiPAP therapy.   Patient was started on azithromycin with the hope to decrease the frequency of COPD exacerbations.   Patient was discharged to [facility] for further evluaiton.     Medical History  Active Ambulatory (Non-Hospital) Problems    Diagnosis     HTN (hypertension)     Past Medical History:   Diagnosis Date     Atrial flutter (H)      Cardiomyopathy (H)      COPD (chronic obstructive pulmonary disease) (H)      Diverticulitis      H/O tricuspid valve repair      HTN (hypertension)      Pulmonary emboli (H)         Surgical History  He  has a past surgical history that includes Total hip arthroplasty (Left); Appendectomy; Laparoscopic cholecystectomy; and Hernia repair.       Social History  Reviewed, and he  reports that he quit smoking about 10 years ago. His smoking use included cigarettes. he has never used smokeless tobacco. He reports that he drinks about 1.8 oz of alcohol per week. He reports that he does not use drugs.       Allergies  Allergies   Allergen Reactions     Amlodipine      Flecainide     Family History  Reviewed, and family history includes COPD in his mother;  Hypertension in his mother; Prostate cancer in his father; Stroke in his mother.   Psychosocial Needs  Social History     Social History Narrative     Not on file     Additional psychosocial needs reviewed per nursing assessment.       Prior to Admission Medications   Medications Prior to Admission   Medication Sig Dispense Refill Last Dose     albuterol (VENTOLIN HFA) 90 mcg/actuation inhaler Inhale 1-2 puffs every 4 (four) hours as needed (whilea wakel). While awake        apixaban (ELIQUIS) 5 mg Tab tablet Take 5 mg by mouth 2 (two) times a day.        azithromycin (ZITHROMAX) 250 MG tablet Take 250 mg by mouth daily.        budesonide (PULMICORT) 0.5 mg/2 mL nebulizer solution Take 0.5 mg by nebulization 2 (two) times a day.        clonazePAM (KLONOPIN) 1 MG tablet Take 1 mg by mouth at bedtime.        diclofenac sodium (VOLTAREN) 1 % Gel Apply 2 g topically 4 (four) times a day. chronoic back pain, X 7 days        diltiazem (CARDIZEM CD) 180 MG 24 hr capsule Take 180 mg by mouth 2 (two) times a day. Hold for sbp<110 or hr <60              famotidine (PEPCID) 20 MG tablet Take 20 mg by mouth 2 (two) times a day.        furosemide (LASIX) 40 MG tablet Take 20 mg by mouth 2 (two) times a day.        insulin glargine (LANTUS) 100 unit/mL injection Inject 10 Units under the skin at bedtime.        ipratropium-albuterol (DUO-NEB) 0.5-2.5 mg/3 mL nebulizer 3 mL 4 (four) times a day.        levalbuterol (XOPENEX) 1.25 mg/3 mL nebulizer solution Take 1 ampule by nebulization every 4 (four) hours as needed for wheezing or shortness of breath.        lidocaine 4 % patch Place 2 patches on the skin daily. To area of pain on back  Remove and discard patch with 12 hours or as directed by MD.        predniSONE (DELTASONE) 10 mg tablet Take 40 mg by mouth daily Please put taper order:  Prednisone 40 mg PO daily X 3 days, then 20 po qday X 3 days, then continue 10 mg po daily until follow up with pulmonology. .         propafenone (RYTHMOL) 225 MG tablet Take 225 mg by mouth every 8 (eight) hours.        tamsulosin (FLOMAX) 0.4 mg cap Take 0.4 mg by mouth Daily after breakfast.               traZODone (DESYREL) 50 MG tablet Take 50 mg by mouth at bedtime.               Review of Systems:  A 12 point comprehensive review of systems was negative except as noted. Physical Exam:  Temp:  [96.6  F (35.9  C)-98.4  F (36.9  C)] 96.6  F (35.9  C)  Heart Rate:  [60-90] 76  Resp:  [13-28] 18  BP: (130-148)/(79-95) 136/95  FiO2 (%):  [30 %] 30 %    Gen: obese, awake, alert, no distress  HEENT: pale conjunctiva, moist mucosa, Mallampati III/IV  Neck: no thyromegaly, masses or JVD  Lungs: decrease breath sounds both bases, discrete expiratory wheezes  CV: regular, no murmurs or gallops appreciated  Abdomen: soft, NT, BS wnl  Ext: trace edema, no clubbing or distal cyanosis   Neuro: CN II-XII intact, non focal      Pertinent Labs  Recent Results (from the past 24 hour(s))   POCT Glucose   Result Value Ref Range    Glucose 283 (H) 70 - 139 mg/dL   POCT Glucose - 4 times daily before meals and at bedtime   Result Value Ref Range    Glucose 283 (H) 70 - 139 mg/dL   POCT Glucose - 4 times daily before meals and at bedtime   Result Value Ref Range    Glucose 255 (H) 70 - 139 mg/dL   Basic metabolic panel   Result Value Ref Range    Sodium 142 136 - 145 mmol/L    Potassium 4.1 3.5 - 5.0 mmol/L    Chloride 98 98 - 107 mmol/L    CO2 38 (H) 22 - 31 mmol/L    Anion Gap, Calculation 6 5 - 18 mmol/L    Glucose 119 70 - 125 mg/dL    Calcium 9.4 8.5 - 10.5 mg/dL    BUN 26 (H) 8 - 22 mg/dL    Creatinine 0.86 0.70 - 1.30 mg/dL    GFR MDRD Af Amer >60 >60 mL/min/1.73m2    GFR MDRD Non Af Amer >60 >60 mL/min/1.73m2   HM2 (CBC W/O DIFF)   Result Value Ref Range    WBC 10.9 4.0 - 11.0 thou/uL    RBC 4.32 (L) 4.40 - 6.20 mill/uL    Hemoglobin 13.2 (L) 14.0 - 18.0 g/dL    Hematocrit 41.2 40.0 - 54.0 %    MCV 95 80 - 100 fL    MCH 30.6 27.0 - 34.0 pg    MCHC 32.0 32.0  - 36.0 g/dL    RDW 14.7 (H) 11.0 - 14.5 %    Platelets 172 140 - 440 thou/uL    MPV 9.6 8.5 - 12.5 fL   Glycosylated Hemoglobin A1C   Result Value Ref Range    Hemoglobin A1c 6.5 (H) 4.2 - 6.1 %   POCT Glucose - 4 times daily before meals and at bedtime   Result Value Ref Range    Glucose 98 70 - 139 mg/dL       Pertinent Radiology    CT CHEST PULMONARY EMBOLISM WITH CONTRAST   2/11/2019 2:29 PM    HISTORY: Dyspnea.    TECHNIQUE: Pulmonary embolism protocol was performed. 69 mL Isovue-370  were injected IV. After contrast injection, volumetric helical  acquisition was performed from the thoracic inlet through the upper  abdomen. Radiation dose for this scan was reduced using automated  exposure control, adjustment of the mA and/or kV according to patient  size, or iterative reconstruction technique.    COMPARISON: Chest CT performed 1/4/2019.    FINDINGS: There is a focal area of nonocclusive pulmonary embolism in  the right upper lobe anteriorly (series 4 image 54). No other areas of  pulmonary embolism are identified. The thoracic aorta is of normal  caliber, without evidence for thoracic aortic aneurysm or dissection.  Mild atherosclerotic aortoiliac calcification. No pleural or  pericardial effusions. Sternotomy. No enlarged lymph nodes are  identified in the chest. Extensive emphysematous changes are noted  throughout both lungs. Focal area of consolidation at the right lung  base posteriorly is suspicious for pneumonia. Previously noted areas  of consolidation and infiltrate in the right lung have otherwise  largely resolved. Prior cholecystectomy. Limited views of the upper  abdomen are unremarkable. Mild anterior compression of the L1  vertebral body is unchanged.  IMPRESSION:   1. The exam is positive for a small area of nonocclusive pulmonary  embolism in the right upper lobe.  2. Small area of consolidation at the right lung base posteriorly is  suspicious for pneumonia.  3. Emphysema.        Echocardiogram 1/4/2019  Interpretation Summary    The right ventricle is mildly dilated.  Mildly decreased right ventricular systolic function  TAPSE 1.52cm,  TASV 10.1cm/s  An annuloplasty ring is noted in the tricuspid position.  mean gradient 4mmHg, peak 12mmHg  The study was technically difficult. Compared to prior study, changes are noted.  Left ventricular systolic function is normal. The visual ejection fraction is  estimated at 60-65%. No regional wall motion abnormalities noted.

## 2021-07-22 NOTE — PLAN OF CARE
"Blood pressure (BP) is within acceptable limits Progressing      Demonstrates ability to cope with hospitalization/illness Progressing      Patient complained of pain 9/10 at 0615 this morning. Gave PRN tylenol. Patient's vital signs were stable with some bradycardia at times throughout the shift. Patient remains on telemetry and has been in atrial flutter all shift. Patient was on bipap until 0620 when he was put on 3L nasal cannula. Patient slept most of the shift. Patient complained when writer asked to see patient's skin. Writer waited until 0600 meds to bundle cares and prevent waking him unnecessarily in the night. Patient rolled his eyes and said \"I think that's the most stupidest thing, I don't have any bed sores\". Patient somewhat complied and rolled onto side so writer could see his back side, but patient refused to roll over far enough for writer to get a good look at patient's buttocks. Nursing staff will continue to monitor and assess. Nohemy Gil RN    "

## 2021-07-22 NOTE — PLAN OF CARE
Problem: Pain  Goal: Patient's pain/discomfort is manageable  Outcome: Progressing     Problem: Safety  Goal: Patient will be injury free during hospitalization  Outcome: Progressing     Problem: Daily Care  Goal: Daily care needs are met  Outcome: Progressing     Problem: Pain  Goal: Patient's pain/discomfort is manageable  Outcome: Progressing     Problem: Safety  Goal: Patient will be injury free during hospitalization  Outcome: Progressing     Problem: Daily Care  Goal: Daily care needs are met  Outcome: Progressing   Patient slept most of the shift; oxygen inhalation was on till 0500.  BPAP placed by RT at 0500 per patient's request.  Urinal at bedside; patient sleeping at this time, appears comfortable in bed.

## 2021-07-22 NOTE — PLAN OF CARE
Pain      Patient's pain/discomfort is manageable Progressing        Safety      Patient will be injury free during hospitalization Progressing        Patient able to communicate needs, denied pain this shift. Patient does not like to be woken up during the nighttime, was uncooperative in changing tele pads. Patient had several episodes of bradycardia this shift but remained asymptomatic . Patient was restful in bed. Needs are met at this time, will continue to monitor.  While giving 0600 medication this AM, upon removal of bipap, blood noted on patients lip and around mouth, patient refused to have nurse look at it.

## 2021-07-22 NOTE — PROGRESS NOTES
WOC RN Progress Note    Today's Visit: No new skin concerns reported to writer, routine reassessment of wound, ostomy and skin concerns.  [facility] DC orders completed.    Labs:  Albumin   Date/Time Value Ref Range Status   03/12/2019 06:46 AM 2.5 (L) 3.5 - 5.0 g/dL Final     Hemoglobin A1c   Date/Time Value Ref Range Status   03/01/2019 06:58 AM 6.5 (H) 4.2 - 6.1 % Final     No results for input(s): HGB in the last 72 hours.    Invalid input(s): HEM    Vitals:   The last obtained vitals are:   Temp: 98.1  F (36.7  C)  Heart Rate: 82  Resp: 24  BP: 128/84    Nathaniel:  Nathaniel Scale Score: 18  Specialty Bed: Isogel ([place])  192 lb 12.8 oz (87.5 kg)  Body mass index is 26.15 kg/m .      Wound Ostomy Continence Assessment/Plan:  Wound Left forearm skin tear   0.7x2cm 100% clean, stable   Treatment Plan: Mepilex (3x3) change every 5 days     Right shin abrasion   Per patient fell into a fire pit   1.8x0.3cm 100% clean  Continue Mepilex    Last Prealbumin: None on file    Nathaniel score: 18          Bed: Isogel          Pillows for heel elevation     Discussed plan of care with nurse and patient.     Outcomes and treatment recommendations are to promote skin integrity, contain exudate and promote wound healing.    Sarika Kilpatrick, BSN, RN, PHN, HNB-BC, CWOCN

## 2021-07-22 NOTE — PROGRESS NOTES
[facility] - Progress Note    Assessment:  Principal Problem:    Acute on chronic respiratory failure with hypoxia and hypercapnia (H)  Active Problems:    Atrial flutter (H)    COPD (chronic obstructive pulmonary disease) (H)    H/O tricuspid valve repair    Pulmonary emboli (H)    Mixed hyperlipidemia    BPH (benign prostatic hyperplasia)    Thrombocytopenia (H)    Chronic right heart failure (H)    Acute on chronic respiratory acidosis    Assessment:  1. Acute on chronic hypoxic and hypercarbic respiratory failure with recent COPD with acute exacerbation and multiple hospitalizations recently for same, now stable on nocturnal BIPAP, which needs to be continues on indefinite basis  - continue azithro MWF  - Continue duonebs Q 4 hours while awake, and continue at discharge  - Discussed with discharge RT, who will order bipap (not ST) machine set at 14/7   -Continue maintaining prednisone 10 mg daily until seen as outpatient by pulmonologist, Dr. Nancy Espinoza  -Continue mucinex two times a day  -Check O2 needs for exertion and with BIPAP; Will need to arrange for O2 with portability at discharge (discussed with RT)  -OK for discharge this week.    2.  Severe COPD, O2 dependent  To f/u with Dr Espinoza [facility] after DC within 2 -3 weeks    3. Aflutter  4. bradycardia    Subjective  68 year old with severe COPD on home oxygen 3 lpm, HTN, paroxysmal atrial fibrillation, anticoagulated, tricuspid valve repair, nonsustained VT and 2 hospitalization over the last few months for COPD exacerbations. These hospitalization required BIPAP and treatment for small pulmonary emboli and staph pneumonia and bacteremia,treated with Ancef. He was admitted to [place] for [facility] on 2/28.    Today is sleeping with neb running, awakens easily, and says he didn't sleep too bad last night, but is more tired this am. He says his breathing is unchanged.    Current FiO2 (%): 2 L NC sats 88-95% (unclear if desat was at rest  or with exertion; Previously 30 % BIPAP 14/7 ST with rr12 with sats high 90's%;     2 L NC nocturnal oximetry 3/11-3/12/19 (off BIPAP): slept well; lowest sat 74%; average sat 94.22%; 32 minutes < 89%; increased to 3 L at 0450.    ABG 3/11/19 on BIPAP 14/7 30% O2 and no Back up rate: 7.39, PCO2 68, PO2 83, HCO3 34.7, sat 98.7%--CO2 not 7 mmHG worse than BIPAP with back up rate, so he does not qualify for BIPAP s/t but does qualify for BIPAP s.    Objective    Vital signs in last 24 hours: aferile; HR 4-83; BP stable  Vitals:    03/12/19 0401 03/12/19 0635 03/12/19 0803 03/12/19 0838   BP: 139/74 (!) 155/92 (!) 142/97    Patient Position: Lying  Lying    Pulse: 60 65 68 69   Resp: 18 19    Temp: 96.7  F (35.9  C)  97.4  F (36.3  C)    TempSrc:   Oral    SpO2: 98%  98% 97%   Weight:       Height:           Weight:   210 lb (95.3 kg)    Intake/Output last 3 shifts    Intake/Output Summary (Last 24 hours) at 3/12/2019 0848  Last data filed at 3/12/2019 0211  Gross per 24 hour   Intake 3 ml   Output 2000 ml   Net -1997 ml       Review of Systems   Pertinent items are noted in HPI.    Physical Exam:  /78 (Patient Position: Lying)   Pulse 65   Temp 98.5  F (36.9  C) (Oral)   Resp 20   Ht 6' (1.829 m)   Wt 210 lb (95.3 kg)   SpO2 97%   BMI 28.48 kg/m    Intake/Output this shift:    GEN: NAD; sleeping but awakens easily. NAD  HEENT: NC PERRL  CVS: regular rhythm, no murmurs, 2+pedal edema  RESP: CTA BL, diminished  Chest/MS: no chest wall abnormalities  ABD: No abdominal pain with palpation, no guarding, no rigidity  Skin: Warm, no rashes on visible skin  Vasc: radial pulses intact carmen  NEURO:  A & A, moving all extremities  PSYCH: Normal affect    Pertinent Labs:   Lab Results:   Lab Results   Component Value Date     03/12/2019    K 3.7 03/12/2019    CL 99 03/12/2019    CO2 38 (H) 03/12/2019    BUN 20 03/12/2019    CREATININE 0.90 03/12/2019    CALCIUM 9.3 03/12/2019     Lab Results   Component Value  Date    WBC 8.3 03/07/2019    HGB 12.7 (L) 03/07/2019    HCT 41.2 03/07/2019    MCV 97 03/07/2019     (L) 03/07/2019       Pertinent Radiology:  CHEST PORTABLE ONE VIEW   2/16/2019 10:20 PM     HISTORY: Shortness of breath.     COMPARISON: Earlier the same day at 1:40 PM.   Other Result Information   Interface, Radiant Ib - 02/16/2019 10:53 PM CST  CHEST PORTABLE ONE VIEW   2/16/2019 10:20 PM     HISTORY: Shortness of breath.     COMPARISON: Earlier the same day at 1:40 PM.      IMPRESSION: Improving streaky bibasilar opacities since prior.  Perihilar vascular prominence is again seen. Cardiac silhouette is  unchanged. No new focal infiltrates. No significant pleural effusion     Geovanna Russell, CNP   [facility]

## 2021-07-22 NOTE — PROGRESS NOTES
Pt cont on 3lnc bs clear and diminished, strong congested at times productive. Received duoneb x4, pulmicort x2. Cont on nc into tameka, bipap at night

## 2021-07-22 NOTE — PLAN OF CARE
Daily Care      Daily care needs are met Progressing        Pain      Patient's pain/discomfort is manageable Progressing        Safety      Patient will be injury free during hospitalization Progressing    Vitals stable this evening.  PRN Tylenol given x2 for low back pain.  Pt is on tele:  A-flutter. All daily cares done. Will continue to monitor.

## 2021-07-22 NOTE — PLAN OF CARE
Daily care needs are met Progressing      Blood pressure (BP) is within acceptable limits Progressing      Achieve optimal glucose control Progressing      Skin integrity is maintained or improved Progressing      Patient complained of pain 8/10 x2 and received PRN tylenol x2 this shift. Patient's vital signs were stable and daily care needs were met. Patient's breakfast blood glucose was 130 and did not require insulin coverage. Patient's 1200 blood glucose was 156 and required 2 units of insulin coverage. Patient told nurse that he didn't want us looking at his skin every shift and that he doesn't want to be repositioned every two hours. Nursing staff explained that our policy is to reposition Q2 hours to prevent pressure ulcers. Patient acknowledged this and stated that he won't refuse repositioning, but that he would pull the pillows out after we left the room. Nursing staff will continue to monitor and assess. Nohemy Gil RN

## 2021-07-22 NOTE — PLAN OF CARE
INITIAL 3 NIGHTS BIPAP/CPAP NOTE    UNIT TYPE:  V60 placed on at 2254 Thursday, 2-28-19.  MASK TYPE:  Switched from medium mask to large mask 0149. Medium mask was leaking and causing V60 to alarm. Pt would not at first, allow me to fully assess skin. RN did help me when pt re-approached and pt complied. A sore was noted on his bottom lip. Pt states he has had it for 3 weeks. However, I did note that with the medium mask, it was resting on his lower lip. It did not do that with the large mask. Pt has a clear duoderm bandage on bridge of nose from previous facility. The skin looked red, but unable to fully assess. RN will leave a message for WOC to assess.    SETTINGS:      MODE - ST   IPAP - 14   EPAP - 7   RATE - 12   FI02 - 30%     TIME OF MASK PLACEMENT - 2254    TWO HOUR SKIN CHECK DONE AT 0149    INTERVENTION INITIATED Switched to large mask.    PATIENT'S TOLERANCE OF DEVICE - Good  Pt uses 2 to 3 LPM NC days. He uses 3 LPM at home.  BS: Clear and dim, SATs 96%, HR 61-77, RR 14-20, - 730, MV 7-13.6.

## 2021-07-22 NOTE — PLAN OF CARE
Pain      Patient's pain/discomfort is manageable Progressing        Has ongoing complaint of mid/lower back pain, which makes it difficult to sit up in chair for meals, given tylenol, which gave some relief.

## 2021-07-22 NOTE — PLAN OF CARE
Problem: Occupational Therapy  Goal: OT Goals  Description  Long Term/Short Term Goal to be met by 6/19/19:   - Bed mobility with indep w/SATs >92%.  - Basic transfers with mod I, w/SATs >92%.  - Advanced transfers SBA, A.E. And SATs >92%.  - FB dressing with mod I, using resp edu and SATs >92%  - Upper body grooming and hygiene tasks with mod I, SATs>92%.  - Tolerate activity for 20 minutes and maintain oxygen saturations > 92% with activity.  - Verbalize and demonstrate understanding of pursed lip breathing with I/ADL tasks with SBA.  - Demo standing (static or dynamic) with functional activity 5 minutes, mod I and SATs  >90%  - Participation in cognitive testing in order to assist with safe discharge planning.-as needed    Goals initiated on 6/2/2019 by Kyle Anna OTRL/DONNELL.   Outcome: Adequate for Discharge    Occupational Therapy Discharge Summary    Date of OT Discharge: 6/6/19  Refer to daily doc flowsheet for equipment issued and current functional status.  Discharge Destination: Home  Discharge Comments: Pt met goals to potential at this time. Goals partially met d/t early discharge and low motivation to participate with OT tasks. Pt did demo understanding of PLB and tolerated standing in kitchen to complete task 5 minutes. Decreased labored resp and shortness of breath at end of stay. No further OT indicated a this time.     Please note that if goals are not fully met the patient is making progress towards established goals, which is documented in flowsheet notes. If further therapy is recommended it is related to documented deficits, and is necessary to maximize functional independence in order for patient to return to previous level of function.      6/6/2019 by Kyle Anna OTRL/DONNELL

## 2021-07-22 NOTE — PLAN OF CARE
Patient remains on BIPAP -- ST- mode: 14/7, 12 RR, 30%.   Blood pressure 128/78, pulse (!) 47, temperature 96.9  F (36.1  C), temperature source Axillary, resp. rate 14, height 6' (1.829 m), weight 210 lb (95.3 kg), SpO2 97 %.   Patient is tolerating well. Patient requested the ABG after 8 AM Please do the ABG after 8 AM when patient is awake. Cont current POC.

## 2021-07-22 NOTE — PROGRESS NOTES
[facility] Daily Progress Note    Assessment/Plan  68 y.o. old male with a PMH significant for COPD both steroid and oxygen dependent, right heart failure, atrial fibrillation on chronic anticoagulation, hypertension admitted to [facility] on 5/26/2019 with a acute on chronic respiratory failure secondary to COPD exacerbation.  He was admitted to the ICU and started on BiPAP.  He was treated with empiric antibiotics as well as high-dose steroids.  He initially required BiPAP almost continuously but is now requiring it intermittently.  He continues to require 3 to 4 L/min oxygen per nasal cannula.  Note that he tires 20 mg of prednisone at home chronically as well as oxygen per nasal cannula at 3 L/min continuously.  He was transitioned to oral steroids and is now on oral prednisone.  He gets short of breath with minimal exertion such as getting out of bed.  This is coupled with hypoxia and tachycardia.    Acute on chronic respiratory failure hypoxia and hypercapnia  COPD exacerbation  On 3 L continuous nasal cannula and chronic prednisone (recently increased to 20 mg) at home. Continues to need bipap prn for dyspnea.  -Continue 3 L oxygen via continuous nasal cannula  -BiPAP at night and as needed  -Continue duo nebs scheduled  -Discontinue Mucomyst and budesonide nebs per patient request  -Continue Xopenex nebs as needed  -Continue prednisone 60 mg, follow on this dose for now but when he weans it will be a slow wean and he will end at 20 mg which was his new previous home dose.  -Start guaifenesin long-acting 600 mg twice a day for 7 days     Poorly controlled hyperglycemia secondary to steroids  Blood glucose 1 , NPH recently added to be given at the time of prednisone  -Continue NPH 5U qam with prednisone  -Continue Accu-Cheks and sliding scale insulin     Atrial fibrillation  Hypertension  Right heart failure  BLE edema c/w volume overload due to acute right sided congestive heart  "failure, urinating frequently.  -Continue Lasix to 40 mg 3x daily  -Continue Eliquis and diltiazem  -BMP in the morning     Lung nodules  -Follow-up as outpatient    DVT prophylaxis:  Eliquis  GI prophylaxis:  None, eating.    Subjective:  Does not want to take Mucomyst nebs because they taste like car exhaust and do not do anything for him, does not want to be on budesonide nebs prednisone, says he takes a medication called \"mucus\" which is 600 mg twice a day and it helps him a lot.  He is wondering if he can have that here.  I tell him that I think he is probably talking about guaifenesin.  No pain, no more chest discomfort, working with therapy.  Using BiPAP to rest at times.  Up at night urinating a lot.    Objective:  Vital signs in last 24 hours:  Temp:  [97.4  F (36.3  C)-98.1  F (36.7  C)] 97.5  F (36.4  C)  Heart Rate:  [61-99] 90  Resp:  [15-24] 21  BP: (147-187)/() 177/107  FiO2 (%):  [25 %-30 %] 25 %  Weight:   207 lb 8 oz (94.1 kg)    Intake/Output last 3 shifts:  I/O last 3 completed shifts:  In: 1060 [P.O.:1060]  Out: 1900 [Urine:1900]  Intake/Output this shift:  I/O this shift:  In: -   Out: 725 [Urine:725]    Physical Exam:  General Appearance: Sitting on side of bed, increased work of breathing  HEENT:  NC/AT, nasal cannula   Lungs:  CTA B, distant, decreased air movement  Cardiovascular:  Irreg irreg, trace - 1+ BLE edema  Abdomen:  S/NT/ND, +BS  Extremities:  Well developed  Skin:  Erythematous face  Neurologic:  A&O x3  Psychologic:  Calm    Imaging: I have independently visualized the image.  None    Lab Results:  BG per above       All medication issues identified within the last 24 hours have been addressed and completed as appropriate.    Barriers to disposition:  Intermittent bipap.    D/w nursing, OT.    Jolene Wagner MD  Internal Medicine  [facility]            "

## 2021-07-22 NOTE — PROGRESS NOTES
On overnight study. Currently on 2LNC. Tolerating well. No desaturation. Blood pressure 139/74, pulse 60, temperature 96.7  F (35.9  C), resp. rate 18, height 6' (1.829 m), weight 210 lb (95.3 kg), SpO2 98 %.

## 2021-07-22 NOTE — PLAN OF CARE
Problem: Occupational Therapy  Goal: OT Goals  Long Term Goal to be met by 4/5/19:   - Bed mobility with modified independence.  - Basic transfer with modified independence.  - Advanced transfers with SBA.  - UB dressing with SBA.  - LB dressing with SBA.  - Participation in grooming/hygiene tasks with SBA standing EOB/at sink.  - Participation in 20 minutes of B UE Exercise with moderate resistance.  - SBA simple IADLs.  - Pt will incorporate breathing techniques/energy saving 75% of the time during ADLs/IADLs/mobility with min cues.    Short Term Goals to be met by 3/15/19:  - Bed mobility with CGA.  - Basic transfer with min assist.  - UB dressing with set-up, using appropriate adaptive equipment PRN.  - LB dressing with mod assist, using appropriate adaptive equipment PRN.  - Participation in grooming/hygiene tasks with SBA.  - Participation in cognitive assessment to assist with treatment and discharge planning.  - Participation in 10 minutes of B UE Exercise with light resistance.  - Participate in cog assess/tx.  - Pt will incorporate breathing techniques/energy saving with moderate cues during ADLs/IADLs/mobility.    Goals initiated on 3/1/2019 by Eveline Reno, OTRL/DONNELL     Outcome: Progressing  Occupational Therapy Weekly Summary    Current Status  Precautions:    UB Dressing/Washing: SBA g/h seated at EOB; UB dressing not addressed this week  LB Dressing/Washing: Not addressed this week  U/E Function: Addressing strengthening tasks, pt limited by pain in back with ex and fatigue  Bed Mobility: SBA supine to sit and return  Functional Transfers: Not addressed to date in OT   Cognitive Testing Scores: WFL    Frequency: 4-6x/wk ELOS 3-4 weeks    Summary: Pt making progress in first week of admission, addressing respiratory ed- pt responsive to cues during functional tasks. Continue to address ADL's, transfers and endurance/strength.     D/C Recommendations: TCU vs home?    Karen Steen, OT

## 2021-07-22 NOTE — PLAN OF CARE
Anxiety is at manageable level Progressing      Maintain airway patency Progressing      Patient will remain free of falls Progressing      Pt stable during this RN's shift. No c/o pain or discomfort. Pt is continent of B&B; no s/sx of rr distress noted. No concerns arising from this RN's shift.

## 2021-07-22 NOTE — PLAN OF CARE
BIPAP/CPAP NOTE    UNIT TYPE: V60. Placed on pt at 2145 Tuesday 6-4-19    MASK TYPE:  Medium mask with gel pad on. Mask re-applied x 1. No skin break down.    SETTINGS:      BIPAP - 14/6, RR 12   FI02 - 25%    BS: Clear and dim, RR 13-17, HR 63-68, SATs 94%-97%, -898, MV 10.3-10.8.    Pt uses 4 LPM NC days.     PATIENT'S TOLERANCE OF DEVICE - Good    Problem: Inadequate Airway Clearance  Goal: Patient will maintain patent airway  Outcome: Progressing  Goal: Patient will achieve/maintain normal respiratory rate/effort  Outcome: Progressing     Problem: Inadequate Gas Exchange  Goal: Patient is adequately oxygenated and ventilation is improved  Outcome: Progressing

## 2021-07-22 NOTE — DISCHARGE SUMMARY
[facility] Discharge Summary    Admit date: 5/31/2019  Discharge date: 06/06/19  Patient YOB: 1950   Disposition:  Home, self care    Discharge Diagnoses  Principal Problem:    Acute on chronic respiratory failure with hypoxia (H)  Active Problems:    Atrial flutter (H)    COPD (chronic obstructive pulmonary disease) (H)    H/O tricuspid valve repair    Uncontrolled hypertension    Pulmonary emboli (H)    Acute on chronic respiratory failure with hypoxia and hypercapnia (H)    Mixed hyperlipidemia    BPH (benign prostatic hyperplasia)    Thrombocytopenia (H)    Chronic right heart failure (H)    Acute on chronic respiratory acidosis       Reason for Admission/HPI: Tone Cooper is a 68 y.o. old male with a PMH significant for COPD both steroid and oxygen dependent, right heart failure, atrial fibrillation on chronic anticoagulation, hypertension. History is provided by reviewing the records from [facility], speaking with the transferring provider, and talking with the patient and his wife.     Patient was admitted to [facility] on 5/26/2019 with a acute on chronic respiratory failure secondary to COPD exacerbation.  He was admitted to the ICU and started on BiPAP.  He was treated with empiric antibiotics as well as high-dose steroids.  He initially required BiPAP almost continuously but is now requiring it intermittently.  He continues to require 3 to 4 L/min oxygen per nasal cannula.  Note that he tires 20 mg of prednisone at home chronically as well as oxygen per nasal cannula at 3 L/min continuously.  He was transitioned to oral steroids and is now on oral prednisone.  He gets short of breath with minimal exertion such as getting out of bed.  This is coupled with hypoxia and tachycardia.  He was medically optimized and transferred to [facility] for ongoing medical management including intermittent BiPAP need secondary to ongoing acute on chronic respiratory failure.   "Upon arrival, he is comfortable.  His breathing feels okay with his current nasal cannula oxygen, he has no pain, he admits to becoming dyspneic with the littlest of activities including getting out of bed.  He does not feel weak, he was able to ambulate independently at [facility].       Hospital Course:   Tone was treated with continuous oxygen via nasal cannula, BiPAP at night and as needed for dyspnea.  Pulmicort and DuoNebs were continued.  He tried Mucomyst but felt that it worsened his shortness of breath and thereafter refused any alternate nebulizer treatments.  He is on a weaning dose of prednisone.    He was evaluated by PT and OT.  Physical therapy notes indicate that he is making progress,  ambulating up to 120 feet with a 4 wheeled walker and even ambulates short distances without assistive device.  He can tolerate 5-6 minutes of continuous activity while maintaining oxygen saturations within normal limits.  He firmly refused to participate in OT, indicating he does not need skilled OT services.  He does not believe he would benefit from home care or additional services after discharge.    Tone says he is aware of the serious nature of his medical problems and is working to check items off his \"bucket list,\" including planning a motorcycle ride by [facility] in late June.  He is anxious to get home this weekend for his daughter's graduation.  He is dismissed home in stable condition.      Significant Diagnostic Studies and Procedures:  none  Discharge Medications:     Medication List      START taking these medications    lisinopril 10 MG tablet  Commonly known as:  PRINIVIL,ZESTRIL  Take 1 tablet (10 mg total) by mouth daily.  Start taking on:  6/7/2019     oxyCODONE 10 mg immediate release tablet  Commonly known as:  ROXICODONE  Take 1 tablet (10 mg total) by mouth every 6 (six) hours as needed.        CONTINUE taking these medications    acetaminophen 325 MG tablet  Commonly known as:  " TYLENOL  Take 325-650 mg by mouth every 6 (six) hours as needed (mild pain).     apixaban 5 mg Tab tablet  Commonly known as:  ELIQUIS  Take 5 mg by mouth 2 (two) times a day.     budesonide 0.5 mg/2 mL nebulizer solution  Commonly known as:  PULMICORT  Take 0.5 mg by nebulization 2 (two) times a day.      clonazePAM 1 MG tablet  Commonly known as:  KlonoPIN  Take 1 mg by mouth at bedtime as needed for anxiety.      diltiazem 180 MG 24 hr capsule  Commonly known as:  CARDIZEM CD  Take 180 mg by mouth 2 (two) times a day. Hold for sbp<110 or hr <60     furosemide 40 MG tablet  Commonly known as:  LASIX  Take 40 mg by mouth 2 (two) times a day at 9am and 6pm.     ipratropium-albuterol 0.5-2.5 mg/3 mL nebulizer  Commonly known as:  DUO-NEB  3 mL 4 (four) times a day.     levalbuterol 1.25 mg/3 mL nebulizer solution  Commonly known as:  XOPENEX  Take 1 ampule by nebulization every 4 (four) hours as needed for wheezing or shortness of breath.     lidocaine 4 % patch  Place 2 patches on the skin daily. To area of pain on back Remove and discard patch with 12 hours or as directed by MD.     predniSONE 20 MG tablet  Commonly known as:  DELTASONE  Take 60 mg by mouth daily.     tamsulosin 0.4 mg Cap  Commonly known as:  FLOMAX  Take 0.4 mg by mouth Daily after breakfast.      traZODone 50 MG tablet  Commonly known as:  DESYREL  Take 50 mg by mouth at bedtime.     VENTOLIN HFA 90 mcg/actuation inhaler  Generic drug:  albuterol  Inhale 1-2 puffs every 4 (four) hours as needed (while awake). While awake           \      Discharge Orders   Activity as tolerated   Order Comments: Rest when tired     Call MD:  if symptoms get worse     Call MD for:  difficulty breathing, headache or visual disturbances     Call MD for:  temperature greater than 101     Call MD for:  severe uncontrolled pain     Call MD for:  weight gain - more than two pounds in a day or five pounds in a week     Discharge diet - Diabetic     Discharge Follow Up -  Primary Care Clinic     Order Specific Question Answer Comments   Follow-up in: Within 7 days    Non-[facility] patient Patient to schedule Follow-up appointment with Primary Care Physician      [facility] Wound Care Right shin and left forearm; Every three days   Standing Status: Future Standing Exp. Date: 06/04/20   Order Comments: Cleanse with: Wound Cleanser, pat dry.  Apply: Mepilex 3x3.     Order Specific Question Answer Comments   Site: Right shin and left forearm    Frequency Every three days          Follow up needed  Follow-up with primary MD within 1 week      Diet: Diabetic  Weight bearing restrictions: none  Code Status: Full    Physical exam  /84 (Patient Position: Lying)   Pulse 76   Temp 98.7  F (37.1  C) (Oral)   Resp 22   Ht 6' (1.829 m)   Wt 192 lb 12.8 oz (87.5 kg)   SpO2 95%   BMI 26.15 kg/m      I saw and examined this patient on the date of discharge.    Discharge Physician: Nitza Alcantar.      Total time spent on this discharge was greater than 30 minutes of which greater than 50% of the time was spent on patient care coordination and review of chart.      Nitza Alcantar M.D.  [facility]  Hospitalist   Sukhi Steele

## 2021-07-22 NOTE — PLAN OF CARE
Impaired Gas Exchange      Demonstrate improved ventilation and adequate oxygenation of tissues as evidenced by absence of respiratory distress Progressing        Ineffective Airway Clearance      Maintain airway patency Progressing            PT currently resting comfortably on 3 LPM NC with cont ox. SPO2 95-98%, RR 18-20, BBS Clear/diminished. No signs of RT distress noted this shift. PT received Duoneb x 1, as scheduled- tolerated well. PT refused his noon tx- stated he wanted to sleep. PT is on 3 LPM NC @ home. PT to go on V60  IPAP 14, EPAP 7, 30%. RT will cont to monitor.     Sunday noc place PT on BiPAP and obtain ABG on Monday morning reflecting 4-6 hours on Bipap with NO BACKUP RATE SET

## 2022-01-28 NOTE — PLAN OF CARE
Problem: Patient Care Overview  Goal: Plan of Care/Patient Progress Review  Outcome: Improving  A/O  Independent in room.   VSS afebrile.   LS clear, diminished.   Hand tremors noted.   Tele  SR/SA  With frequent PACs.    Denies pain. Refused skin assessment.        Writer left voicemail for patient to return call

## 2022-05-02 NOTE — PLAN OF CARE
Addended by: Danny Dunne on: 5/2/2022 02:36 PM     Modules accepted: Orders Tele-Afib CVR with PVCs.  IV Ancef.  ID, SW and Care coord following.  Prior shift notified MD that pt has white patches throughout mouth and tongue and complained of irritation.  Oxy and trazadone given.  Possible discharge home in couple days.  PICC or midline possible with discharge as pt needs antibiotic tx for 4-6 weeks.  Right arm is swollen with no change.  Pt has PRN q2hrs nebs and respiratory  came at 0515 for neb treatment per patients request.  Pt refuses skin checks.

## 2023-06-05 NOTE — ED TRIAGE NOTES
Hospitalized 5 weeks ago with a pneumonia. Sent here with shortness of breath. States his MD let him know he needed to be seen for a possible abscess which was seen on CT 5 weeks ago. Patient collapsed at home yesterday; denies syncope. States he experienced room spinning. Patient is on an antibiotic for the past 5 weeks.    Rhofade Counseling: Rhofade is a topical medication which can decrease superficial blood flow where applied. Side effects are uncommon and include stinging, redness and allergic reactions.

## 2024-02-12 NOTE — PROGRESS NOTES
[facility]  Hospitalist Progress Note  Abby Verdugo MD, MD 12/29/2018    Reason for Stay (Diagnosis): Sepsis secondary to MSSA suspected right lower lung pneumonia         Assessment and Plan:      Summary of Stay: Mr. Cooper is a 67-year-old man with history of severe COPD intermittently on home oxygen who presented with several days of productive cough and worsening shortness of breath.  Found to be in hypoxic respiratory failure with severe sepsis secondary to pneumonia in the right lung.  He also has a had a history of atrial fibrillation with previous ablation, tricuspid valve replacement, hypertension, nonsustained ventricular tachycardia and 2 hospitalizations in the past 2 months for COPD exacerbations.     1.  Severe sepsis due to MSSA bacteremia and pneumonia. The source of bacteremia has been felt to be spill over from the pneumonia. MSSA in blood and sputum. Has a history of tricuspid valve repair and left total hip arthroplasty.  --Continue IV ancef.  --Plans for extended course of IV antibiotic course.  midline placed.   --IV antibiotics with IV Ancef for outpatient infusion orders care of ID service are in place. Appreciate ID input     2.  Acute on chronic hypoxic respiratory failure.  Suspected to be due to pneumonia and COPD exacerbation.    --Initially required BiPAP. Now on oxygen via NC. Off BIPAP  -- Initially received IV steroids and now switched to oral prednisone.  --Oxygen requirement down to 3 lit/min(home dose)  - repeat CXR shows improvement in right lung infiltrate. Renal function stable.   --Still has significant leg edema. Weight up 15lbs since admission. Will increase lasix to 40 mg IV daily for diuresis. Will monitor renal function.     3.  Pneumonia , RLL, likely MSSA.     --Continue IV ancef. Day 13/28 today. Culture from 12/19 negative.  ID following    4.  COPD exacerbation.  --Continue antibiotics as above.  --Continue Xopenex.  --Continue Incruse  The patient has been examined and the H&P has been reviewed:    I concur with the findings and no changes have occurred since H&P was written.    Procedure risks, benefits and alternative options discussed and understood by patient/family.          Active Hospital Problems    Diagnosis  POA    *Atherosclerotic PVD with intermittent claudication [I70.219]  Yes     Priority: Low    Atherosclerosis of aorta [I70.0]  Yes     Ct 6/22      Essential hypertension [I10]  Yes    Impaired functional mobility and activity tolerance [Z74.09]  Yes    COPD, moderate [J44.9]  Yes     Moderate COPD seen on 3/1/2021 CPFT. New treatment: controller ANORO and rescue Albuterol inhaler.   3/1/2021 cpft moderate obstructive airflow defect.   3/1/2021 normal ABG and 6mwd.   Complete smoking cessation strongly advised  1 - 1.5 packs day. Last quit attempt Nov 2020.          Coronary artery disease of native artery of native heart with stable angina pectoris [I25.118]  Yes    Near syncope [R55]  Yes    Bruit of right carotid artery [R09.89]  Yes    Mixed hyperlipidemia [E78.2]  Yes     Chronic      Resolved Hospital Problems   No resolved problems to display.      Ellipta.  --Continue steroids     5.  Atrial fibrillation.-Rate control  --Continue diltiazem, propafenone, and apixaban. Seems adequately controlled      6.  Hyperglycemia. Likely due to steroids.now improving  --On NPH insulin  5 units daily   --NovoLog sliding scale.  --Anticipating improvement of glucose control once steroids has been tapered and discontinued.    8.  Chronic kidney disease.  Creatinine at baseline and currently normal  --Avoid nephrotoxins as able.     9.  Nutrition.  -Regular diet     10.  Deconditioning.    -Physical therapy consult.    11.  Hypertension-still uncontrolled likely current steroid use contributing.  On diltiazem 360 mg daily.Hydralazine 50 mg TID    12. Patient having difficulty sleeping at night, he is requesting ambien instead of trazodone, will try tonight       Diet: Regular Diet Adult    DVT Prophylaxis: eliquis  Code Status: Full Code    Disposition: Anticipate discharge in 1-2 days if swelling improves, O2 sat remains stable, and if no new issues.        Interval History (Subjective):      Patient seen and examined. He stated that his leg swelling has not improved. He stated that he has intermittent cough but denies shortness of breath. He denies fever. No nausea or vomiting.          Physical Exam:      Last Vital Signs:  /87 (BP Location: Right arm)   Pulse 88   Temp 97.9  F (36.6  C) (Oral)   Resp 18   Wt 94.2 kg (207 lb 11.2 oz)   SpO2 95%   BMI 29.80 kg/m      I/O last 3 completed shifts:  In: 480 [P.O.:480]  Out: -   Wt Readings from Last 1 Encounters:   12/29/18 94.2 kg (207 lb 11.2 oz)     Vitals:    12/24/18 0707 12/26/18 0539 12/27/18 0501 12/28/18 0711   Weight: 94.3 kg (207 lb 12.8 oz) 95.4 kg (210 lb 6.4 oz) 95.4 kg (210 lb 4.8 oz) 93.5 kg (206 lb 3.2 oz)    12/29/18 0551   Weight: 94.2 kg (207 lb 11.2 oz)       Constitutional: Awake, alert, cooperative, no apparent distress   Respiratory: Fair air entry, no crackles, scant wheezing    Cardiovascular: Irregularly irregular rhythm, normal rate, S1 and S2   Abdomen: Normal bowel sounds, soft, non-distended, non-tender   Skin: No rashes, no cyanosis, dry to touch   Neuro: Alert and oriented x3, neurologically intact, spontaneous and coherent speech   Extremities: Trace edema, normal range of motion   Other(s): Euthymic mood, not agitated       All other systems: Negative          Medications:      All current medications were reviewed with changes reflected in problem list.         Data:      All new lab and imaging data was reviewed.   Labs:  No results for input(s): CULT in the last 168 hours.  Recent Labs   Lab 12/26/18  0803   WBC 15.4*   HGB 14.8   HCT 46.4   MCV 92        Recent Labs   Lab 12/29/18  0710 12/28/18  0556 12/27/18  0652    138 137   POTASSIUM 5.0 4.7 4.8   CHLORIDE 100 98 99   CO2 36* 40* 37*   ANIONGAP 1* <1* 1*   * 117* 105*   BUN 31* 31* 33*   CR 0.90 0.96 0.96   GFRESTIMATED 88 81 81   GFRESTBLACK >90 >90 >90   WILBERT 8.6 8.4* 8.8     Recent Labs   Lab 12/29/18  0710 12/29/18  0231 12/28/18  2109 12/28/18  1725 12/28/18  1309 12/28/18  0707 12/28/18  0556  12/27/18  0652  12/26/18  0803   *  --   --   --   --   --  117*  --  105*  --  90   BGM  --  122* 167* 251* 193* 122*  --    < >  --    < >  --     < > = values in this interval not displayed.     No results for input(s): INR in the last 168 hours.  No results for input(s): TROPONIN, TROPI, TROPR in the last 168 hours.    Invalid input(s): TROP, TROPONINIES  No results for input(s): COLOR, APPEARANCE, URINEGLC, URINEBILI, URINEKETONE, SG, UBLD, URINEPH, PROTEIN, UROBILINOGEN, NITRITE, LEUKEST, RBCU, WBCU in the last 168 hours.   Imaging:   No results found for this or any previous visit (from the past 24 hour(s)).

## 2024-10-15 NOTE — PROGRESS NOTES
[facility]    Infectious Disease Progress Note    Date of Service (when I saw the patient): 02/13/2019     Assessment & Plan   Tone Cooper is a 68 year old male who was admitted on 2/11/2019.     Impression:  1.  A 68-year-old male with 4th hospitalization in the last month with hypoxic respiratory failure, found to have a new PE in right Upper lobe.   2.  Recent admission for Staph aureus bacteremia, only 1 of 2 cultures positive.  Suspect this is spillover from pneumonia, treated with 4 weeks of IV antibiotics and then discharged on oral Augmentin. He was still taking oral Augmentin.   3. 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 Given this he was aggressively treated with a longer course of IV antibiotics.   4. CT scan this occasion shows improving prior infection in the right lower lobe.         RECOMMENDATIONS:     1. For now IV zosyn.   2. Follow up blood cultures. If negative, can be switched back to oral augmentin soon.   3. No history of MRSA,  Vancomycin. Stopped                 Anna Pettit MD    Interval History   About the same   still  Wheezy     Physical Exam   Temp: 95.1  F (35.1  C) Temp src: Axillary BP: 129/78 Pulse: 61 Heart Rate: 70 Resp: 20 SpO2: 92 % O2 Device: Nasal cannula Oxygen Delivery: 3 LPM  Vitals:    02/12/19 0523 02/12/19 1054 02/13/19 0500   Weight: 85.9 kg (189 lb 6.4 oz) 86 kg (189 lb 9.5 oz) 87.6 kg (193 lb 3.2 oz)     Vital Signs with Ranges  Temp:  [95.1  F (35.1  C)-96.4  F (35.8  C)] 95.1  F (35.1  C)  Pulse:  [61] 61  Heart Rate:  [62-70] 70  Resp:  [18-20] 20  BP: (118-143)/(73-81) 129/78  SpO2:  [91 %-98 %] 92 %    Constitutional: short of breath   Lungs: wheezing   Cardiovascular: Regular rate and rhythm, normal S1 and S2, and no murmur noted  Abdomen: Normal bowel sounds, soft, non-distended, non-tender  Skin: No rashes, no cyanosis, no edema  Other:    Medications      - MEDICATION INSTRUCTIONS -         diltiazem ER COATED BEADS  180 mg Oral BID     enoxaparin  1 mg/kg Subcutaneous Q12H     furosemide  20 mg Oral BID     ipratropium - albuterol 0.5 mg/2.5 mg/3 mL  3 mL Nebulization 4x daily     piperacillin-tazobactam  3.375 g Intravenous Q6H     predniSONE  40 mg Oral Daily     propafenone  150 mg Oral Q8H MEGHAN    And     propafenone  75 mg Oral Q8H MEGHAN     sodium chloride (PF)  3 mL Intracatheter Q8H     tamsulosin  0.4 mg Oral Daily     traZODone  50 mg Oral At Bedtime       Data   All microbiology laboratory data reviewed.  Recent Labs   Lab Test 02/13/19  0634 02/12/19  0642 02/11/19  1209   WBC 12.3* 7.6 15.1*   HGB 13.9 13.9 16.9   HCT 43.2 43.2 51.3   MCV 95 94 93   * 110* 118*     Recent Labs   Lab Test 02/13/19  0634 02/11/19  1209 01/08/19  0621   CR 1.09 1.11 0.97     No lab results found.  Recent Labs   Lab Test 02/11/19  1256 02/11/19  1244 01/05/19  1545 12/19/18  1023 12/19/18  1013 12/18/18  1027 12/17/18  1518 12/17/18  1452 11/25/18  2257   CULT No growth after 2 days No growth after 2 days Moderate growth  Normal tushar   No growth No growth Light growth  Normal tushar    Moderate growth  Staphylococcus aureus  * Cultured on the 1st day of incubation:  Staphylococcus aureus  This isolate DOES NOT demonstrate inducible clindamycin resistance in vitro. Clindamycin   is susceptible and could be used when indicated, however, erythromycin is resistant and   should not be used.  *  Critical Value/Significant Value, preliminary result only, called to and read back by  Laure SANDOVAL @ 3606 12.18.18 JE    (Note)  POSITIVE for STAPHYLOCOCCUS AUREUS and NEGATIVE for the mecA gene  (not MRSA) by [employer] multiplex nucleic acid test. The mecA gene was  not detected. Final identification and antimicrobial susceptibility  testing will be verified by standard methods.    Specimen tested with Octmamiigene multiplex, gram-positive blood culture  nucleic acid test for  the following targets: Staph aureus, Staph  epidermidis, Staph lugdunensis, other Staph species, Enterococcus  faecalis, Enterococcus faecium, Streptococcus species, S. agalactiae,  S. anginosus grp., S. pneumoniae, S. pyogenes, Listeria sp., mecA  (methicillin resistance) and Steven/B (vancomycin resistance).    Critical Value/Significant Value called to and read back by Laure Mathews RN [facility] 1652 12.18.18 PATSY.   No growth Heavy growth  Normal tushar    Moderate growth  Staphylococcus aureus  This isolate DOES NOT demonstrate inducible clindamycin resistance in vitro. Clindamycin   is susceptible and could be used when indicated, however, erythromycin is resistant and   should not be used.  *      Detail Level: Detailed Depth Of Biopsy: dermis Was A Bandage Applied: Yes Size Of Lesion In Cm: 0.4 X Size Of Lesion In Cm: 0.5 Biopsy Type: H and E Biopsy Method: Dermablade Anesthesia Type: 1% lidocaine with epinephrine Additional Anesthesia Volume In Cc (Will Not Render If 0): 0 Hemostasis: Electrocautery and Aluminum Chloride Wound Care: Petrolatum Dressing: bandage Destruction After The Procedure: No Type Of Destruction Used: Curettage Curettage Text: The wound bed was treated with curettage after the biopsy was performed. Cryotherapy Text: The wound bed was treated with cryotherapy after the biopsy was performed. Electrodesiccation Text: The wound bed was treated with electrodesiccation after the biopsy was performed. Electrodesiccation And Curettage Text: The wound bed was treated with electrodesiccation and curettage after the biopsy was performed. Silver Nitrate Text: The wound bed was treated with silver nitrate after the biopsy was performed. Lab: -1963 Consent: Written consent was obtained and risks were reviewed including but not limited to scarring, infection, bleeding, scabbing, incomplete removal, nerve damage and allergy to anesthesia. Post-Care Instructions: I reviewed with the patient in detail post-care instructions. Patient is to keep the biopsy site dry overnight, and then apply bacitracin twice daily until healed. Patient may apply hydrogen peroxide soaks to remove any crusting. Notification Instructions: Patient will be notified of biopsy results. However, patient instructed to call the office if not contacted within 2 weeks. Billing Type: Third-Party Bill Information: Selecting Yes will display possible errors in your note based on the variables you have selected. This validation is only offered as a suggestion for you. PLEASE NOTE THAT THE VALIDATION TEXT WILL BE REMOVED WHEN YOU FINALIZE YOUR NOTE. IF YOU WANT TO FAX A PRELIMINARY NOTE YOU WILL NEED TO TOGGLE THIS TO 'NO' IF YOU DO NOT WANT IT IN YOUR FAXED NOTE.

## 2025-04-14 NOTE — PHARMACY-ADMISSION MEDICATION HISTORY
Admission medication history interview status for this patient is complete. See [facility] admission navigator for allergy information, prior to admission medications and immunization status.     Medication history interview source(s):Patient  Medication history resources (including written lists, pill bottles, clinic record):None  Primary pharmacy:[facility] [address]    Changes made to PTA medication list:  Added: flomax, lasix  Deleted: -  Changed: -    Actions taken by pharmacist (provider contacted, etc):None     Additional medication history information:None    Medication reconciliation/reorder completed by provider prior to medication history? No    Do you take OTC medications (eg tylenol, ibuprofen, fish oil, eye/ear drops, etc)? yes(Y/N)    For patients on insulin therapy: no (Y/N)  Lantus/levemir/NPH/Mix 70/30 dose:   (Y/N) (see Med list for doses)   Sliding scale Novolog Y/N  If Yes, do you have a baseline novolog pre-meal dose:  units with meals  Patients eat three meals a day:   Y/N    How many episodes of hypoglycemia do you have per week: _______  How many missed doses do you have per week: ______  How many times do you check your blood glucose per day: _______  Do you have a Continuous glucose monitor (CGM)   Y/N (remind pt that not approved for hospital use)   Any Barriers to therapy - Be specific :  cost of medications, comfortable with giving injections (if applicable), comfortable and confident with current diabetes regimen: Y/N ______________      Prior to Admission medications    Medication Sig Last Dose Taking? Auth Provider   amoxicillin-clavulanate (AUGMENTIN) 875-125 MG tablet Take 1 tablet by mouth 2 times daily for 28 days 2/11/2019 at x1 Yes Shanon Black MD   diltiazem ER COATED BEADS (CARDIZEM CD/CARTIA XT) 180 MG 24 hr capsule Take 180 mg by mouth 2 times daily 2/11/2019 at x1 Yes Unknown, Entered By History   furosemide (LASIX) 20 MG tablet Take 20 mg by mouth 2 times daily  Received FMLA forms to be completed via Fax from [employer] .    No CONSTANTIN or payment has been received .  Sending forms to the forms department .   2/11/2019 at x1 Yes Unknown, Entered By History   ipratropium - albuterol 0.5 mg/2.5 mg/3 mL (DUONEB) 0.5-2.5 (3) MG/3ML neb solution Take 1 vial by nebulization every 6 hours as needed for shortness of breath / dyspnea or wheezing  Yes Unknown, Entered By History   levalbuterol (XOPENEX) 1.25 MG/3ML neb solution Take 3 mLs (1.25 mg) by nebulization every 4 hours as needed for wheezing or shortness of breath / dyspnea  Yes Marco Masters MD   oxyCODONE (OXY-IR) 5 MG capsule Take 5 mg by mouth every 4 hours as needed for severe pain  Yes Reported, Patient   predniSONE (DELTASONE) 20 MG tablet 20 mg/day x 1 week then   10 mg /day  x 1 week, further plan per PCP / pulmonology  Please dispense 20 tabs. 2/11/2019 at 10 mg Yes Shanon Black MD   propafenone (RYTHMOL) 225 MG tablet Take 1 tablet (225 mg) by mouth every 8 hours 2/11/2019 at x1 Yes Sussy Britt MD   psyllium (METAMUCIL/KONSYL) Packet Take 1 packet by mouth daily as needed for constipation  Yes Unknown, Entered By History   tamsulosin (FLOMAX) 0.4 MG capsule Take 0.4 mg by mouth daily Past Month at Unknown time Yes Unknown, Entered By History   traZODone (DESYREL) 50 MG tablet Take 50 mg by mouth At Bedtime  2/10/2019 at Unknown time Yes Unknown, Entered By History   albuterol (VENTOLIN HFA) 108 (90 Base) MCG/ACT inhaler Inhale 1-2 puffs into the lungs every 4 hours (while awake) PRN  Patient not taking: Reported on 1/18/2019   Nata Mcduffie MD   order for DME Equipment being ordered: Nebulizer   Nata Mcduffie MD   order for DME Oxygen for nocturnal use. 1 LPM via nasal cannula  Testing done at home in chronic stable state.  Condition is COPD.  Patient does not have Obstructive Sleep Apnea.  Overnight oximetry revealed 30.3 minutes of hypoxia (<= 88%).  Duration: Lifetime (99 months).   Tyson Sanders MD

## (undated) RX ORDER — HYDRALAZINE HYDROCHLORIDE 20 MG/ML
INJECTION INTRAMUSCULAR; INTRAVENOUS
Status: DISPENSED
Start: 2017-01-23

## (undated) RX ORDER — KETOROLAC TROMETHAMINE 30 MG/ML
INJECTION, SOLUTION INTRAMUSCULAR; INTRAVENOUS
Status: DISPENSED
Start: 2017-01-23

## (undated) RX ORDER — FENTANYL CITRATE 50 UG/ML
INJECTION, SOLUTION INTRAMUSCULAR; INTRAVENOUS
Status: DISPENSED
Start: 2017-01-23

## (undated) RX ORDER — DIPHENHYDRAMINE HYDROCHLORIDE 50 MG/ML
INJECTION INTRAMUSCULAR; INTRAVENOUS
Status: DISPENSED
Start: 2017-01-23

## (undated) RX ORDER — HEPARIN SODIUM 1000 [USP'U]/ML
INJECTION, SOLUTION INTRAVENOUS; SUBCUTANEOUS
Status: DISPENSED
Start: 2017-01-23